# Patient Record
Sex: FEMALE | Race: WHITE | Employment: OTHER | URBAN - METROPOLITAN AREA
[De-identification: names, ages, dates, MRNs, and addresses within clinical notes are randomized per-mention and may not be internally consistent; named-entity substitution may affect disease eponyms.]

---

## 2017-01-24 ENCOUNTER — HOSPITAL ENCOUNTER (OUTPATIENT)
Dept: WOMENS IMAGING | Age: 67
Discharge: HOME | End: 2017-01-24
Attending: INTERNAL MEDICINE | Admitting: INTERNAL MEDICINE

## 2017-01-24 DIAGNOSIS — Z12.39 SCREENING BREAST EXAMINATION: ICD-10-CM

## 2017-02-11 PROBLEM — I21.4 NSTEMI (NON-ST ELEVATED MYOCARDIAL INFARCTION) (HCC): Status: ACTIVE | Noted: 2017-02-11

## 2017-02-14 ENCOUNTER — CARE COORDINATOR VISIT (OUTPATIENT)
Dept: CASE MANAGEMENT | Age: 67
End: 2017-02-14

## 2017-02-27 ENCOUNTER — OFFICE VISIT (OUTPATIENT)
Dept: CARDIOLOGY CLINIC | Age: 67
End: 2017-02-27

## 2017-02-27 VITALS
WEIGHT: 287 LBS | BODY MASS INDEX: 52.81 KG/M2 | HEART RATE: 64 BPM | DIASTOLIC BLOOD PRESSURE: 62 MMHG | HEIGHT: 62 IN | SYSTOLIC BLOOD PRESSURE: 128 MMHG

## 2017-02-27 DIAGNOSIS — Z95.0 CARDIAC PACEMAKER IN SITU: Primary | ICD-10-CM

## 2017-02-27 PROCEDURE — 99214 OFFICE O/P EST MOD 30 MIN: CPT | Performed by: INTERNAL MEDICINE

## 2017-03-02 ENCOUNTER — OFFICE VISIT (OUTPATIENT)
Dept: SURGERY | Age: 67
End: 2017-03-02

## 2017-03-02 VITALS
SYSTOLIC BLOOD PRESSURE: 140 MMHG | BODY MASS INDEX: 52.49 KG/M2 | HEART RATE: 77 BPM | WEIGHT: 287 LBS | RESPIRATION RATE: 16 BRPM | DIASTOLIC BLOOD PRESSURE: 48 MMHG

## 2017-03-02 DIAGNOSIS — C50.919 MALIGNANT NEOPLASM OF BREAST (FEMALE), UNSPECIFIED SITE: Primary | ICD-10-CM

## 2017-03-02 PROCEDURE — 99213 OFFICE O/P EST LOW 20 MIN: CPT | Performed by: SURGERY

## 2017-03-06 ENCOUNTER — HOSPITAL ENCOUNTER (OUTPATIENT)
Dept: OTHER | Age: 67
Discharge: HOME | End: 2017-03-06
Attending: INTERNAL MEDICINE | Admitting: INTERNAL MEDICINE

## 2017-03-06 LAB
ALBUMIN SERPL-MCNC: 4.1 GM/DL (ref 3.4–5)
ALP BLD-CCNC: 109 IU/L (ref 40–129)
ALT SERPL-CCNC: 32 U/L (ref 10–40)
ANION GAP SERPL CALCULATED.3IONS-SCNC: 13 MMOL/L (ref 4–16)
AST SERPL-CCNC: 32 IU/L (ref 15–37)
BILIRUB SERPL-MCNC: 0.3 MG/DL (ref 0–1)
BUN BLDV-MCNC: 12 MG/DL (ref 6–23)
CALCIUM SERPL-MCNC: 10.1 MG/DL (ref 8.3–10.6)
CHLORIDE BLD-SCNC: 98 MMOL/L (ref 99–110)
CO2: 29 MMOL/L (ref 21–32)
CREAT SERPL-MCNC: 0.8 MG/DL (ref 0.6–1.1)
GFR AFRICAN AMERICAN: >60 ML/MIN/1.73M2
GFR NON-AFRICAN AMERICAN: >60 ML/MIN/1.73M2
GLUCOSE BLD-MCNC: 86 MG/DL (ref 70–140)
LACTATE DEHYDROGENASE: 212 IU/L (ref 120–246)
POTASSIUM SERPL-SCNC: 4.1 MMOL/L (ref 3.5–5.1)
RETICULOCYTE COUNT PCT: 1.9 % (ref 0.2–2.2)
SODIUM BLD-SCNC: 140 MMOL/L (ref 135–145)
TOTAL PROTEIN: 7.4 GM/DL (ref 6.4–8.2)

## 2017-03-10 ENCOUNTER — HOSPITAL ENCOUNTER (OUTPATIENT)
Dept: OTHER | Age: 67
Discharge: HOME | End: 2017-03-10
Attending: SURGERY | Admitting: SURGERY

## 2017-03-10 ENCOUNTER — PROCEDURE VISIT (OUTPATIENT)
Dept: SURGERY | Age: 67
End: 2017-03-10

## 2017-03-10 ENCOUNTER — TELEPHONE (OUTPATIENT)
Dept: SURGERY | Age: 67
End: 2017-03-10

## 2017-03-10 VITALS
HEART RATE: 90 BPM | RESPIRATION RATE: 16 BRPM | SYSTOLIC BLOOD PRESSURE: 147 MMHG | DIASTOLIC BLOOD PRESSURE: 82 MMHG | WEIGHT: 287 LBS | HEIGHT: 62 IN | BODY MASS INDEX: 52.81 KG/M2

## 2017-03-10 DIAGNOSIS — J34.89 LESION OF NOSE: Primary | ICD-10-CM

## 2017-03-10 PROCEDURE — 11100 PR BIOPSY OF SKIN LESION: CPT | Performed by: SURGERY

## 2017-03-13 ENCOUNTER — HOSPITAL ENCOUNTER (OUTPATIENT)
Dept: ULTRASOUND IMAGING | Age: 67
Discharge: HOME | End: 2017-03-13
Attending: INTERNAL MEDICINE | Admitting: INTERNAL MEDICINE

## 2017-03-13 DIAGNOSIS — R16.2 HEPATOMEGALY WITH SPLENOMEGALY, NOT ELSEWHERE CLASSIFIED: ICD-10-CM

## 2017-03-13 DIAGNOSIS — R16.2 HEPATOSPLENOMEGALY: ICD-10-CM

## 2017-03-13 DIAGNOSIS — C50.411 CARCINOMA OF UPPER-OUTER QUADRANT OF FEMALE BREAST, RIGHT (HCC): ICD-10-CM

## 2017-03-15 ENCOUNTER — OFFICE VISIT (OUTPATIENT)
Dept: SURGERY | Age: 67
End: 2017-03-15

## 2017-03-15 VITALS
SYSTOLIC BLOOD PRESSURE: 130 MMHG | WEIGHT: 287 LBS | HEART RATE: 78 BPM | HEIGHT: 62 IN | BODY MASS INDEX: 52.81 KG/M2 | DIASTOLIC BLOOD PRESSURE: 70 MMHG | RESPIRATION RATE: 16 BRPM

## 2017-03-15 DIAGNOSIS — K14.8 TONGUE LESION: ICD-10-CM

## 2017-03-15 DIAGNOSIS — J34.89 LESION OF NOSE: Primary | ICD-10-CM

## 2017-03-15 PROCEDURE — 99024 POSTOP FOLLOW-UP VISIT: CPT | Performed by: SURGERY

## 2017-05-30 ENCOUNTER — HOSPITAL ENCOUNTER (OUTPATIENT)
Dept: OTHER | Age: 67
Discharge: HOME | End: 2017-05-30
Attending: INTERNAL MEDICINE | Admitting: INTERNAL MEDICINE

## 2017-05-30 LAB
ALBUMIN SERPL-MCNC: 4.2 GM/DL (ref 3.4–5)
ALP BLD-CCNC: 106 IU/L (ref 40–129)
ALT SERPL-CCNC: 33 U/L (ref 10–40)
ANION GAP SERPL CALCULATED.3IONS-SCNC: 17 MMOL/L (ref 4–16)
AST SERPL-CCNC: 58 IU/L (ref 15–37)
BILIRUB SERPL-MCNC: 0.3 MG/DL (ref 0–1)
BUN BLDV-MCNC: 17 MG/DL (ref 6–23)
CALCIUM SERPL-MCNC: 9.9 MG/DL (ref 8.3–10.6)
CHLORIDE BLD-SCNC: 99 MMOL/L (ref 99–110)
CO2: 26 MMOL/L (ref 21–32)
CREAT SERPL-MCNC: 0.9 MG/DL (ref 0.6–1.1)
GFR AFRICAN AMERICAN: >60 ML/MIN/1.73M2
GFR NON-AFRICAN AMERICAN: >60 ML/MIN/1.73M2
GLUCOSE BLD-MCNC: 122 MG/DL (ref 70–140)
LACTATE DEHYDROGENASE: 277 IU/L (ref 120–246)
POTASSIUM SERPL-SCNC: 4.9 MMOL/L (ref 3.5–5.1)
SODIUM BLD-SCNC: 142 MMOL/L (ref 135–145)
TOTAL PROTEIN: 7.4 GM/DL (ref 6.4–8.2)

## 2017-09-01 ENCOUNTER — HOSPITAL ENCOUNTER (OUTPATIENT)
Dept: GENERAL RADIOLOGY | Age: 67
Discharge: HOME | End: 2017-09-01
Attending: FAMILY MEDICINE | Admitting: FAMILY MEDICINE

## 2017-09-01 ENCOUNTER — HOSPITAL ENCOUNTER (OUTPATIENT)
Dept: ULTRASOUND IMAGING | Age: 67
Discharge: HOME | End: 2017-09-01
Attending: INTERNAL MEDICINE | Admitting: INTERNAL MEDICINE

## 2017-09-01 DIAGNOSIS — R93.89 ABNORMAL FINDING ON RADIOLOGY EXAM: ICD-10-CM

## 2017-09-01 DIAGNOSIS — R16.1 SPLENOMEGALY: ICD-10-CM

## 2017-09-01 LAB
ALBUMIN SERPL-MCNC: 4.1 GM/DL (ref 3.4–5)
ALP BLD-CCNC: 102 IU/L (ref 40–129)
ALT SERPL-CCNC: 35 U/L (ref 10–40)
ANION GAP SERPL CALCULATED.3IONS-SCNC: 14 MMOL/L (ref 4–16)
AST SERPL-CCNC: 52 IU/L (ref 15–37)
BILIRUB SERPL-MCNC: 0.4 MG/DL (ref 0–1)
BUN BLDV-MCNC: 12 MG/DL (ref 6–23)
CALCIUM SERPL-MCNC: 10.4 MG/DL (ref 8.3–10.6)
CHLORIDE BLD-SCNC: 98 MMOL/L (ref 99–110)
CO2: 27 MMOL/L (ref 21–32)
CREAT SERPL-MCNC: 0.8 MG/DL (ref 0.6–1.1)
DIFFERENTIAL TYPE: ABNORMAL
EOSINOPHILS ABSOLUTE: 0.1 K/CU MM
EOSINOPHILS RELATIVE PERCENT: 1 % (ref 0–3)
ESTIMATED AVERAGE GLUCOSE: 114 MG/DL
FERRITIN: 306 NG/ML (ref 15–150)
FOLATE: 12.7 NG/ML (ref 3.1–17.5)
GFR AFRICAN AMERICAN: >60 ML/MIN/1.73M2
GFR NON-AFRICAN AMERICAN: >60 ML/MIN/1.73M2
GLUCOSE BLD-MCNC: 112 MG/DL (ref 70–140)
HBA1C MFR BLD: 5.6 % (ref 4.2–6.3)
HCT VFR BLD CALC: 37.2 % (ref 37–47)
HEMOGLOBIN: 12.1 GM/DL (ref 12.5–16)
HEPATITIS C ANTIBODY: NON REACTIVE
LACTATE DEHYDROGENASE: 247 IU/L (ref 120–246)
LYMPHOCYTES ABSOLUTE: 1.6 K/CU MM
LYMPHOCYTES RELATIVE PERCENT: 16 % (ref 24–44)
MCH RBC QN AUTO: 32.4 PG (ref 27–31)
MCHC RBC AUTO-ENTMCNC: 32.5 % (ref 32–36)
MCV RBC AUTO: 99.7 FL (ref 78–100)
MONOCYTES ABSOLUTE: 0.4 K/CU MM
MONOCYTES RELATIVE PERCENT: 4 % (ref 0–4)
PDW BLD-RTO: 14.6 % (ref 11.7–14.9)
PLATELET # BLD: 212 K/CU MM (ref 140–440)
PMV BLD AUTO: 9.8 FL (ref 7.5–11.1)
POTASSIUM SERPL-SCNC: 5.2 MMOL/L (ref 3.5–5.1)
RBC # BLD: 3.73 M/CU MM (ref 4.2–5.4)
SEGMENTED NEUTROPHILS ABSOLUTE COUNT: 7.8 K/CU MM
SEGMENTED NEUTROPHILS RELATIVE PERCENT: 79 % (ref 36–66)
SODIUM BLD-SCNC: 139 MMOL/L (ref 135–145)
TOTAL PROTEIN: 7.2 GM/DL (ref 6.4–8.2)
VITAMIN B-12: 175.2 PG/ML (ref 211–911)
WBC # BLD: 9.9 K/CU MM (ref 4–10.5)

## 2017-09-11 ENCOUNTER — HOSPITAL ENCOUNTER (OUTPATIENT)
Dept: OTHER | Age: 67
Discharge: HOME | End: 2017-09-11
Attending: FAMILY MEDICINE | Admitting: FAMILY MEDICINE

## 2017-09-11 LAB
BACTERIA: ABNORMAL /HPF
BILIRUBIN URINE: NEGATIVE MG/DL
BLOOD, URINE: NEGATIVE
CLARITY: CLEAR
COLOR: ABNORMAL
GLUCOSE, URINE: NEGATIVE MG/DL
KETONES, URINE: NEGATIVE MG/DL
LEUKOCYTE ESTERASE, URINE: ABNORMAL
NITRITE URINE, QUANTITATIVE: POSITIVE
PH, URINE: 7 (ref 5–8)
PROTEIN UA: NEGATIVE MG/DL
RBC URINE: <1 /HPF (ref 0–6)
SPECIFIC GRAVITY UA: 1.01 (ref 1–1.03)
SQUAMOUS EPITHELIAL: 1 /HPF
TRICHOMONAS: ABNORMAL /HPF
UROBILINOGEN, URINE: NORMAL MG/DL (ref 0.2–1)
WBC UA: 12 /HPF (ref 0–5)
YEAST: ABNORMAL /HPF

## 2017-09-13 LAB
CULTURE: NORMAL
ORGANISM: NORMAL
REPORT STATUS: NORMAL
REQUEST PROBLEM: NORMAL
SPECIMEN: NORMAL
TOTAL COLONY COUNT: NORMAL

## 2017-11-28 ENCOUNTER — HOSPITAL ENCOUNTER (OUTPATIENT)
Dept: GENERAL RADIOLOGY | Age: 67
Discharge: HOME | End: 2017-11-28
Attending: FAMILY MEDICINE | Admitting: FAMILY MEDICINE

## 2017-11-28 DIAGNOSIS — M25.562 LEFT KNEE PAIN, UNSPECIFIED CHRONICITY: ICD-10-CM

## 2017-11-28 LAB
ALBUMIN SERPL-MCNC: 4.2 GM/DL (ref 3.4–5)
ALP BLD-CCNC: 110 IU/L (ref 40–128)
ALT SERPL-CCNC: 22 U/L (ref 10–40)
ANION GAP SERPL CALCULATED.3IONS-SCNC: 15 MMOL/L (ref 4–16)
AST SERPL-CCNC: 30 IU/L (ref 15–37)
BASOPHILS ABSOLUTE: 0.1 K/CU MM
BASOPHILS RELATIVE PERCENT: 0.5 % (ref 0–1)
BILIRUB SERPL-MCNC: 0.2 MG/DL (ref 0–1)
BUN BLDV-MCNC: 12 MG/DL (ref 6–23)
CALCIUM SERPL-MCNC: 9.9 MG/DL (ref 8.3–10.6)
CHLORIDE BLD-SCNC: 99 MMOL/L (ref 99–110)
CHOLESTEROL: 188 MG/DL
CO2: 28 MMOL/L (ref 21–32)
CREAT SERPL-MCNC: 0.7 MG/DL (ref 0.6–1.1)
DIFFERENTIAL TYPE: ABNORMAL
EOSINOPHILS ABSOLUTE: 0.3 K/CU MM
EOSINOPHILS RELATIVE PERCENT: 2.2 % (ref 0–3)
ESTIMATED AVERAGE GLUCOSE: 108 MG/DL
GFR AFRICAN AMERICAN: >60 ML/MIN/1.73M2
GFR NON-AFRICAN AMERICAN: >60 ML/MIN/1.73M2
GLUCOSE FASTING: 84 MG/DL (ref 70–99)
HBA1C MFR BLD: 5.4 % (ref 4.2–6.3)
HCT VFR BLD CALC: 37.1 % (ref 37–47)
HDLC SERPL-MCNC: 46 MG/DL
HEMOGLOBIN: 11.6 GM/DL (ref 12.5–16)
IMMATURE NEUTROPHIL %: 0.9 % (ref 0–0.43)
LDL CHOLESTEROL DIRECT: 113 MG/DL
LYMPHOCYTES ABSOLUTE: 2.2 K/CU MM
LYMPHOCYTES RELATIVE PERCENT: 19.1 % (ref 24–44)
MCH RBC QN AUTO: 29.6 PG (ref 27–31)
MCHC RBC AUTO-ENTMCNC: 31.3 % (ref 32–36)
MCV RBC AUTO: 94.6 FL (ref 78–100)
MONOCYTES ABSOLUTE: 0.7 K/CU MM
MONOCYTES RELATIVE PERCENT: 5.6 % (ref 0–4)
NUCLEATED RBC %: 0 %
PDW BLD-RTO: 13.6 % (ref 11.7–14.9)
PLATELET # BLD: 291 K/CU MM (ref 140–440)
PMV BLD AUTO: 10 FL (ref 7.5–11.1)
POTASSIUM SERPL-SCNC: 4.5 MMOL/L (ref 3.5–5.1)
RBC # BLD: 3.92 M/CU MM (ref 4.2–5.4)
SEGMENTED NEUTROPHILS ABSOLUTE COUNT: 8.4 K/CU MM
SEGMENTED NEUTROPHILS RELATIVE PERCENT: 71.7 % (ref 36–66)
SODIUM BLD-SCNC: 142 MMOL/L (ref 135–145)
TOTAL IMMATURE NEUTOROPHIL: 0.11 K/CU MM
TOTAL NUCLEATED RBC: 0 K/CU MM
TOTAL PROTEIN: 7.5 GM/DL (ref 6.4–8.2)
TRIGL SERPL-MCNC: 215 MG/DL
VITAMIN B-12: 652 PG/ML (ref 211–911)
WBC # BLD: 11.7 K/CU MM (ref 4–10.5)

## 2017-12-05 ENCOUNTER — PAT TELEPHONE (OUTPATIENT)
Dept: SURGERY | Age: 67
End: 2017-12-05

## 2017-12-05 VITALS — HEIGHT: 62 IN | BODY MASS INDEX: 51.34 KG/M2 | WEIGHT: 279 LBS

## 2017-12-07 ENCOUNTER — HOSPITAL ENCOUNTER (OUTPATIENT)
Dept: OTHER | Age: 67
Discharge: HOME | End: 2017-12-07
Attending: INTERNAL MEDICINE | Admitting: INTERNAL MEDICINE

## 2017-12-07 LAB
ALBUMIN SERPL-MCNC: 4 GM/DL (ref 3.4–5)
ALP BLD-CCNC: 109 IU/L (ref 40–128)
ALT SERPL-CCNC: 19 U/L (ref 10–40)
ANION GAP SERPL CALCULATED.3IONS-SCNC: 15 MMOL/L (ref 4–16)
AST SERPL-CCNC: 20 IU/L (ref 15–37)
BILIRUB SERPL-MCNC: 0.2 MG/DL (ref 0–1)
BUN BLDV-MCNC: 14 MG/DL (ref 6–23)
CALCIUM SERPL-MCNC: 10 MG/DL (ref 8.3–10.6)
CHLORIDE BLD-SCNC: 101 MMOL/L (ref 99–110)
CO2: 28 MMOL/L (ref 21–32)
CREAT SERPL-MCNC: 0.8 MG/DL (ref 0.6–1.1)
GFR AFRICAN AMERICAN: >60 ML/MIN/1.73M2
GFR NON-AFRICAN AMERICAN: >60 ML/MIN/1.73M2
GLUCOSE BLD-MCNC: 95 MG/DL (ref 70–99)
POTASSIUM SERPL-SCNC: 4.2 MMOL/L (ref 3.5–5.1)
SODIUM BLD-SCNC: 144 MMOL/L (ref 135–145)
TOTAL PROTEIN: 7.2 GM/DL (ref 6.4–8.2)

## 2017-12-11 NOTE — ANESTHESIA PRE-OP
Department of Anesthesiology  Preprocedure Note       Name:  Eliseo Persaud   Age:  75 y.o.  :  1950                                          MRN:  [id number]         Date:  2017      Surgeon:    Procedure: colonoscopy    Medications prior to admission:   Prior to Admission medications    Medication Sig Start Date End Date Taking? Authorizing Provider   acetaminophen-codeine (TYLENOL #3) 300-30 MG per tablet Take 1 tablet by mouth every 6 hours as needed for Pain 7/30/15   Levi Ricci MD   PROAIR  (90 BASE) MCG/ACT inhaler INHALE 2 PUFFS BY MOUTH EVERY SIX HOURS AS NEEDED FOR WHEEZING  6/30/15   Amelia Montoya MD   sertraline (ZOLOFT) 100 MG tablet Take 200 mg by mouth every morning     Historical Provider, MD   aspirin 81 MG EC tablet Take 81 mg by mouth every morning     Historical Provider, MD   CPAP Machine MISC Inhale  into the lungs nightly. Historical Provider, MD   FLOVENT  MCG/ACT inhaler Inhale 2 puffs into the lungs 2 times daily. 1/3/13   Historical Provider, MD   ezetimibe (ZETIA) 10 MG tablet Take 10 mg by mouth every morning     Historical Provider, MD   simvastatin (ZOCOR) 20 MG tablet Take 20 mg by mouth nightly.     Historical Provider, MD   letrozole (FEMARA) 2.5 MG tablet Take 2.5 mg by mouth nightly     Historical Provider, MD   Ferrous Sulfate (IRON) 325 (65 FE) MG TABS Take 1 tablet by mouth every morning     Historical Provider, MD   calcium-vitamin D (OSCAL 500/200 D-3) 500-200 MG-UNIT per tablet Take 1 tablet by mouth 2 times daily     Historical Provider, MD   lisinopril (PRINIVIL;ZESTRIL) 20 MG tablet Take 20 mg by mouth every morning     Historical Provider, MD       Current medications:    Current Outpatient Prescriptions   Medication Sig Dispense Refill    acetaminophen-codeine (TYLENOL #3) 300-30 MG per tablet Take 1 tablet by mouth every 6 hours as needed for Pain 15 tablet 0    PROAIR  (90 BASE) MCG/ACT inhaler INHALE 2 PUFFS BY MOUTH EVERY SIX HOURS AS NEEDED FOR WHEEZING  1 Inhaler 5    sertraline (ZOLOFT) 100 MG tablet Take 200 mg by mouth every morning       aspirin 81 MG EC tablet Take 81 mg by mouth every morning       CPAP Machine MISC Inhale  into the lungs nightly.  FLOVENT  MCG/ACT inhaler Inhale 2 puffs into the lungs 2 times daily.  ezetimibe (ZETIA) 10 MG tablet Take 10 mg by mouth every morning       simvastatin (ZOCOR) 20 MG tablet Take 20 mg by mouth nightly.  letrozole (FEMARA) 2.5 MG tablet Take 2.5 mg by mouth nightly       Ferrous Sulfate (IRON) 325 (65 FE) MG TABS Take 1 tablet by mouth every morning       calcium-vitamin D (OSCAL 500/200 D-3) 500-200 MG-UNIT per tablet Take 1 tablet by mouth 2 times daily       lisinopril (PRINIVIL;ZESTRIL) 20 MG tablet Take 20 mg by mouth every morning        No current facility-administered medications for this encounter. Allergies: Allergies   Allergen Reactions    Lipitor [Atorvastatin] Shortness Of Breath    Taxol [Paclitaxel] Anaphylaxis and Swelling    Alcohol Other (See Comments)     Passed out with minimal amount.  Demerol Nausea Only    Neosporin [Bacitracin-Neomycin-Polymyxin] Rash and Other (See Comments)     hotness    Other Rash     Ivory Soap    Talc Other (See Comments)     blisters       Problem List:    Patient Active Problem List   Diagnosis Code    Carcinoma of breast ([facility] C50.919    Malignant neoplasm of breast (female) ([facility] C50.919    Diffuse cystic mastopathy N60.19    Hyperlipidemia E78.5    H/O chest pain Z87.898    Fall W19. XXXA    Heart block AV second degree I44.1    Abnormal cardiovascular stress test R94.39    JAMIE (obstructive sleep apnea) G47.33    Super obesity (HCC) E66.01    Mild asthma J45.998    Severe obstructive sleep apnea G47.33    Cardiac pacemaker Z95.0    Chronic cystitis N30.20    Acute renal failure (ARF) (HCC) N17.9    Hyponatremia E87.1    Asthma J45.909    Hypertension I10    uterus.  HERNIA REPAIR  2004    Umb hernia    HERNIA REPAIR  2008    Inc hernia    LYMPH NODE DISSECTION  2/29/2012    Right axillary-3 sentinel nodes were positive out of 9.    MASTECTOMY, RADICAL Right 02/29/2012    w/ sentinal node disection-Dr West    PACEMAKER PLACEMENT      PRE-MALIGNANT / BENIGN SKIN LESION EXCISION  2/8/2013    right leg X2-Dr West    TONSILLECTOMY  1957    TUNNELED VENOUS PORT PLACEMENT  2004    TUNNELED VENOUS PORT PLACEMENT  02/13/2012    removal of mediport       Social History:    Social History   Substance Use Topics    Smoking status: Never Smoker    Smokeless tobacco: Never Used    Alcohol use No      Comment:           CAFFEINE: 2- 12oz nona daily & 2 cups coffee some nights. Counseling given: Not Answered      Vital Signs (Current): There were no vitals filed for this visit. BP Readings from Last 3 Encounters:   07/13/17 (!) 153/62   03/15/17 130/70   03/10/17 (!) 147/82       NPO Status:                                                                                 BMI:   Wt Readings from Last 3 Encounters:   12/05/17 279 lb (126.6 kg)   07/13/17 278 lb (126.1 kg)   03/15/17 287 lb (130.2 kg)     There is no height or weight on file to calculate BMI. Anesthesia Evaluation    Airway:         Dental:          Pulmonary:   (+) sleep apnea:  asthma:                            Cardiovascular:    (+) hypertension:, pacemaker:, past MI:, hyperlipidemia         Beta Blocker:  Not on Beta Blocker         Neuro/Psych:   (+) depression/anxiety             GI/Hepatic/Renal:   (+) bowel prep, morbid obesity          Endo/Other:    (+) : arthritis: OA., malignancy/cancer (breast). Abdominal:           Vascular:                                        Anesthesia Plan      MAC     ASA 3       Induction: intravenous. Pre Anesthesia Assessment complete.  Chart

## 2017-12-14 ENCOUNTER — HOSPITAL ENCOUNTER (OUTPATIENT)
Dept: SURGERY | Age: 67
Discharge: HOME | End: 2017-12-14
Attending: SPECIALIST | Admitting: SPECIALIST

## 2017-12-14 VITALS
OXYGEN SATURATION: 96 % | DIASTOLIC BLOOD PRESSURE: 54 MMHG | BODY MASS INDEX: 51.71 KG/M2 | SYSTOLIC BLOOD PRESSURE: 114 MMHG | HEART RATE: 62 BPM | TEMPERATURE: 98.2 F | RESPIRATION RATE: 16 BRPM | WEIGHT: 281 LBS | HEIGHT: 62 IN

## 2017-12-14 RX ORDER — SODIUM CHLORIDE, SODIUM LACTATE, POTASSIUM CHLORIDE, CALCIUM CHLORIDE 600; 310; 30; 20 MG/100ML; MG/100ML; MG/100ML; MG/100ML
INJECTION, SOLUTION INTRAVENOUS CONTINUOUS
Status: DISCONTINUED | OUTPATIENT
Start: 2017-12-14 | End: 2017-12-15 | Stop reason: HOSPADM

## 2017-12-14 RX ADMIN — SODIUM CHLORIDE, SODIUM LACTATE, POTASSIUM CHLORIDE, CALCIUM CHLORIDE: 600; 310; 30; 20 INJECTION, SOLUTION INTRAVENOUS at 10:40

## 2017-12-14 ASSESSMENT — PAIN SCALES - GENERAL
PAINLEVEL_OUTOF10: 0
PAINLEVEL_OUTOF10: 0

## 2017-12-14 ASSESSMENT — PAIN - FUNCTIONAL ASSESSMENT: PAIN_FUNCTIONAL_ASSESSMENT: 0-10

## 2017-12-14 NOTE — BRIEF OP NOTE
BRIEF COLONOSCOPY REPORT:    Impression:    1) S/P partial sigmoid resection with end-to-side anastamosis     2) 1.5 cm residual polyp at the end of the blind sigmoid anastamotic loop- removed by EMR   3) three 5  Mm polyps removed from the blind sigmoid loop    4) sigmoid divertics   5) internal hemorrhoids      BRIEF EGD REPORT:    Impression:    1) no esophageal or gastric varices    2) small hiatal hernia            SUGGEST:   1) repeat colonoscopy 1 ywear       The complete operative report (including photos) is available in the following locations:   1) soft chart now   2) report will also be scanned and can then be found by going to \"chart review\" then \"notes\" then \"op report\" or by going to \"chart review\" then \"media\" then \"op report\". For review of photos, may need to go to page 2.

## 2017-12-14 NOTE — ANESTHESIA PRE-OP
inhaler Inhale 2 puffs into the lungs 2 times daily.  ezetimibe (ZETIA) 10 MG tablet Take 10 mg by mouth every morning       simvastatin (ZOCOR) 20 MG tablet Take 20 mg by mouth nightly.  letrozole (FEMARA) 2.5 MG tablet Take 2.5 mg by mouth nightly       Ferrous Sulfate (IRON) 325 (65 FE) MG TABS Take 1 tablet by mouth every morning       calcium-vitamin D (OSCAL 500/200 D-3) 500-200 MG-UNIT per tablet Take 1 tablet by mouth 2 times daily       lisinopril (PRINIVIL;ZESTRIL) 20 MG tablet Take 20 mg by mouth every morning       acetaminophen-codeine (TYLENOL #3) 300-30 MG per tablet Take 1 tablet by mouth every 6 hours as needed for Pain 15 tablet 0    PROAIR  (90 BASE) MCG/ACT inhaler INHALE 2 PUFFS BY MOUTH EVERY SIX HOURS AS NEEDED FOR WHEEZING  1 Inhaler 5    CPAP Machine MISC Inhale  into the lungs nightly. Current Facility-Administered Medications   Medication Dose Route Frequency Provider Last Rate Last Dose    lactated ringers infusion   Intravenous Continuous Jannette Pina MD 50 mL/hr at 12/14/17 1040         Allergies: Allergies   Allergen Reactions    Lipitor [Atorvastatin] Shortness Of Breath    Taxol [Paclitaxel] Anaphylaxis and Swelling    Alcohol Other (See Comments)     Passed out with minimal amount.  Demerol Nausea Only    Neosporin [Bacitracin-Neomycin-Polymyxin] Rash and Other (See Comments)     hotness    Other Rash     Ivory Soap    Talc Other (See Comments)     blisters       Problem List:    Patient Active Problem List   Diagnosis Code    Carcinoma of breast ([facility] C50.919    Malignant neoplasm of breast (female) ([facility] [facility] C50.919    Diffuse cystic mastopathy N60.19    Hyperlipidemia E78.5    H/O chest pain Z87.898    Fall W19. XXXA    Heart block AV second degree I44.1    Abnormal cardiovascular stress test R94.39    JAMIE (obstructive sleep apnea) G47.33    Super obesity (HCC) E66.01    Mild asthma J45.998    Severe obstructive sleep apnea 50%stenosis, there is intimal thickening but no significant atherosclerotic plaque noted in the left internal carotid artery, the left carotid are within normal limits    H/O echocardiogram 4/9/2012, 10/20/2011    4/9/2012-LVSF normal EF=>55%. impaired LV relaxation;    H/O echocardiogram 12/31/14    EF 50-55%. LV shows mild concentric hypertrophy. Mildly dilated left atrium. Mildly dilated right ventricle. Sclerotic but not stenotic aortic valve. Mitral annular calcification. Mild MR, Mild TR, Mild pulm HTN.    H/O urinary incontinence     History of blood transfusion     Hx of cardiovascular stress test 06/2013    EF 70% abn suggestive of anterolateral ischemia, possible RCA ischmeia, post left ventricular dilation suggestive multivessel CAD.  Hx of echocardiogram 06/2013    EF50%, mild MR and TR, mild pulmonary HTN, mild AS    HX OTHER MEDICAL 7/24/13, 06/2013 7/13-No clinacally significant arrhythmia. 6/13-multiple cycles of wenckebach episodes in addtion to some sinus tach    Hyperlipidemia     Hypertension     Hypothermia 2008    Obstructive sleep apnea 2008 01- Has CPAP machine but has not used in over a year - states she has not had problems since her pacemaker was placed.  Osteoarthritis     S/P cardiac cath 06/2013    no significant CAD false postive stress test    Super obesity [facility])        Past Surgical History:        Procedure Laterality Date    A-V CARDIAC PACEMAKER INSERTION  3/10/14     Medtronic. Model:  [id number] Medtronic  Serial:  [id number] dual chamber pacemaker    APPENDECTOMY  2004    BREAST BIOPSY  2/13/12    BREAST LUMPECTOMY  2007    right     BREAST SURGERY  02/13/2012    rt lesion biopsy     CARDIAC CATHETERIZATION  2007 & 2011    COLECTOMY  2004    Colon cancer    COLONOSCOPY  10-18-13    polyp    COLONOSCOPY  1/19/2016    internal hemorrhoids, diverticulosis, 1.5 cm sessile polyp, 8 mm polyp found in rectum.     CYST REMOVAL  2003    back  CYSTOSCOPY Bilateral 12/15/14    DENTAL SURGERY      front right tooth and root canal w/ brass bolt    DILATION AND CURETTAGE OF UTERUS  2006    Needed a camera to find my uterus.  HERNIA REPAIR  2004    Umb hernia    HERNIA REPAIR  2008    Inc hernia    LYMPH NODE DISSECTION  2/29/2012    Right axillary-3 sentinel nodes were positive out of 9.    MASTECTOMY, RADICAL Right 02/29/2012    w/ sentinal node disection-Dr West    PACEMAKER PLACEMENT      PRE-MALIGNANT / BENIGN SKIN LESION EXCISION  2/8/2013    right leg X2-Dr West    TONSILLECTOMY  1957    TUNNELED VENOUS PORT PLACEMENT  2004    TUNNELED VENOUS PORT PLACEMENT  02/13/2012    removal of mediport       Social History:    Social History   Substance Use Topics    Smoking status: Never Smoker    Smokeless tobacco: Never Used    Alcohol use No      Comment:           CAFFEINE: 2- 12oz nona daily & 2 cups coffee some nights. Counseling given: Not Answered      Vital Signs (Current):   Vitals:    12/14/17 1030   BP: (!) 159/58   Pulse: 70   Resp: 16   Temp: 98.6 °F (37 °C)   TempSrc: Temporal   SpO2: 98%   Weight: 281 lb (127.5 kg)   Height: 5' 2\" (1.575 m)                                              BP Readings from Last 3 Encounters:   12/14/17 (!) 159/58   07/13/17 (!) 153/62   03/15/17 130/70       NPO Status: Time of last liquid consumption: 0825                        Time of last solid consumption: 0900                        Date of last liquid consumption: 12/14/17                        Date of last solid food consumption: 12/13/17    BMI:   Wt Readings from Last 3 Encounters:   12/14/17 281 lb (127.5 kg)   12/05/17 279 lb (126.6 kg)   07/13/17 278 lb (126.1 kg)     Body mass index is 51.4 kg/m².     Anesthesia Evaluation   no history of anesthetic complications:   Airway: Mallampati: I  TM distance: >3 FB   Neck ROM: full  Mouth opening: > = 3 FB Dental:          Pulmonary:   (+) sleep apnea: on

## 2017-12-14 NOTE — ANESTHESIA POST-OP
Anesthesia Post-op Note    Patient: Juan David Obrien  MRN: [id number]  YOB: 1950  Date of evaluation: 12/14/2017  Time:  2:53 PM     Procedure(s) Performed: EGD COLONOSCOPY  Last Vitals: BP (!) 114/54   Pulse 62   Temp 98.2 °F (36.8 °C) (Temporal)   Resp 16   Ht 5' 2\" (1.575 m)   Wt 281 lb (127.5 kg)   SpO2 96%   BMI 51.40 kg/m²     Florian Phase I:      Florian Phase II: Florian Score: 10    Anesthesia Post Evaluation    Final anesthesia type: MAC  Patient location during evaluation: outpatient unit  Patient participation: complete - patient participated  Level of consciousness: awake and alert  Pain score: 0  Nausea & Vomiting: no nausea and no vomiting  Complications: no  Cardiovascular status: blood pressure returned to baseline  Respiratory status: acceptable  Hydration status: euvolemic        Janes Howe CRNA  2:53 PM

## 2018-01-25 ENCOUNTER — HOSPITAL ENCOUNTER (OUTPATIENT)
Dept: WOMENS IMAGING | Age: 68
Discharge: HOME | End: 2018-01-25
Attending: FAMILY MEDICINE | Admitting: FAMILY MEDICINE

## 2018-01-25 DIAGNOSIS — Z12.31 SCREENING MAMMOGRAM, ENCOUNTER FOR: ICD-10-CM

## 2018-03-19 ENCOUNTER — HOSPITAL ENCOUNTER (OUTPATIENT)
Dept: GENERAL RADIOLOGY | Age: 68
Discharge: HOME | End: 2018-03-19
Attending: FAMILY MEDICINE | Admitting: FAMILY MEDICINE

## 2018-03-19 LAB
ANION GAP SERPL CALCULATED.3IONS-SCNC: 14 MMOL/L (ref 4–16)
BUN BLDV-MCNC: 15 MG/DL (ref 6–23)
CALCIUM SERPL-MCNC: 10.4 MG/DL (ref 8.3–10.6)
CHLORIDE BLD-SCNC: 98 MMOL/L (ref 99–110)
CO2: 29 MMOL/L (ref 21–32)
CREAT SERPL-MCNC: 0.9 MG/DL (ref 0.6–1.1)
GFR AFRICAN AMERICAN: >60 ML/MIN/1.73M2
GFR NON-AFRICAN AMERICAN: >60 ML/MIN/1.73M2
GLUCOSE BLD-MCNC: 105 MG/DL (ref 70–99)
POTASSIUM SERPL-SCNC: 4.9 MMOL/L (ref 3.5–5.1)
SODIUM BLD-SCNC: 141 MMOL/L (ref 135–145)

## 2018-04-12 ENCOUNTER — HOSPITAL ENCOUNTER (OUTPATIENT)
Dept: GENERAL RADIOLOGY | Age: 68
Discharge: HOME | End: 2018-04-12
Attending: FAMILY MEDICINE | Admitting: FAMILY MEDICINE

## 2018-04-12 LAB
CHOLESTEROL: 175 MG/DL
HDLC SERPL-MCNC: 53 MG/DL
LDL CHOLESTEROL DIRECT: 105 MG/DL
T4 FREE: 1.06 NG/DL (ref 0.9–1.8)
TRIGL SERPL-MCNC: 159 MG/DL
TSH HIGH SENSITIVITY: 1.29 UIU/ML (ref 0.27–4.2)

## 2018-04-15 ENCOUNTER — HOSPITAL ENCOUNTER (OUTPATIENT)
Dept: CT IMAGING | Age: 68
Discharge: HOME | End: 2018-04-15

## 2018-09-18 ENCOUNTER — TELEPHONE (OUTPATIENT)
Dept: CARDIOLOGY CLINIC | Age: 68
End: 2018-09-18

## 2018-09-18 ENCOUNTER — OFFICE VISIT (OUTPATIENT)
Dept: CARDIOLOGY CLINIC | Age: 68
End: 2018-09-18

## 2018-09-18 VITALS
WEIGHT: 274 LBS | HEIGHT: 62 IN | DIASTOLIC BLOOD PRESSURE: 70 MMHG | BODY MASS INDEX: 50.42 KG/M2 | HEART RATE: 60 BPM | SYSTOLIC BLOOD PRESSURE: 114 MMHG

## 2018-09-18 DIAGNOSIS — Z95.0 PACEMAKER: Primary | ICD-10-CM

## 2018-09-18 PROCEDURE — 4040F PNEUMOC VAC/ADMIN/RCVD: CPT | Performed by: INTERNAL MEDICINE

## 2018-09-18 PROCEDURE — 99213 OFFICE O/P EST LOW 20 MIN: CPT | Performed by: INTERNAL MEDICINE

## 2018-09-18 PROCEDURE — 93280 PM DEVICE PROGR EVAL DUAL: CPT | Performed by: INTERNAL MEDICINE

## 2018-09-18 PROCEDURE — 3017F COLORECTAL CA SCREEN DOC REV: CPT | Performed by: INTERNAL MEDICINE

## 2018-09-18 PROCEDURE — 1090F PRES/ABSN URINE INCON ASSESS: CPT | Performed by: INTERNAL MEDICINE

## 2018-09-18 PROCEDURE — 1123F ACP DISCUSS/DSCN MKR DOCD: CPT | Performed by: INTERNAL MEDICINE

## 2018-09-18 PROCEDURE — 1036F TOBACCO NON-USER: CPT | Performed by: INTERNAL MEDICINE

## 2018-09-18 PROCEDURE — G8598 ASA/ANTIPLAT THER USED: HCPCS | Performed by: INTERNAL MEDICINE

## 2018-09-18 PROCEDURE — G8427 DOCREV CUR MEDS BY ELIG CLIN: HCPCS | Performed by: INTERNAL MEDICINE

## 2018-09-18 PROCEDURE — 3014F SCREEN MAMMO DOC REV: CPT | Performed by: INTERNAL MEDICINE

## 2018-09-18 PROCEDURE — 1101F PT FALLS ASSESS-DOCD LE1/YR: CPT | Performed by: INTERNAL MEDICINE

## 2018-09-18 PROCEDURE — G8399 PT W/DXA RESULTS DOCUMENT: HCPCS | Performed by: INTERNAL MEDICINE

## 2018-09-18 PROCEDURE — G8417 CALC BMI ABV UP PARAM F/U: HCPCS | Performed by: INTERNAL MEDICINE

## 2018-09-18 RX ORDER — LANOLIN ALCOHOL/MO/W.PET/CERES
1000 CREAM (GRAM) TOPICAL DAILY
COMMUNITY
End: 2022-10-13

## 2018-09-18 NOTE — PROGRESS NOTES
Active Problem List   Diagnosis Code    Carcinoma of breast ([facility] [address] C50.919    Malignant neoplasm of breast (female) ([facility] [address] C50.919    Diffuse cystic mastopathy N60.19    Hyperlipidemia E78.5    H/O chest pain Z87.898    Fall W19. XXXA    Heart block AV second degree I44.1    Abnormal cardiovascular stress test R94.39    JAMIE (obstructive sleep apnea) G47.33    Super obesity E66.9    Mild asthma J45.998    Severe obstructive sleep apnea G47.33    Cardiac pacemaker Z95.0    Chronic cystitis N30.20    Acute renal failure (ARF) (HCC) N17.9    Hyponatremia E87.1    Asthma J45.909    Hypertension I10    Depression F32.9    Obstructive sleep apnea G47.33    Nocturnal oxygen desaturation G47.34    Moderate protein malnutrition (HCC) E44.0    [facility]STEM[facility]non-[facility] elevated [facility]) [facility] I21.4    Acute chest pain R07.9       Assessment & Plan:    -  Hypertension: Patients blood pressure is normal. Patient is advised about low sodium diet. Present medical regimen will not be changed. -  LIPID MANAGEMENT:  Available lipid  lab data reviewed  and patient was given dietary advice. NCEP- ATP III guidelines reviewed with patient. -   Changes  in medicines made: No          - PPM     Check today    · PACER  ANALYSIS:    Pacer analysis is reviewed and filed in the pacer chart. Analysis is consistent with normal  function with stable leads and appropriate battery status for the age of the device. Remaining average battery life is 5 yrs. Patient is using pacer A pace22%, V qnon680%. Recommend continued every three month check and follow up office visit as scheduled.     Liz Zhou MD, 9/18/2018 12:07 PM          - JAMIE on CPAP stable                                        Esteban Yang MA  [facility]

## 2018-09-24 ENCOUNTER — TELEPHONE (OUTPATIENT)
Dept: CARDIOLOGY CLINIC | Age: 68
End: 2018-09-24

## 2018-09-28 ENCOUNTER — NURSE ONLY (OUTPATIENT)
Dept: CARDIOLOGY CLINIC | Age: 68
End: 2018-09-28

## 2018-09-28 DIAGNOSIS — Z95.0 CARDIAC PACEMAKER IN SITU: Primary | ICD-10-CM

## 2018-09-28 PROCEDURE — 99999 PR OFFICE/OUTPT VISIT,PROCEDURE ONLY: CPT | Performed by: INTERNAL MEDICINE

## 2018-09-28 NOTE — PROGRESS NOTES
Patient seen today to get help with setting up Medtronic monitor to do pacemaker checks at home. Patient was shown how to send transmissions and given a schedule. I explained how to care for monitor and to keep plugged at all times. Patient voiced understanding. Patient is in a wheelchair and it makes it hard for her to get to appointments. She is very happy to be able to do checks at home.

## 2018-10-16 ENCOUNTER — HOSPITAL ENCOUNTER (OUTPATIENT)
Age: 68
Discharge: HOME | End: 2018-10-16
Payer: COMMERCIAL

## 2018-10-16 LAB
ALBUMIN SERPL-MCNC: 3.7 GM/DL (ref 3.4–5)
ALP BLD-CCNC: 112 IU/L (ref 40–129)
ALT SERPL-CCNC: 16 U/L (ref 10–40)
ANION GAP SERPL CALCULATED.3IONS-SCNC: 15 MMOL/L (ref 4–16)
AST SERPL-CCNC: 18 IU/L (ref 15–37)
BACTERIA: ABNORMAL /HPF
BASOPHILS ABSOLUTE: 0 K/CU MM
BASOPHILS RELATIVE PERCENT: 0.3 % (ref 0–1)
BILIRUB SERPL-MCNC: 0.2 MG/DL (ref 0–1)
BILIRUBIN URINE: NEGATIVE MG/DL
BLOOD, URINE: ABNORMAL
BUN BLDV-MCNC: 12 MG/DL (ref 6–23)
CALCIUM SERPL-MCNC: 9.5 MG/DL (ref 8.3–10.6)
CHLORIDE BLD-SCNC: 98 MMOL/L (ref 99–110)
CHOLESTEROL: 160 MG/DL
CLARITY: ABNORMAL
CO2: 27 MMOL/L (ref 21–32)
COLOR: YELLOW
CREAT SERPL-MCNC: 1 MG/DL (ref 0.6–1.1)
DIFFERENTIAL TYPE: ABNORMAL
EOSINOPHILS ABSOLUTE: 0.2 K/CU MM
EOSINOPHILS RELATIVE PERCENT: 1.6 % (ref 0–3)
ESTIMATED AVERAGE GLUCOSE: 108 MG/DL
GFR AFRICAN AMERICAN: >60 ML/MIN/1.73M2
GFR NON-AFRICAN AMERICAN: 55 ML/MIN/1.73M2
GLUCOSE FASTING: 119 MG/DL (ref 70–99)
GLUCOSE, URINE: NEGATIVE MG/DL
HBA1C MFR BLD: 5.4 % (ref 4.2–6.3)
HCT VFR BLD CALC: 35.5 % (ref 37–47)
HDLC SERPL-MCNC: 55 MG/DL
HEMOGLOBIN: 10.9 GM/DL (ref 12.5–16)
IMMATURE NEUTROPHIL %: 1.3 % (ref 0–0.43)
IRON: 52 UG/DL (ref 37–145)
KETONES, URINE: NEGATIVE MG/DL
LDL CHOLESTEROL DIRECT: 93 MG/DL
LEUKOCYTE ESTERASE, URINE: ABNORMAL
LYMPHOCYTES ABSOLUTE: 2.1 K/CU MM
LYMPHOCYTES RELATIVE PERCENT: 19.2 % (ref 24–44)
MCH RBC QN AUTO: 28.5 PG (ref 27–31)
MCHC RBC AUTO-ENTMCNC: 30.7 % (ref 32–36)
MCV RBC AUTO: 92.7 FL (ref 78–100)
MONOCYTES ABSOLUTE: 0.4 K/CU MM
MONOCYTES RELATIVE PERCENT: 3.7 % (ref 0–4)
NITRITE URINE, QUANTITATIVE: NEGATIVE
NUCLEATED RBC %: 0 %
PDW BLD-RTO: 14.6 % (ref 11.7–14.9)
PH, URINE: 7 (ref 5–8)
PLATELET # BLD: 296 K/CU MM (ref 140–440)
PMV BLD AUTO: 9.3 FL (ref 7.5–11.1)
POTASSIUM SERPL-SCNC: 4.3 MMOL/L (ref 3.5–5.1)
PROTEIN UA: 30 MG/DL
RBC # BLD: 3.83 M/CU MM (ref 4.2–5.4)
RBC URINE: 6 /HPF (ref 0–6)
SEGMENTED NEUTROPHILS ABSOLUTE COUNT: 8 K/CU MM
SEGMENTED NEUTROPHILS RELATIVE PERCENT: 73.9 % (ref 36–66)
SODIUM BLD-SCNC: 140 MMOL/L (ref 135–145)
SPECIFIC GRAVITY UA: 1.01 (ref 1–1.03)
SQUAMOUS EPITHELIAL: <1 /HPF
TOTAL IMMATURE NEUTOROPHIL: 0.14 K/CU MM
TOTAL NUCLEATED RBC: 0 K/CU MM
TOTAL PROTEIN: 7 GM/DL (ref 6.4–8.2)
TRICHOMONAS: ABNORMAL /HPF
TRIGL SERPL-MCNC: 138 MG/DL
UROBILINOGEN, URINE: NORMAL MG/DL (ref 0.2–1)
WBC # BLD: 10.8 K/CU MM (ref 4–10.5)
WBC CLUMP: ABNORMAL /HPF
WBC UA: 106 /HPF (ref 0–5)

## 2018-10-16 PROCEDURE — 87077 CULTURE AEROBIC IDENTIFY: CPT

## 2018-10-16 PROCEDURE — 81001 URINALYSIS AUTO W/SCOPE: CPT

## 2018-10-16 PROCEDURE — 36415 COLL VENOUS BLD VENIPUNCTURE: CPT

## 2018-10-16 PROCEDURE — 83721 ASSAY OF BLOOD LIPOPROTEIN: CPT

## 2018-10-16 PROCEDURE — 85025 COMPLETE CBC W/AUTO DIFF WBC: CPT

## 2018-10-16 PROCEDURE — 87086 URINE CULTURE/COLONY COUNT: CPT

## 2018-10-16 PROCEDURE — 80053 COMPREHEN METABOLIC PANEL: CPT

## 2018-10-16 PROCEDURE — 80061 LIPID PANEL: CPT

## 2018-10-16 PROCEDURE — 87186 SC STD MICRODIL/AGAR DIL: CPT

## 2018-10-16 PROCEDURE — 83540 ASSAY OF IRON: CPT

## 2018-10-16 PROCEDURE — 83036 HEMOGLOBIN GLYCOSYLATED A1C: CPT

## 2018-10-19 LAB
CULTURE: NORMAL
CULTURE: NORMAL
Lab: NORMAL
ORGANISM: NORMAL
REPORT STATUS: NORMAL
SPECIMEN: NORMAL
TOTAL COLONY COUNT: NORMAL

## 2018-11-05 ENCOUNTER — HOSPITAL ENCOUNTER (OUTPATIENT)
Dept: WOMENS IMAGING | Age: 68
Discharge: HOME | End: 2018-11-05
Payer: COMMERCIAL

## 2018-11-05 DIAGNOSIS — M85.9 DISORDER OF BONE DENSITY AND STRUCTURE, UNSPECIFIED: ICD-10-CM

## 2018-11-05 DIAGNOSIS — M89.9 BONE DISEASE: ICD-10-CM

## 2018-11-05 PROCEDURE — 77080 DXA BONE DENSITY AXIAL: CPT

## 2018-12-23 PROCEDURE — 93296 REM INTERROG EVL PM/IDS: CPT | Performed by: INTERNAL MEDICINE

## 2018-12-23 PROCEDURE — 93294 REM INTERROG EVL PM/LDLS PM: CPT | Performed by: INTERNAL MEDICINE

## 2018-12-26 ENCOUNTER — PROCEDURE VISIT (OUTPATIENT)
Dept: CARDIOLOGY CLINIC | Age: 68
End: 2018-12-26
Payer: COMMERCIAL

## 2018-12-26 DIAGNOSIS — Z95.0 CARDIAC PACEMAKER IN SITU: Primary | ICD-10-CM

## 2018-12-26 DIAGNOSIS — I44.1 HEART BLOCK AV SECOND DEGREE: ICD-10-CM

## 2019-01-15 ENCOUNTER — ANESTHESIA EVENT (OUTPATIENT)
Dept: ENDOSCOPY | Age: 69
End: 2019-01-15
Payer: COMMERCIAL

## 2019-01-15 ASSESSMENT — LIFESTYLE VARIABLES: SMOKING_STATUS: 0

## 2019-01-16 ENCOUNTER — HOSPITAL ENCOUNTER (EMERGENCY)
Age: 69
Discharge: HOME | End: 2019-01-16
Payer: COMMERCIAL

## 2019-01-16 ENCOUNTER — APPOINTMENT (OUTPATIENT)
Dept: GENERAL RADIOLOGY | Age: 69
End: 2019-01-16
Payer: COMMERCIAL

## 2019-01-16 ENCOUNTER — HOSPITAL ENCOUNTER (OUTPATIENT)
Age: 69
Setting detail: OUTPATIENT SURGERY
Discharge: HOME | End: 2019-01-16
Attending: SPECIALIST | Admitting: SPECIALIST
Payer: COMMERCIAL

## 2019-01-16 ENCOUNTER — APPOINTMENT (OUTPATIENT)
Dept: ULTRASOUND IMAGING | Age: 69
End: 2019-01-16
Payer: COMMERCIAL

## 2019-01-16 ENCOUNTER — ANESTHESIA (OUTPATIENT)
Dept: ENDOSCOPY | Age: 69
End: 2019-01-16
Payer: COMMERCIAL

## 2019-01-16 VITALS
OXYGEN SATURATION: 95 % | TEMPERATURE: 97.1 F | DIASTOLIC BLOOD PRESSURE: 68 MMHG | WEIGHT: 268 LBS | HEART RATE: 64 BPM | RESPIRATION RATE: 18 BRPM | BODY MASS INDEX: 49.32 KG/M2 | SYSTOLIC BLOOD PRESSURE: 123 MMHG | HEIGHT: 62 IN

## 2019-01-16 VITALS
SYSTOLIC BLOOD PRESSURE: 130 MMHG | BODY MASS INDEX: 49.32 KG/M2 | OXYGEN SATURATION: 95 % | HEART RATE: 74 BPM | WEIGHT: 268 LBS | TEMPERATURE: 98 F | RESPIRATION RATE: 17 BRPM | DIASTOLIC BLOOD PRESSURE: 49 MMHG | HEIGHT: 62 IN

## 2019-01-16 VITALS — SYSTOLIC BLOOD PRESSURE: 110 MMHG | OXYGEN SATURATION: 100 % | DIASTOLIC BLOOD PRESSURE: 55 MMHG

## 2019-01-16 DIAGNOSIS — M25.551 BILATERAL HIP PAIN: ICD-10-CM

## 2019-01-16 DIAGNOSIS — M25.561 PAIN IN BOTH KNEES, UNSPECIFIED CHRONICITY: ICD-10-CM
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
## 2019-01-16 DIAGNOSIS — M25.552 BILATERAL HIP PAIN: ICD-10-CM

## 2019-01-16 DIAGNOSIS — I73.9 ARTERIAL INSUFFICIENCY, POSTERIOR TIBIAL (HCC): ICD-10-CM

## 2019-01-16 DIAGNOSIS — M25.562 PAIN IN BOTH KNEES, UNSPECIFIED CHRONICITY: ICD-10-CM

## 2019-01-16 DIAGNOSIS — I77.1 ARTERIAL OCCLUSION DUE TO STENOSIS (HCC): ICD-10-CM

## 2019-01-16 PROCEDURE — 7100000011 HC PHASE II RECOVERY - ADDTL 15 MIN: Performed by: SPECIALIST

## 2019-01-16 PROCEDURE — 73590 X-RAY EXAM OF LOWER LEG: CPT

## 2019-01-16 PROCEDURE — 7100000010 HC PHASE II RECOVERY - FIRST 15 MIN: Performed by: SPECIALIST

## 2019-01-16 PROCEDURE — 2500000003 HC RX 250 WO HCPCS: Performed by: NURSE ANESTHETIST, CERTIFIED REGISTERED

## 2019-01-16 PROCEDURE — 88305 TISSUE EXAM BY PATHOLOGIST: CPT

## 2019-01-16 PROCEDURE — 73552 X-RAY EXAM OF FEMUR 2/>: CPT

## 2019-01-16 PROCEDURE — C1773 RET DEV, INSERTABLE: HCPCS | Performed by: SPECIALIST

## 2019-01-16 PROCEDURE — 2720000010 HC SURG SUPPLY STERILE: Performed by: SPECIALIST

## 2019-01-16 PROCEDURE — 3700000000 HC ANESTHESIA ATTENDED CARE: Performed by: SPECIALIST

## 2019-01-16 PROCEDURE — 99284 EMERGENCY DEPT VISIT MOD MDM: CPT

## 2019-01-16 PROCEDURE — 3700000001 HC ADD 15 MINUTES (ANESTHESIA): Performed by: SPECIALIST

## 2019-01-16 PROCEDURE — 2500000003 HC RX 250 WO HCPCS: Performed by: SPECIALIST

## 2019-01-16 PROCEDURE — 72170 X-RAY EXAM OF PELVIS: CPT

## 2019-01-16 PROCEDURE — 2580000003 HC RX 258: Performed by: ANESTHESIOLOGY

## 2019-01-16 PROCEDURE — 2580000003 HC RX 258: Performed by: NURSE ANESTHETIST, CERTIFIED REGISTERED

## 2019-01-16 PROCEDURE — 2709999900 HC NON-CHARGEABLE SUPPLY: Performed by: SPECIALIST

## 2019-01-16 PROCEDURE — 6360000002 HC RX W HCPCS: Performed by: NURSE ANESTHETIST, CERTIFIED REGISTERED

## 2019-01-16 PROCEDURE — 3609010200 HC COLONOSCOPY ABLATION TUMOR POLYP/OTHER LES: Performed by: SPECIALIST

## 2019-01-16 PROCEDURE — 93926 LOWER EXTREMITY STUDY: CPT

## 2019-01-16 RX ORDER — SODIUM CHLORIDE, SODIUM LACTATE, POTASSIUM CHLORIDE, CALCIUM CHLORIDE 600; 310; 30; 20 MG/100ML; MG/100ML; MG/100ML; MG/100ML
INJECTION, SOLUTION INTRAVENOUS CONTINUOUS PRN
Status: DISCONTINUED | OUTPATIENT
Start: 2019-01-16 | End: 2019-01-16 | Stop reason: SDUPTHER

## 2019-01-16 RX ORDER — SODIUM CHLORIDE, SODIUM LACTATE, POTASSIUM CHLORIDE, CALCIUM CHLORIDE 600; 310; 30; 20 MG/100ML; MG/100ML; MG/100ML; MG/100ML
INJECTION, SOLUTION INTRAVENOUS ONCE
Status: COMPLETED | OUTPATIENT
Start: 2019-01-16 | End: 2019-01-16

## 2019-01-16 RX ORDER — PROPOFOL 10 MG/ML
INJECTION, EMULSION INTRAVENOUS PRN
Status: DISCONTINUED | OUTPATIENT
Start: 2019-01-16 | End: 2019-01-16 | Stop reason: SDUPTHER

## 2019-01-16 RX ORDER — LIDOCAINE HYDROCHLORIDE 20 MG/ML
INJECTION, SOLUTION EPIDURAL; INFILTRATION; INTRACAUDAL; PERINEURAL PRN
Status: DISCONTINUED | OUTPATIENT
Start: 2019-01-16 | End: 2019-01-16 | Stop reason: SDUPTHER

## 2019-01-16 RX ADMIN — SODIUM CHLORIDE, POTASSIUM CHLORIDE, SODIUM LACTATE AND CALCIUM CHLORIDE: 600; 310; 30; 20 INJECTION, SOLUTION INTRAVENOUS at 11:20

## 2019-01-16 RX ADMIN — PROPOFOL 20 MG: 10 INJECTION, EMULSION INTRAVENOUS at 12:21

## 2019-01-16 RX ADMIN — SODIUM CHLORIDE, POTASSIUM CHLORIDE, SODIUM LACTATE AND CALCIUM CHLORIDE: 600; 310; 30; 20 INJECTION, SOLUTION INTRAVENOUS at 12:04

## 2019-01-16 RX ADMIN — PROPOFOL 20 MG: 10 INJECTION, EMULSION INTRAVENOUS at 12:30

## 2019-01-16 RX ADMIN — PROPOFOL 20 MG: 10 INJECTION, EMULSION INTRAVENOUS at 12:48

## 2019-01-16 RX ADMIN — PROPOFOL 20 MG: 10 INJECTION, EMULSION INTRAVENOUS at 12:45

## 2019-01-16 RX ADMIN — PROPOFOL 20 MG: 10 INJECTION, EMULSION INTRAVENOUS at 12:28

## 2019-01-16 RX ADMIN — PROPOFOL 20 MG: 10 INJECTION, EMULSION INTRAVENOUS at 12:33

## 2019-01-16 RX ADMIN — LIDOCAINE HYDROCHLORIDE 60 MG: 20 INJECTION, SOLUTION EPIDURAL; INFILTRATION; INTRACAUDAL; PERINEURAL at 12:04

## 2019-01-16 RX ADMIN — PROPOFOL 20 MG: 10 INJECTION, EMULSION INTRAVENOUS at 12:19

## 2019-01-16 RX ADMIN — PROPOFOL 60 MG: 10 INJECTION, EMULSION INTRAVENOUS at 12:04

## 2019-01-16 RX ADMIN — PROPOFOL 20 MG: 10 INJECTION, EMULSION INTRAVENOUS at 12:37

## 2019-01-16 RX ADMIN — PROPOFOL 20 MG: 10 INJECTION, EMULSION INTRAVENOUS at 12:10

## 2019-01-16 RX ADMIN — PROPOFOL 20 MG: 10 INJECTION, EMULSION INTRAVENOUS at 12:23

## 2019-01-16 RX ADMIN — PROPOFOL 20 MG: 10 INJECTION, EMULSION INTRAVENOUS at 12:15

## 2019-01-16 RX ADMIN — PROPOFOL 20 MG: 10 INJECTION, EMULSION INTRAVENOUS at 12:08

## 2019-01-16 RX ADMIN — PROPOFOL 20 MG: 10 INJECTION, EMULSION INTRAVENOUS at 12:25

## 2019-01-16 RX ADMIN — PROPOFOL 20 MG: 10 INJECTION, EMULSION INTRAVENOUS at 12:42

## 2019-01-16 RX ADMIN — PROPOFOL 20 MG: 10 INJECTION, EMULSION INTRAVENOUS at 12:17

## 2019-01-16 RX ADMIN — PROPOFOL 20 MG: 10 INJECTION, EMULSION INTRAVENOUS at 12:12

## 2019-01-16 RX ADMIN — PROPOFOL 20 MG: 10 INJECTION, EMULSION INTRAVENOUS at 12:39

## 2019-01-16 ASSESSMENT — PAIN SCALES - GENERAL
PAINLEVEL_OUTOF10: 0
PAINLEVEL_OUTOF10: 7

## 2019-01-16 ASSESSMENT — PAIN DESCRIPTION - DESCRIPTORS: DESCRIPTORS: CONSTANT

## 2019-01-16 ASSESSMENT — PAIN DESCRIPTION - ORIENTATION: ORIENTATION: RIGHT;LEFT

## 2019-01-16 ASSESSMENT — PAIN - FUNCTIONAL ASSESSMENT: PAIN_FUNCTIONAL_ASSESSMENT: 0-10

## 2019-01-16 ASSESSMENT — PAIN DESCRIPTION - PAIN TYPE: TYPE: ACUTE PAIN

## 2019-01-16 ASSESSMENT — PAIN DESCRIPTION - LOCATION: LOCATION: LEG

## 2019-01-17 ENCOUNTER — TELEPHONE (OUTPATIENT)
Dept: INTERNAL MEDICINE CLINIC | Age: 69
End: 2019-01-17

## 2019-01-18 RX ORDER — LISINOPRIL 20 MG/1
20 TABLET ORAL EVERY MORNING
Qty: 90 TABLET | Refills: 0 | Status: SHIPPED | OUTPATIENT
Start: 2019-01-18 | End: 2019-02-05 | Stop reason: SDUPTHER

## 2019-02-04 ENCOUNTER — HOSPITAL ENCOUNTER (OUTPATIENT)
Dept: WOMENS IMAGING | Age: 69
Discharge: HOME | End: 2019-02-04
Payer: COMMERCIAL

## 2019-02-04 DIAGNOSIS — Z12.31 SCREENING MAMMOGRAM, ENCOUNTER FOR: ICD-10-CM

## 2019-02-04 PROCEDURE — 77067 SCR MAMMO BI INCL CAD: CPT

## 2019-02-05 ENCOUNTER — OFFICE VISIT (OUTPATIENT)
Dept: INTERNAL MEDICINE CLINIC | Age: 69
End: 2019-02-05
Payer: COMMERCIAL

## 2019-02-05 VITALS
WEIGHT: 274 LBS | HEIGHT: 62 IN | OXYGEN SATURATION: 98 % | HEART RATE: 52 BPM | BODY MASS INDEX: 50.42 KG/M2 | RESPIRATION RATE: 18 BRPM | DIASTOLIC BLOOD PRESSURE: 78 MMHG | SYSTOLIC BLOOD PRESSURE: 138 MMHG

## 2019-02-05 DIAGNOSIS — F32.A DEPRESSION, UNSPECIFIED DEPRESSION TYPE: ICD-10-CM

## 2019-02-05 DIAGNOSIS — Z91.81 AT HIGH RISK FOR FALLS: ICD-10-CM

## 2019-02-05 DIAGNOSIS — R20.2 PARESTHESIA AND PAIN OF LEFT EXTREMITY: ICD-10-CM

## 2019-02-05 DIAGNOSIS — I77.1 ARTERIAL OCCLUSION DUE TO STENOSIS (HCC): ICD-10-CM

## 2019-02-05 DIAGNOSIS — M19.90 OSTEOARTHRITIS, UNSPECIFIED OSTEOARTHRITIS TYPE, UNSPECIFIED SITE: ICD-10-CM

## 2019-02-05 DIAGNOSIS — J44.9 CHRONIC OBSTRUCTIVE PULMONARY DISEASE, UNSPECIFIED COPD TYPE (HCC): ICD-10-CM

## 2019-02-05 DIAGNOSIS — E78.5 HYPERLIPIDEMIA, UNSPECIFIED HYPERLIPIDEMIA TYPE: ICD-10-CM

## 2019-02-05 DIAGNOSIS — M79.609 PARESTHESIA AND PAIN OF LEFT EXTREMITY: ICD-10-CM

## 2019-02-05 DIAGNOSIS — I10 ESSENTIAL HYPERTENSION: Primary | ICD-10-CM

## 2019-02-05 DIAGNOSIS — D64.9 ANEMIA, UNSPECIFIED TYPE: ICD-10-CM

## 2019-02-05 PROCEDURE — 99214 OFFICE O/P EST MOD 30 MIN: CPT | Performed by: FAMILY MEDICINE

## 2019-02-05 RX ORDER — EZETIMIBE 10 MG/1
10 TABLET ORAL EVERY MORNING
Qty: 90 TABLET | Refills: 0 | Status: SHIPPED | OUTPATIENT
Start: 2019-02-05 | End: 2019-05-22 | Stop reason: SDUPTHER

## 2019-02-05 RX ORDER — FLUTICASONE PROPIONATE 110 UG/1
2 AEROSOL, METERED RESPIRATORY (INHALATION) 2 TIMES DAILY
Qty: 1 INHALER | Refills: 2 | Status: SHIPPED | OUTPATIENT
Start: 2019-02-05 | End: 2019-05-22 | Stop reason: SDUPTHER

## 2019-02-05 RX ORDER — LISINOPRIL 20 MG/1
20 TABLET ORAL EVERY MORNING
Qty: 90 TABLET | Refills: 0 | Status: SHIPPED | OUTPATIENT
Start: 2019-02-05 | End: 2019-05-22

## 2019-02-05 RX ORDER — SIMVASTATIN 20 MG
20 TABLET ORAL NIGHTLY
Qty: 90 TABLET | Refills: 0 | Status: SHIPPED | OUTPATIENT
Start: 2019-02-05 | End: 2019-05-22 | Stop reason: SDUPTHER

## 2019-02-05 RX ORDER — ALBUTEROL SULFATE 90 UG/1
AEROSOL, METERED RESPIRATORY (INHALATION)
Qty: 1 INHALER | Refills: 2 | Status: SHIPPED | OUTPATIENT
Start: 2019-02-05 | End: 2021-02-11 | Stop reason: SDUPTHER

## 2019-02-05 ASSESSMENT — PATIENT HEALTH QUESTIONNAIRE - PHQ9
1. LITTLE INTEREST OR PLEASURE IN DOING THINGS: 0
2. FEELING DOWN, DEPRESSED OR HOPELESS: 0
SUM OF ALL RESPONSES TO PHQ QUESTIONS 1-9: 0
SUM OF ALL RESPONSES TO PHQ9 QUESTIONS 1 & 2: 0
SUM OF ALL RESPONSES TO PHQ QUESTIONS 1-9: 0

## 2019-02-10 ASSESSMENT — ENCOUNTER SYMPTOMS
SHORTNESS OF BREATH: 0
WHEEZING: 0
CHEST TIGHTNESS: 0
BLOOD IN STOOL: 0
NAUSEA: 0
EYES NEGATIVE: 1
BACK PAIN: 0
ABDOMINAL PAIN: 0

## 2019-02-12 PROBLEM — I73.9 PVD (PERIPHERAL VASCULAR DISEASE) (HCC): Status: ACTIVE | Noted: 2019-02-12

## 2019-02-26 ENCOUNTER — OFFICE VISIT (OUTPATIENT)
Dept: PHYSICAL MEDICINE AND REHAB | Age: 69
End: 2019-02-26
Payer: COMMERCIAL

## 2019-02-26 DIAGNOSIS — G56.03 BILATERAL CARPAL TUNNEL SYNDROME: Primary | ICD-10-CM

## 2019-02-26 DIAGNOSIS — M79.601 PARESTHESIA AND PAIN OF BOTH UPPER EXTREMITIES: ICD-10-CM

## 2019-02-26 DIAGNOSIS — G60.8 POLYNEUROPATHY, PERIPHERAL SENSORIMOTOR AXONAL: ICD-10-CM

## 2019-02-26 DIAGNOSIS — R20.2 PARESTHESIA AND PAIN OF BOTH UPPER EXTREMITIES: ICD-10-CM

## 2019-02-26 DIAGNOSIS — M79.602 PARESTHESIA AND PAIN OF BOTH UPPER EXTREMITIES: ICD-10-CM

## 2019-02-26 PROCEDURE — 95886 MUSC TEST DONE W/N TEST COMP: CPT | Performed by: PHYSICAL MEDICINE & REHABILITATION

## 2019-02-26 PROCEDURE — 95911 NRV CNDJ TEST 9-10 STUDIES: CPT | Performed by: PHYSICAL MEDICINE & REHABILITATION

## 2019-02-27 ENCOUNTER — TELEPHONE (OUTPATIENT)
Dept: INTERNAL MEDICINE CLINIC | Age: 69
End: 2019-02-27

## 2019-04-01 ENCOUNTER — PROCEDURE VISIT (OUTPATIENT)
Dept: CARDIOLOGY CLINIC | Age: 69
End: 2019-04-01
Payer: COMMERCIAL

## 2019-04-01 DIAGNOSIS — I44.1 HEART BLOCK AV SECOND DEGREE: ICD-10-CM

## 2019-04-01 DIAGNOSIS — Z95.0 CARDIAC PACEMAKER IN SITU: Primary | ICD-10-CM

## 2019-04-01 PROCEDURE — 93296 REM INTERROG EVL PM/IDS: CPT | Performed by: INTERNAL MEDICINE

## 2019-04-01 PROCEDURE — 93294 REM INTERROG EVL PM/LDLS PM: CPT | Performed by: INTERNAL MEDICINE

## 2019-04-18 DIAGNOSIS — I44.1 HEART BLOCK AV SECOND DEGREE: ICD-10-CM

## 2019-04-18 DIAGNOSIS — R94.39 ABNORMAL CARDIOVASCULAR STRESS TEST: ICD-10-CM

## 2019-04-18 DIAGNOSIS — Z87.898 H/O CHEST PAIN: ICD-10-CM

## 2019-04-18 DIAGNOSIS — G47.33 SEVERE OBSTRUCTIVE SLEEP APNEA: ICD-10-CM

## 2019-04-18 DIAGNOSIS — E78.5 HYPERLIPIDEMIA: ICD-10-CM

## 2019-04-19 RX ORDER — SERTRALINE HYDROCHLORIDE 100 MG/1
200 TABLET, FILM COATED ORAL EVERY MORNING
Qty: 180 TABLET | Refills: 0 | Status: SHIPPED | OUTPATIENT
Start: 2019-04-19 | End: 2019-05-22 | Stop reason: SDUPTHER

## 2019-04-23 ENCOUNTER — HOSPITAL ENCOUNTER (OUTPATIENT)
Age: 69
Discharge: HOME | End: 2019-04-23
Payer: COMMERCIAL

## 2019-04-23 LAB
ALBUMIN SERPL-MCNC: 4 GM/DL (ref 3.4–5)
ALP BLD-CCNC: 107 IU/L (ref 40–128)
ALT SERPL-CCNC: 19 U/L (ref 10–40)
ANION GAP SERPL CALCULATED.3IONS-SCNC: 12 MMOL/L (ref 4–16)
AST SERPL-CCNC: 21 IU/L (ref 15–37)
BASOPHILS ABSOLUTE: 0 K/CU MM
BASOPHILS RELATIVE PERCENT: 0.3 % (ref 0–1)
BILIRUB SERPL-MCNC: 0.3 MG/DL (ref 0–1)
BUN BLDV-MCNC: 16 MG/DL (ref 6–23)
CALCIUM SERPL-MCNC: 9.9 MG/DL (ref 8.3–10.6)
CHLORIDE BLD-SCNC: 100 MMOL/L (ref 99–110)
CO2: 27 MMOL/L (ref 21–32)
CREAT SERPL-MCNC: 1 MG/DL (ref 0.6–1.1)
DIFFERENTIAL TYPE: ABNORMAL
EOSINOPHILS ABSOLUTE: 0.3 K/CU MM
EOSINOPHILS RELATIVE PERCENT: 3.3 % (ref 0–3)
GFR AFRICAN AMERICAN: >60 ML/MIN/1.73M2
GFR NON-AFRICAN AMERICAN: 55 ML/MIN/1.73M2
GLUCOSE BLD-MCNC: 87 MG/DL (ref 70–99)
HCT VFR BLD CALC: 38.3 % (ref 37–47)
HEMOGLOBIN: 11.6 GM/DL (ref 12.5–16)
IMMATURE NEUTROPHIL %: 0.8 % (ref 0–0.43)
IRON: 57 UG/DL (ref 37–145)
LYMPHOCYTES ABSOLUTE: 1.9 K/CU MM
LYMPHOCYTES RELATIVE PERCENT: 20.8 % (ref 24–44)
MCH RBC QN AUTO: 28 PG (ref 27–31)
MCHC RBC AUTO-ENTMCNC: 30.3 % (ref 32–36)
MCV RBC AUTO: 92.3 FL (ref 78–100)
MONOCYTES ABSOLUTE: 0.6 K/CU MM
MONOCYTES RELATIVE PERCENT: 6.9 % (ref 0–4)
NUCLEATED RBC %: 0 %
PCT TRANSFERRIN: 20 % (ref 10–44)
PDW BLD-RTO: 14.3 % (ref 11.7–14.9)
PLATELET # BLD: 189 K/CU MM (ref 140–440)
PMV BLD AUTO: 10.1 FL (ref 7.5–11.1)
POTASSIUM SERPL-SCNC: 4.6 MMOL/L (ref 3.5–5.1)
RBC # BLD: 4.15 M/CU MM (ref 4.2–5.4)
SEGMENTED NEUTROPHILS ABSOLUTE COUNT: 6.2 K/CU MM
SEGMENTED NEUTROPHILS RELATIVE PERCENT: 67.9 % (ref 36–66)
SODIUM BLD-SCNC: 139 MMOL/L (ref 135–145)
TOTAL IMMATURE NEUTOROPHIL: 0.07 K/CU MM
TOTAL IRON BINDING CAPACITY: 279 UG/DL (ref 250–450)
TOTAL NUCLEATED RBC: 0 K/CU MM
TOTAL PROTEIN: 6.8 GM/DL (ref 6.4–8.2)
TOTAL RETICULOCYTE COUNT: 0.08 K/CU MM
UNSATURATED IRON BINDING CAPACITY: 222 UG/DL (ref 110–370)
WBC # BLD: 9.1 K/CU MM (ref 4–10.5)

## 2019-04-23 PROCEDURE — 80053 COMPREHEN METABOLIC PANEL: CPT

## 2019-04-23 PROCEDURE — 83540 ASSAY OF IRON: CPT

## 2019-04-23 PROCEDURE — 85025 COMPLETE CBC W/AUTO DIFF WBC: CPT

## 2019-04-23 PROCEDURE — 83550 IRON BINDING TEST: CPT

## 2019-05-22 ENCOUNTER — OFFICE VISIT (OUTPATIENT)
Dept: INTERNAL MEDICINE CLINIC | Age: 69
End: 2019-05-22
Payer: COMMERCIAL

## 2019-05-22 VITALS
OXYGEN SATURATION: 98 % | DIASTOLIC BLOOD PRESSURE: 60 MMHG | SYSTOLIC BLOOD PRESSURE: 110 MMHG | WEIGHT: 269.2 LBS | BODY MASS INDEX: 49.54 KG/M2 | HEIGHT: 62 IN | HEART RATE: 98 BPM

## 2019-05-22 DIAGNOSIS — E66.01 MORBID OBESITY WITH BMI OF 45.0-49.9, ADULT (HCC): ICD-10-CM

## 2019-05-22 DIAGNOSIS — E78.5 HYPERLIPIDEMIA, UNSPECIFIED HYPERLIPIDEMIA TYPE: ICD-10-CM

## 2019-05-22 DIAGNOSIS — F33.41 RECURRENT MAJOR DEPRESSIVE DISORDER, IN PARTIAL REMISSION (HCC): ICD-10-CM

## 2019-05-22 DIAGNOSIS — I10 ESSENTIAL HYPERTENSION: Primary | ICD-10-CM

## 2019-05-22 DIAGNOSIS — G56.03 BILATERAL CARPAL TUNNEL SYNDROME: ICD-10-CM

## 2019-05-22 PROCEDURE — 99214 OFFICE O/P EST MOD 30 MIN: CPT | Performed by: FAMILY MEDICINE

## 2019-05-22 RX ORDER — FLUTICASONE PROPIONATE 110 UG/1
2 AEROSOL, METERED RESPIRATORY (INHALATION) 2 TIMES DAILY
Qty: 1 INHALER | Refills: 2 | Status: SHIPPED | OUTPATIENT
Start: 2019-05-22 | End: 2020-09-03 | Stop reason: SDUPTHER

## 2019-05-22 RX ORDER — SIMVASTATIN 20 MG
20 TABLET ORAL NIGHTLY
Qty: 90 TABLET | Refills: 0 | Status: SHIPPED | OUTPATIENT
Start: 2019-05-22 | End: 2019-11-18 | Stop reason: SDUPTHER

## 2019-05-22 RX ORDER — LISINOPRIL 10 MG/1
10 TABLET ORAL DAILY
Qty: 90 TABLET | Refills: 0 | Status: SHIPPED | OUTPATIENT
Start: 2019-05-22 | End: 2019-06-05 | Stop reason: ALTCHOICE

## 2019-05-22 RX ORDER — EZETIMIBE 10 MG/1
10 TABLET ORAL EVERY MORNING
Qty: 90 TABLET | Refills: 0 | Status: SHIPPED | OUTPATIENT
Start: 2019-05-22 | End: 2019-11-18 | Stop reason: SDUPTHER

## 2019-05-22 RX ORDER — SERTRALINE HYDROCHLORIDE 100 MG/1
200 TABLET, FILM COATED ORAL EVERY MORNING
Qty: 180 TABLET | Refills: 0 | Status: SHIPPED | OUTPATIENT
Start: 2019-05-22 | End: 2019-11-18 | Stop reason: SDUPTHER

## 2019-05-26 PROBLEM — E66.01 MORBID OBESITY WITH BMI OF 45.0-49.9, ADULT (HCC): Status: ACTIVE | Noted: 2019-05-26

## 2019-05-26 ASSESSMENT — ENCOUNTER SYMPTOMS
NAUSEA: 0
WHEEZING: 0
BACK PAIN: 0
DIARRHEA: 0
CONSTIPATION: 0
CHEST TIGHTNESS: 0
ABDOMINAL PAIN: 0
BLOOD IN STOOL: 0
EYES NEGATIVE: 1
SHORTNESS OF BREATH: 0

## 2019-05-27 NOTE — PROGRESS NOTES
Nusrat Miranda  1950  78 y.o.  female    Chief Complaint   Patient presents with    Follow-up     Pt here for 3 month f/u for HTN and depression. History of Present Illness  Nusrat Miranda is a 78 y.o. female who presents today for HTN, HL, depression and morbid obesity. Labs reviewed. No Cp or Sob  -HTN-. She would like to try using a lower dose of Lisinopril as she has felt the BP has been on the lower end. No symptoms today  -HL- Contolled and stable on Zocor 20 mg   -Depression- Stable on Zoloft. No SI or plan  Morbid obesity- She has been trying to lose weight. She is eating more vegetables and trying to lower her calories    Review of Systems   Constitutional: Negative for chills, diaphoresis and fever. HENT: Negative. Eyes: Negative. Respiratory: Negative for chest tightness, shortness of breath and wheezing. Cardiovascular: Negative for chest pain and palpitations. Gastrointestinal: Negative for abdominal pain, blood in stool, constipation, diarrhea and nausea. Genitourinary: Negative for difficulty urinating and dysuria. Musculoskeletal: Negative for back pain and neck pain. Skin: Negative. Neurological: Positive for numbness (CTS). Negative for dizziness, light-headedness and headaches. Psychiatric/Behavioral: Negative for sleep disturbance.         No changes in mood       Allergies   Allergen Reactions    Bactrim [Sulfamethoxazole-Trimethoprim] Anaphylaxis and Hives    Lipitor [Atorvastatin] Shortness Of Breath    Taxol [Paclitaxel] Anaphylaxis and Swelling    Demerol Nausea Only    Neosporin [Bacitracin-Neomycin-Polymyxin] Rash and Other (See Comments)     hotness    Other Rash     [facility] So[facility]    [place] Other (See Comments)     blisters       Past Medical History:   Diagnosis Date    Anemia     Anxiety 1978    Arthritis     generalized    Asthma 2006    AV block     2nd degree per hospital in [address] Blood poisoning 1994    Blood transfusion 12/2003    CAD (coronary artery disease)     Cancer ([facility] [address] 2007    skin (face) & colon(2003)/ 2/2012 dx of right breast ca(pc)    Carcinoma of breast ([facility] [address] 2/29/2012    right arm no b/p or sticks    Cardiac pacemaker 3/10/14    Medtronic dual lead    Chronic cystitis     Chronic respiratory failure (HCC)     2 L/min oxygen at night-time    Colon cancer [facility]) 2013    COPD (chronic obstructive pulmonary disease) ([facility] [address]     Depression     Diverticulitis     H/O 24 hour EKG monitoring 2/28/2014 7/24/13 2/14 complete heart block 7/13No clinically significant arrthymia noted.  H/O cardiac catheterization 10/31/2011, 7/11/2007    10/31/2011-No significant CAD. Cardiolite stress test was false positive-Dr Emilio Lipscomb; 7/11/2007-No CAD, global function intact;    H/O cardiovascular stress test 10/20/2011, 3/23/2010    10/20/2011-Lexiscan- Evid of mild ischemia in Left CX region. EF 70%. Global LVSF normal;    H/O cardiovascular stress test 1/7/15    EF 77%. Normal Stress Test.    H/O chest pain 4/2005    sees Dr Jose Elizondo H/O Doppler ultrasound 2/20/2008 2/20/2008-CAROTID DOPPLER- Normal carotids bilaterally;    H/O Doppler ultrasound 06/06/13    CAROTID DOPPLER- there is heterogeneous, irregular atherosclerotic plaque noted in the right internal carotid artery,  doppler flow velocities within the right internal carotid artery are elevated, consistent with a mild, less than 50%stenosis, there is intimal thickening but no significant atherosclerotic plaque noted in the left internal carotid artery, the left carotid are within normal limits    H/O echocardiogram 4/9/2012, 10/20/2011    4/9/2012-LVSF normal EF=>55%. impaired LV relaxation;    H/O echocardiogram 12/31/14    EF 50-55%. LV shows mild concentric hypertrophy. Mildly dilated left atrium. Mildly dilated right ventricle. Sclerotic but not stenotic aortic valve. Mitral annular calcification.   Mild MR, Mild TR, Mild pulm HTN.    H/O urinary incontinence     History of blood transfusion     Hx of cardiovascular stress test 06/2013    EF 70% abn suggestive of anterolateral ischemia, possible RCA ischmeia, post left ventricular dilation suggestive multivessel CAD.  Hx of echocardiogram 06/2013    EF50%, mild MR and TR, mild pulmonary HTN, mild AS    Hx of fall     \"last time 2018- due to neuropathy, have to use cane\"    HX OTHER MEDICAL 7/24/13, 06/2013 7/13-No clinacally significant arrhythmia. 6/13-multiple cycles of wenckebach episodes in addtion to some sinus tach    Hyperlipidemia     Hypertension     Hypothermia 2008    Malignant neoplasm of breast (female) ([address] 2012    Neuropathy     \"have neuropathy of my legs\"    Normocytic normochromic anemia     Obstructive sleep apnea 2008 01- Has CPAP machine but has not used in over a year - states she has not had problems since her pacemaker was placed.  Old MI (myocardial infarction)     per pt on 1/15/2019\"had heart attack about a year ago\"    Osteoarthritis     Osteopenia     [facility] malignant [facility] colon ([facility] [address]     S/P cardiac cath 06/2013    no significant CAD false postive stress test    Super obesity     Umbilical hernia        Past Surgical History:   Procedure Laterality Date    A-V CARDIAC PACEMAKER INSERTION  3/10/14     Medtronic. Model:  [id number] Medtronic  Serial:  [id number] dual chamber pacemaker    APPENDECTOMY  2004    BREAST BIOPSY  2/13/12    BREAST LUMPECTOMY  2007    right     BREAST SURGERY  02/13/2012    rt lesion biopsy     CARDIAC CATHETERIZATION  2007 & 2011    COLECTOMY  2004    Colon cancer    COLONOSCOPY  10-18-13    polyp    COLONOSCOPY  1/19/2016    internal hemorrhoids, diverticulosis, 1.5 cm sessile polyp, 8 mm polyp found in rectum.     COLONOSCOPY  12/14/2017    1.5cm residual polyp, 5mm polyps in blind sigmoid loop x3, Sigmoid divertics, Internal grade 1 hemorrhoids    COLONOSCOPY N/A 1/16/2019    COLONOSCOPY W/ ENDOSCOPIC MUCOSAL RESECTION WITH ELEVIEW 5ML INJECTION AND POLYPECTOMY OF TIP OF BLIND RECTOSIGMOID STUMP AND CAUTERIZATION WITH ERBE PROBE, CLIPPING X2 performed by Shreya Mancuso MD at [place] [address]  2003    back   [address] Bilateral 12/15/14    DENTAL SURGERY      front right tooth and root canal w/ brass bolt    DILATION AND CURETTAGE OF UTERUS  2006    Needed a camera to find my uterus.  ENDOSCOPY, COLON, DIAGNOSTIC  12/14/2017    Small hiatal hernia    HERNIA REPAIR  2004    Umb hernia    HERNIA REPAIR  2008    Inc hernia    LYMPH NODE DISSECTION  2/29/2012    Right axillary-3 sentinel nodes were positive out of 9.    MASTECTOMY, RADICAL Right 02/29/2012    w/ sentinal node disection-Dr West    PACEMAKER PLACEMENT      PRE-MALIGNANT / BENIGN SKIN LESION EXCISION  2/8/2013    right leg X2-Dr West    TONSILLECTOMY  1957    TUNNELED VENOUS PORT PLACEMENT  2004    TUNNELED VENOUS PORT PLACEMENT  02/13/2012    removal of mediport       Social History     Tobacco Use    Smoking status: Never Smoker    Smokeless tobacco: Never Used   Substance Use Topics    Alcohol use: No     Comment:           CAFFEINE: 2- 12oz nona daily & 2 cups coffee some nights.     Drug use: No       Current Outpatient Medications   Medication Sig Dispense Refill    simvastatin (ZOCOR) 20 MG tablet Take 1 tablet by mouth nightly 90 tablet 0    sertraline (ZOLOFT) 100 MG tablet Take 2 tablets by mouth every morning 180 tablet 0    ezetimibe (ZETIA) 10 MG tablet Take 1 tablet by mouth every morning 90 tablet 0    fluticasone (FLOVENT HFA) 110 MCG/ACT inhaler Inhale 2 puffs into the lungs 2 times daily 1 Inhaler 2    lisinopril (PRINIVIL;ZESTRIL) 10 MG tablet Take 1 tablet by mouth daily for high blood presure 90 tablet 0    albuterol sulfate HFA (PROAIR HFA) 108 (90 Base) MCG/ACT inhaler INHALE 2 PUFFS BY MOUTH EVERY SIX HOURS AS NEEDED FOR WHEEZING 1 Inhaler 2    vitamin B-12 (CYANOCOBALAMIN) 1000 MCG tablet Take 1,000 mcg by mouth daily      aspirin 81 MG EC tablet Take 81 mg by mouth every morning       letrozole (FEMARA) 2.5 MG tablet Take 2.5 mg by mouth nightly       Ferrous Sulfate (IRON) 325 (65 FE) MG TABS Take 1 tablet by mouth every morning       calcium-vitamin D (OSCAL 500/200 D-3) 500-200 MG-UNIT per tablet Take 1 tablet by mouth 2 times daily        No current facility-administered medications for this visit. OBJECTIVE:    /60   Pulse 98   Ht 5' 2\" (1.575 m)   Wt 269 lb 3.2 oz (122.1 kg)   LMP 01/15/1999   SpO2 98%   Breastfeeding? No   BMI 49.24 kg/m²     Physical Exam   Constitutional: She is oriented to person, place, and time. She appears well-developed. No distress. HENT:   Right Ear: External ear normal.   Left Ear: External ear normal.   Nose: Nose normal.   Mouth/Throat: Oropharynx is clear and moist.   Eyes: Pupils are equal, round, and reactive to light. EOM are normal.   Neck: Neck supple. Cardiovascular: Normal rate, regular rhythm and normal heart sounds. Pulmonary/Chest: Effort normal and breath sounds normal. No respiratory distress. Abdominal: Soft. Bowel sounds are normal. She exhibits no distension. There is no tenderness. Musculoskeletal: Normal range of motion. Lymphadenopathy:     She has no cervical adenopathy. Neurological: She is alert and oriented to person, place, and time. A sensory deficit (CTS) is present. No cranial nerve deficit. Coordination (ambulates with walker) abnormal.   Skin: Skin is warm and dry. Psychiatric: She has a normal mood and affect. Vitals reviewed. ASSESSMENT:  1. Essential hypertension    2. Hyperlipidemia, unspecified hyperlipidemia type    3. Bilateral carpal tunnel syndrome    4. Recurrent major depressive disorder, in partial remission ([facility] [address]    5.  Morbid obesity with BMI of 45.0-49.9, adult [facility])        PLAN:  Orders Placed This Encounter   Medications    simvastatin (ZOCOR) 20 MG tablet     Sig: Take 1 tablet by mouth nightly     Dispense:  90 tablet     Refill:  0    sertraline (ZOLOFT) 100 MG tablet     Sig: Take 2 tablets by mouth every morning     Dispense:  180 tablet     Refill:  0    ezetimibe (ZETIA) 10 MG tablet     Sig: Take 1 tablet by mouth every morning     Dispense:  90 tablet     Refill:  0    fluticasone (FLOVENT HFA) 110 MCG/ACT inhaler     Sig: Inhale 2 puffs into the lungs 2 times daily     Dispense:  1 Inhaler     Refill:  2    lisinopril (PRINIVIL;ZESTRIL) 10 MG tablet     Sig: Take 1 tablet by mouth daily for high blood presure     Dispense:  90 tablet     Refill:  0     D/C Lisinopril 20   Labs reviewed  Continue medications  ADR's explained  The patient is asked to make an attempt to improve diet and exercise patterns to aid in medical management of this problem. Declines referral or further treatment for her CTS  Keep f/u with specialists          Quality & Risk Score Accuracy    Visit Dx:  D87.75, Y40.75 - Morbid obesity with BMI of 45.0-49.9, adult ([facility]  The current BMI is 49.24 kg/m2 as of 05/22/19 14:56 EDT  Assessment and plan:  Stable based upon symptoms and exam. Continue current treatment plan and follow up at least yearly. Visit Dx:  F33.41 - Recurrent major depressive disorder, in partial remission ([facility]  Assessment and plan:  Stable based upon symptoms and exam. Continue current treatment plan and follow up at least yearly. Last edited 05/22/19 14:57 EDT by Iliana Centeno DO           Return in about 3 months (around 8/22/2019) for HTN.     Electronically Signed by Iliana Centeno DO

## 2019-05-28 ENCOUNTER — TELEPHONE (OUTPATIENT)
Dept: INTERNAL MEDICINE CLINIC | Age: 69
End: 2019-05-28

## 2019-06-03 ENCOUNTER — TELEPHONE (OUTPATIENT)
Dept: INTERNAL MEDICINE CLINIC | Age: 69
End: 2019-06-03

## 2019-06-05 ENCOUNTER — TELEPHONE (OUTPATIENT)
Dept: CARDIOLOGY CLINIC | Age: 69
End: 2019-06-05

## 2019-06-05 NOTE — TELEPHONE ENCOUNTER
The patient called in and stated that her blood pressure has been up and down and up and down .  Her readings have been all over the place  127/63  99/64  104/48  Dr. Erick Carpio wants her pressure above 110

## 2019-06-06 ENCOUNTER — OFFICE VISIT (OUTPATIENT)
Dept: CARDIOLOGY CLINIC | Age: 69
End: 2019-06-06
Payer: COMMERCIAL

## 2019-06-06 VITALS
BODY MASS INDEX: 50.42 KG/M2 | HEART RATE: 66 BPM | DIASTOLIC BLOOD PRESSURE: 60 MMHG | WEIGHT: 274 LBS | HEIGHT: 62 IN | SYSTOLIC BLOOD PRESSURE: 110 MMHG

## 2019-06-06 DIAGNOSIS — I73.9 PVD (PERIPHERAL VASCULAR DISEASE) (HCC): ICD-10-CM

## 2019-06-06 DIAGNOSIS — G47.33 OSA (OBSTRUCTIVE SLEEP APNEA): ICD-10-CM

## 2019-06-06 DIAGNOSIS — R07.9 CHEST PAIN, UNSPECIFIED TYPE: Primary | ICD-10-CM

## 2019-06-06 DIAGNOSIS — Z95.0 CARDIAC PACEMAKER: ICD-10-CM

## 2019-06-06 DIAGNOSIS — I10 ESSENTIAL HYPERTENSION: ICD-10-CM

## 2019-06-06 DIAGNOSIS — E78.5 HYPERLIPIDEMIA, UNSPECIFIED HYPERLIPIDEMIA TYPE: ICD-10-CM

## 2019-06-06 PROCEDURE — 93000 ELECTROCARDIOGRAM COMPLETE: CPT | Performed by: NURSE PRACTITIONER

## 2019-06-06 PROCEDURE — 99214 OFFICE O/P EST MOD 30 MIN: CPT | Performed by: NURSE PRACTITIONER

## 2019-06-06 PROCEDURE — G8598 ASA/ANTIPLAT THER USED: HCPCS | Performed by: NURSE PRACTITIONER

## 2019-06-06 PROCEDURE — 1090F PRES/ABSN URINE INCON ASSESS: CPT | Performed by: NURSE PRACTITIONER

## 2019-06-06 PROCEDURE — G8399 PT W/DXA RESULTS DOCUMENT: HCPCS | Performed by: NURSE PRACTITIONER

## 2019-06-06 PROCEDURE — 3014F SCREEN MAMMO DOC REV: CPT | Performed by: NURSE PRACTITIONER

## 2019-06-06 PROCEDURE — 1036F TOBACCO NON-USER: CPT | Performed by: NURSE PRACTITIONER

## 2019-06-06 PROCEDURE — 3017F COLORECTAL CA SCREEN DOC REV: CPT | Performed by: NURSE PRACTITIONER

## 2019-06-06 PROCEDURE — G8427 DOCREV CUR MEDS BY ELIG CLIN: HCPCS | Performed by: NURSE PRACTITIONER

## 2019-06-06 PROCEDURE — G8417 CALC BMI ABV UP PARAM F/U: HCPCS | Performed by: NURSE PRACTITIONER

## 2019-06-06 PROCEDURE — 4040F PNEUMOC VAC/ADMIN/RCVD: CPT | Performed by: NURSE PRACTITIONER

## 2019-06-06 PROCEDURE — 1123F ACP DISCUSS/DSCN MKR DOCD: CPT | Performed by: NURSE PRACTITIONER

## 2019-06-06 NOTE — PROGRESS NOTES
[facility] [place] PHYSICAL REHABILITATION Cost  [address]  Phone: [phone]    Fax [phone]                  Julia Henry MD, Lemuel Ardon MD, Kitty Osman MD, MD Riddhi Chen MD Hortense Cai, MD Augusta Bernard, APRROSIE               Jose Freangelo APRROSIE     [employer] [place], APRN      6/6/2019    RE: Allyn Sarabia  (1950)                               TO:  Dr. Aliyah Banks DO  The primary cardiologist is Dr. Mehul Godinez    CC:   1. Chest pain, unspecified type    2. PVD (peripheral vascular disease) ([address]    3. Hyperlipidemia, unspecified hyperlipidemia type    4. JAMIE (obstructive sleep apnea)    5. Cardiac pacemaker    6. Essential hypertension         HPI:    Thank you for involving me in taking care of your patient Allyn Sarabia. She is a 78y.o. year old female with a history as listed above and is being seen in the office today. She reports that is feeling fair. She has noticed some chest pain that is lasting about 5 minutes. It is a mid sternal chest pain. It is described. as a squeezing sensation. It comes on its own. Relief on own. There is no radiation. She notes she has SOB that is not new. It has not increased. There is edema noted to the lower legs- pulses are with the doppler  She does note pain with walking 10 feet. Her BP has been low and her lisinopril has been stopped. Her BP was in the 80's. She is compliant with medications. She is in a wheel chair today.      Vitals:    06/06/19 1017   BP: 110/60   Pulse: 66       Current Outpatient Medications   Medication Sig Dispense Refill    simvastatin (ZOCOR) 20 MG tablet Take 1 tablet by mouth nightly 90 tablet 0    sertraline (ZOLOFT) 100 MG tablet Take 2 tablets by mouth every morning 180 tablet 0    ezetimibe (ZETIA) 10 MG tablet Take 1 tablet by mouth every morning 90 tablet 0    fluticasone (FLOVENT HFA) 110 MCG/ACT inhaler Inhale 2 puffs into Global LVSF normal;    H/O cardiovascular stress test 1/7/15    EF 77%. Normal Stress Test.    H/O chest pain 4/2005    sees Dr Ender Barry H/O Doppler ultrasound 2/20/2008 2/20/2008-CAROTID DOPPLER- Normal carotids bilaterally;    H/O Doppler ultrasound 06/06/13    CAROTID DOPPLER- there is heterogeneous, irregular atherosclerotic plaque noted in the right internal carotid artery,  doppler flow velocities within the right internal carotid artery are elevated, consistent with a mild, less than 50%stenosis, there is intimal thickening but no significant atherosclerotic plaque noted in the left internal carotid artery, the left carotid are within normal limits    H/O echocardiogram 4/9/2012, 10/20/2011    4/9/2012-LVSF normal EF=>55%. impaired LV relaxation;    H/O echocardiogram 12/31/14    EF 50-55%. LV shows mild concentric hypertrophy. Mildly dilated left atrium. Mildly dilated right ventricle. Sclerotic but not stenotic aortic valve. Mitral annular calcification. Mild MR, Mild TR, Mild pulm HTN.    H/O urinary incontinence     History of blood transfusion     Hx of cardiovascular stress test 06/2013    EF 70% abn suggestive of anterolateral ischemia, possible RCA ischmeia, post left ventricular dilation suggestive multivessel CAD.  Hx of echocardiogram 06/2013    EF50%, mild MR and TR, mild pulmonary HTN, mild AS    Hx of fall     \"last time 2018- due to neuropathy, have to use cane\"    HX OTHER MEDICAL 7/24/13, 06/2013 7/13-No clinacally significant arrhythmia. 6/13-multiple cycles of wenckebach episodes in addtion to some sinus tach    Hyperlipidemia     Hypertension     Hypothermia 2008    Malignant neoplasm of breast (female) ([facility] [address] 2012    Neuropathy     \"have neuropathy of my legs\"    Normocytic normochromic anemia     Obstructive sleep apnea 2008 01- Has CPAP machine but has not used in over a year - states she has not had problems since her pacemaker was placed.  Old MI (myocardial infarction)     per pt on 1/15/2019\"had heart attack about a year ago\"    Osteoarthritis     Osteopenia     Primary malignant neoplasm of colon ([address]     S/P cardiac cath 06/2013    no significant CAD false postive stress test    Super obesity     Umbilical hernia      Past Surgical History:   Procedure Laterality Date    A-V CARDIAC PACEMAKER INSERTION  3/10/14     Medtronic. Model:  [id number] Medtronic  Serial:  [id number] dual chamber pacemaker    APPENDECTOMY  2004    BREAST BIOPSY  2/13/12    BREAST LUMPECTOMY  2007    right     BREAST SURGERY  02/13/2012    rt lesion biopsy     CARDIAC CATHETERIZATION  2007 & 2011    COLECTOMY  2004    Colon cancer    COLONOSCOPY  10-18-13    polyp    COLONOSCOPY  1/19/2016    internal hemorrhoids, diverticulosis, 1.5 cm sessile polyp, 8 mm polyp found in rectum.  COLONOSCOPY  12/14/2017    1.5cm residual polyp, 5mm polyps in blind sigmoid loop x3, Sigmoid divertics, Internal grade 1 hemorrhoids    COLONOSCOPY N/A 1/16/2019    COLONOSCOPY W/ ENDOSCOPIC MUCOSAL RESECTION WITH ELEVIEW 5ML INJECTION AND POLYPECTOMY OF TIP OF BLIND RECTOSIGMOID STUMP AND CAUTERIZATION WITH ERBE PROBE, CLIPPING X2 performed by Dina Cruz MD at [facility]  2003    back   [address] Bilateral 12/15/14    DENTAL SURGERY      front right tooth and root canal w/ brass bolt    DILATION AND CURETTAGE OF UTERUS  2006    Needed a camera to find my uterus.     ENDOSCOPY, COLON, DIAGNOSTIC  12/14/2017    Small hiatal hernia    HERNIA REPAIR  2004    Umb hernia    HERNIA REPAIR  2008    Inc hernia    LYMPH NODE DISSECTION  2/29/2012    Right axillary-3 sentinel nodes were positive out of 9.    MASTECTOMY, RADICAL Right 02/29/2012    w/ sentinal node disection-Dr West    PACEMAKER PLACEMENT      PRE-MALIGNANT / BENIGN SKIN LESION EXCISION  2/8/2013    right leg X2-Dr West    TONSILLECTOMY  1957    TUNNELED VENOUS PORT PLACEMENT 2004    TUNNELED VENOUS PORT PLACEMENT  02/13/2012    removal of mediport     Family History   Problem Relation Age of Onset    Arthritis Mother         OA, RA    High Cholesterol Mother     High Blood Pressure Mother     Cancer Father         skin and leukemia    High Blood Pressure Father     High Cholesterol Father     Diabetes Father     Heart Disease Father     Heart Disease Brother     High Cholesterol Brother     High Blood Pressure Brother     Heart Disease Brother     Seizures Son      Social History     Tobacco Use    Smoking status: Never Smoker    Smokeless tobacco: Never Used   Substance Use Topics    Alcohol use: No     Comment:           CAFFEINE: 2- 12oz nona daily & 2 cups coffee some nights. Review of Systems:   · Constitutional: No Fever,no unintentional weight Loss   · Eyes: No change in Vision:     · ENT: No Headaches, Hearing Loss or Vertigo. No tinnitus   · Cardiovascular: as per note above   · Respiratory: No cough or wheezing and as per note above. · Gastrointestinal: no abdominal pain, no appetite loss, no blood in stools, constipation, or diarrhea, No heartburn  · Genitourinary: No dysuria, trouble voiding, or hematuria  · Musculoskeletal: back pain- No: myalgia No,  Arthralgia- Yes  · Integumentary: No rash or pruritis  · Neurological: No TIA or stroke symptoms  · Psychiatric: Anxiety- Yes: depression-  No   · Endocrine: No malaise, No fatigue - no temperature intolerance  · Hematologic/Lymphatic: No bleeding problems, blood clots or swollen lymph nodes  · Allergic/Immunologic: No nasal congestion or hives    Objective:      Physical Exam:  /60   Pulse 66   Ht 5' 2\" (1.575 m)   Wt 274 lb (124.3 kg)   LMP 01/15/1999   BMI 50.12 kg/m²   Wt Readings from Last 3 Encounters:   06/06/19 274 lb (124.3 kg)   05/22/19 269 lb 3.2 oz (122.1 kg)   04/23/19 276 lb (125.2 kg)     Body mass index is 50.12 kg/m².   GENERAL - Alert, oriented, pleasant, in no apparent distress. Head unremarkable  Eyes - pupils equal and reactive to light - bilateral conjunctiva are pink: sclera are white   ENT - external ears intact, nose is intact:  tongue is midline pink and moist  Neck - No jugular venous distention noted. No carotid bruits appreciated. Cardiovascular - Normal S1 and S2:  no murmur appreciated, No gallop. Regular rate- Yes,  rhythm regular-Yes. Extremities - No cyanosis, clubbing, +1-2  edema to lower legs. Pulmonary - No respiratory distress. No wheezes or rales. Chest is clear  Pulses: Bilateral radial pulses normal- pedal pulses with doppler  Abdomen -  Soft no tenderness, non distended   Musculoskeletal - Normal movement of all extremities   Neurologic - alert and oriented: There are no gross focal neurologic abnormalities.    Skin-  No rash: No echymosis   Affect- normal mood and pleasant       DATA  BNP:   Lab Results   Component Value Date    BNP 16 06/26/2013    PROBNP 109.6 11/15/2016     CBC:   Lab Results   Component Value Date    WBC 9.1 04/23/2019    RBC 4.15 04/23/2019    HGB 11.6 04/23/2019    HCT 38.3 04/23/2019     04/23/2019     CMP:    Lab Results   Component Value Date     04/23/2019    K 4.6 04/23/2019     04/23/2019    CO2 27 04/23/2019    BUN 16 04/23/2019    CREATININE 1.0 04/23/2019    GFRAA >60 04/23/2019    LABGLOM 55 04/23/2019    GLUCOSE 87 04/23/2019    CALCIUM 9.9 04/23/2019     Hepatic Function Panel:    Lab Results   Component Value Date    ALKPHOS 107 04/23/2019    ALT 19 04/23/2019    AST 21 04/23/2019    PROT 6.8 04/23/2019    PROT 6.6 10/17/2012    BILITOT 0.3 04/23/2019    BILIDIR 0.2 07/24/2015    IBILI 0.1 07/24/2015    LABALBU 4.0 04/23/2019     Magnesium:    Lab Results   Component Value Date    MG 1.9 07/29/2015     PT/INR:    Lab Results   Component Value Date    PROTIME 11.5 07/28/2018    PROTIME 11.2 10/28/2011    INR 1.01 07/28/2018     Lipids:    Lab Results   Component Value Date    TRIG 138

## 2019-06-11 ENCOUNTER — TELEPHONE (OUTPATIENT)
Dept: CARDIOLOGY CLINIC | Age: 69
End: 2019-06-11

## 2019-06-11 NOTE — TELEPHONE ENCOUNTER
Patient reports b/p:  6/9 99/42 , 80/63  6/10 51/41   123/62  6/11 89/59  111/63      Will report to Ever Balling NP and call if new orders

## 2019-06-26 ENCOUNTER — APPOINTMENT (OUTPATIENT)
Dept: GENERAL RADIOLOGY | Age: 69
End: 2019-06-26
Payer: COMMERCIAL

## 2019-06-26 ENCOUNTER — HOSPITAL ENCOUNTER (OUTPATIENT)
Age: 69
Setting detail: OBSERVATION
Discharge: HOME | End: 2019-06-28
Attending: EMERGENCY MEDICINE | Admitting: HOSPITALIST
Payer: COMMERCIAL

## 2019-06-26 ENCOUNTER — TELEPHONE (OUTPATIENT)
Dept: CARDIOLOGY CLINIC | Age: 69
End: 2019-06-26

## 2019-06-26 ENCOUNTER — APPOINTMENT (OUTPATIENT)
Dept: CT IMAGING | Age: 69
End: 2019-06-26
Payer: COMMERCIAL

## 2019-06-26 DIAGNOSIS — R07.9 CHEST PAIN, UNSPECIFIED TYPE: Primary | ICD-10-CM

## 2019-06-26 DIAGNOSIS — R06.02 SHORTNESS OF BREATH: ICD-10-CM

## 2019-06-26 LAB
ALBUMIN SERPL-MCNC: 3.8 GM/DL (ref 3.4–5)
ALP BLD-CCNC: 112 IU/L (ref 40–129)
ALT SERPL-CCNC: 16 U/L (ref 10–40)
ANION GAP SERPL CALCULATED.3IONS-SCNC: 10 MMOL/L (ref 4–16)
AST SERPL-CCNC: 17 IU/L (ref 15–37)
BASOPHILS ABSOLUTE: 0 K/CU MM
BASOPHILS RELATIVE PERCENT: 0.4 % (ref 0–1)
BILIRUB SERPL-MCNC: 0.2 MG/DL (ref 0–1)
BUN BLDV-MCNC: 13 MG/DL (ref 6–23)
CALCIUM SERPL-MCNC: 10.4 MG/DL (ref 8.3–10.6)
CHLORIDE BLD-SCNC: 101 MMOL/L (ref 99–110)
CO2: 28 MMOL/L (ref 21–32)
CREAT SERPL-MCNC: 0.9 MG/DL (ref 0.6–1.1)
DIFFERENTIAL TYPE: ABNORMAL
EOSINOPHILS ABSOLUTE: 0.2 K/CU MM
EOSINOPHILS RELATIVE PERCENT: 2.2 % (ref 0–3)
GFR AFRICAN AMERICAN: >60 ML/MIN/1.73M2
GFR NON-AFRICAN AMERICAN: >60 ML/MIN/1.73M2
GLUCOSE BLD-MCNC: 138 MG/DL (ref 70–99)
HCT VFR BLD CALC: 37.3 % (ref 37–47)
HEMOGLOBIN: 11.7 GM/DL (ref 12.5–16)
IMMATURE NEUTROPHIL %: 0.7 % (ref 0–0.43)
LYMPHOCYTES ABSOLUTE: 1.5 K/CU MM
LYMPHOCYTES RELATIVE PERCENT: 17.8 % (ref 24–44)
MCH RBC QN AUTO: 28.5 PG (ref 27–31)
MCHC RBC AUTO-ENTMCNC: 31.4 % (ref 32–36)
MCV RBC AUTO: 90.8 FL (ref 78–100)
MONOCYTES ABSOLUTE: 0.4 K/CU MM
MONOCYTES RELATIVE PERCENT: 4.6 % (ref 0–4)
NUCLEATED RBC %: 0 %
PDW BLD-RTO: 14.4 % (ref 11.7–14.9)
PLATELET # BLD: 245 K/CU MM (ref 140–440)
PMV BLD AUTO: 9.6 FL (ref 7.5–11.1)
POTASSIUM SERPL-SCNC: 4.3 MMOL/L (ref 3.5–5.1)
RBC # BLD: 4.11 M/CU MM (ref 4.2–5.4)
SEGMENTED NEUTROPHILS ABSOLUTE COUNT: 6.3 K/CU MM
SEGMENTED NEUTROPHILS RELATIVE PERCENT: 74.3 % (ref 36–66)
SODIUM BLD-SCNC: 139 MMOL/L (ref 135–145)
TOTAL IMMATURE NEUTOROPHIL: 0.06 K/CU MM
TOTAL NUCLEATED RBC: 0 K/CU MM
TOTAL PROTEIN: 7.6 GM/DL (ref 6.4–8.2)
TROPONIN T: <0.01 NG/ML
TROPONIN T: <0.01 NG/ML
WBC # BLD: 8.5 K/CU MM (ref 4–10.5)

## 2019-06-26 PROCEDURE — G0378 HOSPITAL OBSERVATION PER HR: HCPCS

## 2019-06-26 PROCEDURE — 6370000000 HC RX 637 (ALT 250 FOR IP): Performed by: HOSPITALIST

## 2019-06-26 PROCEDURE — 94640 AIRWAY INHALATION TREATMENT: CPT

## 2019-06-26 PROCEDURE — 94761 N-INVAS EAR/PLS OXIMETRY MLT: CPT

## 2019-06-26 PROCEDURE — 2580000003 HC RX 258: Performed by: EMERGENCY MEDICINE

## 2019-06-26 PROCEDURE — 6360000004 HC RX CONTRAST MEDICATION: Performed by: EMERGENCY MEDICINE

## 2019-06-26 PROCEDURE — 85025 COMPLETE CBC W/AUTO DIFF WBC: CPT

## 2019-06-26 PROCEDURE — 2580000003 HC RX 258: Performed by: HOSPITALIST

## 2019-06-26 PROCEDURE — 99285 EMERGENCY DEPT VISIT HI MDM: CPT

## 2019-06-26 PROCEDURE — 6360000002 HC RX W HCPCS: Performed by: HOSPITALIST

## 2019-06-26 PROCEDURE — 80053 COMPREHEN METABOLIC PANEL: CPT

## 2019-06-26 PROCEDURE — 93005 ELECTROCARDIOGRAM TRACING: CPT | Performed by: EMERGENCY MEDICINE

## 2019-06-26 PROCEDURE — 99220 PR INITIAL OBSERVATION CARE/DAY 70 MINUTES: CPT | Performed by: INTERNAL MEDICINE

## 2019-06-26 PROCEDURE — 6370000000 HC RX 637 (ALT 250 FOR IP): Performed by: EMERGENCY MEDICINE

## 2019-06-26 PROCEDURE — 71275 CT ANGIOGRAPHY CHEST: CPT

## 2019-06-26 PROCEDURE — 71045 X-RAY EXAM CHEST 1 VIEW: CPT

## 2019-06-26 PROCEDURE — 36415 COLL VENOUS BLD VENIPUNCTURE: CPT

## 2019-06-26 PROCEDURE — 84484 ASSAY OF TROPONIN QUANT: CPT

## 2019-06-26 RX ORDER — ONDANSETRON 2 MG/ML
4 INJECTION INTRAMUSCULAR; INTRAVENOUS EVERY 6 HOURS PRN
Status: DISCONTINUED | OUTPATIENT
Start: 2019-06-26 | End: 2019-06-28 | Stop reason: HOSPADM

## 2019-06-26 RX ORDER — SODIUM CHLORIDE 0.9 % (FLUSH) 0.9 %
10 SYRINGE (ML) INJECTION PRN
Status: DISCONTINUED | OUTPATIENT
Start: 2019-06-26 | End: 2019-06-28 | Stop reason: HOSPADM

## 2019-06-26 RX ORDER — ASPIRIN 81 MG/1
324 TABLET, CHEWABLE ORAL ONCE
Status: COMPLETED | OUTPATIENT
Start: 2019-06-26 | End: 2019-06-26

## 2019-06-26 RX ORDER — SODIUM CHLORIDE 0.9 % (FLUSH) 0.9 %
10 SYRINGE (ML) INJECTION
Status: COMPLETED | OUTPATIENT
Start: 2019-06-26 | End: 2019-06-26

## 2019-06-26 RX ORDER — ASPIRIN 81 MG/1
81 TABLET ORAL EVERY MORNING
Status: DISCONTINUED | OUTPATIENT
Start: 2019-06-27 | End: 2019-06-26 | Stop reason: SDUPTHER

## 2019-06-26 RX ORDER — ALBUTEROL SULFATE 90 UG/1
1 AEROSOL, METERED RESPIRATORY (INHALATION) EVERY 4 HOURS PRN
Status: DISCONTINUED | OUTPATIENT
Start: 2019-06-26 | End: 2019-06-28 | Stop reason: HOSPADM

## 2019-06-26 RX ORDER — SERTRALINE HYDROCHLORIDE 100 MG/1
200 TABLET, FILM COATED ORAL EVERY MORNING
Status: DISCONTINUED | OUTPATIENT
Start: 2019-06-27 | End: 2019-06-28 | Stop reason: HOSPADM

## 2019-06-26 RX ORDER — LETROZOLE 2.5 MG/1
2.5 TABLET, FILM COATED ORAL NIGHTLY
Status: DISCONTINUED | OUTPATIENT
Start: 2019-06-26 | End: 2019-06-28 | Stop reason: HOSPADM

## 2019-06-26 RX ORDER — SIMVASTATIN 20 MG
20 TABLET ORAL NIGHTLY
Status: DISCONTINUED | OUTPATIENT
Start: 2019-06-26 | End: 2019-06-28 | Stop reason: HOSPADM

## 2019-06-26 RX ORDER — SODIUM CHLORIDE 0.9 % (FLUSH) 0.9 %
10 SYRINGE (ML) INJECTION EVERY 12 HOURS SCHEDULED
Status: DISCONTINUED | OUTPATIENT
Start: 2019-06-26 | End: 2019-06-28 | Stop reason: HOSPADM

## 2019-06-26 RX ORDER — NITROGLYCERIN 0.4 MG/1
0.4 TABLET SUBLINGUAL EVERY 5 MIN PRN
Status: DISCONTINUED | OUTPATIENT
Start: 2019-06-26 | End: 2019-06-28 | Stop reason: HOSPADM

## 2019-06-26 RX ORDER — ASPIRIN 81 MG/1
81 TABLET, CHEWABLE ORAL DAILY
Status: DISCONTINUED | OUTPATIENT
Start: 2019-06-27 | End: 2019-06-28 | Stop reason: HOSPADM

## 2019-06-26 RX ORDER — FERROUS SULFATE 325(65) MG
325 TABLET ORAL EVERY MORNING
Status: DISCONTINUED | OUTPATIENT
Start: 2019-06-27 | End: 2019-06-28 | Stop reason: HOSPADM

## 2019-06-26 RX ORDER — FLUTICASONE PROPIONATE 110 UG/1
2 AEROSOL, METERED RESPIRATORY (INHALATION) 2 TIMES DAILY
Status: DISCONTINUED | OUTPATIENT
Start: 2019-06-26 | End: 2019-06-28 | Stop reason: HOSPADM

## 2019-06-26 RX ADMIN — SODIUM CHLORIDE, PRESERVATIVE FREE 10 ML: 5 INJECTION INTRAVENOUS at 16:02

## 2019-06-26 RX ADMIN — SIMVASTATIN 20 MG: 20 TABLET, FILM COATED ORAL at 22:25

## 2019-06-26 RX ADMIN — SODIUM CHLORIDE, PRESERVATIVE FREE 10 ML: 5 INJECTION INTRAVENOUS at 22:28

## 2019-06-26 RX ADMIN — Medication 2 PUFF: at 20:52

## 2019-06-26 RX ADMIN — IOPAMIDOL 60 ML: 755 INJECTION, SOLUTION INTRAVENOUS at 16:02

## 2019-06-26 RX ADMIN — LETROZOLE 2.5 MG: 2.5 TABLET ORAL at 22:26

## 2019-06-26 RX ADMIN — ASPIRIN 81 MG 324 MG: 81 TABLET ORAL at 14:20

## 2019-06-26 RX ADMIN — NITROGLYCERIN 0.4 MG: 0.4 TABLET, ORALLY DISINTEGRATING SUBLINGUAL at 14:20

## 2019-06-26 ASSESSMENT — PAIN DESCRIPTION - FREQUENCY: FREQUENCY: CONTINUOUS

## 2019-06-26 ASSESSMENT — PAIN DESCRIPTION - ONSET: ONSET: AWAKENED FROM SLEEP

## 2019-06-26 ASSESSMENT — PAIN SCALES - GENERAL
PAINLEVEL_OUTOF10: 7
PAINLEVEL_OUTOF10: 0
PAINLEVEL_OUTOF10: 7
PAINLEVEL_OUTOF10: 0

## 2019-06-26 ASSESSMENT — PAIN DESCRIPTION - DESCRIPTORS: DESCRIPTORS: PRESSURE;TIGHTNESS

## 2019-06-26 ASSESSMENT — PAIN DESCRIPTION - PAIN TYPE: TYPE: ACUTE PAIN

## 2019-06-26 ASSESSMENT — PAIN DESCRIPTION - LOCATION: LOCATION: CHEST

## 2019-06-26 ASSESSMENT — PAIN DESCRIPTION - PROGRESSION
CLINICAL_PROGRESSION: NOT CHANGED
CLINICAL_PROGRESSION: NOT CHANGED

## 2019-06-26 ASSESSMENT — PAIN DESCRIPTION - ORIENTATION: ORIENTATION: RIGHT;UPPER

## 2019-06-26 NOTE — ED TRIAGE NOTES
Pt \"just feels like something is wrong\" states that last time she was here she had \"something wrong with her heart\".    States chest pain and tightness - 7/10 pain and shortness of breath - hx of asthma and sleep apnea

## 2019-06-26 NOTE — PROGRESS NOTES
Medication History  [facility]    Patient Name: Sherice Cabrera 1950     Medication history has been completed by: Hansel Lovett CPhT    Source(s) of information: patient and insurance claims     Primary Care Physician: Charity Lee DO     Pharmacy: [facility]    Allergies as of 06/26/2019 - Review Complete 06/26/2019   Allergen Reaction Noted    Bactrim [sulfamethoxazole-trimethoprim] Anaphylaxis and Hives 07/28/2018    Lipitor [atorvastatin] Shortness Of Breath 07/27/2015    Taxol [paclitaxel] Anaphylaxis and Swelling 10/08/2012    Demerol Nausea Only 02/06/2012    Neosporin [bacitracin-neomycin-polymyxin] Rash and Other (See Comments) 02/06/2012    Other Rash     Talc Other (See Comments) 08/13/2015        Prior to Admission medications    Medication Sig Start Date End Date Taking?  Authorizing Provider   simvastatin (ZOCOR) 20 MG tablet Take 1 tablet by mouth nightly 5/22/19  Yes Charity Lee, DO   sertraline (ZOLOFT) 100 MG tablet Take 2 tablets by mouth every morning 5/22/19  Yes Charity Lee, DO   ezetimibe (ZETIA) 10 MG tablet Take 1 tablet by mouth every morning 5/22/19  Yes Charity Lee, DO   albuterol sulfate HFA (PROAIR HFA) 108 (90 Base) MCG/ACT inhaler INHALE 2 PUFFS BY MOUTH EVERY SIX HOURS AS NEEDED FOR WHEEZING 2/5/19  Yes Charity Lee, DO   vitamin B-12 (CYANOCOBALAMIN) 1000 MCG tablet Take 1,000 mcg by mouth daily   Yes Historical Provider, MD   aspirin 81 MG EC tablet Take 81 mg by mouth nightly    Yes Historical Provider, MD   letrozole (FEMARA) 2.5 MG tablet Take 2.5 mg by mouth nightly    Yes Historical Provider, MD   calcium-vitamin D (OSCAL 500/200 D-3) 500-200 MG-UNIT per tablet Take 1 tablet by mouth 2 times daily    Yes Historical Provider, MD   fluticasone (FLOVENT HFA) 110 MCG/ACT inhaler Inhale 2 puffs into the lungs 2 times daily 5/22/19   Charity Lee, DO   Ferrous Sulfate (IRON) 325 (65 FE) MG TABS Take 1 tablet by mouth every morning     Historical Provider, MD     Other Comments:  Medication list reviewed with patient and insurance claims verified  Patient states she has taken first doses of medications today  Recent claims for lisinopril patient states she no longer takes this med.     To my knowledge the above medication history is accurate as of 6/26/2019 4:37 PM.   Jo Ann Alston CPhT   6/26/2019 4:37 PM

## 2019-06-26 NOTE — TELEPHONE ENCOUNTER
Spoke to pt she is having chest pain, sob and a very bad headache. Stated she planned on going to ED just wanted to let us know when she called here about her testing. Advised pt to yes please go to ED to get your chest pain evaluated.  Pt verbalized understanding

## 2019-06-26 NOTE — PROGRESS NOTES
Skin assessment done on admission with [facility] NA. Pt has some redness on bilateral buttocks with dark purple bruising areas on back of her thighs near buttocks. Redness in periarea noted. Very mild redness in abdominal fold on rt side. Pt is incontinent of urine. She was cleaned and barrier cream applied.

## 2019-06-26 NOTE — H&P
History and Physical      Name:  Vanessa Chaparro /Age/Sex: 1950  (78 y.o. female)   MRN & CSN:  [id number] & [id number] Admission Date/Time: 2019  1:53 PM   Location:  ED28/ED-28 PCP: Chepe Graves [address] [place] [address] Day: 1    Assessment and Plan:   Vanessa Chaparro is a 78 y.o.  female  who presents with Chest pain. > Chest pain  - observation, cycle troponin, cardiac tele,   - aspirin, statin, npo after midnight, consult cardiology    > h/o asthma/COPD  - continue home inhalers    > weakness  - Consult PT/OT    > h/o breast, colon and skin cancer per pt report  - continue femara    Diet No diet orders on file   DVT Prophylaxis [x] Lovenox, []  Heparin, [] SCDs, [] No VTE prophylaxis, patient ambulating   Code Status Prior     History of Present Illness:     Chief Complaint: Chest pain. Vanessa Chaparro is a 78 y.o.  female  who presents with Chest pain. Pt presented with across chest , tightness like chest pain, started while at rest, radiates to neck, associated with dyspnea, nothing make it better or worse, no cough, no abd pain, no N/V. Has a h/o MI in the past     10-14 point ROS reviewed negative, unless as noted above     Objective:   No intake or output data in the 24 hours ending 19 1543   Vitals:   Vitals:    19 1359   BP: (!) 133/54   Pulse: 74   Resp: 18   Temp: 97.7 °F (36.5 °C)   SpO2: 98%     Physical Exam:    GEN Awake female, sitting upright in bed in no apparent distress. Obese, Appears given age. EYES Pupils are equally round. No scleral erythema, discharge, or conjunctivitis. HENT Mucous membranes are moist.   NECK No apparent thyromegaly or masses. RESP Clear to auscultation, no wheezes, rales or rhonchi. Symmetric chest movement . CARDIO/VASC S1/S2 auscultated. Regular rate without appreciable murmurs, rubs, or gallops. No peripheral edema. GI Abdomen is soft without significant tenderness, or guarding. Bowel sounds are normoactive.  Rectal exam deferred.  Farooq catheter is not present. MSK No gross joint deformities. Spontaneous movement of all extremities  SKIN Normal coloration, warm, dry. NEURO Cranial nerves appear grossly intact, normal speech, no lateralizing weakness. Impaired sensation lower ext  PSYCH Awake, alert, oriented x 4. Affect appropriate. Past Medical History:      Past Medical History:   Diagnosis Date    Anemia     Anxiety 1978    Arthritis     generalized    Asthma 2006    AV block     2nd degree per hospital in june2013    Blood poisoning 1994    Blood transfusion     12/2003    CAD (coronary artery disease)     Cancer ([facility] [address] 2007    skin (face) & colon(2003)/ 2/2012 dx of right breast ca(pc)    Carcinoma of breast ([facility] [address] 2/29/2012    right arm no b/p or sticks    Cardiac pacemaker 3/10/14    [employer]onic dual lead    Chronic cystitis     Chronic respiratory failure (HCC)     2 L/min oxygen at night-time    Colon cancer ([facility] [address] 2013    COPD (chronic obstructive pulmonary disease) ([facility] [address]     Depression     Diverticulitis     H/O 24 hour EKG monitoring 2/28/2014 7/24/13 2/14 complete heart block 7/13No clinically significant arrthymia noted.  H/O cardiac catheterization 10/31/2011, 7/11/2007    10/31/2011-No significant CAD. Cardiolite stress test was false positive-Dr Farhan Vela; 7/11/2007-No CAD, global function intact;    H/O cardiovascular stress test 10/20/2011, 3/23/2010    10/20/2011-Lexiscan- Evid of mild ischemia in Left CX region. EF 70%. Global LVSF normal;    H/O cardiovascular stress test 1/7/15    EF 77%.    Normal Stress Test.    H/O chest pain 4/2005    sees Dr Lizzie Mobley H/O Doppler ultrasound 2/20/2008 2/20/2008-CAROTID DOPPLER- Normal carotids bilaterally;    H/O Doppler ultrasound 06/06/13    CAROTID DOPPLER- there is heterogeneous, irregular atherosclerotic plaque noted in the right internal carotid artery,  doppler flow velocities within the right internal carotid artery are elevated, consistent with a mild, less than 50%stenosis, there is intimal thickening but no significant atherosclerotic plaque noted in the left internal carotid artery, the left carotid are within normal limits    H/O echocardiogram 4/9/2012, 10/20/2011    4/9/2012-LVSF normal EF=>55%. impaired LV relaxation;    H/O echocardiogram 12/31/14    EF 50-55%. LV shows mild concentric hypertrophy. Mildly dilated left atrium. Mildly dilated right ventricle. Sclerotic but not stenotic aortic valve. Mitral annular calcification. Mild MR, Mild TR, Mild pulm HTN.    H/O urinary incontinence     History of blood transfusion     Hx of cardiovascular stress test 06/2013    EF 70% abn suggestive of anterolateral ischemia, possible RCA ischmeia, post left ventricular dilation suggestive multivessel CAD.  Hx of echocardiogram 06/2013    EF50%, mild MR and TR, mild pulmonary HTN, mild AS    Hx of fall     \"last time 2018- due to neuropathy, have to use cane\"    HX OTHER MEDICAL 7/24/13, 06/2013 7/13-No clinacally significant arrhythmia. 6/13-multiple cycles of wenckebach episodes in addtion to some sinus tach    Hyperlipidemia     Hypertension     Hypothermia 2008    Malignant neoplasm of breast (female) ([address] 2012    Neuropathy     \"have neuropathy of my legs\"    Normocytic normochromic anemia     Obstructive sleep apnea 2008 01- Has CPAP machine but has not used in over a year - states she has not had problems since her pacemaker was placed.  Old MI (myocardial infarction)     per pt on 1/15/2019\"had heart attack about a year ago\"    Osteoarthritis     Osteopenia     Primary malignant neoplasm of colon ([facility] [address]     S/P cardiac cath 06/2013    no significant CAD false postive stress test    Super obesity     Umbilical hernia      PSHX:  has a past surgical history that includes Tonsillectomy (1957); Appendectomy (2004); colectomy (2004);  Tunneled venous port placement (2004); cyst removal (2003); Breast lumpectomy (2007); Dilation and curettage of uterus (2006); hernia repair (2004); hernia repair (2008); Dental surgery; Breast surgery (02/13/2012); Tunneled venous port placement (02/13/2012); Breast biopsy (2/13/12); Mastectomy, radical (Right, 02/29/2012); pre-malignant / benign skin lesion excision (2/8/2013); lymph node dissection (2/29/2012); Cardiac catheterization (2007 & 2011); A-V cardiac pacemaker insertion (3/10/14); Cystoscopy (Bilateral, 12/15/14); pacemaker placement; Endoscopy, colon, diagnostic (12/14/2017); Colonoscopy (10-18-13); Colonoscopy (1/19/2016); Colonoscopy (12/14/2017); and Colonoscopy (N/A, 1/16/2019). Allergies: Allergies   Allergen Reactions    Bactrim [Sulfamethoxazole-Trimethoprim] Anaphylaxis and Hives    Lipitor [Atorvastatin] Shortness Of Breath    Taxol [Paclitaxel] Anaphylaxis and Swelling    Demerol Nausea Only    Neosporin [Bacitracin-Neomycin-Polymyxin] Rash and Other (See Comments)     hotness    Other Rash     Ivory Soap    Talc Other (See Comments)     blisters       FAM HX: family history includes Arthritis in her mother; Cancer in her father; Diabetes in her father; Heart Disease in her brother, brother, and father; High Blood Pressure in her brother, father, and mother; High Cholesterol in her brother, father, and mother; Seizures in her son.   Soc HX:   Social History     Socioeconomic History    Marital status:      Spouse name: None    Number of children: 2    Years of education: None    Highest education level: None   Occupational History    Occupation: retired   Social Needs    Financial resource strain: None    Food insecurity:     Worry: None     Inability: None    Transportation needs:     Medical: None     Non-medical: None   Tobacco Use    Smoking status: Never Smoker    Smokeless tobacco: Never Used   Substance and Sexual Activity    Alcohol use: No     Comment:           CAFFEINE: 2- 12oz nona daily & 2 cups coffee some nights.  Drug use: No    Sexual activity: Not Currently     Partners: Male   Lifestyle    Physical activity:     Days per week: None     Minutes per session: None    Stress: None   Relationships    Social connections:     Talks on phone: None     Gets together: None     Attends Yazidi service: None     Active member of club or organization: None     Attends meetings of clubs or organizations: None     Relationship status: None    Intimate partner violence:     Fear of current or ex partner: None     Emotionally abused: None     Physically abused: None     Forced sexual activity: None   Other Topics Concern    None   Social History Narrative    Do you donate blood or plasma? No    Caffeine intake? Moderate    Advance directive? No    Is blood transfusion acceptable in an emergency? Yes    Live alone or with others? With others    Able to care for self? Yes               Medications:   Medications:   Prior to Admission medications    Medication Sig Start Date End Date Taking?  Authorizing Provider   simvastatin (ZOCOR) 20 MG tablet Take 1 tablet by mouth nightly 5/22/19   Sae Morgan, DO   sertraline (ZOLOFT) 100 MG tablet Take 2 tablets by mouth every morning 5/22/19   Sae Morgan, DO   ezetimibe (ZETIA) 10 MG tablet Take 1 tablet by mouth every morning 5/22/19   Sae Morgan, DO   fluticasone (FLOVENT HFA) 110 MCG/ACT inhaler Inhale 2 puffs into the lungs 2 times daily 5/22/19   Sae Morgan, DO   albuterol sulfate HFA (PROAIR HFA) 108 (90 Base) MCG/ACT inhaler INHALE 2 PUFFS BY MOUTH EVERY SIX HOURS AS NEEDED FOR WHEEZING 2/5/19   Sae Morgan, DO   vitamin B-12 (CYANOCOBALAMIN) 1000 MCG tablet Take 1,000 mcg by mouth daily    Historical Provider, MD   aspirin 81 MG EC tablet Take 81 mg by mouth every morning     Historical Provider, MD   letrozole (FEMARA) 2.5 MG tablet Take 2.5 mg by mouth nightly     Historical Provider, MD   Ferrous Sulfate (IRON) 325 (65 FE) MG TABS Take 1 tablet by mouth every

## 2019-06-26 NOTE — ED NOTES
[id number] [facility] paged hospitalist     Marietta Villalba  06/26/19 [phone] hospitalist returned call      Marietta Villalba  06/26/19 [id number]

## 2019-06-26 NOTE — ED PROVIDER NOTES
Emergency Department Encounter    Patient: Gerhardt Mate  MRN: [id number]  : 1950  Date of Evaluation: 2019  ED Provider:  Carmen Bryan    Triage Chief Complaint:   Chest Pain; Shortness of Breath; Headache; and Dizziness    [place]:  Gerhardt Mate is a 78 y.o. female that presents with complaint of chest pain shortness of breath, dizziness. Occurred about an hour prior to arrival, 7 out of 10 pain is a pressure over the substernal area, does not radiate. Reports last time she had pain like this she was told \"I had a silent heart attack\". She has no history of known blood clots but reports that her doctor told her she needed a test for them. Has had some increased leg swelling. No syncope. No abdominal pain. No nausea or vomiting. No sweating. Has a history of cancer, completed chemo. Has a history of hyperlipidemia, JAMIE, pacemaker, peripheral vascular disease, NSTEMI. Has had no changes in the pain, nothing makes it better or worse. ROS:  10 systems reviewed and negative except as above. Past Medical History:   Diagnosis Date    Anemia     Anxiety     Arthritis     generalized    Asthma     AV block     2nd degree per hospital in 2013    Blood poisoning     Blood transfusion     2003    CAD (coronary artery disease)     Cancer ([address]     skin (face) & colon()/ 2012 dx of right breast ca(pc)    Carcinoma of breast ([address] 2012    right arm no b/p or sticks    Cardiac pacemaker 3/10/14    Medtronic dual lead    Chronic cystitis     Chronic respiratory failure (HCC)     2 L/min oxygen at night-time    Colon cancer ([address]     COPD (chronic obstructive pulmonary disease) ([address]     Depression     Diverticulitis     H/O 24 hour EKG monitoring 2014 complete heart block No clinically significant arrthymia noted.  H/O cardiac catheterization 10/31/2011, 2007    10/31/2011-No significant CAD.  Cardiolite stress my legs\"    Normocytic normochromic anemia     Obstructive sleep apnea 2008 01- Has CPAP machine but has not used in over a year - states she has not had problems since her pacemaker was placed.  Old MI (myocardial infarction)     per pt on 1/15/2019\"had heart attack about a year ago\"    Osteoarthritis     Osteopenia     Primary malignant neoplasm of colon ([address]     S/P cardiac cath 06/2013    no significant CAD false postive stress test    Super obesity     Umbilical hernia      Past Surgical History:   Procedure Laterality Date    A-V CARDIAC PACEMAKER INSERTION  3/10/14     Medtronic. Model:  [id number] Medtronic  Serial:  [id number] dual chamber pacemaker    APPENDECTOMY  2004    BREAST BIOPSY  2/13/12    BREAST LUMPECTOMY  2007    right     BREAST SURGERY  02/13/2012    rt lesion biopsy     CARDIAC CATHETERIZATION  2007 & 2011    COLECTOMY  2004    Colon cancer    COLONOSCOPY  10-18-13    polyp    COLONOSCOPY  1/19/2016    internal hemorrhoids, diverticulosis, 1.5 cm sessile polyp, 8 mm polyp found in rectum.  COLONOSCOPY  12/14/2017    1.5cm residual polyp, 5mm polyps in blind sigmoid loop x3, Sigmoid divertics, Internal grade 1 hemorrhoids    COLONOSCOPY N/A 1/16/2019    COLONOSCOPY W/ ENDOSCOPIC MUCOSAL RESECTION WITH ELEVIEW 5ML INJECTION AND POLYPECTOMY OF TIP OF BLIND RECTOSIGMOID STUMP AND CAUTERIZATION WITH ERBE PROBE, CLIPPING X2 performed by Nelsy Bush MD at [facility]  2003    back   [address] Bilateral 12/15/14    DENTAL SURGERY      front right tooth and root canal w/ brass bolt    DILATION AND CURETTAGE OF UTERUS  2006    Needed a camera to find my uterus.     ENDOSCOPY, COLON, DIAGNOSTIC  12/14/2017    Small hiatal hernia    HERNIA REPAIR  2004    Umb hernia    HERNIA REPAIR  2008    Inc hernia    LYMPH NODE DISSECTION  2/29/2012    Right axillary-3 sentinel nodes were positive out of 9.    MASTECTOMY, RADICAL Right 02/29/2012 w/ sentinal node disection-Dr West    PACEMAKER PLACEMENT      PRE-MALIGNANT / BENIGN SKIN LESION EXCISION  2/8/2013    right leg X2-Dr West    TONSILLECTOMY  1957    TUNNELED VENOUS PORT PLACEMENT  2004    TUNNELED VENOUS PORT PLACEMENT  02/13/2012    removal of mediport     Family History   Problem Relation Age of Onset    Arthritis Mother         OA, RA    High Cholesterol Mother     High Blood Pressure Mother     Cancer Father         skin and leukemia    High Blood Pressure Father     High Cholesterol Father     Diabetes Father     Heart Disease Father     Heart Disease Brother     High Cholesterol Brother     High Blood Pressure Brother     Heart Disease Brother     Seizures Son      Social History     Socioeconomic History    Marital status:      Spouse name: Not on file    Number of children: 2    Years of education: Not on file    Highest education level: Not on file   Occupational History    Occupation: retired   Social Needs    Financial resource strain: Not on file    Food insecurity:     Worry: Not on file     Inability: Not on file   [employer] needs:     Medical: Not on file     Non-medical: Not on file   Tobacco Use    Smoking status: Never Smoker    Smokeless tobacco: Never Used   Substance and Sexual Activity    Alcohol use: No     Comment:           CAFFEINE: 2- 12oz nona daily & 2 cups coffee some nights.     Drug use: No    Sexual activity: Not Currently     Partners: Male   Lifestyle    Physical activity:     Days per week: Not on file     Minutes per session: Not on file    Stress: Not on file   Relationships    Social connections:     Talks on phone: Not on file     Gets together: Not on file     Attends [employer] service: Not on file     Active member of club or organization: Not on file     Attends meetings of clubs or organizations: Not on file     Relationship status: Not on file    Intimate partner violence:     Fear of current or ex partner: Not on file     Emotionally abused: Not on file     Physically abused: Not on file     Forced sexual activity: Not on file   Other Topics Concern    Not on file   Social History Narrative    Do you donate blood or plasma? No    Caffeine intake? Moderate    Advance directive? No    Is blood transfusion acceptable in an emergency? Yes    Live alone or with others? With others    Able to care for self?  Yes             Current Facility-Administered Medications   Medication Dose Route Frequency Provider Last Rate Last Dose    nitroGLYCERIN (NITROSTAT) SL tablet 0.4 mg  0.4 mg Sublingual Q5 Min PRN Carmen Bryan MD   0.4 mg at 06/26/19 1420     Current Outpatient Medications   Medication Sig Dispense Refill    simvastatin (ZOCOR) 20 MG tablet Take 1 tablet by mouth nightly 90 tablet 0    sertraline (ZOLOFT) 100 MG tablet Take 2 tablets by mouth every morning 180 tablet 0    ezetimibe (ZETIA) 10 MG tablet Take 1 tablet by mouth every morning 90 tablet 0    fluticasone (FLOVENT HFA) 110 MCG/ACT inhaler Inhale 2 puffs into the lungs 2 times daily 1 Inhaler 2    albuterol sulfate HFA (PROAIR HFA) 108 (90 Base) MCG/ACT inhaler INHALE 2 PUFFS BY MOUTH EVERY SIX HOURS AS NEEDED FOR WHEEZING 1 Inhaler 2    vitamin B-12 (CYANOCOBALAMIN) 1000 MCG tablet Take 1,000 mcg by mouth daily      aspirin 81 MG EC tablet Take 81 mg by mouth every morning       letrozole (FEMARA) 2.5 MG tablet Take 2.5 mg by mouth nightly       Ferrous Sulfate (IRON) 325 (65 FE) MG TABS Take 1 tablet by mouth every morning       calcium-vitamin D (OSCAL 500/200 D-3) 500-200 MG-UNIT per tablet Take 1 tablet by mouth 2 times daily        Allergies   Allergen Reactions    Bactrim [Sulfamethoxazole-Trimethoprim] Anaphylaxis and Hives    Lipitor [Atorvastatin] Shortness Of Breath    Taxol [Paclitaxel] Anaphylaxis and Swelling    Demerol Nausea Only    Neosporin [Bacitracin-Neomycin-Polymyxin] Rash and Other (See Comments)     hotness  Other Rash     Ivory Soap    Talc Other (See Comments)     blisters       Nursing Notes Reviewed    Physical Exam:  Triage VS:    ED Triage Vitals [06/26/19 1359]   Enc Vitals Group      BP (!) 133/54      Pulse 74      Resp 18      Temp 97.7 °F (36.5 °C)      Temp Source Oral      SpO2 98 %      Weight 264 lb (119.7 kg)      Height 5' 2\" (1.575 m)      Head Circumference       Peak Flow       Pain Score       Pain Loc       Pain Edu? Excl. in [address]? My pulse ox interpretation is - normal    General appearance:  No acute distress. Skin:  Warm. Dry. Eye:  Extraocular movements intact. Ears, nose, mouth and throat:  Oral mucosa moist   Neck:  Trachea midline. Extremity:  2+ pitting edema bilateral lower extremities. Normal ROM     Heart:  Regular rate and rhythm, normal S1 & S2, no extra heart sounds. Perfusion:  intact  Respiratory:  Lungs clear to auscultation bilaterally. Respirations nonlabored. Abdominal:   Soft. Nontender. Non distended.   Neurological:  Alert and oriented           Psychiatric:  Appropriate    I have reviewed and interpreted all of the currently available lab results from this visit (if applicable):  Results for orders placed or performed during the hospital encounter of 06/26/19   CBC Auto Differential   Result Value Ref Range    WBC 8.5 4.0 - 10.5 K/CU MM    RBC 4.11 (L) 4.2 - 5.4 M/CU MM    Hemoglobin 11.7 (L) 12.5 - 16.0 GM/DL    Hematocrit 37.3 37 - 47 %    MCV 90.8 78 - 100 FL    MCH 28.5 27 - 31 PG    MCHC 31.4 (L) 32.0 - 36.0 %    RDW 14.4 11.7 - 14.9 %    Platelets 510 273 - 501 K/CU MM    MPV 9.6 7.5 - 11.1 FL    Differential Type AUTOMATED DIFFERENTIAL     Segs Relative 74.3 (H) 36 - 66 %    Lymphocytes % 17.8 (L) 24 - 44 %    Monocytes % 4.6 (H) 0 - 4 %    Eosinophils % 2.2 0 - 3 %    Basophils % 0.4 0 - 1 %    Segs Absolute 6.3 K/CU MM    Lymphocytes # 1.5 K/CU MM    Monocytes # 0.4 K/CU MM    Eosinophils # 0.2 K/CU MM    Basophils # 0.0 K/CU MM Nucleated RBC % 0.0 %    Total Nucleated RBC 0.0 K/CU MM    Total Immature Neutrophil 0.06 K/CU MM    Immature Neutrophil % 0.7 (H) 0 - 0.43 %   CMP   Result Value Ref Range    Sodium 139 135 - 145 MMOL/L    Potassium 4.3 3.5 - 5.1 MMOL/L    Chloride 101 99 - 110 mMol/L    CO2 28 21 - 32 MMOL/L    BUN 13 6 - 23 MG/DL    CREATININE 0.9 0.6 - 1.1 MG/DL    Glucose 138 (H) 70 - 99 MG/DL    Calcium 10.4 8.3 - 10.6 MG/DL    Alb 3.8 3.4 - 5.0 GM/DL    Total Protein 7.6 6.4 - 8.2 GM/DL    Total Bilirubin 0.2 0.0 - 1.0 MG/DL    ALT 16 10 - 40 U/L    AST 17 15 - 37 IU/L    Alkaline Phosphatase 112 40 - 129 IU/L    GFR Non-African American >60 >60 mL/min/1.73m2    GFR African American >60 >60 mL/min/1.73m2    Anion Gap 10 4 - 16   Troponin   Result Value Ref Range    Troponin T <0.010 <0.01 NG/ML      Radiographs (if obtained):    [x] Radiologist's Report Reviewed:  XR CHEST PORTABLE   Final Result   Bibasilar atelectasis. CTA PULMONARY W CONTRAST    (Results Pending)         EKG (if obtained): (All EKG's are interpreted by myself in the absence of a cardiologist)  Atrial sensed ventricular paced rhythm with prolonged AV conduction, rate of 67 bpm.  No ST elevation. No previous to compare. Chart review shows recent radiographs:  No results found. MDM:  56-year-old female with history as above presents with complaint of chest pain and shortness of breath. Vitals are stable, she is in no acute distress, after aspirin nitro here chest pain is resolved. Ordered basic labs, troponin is normal, electrolytes are normal.  Hemoglobin is stable. Chest x-ray shows bibasilar atelectasis. I suspect more likely ACS given her history. CT PE pending, vitals are stable. She will be admitted to hospitalist team.  She is agreeable to plan. Discussed with Dr. Ramya Rivero for admission. Clinical Impression:  1. Chest pain, unspecified type    2.  Shortness of breath      Disposition referral (if applicable):  No follow-up provider specified. Disposition medications (if applicable):  New Prescriptions    No medications on file       Comment: Please note this report has been produced using speech recognition software and may contain errors related to that system including errors in grammar, punctuation, and spelling, as well as words and phrases that may be inappropriate. Efforts were made to edit the dictations.         Kojo Potts MD  06/26/19 [id number]

## 2019-06-26 NOTE — CONSULTS
CARDIOLOGY CONSULT NOTE   Reason for consultation:  Chest pain    Referring physician:  Bunny Vogt MD     Primary care physician: Josiane Mijares      Dear  Dr. Bunny Vogt MD   Thanks for the consult. Chief Complaints :  Chief Complaint   Patient presents with    Chest Pain    Shortness of Breath    Headache    Dizziness        History of present illness:Nusrat is a 78 y. o.year old who presents with shortness of breath associated with chest pain which describes as pressure radiates to left arm lasting about 5 minutes or so occurred at rest.  Symptoms have been ongoing for several weeks now but they are more intense now.   He has known peripheral vascular disease cardiac cath in 2013 showed no significant CAD last stress test 2017 showed no  Ischemia  She says the pain was 7 out of 10 describes as pressure  Across the chest , she started having headaches associated with shortness of breath he does have a history of pacemaker normally sees Dr. Christine Stein actually saw Abisai Edwards  2 weeks ago and was advised to undergo stress test which is not done yet it is scheduled for july 5th    Past medical history:    has a past medical history of Anemia, Anxiety, Arthritis, Asthma, AV block, Blood poisoning, Blood transfusion, CAD (coronary artery disease), Cancer ([address] Carcinoma of breast ([address] Cardiac pacemaker, Chronic cystitis, Chronic respiratory failure ([address] Colon cancer ([address] COPD (chronic obstructive pulmonary disease) ([address] Depression, Diverticulitis, H/O 24 hour EKG monitoring, H/O cardiac catheterization, H/O cardiovascular stress test, H/O cardiovascular stress test, H/O chest pain, H/O Doppler ultrasound, H/O Doppler ultrasound, H/O echocardiogram, H/O echocardiogram, H/O urinary incontinence, History of blood transfusion, Hx of cardiovascular stress test, Hx of echocardiogram, Hx of fall, HX OTHER MEDICAL, Hyperlipidemia, Hypertension, Hypothermia, Malignant neoplasm of breast (female) ([address] Neuropathy, Normocytic normochromic anemia, Obstructive sleep apnea, Old MI (myocardial infarction), Osteoarthritis, Osteopenia, Primary malignant neoplasm of colon ([address] S/P cardiac cath, Super obesity, and Umbilical hernia. Past surgical history:   has a past surgical history that includes Tonsillectomy (1957); Appendectomy (2004); colectomy (2004); Tunneled venous port placement (2004); cyst removal (2003); Breast lumpectomy (2007); Dilation and curettage of uterus (2006); hernia repair (2004); hernia repair (2008); Dental surgery; Breast surgery (02/13/2012); Tunneled venous port placement (02/13/2012); Breast biopsy (2/13/12); Mastectomy, radical (Right, 02/29/2012); pre-malignant / benign skin lesion excision (2/8/2013); lymph node dissection (2/29/2012); Cardiac catheterization (2007 & 2011); A-V cardiac pacemaker insertion (3/10/14); Cystoscopy (Bilateral, 12/15/14); pacemaker placement; Endoscopy, colon, diagnostic (12/14/2017); Colonoscopy (10-18-13); Colonoscopy (1/19/2016); Colonoscopy (12/14/2017); and Colonoscopy (N/A, 1/16/2019). Social History:   reports that she has never smoked. She has never used smokeless tobacco. She reports that she does not drink alcohol or use drugs.   Family history:   no family history of CAD, STROKE of DM at early age    Allergies   Allergen Reactions    Bactrim [Sulfamethoxazole-Trimethoprim] Anaphylaxis and Hives    Lipitor [Atorvastatin] Shortness Of Breath    Taxol [Paclitaxel] Anaphylaxis and Swelling    Demerol Nausea Only    Neosporin [Bacitracin-Neomycin-Polymyxin] Rash and Other (See Comments)     hotness    Other Rash     Ivory Soap    Talc Other (See Comments)     blisters         nitroGLYCERIN (NITROSTAT) SL tablet 0.4 mg Q5 Min PRN   sodium chloride flush 0.9 % injection 10 mL 2 times per day   sodium chloride flush 0.9 % injection 10 mL PRN   magnesium hydroxide (MILK OF MAGNESIA) 400 MG/5ML suspension 30 mL Daily PRN   ondansetron (ZOFRAN) injection 4 mg Q6H PRN   [START ON 6/27/2019] aspirin chewable tablet 81 mg Daily   enoxaparin (LOVENOX) injection 40 mg Daily   albuterol sulfate  (90 Base) MCG/ACT inhaler 1 puff Q4H PRN   [START ON 6/27/2019] ferrous sulfate tablet 325 mg QAM   [START ON 6/27/2019] sertraline (ZOLOFT) tablet 200 mg QAM   simvastatin (ZOCOR) tablet 20 mg Nightly   fluticasone (FLOVENT HFA) 110 MCG/ACT inhaler 2 puff BID   letrozole (FEMARA) tablet 2.5 mg Nightly     Current Facility-Administered Medications   Medication Dose Route Frequency Provider Last Rate Last Dose    nitroGLYCERIN (NITROSTAT) SL tablet 0.4 mg  0.4 mg Sublingual Q5 Min PRN Max Oconnell MD   0.4 mg at 06/26/19 1420    sodium chloride flush 0.9 % injection 10 mL  10 mL Intravenous 2 times per day Max Oconnell MD        sodium chloride flush 0.9 % injection 10 mL  10 mL Intravenous PRN Max Oconnell MD        magnesium hydroxide (MILK OF MAGNESIA) 400 MG/5ML suspension 30 mL  30 mL Oral Daily PRN Max Oconnell MD        ondansetron (ZOFRAN) injection 4 mg  4 mg Intravenous Q6H PRN Max Oconnell MD        [START ON 6/27/2019] aspirin chewable tablet 81 mg  81 mg Oral Daily Niocle Gunter MD        enoxaparin (LOVENOX) injection 40 mg  40 mg Subcutaneous Daily Max Oconnell MD        albuterol sulfate  (90 Base) MCG/ACT inhaler 1 puff  1 puff Inhalation Q4H PRN Max Oconnell MD        [START ON 6/27/2019] ferrous sulfate tablet 325 mg  325 mg Oral QAM Max Oconnell MD        [START ON 6/27/2019] sertraline (ZOLOFT) tablet 200 mg  200 mg Oral QAM Nicole Gunter MD        simvastatin (ZOCOR) tablet 20 mg  20 mg Oral Nightly Nicole Gunter MD        fluticasone (FLOVENT HFA) 110 MCG/ACT inhaler 2 puff  2 puff Inhalation BID Max Oconnell MD        letrozole [facility]) tablet 2.5 mg  2.5 mg Oral Nightly Max Oconnell MD         Review of Systems:   · Constitutional: No Fever or Weight Loss   · Eyes: No Decreased Vision  · ENT: No Headaches, Hearing Loss or Vertigo  · Cardiovascular: As per HPI  · Respiratory: As per HPI  · Gastrointestinal: No abdominal pain, appetite loss, blood in stools, constipation, diarrhea or heartburn  · Genitourinary: No dysuria, trouble voiding, or hematuria  · Musculoskeletal:  No gait disturbance, weakness or joint complaints  · Integumentary: No rash or pruritis  · Neurological: No TIA or stroke symptoms  · Psychiatric: No anxiety or depression  · Endocrine: No malaise, fatigue or temperature intolerance  · Hematologic/Lymphatic: No bleeding problems, blood clots or swollen lymph nodes  · Allergic/Immunologic: No nasal congestion or hives  All systems negative except as marked. Physical Examination:    Vitals:    06/26/19 1359 06/26/19 1403 06/26/19 1616 06/26/19 1645   BP: (!) 133/54 (!) 133/54 138/63 90/67   Pulse: 74  60 66   Resp: 18  18 16   Temp: 97.7 °F (36.5 °C)   97.7 °F (36.5 °C)   TempSrc: Oral   Oral   SpO2: 98% 97% 97% 98%   Weight: 264 lb (119.7 kg)   271 lb 9.7 oz (123.2 kg)   Height: 5' 2\" (1.575 m)          General Appearance:  No distress, conversant    Constitutional:  Well developed, Well nourished, No acute distress, Non-toxic appearance. HENT:  Normocephalic, Atraumatic, Bilateral external ears normal, Oropharynx moist, No oral exudates, Nose normal. Neck- Normal range of motion, No tenderness, Supple, No stridor,no apical-carotid delay  Lymphatics : no palpable lymph nodes  Eyes:  PERRL, EOMI, Conjunctiva normal, No discharge. Respiratory:  Normal breath sounds, No respiratory distress, No wheezing, No chest tenderness. ,no use of accessory muscles, crackles Absent   Cardiovascular: (PMI) apex non displaced,no lifts no thrills, ankle swelling Absent  , 1+, s1 and s2 audible,Murmur. Absent , JVD not noted    Abdomen /GI:  Bowel sounds normal, Soft, No tenderness, No masses, No gross visceromegaly   :  No costovertebral angle tenderness   Musculoskeletal:  No edema, no tenderness, no deformities. Back- no tenderness  Integument:  Well hydrated, no rash   Lymphatic:  No lymphadenopathy noted   Neurologic:  Alert & oriented x 3, CN 2-12 normal, normal motor function, normal sensory function, no focal deficits noted           Medical decision making and Data review:    Lab Review   Recent Labs     06/26/19  1411   WBC 8.5   HGB 11.7*   HCT 37.3         Recent Labs     06/26/19  1411      K 4.3      CO2 28   BUN 13   CREATININE 0.9     Recent Labs     06/26/19  1411   AST 17   ALT 16   BILITOT 0.2   ALKPHOS 112     Recent Labs     06/26/19  1411   TROPONINT <0.010       No results for input(s): PROBNP in the last 72 hours. Lab Results   Component Value Date    INR 1.01 07/28/2018    PROTIME 11.5 07/28/2018       EKG: (reviewed by myself)    ECHO:(reviewed by myself)    Chest Xray:(reviewed by myself)  Xr Chest Portable    Result Date: 6/26/2019  EXAMINATION: ONE XRAY VIEW OF THE CHEST 6/26/2019 2:30 pm COMPARISON: 01/25/2018 HISTORY: ORDERING SYSTEM PROVIDED HISTORY: chest pain TECHNOLOGIST PROVIDED HISTORY: Reason for exam:->chest pain Ordering Physician Provided Reason for Exam: chest pain Acuity: Acute Type of Exam: Initial Relevant Medical/Surgical History: asthma, cardiac cath, cad, copd FINDINGS: Cardiac pacer stable in positioning. Bibasilar atelectasis. No focal consolidation, pleural effusion or pneumothorax. The cardiomediastinal silhouette is stable. No overt pulmonary edema. The osseous structures are stable. Bibasilar atelectasis. Cta Pulmonary W Contrast    Result Date: 6/26/2019  EXAMINATION: CTA OF THE CHEST 6/26/2019 4:03 pm TECHNIQUE: CTA of the chest was performed after the administration of intravenous contrast.  Multiplanar reformatted images are provided for review. MIP images are provided for review.  Dose modulation, iterative reconstruction, and/or weight based adjustment of the mA/kV was utilized to reduce the radiation dose to as low as cystitis, Chronic respiratory failure ([facility] Colon cancer (HCC), COPD (chronic obstructive pulmonary disease) ([address] Depression, Diverticulitis, H/O 24 hour EKG monitoring, H/O cardiac catheterization, H/O cardiovascular stress test, H/O cardiovascular stress test, H/O chest pain, H/O Doppler ultrasound, H/O Doppler ultrasound, H/O echocardiogram, H/O echocardiogram, H/O urinary incontinence, History of blood transfusion, Hx of cardiovascular stress test, Hx of echocardiogram, Hx of fall, HX OTHER MEDICAL, Hyperlipidemia, Hypertension, Hypothermia, Malignant neoplasm of breast (female) ([address] Neuropathy, Normocytic normochromic anemia, Obstructive sleep apnea, Old MI (myocardial infarction), Osteoarthritis, Osteopenia, Primary malignant neoplasm of colon ([facility] S/P cardiac cath, Super obesity, and Umbilical hernia. Plan and Recommendations:    She has significant leg claudication known peripheral vascular disease we will get a Lexiscan  Chest Pain: serial cardiac enzymes, EKG reviewed, further plan as per enzymes and clinical course  PVD: follow up as outpatient  HTN: stable, continue present medications. DVT prophylaxis if no contraindication  6. Dyslipidemia: continue statins           Thank you  much for consult and giving us the opportunity in contributing in the care of this patient. Please feel free to call me for any questions.        Corey Meza MD, 6/26/2019 6:01 PM

## 2019-06-27 ENCOUNTER — APPOINTMENT (OUTPATIENT)
Dept: NUCLEAR MEDICINE | Age: 69
End: 2019-06-27
Payer: COMMERCIAL

## 2019-06-27 LAB
LV EF: 62 %
LVEF MODALITY: NORMAL
TROPONIN T: <0.01 NG/ML

## 2019-06-27 PROCEDURE — 97167 OT EVAL HIGH COMPLEX 60 MIN: CPT

## 2019-06-27 PROCEDURE — 2709999900 HC NON-CHARGEABLE SUPPLY

## 2019-06-27 PROCEDURE — 94761 N-INVAS EAR/PLS OXIMETRY MLT: CPT

## 2019-06-27 PROCEDURE — 97116 GAIT TRAINING THERAPY: CPT

## 2019-06-27 PROCEDURE — 97530 THERAPEUTIC ACTIVITIES: CPT

## 2019-06-27 PROCEDURE — 6360000004 HC RX CONTRAST MEDICATION

## 2019-06-27 PROCEDURE — 2580000003 HC RX 258: Performed by: HOSPITALIST

## 2019-06-27 PROCEDURE — 84484 ASSAY OF TROPONIN QUANT: CPT

## 2019-06-27 PROCEDURE — 36415 COLL VENOUS BLD VENIPUNCTURE: CPT

## 2019-06-27 PROCEDURE — 97163 PT EVAL HIGH COMPLEX 45 MIN: CPT

## 2019-06-27 PROCEDURE — 2500000003 HC RX 250 WO HCPCS

## 2019-06-27 PROCEDURE — 2580000003 HC RX 258

## 2019-06-27 PROCEDURE — G0378 HOSPITAL OBSERVATION PER HR: HCPCS

## 2019-06-27 PROCEDURE — 93010 ELECTROCARDIOGRAM REPORT: CPT | Performed by: INTERNAL MEDICINE

## 2019-06-27 PROCEDURE — 78452 HT MUSCLE IMAGE SPECT MULT: CPT

## 2019-06-27 PROCEDURE — 6360000002 HC RX W HCPCS

## 2019-06-27 PROCEDURE — 93017 CV STRESS TEST TRACING ONLY: CPT

## 2019-06-27 PROCEDURE — 94640 AIRWAY INHALATION TREATMENT: CPT

## 2019-06-27 PROCEDURE — C1769 GUIDE WIRE: HCPCS

## 2019-06-27 PROCEDURE — 3430000000 HC RX DIAGNOSTIC RADIOPHARMACEUTICAL: Performed by: INTERNAL MEDICINE

## 2019-06-27 PROCEDURE — 6370000000 HC RX 637 (ALT 250 FOR IP): Performed by: HOSPITALIST

## 2019-06-27 PROCEDURE — 97535 SELF CARE MNGMENT TRAINING: CPT

## 2019-06-27 PROCEDURE — C1894 INTRO/SHEATH, NON-LASER: HCPCS

## 2019-06-27 PROCEDURE — 6360000002 HC RX W HCPCS: Performed by: INTERNAL MEDICINE

## 2019-06-27 PROCEDURE — 93458 L HRT ARTERY/VENTRICLE ANGIO: CPT

## 2019-06-27 PROCEDURE — 93458 L HRT ARTERY/VENTRICLE ANGIO: CPT | Performed by: INTERNAL MEDICINE

## 2019-06-27 PROCEDURE — 99232 SBSQ HOSP IP/OBS MODERATE 35: CPT | Performed by: INTERNAL MEDICINE

## 2019-06-27 PROCEDURE — A9500 TC99M SESTAMIBI: HCPCS | Performed by: INTERNAL MEDICINE

## 2019-06-27 RX ORDER — AMINOPHYLLINE DIHYDRATE 25 MG/ML
75 INJECTION, SOLUTION INTRAVENOUS ONCE
Status: COMPLETED | OUTPATIENT
Start: 2019-06-27 | End: 2019-06-27

## 2019-06-27 RX ADMIN — Medication 10 MILLICURIE: at 07:35

## 2019-06-27 RX ADMIN — AMINOPHYLLINE 75 MG: 25 INJECTION, SOLUTION INTRAVENOUS at 09:46

## 2019-06-27 RX ADMIN — SIMVASTATIN 20 MG: 20 TABLET, FILM COATED ORAL at 20:22

## 2019-06-27 RX ADMIN — SODIUM CHLORIDE, PRESERVATIVE FREE 10 ML: 5 INJECTION INTRAVENOUS at 12:00

## 2019-06-27 RX ADMIN — LETROZOLE 2.5 MG: 2.5 TABLET ORAL at 20:22

## 2019-06-27 RX ADMIN — SERTRALINE HYDROCHLORIDE 200 MG: 100 TABLET ORAL at 16:19

## 2019-06-27 RX ADMIN — Medication 30 MILLICURIE: at 09:40

## 2019-06-27 RX ADMIN — REGADENOSON 0.4 MG: 0.08 INJECTION, SOLUTION INTRAVENOUS at 09:46

## 2019-06-27 RX ADMIN — Medication 2 PUFF: at 08:41

## 2019-06-27 RX ADMIN — Medication 2 PUFF: at 22:25

## 2019-06-27 RX ADMIN — SODIUM CHLORIDE, PRESERVATIVE FREE 10 ML: 5 INJECTION INTRAVENOUS at 20:24

## 2019-06-27 RX ADMIN — FERROUS SULFATE TAB 325 MG (65 MG ELEMENTAL FE) 325 MG: 325 (65 FE) TAB at 16:20

## 2019-06-27 ASSESSMENT — PAIN DESCRIPTION - ORIENTATION: ORIENTATION: POSTERIOR

## 2019-06-27 ASSESSMENT — PAIN DESCRIPTION - ONSET: ONSET: ON-GOING

## 2019-06-27 ASSESSMENT — PAIN SCALES - GENERAL
PAINLEVEL_OUTOF10: 8
PAINLEVEL_OUTOF10: 0

## 2019-06-27 ASSESSMENT — PAIN DESCRIPTION - DESCRIPTORS: DESCRIPTORS: BURNING;PINS AND NEEDLES;SHARP

## 2019-06-27 ASSESSMENT — PAIN DESCRIPTION - PROGRESSION: CLINICAL_PROGRESSION: GRADUALLY WORSENING

## 2019-06-27 ASSESSMENT — PAIN DESCRIPTION - LOCATION: LOCATION: COCCYX

## 2019-06-27 ASSESSMENT — PAIN DESCRIPTION - FREQUENCY: FREQUENCY: CONTINUOUS

## 2019-06-27 NOTE — PROGRESS NOTES
Hospitalist Progress Note      Name:  Lenora Childers /Age/Sex: 1950  (78 y.o. female)   MRN & CSN:  [id number] & [id number] Admission Date/Time: 2019  1:53 PM   Location:  [facility]- PCP: Silver Amaya 57 Day: 2    Assessment and Plan:   Lenora Childers is a 78 y.o.  female  who presents with <principal problem not specified>    > Chest pain  - aspirin, statin,  consult cardiology  - stress test pending     > h/o asthma/COPD  - continue home inhalers     > weakness  - Consult PT/OT     > h/o breast, colon and skin cancer per pt report  - continue femara    > PVD  - OP FU with cardiology    Diet Diet NPO, After Midnight Exceptions are: Ice Chips, Sips with Meds   DVT Prophylaxis ? Lovenox,    Code Status Full Code   Disposition  pending cardiac workup       History of Present Illness:     Pt S&E. No chest pain, no dyspnea, no abd pain, no N/V.     10-14 point ROS reviewed negative, unless as noted above    Objective: Intake/Output Summary (Last 24 hours) at 2019 0858  Last data filed at 2019 0618  Gross per 24 hour   Intake 240 ml   Output --   Net 240 ml      Vitals:   Vitals:    19 0844   BP:    Pulse:    Resp:    Temp:    SpO2: 98%     Physical Exam:    GEN Awake female, cooperative, Obese,  no apparent distress. RESP Clear to auscultation, no wheezes, rales or rhonchi. Symmetric chest movement . CARDIO/VASC S1/S2 auscultated. Regular rate. GI Abdomen is soft without significant tenderness, Bowel sounds are normoactive. MSK No gross joint deformities. Spontaneous movement of all extremities  SKIN Normal coloration, warm, dry. NEURO normal speech, no lateralizing weakness. PSYCH Awake, alert, oriented x 4. Affect appropriate.     Medications:   Medications:    sodium chloride flush  10 mL Intravenous 2 times per day    aspirin  81 mg Oral Daily    enoxaparin  40 mg Subcutaneous Daily    ferrous sulfate  325 mg Oral QAM    sertraline  200 mg Oral QAM    simvastatin  20 mg Oral Nightly    fluticasone  2 puff Inhalation BID    letrozole  2.5 mg Oral Nightly      Infusions:   PRN Meds:   technetium sestamibi 10 millicurie ONCE PRN   technetium sestamibi 30 millicurie ONCE PRN   nitroGLYCERIN 0.4 mg Q5 Min PRN   sodium chloride flush 10 mL PRN   magnesium hydroxide 30 mL Daily PRN   ondansetron 4 mg Q6H PRN   albuterol sulfate HFA 1 puff Q4H PRN         Electronically signed by Varsha Jones MD on 6/27/2019 at 8:58 AM

## 2019-06-27 NOTE — PROGRESS NOTES
Cardiology Progress Note     Admit Date:  6/26/2019    Consult reason/ Seen today for :   Chest pain    Subjective and  Overnight Events :  No chest pain, no edema but stress test       Chief complain on admission : 78 y. o.year old who is admitted for  Chief Complaint   Patient presents with    Chest Pain    Shortness of Breath    Headache    Dizziness      Assessment / Plan:  Abnormal stress test   Plan cardiac cath  Alternates and risk of the procedure were dicussed in detail  Patient is in agreement to proceed  Mallampati is 2  ASA is 2  HTN: stable, continue To titrate up medication as needed  DVT Prophylaxis if no contraindication    Past medical history:    has a past medical history of Anemia, Anxiety, Arthritis, Asthma, AV block, Blood poisoning, Blood transfusion, CAD (coronary artery disease), Cancer ([address] Carcinoma of breast ([address] Cardiac pacemaker, Chronic cystitis, Chronic respiratory failure ([address] Colon cancer ([address] COPD (chronic obstructive pulmonary disease) [address] [address] Depression, Diverticulitis, H/O 24 hour EKG monitoring, H/O cardiac catheterization, H/O cardiovascular stress test, H/O cardiovascular stress test, H/O chest pain, H/O Doppler ultrasound, H/O Doppler ultrasound, H/O echocardiogram, H/O echocardiogram, H/O urinary incontinence, History of blood transfusion, Hx of cardiovascular stress test, Hx of echocardiogram, Hx of fall, HX OTHER MEDICAL, Hyperlipidemia, Hypertension, Hypothermia, Malignant neoplasm of breast (female) ([address] Neuropathy, Normocytic normochromic anemia, Obstructive sleep apnea, Old MI (myocardial infarction), Osteoarthritis, Osteopenia, Primary malignant neoplasm of colon ([address] S/P cardiac cath, Super obesity, and Umbilical hernia. Past surgical history:   has a past surgical history that includes Tonsillectomy (1957); Appendectomy (2004); colectomy (2004);  Tunneled venous port placement (2004); cyst removal (2003); Breast lumpectomy (2007); Dilation and curettage of uterus (2006); hernia repair (2004); hernia repair (2008); Dental surgery; Breast surgery (02/13/2012); Tunneled venous port placement (02/13/2012); Breast biopsy (2/13/12); Mastectomy, radical (Right, 02/29/2012); pre-malignant / benign skin lesion excision (2/8/2013); lymph node dissection (2/29/2012); Cardiac catheterization (2007 & 2011); A-V cardiac pacemaker insertion (3/10/14); Cystoscopy (Bilateral, 12/15/14); pacemaker placement; Endoscopy, colon, diagnostic (12/14/2017); Colonoscopy (10-18-13); Colonoscopy (1/19/2016); Colonoscopy (12/14/2017); and Colonoscopy (N/A, 1/16/2019). Social History:   reports that she has never smoked. She has never used smokeless tobacco. She reports that she does not drink alcohol or use drugs. Family history:  family history includes Arthritis in her mother; Cancer in her father; Diabetes in her father; Heart Disease in her brother, brother, and father; High Blood Pressure in her brother, father, and mother; High Cholesterol in her brother, father, and mother; Seizures in her son.     Allergies   Allergen Reactions    Bactrim [Sulfamethoxazole-Trimethoprim] Anaphylaxis and Hives    Lipitor [Atorvastatin] Shortness Of Breath    Taxol [Paclitaxel] Anaphylaxis and Swelling    Demerol Nausea Only    Neosporin [Bacitracin-Neomycin-Polymyxin] Rash and Other (See Comments)     hotness    Other Rash     Ivory Soap    Talc Other (See Comments)     blisters       Review of Systems:    All 14 systems were reviewed and are negative  Except for the positive findings  which as documented     /62   Pulse 63   Temp 98.2 °F (36.8 °C) (Oral)   Resp 18   Ht 5' 2\" (1.575 m)   Wt 273 lb 5.9 oz (124 kg)   LMP 01/15/1999   SpO2 98%   BMI 50.00 kg/m²       Intake/Output Summary (Last 24 hours) at 6/27/2019 1400  Last data filed at 6/27/2019 0618  Gross per 24 hour   Intake 240 ml   Output 06/27/19  0213   TROPONINT <0.010 <0.010 <0.010        ECHO :   echocardiogram    Assessment:  78 y. o.year old who is admitted for  Chief Complaint   Patient presents with    Chest Pain    Shortness of Breath    Headache    Dizziness    , active issues as noted below:  Impression:  Active Problems:    Chest pain  Resolved Problems:    * No resolved hospital problems. *            All labs, medications and tests reviewed by myself , continue all other medications of all above medical condition listed as is except for changes mentioned above. Thank you very much for consult , please call with questions.     Vanessa Stock MD 6/27/2019 2:00 PM

## 2019-06-27 NOTE — PROGRESS NOTES
Physical Therapy  [facility] ACUTE CARE PHYSICAL THERAPY EVALUATION  Nusrat Corey, 1950, 3029/3029-A, 6/27/2019    History  [place]:  The primary encounter diagnosis was Chest pain, unspecified type. A diagnosis of Shortness of breath was also pertinent to this visit. Patient  has a past medical history of Anemia, Anxiety, Arthritis, Asthma, AV block, Blood poisoning, Blood transfusion, CAD (coronary artery disease), Cancer ([address] Carcinoma of breast ([address] Cardiac pacemaker, Chronic cystitis, Chronic respiratory failure ([address] Colon cancer ([address] COPD (chronic obstructive pulmonary disease) ([address] Depression, Diverticulitis, H/O 24 hour EKG monitoring, H/O cardiac catheterization, H/O cardiovascular stress test, H/O cardiovascular stress test, H/O chest pain, H/O Doppler ultrasound, H/O Doppler ultrasound, H/O echocardiogram, H/O echocardiogram, H/O urinary incontinence, History of blood transfusion, Hx of cardiovascular stress test, Hx of echocardiogram, Hx of fall, HX OTHER MEDICAL, Hyperlipidemia, Hypertension, Hypothermia, Malignant neoplasm of breast (female) ([address] Neuropathy, Normocytic normochromic anemia, Obstructive sleep apnea, Old MI (myocardial infarction), Osteoarthritis, Osteopenia, Primary malignant neoplasm of colon ([address] S/P cardiac cath, Super obesity, and Umbilical hernia. Patient  has a past surgical history that includes Tonsillectomy (1957); Appendectomy (2004); colectomy (2004); Tunneled venous port placement (2004); cyst removal (2003); Breast lumpectomy (2007); Dilation and curettage of uterus (2006); hernia repair (2004); hernia repair (2008); Dental surgery; Breast surgery (02/13/2012); Tunneled venous port placement (02/13/2012); Breast biopsy (2/13/12); Mastectomy, radical (Right, 02/29/2012); pre-malignant / benign skin lesion excision (2/8/2013); lymph node dissection (2/29/2012); Cardiac catheterization (2007 & 2011);  A-V cardiac pacemaker insertion (3/10/14); Cystoscopy (Bilateral, 12/15/14); pacemaker placement; Endoscopy, colon, diagnostic (12/14/2017); Colonoscopy (10-18-13); Colonoscopy (1/19/2016); Colonoscopy (12/14/2017); and Colonoscopy (N/A, 1/16/2019). Subjective:  Patient states:  \"I don't trust those 2 wheeled walkers, I fell using it\"   Pain: 6/10 buttock . Has chronic back and LE pain (pt reports she has a high pain tolerance for that)  Communication with other providers:  Handoff to RN, co-eval with OT. Restrictions: general precautions, fall risk, portable tele, pulse ox    Home Setup/Prior level of function  Social/Functional History  Lives With: Significant other  Type of Home: House  Home Layout: One level  Home Access: Ramped entrance  Bathroom Shower/Tub: Tub/Shower unit  Bathroom Toilet: Standard  Bathroom Equipment: Shower chair  Home Equipment: 4 wheeled walker, Cane, Sock aid, Reacher  Receives Help From: Family  ADL Assistance: Independent(supervision with shower transfers)  [address]: Needs assistance(s/o manages primarily, pt will \"cut up vegetables\")  Homemaking Responsibilities: No  Ambulation Assistance: Independent(\"furniture cruises\" or uses cane/4ww)  Transfer Assistance: Independent  Active : No  Occupation: Retired  Leisure & Hobbies: Knitting    Examination of body systems (includes body structures/functions, activity/participation limitations):  · Observation:  Supine in bed upon arrival. Significant other visiting. Agreeable and cooperative with therapy   · Vision:  WFL with glasses  · Hearing:  [facility]   · Cardiopulmonary:  Stable throughout session   · Orientation: [facility]     Musculoskeletal  · ROM R/L:  WFL BLEs     · Strength R/L: BLEs 4-/5, fair in function and endurance. · Neuro:  Neuropathy to LEs (foot to knee bilat) and hands     Mobility/treatment:  · Rolling L/R:  NT  · Supine to sit:  HOB elevated to full upright position, CGA for safety.  (Pt reports sleeping in straight chair with foot rest at deficits, dec potential fall risk, dec burden of care, and restore function. Complexity: High   Prognosis: Good, no significant barriers to participation at this time. Plan Times per week: 3+/week, 1 week,   Discharge Recommendations: Subacute/Skilled Nursing Facility  Equipment: continue to assess     Goals:  Short term goals  Time Frame for Short term goals: 1 week  Short term goal 1: Pt will perform sit><supine (using bed adjustments) Gavin   Short term goal 2: Pt will transfer to bed/recliner Gavin   Short term goal 3: Pt will ambulate 50ft with LRAD Gavin      Treatment plan:  Strengthening; Balance Training; Transfer Training; ROM; Functional Mobility Training; ADL/Self-care Training; Endurance Training; Gait Training; Neuromuscular Re-education; Modalities; Patient/Caregiver Education & Training; Home Exercise Program; Safety Education & Training; Equipment Evaluation, Education, & procurement; Positioning  Recommendations for NURSING mobility: ambulate to bathroom with RW and 2 person assist for safety/use BSC.      Time:   Time in: 1318  Time out: 1402  Timed treatment minutes: 29  Total time: 44 minutes     Electronically signed by:    Manda Spring [id number]  6/27/2019, 3:03 PM

## 2019-06-27 NOTE — PROGRESS NOTES
Occupational Therapy    [facility] ACUTE CARE OCCUPATIONAL THERAPY EVALUATION    [address] [facility], 1950, 3029/3029-A, 6/27/2019    Discharge Recommendation: Yohan Stephen      History:  [place]:  The primary encounter diagnosis was Chest pain, unspecified type. A diagnosis of Shortness of breath was also pertinent to this visit. Subjective:  Patient states: \"I damn near busted my face using a walker. \"  Pain: Pt reported 8/10 pain in coccyx  Communication with other providers: Discussed with RN Benjamin Valverde, Co-evaluation with PT Amanda  Restrictions: General Precautions, Fall Risk, Telemetry, Pulse ox, BP cuff, Bed/chair alarm    Home Setup/Prior level of function:  Social/Functional History  Lives With: Significant other  Type of Home: House  Home Layout: One level  Home Access: Ramped entrance  Bathroom Shower/Tub: Tub/Shower unit  Bathroom Toilet: Standard  Bathroom Equipment: Shower chair  Home Equipment: 4 wheeled walker, Cane, Sock aid, Reacher  Receives Help From: Family  ADL Assistance: Independent (supervision with shower transfers)  [address]: Needs assistance (s/o manages primarily, pt will \"cut up vegetables\")  Homemaking Responsibilities: No  Ambulation Assistance: Independent (\"furniture cruises\" or uses cane/4ww)  Transfer Assistance: Independent  Active : No  Occupation: Retired  Leisure & Hobbies: Knitting, Crocheting    Examination:  · Observation: Supine in bed upon arrival. Agreeable to evaluation.   · Vision: WFL (Glasses)  · [facility]  · Vitals: Stable vitals throughout session    Body Systems and functions:  · ROM R/L: WFL all joints in BL UEs  · Strength R/L: Grossly 4/5 MMT all major muscle groups BL UEs  · Sensation: Mildly impaired (difficulty identifying light touch in BL hands)  · Tone: Normal  · Coordination: WFL  · Perception: WNL    Activities of Daily Living (ADLs):  · Feeding: Independent  · Grooming: CGA (completed hand hygiene, oral care of personal grooming such as brushing teeth? [] 1   [] 2    [] 2 [] 3    [x] 3   [] 4      6. Eating meals? [] 1   [] 2   [] 2   [] 3   [] 3   [x] 4      Raw Score:  16    [24=0% impaired(CH), 23=1-19%(CI), 20-22=20-39%(CJ), 15-19=40-59%(CK), 10-14=60-79%(CL), 7-9=80-99%(CM), 6=100%(CN)]     Treatment:  Therapeutic Activity Training:   Therapeutic activity training was instructed today. Cues were given for safety, sequence, UE/LE placement, awareness, and balance. Activities performed today included bed mobility training, transfer training, HH mobility with cane and RW, education on role of OT, POC, importance of OOB activity, d/c planning  Self Care Training:   Cues were given for safety, sequence, UE/LE placement, visual cues, and balance. Activities performed today included UB/LB dressing tasks, toileting, hand hygiene, oral hygiene, and grooming.       Safety Measures: Gait belt used, Left in Chair, Alarm in place    Assessment:  Pt is a 78year old female with a past medical history of Anemia, Anxiety, Arthritis, Asthma, AV block, Blood poisoning, Blood transfusion, CAD (coronary artery disease), Cancer ([facility] Carcinoma of breast ([facility] Cardiac pacemaker, Chronic cystitis, Chronic respiratory failure ([facility] Colon cancer ([facility] [address] COPD (chronic obstructive pulmonary disease) ([facility] Depression, Diverticulitis, H/O 24 hour EKG monitoring, H/O cardiac catheterization, H/O cardiovascular stress test, H/O cardiovascular stress test, H/O chest pain, H/O Doppler ultrasound, H/O Doppler ultrasound, H/O echocardiogram, H/O echocardiogram, H/O urinary incontinence, History of blood transfusion, Hx of cardiovascular stress test, Hx of echocardiogram, Hx of fall, HX OTHER MEDICAL, Hyperlipidemia, Hypertension, Hypothermia, Malignant neoplasm of breast (female) ([facility] Neuropathy, Normocytic normochromic anemia, Obstructive sleep apnea, Old MI (myocardial infarction), Osteoarthritis, Osteopenia, Primary malignant neoplasm of colon ([facility] [address] S/P cardiac cath, Super obesity, and Umbilical hernia. Pt admitted with chest pain, shortness of breath, and dizziness. Pt lives at home with her s/o and PLOF is as above. Pt currently presents with the above impairments, and is an extremely high fall risk at this time. Recommend continued OT services in SNF at d/c. Complexity: High  Prognosis: Good  Plan: 2x/week      Goals:  1. Pt will complete all aspects of bed mobility for EOB/OOB ADLs SBA with HOB flat  2. Pt will complete UB/LB bathing min A with setup using long handled sponge PRN  3. Pt will complete all aspects of LB dressing mod A with setup using AE PRN  4. Pt will complete all functional transfers to and from bed, chair, toilet, shower chair CGA/good safety awareness  5. Pt will ambulate HH distance to bathroom for toileting min A x 1 with RW  6. Pt will complete all aspects of toileting task CGA  7. Pt will complete ther ex/ther act with focus on UE strengthening, standing tolerance >6 minutes for ADL/IADL tasks  8.  Pt will complete oral hygiene/grooming routine in standing at sink SBA with setup/no seated breaks          Time:   Time in: 1318  Time out: 1402  Timed treatment minutes: 29  Total time: 44        Electronically signed by:    Juan Farley OT/L, [place], .[id number]

## 2019-06-27 NOTE — PLAN OF CARE
Problem: Falls - Risk of:  Goal: Will remain free from falls  Description  Will remain free from falls  Outcome: Ongoing     Problem: Falls - Risk of:  Goal: Absence of physical injury  Description  Absence of physical injury  Outcome: Ongoing     Problem: Risk for Impaired Skin Integrity  Goal: Tissue integrity - skin and mucous membranes  Description  Structural intactness and normal physiological function of skin and  mucous membranes.   Outcome: Ongoing     Problem: Cardiac:  Goal: Ability to maintain vital signs within normal range will improve  Description  Ability to maintain vital signs within normal range will improve  Outcome: Ongoing     Problem: Cardiac:  Goal: Cardiovascular alteration will improve  Description  Cardiovascular alteration will improve  Outcome: Ongoing     Problem: Health Behavior:  Goal: Will modify at least one risk factor affecting health status  Description  Will modify at least one risk factor affecting health status  Outcome: Ongoing     Problem: Physical Regulation:  Goal: Complications related to the disease process, condition or treatment will be avoided or minimized  Description  Complications related to the disease process, condition or treatment will be avoided or minimized  Outcome: Ongoing     Problem: Discharge Planning:  Goal: Patients continuum of care needs are met  Description  Patients continuum of care needs are met  Outcome: Ongoing

## 2019-06-27 NOTE — CARE COORDINATION
LSW attempted to speak with pt regarding discharge plans. Pt was not in her room at time of visit. LSW will try again later.

## 2019-06-28 VITALS
SYSTOLIC BLOOD PRESSURE: 151 MMHG | OXYGEN SATURATION: 97 % | HEIGHT: 62 IN | DIASTOLIC BLOOD PRESSURE: 67 MMHG | TEMPERATURE: 98.4 F | BODY MASS INDEX: 51.12 KG/M2 | WEIGHT: 277.78 LBS | HEART RATE: 69 BPM | RESPIRATION RATE: 18 BRPM

## 2019-06-28 PROCEDURE — 97530 THERAPEUTIC ACTIVITIES: CPT

## 2019-06-28 PROCEDURE — 97116 GAIT TRAINING THERAPY: CPT

## 2019-06-28 PROCEDURE — 94640 AIRWAY INHALATION TREATMENT: CPT

## 2019-06-28 PROCEDURE — G0378 HOSPITAL OBSERVATION PER HR: HCPCS

## 2019-06-28 PROCEDURE — 6370000000 HC RX 637 (ALT 250 FOR IP): Performed by: INTERNAL MEDICINE

## 2019-06-28 PROCEDURE — 99212 OFFICE O/P EST SF 10 MIN: CPT | Performed by: INTERNAL MEDICINE

## 2019-06-28 PROCEDURE — 2580000003 HC RX 258: Performed by: INTERNAL MEDICINE

## 2019-06-28 PROCEDURE — 94761 N-INVAS EAR/PLS OXIMETRY MLT: CPT

## 2019-06-28 PROCEDURE — APPSS30 APP SPLIT SHARED TIME 16-30 MINUTES: Performed by: NURSE PRACTITIONER

## 2019-06-28 RX ORDER — SODIUM CHLORIDE 9 MG/ML
INJECTION, SOLUTION INTRAVENOUS CONTINUOUS
Status: DISCONTINUED | OUTPATIENT
Start: 2019-06-28 | End: 2019-06-28

## 2019-06-28 RX ORDER — DIPHENHYDRAMINE HCL 25 MG
25 TABLET ORAL
Status: DISCONTINUED | OUTPATIENT
Start: 2019-06-28 | End: 2019-06-28

## 2019-06-28 RX ORDER — SODIUM CHLORIDE 0.9 % (FLUSH) 0.9 %
10 SYRINGE (ML) INJECTION EVERY 12 HOURS SCHEDULED
Status: DISCONTINUED | OUTPATIENT
Start: 2019-06-28 | End: 2019-06-28

## 2019-06-28 RX ORDER — ACETAMINOPHEN 325 MG/1
650 TABLET ORAL EVERY 4 HOURS PRN
Status: DISCONTINUED | OUTPATIENT
Start: 2019-06-28 | End: 2019-06-28 | Stop reason: HOSPADM

## 2019-06-28 RX ORDER — SODIUM CHLORIDE 0.9 % (FLUSH) 0.9 %
10 SYRINGE (ML) INJECTION PRN
Status: DISCONTINUED | OUTPATIENT
Start: 2019-06-28 | End: 2019-06-28

## 2019-06-28 RX ORDER — SODIUM CHLORIDE 0.9 % (FLUSH) 0.9 %
10 SYRINGE (ML) INJECTION EVERY 12 HOURS SCHEDULED
Status: DISCONTINUED | OUTPATIENT
Start: 2019-06-28 | End: 2019-06-28 | Stop reason: HOSPADM

## 2019-06-28 RX ORDER — DIAZEPAM 5 MG/1
5 TABLET ORAL
Status: DISCONTINUED | OUTPATIENT
Start: 2019-06-28 | End: 2019-06-28

## 2019-06-28 RX ADMIN — SODIUM CHLORIDE, PRESERVATIVE FREE 10 ML: 5 INJECTION INTRAVENOUS at 09:00

## 2019-06-28 RX ADMIN — Medication 2 PUFF: at 07:19

## 2019-06-28 RX ADMIN — SERTRALINE HYDROCHLORIDE 200 MG: 100 TABLET ORAL at 08:36

## 2019-06-28 RX ADMIN — ASPIRIN 81 MG 81 MG: 81 TABLET ORAL at 08:37

## 2019-06-28 RX ADMIN — FERROUS SULFATE TAB 325 MG (65 MG ELEMENTAL FE) 325 MG: 325 (65 FE) TAB at 08:37

## 2019-06-28 RX ADMIN — Medication 10 ML: at 08:39

## 2019-06-28 ASSESSMENT — PAIN SCALES - GENERAL
PAINLEVEL_OUTOF10: 0
PAINLEVEL_OUTOF10: 7

## 2019-06-28 ASSESSMENT — PAIN DESCRIPTION - PAIN TYPE: TYPE: ACUTE PAIN

## 2019-06-28 ASSESSMENT — PAIN DESCRIPTION - PROGRESSION: CLINICAL_PROGRESSION: NOT CHANGED

## 2019-06-28 ASSESSMENT — PAIN DESCRIPTION - FREQUENCY: FREQUENCY: CONTINUOUS

## 2019-06-28 ASSESSMENT — PAIN DESCRIPTION - ORIENTATION: ORIENTATION: LOWER

## 2019-06-28 ASSESSMENT — PAIN DESCRIPTION - DESCRIPTORS: DESCRIPTORS: CONSTANT;ACHING

## 2019-06-28 ASSESSMENT — PAIN DESCRIPTION - LOCATION: LOCATION: BACK

## 2019-06-28 ASSESSMENT — PAIN DESCRIPTION - ONSET: ONSET: ON-GOING

## 2019-06-28 NOTE — PLAN OF CARE
Problem: Falls - Risk of:  Goal: Will remain free from falls  Description  Will remain free from falls  6/28/2019 0938 by Ember Lei LPN  Outcome: Ongoing     Problem: Falls - Risk of:  Goal: Absence of physical injury  Description  Absence of physical injury  6/28/2019 2970 by Ember Lei LPN  Outcome: Ongoing     Problem: Risk for Impaired Skin Integrity  Goal: Tissue integrity - skin and mucous membranes  Description  Structural intactness and normal physiological function of skin and  mucous membranes.   6/28/2019 5195 by Ember Lei LPN  Outcome: Ongoing     Problem: Cardiac:  Goal: Ability to maintain vital signs within normal range will improve  Description  Ability to maintain vital signs within normal range will improve  6/28/2019 0938 by Ember Lei LPN  Outcome: Ongoing     Problem: Cardiac:  Goal: Cardiovascular alteration will improve  Description  Cardiovascular alteration will improve  6/28/2019 0938 by Ember Lei LPN  Outcome: Ongoing     Problem: Health Behavior:  Goal: Will modify at least one risk factor affecting health status  Description  Will modify at least one risk factor affecting health status  6/28/2019 0938 by Ember Lei LPN  Outcome: Ongoing     Problem: Physical Regulation:  Goal: Complications related to the disease process, condition or treatment will be avoided or minimized  Description  Complications related to the disease process, condition or treatment will be avoided or minimized  6/28/2019 0938 by Ember Lei LPN  Outcome: Ongoing     Problem: Discharge Planning:  Goal: Patients continuum of care needs are met  Description  Patients continuum of care needs are met  6/28/2019 0938 by Ember Lei LPN  Outcome: Ongoing     Problem: Pain:  Goal: Pain level will decrease  Description  Pain level will decrease  6/28/2019 0938 by Ember Lei LPN  Outcome: Ongoing     Problem: Pain:  Goal: Control of acute pain  Description  Control of acute pain  6/28/2019 0938 by Tonya Carlin LPN  Outcome: Ongoing     Problem: Pain:  Goal: Control of chronic pain  Description  Control of chronic pain  6/28/2019 0938 by Tonya Carlin LPN  Outcome: Ongoing

## 2019-06-28 NOTE — PROGRESS NOTES
Physical Therapy  . Physical Therapy Treatment Note  Name: Jose Boland MRN: [id number] :   1950   Date:  2019   Admission Date: 2019 Room:  [facility]3029-A       Restrictions/Precautions:        General Precautions, Fall Risk,     Communication with other providers:  MICHELLE Bhatt cleared patient for physical therapy. Curtis Olguin for hygiene / linen change. Subjective:  Patient states:  \"I'm not going anywhere with you. You are too small. \" Phone Curtis Olguin to assist for patient comfort. Patient verbalized she is not comfortable with RW. Patient insistent on using SC for gait. Patient states she uses the 4WW at home with seat. Pain:   Location, Type, Intensity (0/10 to 10/10):  7/10 bilateral lower extremities and lower back. Patient reports numbness in bilateral hands and feet. Objective:    Observation:  Patient high fowlers upon therapist arrival. Tele. Patient found incontinent of urine. Spouse present. A&O x4  Vitals: HR 69, 02 97%, RR 25, /61    Treatment, including education/measures:  Transfers  Supine to sit :SBA with HOB elevated and use of bed rails  Sit to supine :NT  Scooting :SBA to scoot to EOB  Rolling :NT  Sit to stand :CGA to chair and commode  Stand to sit :CGA to commode and chair  Hygiene tasks performed while seated on the commode unsupported for approximately 10-15 minutes with moderate / maximal assistance for lower body hygiene and dressing. Gait:  Patient ambulated with SC for 10 ft x2 with CGA . Patient presents with forward flexed posture, decreased bilateral step length and height. Patient required verbal instructions for safety awareness and pathway. Safety  Patient left safely in the chair, with call light/phone in reach with alarm applied. Gait belt was used for transfers and gait.       Assessment / Impression:    Patient's tolerance of treatment:  well   Adverse Reaction: none  Significant change in status and impact: none  Barriers to improvement:   Numbness in hands and feet   Discharge plan: pending    Plan for Next Session:    Per POC    Time in:  31-70-28-28  Time out:  1025  Timed treatment minutes:  60  Total treatment time:  61    Previously filed items:  Social/Functional History  Lives With: Significant other  Type of Home: House  Home Layout: One level  Home Access: Ramped entrance  Bathroom Shower/Tub: Tub/Shower unit  Bathroom Toilet: Standard  Bathroom Equipment: Shower chair  Home Equipment: 4 wheeled walker, Cane, Sock aid, Reacher  Receives Help From: Family  ADL Assistance: Independent(supervision with shower transfers)  [address]: Needs assistance(s/o manages primarily, pt will \"cut up vegetables\")  Homemaking Responsibilities: No  Ambulation Assistance: Independent(\"furniture cruises\" or uses cane/4ww)  Transfer Assistance: Independent  Active : No  Occupation: Retired  Leisure & Hobbies: Knitting  Short term goals  Time Frame for Short term goals: 1 week  Short term goal 1: Pt will perform sit><supine (using bed adjustments) Gavin   Short term goal 2: Pt will transfer to bed/recliner Gavin   Short term goal 3: Pt will ambulate 50ft with [id number]        Electronically signed by:    Ann Ornelas PTA [id number]

## 2019-06-28 NOTE — PLAN OF CARE
Problem: Falls - Risk of:  Goal: Will remain free from falls  Description  Will remain free from falls  6/27/2019 2019 by Keith Penaloza RN  Outcome: Ongoing  6/27/2019 1114 by Bianca Juarez LPN  Outcome: Ongoing  Goal: Absence of physical injury  Description  Absence of physical injury  6/27/2019 2019 by Keith Penaloza RN  Outcome: Ongoing  6/27/2019 1114 by Bianca Juarez LPN  Outcome: Ongoing     Problem: Risk for Impaired Skin Integrity  Goal: Tissue integrity - skin and mucous membranes  Description  Structural intactness and normal physiological function of skin and  mucous membranes.   6/27/2019 2019 by Keith Penaloza RN  Outcome: Ongoing  6/27/2019 1114 by Bianca Juarez LPN  Outcome: Ongoing     Problem: Cardiac:  Goal: Ability to maintain vital signs within normal range will improve  Description  Ability to maintain vital signs within normal range will improve  6/27/2019 2019 by Keith Penaloza RN  Outcome: Ongoing  6/27/2019 1114 by Bianca Juarez LPN  Outcome: Ongoing  Goal: Cardiovascular alteration will improve  Description  Cardiovascular alteration will improve  6/27/2019 2019 by Keith Penaloza RN  Outcome: Ongoing  6/27/2019 1114 by Biacna Juarez LPN  Outcome: Ongoing     Problem: Health Behavior:  Goal: Will modify at least one risk factor affecting health status  Description  Will modify at least one risk factor affecting health status  6/27/2019 2019 by Keith Penaloza RN  Outcome: Ongoing  6/27/2019 1114 by Bianca Juarez LPN  Outcome: Ongoing     Problem: Physical Regulation:  Goal: Complications related to the disease process, condition or treatment will be avoided or minimized  Description  Complications related to the disease process, condition or treatment will be avoided or minimized  6/27/2019 2019 by Keith Penaloza RN  Outcome: Ongoing  6/27/2019 1114 by Bianca Juarez LPN  Outcome: Ongoing     Problem: Discharge Planning:  Goal: Patients continuum of care needs are met  Description  Patients continuum of care needs are met  6/27/2019 2019 by Jenaro Lara RN  Outcome: Ongoing  6/27/2019 1114 by Samanta Holloway LPN  Outcome: Ongoing     Problem: Pain:  Goal: Pain level will decrease  Description  Pain level will decrease  Outcome: Ongoing  Goal: Control of acute pain  Description  Control of acute pain  Outcome: Ongoing  Goal: Control of chronic pain  Description  Control of chronic pain  Outcome: Ongoing

## 2019-06-28 NOTE — PROGRESS NOTES
Cardiology Progress Note     Today's Plan: cpm    Admit Date:  6/26/2019    Consult reason/ Seen today for: chest pain     Subjective and  Overnight Events:  Up sitting in chair  Notes she had chest pain 2 times during the night. Right groin site is free of hematoma or ecchymosis. Telemetry v paced     Assessment / Plan / Recommendation:     1. Chest pain: abnormal stress test-  LHC completed; no significant disease- noted Medical management and risk factor modification - continue with statin   2. HTN well controlled  3. Hyperlipidemia continue with statin   4. VHD- Moderate AS- stable    4. DVT prophylaxis if not contraindicated       History of Presenting Illness:    Chief complain on admission : 78 y. o.year old who is admitted for  Chief Complaint   Patient presents with    Chest Pain    Shortness of Breath    Headache    Dizziness        Past medical history:    has a past medical history of Anemia, Anxiety, Arthritis, Asthma, AV block, Blood poisoning, Blood transfusion, CAD (coronary artery disease), Cancer ([address] Carcinoma of breast ([address] Cardiac pacemaker, Chronic cystitis, Chronic respiratory failure ([address] Colon cancer ([address] COPD (chronic obstructive pulmonary disease) ([address] Depression, Diverticulitis, H/O 24 hour EKG monitoring, H/O cardiac catheterization, H/O cardiovascular stress test, H/O cardiovascular stress test, H/O chest pain, H/O Doppler ultrasound, H/O Doppler ultrasound, H/O echocardiogram, H/O echocardiogram, H/O urinary incontinence, History of blood transfusion, Hx of cardiovascular stress test, Hx of echocardiogram, Hx of fall, HX OTHER MEDICAL, Hyperlipidemia, Hypertension, Hypothermia, Malignant neoplasm of breast (female) ([address] Neuropathy, Normocytic normochromic anemia, Obstructive sleep apnea, Old MI (myocardial infarction), Osteoarthritis, Osteopenia, Primary malignant neoplasm of colon [facility]), S/P cardiac cath, Super obesity, and Umbilical hernia. Past surgical history:   has a past surgical history that includes Tonsillectomy (1957); Appendectomy (2004); colectomy (2004); Tunneled venous port placement (2004); cyst removal (2003); Breast lumpectomy (2007); Dilation and curettage of uterus (2006); hernia repair (2004); hernia repair (2008); Dental surgery; Breast surgery (02/13/2012); Tunneled venous port placement (02/13/2012); Breast biopsy (2/13/12); Mastectomy, radical (Right, 02/29/2012); pre-malignant / benign skin lesion excision (2/8/2013); lymph node dissection (2/29/2012); Cardiac catheterization (2007 & 2011); A-V cardiac pacemaker insertion (3/10/14); Cystoscopy (Bilateral, 12/15/14); pacemaker placement; Endoscopy, colon, diagnostic (12/14/2017); Colonoscopy (10-18-13); Colonoscopy (1/19/2016); Colonoscopy (12/14/2017); and Colonoscopy (N/A, 1/16/2019). Social History:   reports that she has never smoked. She has never used smokeless tobacco. She reports that she does not drink alcohol or use drugs. Family history:  family history includes Arthritis in her mother; Cancer in her father; Diabetes in her father; Heart Disease in her brother, brother, and father; High Blood Pressure in her brother, father, and mother; High Cholesterol in her brother, father, and mother; Seizures in her son.     Allergies   Allergen Reactions    Bactrim [Sulfamethoxazole-Trimethoprim] Anaphylaxis and Hives    Lipitor [Atorvastatin] Shortness Of Breath    Taxol [Paclitaxel] Anaphylaxis and Swelling    Demerol Nausea Only    Neosporin [Bacitracin-Neomycin-Polymyxin] Rash and Other (See Comments)     hotness    Other Rash     Ivory Soap    Talc Other (See Comments)     blisters       Review of Systems:   All 14 systems were reviewed and are negative  Except for the positive findings  which as documented     BP (!) 151/67   Pulse 69   Temp 98.4 °F (36.9 °C) (Oral)   Resp 18   Ht 5' 2\" (1.575 m) Wt 277 lb 12.5 oz (126 kg)   LMP 01/15/1999   SpO2 97%   BMI 50.81 kg/m²       Intake/Output Summary (Last 24 hours) at 6/28/2019 1313  Last data filed at 6/28/2019 0900  Gross per 24 hour   Intake 10 ml   Output --   Net 10 ml       Physical Exam:  Constitutional:  Well developed, Well nourished, No acute distress  HENT:  Normocephalic, Atraumatic, Bilateral external ears normal, No oral exudates, Nose normal.   Neck- Supple, No stridor. Eyes:  PERRL  Respiratory:  Normal breath sounds, No respiratory distress  Cardiovascular:  Normal heart rate, Normal rhythm, no murmurs appreciated, No rubs appreciated, No gallops appreciated, JVP not elevated  Abdomen/GI:  Soft, No tenderness  Musculoskeletal:  Intact distal pulses, no edema, No tenderness, No cyanosis, No clubbing. Integument:  Warm, Dry  Lymphatic:  No lymphadenopathy noted. Neurologic:  Alert & oriented  Psychiatric:  Affect and Mood :pleasant     Medications:    sodium chloride flush  10 mL Intravenous 2 times per day    [START ON 6/29/2019] enoxaparin  40 mg Subcutaneous Daily    sodium chloride flush  10 mL Intravenous 2 times per day    aspirin  81 mg Oral Daily    ferrous sulfate  325 mg Oral QAM    sertraline  200 mg Oral QAM    simvastatin  20 mg Oral Nightly    fluticasone  2 puff Inhalation BID    letrozole  2.5 mg Oral Nightly       acetaminophen, magnesium hydroxide, nitroGLYCERIN, sodium chloride flush, ondansetron, albuterol sulfate HFA    Lab Data:  CBC:   Recent Labs     06/26/19  1411   WBC 8.5   HGB 11.7*   HCT 37.3   MCV 90.8        BMP:   Recent Labs     06/26/19  1411      K 4.3      CO2 28   BUN 13   CREATININE 0.9     PT/INR: No results for input(s): PROTIME, INR in the last 72 hours. BNP:  No results for input(s): PROBNP in the last 72 hours.   TROPONIN:   Recent Labs     06/26/19  1411 06/26/19  2021 06/27/19  0213   TROPONINT <0.010 <0.010 <0.010            Impression:  Principal Problem:    Chest pain  Resolved Problems:    * No resolved hospital problems. *       All labs, medications and tests reviewed by myself, continue all other medications of all above medical condition listed as is except for changes mentioned above. Thank you   Please call with questions. Electronically signed by AUSTIN Palmer CNP on 6/28/2019 at 1:13 PM     [address] [facility]    I have seen ,spoken to  and examined this patient personally, independently of the nurse practitioner. I have reviewed the hospital care given to date and reviewed all pertinent labs and imaging. The plan was developed mutually at the time of the visit with the patient,  NP   and myself. I have spoken with patient, nursing staff and provided written and verbal instructions . The above note has been reviewed and I agree with the assessment, diagnosis, and treatment plan with changes made by me as follows     HPI:  I have reviewed the above HPI  And agree with above   Nusrat is a 78 y. o.year old who and presents with had concerns including Chest Pain; Shortness of Breath; Headache; and Dizziness. Chief Complaint   Patient presents with    Chest Pain    Shortness of Breath    Headache    Dizziness     Please review addendum/changes made to note above   Interval history:  Much better     Physical Exam:  General:  Awake, alert, NAD  Head:normal  Eye:normal  Neck:  No JVD   Chest:  Clear to auscultation, respiration easy  Cardiovascular:  S1 and S2 audible, No added heart sounds, No signs of ankle edema, or volume overload, No evidence of JVD, No crackles  Abdomen:   nontender  Extremities:  No  edema  Pulses; palpable  Neuro: grossly normal      MEDICAL DECISION MAKING;    I agree with the above plan, which was planned by myself and discussed with NP.   Ok to discharge        Rodney Mcdonnell MD [facility] [place]

## 2019-07-01 ENCOUNTER — PROCEDURE VISIT (OUTPATIENT)
Dept: CARDIOLOGY CLINIC | Age: 69
End: 2019-07-01
Payer: COMMERCIAL

## 2019-07-01 DIAGNOSIS — I73.9 PVD (PERIPHERAL VASCULAR DISEASE) (HCC): Primary | ICD-10-CM

## 2019-07-01 DIAGNOSIS — I10 ESSENTIAL HYPERTENSION: ICD-10-CM

## 2019-07-01 DIAGNOSIS — R07.9 CHEST PAIN, UNSPECIFIED TYPE: ICD-10-CM

## 2019-07-01 DIAGNOSIS — Z95.0 CARDIAC PACEMAKER: ICD-10-CM

## 2019-07-01 DIAGNOSIS — E78.5 HYPERLIPIDEMIA, UNSPECIFIED HYPERLIPIDEMIA TYPE: ICD-10-CM

## 2019-07-01 LAB
EKG ATRIAL RATE: 67 BPM
EKG DIAGNOSIS: NORMAL
EKG P AXIS: 20 DEGREES
EKG P-R INTERVAL: 216 MS
EKG Q-T INTERVAL: 434 MS
EKG QRS DURATION: 138 MS
EKG QTC CALCULATION (BAZETT): 458 MS
EKG R AXIS: -70 DEGREES
EKG T AXIS: 101 DEGREES
EKG VENTRICULAR RATE: 67 BPM

## 2019-07-01 PROCEDURE — 93925 LOWER EXTREMITY STUDY: CPT | Performed by: INTERNAL MEDICINE

## 2019-07-02 ENCOUNTER — OFFICE VISIT (OUTPATIENT)
Dept: INTERNAL MEDICINE CLINIC | Age: 69
End: 2019-07-02
Payer: COMMERCIAL

## 2019-07-02 VITALS
DIASTOLIC BLOOD PRESSURE: 68 MMHG | HEART RATE: 67 BPM | BODY MASS INDEX: 48.58 KG/M2 | OXYGEN SATURATION: 95 % | SYSTOLIC BLOOD PRESSURE: 120 MMHG | WEIGHT: 264 LBS | HEIGHT: 62 IN

## 2019-07-02 DIAGNOSIS — I35.0 MODERATE AORTIC STENOSIS: ICD-10-CM

## 2019-07-02 DIAGNOSIS — J45.20 MILD INTERMITTENT ASTHMA WITHOUT COMPLICATION: Primary | ICD-10-CM

## 2019-07-02 DIAGNOSIS — Z87.898 HISTORY OF CHEST PAIN: ICD-10-CM

## 2019-07-02 DIAGNOSIS — I73.9 PVD (PERIPHERAL VASCULAR DISEASE) (HCC): ICD-10-CM

## 2019-07-02 DIAGNOSIS — R16.2 HEPATOSPLENOMEGALY: ICD-10-CM

## 2019-07-02 PROCEDURE — 99213 OFFICE O/P EST LOW 20 MIN: CPT | Performed by: FAMILY MEDICINE

## 2019-07-07 PROBLEM — I35.0 MODERATE AORTIC STENOSIS: Status: ACTIVE | Noted: 2019-07-07

## 2019-07-07 ASSESSMENT — ENCOUNTER SYMPTOMS
SHORTNESS OF BREATH: 0
BLOOD IN STOOL: 0
EYES NEGATIVE: 1
NAUSEA: 0
BACK PAIN: 0
CONSTIPATION: 0
CHEST TIGHTNESS: 0
WHEEZING: 0
ABDOMINAL PAIN: 0
DIARRHEA: 0

## 2019-07-07 NOTE — PROGRESS NOTES
of right breast ca(pc)    Carcinoma of breast ([facility] 2/29/2012    right arm no b/p or sticks    Cardiac pacemaker 3/10/14    Medtronic dual lead    Chronic cystitis     Chronic respiratory failure (HCC)     2 L/min oxygen at night-time    Colon cancer [facility]) 2013    COPD (chronic obstructive pulmonary disease) ([facility]     Depression     Diverticulitis     H/O 24 hour EKG monitoring 2/28/2014 7/24/13 2/14 complete heart block 7/13No clinically significant arrthymia noted.  H/O cardiac catheterization 10/31/2011, 7/11/2007    10/31/2011-No significant CAD. Cardiolite stress test was false positive-Dr Jermain Manzo; 7/11/2007-No CAD, global function intact;    H/O cardiovascular stress test 10/20/2011, 3/23/2010    10/20/2011-Lexiscan- Evid of mild ischemia in Left CX region. EF 70%. Global LVSF normal;    H/O cardiovascular stress test 1/7/15    EF 77%. Normal Stress Test.    H/O chest pain 4/2005    sees Dr eDnnis Carter H/O Doppler ultrasound 2/20/2008 2/20/2008-CAROTID DOPPLER- Normal carotids bilaterally;    H/O Doppler ultrasound 06/06/13    CAROTID DOPPLER- there is heterogeneous, irregular atherosclerotic plaque noted in the right internal carotid artery,  doppler flow velocities within the right internal carotid artery are elevated, consistent with a mild, less than 50%stenosis, there is intimal thickening but no significant atherosclerotic plaque noted in the left internal carotid artery, the left carotid are within normal limits    H/O echocardiogram 4/9/2012, 10/20/2011    4/9/2012-LVSF normal EF=>55%. impaired LV relaxation;    H/O echocardiogram 12/31/14    EF 50-55%. LV shows mild concentric hypertrophy. Mildly dilated left atrium. Mildly dilated right ventricle. Sclerotic but not stenotic aortic valve. Mitral annular calcification.   Mild MR, Mild TR, Mild pulm HTN.    H/O urinary incontinence     History of blood transfusion     Hx of cardiovascular stress test 06/2013    EF 70% abn BP  Keep f/u with specialists           Return for Follow up as scheduled.     Electronically Signed by Bernardo Kaufman DO

## 2019-07-08 ENCOUNTER — PROCEDURE VISIT (OUTPATIENT)
Dept: CARDIOLOGY CLINIC | Age: 69
End: 2019-07-08
Payer: COMMERCIAL

## 2019-07-08 DIAGNOSIS — Z95.0 CARDIAC PACEMAKER IN SITU: Primary | ICD-10-CM

## 2019-07-08 DIAGNOSIS — I44.1 HEART BLOCK AV SECOND DEGREE: ICD-10-CM

## 2019-07-08 PROCEDURE — 93294 REM INTERROG EVL PM/LDLS PM: CPT | Performed by: INTERNAL MEDICINE

## 2019-07-08 PROCEDURE — 93296 REM INTERROG EVL PM/IDS: CPT | Performed by: INTERNAL MEDICINE

## 2019-07-10 ENCOUNTER — TELEPHONE (OUTPATIENT)
Dept: CARDIOLOGY CLINIC | Age: 69
End: 2019-07-10

## 2019-07-18 ENCOUNTER — TELEPHONE (OUTPATIENT)
Dept: INTERNAL MEDICINE CLINIC | Age: 69
End: 2019-07-18

## 2019-07-18 DIAGNOSIS — G56.03 BILATERAL CARPAL TUNNEL SYNDROME: Primary | ICD-10-CM

## 2019-07-18 NOTE — TELEPHONE ENCOUNTER
Pt called stating that she had EMG done and wants to know who to see now b/c her ring and middle fingers on her R hand aren't moving.  Please advise

## 2019-07-19 ENCOUNTER — TELEPHONE (OUTPATIENT)
Dept: ORTHOPEDIC SURGERY | Age: 69
End: 2019-07-19

## 2019-07-19 ENCOUNTER — TELEPHONE (OUTPATIENT)
Dept: SURGERY | Age: 69
End: 2019-07-19

## 2019-07-19 NOTE — TELEPHONE ENCOUNTER
neuropathies (bilateral CTS of significant severity and a mixture of acute and chronic electrical features).                2. Minor underlying sensorimotor polyneuropathy with risk factors including her adenocarcinomas and the chemotherapy exposure.                 3. No signs of a concurrent spinal nerve root injury (radiculopathy), plexopathy or primary muscle disease.

## 2019-08-22 ENCOUNTER — OFFICE VISIT (OUTPATIENT)
Dept: INTERNAL MEDICINE CLINIC | Age: 69
End: 2019-08-22
Payer: COMMERCIAL

## 2019-08-22 VITALS
BODY MASS INDEX: 47.84 KG/M2 | HEIGHT: 62 IN | OXYGEN SATURATION: 97 % | WEIGHT: 260 LBS | HEART RATE: 68 BPM | DIASTOLIC BLOOD PRESSURE: 66 MMHG | SYSTOLIC BLOOD PRESSURE: 112 MMHG

## 2019-08-22 DIAGNOSIS — D64.9 ANEMIA, UNSPECIFIED TYPE: ICD-10-CM

## 2019-08-22 DIAGNOSIS — F32.A DEPRESSION, UNSPECIFIED DEPRESSION TYPE: ICD-10-CM

## 2019-08-22 DIAGNOSIS — M65.341 TRIGGER RING FINGER OF RIGHT HAND: ICD-10-CM

## 2019-08-22 DIAGNOSIS — E78.5 HYPERLIPIDEMIA, UNSPECIFIED HYPERLIPIDEMIA TYPE: ICD-10-CM

## 2019-08-22 DIAGNOSIS — L82.1 SEBORRHEIC KERATOSIS: Primary | ICD-10-CM

## 2019-08-22 DIAGNOSIS — R09.89 LABILE BLOOD PRESSURE: ICD-10-CM

## 2019-08-22 PROCEDURE — 99214 OFFICE O/P EST MOD 30 MIN: CPT | Performed by: FAMILY MEDICINE

## 2019-08-22 RX ORDER — BLOOD PRESSURE TEST KIT
KIT MISCELLANEOUS
Qty: 1 KIT | Refills: 0 | Status: SHIPPED | OUTPATIENT
Start: 2019-08-22 | End: 2019-11-18

## 2019-08-29 NOTE — PROGRESS NOTES
Sera Macias  1950  78 y.o.  female    Chief Complaint   Patient presents with    3 Month Follow-Up     Pt here for 3 month f/u for HTN. pt voiced concerns with spot on back and her R ring finger. History of Present Illness  Nusrat Hernandez is a 78 y.o. female who presents today for a check up. Last labs reviewed. No Cp or Sob.  -Pt c/o of trigger finger of her R 4th digit. She has had similar before  -Labile Bp- She states her wrist monitor shows low readings and she wants a new monitor  -Depression- Stable on Zoloft. No SI or plan  -NC anemia- Chronic. No active bleeding  -HL- Controlled on Zocor and Zetia    Review of Systems   Constitutional: Negative for chills, diaphoresis and fever. HENT: Negative. Eyes: Negative. Respiratory: Negative for cough, chest tightness, shortness of breath and wheezing. Cardiovascular: Negative for chest pain and palpitations. Gastrointestinal: Negative for abdominal pain, blood in stool, constipation, diarrhea and nausea. Genitourinary: Negative for difficulty urinating and dysuria. Musculoskeletal: Negative for back pain and neck pain. Trigger finger   Skin:        Skin lesion on back   Neurological: Negative for dizziness, light-headedness and headaches.    Psychiatric/Behavioral:        No changes in mood       Allergies   Allergen Reactions    Bactrim [Sulfamethoxazole-Trimethoprim] Anaphylaxis and Hives    Lipitor [Atorvastatin] Shortness Of Breath    Taxol [Paclitaxel] Anaphylaxis and Swelling    Demerol Nausea Only    Neosporin [Bacitracin-Neomycin-Polymyxin] Rash and Other (See Comments)     hotness    Other Rash     Ivory Soap    Talc Other (See Comments)     blisters       Past Medical History:   Diagnosis Date    Anemia     Anxiety 1978    Arthritis     generalized    Asthma 2006    AV block     2nd degree per hospital in june2013    Blood poisoning 1994    Blood transfusion     12/2003    CAD (coronary artery incontinence     History of blood transfusion     Hx of Arterial Doppler ultrasound 07/01/2019    No hemodynamically significant or focal stenosis visualized bilaterally, bilateral lower extremity arteries exhibit diffuse plaque and multiphasic waveforms, unable to obtain bilateral ABIs due to elevated leg pressures >250 mmHg, possibly due to atherosclerosis    Hx of cardiovascular stress test 06/2013    EF 70% abn suggestive of anterolateral ischemia, possible RCA ischmeia, post left ventricular dilation suggestive multivessel CAD.  Hx of echocardiogram 06/2013    EF50%, mild MR and TR, mild pulmonary HTN, mild AS    Hx of fall     \"last time 2018- due to neuropathy, have to use cane\"    HX OTHER MEDICAL 7/24/13, 06/2013 7/13-No clinacally significant arrhythmia. 6/13-multiple cycles of wenckebach episodes in addtion to some sinus tach    Hyperlipidemia     Hypertension     Hypothermia 2008    Malignant neoplasm of breast (female) ([facility] [address] 2012    Neuropathy     \"have neuropathy of my legs\"    Normocytic normochromic anemia     Obstructive sleep apnea 2008 01- Has CPAP machine but has not used in over a year - states she has not had problems since her pacemaker was placed.  Old MI (myocardial infarction)     per pt on 1/15/2019\"had heart attack about a year ago\"    Osteoarthritis     Osteopenia     Primary malignant neoplasm of colon ([facility]     S/P cardiac cath 06/2013    no significant CAD false postive stress test    Super obesity     Umbilical hernia        Past Surgical History:   Procedure Laterality Date    A-V CARDIAC PACEMAKER INSERTION  3/10/14     Medtronic.    Model:  [id number] Medtronic  Serial:  [id number] dual chamber pacemaker    APPENDECTOMY  2004    BREAST BIOPSY  2/13/12    BREAST LUMPECTOMY  2007    right     BREAST SURGERY  02/13/2012    rt lesion biopsy     CARDIAC CATHETERIZATION  2007 & 2011    COLECTOMY  2004    Colon cancer    COLONOSCOPY  10-18-13 cervical adenopathy. Neurological: She is alert and oriented to person, place, and time. No cranial nerve deficit. Skin: Skin is warm and dry. Pigmented plaque on back   Psychiatric: She has a normal mood and affect. Vitals reviewed. ASSESSMENT:  1. Seborrheic keratosis    2. Labile blood pressure    3. Trigger ring finger of right hand    4. Depression, unspecified depression type    5. Anemia, unspecified type    6. Hyperlipidemia, unspecified hyperlipidemia type        PLAN:    Orders Placed This Encounter   Procedures    CBC Auto Differential       Orders Placed This Encounter   Medications    Blood Pressure KIT     Sig: Use as directed     Dispense:  1 kit     Refill:  0   Obtain lab  Continue medications  Monitor BP. Pt would like a BP monitor  Pt plans to f/u with Dr. Tiana Scott Sx her skin an finger  The patient is asked to make an attempt to improve diet and exercise patterns to aid in medical management of this problem. Return in about 3 months (around 11/22/2019) for blood pressure.     Electronically Signed by Dao Deutsch DO

## 2019-08-31 ASSESSMENT — ENCOUNTER SYMPTOMS
DIARRHEA: 0
ABDOMINAL PAIN: 0
CHEST TIGHTNESS: 0
BACK PAIN: 0
CONSTIPATION: 0
ROS SKIN COMMENTS: SKIN LESION ON BACK
COUGH: 0
EYES NEGATIVE: 1
SHORTNESS OF BREATH: 0
NAUSEA: 0
BLOOD IN STOOL: 0
WHEEZING: 0

## 2019-09-17 ENCOUNTER — OFFICE VISIT (OUTPATIENT)
Dept: CARDIOLOGY CLINIC | Age: 69
End: 2019-09-17
Payer: COMMERCIAL

## 2019-09-17 VITALS
HEIGHT: 62 IN | WEIGHT: 265.8 LBS | HEART RATE: 76 BPM | DIASTOLIC BLOOD PRESSURE: 72 MMHG | BODY MASS INDEX: 48.91 KG/M2 | SYSTOLIC BLOOD PRESSURE: 138 MMHG

## 2019-09-17 DIAGNOSIS — Z95.0 CARDIAC PACEMAKER IN SITU: Primary | ICD-10-CM

## 2019-09-17 PROCEDURE — 99214 OFFICE O/P EST MOD 30 MIN: CPT | Performed by: INTERNAL MEDICINE

## 2019-09-17 NOTE — PROGRESS NOTES
STUMP AND CAUTERIZATION WITH ERBE PROBE, CLIPPING X2 performed by Molly Gerardo MD at [facility]  2003    back   [address] Bilateral 12/15/14    DENTAL SURGERY      front right tooth and root canal w/ brass bolt    DILATION AND CURETTAGE OF UTERUS  2006    Needed a camera to find my uterus.  ENDOSCOPY, COLON, DIAGNOSTIC  12/14/2017    Small hiatal hernia    HERNIA REPAIR  2004    Umb hernia    HERNIA REPAIR  2008    Inc hernia    LYMPH NODE DISSECTION  2/29/2012    Right axillary-3 sentinel nodes were positive out of 9.    MASTECTOMY, RADICAL Right 02/29/2012    w/ sentinal node disection-Dr West    PACEMAKER PLACEMENT      PRE-MALIGNANT / BENIGN SKIN LESION EXCISION  2/8/2013    right leg X2-Dr West    TONSILLECTOMY  1957    TUNNELED VENOUS PORT PLACEMENT  2004    TUNNELED VENOUS PORT PLACEMENT  02/13/2012    removal of mediport      As reviewed   Family History   Problem Relation Age of Onset    Arthritis Mother         OA, RA    High Cholesterol Mother     High Blood Pressure Mother     Cancer Father         skin and leukemia    High Blood Pressure Father     High Cholesterol Father     Diabetes Father     Heart Disease Father     Heart Disease Brother     High Cholesterol Brother     High Blood Pressure Brother     Heart Disease Brother     Seizures Son      Social History     Tobacco Use    Smoking status: Never Smoker    Smokeless tobacco: Never Used   Substance Use Topics    Alcohol use: No     Comment:           CAFFEINE: 2- 12oz nona daily & 2 cups coffee some nights. Review of Systems:    Constitutional: Negative for diaphoresis and fatigue  Psychological:Negative for anxiety or depression  HENT: Negative for headaches, nasal congestion, sinus pain or vertigo  Eyes: Negative for visual disturbance.    Endocrine: Negative for polydipsia/polyuria  Respiratory: Negative for shortness of breath  Cardiovascular: Negative for chest pain, dyspnea

## 2019-09-27 ENCOUNTER — CARE COORDINATION (OUTPATIENT)
Dept: CARE COORDINATION | Age: 69
End: 2019-09-27

## 2019-09-27 NOTE — CARE COORDINATION
ACM called patient at preferred phone number listed in EPIC. Left message and requested pt  return call. This is first attempt to reach pt for possible enrollment CCP. Introduction letter mailed to pt. Contact info provided on [employer]. Jaquelin Del Toro RN  Ambulatory Care Manager  [phone]  [email or web address]

## 2019-10-08 ENCOUNTER — CARE COORDINATION (OUTPATIENT)
Dept: CARE COORDINATION | Age: 69
End: 2019-10-08

## 2019-10-08 SDOH — SOCIAL STABILITY: SOCIAL INSECURITY
WITHIN THE LAST YEAR, HAVE YOU BEEN KICKED, HIT, SLAPPED, OR OTHERWISE PHYSICALLY HURT BY YOUR PARTNER OR EX-PARTNER?: NO

## 2019-10-08 SDOH — SOCIAL STABILITY: SOCIAL NETWORK
DO YOU BELONG TO ANY CLUBS OR ORGANIZATIONS SUCH AS CHURCH GROUPS UNIONS, FRATERNAL OR ATHLETIC GROUPS, OR SCHOOL GROUPS?: NO

## 2019-10-08 SDOH — ECONOMIC STABILITY: TRANSPORTATION INSECURITY
IN THE PAST 12 MONTHS, HAS THE LACK OF TRANSPORTATION KEPT YOU FROM MEDICAL APPOINTMENTS OR FROM GETTING MEDICATIONS?: NO

## 2019-10-08 SDOH — SOCIAL STABILITY: SOCIAL NETWORK: HOW OFTEN DO YOU ATTENT MEETINGS OF THE CLUB OR ORGANIZATION YOU BELONG TO?: NEVER

## 2019-10-08 SDOH — HEALTH STABILITY: PHYSICAL HEALTH: ON AVERAGE, HOW MANY DAYS PER WEEK DO YOU ENGAGE IN MODERATE TO STRENUOUS EXERCISE (LIKE A BRISK WALK)?: 0 DAYS

## 2019-10-08 SDOH — SOCIAL STABILITY: SOCIAL NETWORK: HOW OFTEN DO YOU GET TOGETHER WITH FRIENDS OR RELATIVES?: ONCE A WEEK

## 2019-10-08 SDOH — SOCIAL STABILITY: SOCIAL INSECURITY
WITHIN THE LAST YEAR, HAVE TO BEEN RAPED OR FORCED TO HAVE ANY KIND OF SEXUAL ACTIVITY BY YOUR PARTNER OR EX-PARTNER?: NO

## 2019-10-08 SDOH — SOCIAL STABILITY: SOCIAL INSECURITY: WITHIN THE LAST YEAR, HAVE YOU BEEN AFRAID OF YOUR PARTNER OR EX-PARTNER?: NO

## 2019-10-08 SDOH — SOCIAL STABILITY: SOCIAL NETWORK: HOW OFTEN DO YOU ATTEND CHURCH OR RELIGIOUS SERVICES?: 1 TO 4 TIMES PER YEAR

## 2019-10-08 SDOH — ECONOMIC STABILITY: FOOD INSECURITY: WITHIN THE PAST 12 MONTHS, THE FOOD YOU BOUGHT JUST DIDN'T LAST AND YOU DIDN'T HAVE MONEY TO GET MORE.: NEVER TRUE

## 2019-10-08 SDOH — ECONOMIC STABILITY: FOOD INSECURITY: WITHIN THE PAST 12 MONTHS, YOU WORRIED THAT YOUR FOOD WOULD RUN OUT BEFORE YOU GOT MONEY TO BUY MORE.: NEVER TRUE

## 2019-10-08 SDOH — SOCIAL STABILITY: SOCIAL NETWORK: ARE YOU MARRIED, WIDOWED, DIVORCED, SEPARATED, NEVER MARRIED, OR LIVING WITH A PARTNER?: LIVING WITH PARTNER

## 2019-10-08 SDOH — SOCIAL STABILITY: SOCIAL INSECURITY: WITHIN THE LAST YEAR, HAVE YOU BEEN HUMILIATED OR EMOTIONALLY ABUSED IN OTHER WAYS BY YOUR PARTNER OR EX-PARTNER?: NO

## 2019-10-08 SDOH — ECONOMIC STABILITY: TRANSPORTATION INSECURITY
IN THE PAST 12 MONTHS, HAS LACK OF TRANSPORTATION KEPT YOU FROM MEETINGS, WORK, OR FROM GETTING THINGS NEEDED FOR DAILY LIVING?: NO

## 2019-10-08 SDOH — HEALTH STABILITY: MENTAL HEALTH
STRESS IS WHEN SOMEONE FEELS TENSE, NERVOUS, ANXIOUS, OR CAN'T SLEEP AT NIGHT BECAUSE THEIR MIND IS TROUBLED. HOW STRESSED ARE YOU?: ONLY A LITTLE

## 2019-10-08 SDOH — SOCIAL STABILITY: SOCIAL NETWORK: IN A TYPICAL WEEK, HOW MANY TIMES DO YOU TALK ON THE PHONE WITH FAMILY, FRIENDS, OR NEIGHBORS?: THREE TIMES A WEEK

## 2019-10-08 ASSESSMENT — ENCOUNTER SYMPTOMS: DYSPNEA ASSOCIATED WITH: MINIMAL EXERTION

## 2019-10-10 ENCOUNTER — HOSPITAL ENCOUNTER (OUTPATIENT)
Age: 69
Discharge: HOME | End: 2019-10-10
Payer: COMMERCIAL

## 2019-10-10 LAB
BASOPHILS ABSOLUTE: 0.1 K/CU MM
BASOPHILS RELATIVE PERCENT: 0.6 % (ref 0–1)
DIFFERENTIAL TYPE: ABNORMAL
EOSINOPHILS ABSOLUTE: 0.3 K/CU MM
EOSINOPHILS RELATIVE PERCENT: 3.1 % (ref 0–3)
HCT VFR BLD CALC: 39.7 % (ref 37–47)
HEMOGLOBIN: 11.7 GM/DL (ref 12.5–16)
IMMATURE NEUTROPHIL %: 0.6 % (ref 0–0.43)
LYMPHOCYTES ABSOLUTE: 2.1 K/CU MM
LYMPHOCYTES RELATIVE PERCENT: 20.1 % (ref 24–44)
MCH RBC QN AUTO: 27.1 PG (ref 27–31)
MCHC RBC AUTO-ENTMCNC: 29.5 % (ref 32–36)
MCV RBC AUTO: 92.1 FL (ref 78–100)
MONOCYTES ABSOLUTE: 0.8 K/CU MM
MONOCYTES RELATIVE PERCENT: 7.4 % (ref 0–4)
NUCLEATED RBC %: 0 %
PDW BLD-RTO: 14 % (ref 11.7–14.9)
PLATELET # BLD: 242 K/CU MM (ref 140–440)
PMV BLD AUTO: 10 FL (ref 7.5–11.1)
RBC # BLD: 4.31 M/CU MM (ref 4.2–5.4)
SEGMENTED NEUTROPHILS ABSOLUTE COUNT: 7.2 K/CU MM
SEGMENTED NEUTROPHILS RELATIVE PERCENT: 68.2 % (ref 36–66)
TOTAL IMMATURE NEUTOROPHIL: 0.06 K/CU MM
TOTAL NUCLEATED RBC: 0 K/CU MM
WBC # BLD: 10.6 K/CU MM (ref 4–10.5)

## 2019-10-10 PROCEDURE — 36415 COLL VENOUS BLD VENIPUNCTURE: CPT

## 2019-10-10 PROCEDURE — 85025 COMPLETE CBC W/AUTO DIFF WBC: CPT

## 2019-10-14 ENCOUNTER — TELEPHONE (OUTPATIENT)
Dept: CARDIOLOGY CLINIC | Age: 69
End: 2019-10-14

## 2019-10-14 ENCOUNTER — PROCEDURE VISIT (OUTPATIENT)
Dept: CARDIOLOGY CLINIC | Age: 69
End: 2019-10-14
Payer: COMMERCIAL

## 2019-10-14 DIAGNOSIS — I44.1 HEART BLOCK AV SECOND DEGREE: ICD-10-CM

## 2019-10-14 DIAGNOSIS — Z95.0 CARDIAC PACEMAKER IN SITU: Primary | ICD-10-CM

## 2019-10-14 PROCEDURE — 93296 REM INTERROG EVL PM/IDS: CPT | Performed by: INTERNAL MEDICINE

## 2019-10-14 PROCEDURE — 93294 REM INTERROG EVL PM/LDLS PM: CPT | Performed by: INTERNAL MEDICINE

## 2019-10-18 ENCOUNTER — TELEPHONE (OUTPATIENT)
Dept: INTERNAL MEDICINE CLINIC | Age: 69
End: 2019-10-18

## 2019-11-18 ENCOUNTER — OFFICE VISIT (OUTPATIENT)
Dept: INTERNAL MEDICINE CLINIC | Age: 69
End: 2019-11-18
Payer: COMMERCIAL

## 2019-11-18 VITALS
DIASTOLIC BLOOD PRESSURE: 70 MMHG | BODY MASS INDEX: 47.96 KG/M2 | HEIGHT: 62 IN | SYSTOLIC BLOOD PRESSURE: 142 MMHG | HEART RATE: 66 BPM | OXYGEN SATURATION: 96 % | WEIGHT: 260.6 LBS

## 2019-11-18 DIAGNOSIS — F32.A DEPRESSION, UNSPECIFIED DEPRESSION TYPE: ICD-10-CM

## 2019-11-18 DIAGNOSIS — R03.0 ELEVATED BLOOD PRESSURE READING: ICD-10-CM

## 2019-11-18 DIAGNOSIS — E78.5 HYPERLIPIDEMIA, UNSPECIFIED HYPERLIPIDEMIA TYPE: Primary | ICD-10-CM

## 2019-11-18 PROCEDURE — 99213 OFFICE O/P EST LOW 20 MIN: CPT | Performed by: FAMILY MEDICINE

## 2019-11-18 RX ORDER — ALBUTEROL SULFATE 90 UG/1
AEROSOL, METERED RESPIRATORY (INHALATION)
Qty: 1 INHALER | Refills: 2 | Status: CANCELLED | OUTPATIENT
Start: 2019-11-18

## 2019-11-18 RX ORDER — EZETIMIBE 10 MG/1
10 TABLET ORAL EVERY MORNING
Qty: 90 TABLET | Refills: 1 | Status: SHIPPED | OUTPATIENT
Start: 2019-11-18 | End: 2020-04-01 | Stop reason: SDUPTHER

## 2019-11-18 RX ORDER — SERTRALINE HYDROCHLORIDE 100 MG/1
200 TABLET, FILM COATED ORAL EVERY MORNING
Qty: 180 TABLET | Refills: 1 | Status: SHIPPED | OUTPATIENT
Start: 2019-11-18 | End: 2020-04-01 | Stop reason: SDUPTHER

## 2019-11-18 RX ORDER — BLOOD PRESSURE TEST KIT
KIT MISCELLANEOUS
Qty: 1 KIT | Refills: 0 | Status: CANCELLED | OUTPATIENT
Start: 2019-11-18

## 2019-11-18 RX ORDER — SIMVASTATIN 20 MG
20 TABLET ORAL NIGHTLY
Qty: 90 TABLET | Refills: 1 | Status: SHIPPED | OUTPATIENT
Start: 2019-11-18 | End: 2020-04-01 | Stop reason: SDUPTHER

## 2019-11-18 RX ORDER — FLUTICASONE PROPIONATE 110 UG/1
2 AEROSOL, METERED RESPIRATORY (INHALATION) 2 TIMES DAILY
Qty: 1 INHALER | Refills: 2 | Status: CANCELLED | OUTPATIENT
Start: 2019-11-18

## 2019-11-23 ASSESSMENT — ENCOUNTER SYMPTOMS
SHORTNESS OF BREATH: 0
ABDOMINAL PAIN: 0
BACK PAIN: 0
COUGH: 0
NAUSEA: 0

## 2019-12-19 ENCOUNTER — CARE COORDINATION (OUTPATIENT)
Dept: CARE COORDINATION | Age: 69
End: 2019-12-19

## 2019-12-19 ASSESSMENT — ENCOUNTER SYMPTOMS: DYSPNEA ASSOCIATED WITH: MINIMAL EXERTION

## 2020-01-16 ENCOUNTER — HOSPITAL ENCOUNTER (OUTPATIENT)
Dept: INFUSION THERAPY | Age: 70
Discharge: HOME | End: 2020-01-16
Payer: MEDICARE

## 2020-01-16 LAB
ALBUMIN SERPL-MCNC: 3.7 GM/DL (ref 3.4–5)
ALP BLD-CCNC: 106 IU/L (ref 40–129)
ALT SERPL-CCNC: 8 U/L (ref 10–40)
ANION GAP SERPL CALCULATED.3IONS-SCNC: 14 MMOL/L (ref 4–16)
AST SERPL-CCNC: 10 IU/L (ref 15–37)
BILIRUB SERPL-MCNC: 0.3 MG/DL (ref 0–1)
BUN BLDV-MCNC: 11 MG/DL (ref 6–23)
CALCIUM SERPL-MCNC: 9.4 MG/DL (ref 8.3–10.6)
CHLORIDE BLD-SCNC: 98 MMOL/L (ref 99–110)
CO2: 27 MMOL/L (ref 21–32)
CREAT SERPL-MCNC: 0.8 MG/DL (ref 0.6–1.1)
DIFFERENTIAL TYPE: ABNORMAL
EOSINOPHILS ABSOLUTE: 0.3 K/CU MM
EOSINOPHILS RELATIVE PERCENT: 2 % (ref 0–3)
GFR AFRICAN AMERICAN: >60 ML/MIN/1.73M2
GFR NON-AFRICAN AMERICAN: >60 ML/MIN/1.73M2
GLUCOSE BLD-MCNC: 113 MG/DL (ref 70–99)
HCT VFR BLD CALC: 35.2 % (ref 37–47)
HEMOGLOBIN: 11.4 GM/DL (ref 12.5–16)
LYMPHOCYTES ABSOLUTE: 2.1 K/CU MM
LYMPHOCYTES RELATIVE PERCENT: 15.2 % (ref 24–44)
MCH RBC QN AUTO: 27.7 PG (ref 27–31)
MCHC RBC AUTO-ENTMCNC: 32.4 % (ref 32–36)
MCV RBC AUTO: 85.4 FL (ref 78–100)
MONOCYTES ABSOLUTE: 1 K/CU MM
MONOCYTES RELATIVE PERCENT: 7.5 % (ref 0–4)
PDW BLD-RTO: 14.9 % (ref 11.7–14.9)
PLATELET # BLD: 259 K/CU MM (ref 140–440)
PMV BLD AUTO: 9.9 FL (ref 7.5–11.1)
POTASSIUM SERPL-SCNC: 4.4 MMOL/L (ref 3.5–5.1)
RBC # BLD: 4.12 M/CU MM (ref 4.2–5.4)
SEGMENTED NEUTROPHILS ABSOLUTE COUNT: 10.2 K/CU MM
SEGMENTED NEUTROPHILS RELATIVE PERCENT: 75.3 % (ref 36–66)
SEGMENTED NEUTROPHILS RELATIVE PERCENT: ABNORMAL % (ref 36–66)
SODIUM BLD-SCNC: 139 MMOL/L (ref 135–145)
TOTAL PROTEIN: 7.2 GM/DL (ref 6.4–8.2)
WBC # BLD: ABNORMAL K/CU MM (ref 4–10.5)

## 2020-01-16 PROCEDURE — 80053 COMPREHEN METABOLIC PANEL: CPT

## 2020-01-16 PROCEDURE — 36415 COLL VENOUS BLD VENIPUNCTURE: CPT

## 2020-01-16 PROCEDURE — 85025 COMPLETE CBC W/AUTO DIFF WBC: CPT

## 2020-01-17 ENCOUNTER — OFFICE VISIT (OUTPATIENT)
Dept: PHYSICAL MEDICINE AND REHAB | Age: 70
End: 2020-01-17
Payer: MEDICARE

## 2020-01-17 PROCEDURE — 95911 NRV CNDJ TEST 9-10 STUDIES: CPT | Performed by: PHYSICAL MEDICINE & REHABILITATION

## 2020-01-17 PROCEDURE — 95886 MUSC TEST DONE W/N TEST COMP: CPT | Performed by: PHYSICAL MEDICINE & REHABILITATION

## 2020-01-17 NOTE — LETTER
January 17, 2020    Cesilia Cortes, [address] [facility]      Patient Name: Hellen Gamble   MRN Number: [id number]   YOB: 1950   Date Of Visit: 01/17/2020      Dear Ashlee Desouza,      Thank you for referring Hellen Gamble to me for electrodiagnostic testing. Attached are the findings of the EMG and my assessment. If you have any further questions, please do not hesitate to call me. Thank you for this interesting referral.      Sincerely,          MELISSA Babin MD.

## 2020-01-18 NOTE — PROGRESS NOTES
EMG REPORT     CHIEF COMPLAINT: Bilateral hand and finger numbness with pain. HISTORY OF PRESENT ILLNESS: 78 y.o. left hand dominant female with progressive bilateral hand and digit numbness and painful tingling over the last few months. She has minor neck stiffness w/o pain radiating into her arms. She reports some trouble with holding things in her . Poor sleep due to these symptoms. She rated the pain severity as \"bad\", but couldn't assign a numerical rating. H/O colon cancer, skin cancer, breast cancer and chemotherapy exposure. No h/o DM or any thyroid disorder. PHYSICAL EXAMINATION: Alert. About 70 degrees of active Cspine ROM. Spurling's maneuver was negative. No UE DTR's. Tinel's sign was negative. Weak thumb opposition and digit abduction bilaterally. No atrophy, tremor or clonus. Blunted perception of light touch in both hands/all digits. NERVE CONDUCTION STUDIES:     MOTOR         LATENCY NORMAL AMPLITUDE DISTANCE COND. SANJUANA. RIGHT  MEDIAN 6.5 < 4.2 msec 0.8 8 cm 49   LEFT  MEDIAN 8.0 < 4.2 msec 1.1 8 cm 47   RIGHT  ULNAR 3.7 < 4.2 msec 5.9/0.2 8 cm 51/57   LEFT  ULNAR 3.8 < 4.2 msec 2.1 8 cm 48      SENSORY  ORTHODROMIC        LATENCY NORMAL AMPLITUDE DISTANCE   RIGHT MEDIAN Absent <2.3 msec  10 cm   LEFT  MEDIAN Absent < 2.3 msec  10 cm   RIGHT  ULNAR Absent < 2.3 msec  10 cm   LEFT  ULNAR Absent < 2.3 msec  10 cm       Right dorsal ulnar sensory: dL 2.9 msec, amp 7 microV.       NEEDLE EMG:      RIGHT   LEFT     Insertional Activity Spontaneous  Activity Volutional  MUAP's Insertional Activity Spontaneous  Activity Volutional  MUAP's   Cerv Parasp Normal None Normal Normal None Normal   Infraspinatus Normal None Normal Normal None Normal   Deltoid   Normal None Normal Normal None Normal   Biceps   Normal None Normal Normal None Normal   Triceps   Normal None Normal Normal None Normal   Pronator Teres Normal None Normal Normal None Normal   Extensor Indicis Normal None Normal Normal

## 2020-01-20 ENCOUNTER — ANESTHESIA EVENT (OUTPATIENT)
Dept: ENDOSCOPY | Age: 70
End: 2020-01-20
Payer: MEDICARE

## 2020-01-20 NOTE — PROGRESS NOTES
Surgery:  [email or web address]                        Arrival time: 0745  Nothing to eat or drink after midnight unless instructed to take certain medications by the doctor or the nurse the am of surgery  Arrive at the front information desk -1st floor /[facility] [place] is on [address] [facility]  Please leave money and all other valuables at home. Wear comfortable clothing. If you wear contacts please bring a case. No make up. You may be asked to remove rings or body piercing. Please bring insurance cards and picture ID am of procedure. Please bring any consent or paper work from your doctor. If you become ill,such as a cold, sore throat or fever contact your doctor. Please bathe or shower am of procedure.   Medications to take AM of procedure:     Any questions call [facility] [place]  [phone]

## 2020-01-20 NOTE — ANESTHESIA PRE PROCEDURE
HFA) 110 MCG/ACT inhaler Inhale 2 puffs into the lungs 2 times daily 1 Inhaler 2    albuterol sulfate HFA (PROAIR HFA) 108 (90 Base) MCG/ACT inhaler INHALE 2 PUFFS BY MOUTH EVERY SIX HOURS AS NEEDED FOR WHEEZING 1 Inhaler 2    vitamin B-12 (CYANOCOBALAMIN) 1000 MCG tablet Take 1,000 mcg by mouth daily      aspirin 81 MG EC tablet Take 81 mg by mouth nightly       letrozole (FEMARA) 2.5 MG tablet Take 2.5 mg by mouth nightly       Ferrous Sulfate (IRON) 325 (65 FE) MG TABS Take 1 tablet by mouth every morning       calcium-vitamin D (OSCAL 500/200 D-3) 500-200 MG-UNIT per tablet Take 1 tablet by mouth 2 times daily          Allergies:     Allergies   Allergen Reactions    Bactrim [Sulfamethoxazole-Trimethoprim] Anaphylaxis and Hives    Lipitor [Atorvastatin] Shortness Of Breath    Taxol [Paclitaxel] Anaphylaxis and Swelling    Demerol Nausea Only    Neosporin [Bacitracin-Neomycin-Polymyxin] Rash and Other (See Comments)     hotness    Other Rash     Ivory Soap    Talc Other (See Comments)     blisters       Problem List:    Patient Active Problem List   Diagnosis Code    Carcinoma of breast ([facility] [address] C50.919    Malignant neoplasm of breast (female) ([facility] [address] C50.919    Diffuse cystic mastopathy N60.19    Hyperlipidemia E78.5    H/O chest pain Z87.898    Heart block AV second degree I44.1    Abnormal cardiovascular stress test R94.39    JAMIE (obstructive sleep apnea) G47.33    Super obesity E66.9    Mild asthma J45.909    Severe obstructive sleep apnea G47.33    Cardiac pacemaker Z95.0    Chronic cystitis N30.20    Acute renal failure (ARF) ([facility]) N17.9    Hyponatremia E87.1    Asthma J45.909    Hypertension I10    Depression F32.9    Obstructive sleep apnea G47.33    Nocturnal oxygen desaturation G47.34    NSTEMI (non-ST elevated myocardial infarction) ([facility]) I21.4    Chest pain R07.9    PVD (peripheral vascular disease) ([facility]) I73.9    Morbid obesity with BMI of 45.0-49.9, adult ([facility]) E66.01, [id number] [employer]  Serial:  [id number] dual chamber pacemaker    APPENDECTOMY  2004    BREAST BIOPSY  2/13/12    BREAST LUMPECTOMY  2007    right     BREAST SURGERY  02/13/2012    rt lesion biopsy     CARDIAC CATHETERIZATION  2007 & 2011    COLECTOMY  2004    Colon cancer    COLONOSCOPY  10-18-13    polyp    COLONOSCOPY  1/19/2016    internal hemorrhoids, diverticulosis, 1.5 cm sessile polyp, 8 mm polyp found in rectum.  COLONOSCOPY  12/14/2017    1.5cm residual polyp, 5mm polyps in blind sigmoid loop x3, Sigmoid divertics, Internal grade 1 hemorrhoids    COLONOSCOPY N/A 1/16/2019    COLONOSCOPY W/ ENDOSCOPIC MUCOSAL RESECTION WITH ELEVIEW 5ML INJECTION AND POLYPECTOMY OF TIP OF BLIND RECTOSIGMOID STUMP AND CAUTERIZATION WITH ERBE PROBE, CLIPPING X2 performed by Jazmine Moctezuma MD at [place] [address]  2003    back   [address] Bilateral 12/15/14    DENTAL SURGERY      front right tooth and root canal w/ brass bolt    DILATION AND CURETTAGE OF UTERUS  2006    Needed a camera to find my uterus.  ENDOSCOPY, COLON, DIAGNOSTIC  12/14/2017    Small hiatal hernia    HERNIA REPAIR  2004    Umb hernia    HERNIA REPAIR  2008    Inc hernia    LYMPH NODE DISSECTION  2/29/2012    Right axillary-3 sentinel nodes were positive out of 9.    MASTECTOMY, RADICAL Right 02/29/2012    w/ sentinal node disection-Dr West    PACEMAKER PLACEMENT      PRE-MALIGNANT / BENIGN SKIN LESION EXCISION  2/8/2013    right leg X2-Dr West    TONSILLECTOMY  1957    TUNNELED VENOUS PORT PLACEMENT  2004    TUNNELED VENOUS PORT PLACEMENT  02/13/2012    removal of mediport       Social History:    Social History     Tobacco Use    Smoking status: Never Smoker    Smokeless tobacco: Never Used   Substance Use Topics    Alcohol use: No     Comment:           CAFFEINE: 2- 12oz nona daily & 2 cups coffee some nights. Counseling given: Not Answered      Vital Signs (Current):  There asthma:                           ROS comment: Home O2, 2 L/NC at night. Cardiovascular:    (+) hypertension:, valvular problems/murmurs: AS, pacemaker:, past MI:, CAD:, hyperlipidemia        Rhythm: regular                   ROS comment: Ejection fraction is visually estimated at 50-55%. Left ventricular hypertrophy. Tissue doppler evaluation suggestive of abnormal diastolic dysfunction. Mild mitral regurgitation is present. Mild tricuspid regurgitation. Moderate aortic stenosis. Neuro/Psych:   (+) depression/anxiety             GI/Hepatic/Renal:   (+) morbid obesity          Endo/Other:    (+) blood dyscrasia: anemia:., .                 Abdominal:   (+) obese,         Vascular:   + PVD, aortic or cerebral, . Anesthesia Plan      MAC, general and TIVA     ASA 4     (Chart review)  Induction: intravenous. Anesthetic plan and risks discussed with patient. Plan discussed with CRNA.     Attending anesthesiologist reviewed and agrees with Pre Eval content            AUSTIN Burgos - CRNA   1/20/2020

## 2020-01-21 ENCOUNTER — HOSPITAL ENCOUNTER (OUTPATIENT)
Age: 70
Setting detail: OUTPATIENT SURGERY
Discharge: HOME | End: 2020-01-21
Attending: SPECIALIST | Admitting: SPECIALIST
Payer: MEDICARE

## 2020-01-21 ENCOUNTER — ANESTHESIA (OUTPATIENT)
Dept: ENDOSCOPY | Age: 70
End: 2020-01-21
Payer: MEDICARE

## 2020-01-21 ENCOUNTER — PROCEDURE VISIT (OUTPATIENT)
Dept: CARDIOLOGY CLINIC | Age: 70
End: 2020-01-21
Payer: MEDICARE

## 2020-01-21 VITALS
TEMPERATURE: 97 F | OXYGEN SATURATION: 99 % | RESPIRATION RATE: 16 BRPM | HEART RATE: 60 BPM | WEIGHT: 255 LBS | BODY MASS INDEX: 50.06 KG/M2 | HEIGHT: 60 IN | DIASTOLIC BLOOD PRESSURE: 94 MMHG | SYSTOLIC BLOOD PRESSURE: 134 MMHG

## 2020-01-21 VITALS — SYSTOLIC BLOOD PRESSURE: 98 MMHG | DIASTOLIC BLOOD PRESSURE: 37 MMHG | OXYGEN SATURATION: 94 %

## 2020-01-21 LAB
ALBUMIN SERPL-MCNC: 4 GM/DL (ref 3.4–5)
ALP BLD-CCNC: 111 IU/L (ref 40–129)
ALT SERPL-CCNC: 10 U/L (ref 10–40)
ANION GAP SERPL CALCULATED.3IONS-SCNC: 14 MMOL/L (ref 4–16)
AST SERPL-CCNC: 12 IU/L (ref 15–37)
BILIRUB SERPL-MCNC: 0.3 MG/DL (ref 0–1)
BUN BLDV-MCNC: 10 MG/DL (ref 6–23)
CALCIUM SERPL-MCNC: 10.1 MG/DL (ref 8.3–10.6)
CHLORIDE BLD-SCNC: 99 MMOL/L (ref 99–110)
CO2: 27 MMOL/L (ref 21–32)
CREAT SERPL-MCNC: 0.8 MG/DL (ref 0.6–1.1)
GFR AFRICAN AMERICAN: >60 ML/MIN/1.73M2
GFR NON-AFRICAN AMERICAN: >60 ML/MIN/1.73M2
GLUCOSE BLD-MCNC: 112 MG/DL (ref 70–99)
HCT VFR BLD CALC: 38 % (ref 37–47)
HEMOGLOBIN: 11.8 GM/DL (ref 12.5–16)
MCH RBC QN AUTO: 27.6 PG (ref 27–31)
MCHC RBC AUTO-ENTMCNC: 31.1 % (ref 32–36)
MCV RBC AUTO: 88.8 FL (ref 78–100)
PDW BLD-RTO: 13.9 % (ref 11.7–14.9)
PLATELET # BLD: 330 K/CU MM (ref 140–440)
PMV BLD AUTO: 9.4 FL (ref 7.5–11.1)
POTASSIUM SERPL-SCNC: 4 MMOL/L (ref 3.5–5.1)
RBC # BLD: 4.28 M/CU MM (ref 4.2–5.4)
SODIUM BLD-SCNC: 140 MMOL/L (ref 135–145)
TOTAL PROTEIN: 8.4 GM/DL (ref 6.4–8.2)
WBC # BLD: 11.2 K/CU MM (ref 4–10.5)

## 2020-01-21 PROCEDURE — 93296 REM INTERROG EVL PM/IDS: CPT | Performed by: INTERNAL MEDICINE

## 2020-01-21 PROCEDURE — 7100000010 HC PHASE II RECOVERY - FIRST 15 MIN: Performed by: SPECIALIST

## 2020-01-21 PROCEDURE — 85027 COMPLETE CBC AUTOMATED: CPT

## 2020-01-21 PROCEDURE — 88305 TISSUE EXAM BY PATHOLOGIST: CPT

## 2020-01-21 PROCEDURE — 2580000003 HC RX 258

## 2020-01-21 PROCEDURE — 6360000002 HC RX W HCPCS: Performed by: NURSE ANESTHETIST, CERTIFIED REGISTERED

## 2020-01-21 PROCEDURE — 80053 COMPREHEN METABOLIC PANEL: CPT

## 2020-01-21 PROCEDURE — 7100000011 HC PHASE II RECOVERY - ADDTL 15 MIN: Performed by: SPECIALIST

## 2020-01-21 PROCEDURE — 3609010600 HC COLONOSCOPY POLYPECTOMY SNARE/COLD BIOPSY: Performed by: SPECIALIST

## 2020-01-21 PROCEDURE — 93294 REM INTERROG EVL PM/LDLS PM: CPT | Performed by: INTERNAL MEDICINE

## 2020-01-21 PROCEDURE — 2580000003 HC RX 258: Performed by: SPECIALIST

## 2020-01-21 PROCEDURE — 3700000000 HC ANESTHESIA ATTENDED CARE: Performed by: SPECIALIST

## 2020-01-21 PROCEDURE — 3700000001 HC ADD 15 MINUTES (ANESTHESIA): Performed by: SPECIALIST

## 2020-01-21 PROCEDURE — 2709999900 HC NON-CHARGEABLE SUPPLY: Performed by: SPECIALIST

## 2020-01-21 PROCEDURE — 2500000003 HC RX 250 WO HCPCS: Performed by: NURSE ANESTHETIST, CERTIFIED REGISTERED

## 2020-01-21 RX ORDER — SODIUM CHLORIDE, SODIUM LACTATE, POTASSIUM CHLORIDE, CALCIUM CHLORIDE 600; 310; 30; 20 MG/100ML; MG/100ML; MG/100ML; MG/100ML
INJECTION, SOLUTION INTRAVENOUS CONTINUOUS
Status: DISCONTINUED | OUTPATIENT
Start: 2020-01-21 | End: 2020-01-21 | Stop reason: HOSPADM

## 2020-01-21 RX ORDER — SODIUM CHLORIDE, SODIUM LACTATE, POTASSIUM CHLORIDE, CALCIUM CHLORIDE 600; 310; 30; 20 MG/100ML; MG/100ML; MG/100ML; MG/100ML
INJECTION, SOLUTION INTRAVENOUS
Status: COMPLETED
Start: 2020-01-21 | End: 2020-01-21

## 2020-01-21 RX ORDER — PROPOFOL 10 MG/ML
INJECTION, EMULSION INTRAVENOUS PRN
Status: DISCONTINUED | OUTPATIENT
Start: 2020-01-21 | End: 2020-01-21 | Stop reason: SDUPTHER

## 2020-01-21 RX ORDER — LIDOCAINE HYDROCHLORIDE 20 MG/ML
INJECTION, SOLUTION EPIDURAL; INFILTRATION; INTRACAUDAL; PERINEURAL PRN
Status: DISCONTINUED | OUTPATIENT
Start: 2020-01-21 | End: 2020-01-21 | Stop reason: SDUPTHER

## 2020-01-21 RX ADMIN — PROPOFOL 40 MG: 10 INJECTION, EMULSION INTRAVENOUS at 10:02

## 2020-01-21 RX ADMIN — PROPOFOL 50 MG: 10 INJECTION, EMULSION INTRAVENOUS at 10:16

## 2020-01-21 RX ADMIN — PROPOFOL 40 MG: 10 INJECTION, EMULSION INTRAVENOUS at 10:06

## 2020-01-21 RX ADMIN — PROPOFOL 100 MG: 10 INJECTION, EMULSION INTRAVENOUS at 09:58

## 2020-01-21 RX ADMIN — PROPOFOL 50 MG: 10 INJECTION, EMULSION INTRAVENOUS at 10:19

## 2020-01-21 RX ADMIN — SODIUM CHLORIDE, POTASSIUM CHLORIDE, SODIUM LACTATE AND CALCIUM CHLORIDE: 600; 310; 30; 20 INJECTION, SOLUTION INTRAVENOUS at 09:54

## 2020-01-21 RX ADMIN — PROPOFOL 40 MG: 10 INJECTION, EMULSION INTRAVENOUS at 10:09

## 2020-01-21 RX ADMIN — PROPOFOL 50 MG: 10 INJECTION, EMULSION INTRAVENOUS at 10:11

## 2020-01-21 RX ADMIN — PROPOFOL 50 MG: 10 INJECTION, EMULSION INTRAVENOUS at 10:13

## 2020-01-21 RX ADMIN — SODIUM CHLORIDE, POTASSIUM CHLORIDE, SODIUM LACTATE AND CALCIUM CHLORIDE 1000 ML: 600; 310; 30; 20 INJECTION, SOLUTION INTRAVENOUS at 08:12

## 2020-01-21 RX ADMIN — LIDOCAINE HYDROCHLORIDE 200 MG: 20 INJECTION, SOLUTION EPIDURAL; INFILTRATION; INTRACAUDAL; PERINEURAL at 09:58

## 2020-01-21 ASSESSMENT — PAIN SCALES - GENERAL
PAINLEVEL_OUTOF10: 0
PAINLEVEL_OUTOF10: 0

## 2020-01-21 ASSESSMENT — PAIN - FUNCTIONAL ASSESSMENT: PAIN_FUNCTIONAL_ASSESSMENT: 0-10

## 2020-01-21 NOTE — BRIEF OP NOTE
BRIEF COLONOSCOPY REPORT:   Photos and full colonoscopy report available by going to \"chart review\" then \"procedures\" then  \"colonoscopy\" then \"View Endoscopy Report\"      Impression:    1) S/P partial sigmoid resection with end-to-side anastamosis    2) 3 mm polyp removed from the end of the sigmoid stump with the cold forceps   3) 3 small sigmoid polyps removed with the cold snare     4) mild pan-diverticulosis    5) internal hemorrhoids     Suggest:   1) repeat in 3 years

## 2020-01-21 NOTE — PROGRESS NOTES
1045 drowsy, responds to voice. Denies needs.  Friend at bedside, call light in reach  1050 sip of soda provided  1110 dr Ivory Banks provided update at bedside  1125 friend at bedside, assisting pt to dress  1140  Pt discharged to car via wheelchair

## 2020-01-28 ENCOUNTER — TELEPHONE (OUTPATIENT)
Dept: INTERNAL MEDICINE CLINIC | Age: 70
End: 2020-01-28

## 2020-01-28 NOTE — TELEPHONE ENCOUNTER
Severe B/L CTS -- B/L ulnar neuropathy worse at R elbow.  Polyneuropathy     She can see Ortho (I believe she has seen Dr. Finn Cleaning before, or Dr. Eusebio Sandhu (she is established) if she preforms  Carpal tunnel surgery(I don't think she will manage the ulnar neuropathy however)

## 2020-02-11 ENCOUNTER — HOSPITAL ENCOUNTER (OUTPATIENT)
Dept: WOMENS IMAGING | Age: 70
Discharge: HOME | End: 2020-02-11
Payer: MEDICARE

## 2020-02-11 ENCOUNTER — HOSPITAL ENCOUNTER (OUTPATIENT)
Age: 70
Discharge: HOME | End: 2020-02-11
Payer: MEDICARE

## 2020-02-11 LAB
CHOLESTEROL, FASTING: 168 MG/DL
HDLC SERPL-MCNC: 57 MG/DL
LDL CHOLESTEROL DIRECT: 102 MG/DL
TRIGLYCERIDE, FASTING: 118 MG/DL

## 2020-02-11 PROCEDURE — 36415 COLL VENOUS BLD VENIPUNCTURE: CPT

## 2020-02-11 PROCEDURE — 77067 SCR MAMMO BI INCL CAD: CPT

## 2020-02-11 PROCEDURE — 80061 LIPID PANEL: CPT

## 2020-02-25 ENCOUNTER — CARE COORDINATION (OUTPATIENT)
Dept: CARE COORDINATION | Age: 70
End: 2020-02-25

## 2020-03-11 NOTE — PROGRESS NOTES
No chief complaint on file. HISTORY OF PRESENT ILLNESS:  Nhi Gilmore is a 78 y.o. left hand-dominant patient here for a numbness & tingling in the Bilateral hand and wrist that began approximately several years ago. She states has difficulty holding a cup. The discomfort does not affect sleep at night. she has not tried wrist splints. EMG testing: yes - positive for CTS. ROS:  ROS neg except for positives in HPI    PHYSICAL EXAMINATION:  Gen/Psych: Examination reveals a pleasant individual in no acute distress. The patient is oriented to time, place and person. The patient's mood and affect are appropriate. Lymph: The lymphatic examination bilaterally reveals all areas to be without enlargement or induration. Musculoskeletal:       Cervical spine, shoulders, elbows, wrist:  satisfactory comfortable baseline range of motion, strength, and stability bilaterally     Hand and digits:   Satisfactory range of motion bilaterally. Neurological:  Direct compression, Tinel's, and Phalen's testing did not reproduce the patient's symptoms into the median nerve distribution    DIAGNOSTIC TESTING: EMG: were positive for carpal tunnel, were positive for peripheral neuropathy    IMPRESSION AND PLAN: Likely Bilateral carpal tunnel syndrome. I have reviewed the diagnosis and evidence based treatment options for the patient including rest, cool compresses, behavior modification, splints, NSAIDS, stretching, massage, avoidance of activities causing pain and surgical release. Patient would like to continue conservative therapy. She will follow up if symptoms worsen and she wants to have surgical release.     Prema Back, UASTIN-CNP

## 2020-03-12 ENCOUNTER — OFFICE VISIT (OUTPATIENT)
Dept: SURGERY | Age: 70
End: 2020-03-12
Payer: MEDICARE

## 2020-03-12 VITALS
HEART RATE: 72 BPM | WEIGHT: 259.6 LBS | SYSTOLIC BLOOD PRESSURE: 147 MMHG | RESPIRATION RATE: 16 BRPM | BODY MASS INDEX: 50.7 KG/M2 | DIASTOLIC BLOOD PRESSURE: 53 MMHG

## 2020-03-12 PROCEDURE — 99213 OFFICE O/P EST LOW 20 MIN: CPT | Performed by: NURSE PRACTITIONER

## 2020-03-17 ENCOUNTER — CARE COORDINATION (OUTPATIENT)
Dept: CARE COORDINATION | Age: 70
End: 2020-03-17

## 2020-03-17 ASSESSMENT — ENCOUNTER SYMPTOMS: DYSPNEA ASSOCIATED WITH: MINIMAL EXERTION

## 2020-03-18 ENCOUNTER — CARE COORDINATION (OUTPATIENT)
Dept: CARE COORDINATION | Age: 70
End: 2020-03-18

## 2020-04-01 RX ORDER — EZETIMIBE 10 MG/1
10 TABLET ORAL EVERY MORNING
Qty: 90 TABLET | Refills: 1 | Status: SHIPPED | OUTPATIENT
Start: 2020-04-01 | End: 2020-09-03 | Stop reason: SDUPTHER

## 2020-04-01 RX ORDER — SIMVASTATIN 20 MG
20 TABLET ORAL NIGHTLY
Qty: 90 TABLET | Refills: 1 | Status: SHIPPED | OUTPATIENT
Start: 2020-04-01 | End: 2020-09-03 | Stop reason: SDUPTHER

## 2020-04-01 RX ORDER — SERTRALINE HYDROCHLORIDE 100 MG/1
200 TABLET, FILM COATED ORAL EVERY MORNING
Qty: 180 TABLET | Refills: 1 | Status: SHIPPED | OUTPATIENT
Start: 2020-04-01 | End: 2020-09-03 | Stop reason: SDUPTHER

## 2020-04-28 ENCOUNTER — PROCEDURE VISIT (OUTPATIENT)
Dept: CARDIOLOGY CLINIC | Age: 70
End: 2020-04-28
Payer: MEDICARE

## 2020-04-28 PROCEDURE — 93294 REM INTERROG EVL PM/LDLS PM: CPT | Performed by: INTERNAL MEDICINE

## 2020-04-28 PROCEDURE — 93296 REM INTERROG EVL PM/IDS: CPT | Performed by: INTERNAL MEDICINE

## 2020-08-04 ENCOUNTER — PROCEDURE VISIT (OUTPATIENT)
Dept: CARDIOLOGY CLINIC | Age: 70
End: 2020-08-04
Payer: MEDICARE

## 2020-08-04 PROCEDURE — 93294 REM INTERROG EVL PM/LDLS PM: CPT | Performed by: INTERNAL MEDICINE

## 2020-08-04 PROCEDURE — 93296 REM INTERROG EVL PM/IDS: CPT | Performed by: INTERNAL MEDICINE

## 2020-08-14 ENCOUNTER — TELEPHONE (OUTPATIENT)
Dept: INTERNAL MEDICINE CLINIC | Age: 70
End: 2020-08-14

## 2020-08-14 NOTE — TELEPHONE ENCOUNTER
Said you used to call her in Tylenol 3 and she would like a few more, could not find any record except Hydrocdone form 2013.  [facility] and please call

## 2020-08-17 ENCOUNTER — HOSPITAL ENCOUNTER (INPATIENT)
Age: 70
LOS: 1 days | Discharge: HOME | DRG: 313 | End: 2020-08-17
Attending: INTERNAL MEDICINE | Admitting: INTERNAL MEDICINE
Payer: MEDICARE

## 2020-08-17 ENCOUNTER — APPOINTMENT (OUTPATIENT)
Dept: GENERAL RADIOLOGY | Age: 70
DRG: 313 | End: 2020-08-17
Payer: MEDICARE

## 2020-08-17 VITALS
WEIGHT: 278.7 LBS | RESPIRATION RATE: 20 BRPM | SYSTOLIC BLOOD PRESSURE: 134 MMHG | OXYGEN SATURATION: 92 % | HEART RATE: 73 BPM | BODY MASS INDEX: 54.72 KG/M2 | HEIGHT: 60 IN | TEMPERATURE: 98 F | DIASTOLIC BLOOD PRESSURE: 52 MMHG

## 2020-08-17 LAB
ALBUMIN SERPL-MCNC: 3.7 GM/DL (ref 3.4–5)
ALP BLD-CCNC: 112 IU/L (ref 40–129)
ALT SERPL-CCNC: 18 U/L (ref 10–40)
ANION GAP SERPL CALCULATED.3IONS-SCNC: 11 MMOL/L (ref 4–16)
AST SERPL-CCNC: 19 IU/L (ref 15–37)
BASOPHILS ABSOLUTE: 0 K/CU MM
BASOPHILS RELATIVE PERCENT: 0.4 % (ref 0–1)
BILIRUB SERPL-MCNC: 0.2 MG/DL (ref 0–1)
BUN BLDV-MCNC: 13 MG/DL (ref 6–23)
CALCIUM SERPL-MCNC: 9.3 MG/DL (ref 8.3–10.6)
CHLORIDE BLD-SCNC: 100 MMOL/L (ref 99–110)
CO2: 25 MMOL/L (ref 21–32)
CREAT SERPL-MCNC: 1.2 MG/DL (ref 0.6–1.1)
DIFFERENTIAL TYPE: ABNORMAL
EOSINOPHILS ABSOLUTE: 0.4 K/CU MM
EOSINOPHILS RELATIVE PERCENT: 3.9 % (ref 0–3)
GFR AFRICAN AMERICAN: 54 ML/MIN/1.73M2
GFR NON-AFRICAN AMERICAN: 44 ML/MIN/1.73M2
GLUCOSE BLD-MCNC: 106 MG/DL (ref 70–99)
HCT VFR BLD CALC: 36.5 % (ref 37–47)
HEMOGLOBIN: 11.8 GM/DL (ref 12.5–16)
IMMATURE NEUTROPHIL %: 0.6 % (ref 0–0.43)
LIPASE: 17 IU/L (ref 13–60)
LV EF: 53 %
LVEF MODALITY: NORMAL
LYMPHOCYTES ABSOLUTE: 2.1 K/CU MM
LYMPHOCYTES RELATIVE PERCENT: 20.3 % (ref 24–44)
MCH RBC QN AUTO: 28.8 PG (ref 27–31)
MCHC RBC AUTO-ENTMCNC: 32.3 % (ref 32–36)
MCV RBC AUTO: 89 FL (ref 78–100)
MONOCYTES ABSOLUTE: 0.7 K/CU MM
MONOCYTES RELATIVE PERCENT: 6.3 % (ref 0–4)
NUCLEATED RBC %: 0 %
PDW BLD-RTO: 14.3 % (ref 11.7–14.9)
PLATELET # BLD: 204 K/CU MM (ref 140–440)
PMV BLD AUTO: 9.8 FL (ref 7.5–11.1)
POTASSIUM SERPL-SCNC: 3.7 MMOL/L (ref 3.5–5.1)
PRO-BNP: 210.2 PG/ML
RBC # BLD: 4.1 M/CU MM (ref 4.2–5.4)
SEGMENTED NEUTROPHILS ABSOLUTE COUNT: 7.2 K/CU MM
SEGMENTED NEUTROPHILS RELATIVE PERCENT: 68.5 % (ref 36–66)
SODIUM BLD-SCNC: 136 MMOL/L (ref 135–145)
TOTAL IMMATURE NEUTOROPHIL: 0.06 K/CU MM
TOTAL NUCLEATED RBC: 0 K/CU MM
TOTAL PROTEIN: 6.9 GM/DL (ref 6.4–8.2)
TROPONIN T: <0.01 NG/ML
WBC # BLD: 10.5 K/CU MM (ref 4–10.5)

## 2020-08-17 PROCEDURE — G0378 HOSPITAL OBSERVATION PER HR: HCPCS

## 2020-08-17 PROCEDURE — 93010 ELECTROCARDIOGRAM REPORT: CPT | Performed by: INTERNAL MEDICINE

## 2020-08-17 PROCEDURE — 6370000000 HC RX 637 (ALT 250 FOR IP): Performed by: INTERNAL MEDICINE

## 2020-08-17 PROCEDURE — 99285 EMERGENCY DEPT VISIT HI MDM: CPT

## 2020-08-17 PROCEDURE — 36415 COLL VENOUS BLD VENIPUNCTURE: CPT

## 2020-08-17 PROCEDURE — 83690 ASSAY OF LIPASE: CPT

## 2020-08-17 PROCEDURE — 83880 ASSAY OF NATRIURETIC PEPTIDE: CPT

## 2020-08-17 PROCEDURE — 2580000003 HC RX 258: Performed by: INTERNAL MEDICINE

## 2020-08-17 PROCEDURE — 94761 N-INVAS EAR/PLS OXIMETRY MLT: CPT

## 2020-08-17 PROCEDURE — 6360000002 HC RX W HCPCS: Performed by: INTERNAL MEDICINE

## 2020-08-17 PROCEDURE — 96372 THER/PROPH/DIAG INJ SC/IM: CPT

## 2020-08-17 PROCEDURE — 85025 COMPLETE CBC W/AUTO DIFF WBC: CPT

## 2020-08-17 PROCEDURE — 93005 ELECTROCARDIOGRAM TRACING: CPT | Performed by: PHYSICIAN ASSISTANT

## 2020-08-17 PROCEDURE — 84484 ASSAY OF TROPONIN QUANT: CPT

## 2020-08-17 PROCEDURE — 71045 X-RAY EXAM CHEST 1 VIEW: CPT

## 2020-08-17 PROCEDURE — 94640 AIRWAY INHALATION TREATMENT: CPT

## 2020-08-17 PROCEDURE — 2140000000 HC CCU INTERMEDIATE R&B

## 2020-08-17 PROCEDURE — 80053 COMPREHEN METABOLIC PANEL: CPT

## 2020-08-17 PROCEDURE — 93306 TTE W/DOPPLER COMPLETE: CPT

## 2020-08-17 PROCEDURE — 2500000003 HC RX 250 WO HCPCS: Performed by: NURSE PRACTITIONER

## 2020-08-17 RX ORDER — ACETAMINOPHEN 325 MG/1
650 TABLET ORAL EVERY 6 HOURS PRN
Status: DISCONTINUED | OUTPATIENT
Start: 2020-08-17 | End: 2020-08-17 | Stop reason: HOSPADM

## 2020-08-17 RX ORDER — SODIUM CHLORIDE 0.9 % (FLUSH) 0.9 %
10 SYRINGE (ML) INJECTION PRN
Status: DISCONTINUED | OUTPATIENT
Start: 2020-08-17 | End: 2020-08-17 | Stop reason: HOSPADM

## 2020-08-17 RX ORDER — PROMETHAZINE HYDROCHLORIDE 25 MG/1
12.5 TABLET ORAL EVERY 6 HOURS PRN
Status: DISCONTINUED | OUTPATIENT
Start: 2020-08-17 | End: 2020-08-17 | Stop reason: HOSPADM

## 2020-08-17 RX ORDER — SODIUM CHLORIDE 0.9 % (FLUSH) 0.9 %
10 SYRINGE (ML) INJECTION EVERY 12 HOURS SCHEDULED
Status: DISCONTINUED | OUTPATIENT
Start: 2020-08-17 | End: 2020-08-17 | Stop reason: HOSPADM

## 2020-08-17 RX ORDER — IPRATROPIUM BROMIDE AND ALBUTEROL SULFATE 2.5; .5 MG/3ML; MG/3ML
1 SOLUTION RESPIRATORY (INHALATION) EVERY 4 HOURS PRN
Status: DISCONTINUED | OUTPATIENT
Start: 2020-08-17 | End: 2020-08-17 | Stop reason: HOSPADM

## 2020-08-17 RX ORDER — ASPIRIN 81 MG/1
81 TABLET, CHEWABLE ORAL DAILY
Status: DISCONTINUED | OUTPATIENT
Start: 2020-08-18 | End: 2020-08-17 | Stop reason: HOSPADM

## 2020-08-17 RX ORDER — SIMVASTATIN 20 MG
20 TABLET ORAL NIGHTLY
Status: DISCONTINUED | OUTPATIENT
Start: 2020-08-17 | End: 2020-08-17 | Stop reason: HOSPADM

## 2020-08-17 RX ORDER — POLYETHYLENE GLYCOL 3350 17 G/17G
17 POWDER, FOR SOLUTION ORAL DAILY PRN
Status: DISCONTINUED | OUTPATIENT
Start: 2020-08-17 | End: 2020-08-17 | Stop reason: HOSPADM

## 2020-08-17 RX ORDER — ONDANSETRON 2 MG/ML
4 INJECTION INTRAMUSCULAR; INTRAVENOUS EVERY 6 HOURS PRN
Status: DISCONTINUED | OUTPATIENT
Start: 2020-08-17 | End: 2020-08-17 | Stop reason: HOSPADM

## 2020-08-17 RX ORDER — PANTOPRAZOLE SODIUM 40 MG/1
40 TABLET, DELAYED RELEASE ORAL
Qty: 30 TABLET | Refills: 3 | Status: SHIPPED | OUTPATIENT
Start: 2020-08-18 | End: 2021-02-11

## 2020-08-17 RX ORDER — SERTRALINE HYDROCHLORIDE 100 MG/1
200 TABLET, FILM COATED ORAL EVERY MORNING
Status: DISCONTINUED | OUTPATIENT
Start: 2020-08-17 | End: 2020-08-17 | Stop reason: HOSPADM

## 2020-08-17 RX ORDER — PANTOPRAZOLE SODIUM 40 MG/1
40 TABLET, DELAYED RELEASE ORAL
Status: DISCONTINUED | OUTPATIENT
Start: 2020-08-18 | End: 2020-08-17 | Stop reason: HOSPADM

## 2020-08-17 RX ORDER — SUCRALFATE 1 G/1
1 TABLET ORAL 4 TIMES DAILY
Qty: 120 TABLET | Refills: 1 | Status: SHIPPED | OUTPATIENT
Start: 2020-08-17 | End: 2020-09-03

## 2020-08-17 RX ORDER — EZETIMIBE 10 MG/1
10 TABLET ORAL EVERY MORNING
Status: DISCONTINUED | OUTPATIENT
Start: 2020-08-17 | End: 2020-08-17 | Stop reason: HOSPADM

## 2020-08-17 RX ORDER — ACETAMINOPHEN 650 MG/1
650 SUPPOSITORY RECTAL EVERY 6 HOURS PRN
Status: DISCONTINUED | OUTPATIENT
Start: 2020-08-17 | End: 2020-08-17 | Stop reason: HOSPADM

## 2020-08-17 RX ORDER — FLUTICASONE PROPIONATE 110 UG/1
2 AEROSOL, METERED RESPIRATORY (INHALATION) 2 TIMES DAILY
Status: DISCONTINUED | OUTPATIENT
Start: 2020-08-17 | End: 2020-08-17 | Stop reason: HOSPADM

## 2020-08-17 RX ORDER — SUCRALFATE 1 G/1
1 TABLET ORAL EVERY 6 HOURS SCHEDULED
Status: DISCONTINUED | OUTPATIENT
Start: 2020-08-17 | End: 2020-08-17 | Stop reason: HOSPADM

## 2020-08-17 RX ORDER — LETROZOLE 2.5 MG/1
2.5 TABLET, FILM COATED ORAL NIGHTLY
Status: DISCONTINUED | OUTPATIENT
Start: 2020-08-17 | End: 2020-08-17 | Stop reason: HOSPADM

## 2020-08-17 RX ADMIN — SODIUM CHLORIDE, PRESERVATIVE FREE 10 ML: 5 INJECTION INTRAVENOUS at 11:13

## 2020-08-17 RX ADMIN — EZETIMIBE 10 MG: 10 TABLET ORAL at 11:13

## 2020-08-17 RX ADMIN — ENOXAPARIN SODIUM 40 MG: 40 INJECTION SUBCUTANEOUS at 11:13

## 2020-08-17 RX ADMIN — Medication 2 PUFF: at 08:11

## 2020-08-17 RX ADMIN — SUCRALFATE 1 G: 1 TABLET ORAL at 11:12

## 2020-08-17 RX ADMIN — SERTRALINE HYDROCHLORIDE 200 MG: 100 TABLET ORAL at 11:12

## 2020-08-17 RX ADMIN — SUCRALFATE 1 G: 1 TABLET ORAL at 17:45

## 2020-08-17 RX ADMIN — MICONAZOLE NITRATE: 20 POWDER TOPICAL at 11:13

## 2020-08-17 ASSESSMENT — PAIN DESCRIPTION - LOCATION
LOCATION: CHEST
LOCATION: CHEST

## 2020-08-17 ASSESSMENT — PAIN DESCRIPTION - PAIN TYPE
TYPE: ACUTE PAIN
TYPE: ACUTE PAIN

## 2020-08-17 ASSESSMENT — PAIN DESCRIPTION - ORIENTATION
ORIENTATION: MID
ORIENTATION: MID

## 2020-08-17 ASSESSMENT — PAIN SCALES - GENERAL
PAINLEVEL_OUTOF10: 0
PAINLEVEL_OUTOF10: 5
PAINLEVEL_OUTOF10: 6

## 2020-08-17 ASSESSMENT — HEART SCORE: ECG: 0

## 2020-08-17 NOTE — ED PROVIDER NOTES
EMERGENCY DEPARTMENT ENCOUNTER        PCP: Sushila Adame, [facility]    Chief Complaint   Patient presents with    Chest Pain       This patient was not evaluated by the attending physician. I have independently evaluated this patient. HPI      Kingston Gottron is a 69 y.o. female nonsmoker with PMH of HTN, HLD, obesity, NSTEMI, COPD who presents via EMS with chest pain. Onset - 11pm  Location -midsternal region  Duration -constant until given aspirin and nitro by EMS and then resolved, has recurred twice more in the ED but spontaneously resolved. Episodes seem to last about about 15 minutes when they occur  Character -tightness, burning  Aggravating/Alleviating factors -aggravating factors exertion. Alleviating factor was aspirin and nitro. Activity at onset -sitting, relaxing watching the news  Associate symptoms -diaphoresis, nausea, shortness of breath  Radiation -right-sided neck and right shoulder and right upper extremity  Severity -pain-free at this time    Patient reports cardiologist is Dr. Kalpesh Puente. Patient denies cough, current shortness of breath, current nausea. REVIEW OF SYSTEMS    Constitutional:  Denies fever, chills. HENT:  Denies sore throat or ear pain   Cardiovascular:  +Chest Pain; Denies palpitations,  Denies syncope, no extremity edema. Respiratory:  + shortness of breath; Denies cough, shortness of breath, or hemoptysis    GI:  + nausea;  Denies abdominal pain, vomiting, or diarrhea  :  Denies any urinary symptoms  Musculoskeletal:  Denies back pain, extremity swelling  Skin:  Denies rash  Neurologic:  Denies lightheadedness, dizziness, headache, focal weakness or sensory changes   Endocrine:  Denies polyuria or polydypsia   Lymphatic:  Denies swollen glands     All other review of systems are negative  See HPI and nursing notes for additional information         PAST MEDICAL & SURGICAL HISTORY    Past Medical History:   Diagnosis Date    Anemia     Anxiety 1978    Arthritis     generalized    Asthma 2006    AV block     2nd degree per hospital in june2013    Blood poisoning 1994    Blood transfusion     12/2003    CAD (coronary artery disease)     Cancer ([facility] [address] 2007    skin (face) & colon(2003)/ 2/2012 dx of right breast ca(pc)    Carcinoma of breast ([facility] [address] 2/29/2012    right arm no b/p or sticks    Cardiac pacemaker 3/10/14    Medtronic dual lead    Chronic cystitis     Chronic respiratory failure (HCC)     2 L/min oxygen at night-time    Colon cancer ([facility] [address] 2013    COPD (chronic obstructive pulmonary disease) (HCC)     CTS (carpal tunnel syndrome)     Depression     Diverticulitis     H/O 24 hour EKG monitoring 2/28/2014 7/24/13 2/14 complete heart block 7/13No clinically significant arrthymia noted.  H/O cardiac catheterization 10/31/2011, 7/11/2007    10/31/2011-No significant CAD. Cardiolite stress test was false positive-Dr Maureen Reddy; 7/11/2007-No CAD, global function intact;    H/O cardiovascular stress test 10/20/2011, 3/23/2010    10/20/2011-Lexiscan- Evid of mild ischemia in Left CX region. EF 70%. Global LVSF normal;    H/O cardiovascular stress test 1/7/15    EF 77%. Normal Stress Test.    H/O chest pain 4/2005    sees Dr Evelyn Overton H/O Doppler ultrasound 2/20/2008 2/20/2008-CAROTID DOPPLER- Normal carotids bilaterally;    H/O Doppler ultrasound 06/06/13    CAROTID DOPPLER- there is heterogeneous, irregular atherosclerotic plaque noted in the right internal carotid artery,  doppler flow velocities within the right internal carotid artery are elevated, consistent with a mild, less than 50%stenosis, there is intimal thickening but no significant atherosclerotic plaque noted in the left internal carotid artery, the left carotid are within normal limits    H/O echocardiogram 4/9/2012, 10/20/2011    4/9/2012-LVSF normal EF=>55%. impaired LV relaxation;    H/O echocardiogram 12/31/14    EF 50-55%.   LV shows mild concentric  BREAST LUMPECTOMY  2007    right     BREAST SURGERY  02/13/2012    rt lesion biopsy     CARDIAC CATHETERIZATION  2007 & 2011    COLECTOMY  2004    Colon cancer    COLONOSCOPY  10-18-13    polyp    COLONOSCOPY  1/19/2016    internal hemorrhoids, diverticulosis, 1.5 cm sessile polyp, 8 mm polyp found in rectum.  COLONOSCOPY  12/14/2017    1.5cm residual polyp, 5mm polyps in blind sigmoid loop x3, Sigmoid divertics, Internal grade 1 hemorrhoids    COLONOSCOPY N/A 1/16/2019    COLONOSCOPY W/ ENDOSCOPIC MUCOSAL RESECTION WITH ELEVIEW 5ML INJECTION AND POLYPECTOMY OF TIP OF BLIND RECTOSIGMOID STUMP AND CAUTERIZATION WITH ERBE PROBE, CLIPPING X2 performed by Doyle Douglas MD at [facility]  01/21/2020    pan divertics/ 4 polyps/ sm int hem, S/P partial sigmoid resection w/ end to side anastamosis, repeat in 3 years    COLONOSCOPY N/A 1/21/2020    COLONOSCOPY POLYPECTOMY SNARE/COLD BIOPSY performed by Doyle Douglas MD at [facility]  2003    back   [address] Bilateral 12/15/14    DENTAL SURGERY      front right tooth and root canal w/ brass bolt    DILATION AND CURETTAGE OF UTERUS  2006    Needed a camera to find my uterus.     ENDOSCOPY, COLON, DIAGNOSTIC  12/14/2017    Small hiatal hernia    HERNIA REPAIR  2004    Umb hernia    HERNIA REPAIR  2008    Inc hernia    LYMPH NODE DISSECTION  2/29/2012    Right axillary-3 sentinel nodes were positive out of 9.    MASTECTOMY, RADICAL Right 02/29/2012    w/ sentinal node disection-Dr West    PACEMAKER PLACEMENT      PRE-MALIGNANT / BENIGN SKIN LESION EXCISION  2/8/2013    right leg X2-Dr West    TONSILLECTOMY  1957    TUNNELED VENOUS PORT PLACEMENT  2004    TUNNELED VENOUS PORT PLACEMENT  02/13/2012    removal of mediport       CURRENT MEDICATIONS    Current Outpatient Rx   Medication Sig Dispense Refill    simvastatin (ZOCOR) 20 MG tablet Take 1 tablet by mouth nightly 90 tablet 1    sertraline (ZOLOFT) 100 MG tablet Take 2 tablets by mouth every morning 180 tablet 1    ezetimibe (ZETIA) 10 MG tablet Take 1 tablet by mouth every morning 90 tablet 1    fluticasone (FLOVENT HFA) 110 MCG/ACT inhaler Inhale 2 puffs into the lungs 2 times daily 1 Inhaler 2    albuterol sulfate HFA (PROAIR HFA) 108 (90 Base) MCG/ACT inhaler INHALE 2 PUFFS BY MOUTH EVERY SIX HOURS AS NEEDED FOR WHEEZING 1 Inhaler 2    vitamin B-12 (CYANOCOBALAMIN) 1000 MCG tablet Take 1,000 mcg by mouth daily      aspirin 81 MG EC tablet Take 81 mg by mouth nightly       letrozole (FEMARA) 2.5 MG tablet Take 2.5 mg by mouth nightly       Ferrous Sulfate (IRON) 325 (65 FE) MG TABS Take 1 tablet by mouth every morning       calcium-vitamin D (OSCAL 500/200 D-3) 500-200 MG-UNIT per tablet Take 1 tablet by mouth 2 times daily          ALLERGIES    Allergies   Allergen Reactions    Bactrim [Sulfamethoxazole-Trimethoprim] Anaphylaxis and Hives    Lipitor [Atorvastatin] Shortness Of Breath    Taxol [Paclitaxel] Anaphylaxis and Swelling    Demerol Nausea Only    Neosporin [Bacitracin-Neomycin-Polymyxin] Rash and Other (See Comments)     hotness    Other Rash     Ivory Soap    Talc Other (See Comments)     blisters       SOCIAL & FAMILY HISTORY    Social History     Socioeconomic History    Marital status:      Spouse name: None    Number of children: 2    Years of education: None    Highest education level: None   Occupational History    Occupation: retired   Social Needs    Financial resource strain: None    Food insecurity     Worry: Never true     Inability: Never true    Transportation needs     Medical: No     Non-medical: No   Tobacco Use    Smoking status: Never Smoker    Smokeless tobacco: Never Used   Substance and Sexual Activity    Alcohol use: No     Comment:           CAFFEINE: 2- 12oz nona daily & 2 cups coffee some nights.     Drug use: No    Sexual activity: Not Currently     Partners: Male   Lifestyle  Physical activity     Days per week: 0 days     Minutes per session: None    Stress: Only a little   Relationships    Social connections     Talks on phone: Three times a week     Gets together: Once a week     Attends Mosque service: 1 to 4 times per year     Active member of club or organization: No     Attends meetings of clubs or organizations: Never     Relationship status: Living with partner   [facility] Intimate partner violence     Fear of current or ex partner: No     Emotionally abused: No     Physically abused: No     Forced sexual activity: No   Other Topics Concern    None   Social History Narrative    Do you donate blood or plasma? No    Caffeine intake? Moderate    Advance directive? No    Is blood transfusion acceptable in an emergency? Yes    Live alone or with others? With others    Able to care for self? Yes    No exercise at this time         Family History   Problem Relation Age of Onset    Arthritis Mother         OA, RA    High Cholesterol Mother     High Blood Pressure Mother     Cancer Father         skin and leukemia    High Blood Pressure Father     High Cholesterol Father     Diabetes Father     Heart Disease Father     Heart Disease Brother     High Cholesterol Brother     High Blood Pressure Brother     Heart Disease Brother     Seizures Son        PHYSICAL EXAM    VITAL SIGNS: /65   Pulse 71   Temp 97.4 °F (36.3 °C) (Oral)   Resp 18   Ht 5' (1.524 m)   Wt 260 lb (117.9 kg)   LMP 01/15/1999   SpO2 94%   BMI 50.78 kg/m²    Constitutional:  Well developed, well nourished, no acute distress   HENT:  Atraumatic, moist mucus membranes  Neck/Lymphatics: supple, no JVD, no swollen nodes  Respiratory:  Lungs Clear, no retractions, no wheezing, rhonchi, rales  Cardiovascular:  Rate regular, regular Rhythm, no murmurs/rubs/gallops. No carotid bruits or murmurs heard in carotids.   Vascular: Radial pulses and DP pulses 2+ equal bilaterally  GI:  Soft, nontender, normal bowel sounds  Musculoskeletal:  No edema, no deformities. No thigh/calf tenderness to palpation bilaterally. Compartments are soft in bilateral lower extremities.   Integument:  Skin warm and dry, no petechiae   Neurologic:  Alert & oriented, normal speech  Psych: Pleasant affect, no hallucinations        LABS:  Results for orders placed or performed during the hospital encounter of 08/17/20   CBC Auto Differential   Result Value Ref Range    WBC 10.5 4.0 - 10.5 K/CU MM    RBC 4.10 (L) 4.2 - 5.4 M/CU MM    Hemoglobin 11.8 (L) 12.5 - 16.0 GM/DL    Hematocrit 36.5 (L) 37 - 47 %    MCV 89.0 78 - 100 FL    MCH 28.8 27 - 31 PG    MCHC 32.3 32.0 - 36.0 %    RDW 14.3 11.7 - 14.9 %    Platelets 753 448 - 373 K/CU MM    MPV 9.8 7.5 - 11.1 FL    Differential Type AUTOMATED DIFFERENTIAL     Segs Relative 68.5 (H) 36 - 66 %    Lymphocytes % 20.3 (L) 24 - 44 %    Monocytes % 6.3 (H) 0 - 4 %    Eosinophils % 3.9 (H) 0 - 3 %    Basophils % 0.4 0 - 1 %    Segs Absolute 7.2 K/CU MM    Lymphocytes Absolute 2.1 K/CU MM    Monocytes Absolute 0.7 K/CU MM    Eosinophils Absolute 0.4 K/CU MM    Basophils Absolute 0.0 K/CU MM    Nucleated RBC % 0.0 %    Total Nucleated RBC 0.0 K/CU MM    Total Immature Neutrophil 0.06 K/CU MM    Immature Neutrophil % 0.6 (H) 0 - 0.43 %   Comprehensive Metabolic Panel w/ Reflex to MG   Result Value Ref Range    Sodium 136 135 - 145 MMOL/L    Potassium 3.7 3.5 - 5.1 MMOL/L    Chloride 100 99 - 110 mMol/L    CO2 25 21 - 32 MMOL/L    BUN 13 6 - 23 MG/DL    CREATININE 1.2 (H) 0.6 - 1.1 MG/DL    Glucose 106 (H) 70 - 99 MG/DL    Calcium 9.3 8.3 - 10.6 MG/DL    Alb 3.7 3.4 - 5.0 GM/DL    Total Protein 6.9 6.4 - 8.2 GM/DL    Total Bilirubin 0.2 0.0 - 1.0 MG/DL    ALT 18 10 - 40 U/L    AST 19 15 - 37 IU/L    Alkaline Phosphatase 112 40 - 129 IU/L    GFR Non- 44 (L) >60 mL/min/1.73m2    GFR  54 (L) >60 mL/min/1.73m2    Anion Gap 11 4 - 16   Lipase   Result Value Ref Range    Lipase 17 13 - 60 IU/L   Troponin   Result Value Ref Range    Troponin T <0.010 <0.01 NG/ML   Brain Natriuretic Peptide   Result Value Ref Range    Pro-.2 <300 PG/ML         EKG: EKG Interpretation  Please see ED physician's note for EKG interpretation- Dr. Jerrica Hannon    RADIOLOGY/PROCEDURES    XR CHEST PORTABLE   Final Result   No acute abnormality. ED COURSE & MEDICAL DECISION MAKING       Vital signs and nursing notes reviewed during ED course. I have independently evaluated this patient. Supervising physician present in the Emergency Department, available for consultation, throughout entirety of patient care. Patient presents as above which prompted work-up today. Patient treated with full dose aspirin as well as 1 dose of nitro prior to arrival by EMS with resolution of pain. However upon recheck when discussing results and plan of care patient reports that she has had 2 more episodes of chest pain in the ED and but she is again pain-free at this time. Patient placed on telemetry monitoring as well as continuous pulse oximetry monitoring. While in the ED today, labs, imaging, and EKG were obtained. For EKG interpretation- please see ED physician's note. Labs reveal mildly elevated creatinine of 1.2, mild anemia of 11.8. Rest of labs without clinically significant derangements. Troponin is negative. Chest xray obtained today and reveals no acute cardiopulmonary process. No pneumothorax, no consolidation concerning for pneumonia, no pleural effusion. No tachycardia, tachypnea, hypoxia, pleuritic chest pain-low clinical suspicion for PE. Patient has no widened mediastinum, no ripping or tearing pain, and pulses are equal in all extremities-low clinical suspicion for AAA/thoracic dissection. HEART score is at least 5- therefore I consulted with hospitalist and we discussed the patient's case.  Hospitalist, Dr. Colt Cooper, agrees to admit the patient at this time for further evaluation and care for ACS rule out. All pertinent Lab data and radiographic results reviewed with patient at bedside. The patient and/or the family were informed of the results of any tests/labs/imaging, the treatment plan, and time was allotted to answer questions. Diagnosis, disposition, and treatment plan reviewed in detail with patient who understands and agrees to admission at this time. See chart for details of medications given during the ED stay. Clinical  IMPRESSION    1. Chest pain, unspecified type        PLAN:  Admission to the hospital    Comment: Please note this report has been produced using speech recognition software and may contain errors related to that system including errors in grammar, punctuation, and spelling, as well as words and phrases that may be inappropriate. If there are any questions or concerns please feel free to contact the dictating provider for clarification.            Jacqueline May PA-C  08/17/20 0159

## 2020-08-17 NOTE — PLAN OF CARE
Problem: Skin Integrity:  Goal: Will show no infection signs and symptoms  Description: Will show no infection signs and symptoms  8/17/2020 0913 by Lexii Park RN  Outcome: Ongoing  8/17/2020 0456 by Beatriz Ortiz RN  Outcome: Ongoing  Goal: Absence of new skin breakdown  Description: Absence of new skin breakdown  8/17/2020 0913 by Lexii Park RN  Outcome: Ongoing  8/17/2020 0456 by Beatriz Ortiz RN  Outcome: Ongoing     Problem: Pain:  Goal: Pain level will decrease  Description: Pain level will decrease  8/17/2020 0913 by Lexii Park RN  Outcome: Ongoing  8/17/2020 0456 by Beatriz Ortiz RN  Outcome: Ongoing  Goal: Control of acute pain  Description: Control of acute pain  8/17/2020 0913 by Lexii Park RN  Outcome: Ongoing  8/17/2020 0456 by Beatriz Ortiz RN  Outcome: Ongoing  Goal: Control of chronic pain  Description: Control of chronic pain  8/17/2020 0913 by Lexii Park RN  Outcome: Ongoing  8/17/2020 0456 by Beatriz Ortiz RN  Outcome: Ongoing     Problem: Infection:  Goal: Will remain free from infection  Description: Will remain free from infection  8/17/2020 0913 by Lexii Park RN  Outcome: Ongoing  8/17/2020 0456 by Beatriz Ortiz RN  Outcome: Ongoing     Problem: Safety:  Goal: Free from accidental physical injury  Description: Free from accidental physical injury  8/17/2020 0913 by Lexii Park RN  Outcome: Ongoing  8/17/2020 0456 by Beatriz Ortiz RN  Outcome: Ongoing  Goal: Free from intentional harm  Description: Free from intentional harm  8/17/2020 0913 by Lexii Park RN  Outcome: Ongoing  8/17/2020 0456 by Beatriz Ortiz RN  Outcome: Ongoing     Problem: Daily Care:  Goal: Daily care needs are met  Description: Daily care needs are met  8/17/2020 0913 by Lexii Park RN  Outcome: Ongoing  8/17/2020 0456 by Beatriz Ortiz RN  Outcome: Ongoing     Problem: Discharge Planning:  Goal: Patients continuum of care needs are met  Description: Patients continuum of care needs are met  8/17/2020 0913 by Jana Shaw RN  Outcome: Ongoing  8/17/2020 0456 by Parker Carver RN  Outcome: Ongoing

## 2020-08-17 NOTE — PLAN OF CARE
Problem: Skin Integrity:  Goal: Will show no infection signs and symptoms  Description: Will show no infection signs and symptoms  Outcome: Ongoing  Goal: Absence of new skin breakdown  Description: Absence of new skin breakdown  Outcome: Ongoing     Problem: Pain:  Goal: Pain level will decrease  Description: Pain level will decrease  Outcome: Ongoing  Goal: Control of acute pain  Description: Control of acute pain  Outcome: Ongoing  Goal: Control of chronic pain  Description: Control of chronic pain  Outcome: Ongoing     Problem: Infection:  Goal: Will remain free from infection  Description: Will remain free from infection  Outcome: Ongoing     Problem: Safety:  Goal: Free from accidental physical injury  Description: Free from accidental physical injury  Outcome: Ongoing  Goal: Free from intentional harm  Description: Free from intentional harm  Outcome: Ongoing     Problem: Daily Care:  Goal: Daily care needs are met  Description: Daily care needs are met  Outcome: Ongoing     Problem: Discharge Planning:  Goal: Patients continuum of care needs are met  Description: Patients continuum of care needs are met  Outcome: Ongoing

## 2020-08-17 NOTE — ED NOTES
Bed: ED-32  Expected date:   Expected time:   Means of arrival:   Comments:  EMS CP     Noelle Figueroa RN  08/17/20 5910

## 2020-08-17 NOTE — H&P
HISTORY AND PHYSICAL  (Hospitalist, Internal Medicine)  IDENTIFYING INFORMATION   PATIENT:  Angela Arrington  MRN:  [id number]  ADMIT DATE: 8/17/2020  TIME OF EVALUATION: 8/17/2020 1:50 AM    CHIEF COMPLAINT     CP  HISTORY OF PRESENT ILLNESS   Angela Arrington is a 69 y.o. female admitted for CP that started during 11 PM last night, it lasted for a few hours. Denies SOB or lightheadedness. Somewhat sweaty with the pain. Pt received ASA and nitro and the pain went awake. Pt otherwise has no complaints of CP, SOB, dizziness, N/V/C/D, abdominal pain, dysuria, joint pains, rash/boils, or fevers. PMH listed below:    PAST MEDICAL, SURGICAL, FAMILY, and SOCIAL HISTORY     Past Medical History:   Diagnosis Date    Anemia     Anxiety 1978    Arthritis     generalized    Asthma 2006    AV block     2nd degree per hospital in june2013    Blood poisoning 1994    Blood transfusion     12/2003    CAD (coronary artery disease)     Cancer ([address] 2007    skin (face) & colon(2003)/ 2/2012 dx of right breast ca(pc)    Carcinoma of breast ([address] 2/29/2012    right arm no b/p or sticks    Cardiac pacemaker 3/10/14    Medtronic dual lead    Chronic cystitis     Chronic respiratory failure (HCC)     2 L/min oxygen at night-time    Colon cancer ([address] 2013    COPD (chronic obstructive pulmonary disease) (HCC)     CTS (carpal tunnel syndrome)     Depression     Diverticulitis     H/O 24 hour EKG monitoring 2/28/2014 7/24/13 2/14 complete heart block 7/13No clinically significant arrthymia noted.  H/O cardiac catheterization 10/31/2011, 7/11/2007    10/31/2011-No significant CAD. Cardiolite stress test was false positive-Dr Kyree Hartman; 7/11/2007-No CAD, global function intact;    H/O cardiovascular stress test 10/20/2011, 3/23/2010    10/20/2011-Lexiscan- Evid of mild ischemia in Left CX region. EF 70%. Global LVSF normal;    H/O cardiovascular stress test 1/7/15    EF 77%.    Normal Stress Test.    H/O chest pain legs\"    Normocytic normochromic anemia     Obstructive sleep apnea 2008 01- Has CPAP machine but has not used in over a year - states she has not had problems since her pacemaker was placed.  Old MI (myocardial infarction)     per pt on 1/15/2019\"had heart attack about a year ago\"    Osteoarthritis     Osteopenia     Primary malignant neoplasm of colon ([address]     S/P cardiac cath 06/2013    no significant CAD false postive stress test    Super obesity     Umbilical hernia      Past Surgical History:   Procedure Laterality Date    A-V CARDIAC PACEMAKER INSERTION  3/10/14     Medtronic. Model:  [id number] Medtronic  Serial:  [id number] dual chamber pacemaker    APPENDECTOMY  2004    BREAST BIOPSY  2/13/12    BREAST LUMPECTOMY  2007    right     BREAST SURGERY  02/13/2012    rt lesion biopsy     CARDIAC CATHETERIZATION  2007 & 2011    COLECTOMY  2004    Colon cancer    COLONOSCOPY  10-18-13    polyp    COLONOSCOPY  1/19/2016    internal hemorrhoids, diverticulosis, 1.5 cm sessile polyp, 8 mm polyp found in rectum.  COLONOSCOPY  12/14/2017    1.5cm residual polyp, 5mm polyps in blind sigmoid loop x3, Sigmoid divertics, Internal grade 1 hemorrhoids    COLONOSCOPY N/A 1/16/2019    COLONOSCOPY W/ ENDOSCOPIC MUCOSAL RESECTION WITH ELEVIEW 5ML INJECTION AND POLYPECTOMY OF TIP OF BLIND RECTOSIGMOID STUMP AND CAUTERIZATION WITH ERBE PROBE, CLIPPING X2 performed by Doug Manzo MD at [place] [address]  01/21/2020    pan divertics/ 4 polyps/ sm int hem, S/P partial sigmoid resection w/ end to side anastamosis, repeat in 3 years    COLONOSCOPY N/A 1/21/2020    COLONOSCOPY POLYPECTOMY SNARE/COLD BIOPSY performed by Doug Manzo MD at [place] [address]  2003    back   [address] Bilateral 12/15/14    DENTAL SURGERY      front right tooth and root canal w/ brass bolt    DILATION AND CURETTAGE OF UTERUS  2006    Needed a camera to find my uterus.     ENDOSCOPY, COLON, DIAGNOSTIC  12/14/2017    Small hiatal hernia    HERNIA REPAIR  2004    Umb hernia    HERNIA REPAIR  2008    Inc hernia    LYMPH NODE DISSECTION  2/29/2012    Right axillary-3 sentinel nodes were positive out of 9.    MASTECTOMY, RADICAL Right 02/29/2012    w/ sentinal node disection-Dr West    PACEMAKER PLACEMENT      PRE-MALIGNANT / BENIGN SKIN LESION EXCISION  2/8/2013    right leg X2-Dr West    TONSILLECTOMY  1957    TUNNELED VENOUS PORT PLACEMENT  2004    TUNNELED VENOUS PORT PLACEMENT  02/13/2012    removal of mediport     Family History   Problem Relation Age of Onset    Arthritis Mother         OA, RA    High Cholesterol Mother     High Blood Pressure Mother     Cancer Father         skin and leukemia    High Blood Pressure Father     High Cholesterol Father     Diabetes Father     Heart Disease Father     Heart Disease Brother     High Cholesterol Brother     High Blood Pressure Brother     Heart Disease Brother     Seizures Son      Family Hx of HTN  Family Hx as reviewed above, otherwise non-contributory  Social History     Socioeconomic History    Marital status:      Spouse name: None    Number of children: 2    Years of education: None    Highest education level: None   Occupational History    Occupation: retired   Social Needs    Financial resource strain: None    Food insecurity     Worry: Never true     Inability: Never true    Transportation needs     Medical: No     Non-medical: No   Tobacco Use    Smoking status: Never Smoker    Smokeless tobacco: Never Used   Substance and Sexual Activity    Alcohol use: No     Comment:           CAFFEINE: 2- 12oz nona daily & 2 cups coffee some nights.  Drug use: No    Sexual activity: Not Currently     Partners: Male   Lifestyle    Physical activity     Days per week: 0 days     Minutes per session: None    Stress: Only a little   Relationships    Social connections     Talks on phone:  Three times a week     Gets together: Once a week     Attends Mosque service: 1 to 4 times per year     Active member of club or organization: No     Attends meetings of clubs or organizations: Never     Relationship status: Living with partner   Justice Mcnair Intimate partner violence     Fear of current or ex partner: No     Emotionally abused: No     Physically abused: No     Forced sexual activity: No   Other Topics Concern    None   Social History Narrative    Do you donate blood or plasma? No    Caffeine intake? Moderate    Advance directive? No    Is blood transfusion acceptable in an emergency? Yes    Live alone or with others? With others    Able to care for self? Yes    No exercise at this time           MEDICATIONS   Medications Prior to Admission  Not in a hospital admission. Current Medications  No current facility-administered medications for this encounter.       Current Outpatient Medications   Medication Sig Dispense Refill    simvastatin (ZOCOR) 20 MG tablet Take 1 tablet by mouth nightly 90 tablet 1    sertraline (ZOLOFT) 100 MG tablet Take 2 tablets by mouth every morning 180 tablet 1    ezetimibe (ZETIA) 10 MG tablet Take 1 tablet by mouth every morning 90 tablet 1    fluticasone (FLOVENT HFA) 110 MCG/ACT inhaler Inhale 2 puffs into the lungs 2 times daily 1 Inhaler 2    albuterol sulfate HFA (PROAIR HFA) 108 (90 Base) MCG/ACT inhaler INHALE 2 PUFFS BY MOUTH EVERY SIX HOURS AS NEEDED FOR WHEEZING 1 Inhaler 2    vitamin B-12 (CYANOCOBALAMIN) 1000 MCG tablet Take 1,000 mcg by mouth daily      aspirin 81 MG EC tablet Take 81 mg by mouth nightly       letrozole (FEMARA) 2.5 MG tablet Take 2.5 mg by mouth nightly       Ferrous Sulfate (IRON) 325 (65 FE) MG TABS Take 1 tablet by mouth every morning       calcium-vitamin D (OSCAL 500/200 D-3) 500-200 MG-UNIT per tablet Take 1 tablet by mouth 2 times daily            Allergies  Allergies   Allergen Reactions    Bactrim [Sulfamethoxazole-Trimethoprim] Anaphylaxis and Hives    Lipitor [Atorvastatin] Shortness Of Breath    Taxol [Paclitaxel] Anaphylaxis and Swelling    Demerol Nausea Only    Neosporin [Bacitracin-Neomycin-Polymyxin] Rash and Other (See Comments)     hotness    Other Rash     Ivory Soap    Talc Other (See Comments)     blisters       REVIEW OF SYSTEMS   Within above limitations. 14 point review of systems reviewed. Pertinent positive or negative as per HPI or otherwise negative per 14 point systems review. PHYSICAL EXAM     Wt Readings from Last 3 Encounters:   08/17/20 260 lb (117.9 kg)   03/12/20 259 lb 9.6 oz (117.8 kg)   01/21/20 255 lb (115.7 kg)       Blood pressure 120/65, pulse 71, temperature 97.4 °F (36.3 °C), temperature source Oral, resp. rate 18, height 5' (1.524 m), weight 260 lb (117.9 kg), last menstrual period 01/15/1999, SpO2 94 %, not currently breastfeeding. General - AAO x 3  Psych - Appropriate affect/speech. No agitation  Eyes - Eye lids intact. No scleral icterus  ENT - Lips wnl. External ear clear/dry/intact. No thyromegaly on inspection  Neuro - No gross peripheral or central neuro deficits on inspection  Heart - Sinus. RRR. S1 and S2 present. No added HS/murmurs appreciated. No elevated JVD appreciated  Lung - Adequate air entry b/l, No crackles/wheezes appreciated  GI - Soft. No guarding/rigidity. No hepatosplenomegaly/ascites. BS+   - No CVA/suprapubic tenderness or palpable bladder distension  Skin - Intact. No rash/petechiae/ecchymosis. Warm extremities  MSK - Joints with normal ROM. No joint swellings    Lines/Drains/Airways/Wounds:  [unfilled]    LABS AND IMAGING   CBC  [unfilled]    Last 3 Hemoglobin  Lab Results   Component Value Date    HGB 11.8 08/17/2020    HGB 11.8 01/21/2020    HGB 11.4 01/16/2020     Last 3 WBC/ANC  Lab Results   Component Value Date    WBC 10.5 08/17/2020    WBC 11.2 01/21/2020    WBC  01/16/2020     13.6  WBC/DIFFERENTIAL SUSPECT FLAG NOTED ON ANALYZER.   PLEASE REVIEW AND CONSIDER SENDING OUT IF CLINICALLY INDICATED. No components found for: GRNLOCTYABS  Last 3 Platelets  No results found for: PLATELET  Chemistry  [unfilled]  [unfilled]  Lab Results   Component Value Date     09/01/2017     05/30/2017     03/06/2017     Coagulation Studies  Lab Results   Component Value Date    INR 1.01 07/28/2018     Liver Function Studies  Lab Results   Component Value Date    ALT 18 08/17/2020    AST 19 08/17/2020    ALKPHOS 112 08/17/2020       Recent Imaging        Relevant labs and imaging reviewed    ASSESSMENT AND PLAN   69 y.o. female nonsmoker with PMH of HTN, HLD, obesity, NSTEMI, COPD who presents via EMS with chest pain      Chest pain, consider ACS. Admit to Med/Surg    Telemetry monitoring    Serial troponin level and repeat EKG in AM   Cardiology consult.     Antiplatelet/Statin  Consider stress test     Other PMH- stable  HTN, HLD, obesity, COPD     Case d/w ED provider    DVT ppx: lovenox  Code status: full    Mariel, Internal Medicine  8/17/2020 at 1:50 AM

## 2020-08-17 NOTE — ED NOTES
Patient's depends changed-reports she is incontinent all the time. Patient placed in gown. Bed linens changed. Richardson provided. Call light in reach. Patient ambulated without difficulty to chair and back to bed. Reports she does fall frequently. This nurse stand by assist during ambulation.      Yuliet Estrada, MICHELLE  08/17/20 0160 [facility], RN  08/17/20 8045

## 2020-08-17 NOTE — DISCHARGE SUMMARY
[address] Hospitalist     Discharge Summary    Name:  Mitesh Oh /Age/Sex: 1950  (69 y.o. female)   MRN & CSN:  [id number] & [id number] Admission Date/Time: 2020 12:22 AM   Attending:  Bonilla San MD Discharging Physician: Bonilla San MD     HPI:     Please, see admission HPI in [address] and patient's hospital course below    Hospital Course:   Mitesh Oh is a 69 y.o.  female  who presents with chest pain and the following assessments reflect patient's hospital course     Chest pain -atypical -unlikely cardiac    Per patient it is mostly on the right side of the chest and radiating to the right arm  3 sets of troponin were negative  EKG with no discernible ST changes  Patient had left heart cath a year ago on 2019 which was reported with no significant CAD and abnormal LVEF  Patient is advised to continue with ASA, Zetia, Zocor  Script was provided for Protonix and sucralfate for possible cardiac origin of the atypical chest pain    She is advised to resume all her medications for the following chronic conditions as prescribed before the admission unless contraindicated  Hypertension, hyperlipidemia, COPD    Patient is hemodynamically stable for DC to home    The patient expressed appropriate understanding of and agreement with the discharge recommendations, medications, and plan.      Consults this admission:  IP CONSULT TO HOSPITALIST  IP CONSULT TO CARDIOLOGY    Discharge Instruction:   Follow up appointments:   Primary care physician:  within 1 to 2 weeks    Diet:  cardiac diet   Activity: activity as tolerated  Disposition: Discharged to:   [x]Home, []C, []SNF, []Acute Rehab, []Hospice   Condition on discharge: Stable    Discharge Medications:      Eusebio Burns   Home Medication Instructions CITLALY:[id number]    Printed on:20 1608   Medication Information                      albuterol sulfate HFA (PROAIR HFA) 108 (90 Base) MCG/ACT inhaler  INHALE 2 PUFFS BY MOUTH EVERY SIX HOURS AS NEEDED FOR WHEEZING             aspirin 81 MG EC tablet  Take 81 mg by mouth nightly              calcium-vitamin D (OSCAL 500/200 D-3) 500-200 MG-UNIT per tablet  Take 1 tablet by mouth 2 times daily              ezetimibe (ZETIA) 10 MG tablet  Take 1 tablet by mouth every morning             Ferrous Sulfate (IRON) 325 (65 FE) MG TABS  Take 1 tablet by mouth every morning              fluticasone (FLOVENT HFA) 110 MCG/ACT inhaler  Inhale 2 puffs into the lungs 2 times daily             letrozole (FEMARA) 2.5 MG tablet  Take 2.5 mg by mouth nightly              pantoprazole (PROTONIX) 40 MG tablet  Take 1 tablet by mouth every morning (before breakfast)             sertraline (ZOLOFT) 100 MG tablet  Take 2 tablets by mouth every morning             simvastatin (ZOCOR) 20 MG tablet  Take 1 tablet by mouth nightly             sucralfate (CARAFATE) 1 GM tablet  Take 1 tablet by mouth 4 times daily             vitamin B-12 (CYANOCOBALAMIN) 1000 MCG tablet  Take 1,000 mcg by mouth daily                 Subjective _patient is resting in bed with no distress, no nausea or vomiting, chest pain has subsided this morning  She is hungry and wants to eat  She stated that she was told by Dr. Mansi Alfaro that she no longer needs any cardiac work-up since her left heart cath was normal last year    Objective Findings at Discharge:   BP (!) 129/47   Pulse 62   Temp 98 °F (36.7 °C) (Oral)   Resp 22   Ht 5' (1.524 m)   Wt 278 lb 11.2 oz (126.4 kg)   LMP 01/15/1999   SpO2 95%   BMI 54.43 kg/m²            PHYSICAL EXAM   GEN Awake female, resting in bed in no apparent distress. Appears given age. RESP Clear to auscultation, no wheezes, rales or rhonchi. CARDIO/VAS - S1/S2 auscultated. Regular rate without appreciable murmurs, rubs, or gallops. Peripheral pulses equal bilaterally and palpable. No peripheral edema. GI Abdomen is soft without significant tenderness, masses, or guarding.  Bowel sounds are normoactive  MSK No gross joint deformities. Spontaneous movement of all extremities  SKIN Normal coloration, warm, dry. NEURO Cranial nerves appear grossly intact, normal speech, no lateralizing weakness.     BMP/CBC  Recent Labs     08/17/20  0034      K 3.7      CO2 25   BUN 13   CREATININE 1.2*   WBC 10.5   HCT 36.5*            Discharge Time of 31 minutes    Electronically signed by Maricel Chapman MD on 8/17/2020 at 4:04 PM

## 2020-08-18 ENCOUNTER — CARE COORDINATION (OUTPATIENT)
Dept: CASE MANAGEMENT | Age: 70
End: 2020-08-18

## 2020-08-18 PROCEDURE — 1111F DSCHRG MED/CURRENT MED MERGE: CPT | Performed by: FAMILY MEDICINE

## 2020-08-18 NOTE — CARE COORDINATION
Appt. Made 8/19 @1pm.  Future Appointments   Date Time Provider Chance Chiang   8/19/2020  1:00 PM AUSTIN Hernandez - CNP [facility] WALK [facility]   9/15/2020  1:50 PM Lucien Chopra MD [facility]   9/17/2020  1:15 PM SCHEDULE, [facility] MED ONC LAB [facility] MED ONC [place]   9/17/2020  1:30 PM Courtney Friedman MD [facility] MED ONC [facility]       Raymond Arteaga RN

## 2020-08-21 PROBLEM — Z85.038 PERSONAL HISTORY OF OTHER MALIGNANT NEOPLASM OF LARGE INTESTINE: Status: ACTIVE | Noted: 2020-08-21

## 2020-08-21 PROBLEM — L02.91 CUTANEOUS ABSCESS, UNSPECIFIED: Status: ACTIVE | Noted: 2020-08-21

## 2020-08-21 PROBLEM — R16.0 HEPATOMEGALY, NOT ELSEWHERE CLASSIFIED: Status: ACTIVE | Noted: 2020-08-21

## 2020-08-21 LAB
EKG ATRIAL RATE: 67 BPM
EKG DIAGNOSIS: NORMAL
EKG P AXIS: 24 DEGREES
EKG P-R INTERVAL: 176 MS
EKG Q-T INTERVAL: 478 MS
EKG QRS DURATION: 170 MS
EKG QTC CALCULATION (BAZETT): 505 MS
EKG R AXIS: -67 DEGREES
EKG T AXIS: 87 DEGREES
EKG VENTRICULAR RATE: 67 BPM

## 2020-08-24 ENCOUNTER — CARE COORDINATION (OUTPATIENT)
Dept: CASE MANAGEMENT | Age: 70
End: 2020-08-24

## 2020-08-24 NOTE — CARE COORDINATION
[facility] Transitions Follow Up Call    2020    Patient: Eliazar Florian  Patient : 1950   MRN: [id number]  Reason for Admission:   Chest pain  Discharge Date: 20 RARS: Readmission Risk Score: 16     Spoke with patient on return call    Follow Up: Attempted to reach patient for Care Transition follow up. No answer to phone. Message left with CTN contact information and request for call back. Patient reports that she is feeling better. Denies any chest pain, SOB, wheezing, palpitations, dizziness, light headedness or worsening symptoms. Instructed patient on s/s to report to MD/91Daquan. Patient denies fever, chills, N/V/D, body aches or COVID related symptoms. Discussed COVID risk/ prevention measures and s/s to report to MD.    Patient reports that she continues to self- quarantine. Reports that she is able to get medication, food and supplies as needed. Patient denies the need for [place] [address] support. Confirms follow up appt. With PCP/ Cardiology in a few weeks. Denies transportation barriers. Patient denies any questions, equipment or resource needs. Patient is agreeable to continued Care Transition.     Future Appointments   Date Time Provider Chance Chiang   9/3/2020  1:30 PM Reji Chery [facility]   9/15/2020  1:50 PM Sally Hayes MD [facility]   2020  1:15 PM Kay [address] [facility] MED ONC LAB [address] [facility] MED ONC [place]   2020  1:30 PM Hernán Mccormick MD [facility] MED ONC [facility]       Ho Bernal RN

## 2020-08-31 ENCOUNTER — CARE COORDINATION (OUTPATIENT)
Dept: CASE MANAGEMENT | Age: 70
End: 2020-08-31

## 2020-08-31 NOTE — CARE COORDINATION
[facility] Transitions Follow Up Call    2020    Patient: Ruben Rodriguez  Patient : 1950   MRN: [id number]  Reason for Admission:   Chest pain/ CAD  Discharge Date: 20 RARS: Readmission Risk Score: 16           Follow Up: Attempted patient contact. No answer to phone. Message left with CTN contact information and request for call back.     Future Appointments   Date Time Provider Chance Chiang   9/3/2020  1:30 PM Reji Chery [facility]   9/15/2020  1:50 PM Annie Rea MD [facility]   [facility] [place]   2020  1:30 PM Geovanni Hernandez MD [facility] MED ONC [facility]       Fina Trinh RN

## 2020-09-03 ENCOUNTER — OFFICE VISIT (OUTPATIENT)
Dept: INTERNAL MEDICINE CLINIC | Age: 70
End: 2020-09-03
Payer: MEDICARE

## 2020-09-03 ENCOUNTER — CARE COORDINATION (OUTPATIENT)
Dept: CASE MANAGEMENT | Age: 70
End: 2020-09-03

## 2020-09-03 VITALS
WEIGHT: 270 LBS | BODY MASS INDEX: 49.69 KG/M2 | OXYGEN SATURATION: 96 % | TEMPERATURE: 98.1 F | HEART RATE: 70 BPM | HEIGHT: 62 IN | DIASTOLIC BLOOD PRESSURE: 68 MMHG | SYSTOLIC BLOOD PRESSURE: 122 MMHG

## 2020-09-03 PROCEDURE — 99214 OFFICE O/P EST MOD 30 MIN: CPT | Performed by: FAMILY MEDICINE

## 2020-09-03 RX ORDER — EZETIMIBE 10 MG/1
10 TABLET ORAL EVERY MORNING
Qty: 90 TABLET | Refills: 1 | Status: SHIPPED | OUTPATIENT
Start: 2020-09-03 | End: 2021-02-11 | Stop reason: SDUPTHER

## 2020-09-03 RX ORDER — ALBUTEROL SULFATE 90 UG/1
AEROSOL, METERED RESPIRATORY (INHALATION)
Qty: 1 INHALER | Refills: 2 | Status: CANCELLED | OUTPATIENT
Start: 2020-09-03

## 2020-09-03 RX ORDER — PNV NO.95/FERROUS FUM/FOLIC AC 28MG-0.8MG
1 TABLET ORAL EVERY MORNING
Qty: 30 TABLET | Refills: 2 | Status: CANCELLED | OUTPATIENT
Start: 2020-09-03

## 2020-09-03 RX ORDER — SERTRALINE HYDROCHLORIDE 100 MG/1
200 TABLET, FILM COATED ORAL EVERY MORNING
Qty: 180 TABLET | Refills: 1 | Status: SHIPPED | OUTPATIENT
Start: 2020-09-03 | End: 2021-02-11 | Stop reason: SDUPTHER

## 2020-09-03 RX ORDER — SIMVASTATIN 20 MG
20 TABLET ORAL NIGHTLY
Qty: 90 TABLET | Refills: 1 | Status: SHIPPED | OUTPATIENT
Start: 2020-09-03 | End: 2021-02-11 | Stop reason: SDUPTHER

## 2020-09-03 RX ORDER — FLUTICASONE PROPIONATE 110 UG/1
2 AEROSOL, METERED RESPIRATORY (INHALATION) 2 TIMES DAILY
Qty: 1 INHALER | Refills: 5 | Status: SHIPPED | OUTPATIENT
Start: 2020-09-03 | End: 2021-02-11 | Stop reason: SDUPTHER

## 2020-09-03 ASSESSMENT — ENCOUNTER SYMPTOMS
NAUSEA: 0
ABDOMINAL PAIN: 0
COUGH: 0
SHORTNESS OF BREATH: 0
BACK PAIN: 0

## 2020-09-03 NOTE — CARE COORDINATION
[facility] Transitions Follow Up Call    9/3/2020    Patient: Prosper Vuong  Patient : 1950   MRN: [id number]  Reason for Admission:  Chest pain  Discharge Date: 20 RARS: Readmission Risk Score: 12         Spoke with:   patient    Care Transitions Subsequent and Final Call    Subsequent and Final Calls  Have your medications changed?:  No  Do you have any questions related to your medications?:  No  Do you currently have any active services?:  No  Do you have any needs or concerns that I can assist you with?:  No  Identified Barriers:  None  Care Transitions Interventions  Other Interventions: Follow Up:  Spoke with patient. Reports that she is doing well. Denies CP, SOB, palpitations, dizziness or light headedness. Reviewed s/s to report to MD/91Daquan. Patient reports that this is the last day of her self-quarantine. Reports that she has an appointment with her PCP this afternoon and is getting ready at this time. Patient confirms that she has someone to take her to her appointment. Denies transportation barriers. Denies any questions, equipment or resource needs. Patient is agreeable to continued Care Transition. CTN contact information given. Encouraged call back if needs arise.       Future Appointments   Date Time Provider Chance Chiang   9/3/2020  1:30 PM Reji Chery [facility]   9/15/2020  1:50 PM Diego Hardy MD [address] [place] [address] [facility]   2020  1:15 PM Kay [facility] MED ONC [facility] MED ONC [place]   2020  1:30 PM Candido Hernandez MD [facility] MED ONC [facility]       Gio Kohler RN

## 2020-09-03 NOTE — PROGRESS NOTES
Nusrat Olivarez [facility]  1950  69 y.o.  female    Chief Complaint   Patient presents with    Follow-Up from Hospital         History of Present Illness  Kirill Jennings is a 69 y.o. female who presents today for a check up. Last seen 11/2019. She was in [address] for atypical chest pain - discharged 8/17. She was discharged on Protonix and Carafate, but she states she has not used. No current Cp or Sob. She will see cardiology soon. CXR- neg. Labs reviewed. NC anemia-chronic. +JAMIE and does not use Cpap. +Asthma, HLD, depression. Stable on chronic medications. Previous history of carcinoma of the colon and R breast cancer-continues to follow with Dr. Russell Michelle. Review of Systems   Constitutional: Negative for diaphoresis and fever. HENT: Negative. Respiratory: Negative for cough and shortness of breath. Cardiovascular: Negative for chest pain and palpitations. Gastrointestinal: Negative for abdominal pain, blood in stool, constipation, diarrhea and nausea. Genitourinary: Negative for difficulty urinating. Musculoskeletal: Negative for back pain. Neurological: Negative for dizziness and headaches. Psychiatric/Behavioral: Negative for dysphoric mood. Allergies   Allergen Reactions    Bactrim [Sulfamethoxazole-Trimethoprim] Anaphylaxis and Hives    Lipitor [Atorvastatin] Shortness Of Breath    Taxol [Paclitaxel] Anaphylaxis and Swelling    Aminoglycosides      Abstracted from [facility] patient chart.     Demerol Nausea Only    Neosporin [Bacitracin-Neomycin-Polymyxin] Rash and Other (See Comments)     hotness    Other Rash     Ivory Soap    Talc Other (See Comments)     blisters       Past Medical History:   Diagnosis Date    Anemia     Anxiety 1978    Arthritis     generalized    Asthma 2006    AV block     2nd degree per hospital in [id number]    Blood poisoning 1994    Blood transfusion     12/2003    CAD (coronary artery disease)     Cancer ([facility] 2007    skin (face) & colon(2003)/ Arterial Doppler ultrasound 07/01/2019    No hemodynamically significant or focal stenosis visualized bilaterally, bilateral lower extremity arteries exhibit diffuse plaque and multiphasic waveforms, unable to obtain bilateral ABIs due to elevated leg pressures >250 mmHg, possibly due to atherosclerosis    Hx of cardiovascular stress test 06/2013    EF 70% abn suggestive of anterolateral ischemia, possible RCA ischmeia, post left ventricular dilation suggestive multivessel CAD.  Hx of echocardiogram 06/2013    EF50%, mild MR and TR, mild pulmonary HTN, mild AS    Hx of fall     \"last time 2018- due to neuropathy, have to use cane\"    HX OTHER MEDICAL 7/24/13, 06/2013 7/13-No clinacally significant arrhythmia. 6/13-multiple cycles of wenckebach episodes in addtion to some sinus tach    Hyperlipidemia     Hypertension     Hypothermia 2008    Malignant neoplasm of breast (female) ([facility] 2012    Neuropathy     \"have neuropathy of my legs\"    Normocytic normochromic anemia     Obstructive sleep apnea 2008 01- Has CPAP machine but has not used in over a year - states she has not had problems since her pacemaker was placed.  Old MI (myocardial infarction)     per pt on 1/15/2019\"had heart attack about a year ago\"    Osteoarthritis     Osteopenia     Primary malignant neoplasm of colon ([facility]     S/P cardiac cath 06/2013    no significant CAD false postive stress test    Super obesity     Umbilical hernia        Past Surgical History:   Procedure Laterality Date    A-V CARDIAC PACEMAKER INSERTION  3/10/14     [employer]tr[employer].    Model:  [id number] Medtronic  Serial:  [id number] dual chamber pacemaker    APPENDECTOMY  2004    BREAST BIOPSY  2/13/12    BREAST LUMPECTOMY  2007    right     BREAST SURGERY  02/13/2012    rt lesion biopsy     CARDIAC CATHETERIZATION  2007 & 2011    COLECTOMY  2004    Colon cancer    COLONOSCOPY  10-18-13    polyp    COLONOSCOPY  1/19/2016    internal hemorrhoids, diverticulosis, 1.5 cm sessile polyp, 8 mm polyp found in rectum.  COLONOSCOPY  12/14/2017    1.5cm residual polyp, 5mm polyps in blind sigmoid loop x3, Sigmoid divertics, Internal grade 1 hemorrhoids    COLONOSCOPY N/A 1/16/2019    COLONOSCOPY W/ ENDOSCOPIC MUCOSAL RESECTION WITH ELEVIEW 5ML INJECTION AND POLYPECTOMY OF TIP OF BLIND RECTOSIGMOID STUMP AND CAUTERIZATION WITH ERBE PROBE, CLIPPING X2 performed by Naomi Feliciano MD at [facility]  01/21/2020    pan divertics/ 4 polyps/ sm int hem, S/P partial sigmoid resection w/ end to side anastamosis, repeat in 3 years    COLONOSCOPY N/A 1/21/2020    COLONOSCOPY POLYPECTOMY SNARE/COLD BIOPSY performed by Naomi Feliciano MD at [place] [address]  2003    back   [address] Bilateral 12/15/14    DENTAL SURGERY      front right tooth and root canal w/ brass bolt    DILATION AND CURETTAGE OF UTERUS  2006    Needed a camera to find my uterus.     ENDOSCOPY, COLON, DIAGNOSTIC  12/14/2017    Small hiatal hernia    HERNIA REPAIR  2004    Umb hernia    HERNIA REPAIR  2008    Inc hernia    LYMPH NODE DISSECTION  2/29/2012    Right axillary-3 sentinel nodes were positive out of 9.    MASTECTOMY, RADICAL Right 02/29/2012    w/ sentinal node disection-Dr West    PACEMAKER PLACEMENT      PRE-MALIGNANT / BENIGN SKIN LESION EXCISION  2/8/2013    right leg X2-Dr West    TONSILLECTOMY  1957    TUNNELED VENOUS PORT PLACEMENT  2004    TUNNELED VENOUS PORT PLACEMENT  02/13/2012    removal of mediport       Family History   Problem Relation Age of Onset    Arthritis Mother         OA, RA    High Cholesterol Mother     High Blood Pressure Mother     Cancer Father         skin and leukemia    High Blood Pressure Father     High Cholesterol Father     Diabetes Father     Heart Disease Father     Heart Disease Brother     High Cholesterol Brother     High Blood Pressure Brother     Heart Disease Brother     Seizures Son Social History     Tobacco Use    Smoking status: Never Smoker    Smokeless tobacco: Never Used   Substance Use Topics    Alcohol use: No     Comment:           CAFFEINE: 2- 12oz nona daily & 2 cups coffee some nights.  Drug use: No       Current Outpatient Medications   Medication Sig Dispense Refill    simvastatin (ZOCOR) 20 MG tablet Take 1 tablet by mouth nightly 90 tablet 1    sertraline (ZOLOFT) 100 MG tablet Take 2 tablets by mouth every morning 180 tablet 1    ezetimibe (ZETIA) 10 MG tablet Take 1 tablet by mouth every morning 90 tablet 1    fluticasone (FLOVENT HFA) 110 MCG/ACT inhaler Inhale 2 puffs into the lungs 2 times daily 1 Inhaler 5    albuterol sulfate HFA (PROAIR HFA) 108 (90 Base) MCG/ACT inhaler INHALE 2 PUFFS BY MOUTH EVERY SIX HOURS AS NEEDED FOR WHEEZING 1 Inhaler 2    vitamin B-12 (CYANOCOBALAMIN) 1000 MCG tablet Take 1,000 mcg by mouth daily      aspirin 81 MG EC tablet Take 81 mg by mouth nightly       letrozole (FEMARA) 2.5 MG tablet Take 2.5 mg by mouth nightly       Ferrous Sulfate (IRON) 325 (65 FE) MG TABS Take 1 tablet by mouth every morning       calcium-vitamin D (OSCAL 500/200 D-3) 500-200 MG-UNIT per tablet Take 1 tablet by mouth 2 times daily       pantoprazole (PROTONIX) 40 MG tablet Take 1 tablet by mouth every morning (before breakfast) (Patient not taking: Reported on 9/3/2020) 30 tablet 3     No current facility-administered medications for this visit. OBJECTIVE:    /68   Pulse 70   Temp 98.1 °F (36.7 °C)   Ht 5' 2\" (1.575 m)   Wt 270 lb (122.5 kg)   LMP 01/15/1999   SpO2 96%   BMI 49.38 kg/m²     Physical Exam  Vitals signs reviewed. Constitutional:       General: She is not in acute distress. Appearance: She is well-developed. Eyes:      Extraocular Movements: Extraocular movements intact. Conjunctiva/sclera: Conjunctivae normal.      Pupils: Pupils are equal, round, and reactive to light.    Neck: Musculoskeletal: Neck supple. Cardiovascular:      Rate and Rhythm: Normal rate and regular rhythm. Pulmonary:      Effort: Pulmonary effort is normal. No respiratory distress. Breath sounds: Normal breath sounds. Abdominal:      General: Bowel sounds are normal.      Palpations: Abdomen is soft. Tenderness: There is no abdominal tenderness. Musculoskeletal:      Right lower leg: Edema (trace) present. Left lower leg: Edema (trace) present. Neurological:      Mental Status: She is alert and oriented to person, place, and time. Cranial Nerves: No cranial nerve deficit. Gait: Gait abnormal (ambulates with walker). Psychiatric:         Mood and Affect: Mood normal.         ASSESSMENT:  1. Hyperlipidemia, unspecified hyperlipidemia type    2. Depression, unspecified depression type    3. Anemia, unspecified type    4. Mild intermittent asthma, unspecified whether complicated    5. Atypical chest pain    6. Long term use of drug        PLAN:    Orders Placed This Encounter   Procedures    BASIC METABOLIC PANEL       Orders Placed This Encounter   Medications    simvastatin (ZOCOR) 20 MG tablet     Sig: Take 1 tablet by mouth nightly     Dispense:  90 tablet     Refill:  1    sertraline (ZOLOFT) 100 MG tablet     Sig: Take 2 tablets by mouth every morning     Dispense:  180 tablet     Refill:  1    ezetimibe (ZETIA) 10 MG tablet     Sig: Take 1 tablet by mouth every morning     Dispense:  90 tablet     Refill:  1    fluticasone (FLOVENT HFA) 110 MCG/ACT inhaler     Sig: Inhale 2 puffs into the lungs 2 times daily     Dispense:  1 Inhaler     Refill:  5   Obtain lab  Continue medications  ADR's explained  JAMIE -not on Cpap  The patient is asked to make an attempt to improve diet and exercise patterns to aid in medical management of this problem. Keep f/u with cardiology and other specialists       Return in about 5 months (around 2/3/2021) for HLD.     Electronically Signed by Job Leyva Glenda Farley

## 2020-09-07 ASSESSMENT — ENCOUNTER SYMPTOMS
CONSTIPATION: 0
BLOOD IN STOOL: 0
DIARRHEA: 0

## 2020-09-14 ENCOUNTER — CARE COORDINATION (OUTPATIENT)
Dept: CASE MANAGEMENT | Age: 70
End: 2020-09-14

## 2020-09-14 NOTE — CARE COORDINATION
[facility] Transitions Follow Up Call    2020    Patient: Si William  Patient : 1950   MRN: [id number]  Reason for Admission:   Chest pain  Discharge Date: 20 RARS: Readmission Risk Score: 16       Follow Up: Attempted to reach patient for Care Transition follow up. No answer to phone. Message left with CTN contact information and request for call back.    Future Appointments   Date Time Provider Chance Chiang   10/1/2020  1:15 PM DAVINA Angel MED ONC [facility] MED ONC [place]   10/1/2020  1:30 PM Marleen Tomlinson MD [address] [facility] MED ONC [facility]   10/5/2020  1:20 PM AUSTIN Desai - CNP [facility]   2021  3:30 PM Jamie Gordillo [address] [facility]       Raymond Arteaga RN

## 2020-09-18 ENCOUNTER — CARE COORDINATION (OUTPATIENT)
Dept: CASE MANAGEMENT | Age: 70
End: 2020-09-18

## 2020-09-28 ENCOUNTER — VIRTUAL VISIT (OUTPATIENT)
Dept: INTERNAL MEDICINE CLINIC | Age: 70
End: 2020-09-28
Payer: MEDICARE

## 2020-09-28 VITALS — WEIGHT: 270 LBS | HEIGHT: 62 IN | BODY MASS INDEX: 49.69 KG/M2

## 2020-09-28 PROCEDURE — G0438 PPPS, INITIAL VISIT: HCPCS | Performed by: FAMILY MEDICINE

## 2020-09-28 ASSESSMENT — PATIENT HEALTH QUESTIONNAIRE - PHQ9
2. FEELING DOWN, DEPRESSED OR HOPELESS: 1
SUM OF ALL RESPONSES TO PHQ9 QUESTIONS 1 & 2: 2
1. LITTLE INTEREST OR PLEASURE IN DOING THINGS: 1
SUM OF ALL RESPONSES TO PHQ QUESTIONS 1-9: 2
SUM OF ALL RESPONSES TO PHQ QUESTIONS 1-9: 2

## 2020-09-28 ASSESSMENT — LIFESTYLE VARIABLES: HOW OFTEN DO YOU HAVE A DRINK CONTAINING ALCOHOL: 0

## 2020-09-28 NOTE — PROGRESS NOTES
Medicare Annual Wellness Visit  Name: Alannah Garcia Date: 2020   MRN: [id number] Sex: Female   Age: 69 y.o. Ethnicity: Non-/Non    : 1950 Race: White      Nusrat Carpenter is here for Medicare AWV    Screenings for behavioral, psychosocial and functional/safety risks, and cognitive dysfunction are all negative except as indicated below. These results, as well as other patient data from the [address] form, are documented in Flowsheets linked to this Encounter. Allergies   Allergen Reactions    Bactrim [Sulfamethoxazole-Trimethoprim] Anaphylaxis and Hives    Lipitor [Atorvastatin] Shortness Of Breath    Taxol [Paclitaxel] Anaphylaxis and Swelling    Aminoglycosides      Abstracted from [facility] patient chart.  Demerol Nausea Only    Neosporin [Bacitracin-Neomycin-Polymyxin] Rash and Other (See Comments)     hotness    Other Rash     Ivory Soap    Talc Other (See Comments)     blisters       Prior to Visit Medications    Medication Sig Taking?  Authorizing Provider   simvastatin (ZOCOR) 20 MG tablet Take 1 tablet by mouth nightly Yes Claudeen Armour, DO   sertraline (ZOLOFT) 100 MG tablet Take 2 tablets by mouth every morning Yes Claudeen Armour, DO   ezetimibe (ZETIA) 10 MG tablet Take 1 tablet by mouth every morning Yes Claudeen Armour, DO   fluticasone (FLOVENT HFA) 110 MCG/ACT inhaler Inhale 2 puffs into the lungs 2 times daily Yes Claudeen Armour, DO   albuterol sulfate HFA (PROAIR HFA) 108 (90 Base) MCG/ACT inhaler INHALE 2 PUFFS BY MOUTH EVERY SIX HOURS AS NEEDED FOR WHEEZING Yes Claudeen Armour, DO   vitamin B-12 (CYANOCOBALAMIN) 1000 MCG tablet Take 1,000 mcg by mouth daily Yes Historical Provider, MD   aspirin 81 MG EC tablet Take 81 mg by mouth nightly  Yes Historical Provider, MD   letrozole (FEMARA) 2.5 MG tablet Take 2.5 mg by mouth nightly  Yes Historical Provider, MD   calcium-vitamin D (OSCAL 500/200 D-3) 500-200 MG-UNIT per tablet Take 1 tablet by mouth 2 times daily  Yes Historical Provider, MD   pantoprazole (PROTONIX) 40 MG tablet Take 1 tablet by mouth every morning (before breakfast)  Patient not taking: Reported on 9/3/2020  Esperanza Ortiz MD   Ferrous Sulfate (IRON) 325 (65 FE) MG TABS Take 1 tablet by mouth every morning   Historical Provider, MD       Past Medical History:   Diagnosis Date    Anemia     Anxiety 1978    Arthritis     generalized    Asthma 2006    AV block     2nd degree per hospital in june2013    Blood poisoning 1994    Blood transfusion     12/2003    CAD (coronary artery disease)     Cancer ([facility] [address] 2007    skin (face) & colon(2003)/ 2/2012 dx of right breast ca(pc)    Carcinoma of breast ([facility] [address] 2/29/2012    right arm no b/p or sticks    Cardiac pacemaker 3/10/14    Medtronic dual lead    Chronic cystitis     Chronic respiratory failure (HCC)     2 L/min oxygen at night-time    Colon cancer ([facility] [address] 2013    COPD (chronic obstructive pulmonary disease) ([facility]     CTS (carpal tunnel syndrome)     Depression     Diverticulitis     H/O 24 hour EKG monitoring 2/28/2014 7/24/13 2/14 complete heart block 7/13No clinically significant arrthymia noted.  H/O cardiac catheterization 10/31/2011, 7/11/2007    10/31/2011-No significant CAD. Cardiolite stress test was false positive-Dr Celso Jimenez; 7/11/2007-No CAD, global function intact;    H/O cardiovascular stress test 10/20/2011, 3/23/2010    10/20/2011-Lexiscan- Evid of mild ischemia in Left CX region. EF 70%. Global LVSF normal;    H/O cardiovascular stress test 1/7/15    EF 77%.    Normal Stress Test.    H/O chest pain 4/2005    sees Dr Brenda Casanova H/O Doppler ultrasound 2/20/2008 2/20/2008-CAROTID DOPPLER- Normal carotids bilaterally;    H/O Doppler ultrasound 06/06/13    CAROTID DOPPLER- there is heterogeneous, irregular atherosclerotic plaque noted in the right internal carotid artery,  doppler flow velocities within the right internal carotid artery are elevated, consistent with a mild, less than 50%stenosis, there is intimal thickening but no significant atherosclerotic plaque noted in the left internal carotid artery, the left carotid are within normal limits    H/O echocardiogram 4/9/2012, 10/20/2011    4/9/2012-LVSF normal EF=>55%. impaired LV relaxation;    H/O echocardiogram 12/31/14    EF 50-55%. LV shows mild concentric hypertrophy. Mildly dilated left atrium. Mildly dilated right ventricle. Sclerotic but not stenotic aortic valve. Mitral annular calcification. Mild MR, Mild TR, Mild pulm HTN.    H/O urinary incontinence     History of blood transfusion     Hx of Arterial Doppler ultrasound 07/01/2019    No hemodynamically significant or focal stenosis visualized bilaterally, bilateral lower extremity arteries exhibit diffuse plaque and multiphasic waveforms, unable to obtain bilateral ABIs due to elevated leg pressures >250 mmHg, possibly due to atherosclerosis    Hx of cardiovascular stress test 06/2013    EF 70% abn suggestive of anterolateral ischemia, possible RCA ischmeia, post left ventricular dilation suggestive multivessel CAD.  Hx of echocardiogram 06/2013    EF50%, mild MR and TR, mild pulmonary HTN, mild AS    Hx of fall     \"last time 2018- due to neuropathy, have to use cane\"    HX OTHER MEDICAL 7/24/13, 06/2013 7/13-No clinacally significant arrhythmia. 6/13-multiple cycles of wenckebach episodes in addtion to some sinus tach    Hyperlipidemia     Hypertension     Hypothermia 2008    Malignant neoplasm of breast (female) [facility] 2012    Neuropathy     \"have neuropathy of my legs\"    Normocytic normochromic anemia     Obstructive sleep apnea 2008 01- Has CPAP machine but has not used in over a year - states she has not had problems since her pacemaker was placed.     Old MI (myocardial infarction)     per pt on 1/15/2019\"had heart attack about a year ago\"    Osteoarthritis     Osteopenia     Primary malignant neoplasm of colon ([address]  S/P cardiac cath 06/2013    no significant CAD false postive stress test    Super obesity     Umbilical hernia        Past Surgical History:   Procedure Laterality Date    A-V CARDIAC PACEMAKER INSERTION  3/10/14     Medtronic. Model:  [id number] Medtronic  Serial:  [id number] dual chamber pacemaker    APPENDECTOMY  2004    BREAST BIOPSY  2/13/12    BREAST LUMPECTOMY  2007    right     BREAST SURGERY  02/13/2012    rt lesion biopsy     CARDIAC CATHETERIZATION  2007 & 2011    COLECTOMY  2004    Colon cancer    COLONOSCOPY  10-18-13    polyp    COLONOSCOPY  1/19/2016    internal hemorrhoids, diverticulosis, 1.5 cm sessile polyp, 8 mm polyp found in rectum.  COLONOSCOPY  12/14/2017    1.5cm residual polyp, 5mm polyps in blind sigmoid loop x3, Sigmoid divertics, Internal grade 1 hemorrhoids    COLONOSCOPY N/A 1/16/2019    COLONOSCOPY W/ ENDOSCOPIC MUCOSAL RESECTION WITH ELEVIEW 5ML INJECTION AND POLYPECTOMY OF TIP OF BLIND RECTOSIGMOID STUMP AND CAUTERIZATION WITH ERBE PROBE, CLIPPING X2 performed by Ann Marie Velasco MD at [place] [address]  01/21/2020    pan divertics/ 4 polyps/ sm int hem, S/P partial sigmoid resection w/ end to side anastamosis, repeat in 3 years    COLONOSCOPY N/A 1/21/2020    COLONOSCOPY POLYPECTOMY SNARE/COLD BIOPSY performed by Ann Marie Velasco MD at [place] [address]  2003    back   [address] Bilateral 12/15/14    DENTAL SURGERY      front right tooth and root canal w/ brass bolt    DILATION AND CURETTAGE OF UTERUS  2006    Needed a camera to find my uterus.     ENDOSCOPY, COLON, DIAGNOSTIC  12/14/2017    Small hiatal hernia    HERNIA REPAIR  2004    Umb hernia    HERNIA REPAIR  2008    Inc hernia    LYMPH NODE DISSECTION  2/29/2012    Right axillary-3 sentinel nodes were positive out of 9.    MASTECTOMY, RADICAL Right 02/29/2012    w/ sentinal node disection-Dr West    PACEMAKER PLACEMENT      PRE-MALIGNANT / BENIGN SKIN LESION EXCISION 2/8/2013    right leg X2-Dr West    TONSILLECTOMY  1957    TUNNELED VENOUS PORT PLACEMENT  2004    TUNNELED VENOUS PORT PLACEMENT  02/13/2012    removal of mediport       Family History   Problem Relation Age of Onset    Arthritis Mother         OA, RA    High Cholesterol Mother     High Blood Pressure Mother     Cancer Father         skin and leukemia    High Blood Pressure Father     High Cholesterol Father     Diabetes Father     Heart Disease Father     Heart Disease Brother     High Cholesterol Brother     High Blood Pressure Brother     Heart Disease Brother     Seizures Son        CareTeam (Including outside providers/suppliers regularly involved in providing care):   Patient Care Team:  Kalpesh Bellamy DO as PCP - General (Family Medicine)  Kalpesh Bellamy DO as PCP [facility]  Diego Hardy MD as Consulting Physician (Cardiology)  Candido Hernandez MD as Consulting Physician (Hematology and Oncology)  Vinny Rangel MD as Surgeon (General Surgery)  Teresa Lezama MD as Consulting Physician (Gastroenterology)  Elizabeth Suarez MD as Consulting Physician (Neurology)  Artur Unger MD as Consulting Physician (Pulmonary Disease)  Jenn Sears MD as Surgeon (Orthopedic Surgery)  [facility] (Urology)    Wt Readings from Last 3 Encounters:   09/28/20 270 lb (122.5 kg)   09/03/20 270 lb (122.5 kg)   08/17/20 278 lb 11.2 oz (126.4 kg)     Vitals:    09/28/20 1442   Weight: 270 lb (122.5 kg)   Height: 5' 2\" (1.575 m)     Body mass index is 49.38 kg/m². Based upon direct observation of the patient, evaluation of cognition reveals recent and remote memory intact. Patient's complete Health Risk Assessment and screening values have been reviewed and are found in Flowsheets. The following problems were reviewed today and where indicated follow up appointments were made and/or referrals ordered.     Positive Risk Factor Screenings with Interventions:     Fall something to increase her arm strength. I suggested light weights, but, she stated she has neuropathy in her hands  · Patient stated she has lost a few pounds as she has changed her eating habits and choosing more nutritious foods        Safety:  Safety  Do you have working smoke detectors?: Yes  Have all throw rugs been removed or fastened?: (!) No  Do you have non-slip mats or surfaces in all bathtubs/showers?: Yes  Do all of your stairways have a railing or banister?: Yes  Safety Interventions:  · Home safety tips provided verbally, strongly encouraging patient to remove throw rugs. Patient stated she doesn't walk where they are placed    ADL:  ADLs  In the past 7 days, did you need help from others to perform any of the following everyday activities? Eating, dressing, grooming, bathing, toileting, or walking/balance?: (!) Walking/Balance  In the past 7 days, did you need help from others to take care of any of the following? Laundry, housekeeping, banking/finances, shopping, telephone use, food preparation, transportation, or taking medications?: [employer], Housekeeping, Banking/Finances, Shopping, Food Preparation, Transportation  ADL Interventions:  · Patient declines any further evaluation/treatment for this issue of walking, as she stated that her  helps her when she needs it.  Patient stated that her  also takes care of ADLs    Personalized Preventive Plan   Current Health Maintenance Status  Immunization History   Administered Date(s) Administered    Tdap (Boostrix, Adacel) 07/13/2017        Health Maintenance   Topic Date Due    Shingles Vaccine (1 of 2) 04/01/1999    Annual Wellness Visit (AWV)  05/29/2019    Flu vaccine (1) 09/01/2020    Pneumococcal 65+ years Vaccine (1 of 1 - PPSV23) 07/02/2021 (Originally 4/1/2014)    Colon cancer screen colonoscopy  01/21/2021    Lipid screen  02/11/2021    Breast cancer screen  02/11/2021    DTaP/Tdap/Td vaccine (2 - Td) 07/13/2027    AVA (modify frequency per FRAX score)  Completed    Hepatitis C screen  Completed    Hepatitis A vaccine  Aged Out    Hepatitis B vaccine  Aged Out    Hib vaccine  Aged Out    Meningococcal (ACWY) vaccine  Aged Out     Recommendations for [employer] Due: see orders and patient instructions/AVS.    Unable to obtain 3 vital signs due to patient not having equipment to take temperature/BP. Recommended screening schedule for the next 5-10 years is provided to the patient in written form: see Patient Instructions/AVS.    Louisa William is a 69 y.o. female being evaluated by a Virtual Visit (audio visit) encounter to address concerns as mentioned above. A caregiver was present when appropriate. Due to this being a TeleHealth encounter (During [facility] public health emergency), evaluation of the following organ systems was limited: Vitals/Constitutional/EENT/Resp/CV/GI//MS/Neuro/Skin/Heme-Lymph-Imm. Pursuant to the emergency declaration under the [address] [place] [address] authority and the [employer] Act, this Virtual Visit was conducted with patient's (and/or legal guardian's) consent, to reduce the patient's risk of exposure to COVID-19 and provide necessary medical care. The patient (and/or legal guardian) has also been advised to contact this office for worsening conditions or problems, and seek emergency medical treatment and/or call 911 if deemed necessary. Patient identification was verified at the start of the visit: Yes    Total time spent for this encounter: Not billed by time    Services were provided through a audio synchronous discussion virtually to substitute for in-person clinic visit. Patient and provider were located at their individual homes. --Анна Hills on 9/28/2020 at 2:52 PM    An electronic signature was used to authenticate this note.       Oksana Bella, 9/28/2020, performed the documented evaluation under the direct supervision of the attending physician.

## 2020-10-05 ENCOUNTER — OFFICE VISIT (OUTPATIENT)
Dept: CARDIOLOGY CLINIC | Age: 70
End: 2020-10-05
Payer: MEDICARE

## 2020-10-05 ENCOUNTER — HOSPITAL ENCOUNTER (OUTPATIENT)
Age: 70
Discharge: HOME | End: 2020-10-05
Payer: MEDICARE

## 2020-10-05 VITALS
DIASTOLIC BLOOD PRESSURE: 64 MMHG | HEIGHT: 62 IN | SYSTOLIC BLOOD PRESSURE: 124 MMHG | WEIGHT: 276.6 LBS | HEART RATE: 84 BPM | BODY MASS INDEX: 50.9 KG/M2 | RESPIRATION RATE: 20 BRPM

## 2020-10-05 LAB
ANION GAP SERPL CALCULATED.3IONS-SCNC: 9 MMOL/L (ref 4–16)
BUN BLDV-MCNC: 13 MG/DL (ref 6–23)
CALCIUM SERPL-MCNC: 9.6 MG/DL (ref 8.3–10.6)
CHLORIDE BLD-SCNC: 101 MMOL/L (ref 99–110)
CO2: 31 MMOL/L (ref 21–32)
CREAT SERPL-MCNC: 0.9 MG/DL (ref 0.6–1.1)
GFR AFRICAN AMERICAN: >60 ML/MIN/1.73M2
GFR NON-AFRICAN AMERICAN: >60 ML/MIN/1.73M2
GLUCOSE BLD-MCNC: 113 MG/DL (ref 70–99)
POTASSIUM SERPL-SCNC: 4.1 MMOL/L (ref 3.5–5.1)
SODIUM BLD-SCNC: 141 MMOL/L (ref 135–145)

## 2020-10-05 PROCEDURE — 80048 BASIC METABOLIC PNL TOTAL CA: CPT

## 2020-10-05 PROCEDURE — 36415 COLL VENOUS BLD VENIPUNCTURE: CPT

## 2020-10-05 PROCEDURE — 99213 OFFICE O/P EST LOW 20 MIN: CPT | Performed by: NURSE PRACTITIONER

## 2020-10-05 ASSESSMENT — ENCOUNTER SYMPTOMS
POOR WOUND HEALING: 0
ABDOMINAL PAIN: 0
SNORING: 1
COLOR CHANGE: 0
SHORTNESS OF BREATH: 1
EYE PAIN: 0
HOARSE VOICE: 0

## 2020-10-05 NOTE — PROGRESS NOTES
[facility] ([facility]  [address], [address]  Phone: [phone]    Fax [phone]                  Cole Saxena MD, Jacki Mederos MD, [address] [facility], MD, Stephan Crooked, MD Lyndal Schlatter, MD Avelino Luria, MD Blanchie Quinones, APRN               Ced Lou, APRROSIE Langeal, APRN              Lizandro Kraft, APRN            10/5/2020    RE: Lisset Avila  (1950)                               TO:  Dr. Juan Chao,   The primary cardiologist is Dr. Kale Blackwell:   1. Cardiac pacemaker    2. Precordial pain    3. Hyperlipidemia, unspecified hyperlipidemia type    4. Essential hypertension         HPI:    Thank you for involving me in taking care of your patient Lisset Avila. Lisset Avila is a 69y.o. year old female with a history as listed above and is being seen in the office today. She was seen in the hospital in August 2020 with chest pain. Troponins were negative x3. Left heart catheterization June 2019 showed no significant CAD. Lisset Avila reports that she is feeling fair . There is occasional  chest pain. Comes and goes lasting for a second or 2. She notes that she has shortness of breath with exertion. She denies any palpitations or lightheadedness. She does have a pacemaker to left chest wall which does move around. She is doing [facility] at home.   He is using a walker today for balance      Current Outpatient Medications   Medication Sig Dispense Refill    simvastatin (ZOCOR) 20 MG tablet Take 1 tablet by mouth nightly 90 tablet 1    sertraline (ZOLOFT) 100 MG tablet Take 2 tablets by mouth every morning 180 tablet 1    ezetimibe (ZETIA) 10 MG tablet Take 1 tablet by mouth every morning 90 tablet 1    fluticasone (FLOVENT HFA) 110 MCG/ACT inhaler Inhale 2 puffs into the lungs 2 times daily 1 Inhaler 5    pantoprazole (PROTONIX) 40 MG tablet Take 1 tablet by mouth every morning (before breakfast) 30 tablet 3    albuterol sulfate HFA (PROAIR HFA) 108 (90 Base) MCG/ACT inhaler INHALE 2 PUFFS BY MOUTH EVERY SIX HOURS AS NEEDED FOR WHEEZING 1 Inhaler 2    vitamin B-12 (CYANOCOBALAMIN) 1000 MCG tablet Take 1,000 mcg by mouth daily      aspirin 81 MG EC tablet Take 81 mg by mouth nightly       letrozole (FEMARA) 2.5 MG tablet Take 2.5 mg by mouth nightly       Ferrous Sulfate (IRON) 325 (65 FE) MG TABS Take 1 tablet by mouth every morning       calcium-vitamin D (OSCAL 500/200 D-3) 500-200 MG-UNIT per tablet Take 1 tablet by mouth 2 times daily        No current facility-administered medications for this visit. Allergies: Bactrim [sulfamethoxazole-trimethoprim]; Lipitor [atorvastatin]; Taxol [paclitaxel]; Aminoglycosides; Demerol; Neosporin [bacitracin-neomycin-polymyxin]; Other; and Talc  Past Medical History:   Diagnosis Date    Anemia     Anxiety 1978    Arthritis     generalized    Asthma 2006    AV block     2nd degree per hospital in june2013    Blood poisoning 1994    Blood transfusion     12/2003    CAD (coronary artery disease)     Cancer ([facility] 2007    skin (face) & colon(2003)/ 2/2012 dx of right breast ca(pc)    Carcinoma of breast ([facility] 2/29/2012    right arm no b/p or sticks    Cardiac pacemaker 3/10/14    Medtronic dual lead    Chronic cystitis     Chronic respiratory failure (HCC)     2 L/min oxygen at night-time    Colon cancer ([facility] 2013    COPD (chronic obstructive pulmonary disease) (HCC)     CTS (carpal tunnel syndrome)     Depression     Diverticulitis     H/O 24 hour EKG monitoring 2/28/2014 7/24/13 2/14 complete heart block 7/13No clinically significant arrthymia noted.  H/O cardiac catheterization 10/31/2011, 7/11/2007    10/31/2011-No significant CAD.  Cardiolite stress test was false positive-Dr Senait Munguia; 7/11/2007-No CAD, global function intact;    H/O cardiovascular stress test 10/20/2011, 3/23/2010    10/20/2011-Lexiscan- Evid of mild ischemia in Left CX region. EF 70%. Global LVSF normal;    H/O cardiovascular stress test 1/7/15    EF 77%. Normal Stress Test.    H/O chest pain 4/2005    sees Dr Lillian Andrade H/O Doppler ultrasound 2/20/2008 2/20/2008-CAROTID DOPPLER- Normal carotids bilaterally;    H/O Doppler ultrasound 06/06/13    CAROTID DOPPLER- there is heterogeneous, irregular atherosclerotic plaque noted in the right internal carotid artery,  doppler flow velocities within the right internal carotid artery are elevated, consistent with a mild, less than 50%stenosis, there is intimal thickening but no significant atherosclerotic plaque noted in the left internal carotid artery, the left carotid are within normal limits    H/O echocardiogram 4/9/2012, 10/20/2011    4/9/2012-LVSF normal EF=>55%. impaired LV relaxation;    H/O echocardiogram 12/31/14    EF 50-55%. LV shows mild concentric hypertrophy. Mildly dilated left atrium. Mildly dilated right ventricle. Sclerotic but not stenotic aortic valve. Mitral annular calcification. Mild MR, Mild TR, Mild pulm HTN.    H/O urinary incontinence     History of blood transfusion     Hx of Arterial Doppler ultrasound 07/01/2019    No hemodynamically significant or focal stenosis visualized bilaterally, bilateral lower extremity arteries exhibit diffuse plaque and multiphasic waveforms, unable to obtain bilateral ABIs due to elevated leg pressures >250 mmHg, possibly due to atherosclerosis    Hx of cardiovascular stress test 06/2013    EF 70% abn suggestive of anterolateral ischemia, possible RCA ischmeia, post left ventricular dilation suggestive multivessel CAD.  Hx of echocardiogram 06/2013    EF50%, mild MR and TR, mild pulmonary HTN, mild AS    Hx of fall     \"last time 2018- due to neuropathy, have to use cane\"    HX OTHER MEDICAL 7/24/13, 06/2013 7/13-No clinacally significant arrhythmia.  6/13-multiple cycles of wenckebach episodes in addtion to some sinus tach    Hyperlipidemia     Hypertension     Hypothermia 2008    Malignant ne[facility] of breast (female) [facility]) 2012    Neuropathy     \"have neuropathy of my legs\"    Normocytic normochromic anemia     Obstructive sleep apnea 2008 01- Has CPAP machine but has not used in over a year - states she has not had problems since her pacemaker was placed.  Old MI (myocardial infarction)     per pt on 1/15/2019\"had heart attack about a year ago\"    Osteoarthritis     Osteopenia     [facility] colon ([facility])     S/P cardiac cath 06/2013    no significant CAD false postive stress test    Super obesity     Umbilical hernia      Past Surgical History:   Procedure Laterality Date    A-V CARDIAC PACEMAKER INSERTION  3/10/14     Medtronic. Model:  [id number] Medtronic  Serial:  [id number] dual chamber pacemaker    APPENDECTOMY  2004    BREAST BIOPSY  2/13/12    BREAST LUMPECTOMY  2007    right     BREAST SURGERY  02/13/2012    rt lesion biopsy     CARDIAC CATHETERIZATION  2007 & 2011    COLECTOMY  2004    Colon cancer    COLONOSCOPY  10-18-13    polyp    COLONOSCOPY  1/19/2016    internal hemorrhoids, diverticulosis, 1.5 cm sessile polyp, 8 mm polyp found in rectum.     COLONOSCOPY  12/14/2017    1.5cm residual polyp, 5mm polyps in blind sigmoid loop x3, Sigmoid divertics, Internal grade 1 hemorrhoids    COLONOSCOPY N/A 1/16/2019    COLONOSCOPY W/ ENDOSCOPIC MUCOSAL RESECTION WITH ELEVIEW 5ML INJECTION AND POLYPECTOMY OF TIP OF BLIND RECTOSIGMOID STUMP AND CAUTERIZATION WITH ERBE PROBE, CLIPPING X2 performed by Jose Enrique Gonsalez MD at [facility]  01/21/2020    pan divertics/ 4 polyps/ sm int hem, S/P partial sigmoid resection w/ end to side anastamosis, repeat in 3 years    COLONOSCOPY N/A 1/21/2020    COLONOSCOPY POLYPECTOMY SNARE/COLD BIOPSY performed by Jose Enrique Gonsalez MD at [facility]  2003    back    CYSTOSCOPY Bilateral 12/15/14   [id number] [facility] front right tooth and root canal w/ brass bolt    DILATION AND CURETTAGE OF UTERUS  2006    Needed a camera to find my uterus.  ENDOSCOPY, COLON, DIAGNOSTIC  12/14/2017    Small hiatal hernia    HERNIA REPAIR  2004    Umb hernia    HERNIA REPAIR  2008    Inc hernia    LYMPH NODE DISSECTION  2/29/2012    Right axillary-3 sentinel nodes were positive out of 9.    MASTECTOMY, RADICAL Right 02/29/2012    w/ sentinal node disection-Dr West    PACEMAKER PLACEMENT      PRE-MALIGNANT / BENIGN SKIN LESION EXCISION  2/8/2013    right leg X2-Dr West    TONSILLECTOMY  1957    TUNNELED VENOUS PORT PLACEMENT  2004    TUNNELED VENOUS PORT PLACEMENT  02/13/2012    removal of mediport     Family History   Problem Relation Age of Onset    Arthritis Mother         OA, RA    High Cholesterol Mother     High Blood Pressure Mother     Cancer Father         skin and leukemia    High Blood Pressure Father     High Cholesterol Father     Diabetes Father     Heart Disease Father     Heart Disease Brother     High Cholesterol Brother     High Blood Pressure Brother     Heart Disease Brother     Seizures Son      Social History     Tobacco Use    Smoking status: Never Smoker    Smokeless tobacco: Never Used   Substance Use Topics    Alcohol use: No     Comment:           CAFFEINE: 2- 12oz nona daily & 2 cups coffee some nights. Review of Systems   Constitution: Negative for diaphoresis and malaise/fatigue. HENT: Negative for congestion and hoarse voice. Eyes: Negative for pain and visual disturbance. Cardiovascular: Positive for chest pain, dyspnea on exertion and leg swelling. Negative for irregular heartbeat, near-syncope, palpitations and paroxysmal nocturnal dyspnea. Respiratory: Positive for shortness of breath and snoring. Endocrine: Negative for cold intolerance and heat intolerance. Hematologic/Lymphatic: Negative for adenopathy and bleeding problem.    Skin: Negative for color change and poor wound healing. Musculoskeletal: Positive for arthritis and muscle weakness. Gastrointestinal: Negative for abdominal pain and melena. Genitourinary: Negative for flank pain and hematuria. Neurological: Positive for dizziness (with fast postion change). Negative for light-headedness. Psychiatric/Behavioral: Negative for depression. The patient is not nervous/anxious. Objective:      Physical Exam:  /64 (Site: Left Upper Arm, Position: Sitting, Cuff Size: Large Adult)   Pulse 84   Resp 20   Ht 5' 2\" (1.575 m)   Wt 276 lb 9.6 oz (125.5 kg)   LMP 01/15/1999   BMI 50.59 kg/m²   Wt Readings from Last 3 Encounters:   10/05/20 276 lb 9.6 oz (125.5 kg)   09/28/20 270 lb (122.5 kg)   09/03/20 270 lb (122.5 kg)     Body mass index is 50.59 kg/m². Physical Exam  Constitutional:       Appearance: She is obese. HENT:      Head: Normocephalic and atraumatic. Right Ear: External ear normal.      Left Ear: External ear normal.      Nose: Nose normal.      Mouth/Throat:      Mouth: Mucous membranes are moist.   Eyes:      Conjunctiva/sclera: Conjunctivae normal.      Pupils: Pupils are equal, round, and reactive to light. Neck:      Musculoskeletal: Normal range of motion and neck supple. Cardiovascular:      Rate and Rhythm: Normal rate and regular rhythm. Pulses: Normal pulses. Heart sounds: Normal heart sounds. Pulmonary:      Effort: Pulmonary effort is normal.      Breath sounds: Normal breath sounds. No rales. Chest:      Chest wall: No tenderness. Abdominal:      General: There is no distension. Palpations: Abdomen is soft. Tenderness: There is no abdominal tenderness. Comments: Large round   Musculoskeletal:         General: No tenderness. Right lower leg: Edema (tace) present. Left lower leg: Edema (trace) present. Skin:     General: Skin is warm and dry.       Capillary Refill: Capillary refill takes less than 2 seconds. Neurological:      General: No focal deficit present. Mental Status: She is alert and oriented to person, place, and time. Psychiatric:         Mood and Affect: Mood normal.         Behavior: Behavior normal.             DATA  BNP:   Lab Results   Component Value Date    BNP 16 06/26/2013    PROBNP 210.2 08/17/2020     CBC:   Lab Results   Component Value Date    WBC 10.5 08/17/2020    RBC 4.10 08/17/2020    HGB 11.8 08/17/2020    HCT 36.5 08/17/2020     08/17/2020     CMP:    Lab Results   Component Value Date     08/17/2020    K 3.7 08/17/2020     08/17/2020    CO2 25 08/17/2020    BUN 13 08/17/2020    CREATININE 1.2 08/17/2020    GFRAA 54 08/17/2020    LABGLOM 44 08/17/2020    GLUCOSE 106 08/17/2020    CALCIUM 9.3 08/17/2020     Hepatic Function Panel:    Lab Results   Component Value Date    ALKPHOS 112 08/17/2020    ALT 18 08/17/2020    AST 19 08/17/2020    PROT 6.9 08/17/2020    PROT 6.6 10/17/2012    BILITOT 0.2 08/17/2020    BILIDIR 0.2 07/24/2015    IBILI 0.1 07/24/2015    LABALBU 3.7 08/17/2020     Magnesium:    Lab Results   Component Value Date    MG 1.9 07/29/2015     PT/INR:    Lab Results   Component Value Date    PROTIME 11.5 07/28/2018    PROTIME 11.2 10/28/2011    INR 1.01 07/28/2018     Lipids:    Lab Results   Component Value Date    TRIG 138 10/16/2018    HDL 57 02/11/2020    LDLDIRECT 102 02/11/2020     Lab Results   Component Value Date    LABA1C 5.4 10/16/2018    LABA1C 5.4 11/28/2017     TSH:  No results found for: TSH      Assessment/ Plan:    Patient seen, interviewed and examined. Testing was reviewed. 1. Cardiac pacemaker  Care link reviewed from 08/2220  Stable parameters   her pacemaker does change positions. Have advised patient not to do with pacemaker. I have encouraged patient not to lip pacemaker as well  I did asked patient if she wanted to see Dr. Alanna Duron for possible pocket revision and she is declined    2.  Precordial pain  Non cardiac pain  Atypical  Lasting few seconds  Cath 2019 no sig disease      3. Hyperlipidemia, unspecified hyperlipidemia type  At goal   Continue present medications       4. Essential hypertension  Blood pressure is controlled           Lifestyle and risk factor modificatons discussed. Various goals are discussed and questions answered. Continue current medications. Appropriate prescriptions are addressed. Questions answered and patient verbalizes understanding.    Office visit in 1 year

## 2020-10-20 ENCOUNTER — HOSPITAL ENCOUNTER (OUTPATIENT)
Dept: INFUSION THERAPY | Age: 70
Discharge: HOME | End: 2020-10-20
Payer: MEDICARE

## 2020-10-20 ENCOUNTER — OFFICE VISIT (OUTPATIENT)
Dept: ONCOLOGY | Age: 70
End: 2020-10-20
Payer: MEDICARE

## 2020-10-20 VITALS
RESPIRATION RATE: 18 BRPM | HEIGHT: 62 IN | SYSTOLIC BLOOD PRESSURE: 148 MMHG | HEART RATE: 85 BPM | TEMPERATURE: 97.1 F | OXYGEN SATURATION: 95 % | BODY MASS INDEX: 49.32 KG/M2 | DIASTOLIC BLOOD PRESSURE: 67 MMHG | WEIGHT: 268 LBS

## 2020-10-20 DIAGNOSIS — C50.411 MALIGNANT NEOPLASM OF UPPER-OUTER QUADRANT OF RIGHT BREAST IN FEMALE, ESTROGEN RECEPTOR POSITIVE (HCC): ICD-10-CM

## 2020-10-20 DIAGNOSIS — Z17.0 MALIGNANT NEOPLASM OF UPPER-OUTER QUADRANT OF RIGHT BREAST IN FEMALE, ESTROGEN RECEPTOR POSITIVE (HCC): ICD-10-CM

## 2020-10-20 LAB
ALBUMIN SERPL-MCNC: 4.1 GM/DL (ref 3.4–5)
ALP BLD-CCNC: 114 IU/L (ref 40–128)
ALT SERPL-CCNC: 14 U/L (ref 10–40)
ANION GAP SERPL CALCULATED.3IONS-SCNC: 13 MMOL/L (ref 4–16)
AST SERPL-CCNC: 17 IU/L (ref 15–37)
BASOPHILS ABSOLUTE: 0 K/CU MM
BASOPHILS RELATIVE PERCENT: 0.3 % (ref 0–1)
BILIRUB SERPL-MCNC: 0.3 MG/DL (ref 0–1)
BUN BLDV-MCNC: 13 MG/DL (ref 6–23)
CALCIUM SERPL-MCNC: 9.9 MG/DL (ref 8.3–10.6)
CHLORIDE BLD-SCNC: 97 MMOL/L (ref 99–110)
CO2: 27 MMOL/L (ref 21–32)
CREAT SERPL-MCNC: 0.9 MG/DL (ref 0.6–1.1)
DIFFERENTIAL TYPE: ABNORMAL
EOSINOPHILS ABSOLUTE: 0.3 K/CU MM
EOSINOPHILS RELATIVE PERCENT: 2.9 % (ref 0–3)
GFR AFRICAN AMERICAN: >60 ML/MIN/1.73M2
GFR NON-AFRICAN AMERICAN: >60 ML/MIN/1.73M2
GLUCOSE BLD-MCNC: 118 MG/DL (ref 70–99)
HCT VFR BLD CALC: 38.7 % (ref 37–47)
HEMOGLOBIN: 12.8 GM/DL (ref 12.5–16)
LYMPHOCYTES ABSOLUTE: 2 K/CU MM
LYMPHOCYTES RELATIVE PERCENT: 17.2 % (ref 24–44)
MCH RBC QN AUTO: 28.4 PG (ref 27–31)
MCHC RBC AUTO-ENTMCNC: 33.1 % (ref 32–36)
MCV RBC AUTO: 86 FL (ref 78–100)
MONOCYTES ABSOLUTE: 0.8 K/CU MM
MONOCYTES RELATIVE PERCENT: 7 % (ref 0–4)
PDW BLD-RTO: 15 % (ref 11.7–14.9)
PLATELET # BLD: 241 K/CU MM (ref 140–440)
PMV BLD AUTO: 9.5 FL (ref 7.5–11.1)
POTASSIUM SERPL-SCNC: 4.3 MMOL/L (ref 3.5–5.1)
RBC # BLD: 4.5 M/CU MM (ref 4.2–5.4)
SEGMENTED NEUTROPHILS ABSOLUTE COUNT: 8.5 K/CU MM
SEGMENTED NEUTROPHILS RELATIVE PERCENT: 72.6 % (ref 36–66)
SODIUM BLD-SCNC: 137 MMOL/L (ref 135–145)
TOTAL PROTEIN: 7.4 GM/DL (ref 6.4–8.2)
WBC # BLD: 11.7 K/CU MM (ref 4–10.5)

## 2020-10-20 PROCEDURE — 36415 COLL VENOUS BLD VENIPUNCTURE: CPT

## 2020-10-20 PROCEDURE — 80053 COMPREHEN METABOLIC PANEL: CPT

## 2020-10-20 PROCEDURE — 99211 OFF/OP EST MAY X REQ PHY/QHP: CPT

## 2020-10-20 PROCEDURE — 86300 IMMUNOASSAY TUMOR CA 15-3: CPT

## 2020-10-20 PROCEDURE — 85025 COMPLETE CBC W/AUTO DIFF WBC: CPT

## 2020-10-20 PROCEDURE — 99213 OFFICE O/P EST LOW 20 MIN: CPT | Performed by: INTERNAL MEDICINE

## 2020-10-20 NOTE — PROGRESS NOTES
Patient Name: Gianna Katz  Patient : 1950  Patient MRN: [id number]     Primary Oncologist: China King MD  Referring Provider: Merlene Crews DO     Date of Service: 10/20/2020      Chief Complaint:   Chief Complaint   Patient presents with    Follow-up     Patient Active Problem List:     Malignant neoplasm of breast (female)      Morbid obesity with BMI of 45.0-49.9, adult      Personal history of other malignant neoplasm of large intestine    HPI:   Nusrat BAKER Lex Johnson is a 50-year-old very pleasant female who has history of colon cancer, diagnosed in 2004 and breast cancer diagnosed in . Routine mammogram done in 2012 showed abnormality involving the right breast, which was noted on ultrasound. She underwent biopsy on 2012, two separate lesions, as well as lobular carcinoma in situ, as well as focal carcinoma in situ. 2012, she underwent mastectomy, sentinel node biopsy, followed by axillary dissection. Primary tumor was 1.2 cm and 3 sentinel nodes were positive. The six additional axillary nodes were negative. There was lymphovascular invasion. Tumor was strongly positive for estrogen and progesterone receptor, negative for HER-2. Adjuvant chemotherapy treatments with Adriamycin and Cytoxan on 2012. She tolerated those treatments with significant weakness and tiredness, but no nausea, vomiting, or mucositis. Started on chemotherapy with Taxol on . She had two injections, but on  did have difficulty breathing and chest discomfort after completion of infusion of Taxol. There was possible worry that maybe it was an allergic reaction to Taxol. Subsequently she received treatment with weekly Taxotere. She received her last treatment on 2012. She tolerated these treatments with moderate degree of morbidity too, especially weakness, tiredness, and fluid retention too. ....       October 15, 2012, started treatment dosages of the medicines, was seen at [facility]. She was hospitalized in February of 2017 with chest discomfort started after sort of an argument at home with her boyfriend. Seems like workup in the hospital for cardiac issues were negative. She did have Doppler done of both lower extremities and there was no evidence of deep vein thrombosis. February 11, 2017, chest x-ray was unremarkable, mammogram January 25, 2017, was negative too. Echocardiogram on February 13, 2017, had revealed decent ejection fraction. March 6, 2017, she stated that she has been followed by Cardiologist as well Dr. Lisa Berrios and there was concern about splenomegaly. I could not though feel any enlargement of spleen. Blood work February 14, 2017, in the hospital normal chemistries, hemoglobin of 11.1 grams percent. March 6, 2017, CBC and chemistries drawn here were fairly unremarkable including LDH and reticulocyte count of 1.9 percent. As there was a question of splenomegaly she did undergo ultrasound of the abdomen on March 13, 2017, and was noted to have a spleen to be around 14.4 cm, mildly enlarged, but no other abnormal findings. March 21, 2017, clinically she was doing about the same, still some issue with her boyfriend, but otherwise still being followed by Dr. Lisa Berrios, Dr. Анна Bertrand, and Dr. Nathalie Page. She also had seen Dr. Jmai Schumacher too as well as Dr. Kirill Bowman. May 30, 2017, she was doing better. Stated that she was going to see Dr. Jami Schumacher in August 2017. She was still having symptoms of exogenous obesity, chronic swelling of her legs, as well as difficulty ambulating, chronic weakness and tiredness, but denied any bleeding, and was in general eating well and was not monitoring her salt intake. She did undergo ultrasound of the abdomen on September 1, 2017, primarily because of previous ultrasound revealing mild splenomegaly. Study done this time revealed hepatomegaly with fatty change. Spleen was enlarged measuring around 15.6 cm. Previously it measured 14.4 cm. Visit here on December 7, 2017, we talked about her underlying evaluation per Dr. Tal Knight for mild chronic liver disease. Otherwise she was encouraged to continue to follow with the rest of the physicians and to see me for follow-up visit in a few weeks. Saw me on December 7, 2017, stated that she for some reason or another had not been able to see Dr. Tal Knight. Stated that she had a fall in November 2017 and thus could not keep her appointments. Still being followed by Dr. Riana Madison and Dr. Cassidy Rosado. She overall was in good spirits, was taking Letrozole on a regular basis, and denied any significant new issues. She saw me on April 26, 2018, still taking the Letrozole on a regular basis. She is still being followed by Dr. Riana Madison too. She has in general been doing quite well, had not noticed any major new problems to speak of. She stated that she did undergo colonoscopy exam as well as EGD per Dr. Tal Knight. She tolerated the procedure well. Stated she had mammogram on January 25, 2018, that was negative. She had undergone CT scan of the abdomen and pelvis on April 15, 2018, when seen in the emergency room for hematuria, which was felt to be secondary to urinary tract infection. CT scan was read to be fairly unremarkable actually the spleen was not reported to be enlarged. She was noted to have diffuse fatty infiltration of the liver. Visit per Dr. Tal Knight on February 5, 2018, was thought to have GALLO with early changes of cirrhosis but no varices, no significant thrombocytopenia and mild splenomegaly was felt to possibly be related to GALLO too. No additional therapy was needed except for her to manage her diet.       She had undergone CT scan of the abdomen and pelvis on July 28, 2018 when she visited the emergency room there was no acute abnormality but there were changes of stable splenomegaly measuring around 15.6 cm. DEXA scan on 11/5/2018 showed osteopenia by WHO criteria. Colonoscopy per Dr. Luis العلي January 17, 2019 tubular adenoma. Mammogram February 4, 2019 benign    Visit here on April 2, 2019 was still taking letrozole on a regular basis. Stated had seen Dr. Manuel Mcginnis for peripheral arterial issues. Was being treated with aspirin. She was hospitalized on June 26, 2019 with symptoms of chest pain, evaluated per cardiology, discharged on June 28, 2019. She was seen in the office on January 16, 2020. Stated she was taking letrozole on a regular basis. Overall she was doing fair but did complain of having significant pain involving the right perianal area for last few days duration where she had noticed some discharge. Was noted to have an infected cyst and was treated with antibiotics. Did undergo colonoscopy exam per Dr. Luis العلي on January 21, 2020 3 mm polyp was removed from the sigmoid stump otherwise no suspicious lesions were noted. Mammogram left screening on February 11, 2020 was negative    On October 20, 2020, she presented to me for follow up. We have been following her for colon cancer and stage IIA right breast cancer. She is status post right breast mastectomy, axillary lymph node dissection, followed by adjuvant chemotherapy. She has been on adjuvant endocrine therapy with letrozole since October 15, 2012. She is tolerating letrozole well and she doesn't encounter any major side effects from it. I recommend her to continue with letrozole for now. I also asked her to continue with calcium and vitamin D. She will need to have repeat DEXA scan in 11/2020. She doesn't have any significant symptoms at today visit. PAST MEDICAL HISTORY:    1. Surgery for carcinoma of the colon, T3, N0, M0 disease, followed by adjuvant chemotherapy.   In that regards she is doing quite well.  , 2. Other medical problems include hypertension, degenerative arthritis, bronchial asthma, and hypertension. Review of Systems: \"Per interval history; otherwise 10 point ROS is negative. \"  Her energy level is fair, appetite, and sleep are good. She doesn't have fever, chills, night sweats, cough, shortness of breath, chest pain, hemoptysis, or palpitations. Her bowel and bladder functions are normal. She denies nausea, vomiting, abdominal pain, diarrhea, constipation, dysuria, loss of appetite, or weight loss. She doesn't have neuropathy and she denies bleeding or clotting issues. Doesn't have any pain in her body. Denies anxiety or depression. The rest of the systems are unremarkable. Vital Signs:  BP (!) 148/67 (Site: Left Upper Arm, Position: Sitting, Cuff Size: Large Adult)   Pulse 85   Temp 97.1 °F (36.2 °C) (Infrared)   Resp 18   Ht 5' 2\" (1.575 m)   Wt 268 lb (121.6 kg)   LMP 01/15/1999   SpO2 95%   BMI 49.02 kg/m²     Physical Exam:  CONSTITUTIONAL: awake, alert, cooperative, no apparent distress   EYES: pupils equal, round and reactive to light, sclera clear and conjunctiva normal  ENT: Normocephalic, without obvious abnormality, atraumatic  NECK: supple, symmetrical, no jugular venous distension and no carotid bruits   HEMATOLOGIC/LYMPHATIC: no cervical, supraclavicular or axillary lymphadenopathy   LUNGS: VBS, no wheezes, no crackles, no rhonchi, no increased work of breathing and clear to auscultation   CARDIOVASCULAR: regular rate and rhythm, normal S1 and S2, no murmur noted  ABDOMEN: normal bowel sounds x 4, soft, non-distended, non-tender, no masses palpated, no hepatosplenomegaly   MUSCULOSKELETAL: full range of motion noted, tone is normal  NEUROLOGIC: awake, alert, oriented to name, place and time. Motor skills grossly intact. SKIN: Normal skin color, texture, turgor and no jaundice.  appears intact   EXTREMITIES: no LE edema, no leg swelling, no cyanosis, no clubbing  BREASTS: no palpable mass in right breast mastectomy site, no palpable mass in left breast and bilateral axilla     Labs:  Hematology:  Lab Results   Component Value Date    WBC 11.7 (H) 10/20/2020    RBC 4.50 10/20/2020    HGB 12.8 10/20/2020    HCT 38.7 10/20/2020    MCV 86.0 10/20/2020    MCH 28.4 10/20/2020    MCHC 33.1 10/20/2020    RDW 15.0 (H) 10/20/2020     10/20/2020    MPV 9.5 10/20/2020    BANDSPCT 9 07/23/2015    SEGSPCT 72.6 (H) 10/20/2020    EOSRELPCT 2.9 10/20/2020    BASOPCT 0.3 10/20/2020    LYMPHOPCT 17.2 (L) 10/20/2020    MONOPCT 7.0 (H) 10/20/2020    BANDABS 1.87 07/23/2015    SEGSABS 8.5 10/20/2020    EOSABS 0.3 10/20/2020    BASOSABS 0.0 10/20/2020    LYMPHSABS 2.0 10/20/2020    MONOSABS 0.8 10/20/2020    DIFFTYPE AUTOMATED DIFFERENTIAL 10/20/2020    ANISOCYTOSIS 1+ 07/23/2015    POLYCHROM 1+ 07/23/2015     Lab Results   Component Value Date     (H) 10/17/2012     Chemistry:  Lab Results   Component Value Date     10/20/2020    K 4.3 10/20/2020    CL 97 (L) 10/20/2020    CO2 27 10/20/2020    BUN 13 10/20/2020    CREATININE 0.9 10/20/2020    GLUCOSE 118 (H) 10/20/2020    CALCIUM 9.9 10/20/2020    PROT 7.4 10/20/2020    LABALBU 4.1 10/20/2020    BILITOT 0.3 10/20/2020    ALKPHOS 114 10/20/2020    AST 17 10/20/2020    ALT 14 10/20/2020    LABGLOM >60 10/20/2020    GFRAA >60 10/20/2020    PHOS 3.5 07/29/2015    MG 1.9 07/29/2015     Lab Results   Component Value Date     (H) 09/01/2017     No components found for: LD  Lab Results   Component Value Date    TSHHS 1.290 04/12/2018    T4FREE 1.06 04/12/2018     Immunology:  Lab Results   Component Value Date    PROT 7.4 10/20/2020    ALBUMINELP 3.3 09/28/2011    LABALPH 0.3 09/28/2011    LABALPH 1.1 09/28/2011    LABBETA 1.2 09/28/2011    GAMGLOB 1.1 09/28/2011     No results found for: [place], KLFLCR  No results found for: B2M  Coagulation Panel:  Lab Results   Component Value Date    PROTIME 11.5 07/28/2018    INR 1.01 07/28/2018    APTT 25.9 07/28/2018    DDIMER 485 (H) 01/25/2018     Anemia Panel:  Lab Results Component Value Date    YOYVGEQZ19 652.0 11/28/2017    FOLATE 12.7 09/01/2017     Tumor Markers:  Lab Results   Component Value Date    CEA 0.8 08/15/2011    LABCA2 19.5 11/20/2014     Observations:  No data recorded      Assessment & Plan:   1. Previous history of carcinoma of the colon., 2. Hypertension. , 3. Degenerative arthritis., 4. Bronchial asthma., 5. Hypertension. , 6. CA breast, surgery in March 2012, mastectomy. Three sentinel nodes were positive. Six additional axillary nodes were negative. There was lymphovascular invasion. Tumor was strongly positive for ER and NH, negative for HER-2. Status post three months of treatment with Adriamycin/Cytoxan, followed by Taxol and Taxotere, finishing adjuvant chemotherapy on September 20, 2012. Overall, tolerated these treatments with moderate degree of morbidity. .. Colonoscopy exam in October 2013 had revealed dysplastic polyp. February 2014 again revealed 1 cm polyp at 5 cm from the anal verge, again showing some dysplasia. .. She also had been having cardiac issues in the form of heart block. She did undergo placement of a pacemaker that has been of help. .. September 17, 2015, was feeling fair. Since the last visit here she was hospitalized with severe cellulitis, spent a few weeks at the nursing home as well as [facility]. September 7, 2017, again spent time with her. We talked about underlying issue of carcinoma of the breast.  Encouraged her to stay on letrozole and take it on a regular basis along with calcium and vitamin D.  DEXA scan earlier on had revealed mild osteopenia. Mammogram in January 2017 was negative. April 26, 2018, she was doing fair, being followed by Dr. Maryellen Cordero for mild liver disease with slight splenomegaly. She is also being followed by Dr. Randall England getting her mammograms, . She sees Dr. Santosh Villarreal for other health problems. Colonoscopy per Dr. Maryellen Cordero January 17, 2019 tubular adenoma.     Mammogram February 4, 2019 benign    Visit here on April 2, 2019 was still taking letrozole on a regular basis. Stated had seen Dr. Zena Lopes for peripheral arterial issues. Was being treated with aspirin. She in particular denied any other specific new issues or complaints    January 16, 2020 visit was noted to have an infected cyst in the perianal area was treated with antibiotics did improve. Colonoscopy exam January 21, 2020 couple of polyps. And mammogram in February 2020 was negative. On October 20, 2020, she presented to me for follow up. We have been following her for colon cancer and stage IIA right breast cancer. She is status post right breast mastectomy, axillary lymph node dissection, followed by adjuvant chemotherapy. She has been on adjuvant endocrine therapy with letrozole since October 15, 2012. She is tolerating letrozole well and she doesn't encounter any major side effects from it. I recommend her to continue with letrozole for now. I also asked her to continue with calcium and vitamin D. She will need to have repeat DEXA scan in 11/2020. She doesn't have any significant symptoms at today visit. Recent imaging and labs were reviewed and discussed with the patient.

## 2020-10-20 NOTE — PROGRESS NOTES
MA Rooming Questions  Patient: Delicia Pardo  MRN: [id number]    Date: 10/20/2020        1. Do you have any new issues?   no         2. Do you need any refills on medications?    no    3. Have you had any imaging done since your last visit? YES    4. Have you been hospitalized or seen in the emergency room since your last visit here?   er    5. Did the patient have a depression screening completed today?  No    No data recorded     PHQ-9 Given to (if applicable):               PHQ-9 Score (if applicable):                     [] Positive     []  Negative              Does question #9 need addressed (if applicable)                     [] Yes    []  No               Channing Mathew MA

## 2020-10-22 LAB — CA 27.29: 17.4 U/ML (ref 0–40)

## 2020-11-08 PROCEDURE — 93296 REM INTERROG EVL PM/IDS: CPT | Performed by: INTERNAL MEDICINE

## 2020-11-08 PROCEDURE — 93294 REM INTERROG EVL PM/LDLS PM: CPT | Performed by: INTERNAL MEDICINE

## 2020-11-09 ENCOUNTER — TELEPHONE (OUTPATIENT)
Dept: CARDIOLOGY CLINIC | Age: 70
End: 2020-11-09

## 2020-11-09 ENCOUNTER — PROCEDURE VISIT (OUTPATIENT)
Dept: CARDIOLOGY CLINIC | Age: 70
End: 2020-11-09
Payer: MEDICARE

## 2020-11-09 NOTE — LETTER
[address] [address] [address] [id number]  Phone: [phone]  Fax: [phone]            November 9, 2020    [id number] [address] [facility]      Dear John Section: This is your CARELINK schedule. Please jean your calendar with these dates. You can do your checks anytime during the scheduled day. Since we do not do reminder calls, it will be your responsibility to perform the checks on the day it is scheduled. If you have any questions or concerns, please call and ask for Adrianna Meadows at [phone].

## 2020-12-07 ENCOUNTER — TELEPHONE (OUTPATIENT)
Dept: INTERNAL MEDICINE CLINIC | Age: 70
End: 2020-12-07

## 2020-12-07 NOTE — LETTER
[address] [place] [address] Internal and Family Medicine  [address] [place], [place] [id number]  Phone: [phone]  Fax: [phone], DO        December 8, 2020     Patient: Stephanie Pelletier   YOB: 1950   Date of Visit: 12/7/2020       To Whom It May Concern: It is my medical opinion that Sloane Davis requires a disability parking placard for the following reasons:  She cannot walk 200 feet without stopping to rest.  Duration of need: 5 years    If you have any questions or concerns, please don't hesitate to call.     Sincerely,          Kyara Nicholas, DO

## 2020-12-07 NOTE — TELEPHONE ENCOUNTER
Pt would like letter for handicap placard, however I did not see where you had given her one before so I wanted to make sure it was okay before I did it. Please advise.

## 2021-02-11 ENCOUNTER — VIRTUAL VISIT (OUTPATIENT)
Dept: INTERNAL MEDICINE CLINIC | Age: 71
End: 2021-02-11
Payer: MEDICARE

## 2021-02-11 DIAGNOSIS — R73.9 HYPERGLYCEMIA: ICD-10-CM

## 2021-02-11 DIAGNOSIS — M25.511 ACUTE PAIN OF RIGHT SHOULDER: ICD-10-CM

## 2021-02-11 DIAGNOSIS — Z91.81 AT HIGH RISK FOR FALLS: ICD-10-CM

## 2021-02-11 DIAGNOSIS — J44.9 CHRONIC OBSTRUCTIVE PULMONARY DISEASE, UNSPECIFIED COPD TYPE (HCC): Primary | ICD-10-CM

## 2021-02-11 DIAGNOSIS — E78.5 HYPERLIPIDEMIA, UNSPECIFIED HYPERLIPIDEMIA TYPE: ICD-10-CM

## 2021-02-11 DIAGNOSIS — F32.A DEPRESSION, UNSPECIFIED DEPRESSION TYPE: ICD-10-CM

## 2021-02-11 DIAGNOSIS — R35.0 URINE FREQUENCY: ICD-10-CM

## 2021-02-11 PROCEDURE — 99442 PR PHYS/QHP TELEPHONE EVALUATION 11-20 MIN: CPT | Performed by: FAMILY MEDICINE

## 2021-02-11 RX ORDER — LETROZOLE 2.5 MG/1
2.5 TABLET, FILM COATED ORAL NIGHTLY
Qty: 90 TABLET | Refills: 1 | Status: CANCELLED | OUTPATIENT
Start: 2021-02-11

## 2021-02-11 RX ORDER — ALBUTEROL SULFATE 90 UG/1
AEROSOL, METERED RESPIRATORY (INHALATION)
Qty: 1 INHALER | Refills: 5 | Status: SHIPPED | OUTPATIENT
Start: 2021-02-11

## 2021-02-11 RX ORDER — SERTRALINE HYDROCHLORIDE 100 MG/1
200 TABLET, FILM COATED ORAL EVERY MORNING
Qty: 180 TABLET | Refills: 1 | Status: SHIPPED | OUTPATIENT
Start: 2021-02-11 | End: 2021-11-22

## 2021-02-11 RX ORDER — SIMVASTATIN 20 MG
20 TABLET ORAL NIGHTLY
Qty: 90 TABLET | Refills: 1 | Status: SHIPPED | OUTPATIENT
Start: 2021-02-11 | End: 2021-11-22

## 2021-02-11 RX ORDER — EZETIMIBE 10 MG/1
10 TABLET ORAL EVERY MORNING
Qty: 90 TABLET | Refills: 1 | Status: SHIPPED | OUTPATIENT
Start: 2021-02-11 | End: 2021-11-22

## 2021-02-11 RX ORDER — FLUTICASONE PROPIONATE 110 UG/1
2 AEROSOL, METERED RESPIRATORY (INHALATION) 2 TIMES DAILY
Qty: 1 INHALER | Refills: 5 | Status: SHIPPED | OUTPATIENT
Start: 2021-02-11

## 2021-02-11 RX ORDER — ACETAMINOPHEN 500 MG
500 TABLET ORAL EVERY 6 HOURS PRN
COMMUNITY

## 2021-02-11 ASSESSMENT — ENCOUNTER SYMPTOMS
DIARRHEA: 0
SHORTNESS OF BREATH: 0
ABDOMINAL PAIN: 0
CONSTIPATION: 0
BLOOD IN STOOL: 0
COUGH: 0
BACK PAIN: 0
NAUSEA: 0

## 2021-02-11 NOTE — PROGRESS NOTES
incontinence     History of blood transfusion     Hx of Arterial Doppler ultrasound 07/01/2019    No hemodynamically significant or focal stenosis visualized bilaterally, bilateral lower extremity arteries exhibit diffuse plaque and multiphasic waveforms, unable to obtain bilateral ABIs due to elevated leg pressures >250 mmHg, possibly due to atherosclerosis    Hx of cardiovascular stress test 06/2013    EF 70% abn suggestive of anterolateral ischemia, possible RCA ischmeia, post left ventricular dilation suggestive multivessel CAD.  Hx of echocardiogram 06/2013    EF50%, mild MR and TR, mild pulmonary HTN, mild AS    Hx of fall     \"last time 2018- due to neuropathy, have to use cane\"    HX OTHER MEDICAL 7/24/13, 06/2013 7/13-No clinacally significant arrhythmia. 6/13-multiple cycles of wenckebach episodes in addtion to some sinus tach    Hyperlipidemia     Hypertension     Hypothermia 2008    Malignant neoplasm of breast (female) [facility] [address] 2012    Neuropathy     \"have neuropathy of my legs\"    Normocytic normochromic anemia     Obstructive sleep apnea 2008 01- Has CPAP machine but has not used in over a year - states she has not had problems since her pacemaker was placed.  Old MI (myocardial infarction)     per pt on 1/15/2019\"had heart attack about a year ago\"    Osteoarthritis     Osteopenia     Primary malignant neoplasm of colon ([facility] [address]     S/P cardiac cath 06/2013    no significant CAD false postive stress test    Super obesity     Umbilical hernia        Past Surgical History:   Procedure Laterality Date    A-V CARDIAC PACEMAKER INSERTION  3/10/14     [employer]tr[employer].    Model:  [id number] Medtronic  Serial:  [id number] dual chamber pacemaker    APPENDECTOMY  2004    BREAST BIOPSY  2/13/12    BREAST LUMPECTOMY  2007    right     BREAST SURGERY  02/13/2012    rt lesion biopsy     CARDIAC CATHETERIZATION  2007 & 2011    COLECTOMY  2004    Colon cancer    COLONOSCOPY  10-18-13 polyp    COLONOSCOPY  1/19/2016    internal hemorrhoids, diverticulosis, 1.5 cm sessile polyp, 8 mm polyp found in rectum.  COLONOSCOPY  12/14/2017    1.5cm residual polyp, 5mm polyps in blind sigmoid loop x3, Sigmoid divertics, Internal grade 1 hemorrhoids    COLONOSCOPY N/A 1/16/2019    COLONOSCOPY W/ ENDOSCOPIC MUCOSAL RESECTION WITH ELEVIEW 5ML INJECTION AND POLYPECTOMY OF TIP OF BLIND RECTOSIGMOID STUMP AND CAUTERIZATION WITH ERBE PROBE, CLIPPING X2 performed by Chucky Lee MD at [facility]  01/21/2020    pan divertics/ 4 polyps/ sm int hem, S/P partial sigmoid resection w/ end to side anastamosis, repeat in 3 years    COLONOSCOPY N/A 1/21/2020    COLONOSCOPY POLYPECTOMY SNARE/COLD BIOPSY performed by Chucky Lee MD at [facility]  2003    back   [address] Bilateral 12/15/14    DENTAL SURGERY      front right tooth and root canal w/ brass bolt    DILATION AND CURETTAGE OF UTERUS  2006    Needed a camera to find my uterus.     ENDOSCOPY, COLON, DIAGNOSTIC  12/14/2017    Small hiatal hernia    HERNIA REPAIR  2004    Umb hernia    HERNIA REPAIR  2008    Inc hernia    LYMPH NODE DISSECTION  2/29/2012    Right axillary-3 sentinel nodes were positive out of 9.    MASTECTOMY, RADICAL Right 02/29/2012    w/ sentinal node disection-Dr West    PACEMAKER PLACEMENT      PRE-MALIGNANT / BENIGN SKIN LESION EXCISION  2/8/2013    right leg X2-Dr West    TONSILLECTOMY  1957    TUNNELED VENOUS PORT PLACEMENT  2004    TUNNELED VENOUS PORT PLACEMENT  02/13/2012    removal of mediport       Family History   Problem Relation Age of Onset    Arthritis Mother         OA, RA    High Cholesterol Mother     High Blood Pressure Mother     Cancer Father         skin and leukemia    High Blood Pressure Father     High Cholesterol Father     Diabetes Father     Heart Disease Father     Heart Disease Brother     High Cholesterol Brother     High Blood Pressure Brother     Heart Disease Brother     Seizures Son        Social History     Tobacco Use    Smoking status: Never Smoker    Smokeless tobacco: Never Used   Substance Use Topics    Alcohol use: No     Comment:           CAFFEINE: 2- 12oz nona daily & 2 cups coffee some nights.  Drug use: No       Current Outpatient Medications   Medication Sig Dispense Refill    acetaminophen (TYLENOL) 500 MG tablet Take 500 mg by mouth every 6 hours as needed for Pain      simvastatin (ZOCOR) 20 MG tablet Take 1 tablet by mouth nightly 90 tablet 1    sertraline (ZOLOFT) 100 MG tablet Take 2 tablets by mouth every morning 180 tablet 1    fluticasone (FLOVENT HFA) 110 MCG/ACT inhaler Inhale 2 puffs into the lungs 2 times daily 1 Inhaler 5    ezetimibe (ZETIA) 10 MG tablet Take 1 tablet by mouth every morning 90 tablet 1    albuterol sulfate HFA (PROAIR HFA) 108 (90 Base) MCG/ACT inhaler INHALE 2 PUFFS BY MOUTH EVERY SIX HOURS AS NEEDED FOR WHEEZING 1 Inhaler 5    vitamin B-12 (CYANOCOBALAMIN) 1000 MCG tablet Take 1,000 mcg by mouth daily      aspirin 81 MG EC tablet Take 81 mg by mouth nightly       letrozole (FEMARA) 2.5 MG tablet Take 2.5 mg by mouth nightly       Ferrous Sulfate (IRON) 325 (65 FE) MG TABS Take 1 tablet by mouth every morning       calcium-vitamin D (OSCAL 500/200 D-3) 500-200 MG-UNIT per tablet Take 1 tablet by mouth 2 times daily        No current facility-administered medications for this visit. OBJECTIVE:    No vitals obtained    Physical Exam (Telephone encounter)    ASSESSMENT:  1. Chronic obstructive pulmonary disease, unspecified COPD type ([address]    2. Hyperlipidemia, unspecified hyperlipidemia type    3. Depression, unspecified depression type    4. Acute pain of right shoulder    5. Hyperglycemia    6. Urine frequency    7.  At high risk for falls        PLAN:    Orders Placed This Encounter   Procedures    Lipid Panel    Urinalysis with Microscopic    Hemoglobin A1C

## 2021-02-14 PROBLEM — L02.91 CUTANEOUS ABSCESS, UNSPECIFIED: Status: RESOLVED | Noted: 2020-08-21 | Resolved: 2021-02-14

## 2021-02-15 ENCOUNTER — PROCEDURE VISIT (OUTPATIENT)
Dept: CARDIOLOGY CLINIC | Age: 71
End: 2021-02-15
Payer: MEDICARE

## 2021-02-15 DIAGNOSIS — I44.1 HEART BLOCK AV SECOND DEGREE: ICD-10-CM

## 2021-02-15 DIAGNOSIS — Z95.0 CARDIAC PACEMAKER IN SITU: Primary | ICD-10-CM

## 2021-02-15 PROCEDURE — 93294 REM INTERROG EVL PM/LDLS PM: CPT | Performed by: INTERNAL MEDICINE

## 2021-02-15 PROCEDURE — 93296 REM INTERROG EVL PM/IDS: CPT | Performed by: INTERNAL MEDICINE

## 2021-02-23 ENCOUNTER — TELEPHONE (OUTPATIENT)
Dept: ONCOLOGY | Age: 71
End: 2021-02-23

## 2021-02-23 NOTE — TELEPHONE ENCOUNTER
I called patient to advise of mammogram apt 3/3/2021. Patient indicated she did not want mammogram at this time due to covid. Patient stated Dr. Bee Meyers will order mammogram and bone density as well as yearly lab work at a later date.

## 2021-02-24 ENCOUNTER — TELEPHONE (OUTPATIENT)
Dept: INTERNAL MEDICINE CLINIC | Age: 71
End: 2021-02-24

## 2021-02-24 DIAGNOSIS — T17.308A CHOKING, INITIAL ENCOUNTER: Primary | ICD-10-CM

## 2021-02-24 NOTE — TELEPHONE ENCOUNTER
Spoke to pt. She does not want a swallowing study or any other testing at this time.  She will call to let me know

## 2021-02-24 NOTE — TELEPHONE ENCOUNTER
Choking with dry throat and has hard time breathing. This happens with eating or a small sip of water or soda. She wasn't to know if there is a stomach test to be done?

## 2021-02-25 NOTE — TELEPHONE ENCOUNTER
Pt had left another VM on MA line, stating the test she had spoke of, was not a stomach test, it was a swallow test.     Spoke with pt, to see if she had changed her mind, and now wanted the swallow study. Pt states she does not currently want any testing. She will keep a log of when/duration/what she is doing, when problem reoccurs. No further action is needed, at this time.

## 2021-03-22 ENCOUNTER — APPOINTMENT (OUTPATIENT)
Dept: GENERAL RADIOLOGY | Age: 71
DRG: 177 | End: 2021-03-22
Payer: MEDICARE

## 2021-03-22 ENCOUNTER — HOSPITAL ENCOUNTER (INPATIENT)
Age: 71
LOS: 5 days | Discharge: NURSING, REHAB OR LONG-TERM CARE | DRG: 177 | End: 2021-03-27
Attending: HOSPITALIST | Admitting: HOSPITALIST
Payer: MEDICARE

## 2021-03-22 DIAGNOSIS — J06.9 ACUTE RESPIRATORY DISEASE DUE TO 2019 NOVEL CORONAVIRUS: Primary | ICD-10-CM

## 2021-03-22 DIAGNOSIS — J18.9 MULTIFOCAL PNEUMONIA: ICD-10-CM

## 2021-03-22 DIAGNOSIS — U07.1 ACUTE RESPIRATORY DISEASE DUE TO 2019 NOVEL CORONAVIRUS: Primary | ICD-10-CM

## 2021-03-22 PROBLEM — J12.82 PNEUMONIA DUE TO COVID-19 VIRUS: Status: ACTIVE | Noted: 2021-03-22

## 2021-03-22 LAB
ALBUMIN SERPL-MCNC: 3.4 GM/DL (ref 3.4–5)
ALP BLD-CCNC: 81 IU/L (ref 40–129)
ALT SERPL-CCNC: 16 U/L (ref 10–40)
ANION GAP SERPL CALCULATED.3IONS-SCNC: 9 MMOL/L (ref 4–16)
AST SERPL-CCNC: 19 IU/L (ref 15–37)
BASE EXCESS MIXED: 2.4 (ref 0–2.3)
BASOPHILS ABSOLUTE: 0 K/CU MM
BASOPHILS RELATIVE PERCENT: 0.2 % (ref 0–1)
BILIRUB SERPL-MCNC: 0.4 MG/DL (ref 0–1)
BUN BLDV-MCNC: 11 MG/DL (ref 6–23)
CALCIUM SERPL-MCNC: 8.7 MG/DL (ref 8.3–10.6)
CHLORIDE BLD-SCNC: 95 MMOL/L (ref 99–110)
CO2: 28 MMOL/L (ref 21–32)
COMMENT: ABNORMAL
CREAT SERPL-MCNC: 0.8 MG/DL (ref 0.6–1.1)
DIFFERENTIAL TYPE: ABNORMAL
EOSINOPHILS ABSOLUTE: 0 K/CU MM
EOSINOPHILS RELATIVE PERCENT: 0.4 % (ref 0–3)
GFR AFRICAN AMERICAN: >60 ML/MIN/1.73M2
GFR NON-AFRICAN AMERICAN: >60 ML/MIN/1.73M2
GLUCOSE BLD-MCNC: 124 MG/DL (ref 70–99)
HCO3 VENOUS: 28.8 MMOL/L (ref 19–25)
HCT VFR BLD CALC: 34.9 % (ref 37–47)
HEMOGLOBIN: 11.4 GM/DL (ref 12.5–16)
HETEROPHILE ANTIBODIES: NEGATIVE
IMMATURE NEUTROPHIL %: 1.3 % (ref 0–0.43)
LYMPHOCYTES ABSOLUTE: 0.8 K/CU MM
LYMPHOCYTES RELATIVE PERCENT: 15.5 % (ref 24–44)
MCH RBC QN AUTO: 28.6 PG (ref 27–31)
MCHC RBC AUTO-ENTMCNC: 32.7 % (ref 32–36)
MCV RBC AUTO: 87.5 FL (ref 78–100)
MONOCYTES ABSOLUTE: 0.4 K/CU MM
MONOCYTES RELATIVE PERCENT: 7.9 % (ref 0–4)
NUCLEATED RBC %: 0 %
O2 SAT, VEN: 73.8 % (ref 50–70)
PCO2, VEN: 51 MMHG (ref 38–52)
PDW BLD-RTO: 14.5 % (ref 11.7–14.9)
PH VENOUS: 7.36 (ref 7.32–7.42)
PLATELET # BLD: 186 K/CU MM (ref 140–440)
PMV BLD AUTO: 9.9 FL (ref 7.5–11.1)
PO2, VEN: 42 MMHG (ref 28–48)
POTASSIUM SERPL-SCNC: 3.4 MMOL/L (ref 3.5–5.1)
PRO-BNP: 153.8 PG/ML
RBC # BLD: 3.99 M/CU MM (ref 4.2–5.4)
SARS-COV-2, NAAT: DETECTED
SEGMENTED NEUTROPHILS ABSOLUTE COUNT: 4 K/CU MM
SEGMENTED NEUTROPHILS RELATIVE PERCENT: 74.7 % (ref 36–66)
SODIUM BLD-SCNC: 132 MMOL/L (ref 135–145)
TOTAL IMMATURE NEUTOROPHIL: 0.07 K/CU MM
TOTAL NUCLEATED RBC: 0 K/CU MM
TOTAL PROTEIN: 7.2 GM/DL (ref 6.4–8.2)
TROPONIN T: <0.01 NG/ML
WBC # BLD: 5.4 K/CU MM (ref 4–10.5)

## 2021-03-22 PROCEDURE — 2580000003 HC RX 258: Performed by: HOSPITALIST

## 2021-03-22 PROCEDURE — 87899 AGENT NOS ASSAY W/OPTIC: CPT

## 2021-03-22 PROCEDURE — 80053 COMPREHEN METABOLIC PANEL: CPT

## 2021-03-22 PROCEDURE — 87430 STREP A AG IA: CPT

## 2021-03-22 PROCEDURE — 85025 COMPLETE CBC W/AUTO DIFF WBC: CPT

## 2021-03-22 PROCEDURE — 74018 RADEX ABDOMEN 1 VIEW: CPT

## 2021-03-22 PROCEDURE — 99285 EMERGENCY DEPT VISIT HI MDM: CPT

## 2021-03-22 PROCEDURE — 87635 SARS-COV-2 COVID-19 AMP PRB: CPT

## 2021-03-22 PROCEDURE — 6370000000 HC RX 637 (ALT 250 FOR IP): Performed by: PHYSICIAN ASSISTANT

## 2021-03-22 PROCEDURE — 82805 BLOOD GASES W/O2 SATURATION: CPT

## 2021-03-22 PROCEDURE — 2500000003 HC RX 250 WO HCPCS: Performed by: HOSPITALIST

## 2021-03-22 PROCEDURE — 93005 ELECTROCARDIOGRAM TRACING: CPT | Performed by: PHYSICIAN ASSISTANT

## 2021-03-22 PROCEDURE — 83880 ASSAY OF NATRIURETIC PEPTIDE: CPT

## 2021-03-22 PROCEDURE — 6370000000 HC RX 637 (ALT 250 FOR IP): Performed by: HOSPITALIST

## 2021-03-22 PROCEDURE — 2060000000 HC ICU INTERMEDIATE R&B

## 2021-03-22 PROCEDURE — 87081 CULTURE SCREEN ONLY: CPT

## 2021-03-22 PROCEDURE — 94640 AIRWAY INHALATION TREATMENT: CPT

## 2021-03-22 PROCEDURE — 86318 IA INFECTIOUS AGENT ANTIBODY: CPT

## 2021-03-22 PROCEDURE — 84484 ASSAY OF TROPONIN QUANT: CPT

## 2021-03-22 PROCEDURE — 96375 TX/PRO/DX INJ NEW DRUG ADDON: CPT

## 2021-03-22 PROCEDURE — 87040 BLOOD CULTURE FOR BACTERIA: CPT

## 2021-03-22 PROCEDURE — 71045 X-RAY EXAM CHEST 1 VIEW: CPT

## 2021-03-22 PROCEDURE — 6360000002 HC RX W HCPCS: Performed by: PHYSICIAN ASSISTANT

## 2021-03-22 PROCEDURE — 96365 THER/PROPH/DIAG IV INF INIT: CPT

## 2021-03-22 PROCEDURE — 2580000003 HC RX 258: Performed by: PHYSICIAN ASSISTANT

## 2021-03-22 PROCEDURE — 87449 NOS EACH ORGANISM AG IA: CPT

## 2021-03-22 PROCEDURE — 2700000000 HC OXYGEN THERAPY PER DAY

## 2021-03-22 PROCEDURE — 6360000002 HC RX W HCPCS: Performed by: HOSPITALIST

## 2021-03-22 PROCEDURE — 94664 DEMO&/EVAL PT USE INHALER: CPT

## 2021-03-22 RX ORDER — ONDANSETRON 2 MG/ML
4 INJECTION INTRAMUSCULAR; INTRAVENOUS EVERY 6 HOURS PRN
Status: DISCONTINUED | OUTPATIENT
Start: 2021-03-22 | End: 2021-03-27 | Stop reason: HOSPADM

## 2021-03-22 RX ORDER — 0.9 % SODIUM CHLORIDE 0.9 %
30 INTRAVENOUS SOLUTION INTRAVENOUS PRN
Status: DISCONTINUED | OUTPATIENT
Start: 2021-03-22 | End: 2021-03-27 | Stop reason: HOSPADM

## 2021-03-22 RX ORDER — SERTRALINE HYDROCHLORIDE 100 MG/1
200 TABLET, FILM COATED ORAL EVERY MORNING
Status: DISCONTINUED | OUTPATIENT
Start: 2021-03-23 | End: 2021-03-27 | Stop reason: HOSPADM

## 2021-03-22 RX ORDER — FERROUS SULFATE 325(65) MG
325 TABLET ORAL EVERY MORNING
Status: DISCONTINUED | OUTPATIENT
Start: 2021-03-23 | End: 2021-03-27 | Stop reason: HOSPADM

## 2021-03-22 RX ORDER — SIMVASTATIN 20 MG
20 TABLET ORAL NIGHTLY
Status: DISCONTINUED | OUTPATIENT
Start: 2021-03-22 | End: 2021-03-27 | Stop reason: HOSPADM

## 2021-03-22 RX ORDER — EZETIMIBE 10 MG/1
10 TABLET ORAL EVERY MORNING
Status: DISCONTINUED | OUTPATIENT
Start: 2021-03-23 | End: 2021-03-27 | Stop reason: HOSPADM

## 2021-03-22 RX ORDER — METHYLPREDNISOLONE SODIUM SUCCINATE 125 MG/2ML
80 INJECTION, POWDER, LYOPHILIZED, FOR SOLUTION INTRAMUSCULAR; INTRAVENOUS ONCE
Status: COMPLETED | OUTPATIENT
Start: 2021-03-22 | End: 2021-03-22

## 2021-03-22 RX ORDER — ACETAMINOPHEN 500 MG
500 TABLET ORAL EVERY 6 HOURS PRN
Status: DISCONTINUED | OUTPATIENT
Start: 2021-03-22 | End: 2021-03-27 | Stop reason: HOSPADM

## 2021-03-22 RX ORDER — PROMETHAZINE HYDROCHLORIDE 25 MG/1
12.5 TABLET ORAL EVERY 6 HOURS PRN
Status: DISCONTINUED | OUTPATIENT
Start: 2021-03-22 | End: 2021-03-27 | Stop reason: HOSPADM

## 2021-03-22 RX ORDER — FAMOTIDINE 20 MG/1
20 TABLET, FILM COATED ORAL 2 TIMES DAILY
Status: DISCONTINUED | OUTPATIENT
Start: 2021-03-22 | End: 2021-03-27 | Stop reason: HOSPADM

## 2021-03-22 RX ORDER — GUAIFENESIN/DEXTROMETHORPHAN 100-10MG/5
5 SYRUP ORAL EVERY 4 HOURS PRN
Status: DISCONTINUED | OUTPATIENT
Start: 2021-03-22 | End: 2021-03-23

## 2021-03-22 RX ORDER — ASPIRIN 81 MG/1
81 TABLET ORAL NIGHTLY
Status: DISCONTINUED | OUTPATIENT
Start: 2021-03-22 | End: 2021-03-27 | Stop reason: HOSPADM

## 2021-03-22 RX ORDER — FLUTICASONE PROPIONATE 110 UG/1
2 AEROSOL, METERED RESPIRATORY (INHALATION) 2 TIMES DAILY
Status: DISCONTINUED | OUTPATIENT
Start: 2021-03-22 | End: 2021-03-27 | Stop reason: HOSPADM

## 2021-03-22 RX ORDER — ACETAMINOPHEN 650 MG/1
650 SUPPOSITORY RECTAL EVERY 6 HOURS PRN
Status: DISCONTINUED | OUTPATIENT
Start: 2021-03-22 | End: 2021-03-27 | Stop reason: HOSPADM

## 2021-03-22 RX ORDER — LETROZOLE 2.5 MG/1
2.5 TABLET, FILM COATED ORAL NIGHTLY
Status: DISCONTINUED | OUTPATIENT
Start: 2021-03-22 | End: 2021-03-27 | Stop reason: HOSPADM

## 2021-03-22 RX ORDER — SODIUM CHLORIDE 0.9 % (FLUSH) 0.9 %
10 SYRINGE (ML) INJECTION PRN
Status: DISCONTINUED | OUTPATIENT
Start: 2021-03-22 | End: 2021-03-27 | Stop reason: HOSPADM

## 2021-03-22 RX ORDER — POLYETHYLENE GLYCOL 3350 17 G/17G
17 POWDER, FOR SOLUTION ORAL DAILY PRN
Status: DISCONTINUED | OUTPATIENT
Start: 2021-03-22 | End: 2021-03-27 | Stop reason: HOSPADM

## 2021-03-22 RX ORDER — SODIUM CHLORIDE 0.9 % (FLUSH) 0.9 %
10 SYRINGE (ML) INJECTION EVERY 12 HOURS SCHEDULED
Status: DISCONTINUED | OUTPATIENT
Start: 2021-03-22 | End: 2021-03-27 | Stop reason: HOSPADM

## 2021-03-22 RX ORDER — ALBUTEROL SULFATE 90 UG/1
2 AEROSOL, METERED RESPIRATORY (INHALATION) ONCE
Status: COMPLETED | OUTPATIENT
Start: 2021-03-22 | End: 2021-03-22

## 2021-03-22 RX ORDER — DEXAMETHASONE 4 MG/1
6 TABLET ORAL DAILY
Status: DISCONTINUED | OUTPATIENT
Start: 2021-03-22 | End: 2021-03-27 | Stop reason: HOSPADM

## 2021-03-22 RX ORDER — ACETAMINOPHEN 325 MG/1
650 TABLET ORAL EVERY 6 HOURS PRN
Status: DISCONTINUED | OUTPATIENT
Start: 2021-03-22 | End: 2021-03-27 | Stop reason: HOSPADM

## 2021-03-22 RX ORDER — LANOLIN ALCOHOL/MO/W.PET/CERES
1000 CREAM (GRAM) TOPICAL DAILY
Status: DISCONTINUED | OUTPATIENT
Start: 2021-03-22 | End: 2021-03-27 | Stop reason: HOSPADM

## 2021-03-22 RX ADMIN — ALBUTEROL SULFATE 2 PUFF: 90 AEROSOL, METERED RESPIRATORY (INHALATION) at 08:28

## 2021-03-22 RX ADMIN — REMDESIVIR 200 MG: 100 INJECTION, POWDER, LYOPHILIZED, FOR SOLUTION INTRAVENOUS at 14:16

## 2021-03-22 RX ADMIN — SODIUM CHLORIDE, PRESERVATIVE FREE 10 ML: 5 INJECTION INTRAVENOUS at 21:20

## 2021-03-22 RX ADMIN — Medication 2000 UNITS: at 21:18

## 2021-03-22 RX ADMIN — CYANOCOBALAMIN TAB 1000 MCG 1000 MCG: 1000 TAB at 21:19

## 2021-03-22 RX ADMIN — Medication 2 PUFF: at 08:26

## 2021-03-22 RX ADMIN — SIMVASTATIN 20 MG: 20 TABLET, FILM COATED ORAL at 21:19

## 2021-03-22 RX ADMIN — CEFTRIAXONE 1000 MG: 1 INJECTION, POWDER, FOR SOLUTION INTRAMUSCULAR; INTRAVENOUS at 08:45

## 2021-03-22 RX ADMIN — FAMOTIDINE 20 MG: 20 TABLET, FILM COATED ORAL at 21:19

## 2021-03-22 RX ADMIN — ENOXAPARIN SODIUM 40 MG: 40 INJECTION SUBCUTANEOUS at 21:18

## 2021-03-22 RX ADMIN — METHYLPREDNISOLONE SODIUM SUCCINATE 80 MG: 125 INJECTION, POWDER, FOR SOLUTION INTRAMUSCULAR; INTRAVENOUS at 07:04

## 2021-03-22 RX ADMIN — LETROZOLE 2.5 MG: 2.5 TABLET ORAL at 21:19

## 2021-03-22 RX ADMIN — Medication 81 MG: at 21:19

## 2021-03-22 RX ADMIN — AZITHROMYCIN MONOHYDRATE 500 MG: 500 INJECTION, POWDER, LYOPHILIZED, FOR SOLUTION INTRAVENOUS at 09:19

## 2021-03-22 RX ADMIN — DEXAMETHASONE 6 MG: 4 TABLET ORAL at 21:20

## 2021-03-22 ASSESSMENT — ENCOUNTER SYMPTOMS
SHORTNESS OF BREATH: 1
COUGH: 1
VOMITING: 0
EYE DISCHARGE: 0
BLOOD IN STOOL: 0
WHEEZING: 1
RHINORRHEA: 1
DIARRHEA: 0
ABDOMINAL PAIN: 0
SORE THROAT: 0
EYE ITCHING: 0

## 2021-03-22 ASSESSMENT — PAIN SCALES - GENERAL: PAINLEVEL_OUTOF10: 0

## 2021-03-22 NOTE — H&P
catheterization 10/31/2011, 7/11/2007    10/31/2011-No significant CAD. Cardiolite stress test was false positive-Dr Victoria Hernandez; 7/11/2007-No CAD, global function intact;    H/O cardiovascular stress test 10/20/2011, 3/23/2010    10/20/2011-Lexiscan- Evid of mild ischemia in Left CX region. EF 70%. Global LVSF normal;    H/O cardiovascular stress test 1/7/15    EF 77%. Normal Stress Test.    H/O chest pain 4/2005    sees Dr Lynn Wick H/O Doppler ultrasound 2/20/2008 2/20/2008-CAROTID DOPPLER- Normal carotids bilaterally;    H/O Doppler ultrasound 06/06/13    CAROTID DOPPLER- there is heterogeneous, irregular atherosclerotic plaque noted in the right internal carotid artery,  doppler flow velocities within the right internal carotid artery are elevated, consistent with a mild, less than 50%stenosis, there is intimal thickening but no significant atherosclerotic plaque noted in the left internal carotid artery, the left carotid are within normal limits    H/O echocardiogram 4/9/2012, 10/20/2011    4/9/2012-LVSF normal EF=>55%. impaired LV relaxation;    H/O echocardiogram 12/31/14    EF 50-55%. LV shows mild concentric hypertrophy. Mildly dilated left atrium. Mildly dilated right ventricle. Sclerotic but not stenotic aortic valve. Mitral annular calcification. Mild MR, Mild TR, Mild pulm HTN.    H/O urinary incontinence     History of blood transfusion     Hx of Arterial Doppler ultrasound 07/01/2019    No hemodynamically significant or focal stenosis visualized bilaterally, bilateral lower extremity arteries exhibit diffuse plaque and multiphasic waveforms, unable to obtain bilateral ABIs due to elevated leg pressures >250 mmHg, possibly due to atherosclerosis    Hx of cardiovascular stress test 06/2013    EF 70% abn suggestive of anterolateral ischemia, possible RCA ischmeia, post left ventricular dilation suggestive multivessel CAD.     Hx of echocardiogram 06/2013    EF50%, mild MR and TR, mild pulmonary HTN, mild AS    Hx of fall     \"last time 2018- due to neuropathy, have to use cane\"    HX OTHER MEDICAL 7/24/13, 06/2013 7/13-No clinacally significant arrhythmia. 6/13-multiple cycles of wenckebach episodes in addtion to some sinus tach    Hyperlipidemia     Hypertension     Hypothermia 2008    Malignant ne[facility] of breast (female) ([facility] [address] 2012    Neuropathy     \"have neuropathy of my legs\"    Normocytic normochromic anemia     Obstructive sleep apnea 2008 01- Has CPAP machine but has not used in over a year - states she has not had problems since her pacemaker was placed.  Old MI (myocardial infarction)     per pt on 1/15/2019\"had heart attack about a year ago\"    Osteoarthritis     Osteopenia     Primary malignant neoplasm of colon ([facility] [address]     S/P cardiac cath 06/2013    no significant CAD false postive stress test    Super obesity     Umbilical hernia        PastSurgical History:        Procedure Laterality Date    A-V CARDIAC PACEMAKER INSERTION  3/10/14     [employer]. Model:  [id number] Med[employer]onic  Serial:  [id number] dual chamber pacemaker    APPENDECTOMY  2004    BREAST BIOPSY  2/13/12    BREAST LUMPECTOMY  2007    right     BREAST SURGERY  02/13/2012    rt lesion biopsy     CARDIAC CATHETERIZATION  2007 & 2011    COLECTOMY  2004    Colon cancer    COLONOSCOPY  10-18-13    polyp    COLONOSCOPY  1/19/2016    internal hemorrhoids, diverticulosis, 1.5 cm sessile polyp, 8 mm polyp found in rectum.     COLONOSCOPY  12/14/2017    1.5cm residual polyp, 5mm polyps in blind sigmoid loop x3, Sigmoid divertics, Internal grade 1 hemorrhoids    COLONOSCOPY N/A 1/16/2019    COLONOSCOPY W/ ENDOSCOPIC MUCOSAL RESECTION WITH ELEVIEW 5ML INJECTION AND POLYPECTOMY OF TIP OF BLIND RECTOSIGMOID STUMP AND CAUTERIZATION WITH ERBE PROBE, CLIPPING X2 performed by Aisha Miguel MD at [facility]  01/21/2020    pan divertics/ 4 polyps/ sm int hem, S/P partial sigmoid resection w/ end to side anastamosis, repeat in 3 years    COLONOSCOPY N/A 1/21/2020    COLONOSCOPY POLYPECTOMY SNARE/COLD BIOPSY performed by Anabella Albert MD at [place] [address]  2003    back   [address] Bilateral 12/15/14    DENTAL SURGERY      front right tooth and root canal w/ brass bolt    DILATION AND CURETTAGE OF UTERUS  2006    Needed a camera to find my uterus.  ENDOSCOPY, COLON, DIAGNOSTIC  12/14/2017    Small hiatal hernia    HERNIA REPAIR  2004    Umb hernia    HERNIA REPAIR  2008    Inc hernia    LYMPH NODE DISSECTION  2/29/2012    Right axillary-3 sentinel nodes were positive out of 9.    MASTECTOMY, RADICAL Right 02/29/2012    w/ sentinal node disection-Dr West    PACEMAKER PLACEMENT      PRE-MALIGNANT / BENIGN SKIN LESION EXCISION  2/8/2013    right leg X2-Dr West    TONSILLECTOMY  1957    TUNNELED VENOUS PORT PLACEMENT  2004    TUNNELED VENOUS PORT PLACEMENT  02/13/2012    removal of mediport       Medications Prior to Admission:    Prior to Admission medications    Medication Sig Start Date End Date Taking?  Authorizing Provider   acetaminophen (TYLENOL) 500 MG tablet Take 500 mg by mouth every 6 hours as needed for Pain   Yes Historical Provider, MD   simvastatin (ZOCOR) 20 MG tablet Take 1 tablet by mouth nightly 2/11/21  Yes Leretha High, DO   sertraline (ZOLOFT) 100 MG tablet Take 2 tablets by mouth every morning 2/11/21  Yes Leretha High, DO   fluticasone (FLOVENT HFA) 110 MCG/ACT inhaler Inhale 2 puffs into the lungs 2 times daily 2/11/21  Yes Leretha High, DO   ezetimibe (ZETIA) 10 MG tablet Take 1 tablet by mouth every morning 2/11/21  Yes Leretha High, DO   albuterol sulfate HFA (PROAIR HFA) 108 (90 Base) MCG/ACT inhaler INHALE 2 PUFFS BY MOUTH EVERY SIX HOURS AS NEEDED FOR WHEEZING 2/11/21  Yes Leretha High, DO   vitamin B-12 (CYANOCOBALAMIN) 1000 MCG tablet Take 1,000 mcg by mouth daily   Yes Historical Provider, MD   aspirin 81 MG EC tablet Take 81 mg by mouth nightly    Yes Historical Provider, MD   letrozole (FEMARA) 2.5 MG tablet Take 2.5 mg by mouth nightly    Yes Historical Provider, MD   Ferrous Sulfate (IRON) 325 (65 FE) MG TABS Take 1 tablet by mouth every morning    Yes Historical Provider, MD   calcium-vitamin D (OSCAL 500/200 D-3) 500-200 MG-UNIT per tablet Take 1 tablet by mouth 2 times daily    Yes Historical Provider, MD       Allergies:    Bactrim [sulfamethoxazole-trimethoprim], Lipitor [atorvastatin], Taxol [paclitaxel], Aminoglycosides, Demerol, Neosporin [bacitracin-neomycin-polymyxin], Other, and Talc    Social History:    The patient currently lives with friend. Needs help to take a bath. TOBACCO:   reports that she has never smoked. She has never used smokeless tobacco.  ETOH:   reports no history of alcohol use. History:  Positive as follows:      Problem Relation Age of Onset    Arthritis Mother         OA, RA    High Cholesterol Mother     High Blood Pressure Mother     Cancer Father         skin and leukemia    High Blood Pressure Father     High Cholesterol Father     Diabetes Father     Heart Disease Father     Heart Disease Brother     High Cholesterol Brother     High Blood Pressure Brother     Heart Disease Brother     Seizures Son        REVIEW OF SYSTEMS:   Pertinent positives and negatives as noted in the HPI and ROS. All other systems reviewed and negative. Review of Systems   Constitutional: Positive for chills, diaphoresis and fever. HENT: Positive for rhinorrhea. Negative for sneezing and sore throat. Eyes: Negative for discharge and itching. Respiratory: Positive for cough, shortness of breath and wheezing. Cardiovascular: Positive for leg swelling (only if legs are down). Negative for chest pain and palpitations. Gastrointestinal: Negative for abdominal pain, blood in stool, diarrhea and vomiting. Genitourinary: Negative for dysuria and hematuria.    Musculoskeletal: Negative for myalgias. Walks with cane   Skin: Negative for rash and wound. Neurological: Positive for dizziness and light-headedness. Negative for syncope. Psychiatric/Behavioral: Negative for confusion and hallucinations. PHYSICAL EXAM:  BP (!) 129/47   Pulse 64   Temp 99.1 °F (37.3 °C) (Oral)   Resp 18   Ht 5' 2\" (1.575 m)   Wt 268 lb (121.6 kg)   LMP 01/15/1999   SpO2 96%   BMI 49.02 kg/m²   Physical Exam  Constitutional:       General: She is not in acute distress. Appearance: She is not ill-appearing, toxic-appearing or diaphoretic. HENT:      Head: Normocephalic and atraumatic. Nose: No congestion or rhinorrhea. Mouth/Throat:      Mouth: Mucous membranes are moist.   Eyes:      General: No scleral icterus. Right eye: No discharge. Left eye: No discharge. Conjunctiva/sclera: Conjunctivae normal.   Neck:      Musculoskeletal: No neck rigidity or muscular tenderness. Cardiovascular:      Rate and Rhythm: Normal rate and regular rhythm. Pulses: Normal pulses. Heart sounds: No murmur. No friction rub. No gallop. Pulmonary:      Effort: Pulmonary effort is normal. No respiratory distress. Breath sounds: Wheezing present. No rhonchi or rales. Abdominal:      General: Abdomen is flat. Palpations: Abdomen is soft. Tenderness: There is no abdominal tenderness. There is no guarding or rebound. Hernia: No hernia is present. Musculoskeletal:         General: Swelling present. Right lower leg: Edema present. Left lower leg: Edema present. Lymphadenopathy:      Cervical: No cervical adenopathy. Skin:     General: Skin is warm. Coloration: Skin is not jaundiced. Findings: No bruising, lesion or rash. Neurological:      General: No focal deficit present. Mental Status: She is alert. Mental status is at baseline. Cranial Nerves: No cranial nerve deficit.    Psychiatric:         Mood and Affect: Mood normal. Behavior: Behavior normal.         Thought Content: Thought content normal.         Judgment: Judgment normal.           Imaging:    Xr Chest Portable    Result Date: 3/22/2021  EXAMINATION: ONE XRAY VIEW OF THE CHEST 3/22/2021 7:21 am COMPARISON: 08/17/2020 HISTORY: ORDERING SYSTEM PROVIDED HISTORY: chest pain TECHNOLOGIST PROVIDED HISTORY: Reason for exam:->chest pain Reason for Exam: chest pain Acuity: Acute Type of Exam: Initial Additional signs and symptoms: Pt to ED by EMS from home with complaints of shortness of breath for 3 weeks. Pt states that she has had a fever at home but is afebrile at this time FINDINGS: Pacemaker wires. Patchy opacities laterally in the left mid lung. Bibasilar atelectasis. Possible trace effusions. Heart size at the upper limits of normal.  Ill-defined right suprahilar opacities. No pneumothorax. Small patchy bilateral airspace opacities, concerning for pneumonia. Possible trace bilateral effusions. EKG:   AV paced rhythm    CBC   Recent Labs     03/22/21  0656   WBC 5.4   HGB 11.4*   HCT 34.9*         RENAL  Recent Labs     03/22/21  0656   *   K 3.4*   CL 95*   CO2 28   BUN 11   CREATININE 0.8     LFT'S  Recent Labs     03/22/21  0656   AST 19   ALT 16   BILITOT 0.4   ALKPHOS 81     COAG  No results for input(s): INR in the last 72 hours. CARDIAC ENZYMES  Recent Labs     03/22/21  0656   TROPONINT <0.010       U/A:    Lab Results   Component Value Date    COLORU YELLOW 10/16/2018    WBCUA 106 10/16/2018    RBCUA 6 10/16/2018    MUCUS RARE 07/28/2018    BACTERIA OCCASIONAL 10/16/2018    CLARITYU SLIGHTLY CLOUDY 10/16/2018    SPECGRAV 1.009 10/16/2018    LEUKOCYTESUR LARGE 10/16/2018    BLOODU SMALL 10/16/2018    AMORPHOUS OCCASIONAL 04/15/2018       ABG  No results found for: GTA6TOI, BEART, E4RCZGAE, PHART, THGBART, NQE2DRQ, PO2ART, CYN9WMT        There are no active hospital problems to display for this patient.       CERTIFICATION    I certify that Ira Ricks is expected to be hospitalized for >2 midnights based on the following assessment and plan:    ASSESSMENT/PLAN:    1. Pneumonia due to COVID19  -rapid Covid positive. On 2 L nasal cannula. CXR: Small patchy bilateral airspace opacities. Placed on azithromycin and Rocephin for possible superimposed bacterial infection. Check procalcitonin, if low then stop antibiotics. Check sputum culture. Check CRP. Check D-dimer. Start dexamethasone for 10 days. Start remdesivir. Symptoms are greater than 72 hours so not a candidate for convalescent plasma. On Lovenox. On bronchodilators.     Chronic Illnesses  Breast cancer  Colon cancer  Skin cancer  Hypertension  CAD  JAMIE  Depression  S/p pacemaker    DVT Prophylaxis: lovenox  Diet: No diet orders on file  Home O2: none  Code Status: Full   POA: Daughter - Tracey Tomlin MD

## 2021-03-22 NOTE — PROGRESS NOTES
Pharmacy Consult for Remdesivir Initiation per Dr. Dionna Zheng for Use:  Covid (+) - Yes  Requiring supplemental oxygen - Yes  Requiring high-flow oxygen (>15L/min), non-invasive (BiPAP), or invasive mechanical ventilation - No  Use of 1 pressor or less - Yes  EGFR >30mL/min - Yes  ALT <10x ULN - Yes    Recommendations are consistent with the [facility] criteria for use published on the Hub with consideration from multiple clinical trials. https://hub.[facility]. org/sites/Quality/COVID-19/Clinical%20Care/Pharmaceutical/[facility]%20COVID%2019%20Treatment%20Summary%20-%20Hub%20Edition. pdf    Based on the above criteria, the patient is a candidate for remdesivir therapy. Remdesivir 200 mg on day 1 followed by 100 mg daily on days 2-5.      The following test will also be ordered:  BMP x5 days  CBC x5 days  LFT x5 days    RENAL LAB EVALUATION  EGFR:  Lab Results   Component Value Date    LABGLOM >60 03/22/2021     Lab Results   Component Value Date    GFRAA >60 03/22/2021     Recent Labs     03/22/21  0656   BUN 11   CREATININE 0.8       LIVER FUNCTION LAB EVALUATION  Recent Labs     03/22/21  0656   AST 19   ALT 16   ALKPHOS 81   BILITOT 0.4       PLATELETS  Platelets   Date Value Ref Range Status   03/22/2021 186 140 - 440 K/CU MM Final   10/20/2020 241 140 - 440 K/CU MM Final   08/17/2020 204 140 - 440 K/CU MM Final   01/21/2020 330 140 - 440 K/CU MM Final   01/16/2020 259 140 - 440 K/CU MM Final       Thank you for the consult,  Sophia Small PharmD, [facility]  3/22/2021 1:34 PM

## 2021-03-22 NOTE — ED TRIAGE NOTES
Pt to ED by EMS from home with complaints of shortness of breath for 3 weeks. Pt states that she has had a fever at home but is afebrile at this time. Pt states she decided to come in this morning because her friend \"Bill\" wouldn't take care of her at home like he is supposed to and she wanted to come somewhere where she would be cared for. Medics stated that her O2 was 90% after walking to the squad so they placed her on 2L NC and O2 was 97% there after.

## 2021-03-22 NOTE — ED PROVIDER NOTES
EMERGENCY DEPARTMENT ENCOUNTER    [facility] EMERGENCY DEPARTMENT        TRIAGE CHIEF COMPLAINT:   Shortness of Breath (X3 WEEKS)      [place]:  Sin Brown is a 69 y.o. female that presents via EMS from home for shortness of breath, cough and fevers. Onset was prior to arrival, x3 weeks ago. Context is patient states that she has been isolating at home for over a year, does have a friend/caretaker that lives with her. She has no known concerns for COVID-19 contact. Began having body aches, headaches and sore throat with waxing and waning fevers, last fever was yesterday at 103F. States that she typically does not take any antipyretics, just \"sleeps it off. \"  States the cough is progressively worsening, with increasing shortness of breath with short periods of exertion and laying down flat. Has no known history of CHF. Is reporting green sputum. Has had a decreased appetite otherwise no abdominal urinary complaints. She has baseline continent to urine. No black tarry stools diarrhea or bright red blood per rectum. She does not have a baseline oxygen requirement, does have history of COPD/asthma. Denies any active tobacco use. Currently follows with cardiology, Dr. Rip Giles, has a pacemaker in place. Past medical history includes hypertension, hyperlipidemia, obstructive sleep apnea, coronary artery disease. Per EMS, patient was ambulatory to the squad, 90% on room air was placed onto 2 L on route.     ROS:  General:  No fevers, no chills   Cardiovascular:  See HPI    Respiratory: See HPI  Gastrointestinal:  No pain, no nausea, no vomiting, no diarrhea  Musculoskeletal:  No muscle pain, no joint pain  Skin:  No rash, no pruritis, no easy bruising  Neurologic:  No speech problems, +headache, no extremity numbness, no extremity tingling, no extremity weakness  Psychiatric:  No anxiety  Genitourinary:  No dysuria, no hematuria  Extremities:  No edema    Past Medical History:   Diagnosis Date    Anemia     Anxiety 1978    Arthritis     generalized    Asthma 2006    AV block     2nd degree per hospital in june2013    Blood poisoning 1994    Blood transfusion     12/2003    CAD (coronary artery disease)     Cancer ([facility] [facility] 2007    skin (face) & colon(2003)/ 2/2012 dx of right breast ca(pc)    Carcinoma of breast ([facility] [facility] 2/29/2012    right arm no b/p or sticks    Cardiac pacemaker 3/10/14    Medtronic dual lead    Chronic cystitis     Chronic respiratory failure (HCC)     2 L/min oxygen at night-time    Colon cancer ([facility] 2013    COPD (chronic obstructive pulmonary disease) (HCC)     CTS (carpal tunnel syndrome)     Depression     Diverticulitis     H/O 24 hour EKG monitoring 2/28/2014 7/24/13 2/14 complete heart block 7/13No clinically significant arrthymia noted.  H/O cardiac catheterization 10/31/2011, 7/11/2007    10/31/2011-No significant CAD. Cardiolite stress test was false positive-Dr Bang Gaxiola; 7/11/2007-No CAD, global function intact;    H/O cardiovascular stress test 10/20/2011, 3/23/2010    10/20/2011-Lexiscan- Evid of mild ischemia in Left CX region. EF 70%. Global LVSF normal;    H/O cardiovascular stress test 1/7/15    EF 77%. Normal Stress Test.    H/O chest pain 4/2005    sees Dr Martinez December H/O Doppler ultrasound 2/20/2008 2/20/2008-CAROTID DOPPLER- Normal carotids bilaterally;    H/O Doppler ultrasound 06/06/13    CAROTID DOPPLER- there is heterogeneous, irregular atherosclerotic plaque noted in the right internal carotid artery,  doppler flow velocities within the right internal carotid artery are elevated, consistent with a mild, less than 50%stenosis, there is intimal thickening but no significant atherosclerotic plaque noted in the left internal carotid artery, the left carotid are within normal limits    H/O echocardiogram 4/9/2012, 10/20/2011    4/9/2012-LVSF normal EF=>55%.  impaired LV relaxation;    H/O echocardiogram 12/31/14 EF 50-55%. LV shows mild concentric hypertrophy. Mildly dilated left atrium. Mildly dilated right ventricle. Sclerotic but not stenotic aortic valve. Mitral annular calcification. Mild MR, Mild TR, Mild pulm HTN.    H/O urinary incontinence     History of blood transfusion     Hx of Arterial Doppler ultrasound 07/01/2019    No hemodynamically significant or focal stenosis visualized bilaterally, bilateral lower extremity arteries exhibit diffuse plaque and multiphasic waveforms, unable to obtain bilateral ABIs due to elevated leg pressures >250 mmHg, possibly due to atherosclerosis    Hx of cardiovascular stress test 06/2013    EF 70% abn suggestive of anterolateral ischemia, possible RCA ischmeia, post left ventricular dilation suggestive multivessel CAD.  Hx of echocardiogram 06/2013    EF50%, mild MR and TR, mild pulmonary HTN, mild AS    Hx of fall     \"last time 2018- due to neuropathy, have to use cane\"    HX OTHER MEDICAL 7/24/13, 06/2013 7/13-No clinacally significant arrhythmia. 6/13-multiple cycles of wenckebach episodes in addtion to some sinus tach    Hyperlipidemia     Hypertension     Hypothermia 2008    Malignant neoplasm of breast (female) ([address] 2012    Neuropathy     \"have neuropathy of my legs\"    Normocytic normochromic anemia     Obstructive sleep apnea 2008 01- Has CPAP machine but has not used in over a year - states she has not had problems since her pacemaker was placed.  Old MI (myocardial infarction)     per pt on 1/15/2019\"had heart attack about a year ago\"    Osteoarthritis     Osteopenia     Primary malignant neoplasm of colon ([address]     S/P cardiac cath 06/2013    no significant CAD false postive stress test    Super obesity     Umbilical hernia      Past Surgical History:   Procedure Laterality Date    A-V CARDIAC PACEMAKER INSERTION  3/10/14     Medtronic.    Model:  [id number] Medtronic  Serial:  [id number] dual chamber pacemaker    APPENDECTOMY  2004    BREAST BIOPSY  2/13/12    BREAST LUMPECTOMY  2007    right     BREAST SURGERY  02/13/2012    rt lesion biopsy     CARDIAC CATHETERIZATION  2007 & 2011    COLECTOMY  2004    Colon cancer    COLONOSCOPY  10-18-13    polyp    COLONOSCOPY  1/19/2016    internal hemorrhoids, diverticulosis, 1.5 cm sessile polyp, 8 mm polyp found in rectum.  COLONOSCOPY  12/14/2017    1.5cm residual polyp, 5mm polyps in blind sigmoid loop x3, Sigmoid divertics, Internal grade 1 hemorrhoids    COLONOSCOPY N/A 1/16/2019    COLONOSCOPY W/ ENDOSCOPIC MUCOSAL RESECTION WITH ELEVIEW 5ML INJECTION AND POLYPECTOMY OF TIP OF BLIND RECTOSIGMOID STUMP AND CAUTERIZATION WITH ERBE PROBE, CLIPPING X2 performed by Khris Ríos MD at [place] [address]  01/21/2020    pan divertics/ 4 polyps/ sm int hem, S/P partial sigmoid resection w/ end to side anastamosis, repeat in 3 years    COLONOSCOPY N/A 1/21/2020    COLONOSCOPY POLYPECTOMY SNARE/COLD BIOPSY performed by Khris Ríos MD at [place] [address]  2003    back   [address] Bilateral 12/15/14    DENTAL SURGERY      front right tooth and root canal w/ brass bolt    DILATION AND CURETTAGE OF UTERUS  2006    Needed a camera to find my uterus.     ENDOSCOPY, COLON, DIAGNOSTIC  12/14/2017    Small hiatal hernia    HERNIA REPAIR  2004    Umb hernia    HERNIA REPAIR  2008    Inc hernia    LYMPH NODE DISSECTION  2/29/2012    Right axillary-3 sentinel nodes were positive out of 9.    MASTECTOMY, RADICAL Right 02/29/2012    w/ sentinal node disection-Dr West    PACEMAKER PLACEMENT      PRE-MALIGNANT / BENIGN SKIN LESION EXCISION  2/8/2013    right leg X2-Dr West    TONSILLECTOMY  1957    TUNNELED VENOUS PORT PLACEMENT  2004    TUNNELED VENOUS PORT PLACEMENT  02/13/2012    removal of mediport     Family History   Problem Relation Age of Onset    Arthritis Mother         OA, RA    High Cholesterol Mother     High Blood Pressure Mother     Cancer Father         skin and leukemia    High Blood Pressure Father     High Cholesterol Father     Diabetes Father     Heart Disease Father     Heart Disease Brother     High Cholesterol Brother     High Blood Pressure Brother     Heart Disease Brother     Seizures Son      Social History     Socioeconomic History    Marital status:      Spouse name: Not on file    Number of children: 2    Years of education: Not on file    Highest education level: Not on file   Occupational History    Occupation: retired   Social Needs    Financial resource strain: Not on file    Food insecurity     Worry: Never true     Inability: Never true    Transportation needs     Medical: No     Non-medical: No   Tobacco Use    Smoking status: Never Smoker    Smokeless tobacco: Never Used   Substance and Sexual Activity    Alcohol use: No     Comment:           CAFFEINE: 2- 12oz nona daily & 2 cups coffee some nights.  Drug use: No    Sexual activity: Not Currently     Partners: Male   Lifestyle    Physical activity     Days per week: 0 days     Minutes per session: Not on file    Stress: Only a little   Relationships    Social connections     Talks on phone: Three times a week     Gets together: Once a week     Attends [employer] service: 1 to 4 times per year     Active member of club or organization: No     Attends meetings of clubs or organizations: Never     Relationship status: Living with partner    Intimate partner violence     Fear of current or ex partner: No     Emotionally abused: No     Physically abused: No     Forced sexual activity: No   Other Topics Concern    Not on file   Social History Narrative    Do you donate blood or plasma? No    Caffeine intake? Moderate    Advance directive? No    Is blood transfusion acceptable in an emergency? Yes    Live alone or with others? With others    Able to care for self?  Yes    No exercise at this time         Current Facility-Administered Medications   Medication Dose Route Frequency Provider Last Rate Last Admin    azithromycin (ZITHROMAX) 500 mg in dextrose 5 % 250 mL IVPB  500 mg Intravenous Once Jaden Stratton PA-C         Current Outpatient Medications   Medication Sig Dispense Refill    acetaminophen (TYLENOL) 500 MG tablet Take 500 mg by mouth every 6 hours as needed for Pain      simvastatin (ZOCOR) 20 MG tablet Take 1 tablet by mouth nightly 90 tablet 1    sertraline (ZOLOFT) 100 MG tablet Take 2 tablets by mouth every morning 180 tablet 1    fluticasone (FLOVENT HFA) 110 MCG/ACT inhaler Inhale 2 puffs into the lungs 2 times daily 1 Inhaler 5    ezetimibe (ZETIA) 10 MG tablet Take 1 tablet by mouth every morning 90 tablet 1    albuterol sulfate HFA (PROAIR HFA) 108 (90 Base) MCG/ACT inhaler INHALE 2 PUFFS BY MOUTH EVERY SIX HOURS AS NEEDED FOR WHEEZING 1 Inhaler 5    vitamin B-12 (CYANOCOBALAMIN) 1000 MCG tablet Take 1,000 mcg by mouth daily      aspirin 81 MG EC tablet Take 81 mg by mouth nightly       letrozole (FEMARA) 2.5 MG tablet Take 2.5 mg by mouth nightly       Ferrous Sulfate (IRON) 325 (65 FE) MG TABS Take 1 tablet by mouth every morning       calcium-vitamin D (OSCAL 500/200 D-3) 500-200 MG-UNIT per tablet Take 1 tablet by mouth 2 times daily        Allergies   Allergen Reactions    Bactrim [Sulfamethoxazole-Trimethoprim] Anaphylaxis and Hives    Lipitor [Atorvastatin] Shortness Of Breath    Taxol [Paclitaxel] Anaphylaxis and Swelling    Aminoglycosides      Abstracted from [facility] patient chart.     Demerol Nausea Only    Neosporin [Bacitracin-Neomycin-Polymyxin] Rash and Other (See Comments)     hotness    Other Rash     Ivory Soap    Talc Other (See Comments)     blisters       Nursing Notes Reviewed  PHYSICAL EXAM    VITAL SIGNS: BP (!) 129/47   Pulse 64   Temp 99.1 °F (37.3 °C) (Oral)   Resp 18   Ht 5' 2\" (1.575 m)   Wt 268 lb (121.6 kg)   LMP 01/15/1999   SpO2 96%   BMI 49.02 kg/m²    Constitutional:  Well developed, elevated BMI. In no acute distress  Head:  Normocephalic, Atraumatic  Eyes: PERRL. EOMI. Sclera clear. Conjunctiva normal, No discharge. Neck/Lymphatics: Supple, no JVD, No lymphadenopathy  Cardiovascular:  RRR, Normal S1 & S2   Peripheral Vascular: Distal pulses 2+, Capillary refill <2seconds  Respiratory:  Respirations nonnlabored, 96% on 2 L. Coarse diminished air exchange with scattered expiratory wheezing or rhonchi throughout. No rales. No retractions accessory muscle use. Abdomen: Bowel sounds normal in all quadrants, Soft, Non tender/Nondistended, No palpable abdominal masses. Musculoskeletal: BUE/BLE symmetrical without atrophy or deformities. 2+ pretibial pitting edema to the bilateral foot dorsum and anterior shin. Healing wound noted to the dorsal aspect of the right foot with scabbed lesion, no evidence of necrosis, fluctuance or excessive warmth. Integument:  Warm, Dry, Intact  Neurologic:  Alert & oriented x3 , No focal deficits noted. Cranial nerves II through XII grossly intact. No slurred speech. No facial droop. Normal gross motor coordination & motor strength bilateral upper and lower extremities.  *  No gait ataxia  Psychiatric:  Affect appropriate      I have reviewed and interpreted all of the currently available lab results from this visit (if applicable):  Results for orders placed or performed during the hospital encounter of 03/22/21   COVID-19, Rapid    Specimen: Nasopharyngeal   Result Value Ref Range    SARS-CoV-2, NAAT DETECTED    Strep Screen Group A Throat    Specimen: Throat   Result Value Ref Range    Specimen THROAT     Special Requests NONE     Strep A Direct Screen NEGATIVE    CBC Auto Differential   Result Value Ref Range    WBC 5.4 4.0 - 10.5 K/CU MM    RBC 3.99 (L) 4.2 - 5.4 M/CU MM    Hemoglobin 11.4 (L) 12.5 - 16.0 GM/DL    Hematocrit 34.9 (L) 37 - 47 %    MCV 87.5 78 - 100 FL    MCH 28.6 27 - 31 PG    MCHC 32.7 32.0 - 36.0 %    RDW 14.5 11.7 - 14.9 %    Platelets 754 711 - 345 K/CU MM    MPV 9.9 7.5 - 11.1 FL    Differential Type AUTOMATED DIFFERENTIAL     Segs Relative 74.7 (H) 36 - 66 %    Lymphocytes % 15.5 (L) 24 - 44 %    Monocytes % 7.9 (H) 0 - 4 %    Eosinophils % 0.4 0 - 3 %    Basophils % 0.2 0 - 1 %    Segs Absolute 4.0 K/CU MM    Lymphocytes Absolute 0.8 K/CU MM    Monocytes Absolute 0.4 K/CU MM    Eosinophils Absolute 0.0 K/CU MM    Basophils Absolute 0.0 K/CU MM    Nucleated RBC % 0.0 %    Total Nucleated RBC 0.0 K/CU MM    Total Immature Neutrophil 0.07 K/CU MM    Immature Neutrophil % 1.3 (H) 0 - 0.43 %   Comprehensive Metabolic Panel   Result Value Ref Range    Sodium 132 (L) 135 - 145 MMOL/L    Potassium 3.4 (L) 3.5 - 5.1 MMOL/L    Chloride 95 (L) 99 - 110 mMol/L    CO2 28 21 - 32 MMOL/L    BUN 11 6 - 23 MG/DL    CREATININE 0.8 0.6 - 1.1 MG/DL    Glucose 124 (H) 70 - 99 MG/DL    Calcium 8.7 8.3 - 10.6 MG/DL    Albumin 3.4 3.4 - 5.0 GM/DL    Total Protein 7.2 6.4 - 8.2 GM/DL    Total Bilirubin 0.4 0.0 - 1.0 MG/DL    ALT 16 10 - 40 U/L    AST 19 15 - 37 IU/L    Alkaline Phosphatase 81 40 - 129 IU/L    GFR Non-African American >60 >60 mL/min/1.73m2    GFR African American >60 >60 mL/min/1.73m2    Anion Gap 9 4 - 16   Troponin   Result Value Ref Range    Troponin T <0.010 <0.01 NG/ML   Brain Natriuretic Peptide   Result Value Ref Range    Pro-.8 <300 PG/ML   Blood Gas, Venous   Result Value Ref Range    pH, Eulalio 7.36 7.32 - 7.42    pCO2, Eulalio 51 38 - 52 mmHG    pO2, Eulalio 42 28 - 48 mmHG    Base Exc, Mixed 2.4 (H) 0 - 2.3    HCO3, Venous 28.8 (H) 19 - 25 MMOL/L    O2 Sat, Eulalio 73.8 (H) 50 - 70 %    Comment VBG    Mononucleosis Screen   Result Value Ref Range    Monospot NEGATIVE NEGATIVE   EKG 12 Lead   Result Value Ref Range    Ventricular Rate 71 BPM    Atrial Rate 71 BPM    P-R Interval 208 ms    QRS Duration 140 ms    Q-T Interval 416 ms    QTc Calculation (Bazett) 452 ms    P Axis 17 degrees    R Axis -69 degrees    T Axis 101 degrees    Diagnosis       Atrial-sensed ventricular-paced rhythm  Abnormal ECG  When compared with ECG of 17-AUG-2020 00:30,  Vent. rate has increased BY   4 BPM          Radiographs (if obtained):  [] The following radiograph was interpreted by myself in the absence of a radiologist:   [] Radiologist's Report Reviewed:  XR CHEST PORTABLE   Final Result   Small patchy bilateral airspace opacities, concerning for pneumonia. Possible trace bilateral effusions. XR CHEST PORTABLE (Final result)  Result time 03/22/21 08:00:10  Final result by Geraldine Dumont MD (03/22/21 08:00:10)                Impression:    Small patchy bilateral airspace opacities, concerning for pneumonia. Possible trace bilateral effusions. Narrative:    EXAMINATION:   ONE XRAY VIEW OF THE CHEST     3/22/2021 7:21 am     COMPARISON:   08/17/2020     HISTORY:   ORDERING SYSTEM PROVIDED HISTORY: chest pain   TECHNOLOGIST PROVIDED HISTORY:   Reason for exam:->chest pain   Reason for Exam: chest pain   Acuity: Acute   Type of Exam: Initial   Additional signs and symptoms: Pt to ED by EMS from home with complaints of   shortness of breath for 3 weeks.  Pt states that she has had a fever at home   but is afebrile at this time     FINDINGS:   Pacemaker wires.  Patchy opacities laterally in the left mid lung.  Bibasilar   atelectasis.  Possible trace effusions.  Heart size at the upper limits of   normal.  Ill-defined right suprahilar opacities.  No pneumothorax. EKG Interpretation  Please see ED physician's note - Dr. Dc Zamora - for EKG interpretation        Chart review shows recent radiographs:  No results found. ED COURSE & MEDICAL DECISION MAKING       Vital signs and nursing notes reviewed during ED course. I have independently evaluated this patient .  Supervising physician - Dr. Dc Zamora - was present in ED and available for consultation throughout entirety of patient's care. All pertinent Lab data and radiographic results reviewed with patient at bedside. The patient and/or the family were informed of the results of any tests/labs/imaging, the treatment plan, and time was allotted to answer questions. Clinical Impression:  1. Acute respiratory disease due to 2019 novel coronavirus    2. Multifocal pneumonia        Patient presents with three week history of fevers, cough, and worsening shortness of breath. Was reported 90% on room air during ambulation by EMS, currently on 2 L nasal cannula 96% without increased work of breathing. Pleasant obese 74-year-old female, no acute distress. Noted 2+ pretibial and dorsal foot swelling with a healing wound to the right foot. Lungs very coarse diminished air exchange scattered expiratory wheezing or rhonchi throughout. Unremarkable abdominal exam.  Patient is placed on telemetry continuous pulse ox monitoring, given albuterol inhaler treatments as well as IV steroids. CBC with normal white count. Hemoglobin is 11.4 which is slightly down trended compared to previous but within her baseline. Venous blood gas shows pH of 7.36 PCO2 of 51. CMP with mild hypokalemia of 3.4, normal renal function. Troponin, BNP within normal limits. Monospot, rapid strep are both negative. Rapid covid is postive/detectalbe. Chest xray reveals small patchy bilateral airspace opacities concerning for pneumonia with possible trace bilateral effusions. .  EKG shows an atrial paced rhythm no acute ischemic changes. Blood cultures ordered, started on IV Rocephin and azithromycin. Given patient's age, new oxygen requirement and likely multifocal pneumonia developing from COVID-19, do feel patient is warranted for further evaluation and care. Patient is comfortable and agreeable this plan.     I did consult with Dr. Darryle Point - hospitalist - and discussed patient's history, ED presentation/course including any pertinent laboratory findings and imaging study findings. He/she agrees to hospital admission. Patient is admitted to the hospital in stable condition. In consideration of current COVID19 pandemic, with effort to minimize unnecessary provider exposure, this patient was seen at bedside by me independently. However, in compliance with current hospital ABEBA/ED protocol, prior to admission I did discuss this patient case with emergency department physician, Dr. Amber Portillo, who did agree with ED workup/evaluation and plan for admission. Of note, this Pt was NOT admitted to the ICU. In light of current events, I did utilize appropriate PPE (including N95 face mask, safety glasses, gloves as recommended by the health facility/national standard best practice, during my bedside interactions with the patient. Patient was masked throughout ED course. Full droplet precaution as well as full PPE was followed throughout patient's ED course and evaluation. Diagnosis and plan discussed in detail with patient who understands and agrees. Disposition referral (if applicable):  No follow-up provider specified.     Disposition medications (if applicable):  New Prescriptions    No medications on file         (Please note that portions of this note may have been completed with a voice recognition program. Efforts were made to edit the dictations but occasionally words are mis-transcribed.)          Malaika Win PA-C  03/22/21 [id number]

## 2021-03-22 NOTE — ED PROVIDER NOTES
12 lead EKG per my interpretation:  Paced 71  Axis is   Left axis deviation  QTc is  452  There is specific T wave changes appreciated. Inverted T waves aVL, V2  There is no specific ST wave changes appreciated. Prior EKG to compare with was available and similar to previous.         Errol Man DO  03/22/21 1361

## 2021-03-22 NOTE — ED NOTES
Attempted to call report to [address] [place] [address], spoke with Jose Carlos Katz who states she did not receive a phone call about getting new pt and will figure things out and call back     Susan Bradley RN  03/22/21 [id number]

## 2021-03-22 NOTE — ED NOTES
Pt updated to plan of care and + covid, states understanding. Blood cultures x2 drawn and sent to lab.      Jassi Henry RN  03/22/21 2212

## 2021-03-22 NOTE — ED NOTES
[facility] resp therapist notified of ordered albuterol and atrovent inh.       [address], RN  03/22/21 [phone]

## 2021-03-22 NOTE — PROGRESS NOTES
Pt walked with 2 person assist and cane from ed cart to bed in room. Pt incontinent. Pt cleaned and brief changed. Cream applied to pt buttocks. Pt buttocks red/purple and small areas appearing to be old wounds healing. [facility] due to pt incontinence. Pt alert and oriented. Pt belongings consist of cane, glasses, wallet, hair clip, ear rings, a ring. Pt stated she use to have o2 and cpap at home but they came and took both from her. She could not tell staff why items were removed.

## 2021-03-22 NOTE — PROGRESS NOTES
Daughter Anisha Hardy in [place] is the only person aside from friend Leeta Cushing that pt lives with to receive information about pt. Pt states she has a son in town and a daughter in law in town that is not to receive information. Pt educated about pt access code and how it is used. Pt states understanding.

## 2021-03-22 NOTE — ED NOTES
Called back to [address] [place] [address], transferred to [facility] [phone] and report given.      Jassi Henry, RN  03/22/21 [phone]

## 2021-03-22 NOTE — PROGRESS NOTES
Dietary notified pt did not have a meal tray with the rest of the dinner trays on floor and will need a meal tray as soon as possible

## 2021-03-22 NOTE — PROGRESS NOTES
Per daughter pt is not getting around at home well and is spending long periods sitting on incontinence pads because she can not get up and walk well.  Daughter concerned about discharge plan due to her living out of state

## 2021-03-23 LAB
ALBUMIN SERPL-MCNC: 3.4 GM/DL (ref 3.4–5)
ALBUMIN SERPL-MCNC: 3.4 GM/DL (ref 3.4–5)
ALP BLD-CCNC: 83 IU/L (ref 40–128)
ALP BLD-CCNC: 83 IU/L (ref 40–129)
ALT SERPL-CCNC: 17 U/L (ref 10–40)
ALT SERPL-CCNC: 17 U/L (ref 10–40)
ANION GAP SERPL CALCULATED.3IONS-SCNC: 11 MMOL/L (ref 4–16)
AST SERPL-CCNC: 21 IU/L (ref 15–37)
AST SERPL-CCNC: 21 IU/L (ref 15–37)
BASOPHILS ABSOLUTE: 0 K/CU MM
BASOPHILS RELATIVE PERCENT: 0.1 % (ref 0–1)
BILIRUB SERPL-MCNC: 0.2 MG/DL (ref 0–1)
BILIRUB SERPL-MCNC: 0.2 MG/DL (ref 0–1)
BILIRUBIN DIRECT: 0.2 MG/DL (ref 0–0.3)
BILIRUBIN, INDIRECT: 0 MG/DL (ref 0–0.7)
BUN BLDV-MCNC: 19 MG/DL (ref 6–23)
CALCIUM SERPL-MCNC: 8.7 MG/DL (ref 8.3–10.6)
CHLORIDE BLD-SCNC: 99 MMOL/L (ref 99–110)
CO2: 24 MMOL/L (ref 21–32)
CREAT SERPL-MCNC: 0.8 MG/DL (ref 0.6–1.1)
D DIMER: 655 NG/ML(DDU)
DIFFERENTIAL TYPE: ABNORMAL
EOSINOPHILS ABSOLUTE: 0 K/CU MM
EOSINOPHILS RELATIVE PERCENT: 0 % (ref 0–3)
GFR AFRICAN AMERICAN: >60 ML/MIN/1.73M2
GFR NON-AFRICAN AMERICAN: >60 ML/MIN/1.73M2
GLUCOSE BLD-MCNC: 140 MG/DL (ref 70–99)
HCT VFR BLD CALC: 35.8 % (ref 37–47)
HEMOGLOBIN: 11.4 GM/DL (ref 12.5–16)
HIGH SENSITIVE C-REACTIVE PROTEIN: 41 MG/L
IMMATURE NEUTROPHIL %: 1.2 % (ref 0–0.43)
LACTATE: 2 MMOL/L (ref 0.4–2)
LYMPHOCYTES ABSOLUTE: 0.9 K/CU MM
LYMPHOCYTES RELATIVE PERCENT: 10.9 % (ref 24–44)
MCH RBC QN AUTO: 28.9 PG (ref 27–31)
MCHC RBC AUTO-ENTMCNC: 31.8 % (ref 32–36)
MCV RBC AUTO: 90.6 FL (ref 78–100)
MONOCYTES ABSOLUTE: 0.4 K/CU MM
MONOCYTES RELATIVE PERCENT: 5 % (ref 0–4)
NUCLEATED RBC %: 0 %
PDW BLD-RTO: 13.8 % (ref 11.7–14.9)
PLATELET # BLD: 222 K/CU MM (ref 140–440)
PMV BLD AUTO: 10.3 FL (ref 7.5–11.1)
POTASSIUM SERPL-SCNC: 4.1 MMOL/L (ref 3.5–5.1)
PROCALCITONIN: 0.07
RBC # BLD: 3.95 M/CU MM (ref 4.2–5.4)
SEGMENTED NEUTROPHILS ABSOLUTE COUNT: 6.5 K/CU MM
SEGMENTED NEUTROPHILS RELATIVE PERCENT: 82.8 % (ref 36–66)
SODIUM BLD-SCNC: 134 MMOL/L (ref 135–145)
TOTAL IMMATURE NEUTOROPHIL: 0.09 K/CU MM
TOTAL NUCLEATED RBC: 0 K/CU MM
TOTAL PROTEIN: 6.5 GM/DL (ref 6.4–8.2)
TOTAL PROTEIN: 6.5 GM/DL (ref 6.4–8.2)
TROPONIN T: <0.01 NG/ML
VITAMIN D 25-HYDROXY: 23.47 NG/ML
WBC # BLD: 7.8 K/CU MM (ref 4–10.5)

## 2021-03-23 PROCEDURE — 82306 VITAMIN D 25 HYDROXY: CPT

## 2021-03-23 PROCEDURE — XW033E5 INTRODUCTION OF REMDESIVIR ANTI-INFECTIVE INTO PERIPHERAL VEIN, PERCUTANEOUS APPROACH, NEW TECHNOLOGY GROUP 5: ICD-10-PCS | Performed by: HOSPITALIST

## 2021-03-23 PROCEDURE — 84145 PROCALCITONIN (PCT): CPT

## 2021-03-23 PROCEDURE — 97535 SELF CARE MNGMENT TRAINING: CPT

## 2021-03-23 PROCEDURE — 1200000000 HC SEMI PRIVATE

## 2021-03-23 PROCEDURE — 83605 ASSAY OF LACTIC ACID: CPT

## 2021-03-23 PROCEDURE — 80048 BASIC METABOLIC PNL TOTAL CA: CPT

## 2021-03-23 PROCEDURE — 6360000002 HC RX W HCPCS: Performed by: HOSPITALIST

## 2021-03-23 PROCEDURE — 84484 ASSAY OF TROPONIN QUANT: CPT

## 2021-03-23 PROCEDURE — 6370000000 HC RX 637 (ALT 250 FOR IP): Performed by: HOSPITALIST

## 2021-03-23 PROCEDURE — 86141 C-REACTIVE PROTEIN HS: CPT

## 2021-03-23 PROCEDURE — 2580000003 HC RX 258: Performed by: HOSPITALIST

## 2021-03-23 PROCEDURE — 85025 COMPLETE CBC W/AUTO DIFF WBC: CPT

## 2021-03-23 PROCEDURE — 97116 GAIT TRAINING THERAPY: CPT

## 2021-03-23 PROCEDURE — 80053 COMPREHEN METABOLIC PANEL: CPT

## 2021-03-23 PROCEDURE — 36415 COLL VENOUS BLD VENIPUNCTURE: CPT

## 2021-03-23 PROCEDURE — 85379 FIBRIN DEGRADATION QUANT: CPT

## 2021-03-23 PROCEDURE — 93010 ELECTROCARDIOGRAM REPORT: CPT | Performed by: INTERNAL MEDICINE

## 2021-03-23 PROCEDURE — 82248 BILIRUBIN DIRECT: CPT

## 2021-03-23 PROCEDURE — 94640 AIRWAY INHALATION TREATMENT: CPT

## 2021-03-23 PROCEDURE — 2700000000 HC OXYGEN THERAPY PER DAY

## 2021-03-23 PROCEDURE — 94761 N-INVAS EAR/PLS OXIMETRY MLT: CPT

## 2021-03-23 PROCEDURE — 97162 PT EVAL MOD COMPLEX 30 MIN: CPT

## 2021-03-23 PROCEDURE — 97530 THERAPEUTIC ACTIVITIES: CPT

## 2021-03-23 PROCEDURE — 97166 OT EVAL MOD COMPLEX 45 MIN: CPT

## 2021-03-23 RX ORDER — CODEINE PHOSPHATE AND GUAIFENESIN 10; 100 MG/5ML; MG/5ML
5 SOLUTION ORAL EVERY 4 HOURS PRN
Status: DISCONTINUED | OUTPATIENT
Start: 2021-03-23 | End: 2021-03-27 | Stop reason: HOSPADM

## 2021-03-23 RX ADMIN — SODIUM CHLORIDE, PRESERVATIVE FREE 10 ML: 5 INJECTION INTRAVENOUS at 21:22

## 2021-03-23 RX ADMIN — SERTRALINE HYDROCHLORIDE 200 MG: 100 TABLET ORAL at 08:43

## 2021-03-23 RX ADMIN — ENOXAPARIN SODIUM 40 MG: 40 INJECTION SUBCUTANEOUS at 21:22

## 2021-03-23 RX ADMIN — ENOXAPARIN SODIUM 40 MG: 40 INJECTION SUBCUTANEOUS at 08:42

## 2021-03-23 RX ADMIN — FAMOTIDINE 20 MG: 20 TABLET, FILM COATED ORAL at 08:43

## 2021-03-23 RX ADMIN — LETROZOLE 2.5 MG: 2.5 TABLET ORAL at 21:21

## 2021-03-23 RX ADMIN — Medication 2000 UNITS: at 08:43

## 2021-03-23 RX ADMIN — DEXAMETHASONE 6 MG: 4 TABLET ORAL at 08:43

## 2021-03-23 RX ADMIN — SIMVASTATIN 20 MG: 20 TABLET, FILM COATED ORAL at 21:21

## 2021-03-23 RX ADMIN — Medication 2 PUFF: at 07:38

## 2021-03-23 RX ADMIN — FAMOTIDINE 20 MG: 20 TABLET, FILM COATED ORAL at 21:21

## 2021-03-23 RX ADMIN — Medication 2 PUFF: at 21:34

## 2021-03-23 RX ADMIN — AZITHROMYCIN MONOHYDRATE 500 MG: 500 INJECTION, POWDER, LYOPHILIZED, FOR SOLUTION INTRAVENOUS at 10:46

## 2021-03-23 RX ADMIN — CEFTRIAXONE 1000 MG: 1 INJECTION, POWDER, FOR SOLUTION INTRAMUSCULAR; INTRAVENOUS at 08:42

## 2021-03-23 RX ADMIN — REMDESIVIR 100 MG: 100 INJECTION, POWDER, LYOPHILIZED, FOR SOLUTION INTRAVENOUS at 14:59

## 2021-03-23 RX ADMIN — Medication 81 MG: at 21:21

## 2021-03-23 RX ADMIN — CYANOCOBALAMIN TAB 1000 MCG 1000 MCG: 1000 TAB at 08:56

## 2021-03-23 RX ADMIN — Medication 1 TABLET: at 21:21

## 2021-03-23 RX ADMIN — SODIUM CHLORIDE, PRESERVATIVE FREE 10 ML: 5 INJECTION INTRAVENOUS at 08:43

## 2021-03-23 RX ADMIN — EZETIMIBE 10 MG: 10 TABLET ORAL at 08:43

## 2021-03-23 RX ADMIN — FERROUS SULFATE TAB 325 MG (65 MG ELEMENTAL FE) 325 MG: 325 (65 FE) TAB at 08:43

## 2021-03-23 ASSESSMENT — PAIN SCALES - GENERAL: PAINLEVEL_OUTOF10: 0

## 2021-03-23 NOTE — CONSULTS
[address] [facility] PHYSICAL THERAPY EVALUATION  [address] [place] [address], 1950, 2011/2011-A, 3/23/2021    History  [place]:  The primary encounter diagnosis was Acute respiratory disease due to 2019 novel coronavirus. A diagnosis of Multifocal pneumonia was also pertinent to this visit. Patient  has a past medical history of Anemia, Anxiety, Arthritis, Asthma, AV block, Blood poisoning, Blood transfusion, CAD (coronary artery disease), Cancer ([address] Carcinoma of breast ([address] Cardiac pacemaker, Chronic cystitis, Chronic respiratory failure ([address] Colon cancer ([address] COPD (chronic obstructive pulmonary disease) ([address] CTS (carpal tunnel syndrome), Depression, Diverticulitis, H/O 24 hour EKG monitoring, H/O cardiac catheterization, H/O cardiovascular stress test, H/O cardiovascular stress test, H/O chest pain, H/O Doppler ultrasound, H/O Doppler ultrasound, H/O echocardiogram, H/O echocardiogram, H/O urinary incontinence, History of blood transfusion, Hx of Arterial Doppler ultrasound, Hx of cardiovascular stress test, Hx of echocardiogram, Hx of fall, HX OTHER MEDICAL, Hyperlipidemia, Hypertension, Hypothermia, Malignant neoplasm of breast (female) ([address] Neuropathy, Normocytic normochromic anemia, Obstructive sleep apnea, Old MI (myocardial infarction), Osteoarthritis, Osteopenia, Primary malignant neoplasm of colon ([address] S/P cardiac cath, Super obesity, and Umbilical hernia. Patient  has a past surgical history that includes Tonsillectomy (1957); Appendectomy (2004); colectomy (2004); Tunneled venous port placement (2004); cyst removal (2003); Breast lumpectomy (2007); Dilation and curettage of uterus (2006); hernia repair (2004); hernia repair (2008); Dental surgery; Breast surgery (02/13/2012); Tunneled venous port placement (02/13/2012); Breast biopsy (2/13/12); Mastectomy, radical (Right, 02/29/2012); pre-malignant / benign skin lesion excision (2/8/2013); lymph node dissection (2/29/2012);  Cardiac catheterization (2007 & 2011); A-V cardiac pacemaker insertion (3/10/14); Cystoscopy (Bilateral, 12/15/14); pacemaker placement; Endoscopy, colon, diagnostic (12/14/2017); Colonoscopy (10-18-13); Colonoscopy (1/19/2016); Colonoscopy (12/14/2017); Colonoscopy (N/A, 1/16/2019); Colonoscopy (01/21/2020); and Colonoscopy (N/A, 1/21/2020). Subjective:  Patient states:  Agreeable to therapy. Pain:  No c/o. Communication with other providers:  Handoff to RN, co-eval with OT. Restrictions: Fall risk    Home Setup/Prior level of function  Social/Functional History  Bathroom Shower/Tub: Tub only  ADL Assistance: Independent(but admits recent difficulty with bathing and dressing and frequent incontience ; someone always assists transfer into her clawfoot tub)  Ambulation Assistance: Independent(c cane or rollator)  Transfer Assistance: Independent    Examination of body systems (includes body structures/functions, activity/participation limitations):  · Observation:  Supine in bed upon arrival  · Vision:  [employer] [place]  · [facility] [employer] [place]  · Cardiopulmonary:  On room air, sating 89-95%  · Cognition: WFL, AxO, see OT/SLP note for further evaluation. Musculoskeletal  · ROM R/L:  WFL. · Strength R/L:  4+/5, min weakness in function and endurance. · Neuro:  No focal deficits    · Gait pattern: \"furniture walks\" at baseline, use of cane with forward flexed posture, reaching for objects in environment. Mobility:  · Supine to sit: Max A  · Transfers: CGA  · Sitting balance:  SBA. · Standing balance:  CGA. · Gait: CGA-min A    [facility] 6 Clicks Inpatient Mobility:  AM-PAC Inpatient Mobility Raw Score : 16    Treatment:  Sup to sit: min A at LE, max A at hips and trunk, HOB elevated, cues for sequencing. Sitting balance: SBA, assist to scoot to edge of surface. Anxious sitting EOB, improved anxiety when personal cane provided. Transfers: STS from bed, commode, and chair (x2) with SPC, CGA.     Gait: ambulated x3ft, x10ft, x3ft, x5ft with cane and intermittent HHA, patient either reaching for objects in environment or HHA depending on environment. Refused to use RW. Standing balance: at sink, cues for upright posture and positioning close to sink, patient resting on forearms, increasing fatigue. Safety: patient left in chair with chair alamr, call light within reach, RN notified, gait belt used. Assessment:  Patient is a 70 yo female who presents with COVID, generalized weakness, per patient's daughter patient with increasing difficulty ambulating in home. Demonstrates impaired mobility and endurance with risk of falls. Rec swing bed/SNF. Complexity: Moderate  Prognosis: Good, no significant barriers to participation at this time. Plan Times per week: 2-3+/week, 1 week,      Equipment: TBD    Goals:  Short term goals  Time Frame for Short term goals: 1 week  Short term goal 1: Patient will perform supine to sit with min A. Short term goal 2: Patient will perform STS x3 with CGA. Short term goal 3: Patient will ambulate x25ft with LRAD and CGA. Treatment plan:  Bed mobility, transfers, balance, gait, TA, TX. Recommendations for NURSING mobility: ambulate to BR with cane and gait belt.     Time:   Time in: 1059  Time out: 1135  Timed treatment minutes: 23  Total time: 36    Electronically signed by:    Keya Oh, PT  3/23/2021, 3:36 PM

## 2021-03-23 NOTE — PROGRESS NOTES
Supine to sit: MaxA. Sleeps in chair at baseline. Sitting balance: SBA    Transfers:   Sit to stand: CGA  Stand to sit: [facility]    Standing balance: CGA     Ambulation:  CGA c cane 3' to chair +10' to toilet +3' to sink +5' to chair    Activity tolerance  WFL, slightly below baseline. Assessment:  Assessment  Performance deficits / Impairments: Decreased endurance, Decreased ADL status, Decreased functional mobility , Decreased high-level IADLs, Decreased safe awareness, Decreased balance  Treatment Diagnosis: covid-19 PNA  Prognosis: Good  Decision Making: Medium Complexity  REQUIRES OT FOLLOW UP: Yes  Discharge Recommendations: Subacute/Skilled Nursing Facility      Goals:  By d/c or goals met:       Pt will perform all functional transfers with SBA and appropriate use of LRD to bed, toilet, chair in prep for increased functional independence. Pt will perform UB ADLs with SBA to increase functional independence. Pt will perform LB ADLs with SBA to increase functional independence. Pt will perform all aspects of toileting with SBA to increase functional independence. Pt will participate in therex/therax c emphasis on strength, activity tolerance,  safety, SAIRA tasks. Plan:  Plan  Times per week: 2x      Recommendation for activity with nursing staff:  CGA-Neha c cane to bathroom or to chair. Treatment today:    Self Care Training:   Self care training was performed today. Cues were given for safety, sequence, UE/LE placement, visual cues, and balance. Therapeutic Activity Training:   Therapeutic activity training was instructed today. Cues were given for safety, sequence, UE/LE placement, visual cues, and balance. Education: Role of OT, OT POC, d/c needs, home safety    Safety: Left in chair with all needs in reach. chair alarm applied. Gait belt used for transfer and mobility.       Time in:  1055  Time out:  1135  Timed treatment minutes:  25  Total treatment time:

## 2021-03-23 NOTE — PROGRESS NOTES
Hospitalist Progress Note      Name:  Hugo Martínez /Age/Sex: 1950  (69 y.o. female)   MRN & CSN:  [id number] & [id number] Admission Date/Time: 3/22/2021  6:23 AM   Location:  -A PCP: Raiza Saenz. Wallace Ma 57 Day: 2    Assessment and Plan:   Hugo Martínez is a 69 y.o.  female  who presents with fever and cough    1) Acute Hypoxic Resp failure due to COVID 19 PNA  -Tested positive 2021 but symptoms have been ongoing for about 3 weeks PTA  -CXR: Small patchy bilateral airspace opacities.   -Follow CRP and Procal  -Continue Dexamethasone for 10 days  -No candidate for CP due to duration of symptoms  -Wean off O2 as tolerated  -Continue IV abx     2) Physical Debility  -PT/OT ordered  -Might need placement    Other Chronic medical conditions; medication resumed unless contraindicated   Breast cancer  Colon cancer  Skin cancer  Essential Hypertension  CAD  JAMIE  Depression  S/p pacemaker     DVT Prophylaxis: lovenox           Diet DIET GENERAL;   DVT Prophylaxis [] Lovenox, []  Heparin, [] SCDs, [] Ambulation   GI Prophylaxis [] PPI,  [] H2 Blocker,  [] Carafate,  [] Diet/Tube Feeds   Code Status Full Code   Disposition TBD   MDM      History of Present Illness:     Patient was seen and examined  Denied any worsening SOB  No chest pain, dizziness  No fever, chills, N/V    Ten point ROS reviewed negative, unless as noted above    Objective: Intake/Output Summary (Last 24 hours) at 3/23/2021 1127  Last data filed at 3/23/2021 0513  Gross per 24 hour   Intake --   Output 950 ml   Net -950 ml      Vitals:   Vitals:    21 0740   BP:    Pulse: 60   Resp: 17   Temp:    SpO2: 93%     Physical Exam:   GEN Awake female, sitting upright in bed in no apparent distress. Appears given age. EYES Pupils are equally round. No scleral erythema, discharge, or conjunctivitis. HENT Mucous membranes are moist. Oral pharynx without exudates, no evidence of thrush.   NECK Supple, no apparent thyromegaly or masses. RESP Clear to auscultation, no wheezes, rales or rhonchi. Symmetric chest movement while on 0.5 L of O2.  CARDIO/VASC S1/S2 auscultated. Regular rate without appreciable murmurs, rubs, or gallops. No JVD or carotid bruits. Peripheral pulses equal bilaterally and palpable. No peripheral edema. GI Abdomen is soft without significant tenderness, masses, or guarding. Bowel sounds are normoactive. Rectal exam deferred.  No costovertebral angle tenderness. Farooq catheter is not present. HEME/LYMPH No palpable cervical lymphadenopathy and no hepatosplenomegaly. No petechiae or ecchymoses. MSK No gross joint deformities. SKIN Normal coloration, warm, dry. NEURO Cranial nerves appear grossly intact, normal speech, no lateralizing weakness. PSYCH Awake, alert, oriented x 4. Affect appropriate.     Medications:   Medications:    aspirin  81 mg Oral Nightly    calcium-cholecalciferol  1 tablet Oral BID    ezetimibe  10 mg Oral QAM    ferrous sulfate  325 mg Oral QAM    fluticasone  2 puff Inhalation BID    letrozole  2.5 mg Oral Nightly    sertraline  200 mg Oral QAM    vitamin B-12  1,000 mcg Oral Daily    sodium chloride flush  10 mL Intravenous 2 times per day    famotidine  20 mg Oral BID    enoxaparin  40 mg Subcutaneous BID    dexamethasone  6 mg Oral Daily    vitamin D  2,000 Units Oral Daily    azithromycin  500 mg Intravenous Q24H    cefTRIAXone (ROCEPHIN) IV  1,000 mg Intravenous Q24H    simvastatin  20 mg Oral Nightly    remdesivir IVPB  100 mg Intravenous Q24H      Infusions:   PRN Meds: guaiFENesin-codeine, 5 mL, Q4H PRN  acetaminophen, 500 mg, Q6H PRN  sodium chloride flush, 10 mL, PRN  promethazine, 12.5 mg, Q6H PRN    Or  ondansetron, 4 mg, Q6H PRN  polyethylene glycol, 17 g, Daily PRN  acetaminophen, 650 mg, Q6H PRN    Or  acetaminophen, 650 mg, Q6H PRN  sodium chloride, 30 mL, PRN          Electronically signed by Tarun Chase MD on 3/23/2021 at 11:27 AM

## 2021-03-24 ENCOUNTER — TELEPHONE (OUTPATIENT)
Dept: INTERNAL MEDICINE CLINIC | Age: 71
End: 2021-03-24

## 2021-03-24 LAB
CULTURE: NORMAL
Lab: NORMAL
SPECIMEN: NORMAL
STREP A DIRECT SCREEN: NEGATIVE

## 2021-03-24 PROCEDURE — 6370000000 HC RX 637 (ALT 250 FOR IP): Performed by: HOSPITALIST

## 2021-03-24 PROCEDURE — 6360000002 HC RX W HCPCS: Performed by: HOSPITALIST

## 2021-03-24 PROCEDURE — 94761 N-INVAS EAR/PLS OXIMETRY MLT: CPT

## 2021-03-24 PROCEDURE — 1200000000 HC SEMI PRIVATE

## 2021-03-24 PROCEDURE — 2580000003 HC RX 258: Performed by: HOSPITALIST

## 2021-03-24 PROCEDURE — 94640 AIRWAY INHALATION TREATMENT: CPT

## 2021-03-24 PROCEDURE — 2500000003 HC RX 250 WO HCPCS: Performed by: HOSPITALIST

## 2021-03-24 RX ADMIN — FAMOTIDINE 20 MG: 20 TABLET, FILM COATED ORAL at 21:36

## 2021-03-24 RX ADMIN — ENOXAPARIN SODIUM 40 MG: 40 INJECTION SUBCUTANEOUS at 09:35

## 2021-03-24 RX ADMIN — SERTRALINE HYDROCHLORIDE 200 MG: 100 TABLET ORAL at 09:35

## 2021-03-24 RX ADMIN — ENOXAPARIN SODIUM 40 MG: 40 INJECTION SUBCUTANEOUS at 21:36

## 2021-03-24 RX ADMIN — LETROZOLE 2.5 MG: 2.5 TABLET ORAL at 21:36

## 2021-03-24 RX ADMIN — DEXAMETHASONE 6 MG: 4 TABLET ORAL at 09:34

## 2021-03-24 RX ADMIN — SODIUM CHLORIDE, PRESERVATIVE FREE 10 ML: 5 INJECTION INTRAVENOUS at 09:36

## 2021-03-24 RX ADMIN — CEFTRIAXONE 1000 MG: 1 INJECTION, POWDER, FOR SOLUTION INTRAMUSCULAR; INTRAVENOUS at 09:35

## 2021-03-24 RX ADMIN — Medication 1 TABLET: at 09:34

## 2021-03-24 RX ADMIN — Medication 1 TABLET: at 21:36

## 2021-03-24 RX ADMIN — EZETIMIBE 10 MG: 10 TABLET ORAL at 09:34

## 2021-03-24 RX ADMIN — Medication 2 PUFF: at 19:33

## 2021-03-24 RX ADMIN — FAMOTIDINE 20 MG: 20 TABLET, FILM COATED ORAL at 09:34

## 2021-03-24 RX ADMIN — FERROUS SULFATE TAB 325 MG (65 MG ELEMENTAL FE) 325 MG: 325 (65 FE) TAB at 09:34

## 2021-03-24 RX ADMIN — Medication 2 PUFF: at 07:57

## 2021-03-24 RX ADMIN — CYANOCOBALAMIN TAB 1000 MCG 1000 MCG: 1000 TAB at 09:35

## 2021-03-24 RX ADMIN — SIMVASTATIN 20 MG: 20 TABLET, FILM COATED ORAL at 21:36

## 2021-03-24 RX ADMIN — Medication 81 MG: at 21:36

## 2021-03-24 RX ADMIN — REMDESIVIR 100 MG: 100 INJECTION, POWDER, LYOPHILIZED, FOR SOLUTION INTRAVENOUS at 14:34

## 2021-03-24 RX ADMIN — AZITHROMYCIN MONOHYDRATE 500 MG: 500 INJECTION, POWDER, LYOPHILIZED, FOR SOLUTION INTRAVENOUS at 09:35

## 2021-03-24 RX ADMIN — SODIUM CHLORIDE, PRESERVATIVE FREE 10 ML: 5 INJECTION INTRAVENOUS at 21:42

## 2021-03-24 RX ADMIN — Medication 2000 UNITS: at 09:34

## 2021-03-24 NOTE — DISCHARGE INSTR - COC
Continuity of Care Form    Patient Name: Maria Isabel Rodarte   :  1950  MRN:  [id number]    Admit date:  3/22/2021  Discharge date:  ***    Code Status Order: Full Code   Advance Directives:      Admitting Physician:  Azul Hoffman MD  PCP: Jose Santacruz DO    Discharging Nurse: [facility] Unit/Room#: -A  Discharging Unit Phone Number: ***    Emergency Contact:   Extended Emergency Contact Information  Primary Emergency Contact: Inderjit Kuhn  Address: [address] [facility], [address] Phone: [phone]  Mobile Phone: [phone]  Relation: Other  Secondary Emergency Contact: Dickson Samaniego  Address: [address] [facility], [address] Phone: [phone]  Mobile Phone: [phone]  Relation: Parent    Past Surgical History:  Past Surgical History:   Procedure Laterality Date    A-V CARDIAC PACEMAKER INSERTION  3/10/14     Medtronic. Model:  [id number] Medtronic  Serial:  [id number] dual chamber pacemaker    APPENDECTOMY  2004    BREAST BIOPSY  12    BREAST LUMPECTOMY  2007    right     BREAST SURGERY  2012    rt lesion biopsy     CARDIAC CATHETERIZATION   &     COLECTOMY      Colon cancer    COLONOSCOPY  10-18-13    polyp    COLONOSCOPY  2016    internal hemorrhoids, diverticulosis, 1.5 cm sessile polyp, 8 mm polyp found in rectum.     COLONOSCOPY  2017    1.5cm residual polyp, 5mm polyps in blind sigmoid loop x3, Sigmoid divertics, Internal grade 1 hemorrhoids    COLONOSCOPY N/A 2019    COLONOSCOPY W/ ENDOSCOPIC MUCOSAL RESECTION WITH ELEVIEW 5ML INJECTION AND POLYPECTOMY OF TIP OF BLIND RECTOSIGMOID STUMP AND CAUTERIZATION WITH ERBE PROBE, CLIPPING X2 performed by David Suh MD at [facility] 9  2020    pan divertics/ 4 polyps/ sm int hem, S/P partial sigmoid resection w/ end to side anastamosis, repeat in 3 years  COLONOSCOPY N/A 1/21/2020    COLONOSCOPY POLYPECTOMY SNARE/COLD BIOPSY performed by Ethel Ley MD at [facility] 82  2003    back   [address] Bilateral 12/15/14    DENTAL SURGERY      front right tooth and root canal w/ brass bolt    DILATION AND CURETTAGE OF UTERUS  2006    Needed a camera to find my uterus.     ENDOSCOPY, COLON, DIAGNOSTIC  12/14/2017    Small hiatal hernia    HERNIA REPAIR  2004    Umb hernia    HERNIA REPAIR  2008    Inc hernia    LYMPH NODE DISSECTION  2/29/2012    Right axillary-3 sentinel nodes were positive out of 9.    MASTECTOMY, RADICAL Right 02/29/2012    w/ sentinal node disection-Dr West    PACEMAKER PLACEMENT      PRE-MALIGNANT / BENIGN SKIN LESION EXCISION  2/8/2013    right leg X2-Dr West    TONSILLECTOMY  1957    TUNNELED VENOUS PORT PLACEMENT  2004    TUNNELED VENOUS PORT PLACEMENT  02/13/2012    removal of mediport       Immunization History:   Immunization History   Administered Date(s) Administered    Tdap (Boostrix, Adacel) 07/13/2017       Active Problems:  Patient Active Problem List   Diagnosis Code    Malignant neoplasm of upper-outer quadrant of right breast in female, estrogen receptor positive ([facility] C50.411, Z17.0    Diffuse cystic mastopathy N60.19    Hyperlipidemia E78.5    H/O chest pain Z87.898    Heart block AV second degree I44.1    Abnormal cardiovascular stress test R94.39    JAMIE (obstructive sleep apnea) G47.33    Super obesity E66.9    Mild asthma J45.909    Severe obstructive sleep apnea G47.33    Cardiac pacemaker Z95.0    Chronic cystitis N30.20    Asthma J45.909    Hypertension I10    Depression F32.9    Obstructive sleep apnea G47.33    Nocturnal oxygen desaturation G47.34    NSTEMI (non-ST elevated myocardial infarction) ([facility]) I21.4    PVD (peripheral vascular disease) ([facility]) I73.9    Morbid obesity with BMI of 45.0-49.9, adult (HCC) E66.01, Z68.42    Moderate aortic stenosis I35.0    Hepatomegaly, not elsewhere classified R16.0    Arthritis M19.90    CAD (coronary artery disease) I25.10    Pneumonia due to COVID-19 virus U07.1, J12.82       Isolation/Infection:   Isolation            Droplet Plus          Patient Infection Status       Infection Onset Added Last Indicated Last Indicated By Review Planned Expiration Resolved Resolved By    COVID-19  03/23/21 03/23/21 Rima Porter RN 03/30/21 04/06/21      Resolved    COVID-19 Rule Out 03/22/21 03/22/21 03/22/21 COVID-19, Rapid (Ordered)   03/22/21 Rule-Out Test Resulted            Nurse Assessment:  Last Vital Signs: BP (!) 142/62   Pulse 60   Temp 97.7 °F (36.5 °C) (Oral)   Resp 20   Ht 5' 2\" (1.575 m)   Wt 280 lb 3.3 oz (127.1 kg)   LMP 01/15/1999   SpO2 95%   BMI 51.25 kg/m²     Last documented pain score (0-10 scale): Pain Level: 0  Last Weight:   Wt Readings from Last 1 Encounters:   03/23/21 280 lb 3.3 oz (127.1 kg)     Mental Status:  oriented and alert    IV Access:  - None    Nursing Mobility/ADLs:  Walking   Assisted  Transfer  Assisted  Bathing  Assisted  Dressing  Assisted  Toileting  Assisted  Feeding  Independent  Med Admin  Independent  Med Delivery   whole    Wound Care Documentation and Therapy:        Elimination:  Continence:   · Bowel: Yes  · Bladder: Yes  Urinary Catheter: None   Colostomy/Ileostomy/Ileal Conduit: No       Date of Last BM: ***  No intake or output data in the 24 hours ending 03/24/21 1104  No intake/output data recorded. Safety Concerns: At Risk for Falls    Impairments/Disabilities:      None      Patient's personal belongings (please select all that are sent with patient):  None    RN SIGNATURE:  Electronically signed by Estelita Dyson RN on 3/27/21 at 11:32 AM EDT          PHYSICIAN SECTION      Nutrition Therapy:  Current Nutrition Therapy:   - Oral Diet:  General    Routes of Feeding: Oral  Liquids:  Thin Liquids  Daily Fluid Restriction: no  Last Modified Barium Swallow with Video (Video Swallowing Test): not done    Treatments at the Time of Hospital Discharge:   Respiratory Treatments:  MDI as on MAR  Oxygen Therapy:  O2 2 l  Ventilator:    - No ventilator support    Rehab Therapies: Physical Therapy and Occupational Therapy  Weight Bearing Status/Restrictions: No weight bearing restirctions  Other Medical Equipment (for information only, NOT a DME order):  per PT/OT  Other Treatments:     CBC and BMP in 1 week    Prognosis: Good    Condition at Discharge: Stable    Rehab Potential (if transferring to Rehab): Good    Recommended Labs or Other Treatments After Discharge:     Combivent 2 puffs QID    Physician Certification: I certify the above information and transfer of [address] [place] [address]  is necessary for the continuing treatment of the diagnosis listed and that she requires [facility] for less 30 days.      Update Admission H&P: No change in H&P    PHYSICIAN SIGNATURE:  Electronically signed by Alison Ziegler MD on 3/27/21 at 11:26 AM EDT

## 2021-03-24 NOTE — TELEPHONE ENCOUNTER
Patient called from the hospital she is positive from covid-19 with pneumonia. She was admitted 3/22/2021. The hospital is coming to talk to her about putting in a nursing home and the patient wants to talk to Dr. Charlotte Vitale. She has someone at home to help take care of her and would like to get DR. Mclaughlin advice. Patient request a call.     Please call patient at [phone]

## 2021-03-24 NOTE — PROGRESS NOTES
Hospitalist Progress Note      Name:  Tuan Tee /Age/Sex: 1950  (69 y.o. female)   MRN & CSN:  [id number] & [id number] Admission Date/Time: 3/22/2021  6:23 AM   Location:  -A PCP: Silver Hong 57 Day: 3    Assessment and Plan:   Tuan Tee is a 69 y.o.  female  who presents with fever and cough     1) Acute Hypoxic Resp failure due to COVID 19 PNA  -Tested positive 2021 but symptoms have been ongoing for about 3 weeks PTA  -CXR: Small patchy bilateral airspace opacities. -CRP and Procal both not elevated; discontinue abx  -Continue Dexamethasone for 10 days  -Not a candidate for CP due to duration of symptom onset  -Currently weaned off O2     2) Physical Debility  -PT/OT ordered; recommended SNF  -Currently working on swing bed as no SNF is able to take patient     Other Chronic medical conditions; medication resumed unless contraindicated   Breast cancer  Colon cancer  Skin cancer  Essential Hypertension  CAD  JAMIE  Depression  S/p pacemaker     DVT Prophylaxis: lovenox       Diet DIET GENERAL;   DVT Prophylaxis [] Lovenox, []  Heparin, [] SCDs, [] Ambulation   GI Prophylaxis [] PPI,  [] H2 Blocker,  [] Carafate,  [] Diet/Tube Feeds   Code Status Full Code   Disposition Patient requires continued admission due to placement   MDM [] Low, [x] Moderate,[]  High  Patient's risk as above due to Swing bed     History of Present Illness:     Patient was seen and examined  Denied any worsening shortness of breath  No dizziness or palpitation reported  No fever or chills  Feeling better      Ten point ROS reviewed negative, unless as noted above    Objective:   No intake or output data in the 24 hours ending 21 1151   Vitals:   Vitals:    21 0929   BP: (!) 142/62   Pulse: 60   Resp: 20   Temp: 97.7 °F (36.5 °C)   SpO2: 95%     Physical Exam:   GEN Awake female, sitting upright in bed in no apparent distress. Appears given age.   EYES Pupils are equally round. No scleral erythema, discharge, or conjunctivitis. HENT Mucous membranes are moist. Oral pharynx without exudates, no evidence of thrush. NECK Supple, no apparent thyromegaly or masses. RESP Clear to auscultation, no wheezes, rales or rhonchi. Symmetric chest movement while on room air. CARDIO/VASC S1/S2 auscultated. Regular rate without appreciable murmurs, rubs, or gallops. No JVD or carotid bruits. Peripheral pulses equal bilaterally and palpable. No peripheral edema. GI Abdomen is soft without significant tenderness, masses, or guarding. Bowel sounds are normoactive. Rectal exam deferred.  No costovertebral angle tenderness. Farooq catheter is not present. HEME/LYMPH No palpable cervical lymphadenopathy and no hepatosplenomegaly. No petechiae or ecchymoses. MSK No gross joint deformities. SKIN Normal coloration, warm, dry. NEURO Cranial nerves appear grossly intact, normal speech, no lateralizing weakness. PSYCH Awake, alert, oriented x 4. Affect appropriate.     Medications:   Medications:    aspirin  81 mg Oral Nightly    calcium-cholecalciferol  1 tablet Oral BID    ezetimibe  10 mg Oral QAM    ferrous sulfate  325 mg Oral QAM    fluticasone  2 puff Inhalation BID    letrozole  2.5 mg Oral Nightly    sertraline  200 mg Oral QAM    vitamin B-12  1,000 mcg Oral Daily    sodium chloride flush  10 mL Intravenous 2 times per day    famotidine  20 mg Oral BID    enoxaparin  40 mg Subcutaneous BID    dexamethasone  6 mg Oral Daily    vitamin D  2,000 Units Oral Daily    cefTRIAXone (ROCEPHIN) IV  1,000 mg Intravenous Q24H    simvastatin  20 mg Oral Nightly    remdesivir IVPB  100 mg Intravenous Q24H      Infusions:   PRN Meds: guaiFENesin-codeine, 5 mL, Q4H PRN  acetaminophen, 500 mg, Q6H PRN  sodium chloride flush, 10 mL, PRN  promethazine, 12.5 mg, Q6H PRN    Or  ondansetron, 4 mg, Q6H PRN  polyethylene glycol, 17 g, Daily PRN  acetaminophen, 650 mg, Q6H PRN Or  acetaminophen, 650 mg, Q6H PRN  sodium chloride, 30 mL, PRN          Electronically signed by Violeta Crowley MD on 3/24/2021 at 11:51 AM

## 2021-03-24 NOTE — CARE COORDINATION
Reviewed chart for discharge planning. Pt admitted from home but in need of SNF plcmt. At this time [facility] is the only local SNF that accepts Covid pt and they are out of network with pt's insurance (not able to get one time contact as per Pamela they have tried previously). I therefore spoke with Nayana Quinn RN CM Director about possibility Swing. She states [facility] is opening Covid unit to try there first.  Sent referral to [place] to review. Per Dr. Beverley Gonzales pt is medically ready for discharge. Received call back from Pamela at [facility], she will look again if one time contact could be obtained if needed. Advised I also have [facility] looking pending their ability to admit.

## 2021-03-25 LAB
ALBUMIN SERPL-MCNC: 3.3 GM/DL (ref 3.4–5)
ALBUMIN SERPL-MCNC: 3.3 GM/DL (ref 3.4–5)
ALP BLD-CCNC: 80 IU/L (ref 40–128)
ALP BLD-CCNC: 80 IU/L (ref 40–129)
ALT SERPL-CCNC: 16 U/L (ref 10–40)
ALT SERPL-CCNC: 16 U/L (ref 10–40)
ANION GAP SERPL CALCULATED.3IONS-SCNC: 11 MMOL/L (ref 4–16)
AST SERPL-CCNC: 18 IU/L (ref 15–37)
AST SERPL-CCNC: 18 IU/L (ref 15–37)
BASOPHILS ABSOLUTE: 0 K/CU MM
BASOPHILS RELATIVE PERCENT: 0.1 % (ref 0–1)
BILIRUB SERPL-MCNC: 0.2 MG/DL (ref 0–1)
BILIRUB SERPL-MCNC: 0.2 MG/DL (ref 0–1)
BILIRUBIN DIRECT: 0.2 MG/DL (ref 0–0.3)
BILIRUBIN, INDIRECT: 0 MG/DL (ref 0–0.7)
BUN BLDV-MCNC: 24 MG/DL (ref 6–23)
CALCIUM SERPL-MCNC: 8.9 MG/DL (ref 8.3–10.6)
CHLORIDE BLD-SCNC: 101 MMOL/L (ref 99–110)
CO2: 24 MMOL/L (ref 21–32)
CREAT SERPL-MCNC: 0.8 MG/DL (ref 0.6–1.1)
D DIMER: 541 NG/ML(DDU)
DIFFERENTIAL TYPE: ABNORMAL
EOSINOPHILS ABSOLUTE: 0 K/CU MM
EOSINOPHILS RELATIVE PERCENT: 0 % (ref 0–3)
GFR AFRICAN AMERICAN: >60 ML/MIN/1.73M2
GFR NON-AFRICAN AMERICAN: >60 ML/MIN/1.73M2
GLUCOSE BLD-MCNC: 119 MG/DL (ref 70–99)
HCT VFR BLD CALC: 31.1 % (ref 37–47)
HEMOGLOBIN: 11.2 GM/DL (ref 12.5–16)
HIGH SENSITIVE C-REACTIVE PROTEIN: 11.1 MG/L
IMMATURE NEUTROPHIL %: 2.6 % (ref 0–0.43)
LYMPHOCYTES ABSOLUTE: 0.9 K/CU MM
LYMPHOCYTES RELATIVE PERCENT: 9.2 % (ref 24–44)
MCH RBC QN AUTO: 33.4 PG (ref 27–31)
MCHC RBC AUTO-ENTMCNC: 36 % (ref 32–36)
MCV RBC AUTO: 92.8 FL (ref 78–100)
MONOCYTES ABSOLUTE: 0.4 K/CU MM
MONOCYTES RELATIVE PERCENT: 3.8 % (ref 0–4)
NUCLEATED RBC %: 0 %
PDW BLD-RTO: 15.9 % (ref 11.7–14.9)
PLATELET # BLD: 274 K/CU MM (ref 140–440)
PMV BLD AUTO: 9.9 FL (ref 7.5–11.1)
POTASSIUM SERPL-SCNC: 4.1 MMOL/L (ref 3.5–5.1)
RBC # BLD: 3.35 M/CU MM (ref 4.2–5.4)
SEGMENTED NEUTROPHILS ABSOLUTE COUNT: 8.2 K/CU MM
SEGMENTED NEUTROPHILS RELATIVE PERCENT: 84.3 % (ref 36–66)
SODIUM BLD-SCNC: 136 MMOL/L (ref 135–145)
TOTAL IMMATURE NEUTOROPHIL: 0.25 K/CU MM
TOTAL NUCLEATED RBC: 0 K/CU MM
TOTAL PROTEIN: 6.2 GM/DL (ref 6.4–8.2)
TOTAL PROTEIN: 6.2 GM/DL (ref 6.4–8.2)
WBC # BLD: 9.8 K/CU MM (ref 4–10.5)

## 2021-03-25 PROCEDURE — 85379 FIBRIN DEGRADATION QUANT: CPT

## 2021-03-25 PROCEDURE — 80053 COMPREHEN METABOLIC PANEL: CPT

## 2021-03-25 PROCEDURE — 97535 SELF CARE MNGMENT TRAINING: CPT

## 2021-03-25 PROCEDURE — 86141 C-REACTIVE PROTEIN HS: CPT

## 2021-03-25 PROCEDURE — 6360000002 HC RX W HCPCS: Performed by: HOSPITALIST

## 2021-03-25 PROCEDURE — 85025 COMPLETE CBC W/AUTO DIFF WBC: CPT

## 2021-03-25 PROCEDURE — 97530 THERAPEUTIC ACTIVITIES: CPT

## 2021-03-25 PROCEDURE — 80048 BASIC METABOLIC PNL TOTAL CA: CPT

## 2021-03-25 PROCEDURE — 6370000000 HC RX 637 (ALT 250 FOR IP): Performed by: HOSPITALIST

## 2021-03-25 PROCEDURE — 2500000003 HC RX 250 WO HCPCS: Performed by: HOSPITALIST

## 2021-03-25 PROCEDURE — 2580000003 HC RX 258: Performed by: HOSPITALIST

## 2021-03-25 PROCEDURE — 94761 N-INVAS EAR/PLS OXIMETRY MLT: CPT

## 2021-03-25 PROCEDURE — 82248 BILIRUBIN DIRECT: CPT

## 2021-03-25 PROCEDURE — 1200000000 HC SEMI PRIVATE

## 2021-03-25 PROCEDURE — 36415 COLL VENOUS BLD VENIPUNCTURE: CPT

## 2021-03-25 PROCEDURE — 94640 AIRWAY INHALATION TREATMENT: CPT

## 2021-03-25 RX ADMIN — EZETIMIBE 10 MG: 10 TABLET ORAL at 08:22

## 2021-03-25 RX ADMIN — FAMOTIDINE 20 MG: 20 TABLET, FILM COATED ORAL at 20:44

## 2021-03-25 RX ADMIN — REMDESIVIR 100 MG: 100 INJECTION, POWDER, LYOPHILIZED, FOR SOLUTION INTRAVENOUS at 14:06

## 2021-03-25 RX ADMIN — CYANOCOBALAMIN TAB 1000 MCG 1000 MCG: 1000 TAB at 08:22

## 2021-03-25 RX ADMIN — ENOXAPARIN SODIUM 40 MG: 40 INJECTION SUBCUTANEOUS at 08:22

## 2021-03-25 RX ADMIN — FERROUS SULFATE TAB 325 MG (65 MG ELEMENTAL FE) 325 MG: 325 (65 FE) TAB at 08:22

## 2021-03-25 RX ADMIN — Medication 2000 UNITS: at 08:22

## 2021-03-25 RX ADMIN — Medication 81 MG: at 20:44

## 2021-03-25 RX ADMIN — Medication 1 TABLET: at 20:44

## 2021-03-25 RX ADMIN — DEXAMETHASONE 6 MG: 4 TABLET ORAL at 08:23

## 2021-03-25 RX ADMIN — SODIUM CHLORIDE, PRESERVATIVE FREE 10 ML: 5 INJECTION INTRAVENOUS at 20:44

## 2021-03-25 RX ADMIN — FAMOTIDINE 20 MG: 20 TABLET, FILM COATED ORAL at 08:22

## 2021-03-25 RX ADMIN — ENOXAPARIN SODIUM 40 MG: 40 INJECTION SUBCUTANEOUS at 20:44

## 2021-03-25 RX ADMIN — SIMVASTATIN 20 MG: 20 TABLET, FILM COATED ORAL at 20:43

## 2021-03-25 RX ADMIN — LETROZOLE 2.5 MG: 2.5 TABLET ORAL at 20:43

## 2021-03-25 RX ADMIN — SODIUM CHLORIDE, PRESERVATIVE FREE 10 ML: 5 INJECTION INTRAVENOUS at 08:23

## 2021-03-25 RX ADMIN — SERTRALINE HYDROCHLORIDE 200 MG: 100 TABLET ORAL at 08:22

## 2021-03-25 RX ADMIN — Medication 1 TABLET: at 08:23

## 2021-03-25 RX ADMIN — Medication 2 PUFF: at 07:58

## 2021-03-25 ASSESSMENT — PAIN SCALES - GENERAL: PAINLEVEL_OUTOF10: 0

## 2021-03-25 NOTE — PROGRESS NOTES
Hospitalist Progress Note      Name:  Kenny Ferreira /Age/Sex: 1950  (69 y.o. female)   MRN & CSN:  [id number] & [id number] Admission Date/Time: 3/22/2021  6:23 AM   Location:  [address] [place] [address] PCP: Alf Herrera Dr Day: 4    Assessment and Plan:   Beto Garcia a 69 y.o.  female  who presents with fever and cough     1) Acute Hypoxic Resp failure due to COVID 19 PNA  -Tested positive 2021 but symptoms have been ongoing for about 3 weeks PTA  -CXR: Small patchy bilateral airspace opacities.    -CRP and Procal both not elevated; discontinue abx  -Continue Dexamethasone for 10 days  -Not a candidate for CP due to duration of symptom onset  -Currently weaned off O2     2) Physical Debility  -PT/OT ordered; recommended SNF  -Currently working on swing bed vs SNF placement     Other Chronic medical conditions; medication resumed unless contraindicated   Breast cancer  Colon cancer  Skin cancer  Essential Hypertension  CAD  JAMIE  Depression  S/p pacemaker     DVT Prophylaxis: lovenox       Diet DIET GENERAL;   DVT Prophylaxis [] Lovenox, []  Heparin, [] SCDs, [] Ambulation   GI Prophylaxis [] PPI,  [] H2 Blocker,  [] Carafate,  [] Diet/Tube Feeds   Code Status Full Code   Disposition SNF Vs Swing   [facility]      History of Present Illness:     Patient was seen and examined  Denied any SOB, palpitations  No fever, chills, N/V/D  No acute event overnight    Ten point ROS reviewed negative, unless as noted above    Objective: Intake/Output Summary (Last 24 hours) at 3/25/2021 1254  Last data filed at 3/25/2021 0407  Gross per 24 hour   Intake --   Output 1000 ml   Net -1000 ml      Vitals:   Vitals:    21 1010   BP: (!) 136/95   Pulse: 68   Resp: 17   Temp:    SpO2: 93%     Physical Exam:   GEN Awake female, sitting upright in bed in no apparent distress. Appears given age. EYES Pupils are equally round. No scleral erythema, discharge, or conjunctivitis.   HENT Mucous membranes are moist. Oral pharynx without exudates, no evidence of thrush. NECK Supple, no apparent thyromegaly or masses. RESP Clear to auscultation, no wheezes, rales or rhonchi. Symmetric chest movement while on room air. CARDIO/VASC S1/S2 auscultated. Regular rate without appreciable murmurs, rubs, or gallops. No JVD or carotid bruits. Peripheral pulses equal bilaterally and palpable. No peripheral edema. GI Abdomen is soft without significant tenderness, masses, or guarding. Bowel sounds are normoactive. Rectal exam deferred.  No costovertebral angle tenderness. Farooq catheter is not present. HEME/LYMPH No palpable cervical lymphadenopathy and no hepatosplenomegaly. No petechiae or ecchymoses. MSK No gross joint deformities. SKIN Normal coloration, warm, dry. NEURO Cranial nerves appear grossly intact, normal speech, no lateralizing weakness. PSYCH Awake, alert, oriented x 4. Affect appropriate.     Medications:   Medications:    aspirin  81 mg Oral Nightly    calcium-cholecalciferol  1 tablet Oral BID    ezetimibe  10 mg Oral QAM    ferrous sulfate  325 mg Oral QAM    fluticasone  2 puff Inhalation BID    letrozole  2.5 mg Oral Nightly    sertraline  200 mg Oral QAM    vitamin B-12  1,000 mcg Oral Daily    sodium chloride flush  10 mL Intravenous 2 times per day    famotidine  20 mg Oral BID    enoxaparin  40 mg Subcutaneous BID    dexamethasone  6 mg Oral Daily    vitamin D  2,000 Units Oral Daily    simvastatin  20 mg Oral Nightly    remdesivir IVPB  100 mg Intravenous Q24H      Infusions:   PRN Meds: guaiFENesin-codeine, 5 mL, Q4H PRN  acetaminophen, 500 mg, Q6H PRN  sodium chloride flush, 10 mL, PRN  promethazine, 12.5 mg, Q6H PRN    Or  ondansetron, 4 mg, Q6H PRN  polyethylene glycol, 17 g, Daily PRN  acetaminophen, 650 mg, Q6H PRN    Or  acetaminophen, 650 mg, Q6H PRN  sodium chloride, 30 mL, PRN          Electronically signed by Melyssa Lester MD on 3/25/2021 at 12:54 PM

## 2021-03-25 NOTE — PROGRESS NOTES
Occupational Therapy  Occupational Therapy Treatment Note    Date:  3/25/2021   Room:  420553-S    Kelvin Cee :   1950   MRN: [id number] Admission Date: 3/22/2021     Diagnosis:  The primary encounter diagnosis was Acute respiratory disease due to 2019 novel coronavirus. A diagnosis of Multifocal pneumonia was also pertinent to this visit. Restrictions/Precautions:                      Communication with other providers:  RN, PT. Subjective:  Patient states:  \"I need a chair! \"  Pain:   Location, Type, Intensity (0/10 to 10/10):  none    Objective:    Orientation: WFL   Observation: Pt received in bed. Alert and cooperative. Objective Measures:  WFL. Room air. Treatment, including education:  Self Care Training:   Self care training was performed today. Cues were given for safety, sequence, UE/LE placement, visual cues, and balance. Supine to sit: SBA, HOB fully elevated    Sitting: good, cannot reach distal LE for sock/slipper management    Stand-step pivot: SBA bed to chair    Feeding: INDEP    Assessment / Impression:      Patient's tolerance of treatment:  WFL    Significant change in status and impact:  Improved, nearing baseline  Barriers to improvement:  none    Plan for Next Session:    Cont POC addressing goals:    Goals:  By d/c or goals met:     Pt will perform all functional transfers with SBA and appropriate use of LRD to bed, toilet, chair in prep for increased functional independence. Pt will perform UB ADLs with SBA to increase functional independence. Pt will perform LB ADLs with SBA to increase functional independence. Pt will perform all aspects of toileting with SBA to increase functional independence. Pt will participate in therex/therax c emphasis on strength, activity tolerance,  safety, SAIRA tasks.       Safety: Left in chair with all needs in reach. chair alarm applied. Gait belt used for transfer and mobility.       Time in:  1223  Time out: 1233  Timed treatment minutes:  0  Total treatment time:  10    Electronically signed by:     Lui Delgado [place], [place]   [id number]   12:38 PM, 3/25/2021      Previously filed values:

## 2021-03-25 NOTE — PROGRESS NOTES
Physical Therapy    Physical Therapy Treatment Note  Name: Chad Bryan MRN: [id number] :   1950   Date:  3/25/2021   Admission Date: 3/22/2021 Room:  [address] [place] [address]   Restrictions/Precautions:        Fall risk  Communication with other providers:  OT  Subjective:  Patient states:  \"my daughter is really on top of all this\"  Pain:   Location, Type, Intensity (0/10 to 10/10):  No c/o  Objective:    Observation:  Supine in bed  Treatment, including education/measures:  Sup to sit: SBA, HOB elevated, increased time for scooting, min A to boost hips. Sitting balance: good  Transfers: stand pivot to chair SBA, limited session d/t lunch arriving. Patient left in chair with chair alarm, call light within reach, RN notified, gait belt used. Assessment / Impression:       Patient's tolerance of treatment:  Tolerated well   Adverse Reaction: none  Significant change in status and impact:  Improved mobility  Barriers to improvement:  Endurance, gait, balance, strength  Plan for Next Session:    Cont POC. Time in:  1226  Time out:  1236  Timed treatment minutes:  10  Total treatment time:  10    Previously filed items:  Social/Functional History  Bathroom Shower/Tub: Tub only  ADL Assistance: Independent(but admits recent difficulty with bathing and dressing and frequent incontience ; someone always assists transfer into her clawfoot tub)  Ambulation Assistance: Independent(c cane or rollator)  Transfer Assistance: Independent  Short term goals  Time Frame for Short term goals: 1 week  Short term goal 1: Patient will perform supine to sit with min A. Short term goal 2: Patient will perform STS x3 with CGA. Short term goal 3: Patient will ambulate x25ft with LRAD and CGA.        Electronically signed by:    Luiza Feliciano, PT  3/25/2021, 4:17 PM

## 2021-03-25 NOTE — CARE COORDINATION
Spoke with Jayla at [facility] and they are in process of opening their covid unit and willing to review pt for possible admission. Info provided. Attempted to call pt in room with no answer. TC to pt's dghtr Chyna to discuss options. She states pt's signif other also has covid and that is why he has been unable to care for her as he normally does. She states she has encouraged pt to move into Assisted Living but has been resistant, she hopes this will help pt in going into AL. Advised there are two facilities in [facility] that I know accept Medicaid Voucher for AL, emailed her info to [email or web address]. She states she is in agreement to plcmt at [facility] and will discuss with pt when she talks to her today. Spoke with Jayla, she needs updated PT notes for SNF precert, placed whiteboard.

## 2021-03-26 LAB
ALBUMIN SERPL-MCNC: 3.4 GM/DL (ref 3.4–5)
ALBUMIN SERPL-MCNC: 3.4 GM/DL (ref 3.4–5)
ALP BLD-CCNC: 90 IU/L (ref 40–128)
ALP BLD-CCNC: 90 IU/L (ref 40–129)
ALT SERPL-CCNC: 19 U/L (ref 10–40)
ALT SERPL-CCNC: 19 U/L (ref 10–40)
ANION GAP SERPL CALCULATED.3IONS-SCNC: 13 MMOL/L (ref 4–16)
ANISOCYTOSIS: ABNORMAL
AST SERPL-CCNC: 19 IU/L (ref 15–37)
AST SERPL-CCNC: 19 IU/L (ref 15–37)
BILIRUB SERPL-MCNC: 0.2 MG/DL (ref 0–1)
BILIRUB SERPL-MCNC: 0.2 MG/DL (ref 0–1)
BILIRUBIN DIRECT: 0.2 MG/DL (ref 0–0.3)
BILIRUBIN, INDIRECT: 0 MG/DL (ref 0–0.7)
BUN BLDV-MCNC: 23 MG/DL (ref 6–23)
CALCIUM SERPL-MCNC: 8.7 MG/DL (ref 8.3–10.6)
CHLORIDE BLD-SCNC: 100 MMOL/L (ref 99–110)
CO2: 25 MMOL/L (ref 21–32)
CREAT SERPL-MCNC: 0.9 MG/DL (ref 0.6–1.1)
DIFFERENTIAL TYPE: ABNORMAL
EOSINOPHILS ABSOLUTE: 0.1 K/CU MM
EOSINOPHILS RELATIVE PERCENT: 1 % (ref 0–3)
GFR AFRICAN AMERICAN: >60 ML/MIN/1.73M2
GFR NON-AFRICAN AMERICAN: >60 ML/MIN/1.73M2
GLUCOSE BLD-MCNC: 145 MG/DL (ref 70–99)
HCT VFR BLD CALC: 35.2 % (ref 37–47)
HEMOGLOBIN: 12 GM/DL (ref 12.5–16)
LYMPHOCYTES ABSOLUTE: 0.6 K/CU MM
LYMPHOCYTES RELATIVE PERCENT: 6 % (ref 24–44)
MCH RBC QN AUTO: 30.4 PG (ref 27–31)
MCHC RBC AUTO-ENTMCNC: 34.1 % (ref 32–36)
MCV RBC AUTO: 89.1 FL (ref 78–100)
METAMYELOCYTES ABSOLUTE COUNT: 0.11 K/CU MM
METAMYELOCYTES PERCENT: 1 %
MONOCYTES ABSOLUTE: 0.4 K/CU MM
MONOCYTES RELATIVE PERCENT: 4 % (ref 0–4)
NUCLEATED RED BLOOD CELLS: 1
PDW BLD-RTO: 14.4 % (ref 11.7–14.9)
PLATELET # BLD: 306 K/CU MM (ref 140–440)
PLT MORPHOLOGY: ABNORMAL
PMV BLD AUTO: 9.6 FL (ref 7.5–11.1)
POTASSIUM SERPL-SCNC: 4.1 MMOL/L (ref 3.5–5.1)
RBC # BLD: 3.95 M/CU MM (ref 4.2–5.4)
SEGMENTED NEUTROPHILS ABSOLUTE COUNT: 9.3 K/CU MM
SEGMENTED NEUTROPHILS RELATIVE PERCENT: 88 % (ref 36–66)
SODIUM BLD-SCNC: 138 MMOL/L (ref 135–145)
TOTAL PROTEIN: 6.5 GM/DL (ref 6.4–8.2)
TOTAL PROTEIN: 6.5 GM/DL (ref 6.4–8.2)
WBC # BLD: 10.5 K/CU MM (ref 4–10.5)

## 2021-03-26 PROCEDURE — 94640 AIRWAY INHALATION TREATMENT: CPT

## 2021-03-26 PROCEDURE — 80053 COMPREHEN METABOLIC PANEL: CPT

## 2021-03-26 PROCEDURE — 82248 BILIRUBIN DIRECT: CPT

## 2021-03-26 PROCEDURE — 85027 COMPLETE CBC AUTOMATED: CPT

## 2021-03-26 PROCEDURE — 2500000003 HC RX 250 WO HCPCS: Performed by: HOSPITALIST

## 2021-03-26 PROCEDURE — 80048 BASIC METABOLIC PNL TOTAL CA: CPT

## 2021-03-26 PROCEDURE — 85007 BL SMEAR W/DIFF WBC COUNT: CPT

## 2021-03-26 PROCEDURE — 1200000000 HC SEMI PRIVATE

## 2021-03-26 PROCEDURE — 6360000002 HC RX W HCPCS: Performed by: HOSPITALIST

## 2021-03-26 PROCEDURE — 2580000003 HC RX 258: Performed by: HOSPITALIST

## 2021-03-26 PROCEDURE — 6370000000 HC RX 637 (ALT 250 FOR IP): Performed by: HOSPITALIST

## 2021-03-26 PROCEDURE — 94761 N-INVAS EAR/PLS OXIMETRY MLT: CPT

## 2021-03-26 RX ADMIN — LETROZOLE 2.5 MG: 2.5 TABLET ORAL at 21:19

## 2021-03-26 RX ADMIN — REMDESIVIR 100 MG: 100 INJECTION, POWDER, LYOPHILIZED, FOR SOLUTION INTRAVENOUS at 15:14

## 2021-03-26 RX ADMIN — FAMOTIDINE 20 MG: 20 TABLET, FILM COATED ORAL at 08:43

## 2021-03-26 RX ADMIN — SODIUM CHLORIDE, PRESERVATIVE FREE 10 ML: 5 INJECTION INTRAVENOUS at 21:19

## 2021-03-26 RX ADMIN — Medication 1 TABLET: at 08:44

## 2021-03-26 RX ADMIN — Medication 2 PUFF: at 07:41

## 2021-03-26 RX ADMIN — ENOXAPARIN SODIUM 40 MG: 40 INJECTION SUBCUTANEOUS at 08:43

## 2021-03-26 RX ADMIN — SERTRALINE HYDROCHLORIDE 200 MG: 100 TABLET ORAL at 08:45

## 2021-03-26 RX ADMIN — SODIUM CHLORIDE, PRESERVATIVE FREE 10 ML: 5 INJECTION INTRAVENOUS at 08:46

## 2021-03-26 RX ADMIN — CYANOCOBALAMIN TAB 1000 MCG 1000 MCG: 1000 TAB at 08:43

## 2021-03-26 RX ADMIN — Medication 2000 UNITS: at 08:43

## 2021-03-26 RX ADMIN — DEXAMETHASONE 6 MG: 4 TABLET ORAL at 08:44

## 2021-03-26 RX ADMIN — FERROUS SULFATE TAB 325 MG (65 MG ELEMENTAL FE) 325 MG: 325 (65 FE) TAB at 08:43

## 2021-03-26 RX ADMIN — SIMVASTATIN 20 MG: 20 TABLET, FILM COATED ORAL at 21:00

## 2021-03-26 RX ADMIN — EZETIMIBE 10 MG: 10 TABLET ORAL at 08:45

## 2021-03-26 RX ADMIN — Medication 2 PUFF: at 21:03

## 2021-03-26 RX ADMIN — Medication 81 MG: at 21:19

## 2021-03-26 RX ADMIN — FAMOTIDINE 20 MG: 20 TABLET, FILM COATED ORAL at 21:19

## 2021-03-26 RX ADMIN — ENOXAPARIN SODIUM 40 MG: 40 INJECTION SUBCUTANEOUS at 21:19

## 2021-03-26 RX ADMIN — Medication 1 TABLET: at 21:19

## 2021-03-26 ASSESSMENT — PAIN DESCRIPTION - LOCATION: LOCATION: GENERALIZED

## 2021-03-26 ASSESSMENT — PAIN SCALES - GENERAL: PAINLEVEL_OUTOF10: 4

## 2021-03-26 NOTE — CARE COORDINATION
Spoke with Jayla at [facility], precert pending and she will update as soon as received.

## 2021-03-26 NOTE — PLAN OF CARE
Problem: Falls - Risk of:  Goal: Will remain free from falls  Description: Will remain free from falls  Outcome: Met This Shift  Goal: Absence of physical injury  Description: Absence of physical injury  Outcome: Met This Shift     Problem: Airway Clearance - Ineffective  Goal: Achieve or maintain patent airway  Outcome: Met This Shift     Problem: Gas Exchange - Impaired  Goal: Absence of hypoxia  Outcome: Met This Shift  Goal: Promote optimal lung function  Outcome: Met This Shift     Problem: Breathing Pattern - Ineffective  Goal: Ability to achieve and maintain a regular respiratory rate  Outcome: Met This Shift     Problem:  Body Temperature -  Risk of, Imbalanced  Goal: Ability to maintain a body temperature within defined limits  Outcome: Met This Shift  Goal: Will regain or maintain usual level of consciousness  Outcome: Met This Shift  Goal: Complications related to the disease process, condition or treatment will be avoided or minimized  Outcome: Met This Shift     Problem: Isolation Precautions - Risk of Spread of Infection  Goal: Prevent transmission of infection  Outcome: Met This Shift     Problem: Nutrition Deficits  Goal: Optimize nutritional status  Outcome: Met This Shift     Problem: Risk for Fluid Volume Deficit  Goal: Maintain normal heart rhythm  Outcome: Met This Shift  Goal: Maintain absence of muscle cramping  Outcome: Met This Shift  Goal: Maintain normal serum potassium, sodium, calcium, phosphorus, and pH  Outcome: Met This Shift     Problem: Loneliness or Risk for Loneliness  Goal: Demonstrate positive use of time alone when socialization is not possible  Outcome: Met This Shift     Problem: Fatigue  Goal: Verbalize increase energy and improved vitality  Outcome: Met This Shift     Problem: Patient Education: Go to Patient Education Activity  Goal: Patient/Family Education  Outcome: Met This Shift     Problem: Skin Integrity:  Goal: Will show no infection signs and symptoms  Description: Will show no infection signs and symptoms  Outcome: Met This Shift  Goal: Absence of new skin breakdown  Description: Absence of new skin breakdown  Outcome: Met This Shift

## 2021-03-26 NOTE — PROGRESS NOTES
Hospitalist Progress Note      Name:  Ira Ricks /Age/Sex: 1950  (69 y.o. female)   MRN & CSN:  [id number] & [id number] Admission Date/Time: 3/22/2021  6:23 AM   Location:  [id number] PCP: Silver Dejesus Day: 5    Assessment and Plan:   Ira Ricks is a 69 y.o.  female  who presents with fever and cough     1) Acute Hypoxic Resp failure due to COVID 19 PNA  -Tested positive 2021 but symptoms have been ongoing for about 3 weeks PTA  -CXR: Small patchy bilateral airspace opacities. -CRP and Procal both not elevated; discontinued abx  -On dexamethasone for 10 days  -Not a candidate for CP due to duration of symptom onset  -Currently weaned off O2     2) Physical Debility  -This management working on placement       Breast cancer -letrozole  Colon cancer  Skin cancer  Essential Hypertension  Hyperlipidemia-ezetimibe and simvastatin    Cardiac cath in 2019 showed no significant CAD-on aspirin 81 mg daily    JAMIE  Depression -sertraline  S/p pacemaker     DVT Prophylaxis: lovenox       Diet DIET GENERAL;   DVT Prophylaxis [] Lovenox, []  Heparin, [] SCDs, [] Ambulation   GI Prophylaxis [] PPI,  [] H2 Blocker,  [] Carafate,  [] Diet/Tube Feeds   Code Status Full Code   Disposition Patient requires continued admission due to placement   MDM [] Low, [x] Moderate,[]  High  Patient's risk as above due to Swing bed     History of Present Illness:       -Assumed care today, today is hospital day 4  - Mirlande Preciado is working on placement at [facility]  -Updated PT notes provided today    She was up sitting on the couch when I saw her and playing cards      Social history  -She has seen oncologist Dr. Bull Burk  -She has seen gastroenterologist Dr. Chad Mchugh  -She has seen surgeon Dr. Josh Finnegan  -PCP is Dr. Brittani Rooney oxygen: None    Medical history  -She is 69years old  -Obesity Body mass index is 51.25 kg/m².   -Asthma  -History of colon cancer  -History of stage IIa right breast cancer status post right breast mastectomy, axillary lymph node dissection followed by adjuvant chemotherapy  -She is been on adjuvant endocrine therapy with letrozole since October 15, 2012  -She last saw oncologist Dr. Yoshi Mckeon in October 2020    Objective:        Intake/Output Summary (Last 24 hours) at 3/26/2021 0911  Last data filed at 3/26/2021 0846  Gross per 24 hour   Intake 310 ml   Output --   Net 310 ml      Vitals:   Vitals:    03/26/21 0814   BP: (!) 157/69   Pulse:    Resp: 22   Temp: 97.9 °F (36.6 °C)   SpO2:      Physical Exam:     Lungs-respiratory effort nonlabored at rest    Neurologic-speech clear    Skin-no cyanosis    Psychiatric-very pleasant and cooperative    Gait-stable when sitting up on the couch    Medications:   Medications:    aspirin  81 mg Oral Nightly    calcium-cholecalciferol  1 tablet Oral BID    ezetimibe  10 mg Oral QAM    ferrous sulfate  325 mg Oral QAM    fluticasone  2 puff Inhalation BID    letrozole  2.5 mg Oral Nightly    sertraline  200 mg Oral QAM    vitamin B-12  1,000 mcg Oral Daily    sodium chloride flush  10 mL Intravenous 2 times per day    famotidine  20 mg Oral BID    enoxaparin  40 mg Subcutaneous BID    dexamethasone  6 mg Oral Daily    vitamin D  2,000 Units Oral Daily    simvastatin  20 mg Oral Nightly    remdesivir IVPB  100 mg Intravenous Q24H      Infusions:   PRN Meds: guaiFENesin-codeine, 5 mL, Q4H PRN  acetaminophen, 500 mg, Q6H PRN  sodium chloride flush, 10 mL, PRN  promethazine, 12.5 mg, Q6H PRN    Or  ondansetron, 4 mg, Q6H PRN  polyethylene glycol, 17 g, Daily PRN  acetaminophen, 650 mg, Q6H PRN    Or  acetaminophen, 650 mg, Q6H PRN  sodium chloride, 30 mL, PRN          Electronically signed by Geno Goodwin MD on 3/26/2021 at 9:11 AM

## 2021-03-27 VITALS
HEIGHT: 62 IN | DIASTOLIC BLOOD PRESSURE: 77 MMHG | WEIGHT: 280.2 LBS | SYSTOLIC BLOOD PRESSURE: 153 MMHG | BODY MASS INDEX: 51.56 KG/M2 | TEMPERATURE: 97.7 F | OXYGEN SATURATION: 92 % | RESPIRATION RATE: 14 BRPM | HEART RATE: 64 BPM

## 2021-03-27 LAB
CULTURE: NORMAL
CULTURE: NORMAL
LEGIONELLA URINARY AG: NEGATIVE
Lab: NORMAL
Lab: NORMAL
SPECIMEN: NORMAL
SPECIMEN: NORMAL
STREP PNEUMONIAE ANTIGEN: NORMAL

## 2021-03-27 PROCEDURE — 6370000000 HC RX 637 (ALT 250 FOR IP): Performed by: HOSPITALIST

## 2021-03-27 PROCEDURE — 6360000002 HC RX W HCPCS: Performed by: HOSPITALIST

## 2021-03-27 PROCEDURE — 2700000000 HC OXYGEN THERAPY PER DAY

## 2021-03-27 PROCEDURE — 94640 AIRWAY INHALATION TREATMENT: CPT

## 2021-03-27 PROCEDURE — 94761 N-INVAS EAR/PLS OXIMETRY MLT: CPT

## 2021-03-27 PROCEDURE — 2580000003 HC RX 258: Performed by: HOSPITALIST

## 2021-03-27 RX ORDER — DEXAMETHASONE 6 MG/1
6 TABLET ORAL DAILY
Qty: 4 TABLET | Refills: 0
Start: 2021-03-28 | End: 2021-04-01

## 2021-03-27 RX ORDER — FAMOTIDINE 20 MG/1
20 TABLET, FILM COATED ORAL 2 TIMES DAILY
Qty: 8 TABLET | Refills: 0
Start: 2021-03-27 | End: 2021-05-04 | Stop reason: ALTCHOICE

## 2021-03-27 RX ORDER — PNV NO.95/FERROUS FUM/FOLIC AC 28MG-0.8MG
1 TABLET ORAL EVERY OTHER DAY
Qty: 30 TABLET | Refills: 0
Start: 2021-03-27 | End: 2022-08-23

## 2021-03-27 RX ADMIN — EZETIMIBE 10 MG: 10 TABLET ORAL at 08:17

## 2021-03-27 RX ADMIN — Medication 2 PUFF: at 07:30

## 2021-03-27 RX ADMIN — Medication 1 TABLET: at 08:17

## 2021-03-27 RX ADMIN — Medication 2000 UNITS: at 08:17

## 2021-03-27 RX ADMIN — SODIUM CHLORIDE, PRESERVATIVE FREE 10 ML: 5 INJECTION INTRAVENOUS at 08:18

## 2021-03-27 RX ADMIN — DEXAMETHASONE 6 MG: 4 TABLET ORAL at 08:17

## 2021-03-27 RX ADMIN — FERROUS SULFATE TAB 325 MG (65 MG ELEMENTAL FE) 325 MG: 325 (65 FE) TAB at 08:17

## 2021-03-27 RX ADMIN — ENOXAPARIN SODIUM 40 MG: 40 INJECTION SUBCUTANEOUS at 08:17

## 2021-03-27 RX ADMIN — FAMOTIDINE 20 MG: 20 TABLET, FILM COATED ORAL at 08:17

## 2021-03-27 RX ADMIN — SERTRALINE HYDROCHLORIDE 200 MG: 100 TABLET ORAL at 08:17

## 2021-03-27 RX ADMIN — CYANOCOBALAMIN TAB 1000 MCG 1000 MCG: 1000 TAB at 08:17

## 2021-03-27 NOTE — CARE COORDINATION
LSW received a call from [facility] who stated [facility] can not take pt with +COVID at this time. Autumn stated she received a call form [employer] and Ron Milner informed her that they have approval.  The approval is good for 48 hours. [facility] will need to know when pt is being discharge so they can make sure they have the staff for their COVID unit. LSW PS Dr. Fernando Burch to inform him of approval and if pt is ready for discharge. 10:55 AM Dr. Fernando Burch stated he thinks pt can be discharged today. LSW called Bill at [employer] and he will make sure they have staff on their COVID unit and will call this LSW back.

## 2021-03-27 NOTE — CARE COORDINATION
Received discharge orders for pt  to go to [facility]. Saadia Henderson requested pt come later today. Med Trans to  pt at 4:00pm. LSW faxed pt AVS with both KIEL. LSw called pt dght and left a message informing her of pt discharge to [facility] and time of . Pt's RN and Saadia Henderson also informed of  time.

## 2021-03-27 NOTE — CARE COORDINATION
XENIAW received a call from [place] with [facility] and if pt is ready for discharge they can take pt later today. XENIAW informed Dr. Kaylah Lerner of this info. LSW completed the PASS. CM will need a KIEL to be completed by RN and Doctor. If pt is discharged after hours please complete the following. ... Call report to [phone]   Fax completed AVS with both KIEL on the AVS and any written Rx [phone]. Set up transportation (pt's choice) [place] [phone] or Med Trans [phone] and call family.

## 2021-03-29 LAB
EKG ATRIAL RATE: 71 BPM
EKG DIAGNOSIS: NORMAL
EKG P AXIS: 17 DEGREES
EKG P-R INTERVAL: 208 MS
EKG Q-T INTERVAL: 416 MS
EKG QRS DURATION: 140 MS
EKG QTC CALCULATION (BAZETT): 452 MS
EKG R AXIS: -69 DEGREES
EKG T AXIS: 101 DEGREES
EKG VENTRICULAR RATE: 71 BPM

## 2021-03-30 ENCOUNTER — HOSPITAL ENCOUNTER (OUTPATIENT)
Age: 71
Setting detail: SPECIMEN
Discharge: HOME | End: 2021-03-30

## 2021-03-30 LAB
ALBUMIN SERPL-MCNC: 3.6 GM/DL (ref 3.4–5)
ALP BLD-CCNC: 91 IU/L (ref 40–128)
ALT SERPL-CCNC: 17 U/L (ref 10–40)
ANION GAP SERPL CALCULATED.3IONS-SCNC: 13 MMOL/L (ref 4–16)
ANISOCYTOSIS: ABNORMAL
AST SERPL-CCNC: 16 IU/L (ref 15–37)
BANDED NEUTROPHILS ABSOLUTE COUNT: 0.1 K/CU MM
BANDED NEUTROPHILS RELATIVE PERCENT: 1 % (ref 5–11)
BILIRUB SERPL-MCNC: 0.2 MG/DL (ref 0–1)
BUN BLDV-MCNC: 18 MG/DL (ref 6–23)
CALCIUM SERPL-MCNC: 9.7 MG/DL (ref 8.3–10.6)
CHLORIDE BLD-SCNC: 99 MMOL/L (ref 99–110)
CO2: 28 MMOL/L (ref 21–32)
CREAT SERPL-MCNC: 0.8 MG/DL (ref 0.6–1.1)
DIFFERENTIAL TYPE: ABNORMAL
GFR AFRICAN AMERICAN: >60 ML/MIN/1.73M2
GFR NON-AFRICAN AMERICAN: >60 ML/MIN/1.73M2
GLUCOSE BLD-MCNC: 136 MG/DL (ref 70–99)
HCT VFR BLD CALC: 40 % (ref 37–47)
HEMOGLOBIN: 12.1 GM/DL (ref 12.5–16)
LYMPHOCYTES ABSOLUTE: 1.6 K/CU MM
LYMPHOCYTES RELATIVE PERCENT: 15 % (ref 24–44)
MCH RBC QN AUTO: 28 PG (ref 27–31)
MCHC RBC AUTO-ENTMCNC: 30.3 % (ref 32–36)
MCV RBC AUTO: 92.6 FL (ref 78–100)
METAMYELOCYTES ABSOLUTE COUNT: 0.1 K/CU MM
METAMYELOCYTES PERCENT: 1 %
MONOCYTES ABSOLUTE: 0.2 K/CU MM
MONOCYTES RELATIVE PERCENT: 2 % (ref 0–4)
PDW BLD-RTO: 15.1 % (ref 11.7–14.9)
PLATELET # BLD: 297 K/CU MM (ref 140–440)
PMV BLD AUTO: 9.6 FL (ref 7.5–11.1)
POLYCHROMASIA: ABNORMAL
POTASSIUM SERPL-SCNC: 4.9 MMOL/L (ref 3.5–5.1)
RBC # BLD: 4.32 M/CU MM (ref 4.2–5.4)
SEGMENTED NEUTROPHILS ABSOLUTE COUNT: 8.4 K/CU MM
SEGMENTED NEUTROPHILS RELATIVE PERCENT: 81 % (ref 36–66)
SODIUM BLD-SCNC: 140 MMOL/L (ref 135–145)
TOTAL PROTEIN: 6.6 GM/DL (ref 6.4–8.2)
WBC # BLD: 10.4 K/CU MM (ref 4–10.5)

## 2021-03-30 PROCEDURE — 85007 BL SMEAR W/DIFF WBC COUNT: CPT

## 2021-03-30 PROCEDURE — 80053 COMPREHEN METABOLIC PANEL: CPT

## 2021-03-30 PROCEDURE — 36415 COLL VENOUS BLD VENIPUNCTURE: CPT

## 2021-03-30 PROCEDURE — 85027 COMPLETE CBC AUTOMATED: CPT

## 2021-03-30 NOTE — DISCHARGE SUMMARY
Discharge Summary    Name:  Su Evans /Age/Sex: 1950  (69 y.o. female)   MRN & CSN:  [id number] & [id number] Admission Date/Time: 3/22/2021  6:23 AM   Attending:  No att. providers found Discharging Physician: Hamzah Justice, Tiana Hernandez Course:     Mauricio Obando is a pleasant 76-year-old and was treated for a Covid infection while here. She was treated with dexamethasone. She has underlying asthma and obesity. She came from her home, and was transferred to [facility] for skilled care. She was discharged in stable condition. The patient expressed appropriate understanding of and agreement with the discharge recommendations, medications, and plan.      Consults this admission:  IP CONSULT TO HOSPITALIST  IP CONSULT TO PHARMACY        Discharge Medications:      Jose Enrique Wright   Home Medication Instructions HEY:[id number]    Printed on:21 0708   Medication Information                      acetaminophen (TYLENOL) 500 MG tablet  Take 500 mg by mouth every 6 hours as needed for Pain             albuterol sulfate HFA (PROAIR HFA) 108 (90 Base) MCG/ACT inhaler  INHALE 2 PUFFS BY MOUTH EVERY SIX HOURS AS NEEDED FOR WHEEZING             albuterol-ipratropium (COMBIVENT RESPIMAT)  MCG/ACT AERS inhaler  Inhale 1 puff into the lungs 4 times daily             aspirin 81 MG EC tablet  Take 81 mg by mouth nightly              calcium-vitamin D (OSCAL 500/200 D-3) 500-200 MG-UNIT per tablet  Take 1 tablet by mouth 2 times daily              dexamethasone (DECADRON) 6 MG tablet  Take 1 tablet by mouth daily for 4 days             ezetimibe (ZETIA) 10 MG tablet  Take 1 tablet by mouth every morning             famotidine (PEPCID) 20 MG tablet  Take 1 tablet by mouth 2 times daily for 4 days STOP after 2021, once course of Decadron for Covid completed             Ferrous Sulfate (IRON) 325 (65 Fe) MG TABS  Take 1 tablet by mouth every other day             fluticasone (FLOVENT HFA) 110

## 2021-04-08 ENCOUNTER — HOSPITAL ENCOUNTER (OUTPATIENT)
Age: 71
Setting detail: SPECIMEN
Discharge: HOME | End: 2021-04-08

## 2021-04-09 LAB
ALBUMIN SERPL-MCNC: 3.3 GM/DL (ref 3.4–5)
ALP BLD-CCNC: 102 IU/L (ref 40–129)
ALT SERPL-CCNC: 17 U/L (ref 10–40)
ANION GAP SERPL CALCULATED.3IONS-SCNC: 10 MMOL/L (ref 4–16)
AST SERPL-CCNC: 17 IU/L (ref 15–37)
BILIRUB SERPL-MCNC: 0.3 MG/DL (ref 0–1)
BUN BLDV-MCNC: 15 MG/DL (ref 6–23)
CALCIUM SERPL-MCNC: 9.6 MG/DL (ref 8.3–10.6)
CHLORIDE BLD-SCNC: 99 MMOL/L (ref 99–110)
CO2: 29 MMOL/L (ref 21–32)
CREAT SERPL-MCNC: 0.8 MG/DL (ref 0.6–1.1)
GFR AFRICAN AMERICAN: >60 ML/MIN/1.73M2
GFR NON-AFRICAN AMERICAN: >60 ML/MIN/1.73M2
GLUCOSE BLD-MCNC: 107 MG/DL (ref 70–99)
HCT VFR BLD CALC: 36.7 % (ref 37–47)
HEMOGLOBIN: 11.4 GM/DL (ref 12.5–16)
MCH RBC QN AUTO: 28.9 PG (ref 27–31)
MCHC RBC AUTO-ENTMCNC: 31.1 % (ref 32–36)
MCV RBC AUTO: 92.9 FL (ref 78–100)
PDW BLD-RTO: 15.7 % (ref 11.7–14.9)
PLATELET # BLD: 164 K/CU MM (ref 140–440)
PMV BLD AUTO: 10.3 FL (ref 7.5–11.1)
POTASSIUM SERPL-SCNC: 4.1 MMOL/L (ref 3.5–5.1)
RBC # BLD: 3.95 M/CU MM (ref 4.2–5.4)
SODIUM BLD-SCNC: 138 MMOL/L (ref 135–145)
TOTAL PROTEIN: 6.1 GM/DL (ref 6.4–8.2)
WBC # BLD: 9.1 K/CU MM (ref 4–10.5)

## 2021-04-09 PROCEDURE — 85027 COMPLETE CBC AUTOMATED: CPT

## 2021-04-09 PROCEDURE — 80053 COMPREHEN METABOLIC PANEL: CPT

## 2021-04-09 PROCEDURE — 36415 COLL VENOUS BLD VENIPUNCTURE: CPT

## 2021-04-26 ENCOUNTER — HOSPITAL ENCOUNTER (OUTPATIENT)
Age: 71
Setting detail: SPECIMEN
Discharge: HOME | End: 2021-04-26

## 2021-04-26 LAB
ALBUMIN SERPL-MCNC: 3.7 GM/DL (ref 3.4–5)
ALP BLD-CCNC: 104 IU/L (ref 40–129)
ALT SERPL-CCNC: 14 U/L (ref 10–40)
ANION GAP SERPL CALCULATED.3IONS-SCNC: 10 MMOL/L (ref 4–16)
AST SERPL-CCNC: 16 IU/L (ref 15–37)
BILIRUB SERPL-MCNC: 0.1 MG/DL (ref 0–1)
BUN BLDV-MCNC: 13 MG/DL (ref 6–23)
CALCIUM SERPL-MCNC: 9 MG/DL (ref 8.3–10.6)
CHLORIDE BLD-SCNC: 100 MMOL/L (ref 99–110)
CO2: 29 MMOL/L (ref 21–32)
CREAT SERPL-MCNC: 0.8 MG/DL (ref 0.6–1.1)
GFR AFRICAN AMERICAN: >60 ML/MIN/1.73M2
GFR NON-AFRICAN AMERICAN: >60 ML/MIN/1.73M2
GLUCOSE BLD-MCNC: 106 MG/DL (ref 70–99)
HCT VFR BLD CALC: 34.1 % (ref 37–47)
HEMOGLOBIN: 10.9 GM/DL (ref 12.5–16)
MCH RBC QN AUTO: 29.8 PG (ref 27–31)
MCHC RBC AUTO-ENTMCNC: 32 % (ref 32–36)
MCV RBC AUTO: 93.2 FL (ref 78–100)
PDW BLD-RTO: 16 % (ref 11.7–14.9)
PLATELET # BLD: 195 K/CU MM (ref 140–440)
PMV BLD AUTO: 10.3 FL (ref 7.5–11.1)
POTASSIUM SERPL-SCNC: 4.3 MMOL/L (ref 3.5–5.1)
RBC # BLD: 3.66 M/CU MM (ref 4.2–5.4)
SODIUM BLD-SCNC: 139 MMOL/L (ref 135–145)
TOTAL PROTEIN: 5.9 GM/DL (ref 6.4–8.2)
WBC # BLD: 8.4 K/CU MM (ref 4–10.5)

## 2021-04-26 PROCEDURE — 80053 COMPREHEN METABOLIC PANEL: CPT

## 2021-04-26 PROCEDURE — 36415 COLL VENOUS BLD VENIPUNCTURE: CPT

## 2021-04-26 PROCEDURE — 85027 COMPLETE CBC AUTOMATED: CPT

## 2021-04-27 ENCOUNTER — CARE COORDINATION (OUTPATIENT)
Dept: CASE MANAGEMENT | Age: 71
End: 2021-04-27

## 2021-04-27 NOTE — CARE COORDINATION
[facility] Transitions Initial Follow Up Call    Call within 2 business days of discharge: Yes    Patient: Roxanne Brennan Patient : 1950   MRN: <[id number]>  Reason for Admission: [address]  Discharge Date: 3/27/21 RARS: Readmission Risk Score: 18      Last Discharge [facility]       Complaint Diagnosis Description Type Department Provider    3/22/21 Shortness of Breath Acute respiratory disease due to 2019 novel coronavirus . .. ED to Hosp-Admission (Discharged) (ADMITTED) [facility] 4E Jessica Boothe MD; Nydia Blas [facility]... Acute Care Course:  Pt to [facility] from 3/22 to 3/27 with CoVID 19 pna. She d/c to [facility] for 30 days with d/c on  home    Sig Hx:   Asthma, obese, h/o breast CA, HLD, JAMIE, pacemaker, h/o NSTEMI, hepatomegaly, CAD    DME:     Conversation:  Left HIPPA compliant message regarding the nature of the call and a request for a return call with my contact information      GHADA Patterson, [phone]  Lissa Maynard / [facility] Transition Nurse         Follow up plan:   Will re-attempt          Care Transitions 24 Hour Call    Do you have all of your prescriptions and are they filled?: Yes  Patient DME: Straight cane, Porter B[employer], Shower chair  Patient Home Equipment: CPAP, Nebulizer  Care Transitions Interventions         Follow Up  Future Appointments   Date Time Provider Chance Chiang   2021  3:15 PM Eva LOUIE   10/4/2021  1:00 PM ISAI Waters   10/5/2021  1:40 PM Patrick Velasco MD [facility] GHADA Lancaster, RN   [address] Kaykay Hutchinson/ Elias Garcia Transition Nurse  [phone]

## 2021-04-28 ENCOUNTER — CARE COORDINATION (OUTPATIENT)
Dept: CASE MANAGEMENT | Age: 71
End: 2021-04-28

## 2021-04-28 NOTE — CARE COORDINATION
Elias Garcia Transitions Initial Follow Up Call    Call within 2 business days of discharge: Yes    Patient: Akbar Dodson Patient : 1950   MRN: <[id number]>  Reason for Admission: [address]  Discharge Date: 3/27/21 RARS: Readmission Risk Score: 18      Last Discharge [facility]       Complaint Diagnosis Description Type Department Provider    3/22/21 Shortness of Breath Acute respiratory disease due to 2019 novel coronavirus . .. ED to Hosp-Admission (Discharged) (ADMITTED) [facility] 4E Roberta Bradley MD; [facility]... Acute Care Course:  Pt to [facility] from 3/22 to 3/27 with CoVID 19 pna.  She d/c to [facility] for 30 days with d/c on  home     Sig Hx:   Asthma, obese, h/o breast CA, HLD, JAMIE, pacemaker, h/o NSTEMI, hepatomegaly, CAD     DME:      Conversation:  Called home number and it was a hospital number in vacant room    Left HIPPA compliant message regarding the nature of the call and a request for a return call with my contact information        GHADA Bardales, -[phone]  Nigel Taveras / [facility] Garcia Transition Nurse           Care Transitions 24 Hour Call    Do you have all of your prescriptions and are they filled?: Yes  Patient DME: Straight cane, [employer], Shower chair  Patient Home Equipment: CPAP, Nebulizer  Care Transitions Interventions         Follow Up  Future Appointments   Date Time Provider Chance Chiang   2021  3:15 PM Amaya [facility] KYLER LOUIE   10/4/2021  1:00 PM ISAI Hardy DARYL NOR JACY   10/5/2021  1:40 PM Kaitlin Cunningham MD [facility]     GHADA Bardales, RN   31 Kaykay Hutchinson/ Elias Garcia Transition Nurse  [phone]

## 2021-04-29 ENCOUNTER — TELEPHONE (OUTPATIENT)
Dept: INTERNAL MEDICINE CLINIC | Age: 71
End: 2021-04-29

## 2021-05-04 ENCOUNTER — OFFICE VISIT (OUTPATIENT)
Dept: INTERNAL MEDICINE CLINIC | Age: 71
End: 2021-05-04
Payer: MEDICARE

## 2021-05-04 VITALS
BODY MASS INDEX: 51.54 KG/M2 | HEART RATE: 70 BPM | TEMPERATURE: 97.2 F | OXYGEN SATURATION: 96 % | DIASTOLIC BLOOD PRESSURE: 68 MMHG | SYSTOLIC BLOOD PRESSURE: 138 MMHG | WEIGHT: 281.8 LBS

## 2021-05-04 DIAGNOSIS — D64.9 ANEMIA, UNSPECIFIED TYPE: ICD-10-CM

## 2021-05-04 DIAGNOSIS — F32.A DEPRESSION, UNSPECIFIED DEPRESSION TYPE: ICD-10-CM

## 2021-05-04 DIAGNOSIS — Z86.16 HISTORY OF COVID-19: ICD-10-CM

## 2021-05-04 DIAGNOSIS — E78.5 HYPERLIPIDEMIA, UNSPECIFIED HYPERLIPIDEMIA TYPE: ICD-10-CM

## 2021-05-04 DIAGNOSIS — J45.20 MILD INTERMITTENT ASTHMA, UNSPECIFIED WHETHER COMPLICATED: Primary | ICD-10-CM

## 2021-05-04 PROCEDURE — 99214 OFFICE O/P EST MOD 30 MIN: CPT | Performed by: FAMILY MEDICINE

## 2021-05-04 ASSESSMENT — ENCOUNTER SYMPTOMS
ABDOMINAL PAIN: 0
COUGH: 0
BACK PAIN: 0
SHORTNESS OF BREATH: 0
CHEST TIGHTNESS: 0
CONSTIPATION: 0
NAUSEA: 0
DIARRHEA: 0

## 2021-05-04 NOTE — PROGRESS NOTES
Nsurat Tamara Elaine  1950  66 y.o.  female    Chief Complaint   Patient presents with    Follow-up     Discharged from [place], covid pneumonia discharge date 4/26/2021         History of Present Illness  Saima Bowman is a 66 y.o. female who presents today for a check up. She is here after being discharged from [facility]. Patient Active Problem List    Diagnosis Date Noted    Pneumonia due to COVID-19 virus 03/22/2021    CAD (coronary artery disease)     Arthritis     Hepatomegaly, not elsewhere classified 08/21/2020    Moderate aortic stenosis 07/07/2019    Morbid obesity with BMI of 45.0-49.9, adult ([address] 05/26/2019    PVD (peripheral vascular disease) ([address] 02/12/2019    NSTEMI (non-ST elevated myocardial infarction) ([facility] 02/11/2017    Asthma     Hypertension     Depression     Obstructive sleep apnea     Nocturnal oxygen desaturation     Chronic cystitis 12/15/2014    Cardiac pacemaker 03/10/2014    Mild asthma 11/20/2013    Severe obstructive sleep apnea 11/20/2013    Super obesity 09/11/2013    JAMIE (obstructive sleep apnea) 07/17/2013    Abnormal cardiovascular stress test 06/28/2013    Heart block AV second degree 06/26/2013    Hyperlipidemia     Malignant neoplasm of upper-outer quadrant of right breast in female, estrogen receptor positive ([facility] 08/29/2012    Diffuse cystic mastopathy 08/29/2012    H/O chest pain 04/01/2005     -Pt recently discharged from NH due to Covid 19 pneumonia. . Pt has recovered. She states she had a repeat CXR at the nursing home. Stable today  -Pt states she does not want to have PT or OT in her house. They are only able to assess her on her porch  -NC anemia with hemoglobin of 10.9  -Asthma- Stable today  -Depression- On Zoloft  -HLD- On Zocor    Review of Systems   Constitutional: Negative for diaphoresis and fever. HENT: Negative. Respiratory: Negative for cough, chest tightness and shortness of breath.     Cardiovascular: Negative H/O cardiovascular stress test 1/7/15    EF 77%. Normal Stress Test.    H/O chest pain 4/2005    sees Dr Jemal Carver H/O Doppler ultrasound 2/20/2008 2/20/2008-CAROTID DOPPLER- Normal carotids bilaterally;    H/O Doppler ultrasound 06/06/13    CAROTID DOPPLER- there is heterogeneous, irregular atherosclerotic plaque noted in the right internal carotid artery,  doppler flow velocities within the right internal carotid artery are elevated, consistent with a mild, less than 50%stenosis, there is intimal thickening but no significant atherosclerotic plaque noted in the left internal carotid artery, the left carotid are within normal limits    H/O echocardiogram 4/9/2012, 10/20/2011    4/9/2012-LVSF normal EF=>55%. impaired LV relaxation;    H/O echocardiogram 12/31/14    EF 50-55%. LV shows mild concentric hypertrophy. Mildly dilated left atrium. Mildly dilated right ventricle. Sclerotic but not stenotic aortic valve. Mitral annular calcification. Mild MR, Mild TR, Mild pulm HTN.    H/O urinary incontinence     History of blood transfusion     Hx of Arterial Doppler ultrasound 07/01/2019    No hemodynamically significant or focal stenosis visualized bilaterally, bilateral lower extremity arteries exhibit diffuse plaque and multiphasic waveforms, unable to obtain bilateral ABIs due to elevated leg pressures >250 mmHg, possibly due to atherosclerosis    Hx of cardiovascular stress test 06/2013    EF 70% abn suggestive of anterolateral ischemia, possible RCA ischmeia, post left ventricular dilation suggestive multivessel CAD.  Hx of echocardiogram 06/2013    EF50%, mild MR and TR, mild pulmonary HTN, mild AS    Hx of fall     \"last time 2018- due to neuropathy, have to use cane\"    HX OTHER MEDICAL 7/24/13, 06/2013 7/13-No clinacally significant arrhythmia.  6/13-multiple cycles of wenckebach episodes in addtion to some sinus tach    Hyperlipidemia     Hypertension     Hypothermia 2008    Malignant neoplasm of breast (female) [facility] 2012    Neuropathy     \"have neuropathy of my legs\"    Normocytic normochromic anemia     Obstructive sleep apnea 2008 01- Has CPAP machine but has not used in over a year - states she has not had problems since her pacemaker was placed.  Old MI (myocardial infarction)     per pt on 1/15/2019\"had heart attack about a year ago\"    Osteoarthritis     Osteopenia     Primary malignant neoplasm of colon ([address]     S/P cardiac cath 06/2013    no significant CAD false postive stress test    Super obesity     Umbilical hernia        Past Surgical History:   Procedure Laterality Date    A-V CARDIAC PACEMAKER INSERTION  3/10/14     [employer]. Model:  [id number] Med[employer]onic  Serial:  [id number] dual chamber pacemaker    APPENDECTOMY  2004    BREAST BIOPSY  2/13/12    BREAST LUMPECTOMY  2007    right     BREAST SURGERY  02/13/2012    rt lesion biopsy     CARDIAC CATHETERIZATION  2007 & 2011    COLECTOMY  2004    Colon cancer    COLONOSCOPY  10-18-13    polyp    COLONOSCOPY  1/19/2016    internal hemorrhoids, diverticulosis, 1.5 cm sessile polyp, 8 mm polyp found in rectum.     COLONOSCOPY  12/14/2017    1.5cm residual polyp, 5mm polyps in blind sigmoid loop x3, Sigmoid divertics, Internal grade 1 hemorrhoids    COLONOSCOPY N/A 1/16/2019    COLONOSCOPY W/ ENDOSCOPIC MUCOSAL RESECTION WITH ELEVIEW 5ML INJECTION AND POLYPECTOMY OF TIP OF BLIND RECTOSIGMOID STUMP AND CAUTERIZATION WITH ERBE PROBE, CLIPPING X2 performed by Johnna Marks MD at [facility]  01/21/2020    pan divertics/ 4 polyps/ sm int hem, S/P partial sigmoid resection w/ end to side anastamosis, repeat in 3 years    COLONOSCOPY N/A 1/21/2020    COLONOSCOPY POLYPECTOMY SNARE/COLD BIOPSY performed by Johnna Marks MD at [facility]  2003    back    CYSTOSCOPY Bilateral 12/15/14    DENTAL SURGERY      front right tooth and root canal w/ brass bolt    DILATION AND CURETTAGE OF UTERUS  2006    Needed a camera to find my uterus.  ENDOSCOPY, COLON, DIAGNOSTIC  12/14/2017    Small hiatal hernia    HERNIA REPAIR  2004    Umb hernia    HERNIA REPAIR  2008    Inc hernia    LYMPH NODE DISSECTION  2/29/2012    Right axillary-3 sentinel nodes were positive out of 9.    MASTECTOMY, RADICAL Right 02/29/2012    w/ sentinal node disection-Dr West    PACEMAKER PLACEMENT      PRE-MALIGNANT / BENIGN SKIN LESION EXCISION  2/8/2013    right leg X2-Dr West    TONSILLECTOMY  1957    TUNNELED VENOUS PORT PLACEMENT  2004    TUNNELED VENOUS PORT PLACEMENT  02/13/2012    removal of mediport       Family History   Problem Relation Age of Onset    Arthritis Mother         OA, RA    High Cholesterol Mother     High Blood Pressure Mother     Cancer Father         skin and leukemia    High Blood Pressure Father     High Cholesterol Father     Diabetes Father     Heart Disease Father     Heart Disease Brother     High Cholesterol Brother     High Blood Pressure Brother     Heart Disease Brother     Seizures Son        Social History     Tobacco Use    Smoking status: Never Smoker    Smokeless tobacco: Never Used   Substance Use Topics    Alcohol use: No     Comment:           CAFFEINE: 2- 12oz nona daily & 2 cups coffee some nights.     Drug use: No       Current Outpatient Medications   Medication Sig Dispense Refill    Ferrous Sulfate (IRON) 325 (65 Fe) MG TABS Take 1 tablet by mouth every other day 30 tablet 0    acetaminophen (TYLENOL) 500 MG tablet Take 500 mg by mouth every 6 hours as needed for Pain      simvastatin (ZOCOR) 20 MG tablet Take 1 tablet by mouth nightly 90 tablet 1    sertraline (ZOLOFT) 100 MG tablet Take 2 tablets by mouth every morning 180 tablet 1    fluticasone (FLOVENT HFA) 110 MCG/ACT inhaler Inhale 2 puffs into the lungs 2 times daily 1 Inhaler 5    ezetimibe (ZETIA) 10 MG tablet Take 1 tablet by mouth every morning 90 tablet 1    albuterol sulfate HFA (PROAIR HFA) 108 (90 Base) MCG/ACT inhaler INHALE 2 PUFFS BY MOUTH EVERY SIX HOURS AS NEEDED FOR WHEEZING 1 Inhaler 5    vitamin B-12 (CYANOCOBALAMIN) 1000 MCG tablet Take 1,000 mcg by mouth daily      aspirin 81 MG EC tablet Take 81 mg by mouth nightly       letrozole (FEMARA) 2.5 MG tablet Take 2.5 mg by mouth nightly       calcium-vitamin D (OSCAL 500/200 D-3) 500-200 MG-UNIT per tablet Take 1 tablet by mouth 2 times daily       HYDROcodone-acetaminophen (NORCO) 5-325 MG per tablet Take 1 tablet by mouth every 4 hours as needed for Pain for up to 3 days. 12 tablet 0    albuterol-ipratropium (COMBIVENT RESPIMAT)  MCG/ACT AERS inhaler Inhale 1 puff into the lungs 4 times daily 1 Inhaler 0     No current facility-administered medications for this visit. OBJECTIVE:    /68   Pulse 70   Temp 97.2 °F (36.2 °C)   Wt 281 lb 12.8 oz (127.8 kg)   LMP 01/15/1999   SpO2 96%   Breastfeeding No   BMI 51.54 kg/m²     Physical Exam  Vitals signs reviewed. Constitutional:       General: She is not in acute distress. HENT:      Right Ear: Tympanic membrane normal.      Left Ear: Tympanic membrane normal.   Eyes:      Extraocular Movements: Extraocular movements intact. Conjunctiva/sclera: Conjunctivae normal.      Pupils: Pupils are equal, round, and reactive to light. Neck:      Musculoskeletal: Neck supple. Cardiovascular:      Rate and Rhythm: Normal rate and regular rhythm. Pulmonary:      Effort: Pulmonary effort is normal. No respiratory distress. Breath sounds: Normal breath sounds. Abdominal:      General: Bowel sounds are normal. There is no distension. Palpations: Abdomen is soft. Tenderness: There is no abdominal tenderness. Musculoskeletal:         General: No tenderness. Right lower leg: Edema present. Left lower leg: Edema present. Skin:     Findings: No rash.    Neurological:      Mental Status: She is alert and oriented to person, place, and time. Cranial Nerves: No cranial nerve deficit. Psychiatric:         Mood and Affect: Mood normal.         ASSESSMENT:  1. Mild intermittent asthma, unspecified whether complicated    2. History of COVID-19    3. Depression, unspecified depression type    4. Anemia, unspecified type    5. Hyperlipidemia, unspecified hyperlipidemia type        PLAN:    Orders Placed This Encounter   Procedures    Lipid Panel    CBC Auto Differential    IRON    VITAMIN B12 & FOLATE   Obtain lab  Obtain follow up chest xray from [facility] medications  The patient is asked to make an attempt to improve diet and exercise patterns   Keep f/u with specialists           Return in about 4 months (around 9/4/2021) for Check up.     Electronically Signed by Mohan French DO

## 2021-05-06 ENCOUNTER — TELEPHONE (OUTPATIENT)
Dept: INTERNAL MEDICINE CLINIC | Age: 71
End: 2021-05-06

## 2021-05-06 NOTE — TELEPHONE ENCOUNTER
[facility] nurse went to patients house to evaluate for PT, OT.  Mrs. Peter Villarreal would not let them into her home. Patient states wants to preform treatment from the front porch. The [place] nurse cannot give a legitimate treatment plan and would like direction as how to proceed.

## 2021-05-08 ENCOUNTER — APPOINTMENT (OUTPATIENT)
Dept: GENERAL RADIOLOGY | Age: 71
End: 2021-05-08
Payer: MEDICARE

## 2021-05-08 ENCOUNTER — APPOINTMENT (OUTPATIENT)
Dept: ULTRASOUND IMAGING | Age: 71
End: 2021-05-08
Payer: MEDICARE

## 2021-05-08 ENCOUNTER — HOSPITAL ENCOUNTER (EMERGENCY)
Age: 71
Discharge: HOME | End: 2021-05-08
Payer: MEDICARE

## 2021-05-08 VITALS
SYSTOLIC BLOOD PRESSURE: 154 MMHG | DIASTOLIC BLOOD PRESSURE: 56 MMHG | TEMPERATURE: 97.9 F | OXYGEN SATURATION: 93 % | HEART RATE: 74 BPM | RESPIRATION RATE: 16 BRPM

## 2021-05-08 DIAGNOSIS — M79.605 LEFT LEG PAIN: Primary | ICD-10-CM

## 2021-05-08 PROCEDURE — 73501 X-RAY EXAM HIP UNI 1 VIEW: CPT

## 2021-05-08 PROCEDURE — 6370000000 HC RX 637 (ALT 250 FOR IP): Performed by: PHYSICIAN ASSISTANT

## 2021-05-08 PROCEDURE — 99283 EMERGENCY DEPT VISIT LOW MDM: CPT

## 2021-05-08 PROCEDURE — 93971 EXTREMITY STUDY: CPT

## 2021-05-08 RX ORDER — HYDROCODONE BITARTRATE AND ACETAMINOPHEN 5; 325 MG/1; MG/1
1 TABLET ORAL ONCE
Status: COMPLETED | OUTPATIENT
Start: 2021-05-08 | End: 2021-05-08

## 2021-05-08 RX ORDER — HYDROCODONE BITARTRATE AND ACETAMINOPHEN 5; 325 MG/1; MG/1
1 TABLET ORAL EVERY 4 HOURS PRN
Qty: 12 TABLET | Refills: 0 | Status: SHIPPED | OUTPATIENT
Start: 2021-05-08 | End: 2021-05-11

## 2021-05-08 RX ADMIN — HYDROCODONE BITARTRATE AND ACETAMINOPHEN 1 TABLET: 5; 325 TABLET ORAL at 07:04

## 2021-05-08 ASSESSMENT — PAIN DESCRIPTION - PAIN TYPE: TYPE: ACUTE PAIN

## 2021-05-08 ASSESSMENT — PAIN DESCRIPTION - LOCATION: LOCATION: LEG

## 2021-05-08 ASSESSMENT — PAIN DESCRIPTION - ORIENTATION: ORIENTATION: LEFT

## 2021-05-08 NOTE — ED NOTES
Patient vomited oral contrast.  Dr. Flores aware.     Karen Patten, RN  06/19/17 [id number]     Reviewed AVS with patient and spouse who verbalized understanding. Discharged patient via wheelchair.       Priscila Ricci RN  05/08/21 [id number]

## 2021-05-08 NOTE — ED PROVIDER NOTES
EMERGENCY DEPARTMENT ENCOUNTER      PCP: Lakhwinder Villalobos, [facility]     Chief Complaint   Patient presents with    Leg Pain         This patient was not evaluated by the attending physician. I have independently evaluated this patient . HPI    Eva Chu is a 66 y.o. female who presents with left leg pain. Onset 4 days. Context is patient notes insidious onset of pain without trauma. Pain is generalized to the left upper leg, does not radiate. Pain is worse with certain movements of the hip. Patient states pain started in left low back and radiates to the left thigh. Now pain only in the left upper leg. Patient has history of chronic episodic low back pain. No distal numbness, tingling, weakness, or functional deficit. No other pain. Patient notes that she was recently discharged from skilled nursing facility for which she was at for 4 weeks for Covid, pneumonia. She denies any chest pain or new shortness of breath. REVIEW OF SYSTEMS    General: Denies constitutional symptoms. Cardiac: Denies chest wall injury or chest pain  Pulmonary: Denies chest wall injury or shortness of breath  GI: Denies abdomen injury or abdominal pain  Musculoskeletal:  Denies any other musculoskeletal pain or injuries, including chest wall   Skin:  Skin intact. Lymphatics:  No nodules or streaks.       See HPI and nursing notes for additional information     PAST MEDICAL & SURGICAL HISTORY    Past Medical History:   Diagnosis Date    Anemia     Anxiety 1978    Arthritis     generalized    Asthma 2006    AV block     2nd degree per hospital in june2013    Blood poisoning 1994    Blood transfusion     12/2003    CAD (coronary artery disease)     Cancer ([facility] [address] 2007    skin (face) & colon(2003)/ 2/2012 dx of right breast ca(pc)    Carcinoma of breast ([facility] [address] 2/29/2012    right arm no b/p or sticks    Cardiac pacemaker 3/10/14    Medtronic dual lead    Chronic cystitis     Chronic respiratory failure (HCC)     2 L/min oxygen at night-time    Colon cancer [facility]) 2013    COPD (chronic obstructive pulmonary disease) (HCC)     CTS (carpal tunnel syndrome)     Depression     Diverticulitis     H/O 24 hour EKG monitoring 2/28/2014 7/24/13 2/14 complete heart block 7/13No clinically significant arrthymia noted.  H/O cardiac catheterization 10/31/2011, 7/11/2007    10/31/2011-No significant CAD. Cardiolite stress test was false positive-Dr Kodi Castellon; 7/11/2007-No CAD, global function intact;    H/O cardiovascular stress test 10/20/2011, 3/23/2010    10/20/2011-Lexiscan- Evid of mild ischemia in Left CX region. EF 70%. Global LVSF normal;    H/O cardiovascular stress test 1/7/15    EF 77%. Normal Stress Test.    H/O chest pain 4/2005    sees Dr Olu Moreno H/O Doppler ultrasound 2/20/2008 2/20/2008-CAROTID DOPPLER- Normal carotids bilaterally;    H/O Doppler ultrasound 06/06/13    CAROTID DOPPLER- there is heterogeneous, irregular atherosclerotic plaque noted in the right internal carotid artery,  doppler flow velocities within the right internal carotid artery are elevated, consistent with a mild, less than 50%stenosis, there is intimal thickening but no significant atherosclerotic plaque noted in the left internal carotid artery, the left carotid are within normal limits    H/O echocardiogram 4/9/2012, 10/20/2011    4/9/2012-LVSF normal EF=>55%. impaired LV relaxation;    H/O echocardiogram 12/31/14    EF 50-55%. LV shows mild concentric hypertrophy. Mildly dilated left atrium. Mildly dilated right ventricle. Sclerotic but not stenotic aortic valve. Mitral annular calcification.   Mild MR, Mild TR, Mild pulm HTN.    H/O urinary incontinence     History of blood transfusion     Hx of Arterial Doppler ultrasound 07/01/2019    No hemodynamically significant or focal stenosis visualized bilaterally, bilateral lower extremity arteries exhibit diffuse plaque and multiphasic waveforms, unable to obtain bilateral ABIs due to elevated leg pressures >250 mmHg, possibly due to atherosclerosis    Hx of cardiovascular stress test 06/2013    EF 70% abn suggestive of anterolateral ischemia, possible RCA ischmeia, post left ventricular dilation suggestive multivessel CAD.  Hx of echocardiogram 06/2013    EF50%, mild MR and TR, mild pulmonary HTN, mild AS    Hx of fall     \"last time 2018- due to neuropathy, have to use cane\"    HX OTHER MEDICAL 7/24/13, 06/2013 7/13-No clinacally significant arrhythmia. 6/13-multiple cycles of wenckebach episodes in addtion to some sinus tach    Hyperlipidemia     Hypertension     Hypothermia 2008    Malignant neoplasm of breast (female) ([facility] [address] 2012    Neuropathy     \"have neuropathy of my legs\"    Normocytic normochromic anemia     Obstructive sleep apnea 2008 01- Has CPAP machine but has not used in over a year - states she has not had problems since her pacemaker was placed.  Old MI (myocardial infarction)     per pt on 1/15/2019\"had heart attack about a year ago\"    Osteoarthritis     Osteopenia     Primary malignant neoplasm of colon ([facility] [address]     S/P cardiac cath 06/2013    no significant CAD false postive stress test    Super obesity     Umbilical hernia      Past Surgical History:   Procedure Laterality Date    A-V CARDIAC PACEMAKER INSERTION  3/10/14     Medtronic. Model:  [id number] Medtronic  Serial:  [id number] dual chamber pacemaker    APPENDECTOMY  2004    BREAST BIOPSY  2/13/12    BREAST LUMPECTOMY  2007    right     BREAST SURGERY  02/13/2012    rt lesion biopsy     CARDIAC CATHETERIZATION  2007 & 2011    COLECTOMY  2004    Colon cancer    COLONOSCOPY  10-18-13    polyp    COLONOSCOPY  1/19/2016    internal hemorrhoids, diverticulosis, 1.5 cm sessile polyp, 8 mm polyp found in rectum.     COLONOSCOPY  12/14/2017    1.5cm residual polyp, 5mm polyps in blind sigmoid loop x3, Sigmoid divertics, Internal grade 1 hemorrhoids    COLONOSCOPY N/A 1/16/2019    COLONOSCOPY W/ ENDOSCOPIC MUCOSAL RESECTION WITH ELEVIEW 5ML INJECTION AND POLYPECTOMY OF TIP OF BLIND RECTOSIGMOID STUMP AND CAUTERIZATION WITH ERBE PROBE, CLIPPING X2 performed by Kell Bond MD at [facility]  01/21/2020    pan divertics/ 4 polyps/ sm int hem, S/P partial sigmoid resection w/ end to side anastamosis, repeat in 3 years    COLONOSCOPY N/A 1/21/2020    COLONOSCOPY POLYPECTOMY SNARE/COLD BIOPSY performed by Kell Bond MD at [facility]  2003    back   [address] Bilateral 12/15/14    DENTAL SURGERY      front right tooth and root canal w/ brass bolt    DILATION AND CURETTAGE OF UTERUS  2006    Needed a camera to find my uterus.  ENDOSCOPY, COLON, DIAGNOSTIC  12/14/2017    Small hiatal hernia    HERNIA REPAIR  2004    Umb hernia    HERNIA REPAIR  2008    Inc hernia    LYMPH NODE DISSECTION  2/29/2012    Right axillary-3 sentinel nodes were positive out of 9.    MASTECTOMY, RADICAL Right 02/29/2012    w/ sentinal node disection-Dr West    PACEMAKER PLACEMENT      PRE-MALIGNANT / BENIGN SKIN LESION EXCISION  2/8/2013    right leg X2-Dr West    TONSILLECTOMY  1957    TUNNELED VENOUS PORT PLACEMENT  2004    TUNNELED VENOUS PORT PLACEMENT  02/13/2012    removal of mediport       CURRENT MEDICATIONS    Current Outpatient Rx   Medication Sig Dispense Refill    HYDROcodone-acetaminophen (NORCO) 5-325 MG per tablet Take 1 tablet by mouth every 4 hours as needed for Pain for up to 3 days.  12 tablet 0    Ferrous Sulfate (IRON) 325 (65 Fe) MG TABS Take 1 tablet by mouth every other day 30 tablet 0    albuterol-ipratropium (COMBIVENT RESPIMAT)  MCG/ACT AERS inhaler Inhale 1 puff into the lungs 4 times daily (Patient not taking: Reported on 5/4/2021) 1 Inhaler 0    acetaminophen (TYLENOL) 500 MG tablet Take 500 mg by mouth every 6 hours as needed for Pain      simvastatin (ZOCOR) 20 MG tablet Take 1 tablet by mouth nightly 90 tablet 1    sertraline (ZOLOFT) 100 MG tablet Take 2 tablets by mouth every morning 180 tablet 1    fluticasone (FLOVENT HFA) 110 MCG/ACT inhaler Inhale 2 puffs into the lungs 2 times daily 1 Inhaler 5    ezetimibe (ZETIA) 10 MG tablet Take 1 tablet by mouth every morning 90 tablet 1    albuterol sulfate HFA (PROAIR HFA) 108 (90 Base) MCG/ACT inhaler INHALE 2 PUFFS BY MOUTH EVERY SIX HOURS AS NEEDED FOR WHEEZING 1 Inhaler 5    vitamin B-12 (CYANOCOBALAMIN) 1000 MCG tablet Take 1,000 mcg by mouth daily      aspirin 81 MG EC tablet Take 81 mg by mouth nightly       letrozole (FEMARA) 2.5 MG tablet Take 2.5 mg by mouth nightly       calcium-vitamin D (OSCAL 500/200 D-3) 500-200 MG-UNIT per tablet Take 1 tablet by mouth 2 times daily          ALLERGIES    Allergies   Allergen Reactions    Bactrim [Sulfamethoxazole-Trimethoprim] Anaphylaxis and Hives    Lipitor [Atorvastatin] Shortness Of Breath    Taxol [Paclitaxel] Anaphylaxis and Swelling    Aminoglycosides      Abstracted from [facility] patient chart.  Demerol Nausea Only    Neosporin [Bacitracin-Neomycin-Polymyxin] Rash and Other (See Comments)     hotness    Other Rash     Ivory Soap    Talc Other (See Comments)     blisters       SOCIAL & FAMILY HISTORY    Social History     Socioeconomic History    Marital status:      Spouse name: None    Number of children: 2    Years of education: None    Highest education level: None   Occupational History    Occupation: retired   Social Needs    Financial resource strain: None    Food insecurity     Worry: Never true     Inability: Never true    Transportation needs     Medical: No     Non-medical: No   Tobacco Use    Smoking status: Never Smoker    Smokeless tobacco: Never Used   Substance and Sexual Activity    Alcohol use: No     Comment:           CAFFEINE: 2- 12oz nona daily & 2 cups coffee some nights.     Drug use: No    Sexual activity: Not bilateral lower extremity edema, 1+ nonpitting. Patient states no new nature severity recently. Patient states has been present for \"a long time\". There is mild tenderness to the left posterior calf without redness, warmth, palpable cord. No cord. Homans sign negative. Distal capillary refill, pulses, sensation and motor intact. Back:   - No gross deformity, swelling, or discoloration.    -  + generalized lumbar and Paralumbar tenderness without focus. No masses, fluctuance, warmth, or skin changes.  - No localized midline bony tenderness.   - No change in pain with forward flexion  - SLR test negative     No CVA tenderness to percussion      Neurologic:    - Alert & oriented person, place, time, and situation, no speech difficulties or slurring.  - No obvious gross motor deficits  - Cranial nerves 2-12 grossly intact  - Negative meningeal signs.  - Sensation intact to light touch  - Strength 5/5 in upper and lower extremities bilaterally      HIGH sensitivity Neuro Exam for Lumbar spine  -(L1-L2)  inner thigh sensation intact  -(S3,4,5) Groin sensation intact     -Lower extremity ROM intact including:       -(L2) cross legs (adduct thighs)        -(L3) extend knees & flex knees (S1)       -(L4) dorsiflex ankles       -(L5) point great toes upward & plantar flex toes (S2)        -(L2,3,4) Knee reflexes intact bilaterally      Vascular:    Femoral & Distal pulses, & capillary refill intact bilateral lower extremities  Lymphatics:  No swollen nodes or streaks. Integument:  Well hydrated, no rash. skin intact  Neurologic:  Awake alert, normal flow of speech. Cranial nerves II through XII grossly intact. Psychiatric: Cooperative, pleasant affect    RADIOLOGY/PROCEDURES    XR HIP 1 VW W PELVIS LEFT   Preliminary Result   Mild to moderate bilateral hip degenerative changes without acute osseous   abnormality.   If there remains a high clinical suspicion for fracture,   recommend further evaluation with CT or MRI. VL DUP LOWER EXTREMITY VENOUS LEFT   Final Result   Negative study for deep venous thrombosis in the left lower extremity. ED COURSE & MEDICAL DECISION MAKING        Patient presents as above with left leg pain, nontraumatic. Patient states initially pain was in the left low back that traveled to left lateral hip. Pain now in left lateral hip without discernible low back pain. On exam today there is no evidence of cellulitis, necrotizing infection or vascular compromise clinically. Ultrasound of left lower extremity today reveals no evidence of DVT. Hip x-rays today reveal evidence of mild to moderate degenerative changes. I discussed the unknown etiology with patient at bedside today. We discussed the possibility of pain emanating from hip joint versus low back. Patient's description of scenario is consistent with low back pain, sciatica. No red flag warning signs for epidural abscess, hematoma, cauda equina today. Patient remains neurologically intact and serial rechecks revealed no new findings. Patient states pain medication given in ED today alleviated symptoms. Plan for discharge home with pain medication and close outpatient follow-up with PCP and/or orthopedist-Ortho referral information provided today. Patient agrees to return emergency department if symptoms worsen or any new symptoms develop. Clinical  IMPRESSION    1. Left leg pain              Comment: Please note this report has been produced using speech recognition software and may contain errors related to that system including errors in grammar, punctuation, and spelling, as well as words and phrases that may be inappropriate. If there are any questions or concerns please feel free to contact the dictating provider for clarification.        Caryl Crews, [address]  05/08/21 [id number]

## 2021-05-10 ENCOUNTER — CARE COORDINATION (OUTPATIENT)
Dept: CARE COORDINATION | Age: 71
End: 2021-05-10
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
## 2021-05-12 ENCOUNTER — CARE COORDINATION (OUTPATIENT)
Dept: CARE COORDINATION | Age: 71
End: 2021-05-12

## 2021-05-12 NOTE — CARE COORDINATION
LSW received ACP ref from [facility] contacted pt who communicated to LSW that she does not have a computer, nor will she use one. Pt reviewed that she also does not allow individuals into the home. LSW reviewed the ACP paper work. Pt indicated she was \"ok\" with LSW sending the documentation, but was unsure if she would do it or not. LSW to mail ACP paperwork to pt.

## 2021-05-13 ENCOUNTER — CARE COORDINATION (OUTPATIENT)
Dept: CARE COORDINATION | Age: 71
End: 2021-05-13

## 2021-05-13 ENCOUNTER — OFFICE VISIT (OUTPATIENT)
Dept: INTERNAL MEDICINE CLINIC | Age: 71
End: 2021-05-13
Payer: MEDICARE

## 2021-05-13 VITALS
HEART RATE: 77 BPM | DIASTOLIC BLOOD PRESSURE: 68 MMHG | TEMPERATURE: 98.4 F | SYSTOLIC BLOOD PRESSURE: 130 MMHG | OXYGEN SATURATION: 95 %

## 2021-05-13 DIAGNOSIS — G25.81 RESTLESS LEGS: ICD-10-CM

## 2021-05-13 DIAGNOSIS — M16.0 PRIMARY OSTEOARTHRITIS OF BOTH HIPS: Primary | ICD-10-CM

## 2021-05-13 DIAGNOSIS — R60.0 PEDAL EDEMA: ICD-10-CM

## 2021-05-13 PROCEDURE — 99214 OFFICE O/P EST MOD 30 MIN: CPT | Performed by: FAMILY MEDICINE

## 2021-05-13 RX ORDER — GABAPENTIN 100 MG/1
100 CAPSULE ORAL NIGHTLY
Qty: 30 CAPSULE | Refills: 3 | Status: SHIPPED | OUTPATIENT
Start: 2021-05-13 | End: 2021-07-29 | Stop reason: SDUPTHER

## 2021-05-13 ASSESSMENT — ENCOUNTER SYMPTOMS
COUGH: 0
NAUSEA: 0
SHORTNESS OF BREATH: 0
BACK PAIN: 0
ABDOMINAL PAIN: 0

## 2021-05-13 NOTE — CARE COORDINATION
Attempted to reach patient for ACM follow up. No answer to phone. Message left with ACM contact information and request for call back. Confirmed f/u per ACP.       Plan for next contact:  LISA

## 2021-05-13 NOTE — PROGRESS NOTES
Carcinoma of breast ([facility] [address] 2/29/2012    right arm no b/p or sticks    Cardiac pacemaker 3/10/14    Medtronic dual lead    Chronic cystitis     Chronic respiratory failure (HCC)     2 L/min oxygen at night-time    Colon cancer [facility]) 2013    COPD (chronic obstructive pulmonary disease) (HCC)     CTS (carpal tunnel syndrome)     Depression     Diverticulitis     H/O 24 hour EKG monitoring 2/28/2014 7/24/13 2/14 complete heart block 7/13No clinically significant arrthymia noted.  H/O cardiac catheterization 10/31/2011, 7/11/2007    10/31/2011-No significant CAD. Cardiolite stress test was false positive-Dr Zan Jordan; 7/11/2007-No CAD, global function intact;    H/O cardiovascular stress test 10/20/2011, 3/23/2010    10/20/2011-Lexiscan- Evid of mild ischemia in Left CX region. EF 70%. Global LVSF normal;    H/O cardiovascular stress test 1/7/15    EF 77%. Normal Stress Test.    H/O chest pain 4/2005    sees Dr Shlomo Miranda H/O Doppler ultrasound 2/20/2008 2/20/2008-CAROTID DOPPLER- Normal carotids bilaterally;    H/O Doppler ultrasound 06/06/13    CAROTID DOPPLER- there is heterogeneous, irregular atherosclerotic plaque noted in the right internal carotid artery,  doppler flow velocities within the right internal carotid artery are elevated, consistent with a mild, less than 50%stenosis, there is intimal thickening but no significant atherosclerotic plaque noted in the left internal carotid artery, the left carotid are within normal limits    H/O echocardiogram 4/9/2012, 10/20/2011    4/9/2012-LVSF normal EF=>55%. impaired LV relaxation;    H/O echocardiogram 12/31/14    EF 50-55%. LV shows mild concentric hypertrophy. Mildly dilated left atrium. Mildly dilated right ventricle. Sclerotic but not stenotic aortic valve. Mitral annular calcification.   Mild MR, Mild TR, Mild pulm HTN.    H/O urinary incontinence     History of blood transfusion     Hx of Arterial Doppler ultrasound 07/01/2019 No hemodynamically significant or focal stenosis visualized bilaterally, bilateral lower extremity arteries exhibit diffuse plaque and multiphasic waveforms, unable to obtain bilateral ABIs due to elevated leg pressures >250 mmHg, possibly due to atherosclerosis    Hx of cardiovascular stress test 06/2013    EF 70% abn suggestive of anterolateral ischemia, possible RCA ischmeia, post left ventricular dilation suggestive multivessel CAD.  Hx of echocardiogram 06/2013    EF50%, mild MR and TR, mild pulmonary HTN, mild AS    Hx of fall     \"last time 2018- due to neuropathy, have to use cane\"    HX OTHER MEDICAL 7/24/13, 06/2013 7/13-No clinacally significant arrhythmia. 6/13-multiple cycles of wenckebach episodes in addtion to some sinus tach    Hyperlipidemia     Hypertension     Hypothermia 2008    Malignant neoplasm of breast (female) ([address] 2012    Neuropathy     \"have neuropathy of my legs\"    Normocytic normochromic anemia     Obstructive sleep apnea 2008 01- Has CPAP machine but has not used in over a year - states she has not had problems since her pacemaker was placed.  Old MI (myocardial infarction)     per pt on 1/15/2019\"had heart attack about a year ago\"    Osteoarthritis     Osteopenia     Primary malignant neoplasm of colon ([address]     S/P cardiac cath 06/2013    no significant CAD false postive stress test    Super obesity     Umbilical hernia        Past Surgical History:   Procedure Laterality Date    A-V CARDIAC PACEMAKER INSERTION  3/10/14     Medtronic.    Model:  [id number] Medtronic  Serial:  [id number] dual chamber pacemaker    APPENDECTOMY  2004    BREAST BIOPSY  2/13/12    BREAST LUMPECTOMY  2007    right     BREAST SURGERY  02/13/2012    rt lesion biopsy     CARDIAC CATHETERIZATION  2007 & 2011    COLECTOMY  2004    Colon cancer    COLONOSCOPY  10-18-13    polyp    COLONOSCOPY  1/19/2016    internal hemorrhoids, diverticulosis, 1.5 cm sessile polyp, 8 mm polyp found in rectum.  COLONOSCOPY  12/14/2017    1.5cm residual polyp, 5mm polyps in blind sigmoid loop x3, Sigmoid divertics, Internal grade 1 hemorrhoids    COLONOSCOPY N/A 1/16/2019    COLONOSCOPY W/ ENDOSCOPIC MUCOSAL RESECTION WITH ELEVIEW 5ML INJECTION AND POLYPECTOMY OF TIP OF BLIND RECTOSIGMOID STUMP AND CAUTERIZATION WITH ERBE PROBE, CLIPPING X2 performed by Surya Mcknight MD at [facility]  01/21/2020    pan divertics/ 4 polyps/ sm int hem, S/P partial sigmoid resection w/ end to side anastamosis, repeat in 3 years    COLONOSCOPY N/A 1/21/2020    COLONOSCOPY POLYPECTOMY SNARE/COLD BIOPSY performed by Surya Mcknight MD at [facility]  2003    back   [address] Bilateral 12/15/14    DENTAL SURGERY      front right tooth and root canal w/ brass bolt    DILATION AND CURETTAGE OF UTERUS  2006    Needed a camera to find my uterus.     ENDOSCOPY, COLON, DIAGNOSTIC  12/14/2017    Small hiatal hernia    HERNIA REPAIR  2004    Umb hernia    HERNIA REPAIR  2008    Inc hernia    LYMPH NODE DISSECTION  2/29/2012    Right axillary-3 sentinel nodes were positive out of 9.    MASTECTOMY, RADICAL Right 02/29/2012    w/ sentinal node disection-Dr West    PACEMAKER PLACEMENT      PRE-MALIGNANT / BENIGN SKIN LESION EXCISION  2/8/2013    right leg X2-Dr West    TONSILLECTOMY  1957    TUNNELED VENOUS PORT PLACEMENT  2004    TUNNELED VENOUS PORT PLACEMENT  02/13/2012    removal of mediport       Family History   Problem Relation Age of Onset    Arthritis Mother         OA, RA    High Cholesterol Mother     High Blood Pressure Mother     Cancer Father         skin and leukemia    High Blood Pressure Father     High Cholesterol Father     Diabetes Father     Heart Disease Father     Heart Disease Brother     High Cholesterol Brother     High Blood Pressure Brother     Heart Disease Brother     Seizures Son        Social History     Tobacco Use   

## 2021-05-21 ENCOUNTER — OFFICE VISIT (OUTPATIENT)
Dept: ORTHOPEDIC SURGERY | Age: 71
End: 2021-05-21
Payer: MEDICARE

## 2021-05-21 ENCOUNTER — CARE COORDINATION (OUTPATIENT)
Dept: CARE COORDINATION | Age: 71
End: 2021-05-21

## 2021-05-21 VITALS
OXYGEN SATURATION: 92 % | WEIGHT: 282.8 LBS | RESPIRATION RATE: 18 BRPM | HEART RATE: 92 BPM | BODY MASS INDEX: 52.04 KG/M2 | HEIGHT: 62 IN

## 2021-05-21 DIAGNOSIS — M54.32 SCIATICA OF LEFT SIDE: Primary | ICD-10-CM

## 2021-05-21 DIAGNOSIS — M16.12 PRIMARY OSTEOARTHRITIS OF LEFT HIP: ICD-10-CM

## 2021-05-21 PROCEDURE — 99203 OFFICE O/P NEW LOW 30 MIN: CPT | Performed by: PHYSICIAN ASSISTANT

## 2021-05-21 ASSESSMENT — ENCOUNTER SYMPTOMS
EYES NEGATIVE: 1
GASTROINTESTINAL NEGATIVE: 1
RESPIRATORY NEGATIVE: 1

## 2021-05-21 NOTE — PROGRESS NOTES
Review of Systems   Constitutional: Negative. HENT: Negative. Eyes: Negative. Respiratory: Negative. Cardiovascular: Negative. Gastrointestinal: Negative. Genitourinary: Negative. Musculoskeletal: Positive for arthralgias and myalgias. Skin: Negative. Negative for rash and wound. Neurological: Negative. Psychiatric/Behavioral: Negative. I reviewed and agree with the portions of the HPI, review of systems, vital documentation and plan performed by my staff and have added/addended where appropriate. Dalila Nuñez is a 66 y.o. Patient presents to the office for an ED follow up from [facility] after she was seen on 5/8/21 with complaints of leg pain. X-rays taken in the ED revealed mild to moderate bilateral hip degenerative changes without acute osseous abnormality. Onset: 2 weeks ago with NKI. Hx of multiple falls with her last fall occurring 3 months ago which patient contributes to chronic neuropathy issues. Pain is described as a \"squeezing sensation\" that begins at the top of the buttocks with sensation radiating down the lateral aspect of the left leg with pain rated at a 4/10. She only takes OTC Extra Strength when pain becomes unbearable with pain aggravated by prolonged periods of ambulation. No recent injury, surgeries, or other conservative therapies reported. Hx of skin, colon, and breast cancer and upcoming bone density test scheduled for May 26, 2021. Patient did mention that she is having some numbness in her hands as well that has been present for many years and causes her to drop things and has her fingers tingle. She did not come here for this and does not necessarily want this evaluated this time but did mention it to me during our discussion of her general health.       Past Medical History:   Diagnosis Date    Anemia     Anxiety 1978    Arthritis     generalized    Asthma 2006    AV block     2nd degree per hospital in [address] Blood poisoning 1994  Blood transfusion     12/2003    CAD (coronary artery disease)     Cancer ([facility] [address] 2007    skin (face) & colon(2003)/ 2/2012 dx of right breast ca(pc)    Carcinoma of breast ([facility] [address] 2/29/2012    right arm no b/p or sticks    Cardiac pacemaker 3/10/14    Medtronic dual lead    Chronic cystitis     Chronic respiratory failure (HCC)     2 L/min oxygen at night-time    Colon cancer [facility]) 2013    COPD (chronic obstructive pulmonary disease) (HCC)     CTS (carpal tunnel syndrome)     Depression     Diverticulitis     H/O 24 hour EKG monitoring 2/28/2014 7/24/13 2/14 complete heart block 7/13No clinically significant arrthymia noted.  H/O cardiac catheterization 10/31/2011, 7/11/2007    10/31/2011-No significant CAD. Cardiolite stress test was false positive-Dr Corinne Davenport; 7/11/2007-No CAD, global function intact;    H/O cardiovascular stress test 10/20/2011, 3/23/2010    10/20/2011-Lexiscan- Evid of mild ischemia in Left CX region. EF 70%. Global LVSF normal;    H/O cardiovascular stress test 1/7/15    EF 77%. Normal Stress Test.    H/O chest pain 4/2005    sees Dr Omar Basilio H/O Doppler ultrasound 2/20/2008 2/20/2008-CAROTID DOPPLER- Normal carotids bilaterally;    H/O Doppler ultrasound 06/06/13    CAROTID DOPPLER- there is heterogeneous, irregular atherosclerotic plaque noted in the right internal carotid artery,  doppler flow velocities within the right internal carotid artery are elevated, consistent with a mild, less than 50%stenosis, there is intimal thickening but no significant atherosclerotic plaque noted in the left internal carotid artery, the left carotid are within normal limits    H/O echocardiogram 4/9/2012, 10/20/2011    4/9/2012-LVSF normal EF=>55%. impaired LV relaxation;    H/O echocardiogram 12/31/14    EF 50-55%. LV shows mild concentric hypertrophy. Mildly dilated left atrium. Mildly dilated right ventricle. Sclerotic but not stenotic aortic valve.   Mitral annular calcification. Mild MR, Mild TR, Mild pulm HTN.    H/O urinary incontinence     History of blood transfusion     Hx of Arterial Doppler ultrasound 07/01/2019    No hemodynamically significant or focal stenosis visualized bilaterally, bilateral lower extremity arteries exhibit diffuse plaque and multiphasic waveforms, unable to obtain bilateral ABIs due to elevated leg pressures >250 mmHg, possibly due to atherosclerosis    Hx of cardiovascular stress test 06/2013    EF 70% abn suggestive of anterolateral ischemia, possible RCA ischmeia, post left ventricular dilation suggestive multivessel CAD.  Hx of echocardiogram 06/2013    EF50%, mild MR and TR, mild pulmonary HTN, mild AS    Hx of fall     \"last time 2018- due to neuropathy, have to use cane\"    HX OTHER MEDICAL 7/24/13, 06/2013 7/13-No clinacally significant arrhythmia. 6/13-multiple cycles of wenckebach episodes in addtion to some sinus tach    Hyperlipidemia     Hypertension     Hypothermia 2008    Malignant neoplasm of breast (female) ([address] 2012    Neuropathy     \"have neuropathy of my legs\"    Normocytic normochromic anemia     Obstructive sleep apnea 2008 01- Has CPAP machine but has not used in over a year - states she has not had problems since her pacemaker was placed.  Old MI (myocardial infarction)     per pt on 1/15/2019\"had heart attack about a year ago\"    Osteoarthritis     Osteopenia     Primary malignant neoplasm of colon ([address]     S/P cardiac cath 06/2013    no significant CAD false postive stress test    Super obesity     Umbilical hernia        Past Surgical History:   Procedure Laterality Date    A-V CARDIAC PACEMAKER INSERTION  3/10/14     Medtronic.    Model:  [id number] Medtronic  Serial:  [id number] dual chamber pacemaker    APPENDECTOMY  2004    BREAST BIOPSY  2/13/12    BREAST LUMPECTOMY  2007    right     BREAST SURGERY  02/13/2012    rt lesion biopsy     CARDIAC CATHETERIZATION  2007 & 2011  COLECTOMY  2004    Colon cancer    COLONOSCOPY  10-18-13    polyp    COLONOSCOPY  1/19/2016    internal hemorrhoids, diverticulosis, 1.5 cm sessile polyp, 8 mm polyp found in rectum.  COLONOSCOPY  12/14/2017    1.5cm residual polyp, 5mm polyps in blind sigmoid loop x3, Sigmoid divertics, Internal grade 1 hemorrhoids    COLONOSCOPY N/A 1/16/2019    COLONOSCOPY W/ ENDOSCOPIC MUCOSAL RESECTION WITH ELEVIEW 5ML INJECTION AND POLYPECTOMY OF TIP OF BLIND RECTOSIGMOID STUMP AND CAUTERIZATION WITH ERBE PROBE, CLIPPING X2 performed by Elder Augustin MD at [facility]  01/21/2020    pan divertics/ 4 polyps/ sm int hem, S/P partial sigmoid resection w/ end to side anastamosis, repeat in 3 years    COLONOSCOPY N/A 1/21/2020    COLONOSCOPY POLYPECTOMY SNARE/COLD BIOPSY performed by Elder Augustin MD at [facility]  2003    back   [address] Bilateral 12/15/14    DENTAL SURGERY      front right tooth and root canal w/ brass bolt    DILATION AND CURETTAGE OF UTERUS  2006    Needed a camera to find my uterus.     ENDOSCOPY, COLON, DIAGNOSTIC  12/14/2017    Small hiatal hernia    HERNIA REPAIR  2004    Umb hernia    HERNIA REPAIR  2008    Inc hernia    LYMPH NODE DISSECTION  2/29/2012    Right axillary-3 sentinel nodes were positive out of 9.    MASTECTOMY, RADICAL Right 02/29/2012    w/ sentinal node disection-Dr West    PACEMAKER PLACEMENT      PRE-MALIGNANT / BENIGN SKIN LESION EXCISION  2/8/2013    right leg X2-Dr West    TONSILLECTOMY  1957    TUNNELED VENOUS PORT PLACEMENT  2004    TUNNELED VENOUS PORT PLACEMENT  02/13/2012    removal of mediport       Family History   Problem Relation Age of Onset    Arthritis Mother         OA, RA    High Cholesterol Mother     High Blood Pressure Mother     Cancer Father         skin and leukemia    High Blood Pressure Father     High Cholesterol Father     Diabetes Father     Heart Disease Father     Heart Disease Brother     High Cholesterol Brother     High Blood Pressure Brother     Heart Disease Brother     Seizures Son        Social History     Socioeconomic History    Marital status:      Spouse name: None    Number of children: 2    Years of education: None    Highest education level: None   Occupational History    Occupation: retired   Tobacco Use    Smoking status: Never Smoker    Smokeless tobacco: Never Used   Vaping Use    Vaping Use: Never used   Substance and Sexual Activity    Alcohol use: No     Comment:           CAFFEINE: 2- 12oz nona daily & 2 cups coffee some nights.  Drug use: No    Sexual activity: Not Currently     Partners: Male   Other Topics Concern    None   Social History Narrative    Do you donate blood or plasma? No    Caffeine intake? Moderate    Advance directive? No    Is blood transfusion acceptable in an emergency? Yes    Live alone or with others? With others    Able to care for self? Yes    No exercise at this time         Social Determinants of Health     Financial Resource Strain:     Difficulty of Paying Living Expenses:    Food Insecurity:     Worried About Running Out of Food in the Last Year:     [address] in the Last Year:    Transportation Needs:     Lack of Transportation (Medical):      Lack of Transportation (Non-Medical):    Physical Activity:     Days of Exercise per Week:     Minutes of Exercise per Session:    Stress:     Feeling of Stress :    Social Connections:     Frequency of Communication with Friends and Family:     Frequency of Social Gatherings with Friends and Family:     Attends Yarsani Services:     Active Member of Clubs or Organizations:     Attends Club or Organization Meetings:     Marital Status:    Intimate Partner Violence:     Fear of Current or Ex-Partner:     Emotionally Abused:     Physically Abused:     Sexually Abused:        Current Outpatient Medications   Medication Sig Dispense Refill SpO2 92%   BMI 51.72 kg/m²       Gait is antalgic       Gen/Psych:Examination reveals a pleasant individual in no acute distress. The patient is oriented to time, place and person. The patient's mood and affect are appropriate. Patient appears well nourished. Body habitus is morbidly obese     Lymph:  Mild lymphedema in bilateral lower extremities     Skin intact in bilateral lower extremities with no ulcerations, lesions, rash, erythema. Vascular: There are mild varicosities in bilateral lower extremities, sensation intact to light touch over bilateral lower extremities. Left hip examination:  Palpation: No tenderness to palpation along the greater trochanter but there is mild to moderate tenderness to palpation over the left lumbosacral area extending into the gluteal region. Inspection: Leg lengths are approximately equal  Range of motion: 45 degrees abduction, 40 degrees external rotation, 25 degrees internal rotation, 90 degrees hip flexion  No pain with passive internal or external rotation of the left hip  Hip strength: 5/5 hip abduction, 5/5 hip adduction, 5/5 hip flexion      Outsiderecord review: ER note and x-rays reviewed    Imaging studies:    XR HIP 1 VW W PELVIS LEFT  My review of the x-ray shows moderate to severe degenerative change within the left hip joint and severe degenerative changes in the right hip joint. No obvious fractures noted. Impression   Mild to moderate bilateral hip degenerative changes without acute osseous   abnormality.  If there remains a high clinical suspicion for fracture,   recommend further evaluation with CT or MRI. Impression:     Diagnosis Orders   1. Sciatica of left side     2.  Primary osteoarthritis of left hip             Plan:    Patient Instructions   You may follow up for carpal tunnel as needed  As far as your hip/back pain is concerned I believe more of your pain is coming from your back therefore follow-up with primary care provider or a doctor who works on the spine but not general orthopedics.

## 2021-05-21 NOTE — CARE COORDINATION
Attempted to reach patient for ACM follow up. No answer to phone. Message left with ACM contact information and request for call back. Noted Provider f/u.   Plan for next call:  SDOH/ ACP f/u

## 2021-05-24 ENCOUNTER — PROCEDURE VISIT (OUTPATIENT)
Dept: CARDIOLOGY CLINIC | Age: 71
End: 2021-05-24
Payer: MEDICARE

## 2021-05-24 DIAGNOSIS — I44.1 HEART BLOCK AV SECOND DEGREE: ICD-10-CM

## 2021-05-24 DIAGNOSIS — Z95.0 CARDIAC PACEMAKER IN SITU: Primary | ICD-10-CM

## 2021-05-24 PROCEDURE — 93296 REM INTERROG EVL PM/IDS: CPT | Performed by: INTERNAL MEDICINE

## 2021-05-24 PROCEDURE — 93294 REM INTERROG EVL PM/LDLS PM: CPT | Performed by: INTERNAL MEDICINE

## 2021-06-01 ENCOUNTER — CARE COORDINATION (OUTPATIENT)
Dept: CARE COORDINATION | Age: 71
End: 2021-06-01

## 2021-06-15 ENCOUNTER — CARE COORDINATION (OUTPATIENT)
Dept: CARE COORDINATION | Age: 71
End: 2021-06-15

## 2021-06-15 NOTE — CARE COORDINATION
acetaminophen (TYLENOL) 500 MG tablet Take 500 mg by mouth every 6 hours as needed for Pain    Historical Provider, MD   simvastatin (ZOCOR) 20 MG tablet Take 1 tablet by mouth nightly 2/11/21   [facility], DO   sertraline (ZOLOFT) 100 MG tablet Take 2 tablets by mouth every morning 2/11/21   [facility], DO   fluticasone (FLOVENT HFA) 110 MCG/ACT inhaler Inhale 2 puffs into the lungs 2 times daily 2/11/21   [facility], DO   ezetimibe (ZETIA) 10 MG tablet Take 1 tablet by mouth every morning 2/11/21   [facility], DO   albuterol sulfate HFA (PROAIR HFA) 108 (90 Base) MCG/ACT inhaler INHALE 2 PUFFS BY MOUTH EVERY SIX HOURS AS NEEDED FOR WHEEZING 2/11/21   [facility], DO   vitamin B-12 (CYANOCOBALAMIN) 1000 MCG tablet Take 1,000 mcg by mouth daily    Historical Provider, MD   aspirin 81 MG EC tablet Take 81 mg by mouth nightly     Historical Provider, MD   letrozole (FEMARA) 2.5 MG tablet Take 2.5 mg by mouth nightly     Historical Provider, MD   calcium-vitamin D (OSCAL 500/200 D-3) 500-200 MG-UNIT per tablet Take 1 tablet by mouth 2 times daily     Historical Provider, MD       Future Appointments   Date Time Provider Chance Chiang   9/8/2021  4:00 PM Michelle Tovar DO [facility]   10/4/2021  1:00 PM SCHEDULE, [facility] LPN [facility]   10/5/2021  1:40 PM Deandra Rhodes MD [facility]     ,   COPD Assessment    Does the patient understand envrionmental exposure?: Yes  Is the patient able to verbalize Rescue vs. Long Acting medications?: Yes  Does the patient have a nebulizer?: Yes  Does the patient use a space with inhaled medications?: No            Symptoms:     Symptom course: no change      and   General Assessment    Do you have any symptoms that are causing concern?: No

## 2021-07-15 ENCOUNTER — CARE COORDINATION (OUTPATIENT)
Dept: CARE COORDINATION | Age: 71
End: 2021-07-15

## 2021-07-27 ENCOUNTER — HOSPITAL ENCOUNTER (EMERGENCY)
Age: 71
Discharge: HOME | End: 2021-07-27
Attending: STUDENT IN AN ORGANIZED HEALTH CARE EDUCATION/TRAINING PROGRAM
Payer: MEDICARE

## 2021-07-27 ENCOUNTER — TELEPHONE (OUTPATIENT)
Dept: INTERNAL MEDICINE CLINIC | Age: 71
End: 2021-07-27

## 2021-07-27 ENCOUNTER — APPOINTMENT (OUTPATIENT)
Dept: ULTRASOUND IMAGING | Age: 71
End: 2021-07-27
Payer: MEDICARE

## 2021-07-27 VITALS
OXYGEN SATURATION: 96 % | SYSTOLIC BLOOD PRESSURE: 147 MMHG | RESPIRATION RATE: 18 BRPM | TEMPERATURE: 98.2 F | HEART RATE: 74 BPM | DIASTOLIC BLOOD PRESSURE: 61 MMHG

## 2021-07-27 DIAGNOSIS — M79.89 LEG SWELLING: Primary | ICD-10-CM

## 2021-07-27 PROCEDURE — 93971 EXTREMITY STUDY: CPT

## 2021-07-27 PROCEDURE — 99284 EMERGENCY DEPT VISIT MOD MDM: CPT

## 2021-07-27 ASSESSMENT — PAIN SCALES - GENERAL: PAINLEVEL_OUTOF10: 10

## 2021-07-27 NOTE — TELEPHONE ENCOUNTER
Pt called, c/o swelling and inability to move L leg, pedal edema MARISSA, x 5-6 days. Swollen bulges to LL and UR of Left knee. Pt advised to ER. She voiced understanding/thanks. No further action is needed, at this time.

## 2021-07-28 ENCOUNTER — CARE COORDINATION (OUTPATIENT)
Dept: CARE COORDINATION | Age: 71
End: 2021-07-28

## 2021-07-28 NOTE — CARE COORDINATION
Ambulatory Care Coordination Note  7/28/2021  CM Risk Score: 10  Charlson 10 Year Mortality Risk Score: 100%     ACC: Yani Trammell RN    Summary Note:   Spoke with patient for [employer] [facility] ER follow up. Patient reports that she is still tired. Reports swelling to extremities; pain to calf area; 6/10/  Denies change in color, temperature, increased swelling or pain with dorsiflexion or increased SOB. Encouraged activity as tolerated. Instructed on s/s DVT to report to MD.    Discussed the recommendation for Provider follow up. [facility] contact with Provider to schedule appt. COPD:  Patient reports breathing is at baseline. Denies increased cough, wheezing, or increased SOB. Patient reports that her thoughts are clear. Reviewed COPD zone management tool and s/s to report to MD.    Discussed resource/ support needs:  Instructed on the benefits of Linn Tyson for safety/ disease management. Patient denies support needs at this time. ACP support:  Patient confirms that her friend Travon Villagomez is HCDM. ACP documents have been mailed to patient; patient reports that she did not receive. ACM to re-send. Patient declines support for completion. Encouraged follow through. Care Gaps:  Discussed care gaps: PNA, Shingles, COVID vaccine. Patient reports that she did not think she needed the vaccine for COVID as she had the virus. Instructed patient on recommendation. Will update Provider with request to further discuss with patient. Patient denies any questions or concerns at this time. ACM contact information provided should needs arise. Plan for next outreach:  Confirm PCP follow up; ACP support        Care Coordination Interventions    Program Enrollment: Complex Care  Referral from Primary Care Provider: No  Suggested Interventions and Community Resources  Fall Risk Prevention: In Process  Home Health Services: Completed  Zone Management Tools:  In Process         Goals Addressed This Visit's Progress     Care Coordination Self Management   No change     CC Self Management Goal  Patient Goal (What steps will patient take to achieve goal?):  Compliance with routine PCP follow up. Patient is able to discuss self-management of condition(s):  Pt demonstrates adherence to medications  Pt demonstrates understanding of self-monitoring  Patient is able to identify Red Flags:  Alert to potential adverse drug reactions(s) or side effects and actions to take should they arise  Discuss target symptoms and actions to take should they arise  Identify problems that require immediate PCP or specialist visit  Patient demonstrates understanding of access to PCP/Specialist:  Understands about scheduling routine Follow Up appointments   Understands about sick day appointment options for worsening of symptoms/progression (Same Day, E Visits)            Prior to Admission medications    Medication Sig Start Date End Date Taking? Authorizing Provider   gabapentin (NEURONTIN) 100 MG capsule Take 1 capsule by mouth nightly for 30 days.   Patient not taking: Reported on 5/21/2021 5/13/21 6/12/21  Bonifacio North, DO   Ferrous Sulfate (IRON) 325 (65 Fe) MG TABS Take 1 tablet by mouth every other day 3/27/21   Jona Vences MD   albuterol-ipratropium (COMBIVENT RESPIMAT)  MCG/ACT AERS inhaler Inhale 1 puff into the lungs 4 times daily 3/27/21   Jona Vences MD   acetaminophen (TYLENOL) 500 MG tablet Take 500 mg by mouth every 6 hours as needed for Pain    Historical Provider, MD   simvastatin (ZOCOR) 20 MG tablet Take 1 tablet by mouth nightly 2/11/21   Bonifacio North, DO   sertraline (ZOLOFT) 100 MG tablet Take 2 tablets by mouth every morning 2/11/21   Bonifacio North, DO   fluticasone (FLOVENT HFA) 110 MCG/ACT inhaler Inhale 2 puffs into the lungs 2 times daily 2/11/21   Bonifacio North, DO   ezetimibe (ZETIA) 10 MG tablet Take 1 tablet by mouth every morning 2/11/21   Bonifacio North, DO albuterol sulfate HFA (PROAIR HFA) 108 (90 Base) MCG/ACT inhaler INHALE 2 PUFFS BY MOUTH EVERY SIX HOURS AS NEEDED FOR WHEEZING 2/11/21   Nick Montanez DO   vitamin B-12 (CYANOCOBALAMIN) 1000 MCG tablet Take 1,000 mcg by mouth daily    Historical Provider, MD   aspirin 81 MG EC tablet Take 81 mg by mouth nightly     Historical Provider, MD   letrozole (FEMARA) 2.5 MG tablet Take 2.5 mg by mouth nightly     Historical Provider, MD   calcium-vitamin D (OSCAL 500/200 D-3) 500-200 MG-UNIT per tablet Take 1 tablet by mouth 2 times daily     Historical Provider, MD       Future Appointments   Date Time Provider Chance Mariia   7/29/2021 11:15 AM DO ROSIE Carreon   9/8/2021  4:00 PM DO ROSIE Carreon NOR JACY   10/4/2021  1:00 PM SCHEDULE, SRMX AWV LPN N [facility] NOR MMA   10/5/2021  1:40 PM Dru Humphrey MD [facility]     ,   COPD Assessment    Does the patient understand envrionmental exposure?: Yes  Is the patient able to verbalize Rescue vs. Long Acting medications?: Yes  Does the patient have a nebulizer?: Yes  Does the patient use a space with inhaled medications?: No            Symptoms:     Symptom course: no change      and   General Assessment    Do you have any symptoms that are causing concern?: Yes  Reported Symptoms: Other (Comment: leg pain)

## 2021-07-28 NOTE — ED PROVIDER NOTES
EMERGENCY DEPARTMENT ENCOUNTER      CHIEF COMPLAINT    Chief Complaint   Patient presents with    Leg Pain     left since Thursday, NKI       HPI    Rayray Webb is a 66 y.o. female with history significant for COPD, colon cancer, CAD, has pacemaker who presents with left leg pain. Patient presented with atraumatic left lower extremity pain that is mainly localizing calf area described with a charley horse sensations, with associated swelling, denies any trauma, no fever skin color change or any chest pain or shortness of breath was associated leg swelling. Denies any recent travel or any recent surgery but patient does live a relatively sedentary lifestyle. REVIEW OF SYSTEMS    Constitutional: Denies chills, fatigue, unexpected weight loss or fever. HENT: Denies sore throat or rhinorrhea. Eyes: Denies vision changes. Respiratory: Denies shortness of breath or cough. Cardiovascular: Denies chest pain, leg swelling or palpitations. Gastrointestinal: Denies abdominal pain, diarrhea, nausea and vomiting. Genitourinary: Denies dysuria or hematuria. Skin: Denies rashes or wounds. MSK: Left calf pain and swelling  Neurologic: Denies headache, lightheadedness, numbness, or weakness.    Hematologic/lymphatic: Denies unexpected weight loss, night sweats  Endocrine: No polyuria, polydipsia, or polyphagia      Pertinent positives and negatives are delineated in HPI and ROS above, all other systems are reviewed and are negative    PAST MEDICAL HISTORY    Past Medical History:   Diagnosis Date    Anemia     Anxiety 1978    Arthritis     generalized    Asthma 2006    AV block     2nd degree per hospital in june2013    Blood poisoning 1994    Blood transfusion     12/2003    CAD (coronary artery disease)     Cancer ([facility] [address] 2007    skin (face) & colon(2003)/ 2/2012 dx of right breast ca(pc)    Carcinoma of breast ([facility] [address] 2/29/2012    right arm no b/p or sticks    Cardiac pacemaker 3/10/14 [employer] dual lead    Chronic cystitis     Chronic respiratory failure (HCC)     2 L/min oxygen at night-time    Colon cancer [facility]) 2013    COPD (chronic obstructive pulmonary disease) (HCC)     CTS (carpal tunnel syndrome)     Depression     Diverticulitis     H/O 24 hour EKG monitoring 2/28/2014 7/24/13 2/14 complete heart block 7/13No clinically significant arrthymia noted.  H/O cardiac catheterization 10/31/2011, 7/11/2007    10/31/2011-No significant CAD. Cardiolite stress test was false positive-Dr Maria Del Rosario Bo; 7/11/2007-No CAD, global function intact;    H/O cardiovascular stress test 10/20/2011, 3/23/2010    10/20/2011-Lexiscan- Evid of mild ischemia in Left CX region. EF 70%. Global LVSF normal;    H/O cardiovascular stress test 1/7/15    EF 77%. Normal Stress Test.    H/O chest pain 4/2005    sees Dr Lillian Andrade H/O Doppler ultrasound 2/20/2008 2/20/2008-CAROTID DOPPLER- Normal carotids bilaterally;    H/O Doppler ultrasound 06/06/13    CAROTID DOPPLER- there is heterogeneous, irregular atherosclerotic plaque noted in the right internal carotid artery,  doppler flow velocities within the right internal carotid artery are elevated, consistent with a mild, less than 50%stenosis, there is intimal thickening but no significant atherosclerotic plaque noted in the left internal carotid artery, the left carotid are within normal limits    H/O echocardiogram 4/9/2012, 10/20/2011    4/9/2012-LVSF normal EF=>55%. impaired LV relaxation;    H/O echocardiogram 12/31/14    EF 50-55%. LV shows mild concentric hypertrophy. Mildly dilated left atrium. Mildly dilated right ventricle. Sclerotic but not stenotic aortic valve. Mitral annular calcification.   Mild MR, Mild TR, Mild pulm HTN.    H/O urinary incontinence     History of blood transfusion     Hx of Arterial Doppler ultrasound 07/01/2019    No hemodynamically significant or focal stenosis visualized bilaterally, bilateral lower extremity RESPIMAT)  MCG/ACT AERS inhaler Inhale 1 puff into the lungs 4 times daily 1 Inhaler 0    acetaminophen (TYLENOL) 500 MG tablet Take 500 mg by mouth every 6 hours as needed for Pain      simvastatin (ZOCOR) 20 MG tablet Take 1 tablet by mouth nightly 90 tablet 1    sertraline (ZOLOFT) 100 MG tablet Take 2 tablets by mouth every morning 180 tablet 1    fluticasone (FLOVENT HFA) 110 MCG/ACT inhaler Inhale 2 puffs into the lungs 2 times daily 1 Inhaler 5    ezetimibe (ZETIA) 10 MG tablet Take 1 tablet by mouth every morning 90 tablet 1    albuterol sulfate HFA (PROAIR HFA) 108 (90 Base) MCG/ACT inhaler INHALE 2 PUFFS BY MOUTH EVERY SIX HOURS AS NEEDED FOR WHEEZING 1 Inhaler 5    vitamin B-12 (CYANOCOBALAMIN) 1000 MCG tablet Take 1,000 mcg by mouth daily      aspirin 81 MG EC tablet Take 81 mg by mouth nightly       letrozole (FEMARA) 2.5 MG tablet Take 2.5 mg by mouth nightly       calcium-vitamin D (OSCAL 500/200 D-3) 500-200 MG-UNIT per tablet Take 1 tablet by mouth 2 times daily        Medication is reviewed    ALLERGIES    Allergies   Allergen Reactions    Bactrim [Sulfamethoxazole-Trimethoprim] Anaphylaxis and Hives    Lipitor [Atorvastatin] Shortness Of Breath    Taxol [Paclitaxel] Anaphylaxis and Swelling    Aminoglycosides      Abstracted from [facility] chart.     Demerol Nausea Only    Neosporin [Bacitracin-Neomycin-Polymyxin] Rash and Other (See Comments)     hotness    Other Rash     Ivory Soap    Talc Other (See Comments)     blisters     Allergy is reviewed    FAMILY HISTORY    Family History   Problem Relation Age of Onset    Arthritis Mother         OA, RA    High Cholesterol Mother     High Blood Pressure Mother     Cancer Father         skin and leukemia    High Blood Pressure Father     High Cholesterol Father     Diabetes Father     Heart Disease Father     Heart Disease Brother     High Cholesterol Brother     High Blood Pressure Brother     Heart Disease Brother  Seizures Son      Family history reviewed and no pertinent family history other than the ones mentioned above    SOCIAL HISTORY    Social History     Socioeconomic History    Marital status:      Spouse name: Not on file    Number of children: 2    Years of education: 15    Highest education level: 12th grade   Occupational History    Occupation: retired   Tobacco Use    Smoking status: Never Smoker    Smokeless tobacco: Never Used   Vaping Use    Vaping Use: Never used   Substance and Sexual Activity    Alcohol use: No     Comment:           CAFFEINE: 2- 12oz nona daily & 2 cups coffee some nights.  Drug use: No    Sexual activity: Not Currently     Partners: Male   Other Topics Concern    Not on file   Social History Narrative    Do you donate blood or plasma? No    Caffeine intake? Moderate    Advance directive? No    Is blood transfusion acceptable in an emergency? Yes    Live alone or with others? With others    Able to care for self? Yes    No exercise at this time         Social Determinants of Health     Financial Resource Strain:     Difficulty of Paying Living Expenses:    Food Insecurity:     Worried About Running Out of Food in the Last Year:     [address] in the Last Year:    Transportation Needs:     Lack of Transportation (Medical):      Lack of Transportation (Non-Medical):    Physical Activity:     Days of Exercise per Week:     Minutes of Exercise per Session:    Stress:     Feeling of Stress :    Social Connections:     Frequency of Communication with Friends and Family:     Frequency of Social Gatherings with Friends and Family:     Attends [employer] Services:     Active Member of Clubs or Organizations:     Attends Club or Organization Meetings:     Marital Status:    Intimate Partner Violence:     Fear of Current or Ex-Partner:     Emotionally Abused:     Physically Abused:     Sexually Abused:      Live with by herself  Alcohol and recreational drug use: Denies  Social history reviewed and no pertinent social history other than the ones mentioned above    PHYSICAL EXAM    Vital Signs:BP (!) 147/61   Pulse 74   Temp 98.2 °F (36.8 °C)   Resp 18   LMP 01/15/1999   SpO2 96%   I have reviewed the triage vital signs. Constitutional: Well nourished, well developed, appears stated age  Eyes: PERRL, no conjunctival injection  HENT: NCAT, Neck supple without meningismus   CV: RRR, Warm, no edema  RESP: Normal RR, no increased respiratory efforts  GI: soft, non-distended  MSK: Left leg slightly swollen with 1+ edema with positive [place] Bake' sign in the calf, neurovascularly intact, right leg also slightly swollen however significantly less compared to the left side with neuro intact neuro vasculature negative Homans' sign  Skin: Warm, dry. No rashes  Neuro: Alert, CNs II-XII grossly intact. Moving all 4 extremities  Psych: Appropriate mood and affect. Labs:   Labs Reviewed - No data to display    Radiology:  VL DUP LOWER EXTREMITY VENOUS LEFT   Final Result   No evidence of DVT in the left lower extremity.              I directly reviewed the images and radiology interpretation    ED COURSE  Assessment & Medical Decision Making:  Neetu Chase is a 66 y.o. female who presents with leg pain given patient's sedentary lifestyle with atraumatic leg pain and swelling positive [place] Bake' sign concerning for possible DVT, vascular duplex was obtained that showed no evidence of DVT in the left lower extremities, patient swelling can be contributed by dependent edema given patient relatively sedentary lifestyle, does not have any signs of infections on exam, patient is advised if this pain continues in a week given high suspicion of DVT may require another DVT study, patient will be discharged with urgent PCP follow-up          DDx includes but not limited to: DVT, phlegmasia's, trauma, infection, dependent edema    Workup includes but not limited to: DVT study    Treatment includes but not limited to: None required    Critical care time: NA    Impression:   1. Left lower extremity swelling    Disposition: Discharged    This note dictated using Dragon medical voice recognition software. Attempts at proofreading were made, but errors may occasionally still occur.           Danielle Garcia MD  07/27/21 0924

## 2021-07-29 ENCOUNTER — OFFICE VISIT (OUTPATIENT)
Dept: INTERNAL MEDICINE CLINIC | Age: 71
End: 2021-07-29
Payer: MEDICARE

## 2021-07-29 VITALS
BODY MASS INDEX: 51.69 KG/M2 | HEART RATE: 66 BPM | WEIGHT: 282.6 LBS | OXYGEN SATURATION: 94 % | SYSTOLIC BLOOD PRESSURE: 130 MMHG | DIASTOLIC BLOOD PRESSURE: 60 MMHG

## 2021-07-29 DIAGNOSIS — M54.42 CHRONIC LEFT-SIDED LOW BACK PAIN WITH LEFT-SIDED SCIATICA: Primary | ICD-10-CM

## 2021-07-29 DIAGNOSIS — M54.32 SCIATICA OF LEFT SIDE: ICD-10-CM

## 2021-07-29 DIAGNOSIS — R60.0 PEDAL EDEMA: ICD-10-CM

## 2021-07-29 DIAGNOSIS — E78.5 HYPERLIPIDEMIA, UNSPECIFIED HYPERLIPIDEMIA TYPE: ICD-10-CM

## 2021-07-29 DIAGNOSIS — L03.115 CELLULITIS OF RIGHT LOWER EXTREMITY: ICD-10-CM

## 2021-07-29 DIAGNOSIS — Z12.31 SCREENING MAMMOGRAM, ENCOUNTER FOR: ICD-10-CM

## 2021-07-29 DIAGNOSIS — N95.1 MENOPAUSAL STATE: ICD-10-CM

## 2021-07-29 DIAGNOSIS — G89.29 CHRONIC LEFT-SIDED LOW BACK PAIN WITH LEFT-SIDED SCIATICA: Primary | ICD-10-CM

## 2021-07-29 DIAGNOSIS — D64.9 ANEMIA, UNSPECIFIED TYPE: ICD-10-CM

## 2021-07-29 LAB
A/G RATIO: 1.3 (ref 1.1–2.2)
ALBUMIN SERPL-MCNC: 4.3 G/DL (ref 3.4–5)
ALP BLD-CCNC: 113 U/L (ref 40–129)
ALT SERPL-CCNC: 18 U/L (ref 10–40)
ANION GAP SERPL CALCULATED.3IONS-SCNC: 16 MMOL/L (ref 3–16)
AST SERPL-CCNC: 19 U/L (ref 15–37)
BASOPHILS ABSOLUTE: 0.1 K/UL (ref 0–0.2)
BASOPHILS RELATIVE PERCENT: 0.7 %
BILIRUB SERPL-MCNC: <0.2 MG/DL (ref 0–1)
BUN BLDV-MCNC: 12 MG/DL (ref 7–20)
CALCIUM SERPL-MCNC: 10.3 MG/DL (ref 8.3–10.6)
CHLORIDE BLD-SCNC: 99 MMOL/L (ref 99–110)
CHOLESTEROL, TOTAL: 182 MG/DL (ref 0–199)
CO2: 26 MMOL/L (ref 21–32)
CREAT SERPL-MCNC: 0.7 MG/DL (ref 0.6–1.2)
EOSINOPHILS ABSOLUTE: 0.3 K/UL (ref 0–0.6)
EOSINOPHILS RELATIVE PERCENT: 3.6 %
FERRITIN: 137.8 NG/ML (ref 15–150)
FOLATE: 8.07 NG/ML (ref 4.78–24.2)
GFR AFRICAN AMERICAN: >60
GFR NON-AFRICAN AMERICAN: >60
GLOBULIN: 3.2 G/DL
GLUCOSE BLD-MCNC: 122 MG/DL (ref 70–99)
HCT VFR BLD CALC: 37.2 % (ref 36–48)
HDLC SERPL-MCNC: 50 MG/DL (ref 40–60)
HEMOGLOBIN: 12.7 G/DL (ref 12–16)
IRON SATURATION: 19 % (ref 15–50)
IRON: 60 UG/DL (ref 37–145)
LDL CHOLESTEROL CALCULATED: 95 MG/DL
LYMPHOCYTES ABSOLUTE: 1.5 K/UL (ref 1–5.1)
LYMPHOCYTES RELATIVE PERCENT: 18.3 %
MCH RBC QN AUTO: 29 PG (ref 26–34)
MCHC RBC AUTO-ENTMCNC: 34.2 G/DL (ref 31–36)
MCV RBC AUTO: 85 FL (ref 80–100)
MONOCYTES ABSOLUTE: 0.5 K/UL (ref 0–1.3)
MONOCYTES RELATIVE PERCENT: 6.2 %
NEUTROPHILS ABSOLUTE: 5.8 K/UL (ref 1.7–7.7)
NEUTROPHILS RELATIVE PERCENT: 71.2 %
PDW BLD-RTO: 14.7 % (ref 12.4–15.4)
PLATELET # BLD: 213 K/UL (ref 135–450)
PMV BLD AUTO: 8.4 FL (ref 5–10.5)
POTASSIUM SERPL-SCNC: 4.4 MMOL/L (ref 3.5–5.1)
RBC # BLD: 4.38 M/UL (ref 4–5.2)
SODIUM BLD-SCNC: 141 MMOL/L (ref 136–145)
TOTAL IRON BINDING CAPACITY: 316 UG/DL (ref 260–445)
TOTAL PROTEIN: 7.5 G/DL (ref 6.4–8.2)
TRIGL SERPL-MCNC: 185 MG/DL (ref 0–150)
VITAMIN B-12: 1219 PG/ML (ref 211–911)
VLDLC SERPL CALC-MCNC: 37 MG/DL
WBC # BLD: 8.1 K/UL (ref 4–11)

## 2021-07-29 PROCEDURE — 36415 COLL VENOUS BLD VENIPUNCTURE: CPT | Performed by: FAMILY MEDICINE

## 2021-07-29 PROCEDURE — 99214 OFFICE O/P EST MOD 30 MIN: CPT | Performed by: FAMILY MEDICINE

## 2021-07-29 RX ORDER — DOXYCYCLINE HYCLATE 100 MG
100 TABLET ORAL 2 TIMES DAILY
Qty: 20 TABLET | Refills: 0 | Status: SHIPPED | OUTPATIENT
Start: 2021-07-29 | End: 2021-08-08

## 2021-07-29 RX ORDER — GABAPENTIN 100 MG/1
100 CAPSULE ORAL 3 TIMES DAILY
Qty: 90 CAPSULE | Refills: 3 | Status: SHIPPED | OUTPATIENT
Start: 2021-07-29 | End: 2021-11-24

## 2021-07-29 RX ORDER — ACETAMINOPHEN AND CODEINE PHOSPHATE 300; 30 MG/1; MG/1
1 TABLET ORAL EVERY 6 HOURS PRN
Qty: 20 TABLET | Refills: 0 | Status: SHIPPED | OUTPATIENT
Start: 2021-07-29 | End: 2021-08-03

## 2021-07-29 ASSESSMENT — ENCOUNTER SYMPTOMS
SHORTNESS OF BREATH: 0
NAUSEA: 0
COUGH: 0
ABDOMINAL PAIN: 0

## 2021-07-29 NOTE — PROGRESS NOTES
Chantell Duarte (:  1950) is a 66 y.o. female,Established patient, here for evaluation of the following chief complaint(s):  Leg Pain (Bilat, but left leg has more pain pain level is a 10 on the left leg ), Foot Swelling (right foot ), and Other (chronic conditions)         ASSESSMENT/PLAN:  1. Chronic left-sided low back pain with left-sided sciatica  -     XR LUMBAR SPINE (MIN 4 VIEWS); Future  2. Cellulitis of right lower extremity acute  Start the following medications:  -     mupirocin (BACTROBAN) 2 % ointment; Apply topically 3 times daily to affected areas on legs and feet, Disp-22 g, R-0, Normal  -     doxycycline hyclate (VIBRA-TABS) 100 MG tablet; Take 1 tablet by mouth 2 times daily for 10 days, Disp-20 tablet, R-0Normal  -     acetaminophen-codeine (TYLENOL/CODEINE #3) 300-30 MG per tablet; Take 1 tablet by mouth every 6 hours as needed for Pain for up to 5 days. Intended supply: 45days. Take lowest dose possible to manage pain, Disp-20 tablet, R-0Normal  3. Pedal edema chronic  -     URINE RT REFLEX TO CULTURE  -     CBC WITH AUTO DIFFERENTIAL  -     COMPREHENSIVE METABOLIC PANEL  4. Sciatica of left side  Increase Gabapentin to tid  -     gabapentin (NEURONTIN) 100 MG capsule; Take 1 capsule by mouth 3 times daily for 30 days. , Disp-90 capsule, R-3Normal  -     acetaminophen-codeine (TYLENOL/CODEINE #3) 300-30 MG per tablet; Take 1 tablet by mouth every 6 hours as needed for Pain for up to 5 days. Intended supply: 45days. Take lowest dose possible to manage pain, Disp-20 tablet, R-0Normal  5. Anemia, unspecified type  -     IRON AND TIBC  -     FERRITIN  -     VITAMIN B12 & FOLATE  6. Hyperlipidemia, unspecified hyperlipidemia type  -     LIPID PANEL  7. Menopausal state  -     DEXA BONE DENSITY AXIAL SKELETON; Future  8. Screening mammogram, encounter for  -     [facility] DIGITAL SCREEN UNILATERAL LEFT; Future  Orders as above  ADR's explained  Keep affected area clean.  Avoid scratching  Worse to ER  On this date 7/29/2021 I have spent 30 minutes reviewing previous notes, test results and face to face with the patient discussing the diagnosis and importance of compliance with the treatment plan as well as documenting on the day of the visit. Return if symptoms worsen or fail to improve. Subjective   SUBJECTIVE/OBJECTIVE:  HPI This 65 yo f here for f/u from [facility] ED on 7/27 for leg swelling. Pt with B/L leg swelling L >R, and she has noticed that the top of her R foot is red and warm. B/L legs and feet with several excoriations. She denies scratching. She was negative for DVT. She also has chronic LBP and  LLE sciatica. She is on Gabapentin.  -Anemia- Last hemoglobin 10.9. Pt did not do previous lab orders  -HLD-stable  -Menopausal- Due for bone density   -Hx of  R breast cancer with mastectomy.  Due for L mammogram    Patient Active Problem List    Diagnosis Date Noted    Pneumonia due to COVID-19 virus 03/22/2021    CAD (coronary artery disease)     Arthritis     Hepatomegaly, not elsewhere classified 08/21/2020    Moderate aortic stenosis 07/07/2019    Morbid obesity with BMI of 45.0-49.9, adult ([address] 05/26/2019    PVD (peripheral vascular disease) ([address] 02/12/2019    NSTEMI (non-ST elevated myocardial infarction) ([address] 02/11/2017    Asthma     Hypertension     Depression     Obstructive sleep apnea     Nocturnal oxygen desaturation     Chronic cystitis 12/15/2014    Cardiac pacemaker 03/10/2014    Mild asthma 11/20/2013    Severe obstructive sleep apnea 11/20/2013    Super obesity 09/11/2013    JAMIE (obstructive sleep apnea) 07/17/2013    Abnormal cardiovascular stress test 06/28/2013    Heart block AV second degree 06/26/2013    Hyperlipidemia     Malignant neoplasm of upper-outer quadrant of right breast in female, estrogen receptor positive ([address] 08/29/2012    Diffuse cystic mastopathy 08/29/2012    H/O chest pain 04/01/2005       Review of Systems   Constitutional: Negative for diaphoresis and fever. Respiratory: Negative for cough and shortness of breath. Cardiovascular: Negative for chest pain and palpitations. Gastrointestinal: Negative for abdominal pain and nausea. Genitourinary: Negative for dysuria. Musculoskeletal: Positive for arthralgias. Skin: Positive for rash. Neurological: Negative for dizziness and headaches. Psychiatric/Behavioral: Negative for dysphoric mood. Objective    Vitals:    07/29/21 1139   BP: 130/60   Site: Left Upper Arm   Position: Sitting   Cuff Size: Large Adult   Pulse: 66   SpO2: 94%   Weight: 282 lb 9.6 oz (128.2 kg)       Physical Exam  Vitals reviewed. Constitutional:       General: She is not in acute distress. Cardiovascular:      Rate and Rhythm: Normal rate and regular rhythm. Pulmonary:      Effort: Pulmonary effort is normal. No respiratory distress. Breath sounds: Normal breath sounds. Abdominal:      General: Bowel sounds are normal.      Palpations: Abdomen is soft. Tenderness: There is no abdominal tenderness. Musculoskeletal:      Cervical back: Neck supple. Right lower leg: Edema present. Left lower leg: Edema present. Skin:     Findings: Erythema (erythema to top of R foot with excoriated skin to b/l feet) present. Neurological:      Mental Status: She is alert and oriented to person, place, and time. Cranial Nerves: No cranial nerve deficit. Psychiatric:         Mood and Affect: Mood normal.          An electronic signature was used to authenticate this note.     --Nisha Willis, DO

## 2021-08-04 ENCOUNTER — HOSPITAL ENCOUNTER (OUTPATIENT)
Dept: GENERAL RADIOLOGY | Age: 71
Discharge: HOME | End: 2021-08-04
Payer: MEDICARE

## 2021-08-04 ENCOUNTER — HOSPITAL ENCOUNTER (OUTPATIENT)
Age: 71
Discharge: HOME | End: 2021-08-04
Payer: MEDICARE

## 2021-08-04 DIAGNOSIS — M54.42 CHRONIC LEFT-SIDED LOW BACK PAIN WITH LEFT-SIDED SCIATICA: ICD-10-CM

## 2021-08-04 DIAGNOSIS — G89.29 CHRONIC LEFT-SIDED LOW BACK PAIN WITH LEFT-SIDED SCIATICA: ICD-10-CM

## 2021-08-04 PROCEDURE — 72110 X-RAY EXAM L-2 SPINE 4/>VWS: CPT

## 2021-08-11 ENCOUNTER — CARE COORDINATION (OUTPATIENT)
Dept: CARE COORDINATION | Age: 71
End: 2021-08-11

## 2021-08-30 ENCOUNTER — TELEPHONE (OUTPATIENT)
Dept: INTERNAL MEDICINE CLINIC | Age: 71
End: 2021-08-30

## 2021-08-30 ENCOUNTER — PROCEDURE VISIT (OUTPATIENT)
Dept: CARDIOLOGY CLINIC | Age: 71
End: 2021-08-30
Payer: MEDICARE

## 2021-08-30 DIAGNOSIS — I44.1 HEART BLOCK AV SECOND DEGREE: ICD-10-CM

## 2021-08-30 DIAGNOSIS — Z95.0 CARDIAC PACEMAKER IN SITU: Primary | ICD-10-CM

## 2021-08-30 PROCEDURE — 93296 REM INTERROG EVL PM/IDS: CPT | Performed by: INTERNAL MEDICINE

## 2021-08-30 PROCEDURE — 93294 REM INTERROG EVL PM/LDLS PM: CPT | Performed by: INTERNAL MEDICINE

## 2021-08-30 NOTE — TELEPHONE ENCOUNTER
Had covid for 1 week in April, went to nursing home for 1 month, wants to know if you think she should have the vaccine?  Please call

## 2021-08-31 NOTE — TELEPHONE ENCOUNTER
Called pt and let her know that Dr. Mervat Alfonso recommends getting the vaccination.  Pt verbalized understanding

## 2021-09-14 ENCOUNTER — HOSPITAL ENCOUNTER (OUTPATIENT)
Dept: WOMENS IMAGING | Age: 71
Discharge: HOME | End: 2021-09-14
Payer: MEDICARE

## 2021-09-14 DIAGNOSIS — N95.1 MENOPAUSAL STATE: ICD-10-CM

## 2021-09-14 DIAGNOSIS — Z12.31 SCREENING MAMMOGRAM, ENCOUNTER FOR: ICD-10-CM

## 2021-09-14 PROCEDURE — 77067 SCR MAMMO BI INCL CAD: CPT

## 2021-09-14 PROCEDURE — 77080 DXA BONE DENSITY AXIAL: CPT

## 2021-09-22 ENCOUNTER — CARE COORDINATION (OUTPATIENT)
Dept: CARE COORDINATION | Age: 71
End: 2021-09-22

## 2021-09-22 NOTE — CARE COORDINATION
Ambulatory Care Coordination Note  9/22/2021  CM Risk Score: 10  Charlson 10 Year Mortality Risk Score: 100%     ACC: Cecilio Craig RN    Summary Note:  Spoke with patient. Reports that she is feeling ok. Patient reports that she is very tired today. Denies increased SOB, CP or wheezing. Encouraged patient to avoid triggers for Asthma. Encouraged medication as directed; routine Provider follow up. Instructed patient on safety/ fall prevention measures. Encouraged proper use of DME as directed. Discussed care gaps. Patient verbalized plan to further discuss vaccinations with PCP. Reports that she is considering COVID vaccine. Patient denies any questions or concerns at this time. ACM contact information provided should questions arise. Confirmed upcoming appointment dates and times. Patient denies transportation barriers. No equipment or resource needs noted. Plan for next outreach: Progress toward graduation      Care Coordination Interventions    Program Enrollment: Complex Care  Referral from Primary Care Provider: No  Suggested Interventions and Community Resources  Fall Risk Prevention: In Process  Home Health Services: Completed  Zone Management Tools: In Process         Goals Addressed                 This Visit's Progress     Care Coordination Self Management   On track     CC Self Management Goal  Patient Goal (What steps will patient take to achieve goal?):  Compliance with routine PCP follow up.   Patient is able to discuss self-management of condition(s):  Pt demonstrates adherence to medications  Pt demonstrates understanding of self-monitoring  Patient is able to identify Red Flags:  Alert to potential adverse drug reactions(s) or side effects and actions to take should they arise  Discuss target symptoms and actions to take should they arise  Identify problems that require immediate PCP or specialist visit  Patient demonstrates understanding of access to PCP/Specialist:  Understands about scheduling routine Follow Up appointments   Understands about sick day appointment options for worsening of symptoms/progression (Same Day, E Visits)            Prior to Admission medications    Medication Sig Start Date End Date Taking? Authorizing Provider   gabapentin (NEURONTIN) 100 MG capsule Take 1 capsule by mouth 3 times daily for 30 days.  7/29/21 8/28/21  Galina Rosue, DO   Ferrous Sulfate (IRON) 325 (65 Fe) MG TABS Take 1 tablet by mouth every other day 3/27/21   Jeanmarie Prince MD   albuterol-ipratropium (COMBIVENT RESPIMAT)  MCG/ACT AERS inhaler Inhale 1 puff into the lungs 4 times daily 3/27/21   Jeanmarie Prince MD   acetaminophen (TYLENOL) 500 MG tablet Take 500 mg by mouth every 6 hours as needed for Pain    Historical Provider, MD   simvastatin (ZOCOR) 20 MG tablet Take 1 tablet by mouth nightly 2/11/21   Galina Rouse, DO   sertraline (ZOLOFT) 100 MG tablet Take 2 tablets by mouth every morning 2/11/21   Galina Rouse, DO   fluticasone (FLOVENT HFA) 110 MCG/ACT inhaler Inhale 2 puffs into the lungs 2 times daily 2/11/21   Galina Rouse, DO   ezetimibe (ZETIA) 10 MG tablet Take 1 tablet by mouth every morning 2/11/21   Galina Rouse, DO   albuterol sulfate HFA (PROAIR HFA) 108 (90 Base) MCG/ACT inhaler INHALE 2 PUFFS BY MOUTH EVERY SIX HOURS AS NEEDED FOR WHEEZING 2/11/21   Galina Rouse, DO   vitamin B-12 (CYANOCOBALAMIN) 1000 MCG tablet Take 1,000 mcg by mouth daily    Historical Provider, MD   aspirin 81 MG EC tablet Take 81 mg by mouth nightly     Historical Provider, MD   letrozole (FEMARA) 2.5 MG tablet Take 2.5 mg by mouth nightly     Historical Provider, MD   calcium-vitamin D (OSCAL 500/200 D-3) 500-200 MG-UNIT per tablet Take 1 tablet by mouth 2 times daily     Historical Provider, MD       Future Appointments   Date Time Provider Chance Chiang   10/4/2021  1:00 PM SCHEDULE, [address] [facility] Wiley Marcial AW LPN [address] [facility]   10/5/2021  1:40 PM Shahrzad Rodriguez MD [facility]   11/4/2021  2:00 PM Denis Duque, DO [address] [place] [facility]     ,   COPD Assessment    Does the patient understand envrionmental exposure?: Yes  Is the patient able to verbalize Rescue vs. Long Acting medications?: Yes  Does the patient have a nebulizer?: Yes  Does the patient use a space with inhaled medications?: No            Symptoms:         and   General Assessment    Do you have any symptoms that are causing concern?: No

## 2021-10-04 ENCOUNTER — OFFICE VISIT (OUTPATIENT)
Dept: INTERNAL MEDICINE CLINIC | Age: 71
End: 2021-10-04
Payer: MEDICARE

## 2021-10-04 VITALS
WEIGHT: 253.2 LBS | HEART RATE: 68 BPM | HEIGHT: 62 IN | BODY MASS INDEX: 46.59 KG/M2 | SYSTOLIC BLOOD PRESSURE: 126 MMHG | DIASTOLIC BLOOD PRESSURE: 72 MMHG | OXYGEN SATURATION: 95 %

## 2021-10-04 DIAGNOSIS — Z00.00 ROUTINE GENERAL MEDICAL EXAMINATION AT A HEALTH CARE FACILITY: Primary | ICD-10-CM

## 2021-10-04 PROCEDURE — G0439 PPPS, SUBSEQ VISIT: HCPCS | Performed by: FAMILY MEDICINE

## 2021-10-04 SDOH — ECONOMIC STABILITY: FOOD INSECURITY: WITHIN THE PAST 12 MONTHS, THE FOOD YOU BOUGHT JUST DIDN'T LAST AND YOU DIDN'T HAVE MONEY TO GET MORE.: NEVER TRUE

## 2021-10-04 SDOH — ECONOMIC STABILITY: FOOD INSECURITY: WITHIN THE PAST 12 MONTHS, YOU WORRIED THAT YOUR FOOD WOULD RUN OUT BEFORE YOU GOT MONEY TO BUY MORE.: NEVER TRUE

## 2021-10-04 ASSESSMENT — PATIENT HEALTH QUESTIONNAIRE - PHQ9
SUM OF ALL RESPONSES TO PHQ QUESTIONS 1-9: 2
2. FEELING DOWN, DEPRESSED OR HOPELESS: 1
SUM OF ALL RESPONSES TO PHQ9 QUESTIONS 1 & 2: 2
1. LITTLE INTEREST OR PLEASURE IN DOING THINGS: 1

## 2021-10-04 ASSESSMENT — LIFESTYLE VARIABLES: HOW OFTEN DO YOU HAVE A DRINK CONTAINING ALCOHOL: 0

## 2021-10-04 ASSESSMENT — SOCIAL DETERMINANTS OF HEALTH (SDOH): HOW HARD IS IT FOR YOU TO PAY FOR THE VERY BASICS LIKE FOOD, HOUSING, MEDICAL CARE, AND HEATING?: NOT HARD AT ALL

## 2021-10-04 NOTE — PROGRESS NOTES
Medicare Annual Wellness Visit  Name: Shauna Urbina Date: 10/4/2021   MRN: [id number] Sex: Female   Age: 66 y.o. Ethnicity: Non- / Non    : 1950 Race: White (non-)      Lydia Wells is here for Medicare AWV    Screenings for behavioral, psychosocial and functional/safety risks, and cognitive dysfunction are all negative except as indicated below. These results, as well as other patient data from the [address] form, are documented in Flowsheets linked to this Encounter. Allergies   Allergen Reactions    Bactrim [Sulfamethoxazole-Trimethoprim] Anaphylaxis and Hives    Lipitor [Atorvastatin] Shortness Of Breath    Taxol [Paclitaxel] Anaphylaxis and Swelling    Aminoglycosides      Abstracted from [facility] patient chart.  Demerol Nausea Only    Neosporin [Bacitracin-Neomycin-Polymyxin] Rash and Other (See Comments)     hotness    Other Rash     Ivory Soap    Talc Other (See Comments)     blisters         Prior to Visit Medications    Medication Sig Taking?  Authorizing Provider   Ferrous Sulfate (IRON) 325 (65 Fe) MG TABS Take 1 tablet by mouth every other day Yes Jose Luu MD   albuterol-ipratropium (COMBIVENT RESPIMAT)  MCG/ACT AERS inhaler Inhale 1 puff into the lungs 4 times daily Yes Jose Luu MD   acetaminophen (TYLENOL) 500 MG tablet Take 500 mg by mouth every 6 hours as needed for Pain Yes Historical Provider, MD   simvastatin (ZOCOR) 20 MG tablet Take 1 tablet by mouth nightly Yes Kaylie Bile, DO   sertraline (ZOLOFT) 100 MG tablet Take 2 tablets by mouth every morning Yes Kaylie Bile, DO   fluticasone (FLOVENT HFA) 110 MCG/ACT inhaler Inhale 2 puffs into the lungs 2 times daily Yes Kaylie Bile, DO   ezetimibe (ZETIA) 10 MG tablet Take 1 tablet by mouth every morning Yes Kaylie Bile, DO   albuterol sulfate HFA (PROAIR HFA) 108 (90 Base) MCG/ACT inhaler INHALE 2 PUFFS BY MOUTH EVERY SIX HOURS AS NEEDED FOR WHEEZING Yes Kaylie Sauceda, DO   vitamin B-12 (CYANOCOBALAMIN) 1000 MCG tablet Take 1,000 mcg by mouth daily Yes Historical Provider, MD   aspirin 81 MG EC tablet Take 81 mg by mouth nightly  Yes Historical Provider, MD   letrozole (FEMARA) 2.5 MG tablet Take 2.5 mg by mouth nightly  Yes Historical Provider, MD   calcium-vitamin D (OSCAL 500/200 D-3) 500-200 MG-UNIT per tablet Take 1 tablet by mouth 2 times daily  Yes Historical Provider, MD   gabapentin (NEURONTIN) 100 MG capsule Take 1 capsule by mouth 3 times daily for 30 days. Kaylie Bile, DO         Past Medical History:   Diagnosis Date    Anemia     Anxiety 1978    Arthritis     generalized    Asthma 2006    AV block     2nd degree per hospital in june2013    Blood poisoning 1994    Blood transfusion     12/2003    CAD (coronary artery disease)     Cancer ([facility] [address] 2007    skin (face) & colon(2003)/ 2/2012 dx of right breast ca(pc)    Carcinoma of breast ([facility] [address] 2/29/2012    right arm no b/p or sticks    Cardiac pacemaker 3/10/14    Medtronic dual lead    Chronic cystitis     Chronic respiratory failure (HCC)     2 L/min oxygen at night-time    Colon cancer ([address] 2013    COPD (chronic obstructive pulmonary disease) (HCC)     CTS (carpal tunnel syndrome)     Depression     Diverticulitis     H/O 24 hour EKG monitoring 2/28/2014 7/24/13 2/14 complete heart block 7/13No clinically significant arrthymia noted.  H/O cardiac catheterization 10/31/2011, 7/11/2007    10/31/2011-No significant CAD. Cardiolite stress test was false positive-Dr Alphonso Li; 7/11/2007-No CAD, global function intact;    H/O cardiovascular stress test 10/20/2011, 3/23/2010    10/20/2011-Lexiscan- Evid of mild ischemia in Left CX region. EF 70%. Global LVSF normal;    H/O cardiovascular stress test 1/7/15    EF 77%.    Normal Stress Test.    H/O chest pain 4/2005    sees Dr Rosario Lopes H/O Doppler ultrasound 2/20/2008 2/20/2008-CAROTID DOPPLER- Normal carotids bilaterally;    H/O Doppler ultrasound 06/06/13    CAROTID DOPPLER- there is heterogeneous, irregular atherosclerotic plaque noted in the right internal carotid artery,  doppler flow velocities within the right internal carotid artery are elevated, consistent with a mild, less than 50%stenosis, there is intimal thickening but no significant atherosclerotic plaque noted in the left internal carotid artery, the left carotid are within normal limits    H/O echocardiogram 4/9/2012, 10/20/2011    4/9/2012-LVSF normal EF=>55%. impaired LV relaxation;    H/O echocardiogram 12/31/14    EF 50-55%. LV shows mild concentric hypertrophy. Mildly dilated left atrium. Mildly dilated right ventricle. Sclerotic but not stenotic aortic valve. Mitral annular calcification. Mild MR, Mild TR, Mild pulm HTN.    H/O urinary incontinence     History of blood transfusion     Hx antineoplastic chemo     Hx of Arterial Doppler ultrasound 07/01/2019    No hemodynamically significant or focal stenosis visualized bilaterally, bilateral lower extremity arteries exhibit diffuse plaque and multiphasic waveforms, unable to obtain bilateral ABIs due to elevated leg pressures >250 mmHg, possibly due to atherosclerosis    Hx of cardiovascular stress test 06/2013    EF 70% abn suggestive of anterolateral ischemia, possible RCA ischmeia, post left ventricular dilation suggestive multivessel CAD.  Hx of echocardiogram 06/2013    EF50%, mild MR and TR, mild pulmonary HTN, mild AS    Hx of fall     \"last time 2018- due to neuropathy, have to use cane\"    HX OTHER MEDICAL 7/24/13, 06/2013 7/13-No clinacally significant arrhythmia.  6/13-multiple cycles of wenckebach episodes in addtion to some sinus tach    Hyperlipidemia     Hypertension     Hypothermia 2008    Malignant neoplasm of breast (female) ([facility] [address] 2012    Neuropathy     \"have neuropathy of my legs\"    Normocytic normochromic anemia     Obstructive sleep apnea 2008 01- Has CPAP machine but has not used in over a year - states she has not had problems since her pacemaker was placed.  Old MI (myocardial infarction)     per pt on 1/15/2019\"had heart attack about a year ago\"    Osteoarthritis     Osteopenia     Primary malignant neoplasm of colon ([address]     S/P cardiac cath 06/2013    no significant CAD false postive stress test    Skin cancer     Super obesity     Umbilical hernia        Past Surgical History:   Procedure Laterality Date    A-V CARDIAC PACEMAKER INSERTION  3/10/14     Medtronic. Model:  [id number] Medtronic  Serial:  [id number] dual chamber pacemaker    APPENDECTOMY  2004    BREAST BIOPSY  2/13/12    BREAST LUMPECTOMY  2007    right     BREAST SURGERY  02/13/2012    rt lesion biopsy     CARDIAC CATHETERIZATION  2007 & 2011    COLECTOMY  2004    Colon cancer    COLONOSCOPY  10-18-13    polyp    COLONOSCOPY  1/19/2016    internal hemorrhoids, diverticulosis, 1.5 cm sessile polyp, 8 mm polyp found in rectum.  COLONOSCOPY  12/14/2017    1.5cm residual polyp, 5mm polyps in blind sigmoid loop x3, Sigmoid divertics, Internal grade 1 hemorrhoids    COLONOSCOPY N/A 1/16/2019    COLONOSCOPY W/ ENDOSCOPIC MUCOSAL RESECTION WITH ELEVIEW 5ML INJECTION AND POLYPECTOMY OF TIP OF BLIND RECTOSIGMOID STUMP AND CAUTERIZATION WITH ERBE PROBE, CLIPPING X2 performed by Shirin Oquendo MD at [place] [address]  01/21/2020    pan divertics/ 4 polyps/ sm int hem, S/P partial sigmoid resection w/ end to side anastamosis, repeat in 3 years    COLONOSCOPY N/A 1/21/2020    COLONOSCOPY POLYPECTOMY SNARE/COLD BIOPSY performed by Shirin Oquendo MD at [facility]  2003    back   [address] Bilateral 12/15/14    DENTAL SURGERY      front right tooth and root canal w/ brass bolt    DILATION AND CURETTAGE OF UTERUS  2006    Needed a camera to find my uterus.     ENDOSCOPY, COLON, DIAGNOSTIC  12/14/2017    Small hiatal hernia    HERNIA REPAIR  2004 Umb hernia    HERNIA REPAIR  2008    Inc hernia    LYMPH NODE DISSECTION  2/29/2012    Right axillary-3 sentinel nodes were positive out of 9.    MASTECTOMY, RADICAL Right 02/29/2012    w/ sentinal node disection-Dr West    PACEMAKER PLACEMENT      PRE-MALIGNANT / BENIGN SKIN LESION EXCISION  2/8/2013    right leg X2-Dr West    TONSILLECTOMY  1957    TUNNELED VENOUS PORT PLACEMENT  2004    TUNNELED VENOUS PORT PLACEMENT  02/13/2012    removal of mediport         Family History   Problem Relation Age of Onset    Arthritis Mother         OA, RA    High Cholesterol Mother     High Blood Pressure Mother     Cancer Father         skin and leukemia    High Blood Pressure Father     High Cholesterol Father     Diabetes Father     Heart Disease Father     Heart Disease Brother     High Cholesterol Brother     High Blood Pressure Brother     Heart Disease Brother     No Known Problems Sister     Seizures Son     Cancer Brother         skin cancer    Breast Cancer Neg Hx     Ovarian Cancer Neg Hx        CareTeam (Including outside providers/suppliers regularly involved in providing care):   Patient Care Team:  Ian Herman DO as PCP - General (Family Medicine)  Ian Herman DO as PCP - [address] [place] [address] Provider  Osiel Martel MD as Consulting Physician (Cardiology)  Virgie Ray MD as Consulting Physician (Hematology and Oncology)  Emil Rodriguez MD as Surgeon (General Surgery)  Alia Craft MD as Consulting Physician (Gastroenterology)  Dominga Dawkins MD as Consulting Physician (Neurology)  Jovana Farley MD as Consulting Physician (Pulmonary Disease)  Margarita Zabala MD as Surgeon (Orthopedic Surgery)  [facility] (Urology)  Sarai Altamirano, RN as Ambulatory Care Manager    Wt Readings from Last 3 Encounters:   10/04/21 253 lb 3.2 oz (114.9 kg)   07/29/21 282 lb 9.6 oz (128.2 kg)   05/21/21 282 lb 12.8 oz (128.3 kg)     Vitals:    10/04/21 1302   BP: 126/72 Site: Left Upper Arm   Position: Sitting   Cuff Size: Large Adult   Pulse: 68   SpO2: 95%   Weight: 253 lb 3.2 oz (114.9 kg)   Height: 5' 2\" (1.575 m)     Body mass index is 46.31 kg/m². Based upon direct observation of the patient, evaluation of cognition reveals recent and remote memory intact. Patient's complete Health Risk Assessment and screening values have been reviewed and are found in Flowsheets. The following problems were reviewed today and where indicated follow up appointments were made and/or referrals ordered. Positive Risk Factor Screenings with Interventions:     Fall Risk:  Timed Up and Go Test > 12 seconds? (Complete if either Fall Risk answers are Yes): no  2 or more falls in past year?: (!) yes  Fall with injury in past year?: no  Fall Risk Interventions:    · Home safety tips provided          General Health and ACP:  General  In general, how would you say your health is?: (!) Poor (due to all of her health issues, has had several different cancers, heart issues, asthma)  In the past 7 days, have you experienced any of the following?  New or Increased Pain, New or Increased Fatigue, Loneliness, Social Isolation, Stress or Anger?: (!) Stress (roommate causes her stress)  Do you get the social and emotional support that you need?: Yes  Do you have a Living Will?: (!) No  Advance Directives     Power of  Living Will ACP-Advance Directive ACP-Power of     Not on File Not on File Not on File Not on File      General Health Risk Interventions:  · Poor self-assessment of health status: Patient states her health is poor as she has had several different cancers, heart issues and asthma  · Stress: patient's comments regarding reasons for stress and/or anger: Patient states her room mate will cause her to become stressed  · No Living Will: Advance Care Planning addressed with patient today, information provided to patient    Health Habits/Nutrition:  Health Habits/Nutrition  Do you exercise for at least 20 minutes 2-3 times per week?: (!) No  Have you lost any weight without trying in the past 3 months?: (!) Yes  Do you eat only one meal per day?: No  Have you seen the dentist within the past year?: (!) No (lost teeth due to chemo)  Body mass index: (!) 46.31  Health Habits/Nutrition Interventions:  · Inadequate physical activity:  educational materials provided to promote increased physical activity  · Nutritional issues:  educational materials for healthy, well-balanced diet provided, educational materials to promote weight loss provided  · Dental exam overdue:  patient encouraged to make appointment with his/her dentist      ADL:  ADLs  In the past 7 days, did you need help from others to perform any of the following everyday activities? Eating, dressing, grooming, bathing, toileting, or walking/balance?: (!) Bathing  In the past 7 days, did you need help from others to take care of any of the following? Laundry, housekeeping, banking/finances, shopping, telephone use, food preparation, transportation, or taking medications?: [employer], Housekeeping, Transportation, Food Preparation, Shopping (room mate helps her)  ADL Interventions:  · Patient declines any further evaluation/treatment for this issue, states her room mate helps her with her ADLs.     Personalized Preventive Plan   Current Health Maintenance Status  Immunization History   Administered Date(s) Administered    Tdap (Boostrix, Adacel) 07/13/2017        Health Maintenance   Topic Date Due    COVID-19 Vaccine (1) Never done    Shingles Vaccine (1 of 2) Never done    Pneumococcal 65+ years Vaccine (1 of 1 - PPSV23) Never done    Flu vaccine (1) Never done   [employer] Visit (AWV)  09/29/2021    Lipid screen  07/29/2022    Breast cancer screen  09/14/2022    Colon cancer screen colonoscopy  01/21/2023    DTaP/Tdap/Td vaccine (2 - Td or Tdap) 07/13/2027    DEXA (modify frequency per FRAX score)  Completed    Hepatitis C screen  Completed    Hepatitis A vaccine  Aged Out    Hepatitis B vaccine  Aged Out    Hib vaccine  Aged Out    Meningococcal (ACWY) vaccine  Aged Out     Recommendations for [employer] Due: see orders and patient instructions/AVS. Patient states is planning to get vaccinations needed, and is aware she will need to spread them out over a period of time. Recommended screening schedule for the next 5-10 years is provided to the patient in written form: see Patient Instructions/AVS.    I, Clifm Sleeper, LPN, 42/3/8881, performed the documented evaluation under the direct supervision of the attending physician.

## 2021-10-04 NOTE — PATIENT INSTRUCTIONS
Personalized Preventive Plan for Bebo Avilez - 10/4/2021  Medicare offers a range of preventive health benefits. Some of the tests and screenings are paid in full while other may be subject to a deductible, co-insurance, and/or copay. Some of these benefits include a comprehensive review of your medical history including lifestyle, illnesses that may run in your family, and various assessments and screenings as appropriate. After reviewing your medical record and screening and assessments performed today your provider may have ordered immunizations, labs, imaging, and/or referrals for you. A list of these orders (if applicable) as well as your Preventive Care list are included within your After Visit Summary for your review. Other Preventive Recommendations:    · A preventive eye exam performed by an eye specialist is recommended every 1-2 years to screen for glaucoma; cataracts, macular degeneration, and other eye disorders. · A preventive dental visit is recommended every 6 months. · Try to get at least 150 minutes of exercise per week or 10,000 steps per day on a pedometer . · Order or download the FREE \"Exercise & Physical Activity: Your Everyday Guide\" from [employer]. Call 0-584.773.5115 or search [employer] online. · You need 8196-9413 mg of calcium and 8385-3737 IU of vitamin D per day. It is possible to meet your calcium requirement with diet alone, but a vitamin D supplement is usually necessary to meet this goal.  · When exposed to the sun, use a sunscreen that protects against both UVA and UVB radiation with an SPF of 30 or greater. Reapply every 2 to 3 hours or after sweating, drying off with a towel, or swimming. · Always wear a seat belt when traveling in a car. Always wear a helmet when riding a bicycle or motorcycle. Heart-Healthy Diet   Sodium, Fat, and Cholesterol Controlled Diet       What Is a Heart Healthy Diet?    A heart-healthy diet is one that limits sodium , certain types of fat , and cholesterol . This type of diet is recommended for:   People with any form of cardiovascular disease (eg, coronary heart disease , peripheral vascular disease , previous heart attack , previous stroke )   People with risk factors for cardiovascular disease, such as high blood pressure , high cholesterol , or diabetes   Anyone who wants to lower their risk of developing cardiovascular disease   Sodium    Sodium is a mineral found in many foods. In general, most people consume much more sodium than they need. Diets high in sodium can increase blood pressure and lead to edema (water retention). On a heart-healthy diet, you should consume no more than 2,300 mg (milligrams) of sodium per dayabout the amount in one teaspoon of table salt. The foods highest in sodium include table salt (about 50% sodium), processed foods, convenience foods, and preserved foods. Cholesterol    Cholesterol is a fat-like, waxy substance in your blood. Our bodies make some cholesterol. It is also found in animal products, with the highest amounts in fatty meat, egg yolks, whole milk, cheese, shellfish, and organ meats. On a heart-healthy diet, you should limit your cholesterol intake to less than 200 mg per day. It is normal and important to have some cholesterol in your bloodstream. But too much cholesterol can cause plaque to build up within your arteries, which can eventually lead to a heart attack or stroke. The two types of cholesterol that are most commonly referred to are:   Low-density lipoprotein (LDL) cholesterol  Also known as bad cholesterol, this is the cholesterol that tends to build up along your arteries. Bad cholesterol levels are increased by eating fats that are saturated or hydrogenated. Optimal level of this cholesterol is less than 100. Over 130 starts to get risky for heart disease.    High-density lipoprotein (HDL) cholesterol  Also known as good cholesterol, this type of cholesterol actually carries cholesterol away from your arteries and may, therefore, help lower your risk of having a heart attack. You want this level to be high (ideally greater than 60). It is a risk to have a level less than 40. You can raise this good cholesterol by eating olive oil, canola oil, avocados, or nuts. Exercise raises this level, too. Fat    Fat is calorie dense and packs a lot of calories into a small amount of food. Even though fats should be limited due to their high calorie content, not all fats are bad. In fact, some fats are quite healthful. Fat can be broken down into four main types. The good-for-you fats are:   Monounsaturated fat  found in oils such as olive and canola, avocados, and nuts and natural nut butters; can decrease cholesterol levels, while keeping levels of HDL cholesterol high   Polyunsaturated fat  found in oils such as safflower, sunflower, soybean, corn, and sesame; can decrease total cholesterol and LDL cholesterol   Omega-3 fatty acids  particularly those found in fatty fish (such as salmon, trout, tuna, mackerel, herring, and sardines); can decrease risk of arrhythmias, decrease triglyceride levels, and slightly lower blood pressure   The fats that you want to limit are:   Saturated fat  found in animal products, many fast foods, and a few vegetables; increases total blood cholesterol, including LDL levels   Animal fats that are saturated include: butter, lard, whole-milk dairy products, meat fat, and poultry skin   Vegetable fats that are saturated include: hydrogenated shortening, palm oil, coconut oil, cocoa butter   Hydrogenated or trans fat  found in margarine and vegetable shortening, most shelf stable snack foods, and fried foods; increases LDL and decreases HDL     It is generally recommended that you limit your total fat for the day to less than 30% of your total calories.  If you follow an 1800-calorie heart healthy diet, for example, this would mean 60 grams of fat or less per day. Saturated fat and trans fat in your diet raises your blood cholesterol the most, much more than dietary cholesterol does. For this reason, on a heart-healthy diet, less than 7% of your calories should come from saturated fat and ideally 0% from trans fat. On an 1800-calorie diet, this translates into less than 14 grams of saturated fat per day, leaving 46 grams of fat to come from mono- and polyunsaturated fats.    Food Choices on a Heart Healthy Diet   Food Category   Foods Recommended   Foods to Avoid   Grains   Breads and rolls without salted tops Most dry and cooked cereals Unsalted crackers and breadsticks Low-sodium or homemade breadcrumbs or stuffing All rice and pastas   Breads, rolls, and crackers with salted tops High-fat baked goods (eg, muffins, donuts, pastries) Quick breads, self-rising flour, and biscuit mixes Regular bread crumbs Instant hot cereals Commercially prepared rice, pasta, or stuffing mixes   Vegetables   Most fresh, frozen, and low-sodium canned vegetables Low-sodium and salt-free vegetable juices Canned vegetables if unsalted or rinsed   Regular canned vegetables and juices, including sauerkraut and pickled vegetables Frozen vegetables with sauces Commercially prepared potato and vegetable mixes   Fruits   Most fresh, frozen, and canned fruits All fruit juices   Fruits processed with salt or sodium   Milk   Nonfat or low-fat (1%) milk Nonfat or low-fat yogurt Cottage cheese, low-fat ricotta, cheeses labeled as low-fat and low-sodium   Whole milk Reduced-fat (2%) milk Malted and chocolate milk Full fat yogurt Most cheeses (unless low-fat and low salt) Buttermilk (no more than 1 cup per week)   Meats and Beans   Lean cuts of fresh or frozen beef, veal, lamb, or pork (look for the word loin) Fresh or frozen poultry without the skin Fresh or frozen fish and some shellfish Egg whites and egg substitutes (Limit whole eggs to three per week) Tofu Nuts or seeds (unsalted, dry-roasted), low-sodium peanut butter Dried peas, beans, and lentils   Any smoked, cured, salted, or canned meat, fish, or poultry (including shaw, chipped beef, cold cuts, hot dogs, sausages, sardines, and anchovies) Poultry skins Breaded and/or fried fish or meats Canned peas, beans, and lentils Salted nuts   Fats and Oils   Olive oil and canola oil Low-sodium, low-fat salad dressings and mayonnaise   Butter, margarine, coconut and palm oils, shaw fat   Snacks, Sweets, and Condiments   Low-sodium or unsalted versions of broths, soups, soy sauce, and condiments Pepper, herbs, and spices; vinegar, lemon, or lime juice Low-fat frozen desserts (yogurt, sherbet, fruit bars) Sugar, cocoa powder, honey, syrup, jam, and preserves Low-fat, trans-fat free cookies, cakes, and pies Selvin and animal crackers, fig bars, latanya snaps   High-fat desserts Broth, soups, gravies, and sauces, made from instant mixes or other high-sodium ingredients Salted snack foods Canned olives Meat tenderizers, seasoning salt, and most flavored vinegars   Beverages   Low-sodium carbonated beverages Tea and coffee in moderation Soy milk   Commercially softened water   Suggestions   Make whole grains, fruits, and vegetables the base of your diet. Choose heart-healthy fats such as canola, olive, and flaxseed oil, and foods high in heart-healthy fats, such as nuts, seeds, soybeans, tofu, and fish. Eat fish at least twice per week; the fish highest in omega-3 fatty acids and lowest in mercury include salmon, herring, mackerel, sardines, and canned chunk light tuna. If you eat fish less than twice per week or have high triglycerides, talk to your doctor about taking fish oil supplements. Read food labels.    For products low in fat and cholesterol, look for fat free, low-fat, cholesterol free, saturated fat free, and trans fat freeAlso scan the Nutrition Facts Label, which lists saturated fat, trans fat, and cholesterol amounts. For products low in sodium, look for sodium free, very low sodium, low sodium, no added salt, and unsalted   Skip the salt when cooking or at the table; if food needs more flavor, get creative and try out different herbs and spices. Garlic and onion also add substantial flavor to foods. Trim any visible fat off meat and poultry before cooking, and drain the fat off after moreno. Use cooking methods that require little or no added fat, such as grilling, boiling, baking, poaching, broiling, roasting, steaming, stir-frying, and sauting. Avoid fast food and convenience food. They tend to be high in saturated and trans fat and have a lot of added salt. Talk to a registered dietitian for individualized diet advice. Last Reviewed: March 2011 Demarco Spain MS, MPH, RD   Updated: 3/29/2011   ·     Heart-Healthy Diet   Sodium, Fat, and Cholesterol Controlled Diet       What Is a Heart Healthy Diet? A heart-healthy diet is one that limits sodium , certain types of fat , and cholesterol . This type of diet is recommended for:   People with any form of cardiovascular disease (eg, coronary heart disease , peripheral vascular disease , previous heart attack , previous stroke )   People with risk factors for cardiovascular disease, such as high blood pressure , high cholesterol , or diabetes   Anyone who wants to lower their risk of developing cardiovascular disease   Sodium    Sodium is a mineral found in many foods. In general, most people consume much more sodium than they need. Diets high in sodium can increase blood pressure and lead to edema (water retention). On a heart-healthy diet, you should consume no more than 2,300 mg (milligrams) of sodium per dayabout the amount in one teaspoon of table salt. The foods highest in sodium include table salt (about 50% sodium), processed foods, convenience foods, and preserved foods.    Cholesterol    Cholesterol is a fat-like, waxy substance in your blood. Our bodies make some cholesterol. It is also found in animal products, with the highest amounts in fatty meat, egg yolks, whole milk, cheese, shellfish, and organ meats. On a heart-healthy diet, you should limit your cholesterol intake to less than 200 mg per day. It is normal and important to have some cholesterol in your bloodstream. But too much cholesterol can cause plaque to build up within your arteries, which can eventually lead to a heart attack or stroke. The two types of cholesterol that are most commonly referred to are:   Low-density lipoprotein (LDL) cholesterol  Also known as bad cholesterol, this is the cholesterol that tends to build up along your arteries. Bad cholesterol levels are increased by eating fats that are saturated or hydrogenated. Optimal level of this cholesterol is less than 100. Over 130 starts to get risky for heart disease. High-density lipoprotein (HDL) cholesterol  Also known as good cholesterol, this type of cholesterol actually carries cholesterol away from your arteries and may, therefore, help lower your risk of having a heart attack. You want this level to be high (ideally greater than 60). It is a risk to have a level less than 40. You can raise this good cholesterol by eating olive oil, canola oil, avocados, or nuts. Exercise raises this level, too. Fat    Fat is calorie dense and packs a lot of calories into a small amount of food. Even though fats should be limited due to their high calorie content, not all fats are bad. In fact, some fats are quite healthful. Fat can be broken down into four main types.    The good-for-you fats are:   Monounsaturated fat  found in oils such as olive and canola, avocados, and nuts and natural nut butters; can decrease cholesterol levels, while keeping levels of HDL cholesterol high   Polyunsaturated fat  found in oils such as safflower, sunflower, soybean, corn, and sesame; can decrease total cholesterol and LDL cholesterol   Omega-3 fatty acids  particularly those found in fatty fish (such as salmon, trout, tuna, mackerel, herring, and sardines); can decrease risk of arrhythmias, decrease triglyceride levels, and slightly lower blood pressure   The fats that you want to limit are:   Saturated fat  found in animal products, many fast foods, and a few vegetables; increases total blood cholesterol, including LDL levels   Animal fats that are saturated include: butter, lard, whole-milk dairy products, meat fat, and poultry skin   Vegetable fats that are saturated include: hydrogenated shortening, palm oil, coconut oil, cocoa butter   Hydrogenated or trans fat  found in margarine and vegetable shortening, most shelf stable snack foods, and fried foods; increases LDL and decreases HDL     It is generally recommended that you limit your total fat for the day to less than 30% of your total calories. If you follow an 1800-calorie heart healthy diet, for example, this would mean 60 grams of fat or less per day. Saturated fat and trans fat in your diet raises your blood cholesterol the most, much more than dietary cholesterol does. For this reason, on a heart-healthy diet, less than 7% of your calories should come from saturated fat and ideally 0% from trans fat. On an 1800-calorie diet, this translates into less than 14 grams of saturated fat per day, leaving 46 grams of fat to come from mono- and polyunsaturated fats.    Food Choices on a Heart Healthy Diet   Food Category   Foods Recommended   Foods to Avoid   Grains   Breads and rolls without salted tops Most dry and cooked cereals Unsalted crackers and breadsticks Low-sodium or homemade breadcrumbs or stuffing All rice and pastas   Breads, rolls, and crackers with salted tops High-fat baked goods (eg, muffins, donuts, pastries) Quick breads, self-rising flour, and biscuit mixes Regular bread crumbs Instant hot cereals Commercially prepared rice, pasta, or stuffing mixes Vegetables   Most fresh, frozen, and low-sodium canned vegetables Low-sodium and salt-free vegetable juices Canned vegetables if unsalted or rinsed   Regular canned vegetables and juices, including sauerkraut and pickled vegetables Frozen vegetables with sauces Commercially prepared potato and vegetable mixes   Fruits   Most fresh, frozen, and canned fruits All fruit juices   Fruits processed with salt or sodium   Milk   Nonfat or low-fat (1%) milk Nonfat or low-fat yogurt Cottage cheese, low-fat ricotta, cheeses labeled as low-fat and low-sodium   Whole milk Reduced-fat (2%) milk Malted and chocolate milk Full fat yogurt Most cheeses (unless low-fat and low salt) Buttermilk (no more than 1 cup per week)   Meats and Beans   Lean cuts of fresh or frozen beef, veal, lamb, or pork (look for the word loin) Fresh or frozen poultry without the skin Fresh or frozen fish and some shellfish Egg whites and egg substitutes (Limit whole eggs to three per week) Tofu Nuts or seeds (unsalted, dry-roasted), low-sodium peanut butter Dried peas, beans, and lentils   Any smoked, cured, salted, or canned meat, fish, or poultry (including shaw, chipped beef, cold cuts, hot dogs, sausages, sardines, and anchovies) Poultry skins Breaded and/or fried fish or meats Canned peas, beans, and lentils Salted nuts   Fats and Oils   Olive oil and canola oil Low-sodium, low-fat salad dressings and mayonnaise   Butter, margarine, coconut and palm oils, shaw fat   Snacks, Sweets, and Condiments   Low-sodium or unsalted versions of broths, soups, soy sauce, and condiments Pepper, herbs, and spices; vinegar, lemon, or lime juice Low-fat frozen desserts (yogurt, sherbet, fruit bars) Sugar, cocoa powder, honey, syrup, jam, and preserves Low-fat, trans-fat free cookies, cakes, and pies Selvin and animal crackers, fig bars, latanya snaps   High-fat desserts Broth, soups, gravies, and sauces, made from instant mixes or other high-sodium ingredients Salted snack foods Canned olives Meat tenderizers, seasoning salt, and most flavored vinegars   Beverages   Low-sodium carbonated beverages Tea and coffee in moderation Soy milk   Commercially softened water   Suggestions   Make whole grains, fruits, and vegetables the base of your diet. Choose heart-healthy fats such as canola, olive, and flaxseed oil, and foods high in heart-healthy fats, such as nuts, seeds, soybeans, tofu, and fish. Eat fish at least twice per week; the fish highest in omega-3 fatty acids and lowest in mercury include salmon, herring, mackerel, sardines, and canned chunk light tuna. If you eat fish less than twice per week or have high triglycerides, talk to your doctor about taking fish oil supplements. Read food labels. For products low in fat and cholesterol, look for fat free, low-fat, cholesterol free, saturated fat free, and trans fat freeAlso scan the Nutrition Facts Label, which lists saturated fat, trans fat, and cholesterol amounts. For products low in sodium, look for sodium free, very low sodium, low sodium, no added salt, and unsalted   Skip the salt when cooking or at the table; if food needs more flavor, get creative and try out different herbs and spices. Garlic and onion also add substantial flavor to foods. Trim any visible fat off meat and poultry before cooking, and drain the fat off after moreno. Use cooking methods that require little or no added fat, such as grilling, boiling, baking, poaching, broiling, roasting, steaming, stir-frying, and sauting. Avoid fast food and convenience food. They tend to be high in saturated and trans fat and have a lot of added salt. Talk to a registered dietitian for individualized diet advice.       Last Reviewed: March 2011 Brita Dubin, MS, MPH, RD   Updated: 3/29/2011   ·   Patient information: Weight loss treatments    INTRODUCTION  Obesity is a major international problem, and Americans are among the heaviest people in the world. The percentage of obese people in the [place] has risen steadily. Many people find that although they initially lose weight by dieting, they quickly regain the weight after the diet ends. Because it so hard to keep weight off over time, it is important to have as much information and support as possible before starting a diet. You are most likely to be successful in losing weight and keeping it off when you believe that your body weight can be controlled. STARTING A WEIGHT LOSS PROGRAM  Some people like to talk to their doctor or nurse to get help choosing the best plan, monitoring progress, and getting advice and support along the way. To know what treatment (or combination of treatments) will work best, determine your body mass index (BMI) and waist circumference (measurement). The BMI is calculated from your height and weight. A person with a BMI between 25 and 29.9 is considered overweight   A person with a BMI of 30 or greater is considered to be obese  A waist circumference greater than 35 inches (88 cm) in women and 40 inches (102 cm) in men increases the risk of obesity-related complications, such as heart disease and diabetes. People who are obese and who have a larger waist size may need more aggressive weight loss treatment than others. Talk to your doctor or nurse for advice. Types of treatment  Based on your measurements and your medical history, your doctor or nurse can determine what combination of weight loss treatments would work best for you. Treatments may include changes in lifestyle, exercise, dieting, and, in some cases, weight loss medicines or weight loss surgery. Weight loss surgery, also called bariatric surgery, is reserved for people with severe obesity who have not responded to other weight loss treatments.    SETTING A WEIGHT LOSS GOAL  It is important to set a realistic weight loss goal. Your first goal should be to avoid gaining more weight and staying at your current weight (or within 5 percent). Many people have a \"dream\" weight that is difficult or impossible to achieve. People at high risk of developing diabetes who are able to lose 5 percent of their body weight and maintain this weight will reduce their risk of developing diabetes by about 50 percent and reduce their blood pressure. This is a success. Losing more than 15 percent of your body weight and staying at this weight is an extremely good result, even if you never reach your \"dream\" or \"ideal\" weight. LIFESTYLE CHANGES  Programs that help you to change your lifestyle are usually run by psychologists or other professionals. The goals of lifestyle changes are to help you change your eating habits, become more active, and be more aware of how much you eat and exercise, helping you to make healthier choices. This type of treatment can be broken down into three steps: The triggers that make you want to eat   Eating   What happens after you eat  Triggers to eat  Determining what triggers you to eat involves figuring out what foods you eat and where and when you eat. To figure out what triggers you to eat, keep a record for a few days of everything you eat, the places where you eat, how often you eat, and the emotions you were feeling when you ate. For some people, the trigger is related to a certain time of day or night. For others, the trigger is related to a certain place, like sitting at a desk working. Eating  You can change your eating habits by breaking the chain of events between the trigger for eating and eating itself. There are many ways to do this.  For instance, you can:  Limit where you eat to a few places (eg, dining room)   Restrict the number of utensils (eg, only a fork) used for eating   Drink a sip of water between each bite   Chew your food a certain number of times   Get up and stop eating every few minutes  What happens after you eat  Rewarding yourself for good eating behaviors can help you to develop better habits. This is not a reward for weight loss; instead, it is a reward for changing unhealthy behaviors. Do not use food as a reward. Some people find money, clothing, or personal care (eg, a hair cut, manicure, or massage) to be effective rewards. Treat yourself immediately after making better eating choices to reinforce the value of the good behavior. You need to have clear behavior goals, and you must have a time frame for reaching your goals. Reward small changes along the way to your final goal.  Other factors that contribute to successful weight loss  Changing your behavior involves more than just changing unhealthy eating habits; it also involves finding people around you to support your weight loss, reducing stress, and learning to be strong when tempted by food. Establish a \"cristiana\" system  Having a friend or family member available to provide support and reinforce good behavior is very helpful. The support person needs to understand your goals. Learn to be strong  Learning to be strong when tempted by food is an important part of losing weight. As an example, you will need to learn how to say \"no\" and continue to say no when urged to eat at parties and social gatherings. Develop strategies for events before you go, such as eating before you go or taking low-calorie snacks and drinks with you. Develop a support system  Having a support system is helpful when losing weight. This is why many [employer] groups are successful. Family support is also essential; if your family does not support your efforts to lose weight, this can slow your progress or even keep you from losing weight. Positive thinking  People often have conversations with themselves in their head; these conversations can be positive or negative. If you eat a piece of cake that was not planned, you may respond by thinking, \"Oh, you stupid idiot, you've blown your diet! \" and as a result, you may eat more cake. A positive thought for the same event could be, \"Well, I ate cake when it was not on my plan. Now I should do something to get back on track. \" A positive approach is much more likely to be successful than a negative one. Reduce stress  Although stress is a part of everyday life, it can trigger uncontrolled eating in some people. It is important to find a way to get through these difficult times without eating or by eating low-calorie food, like raw vegetables. It may be helpful to imagine a relaxing place that allows you to temporarily escape from stress. With deep breaths and closed eyes, you can imagine this relaxing place for a few minutes. Self-help programs  Self-help programs like F[employer] BAG Watchers®, Overeaters Anonymous®, and Take Off Chris ([employer])© work for some people. As with all weight loss programs, you are most likely to be successful with these plans if you make long-term changes in how you eat. CHOOSING A DIET  A calorie is a unit of energy found in food. Your body needs calories to function. The goal of any diet is to burn up more calories than you eat. How quickly you lose weight depends upon several factors, such as your age, gender, and starting weight. Older people have a slower metabolism than young people, so they lose weight more slowly. Men lose more weight than women of similar height and weight when dieting because they use more energy. People who are extremely overweight lose weight more quickly than those who are only mildly overweight. Try not to drink alcohol or drinks with added sugar, and most sweets (candy, cakes, cookies), since they rarely contain important nutrients. Portion-controlled diets  One simple way to diet is to buy packaged foods, like frozen low-calorie meals or meal-replacement canned drinks.  A typical meal plan for one day may include:  A meal-replacement drink or breakfast bar for breakfast   A meal-replacement drink or a frozen low-calorie (250 to 350 calories) meal for lunch   A frozen low-calorie meal or other prepackaged, calorie-controlled meal, along with extra vegetables for dinner  This would give you 1000 to 1500 calories per day. Low-fat diet  To reduce the amount of fat in your diet, you can:  Eat low-fat foods. Low-fat foods are those that contain less than 30 percent of calories from fat. Fat is listed on the food facts label (figure 1). Count fat grams. For a 1500 calorie diet, this would mean about 45 g or fewer of fat per day. Low-carbohydrate diet  Low- and very-low-carbohydrate diets (eg, Atkins diet, [employer]) have become popular ways to lose weight quickly. With a very-low-carbohydrate diet, you eat between 0 and 60 grams of carbohydrates per day (a standard diet contains 200 to 300 grams of carbohydrates)   With a low-carbohydrate diet, you eat between 60 and 130 grams of carbohydrates per day  Carbohydrates are found in fruits, vegetables, and grains (including breads, rice, pasta, and cereal), alcoholic beverages, and in dairy products. Meat and fish do not contain carbohydrates. Side effects of very-low-carbohydrate diets can include constipation, headache, bad breath, muscle cramps, diarrhea, and weakness. Mediterranean diet  The term \"Mediterranean diet\" refers to a way of eating that is common in olive-growing regions around the . Although there is some variation in Mediterranean diets, there are some similarities. Most Mediterranean diets include:  A high level of monounsaturated fats (from olive or canola oil, walnuts, pecans, almonds) and a low level of saturated fats (from butter)   A high amount of vegetables, fruits, legumes, and grains (7 to 10 servings of fruits and vegetables per day)   A moderate amount of milk and dairy products, mostly in the form of cheese. Use low-fat dairy products (skim milk, fat-free yogurt, low-fat cheese).    A relatively low amount of red meat and meat products. Substitute fish or poultry for red meat. For those who drink alcohol, a modest amount (mainly as red wine) may help to protect against cardiovascular disease. A modest amount is up to one (4 ounce) glass per day for women and up to two glasses per day for men. Which diet is best?  Studies have compared different diets, including:  Very-low-carbohydrate (Atkins)   Macronutrient balance controlling glycemic load (Zone®)   Reduced-calorie (Weight Watchers®)   Very-low-fat (Ornish)  No one diet is \"best\" for weight loss. Any diet will help you to lose weight if you stick with the diet. Therefore, it is important to choose a diet that includes foods you like. Fad diets  Fad diets often promise quick weight loss (more than 1 to 2 pounds per week) and may claim that you do not need to exercise or give up favorite foods. Some fad diets cost a lot of money, because you have to pay for seminars or pills. Fad diets generally lack any scientific evidence that they are safe and effective, but instead rely on \"before\" and \"after\" photos or testimonials. Diets that sound too good to be true usually are. These plans are a waste of time and money and are not recommended. A doctor, nurse, or nutritionist can help you find a safe and effective way to lose weight and keep it off. WEIGHT LOSS [facility] a weight loss medicine may be helpful when used in combination with diet, exercise, and lifestyle changes. However, it is important to understand the risks and benefits of these medicines. It is also important to be realistic about your goal weight using a weight loss medicine; you may not reach your \"dream\" weight, but you may be able to reduce your risk of diabetes or heart disease.    Weight loss medicines may be recommended for people who have not been able to lose weight with diet and exercise who have a:  BMI of 30 or more    BMI between 27 and 29.9 and have other medical problems, such These problems are more likely when you take orlistat with a high-fat meal (if more than 30 percent of calories in the meal are from fat). Side effects usually improve as you learn to avoid high-fat foods. Severe liver injury has been reported rarely in patients taking orlistat, but it is not known if orlistat caused the liver problems. Diet supplements  Diet supplements are widely used by people who are trying to lose weight, although the safety and efficacy of these supplements are often unproven. A few of the more common diet supplements are discussed below; none of these are recommended because they have not been studied carefully, and there is no proof they are safe or effective. Chitosan and wheat dextrin are ineffective for weight loss, and their use is not recommended. Ephedra, a compound related to ephedrine, is no longer available in the [place] due to safety concerns. Many nonprescription diet pills previously contained ephedra. Although some studies have shown that ephedra helps with weight loss, there can be serious side effects (psychiatric symptoms, palpitations, and stomach upset), including death. There are not enough data about the safety and efficacy of chromium, ginseng, glucomannan, green tea, hydroxycitric acid, L carnitine, psyllium, pyruvate supplements, [place] wort, and conjugated linoleic acid. Two supplements from [facility], [address] [place] (also known as the Xuan Doan 15 pill) and Herbathin dietary supplement, have been shown to contain prescription drugs. Hoodia gordonii is a dietary supplement derived from a plant in [place]. It is not recommended because there is no proof that it is safe or effective. Bitter orange (Citrus aurantium) can increase your heart rate and blood pressure and is not recommended. SHOULD I HAVE SURGERY TO LOSE WEIGHT?   Weight loss surgery is recommended ONLY for people with one of the following:  Severe obesity (body mass index above 40) (calculator 1 and calculator 2) who have not responded to diet, exercise, or weight loss medicines   Body mass index between 35 and 40, along with a serious medical problem (including diabetes, severe joint pain, or sleep apnea) that would improve with weight loss  You should be sure that you understand the potential risks and benefits of weight loss surgery. You must be motivated and willing to make lifelong changes in how you eat to reach and maintain a healthier weight after surgery. You must also be realistic about weight loss after surgery (see 'Effectiveness of weight loss surgery' below). PREPARING FOR WEIGHT LOSS SURGERY  Most people who have weight loss surgery will meet with several specialists before surgery is scheduled. This often includes a dietitian, mental health counselor, a doctor who specializes in care of obese people, and a surgeon who performs weight loss surgery (bariatric surgeon). You may need to work with these providers for several weeks or months before surgery. The nutritionist will explain what and how much you will be able to eat after surgery. You may also need to lose a small amount of weight before surgery. The mental health specialist will help you to cope with stress and other factors that can make it harder to lose weight or trigger you to eat   The medical doctor will determine whether you need other tests, counseling, or treatment before surgery. He or she might also help you begin a medical weight loss program so that you can lose some weight before surgery. The bariatric surgeon will meet with you to discuss the surgeries available to treat obesity. He or she will also make sure you are a good candidate for surgery. TYPES OF WEIGHT LOSS SURGERY  There are several types of weight loss surgeries, the most common being lap banding, gastric bypass, and gastric sleeve.   Lap banding  Laparoscopic adjustable gastric banding (LAGB), or lap banding, is a surgery that uses an adjustable band around the opening to the stomach (figure 1). This reduces the amount of food that you can eat at one time. Lap banding is done through small incisions, with a laparoscope. The band can be adjusted after surgery, allowing you to eat more or less food. Adjustments to the size and tightness of the band are made by using a needle to add or remove fluid from a port (a small container under the skin that is connected to the band). Adding fluid to the band makes it tighter which restricts the amount of food you can eat and may help you to lose more weight. Lap banding is a popular choice because it is relatively simple to perform, can be adjusted or removed, and has a low risk of serious complications immediately after surgery. However, weight loss with the lap band depends on your ability to follow the program closely. You will need to prepare nutritious meals that [facility] with\" the band, not against it. For example, the lap band will not work well if you eat or drink a large amount of liquid calories (like ice cream). The band will not help you to feel full when you eat/drink liquid calories. Weight loss ranges from 45 to 75 percent after two years. As an example, a person who is 120 pounds overweight could expect to lose approximately 54 to 90 pounds in the two years after lap banding. Gastric bypass  [place]-en-Y gastric bypass, also called gastric bypass, helps you to lose weight by reducing the amount of food you can eat and reducing the number of calories and nutrients you absorb from the food you eat. To perform gastric bypass, a surgeon creates a small stomach pouch by dividing the stomach and attaching it to the small intestine. This helps you to lose weight in two ways: The smaller stomach can hold less food than before surgery. This causes you to feel full after eating a very small amount of food or liquid. Over time, the pouch might stretch, allowing you to eat more food.    The body absorbs fewer calories, since food bypasses most of the stomach as well as the upper small intestine. This new arrangement seems to decrease your appetite and change how you break down foods by changing the release of various hormones. Gastric bypass can be performed as open surgery (through an incision on the abdomen) or laparoscopically, which uses smaller incisions and smaller instruments. Both the laparoscopic and open techniques have risks and benefits. You and your surgeon should work together to decide which surgery, if any, is right for you. Gastric bypass has a high success rate, and people lose an average of 62 to 68 percent of their excess body weight in the first year. Weight loss typically levels off after one to two years, with an overall excess weight loss between 50 and 75 percent. For a person who is 120 pounds overweight, an average of 60 to 90 pounds of weight loss would be expected. Gastric sleeve  Gastric sleeve, also known as sleeve gastrectomy, is a surgery that reduces the size of the stomach and makes it into a narrow tube (figure 3). The new stomach is much smaller and produces less of the hormone (ghrelin) that causes hunger, helping you feel satisfied with less food. Sleeve gastrectomy is safer than gastric bypass because the intestines are not rearranged, and there is less chance of malnutrition. It also appears to control hunger better than lap banding. It might be safer than the lap banding because no foreign materials are used. The gastric sleeve has a good success rate, and people lose an average of 33 percent of their excess body weight in the first year. For a person who is 120 pounds overweight, this would mean losing about 40 pounds in the first year. WEIGHT LOSS SURGERY COMPLICATIONS  A variety of complications can occur with weight loss surgery. The risks of surgery depend upon which surgery you have and any medical problems you had before surgery.  Some of the more common early surgical complications (one to six weeks after surgery) include:  Bleeding   Infection   Blockage or tear in the bowels   Need for further surgery  Important medical complications after surgery can include blood clots in the legs or lungs, heart attack, pneumonia, and urinary tract infection. Complications are less likely when surgery is performed in centers that are experienced in weight loss surgery. In general, centers with experience in weight loss surgery have:  Board-certified doctors and surgeons   A team of support staff (dietitians, counselors, nurses)   Long-term follow-up after surgery   Hospital staff experienced with the care of weight loss patients. This includes nurses who are trained in the care of patients immediately after surgery and anesthesiologists who are experienced in caring for the morbidly obese. EFFECTIVENESS OF WEIGHT LOSS SURGERY  The goal of weight loss surgery is to reduce the risk of illness or death associated with obesity. Weight loss surgery can also help you to feel and look better, reduce the amount of money you spend on medicines, and cut down on sick days. As an example, weight loss surgery can improve health problems related to obesity (diabetes, high blood pressure, high cholesterol, sleep apnea) to the point that you need less or no medicine. Finally, weight loss surgery might reduce your risk of developing heart disease, cancer, and certain infections. AFTER WEIGHT LOSS SURGERY  You will need to stay in the hospital until your team feels that it is safe for you to leave (on average, one to three days). Do not drive if you are taking prescription pain medicine. Begin exercising as soon as possible once you have healed; most weight loss centers will design an exercise program for you. Once you are home, it is important to eat and drink exactly what your doctor and dietitian recommend.  You will see your doctor, nurse, and dietitian on a regular basis after surgery to monitor your health, diet, and weight loss. You will be able to slowly increase how much you eat over time, although it will always be important to:  Eat small, frequent meals and not skip meals   Chew your food slowly and completely   Avoid eating while \"distracted\" (such as eating while watching TV)   Stop eating when you feel full   Drink liquids at least 30 minutes before or after eating   Avoid foods high in fat or sugar   Take vitamin supplements, as recommended  It can take several months to learn to listen to your body so that you know when you are hungry and when you are full. You may dislike foods you previously loved, and you may begin to prefer new foods. This can be a frustrating process for some people, so talk to your dietitian if you are having trouble. It usually takes between one and two years to lose weight after surgery. After reaching their goal weight, some people have plastic surgery (called \"body contouring\") to remove excess skin from the body, particularly in the abdominal area. Before you decide to have weight loss surgery, you must commit to staying healthy for life. This includes following up with your healthcare team, exercising most days of the week, and eating a sensible diet every day. It can be difficult to develop new eating and exercise habits after weight loss surgery, and you will have to work hard to stick to your goals. Recovering from surgery and losing weight can be stressful and emotional, and it is important to have the support of family and friends. Working with a , therapist, or support group can help you through the ups and downs. WHERE TO GET MORE INFORMATION  Your healthcare provider is the best source of information for questions and concerns related to your medical problem. This article will be updated as needed every four months on our Web site ([email or web address])  ·     High-Fiber Diet     What Is Fiber?    Dietary fiber is a form of carbohydrate found in plants that cannot be digested by humans. All plants contain fiber, including fruits, vegetables, grains, and legumes. Fiber is often classified into two categories: soluble and insoluble. Soluble fiber draws water into the bowel and can help slow digestion. Examples of foods that are high in soluble fiber include oatmeal, oat bran, barley, legumes (eg, beans and peas), apples, and strawberries. Insoluble fiber speeds digestion and can add bulk to the stool. Examples of foods that are high in insoluble fiber include whole-wheat products, wheat bran, cauliflower, green beans, and potatoes. Why Follow a High-Fiber Diet? A high-fiber diet is often recommended to prevent and treat constipation , hemorrhoids , diverticulitis , and irritable bowel syndrome . Eating a high-fiber diet can also help improve your cholesterol levels, lower your risk of coronary heart disease , reduce your risk of type 2 diabetes , and lower your weight. For people with type 1 or 2 diabetes, a high-fiber diet can also help stabilize blood sugar levels. How Much Fiber Should I Eat? A high-fiber diet should contain  20-35 grams  of fiber a day. This is actually the amount recommended for the general adult population; however, most Americans eat only 15 grams of fiber per day. Digestion of Fiber   Eating a higher fiber diet than usual can take some getting used to by your body's digestive system. To avoid the side effects of sudden increases in dietary fiber (eg, gas, cramping, bloating, and diarrhea), increase fiber gradually and be sure to drink plenty of fluids every day. Tips for Increasing Fiber Intake   Whenever possible, choose whole grains over refined grains (eg, brown rice instead of white rice, whole-wheat bread instead of white bread). Include a variety of grains in your diet, such as wheat, rye, barley, oats, quinoa, and bulgur. Eat more vegetarian-based meals.  Here are some ideas: black bean burgers, eggplant lasagna, and veggie tofu stir-cuadra. Choose high-fiber snacks, such as fruits, popcorn, whole-grain crackers, and nuts. Make whole-grain cereal or whole-grain toast part of your daily breakfast regime. When eating out, whether ordering a sandwich or dinner, ask for extra vegetables. When baking, replace part of the white flour with whole-wheat flour. Whole-wheat flour is particularly easy to incorporate into a recipe. High-Fiber Diet Eating Guide   Food Category   Foods Recommended   Notes   Grains   Whole-grain breads, muffins, bagels, or diana bread Rye bread Whole-wheat crackers or crisp breads Whole-grain or bran cereals Oatmeal, oat bran, or grits Wheat germ Whole-wheat pasta and brown rice   Read the ingredients list on food labels. Look for products that list \"whole\" as the first ingredient (eg, whole-wheat, whole oats). Choose cereals with at least 2 grams of fiber per serving. Vegetables   All vegetables, especially asparagus, bean sprouts, broccoli, [place] sprouts, cabbage, carrots, cauliflower, celery, corn, greens, green beans, green pepper, onions, peas, potatoes (with skin), snow peas, spinach, squash, sweet potatoes, tomatoes, zucchini   For maximum fiber intake, eat the peels of fruits and vegetablesjust be sure to wash them well first.   Fruits   All fruits, especially apples, berries, grapefruits, mangoes, nectarines, oranges, peaches, pears, dried fruits (figs, dates, prunes, raisins)   Choose raw fruits and vegetables over juice, cooked, or cannedraw fruit has more fiber. Dried fruit is also a good source of fiber. Milk   With the exception of yogurt containing inulin (a type of fiber), dairy foods provide little fiber. Add more fiber by topping your yogurt or cottage cheese with fresh fruit, whole grain or bran cereals, nuts, or seeds.    Meats and Beans   All beans and peas, especially Garbanzo beans, kidney beans, lentils, lima beans, split peas, and monae beans All nuts and seeds, especially almonds, peanuts, [place] nuts, cashews, peanut butter, walnuts, sesame and sunflower seeds All meat, poultry, fish, and eggs   Increase fiber in meat dishes by adding monae beans, kidney beans, black-eyed peas, bran, or oatmeal. If you are following a low-fat diet, use nuts and seeds only in moderation. Fats and Oils   All in moderation   Fats and oils do not provide fiber   Snacks, Sweets, and Condiments   Fruit Nuts Popcorn, whole-wheat pretzels, or trail mix made with dried fruits, nuts, and seeds Cakes, breads, and cookies made with oatmeal or whole-wheat flour   Most snack foods do not provide much fiber. Choose snacks with at least 2 grams of fiber per serving. Last Reviewed: March 2011 Hina Alvarez MS, MPH, RD   Updated: 3/29/2011   ·     Keep Your Memory [place]       Many factors can affect your ability to remembera hectic lifestyle, aging, stress, chronic disease, and certain medicines. But, there are steps you can take to sharpen your mind and help preserve your memory. Challenge Your Brain   Regularly challenging your mind may help keeps it in top shape. Good mental exercises include:   Crossword puzzlesUse a dictionary if you need it; you will learn more that way. Brainteasers Try some! Crafts, such as wood working and sewing   Hobbies, such as gardening and building model airplanes   SocializingVisit old friends or join groups to meet new ones. Reading   Learning a new language   Taking a class, whether it be art history or shorty chi   TravelingExperience the food, history, and culture of your destination   Learning to use a computer   Going to museums, the theater, or thought-provoking movies   Changing things in your daily life, such as reversing your pattern in the grocery store or brushing your teeth using your nondominant hand   Use Memory Aids   There is no need to remember every detail on your own.  These memory aids can help: Calendars and day planners   Electronic organizers to store all sorts of helpful informationThese devices can \"beep\" to remind you of appointments. A book of days to record birthdays, anniversaries, and other occasions that occur on the same date every year   Detailed \"to-do\" lists and strategically placed sticky notes   Quick \"study\" sessionsBefore a gathering, review who will be there so their names will be fresh in your mind. Establish routinesFor example, keep your keys, wallet, and umbrella in the same place all the time or take medicine with your 8:00 AM glass of juice   Live a Healthy Life   Many actions that will keep your body strong will do the same for your mind. For example:   Talk to Your Doctor About Herbs and Supplements    Malnutrition and vitamin deficiencies can impair your mental function. For example, vitamin B12 deficiency can cause a range of symptoms, including confusion. But, what if your nutritional needs are being met? Can herbs and supplements still offer a benefit? Researchers have investigated a range of natural remedies, such as ginkgo , ginseng , and the supplement phosphatidylserine (PS). So far, though, the evidence is inconsistent as to whether these products can improve memory or thinking. If you are interested in taking herbs and supplements, talk to your doctor first because they may interact with other medicines that you are taking. Exercise Regularly    Among the many benefits of regular exercise are increased blood flow to the brain and decreased risk of certain diseases that can interfere with memory function. One study found that even moderate exercise has a beneficial effect. Examples of \"moderate\" exercise include:   Playing 18 holes of golf once a week, without a cart   Playing tennis twice a week   Walking one mile per day   Manage Stress    It can be tough to remember what is important when your mind is cluttered. Make time for relaxation.  Choose activities that calm you down, and make it routine. Manage Chronic Conditions    Side effects of high blood pressure , diabetes, and heart disease can interfere with mental function. Many of the lifestyle steps discussed here can help manage these conditions. Strive to eat a healthy diet, exercise regularly, get stress under control, and follow your doctor's advice for your condition. Minimize Medications    Talk to your doctor about the medicines that you take. Some may be unnecessary. Also, healthy lifestyle habits may lower the need for certain drugs. Last Reviewed: April 2010 Nuno Daniels MD   Updated: 4/13/2010   ·     [address]       As we get older, changes in balance, gait, strength, vision, hearing, and cognition make even the most youthful senior more prone to accidents. Falls are one of the leading health risks for older people. This increased risk of falling is related to:   Aging process (eg, decreased muscle strength, slowed reflexes)   Higher incidence of chronic health problems (eg, arthritis, diabetes) that may limit mobility, agility or sensory awareness   Side effects of medicine (eg, dizziness, blurred vision)especially medicines like prescription pain medicines and drugs used to treat mental health conditions   Depending on the brittleness of your bones, the consequences of a fall can be serious and long lasting. Home Life   Research by the Association of Aging [facility]) shows that some home accidents among older adults can be prevented by making simple lifestyle changes and basic modifications and repairs to the home environment. Here are some lifestyle changes that experts recommend:   Have your hearing and vision checked regularly. Be sure to wear prescription glasses that are right for you. Speak to your doctor or pharmacist about the possible side effects of your medicines. A number of medicines can cause dizziness. If you have problems with sleep, talk to your doctor. Limit your intake of alcohol. If necessary, use a cane or walker to help maintain your balance. Wear supportive, rubber-soled shoes, even at home. If you live in a region that gets wintry weather, you may want to put special cleats on your shoes to prevent you from slipping on the snow and ice. Exercise regularly to help maintain muscle tone, agility, and balance. Always hold the banister when going up or down stairs. Also, use  bars when getting in or out of the bath or shower, or using the toilet. To avoid dizziness, get up slowly from a lying down position. Sit up first, dangling your legs for a minute or two before rising to a standing position. Overall Home Safety Check   According to the Consumer Product Safety Commision's \"Older Consumer Home Safety Checklist,\" it is important to check for potential hazards in each room. And remember, proper lighting is an essential factor in home safety. If you cannot see clearly, you are more likely to fall. Important questions to ask yourself include:   Are lamp, electric, extension, and telephone cords placed out of the flow of traffic and maintained in good condition? Have frayed cords been replaced? Are all small rugs and runners slip resistant? If not, you can secure them to the floor with a special double-sided carpet tape. Are smoke detectors properly locatedone on every floor of your home and one outside of every sleeping area? Are they in good working order? Are batteries replaced at least once a year? Do you have a well-maintained carbon monoxide detector outside every sleeping are in your home? Does your furniture layout leave plenty of space to maneuver between and around chairs, tables, beds, and sofas? Are hallways, stairs and passages between rooms well lit? Can you reach a lamp without getting out of bed? Are floor surfaces well maintained?  Shag rugs, high-pile carpeting, tile floors, and polished wood floors can be particularly slippery. Stairs should always have handrails and be carpeted or fitted with a non-skid tread. Is your telephone easily reachable. Is the cord safely tucked away? Room by Room   According to the Association of Aging, bathrooms and emerita are the two most potentially hazardous rooms in your home. In the Kitchen    Be sure your stove is in proper working order and always make sure burners and the oven are off before you go out or go to sleep. Keep pots on the back burners, turn handles away from the front of the stove, and keep stove clean and free of grease build-up. Kitchen ventilation systems and range exhausts should be working properly. Keep flammable objects such as towels and pot holders away from the cooking area except when in use. Make sure kitchen curtains are tied back. Move cords and appliances away from the sink and hot surfaces. If extension cords are needed, install wiring guides so they do not hang over the sink, range, or working areas. Look for coffee pots, kettles and toaster ovens with automatic shut-offs. Keep a mop handy in the kitchen so you can wipe up spills instantly. You should also have a small fire extinguisher. Arrange your kitchen with frequently used items on lower shelves to avoid the need to stand on a stepstool to reach them. Make sure countertops are well-lit to avoid injuries while cutting and preparing food. In the Bathroom    Use a non-slip mat or decals in the tub and shower, since wet, soapy tile or porcelain surfaces are extremely slippery. Make sure bathroom rugs are non-skid or tape them firmly to the floor. Bathtubs should have at least one, preferably two, grab bars, firmly attached to structural supports in the wall. (Do not use built-in soap holders or glass shower doors as grab bars.)    Tub seats fitted with non-slip material on the legs allow you to wash sitting down.  For people with limited mobility, bathtub new products to help older adults live safely and independently in spite of age-related changes. Making Life More Livable  , by Nayely Vaughn, lists over 1,000 products for \"living well in the mature years,\" such as bathing and mobility aids, household security devices, ergonomically designed knives and peelers, and faucet valves and knobs for temperature control. Medical supply stores and organizations are good sources of information about products that improve your quality of life and insure your safety. Last Reviewed: November 2009 Santa Ruff MD   Updated: 3/7/2011     ·        Advance Care Planning: Care Instructions  Your Care Instructions     It can be hard to live with an illness that cannot be cured. But if your health is getting worse, you may want to make decisions about end-of-life care. Planning for the end of your life does not mean that you are giving up. It is a way to make sure that your wishes are met. Clearly stating your wishes can make it easier for your loved ones. Making plans while you are still able may also ease your mind and make your final days less stressful and more meaningful. Follow-up care is a key part of your treatment and safety. Be sure to make and go to all appointments, and call your doctor if you are having problems. It's also a good idea to know your test results and keep a list of the medicines you take. What can you do to plan for the end of life? You can bring these issues up with your doctor. You do not need to wait until your doctor starts the conversation. You might start with, \"What makes life worth living for me is. Tiana Remedies Tiana Remedies \" And then follow it with, \"I would not be willing to live with . Tiana Remedies Tiana Remedies Tiana Remedies \" When you complete this sentence it helps your doctor understand your wishes. Talk openly and honestly with your doctor. This is the best way to understand the decisions you will need to make as your health changes. Know that you can always change your mind.   Ask your doctor about commonly used life-support measures. These include tube feedings, breathing machines, and fluids given through a vein (IV). Understanding these treatments will help you decide whether you want them. You may choose to have these life-supporting treatments for a limited time. This allows a trial period to see whether they will help you. You may also decide that you want your doctor to take only certain measures to keep you alive. It may help to think about the big picture, like what makes life worth living for you or what your values and goals are. Talk to your doctor about how long you are likely to live. Your doctor may be able to give you an idea of what usually happens with your specific illness. Think about preparing papers that state your wishes. These papers are called advance directives. If you do this early and review them often, there will not be any confusion about what you want. You can change your instructions at any time. Which papers should you prepare? Advance directives are legal papers that tell doctors how you want to be cared for at the end of your life. You do not need a  to write these papers. Ask your doctor or your state [facility] department for information on how to write your advance directives. They may have the forms for each of these types of papers. Make sure your doctor has a copy of these on file, and give a copy to a family member or close friend. Consider a do-not-resuscitate order (DNR). This order asks that no extra treatments be done if your heart stops or you stop breathing. Extra treatments may include cardiopulmonary resuscitation (CPR), electrical shock to restart your heart, or a machine to breathe for you. If you decide to have a DNR order, ask your doctor to explain and write it. Place the order in your home where everyone can easily see it. Consider a living will.  A living will explains your wishes about life support and other treatments at the end of your life if you become unable to speak for yourself. Living zaldivar tell doctors to use or not use treatments that would keep you alive. You must have one or two witnesses or a notary present when you sign this form. A living will may be called something else in your state. Consider a medical power of . This form allows you to name a person to make decisions about your care if you are not able to. Most people ask a close friend or family member. Talk to this person about the kinds of treatments you want and those that you do not want. Make sure this person understands your wishes. A medical power of  may be called something else in your state. These legal papers are simple to change. Tell your doctor what you want to change, and ask him or her to make a note in your medical file. Give your family updated copies of the papers. Where can you learn more? Go to https://HabitRPGpepiceweb.[employer] org and sign in to your [employer] account. Enter P184 in the [employer]ES[employer] box to learn more about \"Advance Care Planning: Care Instructions. \"     If you do not have an account, please click on the \"Sign Up Now\" link. Current as of: March 17, 2021               Content Version: 13.0  © 2006-2021 Healthwise, [employer]. Care instructions adapted under license by [facility] ([facility]). If you have questions about a medical condition or this instruction, always ask your healthcare professional. [place] [address] any warranty or liability for your use of this information. ·        Learning About Living Perroy  What is a living will? A living will, also called a declaration, is a legal form. It tells your family and your doctor your wishes when you can't speak for yourself. It's used by the health professionals who will treat you as you near the end of your life or if you get seriously hurt or ill.   If you put your wishes in writing, your loved ones and others will know what kind of care you want. They won't need to guess. This can ease your mind and be helpful to others. And you can change or cancel your living will at any time. A living will is not the same as an estate or property will. An estate will explains what you want to happen with your money and property after you die. How do you use it? A living will is used to describe the kinds of treatment or life support you want as you near the end of your life or if you get seriously hurt or ill. Keep these facts in mind about living zaldivar. Your living will is used only if you can't speak or make decisions for yourself. Most often, one or more doctors must certify that you can't speak or decide for yourself before your living will takes effect. If you get better and can speak for yourself again, you can accept or refuse any treatment. It doesn't matter what you said in your living will. Some states may limit your right to refuse treatment in certain cases. For example, you may need to clearly state in your living will that you don't want artificial hydration and nutrition, such as being fed through a tube. Is a living will a legal document? A living will is a legal document. Each state has its own laws about living zaldivar. And a living will may be called something else in your state. Here are some things to know about living zaldivar. You don't need an  to complete a living will. But legal advice can be helpful if your state's laws are unclear. It can also help if your health history is complicated or your family can't agree on what should be in your living will. You can change your living will at any time. Some people find that their wishes about end-of-life care change as their health changes. If you make big changes to your living will, complete a new form. If you move to another state, make sure that your living will is legal in the state where you now live.  In most cases, doctors will respect your wishes even if you have a form from a different state. You might use a universal form that has been approved by many states. This kind of form can sometimes be filled out and stored online. Your digital copy will then be available wherever you have a connection to the internet. The doctors and nurses who need to treat you can find it right away. Your state may offer an online registry. This is another place where you can store your living will online. It's a good idea to get your living will notarized. This means using a person called a [employer] to watch two people sign, or witness, your living will. What should you know when you create a living will? Here are some questions to ask yourself as you make your living will:  Do you know enough about life support methods that might be used? If not, talk to your doctor so you know what might be done if you can't breathe on your own, your heart stops, or you can't swallow. What things would you still want to be able to do after you receive life-support methods? Would you want to be able to walk? To speak? To eat on your own? To live without the help of machines? Do you want certain Mormon practices performed if you become very ill? If you have a choice, where do you want to be cared for? In your home? At a hospital or nursing home? If you have a choice at the end of your life, where would you prefer to die? At home? In a hospital or nursing home? Somewhere else? Would you prefer to be buried or cremated? Do you want your organs to be donated after you die? What should you do with your living will? Make sure that your family members and your health care agent have copies of your living will (also called a declaration). Give your doctor a copy of your living will. Ask him or her to keep it as part of your medical record. If you have more than one doctor, make sure that each one has a copy. Put a copy of your living will where it can be easily found.  For example, some people may put a copy on their refrigerator door. If you are using a digital copy, be sure your doctor, family members, and health care agent know how to find and access it. Where can you learn more? Go to https://chpevenancio.[employer]. org and sign in to your MyoKardia account. Enter G239 in the [facility] box to learn more about \"Learning About Living Perroy. \"     If you do not have an account, please click on the \"Sign Up Now\" link. Current as of: March 17, 2021               Content Version: 13.0  © 2515-2686 [employer]. Care instructions adapted under license by [facility] ([facility]). If you have questions about a medical condition or this instruction, always ask your healthcare professional. [address] any warranty or liability for your use of this information. ·        Learning About Medical Power of   What is a medical power of ? A medical power of , also called a durable power of  for health care, is one type of the legal forms called advance directives. It lets you name the person you want to make treatment decisions for you if you can't speak or decide for yourself. The person you choose is called your health care agent. This person is also called a health care proxy or health care surrogate. A medical power of  may be called something else in your state. How do you choose a health care agent? Choose your health care agent carefully. This person may or may not be a family member. Talk to the person before you make your final decision. Make sure he or she is comfortable with this responsibility. It's a good idea to choose someone who:  Is at least 25years old. Knows you well and understands what makes life meaningful for you. Understands your Anglican and moral values. Will do what you want, not what he or she wants. Will be able to make difficult choices at a stressful time.   Will be able to refuse or stop treatment, if that is what you would want, even if you could die. Will be firm and confident with health professionals if needed. Will ask questions to get needed information. Lives near you or agrees to travel to you if needed. Your family may help you make medical decisions while you can still be part of that process. But it's important to choose one person to be your health care agent in case you aren't able to make decisions for yourself. If you don't fill out the legal form and name a health care agent, the decisions your family can make may be limited. A health care agent may be called something else in your state. Who will make decisions for you if you don't have a health care agent? If you don't have a health care agent or a living will, you may not get the care you want. Decisions may be made by family members who disagree about your medical care. Or decisions may be made by a medical professional who doesn't know you well. In some cases, a  makes the decisions. When you name a health care agent, it is very clear who has the power to make health decisions for you. How do you name a health care agent? You name your health care agent on a legal form. This form is usually called a medical power of . Ask your hospital, state bar association, or office on aging where to find these forms. You must sign the form to make it legal. Some states require you to get the form notarized. This means that a person called a  watches you sign the form and then he or she signs the form. Some states also require that two or more witnesses sign the form. Be sure to tell your family members and doctors who your health care agent is. Where can you learn more? Go to https://laureano.healthGuvera. org and sign in to your Gtxh account. Enter [id number] in the [employer] box to learn more about \"Learning About [address]. \"     If you do not have an account, please click on the \"Sign Up Now\" link. Current as of: March 17, 2021               Content Version: 13.0  © 3396-6035 Healthwise, Incorporated. Care instructions adapted under license by [facility] ([facility]). If you have questions about a medical condition or this instruction, always ask your healthcare professional. [address] any warranty or liability for your use of this information.     ·

## 2021-10-06 ENCOUNTER — OFFICE VISIT (OUTPATIENT)
Dept: CARDIOLOGY CLINIC | Age: 71
End: 2021-10-06
Payer: MEDICARE

## 2021-10-06 VITALS
HEART RATE: 68 BPM | BODY MASS INDEX: 46.56 KG/M2 | WEIGHT: 253 LBS | DIASTOLIC BLOOD PRESSURE: 70 MMHG | HEIGHT: 62 IN | SYSTOLIC BLOOD PRESSURE: 118 MMHG

## 2021-10-06 DIAGNOSIS — R07.2 PRECORDIAL PAIN: ICD-10-CM

## 2021-10-06 DIAGNOSIS — Z95.0 CARDIAC PACEMAKER: ICD-10-CM

## 2021-10-06 DIAGNOSIS — E78.2 MIXED HYPERLIPIDEMIA: ICD-10-CM

## 2021-10-06 DIAGNOSIS — I38 VHD (VALVULAR HEART DISEASE): Primary | ICD-10-CM

## 2021-10-06 DIAGNOSIS — I10 PRIMARY HYPERTENSION: ICD-10-CM

## 2021-10-06 PROCEDURE — 99214 OFFICE O/P EST MOD 30 MIN: CPT | Performed by: NURSE PRACTITIONER

## 2021-10-06 ASSESSMENT — ENCOUNTER SYMPTOMS
SHORTNESS OF BREATH: 1
SNORING: 1
EYE PAIN: 0

## 2021-10-06 NOTE — LETTER
Brionna Mckeon  1950  [id number]    Have you had any Chest Pain that is not new? - No       DO EKG IF: Patient has a Heart Rate above 100 or below 40     CAD (Coronary Artery Disease) patient should have one on file every 6 months        Have you had any Shortness of Breath - Yes  If Yes - When at rest and on exertion    Have you had any dizziness - Yes  If Yes DO ORTHOSTATIC BP - when do you feel dizzy with position change   How long does it last .  seconds      Sitting wait 5 minutes do supine (laying down) wait 5 minutes then do standing - log each in \"vitals\" area in Epic   Be sure to ask what symptoms they are having if they get dizzy while completing ortho stats such as: room spinning, nausea, etc.    Have you had any palpitations that are not new? - No      Is the patient on any of the following medications - No  If Yes DO EKG - Needs done every 6 months    Do you have any edema - swelling in legs, ankles, and feet  If Yes - CHECK TO SEE IF THE EDEMA IS PITTING  How long have they been having edema - Years  If Yes - Have they worn compression stockings No      Vein \"LEG PROBLEM Questionnaire\"  1. Do you have prominent leg veins? No   2. Do you have any skin discoloration? No  3. Do you have any healed or active sores? Yes  4. Do you have swelling of the legs? Yes  5. Do you have a family history of varicose veins? Yes  6. Does your profession involve pro-longed        standing or heavy lifting? No  7. Have you been fighting overweight problems? Yes  8. Do you have restless legs? Yes  9. Do you have any night time cramps? No  10. Do you have any of the following in your legs:        Tiredness and weakness    When did you have your last labs drawn 07/29/2021  Where did you have them done   What doctor ordered Ian Herman    If we do not have these labs you are retrieve these labs for these providers!     Do you have a surgery or procedure scheduled in the near future - No       Ask patient if they want to sign up for MyChart if they are not already signed up     Check to see if we have an E-MAIL on file for the patient     Check medication list thoroughly!!! AND RECONCILE OUTSIDE MEDICATIONS  If dose has changed change the entire order not just the MG  BE SURE TO ASK PATIENT IF THEY NEED MEDICATION REFILLS     At check out add to every patient's \"wrap up\" the following dot phrase AFTERHOURSEDUCATION and ensure we explain this to our patients

## 2021-10-06 NOTE — PROGRESS NOTES
mouth nightly 90 tablet 1    sertraline (ZOLOFT) 100 MG tablet Take 2 tablets by mouth every morning 180 tablet 1    fluticasone (FLOVENT HFA) 110 MCG/ACT inhaler Inhale 2 puffs into the lungs 2 times daily 1 Inhaler 5    ezetimibe (ZETIA) 10 MG tablet Take 1 tablet by mouth every morning 90 tablet 1    albuterol sulfate HFA (PROAIR HFA) 108 (90 Base) MCG/ACT inhaler INHALE 2 PUFFS BY MOUTH EVERY SIX HOURS AS NEEDED FOR WHEEZING 1 Inhaler 5    vitamin B-12 (CYANOCOBALAMIN) 1000 MCG tablet Take 1,000 mcg by mouth daily      aspirin 81 MG EC tablet Take 81 mg by mouth nightly       letrozole (FEMARA) 2.5 MG tablet Take 2.5 mg by mouth nightly       calcium-vitamin D (OSCAL 500/200 D-3) 500-200 MG-UNIT per tablet Take 1 tablet by mouth 2 times daily        No current facility-administered medications for this visit. Allergies: Bactrim [sulfamethoxazole-trimethoprim], Lipitor [atorvastatin], Taxol [paclitaxel], Aminoglycosides, Demerol, Neosporin [bacitracin-neomycin-polymyxin], Other, and Talc  Past Medical History:   Diagnosis Date    Anemia     Anxiety 1978    Arthritis     generalized    Asthma 2006    AV block     2nd degree per hospital in june2013    Blood poisoning 1994    Blood transfusion     12/2003    CAD (coronary artery disease)     Cancer ([facility] [address] 2007    skin (face) & colon(2003)/ 2/2012 dx of right breast ca(pc)    Carcinoma of breast ([facility] [address] 2/29/2012    right arm no b/p or sticks    Cardiac pacemaker 3/10/14    Medtronic dual lead    Chronic cystitis     Chronic respiratory failure (HCC)     2 L/min oxygen at night-time    Colon cancer ([address] 2013    COPD (chronic obstructive pulmonary disease) (HCC)     CTS (carpal tunnel syndrome)     Depression     Diverticulitis     H/O 24 hour EKG monitoring 2/28/2014 7/24/13 2/14 complete heart block 7/13No clinically significant arrthymia noted.  H/O cardiac catheterization 10/31/2011, 7/11/2007    10/31/2011-No significant CAD. Cardiolite stress test was false positive-Dr Feliberto Hernandez; 7/11/2007-No CAD, global function intact;    H/O cardiovascular stress test 10/20/2011, 3/23/2010    10/20/2011-Lexiscan- Evid of mild ischemia in Left CX region. EF 70%. Global LVSF normal;    H/O cardiovascular stress test 1/7/15    EF 77%. Normal Stress Test.    H/O chest pain 4/2005    sees Dr Kenton oMntgomery H/O Doppler ultrasound 2/20/2008 2/20/2008-CAROTID DOPPLER- Normal carotids bilaterally;    H/O Doppler ultrasound 06/06/13    CAROTID DOPPLER- there is heterogeneous, irregular atherosclerotic plaque noted in the right internal carotid artery,  doppler flow velocities within the right internal carotid artery are elevated, consistent with a mild, less than 50%stenosis, there is intimal thickening but no significant atherosclerotic plaque noted in the left internal carotid artery, the left carotid are within normal limits    H/O echocardiogram 4/9/2012, 10/20/2011    4/9/2012-LVSF normal EF=>55%. impaired LV relaxation;    H/O echocardiogram 12/31/14    EF 50-55%. LV shows mild concentric hypertrophy. Mildly dilated left atrium. Mildly dilated right ventricle. Sclerotic but not stenotic aortic valve. Mitral annular calcification. Mild MR, Mild TR, Mild pulm HTN.    H/O urinary incontinence     History of blood transfusion     Hx antineoplastic chemo     Hx of Arterial Doppler ultrasound 07/01/2019    No hemodynamically significant or focal stenosis visualized bilaterally, bilateral lower extremity arteries exhibit diffuse plaque and multiphasic waveforms, unable to obtain bilateral ABIs due to elevated leg pressures >250 mmHg, possibly due to atherosclerosis    Hx of cardiovascular stress test 06/2013    EF 70% abn suggestive of anterolateral ischemia, possible RCA ischmeia, post left ventricular dilation suggestive multivessel CAD.     Hx of echocardiogram 06/2013    EF50%, mild MR and TR, mild pulmonary HTN, mild AS    Hx of fall \"last time 2018- due to neuropathy, have to use cane\"    HX OTHER MEDICAL 7/24/13, 06/2013 7/13-No clinacally significant arrhythmia. 6/13-multiple cycles of wenckebach episodes in addtion to some sinus tach    Hyperlipidemia     Hypertension     Hypothermia 2008    Malignant neoplasm of breast (female) ([facility] [address] 2012    Neuropathy     \"have neuropathy of my legs\"    Normocytic normochromic anemia     Obstructive sleep apnea 2008 01- Has CPAP machine but has not used in over a year - states she has not had problems since her pacemaker was placed.  Old MI (myocardial infarction)     per pt on 1/15/2019\"had heart attack about a year ago\"    Osteoarthritis     Osteopenia     Primary malignant neoplasm of colon ([facility] [address]     S/P cardiac cath 06/2013    no significant CAD false postive stress test    Skin cancer     Super obesity     Umbilical hernia      Past Surgical History:   Procedure Laterality Date    A-V CARDIAC PACEMAKER INSERTION  3/10/14     Medtronic. Model:  [id number] Medtronic  Serial:  [id number] dual chamber pacemaker    APPENDECTOMY  2004    BREAST BIOPSY  2/13/12    BREAST LUMPECTOMY  2007    right     BREAST SURGERY  02/13/2012    rt lesion biopsy     CARDIAC CATHETERIZATION  2007 & 2011    COLECTOMY  2004    Colon cancer    COLONOSCOPY  10-18-13    polyp    COLONOSCOPY  1/19/2016    internal hemorrhoids, diverticulosis, 1.5 cm sessile polyp, 8 mm polyp found in rectum.     COLONOSCOPY  12/14/2017    1.5cm residual polyp, 5mm polyps in blind sigmoid loop x3, Sigmoid divertics, Internal grade 1 hemorrhoids    COLONOSCOPY N/A 1/16/2019    COLONOSCOPY W/ ENDOSCOPIC MUCOSAL RESECTION WITH ELEVIEW 5ML INJECTION AND POLYPECTOMY OF TIP OF BLIND RECTOSIGMOID STUMP AND CAUTERIZATION WITH ERBE PROBE, CLIPPING X2 performed by Tamica Garcia MD at [facility]  01/21/2020    pan divertics/ 4 polyps/ sm int hem, S/P partial sigmoid resection w/ end to side anastamosis, repeat in 3 years    COLONOSCOPY N/A 1/21/2020    COLONOSCOPY POLYPECTOMY SNARE/COLD BIOPSY performed by Too Quiros MD at [place] [address]  2003    back   [address] Bilateral 12/15/14    DENTAL SURGERY      front right tooth and root canal w/ brass bolt    DILATION AND CURETTAGE OF UTERUS  2006    Needed a camera to find my uterus.  ENDOSCOPY, COLON, DIAGNOSTIC  12/14/2017    Small hiatal hernia    HERNIA REPAIR  2004    Umb hernia    HERNIA REPAIR  2008    Inc hernia    LYMPH NODE DISSECTION  2/29/2012    Right axillary-3 sentinel nodes were positive out of 9.    MASTECTOMY, RADICAL Right 02/29/2012    w/ sentinal node disection-Dr West    PACEMAKER PLACEMENT      PRE-MALIGNANT / BENIGN SKIN LESION EXCISION  2/8/2013    right leg X2-Dr West    TONSILLECTOMY  1957    TUNNELED VENOUS PORT PLACEMENT  2004    TUNNELED VENOUS PORT PLACEMENT  02/13/2012    removal of mediport     Family History   Problem Relation Age of Onset    Arthritis Mother         OA, RA    High Cholesterol Mother     High Blood Pressure Mother     Cancer Father         skin and leukemia    High Blood Pressure Father     High Cholesterol Father     Diabetes Father     Heart Disease Father     Heart Disease Brother     High Cholesterol Brother     High Blood Pressure Brother     Heart Disease Brother     No Known Problems Sister     Seizures Son     Cancer Brother         skin cancer    Breast Cancer Neg Hx     Ovarian Cancer Neg Hx      Social History     Tobacco Use    Smoking status: Never Smoker    Smokeless tobacco: Never Used   Substance Use Topics    Alcohol use: No     Comment:           CAFFEINE: 2- 12oz nona daily & 2 cups coffee some nights. Review of Systems   Constitutional: Negative for diaphoresis and malaise/fatigue. Eyes: Negative for pain and visual disturbance. Cardiovascular: Positive for dyspnea on exertion and leg swelling.  Negative for pain  Non cardiac pain  Atypical-Lasting few seconds  Cath 2019 no sig disease    3. Hyperlipidemia, unspecified hyperlipidemia type  At goal   Continue simvastatin     4. Essential hypertension  Blood pressure is controlled   Continue to monitor    5. VHD  Hx. Of VHD and recently had COVID. She does have shortness of breath and murmur 4/6 noted. Check echo to make sure VHD is not worse or exasperated        Lifestyle and risk factor modificatons discussed. Various goals are discussed and questions answered. Continue current medications. Appropriate prescriptions are addressed. Questions answered and patient verbalizes understanding.        Office visit in 1 year

## 2021-10-26 ENCOUNTER — CARE COORDINATION (OUTPATIENT)
Dept: CARE COORDINATION | Age: 71
End: 2021-10-26

## 2021-11-01 ENCOUNTER — PROCEDURE VISIT (OUTPATIENT)
Dept: CARDIOLOGY CLINIC | Age: 71
End: 2021-11-01
Payer: MEDICARE

## 2021-11-01 DIAGNOSIS — I38 VHD (VALVULAR HEART DISEASE): Primary | ICD-10-CM

## 2021-11-01 LAB
LV EF: 53 %
LVEF MODALITY: NORMAL

## 2021-11-01 PROCEDURE — 93306 TTE W/DOPPLER COMPLETE: CPT | Performed by: INTERNAL MEDICINE

## 2021-11-03 ENCOUNTER — TELEPHONE (OUTPATIENT)
Dept: CARDIOLOGY CLINIC | Age: 71
End: 2021-11-03

## 2021-11-03 NOTE — TELEPHONE ENCOUNTER
Left ventricular systolic function is normal, 55-60%. Mild concentric left ventricular hypertrophy. Grade I diastolic dysfunction. Mildly dilated left atrium. Moderate aortic insufficiency with  of msec. Moderate aortic stenosis with mean gradient of 27 mmHg. Mid-to-Moderate mitral regurgitation is present. Mitral annular calcification is present. Mild tricuspid regurgitation. Mildly elevated pulmonary artery pressure, RVSP 37 mmHg. No evidence of any pericardial effusion.    eliza IN 6 mos    Pt verbally understood results

## 2021-11-04 ENCOUNTER — APPOINTMENT (OUTPATIENT)
Dept: GENERAL RADIOLOGY | Age: 71
End: 2021-11-04
Payer: MEDICARE

## 2021-11-04 ENCOUNTER — HOSPITAL ENCOUNTER (EMERGENCY)
Age: 71
Discharge: HOME | End: 2021-11-05
Attending: EMERGENCY MEDICINE
Payer: MEDICARE

## 2021-11-04 ENCOUNTER — OFFICE VISIT (OUTPATIENT)
Dept: INTERNAL MEDICINE CLINIC | Age: 71
End: 2021-11-04
Payer: MEDICARE

## 2021-11-04 VITALS
HEIGHT: 62 IN | WEIGHT: 287 LBS | OXYGEN SATURATION: 99 % | RESPIRATION RATE: 17 BRPM | BODY MASS INDEX: 52.81 KG/M2 | HEART RATE: 72 BPM | DIASTOLIC BLOOD PRESSURE: 62 MMHG | SYSTOLIC BLOOD PRESSURE: 148 MMHG | TEMPERATURE: 98.7 F

## 2021-11-04 VITALS
HEIGHT: 62 IN | HEART RATE: 75 BPM | OXYGEN SATURATION: 95 % | SYSTOLIC BLOOD PRESSURE: 140 MMHG | TEMPERATURE: 97.2 F | BODY MASS INDEX: 52.81 KG/M2 | WEIGHT: 287 LBS | DIASTOLIC BLOOD PRESSURE: 60 MMHG

## 2021-11-04 DIAGNOSIS — E78.5 HYPERLIPIDEMIA, UNSPECIFIED HYPERLIPIDEMIA TYPE: ICD-10-CM

## 2021-11-04 DIAGNOSIS — M54.42 CHRONIC LEFT-SIDED LOW BACK PAIN WITH LEFT-SIDED SCIATICA: ICD-10-CM

## 2021-11-04 DIAGNOSIS — R09.89 BILATERAL CAROTID BRUITS: ICD-10-CM

## 2021-11-04 DIAGNOSIS — F32.A DEPRESSION, UNSPECIFIED DEPRESSION TYPE: ICD-10-CM

## 2021-11-04 DIAGNOSIS — R73.9 HYPERGLYCEMIA: ICD-10-CM

## 2021-11-04 DIAGNOSIS — G89.29 CHRONIC LEFT-SIDED LOW BACK PAIN WITH LEFT-SIDED SCIATICA: ICD-10-CM

## 2021-11-04 DIAGNOSIS — J45.20 MILD INTERMITTENT ASTHMA, UNSPECIFIED WHETHER COMPLICATED: Primary | ICD-10-CM

## 2021-11-04 DIAGNOSIS — F41.1 ANXIETY STATE: ICD-10-CM

## 2021-11-04 DIAGNOSIS — R07.9 CHEST PAIN, UNSPECIFIED TYPE: Primary | ICD-10-CM

## 2021-11-04 LAB
ALBUMIN SERPL-MCNC: 4.1 GM/DL (ref 3.4–5)
ALP BLD-CCNC: 108 IU/L (ref 40–129)
ALT SERPL-CCNC: 20 U/L (ref 10–40)
ANION GAP SERPL CALCULATED.3IONS-SCNC: 12 MMOL/L (ref 4–16)
AST SERPL-CCNC: 25 IU/L (ref 15–37)
BASOPHILS ABSOLUTE: 0 K/CU MM
BASOPHILS RELATIVE PERCENT: 0.4 % (ref 0–1)
BILIRUB SERPL-MCNC: 0.2 MG/DL (ref 0–1)
BUN BLDV-MCNC: 16 MG/DL (ref 6–23)
CALCIUM SERPL-MCNC: 9.6 MG/DL (ref 8.3–10.6)
CHLORIDE BLD-SCNC: 100 MMOL/L (ref 99–110)
CO2: 26 MMOL/L (ref 21–32)
CREAT SERPL-MCNC: 0.8 MG/DL (ref 0.6–1.1)
D DIMER: 337 NG/ML(DDU)
DIFFERENTIAL TYPE: ABNORMAL
EOSINOPHILS ABSOLUTE: 0.3 K/CU MM
EOSINOPHILS RELATIVE PERCENT: 3.2 % (ref 0–3)
GFR AFRICAN AMERICAN: >60 ML/MIN/1.73M2
GFR NON-AFRICAN AMERICAN: >60 ML/MIN/1.73M2
GLUCOSE BLD-MCNC: 124 MG/DL (ref 70–99)
HCT VFR BLD CALC: 37.6 % (ref 37–47)
HEMOGLOBIN: 11.7 GM/DL (ref 12.5–16)
IMMATURE NEUTROPHIL %: 0.7 % (ref 0–0.43)
LYMPHOCYTES ABSOLUTE: 1.9 K/CU MM
LYMPHOCYTES RELATIVE PERCENT: 18.6 % (ref 24–44)
MCH RBC QN AUTO: 28 PG (ref 27–31)
MCHC RBC AUTO-ENTMCNC: 31.1 % (ref 32–36)
MCV RBC AUTO: 90 FL (ref 78–100)
MONOCYTES ABSOLUTE: 0.7 K/CU MM
MONOCYTES RELATIVE PERCENT: 6.9 % (ref 0–4)
NUCLEATED RBC %: 0 %
PDW BLD-RTO: 14.2 % (ref 11.7–14.9)
PLATELET # BLD: 222 K/CU MM (ref 140–440)
PMV BLD AUTO: 9.7 FL (ref 7.5–11.1)
POTASSIUM SERPL-SCNC: 3.6 MMOL/L (ref 3.5–5.1)
RBC # BLD: 4.18 M/CU MM (ref 4.2–5.4)
SEGMENTED NEUTROPHILS ABSOLUTE COUNT: 7 K/CU MM
SEGMENTED NEUTROPHILS RELATIVE PERCENT: 70.2 % (ref 36–66)
SODIUM BLD-SCNC: 138 MMOL/L (ref 135–145)
TOTAL IMMATURE NEUTOROPHIL: 0.07 K/CU MM
TOTAL NUCLEATED RBC: 0 K/CU MM
TOTAL PROTEIN: 7.3 GM/DL (ref 6.4–8.2)
TROPONIN T: <0.01 NG/ML
WBC # BLD: 10 K/CU MM (ref 4–10.5)

## 2021-11-04 PROCEDURE — 85025 COMPLETE CBC W/AUTO DIFF WBC: CPT

## 2021-11-04 PROCEDURE — 99214 OFFICE O/P EST MOD 30 MIN: CPT | Performed by: FAMILY MEDICINE

## 2021-11-04 PROCEDURE — 71045 X-RAY EXAM CHEST 1 VIEW: CPT

## 2021-11-04 PROCEDURE — 80053 COMPREHEN METABOLIC PANEL: CPT

## 2021-11-04 PROCEDURE — 93005 ELECTROCARDIOGRAM TRACING: CPT | Performed by: EMERGENCY MEDICINE

## 2021-11-04 PROCEDURE — 85379 FIBRIN DEGRADATION QUANT: CPT

## 2021-11-04 PROCEDURE — 99285 EMERGENCY DEPT VISIT HI MDM: CPT

## 2021-11-04 PROCEDURE — 84484 ASSAY OF TROPONIN QUANT: CPT

## 2021-11-04 ASSESSMENT — PAIN DESCRIPTION - FREQUENCY: FREQUENCY: CONTINUOUS

## 2021-11-04 ASSESSMENT — ENCOUNTER SYMPTOMS
CHEST TIGHTNESS: 0
COUGH: 0
SHORTNESS OF BREATH: 0
NAUSEA: 0
ABDOMINAL PAIN: 0

## 2021-11-04 ASSESSMENT — PAIN DESCRIPTION - PAIN TYPE: TYPE: ACUTE PAIN

## 2021-11-04 ASSESSMENT — PAIN DESCRIPTION - DESCRIPTORS: DESCRIPTORS: CONSTANT

## 2021-11-04 ASSESSMENT — HEART SCORE: ECG: 0

## 2021-11-04 ASSESSMENT — PAIN DESCRIPTION - LOCATION: LOCATION: ARM

## 2021-11-04 ASSESSMENT — PAIN DESCRIPTION - ONSET: ONSET: ON-GOING

## 2021-11-04 ASSESSMENT — PAIN DESCRIPTION - ORIENTATION: ORIENTATION: RIGHT

## 2021-11-04 ASSESSMENT — PAIN SCALES - GENERAL: PAINLEVEL_OUTOF10: 5

## 2021-11-04 NOTE — PROGRESS NOTES
Austin Doyle (:  1950) is a 66 y.o. female,Established patient, here for evaluation of the following chief complaint(s):  Hyperlipidemia and Depression         ASSESSMENT/PLAN:  1. Mild intermittent asthma, unspecified whether complicated, chronic  2. Bilateral carotid bruits  -     VL CAROTID BILATERAL; Future  3. Hyperglycemia  -     Hemoglobin A1C; Future  4. Hyperlipidemia, unspecified hyperlipidemia type,chronic  5. Chronic left-sided low back pain with left-sided sciatica  6. Depression, unspecified depression type  Continue medications  Monitor BP  The patient is asked to make an attempt to improve diet and exercise patterns   On this date 2021 I have spent 30 minutes reviewing previous notes, test results and face to face with the patient discussing the diagnosis and importance of compliance with the treatment plan as well as documenting on the day of the visit. Return in about 4 months (around 3/4/2022) for HLD.      Lab Results   Component Value Date    WBC 10.0 2021    HGB 11.7 (L) 2021    HCT 37.6 2021    MCV 90.0 2021     2021     Lab Results   Component Value Date     2021    K 3.6 2021     2021    CO2 26 2021    BUN 16 2021    CREATININE 0.8 2021    GLUCOSE 124 (H) 2021    CALCIUM 9.6 2021    PROT 7.3 2021    LABALBU 4.1 2021    BILITOT 0.2 2021    ALKPHOS 108 2021    AST 25 2021    ALT 20 2021    LABGLOM >60 2021    GFRAA >60 2021    AGRATIO 1.3 2021    GLOB 3.2 2021       Lab Results   Component Value Date    CHOL 182 2021    CHOL 160 10/16/2018    CHOL 175 2018     Lab Results   Component Value Date    TRIG 185 (H) 2021    TRIG 138 10/16/2018    TRIG 159 (H) 2018     Lab Results   Component Value Date    HDL 50 2021    HDL 57 2020    HDL 55 10/16/2018     Lab Results   Component Value Date [address] 07/29/2021     Lab Results   Component Value Date    LABVLDL 37 07/29/2021     No results found for: [facility]  Lab Results   Component Value Date    IRON 60 07/29/2021    TIBC 316 07/29/2021    FERRITIN 137.8 07/29/2021         Subjective   SUBJECTIVE/OBJECTIVE:  HPI: This  67 yo f here for the following:    Asthma- stable  Hyperglycemia on labs  HLD- stable on Zocor and Zetia. Pt with elevated triglycerides  Depression - She is doing well with Zoloft  Chronic LBP with sciatica- On Gabapentin    Patient Active Problem List   Diagnosis    Malignant neoplasm of upper-outer quadrant of right breast in female, estrogen receptor positive ([address]    Diffuse cystic mastopathy    Hyperlipidemia    H/O chest pain    Heart block AV second degree    Abnormal cardiovascular stress test    JAMIE (obstructive sleep apnea)    Super obesity    Mild asthma    Severe obstructive sleep apnea    Cardiac pacemaker    Chronic cystitis    Asthma    Hypertension    Depression    Obstructive sleep apnea    Nocturnal oxygen desaturation    [facility]STEMI [facility]non-ST elevated myocardial infarction) [facility] [address]    PVD (peripheral vascular disease) ([facility] [address]    Morbid obesity with BMI of 45.0-49.9, adult (HCC)    Moderate aortic stenosis    Hepatomegaly, not elsewhere classified    Arthritis    CAD (coronary artery disease)    Pneumonia due to COVID-19 virus       Review of Systems   Constitutional: Negative for diaphoresis and fever. Respiratory: Negative for cough, chest tightness and shortness of breath. Cardiovascular: Negative for chest pain and palpitations. Gastrointestinal: Negative for abdominal pain and nausea. Genitourinary: Negative for dysuria. Musculoskeletal: Positive for back pain (chronic  -stable). Neurological: Negative for dizziness and headaches. Psychiatric/Behavioral: Negative for dysphoric mood.        Allergies   Allergen Reactions    Bactrim [Sulfamethoxazole-Trimethoprim] Anaphylaxis 5' 2\" (1.575 m)      Objective   Physical Exam  Vitals reviewed. Constitutional:       General: She is not in acute distress. Eyes:      Conjunctiva/sclera: Conjunctivae normal.   Neck:      Vascular: Carotid bruit present. Cardiovascular:      Rate and Rhythm: Normal rate and regular rhythm. Pulmonary:      Effort: Pulmonary effort is normal. No respiratory distress. Breath sounds: Normal breath sounds. Abdominal:      General: Bowel sounds are normal.      Palpations: Abdomen is soft. Tenderness: There is no abdominal tenderness. Musculoskeletal:      Cervical back: Neck supple. Right lower leg: Edema present. Left lower leg: Edema present. Neurological:      Mental Status: She is alert and oriented to person, place, and time. Gait: Gait abnormal (wheelchair). Psychiatric:         Mood and Affect: Mood normal.                An electronic signature was used to authenticate this note.     --Maggie More, DO

## 2021-11-05 ENCOUNTER — CARE COORDINATION (OUTPATIENT)
Dept: CARE COORDINATION | Age: 71
End: 2021-11-05

## 2021-11-05 LAB — TROPONIN T: <0.01 NG/ML

## 2021-11-05 PROCEDURE — 84484 ASSAY OF TROPONIN QUANT: CPT

## 2021-11-05 NOTE — ED PROVIDER NOTES
Emergency Department Encounter  Location: [address] [place] [address] EMERGENCY DEPARTMENT    Patient: Shefali Santiago  MRN: [id number]  : 1950  Date of evaluation: 2021  ED Provider: Sallie Martin MD    0000:a.m. Shefali Santiago was checked out to me by Dr. Abhishek Covington. Please see his/her initial documentation for details of the patient's initial ED presentation, physical exam and completed studies. In brief, Shefali Santiago is a 66 y.o. female that presented to the emergency department with chest pain.     I have reviewed and interpreted all of the currently available lab results and diagnostics from this visit:  Results for orders placed or performed during the hospital encounter of 21   CBC auto diff   Result Value Ref Range    WBC 10.0 4.0 - 10.5 K/CU MM    RBC 4.18 (L) 4.2 - 5.4 M/CU MM    Hemoglobin 11.7 (L) 12.5 - 16.0 GM/DL    Hematocrit 37.6 37 - 47 %    MCV 90.0 78 - 100 FL    MCH 28.0 27 - 31 PG    MCHC 31.1 (L) 32.0 - 36.0 %    RDW 14.2 11.7 - 14.9 %    Platelets 236 978 - 230 K/CU MM    MPV 9.7 7.5 - 11.1 FL    Differential Type AUTOMATED DIFFERENTIAL     Segs Relative 70.2 (H) 36 - 66 %    Lymphocytes % 18.6 (L) 24 - 44 %    Monocytes % 6.9 (H) 0 - 4 %    Eosinophils % 3.2 (H) 0 - 3 %    Basophils % 0.4 0 - 1 %    Segs Absolute 7.0 K/CU MM    Lymphocytes Absolute 1.9 K/CU MM    Monocytes Absolute 0.7 K/CU MM    Eosinophils Absolute 0.3 K/CU MM    Basophils Absolute 0.0 K/CU MM    Nucleated RBC % 0.0 %    Total Nucleated RBC 0.0 K/CU MM    Total Immature Neutrophil 0.07 K/CU MM    Immature Neutrophil % 0.7 (H) 0 - 0.43 %   CMP   Result Value Ref Range    Sodium 138 135 - 145 MMOL/L    Potassium 3.6 3.5 - 5.1 MMOL/L    Chloride 100 99 - 110 mMol/L    CO2 26 21 - 32 MMOL/L    BUN 16 6 - 23 MG/DL    CREATININE 0.8 0.6 - 1.1 MG/DL    Glucose 124 (H) 70 - 99 MG/DL    Calcium 9.6 8.3 - 10.6 MG/DL    Albumin 4.1 3.4 - 5.0 GM/DL    Total Protein 7.3 6.4 - 8.2 GM/DL    Total Bilirubin 0.2 0.0 - 1.0 MG/DL    ALT 20 10 - 40 U/L    AST 25 15 - 37 IU/L    Alkaline Phosphatase 108 40 - 129 IU/L    GFR Non-African American >60 >60 mL/min/1.73m2    GFR African American >60 >60 mL/min/1.73m2    Anion Gap 12 4 - 16   Troponin   Result Value Ref Range    Troponin T <0.010 <0.01 NG/ML   D-dimer, Quantitative   Result Value Ref Range    D-Dimer, Quant 337 (H) <230 NG/mL(DDU)   Troponin   Result Value Ref Range    Troponin T <0.010 <0.01 NG/ML     XR CHEST PORTABLE    Result Date: 11/4/2021  EXAMINATION: ONE XRAY VIEW OF THE CHEST 11/4/2021 10:21 pm COMPARISON: 03/22/2021 HISTORY: ORDERING SYSTEM PROVIDED HISTORY: chest pain, sob TECHNOLOGIST PROVIDED HISTORY: Reason for exam:->chest pain, sob Reason for Exam: chest pain, sob Acuity: Acute Type of Exam: Initial FINDINGS: The lungs are clear. The cardiac and mediastinal contours are stable. Pacemaker is unchanged. There is no pleural effusion or pneumothorax. No acute osseous abnormality is identified. No acute cardiopulmonary abnormality. Final ED Course and MDM: In brief, Star Larry is a 66 y.o. female whose care was signed out to me by the outgoing provider. Patient sleeping and resting comfortably on my evaluation. Denies any further complaints. Repeat troponin is within normal limits. She will be discharged home and instructed to follow-up closely with PCP. She is agreeable with this plan of care. ED Medication Orders (From admission, onward)    None          Final Impression      1. Chest pain, unspecified type    2.  Anxiety state        DISPOSITION Decision To Discharge 11/05/2021 02:14:41 AM     (Please note that portions of this note may have been completed with a voice recognition program. Efforts were made to edit the dictations but occasionally words are mis-transcribed.)    Orma [place], MD [address], MD  11/05/21 [id number]

## 2021-11-05 NOTE — ED NOTES
Pt to ED via [facility] with c.o R sided chest pain that started 30 minutes prior to arrival. Pt states it is radiating to R shoulder and R arm, described as constant, it started when she was talking to family about [place] plans, and son will not speak to her. States was getting very anxious. Pt also c.o shortness of breath, had COVID in March for which she spent 4 weeks in a nursing home. Pt now lives at home with family. Uses a walker and cane. Pt states has trouble getting to the bathroom in time, family is able to assist with meal.     Pt has a pacer, sees Dr. Radha De La Paz, is due for testing to see if she has blockages in her carotids, and bloodwork for diabetes. Pt does not wear oxygen at home, was put on 2L via [place] by EMS. .    Pt has not yet received asa.      Patrice Preston, MICHELLE  11/04/21 [id number]

## 2021-11-05 NOTE — ED PROVIDER NOTES
Triage Chief Complaint:   Chest Pain and Shortness of Breath (hx of COVID)    [place]:  Felton Simpson is a 66 y.o. female that presents with chest pain. Patient was in baseline state of health until this evening when the above started. Patient reports \"I got too excited and I think I got anxious\". Patient reports that she deals with depression secondary to not being able to talk to her daughter and grandchildren who live in [place]. This evening patient was talking with her \"new family\". Patient got very excited around the thoughts of being around her new family for Thanksgiving and John. Patient reports that with the excitement she developed chest pain that is a central chest into the right shoulder, constant since onset approximately an hour and a half ago, currently \"just a little bit there\" and worse/improved with nothing. Patient does have some associated shortness of breath that is improving as well. No sweating. No nausea. Patient does have a history of anxiety. Additionally, patient with a history of breast cancer status post surgical resection and chemotherapy with no active chemotherapy. Patient also with a pacemaker.     ROS:  General:  No fevers, no chills, no weakness  Eyes:  No recent vison changes, no discharge  ENT:  No sore throat, no nasal congestion  Cardiovascular:  + chest pain, no palpitations  Respiratory:  + shortness of breath, no cough, no wheezing  Gastrointestinal:  No pain, no nausea, no vomiting, no diarrhea  Musculoskeletal:  No muscle pain, no joint pain  Skin:  No rash, no pruritis, no easy bruising  Neurologic:  No speech problems, no headache, no extremity numbness, no extremity tingling, no extremity weakness  Psychiatric:  + anxiety, + depression  Genitourinary:  No dysuria, no increased urinary frequency  Extremities:  no edema, no pain    Past Medical History:   Diagnosis Date    Anemia     Anxiety 1978    Arthritis     generalized    Asthma 2006    AV block     2nd degree per hospital in [id number]    Blood poisoning 1994    Blood transfusion     12/2003    CAD (coronary artery disease)     Cancer ([facility] [address] 2007    skin (face) & colon(2003)/ 2/2012 dx of right breast ca(pc)    Carcinoma of breast ([facility] [address] 02/29/2012    right arm no b/p or sticks    Cardiac pacemaker 03/10/2014    Medtronic dual lead    Chronic cystitis     Chronic respiratory failure (HCC)     2 L/min oxygen at night-time    Colon cancer ([facility] [address] 2013    COPD (chronic obstructive pulmonary disease) (HCC)     CTS (carpal tunnel syndrome)     Depression     Diverticulitis     H/O 24 hour EKG monitoring 2/28/2014 7/24/13 2/14 complete heart block 7/13No clinically significant arrthymia noted.  H/O cardiac catheterization 10/31/2011, 7/11/2007    10/31/2011-No significant CAD. Cardiolite stress test was false positive-Dr Alphonso Li; 7/11/2007-No CAD, global function intact;    H/O cardiovascular stress test 10/20/2011, 3/23/2010    10/20/2011-Lexiscan- Evid of mild ischemia in Left CX region. EF 70%. Global LVSF normal;    H/O cardiovascular stress test 01/07/2015    EF 77%. Normal Stress Test.    H/O chest pain 04/2005    sees Dr Rosario Lopes H/O Doppler ultrasound 02/20/2008 2/20/2008-CAROTID DOPPLER- Normal carotids bilaterally;    H/O Doppler ultrasound 06/06/2013    CAROTID DOPPLER- there is heterogeneous, irregular atherosclerotic plaque noted in the right internal carotid artery,  doppler flow velocities within the right internal carotid artery are elevated, consistent with a mild, less than 50%stenosis, there is intimal thickening but no significant atherosclerotic plaque noted in the left internal carotid artery, the left carotid are within normal limits    H/O echocardiogram 4/9/2012, 10/20/2011    4/9/2012-LVSF normal EF=>55%. impaired LV relaxation;    H/O echocardiogram 12/31/2014    EF 50-55%. LV shows mild concentric hypertrophy. Mildly dilated left atrium.   Mildly dilated right ventricle. Sclerotic but not stenotic aortic valve. Mitral annular calcification. Mild MR, Mild TR, Mild pulm HTN.    H/O urinary incontinence     History of blood transfusion     Hx antineoplastic chemo     Hx of Arterial Doppler ultrasound 07/01/2019    No hemodynamically significant or focal stenosis visualized bilaterally, bilateral lower extremity arteries exhibit diffuse plaque and multiphasic waveforms, unable to obtain bilateral ABIs due to elevated leg pressures >250 mmHg, possibly due to atherosclerosis    Hx of cardiovascular stress test 06/2013    EF 70% abn suggestive of anterolateral ischemia, possible RCA ischmeia, post left ventricular dilation suggestive multivessel CAD.  Hx of echocardiogram 06/2013    EF50%, mild MR and TR, mild pulmonary HTN, mild AS    Hx of echocardiogram 11/01/2021    Left ventricular systolic function is normal, Mild concentric left ventricular hypertrophy Mildly dilated left atrium. Mitral annular calcification is present. Mild tricuspid regurgitation No evidence of any pericardial effusion    Hx of fall     \"last time 2018- due to neuropathy, have to use cane\"    HX OTHER MEDICAL 7/24/13, 06/2013 7/13-No clinacally significant arrhythmia. 6/13-multiple cycles of wenckebach episodes in addtion to some sinus tach    Hyperlipidemia     Hypertension     Hypothermia 2008    Malignant neoplasm of breast (female) ([facility] [address] 2012    Neuropathy     \"have neuropathy of my legs\"    Normocytic normochromic anemia     Obstructive sleep apnea 2008 01- Has CPAP machine but has not used in over a year - states she has not had problems since her pacemaker was placed.     Old MI (myocardial infarction)     per pt on 1/15/2019\"had heart attack about a year ago\"    Osteoarthritis     Osteopenia     Primary malignant neoplasm of colon ([facility] [address]     S/P cardiac cath 06/2013    no significant CAD false postive stress test    Skin cancer     Super obesity     Umbilical hernia      Past Surgical History:   Procedure Laterality Date    A-V CARDIAC PACEMAKER INSERTION  3/10/14     Medtronic. Model:  [id number] Medtronic  Serial:  [id number] dual chamber pacemaker    APPENDECTOMY  2004    BREAST BIOPSY  2/13/12    BREAST LUMPECTOMY  2007    right     BREAST SURGERY  02/13/2012    rt lesion biopsy     CARDIAC CATHETERIZATION  2007 & 2011    COLECTOMY  2004    Colon cancer    COLONOSCOPY  10-18-13    polyp    COLONOSCOPY  1/19/2016    internal hemorrhoids, diverticulosis, 1.5 cm sessile polyp, 8 mm polyp found in rectum.  COLONOSCOPY  12/14/2017    1.5cm residual polyp, 5mm polyps in blind sigmoid loop x3, Sigmoid divertics, Internal grade 1 hemorrhoids    COLONOSCOPY N/A 1/16/2019    COLONOSCOPY W/ ENDOSCOPIC MUCOSAL RESECTION WITH ELEVIEW 5ML INJECTION AND POLYPECTOMY OF TIP OF BLIND RECTOSIGMOID STUMP AND CAUTERIZATION WITH ERBE PROBE, CLIPPING X2 performed by Edgard Bowman MD at \[facility]\"" [facility]  01/21/2020    pan divertics/ 4 polyps/ sm int hem, S/P partial sigmoid resection w/ end to side anastamosis, repeat in 3 years    COLONOSCOPY N/A 1/21/2020    COLONOSCOPY POLYPECTOMY SNARE/COLD BIOPSY performed by Edgard Bowman MD at [facility]  2003    back   [address] Bilateral 12/15/14    DENTAL SURGERY      front right tooth and root canal w/ brass bolt    DILATION AND CURETTAGE OF UTERUS  2006    Needed a camera to find my uterus.     ENDOSCOPY, COLON, DIAGNOSTIC  12/14/2017    Small hiatal hernia    HERNIA REPAIR  2004    Umb hernia    HERNIA REPAIR  2008    Inc hernia    LYMPH NODE DISSECTION  2/29/2012    Right axillary-3 sentinel nodes were positive out of 9.    MASTECTOMY, RADICAL Right 02/29/2012    w/ sentinal node disection-Dr West    PACEMAKER PLACEMENT      PRE-MALIGNANT / BENIGN SKIN LESION EXCISION  2/8/2013    right leg X2-Dr West    TONSILLECTOMY  1957    TUNNELED VENOUS PORT PLACEMENT  2004    TUNNELED VENOUS PORT PLACEMENT  02/13/2012    removal of mediport     Family History   Problem Relation Age of Onset    Arthritis Mother         OA, RA    High Cholesterol Mother     High Blood Pressure Mother     Cancer Father         skin and leukemia    High Blood Pressure Father     High Cholesterol Father     Diabetes Father     Heart Disease Father     Heart Disease Brother     High Cholesterol Brother     High Blood Pressure Brother     Heart Disease Brother     No Known Problems Sister     Seizures Son     Cancer Brother         skin cancer    Breast Cancer Neg Hx     Ovarian Cancer Neg Hx      Social History     Socioeconomic History    Marital status:      Spouse name: Not on file    Number of children: 2    Years of education: 15    Highest education level: 12th grade   Occupational History    Occupation: retired   Tobacco Use    Smoking status: Never Smoker    Smokeless tobacco: Never Used   Vaping Use    Vaping Use: Never used   Substance and Sexual Activity    Alcohol use: No     Comment:           CAFFEINE: 2- 12oz nona daily & 2 cups coffee some nights.  Drug use: No    Sexual activity: Not Currently     Partners: Male   Other Topics Concern    Not on file   Social History Narrative    Do you donate blood or plasma? No    Caffeine intake? Moderate    Advance directive? No    Is blood transfusion acceptable in an emergency? Yes    Live alone or with others? With others    Able to care for self? Yes    No exercise at this time         Social Determinants of Health     Financial Resource Strain: Low Risk     Difficulty of Paying Living Expenses: Not hard at all   Food Insecurity: No Food Insecurity    Worried About Running Out of Food in the Last Year: Never true    Beau of Food in the Last Year: Never true   Transportation Needs:     Lack of Transportation (Medical):      Lack of Transportation (Non-Medical):    Physical Activity:     Days of Exercise per Week:     Minutes of Exercise per Session:    Stress:     Feeling of Stress :    Social Connections:     Frequency of Communication with Friends and Family:     Frequency of Social Gatherings with Friends and Family:     Attends Baptism Services:     Active Member of Clubs or Organizations:     Attends Club or Organization Meetings:     Marital Status:    Intimate Partner Violence:     Fear of Current or Ex-Partner:     Emotionally Abused:     Physically Abused:     Sexually Abused:      No current facility-administered medications for this encounter. Current Outpatient Medications   Medication Sig Dispense Refill    gabapentin (NEURONTIN) 100 MG capsule Take 1 capsule by mouth 3 times daily for 30 days.  (Patient not taking: Reported on 11/4/2021) 90 capsule 3    Ferrous Sulfate (IRON) 325 (65 Fe) MG TABS Take 1 tablet by mouth every other day 30 tablet 0    albuterol-ipratropium (COMBIVENT RESPIMAT)  MCG/ACT AERS inhaler Inhale 1 puff into the lungs 4 times daily 1 Inhaler 0    acetaminophen (TYLENOL) 500 MG tablet Take 500 mg by mouth every 6 hours as needed for Pain      simvastatin (ZOCOR) 20 MG tablet Take 1 tablet by mouth nightly 90 tablet 1    sertraline (ZOLOFT) 100 MG tablet Take 2 tablets by mouth every morning 180 tablet 1    fluticasone (FLOVENT HFA) 110 MCG/ACT inhaler Inhale 2 puffs into the lungs 2 times daily 1 Inhaler 5    ezetimibe (ZETIA) 10 MG tablet Take 1 tablet by mouth every morning 90 tablet 1    albuterol sulfate HFA (PROAIR HFA) 108 (90 Base) MCG/ACT inhaler INHALE 2 PUFFS BY MOUTH EVERY SIX HOURS AS NEEDED FOR WHEEZING 1 Inhaler 5    vitamin B-12 (CYANOCOBALAMIN) 1000 MCG tablet Take 1,000 mcg by mouth daily      aspirin 81 MG EC tablet Take 81 mg by mouth nightly       letrozole (FEMARA) 2.5 MG tablet Take 2.5 mg by mouth nightly       calcium-vitamin D (OSCAL 500/200 D-3) 500-200 MG-UNIT per tablet Take 1 tablet by mouth 2 times daily        Allergies   Allergen Reactions    Bactrim [Sulfamethoxazole-Trimethoprim] Anaphylaxis and Hives    Lipitor [Atorvastatin] Shortness Of Breath    Taxol [Paclitaxel] Anaphylaxis and Swelling    Aminoglycosides      Abstracted from [facility].  Demerol Nausea Only    Neosporin [Bacitracin-Neomycin-Polymyxin] Rash and Other (See Comments)     hotness    Other Rash     [facility] Soap    Talc Other (See Comments)     blisters       Nursing Notes Reviewed    Physical Exam:  ED Triage Vitals   Enc Vitals Group      BP 11/04/21 2211 (!) 148/62      Pulse 11/04/21 2203 72      Resp 11/04/21 2211 17      Temp 11/04/21 2203 98.7 °F (37.1 °C)      Temp Source 11/04/21 2203 Oral      SpO2 11/04/21 2203 98 %      Weight 11/04/21 2203 287 lb (130.2 kg)      Height 11/04/21 2203 5' 2\" (1.575 m)      Head Circumference --       Peak Flow --       Pain Score --       Pain Loc --       Pain Edu? --       Excl. in [address]? --        My pulse ox interpretation is - normal    General appearance:  No acute distress. Appears deconditioned. Skin:  Warm. Dry. No diaphoresis. Eye:  Extraocular movements intact. Ears, nose, mouth and throat:  Oral mucosa moist   Neck:  Trachea midline. Extremity:  Normal ROM. No bilateral lower extremity edema. No calf tenderness to palpation. Heart:  Regular rate and rhythm, normal S1 & S2, no extra heart sounds. Perfusion:  Intact  Respiratory:  Lungs clear to auscultation bilaterally. Respirations nonlabored. Speaking clearly in full sentences peer respiratory distress. Abdominal:  Normal bowel sounds. Soft. Nontender. Non distended. Elevated BMI. Back:  No CVA tenderness to palpation     Neurological:  Alert and oriented times 3. No focal neuro deficits.              Psychiatric:  Appropriate    I have reviewed and interpreted all of the currently available lab results from this visit (if applicable):  Results for orders placed or performed during the hospital encounter of 11/04/21   CBC auto diff   Result Value Ref Range    WBC 10.0 4.0 - 10.5 K/CU MM    RBC 4.18 (L) 4.2 - 5.4 M/CU MM    Hemoglobin 11.7 (L) 12.5 - 16.0 GM/DL    Hematocrit 37.6 37 - 47 %    MCV 90.0 78 - 100 FL    MCH 28.0 27 - 31 PG    MCHC 31.1 (L) 32.0 - 36.0 %    RDW 14.2 11.7 - 14.9 %    Platelets 405 772 - 257 K/CU MM    MPV 9.7 7.5 - 11.1 FL    Differential Type AUTOMATED DIFFERENTIAL     Segs Relative 70.2 (H) 36 - 66 %    Lymphocytes % 18.6 (L) 24 - 44 %    Monocytes % 6.9 (H) 0 - 4 %    Eosinophils % 3.2 (H) 0 - 3 %    Basophils % 0.4 0 - 1 %    Segs Absolute 7.0 K/CU MM    Lymphocytes Absolute 1.9 K/CU MM    Monocytes Absolute 0.7 K/CU MM    Eosinophils Absolute 0.3 K/CU MM    Basophils Absolute 0.0 K/CU MM    Nucleated RBC % 0.0 %    Total Nucleated RBC 0.0 K/CU MM    Total Immature Neutrophil 0.07 K/CU MM    Immature Neutrophil % 0.7 (H) 0 - 0.43 %   D-dimer, Quantitative   Result Value Ref Range    D-Dimer, Quant 337 (H) <230 NG/mL(DDU)      Radiographs (if obtained):  [] The following radiograph was interpreted by myself in the absence of a radiologist:   [x] Radiologist's Report Reviewed:  XR CHEST PORTABLE   Final Result   No acute cardiopulmonary abnormality. EKG (if obtained): (All EKG's are interpreted by myself in the absence of a cardiologist)  12 lead EKG per my interpretation:  Paced (AS-) at 69  Axis is   Left axis deviation  QTc is  within an acceptable range  PACED    Prior EKG to compare with was available and no clinically significant change no morphology when compared to prior.       Chart review shows recent radiographs:  ECHO Complete 2D W Doppler W Color    Result Date: 11/2/2021  Transthoracic Echocardiography Report (TTE)  Demographics   Patient Name       Loretta SIMPSON    Date of Study       11/01/2021   Date of Birth      1950         Gender              Female   Age                66 year(s)         Race                 Aortic Valve  Moderate aortic insufficiency with  of msec. Moderate aortic stenosis with mean gradient of 27 mmHg. Mitral Valve  Mid-to-Moderate mitral regurgitation is present. Mitral annular calcification is present. Tricuspid Valve  Tricuspid valve is structurally normal.  Mild tricuspid regurgitation. Mildly elevated pulmonary artery pressure, RVSP 37 mmHg. Pulmonic Valve  The pulmonic valve was not well visualized. Pericardial Effusion  No evidence of any pericardial effusion. Pleural Effusion  No evidence of pleural effusion. Miscellaneous  Abdominal aorta not visualized. IVC not visualized. The aorta is within normal limits.   M-Mode/2D Measurements & Calculations   LV Diastolic Dimension:  LV Systolic Dimension:  AV Cusp Separation: 1.2  4.64 cm                  3.55 cm                 cmLA Dimension: 3.3 cmAO  LV FS:23.5 %             LV Volume Diastolic: 69 Root Dimension: 2.1 cmLA  LV PW Diastolic: 6.33 cm ml                      Area: 22.1 cm2  Septum Diastolic: 9.61   LV Volume Systolic: 33  cm                       ml                           LV EDV/LV EDV Index: 69                           ml/32 m2LV ESV/LV ESV   RV Diastolic Dimension:                           Index: 33 ml/15 m2      2.78 cm  LV Area Diastolic: 96.6  EF Calculated (A4C):  cm2                      52.2 %                  LA/Aorta: 9.00  LV Area Systolic: 33.2   EF Calculated (2D):  cm2                      51.7 %                  LA volume/Index: 69 ml                                                   /32m2                           LV Length: 6.95 cm                            LVOT: 2 cm  Doppler Measurements & Calculations   MV Peak E-Wave: 149  AV Peak Velocity: 330 cm/s    LVOT Mean Velocity: 87.8  cm/s                 AV Peak Gradient: 43.56 mmHg  cm/s  MV Peak A-Wave: 136  AV Mean Velocity: 249 cm/s    LVOT Mean Gradient: 4  cm/s                 AV Mean Gradient: 27 mmHg     mmHg  MV E/A Ratio: 1.1 AV VTI: 73 cm                 Estimated RVSP: 37 mmHg  MV Peak Gradient:    AV Area (Continuity):1.35 cm2 Estimated RAP:8 mmHg  8.88 mmHg                       LVOT VTI: 31.4 cm                       AV P1/2t: 474 msec            TR Velocity:269 cm/s                       Estimated PASP: 36.94 mmHg    TR Gradient:28.94 mmHg   MV E' Lateral  Velocity: 7.21 cm/s  MV A' Lateral  Velocity: 11.7 cm/s  MV E/E' lateral:  20.67        MDM:  Pt presents as above. Emergent conditions considered. Presentation prompted initial labs, EKG and imaging as above. EKG with paced rhythm as above. Patient's chest x-ray is read by radiology as negative for acute abnormality making clinically significant pneumonia, pneumothorax or aortic pathology less likely. The patient's initial troponin is within the normal range. Patient's EKG did not show any acute diagnostic ischemic changes as above. CBC and CMP without clinically significant derangement. D-dimer is elevated to 337 but is not suggestive of thromboembolic disease based on age adjustment. The patient was given medications, is starting to feel better. Patient sitting comfortably on recheck. Patient does have a heart score technically of 4 based on her age and risk factors however patient also with a cardiac cath just over 2 years ago with no significant coronary artery disease. Given patient's history, current physical exam, reassuring work-up thus far decision made to obtain delta troponin at the 3-hour point which patient is amenable to staying for. Patient's repeat troponin should be it drawn at 1:15 AM with plan for likely discharge home should be negative with further outpatient cardiology follow-up. Patient signed out to Dr. Peri Forte pending this repeat troponin. Clinical Impression:  1. Chest pain, unspecified type    2. Anxiety state      Disposition referral (if applicable):  No follow-up provider specified.   Disposition medications (if applicable):  New Prescriptions    No medications on file       Comment: Please note this report has been produced using speech recognition software and may contain errors related to that system including errors in grammar, punctuation, and spelling, as well as words and phrases that may be inappropriate. If there are any questions or concerns please feel free to contact the dictating provider for clarification.        Dorinda Ferrer MD  11/04/21 5949

## 2021-11-05 NOTE — CARE COORDINATION
Ambulatory Care Coordination Note  11/5/2021  CM Risk Score: 10  Charlson 10 Year Mortality Risk Score: 100%     ACC: Efrain Allred RN    Summary Note: Call to pt for acm ed f/u. Pt Reports it was anxiety, no med changes made. ACM offered to schedule f/u visit for pt. Pt declines and reports she will call pcp to schedule f/u next week. Pt denies any other needs/concerns at this time. Advised to call if needs arise, voices understanding. Care Coordination Interventions    Program Enrollment: Complex Care  Referral from Primary Care Provider: No  Suggested Interventions and Community Resources  Fall Risk Prevention: In Process  Home Health Services: Completed  Zone Management Tools: In Process         Goals Addressed    None         Prior to Admission medications    Medication Sig Start Date End Date Taking? Authorizing Provider   gabapentin (NEURONTIN) 100 MG capsule Take 1 capsule by mouth 3 times daily for 30 days.   Patient not taking: Reported on 11/4/2021 7/29/21 11/4/21  Lone Jack Bel, DO   Ferrous Sulfate (IRON) 325 (65 Fe) MG TABS Take 1 tablet by mouth every other day 3/27/21   John Jackson MD   albuterol-ipratropium (COMBIVENT RESPIMAT)  MCG/ACT AERS inhaler Inhale 1 puff into the lungs 4 times daily 3/27/21   John Jackson MD   acetaminophen (TYLENOL) 500 MG tablet Take 500 mg by mouth every 6 hours as needed for Pain    Historical Provider, MD   simvastatin (ZOCOR) 20 MG tablet Take 1 tablet by mouth nightly 2/11/21   Wiley Bel, DO   sertraline (ZOLOFT) 100 MG tablet Take 2 tablets by mouth every morning 2/11/21   Wiley Bel, DO   fluticasone (FLOVENT HFA) 110 MCG/ACT inhaler Inhale 2 puffs into the lungs 2 times daily 2/11/21   Wiley Bel, DO   ezetimibe (ZETIA) 10 MG tablet Take 1 tablet by mouth every morning 2/11/21   Wiley Bel, DO   albuterol sulfate HFA (PROAIR HFA) 108 (90 Base) MCG/ACT inhaler INHALE 2 PUFFS BY MOUTH EVERY SIX HOURS AS NEEDED FOR WHEEZING 2/11/21   Raad Garcia DO   vitamin B-12 (CYANOCOBALAMIN) 1000 MCG tablet Take 1,000 mcg by mouth daily    Historical Provider, MD   aspirin 81 MG EC tablet Take 81 mg by mouth nightly     Historical Provider, MD   letrozole (FEMARA) 2.5 MG tablet Take 2.5 mg by mouth nightly     Historical Provider, MD   calcium-vitamin D (OSCAL 500/200 D-3) 500-200 MG-UNIT per tablet Take 1 tablet by mouth 2 times daily     Historical Provider, MD       Future Appointments   Date Time Provider Chance Mariia   11/9/2021  3:30 PM [address] [address] [facility]   3/7/2022  2:30 PM Raad Garcia DO [facility]   10/6/2022  2:00 PM [place] [facility], APRN - CNP [facility]   10/10/2022  1:00 PM SCHEDULE, [facility] AWV LPN [facility]     ,   COPD Assessment    Does the patient understand envrionmental exposure?: Yes  Is the patient able to verbalize Rescue vs. Long Acting medications?: Yes  Does the patient have a nebulizer?: Yes  Does the patient use a space with inhaled medications?: No            Symptoms:         and   General Assessment    Do you have any symptoms that are causing concern?: No

## 2021-11-05 NOTE — ED NOTES
Bed: ED-30  Expected date:   Expected time:   Means of arrival:   Comments:  ruthie Zapata  11/04/21 5847

## 2021-11-05 NOTE — ED NOTES
Pt's son Cely Generous updated about current plan of care. When pt is discharged he would like to be called at [phone] to  pt.         Juan J Ferris RN  11/05/21 8812

## 2021-11-06 LAB
EKG ATRIAL RATE: 69 BPM
EKG DIAGNOSIS: NORMAL
EKG P AXIS: 23 DEGREES
EKG P-R INTERVAL: 210 MS
EKG Q-T INTERVAL: 438 MS
EKG QRS DURATION: 144 MS
EKG QTC CALCULATION (BAZETT): 469 MS
EKG R AXIS: -70 DEGREES
EKG T AXIS: 106 DEGREES
EKG VENTRICULAR RATE: 69 BPM

## 2021-11-06 PROCEDURE — 93010 ELECTROCARDIOGRAM REPORT: CPT | Performed by: INTERNAL MEDICINE

## 2021-11-07 ASSESSMENT — ENCOUNTER SYMPTOMS: BACK PAIN: 1

## 2021-11-09 ENCOUNTER — CARE COORDINATION (OUTPATIENT)
Dept: CARE COORDINATION | Age: 71
End: 2021-11-09

## 2021-11-09 ENCOUNTER — HOSPITAL ENCOUNTER (OUTPATIENT)
Dept: ULTRASOUND IMAGING | Age: 71
Discharge: HOME | End: 2021-11-09
Payer: MEDICARE

## 2021-11-09 DIAGNOSIS — R09.89 BILATERAL CAROTID BRUITS: ICD-10-CM

## 2021-11-09 PROCEDURE — 93880 EXTRACRANIAL BILAT STUDY: CPT

## 2021-11-09 NOTE — CARE COORDINATION
Ambulatory Care Coordination Note  11/10/2021  CM Risk Score: 10  Charlson 10 Year Mortality Risk Score: 100%     ACC: Lito Jordan RN    Summary Note:   Spoke with patient. Patient reports that she is very tired. Denies increased SOB, wheezing or increased cough. COPD:  Breathing is at baseline. Patient reports that she is using inhalers as directed. Reviewed COPD zone management tool and s/s to report to MD.    Discussed recommendation for routine Provider follow up. Offered to schedule appointment; patient declined. Confirmed previous provider appt. 11/4/21. Patient confirmed plan for follow up for Bilateral Carotid SRI today. Patient reports worsening feeling of loneliness anxiety. Reports that she has limited family support. Discussed Gyft counseling and patient confirms that she would benefit; prefers tele-health phone up if available. ACM to follow up. Patient reports that her BS was running higher at recent Provider visit. A1C test pending. Instructed on low sugar/ low carb diet. Offered Dietician support for meal planning. Patient verbalized consent. Discussed Care Gaps:    Patient reports that she has never had the flu and she does not plan on receiving flu shot. Reports that she has received PNA shot one time and does not plan on getting it again. Patient also states that she will not be taking shingles vaccine and \" has heard bad thing about the COVID vaccine. Instructed on recommendation for patient follow through. Encouraged patient to discuss questions, concerns with PCP. Discussed the benefits of HHC and patient declined. Reports that she does not want people to come in her home. Encouraged patient to update ACM/ PCP should needs arise. Patient denies any questions or concerns at this time. ACM contact information provided should needs arise. Note routed to PCP with request for Gyft follow up if appropriate.     Note routed to RD with request for follow up.    Plan for next outreach:   Confirm / [facility] , follow up, progress toward graduation. Care Coordination Interventions    Program Enrollment: Complex Care  Referral from Primary Care Provider: No  Suggested Interventions and Community Resources  Fall Risk Prevention: In Process  Home Health Services: Completed  Registered Dietician: In Process  Social Work: Declined  Other Therapy Services: In Process (Comment: Behavioral Health)  Zone Management Tools: In Process         Goals Addressed                 This Visit's Progress     Care Coordination Self Management   On track     CC Self Management Goal  Patient Goal (What steps will patient take to achieve goal?):  Compliance with routine PCP follow up. Patient is able to discuss self-management of condition(s):  Pt demonstrates adherence to medications  Pt demonstrates understanding of self-monitoring  Patient is able to identify Red Flags:  Alert to potential adverse drug reactions(s) or side effects and actions to take should they arise  Discuss target symptoms and actions to take should they arise  Identify problems that require immediate PCP or specialist visit  Patient demonstrates understanding of access to PCP/Specialist:  Understands about scheduling routine Follow Up appointments   Understands about sick day appointment options for worsening of symptoms/progression (Same Day, E Visits)       Self Monitoring   On track     Other Self-Monitoring - I will monitor my increase cough, SOB and sputum amt and color - Other: with cough always   will report increased any of above symptom to my provider. Patient Reported Blood Pressure No flowsheet data found.     Barriers: lack of motivation, financial and overwhelmed by complexity of regimen  Plan for overcoming my barriers: ACM advised pt to use her medb-neb machine 4 times   Confidence: 5 /10  Anticipated Goal Completion Date: 3/1/20            Prior to Admission medications    Medication Sig Start Date End Date Taking? Authorizing Provider   gabapentin (NEURONTIN) 100 MG capsule Take 1 capsule by mouth 3 times daily for 30 days.   Patient not taking: Reported on 11/4/2021 7/29/21 11/4/21  Ian Alex DO   Ferrous Sulfate (IRON) 325 (65 Fe) MG TABS Take 1 tablet by mouth every other day 3/27/21   Rere Arredondo MD   albuterol-ipratropium (COMBIVENT RESPIMAT)  MCG/ACT AERS inhaler Inhale 1 puff into the lungs 4 times daily 3/27/21   Rere Arredondo MD   acetaminophen (TYLENOL) 500 MG tablet Take 500 mg by mouth every 6 hours as needed for Pain    Historical Provider, MD   simvastatin (ZOCOR) 20 MG tablet Take 1 tablet by mouth nightly 2/11/21   Ian Alex DO   sertraline (ZOLOFT) 100 MG tablet Take 2 tablets by mouth every morning 2/11/21   Ian Alex DO   fluticasone (FLOVENT HFA) 110 MCG/ACT inhaler Inhale 2 puffs into the lungs 2 times daily 2/11/21   Ian Alex DO   ezetimibe (ZETIA) 10 MG tablet Take 1 tablet by mouth every morning 2/11/21   Ian Alex DO   albuterol sulfate HFA (PROAIR HFA) 108 (90 Base) MCG/ACT inhaler INHALE 2 PUFFS BY MOUTH EVERY SIX HOURS AS NEEDED FOR WHEEZING 2/11/21   Ian Alex DO   vitamin B-12 (CYANOCOBALAMIN) 1000 MCG tablet Take 1,000 mcg by mouth daily    Historical Provider, MD   aspirin 81 MG EC tablet Take 81 mg by mouth nightly     Historical Provider, MD   letrozole (FEMARA) 2.5 MG tablet Take 2.5 mg by mouth nightly     Historical Provider, MD   calcium-vitamin D (OSCAL 500/200 D-3) 500-200 MG-UNIT per tablet Take 1 tablet by mouth 2 times daily     Historical Provider, MD       Future Appointments   Date Time Provider Chance Chiang   3/7/2022  2:30 PM Ian Alex DO N [facility]   10/6/2022  2:00 PM Isabel IQBAL/ Sterling Santiago. [place], APRN - CNP [facility]   10/10/2022  1:00 PM SCHEDULE, SRMX AWV LPN N [facility]     ,   COPD Assessment    Does the patient understand envrionmental exposure?: Yes  Is the patient able to verbalize

## 2021-11-10 ENCOUNTER — HOSPITAL ENCOUNTER (OUTPATIENT)
Age: 71
Discharge: HOME | End: 2021-11-10
Payer: MEDICARE

## 2021-11-10 ENCOUNTER — CARE COORDINATION (OUTPATIENT)
Dept: CARE COORDINATION | Age: 71
End: 2021-11-10

## 2021-11-10 DIAGNOSIS — I65.23 BILATERAL CAROTID ARTERY STENOSIS: Primary | ICD-10-CM

## 2021-11-10 LAB
ESTIMATED AVERAGE GLUCOSE: 120 MG/DL
HBA1C MFR BLD: 5.8 % (ref 4.2–6.3)

## 2021-11-10 PROCEDURE — 83036 HEMOGLOBIN GLYCOSYLATED A1C: CPT

## 2021-11-10 PROCEDURE — 36415 COLL VENOUS BLD VENIPUNCTURE: CPT

## 2021-11-10 NOTE — CARE COORDINATION
Contacted [facility] and left voicemail regarding Dietitian referral. Left call back number and will follow up as appropriate.          [address] [facility], [address] [place] [address]

## 2021-11-15 ENCOUNTER — CARE COORDINATION (OUTPATIENT)
Dept: CARE COORDINATION | Age: 71
End: 2021-11-15

## 2021-11-15 SDOH — HEALTH STABILITY: MENTAL HEALTH: HOW OFTEN DO YOU HAVE A DRINK CONTAINING ALCOHOL?: NEVER

## 2021-11-15 SDOH — ECONOMIC STABILITY: INCOME INSECURITY: IN THE LAST 12 MONTHS, WAS THERE A TIME WHEN YOU WERE NOT ABLE TO PAY THE MORTGAGE OR RENT ON TIME?: NO

## 2021-11-15 SDOH — ECONOMIC STABILITY: HOUSING INSECURITY: IN THE LAST 12 MONTHS, HOW MANY PLACES HAVE YOU LIVED?: 1

## 2021-11-15 SDOH — ECONOMIC STABILITY: HOUSING INSECURITY
IN THE LAST 12 MONTHS, WAS THERE A TIME WHEN YOU DID NOT HAVE A STEADY PLACE TO SLEEP OR SLEPT IN A SHELTER (INCLUDING NOW)?: NO

## 2021-11-15 NOTE — CARE COORDINATION
[facility] regarding Dietitian referral. Pt answered, RD explained reason for call and role in care. RD referred for hyperglycemia- per chart review, patient's A1C is 5.8% (11/10/21), which is WNL. Patient states she does not have any specific nutrition related questions or concerns at this time. RD briefly discussed the importance of eating three balanced meals/day with a variety of foods and exercising (such as walking) to help manage BS. Pt verbalizes understanding. RD provided contact information and encouraged patient to outreach with any nutrition related questions or concerns in the future. RD will continue to follow/assist with return call.      [address] [facility], [address] [place] [address]

## 2021-11-19 ENCOUNTER — HOSPITAL ENCOUNTER (OUTPATIENT)
Dept: CT IMAGING | Age: 71
Discharge: HOME | End: 2021-11-19
Payer: MEDICARE

## 2021-11-19 DIAGNOSIS — I65.23 BILATERAL CAROTID ARTERY STENOSIS: ICD-10-CM

## 2021-11-19 PROCEDURE — 6360000004 HC RX CONTRAST MEDICATION: Performed by: FAMILY MEDICINE

## 2021-11-19 PROCEDURE — 70496 CT ANGIOGRAPHY HEAD: CPT

## 2021-11-19 RX ADMIN — IOPAMIDOL 80 ML: 755 INJECTION, SOLUTION INTRAVENOUS at 13:56

## 2021-11-21 DIAGNOSIS — I65.23 BILATERAL CAROTID ARTERY STENOSIS: Primary | ICD-10-CM

## 2021-11-22 DIAGNOSIS — E78.5 HYPERLIPIDEMIA, UNSPECIFIED HYPERLIPIDEMIA TYPE: ICD-10-CM

## 2021-11-22 RX ORDER — SERTRALINE HYDROCHLORIDE 100 MG/1
200 TABLET, FILM COATED ORAL EVERY MORNING
Qty: 180 TABLET | Refills: 1 | Status: SHIPPED | OUTPATIENT
Start: 2021-11-22 | End: 2022-03-28

## 2021-11-22 RX ORDER — SIMVASTATIN 20 MG
TABLET ORAL
Qty: 90 TABLET | Refills: 1 | Status: SHIPPED | OUTPATIENT
Start: 2021-11-22 | End: 2022-03-28

## 2021-11-22 RX ORDER — EZETIMIBE 10 MG/1
10 TABLET ORAL EVERY MORNING
Qty: 90 TABLET | Refills: 1 | Status: SHIPPED | OUTPATIENT
Start: 2021-11-22 | End: 2022-03-28

## 2021-11-23 PROBLEM — I65.23 BILATERAL CAROTID ARTERY STENOSIS: Status: ACTIVE | Noted: 2021-11-23

## 2021-11-23 PROBLEM — J45.40 MODERATE PERSISTENT ASTHMA WITHOUT COMPLICATION: Status: ACTIVE | Noted: 2021-11-23

## 2021-11-29 ENCOUNTER — HOSPITAL ENCOUNTER (OUTPATIENT)
Age: 71
Discharge: HOME | End: 2021-11-29
Payer: MEDICARE

## 2021-11-29 ENCOUNTER — HOSPITAL ENCOUNTER (OUTPATIENT)
Dept: LAB | Age: 71
Discharge: HOME | End: 2021-11-29
Payer: MEDICARE

## 2021-11-29 ENCOUNTER — HOSPITAL ENCOUNTER (OUTPATIENT)
Dept: GENERAL RADIOLOGY | Age: 71
Discharge: HOME | End: 2021-11-29
Payer: MEDICARE

## 2021-11-29 DIAGNOSIS — Z01.818 ENCOUNTER FOR PREADMISSION TESTING: ICD-10-CM

## 2021-11-29 LAB
ABO/RH: NORMAL
ANION GAP SERPL CALCULATED.3IONS-SCNC: 9 MMOL/L (ref 4–16)
ANTIBODY SCREEN: NEGATIVE
APTT: 26.6 SECONDS (ref 25.1–37.1)
BUN BLDV-MCNC: 17 MG/DL (ref 6–23)
CALCIUM SERPL-MCNC: 9.5 MG/DL (ref 8.3–10.6)
CHLORIDE BLD-SCNC: 101 MMOL/L (ref 99–110)
CO2: 29 MMOL/L (ref 21–32)
COMMENT: NORMAL
CREAT SERPL-MCNC: 0.7 MG/DL (ref 0.6–1.1)
GFR AFRICAN AMERICAN: >60 ML/MIN/1.73M2
GFR NON-AFRICAN AMERICAN: >60 ML/MIN/1.73M2
GLUCOSE BLD-MCNC: 105 MG/DL (ref 70–99)
HCT VFR BLD CALC: 38.2 % (ref 37–47)
HEMOGLOBIN: 12.2 GM/DL (ref 12.5–16)
INR BLD: 0.88 INDEX
MCH RBC QN AUTO: 28.2 PG (ref 27–31)
MCHC RBC AUTO-ENTMCNC: 31.9 % (ref 32–36)
MCV RBC AUTO: 88.4 FL (ref 78–100)
PDW BLD-RTO: 14.3 % (ref 11.7–14.9)
PLATELET # BLD: 241 K/CU MM (ref 140–440)
PMV BLD AUTO: 9.9 FL (ref 7.5–11.1)
POTASSIUM SERPL-SCNC: 3.8 MMOL/L (ref 3.5–5.1)
PROTHROMBIN TIME: 11.4 SECONDS (ref 11.7–14.5)
RBC # BLD: 4.32 M/CU MM (ref 4.2–5.4)
SODIUM BLD-SCNC: 139 MMOL/L (ref 135–145)
WBC # BLD: 10.9 K/CU MM (ref 4–10.5)

## 2021-11-29 PROCEDURE — 86850 RBC ANTIBODY SCREEN: CPT

## 2021-11-29 PROCEDURE — 85027 COMPLETE CBC AUTOMATED: CPT

## 2021-11-29 PROCEDURE — 86900 BLOOD TYPING SEROLOGIC ABO: CPT

## 2021-11-29 PROCEDURE — U0003 INFECTIOUS AGENT DETECTION BY NUCLEIC ACID (DNA OR RNA); SEVERE ACUTE RESPIRATORY SYNDROME CORONAVIRUS 2 (SARS-COV-2) (CORONAVIRUS DISEASE [COVID-19]), AMPLIFIED PROBE TECHNIQUE, MAKING USE OF HIGH THROUGHPUT TECHNOLOGIES AS DESCRIBED BY CMS-2020-01-R: HCPCS

## 2021-11-29 PROCEDURE — 93005 ELECTROCARDIOGRAM TRACING: CPT | Performed by: SURGERY

## 2021-11-29 PROCEDURE — 71046 X-RAY EXAM CHEST 2 VIEWS: CPT

## 2021-11-29 PROCEDURE — 36415 COLL VENOUS BLD VENIPUNCTURE: CPT

## 2021-11-29 PROCEDURE — 85730 THROMBOPLASTIN TIME PARTIAL: CPT

## 2021-11-29 PROCEDURE — U0005 INFEC AGEN DETEC AMPLI PROBE: HCPCS

## 2021-11-29 PROCEDURE — 80048 BASIC METABOLIC PNL TOTAL CA: CPT

## 2021-11-29 PROCEDURE — 85610 PROTHROMBIN TIME: CPT

## 2021-11-29 PROCEDURE — 86901 BLOOD TYPING SEROLOGIC RH(D): CPT

## 2021-11-30 ENCOUNTER — CARE COORDINATION (OUTPATIENT)
Dept: CARE COORDINATION | Age: 71
End: 2021-11-30

## 2021-11-30 LAB
EKG ATRIAL RATE: 63 BPM
EKG DIAGNOSIS: NORMAL
EKG P AXIS: 66 DEGREES
EKG P-R INTERVAL: 156 MS
EKG Q-T INTERVAL: 496 MS
EKG QRS DURATION: 176 MS
EKG QTC CALCULATION (BAZETT): 507 MS
EKG R AXIS: -69 DEGREES
EKG T AXIS: 97 DEGREES
EKG VENTRICULAR RATE: 63 BPM
SARS-COV-2: NOT DETECTED
SOURCE: NORMAL

## 2021-11-30 PROCEDURE — 93010 ELECTROCARDIOGRAM REPORT: CPT | Performed by: INTERNAL MEDICINE

## 2021-11-30 RX ORDER — LETROZOLE 2.5 MG/1
2.5 TABLET, FILM COATED ORAL NIGHTLY
Qty: 30 TABLET | Refills: 5 | Status: SHIPPED | OUTPATIENT
Start: 2021-11-30

## 2021-12-02 ENCOUNTER — ANESTHESIA EVENT (OUTPATIENT)
Dept: OPERATING ROOM | Age: 71
DRG: 039 | End: 2021-12-02
Payer: MEDICARE

## 2021-12-02 NOTE — ANESTHESIA PRE PROCEDURE
Department of Anesthesiology  Preprocedure Note       Name:  Gil Lopez   Age:  66 y.o.  :  1950                                          MRN:  [id number]         Date:  2021      Surgeon: Caryl Daley):  Yi Strange MD    Procedure: Procedure(s):  RIGHT CAROTID ENDARTERECTOMY    Medications prior to admission:   Prior to Admission medications    Medication Sig Start Date End Date Taking? Authorizing Provider   sertraline (ZOLOFT) 100 MG tablet TAKE 2 TABLETS BY MOUTH EVERY MORNING 21  Yes Tex Needs, DO   simvastatin (ZOCOR) 20 MG tablet TAKE ONE (1) TABLET BY MOUTH NIGHTLY 21  Yes Tex Needs, DO   ezetimibe (ZETIA) 10 MG tablet TAKE 1 TABLET BY MOUTH EVERY MORNING 21  Yes Tex Needs, DO   Ferrous Sulfate (IRON) 325 (65 Fe) MG TABS Take 1 tablet by mouth every other day 3/27/21  Yes Bunny Kaiser MD   albuterol-ipratropium (COMBIVENT RESPIMAT)  MCG/ACT AERS inhaler Inhale 1 puff into the lungs 4 times daily 3/27/21  Yes Bunny Kaiser MD   fluticasone (FLOVENT HFA) 110 MCG/ACT inhaler Inhale 2 puffs into the lungs 2 times daily 21  Yes Cheyenne Arias DO   albuterol sulfate HFA (PROAIR HFA) 108 (90 Base) MCG/ACT inhaler INHALE 2 PUFFS BY MOUTH EVERY SIX HOURS AS NEEDED FOR WHEEZING 21  Yes Tex Pugh, DO   vitamin B-12 (CYANOCOBALAMIN) 1000 MCG tablet Take 1,000 mcg by mouth daily   Yes Historical Provider, MD   aspirin 81 MG EC tablet Take 81 mg by mouth nightly    Yes Historical Provider, MD   calcium-vitamin D (OSCAL 500/200 D-3) 500-200 MG-UNIT per tablet Take 1 tablet by mouth 2 times daily    Yes Historical Provider, MD   letrozole [facility]) 2.5 MG tablet Take 1 tablet by mouth nightly 21   Rajiv Cornejo MD   acetaminophen (TYLENOL) 500 MG tablet Take 500 mg by mouth every 6 hours as needed for Pain    Historical Provider, MD       Current medications:    No current facility-administered medications for this encounter.      Current Outpatient Medications   Medication Sig Dispense Refill    sertraline (ZOLOFT) 100 MG tablet TAKE 2 TABLETS BY MOUTH EVERY MORNING 180 tablet 1    simvastatin (ZOCOR) 20 MG tablet TAKE ONE (1) TABLET BY MOUTH NIGHTLY 90 tablet 1    ezetimibe (ZETIA) 10 MG tablet TAKE 1 TABLET BY MOUTH EVERY MORNING 90 tablet 1    Ferrous Sulfate (IRON) 325 (65 Fe) MG TABS Take 1 tablet by mouth every other day 30 tablet 0    albuterol-ipratropium (COMBIVENT RESPIMAT)  MCG/ACT AERS inhaler Inhale 1 puff into the lungs 4 times daily 1 Inhaler 0    fluticasone (FLOVENT HFA) 110 MCG/ACT inhaler Inhale 2 puffs into the lungs 2 times daily 1 Inhaler 5    albuterol sulfate HFA (PROAIR HFA) 108 (90 Base) MCG/ACT inhaler INHALE 2 PUFFS BY MOUTH EVERY SIX HOURS AS NEEDED FOR WHEEZING 1 Inhaler 5    vitamin B-12 (CYANOCOBALAMIN) 1000 MCG tablet Take 1,000 mcg by mouth daily      aspirin 81 MG EC tablet Take 81 mg by mouth nightly       calcium-vitamin D (OSCAL 500/200 D-3) 500-200 MG-UNIT per tablet Take 1 tablet by mouth 2 times daily       letrozole (FEMARA) 2.5 MG tablet Take 1 tablet by mouth nightly 30 tablet 5    acetaminophen (TYLENOL) 500 MG tablet Take 500 mg by mouth every 6 hours as needed for Pain         Allergies: Allergies   Allergen Reactions    Bactrim [Sulfamethoxazole-Trimethoprim] Anaphylaxis and Hives    Lipitor [Atorvastatin] Shortness Of Breath    Taxol [Paclitaxel] Anaphylaxis and Swelling    Aminoglycosides      Abstracted from [facility] patient chart.     Demerol Nausea Only    Neosporin [Bacitracin-Neomycin-Polymyxin] Rash and Other (See Comments)     hotness    Other Rash     Ivory Soap    Talc Other (See Comments)     blisters       Problem List:    Patient Active Problem List   Diagnosis Code    Malignant neoplasm of upper-outer quadrant of right breast in female, estrogen receptor positive ([facility] [address] C50.411, Z17.0    Diffuse cystic mastopathy N60.19    Hyperlipidemia E78.5    H/O chest pain C35.603    Heart block AV second degree I44.1    Abnormal cardiovascular stress test R94.39    JAMIE (obstructive sleep apnea) G47.33    Super obesity E66.9    Mild asthma J45.909    Severe obstructive sleep apnea G47.33    Cardiac pacemaker Z95.0    Chronic cystitis N30.20    Asthma J45.909    Hypertension I10    Depression F32. A    Obstructive sleep apnea G47.33    Nocturnal oxygen desaturation G47.34    NSTEMI (non-ST elevated myocardial infarction) ([facility]) I21.4    PVD (peripheral vascular disease) ([facility]) I73.9    Morbid obesity with BMI of 45.0-49.9, adult ([facility]) E66.01, Z68.42    Moderate aortic stenosis I35.0    Hepatomegaly, not elsewhere classified R16.0    Arthritis M19.90    CAD (coronary artery disease) I25.10    Pneumonia due to COVID-19 virus U07.1, J12.82    Bilateral carotid artery stenosis I65.23    Moderate persistent asthma without complication C23.63       Past Medical History:        Diagnosis Date    Anemia     Anxiety 1978    Arthritis     generalized    Asthma 2006    AV block     2nd degree per hospital in june2013    Blood poisoning 1994    Blood transfusion     12/2003    CAD (coronary artery disease)     Cancer ([facility] [address] 2007    skin (face) & colon(2003)/ 2/2012 dx of right breast ca(pc)    Carcinoma of breast ([facility] [address] 02/29/2012    right arm no b/p or sticks    Cardiac pacemaker 03/10/2014    Medtronic dual lead    Chronic cystitis     Chronic respiratory failure (HCC)     2 L/min oxygen at night-time    Colon cancer ([facility] [address] 2013    COPD (chronic obstructive pulmonary disease) ([facility])     CTS (carpal tunnel syndrome)     Depression     Diverticulitis     H/O 24 hour EKG monitoring 2/28/2014 7/24/13 2/14 complete heart block 7/13No clinically significant arrthymia noted.  H/O cardiac catheterization 10/31/2011, 7/11/2007    10/31/2011-No significant CAD.  Cardiolite stress test was false positive-Dr Jay Jay Gould; 7/11/2007-No CAD, global function intact;    H/O cardiovascular stress test 10/20/2011, 3/23/2010    10/20/2011-Lexiscan- Evid of mild ischemia in Left CX region. EF 70%. Global LVSF normal;    H/O cardiovascular stress test 01/07/2015    EF 77%. Normal Stress Test.    H/O chest pain 04/2005    sees Dr Zohra Reyna H/O Doppler ultrasound 02/20/2008 2/20/2008-CAROTID DOPPLER- Normal carotids bilaterally;    H/O Doppler ultrasound 06/06/2013    CAROTID DOPPLER- there is heterogeneous, irregular atherosclerotic plaque noted in the right internal carotid artery,  doppler flow velocities within the right internal carotid artery are elevated, consistent with a mild, less than 50%stenosis, there is intimal thickening but no significant atherosclerotic plaque noted in the left internal carotid artery, the left carotid are within normal limits    H/O echocardiogram 4/9/2012, 10/20/2011    4/9/2012-LVSF normal EF=>55%. impaired LV relaxation;    H/O echocardiogram 12/31/2014    EF 50-55%. LV shows mild concentric hypertrophy. Mildly dilated left atrium. Mildly dilated right ventricle. Sclerotic but not stenotic aortic valve. Mitral annular calcification. Mild MR, Mild TR, Mild pulm HTN.    H/O urinary incontinence     History of blood transfusion     Hx antineoplastic chemo     Hx of Arterial Doppler ultrasound 07/01/2019    No hemodynamically significant or focal stenosis visualized bilaterally, bilateral lower extremity arteries exhibit diffuse plaque and multiphasic waveforms, unable to obtain bilateral ABIs due to elevated leg pressures >250 mmHg, possibly due to atherosclerosis    Hx of cardiovascular stress test 06/2013    EF 70% abn suggestive of anterolateral ischemia, possible RCA ischmeia, post left ventricular dilation suggestive multivessel CAD.     Hx of echocardiogram 06/2013    EF50%, mild MR and TR, mild pulmonary HTN, mild AS    Hx of echocardiogram 11/01/2021    Left ventricular systolic function is normal, Mild concentric left ventricular hypertrophy Mildly dilated left atrium. Mitral annular calcification is present. Mild tricuspid regurgitation No evidence of any pericardial effusion    Hx of fall     \"last time 2018- due to neuropathy, have to use cane\"    HX OTHER MEDICAL 7/24/13, 06/2013 7/13-No clinacally significant arrhythmia. 6/13-multiple cycles of wenckebach episodes in addtion to some sinus tach    Hyperlipidemia     Hypertension     Hypothermia 2008    Malignant neoplasm of breast (female) ([facility] [address] 2012    Neuropathy     \"have neuropathy of my legs\"    Normocytic normochromic anemia     Obstructive sleep apnea 2008 01- Has CPAP machine but has not used in over a year - states she has not had problems since her pacemaker was placed.  Old MI (myocardial infarction)     per pt on 1/15/2019\"had heart attack about a year ago\"    Osteoarthritis     Osteopenia     Primary malignant neoplasm of colon ([facility] [address]     S/P cardiac cath 06/2013    no significant CAD false postive stress test    Skin cancer     Super obesity     Umbilical hernia        Past Surgical History:        Procedure Laterality Date    A-V CARDIAC PACEMAKER INSERTION  3/10/14     Medtronic. Model:  [id number] Medtronic  Serial:  [id number] dual chamber pacemaker    APPENDECTOMY  2004    BREAST BIOPSY  2/13/12    BREAST LUMPECTOMY  2007    right     BREAST SURGERY  02/13/2012    rt lesion biopsy     CARDIAC CATHETERIZATION  2007 & 2011    COLECTOMY  2004    Colon cancer    COLONOSCOPY  10-18-13    polyp    COLONOSCOPY  1/19/2016    internal hemorrhoids, diverticulosis, 1.5 cm sessile polyp, 8 mm polyp found in rectum.     COLONOSCOPY  12/14/2017    1.5cm residual polyp, 5mm polyps in blind sigmoid loop x3, Sigmoid divertics, Internal grade 1 hemorrhoids    COLONOSCOPY N/A 1/16/2019    COLONOSCOPY W/ ENDOSCOPIC MUCOSAL RESECTION WITH ELEVIEW 5ML INJECTION AND POLYPECTOMY OF TIP OF BLIND RECTOSIGMOID STUMP AND CAUTERIZATION WITH ERBE PROBE, CLIPPING X2 performed by Vince Pickett MD at [facility]  01/21/2020    pan divertics/ 4 polyps/ sm int hem, S/P partial sigmoid resection w/ end to side anastamosis, repeat in 3 years    COLONOSCOPY N/A 1/21/2020    COLONOSCOPY POLYPECTOMY SNARE/COLD BIOPSY performed by Vince Pickett MD at [facility]  2003    back   [address] Bilateral 12/15/14    DENTAL SURGERY      front right tooth and root canal w/ brass bolt    DILATION AND CURETTAGE OF UTERUS  2006    Needed a camera to find my uterus.  ENDOSCOPY, COLON, DIAGNOSTIC  12/14/2017    Small hiatal hernia    HERNIA REPAIR  2004    Umb hernia    HERNIA REPAIR  2008    Inc hernia    LYMPH NODE DISSECTION  2/29/2012    Right axillary-3 sentinel nodes were positive out of 9.    MASTECTOMY, RADICAL Right 02/29/2012    w/ sentinal node disection-Dr West    PACEMAKER PLACEMENT      PRE-MALIGNANT / BENIGN SKIN LESION EXCISION  2/8/2013    right leg X2-Dr West    TONSILLECTOMY  1957    TUNNELED VENOUS PORT PLACEMENT  2004    TUNNELED VENOUS PORT PLACEMENT  02/13/2012    removal of mediport       Social History:    Social History     Tobacco Use    Smoking status: Never Smoker    Smokeless tobacco: Never Used   Substance Use Topics    Alcohol use: No     Comment:           CAFFEINE: 2- 12oz nona daily & 2 cups coffee some nights.                                 Counseling given: Not Answered      Vital Signs (Current):   Vitals:    11/24/21 1559   Weight: 275 lb (124.7 kg)   Height: 5' 2\" (1.575 m)                                              BP Readings from Last 3 Encounters:   11/23/21 125/80   11/04/21 (!) 148/62   11/04/21 (!) 140/60       NPO Status:                                                                                 BMI:   Wt Readings from Last 3 Encounters:   11/23/21 275 lb (124.7 kg)   11/04/21 287 lb (130.2 kg)   11/04/21 287 lb (130.2 kg)     Body mass index is 50.3 kg/m².    CBC:   Lab Results   Component Value Date    WBC 10.9 11/29/2021    RBC 4.32 11/29/2021    HGB 12.2 11/29/2021    HCT 38.2 11/29/2021    MCV 88.4 11/29/2021    RDW 14.3 11/29/2021     11/29/2021       CMP:   Lab Results   Component Value Date     11/29/2021    K 3.8 11/29/2021     11/29/2021    CO2 29 11/29/2021    BUN 17 11/29/2021    CREATININE 0.7 11/29/2021    GFRAA >60 11/29/2021    AGRATIO 1.3 07/29/2021    LABGLOM >60 11/29/2021    GLUCOSE 105 11/29/2021    PROT 7.3 11/04/2021    PROT 6.6 10/17/2012    CALCIUM 9.5 11/29/2021    BILITOT 0.2 11/04/2021    ALKPHOS 108 11/04/2021    AST 25 11/04/2021    ALT 20 11/04/2021       POC Tests: No results for input(s): POCGLU, POCNA, POCK, POCCL, POCBUN, POCHEMO, POCHCT in the last 72 hours.     Coags:   Lab Results   Component Value Date    PROTIME 11.4 11/29/2021    PROTIME 11.2 10/28/2011    INR 0.88 11/29/2021    APTT 26.6 11/29/2021       HCG (If Applicable): No results found for: PREGTESTUR, PREGSERUM, HCG, HCGQUANT     ABGs: No results found for: PHART, PO2ART, QND1MSJ, LLB8NOV, BEART, Y0APUOEH     Type & Screen (If Applicable):  No results found for: LABABO, LABRH    Drug/Infectious Status (If Applicable):  Lab Results   Component Value Date    HEPCAB NON REACTIVE 09/01/2017       COVID-19 Screening (If Applicable):   Lab Results   Component Value Date    COVID19 NOT DETECTED 11/29/2021           Anesthesia Evaluation  Patient summary reviewed and Nursing notes reviewed  Airway: Mallampati: II  TM distance: >3 FB   Neck ROM: full  Mouth opening: > = 3 FB Dental:          Pulmonary:normal exam    (+) COPD:  sleep apnea: on noncompliant,  asthma:                           ROS comment: 2 L/min oxygen at night-time   Cardiovascular:    (+) hypertension:, valvular problems/murmurs: AS, pacemaker:, past MI:, CAD:, hyperlipidemia      ECG reviewed                     ROS comment: Atrial-sensed ventricular-paced rhythm   Abnormal ECG   When compared with ECG of 04-NOV-2021 22:07,   Vent. rate has decreased BY   6 BPM   Confirmed by [facility] ([id number]) on 11/30/2021 6:19:20 PM     Summary   Left ventricular systolic function is normal, 55-60%. Mild concentric left ventricular hypertrophy. Grade I diastolic dysfunction. Mildly dilated left atrium. Moderate aortic insufficiency with  of msec. Moderate aortic stenosis with mean gradient of 27 mmHg. Mid-to-Moderate mitral regurgitation is present. Mitral annular calcification is present. Mild tricuspid regurgitation. Mildly elevated pulmonary artery pressure, RVSP 37 mmHg. No evidence of any pericardial effusion. reecho IN 6 mos      Signature      ------------------------------------------------------------------   Electronically signed by Tami Deutsch MD   (Interpreting physician) on 11/02/2021 at 02:23 PM     Neuro/Psych:   (+) depression/anxiety             GI/Hepatic/Renal:   (+) liver disease:, morbid obesity          Endo/Other:    (+) blood dyscrasia: anemia, arthritis:., malignancy/cancer. Abdominal:             Vascular:   + PVD, aortic or cerebral, . ROS comment: Approximately 90% stenosis involving the proximal right cervical internal  carotid artery.     Approximately 50% stenosis involving the proximal left cervical internal  carotid artery.     Severe stenosis involving the origin of the left vertebral artery.     Hypoplastic right vertebral artery. . Other Findings: Poor dentition          Anesthesia Plan      general     ASA 4     (Type and cross? Chart review 12/2/21)  Induction: intravenous. arterial line and BIS  MIPS: Postoperative opioids intended. Anesthetic plan and risks discussed with patient. Plan discussed with CRNA.     Attending anesthesiologist reviewed and agrees with Pre Eval content            AUSTIN Fernandez - CRNA   12/2/2021

## 2021-12-03 ENCOUNTER — ANESTHESIA (OUTPATIENT)
Dept: OPERATING ROOM | Age: 71
DRG: 039 | End: 2021-12-03
Payer: MEDICARE

## 2021-12-03 ENCOUNTER — HOSPITAL ENCOUNTER (INPATIENT)
Age: 71
LOS: 2 days | Discharge: HOME | DRG: 039 | End: 2021-12-05
Attending: SURGERY | Admitting: SURGERY
Payer: MEDICARE

## 2021-12-03 VITALS
OXYGEN SATURATION: 91 % | DIASTOLIC BLOOD PRESSURE: 77 MMHG | TEMPERATURE: 96.9 F | RESPIRATION RATE: 5 BRPM | SYSTOLIC BLOOD PRESSURE: 182 MMHG

## 2021-12-03 DIAGNOSIS — Z01.818 PRE-OP TESTING: ICD-10-CM

## 2021-12-03 DIAGNOSIS — I65.23 BILATERAL CAROTID ARTERY STENOSIS: ICD-10-CM

## 2021-12-03 DIAGNOSIS — Z01.818 ENCOUNTER FOR PREADMISSION TESTING: Primary | ICD-10-CM

## 2021-12-03 PROBLEM — I65.21 CAROTID STENOSIS, RIGHT: Status: ACTIVE | Noted: 2021-12-03

## 2021-12-03 PROCEDURE — 6360000002 HC RX W HCPCS: Performed by: SURGERY

## 2021-12-03 PROCEDURE — 2580000003 HC RX 258

## 2021-12-03 PROCEDURE — 6370000000 HC RX 637 (ALT 250 FOR IP): Performed by: SURGERY

## 2021-12-03 PROCEDURE — 03UK0KZ SUPPLEMENT RIGHT INTERNAL CAROTID ARTERY WITH NONAUTOLOGOUS TISSUE SUBSTITUTE, OPEN APPROACH: ICD-10-PCS | Performed by: SURGERY

## 2021-12-03 PROCEDURE — 6360000002 HC RX W HCPCS

## 2021-12-03 PROCEDURE — 03CM0ZZ EXTIRPATION OF MATTER FROM RIGHT EXTERNAL CAROTID ARTERY, OPEN APPROACH: ICD-10-PCS | Performed by: SURGERY

## 2021-12-03 PROCEDURE — 2709999900 HC NON-CHARGEABLE SUPPLY: Performed by: SURGERY

## 2021-12-03 PROCEDURE — 6360000002 HC RX W HCPCS: Performed by: PHYSICIAN ASSISTANT

## 2021-12-03 PROCEDURE — 2580000003 HC RX 258: Performed by: SURGERY

## 2021-12-03 PROCEDURE — 2580000003 HC RX 258: Performed by: PHYSICIAN ASSISTANT

## 2021-12-03 PROCEDURE — 7100000000 HC PACU RECOVERY - FIRST 15 MIN: Performed by: SURGERY

## 2021-12-03 PROCEDURE — 6370000000 HC RX 637 (ALT 250 FOR IP): Performed by: PHYSICIAN ASSISTANT

## 2021-12-03 PROCEDURE — 2500000003 HC RX 250 WO HCPCS

## 2021-12-03 PROCEDURE — 2580000003 HC RX 258: Performed by: SPECIALIST

## 2021-12-03 PROCEDURE — 3700000001 HC ADD 15 MINUTES (ANESTHESIA): Performed by: SURGERY

## 2021-12-03 PROCEDURE — 2000000000 HC ICU R&B

## 2021-12-03 PROCEDURE — 3700000000 HC ANESTHESIA ATTENDED CARE: Performed by: SURGERY

## 2021-12-03 PROCEDURE — 88300 SURGICAL PATH GROSS: CPT

## 2021-12-03 PROCEDURE — C1768 GRAFT, VASCULAR: HCPCS | Performed by: SURGERY

## 2021-12-03 PROCEDURE — 03UM0KZ SUPPLEMENT RIGHT EXTERNAL CAROTID ARTERY WITH NONAUTOLOGOUS TISSUE SUBSTITUTE, OPEN APPROACH: ICD-10-PCS | Performed by: SURGERY

## 2021-12-03 PROCEDURE — 2500000003 HC RX 250 WO HCPCS: Performed by: SURGERY

## 2021-12-03 PROCEDURE — 7100000001 HC PACU RECOVERY - ADDTL 15 MIN: Performed by: SURGERY

## 2021-12-03 PROCEDURE — 2580000003 HC RX 258: Performed by: ANESTHESIOLOGY

## 2021-12-03 PROCEDURE — 2500000003 HC RX 250 WO HCPCS: Performed by: SPECIALIST

## 2021-12-03 PROCEDURE — 03CH0ZZ EXTIRPATION OF MATTER FROM RIGHT COMMON CAROTID ARTERY, OPEN APPROACH: ICD-10-PCS | Performed by: SURGERY

## 2021-12-03 PROCEDURE — 94640 AIRWAY INHALATION TREATMENT: CPT

## 2021-12-03 PROCEDURE — 03CK0ZZ EXTIRPATION OF MATTER FROM RIGHT INTERNAL CAROTID ARTERY, OPEN APPROACH: ICD-10-PCS | Performed by: SURGERY

## 2021-12-03 PROCEDURE — 3600000014 HC SURGERY LEVEL 4 ADDTL 15MIN: Performed by: SURGERY

## 2021-12-03 PROCEDURE — 03UH0KZ SUPPLEMENT RIGHT COMMON CAROTID ARTERY WITH NONAUTOLOGOUS TISSUE SUBSTITUTE, OPEN APPROACH: ICD-10-PCS | Performed by: SURGERY

## 2021-12-03 PROCEDURE — 6360000002 HC RX W HCPCS: Performed by: ANESTHESIOLOGY

## 2021-12-03 PROCEDURE — 6370000000 HC RX 637 (ALT 250 FOR IP)

## 2021-12-03 PROCEDURE — 3600000004 HC SURGERY LEVEL 4 BASE: Performed by: SURGERY

## 2021-12-03 DEVICE — GRAFT VASC W1XL10CM THK6 PLY SYNTH CROSSLINKED ELAS FOR VASC: Type: IMPLANTABLE DEVICE | Site: NECK | Status: FUNCTIONAL

## 2021-12-03 RX ORDER — ONDANSETRON 2 MG/ML
4 INJECTION INTRAMUSCULAR; INTRAVENOUS
Status: COMPLETED | OUTPATIENT
Start: 2021-12-03 | End: 2021-12-03

## 2021-12-03 RX ORDER — LIDOCAINE HYDROCHLORIDE 10 MG/ML
INJECTION, SOLUTION EPIDURAL; INFILTRATION; INTRACAUDAL; PERINEURAL
Status: COMPLETED | OUTPATIENT
Start: 2021-12-03 | End: 2021-12-03

## 2021-12-03 RX ORDER — HYDROCODONE BITARTRATE AND ACETAMINOPHEN 5; 325 MG/1; MG/1
1 TABLET ORAL PRN
Status: DISCONTINUED | OUTPATIENT
Start: 2021-12-03 | End: 2021-12-03 | Stop reason: HOSPADM

## 2021-12-03 RX ORDER — DEXAMETHASONE SODIUM PHOSPHATE 4 MG/ML
INJECTION, SOLUTION INTRA-ARTICULAR; INTRALESIONAL; INTRAMUSCULAR; INTRAVENOUS; SOFT TISSUE PRN
Status: DISCONTINUED | OUTPATIENT
Start: 2021-12-03 | End: 2021-12-03 | Stop reason: SDUPTHER

## 2021-12-03 RX ORDER — MEPERIDINE HYDROCHLORIDE 25 MG/ML
12.5 INJECTION INTRAMUSCULAR; INTRAVENOUS; SUBCUTANEOUS EVERY 5 MIN PRN
Status: DISCONTINUED | OUTPATIENT
Start: 2021-12-03 | End: 2021-12-03 | Stop reason: HOSPADM

## 2021-12-03 RX ORDER — FLUTICASONE PROPIONATE 110 UG/1
2 AEROSOL, METERED RESPIRATORY (INHALATION) 2 TIMES DAILY
Status: DISCONTINUED | OUTPATIENT
Start: 2021-12-03 | End: 2021-12-05 | Stop reason: HOSPADM

## 2021-12-03 RX ORDER — 0.9 % SODIUM CHLORIDE 0.9 %
500 INTRAVENOUS SOLUTION INTRAVENOUS
Status: DISCONTINUED | OUTPATIENT
Start: 2021-12-03 | End: 2021-12-03 | Stop reason: HOSPADM

## 2021-12-03 RX ORDER — LETROZOLE 2.5 MG/1
2.5 TABLET, FILM COATED ORAL NIGHTLY
Status: DISCONTINUED | OUTPATIENT
Start: 2021-12-03 | End: 2021-12-05 | Stop reason: HOSPADM

## 2021-12-03 RX ORDER — LABETALOL HYDROCHLORIDE 5 MG/ML
INJECTION, SOLUTION INTRAVENOUS PRN
Status: DISCONTINUED | OUTPATIENT
Start: 2021-12-03 | End: 2021-12-03 | Stop reason: SDUPTHER

## 2021-12-03 RX ORDER — ALBUTEROL SULFATE 90 UG/1
1 AEROSOL, METERED RESPIRATORY (INHALATION) EVERY 6 HOURS PRN
Status: DISCONTINUED | OUTPATIENT
Start: 2021-12-03 | End: 2021-12-05 | Stop reason: HOSPADM

## 2021-12-03 RX ORDER — ASPIRIN 81 MG/1
81 TABLET ORAL NIGHTLY
Status: DISCONTINUED | OUTPATIENT
Start: 2021-12-03 | End: 2021-12-05 | Stop reason: HOSPADM

## 2021-12-03 RX ORDER — SODIUM CHLORIDE, SODIUM LACTATE, POTASSIUM CHLORIDE, CALCIUM CHLORIDE 600; 310; 30; 20 MG/100ML; MG/100ML; MG/100ML; MG/100ML
INJECTION, SOLUTION INTRAVENOUS CONTINUOUS
Status: DISCONTINUED | OUTPATIENT
Start: 2021-12-03 | End: 2021-12-03

## 2021-12-03 RX ORDER — PROTAMINE SULFATE 10 MG/ML
INJECTION, SOLUTION INTRAVENOUS PRN
Status: DISCONTINUED | OUTPATIENT
Start: 2021-12-03 | End: 2021-12-03 | Stop reason: SDUPTHER

## 2021-12-03 RX ORDER — ONDANSETRON 2 MG/ML
INJECTION INTRAMUSCULAR; INTRAVENOUS PRN
Status: DISCONTINUED | OUTPATIENT
Start: 2021-12-03 | End: 2021-12-03 | Stop reason: SDUPTHER

## 2021-12-03 RX ORDER — SIMVASTATIN 10 MG
10 TABLET ORAL NIGHTLY
Status: DISCONTINUED | OUTPATIENT
Start: 2021-12-03 | End: 2021-12-05 | Stop reason: HOSPADM

## 2021-12-03 RX ORDER — CEFAZOLIN SODIUM 2 G/100ML
2000 INJECTION, SOLUTION INTRAVENOUS ONCE
Status: COMPLETED | OUTPATIENT
Start: 2021-12-03 | End: 2021-12-03

## 2021-12-03 RX ORDER — SODIUM CHLORIDE 9 MG/ML
INJECTION, SOLUTION INTRAVENOUS CONTINUOUS PRN
Status: DISCONTINUED | OUTPATIENT
Start: 2021-12-03 | End: 2021-12-03 | Stop reason: SDUPTHER

## 2021-12-03 RX ORDER — SODIUM CHLORIDE 450 MG/100ML
INJECTION, SOLUTION INTRAVENOUS CONTINUOUS
Status: DISCONTINUED | OUTPATIENT
Start: 2021-12-03 | End: 2021-12-05 | Stop reason: HOSPADM

## 2021-12-03 RX ORDER — LABETALOL HYDROCHLORIDE 5 MG/ML
5 INJECTION, SOLUTION INTRAVENOUS EVERY 10 MIN PRN
Status: DISCONTINUED | OUTPATIENT
Start: 2021-12-03 | End: 2021-12-03 | Stop reason: HOSPADM

## 2021-12-03 RX ORDER — MORPHINE SULFATE 2 MG/ML
2 INJECTION, SOLUTION INTRAMUSCULAR; INTRAVENOUS
Status: DISCONTINUED | OUTPATIENT
Start: 2021-12-03 | End: 2021-12-05 | Stop reason: HOSPADM

## 2021-12-03 RX ORDER — SODIUM CHLORIDE 9 MG/ML
25 INJECTION, SOLUTION INTRAVENOUS PRN
Status: DISCONTINUED | OUTPATIENT
Start: 2021-12-03 | End: 2021-12-05 | Stop reason: HOSPADM

## 2021-12-03 RX ORDER — DIPHENHYDRAMINE HYDROCHLORIDE 50 MG/ML
12.5 INJECTION INTRAMUSCULAR; INTRAVENOUS
Status: DISCONTINUED | OUTPATIENT
Start: 2021-12-03 | End: 2021-12-03 | Stop reason: HOSPADM

## 2021-12-03 RX ORDER — TRAMADOL HYDROCHLORIDE 50 MG/1
50 TABLET ORAL EVERY 6 HOURS PRN
Status: DISCONTINUED | OUTPATIENT
Start: 2021-12-03 | End: 2021-12-05 | Stop reason: HOSPADM

## 2021-12-03 RX ORDER — PROMETHAZINE HYDROCHLORIDE 25 MG/ML
6.25 INJECTION, SOLUTION INTRAMUSCULAR; INTRAVENOUS
Status: DISCONTINUED | OUTPATIENT
Start: 2021-12-03 | End: 2021-12-03 | Stop reason: HOSPADM

## 2021-12-03 RX ORDER — CEFAZOLIN SODIUM 2 G/100ML
2000 INJECTION, SOLUTION INTRAVENOUS EVERY 8 HOURS
Status: COMPLETED | OUTPATIENT
Start: 2021-12-03 | End: 2021-12-04

## 2021-12-03 RX ORDER — FUROSEMIDE 10 MG/ML
INJECTION INTRAMUSCULAR; INTRAVENOUS PRN
Status: DISCONTINUED | OUTPATIENT
Start: 2021-12-03 | End: 2021-12-03 | Stop reason: SDUPTHER

## 2021-12-03 RX ORDER — HYDROMORPHONE HCL 110MG/55ML
0.5 PATIENT CONTROLLED ANALGESIA SYRINGE INTRAVENOUS EVERY 5 MIN PRN
Status: DISCONTINUED | OUTPATIENT
Start: 2021-12-03 | End: 2021-12-03 | Stop reason: HOSPADM

## 2021-12-03 RX ORDER — HEPARIN SODIUM 1000 [USP'U]/ML
INJECTION, SOLUTION INTRAVENOUS; SUBCUTANEOUS PRN
Status: DISCONTINUED | OUTPATIENT
Start: 2021-12-03 | End: 2021-12-03 | Stop reason: SDUPTHER

## 2021-12-03 RX ORDER — PROPOFOL 10 MG/ML
INJECTION, EMULSION INTRAVENOUS PRN
Status: DISCONTINUED | OUTPATIENT
Start: 2021-12-03 | End: 2021-12-03 | Stop reason: SDUPTHER

## 2021-12-03 RX ORDER — HYDRALAZINE HYDROCHLORIDE 20 MG/ML
5 INJECTION INTRAMUSCULAR; INTRAVENOUS EVERY 10 MIN PRN
Status: DISCONTINUED | OUTPATIENT
Start: 2021-12-03 | End: 2021-12-03 | Stop reason: HOSPADM

## 2021-12-03 RX ORDER — FENTANYL CITRATE 50 UG/ML
50 INJECTION, SOLUTION INTRAMUSCULAR; INTRAVENOUS EVERY 5 MIN PRN
Status: DISCONTINUED | OUTPATIENT
Start: 2021-12-03 | End: 2021-12-03 | Stop reason: HOSPADM

## 2021-12-03 RX ORDER — HYDROCODONE BITARTRATE AND ACETAMINOPHEN 5; 325 MG/1; MG/1
2 TABLET ORAL EVERY 6 HOURS PRN
Status: DISCONTINUED | OUTPATIENT
Start: 2021-12-03 | End: 2021-12-03 | Stop reason: HOSPADM

## 2021-12-03 RX ORDER — ACETAMINOPHEN 325 MG/1
650 TABLET ORAL EVERY 4 HOURS PRN
Status: DISCONTINUED | OUTPATIENT
Start: 2021-12-03 | End: 2021-12-05 | Stop reason: HOSPADM

## 2021-12-03 RX ORDER — LIDOCAINE HYDROCHLORIDE 20 MG/ML
INJECTION, SOLUTION INTRAVENOUS PRN
Status: DISCONTINUED | OUTPATIENT
Start: 2021-12-03 | End: 2021-12-03 | Stop reason: SDUPTHER

## 2021-12-03 RX ORDER — ROCURONIUM BROMIDE 10 MG/ML
INJECTION, SOLUTION INTRAVENOUS PRN
Status: DISCONTINUED | OUTPATIENT
Start: 2021-12-03 | End: 2021-12-03 | Stop reason: SDUPTHER

## 2021-12-03 RX ORDER — ALBUTEROL SULFATE 90 UG/1
1 AEROSOL, METERED RESPIRATORY (INHALATION) 4 TIMES DAILY
Status: DISCONTINUED | OUTPATIENT
Start: 2021-12-03 | End: 2021-12-03

## 2021-12-03 RX ORDER — ALBUTEROL SULFATE 90 UG/1
1 AEROSOL, METERED RESPIRATORY (INHALATION) EVERY 4 HOURS PRN
Status: DISCONTINUED | OUTPATIENT
Start: 2021-12-03 | End: 2021-12-05 | Stop reason: HOSPADM

## 2021-12-03 RX ORDER — SERTRALINE HYDROCHLORIDE 100 MG/1
200 TABLET, FILM COATED ORAL EVERY MORNING
Status: DISCONTINUED | OUTPATIENT
Start: 2021-12-03 | End: 2021-12-05 | Stop reason: HOSPADM

## 2021-12-03 RX ORDER — SODIUM CHLORIDE 0.9 % (FLUSH) 0.9 %
10 SYRINGE (ML) INJECTION PRN
Status: DISCONTINUED | OUTPATIENT
Start: 2021-12-03 | End: 2021-12-05 | Stop reason: HOSPADM

## 2021-12-03 RX ORDER — ONDANSETRON 2 MG/ML
4 INJECTION INTRAMUSCULAR; INTRAVENOUS EVERY 6 HOURS PRN
Status: DISCONTINUED | OUTPATIENT
Start: 2021-12-03 | End: 2021-12-05 | Stop reason: HOSPADM

## 2021-12-03 RX ORDER — SODIUM CHLORIDE 0.9 % (FLUSH) 0.9 %
10 SYRINGE (ML) INJECTION EVERY 12 HOURS SCHEDULED
Status: DISCONTINUED | OUTPATIENT
Start: 2021-12-03 | End: 2021-12-05 | Stop reason: HOSPADM

## 2021-12-03 RX ORDER — FENTANYL CITRATE 50 UG/ML
INJECTION, SOLUTION INTRAMUSCULAR; INTRAVENOUS PRN
Status: DISCONTINUED | OUTPATIENT
Start: 2021-12-03 | End: 2021-12-03 | Stop reason: SDUPTHER

## 2021-12-03 RX ADMIN — DEXTROSE MONOHYDRATE 5 MG/HR: 50 INJECTION, SOLUTION INTRAVENOUS at 09:06

## 2021-12-03 RX ADMIN — FENTANYL CITRATE 25 MCG: 50 INJECTION, SOLUTION INTRAMUSCULAR; INTRAVENOUS at 09:41

## 2021-12-03 RX ADMIN — PROTAMINE SULFATE 50 MG: 10 INJECTION, SOLUTION INTRAVENOUS at 08:48

## 2021-12-03 RX ADMIN — ONDANSETRON 4 MG: 2 INJECTION INTRAMUSCULAR; INTRAVENOUS at 08:33

## 2021-12-03 RX ADMIN — PROPOFOL 120 MG: 10 INJECTION, EMULSION INTRAVENOUS at 07:51

## 2021-12-03 RX ADMIN — DEXAMETHASONE SODIUM PHOSPHATE 4 MG: 4 INJECTION, SOLUTION INTRAMUSCULAR; INTRAVENOUS at 08:02

## 2021-12-03 RX ADMIN — Medication 2 PUFF: at 19:51

## 2021-12-03 RX ADMIN — FENTANYL CITRATE 25 MCG: 50 INJECTION, SOLUTION INTRAMUSCULAR; INTRAVENOUS at 08:23

## 2021-12-03 RX ADMIN — SODIUM CHLORIDE: 9 INJECTION, SOLUTION INTRAVENOUS at 07:58

## 2021-12-03 RX ADMIN — LIDOCAINE HYDROCHLORIDE 80 MG: 20 INJECTION, SOLUTION INTRAVENOUS at 07:51

## 2021-12-03 RX ADMIN — FUROSEMIDE 20 MG: 10 INJECTION, SOLUTION INTRAMUSCULAR; INTRAVENOUS at 09:17

## 2021-12-03 RX ADMIN — FENTANYL CITRATE 50 MCG: 50 INJECTION, SOLUTION INTRAMUSCULAR; INTRAVENOUS at 08:06

## 2021-12-03 RX ADMIN — FENTANYL CITRATE 50 MCG: 50 INJECTION, SOLUTION INTRAMUSCULAR; INTRAVENOUS at 07:51

## 2021-12-03 RX ADMIN — CEFAZOLIN SODIUM 2000 MG: 2 INJECTION, SOLUTION INTRAVENOUS at 15:52

## 2021-12-03 RX ADMIN — SODIUM CHLORIDE, POTASSIUM CHLORIDE, SODIUM LACTATE AND CALCIUM CHLORIDE: 600; 310; 30; 20 INJECTION, SOLUTION INTRAVENOUS at 07:28

## 2021-12-03 RX ADMIN — LETROZOLE 2.5 MG: 2.5 TABLET ORAL at 20:09

## 2021-12-03 RX ADMIN — SODIUM CHLORIDE, PRESERVATIVE FREE 10 ML: 5 INJECTION INTRAVENOUS at 20:10

## 2021-12-03 RX ADMIN — CEFAZOLIN SODIUM 2000 MG: 2 INJECTION, SOLUTION INTRAVENOUS at 07:54

## 2021-12-03 RX ADMIN — LABETALOL HYDROCHLORIDE 10 MG: 5 INJECTION, SOLUTION INTRAVENOUS at 09:24

## 2021-12-03 RX ADMIN — HEPARIN SODIUM 5000 UNITS: 1000 INJECTION, SOLUTION INTRAVENOUS; SUBCUTANEOUS at 08:24

## 2021-12-03 RX ADMIN — ONDANSETRON 4 MG: 2 INJECTION INTRAMUSCULAR; INTRAVENOUS at 09:53

## 2021-12-03 RX ADMIN — SODIUM CHLORIDE: 4.5 INJECTION, SOLUTION INTRAVENOUS at 22:33

## 2021-12-03 RX ADMIN — ROCURONIUM BROMIDE 50 MG: 10 INJECTION INTRAVENOUS at 07:52

## 2021-12-03 RX ADMIN — ASPIRIN 81 MG: 81 TABLET, COATED ORAL at 20:09

## 2021-12-03 RX ADMIN — SODIUM CHLORIDE: 4.5 INJECTION, SOLUTION INTRAVENOUS at 10:09

## 2021-12-03 RX ADMIN — SUGAMMADEX 300 MG: 100 INJECTION, SOLUTION INTRAVENOUS at 09:10

## 2021-12-03 RX ADMIN — SIMVASTATIN 10 MG: 10 TABLET, FILM COATED ORAL at 20:09

## 2021-12-03 ASSESSMENT — PULMONARY FUNCTION TESTS
PIF_VALUE: 31
PIF_VALUE: 29
PIF_VALUE: 30
PIF_VALUE: 29
PIF_VALUE: 23
PIF_VALUE: 30
PIF_VALUE: 30
PIF_VALUE: 0
PIF_VALUE: 30
PIF_VALUE: 0
PIF_VALUE: 4
PIF_VALUE: 30
PIF_VALUE: 30
PIF_VALUE: 0
PIF_VALUE: 21
PIF_VALUE: 1
PIF_VALUE: 33
PIF_VALUE: 30
PIF_VALUE: 31
PIF_VALUE: 31
PIF_VALUE: 6
PIF_VALUE: 33
PIF_VALUE: 29
PIF_VALUE: 32
PIF_VALUE: 30
PIF_VALUE: 0
PIF_VALUE: 2
PIF_VALUE: 30
PIF_VALUE: 29
PIF_VALUE: 2
PIF_VALUE: 32
PIF_VALUE: 21
PIF_VALUE: 29
PIF_VALUE: 0
PIF_VALUE: 1
PIF_VALUE: 1
PIF_VALUE: 31
PIF_VALUE: 1
PIF_VALUE: 0
PIF_VALUE: 1
PIF_VALUE: 8
PIF_VALUE: 32
PIF_VALUE: 32
PIF_VALUE: 4
PIF_VALUE: 31
PIF_VALUE: 29
PIF_VALUE: 29
PIF_VALUE: 19
PIF_VALUE: 0
PIF_VALUE: 1
PIF_VALUE: 6
PIF_VALUE: 34
PIF_VALUE: 30
PIF_VALUE: 30
PIF_VALUE: 1
PIF_VALUE: 31
PIF_VALUE: 32
PIF_VALUE: 0
PIF_VALUE: 29
PIF_VALUE: 19
PIF_VALUE: 7
PIF_VALUE: 1
PIF_VALUE: 29
PIF_VALUE: 0
PIF_VALUE: 26
PIF_VALUE: 7
PIF_VALUE: 32
PIF_VALUE: 30
PIF_VALUE: 30
PIF_VALUE: 32
PIF_VALUE: 27
PIF_VALUE: 30
PIF_VALUE: 32
PIF_VALUE: 32
PIF_VALUE: 30
PIF_VALUE: 1
PIF_VALUE: 9
PIF_VALUE: 29
PIF_VALUE: 31
PIF_VALUE: 0
PIF_VALUE: 1
PIF_VALUE: 29
PIF_VALUE: 30
PIF_VALUE: 30
PIF_VALUE: 21
PIF_VALUE: 8
PIF_VALUE: 0
PIF_VALUE: 6
PIF_VALUE: 34
PIF_VALUE: 0
PIF_VALUE: 31
PIF_VALUE: 30
PIF_VALUE: 0
PIF_VALUE: 30
PIF_VALUE: 0
PIF_VALUE: 30
PIF_VALUE: 6
PIF_VALUE: 30
PIF_VALUE: 1
PIF_VALUE: 32
PIF_VALUE: 0
PIF_VALUE: 0
PIF_VALUE: 31
PIF_VALUE: 0
PIF_VALUE: 30
PIF_VALUE: 0
PIF_VALUE: 4
PIF_VALUE: 2
PIF_VALUE: 0
PIF_VALUE: 4
PIF_VALUE: 30
PIF_VALUE: 27
PIF_VALUE: 32
PIF_VALUE: 31
PIF_VALUE: 30
PIF_VALUE: 30
PIF_VALUE: 34
PIF_VALUE: 2
PIF_VALUE: 21
PIF_VALUE: 29
PIF_VALUE: 31
PIF_VALUE: 32
PIF_VALUE: 0
PIF_VALUE: 31
PIF_VALUE: 2
PIF_VALUE: 33
PIF_VALUE: 30
PIF_VALUE: 0
PIF_VALUE: 33
PIF_VALUE: 30
PIF_VALUE: 3
PIF_VALUE: 1
PIF_VALUE: 1

## 2021-12-03 ASSESSMENT — PAIN SCALES - GENERAL
PAINLEVEL_OUTOF10: 0

## 2021-12-03 ASSESSMENT — PAIN - FUNCTIONAL ASSESSMENT: PAIN_FUNCTIONAL_ASSESSMENT: 0-10

## 2021-12-03 NOTE — ANESTHESIA POSTPROCEDURE EVALUATION
Department of Anesthesiology  Postprocedure Note    Patient: Sin Arita  MRN: [id number]  YOB: 1950  Date of evaluation: 12/3/2021  Time:  9:46 AM     Procedure Summary     Date: 12/03/21 Room / Location: [address]    Anesthesia Start: 4826 Anesthesia Stop: 1880    Procedure: RIGHT CAROTID ENDARTERECTOMY WITH PATCH (Right Neck) Diagnosis:       Bilateral carotid artery stenosis      (Bilateral carotid artery stenosis [I65.23])    Surgeons: Robi Murray MD Responsible Provider: Bang May MD    Anesthesia Type: general ASA Status: 4          Anesthesia Type: general    Florian Phase I:      Florian Phase II:      Last vitals: Reviewed and per EMR flowsheets.        Anesthesia Post Evaluation    Patient location during evaluation: PACU  Patient participation: complete - patient participated  Level of consciousness: awake and alert  Pain score: 1  Airway patency: patent  Nausea & Vomiting: no nausea and no vomiting  Complications: no  Cardiovascular status: hemodynamically stable  Respiratory status: acceptable, spontaneous ventilation and face mask  Hydration status: stable

## 2021-12-03 NOTE — OP NOTE
Operative Note      Patient: Francine Pollard  YOB: 1950  MRN: [id number]    Date of Procedure: 12/3/2021    Pre-Op Diagnosis: Bilateral carotid artery stenosis [I65.23]    Post-Op Diagnosis: Same       Procedure(s):  RIGHT CAROTID ENDARTERECTOMY    Surgeon(s):  MD Willy Araujo MD    Assistant:   Physician Assistant: Anjali Oshea PA-C    Anesthesia: General    Preoperative Diagnosis: Bilateral carotid artery stenosis [I65.23]  Carotid stenosis, right [I65.21]  Active Hospital Problems    Diagnosis Date Noted    Carotid stenosis, right [I65.21] 12/03/2021        Severe Carotid Stenosis   Postoperative Diagnosis: Bilateral carotid artery stenosis [I65.23]  Carotid stenosis, right [I65.21]  Active Hospital Problems    Diagnosis Date Noted    Carotid stenosis, right [I65.21] 12/03/2021       Operation: Right Carotid Endarterectomy Cormatrix patch      Anesthesia: General    Findings: Heavily calcific, ulcerated stenosis on the takeoff of the internal carotid artery    Procedure:  Patient was brought to the operating room after preoperative discussion with patient and family and review of the investigations. Preoperative arterial line and transcranial oxygen saturation monitoring was established. Time out per check list confirmed  Under general anesthesia right neck was prepared and draped. Incision was made along the anterior border of the sternomastoid muscle. The common carotid was dissected out circumferentially and dissected distally to the bifurcation. The internal carotid was dissected beyond the lesion. After heparinization clamps were placed on the internal followed by external and common carotid arteries in that order. Arteriotomy was made in the common carotid and carried through the plaque, into the internal beyond the lesion. The plaque was  shortly from the common carotid and removed smoothly from the internal and the external carotid.  All the loose plaques were removed. The arteriotomy was closed using a CorMatrix patch with a continuous prolene suture. Blood flow was established to the external followed by internal carotid arteries. Protamine was given to reverse the Heparin. Hemostasis was secured and confirmed throughout surgery. Wound was irrigated with antibiotic solution and closed in layers. Blood loss was minimal. Sponge and instrument count was correct before closure. Patient tolerated the procedure well. She was fully alert and awake without neurologic and cardiologic problems at the end of the procedure. We're optimistic that she will make a good recovery. Assistant  Surgeon helped with exposure and facilitated key portions of the procedure. Complications: None    Specimens:   ID Type Source Tests Collected by Time Destination   A : RIGHT CAROTID PLAQUE Specimen Neck SURGICAL PATHOLOGY Jefe Daly MD 12/3/2021 0840        Implants:  Implant Name Type Inv.  Item Serial No.  Lot No. LRB No. Used Action   GRAFT VASC [id number] THK6 PLY SYNTH CROSSLINKED ELAS FOR VASC  GRAFT VASC [id number] THK6 PLY SYNTH CROSSLINKED ELAS FOR VASC  CORMJJ Sales CARDIOVASCULAR [facility]- [id number] Right 1 Implanted         Drains:   [REMOVED] External Urinary Catheter (Removed)       Electronically signed by Salomón Bryant MD on 12/3/2021 at 9:10 AM

## 2021-12-04 LAB — GLUCOSE BLD-MCNC: 108 MG/DL (ref 70–99)

## 2021-12-04 PROCEDURE — 94150 VITAL CAPACITY TEST: CPT

## 2021-12-04 PROCEDURE — 2700000000 HC OXYGEN THERAPY PER DAY

## 2021-12-04 PROCEDURE — 85025 COMPLETE CBC W/AUTO DIFF WBC: CPT

## 2021-12-04 PROCEDURE — 94640 AIRWAY INHALATION TREATMENT: CPT

## 2021-12-04 PROCEDURE — 82962 GLUCOSE BLOOD TEST: CPT

## 2021-12-04 PROCEDURE — 2580000003 HC RX 258: Performed by: PHYSICIAN ASSISTANT

## 2021-12-04 PROCEDURE — 2000000000 HC ICU R&B

## 2021-12-04 PROCEDURE — 94664 DEMO&/EVAL PT USE INHALER: CPT

## 2021-12-04 PROCEDURE — 6360000002 HC RX W HCPCS: Performed by: PHYSICIAN ASSISTANT

## 2021-12-04 PROCEDURE — 94761 N-INVAS EAR/PLS OXIMETRY MLT: CPT

## 2021-12-04 PROCEDURE — 6370000000 HC RX 637 (ALT 250 FOR IP): Performed by: PHYSICIAN ASSISTANT

## 2021-12-04 RX ADMIN — LETROZOLE 2.5 MG: 2.5 TABLET ORAL at 20:45

## 2021-12-04 RX ADMIN — ACETAMINOPHEN 650 MG: 325 TABLET ORAL at 09:10

## 2021-12-04 RX ADMIN — ACETAMINOPHEN 650 MG: 325 TABLET ORAL at 17:22

## 2021-12-04 RX ADMIN — CEFAZOLIN SODIUM 2000 MG: 2 INJECTION, SOLUTION INTRAVENOUS at 00:28

## 2021-12-04 RX ADMIN — Medication 2 PUFF: at 19:43

## 2021-12-04 RX ADMIN — ASPIRIN 81 MG: 81 TABLET, COATED ORAL at 20:45

## 2021-12-04 RX ADMIN — SODIUM CHLORIDE, PRESERVATIVE FREE 10 ML: 5 INJECTION INTRAVENOUS at 20:45

## 2021-12-04 RX ADMIN — SIMVASTATIN 10 MG: 10 TABLET, FILM COATED ORAL at 20:45

## 2021-12-04 RX ADMIN — SODIUM CHLORIDE: 4.5 INJECTION, SOLUTION INTRAVENOUS at 17:23

## 2021-12-04 RX ADMIN — Medication 2 PUFF: at 08:31

## 2021-12-04 RX ADMIN — SERTRALINE HYDROCHLORIDE 200 MG: 100 TABLET ORAL at 09:10

## 2021-12-04 ASSESSMENT — PAIN SCALES - GENERAL
PAINLEVEL_OUTOF10: 3
PAINLEVEL_OUTOF10: 4

## 2021-12-04 NOTE — DISCHARGE SUMMARY
Discharge Summary     Patient ID  Aaron Tadeo   1950  [id number]      Primary Care Doctor:  Lashawn Hawthorne DO    Admit date: 12/3/2021   Discharge date: 12/5/21      Admitting Physician: Senait Molina MD   Discharge Physician: Patricia Melvin MD MD    Discharge Diagnoses: Active Hospital Problems    Diagnosis Date Noted    Carotid stenosis, right [I65.21] 12/03/2021    Super obesity [E66.9] 09/11/2013     Discharged Condition: stable. Hospital Course:  Upon admission underwent surgery of R CEA after discussion and preparation. Tolerated surgery well   Post op ; monitored rhythm, O2 sat, I/O, Labs, CXRs serially  Neuro status , BP and vital signs monitored  Started on diet and activity.   Uneventful recovery  At discharge, pt ambulating  Tolerating diet  Wounds clean  Voiding with baseline incontinece  Lungs clear   NSR  VS at discharge   Vitals:    12/05/21 1726   BP: (!) 132/92   Pulse: 72   Resp: 17   Temp: 98 °F (36.7 °C)   SpO2: 92%       Consults: none    Significant Diagnostic Studies:     Disposition: home    Patient Instructions:      Medication List        CONTINUE taking these medications      albuterol sulfate  (90 Base) MCG/ACT inhaler  Commonly known as: ProAir HFA  INHALE 2 PUFFS BY MOUTH EVERY SIX HOURS AS NEEDED FOR WHEEZING     albuterol-ipratropium  MCG/ACT Aers inhaler  Commonly known as: COMBIVENT RESPIMAT  Inhale 1 puff into the lungs 4 times daily     aspirin 81 MG EC tablet     ezetimibe 10 MG tablet  Commonly known as: ZETIA  TAKE 1 TABLET BY MOUTH EVERY MORNING     fluticasone 110 MCG/ACT inhaler  Commonly known as: Flovent HFA  Inhale 2 puffs into the lungs 2 times daily     Iron 325 (65 Fe) MG Tabs  Take 1 tablet by mouth every other day     letrozole 2.5 MG tablet  Commonly known as: FEMARA  Take 1 tablet by mouth nightly     Oscal 500/200 D-3 500-200 MG-UNIT per tablet  Generic drug: calcium-vitamin D     sertraline 100 MG tablet  Commonly known as: ZOLOFT  TAKE 2 TABLETS BY MOUTH EVERY MORNING     simvastatin 20 MG tablet  Commonly known as: ZOCOR  TAKE ONE (1) TABLET BY MOUTH NIGHTLY     TYLENOL 500 MG tablet  Generic drug: acetaminophen     vitamin B-12 1000 MCG tablet  Commonly known as: CYANOCOBALAMIN            Activity: activity as tolerated and no lifting, Driving, or Strenuous exercise until seen by physician in office  Diet: cardiac diet  Wound Care: as directed    Follow-up as directed at discharge    Signed: Belgica Varela MD    Time spent on discharge 35 minutes

## 2021-12-05 VITALS
HEART RATE: 72 BPM | SYSTOLIC BLOOD PRESSURE: 132 MMHG | OXYGEN SATURATION: 92 % | HEIGHT: 62 IN | BODY MASS INDEX: 50.61 KG/M2 | RESPIRATION RATE: 17 BRPM | WEIGHT: 275 LBS | DIASTOLIC BLOOD PRESSURE: 92 MMHG | TEMPERATURE: 98 F

## 2021-12-05 LAB
ANION GAP SERPL CALCULATED.3IONS-SCNC: 8 MMOL/L (ref 4–16)
BUN BLDV-MCNC: 13 MG/DL (ref 6–23)
CALCIUM SERPL-MCNC: 9.1 MG/DL (ref 8.3–10.6)
CHLORIDE BLD-SCNC: 102 MMOL/L (ref 99–110)
CO2: 29 MMOL/L (ref 21–32)
CREAT SERPL-MCNC: 0.7 MG/DL (ref 0.6–1.1)
GFR AFRICAN AMERICAN: >60 ML/MIN/1.73M2
GFR NON-AFRICAN AMERICAN: >60 ML/MIN/1.73M2
GLUCOSE BLD-MCNC: 128 MG/DL (ref 70–99)
HCT VFR BLD CALC: 35.2 % (ref 37–47)
HEMOGLOBIN: 11.1 GM/DL (ref 12.5–16)
MCH RBC QN AUTO: 28.4 PG (ref 27–31)
MCHC RBC AUTO-ENTMCNC: 31.5 % (ref 32–36)
MCV RBC AUTO: 90 FL (ref 78–100)
PDW BLD-RTO: 14.4 % (ref 11.7–14.9)
PLATELET # BLD: 192 K/CU MM (ref 140–440)
PMV BLD AUTO: 9.8 FL (ref 7.5–11.1)
POTASSIUM SERPL-SCNC: 4.2 MMOL/L (ref 3.5–5.1)
RBC # BLD: 3.91 M/CU MM (ref 4.2–5.4)
SODIUM BLD-SCNC: 139 MMOL/L (ref 135–145)
WBC # BLD: 9 K/CU MM (ref 4–10.5)

## 2021-12-05 PROCEDURE — 94150 VITAL CAPACITY TEST: CPT

## 2021-12-05 PROCEDURE — 94640 AIRWAY INHALATION TREATMENT: CPT

## 2021-12-05 PROCEDURE — 85027 COMPLETE CBC AUTOMATED: CPT

## 2021-12-05 PROCEDURE — 6370000000 HC RX 637 (ALT 250 FOR IP): Performed by: PHYSICIAN ASSISTANT

## 2021-12-05 PROCEDURE — 94761 N-INVAS EAR/PLS OXIMETRY MLT: CPT

## 2021-12-05 PROCEDURE — 80048 BASIC METABOLIC PNL TOTAL CA: CPT

## 2021-12-05 PROCEDURE — 2700000000 HC OXYGEN THERAPY PER DAY

## 2021-12-05 PROCEDURE — 2580000003 HC RX 258: Performed by: PHYSICIAN ASSISTANT

## 2021-12-05 RX ADMIN — Medication 2 PUFF: at 11:46

## 2021-12-05 RX ADMIN — SODIUM CHLORIDE: 4.5 INJECTION, SOLUTION INTRAVENOUS at 09:23

## 2021-12-05 RX ADMIN — ALBUTEROL SULFATE 1 PUFF: 90 AEROSOL, METERED RESPIRATORY (INHALATION) at 16:19

## 2021-12-05 RX ADMIN — SERTRALINE HYDROCHLORIDE 200 MG: 100 TABLET ORAL at 09:02

## 2021-12-05 NOTE — PLAN OF CARE
Problem: Falls - Risk of:  Goal: Will remain free from falls  Description: Will remain free from falls  Outcome: Ongoing  Goal: Absence of physical injury  Description: Absence of physical injury  Outcome: Ongoing     Problem: Infection - Surgical Site:  Goal: Will show no infection signs and symptoms  Description: Will show no infection signs and symptoms  Outcome: Ongoing     Problem: Nutritional:  Goal: Nutritional status will improve  Description: Nutritional status will improve  Outcome: Ongoing     Problem: Physical Regulation:  Goal: Diagnostic test results will improve  Description: Diagnostic test results will improve  Outcome: Ongoing  Goal: Will remain free from infection  Description: Will remain free from infection  Outcome: Ongoing  Goal: Ability to maintain vital signs within normal range will improve  Description: Ability to maintain vital signs within normal range will improve  Outcome: Ongoing     Problem: Respiratory:  Goal: Ability to maintain normal respiratory secretions will improve  Description: Ability to maintain normal respiratory secretions will improve  Outcome: Ongoing     Problem: Skin Integrity:  Goal: Demonstration of wound healing without infection will improve  Description: Demonstration of wound healing without infection will improve  Outcome: Ongoing  Goal: Complications related to intravenous access or infusion will be avoided or minimized  Description: Complications related to intravenous access or infusion will be avoided or minimized  Outcome: Ongoing     Problem: Pain:  Goal: Pain level will decrease  Description: Pain level will decrease  Outcome: Ongoing  Goal: Control of acute pain  Description: Control of acute pain  Outcome: Ongoing  Goal: Control of chronic pain  Description: Control of chronic pain  Outcome: Ongoing

## 2021-12-06 ENCOUNTER — TELEPHONE (OUTPATIENT)
Dept: CARDIOLOGY CLINIC | Age: 71
End: 2021-12-06

## 2021-12-06 ENCOUNTER — PROCEDURE VISIT (OUTPATIENT)
Dept: CARDIOLOGY CLINIC | Age: 71
End: 2021-12-06
Payer: MEDICARE

## 2021-12-06 DIAGNOSIS — I44.1 HEART BLOCK AV SECOND DEGREE: ICD-10-CM

## 2021-12-06 DIAGNOSIS — Z95.0 CARDIAC PACEMAKER IN SITU: Primary | ICD-10-CM

## 2021-12-06 PROCEDURE — 93296 REM INTERROG EVL PM/IDS: CPT | Performed by: INTERNAL MEDICINE

## 2021-12-06 PROCEDURE — 93294 REM INTERROG EVL PM/LDLS PM: CPT | Performed by: INTERNAL MEDICINE

## 2021-12-06 NOTE — PROGRESS NOTES
0930 - transferred from OR on bed, monitor applied, alarms on and verified, tanner leveled and zeroed, bedside handoff provided by [facility] and [address] [facility] - medicated for complaint of nausea  1048 - report called to [facility]; phase one care complete
Another attempt made to ambulate and patient tolerated fairly well. Ambulated approximately 30 feet with cane and standby balance assist Once  returns to room will discuss about attempting to discharge home this afternoon/evening.
Attempted to get patient out of bed to the chair, to evaluate baseline mobility for D/C home, per attending request.  Pt is a maximum assist.  Pt was unable to support her own weight enough to stand, even with maximum assist x2. Pt became dizzy and short of breath. Returned pt to bed where she collapsed and was too exhausted to sit herself up for several minutes, without assistance. Pt states that she holds onto furniture at home to get around until her legs give out completely. She then yells for help if her  is around and states that he helps pull her. Pt states that she wears a depends at home due to being unable to get to the toilet. MD notified. Eval entered for PT. D/C orders canceled.
Called waiting for Bill to come back and see pt
Pt alert and oriented and states she feels comfortable discharging with her  to assist her. Pt transported to front door and was able to stand and pivot to her car without assistance.
Pt ambulated with standby supervision and cane assistance. Ambulated approximately 30 feet and tolerated moderately well. Pt appeared to get slightly dyspneic at end of walk, but pt states she is only able to walk short distances at baseline before getting \"out of breath\". Dr. Tani Davis at bedside for portion of ambulation attempt.
Pt arrived from PACU s/p R CEA, bedside updates given by Gloria Baltazar RN- pt is very sleepy but arouses- follows all commands and A/Ox4- BP elevated pt is on a Cardene GTT(IV) see MAR for all titration and weaning of GTT's and documentation in comment section. Oriented to room- call light within reach- bed alarm on.  V. Paced with complete capture rate @ 70 BPM. Pt has a right neck DSG- C/D/I and Ice Pack remains on top
Pt was not discharged yesterday, due to inability to mobilize. Ambulating better today. Likely home today.
Surgery time of 1000/0700 confirmed with patient.
Visitor at bedside- updates given
from the doctors office    (H & P, Consent, & card for implantable devices). 12. Take a shower the night before or morning of your procedure, do not apply any lotion, oil or powder. 13. Wear a mask covering your nose & mouth when entering the hospital. Have your covid-19 test performed within 2-7 days of your                  surgery. Quarantine yourself after the test until after your surgery.   COVID test 11/29/21 and labs, cxr, EKG

## 2021-12-08 ENCOUNTER — TELEPHONE (OUTPATIENT)
Dept: INTERNAL MEDICINE CLINIC | Age: 71
End: 2021-12-08

## 2021-12-08 NOTE — TELEPHONE ENCOUNTER
Patient called wanted to let the physician know while she was in the hospital 12/3-12/5/2021. She had a IV in the crease of her right arm and a blood pressure done on her right arm wanted to let  know. She has carotid artery right side of neck she is to have no needle sticks and no blood pressure on the right side.

## 2021-12-08 NOTE — TELEPHONE ENCOUNTER
Called and spoke with pt. Pt. States she is doing well. Sometimes when she turns her head she has some mild neck pain. Scheduled pt. For 12/15/2021 at 4:30. Unable to do TCM d/t past 48hr window.

## 2021-12-15 ENCOUNTER — OFFICE VISIT (OUTPATIENT)
Dept: INTERNAL MEDICINE CLINIC | Age: 71
End: 2021-12-15
Payer: MEDICARE

## 2021-12-15 VITALS
OXYGEN SATURATION: 96 % | HEIGHT: 62 IN | DIASTOLIC BLOOD PRESSURE: 60 MMHG | SYSTOLIC BLOOD PRESSURE: 125 MMHG | HEART RATE: 87 BPM | TEMPERATURE: 97.4 F | BODY MASS INDEX: 50.61 KG/M2 | WEIGHT: 275 LBS

## 2021-12-15 DIAGNOSIS — I65.23 BILATERAL CAROTID ARTERY STENOSIS: Primary | ICD-10-CM

## 2021-12-15 DIAGNOSIS — Z98.890 S/P CAROTID ENDARTERECTOMY: ICD-10-CM

## 2021-12-15 DIAGNOSIS — J45.20 MILD INTERMITTENT ASTHMA WITHOUT COMPLICATION: ICD-10-CM

## 2021-12-15 DIAGNOSIS — D64.9 ANEMIA, UNSPECIFIED TYPE: ICD-10-CM

## 2021-12-15 PROCEDURE — 3017F COLORECTAL CA SCREEN DOC REV: CPT | Performed by: FAMILY MEDICINE

## 2021-12-15 PROCEDURE — 4040F PNEUMOC VAC/ADMIN/RCVD: CPT | Performed by: FAMILY MEDICINE

## 2021-12-15 PROCEDURE — G8417 CALC BMI ABV UP PARAM F/U: HCPCS | Performed by: FAMILY MEDICINE

## 2021-12-15 PROCEDURE — G8399 PT W/DXA RESULTS DOCUMENT: HCPCS | Performed by: FAMILY MEDICINE

## 2021-12-15 PROCEDURE — 1123F ACP DISCUSS/DSCN MKR DOCD: CPT | Performed by: FAMILY MEDICINE

## 2021-12-15 PROCEDURE — 1111F DSCHRG MED/CURRENT MED MERGE: CPT | Performed by: FAMILY MEDICINE

## 2021-12-15 PROCEDURE — 99213 OFFICE O/P EST LOW 20 MIN: CPT | Performed by: FAMILY MEDICINE

## 2021-12-15 PROCEDURE — 1036F TOBACCO NON-USER: CPT | Performed by: FAMILY MEDICINE

## 2021-12-15 PROCEDURE — G8427 DOCREV CUR MEDS BY ELIG CLIN: HCPCS | Performed by: FAMILY MEDICINE

## 2021-12-15 PROCEDURE — G8484 FLU IMMUNIZE NO ADMIN: HCPCS | Performed by: FAMILY MEDICINE

## 2021-12-15 PROCEDURE — 1090F PRES/ABSN URINE INCON ASSESS: CPT | Performed by: FAMILY MEDICINE

## 2021-12-15 NOTE — PROGRESS NOTES
Post-Discharge Transitional Care Management Services or Hospital Follow Up      [address] [facility]   YOB: 1950    Date of Office Visit:  12/15/2021  Date of Hospital Admission: 12/3/21  Date of Hospital Discharge: 12/5/21  Risk of hospital readmission (high >=14%. Medium >=10%) :Readmission Risk Score: 15.3 ( )      Care management risk score Rising risk (score 2-5) and Complex Care (Scores >=6): 14     Non face to face  following discharge, date last encounter closed (first attempt may have been earlier): *No documented post hospital discharge outreach found in the last 14 days    Call initiated 2 business days of discharge: *No response recorded in the last 14 days    Patient Active Problem List   Diagnosis    Malignant neoplasm of upper-outer quadrant of right breast in female, estrogen receptor positive ([facility] [address]    Diffuse cystic mastopathy    Hyperlipidemia    H/O chest pain    Heart block AV second degree    Abnormal cardiovascular stress test    JAMIE (obstructive sleep apnea)    Super obesity    Mild asthma    Severe obstructive sleep apnea    Cardiac pacemaker    Chronic cystitis    Asthma    Hypertension    Depression    Obstructive sleep apnea    Nocturnal oxygen desaturation    NSTEMI (non-ST elevated myocardial infarction) ([address]    PVD (peripheral vascular disease) ([facility] [address]    Morbid obesity with BMI of 45.0-49.9, adult (HCC)    Moderate aortic stenosis    Hepatomegaly, not elsewhere classified    Arthritis    CAD (coronary artery disease)    Pneumonia due to COVID-19 virus    Bilateral carotid artery stenosis    Moderate persistent asthma without complication    Carotid stenosis, right       Allergies   Allergen Reactions    Bactrim [Sulfamethoxazole-Trimethoprim] Anaphylaxis and Hives    Lipitor [Atorvastatin] Shortness Of Breath    Taxol [Paclitaxel] Anaphylaxis and Swelling    Aminoglycosides      Abstracted from [facility] patient chart.     Demerol Nausea Only    Neosporin [Bacitracin-Neomycin-Polymyxin] Rash and Other (See Comments)     hotness    Other Rash     [facility] Soap    Tal Other (See Comments)     blisters       Medications listed as ordered at the time of discharge from hospital     Medication List          Accurate as of December 15, 2021 11:59 PM. If you have any questions, ask your nurse or doctor.             CONTINUE taking these medications    albuterol sulfate  (90 Base) MCG/ACT inhaler  Commonly known as: ProAir HFA  INHALE 2 PUFFS BY MOUTH EVERY SIX HOURS AS NEEDED FOR WHEEZING     albuterol-ipratropium  MCG/ACT Aers inhaler  Commonly known as: COMBIVENT RESPIMAT  Inhale 1 puff into the lungs 4 times daily     aspirin 81 MG EC tablet     ezetimibe 10 MG tablet  Commonly known as: ZETIA  TAKE 1 TABLET BY MOUTH EVERY MORNING     fluticasone 110 MCG/ACT inhaler  Commonly known as: Flovent HFA  Inhale 2 puffs into the lungs 2 times daily     Iron 325 (65 Fe) MG Tabs  Take 1 tablet by mouth every other day     letrozole 2.5 MG tablet  Commonly known as: FEMARA  Take 1 tablet by mouth nightly     Oscal 500/200 D-3 500-200 MG-UNIT per tablet  Generic drug: calcium-vitamin D     sertraline 100 MG tablet  Commonly known as: ZOLOFT  TAKE 2 TABLETS BY MOUTH EVERY MORNING     simvastatin 20 MG tablet  Commonly known as: ZOCOR  TAKE ONE (1) TABLET BY MOUTH NIGHTLY     TYLENOL 500 MG tablet  Generic drug: acetaminophen     vitamin B-12 1000 MCG tablet  Commonly known as: CYANOCOBALAMIN              Medications marked \"taking\" at this time  Outpatient Medications Marked as Taking for the 12/15/21 encounter (Office Visit) with Ian Singh, DO   Medication Sig Dispense Refill    letrozole (FEMARA) 2.5 MG tablet Take 1 tablet by mouth nightly 30 tablet 5    sertraline (ZOLOFT) 100 MG tablet TAKE 2 TABLETS BY MOUTH EVERY MORNING 180 tablet 1    simvastatin (ZOCOR) 20 MG tablet TAKE ONE (1) TABLET BY MOUTH NIGHTLY 90 tablet 1    ezetimibe (ZETIA) 10 MG tablet TAKE 1 TABLET BY MOUTH EVERY MORNING 90 tablet 1    Ferrous Sulfate (IRON) 325 (65 Fe) MG TABS Take 1 tablet by mouth every other day 30 tablet 0    albuterol-ipratropium (COMBIVENT RESPIMAT)  MCG/ACT AERS inhaler Inhale 1 puff into the lungs 4 times daily 1 Inhaler 0    acetaminophen (TYLENOL) 500 MG tablet Take 500 mg by mouth every 6 hours as needed for Pain      fluticasone (FLOVENT HFA) 110 MCG/ACT inhaler Inhale 2 puffs into the lungs 2 times daily 1 Inhaler 5    albuterol sulfate HFA (PROAIR HFA) 108 (90 Base) MCG/ACT inhaler INHALE 2 PUFFS BY MOUTH EVERY SIX HOURS AS NEEDED FOR WHEEZING 1 Inhaler 5    vitamin B-12 (CYANOCOBALAMIN) 1000 MCG tablet Take 1,000 mcg by mouth daily      aspirin 81 MG EC tablet Take 81 mg by mouth nightly       calcium-vitamin D (OSCAL 500/200 D-3) 500-200 MG-UNIT per tablet Take 1 tablet by mouth 2 times daily           Medications patient taking as of now reconciled against medications ordered at time of hospital discharge: Yes    Chief Complaint   Patient presents with    Follow-Up from Hospital     carotid surgery on right side       History of Present illness - Follow up of Hospital diagnosis(es): Carotid stenosis, S/P R CEA    -Anemia- mild  -Asthma - stable  Pt states dizzy spells better    Inpatient course: Discharge summary reviewed- see chart. Interval history/Current status: stable    A comprehensive review of systems was negative except for what was noted in the HPI. Lab Results   Component Value Date    WBC 9.0 12/05/2021    HGB 11.1 (L) 12/05/2021    HCT 35.2 (L) 12/05/2021    MCV 90.0 12/05/2021     12/05/2021     Lab Results   Component Value Date     12/05/2021    K 4.2 12/05/2021     12/05/2021    CO2 29 12/05/2021    BUN 13 12/05/2021    CREATININE 0.7 12/05/2021    GLUCOSE 128 12/05/2021    CALCIUM 9.1 12/05/2021        XR Chest 11/29/21  Impression No acute cardiopulmonary process.          Vitals:    12/15/21 1630 12/15/21 1706   BP: (!) 120/58 125/60   Site: Left Upper Arm    Position: Sitting    Cuff Size: Large Adult    Pulse: 87    Temp: 97.4 °F (36.3 °C)    SpO2: 96%    Weight: 275 lb (124.7 kg)    Height: 5' 2\" (1.575 m)      Body mass index is 50.3 kg/m². Wt Readings from Last 3 Encounters:   12/15/21 275 lb (124.7 kg)   12/14/21 275 lb (124.7 kg)   12/03/21 275 lb (124.7 kg)     BP Readings from Last 3 Encounters:   12/15/21 125/60   12/14/21 (!) 160/88   12/05/21 (!) 132/92      Physical Exam:  General Appearance: alert and oriented to person, place and time, well developed and well- nourished, in no acute distress  Skin: warm and dry, no rash or erythema  Head: normocephalic and atraumatic  Eyes: pupils equal, round, and reactive to light, extraocular eye movements intact, conjunctivae normal  ENT: tympanic membrane, external ear and ear canal normal bilaterally  Neck: supple. Incision site C/D/I  Pulmonary/Chest: clear to auscultation bilaterally- no wheezes, rales or rhonchi, normal air movement, no respiratory distress  Cardiovascular: normal rate, regular rhythm, normal S1 and S2  Abdomen: soft, non-tender, non-distended, normal bowel sounds  Extremities: +P. edema  Musculoskeletal: no tenderness  Neurologic: no cranial nerve deficit, Pt in wheelchair    Assessment/Plan:  1. Bilateral carotid artery stenosis    2. S/P carotid endarterectomy    3. Anemia, unspecified type    4.  Mild intermittent asthma without complication    Labs reviewed CBC, BMP  CXR reviewed  Medical Decision Making: low complexity  On this date 12/15/21, I spent min reviewing previous notes and test results as well as a face to face encounter with the patient discussing diagnosis and treatment plan as well as documenting the day of the visit

## 2021-12-22 ENCOUNTER — CARE COORDINATION (OUTPATIENT)
Dept: CARE COORDINATION | Age: 71
End: 2021-12-22

## 2021-12-22 NOTE — CARE COORDINATION
Ambulatory Care Coordination Note  12/22/2021  CM Risk Score: 14  Charlson 10 Year Mortality Risk Score: 100%     ACC: Princess Colby RN    Summary Note:   Spoke with patient for ACM follow up; plan for graduation. COPD:  Breathing is at baseline; some SOB on exertion. Encouraged patient to pace activity. Patient denies increased SOB, cough, wheezing or fatigue. Patient is sleeping well; thought are clear. Patient reports that she is taking her medications as tolerated. Encouraged routine Provider follow up; avoidance of potential triggers to avoid exacerbation. Reviewed COPD zone management tool and s/s to report to MD.      Discussed care gaps. Patient reports that she does not plan to receive vaccinations. Encouraged patient to discuss any questions with Provider. Patient denies the need for ACP support. Offered to schedule PCP appointment : patient declined. Encouraged patient to reach out to her Provider for any changes in condition. Patient denies any questions, equipment or resource needs. ACM contact information provided should needs arise. No further outreach planned. Care Coordination Interventions    Program Enrollment: Complex Care  Referral from Primary Care Provider: No  Suggested Interventions and Community Resources  Fall Risk Prevention: In Process  Home Health Services: Completed  Pharmacist: Declined  Registered Dietician: In Process  Social Work: Declined  Other Therapy Services: In Process (Comment: Behavioral Health)  Zone Management Tools: In Process         Goals Addressed                 This Visit's Progress     COMPLETED: Care Coordination Self Management   On track     CC Self Management Goal  Patient Goal (What steps will patient take to achieve goal?):  Compliance with routine PCP follow up.   Patient is able to discuss self-management of condition(s):  Pt demonstrates adherence to medications  Pt demonstrates understanding of self-monitoring  Patient is able to identify Red Flags:  Alert to potential adverse drug reactions(s) or side effects and actions to take should they arise  Discuss target symptoms and actions to take should they arise  Identify problems that require immediate PCP or specialist visit  Patient demonstrates understanding of access to PCP/Specialist:  Understands about scheduling routine Follow Up appointments   Understands about sick day appointment options for worsening of symptoms/progression (Same Day, E Visits)       COMPLETED: Self Monitoring   On track     Other Self-Monitoring - I will monitor my increase cough, SOB and sputum amt and color - Other: with cough always   will report increased any of above symptom to my provider. Patient Reported Blood Pressure No flowsheet data found. Barriers: lack of motivation, financial and overwhelmed by complexity of regimen  Plan for overcoming my barriers: ACM advised pt to use her [employer] machine 4 times   Confidence: 5 /10  Anticipated Goal Completion Date: 3/1/20            Prior to Admission medications    Medication Sig Start Date End Date Taking?  Authorizing Provider   letrozole [facility]) 2.5 MG tablet Take 1 tablet by mouth nightly 11/30/21   Melonie Bustamante MD   sertraline (ZOLOFT) 100 MG tablet TAKE 2 TABLETS BY MOUTH EVERY MORNING 11/22/21   Natasha Mcclelland DO   simvastatin (ZOCOR) 20 MG tablet TAKE ONE (1) TABLET BY MOUTH NIGHTLY 11/22/21   Natasha Mcclelland DO   ezetimibe (ZETIA) 10 MG tablet TAKE 1 TABLET BY MOUTH EVERY MORNING 11/22/21   Natasha Mcclelland, DO   Ferrous Sulfate (IRON) 325 (65 Fe) MG TABS Take 1 tablet by mouth every other day 3/27/21   Kristopher Zambrano MD   albuterol-ipratropium (COMBIVENT RESPIMAT)  MCG/ACT AERS inhaler Inhale 1 puff into the lungs 4 times daily 3/27/21   Kristopher Zambrano MD   acetaminophen (TYLENOL) 500 MG tablet Take 500 mg by mouth every 6 hours as needed for Pain    Historical Provider, MD   fluticasone (FLOVENT HFA) 110 MCG/ACT inhaler Inhale 2 puffs into the lungs 2 times daily 2/11/21   Falcon Crown, DO   albuterol sulfate HFA (PROAIR HFA) 108 (90 Base) MCG/ACT inhaler INHALE 2 PUFFS BY MOUTH EVERY SIX HOURS AS NEEDED FOR WHEEZING 2/11/21   Falcon Crown, DO   vitamin B-12 (CYANOCOBALAMIN) 1000 MCG tablet Take 1,000 mcg by mouth daily    Historical Provider, MD   aspirin 81 MG EC tablet Take 81 mg by mouth nightly     Historical Provider, MD   calcium-vitamin D (OSCAL 500/200 D-3) 500-200 MG-UNIT per tablet Take 1 tablet by mouth 2 times daily     Historical Provider, MD       Future Appointments   Date Time Provider Chance Chiang   3/7/2022  2:30 PM [place], DO [facility]   10/6/2022  2:00 PM Lacie Dutta, APRN - CNP [facility]   10/10/2022  1:00 PM SCHEDULE, SRMX AWV LPN [facility]     ,   COPD Assessment    Does the patient understand envrionmental exposure?: Yes  Is the patient able to verbalize Rescue vs. Long Acting medications?: Yes  Does the patient have a nebulizer?: Yes  Does the patient use a space with inhaled medications?: No            Symptoms:     Symptom course: no change  Change in chronic cough?: No/At Baseline  Change in sputum?: No/At Baseline      and   General Assessment    Do you have any symptoms that are causing concern?: No

## 2022-01-23 ENCOUNTER — APPOINTMENT (OUTPATIENT)
Dept: GENERAL RADIOLOGY | Age: 72
End: 2022-01-23
Payer: MEDICARE

## 2022-01-23 ENCOUNTER — HOSPITAL ENCOUNTER (EMERGENCY)
Age: 72
Discharge: HOME | End: 2022-01-24
Attending: EMERGENCY MEDICINE
Payer: MEDICARE

## 2022-01-23 VITALS
HEIGHT: 62 IN | WEIGHT: 275 LBS | TEMPERATURE: 98 F | RESPIRATION RATE: 18 BRPM | BODY MASS INDEX: 50.61 KG/M2 | SYSTOLIC BLOOD PRESSURE: 139 MMHG | OXYGEN SATURATION: 97 % | HEART RATE: 74 BPM | DIASTOLIC BLOOD PRESSURE: 56 MMHG
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
## 2022-01-23 DIAGNOSIS — S80.00XA CONTUSION OF KNEE, UNSPECIFIED LATERALITY, INITIAL ENCOUNTER: ICD-10-CM

## 2022-01-23 DIAGNOSIS — M25.562 PAIN IN BOTH KNEES, UNSPECIFIED CHRONICITY: Primary | ICD-10-CM

## 2022-01-23 DIAGNOSIS — M25.561 PAIN IN BOTH KNEES, UNSPECIFIED CHRONICITY: Primary | ICD-10-CM

## 2022-01-23 PROCEDURE — 73552 X-RAY EXAM OF FEMUR 2/>: CPT

## 2022-01-23 PROCEDURE — 73564 X-RAY EXAM KNEE 4 OR MORE: CPT

## 2022-01-23 PROCEDURE — 72170 X-RAY EXAM OF PELVIS: CPT

## 2022-01-23 PROCEDURE — 99283 EMERGENCY DEPT VISIT LOW MDM: CPT

## 2022-01-23 RX ORDER — ACETAMINOPHEN 500 MG
1000 TABLET ORAL ONCE
Status: DISCONTINUED | OUTPATIENT
Start: 2022-01-23 | End: 2022-01-24 | Stop reason: HOSPADM

## 2022-01-23 ASSESSMENT — ENCOUNTER SYMPTOMS
GASTROINTESTINAL NEGATIVE: 1
RESPIRATORY NEGATIVE: 1
ALLERGIC/IMMUNOLOGIC NEGATIVE: 1
EYES NEGATIVE: 1

## 2022-01-23 ASSESSMENT — PAIN SCALES - GENERAL: PAINLEVEL_OUTOF10: 4

## 2022-01-24 NOTE — ED PROVIDER NOTES
EMERGENCY DEPARTMENT ENCOUNTER      PCP: Rangel Baldwin, [facility]    Chief Complaint   Patient presents with    Knee Pain     bilateral; slipped in the snow and fell down onto knees; pt did not hit head or have a loc     This patient was evaluated by attending physician, Dr. Celeste Kelleys is a 66 y.o. female who presents to emergency department today via EMS after a mechanical fall, bilateral knee pain. Patient states that she was getting out of a car when she slipped on ice landing on both of her knees. Now she is having pain throughout her knees, bilateral thighs. Does have a history of neuropathy. Had trouble getting into the hospital EMS was called and was brought to the emergency room for further evaluation. REVIEW OF SYSTEMS    General: No Fever  ENT:  No visual changes. No headache. Cardiac: No Chest Pain, No syncope  Respiratory: No cough or difficulty breathing  GI: No vomiting. No Bloody Stool or Diarrhea  : No Dysuria or Hematuria  MSKTL:  See HPI. No neck or back pain.   Skin:  Denies rash  Neurologic:  Denies headache, focal weakness or sensory changes   Endocrine:  Denies polyuria or polydypsia   Lymphatic:  Denies swollen glands   See HPI and nursing notes for additional information       PAST MEDICAL & SURGICAL HISTORY    Past Medical History:   Diagnosis Date    Anemia     Anxiety 1978    Arthritis     generalized    Asthma 2006    AV block     2nd degree per hospital in june2013    Blood poisoning 1994    Blood transfusion     12/2003    CAD (coronary artery disease)     Cancer ([facility] [address] 2007    skin (face) & colon(2003)/ 2/2012 dx of right breast ca(pc)    Carcinoma of breast ([facility] [address] 02/29/2012    right arm no b/p or sticks    Cardiac pacemaker 03/10/2014    Medtronic dual lead    Chronic cystitis     Chronic respiratory failure (HCC)     2 L/min oxygen at night-time    Colon cancer ([facility] [address] 2013    COPD (chronic obstructive pulmonary disease) ([facility] [address]     CTS (carpal tunnel syndrome)     Depression     Diverticulitis     H/O 24 hour EKG monitoring 2/28/2014 7/24/13 2/14 complete heart block 7/13No clinically significant arrthymia noted.  H/O cardiac catheterization 10/31/2011, 7/11/2007    10/31/2011-No significant CAD. Cardiolite stress test was false positive-Dr Faith Miramontes; 7/11/2007-No CAD, global function intact;    H/O cardiovascular stress test 10/20/2011, 3/23/2010    10/20/2011-Lexiscan- Evid of mild ischemia in Left CX region. EF 70%. Global LVSF normal;    H/O cardiovascular stress test 01/07/2015    EF 77%. Normal Stress Test.    H/O chest pain 04/2005    sees Dr Hussain Garrett H/O Doppler ultrasound 02/20/2008 2/20/2008-CAROTID DOPPLER- Normal carotids bilaterally;    H/O Doppler ultrasound 06/06/2013    CAROTID DOPPLER- there is heterogeneous, irregular atherosclerotic plaque noted in the right internal carotid artery,  doppler flow velocities within the right internal carotid artery are elevated, consistent with a mild, less than 50%stenosis, there is intimal thickening but no significant atherosclerotic plaque noted in the left internal carotid artery, the left carotid are within normal limits    H/O echocardiogram 4/9/2012, 10/20/2011    4/9/2012-LVSF normal EF=>55%. impaired LV relaxation;    H/O echocardiogram 12/31/2014    EF 50-55%. LV shows mild concentric hypertrophy. Mildly dilated left atrium. Mildly dilated right ventricle. Sclerotic but not stenotic aortic valve. Mitral annular calcification.   Mild MR, Mild TR, Mild pulm HTN.    H/O urinary incontinence     History of blood transfusion     Hx antineoplastic chemo     Hx of Arterial Doppler ultrasound 07/01/2019    No hemodynamically significant or focal stenosis visualized bilaterally, bilateral lower extremity arteries exhibit diffuse plaque and multiphasic waveforms, unable to obtain bilateral ABIs due to elevated leg pressures >250 mmHg, possibly due to atherosclerosis    Hx of cardiovascular stress test 06/2013    EF 70% abn suggestive of anterolateral ischemia, possible RCA ischmeia, post left ventricular dilation suggestive multivessel CAD.  Hx of echocardiogram 06/2013    EF50%, mild MR and TR, mild pulmonary HTN, mild AS    Hx of echocardiogram 11/01/2021    Left ventricular systolic function is normal, Mild concentric left ventricular hypertrophy Mildly dilated left atrium. Mitral annular calcification is present. Mild tricuspid regurgitation No evidence of any pericardial effusion    Hx of fall     \"last time 2018- due to neuropathy, have to use cane\"    HX OTHER MEDICAL 7/24/13, 06/2013 7/13-No clinacally significant arrhythmia. 6/13-multiple cycles of wenckebach episodes in addtion to some sinus tach    Hyperlipidemia     Hypertension     Hypothermia 2008    Malignant neoplasm of breast (female) ([facility] [address] 2012    Neuropathy     \"have neuropathy of my legs\"    Normocytic normochromic anemia     Obstructive sleep apnea 2008 01- Has CPAP machine but has not used in over a year - states she has not had problems since her pacemaker was placed.  Old MI (myocardial infarction)     per pt on 1/15/2019\"had heart attack about a year ago\"    Osteoarthritis     Osteopenia     Primary malignant neoplasm of colon ([address]     S/P cardiac cath 06/2013    no significant CAD false postive stress test    Skin cancer     Super obesity     Umbilical hernia      Past Surgical History:   Procedure Laterality Date    A-V CARDIAC PACEMAKER INSERTION  3/10/14     Medtronic.    Model:  [id number] Medtronic  Serial:  [id number] dual chamber pacemaker    APPENDECTOMY  2004    BREAST BIOPSY  2/13/12    BREAST LUMPECTOMY  2007    right     BREAST SURGERY  02/13/2012    rt lesion biopsy     CARDIAC CATHETERIZATION  2007 & 2011    CAROTID ENDARTERECTOMY Right 12/3/2021    RIGHT CAROTID ENDARTERECTOMY WITH PATCH performed by Amaury Nur MD at [facility]  2004    Colon cancer    COLONOSCOPY  10-18-13    polyp    COLONOSCOPY  1/19/2016    internal hemorrhoids, diverticulosis, 1.5 cm sessile polyp, 8 mm polyp found in rectum.  COLONOSCOPY  12/14/2017    1.5cm residual polyp, 5mm polyps in blind sigmoid loop x3, Sigmoid divertics, Internal grade 1 hemorrhoids    COLONOSCOPY N/A 1/16/2019    COLONOSCOPY W/ ENDOSCOPIC MUCOSAL RESECTION WITH ELEVIEW 5ML INJECTION AND POLYPECTOMY OF TIP OF BLIND RECTOSIGMOID STUMP AND CAUTERIZATION WITH ERBE PROBE, CLIPPING X2 performed by Aneesh Gallego MD at [facility]  01/21/2020    pan divertics/ 4 polyps/ sm int hem, S/P partial sigmoid resection w/ end to side anastamosis, repeat in 3 years    COLONOSCOPY N/A 1/21/2020    COLONOSCOPY POLYPECTOMY SNARE/COLD BIOPSY performed by Aneesh Gallego MD at [facility]  2003    back   [address] Bilateral 12/15/14    DENTAL SURGERY      front right tooth and root canal w/ brass bolt    DILATION AND CURETTAGE OF UTERUS  2006    Needed a camera to find my uterus.     ENDOSCOPY, COLON, DIAGNOSTIC  12/14/2017    Small hiatal hernia    HERNIA REPAIR  2004    Umb hernia    HERNIA REPAIR  2008    Inc hernia    LYMPH NODE DISSECTION  2/29/2012    Right axillary-3 sentinel nodes were positive out of 9.    MASTECTOMY, RADICAL Right 02/29/2012    w/ sentinal node disection-Dr West    PACEMAKER PLACEMENT      PRE-MALIGNANT / BENIGN SKIN LESION EXCISION  2/8/2013    right leg X2-Dr West    TONSILLECTOMY  1957    TUNNELED VENOUS PORT PLACEMENT  2004    TUNNELED VENOUS PORT PLACEMENT  02/13/2012    removal of mediport       CURRENT MEDICATIONS    Current Outpatient Rx   Medication Sig Dispense Refill    letrozole (FEMARA) 2.5 MG tablet Take 1 tablet by mouth nightly 30 tablet 5    sertraline (ZOLOFT) 100 MG tablet TAKE 2 TABLETS BY MOUTH EVERY MORNING 180 tablet 1    simvastatin (ZOCOR) 20 MG tablet TAKE ONE (1) Disease Brother     High Cholesterol Brother     High Blood Pressure Brother     Heart Disease Brother     No Known Problems Sister     Seizures Son     Cancer Brother         skin cancer    Breast Cancer Neg Hx     Ovarian Cancer Neg Hx        PHYSICAL EXAM    VITAL SIGNS: BP (!) 139/56   Pulse 74   Temp 98 °F (36.7 °C) (Oral)   Resp 18   Ht 5' 2\" (1.575 m)   Wt 275 lb (124.7 kg)   LMP 01/15/1999   SpO2 97%   BMI 50.30 kg/m²    Constitutional:  Well developed, well nourished, no acute distress   Eyes: EOMI. PERRL, sclera nonicteric. Anterior chambers clear. Funduscopic exam without any gross abnormality or hemorrhages. HENT:  Atraumatic, no trismus. Ears canals and TMs free of blood or clear fluid. Nasal passages and oropharynx free of blood or clear fluid. No circumferential periorbital ecchymosis or mastoid ecchymosis noted. Neck/Lymphatics: supple, no JVD, no swollen nodes. No posterior neck tenderness. Range of motion without obvious pain or deficit. Respiratory:  Lungs Clear, no retractions   Cardiovascular:   normal rate, no murmurs  GI:  Soft, nontender, normal bowel sounds  Musculoskeletal:  No edema, on inspection there is no obvious defect or deformity. No obvious defect deformity into the lower extremity has mild general tenderness throughout. Integument:  Well hydrated, no petechiae   Neurologic:    - Alert & oriented person, place, time, and situation, no speech difficulties or slurring.  - No obvious gross motor deficits  - Cranial nerves 2-12 grossly intact  - Negative meningeal signs.  - Sensation intact to light touch  - Strength 5/5 in upper and lower extremities bilaterally  - Light touch sensation intact throughout. - Upper and lower extremity DTRs 2+ bilaterally. Psych: Pleasant affect, no hallucinations    LABS:  No results found for this visit on 01/23/22.     RADIOLOGY   XR PELVIS (1-2 VIEWS)    Result Date: 1/23/2022  EXAMINATION: ONE XRAY VIEW OF THE PELVIS 1/23/2022 10:51 pm COMPARISON: None. HISTORY: ORDERING SYSTEM PROVIDED HISTORY: trauma TECHNOLOGIST PROVIDED HISTORY: Reason for exam:->trauma FINDINGS: Femoral heads are normally situated in the acetabula with mild degenerative changes on the left and moderate degenerative changes on the right. Surgical clips are noted in the pelvis. Vascular calcifications are noted. The pelvic ring is intact. 1. Osteopenia. 2. No evidence of acute fracture. XR FEMUR LEFT (MIN 2 VIEWS)    Result Date: 1/23/2022  EXAMINATION: 2 X-RAY VIEWS OF THE LEFT FEMUR 4 X-RAY VIEWS LEFT KNEE 1/23/2022 10:51 pm COMPARISON: None. HISTORY: Patient fell most known her knees, left thigh and knee pain LEFT FEMUR FINDINGS: 4 images are presented. Vascular calcifications are noted reflecting calcific atherosclerosis. The femur appears intact throughout its length, articulating normally at the knee and hip joints, though these joints are not studied in detail on this exam.  No retained radiopaque foreign body is seen. Soft tissues appear unremarkable. LEFT KNEE FINDINGS: Osseous structures of the knee are intact and align normally. No retained radiopaque foreign body. Joint spaces appear well maintained. 1. LEFT FEMUR: No acute osseous abnormality evident. 2.  Vascular calcifications are noted reflecting calcific atherosclerosis. 3. LEFT KNEE: No acute osseous abnormality evident. Follow-up imaging recommended if pain persists or worsens following conservative management. XR FEMUR RIGHT (MIN 2 VIEWS)    Result Date: 1/23/2022  EXAMINATION: 4  XRAY VIEWS OF THE RIGHT FEMUR 1/23/2022 7:51 pm COMPARISON: January 16, 2019 HISTORY: ORDERING SYSTEM PROVIDED HISTORY: fall TECHNOLOGIST PROVIDED HISTORY: Reason for exam:->fall FINDINGS: No evidence of an acute fracture or traumatic malalignment is present involving the right femur. Degenerative changes present involving the right hip joint. Vascular calcifications are present.   Pubic rami are intact. No foreign bodies are noted. No evidence of an acute fracture or traumatic malalignment involving the right femur     XR KNEE LEFT (MIN 4 VIEWS)    Result Date: 1/23/2022  EXAMINATION: 2 X-RAY VIEWS OF THE LEFT FEMUR 4 X-RAY VIEWS LEFT KNEE 1/23/2022 10:51 pm COMPARISON: None. HISTORY: Patient fell most known her knees, left thigh and knee pain LEFT FEMUR FINDINGS: 4 images are presented. Vascular calcifications are noted reflecting calcific atherosclerosis. The femur appears intact throughout its length, articulating normally at the knee and hip joints, though these joints are not studied in detail on this exam.  No retained radiopaque foreign body is seen. Soft tissues appear unremarkable. LEFT KNEE FINDINGS: Osseous structures of the knee are intact and align normally. No retained radiopaque foreign body. Joint spaces appear well maintained. 1. LEFT FEMUR: No acute osseous abnormality evident. 2.  Vascular calcifications are noted reflecting calcific atherosclerosis. 3. LEFT KNEE: No acute osseous abnormality evident. Follow-up imaging recommended if pain persists or worsens following conservative management. XR KNEE RIGHT (MIN 4 VIEWS)    Result Date: 1/23/2022  EXAMINATION: FOUR XRAY VIEWS OF THE RIGHT KNEE 1/23/2022 10:51 pm COMPARISON: 04/12/2016. HISTORY: ORDERING SYSTEM PROVIDED HISTORY: pain TECHNOLOGIST PROVIDED HISTORY: Reason for exam:->pain FINDINGS: There is minimal tricompartmental osteoarthritis in the right knee with a medial compartment predominance. There is no fracture or osseous destructive lesion. No knee effusion. Vascular calcifications are noted. No appreciable soft tissue swelling. 1. No acute traumatic injury involving the right knee. ED COURSE & MEDICAL DECISION MAKING      Patient presents as above. Patient seen and examined. Was evaluated by attending physician. Essentially had a mechanical fall slipped on ice landing on knees.   Now having worsening knee pain trouble getting into the house which then prompted the EMS call. She does have history of chronic neuropathy, there is no traumatic findings on her imaging today. At this point I do feel like she can be discharged in stable condition, did get case management well to help with resources to help her get into the home. Son states that he feels that he can get her in. At this point though she will need orthopedic evaluation/primary care eval if symptoms continue. We discharged home in stable condition    Return to emergency Department precautions were discussed in detail with patient and include worsening pain, new symptoms. All pertinent Lab data and radiographic results reviewed with patient at bedside. The patient and/or the family were informed of the results of any tests/labs/imaging, the treatment plan, and time was allotted to answer questions. Clinical  IMPRESSION    1. Pain in both knees, unspecified chronicity    2. Contusion of knee, unspecified laterality, initial encounter    3. Fall, initial encounter      Comment: Please note this report has been produced using speech recognition software and may contain errors related to that system including errors in grammar, punctuation, and spelling, as well as words and phrases that may be inappropriate. If there are any questions or concerns please feel free to contact the dictating provider for clarification.       Jessie Sanchez 411, PA  01/24/22 0110

## 2022-01-24 NOTE — ED NOTES
Discharge instructions reviewed with patient; pt voiced understanding at this time.       Yesika Quinn RN  01/24/22 [id number]

## 2022-01-24 NOTE — ED PROVIDER NOTES
[address] [facility]      TRIAGE CHIEF COMPLAINT:   Knee Pain (bilateral; slipped in the snow and fell down onto knees; pt did not hit head or have a loc)      [place]:  Oren Arellano is a 66 y.o. female that presents via EMS with complaint of fall, bilateral knee pain. Patient states before arrival she was getting out of her car when she slipped on the snow, ice landing on both of her knees. She complains of bilateral knee pain, thigh pain. She did not pass out or hit her head no fevers nausea vomiting chest pain shortness of breath no neck back pain or other extremity pain. Came by EMS she did not take any medicine for this. No other questions or concerns. REVIEW OF SYSTEMS:  At least 10 systems reviewed and otherwise acutely negative except as in the [address] Review of Systems   Constitutional: Negative. HENT: Negative. Eyes: Negative. Respiratory: Negative. Cardiovascular: Negative. Gastrointestinal: Negative. Endocrine: Negative. Genitourinary: Negative. Musculoskeletal: Positive for arthralgias and myalgias. Skin: Negative. Allergic/Immunologic: Negative. Neurological: Negative. Hematological: Negative. Psychiatric/Behavioral: Negative. All other systems reviewed and are negative.       Past Medical History:   Diagnosis Date    Anemia     Anxiety 1978    Arthritis     generalized    Asthma 2006    AV block     2nd degree per hospital in june2013    Blood poisoning 1994    Blood transfusion     12/2003    CAD (coronary artery disease)     Cancer ([address] 2007    skin (face) & colon(2003)/ 2/2012 dx of right breast ca(pc)    Carcinoma of breast ([address] 02/29/2012    right arm no b/p or sticks    Cardiac pacemaker 03/10/2014    Medtronic dual lead    Chronic cystitis     Chronic respiratory failure (HCC)     2 L/min oxygen at night-time    Colon cancer ([address] 2013    COPD (chronic obstructive pulmonary disease) (HCC)     CTS (carpal tunnel syndrome)     Depression     Diverticulitis     H/O 24 hour EKG monitoring 2/28/2014 7/24/13 2/14 complete heart block 7/13No clinically significant arrthymia noted.  H/O cardiac catheterization 10/31/2011, 7/11/2007    10/31/2011-No significant CAD. Cardiolite stress test was false positive-Dr Maty North; 7/11/2007-No CAD, global function intact;    H/O cardiovascular stress test 10/20/2011, 3/23/2010    10/20/2011-Lexiscan- Evid of mild ischemia in Left CX region. EF 70%. Global LVSF normal;    H/O cardiovascular stress test 01/07/2015    EF 77%. Normal Stress Test.    H/O chest pain 04/2005    sees Dr Hilda Blas H/O Doppler ultrasound 02/20/2008 2/20/2008-CAROTID DOPPLER- Normal carotids bilaterally;    H/O Doppler ultrasound 06/06/2013    CAROTID DOPPLER- there is heterogeneous, irregular atherosclerotic plaque noted in the right internal carotid artery,  doppler flow velocities within the right internal carotid artery are elevated, consistent with a mild, less than 50%stenosis, there is intimal thickening but no significant atherosclerotic plaque noted in the left internal carotid artery, the left carotid are within normal limits    H/O echocardiogram 4/9/2012, 10/20/2011    4/9/2012-LVSF normal EF=>55%. impaired LV relaxation;    H/O echocardiogram 12/31/2014    EF 50-55%. LV shows mild concentric hypertrophy. Mildly dilated left atrium. Mildly dilated right ventricle. Sclerotic but not stenotic aortic valve. Mitral annular calcification.   Mild MR, Mild TR, Mild pulm HTN.    H/O urinary incontinence     History of blood transfusion     Hx antineoplastic chemo     Hx of Arterial Doppler ultrasound 07/01/2019    No hemodynamically significant or focal stenosis visualized bilaterally, bilateral lower extremity arteries exhibit diffuse plaque and multiphasic waveforms, unable to obtain bilateral ABIs due to elevated leg pressures >250 mmHg, possibly due to atherosclerosis    Hx of cardiovascular stress test 06/2013    EF 70% abn suggestive of anterolateral ischemia, possible RCA ischmeia, post left ventricular dilation suggestive multivessel CAD.  Hx of echocardiogram 06/2013    EF50%, mild MR and TR, mild pulmonary HTN, mild AS    Hx of echocardiogram 11/01/2021    Left ventricular systolic function is normal, Mild concentric left ventricular hypertrophy Mildly dilated left atrium. Mitral annular calcification is present. Mild tricuspid regurgitation No evidence of any pericardial effusion    Hx of fall     \"last time 2018- due to neuropathy, have to use cane\"    HX OTHER MEDICAL 7/24/13, 06/2013 7/13-No clinacally significant arrhythmia. 6/13-multiple cycles of wenckebach episodes in addtion to some sinus tach    Hyperlipidemia     Hypertension     Hypothermia 2008    Malignant neoplasm of breast (female) ([address] 2012    Neuropathy     \"have neuropathy of my legs\"    Normocytic normochromic anemia     Obstructive sleep apnea 2008 01- Has CPAP machine but has not used in over a year - states she has not had problems since her pacemaker was placed.  Old MI (myocardial infarction)     per pt on 1/15/2019\"had heart attack about a year ago\"    Osteoarthritis     Osteopenia     Primary malignant neoplasm of colon ([address]     S/P cardiac cath 06/2013    no significant CAD false postive stress test    Skin cancer     Super obesity     Umbilical hernia      Past Surgical History:   Procedure Laterality Date    A-V CARDIAC PACEMAKER INSERTION  3/10/14     Medtronic.    Model:  [id number] Medtronic  Serial:  [id number] dual chamber pacemaker    APPENDECTOMY  2004    BREAST BIOPSY  2/13/12    BREAST LUMPECTOMY  2007    right     BREAST SURGERY  02/13/2012    rt lesion biopsy     CARDIAC CATHETERIZATION  2007 & 2011    CAROTID ENDARTERECTOMY Right 12/3/2021    RIGHT CAROTID ENDARTERECTOMY WITH PATCH performed by Sehna Pollard MD at [address] [place] [address]  2004 Colon cancer    COLONOSCOPY  10-18-13    polyp    COLONOSCOPY  1/19/2016    internal hemorrhoids, diverticulosis, 1.5 cm sessile polyp, 8 mm polyp found in rectum.  COLONOSCOPY  12/14/2017    1.5cm residual polyp, 5mm polyps in blind sigmoid loop x3, Sigmoid divertics, Internal grade 1 hemorrhoids    COLONOSCOPY N/A 1/16/2019    COLONOSCOPY W/ ENDOSCOPIC MUCOSAL RESECTION WITH ELEVIEW 5ML INJECTION AND POLYPECTOMY OF TIP OF BLIND RECTOSIGMOID STUMP AND CAUTERIZATION WITH ERBE PROBE, CLIPPING X2 performed by Aneesh Gallego MD at [facility]  01/21/2020    pan divertics/ 4 polyps/ sm int hem, S/P partial sigmoid resection w/ end to side anastamosis, repeat in 3 years    COLONOSCOPY N/A 1/21/2020    COLONOSCOPY POLYPECTOMY SNARE/COLD BIOPSY performed by Aneesh Gallego MD at [facility]  2003    back   [address] Bilateral 12/15/14    DENTAL SURGERY      front right tooth and root canal w/ brass bolt    DILATION AND CURETTAGE OF UTERUS  2006    Needed a camera to find my uterus.     ENDOSCOPY, COLON, DIAGNOSTIC  12/14/2017    Small hiatal hernia    HERNIA REPAIR  2004    Umb hernia    HERNIA REPAIR  2008    Inc hernia    LYMPH NODE DISSECTION  2/29/2012    Right axillary-3 sentinel nodes were positive out of 9.    MASTECTOMY, RADICAL Right 02/29/2012    w/ sentinal node disection-Dr West    PACEMAKER PLACEMENT      PRE-MALIGNANT / BENIGN SKIN LESION EXCISION  2/8/2013    right leg X2-Dr West    TONSILLECTOMY  1957    TUNNELED VENOUS PORT PLACEMENT  2004    TUNNELED VENOUS PORT PLACEMENT  02/13/2012    removal of mediport     Family History   Problem Relation Age of Onset    Arthritis Mother         OA, RA    High Cholesterol Mother     High Blood Pressure Mother     Cancer Father         skin and leukemia    High Blood Pressure Father     High Cholesterol Father     Diabetes Father     Heart Disease Father     Heart Disease Brother     High Cholesterol Brother     High Blood Pressure Brother     Heart Disease Brother     No Known Problems Sister     Seizures Son     Cancer Brother         skin cancer    Breast Cancer Neg Hx     Ovarian Cancer Neg Hx      Social History     Socioeconomic History    Marital status:      Spouse name: Not on file    Number of children: 2    Years of education: 15    Highest education level: 12th grade   Occupational History    Occupation: retired   Tobacco Use    Smoking status: Never Smoker    Smokeless tobacco: Never Used   Vaping Use    Vaping Use: Never used   Substance and Sexual Activity    Alcohol use: No     Comment:           CAFFEINE: 2- 12oz nona daily & 2 cups coffee some nights.  Drug use: No    Sexual activity: Not Currently     Partners: Male   Other Topics Concern    Not on file   Social History Narrative    Do you donate blood or plasma? No    Caffeine intake? Moderate    Advance directive? No    Is blood transfusion acceptable in an emergency? Yes    Live alone or with others? With others    Able to care for self? Yes    No exercise at this time         Social Determinants of Health     Financial Resource Strain: Low Risk     Difficulty of Paying Living Expenses: Not hard at all   Food Insecurity: No Food Insecurity    Worried About Running Out of Food in the Last Year: Never true    Beau of Food in the Last Year: Never true   Transportation Needs:     Lack of Transportation (Medical): Not on file    Lack of Transportation (Non-Medical):  Not on file   Physical Activity:     Days of Exercise per Week: Not on file    Minutes of Exercise per Session: Not on file   Stress:     Feeling of Stress : Not on file   Social Connections:     Frequency of Communication with Friends and Family: Not on file    Frequency of Social Gatherings with Friends and Family: Not on file    Attends Protestant Services: Not on file    Active Member of Clubs or Organizations: Not on file  Attends Club or Organization Meetings: Not on file    Marital Status: Not on file   Intimate Partner Violence:     Fear of Current or Ex-Partner: Not on file    Emotionally Abused: Not on file    Physically Abused: Not on file    Sexually Abused: Not on file   Housing Stability: [address] Unable to Pay for Housing in the Last Year: No    Number of [place] in the Last Year: 1    Unstable Housing in the Last Year: No     Current Facility-Administered Medications   Medication Dose Route Frequency Provider Last Rate Last Admin    acetaminophen (TYLENOL) tablet 1,000 mg  1,000 mg Oral Once Bee Bermudez, DO         Current Outpatient Medications   Medication Sig Dispense Refill    letrozole ([address] [facility]) 2.5 MG tablet Take 1 tablet by mouth nightly 30 tablet 5    sertraline (ZOLOFT) 100 MG tablet TAKE 2 TABLETS BY MOUTH EVERY MORNING 180 tablet 1    simvastatin (ZOCOR) 20 MG tablet TAKE ONE (1) TABLET BY MOUTH NIGHTLY 90 tablet 1    ezetimibe (ZETIA) 10 MG tablet TAKE 1 TABLET BY MOUTH EVERY MORNING 90 tablet 1    Ferrous Sulfate (IRON) 325 (65 Fe) MG TABS Take 1 tablet by mouth every other day 30 tablet 0    albuterol-ipratropium (COMBIVENT RESPIMAT)  MCG/ACT AERS inhaler Inhale 1 puff into the lungs 4 times daily 1 Inhaler 0    acetaminophen (TYLENOL) 500 MG tablet Take 500 mg by mouth every 6 hours as needed for Pain      fluticasone (FLOVENT HFA) 110 MCG/ACT inhaler Inhale 2 puffs into the lungs 2 times daily 1 Inhaler 5    albuterol sulfate HFA (PROAIR HFA) 108 (90 Base) MCG/ACT inhaler INHALE 2 PUFFS BY MOUTH EVERY SIX HOURS AS NEEDED FOR WHEEZING 1 Inhaler 5    vitamin B-12 (CYANOCOBALAMIN) 1000 MCG tablet Take 1,000 mcg by mouth daily      aspirin 81 MG EC tablet Take 81 mg by mouth nightly       calcium-vitamin D (OSCAL 500/200 D-3) 500-200 MG-UNIT per tablet Take 1 tablet by mouth 2 times daily         Allergies   Allergen Reactions    Bactrim [Sulfamethoxazole-Trimethoprim] Anaphylaxis and Hives    Lipitor [Atorvastatin] Shortness Of Breath    Taxol [Paclitaxel] Anaphylaxis and Swelling    Aminoglycosides      Abstracted from [facility] patient chart.     Demerol Nausea Only    Neosporin [Bacitracin-Neomycin-Polymyxin] Rash and Other (See Comments)     hotness    Other Rash     Ivory Soap    Talc Other (See Comments)     blisters     Current Facility-Administered Medications   Medication Dose Route Frequency Provider Last Rate Last Admin    acetaminophen (TYLENOL) tablet 1,000 mg  1,000 mg Oral Once Briseida Quiet, DO         Current Outpatient Medications   Medication Sig Dispense Refill    letrozole (FEMARA) 2.5 MG tablet Take 1 tablet by mouth nightly 30 tablet 5    sertraline (ZOLOFT) 100 MG tablet TAKE 2 TABLETS BY MOUTH EVERY MORNING 180 tablet 1    simvastatin (ZOCOR) 20 MG tablet TAKE ONE (1) TABLET BY MOUTH NIGHTLY 90 tablet 1    ezetimibe (ZETIA) 10 MG tablet TAKE 1 TABLET BY MOUTH EVERY MORNING 90 tablet 1    Ferrous Sulfate (IRON) 325 (65 Fe) MG TABS Take 1 tablet by mouth every other day 30 tablet 0    albuterol-ipratropium (COMBIVENT RESPIMAT)  MCG/ACT AERS inhaler Inhale 1 puff into the lungs 4 times daily 1 Inhaler 0    acetaminophen (TYLENOL) 500 MG tablet Take 500 mg by mouth every 6 hours as needed for Pain      fluticasone (FLOVENT HFA) 110 MCG/ACT inhaler Inhale 2 puffs into the lungs 2 times daily 1 Inhaler 5    albuterol sulfate HFA (PROAIR HFA) 108 (90 Base) MCG/ACT inhaler INHALE 2 PUFFS BY MOUTH EVERY SIX HOURS AS NEEDED FOR WHEEZING 1 Inhaler 5    vitamin B-12 (CYANOCOBALAMIN) 1000 MCG tablet Take 1,000 mcg by mouth daily      aspirin 81 MG EC tablet Take 81 mg by mouth nightly       calcium-vitamin D (OSCAL 500/200 D-3) 500-200 MG-UNIT per tablet Take 1 tablet by mouth 2 times daily          Nursing Notes Reviewed    VITAL SIGNS:  ED Triage Vitals   Enc Vitals Group      BP       Pulse       Resp       Temp       Temp src       SpO2 Weight       Height       Head Circumference       Peak Flow       Pain Score       Pain Loc       Pain Edu? Excl. in [address]? PHYSICAL EXAM:  Physical Exam  Vitals and nursing note reviewed. Constitutional:       General: She is not in acute distress. Appearance: Normal appearance. She is obese. She is not ill-appearing, toxic-appearing or diaphoretic. HENT:      Head: Normocephalic and atraumatic. Right Ear: External ear normal.   Eyes:      General: No scleral icterus. Right eye: No discharge. Left eye: No discharge. Extraocular Movements: Extraocular movements intact. Conjunctiva/sclera: Conjunctivae normal.   Cardiovascular:      Rate and Rhythm: Normal rate and regular rhythm. Pulses: Normal pulses. Heart sounds: Normal heart sounds. No murmur heard. No friction rub. No gallop. Pulmonary:      Effort: Pulmonary effort is normal. No respiratory distress. Breath sounds: Normal breath sounds. No stridor. No wheezing, rhonchi or rales. Abdominal:      General: Bowel sounds are normal. There is no distension. Palpations: Abdomen is soft. There is no mass. Tenderness: There is no abdominal tenderness. There is no guarding or rebound. Hernia: No hernia is present. Musculoskeletal:         General: Tenderness present. No swelling, deformity or signs of injury. Cervical back: Normal and normal range of motion. No rigidity or tenderness. Right hip: Normal.      Left hip: Normal.      Right upper leg: Tenderness present. Left upper leg: Tenderness present. Right knee: Normal range of motion. Tenderness present. Normal pulse. Left knee: Normal range of motion. Tenderness present. Normal pulse. Right lower leg: Normal. No edema. Left lower leg: Normal. No edema. Right ankle: Normal.        Legs:    Skin:     General: Skin is warm. Coloration: Skin is not jaundiced or pale.       Findings: No bruising, erythema, laceration, lesion, rash or wound. Neurological:      General: No focal deficit present. Mental Status: She is alert and oriented to person, place, and time. GCS: GCS eye subscore is 4. GCS verbal subscore is 5. GCS motor subscore is 6. Cranial Nerves: Cranial nerves are intact. No cranial nerve deficit, dysarthria or facial asymmetry. Sensory: Sensation is intact. No sensory deficit. Motor: Motor function is intact. No weakness, tremor, atrophy, abnormal muscle tone or seizure activity. Coordination: Coordination is intact. Coordination normal.   Psychiatric:         Mood and Affect: Mood normal.         Behavior: Behavior normal.         Thought Content: Thought content normal.         Judgment: Judgment normal.           I have reviewed andinterpreted all of the currently available lab results from this visit (if applicable):    No results found for this visit on 01/23/22. Radiographs (if obtained):  [] The following radiograph was interpreted by myself in the absence of a radiologist:  [x] Radiologist's Report Reviewed:    Xray pelvis, Xray bilateral knees, bilateral femurs    EKG (if obtained): (All EKG's are interpreted by myself in the absence of a cardiologist)    MDM:    Patient is complaining of bilateral knee pain. Again before arrival patient was getting out of her car when she states she slipped on the snow, landing on both of her knees. She did not pass out or hit her head denies any neck back pain or extremity pain. Just complains of bilateral knee pain, thigh pain. Came by EMS she appears otherwise very well she denies taking medicine for this. On exam she has good range of motion of both knees, pelvis is stable she is no abdominal pain breath sounds are clear vital signs are stable. She is good pulses abdomen is any signs of trauma or swelling or bruising. We will give her Tylenol, x-ray of her pelvis, knees, femurs.   Likely musculoskeletal pain, contusion patient recheck doing well x-rays negative for fracture dislocation. Again she has good pulses no other signs of trauma discharged home given return precautions and follow-up information. Likely contusion.     CLINICAL IMPRESSION:  Final diagnoses:   Pain in both knees, unspecified chronicity   Contusion of knee, unspecified laterality, initial encounter   Fall, initial encounter       (Please note that portions of this note may have been completed with a voice recognition program. Efforts were made to edit the dictations but occasionally words aremis-transcribed.)    DISPOSITION REFERRAL (if applicable):  Albino Wyatt DO  [address]  [phone]    Schedule an appointment as soon as possible for a visit       [facility] Emergency Department  [place] [facility] [phone]  In 1 day  For re- check, If symptoms worsen    Randell Garcia DO  [address] [facility] [address]  Greta Santos (if applicable):  Discharge Medication List as of 1/24/2022 12:04 AM             [facility] [employer]  01/24/22 [id number]

## 2022-03-07 ENCOUNTER — TELEPHONE (OUTPATIENT)
Dept: INTERNAL MEDICINE CLINIC | Age: 72
End: 2022-03-07

## 2022-03-07 NOTE — TELEPHONE ENCOUNTER
----- Message from Sharilyn Saint sent at 3/7/2022  8:25 AM EST -----  Subject: Message to Provider    QUESTIONS  Information for Provider? Patient has a scheduled in-person appointment   today 3/7 at 2:30 PM and would like to switch her appointment to a VV, having a hard time walking due to fall last week.  ---------------------------------------------------------------------------  --------------  CALL BACK INFO  What is the best way for the office to contact you? OK to leave message on   voicemail  Preferred Call Back Phone Number? [phone]  ---------------------------------------------------------------------------  --------------  SCRIPT ANSWERS  Relationship to Patient?  Self

## 2022-03-07 NOTE — TELEPHONE ENCOUNTER
Spoke with pt. She does not have a cell phon/e. Unable to do VV. Appt rescheduled for 4/08/22. No further action is needed, at this time.

## 2022-03-23 ENCOUNTER — TELEPHONE (OUTPATIENT)
Dept: CARDIOLOGY CLINIC | Age: 72
End: 2022-03-23

## 2022-03-23 ENCOUNTER — PROCEDURE VISIT (OUTPATIENT)
Dept: CARDIOLOGY CLINIC | Age: 72
End: 2022-03-23
Payer: MEDICARE

## 2022-03-23 DIAGNOSIS — Z95.0 CARDIAC PACEMAKER IN SITU: Primary | ICD-10-CM

## 2022-03-23 DIAGNOSIS — I44.1 HEART BLOCK AV SECOND DEGREE: ICD-10-CM

## 2022-03-23 PROCEDURE — 93296 REM INTERROG EVL PM/IDS: CPT | Performed by: INTERNAL MEDICINE

## 2022-03-23 PROCEDURE — 93294 REM INTERROG EVL PM/LDLS PM: CPT | Performed by: INTERNAL MEDICINE

## 2022-03-23 NOTE — LETTER
[address]  [address] [address]  Phone: [phone]  Fax:[phone]      March 23, 2022      [id number] [email or web address] [facility]      Dear Enrique Coyne: This is your CARELINK schedule. Please jean your calendar with these dates. You can do you checks anytime during the scheduled day. Since we do not do reminder calls, it will be your responsibility to preform the checks on the day it is scheduled. If you have any questions or concerns, please call and ask for Delmy Hawley at [phone]. Thank you.

## 2022-03-28 DIAGNOSIS — E78.5 HYPERLIPIDEMIA, UNSPECIFIED HYPERLIPIDEMIA TYPE: ICD-10-CM

## 2022-03-28 RX ORDER — SERTRALINE HYDROCHLORIDE 100 MG/1
200 TABLET, FILM COATED ORAL EVERY MORNING
Qty: 180 TABLET | Refills: 0 | Status: SHIPPED | OUTPATIENT
Start: 2022-03-28 | End: 2022-08-23

## 2022-03-28 RX ORDER — SIMVASTATIN 20 MG
TABLET ORAL
Qty: 90 TABLET | Refills: 0 | Status: SHIPPED | OUTPATIENT
Start: 2022-03-28

## 2022-03-28 RX ORDER — EZETIMIBE 10 MG/1
10 TABLET ORAL EVERY MORNING
Qty: 90 TABLET | Refills: 0 | Status: SHIPPED | OUTPATIENT
Start: 2022-03-28 | End: 2022-08-23

## 2022-03-31 ENCOUNTER — TELEPHONE (OUTPATIENT)
Dept: INTERNAL MEDICINE CLINIC | Age: 72
End: 2022-03-31

## 2022-03-31 NOTE — TELEPHONE ENCOUNTER
Patient called stating left leg between knee and ankle is swollen and itching has an appointment on Friday April 8 with Dr. Benji Rios sent to the clinic to get it taken care of before appointment.

## 2022-04-01 ENCOUNTER — OFFICE VISIT (OUTPATIENT)
Dept: FAMILY MEDICINE CLINIC | Age: 72
End: 2022-04-01
Payer: MEDICARE

## 2022-04-01 ENCOUNTER — HOSPITAL ENCOUNTER (OUTPATIENT)
Dept: ULTRASOUND IMAGING | Age: 72
Discharge: HOME | End: 2022-04-01
Payer: MEDICARE

## 2022-04-01 ENCOUNTER — HOSPITAL ENCOUNTER (OUTPATIENT)
Age: 72
Discharge: HOME | End: 2022-04-01
Payer: MEDICARE

## 2022-04-01 VITALS
WEIGHT: 280 LBS | HEART RATE: 81 BPM | BODY MASS INDEX: 51.21 KG/M2 | SYSTOLIC BLOOD PRESSURE: 128 MMHG | TEMPERATURE: 99.1 F | DIASTOLIC BLOOD PRESSURE: 62 MMHG | OXYGEN SATURATION: 94 %

## 2022-04-01 DIAGNOSIS — M79.89 PAIN AND SWELLING OF LEFT LOWER EXTREMITY: Primary | ICD-10-CM

## 2022-04-01 DIAGNOSIS — M79.605 PAIN AND SWELLING OF LEFT LOWER EXTREMITY: ICD-10-CM

## 2022-04-01 DIAGNOSIS — M79.89 PAIN AND SWELLING OF LEFT LOWER EXTREMITY: ICD-10-CM

## 2022-04-01 DIAGNOSIS — L03.116 CELLULITIS OF LEFT LOWER EXTREMITY: ICD-10-CM

## 2022-04-01 DIAGNOSIS — M79.605 PAIN AND SWELLING OF LEFT LOWER EXTREMITY: Primary | ICD-10-CM

## 2022-04-01 PROCEDURE — 4040F PNEUMOC VAC/ADMIN/RCVD: CPT | Performed by: NURSE PRACTITIONER

## 2022-04-01 PROCEDURE — 93971 EXTREMITY STUDY: CPT

## 2022-04-01 PROCEDURE — 1036F TOBACCO NON-USER: CPT | Performed by: NURSE PRACTITIONER

## 2022-04-01 PROCEDURE — 1123F ACP DISCUSS/DSCN MKR DOCD: CPT | Performed by: NURSE PRACTITIONER

## 2022-04-01 PROCEDURE — G8399 PT W/DXA RESULTS DOCUMENT: HCPCS | Performed by: NURSE PRACTITIONER

## 2022-04-01 PROCEDURE — 1090F PRES/ABSN URINE INCON ASSESS: CPT | Performed by: NURSE PRACTITIONER

## 2022-04-01 PROCEDURE — G8417 CALC BMI ABV UP PARAM F/U: HCPCS | Performed by: NURSE PRACTITIONER

## 2022-04-01 PROCEDURE — 3017F COLORECTAL CA SCREEN DOC REV: CPT | Performed by: NURSE PRACTITIONER

## 2022-04-01 PROCEDURE — 99213 OFFICE O/P EST LOW 20 MIN: CPT | Performed by: NURSE PRACTITIONER

## 2022-04-01 PROCEDURE — G8427 DOCREV CUR MEDS BY ELIG CLIN: HCPCS | Performed by: NURSE PRACTITIONER

## 2022-04-01 RX ORDER — CEPHALEXIN 500 MG/1
500 CAPSULE ORAL 4 TIMES DAILY
Qty: 40 CAPSULE | Refills: 0 | Status: SHIPPED | OUTPATIENT
Start: 2022-04-01 | End: 2022-04-11

## 2022-04-01 RX ORDER — GABAPENTIN 100 MG/1
100 CAPSULE ORAL DAILY
COMMUNITY
End: 2022-10-13 | Stop reason: ALTCHOICE

## 2022-04-01 NOTE — PROGRESS NOTES
Janet Palacios   67 y.o.  female  [id number]      Chief Complaint   Patient presents with    Cellulitis     left leg        Subjective:  67 y. o.female is here for a follow up. She has the following chronic/acute medical problems:  Patient Active Problem List   Diagnosis    Malignant neoplasm of upper-outer quadrant of right breast in female, estrogen receptor positive ([facility]    Diffuse cystic mastopathy    Hyperlipidemia    H/O chest pain    Heart block AV second degree    Abnormal cardiovascular stress test    JAMIE (obstructive sleep apnea)    Super obesity    Mild asthma    Severe obstructive sleep apnea    Cardiac pacemaker    Chronic cystitis    Asthma    Hypertension    Depression    Obstructive sleep apnea    Nocturnal oxygen desaturation    NSTEMI (non-ST elevated myocardial infarction) ([facility]    PVD (peripheral vascular disease) ([facility]    Morbid obesity with BMI of 45.0-49.9, adult (HCC)    Moderate aortic stenosis    Hepatomegaly, not elsewhere classified    Arthritis    CAD (coronary artery disease)    Pneumonia due to COVID-19 virus    Bilateral carotid artery stenosis    Moderate persistent asthma without complication    Carotid stenosis, right       Patient is here with complaints of what she thinks is cellulitis of her left lower extremity. Patient states she has what she calls water bubbles due to swelling and is normal for her. Patient states she has calf pain and she measured and her calf is 2 cm bigger then the other one. Patient states her calf area of the left lower extremity is painful. Patient does have some scratches on the left right lower extremities. Patient states because her leg is itchy. Patient does have erythema on the front part of her shin area. Patient denies pain on the shin area. Patient states she has neuropathy. Patient states she has had cellulitis of her right lower extremity before.        Review of Systems   Constitutional: Negative for appetite change, chills, fatigue and fever. HENT: Negative for congestion, ear pain, postnasal drip, rhinorrhea, sinus pressure, sinus pain, sneezing and sore throat. Respiratory: Negative for cough, chest tightness, shortness of breath and wheezing. Cardiovascular: Positive for leg swelling (states normal for her). Negative for chest pain and palpitations. Gastrointestinal: Negative for diarrhea, nausea and vomiting. Skin: Negative for rash. Neurological: Negative for dizziness, light-headedness and headaches. Current Outpatient Medications   Medication Sig Dispense Refill    gabapentin (NEURONTIN) 100 MG capsule Take 100 mg by mouth daily.       cephALEXin (KEFLEX) 500 MG capsule Take 1 capsule by mouth 4 times daily for 10 days 40 capsule 0    sertraline (ZOLOFT) 100 MG tablet TAKE 2 TABLETS BY MOUTH EVERY MORNING 180 tablet 0    ezetimibe (ZETIA) 10 MG tablet TAKE 1 TABLET BY MOUTH EVERY MORNING 90 tablet 0    simvastatin (ZOCOR) 20 MG tablet TAKE ONE (1) TABLET BY MOUTH NIGHTLY 90 tablet 0    letrozole (FEMARA) 2.5 MG tablet Take 1 tablet by mouth nightly 30 tablet 5    Ferrous Sulfate (IRON) 325 (65 Fe) MG TABS Take 1 tablet by mouth every other day 30 tablet 0    albuterol-ipratropium (COMBIVENT RESPIMAT)  MCG/ACT AERS inhaler Inhale 1 puff into the lungs 4 times daily 1 Inhaler 0    acetaminophen (TYLENOL) 500 MG tablet Take 500 mg by mouth every 6 hours as needed for Pain      fluticasone (FLOVENT HFA) 110 MCG/ACT inhaler Inhale 2 puffs into the lungs 2 times daily 1 Inhaler 5    albuterol sulfate HFA (PROAIR HFA) 108 (90 Base) MCG/ACT inhaler INHALE 2 PUFFS BY MOUTH EVERY SIX HOURS AS NEEDED FOR WHEEZING 1 Inhaler 5    vitamin B-12 (CYANOCOBALAMIN) 1000 MCG tablet Take 1,000 mcg by mouth daily      aspirin 81 MG EC tablet Take 81 mg by mouth nightly       calcium-vitamin D (OSCAL 500/200 D-3) 500-200 MG-UNIT per tablet Take 1 tablet by mouth 2 times daily        No current facility-administered medications for this visit. Past medical, family,surgical history reviewed today. Objective:  /62   Pulse 81   Temp 99.1 °F (37.3 °C) (Oral)   Wt 280 lb (127 kg) Comment: per patient  LMP 01/15/1999   SpO2 94%   Breastfeeding No   BMI 51.21 kg/m²   BP Readings from Last 3 Encounters:   04/01/22 128/62   12/15/21 125/60   12/14/21 (!) 160/88     Wt Readings from Last 3 Encounters:   04/01/22 280 lb (127 kg)   12/15/21 275 lb (124.7 kg)   12/14/21 275 lb (124.7 kg)         Physical Exam  Constitutional:       Appearance: Normal appearance. HENT:      Head: Normocephalic. Cardiovascular:      Rate and Rhythm: Normal rate and regular rhythm. Heart sounds: Normal heart sounds. Pulmonary:      Effort: Pulmonary effort is normal.      Breath sounds: Normal breath sounds. Musculoskeletal:      Cervical back: Neck supple. Right lower leg: Edema present. Left lower leg: Edema present. Skin:     General: Skin is warm and dry. Neurological:      Mental Status: She is alert and oriented to person, place, and time.    Psychiatric:         Mood and Affect: Mood normal.         Behavior: Behavior normal.         Lab Results   Component Value Date    WBC 9.0 12/05/2021    HGB 11.1 (L) 12/05/2021    HCT 35.2 (L) 12/05/2021    MCV 90.0 12/05/2021     12/05/2021     Lab Results   Component Value Date     12/05/2021    K 4.2 12/05/2021     12/05/2021    CO2 29 12/05/2021    BUN 13 12/05/2021    CREATININE 0.7 12/05/2021    GLUCOSE 128 (H) 12/05/2021    CALCIUM 9.1 12/05/2021    PROT 7.3 11/04/2021    LABALBU 4.1 11/04/2021    BILITOT 0.2 11/04/2021    ALKPHOS 108 11/04/2021    AST 25 11/04/2021    ALT 20 11/04/2021    LABGLOM >60 12/05/2021    GFRAA >60 12/05/2021    AGRATIO 1.3 07/29/2021    GLOB 3.2 07/29/2021     Lab Results   Component Value Date    CHOL 182 07/29/2021    CHOL 160 10/16/2018    CHOL 175 04/12/2018     Lab Results Component Value Date    TRIG 185 (H) 07/29/2021    TRIG 138 10/16/2018    TRIG 159 (H) 04/12/2018     Lab Results   Component Value Date    HDL 50 07/29/2021    HDL 57 02/11/2020    HDL 55 10/16/2018     Lab Results   Component Value Date    LDLCALC 95 07/29/2021     Lab Results   Component Value Date    LABA1C 5.8 11/10/2021     Lab Results   Component Value Date    TSHHS 1.290 04/12/2018         ASSESSMENT/PLAN:      1. Pain and swelling of left lower extremity  - VL LOWER EXTREMITY VENOUS LEFT; Future    2. Cellulitis of left lower extremity  Explained to patient will wait to send antibiotic over after getting her results to rule out blood clot. Will have results back in a couple of hours and send all medications over at once to the pharmacy today. No orders of the defined types were placed in this encounter. There are no discontinued medications. Care discussed with patient. Questions answered. Patient verbalizes understanding and agrees with plan. After visit summary provided. Advised to call for any problems, questions, or concerns. Return if symptoms worsen or fail to improve.                                              Signed:  AUTSIN Shaw CNP  04/04/22  7:16 PM

## 2022-04-04 ASSESSMENT — ENCOUNTER SYMPTOMS
VOMITING: 0
DIARRHEA: 0
SINUS PAIN: 0
RHINORRHEA: 0
SHORTNESS OF BREATH: 0
COUGH: 0
NAUSEA: 0
SINUS PRESSURE: 0
CHEST TIGHTNESS: 0
WHEEZING: 0
SORE THROAT: 0

## 2022-04-08 ENCOUNTER — OFFICE VISIT (OUTPATIENT)
Dept: INTERNAL MEDICINE CLINIC | Age: 72
End: 2022-04-08
Payer: MEDICARE

## 2022-04-08 VITALS
BODY MASS INDEX: 51.53 KG/M2 | SYSTOLIC BLOOD PRESSURE: 134 MMHG | HEART RATE: 70 BPM | HEIGHT: 62 IN | DIASTOLIC BLOOD PRESSURE: 58 MMHG | OXYGEN SATURATION: 98 % | WEIGHT: 280 LBS

## 2022-04-08 DIAGNOSIS — R73.03 PREDIABETES: ICD-10-CM

## 2022-04-08 DIAGNOSIS — G89.29 CHRONIC LEFT-SIDED LOW BACK PAIN WITH LEFT-SIDED SCIATICA: ICD-10-CM

## 2022-04-08 DIAGNOSIS — M54.42 CHRONIC LEFT-SIDED LOW BACK PAIN WITH LEFT-SIDED SCIATICA: ICD-10-CM

## 2022-04-08 DIAGNOSIS — J45.20 MILD INTERMITTENT ASTHMA WITHOUT COMPLICATION: ICD-10-CM

## 2022-04-08 DIAGNOSIS — E78.5 HYPERLIPIDEMIA, UNSPECIFIED HYPERLIPIDEMIA TYPE: ICD-10-CM

## 2022-04-08 DIAGNOSIS — F32.A DEPRESSION, UNSPECIFIED DEPRESSION TYPE: ICD-10-CM

## 2022-04-08 DIAGNOSIS — L03.116 CELLULITIS OF LEFT LOWER EXTREMITY: Primary | ICD-10-CM

## 2022-04-08 PROCEDURE — G8399 PT W/DXA RESULTS DOCUMENT: HCPCS | Performed by: FAMILY MEDICINE

## 2022-04-08 PROCEDURE — 1123F ACP DISCUSS/DSCN MKR DOCD: CPT | Performed by: FAMILY MEDICINE

## 2022-04-08 PROCEDURE — 1036F TOBACCO NON-USER: CPT | Performed by: FAMILY MEDICINE

## 2022-04-08 PROCEDURE — 99214 OFFICE O/P EST MOD 30 MIN: CPT | Performed by: FAMILY MEDICINE

## 2022-04-08 PROCEDURE — 3017F COLORECTAL CA SCREEN DOC REV: CPT | Performed by: FAMILY MEDICINE

## 2022-04-08 PROCEDURE — 1090F PRES/ABSN URINE INCON ASSESS: CPT | Performed by: FAMILY MEDICINE

## 2022-04-08 PROCEDURE — G8427 DOCREV CUR MEDS BY ELIG CLIN: HCPCS | Performed by: FAMILY MEDICINE

## 2022-04-08 PROCEDURE — G8417 CALC BMI ABV UP PARAM F/U: HCPCS | Performed by: FAMILY MEDICINE

## 2022-04-08 PROCEDURE — 4040F PNEUMOC VAC/ADMIN/RCVD: CPT | Performed by: FAMILY MEDICINE

## 2022-04-08 ASSESSMENT — ENCOUNTER SYMPTOMS
SHORTNESS OF BREATH: 1
NAUSEA: 0
COUGH: 0
ABDOMINAL PAIN: 0
BACK PAIN: 0

## 2022-04-08 NOTE — PROGRESS NOTES
Alejandra Patterson (:  1950) is a 67 y.o. female,Established patient, here for evaluation of the following chief complaint(s):  Hyperlipidemia, Leg Swelling (left leg - for the past 4 weeks. Went to the walk in clinic and was dx'd with cellulitis and Rx'd Keflex), and Other (chronic conditions)         ASSESSMENT/PLAN:  1. Cellulitis of left lower extremity  Finish Keflex  2. Hyperlipidemia, unspecified hyperlipidemia type  -     Lipid Panel; Future  Continue Zocor and Zetia  3. Mild intermittent asthma without complication  Continue inhalers  4. Depression, unspecified depression type  Continue Zoloft  Consider counseling  5. Chronic left-sided low back pain with left-sided sciatica  On Gabapentin at night  6. Prediabetes  -     Hemoglobin A1C; Future    Pt to see cardiology for vein evaluation  Compression hose/ elevate legs  On this date 2022 I have spent 30 minutes reviewing previous notes, test results and face to face with the patient discussing the diagnosis and importance of compliance with the treatment plan as well as documenting on the day of the visit. Return for follow up in 4 to 5 months for HLD, prediabetes. 22 VL Lower Extremity Venous Left  Impression To the extent of visualization, no DVT demonstrated in the left lower extremity.      Lab Results   Component Value Date    WBC 9.0 2021    HGB 11.1 (L) 2021    HCT 35.2 (L) 2021    MCV 90.0 2021     2021     Lab Results   Component Value Date     2021    K 4.2 2021     2021    CO2 29 2021    BUN 13 2021    CREATININE 0.7 2021    GLUCOSE 128 2021    CALCIUM 9.1 2021      Lab Results   Component Value Date    LABA1C 5.8 11/10/2021     Lab Results   Component Value Date     11/10/2021     Lab Results   Component Value Date    CHOL 182 2021    CHOL 160 10/16/2018    CHOL 175 2018     Lab Results   Component Value Date TRIG 185 (H) 07/29/2021    TRIG 138 10/16/2018    TRIG 159 (H) 04/12/2018     Lab Results   Component Value Date    HDL 50 07/29/2021    HDL 57 02/11/2020    HDL 55 10/16/2018     Lab Results   Component Value Date    LDLCALC 95 07/29/2021     Lab Results   Component Value Date    LABVLDL 37 07/29/2021     No results found for: CHOLHDLRATIO      Subjective   SUBJECTIVE/OBJECTIVE:  HPI: This  68 yo F here for the following:    Pt here for f/u cellulitis LLE. Pt was seen [facility] on 4/1/22 and started on Keflex. Pt with chronic leg swelling. She is established with cardiology  HLD- On Zetia and Zocor  Asthma- stable on inhalers  Depression- On Zoloft. Pt has some concerns about her boyfriend. Estranged from daughter. She may consider going back to counseling with Dr. Makeda Evans  Chronic LBP with sciatica- On Gabapentin and this has helped  VHD-follows with cardiology  Carotid stenosis- Hx of R CEA.  follows with vascular surgery    Patient Active Problem List    Diagnosis Date Noted    Carotid stenosis, right 12/03/2021    Bilateral carotid artery stenosis 11/23/2021    Moderate persistent asthma without complication 65/96/8861    Pneumonia due to COVID-19 virus 03/22/2021    CAD (coronary artery disease)     Arthritis     Hepatomegaly, not elsewhere classified 08/21/2020    Moderate aortic stenosis 07/07/2019    Morbid obesity with BMI of 45.0-49.9, adult ([facility] 05/26/2019    PVD (peripheral vascular disease) ([facility] 02/12/2019    NSTEMI (non-ST elevated myocardial infarction) ([facility] 02/11/2017    Asthma     Hypertension     Depression     Obstructive sleep apnea     Nocturnal oxygen desaturation     Chronic cystitis 12/15/2014    Cardiac pacemaker 03/10/2014    Mild asthma 11/20/2013    Severe obstructive sleep apnea 11/20/2013    Super obesity 09/11/2013    JAMIE (obstructive sleep apnea) 07/17/2013    Abnormal cardiovascular stress test 06/28/2013    Heart block AV second degree 06/26/2013    Hyperlipidemia     Malignant neoplasm of upper-outer quadrant of right breast in female, estrogen receptor positive ([facility] 08/29/2012    Diffuse cystic mastopathy 08/29/2012    H/O chest pain 04/01/2005       Review of Systems   Constitutional: Negative for diaphoresis and fever. Respiratory: Positive for shortness of breath (chronic - stable). Negative for cough. Cardiovascular: Negative for chest pain and palpitations. Gastrointestinal: Negative for abdominal pain and nausea. Genitourinary: Negative for dysuria. Musculoskeletal: Negative for back pain. Skin: Positive for rash. Neurological: Negative for dizziness and headaches. Allergies   Allergen Reactions    Bactrim [Sulfamethoxazole-Trimethoprim] Anaphylaxis and Hives    Lipitor [Atorvastatin] Shortness Of Breath    Taxol [Paclitaxel] Anaphylaxis and Swelling    Aminoglycosides      Abstracted from [facility] patient chart.  Demerol Nausea Only    Neosporin [Bacitracin-Neomycin-Polymyxin] Rash and Other (See Comments)     hotness    Other Rash     Ivory Soap    Talc Other (See Comments)     blisters     Current Outpatient Medications   Medication Sig Dispense Refill    gabapentin (NEURONTIN) 100 MG capsule Take 100 mg by mouth daily.       cephALEXin (KEFLEX) 500 MG capsule Take 1 capsule by mouth 4 times daily for 10 days 40 capsule 0    sertraline (ZOLOFT) 100 MG tablet TAKE 2 TABLETS BY MOUTH EVERY MORNING 180 tablet 0    ezetimibe (ZETIA) 10 MG tablet TAKE 1 TABLET BY MOUTH EVERY MORNING 90 tablet 0    simvastatin (ZOCOR) 20 MG tablet TAKE ONE (1) TABLET BY MOUTH NIGHTLY 90 tablet 0    letrozole (FEMARA) 2.5 MG tablet Take 1 tablet by mouth nightly 30 tablet 5    Ferrous Sulfate (IRON) 325 (65 Fe) MG TABS Take 1 tablet by mouth every other day 30 tablet 0    albuterol-ipratropium (COMBIVENT RESPIMAT)  MCG/ACT AERS inhaler Inhale 1 puff into the lungs 4 times daily 1 Inhaler 0    acetaminophen (TYLENOL) 500 MG tablet Take 500 mg by mouth every 6 hours as needed for Pain      fluticasone (FLOVENT HFA) 110 MCG/ACT inhaler Inhale 2 puffs into the lungs 2 times daily 1 Inhaler 5    albuterol sulfate HFA (PROAIR HFA) 108 (90 Base) MCG/ACT inhaler INHALE 2 PUFFS BY MOUTH EVERY SIX HOURS AS NEEDED FOR WHEEZING 1 Inhaler 5    vitamin B-12 (CYANOCOBALAMIN) 1000 MCG tablet Take 1,000 mcg by mouth daily      aspirin 81 MG EC tablet Take 81 mg by mouth nightly       calcium-vitamin D (OSCAL 500/200 D-3) 500-200 MG-UNIT per tablet Take 1 tablet by mouth 2 times daily        No current facility-administered medications for this visit. Vitals:    04/08/22 1058   BP: (!) 134/58   Site: Left Upper Arm   Position: Sitting   Cuff Size: Medium Adult   Pulse: 70   SpO2: 98%   Weight: 280 lb (127 kg)   Height: 5' 2\" (1.575 m)     Objective   Physical Exam  Vitals reviewed. Constitutional:       General: She is not in acute distress. Eyes:      Extraocular Movements: Extraocular movements intact. Conjunctiva/sclera: Conjunctivae normal.   Cardiovascular:      Rate and Rhythm: Normal rate and regular rhythm. Heart sounds: Murmur heard. Pulmonary:      Effort: Pulmonary effort is normal. No respiratory distress. Breath sounds: Normal breath sounds. Abdominal:      Palpations: Abdomen is soft. Tenderness: There is no abdominal tenderness. Musculoskeletal:      Cervical back: Neck supple. Right lower leg: Edema present. Left lower leg: Edema present. Skin:     Findings: Rash (stasis derm) present. Neurological:      Mental Status: She is alert and oriented to person, place, and time. Psychiatric:         Mood and Affect: Mood normal.                An electronic signature was used to authenticate this note.     --Sandi Garcia DO

## 2022-05-13 ENCOUNTER — TELEPHONE (OUTPATIENT)
Dept: INTERNAL MEDICINE CLINIC | Age: 72
End: 2022-05-13

## 2022-05-13 NOTE — TELEPHONE ENCOUNTER
The patient wanted to let you know she fell     Patient fell while walking back from the bathroom 6:30 am on the floor. The next day bruise and lump in left breast .She called insurance for medical equipment. A ran to move her off the floor and an electric wheelchair.

## 2022-05-15 ENCOUNTER — ANESTHESIA EVENT (OUTPATIENT)
Dept: OPERATING ROOM | Age: 72
DRG: 853 | End: 2022-05-15
Payer: MEDICARE

## 2022-05-15 ENCOUNTER — APPOINTMENT (OUTPATIENT)
Dept: CT IMAGING | Age: 72
DRG: 853 | End: 2022-05-15
Payer: MEDICARE

## 2022-05-15 ENCOUNTER — ANESTHESIA (OUTPATIENT)
Dept: OPERATING ROOM | Age: 72
DRG: 853 | End: 2022-05-15
Payer: MEDICARE

## 2022-05-15 ENCOUNTER — HOSPITAL ENCOUNTER (INPATIENT)
Age: 72
LOS: 4 days | Discharge: NURSING, REHAB OR LONG-TERM CARE | DRG: 853 | End: 2022-05-19
Attending: EMERGENCY MEDICINE | Admitting: INTERNAL MEDICINE
Payer: MEDICARE

## 2022-05-15 ENCOUNTER — APPOINTMENT (OUTPATIENT)
Dept: GENERAL RADIOLOGY | Age: 72
DRG: 853 | End: 2022-05-15
Payer: MEDICARE

## 2022-05-15 DIAGNOSIS — N30.00 ACUTE CYSTITIS WITHOUT HEMATURIA: ICD-10-CM
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
## 2022-05-15 DIAGNOSIS — R10.9 ABDOMINAL PAIN, UNSPECIFIED ABDOMINAL LOCATION: Primary | ICD-10-CM

## 2022-05-15 DIAGNOSIS — R77.8 ELEVATED TROPONIN: ICD-10-CM

## 2022-05-15 DIAGNOSIS — N20.0 KIDNEY STONE: ICD-10-CM

## 2022-05-15 PROBLEM — N39.0 COMPLICATED URINARY TRACT INFECTION: Status: ACTIVE | Noted: 2022-05-15

## 2022-05-15 LAB
ALBUMIN SERPL-MCNC: 3.5 GM/DL (ref 3.4–5)
ALP BLD-CCNC: 115 IU/L (ref 40–128)
ALT SERPL-CCNC: 20 U/L (ref 10–40)
ANION GAP SERPL CALCULATED.3IONS-SCNC: 12 MMOL/L (ref 4–16)
AST SERPL-CCNC: 21 IU/L (ref 15–37)
BACTERIA: NEGATIVE /HPF
BASOPHILS ABSOLUTE: 0 K/CU MM
BASOPHILS RELATIVE PERCENT: 0.3 % (ref 0–1)
BILIRUB SERPL-MCNC: 0.2 MG/DL (ref 0–1)
BILIRUBIN URINE: NEGATIVE MG/DL
BLOOD, URINE: ABNORMAL
BUN BLDV-MCNC: 14 MG/DL (ref 6–23)
CALCIUM SERPL-MCNC: 9.6 MG/DL (ref 8.3–10.6)
CHLORIDE BLD-SCNC: 105 MMOL/L (ref 99–110)
CLARITY: CLEAR
CO2: 24 MMOL/L (ref 21–32)
COLOR: YELLOW
CREAT SERPL-MCNC: 0.8 MG/DL (ref 0.6–1.1)
DIFFERENTIAL TYPE: ABNORMAL
EOSINOPHILS ABSOLUTE: 0.3 K/CU MM
EOSINOPHILS RELATIVE PERCENT: 2.1 % (ref 0–3)
GFR AFRICAN AMERICAN: >60 ML/MIN/1.73M2
GFR NON-AFRICAN AMERICAN: >60 ML/MIN/1.73M2
GLUCOSE BLD-MCNC: 115 MG/DL (ref 70–99)
GLUCOSE, URINE: NEGATIVE MG/DL
HCT VFR BLD CALC: 34.3 % (ref 37–47)
HEMOGLOBIN: 10.9 GM/DL (ref 12.5–16)
IMMATURE NEUTROPHIL %: 0.7 % (ref 0–0.43)
KETONES, URINE: NEGATIVE MG/DL
LEUKOCYTE ESTERASE, URINE: ABNORMAL
LIPASE: 39 IU/L (ref 13–60)
LYMPHOCYTES ABSOLUTE: 2.2 K/CU MM
LYMPHOCYTES RELATIVE PERCENT: 18.1 % (ref 24–44)
MAGNESIUM: 1.8 MG/DL (ref 1.8–2.4)
MCH RBC QN AUTO: 27.9 PG (ref 27–31)
MCHC RBC AUTO-ENTMCNC: 31.8 % (ref 32–36)
MCV RBC AUTO: 87.9 FL (ref 78–100)
MONOCYTES ABSOLUTE: 0.8 K/CU MM
MONOCYTES RELATIVE PERCENT: 6.2 % (ref 0–4)
MUCUS: ABNORMAL HPF
NITRITE URINE, QUANTITATIVE: POSITIVE
NUCLEATED RBC %: 0 %
PDW BLD-RTO: 14.4 % (ref 11.7–14.9)
PH, URINE: 8.5 (ref 5–8)
PLATELET # BLD: 264 K/CU MM (ref 140–440)
PMV BLD AUTO: 9.6 FL (ref 7.5–11.1)
POTASSIUM SERPL-SCNC: 3.5 MMOL/L (ref 3.5–5.1)
PROTEIN UA: 100 MG/DL
RBC # BLD: 3.9 M/CU MM (ref 4.2–5.4)
RBC URINE: 19 /HPF (ref 0–6)
SEGMENTED NEUTROPHILS ABSOLUTE COUNT: 8.9 K/CU MM
SEGMENTED NEUTROPHILS RELATIVE PERCENT: 72.6 % (ref 36–66)
SODIUM BLD-SCNC: 141 MMOL/L (ref 135–145)
SPECIFIC GRAVITY UA: 1.01 (ref 1–1.03)
TOTAL CK: 168 IU/L (ref 26–140)
TOTAL IMMATURE NEUTOROPHIL: 0.09 K/CU MM
TOTAL NUCLEATED RBC: 0 K/CU MM
TOTAL PROTEIN: 6.6 GM/DL (ref 6.4–8.2)
TRANSITIONAL EPITHELIAL: 3 /HPF
TROPONIN T: 0.02 NG/ML
UROBILINOGEN, URINE: 0.2 MG/DL (ref 0.2–1)
WBC # BLD: 12.3 K/CU MM (ref 4–10.5)
WBC CLUMP: ABNORMAL /HPF
WBC UA: 522 /HPF (ref 0–5)

## 2022-05-15 PROCEDURE — 84484 ASSAY OF TROPONIN QUANT: CPT

## 2022-05-15 PROCEDURE — 85025 COMPLETE CBC W/AUTO DIFF WBC: CPT

## 2022-05-15 PROCEDURE — 96365 THER/PROPH/DIAG IV INF INIT: CPT

## 2022-05-15 PROCEDURE — 70450 CT HEAD/BRAIN W/O DYE: CPT

## 2022-05-15 PROCEDURE — 76000 FLUOROSCOPY <1 HR PHYS/QHP: CPT

## 2022-05-15 PROCEDURE — 93005 ELECTROCARDIOGRAM TRACING: CPT | Performed by: EMERGENCY MEDICINE

## 2022-05-15 PROCEDURE — 99285 EMERGENCY DEPT VISIT HI MDM: CPT

## 2022-05-15 PROCEDURE — C2617 STENT, NON-COR, TEM W/O DEL: HCPCS | Performed by: UROLOGY

## 2022-05-15 PROCEDURE — 1200000000 HC SEMI PRIVATE

## 2022-05-15 PROCEDURE — 83735 ASSAY OF MAGNESIUM: CPT

## 2022-05-15 PROCEDURE — 2580000003 HC RX 258: Performed by: STUDENT IN AN ORGANIZED HEALTH CARE EDUCATION/TRAINING PROGRAM

## 2022-05-15 PROCEDURE — 0T768DZ DILATION OF RIGHT URETER WITH INTRALUMINAL DEVICE, VIA NATURAL OR ARTIFICIAL OPENING ENDOSCOPIC: ICD-10-PCS | Performed by: UROLOGY

## 2022-05-15 PROCEDURE — 76376 3D RENDER W/INTRP POSTPROCES: CPT

## 2022-05-15 PROCEDURE — 82550 ASSAY OF CK (CPK): CPT

## 2022-05-15 PROCEDURE — 3600000003 HC SURGERY LEVEL 3 BASE: Performed by: UROLOGY

## 2022-05-15 PROCEDURE — 7100000001 HC PACU RECOVERY - ADDTL 15 MIN: Performed by: UROLOGY

## 2022-05-15 PROCEDURE — 2709999900 HC NON-CHARGEABLE SUPPLY: Performed by: UROLOGY

## 2022-05-15 PROCEDURE — 71045 X-RAY EXAM CHEST 1 VIEW: CPT

## 2022-05-15 PROCEDURE — 2500000003 HC RX 250 WO HCPCS

## 2022-05-15 PROCEDURE — 6360000002 HC RX W HCPCS

## 2022-05-15 PROCEDURE — 3600000013 HC SURGERY LEVEL 3 ADDTL 15MIN: Performed by: UROLOGY

## 2022-05-15 PROCEDURE — C1769 GUIDE WIRE: HCPCS | Performed by: UROLOGY

## 2022-05-15 PROCEDURE — 2580000003 HC RX 258: Performed by: NURSE PRACTITIONER

## 2022-05-15 PROCEDURE — 81001 URINALYSIS AUTO W/SCOPE: CPT

## 2022-05-15 PROCEDURE — 71250 CT THORAX DX C-: CPT

## 2022-05-15 PROCEDURE — 80053 COMPREHEN METABOLIC PANEL: CPT

## 2022-05-15 PROCEDURE — 7100000000 HC PACU RECOVERY - FIRST 15 MIN: Performed by: UROLOGY

## 2022-05-15 PROCEDURE — 74176 CT ABD & PELVIS W/O CONTRAST: CPT

## 2022-05-15 PROCEDURE — 3700000000 HC ANESTHESIA ATTENDED CARE: Performed by: UROLOGY

## 2022-05-15 PROCEDURE — 6370000000 HC RX 637 (ALT 250 FOR IP): Performed by: INTERNAL MEDICINE

## 2022-05-15 PROCEDURE — 87086 URINE CULTURE/COLONY COUNT: CPT

## 2022-05-15 PROCEDURE — 87186 SC STD MICRODIL/AGAR DIL: CPT

## 2022-05-15 PROCEDURE — 6360000002 HC RX W HCPCS: Performed by: UROLOGY

## 2022-05-15 PROCEDURE — BT1D1ZZ FLUOROSCOPY OF RIGHT KIDNEY, URETER AND BLADDER USING LOW OSMOLAR CONTRAST: ICD-10-PCS | Performed by: UROLOGY

## 2022-05-15 PROCEDURE — 6360000004 HC RX CONTRAST MEDICATION: Performed by: UROLOGY

## 2022-05-15 PROCEDURE — 3700000001 HC ADD 15 MINUTES (ANESTHESIA): Performed by: UROLOGY

## 2022-05-15 PROCEDURE — 87077 CULTURE AEROBIC IDENTIFY: CPT

## 2022-05-15 PROCEDURE — 51701 INSERT BLADDER CATHETER: CPT

## 2022-05-15 PROCEDURE — 83690 ASSAY OF LIPASE: CPT

## 2022-05-15 PROCEDURE — 6360000002 HC RX W HCPCS: Performed by: STUDENT IN AN ORGANIZED HEALTH CARE EDUCATION/TRAINING PROGRAM

## 2022-05-15 PROCEDURE — 72125 CT NECK SPINE W/O DYE: CPT

## 2022-05-15 DEVICE — VARIABLE LENGTH URETERAL STENT
Type: IMPLANTABLE DEVICE | Site: URETER | Status: FUNCTIONAL
Brand: CONTOUR VL™

## 2022-05-15 RX ORDER — ENOXAPARIN SODIUM 100 MG/ML
30 INJECTION SUBCUTANEOUS 2 TIMES DAILY
Status: DISCONTINUED | OUTPATIENT
Start: 2022-05-15 | End: 2022-05-19 | Stop reason: HOSPADM

## 2022-05-15 RX ORDER — PROPOFOL 10 MG/ML
INJECTION, EMULSION INTRAVENOUS PRN
Status: DISCONTINUED | OUTPATIENT
Start: 2022-05-15 | End: 2022-05-15 | Stop reason: SDUPTHER

## 2022-05-15 RX ORDER — ACETAMINOPHEN 325 MG/1
650 TABLET ORAL EVERY 6 HOURS PRN
Status: DISCONTINUED | OUTPATIENT
Start: 2022-05-15 | End: 2022-05-19 | Stop reason: HOSPADM

## 2022-05-15 RX ORDER — ONDANSETRON 2 MG/ML
INJECTION INTRAMUSCULAR; INTRAVENOUS PRN
Status: DISCONTINUED | OUTPATIENT
Start: 2022-05-15 | End: 2022-05-15 | Stop reason: SDUPTHER

## 2022-05-15 RX ORDER — SUCCINYLCHOLINE/SOD CL,ISO/PF 100 MG/5ML
SYRINGE (ML) INTRAVENOUS PRN
Status: DISCONTINUED | OUTPATIENT
Start: 2022-05-15 | End: 2022-05-15 | Stop reason: SDUPTHER

## 2022-05-15 RX ORDER — ONDANSETRON 2 MG/ML
4 INJECTION INTRAMUSCULAR; INTRAVENOUS EVERY 6 HOURS PRN
Status: DISCONTINUED | OUTPATIENT
Start: 2022-05-15 | End: 2022-05-15 | Stop reason: SDUPTHER

## 2022-05-15 RX ORDER — LIDOCAINE HYDROCHLORIDE 20 MG/ML
INJECTION, SOLUTION EPIDURAL; INFILTRATION; INTRACAUDAL; PERINEURAL PRN
Status: DISCONTINUED | OUTPATIENT
Start: 2022-05-15 | End: 2022-05-15 | Stop reason: SDUPTHER

## 2022-05-15 RX ORDER — ONDANSETRON 2 MG/ML
4 INJECTION INTRAMUSCULAR; INTRAVENOUS EVERY 6 HOURS PRN
Status: DISCONTINUED | OUTPATIENT
Start: 2022-05-15 | End: 2022-05-19 | Stop reason: HOSPADM

## 2022-05-15 RX ORDER — 0.9 % SODIUM CHLORIDE 0.9 %
1000 INTRAVENOUS SOLUTION INTRAVENOUS ONCE
Status: COMPLETED | OUTPATIENT
Start: 2022-05-15 | End: 2022-05-15

## 2022-05-15 RX ORDER — MORPHINE SULFATE 4 MG/ML
4 INJECTION, SOLUTION INTRAMUSCULAR; INTRAVENOUS
Status: DISCONTINUED | OUTPATIENT
Start: 2022-05-15 | End: 2022-05-19 | Stop reason: HOSPADM

## 2022-05-15 RX ORDER — ACETAMINOPHEN 650 MG/1
650 SUPPOSITORY RECTAL EVERY 6 HOURS PRN
Status: DISCONTINUED | OUTPATIENT
Start: 2022-05-15 | End: 2022-05-19 | Stop reason: HOSPADM

## 2022-05-15 RX ORDER — MORPHINE SULFATE 2 MG/ML
2 INJECTION, SOLUTION INTRAMUSCULAR; INTRAVENOUS
Status: DISCONTINUED | OUTPATIENT
Start: 2022-05-15 | End: 2022-05-19 | Stop reason: HOSPADM

## 2022-05-15 RX ORDER — FENTANYL CITRATE 50 UG/ML
INJECTION, SOLUTION INTRAMUSCULAR; INTRAVENOUS PRN
Status: DISCONTINUED | OUTPATIENT
Start: 2022-05-15 | End: 2022-05-15 | Stop reason: SDUPTHER

## 2022-05-15 RX ORDER — TAMSULOSIN HYDROCHLORIDE 0.4 MG/1
0.4 CAPSULE ORAL DAILY
Status: DISCONTINUED | OUTPATIENT
Start: 2022-05-15 | End: 2022-05-19 | Stop reason: HOSPADM

## 2022-05-15 RX ORDER — SODIUM CHLORIDE 9 MG/ML
INJECTION, SOLUTION INTRAVENOUS CONTINUOUS
Status: DISCONTINUED | OUTPATIENT
Start: 2022-05-15 | End: 2022-05-16

## 2022-05-15 RX ORDER — LETROZOLE 2.5 MG/1
2.5 TABLET, FILM COATED ORAL NIGHTLY
Status: DISCONTINUED | OUTPATIENT
Start: 2022-05-15 | End: 2022-05-19 | Stop reason: HOSPADM

## 2022-05-15 RX ORDER — ONDANSETRON 4 MG/1
4 TABLET, ORALLY DISINTEGRATING ORAL EVERY 8 HOURS PRN
Status: DISCONTINUED | OUTPATIENT
Start: 2022-05-15 | End: 2022-05-15 | Stop reason: SDUPTHER

## 2022-05-15 RX ORDER — ONDANSETRON 4 MG/1
4 TABLET, ORALLY DISINTEGRATING ORAL EVERY 8 HOURS PRN
Status: DISCONTINUED | OUTPATIENT
Start: 2022-05-15 | End: 2022-05-19 | Stop reason: HOSPADM

## 2022-05-15 RX ADMIN — ENOXAPARIN SODIUM 30 MG: 100 INJECTION SUBCUTANEOUS at 20:40

## 2022-05-15 RX ADMIN — PROPOFOL 120 MG: 10 INJECTION, EMULSION INTRAVENOUS at 15:19

## 2022-05-15 RX ADMIN — LIDOCAINE HYDROCHLORIDE 100 MG: 20 INJECTION, SOLUTION EPIDURAL; INFILTRATION; INTRACAUDAL; PERINEURAL at 15:19

## 2022-05-15 RX ADMIN — Medication 100 MG: at 15:19

## 2022-05-15 RX ADMIN — ONDANSETRON 4 MG: 2 INJECTION INTRAMUSCULAR; INTRAVENOUS at 15:21

## 2022-05-15 RX ADMIN — SODIUM CHLORIDE: 9 INJECTION, SOLUTION INTRAVENOUS at 15:16

## 2022-05-15 RX ADMIN — CEFTRIAXONE 1000 MG: 1 INJECTION, POWDER, FOR SOLUTION INTRAMUSCULAR; INTRAVENOUS at 11:53

## 2022-05-15 RX ADMIN — SODIUM CHLORIDE 1000 ML: 9 INJECTION, SOLUTION INTRAVENOUS at 11:53

## 2022-05-15 RX ADMIN — LETROZOLE 2.5 MG: 2.5 TABLET, FILM COATED ORAL at 22:45

## 2022-05-15 RX ADMIN — FENTANYL CITRATE 25 MCG: 50 INJECTION, SOLUTION INTRAMUSCULAR; INTRAVENOUS at 15:35

## 2022-05-15 RX ADMIN — FENTANYL CITRATE 50 MCG: 50 INJECTION, SOLUTION INTRAMUSCULAR; INTRAVENOUS at 15:18

## 2022-05-15 ASSESSMENT — ENCOUNTER SYMPTOMS
DOUBLE VISION: 0
ABDOMINAL PAIN: 1
PHOTOPHOBIA: 0
HOARSE VOICE: 0
COUGH: 0
ORTHOPNEA: 0
BLURRED VISION: 0
SPUTUM PRODUCTION: 1
NAUSEA: 0
SINUS PRESSURE: 0
RHINORRHEA: 0
WHEEZING: 0
EYE PAIN: 0
CONSTIPATION: 0
VOMITING: 0
COLOR CHANGE: 1
HEMOPTYSIS: 0
BLOATING: 0
SORE THROAT: 0
DIARRHEA: 0
SHORTNESS OF BREATH: 0

## 2022-05-15 ASSESSMENT — PAIN SCALES - GENERAL
PAINLEVEL_OUTOF10: 0
PAINLEVEL_OUTOF10: 0

## 2022-05-15 NOTE — CONSULTS
Department of Urology   Consult Note  [facility] 1 2 3 4 5      Date: 5/15/2022   Patient: Neetu Chase   : 1950   DOA: 5/15/2022   MRN: [id number]   ROOM#: [id number]     Reason for Consult:  UTI, ureteral & renal stones  Requesting Practitioner:  [facility]    CHIEF COMPLAINT:  Couldn't get up after a fall a week ago    History Obtained From:  patient, electronic medical record    HISTORY OF PRESENT ILLNESS:                The patient is a 67 y.o. female with significant past medical history of CAD (sees Dr. Jabari Oneal) who presented after a fall a week ago and couldn't get up. No h/o stones. Subjective fevers last night. No hematuria/dysuria. Past Medical History:        Diagnosis Date    Anemia     Anxiety     Arthritis     generalized    Asthma     AV block     2nd degree per hospital in 2013    Blood poisoning     Blood transfusion     2003    CAD (coronary artery disease)     Cancer ([facility] [address]     skin (face) & colon(2012 dx of right breast ca(pc)    Carcinoma of breast ([facility] [address] 2012    right arm no b/p or sticks    Cardiac pacemaker 03/10/2014    Medtronic dual lead    Chronic cystitis     Chronic respiratory failure (HCC)     2 L/min oxygen at night-time    Colon cancer ([facility] [address]     COPD (chronic obstructive pulmonary disease) (HCC)     CTS (carpal tunnel syndrome)     Depression     Diverticulitis     H/O 24 hour EKG monitoring 2014 complete heart block No clinically significant arrthymia noted.  H/O cardiac catheterization 10/31/2011, 2007    10/31/2011-No significant CAD. Cardiolite stress test was false positive-Dr Jabari Oneal; 2007-No CAD, global function intact;    H/O cardiovascular stress test 10/20/2011, 3/23/2010    10/20/2011-Lexiscan- Evid of mild ischemia in Left CX region. EF 70%. Global LVSF normal;    H/O cardiovascular stress test 2015    EF 77%.    Normal Stress Test.    H/O chest pain 04/2005    sees Dr Radha Marshall H/O Doppler ultrasound 02/20/2008 2/20/2008-CAROTID DOPPLER- Normal carotids bilaterally;    H/O Doppler ultrasound 06/06/2013    CAROTID DOPPLER- there is heterogeneous, irregular atherosclerotic plaque noted in the right internal carotid artery,  doppler flow velocities within the right internal carotid artery are elevated, consistent with a mild, less than 50%stenosis, there is intimal thickening but no significant atherosclerotic plaque noted in the left internal carotid artery, the left carotid are within normal limits    H/O echocardiogram 4/9/2012, 10/20/2011    4/9/2012-LVSF normal EF=>55%. impaired LV relaxation;    H/O echocardiogram 12/31/2014    EF 50-55%. LV shows mild concentric hypertrophy. Mildly dilated left atrium. Mildly dilated right ventricle. Sclerotic but not stenotic aortic valve. Mitral annular calcification. Mild MR, Mild TR, Mild pulm HTN.    H/O urinary incontinence     History of blood transfusion     Hx antineoplastic chemo     Hx of Arterial Doppler ultrasound 07/01/2019    No hemodynamically significant or focal stenosis visualized bilaterally, bilateral lower extremity arteries exhibit diffuse plaque and multiphasic waveforms, unable to obtain bilateral ABIs due to elevated leg pressures >250 mmHg, possibly due to atherosclerosis    Hx of cardiovascular stress test 06/2013    EF 70% abn suggestive of anterolateral ischemia, possible RCA ischmeia, post left ventricular dilation suggestive multivessel CAD.  Hx of echocardiogram 06/2013    EF50%, mild MR and TR, mild pulmonary HTN, mild AS    Hx of echocardiogram 11/01/2021    Left ventricular systolic function is normal, Mild concentric left ventricular hypertrophy Mildly dilated left atrium. Mitral annular calcification is present.   Mild tricuspid regurgitation No evidence of any pericardial effusion    Hx of fall     \"last time 2018- due to neuropathy, have to use cane\"    HX OTHER MEDICAL 7/24/13, 06/2013 7/13-No clinacally significant arrhythmia. 6/13-multiple cycles of wenckebach episodes in addtion to some sinus tach    Hyperlipidemia     Hypertension     Hypothermia 2008    Malignant neoplasm of breast (female) ([facility] [address] 2012    Neuropathy     \"have neuropathy of my legs\"    Normocytic normochromic anemia     Obstructive sleep apnea 2008 01- Has CPAP machine but has not used in over a year - states she has not had problems since her pacemaker was placed.  Old MI (myocardial infarction)     per pt on 1/15/2019\"had heart attack about a year ago\"    Osteoarthritis     Osteopenia     Primary malignant neoplasm of colon ([facility] [address]     S/P cardiac cath 06/2013    no significant CAD false postive stress test    Skin cancer     Super obesity     Umbilical hernia      Past Surgical History:        Procedure Laterality Date    A-V CARDIAC PACEMAKER INSERTION  3/10/14     Medtronic. Model:  [id number] Medtronic  Serial:  [id number] dual chamber pacemaker    APPENDECTOMY  2004    BREAST BIOPSY  2/13/12    BREAST LUMPECTOMY  2007    right     BREAST SURGERY  02/13/2012    rt lesion biopsy     CARDIAC CATHETERIZATION  2007 & 2011    CAROTID ENDARTERECTOMY Right 12/3/2021    RIGHT CAROTID ENDARTERECTOMY WITH PATCH performed by Vincenzo Martinez MD at [address] [place] [address]  2004    Colon cancer    COLONOSCOPY  10-18-13    polyp    COLONOSCOPY  1/19/2016    internal hemorrhoids, diverticulosis, 1.5 cm sessile polyp, 8 mm polyp found in rectum.     COLONOSCOPY  12/14/2017    1.5cm residual polyp, 5mm polyps in blind sigmoid loop x3, Sigmoid divertics, Internal grade 1 hemorrhoids    COLONOSCOPY N/A 1/16/2019    COLONOSCOPY W/ ENDOSCOPIC MUCOSAL RESECTION WITH ELEVIEW 5ML INJECTION AND POLYPECTOMY OF TIP OF BLIND RECTOSIGMOID STUMP AND CAUTERIZATION WITH ERBE PROBE, CLIPPING X2 performed by Marly Mccrary MD at [place] [address]  01/21/2020    dunlap divertics/ 4 polyps/ sm int hem, S/P partial sigmoid resection w/ end to side anastamosis, repeat in 3 years    COLONOSCOPY N/A 1/21/2020    COLONOSCOPY POLYPECTOMY SNARE/COLD BIOPSY performed by Gretchen Medina MD at [address] [address]  2003    back   [address] Bilateral 12/15/14    DENTAL SURGERY      front right tooth and root canal w/ brass bolt    DILATION AND CURETTAGE OF UTERUS  2006    Needed a camera to find my uterus.  ENDOSCOPY, COLON, DIAGNOSTIC  12/14/2017    Small hiatal hernia    HERNIA REPAIR  2004    Umb hernia    HERNIA REPAIR  2008    Inc hernia    LYMPH NODE DISSECTION  2/29/2012    Right axillary-3 sentinel nodes were positive out of 9.    MASTECTOMY, RADICAL Right 02/29/2012    w/ sentinal node disection-Dr West    PACEMAKER PLACEMENT      PRE-MALIGNANT / BENIGN SKIN LESION EXCISION  2/8/2013    right leg X2-Dr West    TONSILLECTOMY  1957    TUNNELED VENOUS PORT PLACEMENT  2004    TUNNELED VENOUS PORT PLACEMENT  02/13/2012    removal of mediport     Current Medications:   Current Facility-Administered Medications: 0.9 % sodium chloride bolus, 1,000 mL, IntraVENous, Once    Allergies:  Bactrim [sulfamethoxazole-trimethoprim], Lipitor [atorvastatin], Taxol [paclitaxel], Aminoglycosides, Demerol, Neosporin [bacitracin-neomycin-polymyxin], Other, and Talc    Social History:   TOBACCO:   reports that she has never smoked. She has never used smokeless tobacco.  ETOH:   reports no history of alcohol use. DRUGS:   reports no history of drug use.     Family History:         Problem Relation Age of Onset    Arthritis Mother         OA, RA    High Cholesterol Mother     High Blood Pressure Mother     Cancer Father         skin and leukemia    High Blood Pressure Father     High Cholesterol Father     Diabetes Father     Heart Disease Father     Heart Disease Brother     High Cholesterol Brother     High Blood Pressure Brother     Heart Disease Brother    [facility] No Known Problems Sister     Seizures Son     Cancer Brother         skin cancer    Breast Cancer Neg Hx     Ovarian Cancer Neg Hx        REVIEW OF SYSTEMS:        PHYSICAL EXAM:    VITALS:  /66   Pulse 66   Temp 98.6 °F (37 °C) (Oral)   Resp 16   Wt 300 lb (136.1 kg)   LMP 01/15/1999   SpO2 95%   BMI 54.87 kg/m²     TEMPERATURE:  Current - Temp: 98.6 °F (37 °C); Max - Temp  Av.9 °F (37.2 °C)  Min: 98.6 °F (37 °C)  Max: 99.1 °F (37.3 °C)  24HR BLOOD PRESSURE RANGE:  Systolic (50REO), NATALIE:016 , Min:117 , PDO:429   ; Diastolic (46DUV), FXI:47, Min:49, Max:91    8HR INTAKE OUTPUT:  No intake/output data recorded. URINARY CATHETER OUTPUT (Farooq):     DRAIN/TUBE OUTPUT:        CONSTITUTIONAL:  awake, alert, cooperative, no apparent distress, and appears stated age  EYES:  Lids and lashes normal, pupils equal, round  NECK:  supple, symmetrical, trachea midline and skin normal  BACK:  Symmetric, no curvature, spinous processes are non-tender on palpation, paraspinous muscles are non-tender on palpation, no costal vertebral tenderness  LUNGS:  No increased work of breathing  ABDOMEN:  soft, non-distended, non-tender, no masses palpated, no hepatosplenomegally      Data:    WBC:    Lab Results   Component Value Date    WBC 12.3 05/15/2022     Hemoglobin/Hematocrit:    Lab Results   Component Value Date    HGB 10.9 05/15/2022    HCT 34.3 05/15/2022     BMP:    Lab Results   Component Value Date     05/15/2022    K 3.5 05/15/2022     05/15/2022    CO2 24 05/15/2022    BUN 14 05/15/2022    LABALBU 3.5 05/15/2022    CREATININE 0.8 05/15/2022    CALCIUM 9.6 05/15/2022    GFRAA >60 05/15/2022    LABGLOM >60 05/15/2022     PT/INR:    Lab Results   Component Value Date    PROTIME 11.4 2021    PROTIME 11.2 10/28/2011    INR 0.88 2021       Results for Raissa Mcgill (MRN [id number]) as of 5/15/2022 13:27   Ref.  Range 5/15/2022 05:20   Color, UA Latest Ref Range: YELLOW  YELLOW Clarity, UA Latest Ref Range: CLEAR  CLEAR   Bilirubin, Urine Latest Ref Range: NEGATIVE MG/DL NEGATIVE   Ketones, Urine Latest Ref Range: NEGATIVE MG/DL NEGATIVE   Specific Gravity, UA Latest Ref Range: 1.001 - 1.035  1.015   Blood, Urine Latest Ref Range: NEGATIVE  LARGE NUMBER OR AMOUNT OF (A)   Protein, UA Latest Ref Range: NEGATIVE MG/ (A)   Urobilinogen, Urine Latest Ref Range: 0.2 - 1.0 MG/DL 0.2   Leukocyte Esterase, Urine Latest Ref Range: NEGATIVE  LARGE NUMBER OR AMOUNT OF (A)   Glucose, Urine Latest Ref Range: NEGATIVE MG/DL NEGATIVE   Nitrite Urine, Quantitative Latest Ref Range: NEGATIVE  POSITIVE (A)   pH, Urine Latest Ref Range: 5.0 - 8.0  8.5 (HH)   Mucus, UA Latest Ref Range: NEGATIVE HPF RARE (A)   WBC, UA Latest Ref Range: 0 - 5 / (H)   WBC Clumps, UA Latest Units: /HPF MANY   RBC, UA Latest Ref Range: 0 - 6 /HPF 19 (H)   Trans Epithel, UA Latest Units: /HPF 3   Bacteria, UA Latest Ref Range: NEGATIVE /HPF NEGATIVE       Imaging:    CT ABDOMEN PELVIS WO CONTRAST Additional Contrast? None    Result Date: 5/15/2022  EXAMINATION: CT OF THE CHEST WITHOUT CONTRAST; CT OF THE ABDOMEN AND PELVIS WITHOUT CONTRAST; CT OF THE LUMBAR SPINE WITHOUT CONTRAST; CT OF THE THORACIC SPINE WITHOUT CONTRAST 5/15/2022 5:51 am; 5/15/2022 5:52 am; 5/15/2022 5:53 am TECHNIQUE: CT of the chest was performed without the administration of intravenous contrast. Multiplanar reformatted images are provided for review. Automated exposure control, iterative reconstruction, and/or weight based adjustment of the mA/kV was utilized to reduce the radiation dose to as low as reasonably achievable.; CT of the abdomen and pelvis was performed without the administration of intravenous contrast. Multiplanar reformatted images are provided for review.  Automated exposure control, iterative reconstruction, and/or weight based adjustment of the mA/kV was utilized to reduce the radiation dose to as low as reasonably achievable.; CT of the lumbar spine was performed without the administration of intravenous contrast. Multiplanar reformatted images are provided for review. Adjustment of mA and/or kV according to patient size was utilized. Automated exposure control, iterative reconstruction, and/or weight based adjustment of the mA/kV was utilized to reduce the radiation dose to as low as reasonably achievable.; CT of the thoracic spine was performed without the administration of intravenous contrast. Multiplanar reformatted images are provided for review. Automated exposure control, iterative reconstruction, and/or weight based adjustment of the mA/kV was utilized to reduce the radiation dose to as low as reasonably achievable. COMPARISON: Chest radiograph today, chest CT 06/26/2019, CT abdomen and pelvis 07/28/2018 HISTORY: ORDERING SYSTEM PROVIDED HISTORY: Fall, left chest ecchymosis TECHNOLOGIST PROVIDED HISTORY: Reason for exam:->Fall, left chest ecchymosis Reason for Exam: Fall, left chest ecchymosis; ORDERING SYSTEM PROVIDED HISTORY: Diffuse abdominal pain TECHNOLOGIST PROVIDED HISTORY: Reason for exam:->Diffuse abdominal pain Additional Contrast?->None Reason for Exam: Diffuse abdominal pain; ORDERING SYSTEM PROVIDED HISTORY: fall TECHNOLOGIST PROVIDED HISTORY: Reason for exam:->fall Reason for Exam: fall; ORDERING SYSTEM PROVIDED HISTORY: fall TECHNOLOGIST PROVIDED HISTORY: Reason for exam:->fall Decision Support Exception - unselect if not a suspected or confirmed emergency medical condition->Emergency Medical Condition (MA) Reason for Exam: fall FINDINGS: Chest: Mediastinum: No mediastinal hematoma. No pericardial effusion. Left subclavian dual chamber pacer in place. Lungs/pleura: No pneumothorax or effusion. Mild respiratory motion artifact is noted without evidence for acute airspace disease. The central airway is patent. Soft Tissues/Bones: Motion artifact is noted.   Slight offset of the sternal body near the sternomanubrial junction appears due to motion artifact. No displaced fracture identified. Abdomen/Pelvis: Organs: Evaluation of the abdominal and pelvic viscera is limited in the absence of contrast.  No solid organ injury identified. An obstructing 11 mm stone is present in the mid right ureter resulting in mild hydronephrosis. Bilateral renal calculi are also present with a 1.7 cm stone in the left renal pelvis and mild caliectasis noted. GI/Bowel: There is no bowel dilatation or wall thickening identified. Moderate distal colonic stool burden. Status post partial sigmoid resection. Diverticulosis. Pelvis: Gas within the uterine cavity is noted. Curvilinear soft tissue is noted extending towards the sigmoid colon and colonic surgical clips. Peritoneum/Retroperitoneum: No free air. No free fluid. The aorta is normal in caliber. Bones/Soft Tissues: Pelvic alignment maintained. No fracture identified. No soft tissue hematoma. THORACIC: BONES/ALIGNMENT: Osteopenia. There is normal alignment of the spine. The vertebral body heights are maintained. No osseous destructive lesion is seen. DEGENERATIVE CHANGES: No gross spinal canal stenosis or bony neural foraminal narrowing of the thoracic spine. SOFT TISSUES: No paraspinal hematoma. LUMBAR: BONES/ALIGNMENT: Osteopenia. There is normal alignment of the spine. The vertebral body heights are maintained. No osseous destructive lesion is seen. DEGENERATIVE CHANGES: No gross spinal canal stenosis or bony neural foraminal narrowing of the lumbar spine. SOFT TISSUES: No paraspinal hematoma. 1.  No acute traumatic injury identified in the chest, abdomen or pelvis, within the limits of a noncontrast exam.  Motion artifact does degrade evaluation of the osseous structures in the chest however. 2.  No acute airspace disease identified. 3.  Mildly obstructing 11 mm stone in the mid right ureter. Bilateral nephrolithiasis.  4.  Gas within the uterine cavity is noted, suggestive of underlying fistula. 5.  No acute osseous abnormality identified in the thoracic or lumbar spine. Impression: 66 yo female with UTI and right ureteral stone/left renal stone, h/o CAD. Had lunch at 1100. Will consider this an emergency with her infected stone. Recommendation: Will make NPO for cystoscopy and right retrograde pyelogram/stent insertion today. Right flank initialed. Consent signed & placed in the chart. Dosed Rocephin 1 gram at 1115. [place] friend [phone]    Patient seen and examined, chart reviewed.      Alva Rubin MD     Electronically signed at 5/15/2022

## 2022-05-15 NOTE — PROGRESS NOTES
4 Eyes Skin Assessment     The patient is being assess for  Transfer to New Unit    I agree that 2 RN's have performed a thorough Head to Toe Skin Assessment on the patient. ALL assessment sites listed below have been assessed. Areas assessed by both nurses:   [x]   Head, Face, and Ears   [x]   Shoulders, Back, and Chest  [x]   Arms, Elbows, and Hands   [x]   Coccyx, Sacrum, and IschIum  [x]   Legs, Feet, and Heels        Does the Patient have Skin Breakdown? Skin assessment on admission shows just a red coccyx area with a small opening on the right side. No other skin issues to be noted. Will keep monitoring.           Rangel Prevention initiated:  Yes   Wound Care Orders initiated:  No      [facility] nurse consulted for Pressure Injury (Stage 3,4, Unstageable, DTI, NWPT, and Complex wounds), New and Established Ostomies:  No      Nurse 1 eSignature: Electronically signed by Kaleb Guardado RN on 5/15/22 at 5:07 PM EDT    **SHARE this note so that the co-signing nurse is able to place an eSignature**    Nurse 2 eSignature: Electronically signed by Jo Ann Mitchell on 5/15/22 at 5:07 PM EDT

## 2022-05-15 NOTE — ED PROVIDER NOTES
Emergency Department Encounter  Location: [address] [place] [address] EMERGENCY DEPARTMENT    Patient: Reed Gordon  MRN: [id number]  : 1950  Date of evaluation: 5/15/2022  ED Provider: Naa Conrad MD  06:00AM  Reed Gordon was checked out to me by Dr. Radha Finch. Please see his/her initial documentation for details of the patient's initial ED presentation, physical exam and completed studies. In brief, Reed Gordon is a 67 y.o. female that presented to the emergency department after a fall on May 10, with generalize weakness since the episode and today presents with abdominal and flank pain.      I have reviewed and interpreted all of the currently available lab results and diagnostics from this visit:  Results for orders placed or performed during the hospital encounter of 05/15/22   CBC with Auto Differential   Result Value Ref Range    WBC 12.3 (H) 4.0 - 10.5 K/CU MM    RBC 3.90 (L) 4.2 - 5.4 M/CU MM    Hemoglobin 10.9 (L) 12.5 - 16.0 GM/DL    Hematocrit 34.3 (L) 37 - 47 %    MCV 87.9 78 - 100 FL    MCH 27.9 27 - 31 PG    MCHC 31.8 (L) 32.0 - 36.0 %    RDW 14.4 11.7 - 14.9 %    Platelets 370 206 - 473 K/CU MM    MPV 9.6 7.5 - 11.1 FL    Differential Type AUTOMATED DIFFERENTIAL     Segs Relative 72.6 (H) 36 - 66 %    Lymphocytes % 18.1 (L) 24 - 44 %    Monocytes % 6.2 (H) 0 - 4 %    Eosinophils % 2.1 0 - 3 %    Basophils % 0.3 0 - 1 %    Segs Absolute 8.9 K/CU MM    Lymphocytes Absolute 2.2 K/CU MM    Monocytes Absolute 0.8 K/CU MM    Eosinophils Absolute 0.3 K/CU MM    Basophils Absolute 0.0 K/CU MM    Nucleated RBC % 0.0 %    Total Nucleated RBC 0.0 K/CU MM    Total Immature Neutrophil 0.09 K/CU MM    Immature Neutrophil % 0.7 (H) 0 - 0.43 %   Comprehensive Metabolic Panel w/ Reflex to MG   Result Value Ref Range    Sodium 141 135 - 145 MMOL/L    Potassium 3.5 3.5 - 5.1 MMOL/L    Chloride 105 99 - 110 mMol/L    CO2 24 21 - 32 MMOL/L    BUN 14 6 - 23 MG/DL    CREATININE 0.8 0.6 - 1.1 MG/DL    Glucose 115 (H) 70 - 99 MG/DL    Calcium 9.6 8.3 - 10.6 MG/DL    Albumin 3.5 3.4 - 5.0 GM/DL    Total Protein 6.6 6.4 - 8.2 GM/DL    Total Bilirubin 0.2 0.0 - 1.0 MG/DL    ALT 20 10 - 40 U/L    AST 21 15 - 37 IU/L    Alkaline Phosphatase 115 40 - 128 IU/L    GFR Non-African American >60 >60 mL/min/1.73m2    GFR African American >60 >60 mL/min/1.73m2    Anion Gap 12 4 - 16   Lipase   Result Value Ref Range    Lipase 39 13 - 60 IU/L   Troponin   Result Value Ref Range    Troponin T 0.019 (H) <0.01 NG/ML   Urinalysis with Microscopic   Result Value Ref Range    Color, UA YELLOW YELLOW    Clarity, UA CLEAR CLEAR    Glucose, Urine NEGATIVE NEGATIVE MG/DL    Bilirubin Urine NEGATIVE NEGATIVE MG/DL    Ketones, Urine NEGATIVE NEGATIVE MG/DL    Specific Gravity, UA 1.015 1.001 - 1.035    Blood, Urine LARGE NUMBER OR AMOUNT OF  (A) NEGATIVE    pH, Urine 8.5 (HH) 5.0 - 8.0    Protein,  (A) NEGATIVE MG/DL    Urobilinogen, Urine 0.2 0.2 - 1.0 MG/DL    Nitrite Urine, Quantitative POSITIVE (A) NEGATIVE    Leukocyte Esterase, Urine LARGE NUMBER OR AMOUNT OF  (A) NEGATIVE    RBC, UA 19 (H) 0 - 6 /HPF    WBC,  (H) 0 - 5 /HPF    Bacteria, UA NEGATIVE NEGATIVE /HPF    WBC Clumps, UA MANY /HPF    Trans Epithel, UA 3 /HPF    Mucus, UA RARE (A) NEGATIVE HPF   CK   Result Value Ref Range    Total  (H) 26 - 140 IU/L   Magnesium   Result Value Ref Range    Magnesium 1.8 1.8 - 2.4 mg/dl   EKG 12 Lead   Result Value Ref Range    Ventricular Rate 66 BPM    Atrial Rate 66 BPM    P-R Interval 178 ms    QRS Duration 176 ms    Q-T Interval 486 ms    QTc Calculation (Bazett) 509 ms    P Axis 22 degrees    R Axis -62 degrees    T Axis 108 degrees    Diagnosis       Atrial-sensed ventricular-paced rhythm  Abnormal ECG  When compared with ECG of 29-NOV-2021 15:44,  Vent.  rate has increased BY   3 BPM       CT ABDOMEN PELVIS WO CONTRAST Additional Contrast? None    Result Date: 5/15/2022  EXAMINATION: CT OF THE CHEST WITHOUT CONTRAST; CT OF THE ABDOMEN AND PELVIS WITHOUT CONTRAST; CT OF THE LUMBAR SPINE WITHOUT CONTRAST; CT OF THE THORACIC SPINE WITHOUT CONTRAST 5/15/2022 5:51 am; 5/15/2022 5:52 am; 5/15/2022 5:53 am TECHNIQUE: CT of the chest was performed without the administration of intravenous contrast. Multiplanar reformatted images are provided for review. Automated exposure control, iterative reconstruction, and/or weight based adjustment of the mA/kV was utilized to reduce the radiation dose to as low as reasonably achievable.; CT of the abdomen and pelvis was performed without the administration of intravenous contrast. Multiplanar reformatted images are provided for review. Automated exposure control, iterative reconstruction, and/or weight based adjustment of the mA/kV was utilized to reduce the radiation dose to as low as reasonably achievable.; CT of the lumbar spine was performed without the administration of intravenous contrast. Multiplanar reformatted images are provided for review. Adjustment of mA and/or kV according to patient size was utilized. Automated exposure control, iterative reconstruction, and/or weight based adjustment of the mA/kV was utilized to reduce the radiation dose to as low as reasonably achievable.; CT of the thoracic spine was performed without the administration of intravenous contrast. Multiplanar reformatted images are provided for review. Automated exposure control, iterative reconstruction, and/or weight based adjustment of the mA/kV was utilized to reduce the radiation dose to as low as reasonably achievable.  COMPARISON: Chest radiograph today, chest CT 06/26/2019, CT abdomen and pelvis 07/28/2018 HISTORY: ORDERING SYSTEM PROVIDED HISTORY: Fall, left chest ecchymosis TECHNOLOGIST PROVIDED HISTORY: Reason for exam:->Fall, left chest ecchymosis Reason for Exam: Fall, left chest ecchymosis; ORDERING SYSTEM PROVIDED HISTORY: Diffuse abdominal pain TECHNOLOGIST PROVIDED HISTORY: Reason for exam:->Diffuse abdominal pain Additional Contrast?->None Reason for Exam: Diffuse abdominal pain; ORDERING SYSTEM PROVIDED HISTORY: fall TECHNOLOGIST PROVIDED HISTORY: Reason for exam:->fall Reason for Exam: fall; ORDERING SYSTEM PROVIDED HISTORY: fall TECHNOLOGIST PROVIDED HISTORY: Reason for exam:->fall Decision Support Exception - unselect if not a suspected or confirmed emergency medical condition->Emergency Medical Condition (MA) Reason for Exam: fall FINDINGS: Chest: Mediastinum: No mediastinal hematoma. No pericardial effusion. Left subclavian dual chamber pacer in place. Lungs/pleura: No pneumothorax or effusion. Mild respiratory motion artifact is noted without evidence for acute airspace disease. The central airway is patent. Soft Tissues/Bones: Motion artifact is noted. Slight offset of the sternal body near the sternomanubrial junction appears due to motion artifact. No displaced fracture identified. Abdomen/Pelvis: Organs: Evaluation of the abdominal and pelvic viscera is limited in the absence of contrast.  No solid organ injury identified. An obstructing 11 mm stone is present in the mid right ureter resulting in mild hydronephrosis. Bilateral renal calculi are also present with a 1.7 cm stone in the left renal pelvis and mild caliectasis noted. GI/Bowel: There is no bowel dilatation or wall thickening identified. Moderate distal colonic stool burden. Status post partial sigmoid resection. Diverticulosis. Pelvis: Gas within the uterine cavity is noted. Curvilinear soft tissue is noted extending towards the sigmoid colon and colonic surgical clips. Peritoneum/Retroperitoneum: No free air. No free fluid. The aorta is normal in caliber. Bones/Soft Tissues: Pelvic alignment maintained. No fracture identified. No soft tissue hematoma. THORACIC: BONES/ALIGNMENT: Osteopenia. There is normal alignment of the spine. The vertebral body heights are maintained.  No osseous destructive lesion is seen. DEGENERATIVE CHANGES: No gross spinal canal stenosis or bony neural foraminal narrowing of the thoracic spine. SOFT TISSUES: No paraspinal hematoma. LUMBAR: BONES/ALIGNMENT: Osteopenia. There is normal alignment of the spine. The vertebral body heights are maintained. No osseous destructive lesion is seen. DEGENERATIVE CHANGES: No gross spinal canal stenosis or bony neural foraminal narrowing of the lumbar spine. SOFT TISSUES: No paraspinal hematoma. 1.  No acute traumatic injury identified in the chest, abdomen or pelvis, within the limits of a noncontrast exam.  Motion artifact does degrade evaluation of the osseous structures in the chest however. 2.  No acute airspace disease identified. 3.  Mildly obstructing 11 mm stone in the mid right ureter. Bilateral nephrolithiasis. 4.  Gas within the uterine cavity is noted, suggestive of underlying fistula. 5.  No acute osseous abnormality identified in the thoracic or lumbar spine. CT HEAD WO CONTRAST    Result Date: 5/15/2022  EXAMINATION: CT OF THE CERVICAL SPINE WITHOUT CONTRAST; CT OF THE HEAD WITHOUT CONTRAST 5/15/2022 5:48 am; 5/15/2022 5:47 am TECHNIQUE: CT of the cervical spine was performed without the administration of intravenous contrast. Multiplanar reformatted images are provided for review. Automated exposure control, iterative reconstruction, and/or weight based adjustment of the mA/kV was utilized to reduce the radiation dose to as low as reasonably achievable.; CT of the head was performed without the administration of intravenous contrast. Automated exposure control, iterative reconstruction, and/or weight based adjustment of the mA/kV was utilized to reduce the radiation dose to as low as reasonably achievable.  COMPARISON: 06/26/2014 HISTORY: ORDERING SYSTEM PROVIDED HISTORY: fall TECHNOLOGIST PROVIDED HISTORY: Reason for exam:->fall Decision Support Exception - unselect if not a suspected or confirmed emergency medical condition->Emergency Medical Condition (MA) Reason for Exam: fall; ORDERING SYSTEM PROVIDED HISTORY: fall TECHNOLOGIST PROVIDED HISTORY: Reason for exam:->fall Has a \"code stroke\" or \"stroke alert\" been called? ->No Decision Support Exception - unselect if not a suspected or confirmed emergency medical condition->Emergency Medical Condition (MA) Reason for Exam: fall FINDINGS: HEAD: BRAIN/VENTRICLES: There is no acute intracranial hemorrhage, mass effect or midline shift. No abnormal extra-axial fluid collection. ORBITS: The visualized portion of the orbits demonstrate no acute abnormality. SINUSES: The visualized paranasal sinuses and mastoid air cells demonstrate no acute abnormality. SOFT TISSUES/SKULL: No acute abnormality of the visualized skull or soft tissues. CERVICAL SPINE: BONES/ALIGNMENT: Motion artifact noted. No convincing evidence for fracture or traumatic malalignment. DEGENERATIVE CHANGES: Multilevel degenerative disc disease and facet arthropathy. SOFT TISSUES: There is no prevertebral soft tissue swelling. 1.  Chronic findings in the brain without acute CT abnormality identified. 2.  Motion degraded exam of the cervical spine. No convincing evidence for fracture or traumatic malalignment. CT CHEST WO CONTRAST    Result Date: 5/15/2022  EXAMINATION: CT OF THE CHEST WITHOUT CONTRAST; CT OF THE ABDOMEN AND PELVIS WITHOUT CONTRAST; CT OF THE LUMBAR SPINE WITHOUT CONTRAST; CT OF THE THORACIC SPINE WITHOUT CONTRAST 5/15/2022 5:51 am; 5/15/2022 5:52 am; 5/15/2022 5:53 am TECHNIQUE: CT of the chest was performed without the administration of intravenous contrast. Multiplanar reformatted images are provided for review.  Automated exposure control, iterative reconstruction, and/or weight based adjustment of the mA/kV was utilized to reduce the radiation dose to as low as reasonably achievable.; CT of the abdomen and pelvis was performed without the administration of intravenous contrast. Multiplanar reformatted images are provided for review. Automated exposure control, iterative reconstruction, and/or weight based adjustment of the mA/kV was utilized to reduce the radiation dose to as low as reasonably achievable.; CT of the lumbar spine was performed without the administration of intravenous contrast. Multiplanar reformatted images are provided for review. Adjustment of mA and/or kV according to patient size was utilized. Automated exposure control, iterative reconstruction, and/or weight based adjustment of the mA/kV was utilized to reduce the radiation dose to as low as reasonably achievable.; CT of the thoracic spine was performed without the administration of intravenous contrast. Multiplanar reformatted images are provided for review. Automated exposure control, iterative reconstruction, and/or weight based adjustment of the mA/kV was utilized to reduce the radiation dose to as low as reasonably achievable. COMPARISON: Chest radiograph today, chest CT 06/26/2019, CT abdomen and pelvis 07/28/2018 HISTORY: ORDERING SYSTEM PROVIDED HISTORY: Fall, left chest ecchymosis TECHNOLOGIST PROVIDED HISTORY: Reason for exam:->Fall, left chest ecchymosis Reason for Exam: Fall, left chest ecchymosis; ORDERING SYSTEM PROVIDED HISTORY: Diffuse abdominal pain TECHNOLOGIST PROVIDED HISTORY: Reason for exam:->Diffuse abdominal pain Additional Contrast?->None Reason for Exam: Diffuse abdominal pain; ORDERING SYSTEM PROVIDED HISTORY: fall TECHNOLOGIST PROVIDED HISTORY: Reason for exam:->fall Reason for Exam: fall; ORDERING SYSTEM PROVIDED HISTORY: fall TECHNOLOGIST PROVIDED HISTORY: Reason for exam:->fall Decision Support Exception - unselect if not a suspected or confirmed emergency medical condition->Emergency Medical Condition (MA) Reason for Exam: fall FINDINGS: Chest: Mediastinum: No mediastinal hematoma. No pericardial effusion. Left subclavian dual chamber pacer in place. Lungs/pleura: No pneumothorax or effusion. structures in the chest however. 2.  No acute airspace disease identified. 3.  Mildly obstructing 11 mm stone in the mid right ureter. Bilateral nephrolithiasis. 4.  Gas within the uterine cavity is noted, suggestive of underlying fistula. 5.  No acute osseous abnormality identified in the thoracic or lumbar spine. CT CERVICAL SPINE WO CONTRAST    Result Date: 5/15/2022  EXAMINATION: CT OF THE CERVICAL SPINE WITHOUT CONTRAST; CT OF THE HEAD WITHOUT CONTRAST 5/15/2022 5:48 am; 5/15/2022 5:47 am TECHNIQUE: CT of the cervical spine was performed without the administration of intravenous contrast. Multiplanar reformatted images are provided for review. Automated exposure control, iterative reconstruction, and/or weight based adjustment of the mA/kV was utilized to reduce the radiation dose to as low as reasonably achievable.; CT of the head was performed without the administration of intravenous contrast. Automated exposure control, iterative reconstruction, and/or weight based adjustment of the mA/kV was utilized to reduce the radiation dose to as low as reasonably achievable. COMPARISON: 06/26/2014 HISTORY: ORDERING SYSTEM PROVIDED HISTORY: fall TECHNOLOGIST PROVIDED HISTORY: Reason for exam:->fall Decision Support Exception - unselect if not a suspected or confirmed emergency medical condition->Emergency Medical Condition (MA) Reason for Exam: fall; ORDERING SYSTEM PROVIDED HISTORY: fall TECHNOLOGIST PROVIDED HISTORY: Reason for exam:->fall Has a \"code stroke\" or \"stroke alert\" been called? ->No Decision Support Exception - unselect if not a suspected or confirmed emergency medical condition->Emergency Medical Condition (MA) Reason for Exam: fall FINDINGS: HEAD: BRAIN/VENTRICLES: There is no acute intracranial hemorrhage, mass effect or midline shift. No abnormal extra-axial fluid collection. ORBITS: The visualized portion of the orbits demonstrate no acute abnormality.  SINUSES: The visualized paranasal sinuses and mastoid air cells demonstrate no acute abnormality. SOFT TISSUES/SKULL: No acute abnormality of the visualized skull or soft tissues. CERVICAL SPINE: BONES/ALIGNMENT: Motion artifact noted. No convincing evidence for fracture or traumatic malalignment. DEGENERATIVE CHANGES: Multilevel degenerative disc disease and facet arthropathy. SOFT TISSUES: There is no prevertebral soft tissue swelling. 1.  Chronic findings in the brain without acute CT abnormality identified. 2.  Motion degraded exam of the cervical spine. No convincing evidence for fracture or traumatic malalignment. XR CHEST PORTABLE    Result Date: 5/15/2022  EXAMINATION: ONE XRAY VIEW OF THE CHEST 5/15/2022 2:57 am COMPARISON: 11/29/2021 HISTORY: ORDERING SYSTEM PROVIDED HISTORY: fall TECHNOLOGIST PROVIDED HISTORY: Reason for exam:->fall Reason for Exam: fall Additional signs and symptoms: fall FINDINGS: The cardiomediastinal silhouette is unchanged in appearance. Left subclavian dual chamber pacer in place. There is no consolidation, pneumothorax, or evidence of edema. No effusion is appreciated. The osseous structures are unchanged in appearance. Unchanged appearance of the chest without acute airspace disease identified. CT LUMBAR RECONSTRUCTION WO POST PROCESS    Result Date: 5/15/2022  EXAMINATION: CT OF THE CHEST WITHOUT CONTRAST; CT OF THE ABDOMEN AND PELVIS WITHOUT CONTRAST; CT OF THE LUMBAR SPINE WITHOUT CONTRAST; CT OF THE THORACIC SPINE WITHOUT CONTRAST 5/15/2022 5:51 am; 5/15/2022 5:52 am; 5/15/2022 5:53 am TECHNIQUE: CT of the chest was performed without the administration of intravenous contrast. Multiplanar reformatted images are provided for review.  Automated exposure control, iterative reconstruction, and/or weight based adjustment of the mA/kV was utilized to reduce the radiation dose to as low as reasonably achievable.; CT of the abdomen and pelvis was performed without the administration of intravenous contrast. Multiplanar reformatted images are provided for review. Automated exposure control, iterative reconstruction, and/or weight based adjustment of the mA/kV was utilized to reduce the radiation dose to as low as reasonably achievable.; CT of the lumbar spine was performed without the administration of intravenous contrast. Multiplanar reformatted images are provided for review. Adjustment of mA and/or kV according to patient size was utilized. Automated exposure control, iterative reconstruction, and/or weight based adjustment of the mA/kV was utilized to reduce the radiation dose to as low as reasonably achievable.; CT of the thoracic spine was performed without the administration of intravenous contrast. Multiplanar reformatted images are provided for review. Automated exposure control, iterative reconstruction, and/or weight based adjustment of the mA/kV was utilized to reduce the radiation dose to as low as reasonably achievable. COMPARISON: Chest radiograph today, chest CT 06/26/2019, CT abdomen and pelvis 07/28/2018 HISTORY: ORDERING SYSTEM PROVIDED HISTORY: Fall, left chest ecchymosis TECHNOLOGIST PROVIDED HISTORY: Reason for exam:->Fall, left chest ecchymosis Reason for Exam: Fall, left chest ecchymosis; ORDERING SYSTEM PROVIDED HISTORY: Diffuse abdominal pain TECHNOLOGIST PROVIDED HISTORY: Reason for exam:->Diffuse abdominal pain Additional Contrast?->None Reason for Exam: Diffuse abdominal pain; ORDERING SYSTEM PROVIDED HISTORY: fall TECHNOLOGIST PROVIDED HISTORY: Reason for exam:->fall Reason for Exam: fall; ORDERING SYSTEM PROVIDED HISTORY: fall TECHNOLOGIST PROVIDED HISTORY: Reason for exam:->fall Decision Support Exception - unselect if not a suspected or confirmed emergency medical condition->Emergency Medical Condition (MA) Reason for Exam: fall FINDINGS: Chest: Mediastinum: No mediastinal hematoma. No pericardial effusion. Left subclavian dual chamber pacer in place.  Lungs/pleura: No pneumothorax or effusion. Mild respiratory motion artifact is noted without evidence for acute airspace disease. The central airway is patent. Soft Tissues/Bones: Motion artifact is noted. Slight offset of the sternal body near the sternomanubrial junction appears due to motion artifact. No displaced fracture identified. Abdomen/Pelvis: Organs: Evaluation of the abdominal and pelvic viscera is limited in the absence of contrast.  No solid organ injury identified. An obstructing 11 mm stone is present in the mid right ureter resulting in mild hydronephrosis. Bilateral renal calculi are also present with a 1.7 cm stone in the left renal pelvis and mild caliectasis noted. GI/Bowel: There is no bowel dilatation or wall thickening identified. Moderate distal colonic stool burden. Status post partial sigmoid resection. Diverticulosis. Pelvis: Gas within the uterine cavity is noted. Curvilinear soft tissue is noted extending towards the sigmoid colon and colonic surgical clips. Peritoneum/Retroperitoneum: No free air. No free fluid. The aorta is normal in caliber. Bones/Soft Tissues: Pelvic alignment maintained. No fracture identified. No soft tissue hematoma. THORACIC: BONES/ALIGNMENT: Osteopenia. There is normal alignment of the spine. The vertebral body heights are maintained. No osseous destructive lesion is seen. DEGENERATIVE CHANGES: No gross spinal canal stenosis or bony neural foraminal narrowing of the thoracic spine. SOFT TISSUES: No paraspinal hematoma. LUMBAR: BONES/ALIGNMENT: Osteopenia. There is normal alignment of the spine. The vertebral body heights are maintained. No osseous destructive lesion is seen. DEGENERATIVE CHANGES: No gross spinal canal stenosis or bony neural foraminal narrowing of the lumbar spine. SOFT TISSUES: No paraspinal hematoma.      1.  No acute traumatic injury identified in the chest, abdomen or pelvis, within the limits of a noncontrast exam.  Motion artifact does degrade evaluation of the osseous structures in the chest however. 2.  No acute airspace disease identified. 3.  Mildly obstructing 11 mm stone in the mid right ureter. Bilateral nephrolithiasis. 4.  Gas within the uterine cavity is noted, suggestive of underlying fistula. 5.  No acute osseous abnormality identified in the thoracic or lumbar spine. CT THORACIC RECONSTRUCTION WO POST PROCESS    Result Date: 5/15/2022  EXAMINATION: CT OF THE CHEST WITHOUT CONTRAST; CT OF THE ABDOMEN AND PELVIS WITHOUT CONTRAST; CT OF THE LUMBAR SPINE WITHOUT CONTRAST; CT OF THE THORACIC SPINE WITHOUT CONTRAST 5/15/2022 5:51 am; 5/15/2022 5:52 am; 5/15/2022 5:53 am TECHNIQUE: CT of the chest was performed without the administration of intravenous contrast. Multiplanar reformatted images are provided for review. Automated exposure control, iterative reconstruction, and/or weight based adjustment of the mA/kV was utilized to reduce the radiation dose to as low as reasonably achievable.; CT of the abdomen and pelvis was performed without the administration of intravenous contrast. Multiplanar reformatted images are provided for review. Automated exposure control, iterative reconstruction, and/or weight based adjustment of the mA/kV was utilized to reduce the radiation dose to as low as reasonably achievable.; CT of the lumbar spine was performed without the administration of intravenous contrast. Multiplanar reformatted images are provided for review. Adjustment of mA and/or kV according to patient size was utilized. Automated exposure control, iterative reconstruction, and/or weight based adjustment of the mA/kV was utilized to reduce the radiation dose to as low as reasonably achievable.; CT of the thoracic spine was performed without the administration of intravenous contrast. Multiplanar reformatted images are provided for review.  Automated exposure control, iterative reconstruction, and/or weight based adjustment of the mA/kV was utilized to reduce the radiation dose to as low as reasonably achievable. COMPARISON: Chest radiograph today, chest CT 06/26/2019, CT abdomen and pelvis 07/28/2018 HISTORY: ORDERING SYSTEM PROVIDED HISTORY: Fall, left chest ecchymosis TECHNOLOGIST PROVIDED HISTORY: Reason for exam:->Fall, left chest ecchymosis Reason for Exam: Fall, left chest ecchymosis; ORDERING SYSTEM PROVIDED HISTORY: Diffuse abdominal pain TECHNOLOGIST PROVIDED HISTORY: Reason for exam:->Diffuse abdominal pain Additional Contrast?->None Reason for Exam: Diffuse abdominal pain; ORDERING SYSTEM PROVIDED HISTORY: fall TECHNOLOGIST PROVIDED HISTORY: Reason for exam:->fall Reason for Exam: fall; ORDERING SYSTEM PROVIDED HISTORY: fall TECHNOLOGIST PROVIDED HISTORY: Reason for exam:->fall Decision Support Exception - unselect if not a suspected or confirmed emergency medical condition->Emergency Medical Condition (MA) Reason for Exam: fall FINDINGS: Chest: Mediastinum: No mediastinal hematoma. No pericardial effusion. Left subclavian dual chamber pacer in place. Lungs/pleura: No pneumothorax or effusion. Mild respiratory motion artifact is noted without evidence for acute airspace disease. The central airway is patent. Soft Tissues/Bones: Motion artifact is noted. Slight offset of the sternal body near the sternomanubrial junction appears due to motion artifact. No displaced fracture identified. Abdomen/Pelvis: Organs: Evaluation of the abdominal and pelvic viscera is limited in the absence of contrast.  No solid organ injury identified. An obstructing 11 mm stone is present in the mid right ureter resulting in mild hydronephrosis. Bilateral renal calculi are also present with a 1.7 cm stone in the left renal pelvis and mild caliectasis noted. GI/Bowel: There is no bowel dilatation or wall thickening identified. Moderate distal colonic stool burden. Status post partial sigmoid resection. Diverticulosis. Pelvis: Gas within the uterine cavity is noted. Curvilinear soft tissue is noted extending towards the sigmoid colon and colonic surgical clips. Peritoneum/Retroperitoneum: No free air. No free fluid. The aorta is normal in caliber. Bones/Soft Tissues: Pelvic alignment maintained. No fracture identified. No soft tissue hematoma. THORACIC: BONES/ALIGNMENT: Osteopenia. There is normal alignment of the spine. The vertebral body heights are maintained. No osseous destructive lesion is seen. DEGENERATIVE CHANGES: No gross spinal canal stenosis or bony neural foraminal narrowing of the thoracic spine. SOFT TISSUES: No paraspinal hematoma. LUMBAR: BONES/ALIGNMENT: Osteopenia. There is normal alignment of the spine. The vertebral body heights are maintained. No osseous destructive lesion is seen. DEGENERATIVE CHANGES: No gross spinal canal stenosis or bony neural foraminal narrowing of the lumbar spine. SOFT TISSUES: No paraspinal hematoma. 1.  No acute traumatic injury identified in the chest, abdomen or pelvis, within the limits of a noncontrast exam.  Motion artifact does degrade evaluation of the osseous structures in the chest however. 2.  No acute airspace disease identified. 3.  Mildly obstructing 11 mm stone in the mid right ureter. Bilateral nephrolithiasis. 4.  Gas within the uterine cavity is noted, suggestive of underlying fistula. 5.  No acute osseous abnormality identified in the thoracic or lumbar spine. Final ED Course and MDM: In brief, Marcia Norris is a 67 y.o. female whose care was signed out to me by the outgoing provider. In brief, patient was evaluated for the fall on May 10th and the abdominal pain. Patient at the sign of sign out was pending UA, and imaging. Patient was found to have a UTI and a kidney stone on the right ureter with mild obstruction. Troponin was also elevated at 0.019. Upon my re-evaluation patient denies any chest pain but does have ecchymosis which patient reports is from the fall on May 10th.  Discussed the case with Jolly Lynne from urology who reports urology will evaluate the patient once admitted. Discussed with admitting provider the conversation with urology and discussed the positive troponin findings. Discussed with patient the plan for admission and findings. Patient agrees with plan of care. ED Medication Orders (From admission, onward)    Start Ordered     Status Ordering Provider    05/15/22 1115 05/15/22 1103  cefTRIAXone (ROCEPHIN) 1000 mg IVPB in 50 mL D5W minibag  ONCE        Question:  Antimicrobial Indications  Answer:  Urinary Tract Infection    Last MAR action: Stopped - by Davion Interiano on 05/15/22 at [address], [facility]    05/15/22 1115 05/15/22 1103  0.9 % sodium chloride bolus  ONCE         Last MAR action: New Bag - by Davion Interiano on 05/15/22 at [address] [facility]          Final Impression      1. Abdominal pain, unspecified abdominal location    2. Elevated troponin    3. Acute cystitis without hematuria    4. Kidney stone    5. Fall, initial encounter        DISPOSITION Decision To Admit 05/15/2022 12:59:59 PM     (Please note that portions of this note may have been completed with a voice recognition program. Efforts were made to edit the dictations but occasionally words are mis-transcribed. )    Denisse Flores MD  [address], [place]  05/16/22 6887

## 2022-05-15 NOTE — ED NOTES
Patient brought in by EMS via stretcher complaints of abdominal pain for the last month after a fall. Patient does verbalize that she is not feeling safe due to next door neighbors pitbull dog but feels safe at her home.       Juan Stern RN  05/15/22 [id number]

## 2022-05-15 NOTE — ED PROVIDER NOTES
Emergency Department Encounter    Patient: Chanda Conley  MRN: [id number]  : 1950  Date of Evaluation: 5/15/2022  ED Provider:  Ilya Berrios DO    Triage Chief Complaint:   Abdominal Pain (for several months)    HPI:  Chanda Conley is a 67 y.o. female with past medical history as listed below, including CAD, colon cancer, skin cancer, breast cancer, COPD who presents with abdominal pain. Patient states that on May 10 she fell, she is ambulating back to the bathroom, when she tripped and fell landing on her left side. Patient states she is able to get to her chair, and she states that she has not been able to get out of her chair since the fall, she has been in her chair for 5 days.  has been able to change her, prior to the fall, she was incontinent of urine and stool. This is baseline for her. Patient states that tonight the pain in her left hip increased significantly which prompted her to come in. She also admits to generalized abdominal pain since the fall that is constant, sharp, 10 out of 10. Patient also admits to left-sided chest pain, she has ecchymosis over her left chest, and states that it has been painful since the fall. She did not seek medical attention after the fall. Denies F/C, SOB, palpitations, N/V, dysuria, diarrhea and constipatin. ROS:  Review of Systems   Constitutional: Negative for chills and fever. HENT: Negative for congestion, rhinorrhea, sinus pressure and sore throat. Eyes: Negative for visual disturbance. Respiratory: Negative for cough, shortness of breath and wheezing. Cardiovascular: Positive for chest pain. Negative for palpitations. Gastrointestinal: Positive for abdominal pain. Negative for constipation, diarrhea, nausea and vomiting. Genitourinary: Negative for dysuria, frequency and urgency. Musculoskeletal: Positive for arthralgias and myalgias. Skin: Negative for rash.    Neurological: Negative for dizziness and syncope. Psychiatric/Behavioral: Negative for agitation, behavioral problems and confusion. Past Medical History:   Diagnosis Date    Anemia     Anxiety 1978    Arthritis     generalized    Asthma 2006    AV block     2nd degree per hospital in june2013    Blood poisoning 1994    Blood transfusion     12/2003    CAD (coronary artery disease)     Cancer ([facility] [address] 2007    skin (face) & colon(2003)/ 2/2012 dx of right breast ca(pc)    Carcinoma of breast ([facility] [address] 02/29/2012    right arm no b/p or sticks    Cardiac pacemaker 03/10/2014    Medtronic dual lead    Chronic cystitis     Chronic respiratory failure (HCC)     2 L/min oxygen at night-time    Colon cancer ([facility] [address] 2013    COPD (chronic obstructive pulmonary disease) (HCC)     CTS (carpal tunnel syndrome)     Depression     Diverticulitis     H/O 24 hour EKG monitoring 2/28/2014 7/24/13 2/14 complete heart block 7/13No clinically significant arrthymia noted.  H/O cardiac catheterization 10/31/2011, 7/11/2007    10/31/2011-No significant CAD. Cardiolite stress test was false positive-Dr Timoteo Ray; 7/11/2007-No CAD, global function intact;    H/O cardiovascular stress test 10/20/2011, 3/23/2010    10/20/2011-Lexiscan- Evid of mild ischemia in Left CX region. EF 70%. Global LVSF normal;    H/O cardiovascular stress test 01/07/2015    EF 77%.    Normal Stress Test.    H/O chest pain 04/2005    sees Dr Zainab Poole H/O Doppler ultrasound 02/20/2008 2/20/2008-CAROTID DOPPLER- Normal carotids bilaterally;    H/O Doppler ultrasound 06/06/2013    CAROTID DOPPLER- there is heterogeneous, irregular atherosclerotic plaque noted in the right internal carotid artery,  doppler flow velocities within the right internal carotid artery are elevated, consistent with a mild, less than 50%stenosis, there is intimal thickening but no significant atherosclerotic plaque noted in the left internal carotid artery, the left carotid are within normal limits    H/O echocardiogram 4/9/2012, 10/20/2011    4/9/2012-LVSF normal EF=>55%. impaired LV relaxation;    H/O echocardiogram 12/31/2014    EF 50-55%. LV shows mild concentric hypertrophy. Mildly dilated left atrium. Mildly dilated right ventricle. Sclerotic but not stenotic aortic valve. Mitral annular calcification. Mild MR, Mild TR, Mild pulm HTN.    H/O urinary incontinence     History of blood transfusion     Hx antineoplastic chemo     Hx of Arterial Doppler ultrasound 07/01/2019    No hemodynamically significant or focal stenosis visualized bilaterally, bilateral lower extremity arteries exhibit diffuse plaque and multiphasic waveforms, unable to obtain bilateral ABIs due to elevated leg pressures >250 mmHg, possibly due to atherosclerosis    Hx of cardiovascular stress test 06/2013    EF 70% abn suggestive of anterolateral ischemia, possible RCA ischmeia, post left ventricular dilation suggestive multivessel CAD.  Hx of echocardiogram 06/2013    EF50%, mild MR and TR, mild pulmonary HTN, mild AS    Hx of echocardiogram 11/01/2021    Left ventricular systolic function is normal, Mild concentric left ventricular hypertrophy Mildly dilated left atrium. Mitral annular calcification is present. Mild tricuspid regurgitation No evidence of any pericardial effusion    Hx of fall     \"last time 2018- due to neuropathy, have to use cane\"    HX OTHER MEDICAL 7/24/13, 06/2013 7/13-No clinacally significant arrhythmia. 6/13-multiple cycles of wenckebach episodes in addtion to some sinus tach    Hyperlipidemia     Hypertension     Hypothermia 2008    Malignant neoplasm of breast (female) ([facility] [address] 2012    Neuropathy     \"have neuropathy of my legs\"    Normocytic normochromic anemia     Obstructive sleep apnea 2008 01- Has CPAP machine but has not used in over a year - states she has not had problems since her pacemaker was placed.     Old MI (myocardial infarction)     per pt on 1/15/2019\"had heart attack about a year ago\"    Osteoarthritis     Osteopenia     Primary malignant neoplasm of colon ([address]     S/P cardiac cath 06/2013    no significant CAD false postive stress test    Skin cancer     Super obesity     Umbilical hernia      Past Surgical History:   Procedure Laterality Date    A-V CARDIAC PACEMAKER INSERTION  3/10/14     Medtronic. Model:  [id number] Medtronic  Serial:  [id number] dual chamber pacemaker    APPENDECTOMY  2004    BREAST BIOPSY  2/13/12    BREAST LUMPECTOMY  2007    right     BREAST SURGERY  02/13/2012    rt lesion biopsy     CARDIAC CATHETERIZATION  2007 & 2011    CAROTID ENDARTERECTOMY Right 12/3/2021    RIGHT CAROTID ENDARTERECTOMY WITH PATCH performed by Andrez Toussaint MD at [address] [place] [address]  2004    Colon cancer    COLONOSCOPY  10-18-13    polyp    COLONOSCOPY  1/19/2016    internal hemorrhoids, diverticulosis, 1.5 cm sessile polyp, 8 mm polyp found in rectum.  COLONOSCOPY  12/14/2017    1.5cm residual polyp, 5mm polyps in blind sigmoid loop x3, Sigmoid divertics, Internal grade 1 hemorrhoids    COLONOSCOPY N/A 1/16/2019    COLONOSCOPY W/ ENDOSCOPIC MUCOSAL RESECTION WITH ELEVIEW 5ML INJECTION AND POLYPECTOMY OF TIP OF BLIND RECTOSIGMOID STUMP AND CAUTERIZATION WITH ERBE PROBE, CLIPPING X2 performed by Katia Peterson MD at [facility]  01/21/2020    pan divertics/ 4 polyps/ sm int hem, S/P partial sigmoid resection w/ end to side anastamosis, repeat in 3 years    COLONOSCOPY N/A 1/21/2020    COLONOSCOPY POLYPECTOMY SNARE/COLD BIOPSY performed by Katia Peterson MD at [place] [address]  2003    back   [address] Bilateral 12/15/14    DENTAL SURGERY      front right tooth and root canal w/ brass bolt    DILATION AND CURETTAGE OF UTERUS  2006    Needed a camera to find my uterus.     ENDOSCOPY, COLON, DIAGNOSTIC  12/14/2017    Small hiatal hernia    HERNIA REPAIR  2004    Umb hernia    HERNIA REPAIR  2008 Inc hernia    LYMPH NODE DISSECTION  2/29/2012    Right axillary-3 sentinel nodes were positive out of 9.    MASTECTOMY, RADICAL Right 02/29/2012    w/ sentinal node disection-Dr Wets    PACEMAKER PLACEMENT      PRE-MALIGNANT / BENIGN SKIN LESION EXCISION  2/8/2013    right leg X2-Dr West    TONSILLECTOMY  1957    TUNNELED VENOUS PORT PLACEMENT  2004    TUNNELED VENOUS PORT PLACEMENT  02/13/2012    removal of mediport     Family History   Problem Relation Age of Onset    Arthritis Mother         OA, RA    High Cholesterol Mother     High Blood Pressure Mother     Cancer Father         skin and leukemia    High Blood Pressure Father     High Cholesterol Father     Diabetes Father     Heart Disease Father     Heart Disease Brother     High Cholesterol Brother     High Blood Pressure Brother     Heart Disease Brother     No Known Problems Sister     Seizures Son     Cancer Brother         skin cancer    Breast Cancer Neg Hx     Ovarian Cancer Neg Hx      Social History     Socioeconomic History    Marital status:      Spouse name: Not on file    Number of children: 2    Years of education: 15    Highest education level: 12th grade   Occupational History    Occupation: retired   Tobacco Use    Smoking status: Never Smoker    Smokeless tobacco: Never Used   Vaping Use    Vaping Use: Never used   Substance and Sexual Activity    Alcohol use: No     Comment:           CAFFEINE: 2- 12oz nona daily & 2 cups coffee some nights.  Drug use: No    Sexual activity: Not Currently     Partners: Male   Other Topics Concern    Not on file   Social History Narrative    Do you donate blood or plasma? No    Caffeine intake? Moderate    Advance directive? No    Is blood transfusion acceptable in an emergency? Yes    Live alone or with others? With others    Able to care for self?  Yes    No exercise at this time         Social Determinants of Health     Financial Resource Strain: Low Risk     Difficulty of Paying Living Expenses: Not hard at all   Food Insecurity: No Food Insecurity    Worried About Running Out of Food in the Last Year: Never true    Beau of Food in the Last Year: Never true   Transportation Needs:     Lack of Transportation (Medical): Not on file    Lack of Transportation (Non-Medical): Not on file   Physical Activity:     Days of Exercise per Week: Not on file    Minutes of Exercise per Session: Not on file   Stress:     Feeling of Stress : Not on file   Social Connections:     Frequency of Communication with Friends and Family: Not on file    Frequency of Social Gatherings with Friends and Family: Not on file    Attends Taoism Services: Not on file    Active Member of [address] [place] [address] or Organizations: Not on file    Attends Club or Organization Meetings: Not on file    Marital Status: Not on file   Intimate Partner Violence:     Fear of Current or Ex-Partner: Not on file    Emotionally Abused: Not on file    Physically Abused: Not on file    Sexually Abused: Not on file   Housing Stability: [address] Unable to Pay for Housing in the Last Year: No    Number of [place] in the Last Year: 1    Unstable Housing in the Last Year: No     No current facility-administered medications for this encounter. Current Outpatient Medications   Medication Sig Dispense Refill    gabapentin (NEURONTIN) 100 MG capsule Take 100 mg by mouth daily.       sertraline (ZOLOFT) 100 MG tablet TAKE 2 TABLETS BY MOUTH EVERY MORNING 180 tablet 0    ezetimibe (ZETIA) 10 MG tablet TAKE 1 TABLET BY MOUTH EVERY MORNING 90 tablet 0    simvastatin (ZOCOR) 20 MG tablet TAKE ONE (1) TABLET BY MOUTH NIGHTLY 90 tablet 0    letrozole (FEMARA) 2.5 MG tablet Take 1 tablet by mouth nightly 30 tablet 5    Ferrous Sulfate (IRON) 325 (65 Fe) MG TABS Take 1 tablet by mouth every other day 30 tablet 0    albuterol-ipratropium (COMBIVENT RESPIMAT)  MCG/ACT AERS inhaler Inhale 1 puff into the lungs 4 times daily 1 Inhaler 0    acetaminophen (TYLENOL) 500 MG tablet Take 500 mg by mouth every 6 hours as needed for Pain      fluticasone (FLOVENT HFA) 110 MCG/ACT inhaler Inhale 2 puffs into the lungs 2 times daily 1 Inhaler 5    albuterol sulfate HFA (PROAIR HFA) 108 (90 Base) MCG/ACT inhaler INHALE 2 PUFFS BY MOUTH EVERY SIX HOURS AS NEEDED FOR WHEEZING 1 Inhaler 5    vitamin B-12 (CYANOCOBALAMIN) 1000 MCG tablet Take 1,000 mcg by mouth daily      aspirin 81 MG EC tablet Take 81 mg by mouth nightly       calcium-vitamin D (OSCAL 500/200 D-3) 500-200 MG-UNIT per tablet Take 1 tablet by mouth 2 times daily        Allergies   Allergen Reactions    Bactrim [Sulfamethoxazole-Trimethoprim] Anaphylaxis and Hives    Lipitor [Atorvastatin] Shortness Of Breath    Taxol [Paclitaxel] Anaphylaxis and Swelling    Aminoglycosides      Abstracted from [facility] patient chart.  Demerol Nausea Only    Neosporin [Bacitracin-Neomycin-Polymyxin] Rash and Other (See Comments)     hotness    Other Rash     Ivory Soap    Talc Other (See Comments)     blisters       Nursing Notes Reviewed    Physical Exam:  Triage VS:    ED Triage Vitals [05/15/22 0100]   Enc Vitals Group      BP (!) 117/91      Pulse 78      Resp 15      Temp 99.1 °F (37.3 °C)      Temp Source Oral      SpO2 93 %      Weight       Height       Head Circumference       Peak Flow       Pain Score       Pain Loc       Pain Edu? Excl. in [address]? Physical Exam  Vitals and nursing note reviewed. Constitutional:       General: She is not in acute distress. Appearance: She is well-developed. HENT:      Head: Normocephalic and atraumatic. Nose: Nose normal.      Mouth/Throat:      Mouth: Mucous membranes are moist.   Eyes:      Conjunctiva/sclera: Conjunctivae normal.   Cardiovascular:      Rate and Rhythm: Normal rate and regular rhythm. Pulses: Normal pulses.    Pulmonary:      Effort: Pulmonary effort is normal. No respiratory distress. Breath sounds: Normal breath sounds. No wheezing or rhonchi. Comments: Patient has aging ecchymosis over her left chest wall. Abdominal:      General: Abdomen is flat. Bowel sounds are normal.      Palpations: Abdomen is soft. Tenderness: There is abdominal tenderness. There is no guarding or rebound. Comments: Diffuse abdominal tenderness to palpation. Musculoskeletal:      Comments: Patient able to dorsi and plantarflex laterally. Sensation is diminished bilaterally on her lower extremities, however she does note she has a history of neuropathy and this is baseline for her. No midline tenderness in the C, T, L-spine. Skin:     General: Skin is warm. Capillary Refill: Capillary refill takes less than 2 seconds. Neurological:      Mental Status: She is alert and oriented to person, place, and time. Mental status is at baseline.    Psychiatric:         Mood and Affect: Mood normal.              I have reviewed and interpreted all of the currently available lab results from this visit (if applicable):  Results for orders placed or performed during the hospital encounter of 05/15/22   CBC with Auto Differential   Result Value Ref Range    WBC 12.3 (H) 4.0 - 10.5 K/CU MM    RBC 3.90 (L) 4.2 - 5.4 M/CU MM    Hemoglobin 10.9 (L) 12.5 - 16.0 GM/DL    Hematocrit 34.3 (L) 37 - 47 %    MCV 87.9 78 - 100 FL    MCH 27.9 27 - 31 PG    MCHC 31.8 (L) 32.0 - 36.0 %    RDW 14.4 11.7 - 14.9 %    Platelets 114 198 - 808 K/CU MM    MPV 9.6 7.5 - 11.1 FL    Differential Type AUTOMATED DIFFERENTIAL     Segs Relative 72.6 (H) 36 - 66 %    Lymphocytes % 18.1 (L) 24 - 44 %    Monocytes % 6.2 (H) 0 - 4 %    Eosinophils % 2.1 0 - 3 %    Basophils % 0.3 0 - 1 %    Segs Absolute 8.9 K/CU MM    Lymphocytes Absolute 2.2 K/CU MM    Monocytes Absolute 0.8 K/CU MM    Eosinophils Absolute 0.3 K/CU MM    Basophils Absolute 0.0 K/CU MM    Nucleated RBC % 0.0 %    Total Nucleated RBC 0.0 K/CU MM    Total Immature Neutrophil 0.09 K/CU MM    Immature Neutrophil % 0.7 (H) 0 - 0.43 %   Comprehensive Metabolic Panel w/ Reflex to MG   Result Value Ref Range    Sodium 141 135 - 145 MMOL/L    Potassium 3.5 3.5 - 5.1 MMOL/L    Chloride 105 99 - 110 mMol/L    CO2 24 21 - 32 MMOL/L    BUN 14 6 - 23 MG/DL    CREATININE 0.8 0.6 - 1.1 MG/DL    Glucose 115 (H) 70 - 99 MG/DL    Calcium 9.6 8.3 - 10.6 MG/DL    Albumin 3.5 3.4 - 5.0 GM/DL    Total Protein 6.6 6.4 - 8.2 GM/DL    Total Bilirubin 0.2 0.0 - 1.0 MG/DL    ALT 20 10 - 40 U/L    AST 21 15 - 37 IU/L    Alkaline Phosphatase 115 40 - 128 IU/L    GFR Non-African American >60 >60 mL/min/1.73m2    GFR African American >60 >60 mL/min/1.73m2    Anion Gap 12 4 - 16   Lipase   Result Value Ref Range    Lipase 39 13 - 60 IU/L   Troponin   Result Value Ref Range    Troponin T 0.019 (H) <0.01 NG/ML   CK   Result Value Ref Range    Total  (H) 26 - 140 IU/L   Magnesium   Result Value Ref Range    Magnesium 1.8 1.8 - 2.4 mg/dl   EKG 12 Lead   Result Value Ref Range    Ventricular Rate 66 BPM    Atrial Rate 66 BPM    P-R Interval 178 ms    QRS Duration 176 ms    Q-T Interval 486 ms    QTc Calculation (Bazett) 509 ms    P Axis 22 degrees    R Axis -62 degrees    T Axis 108 degrees    Diagnosis       Atrial-sensed ventricular-paced rhythm  Abnormal ECG  When compared with ECG of 29-NOV-2021 15:44,  Vent. rate has increased BY   3 BPM        Radiographs (if obtained):    [] Radiologist's Report Reviewed:  XR CHEST PORTABLE   Final Result   Unchanged appearance of the chest without acute airspace disease identified.          CT HEAD WO CONTRAST    (Results Pending)   CT CERVICAL SPINE WO CONTRAST    (Results Pending)   CT LUMBAR RECONSTRUCTION WO POST PROCESS    (Results Pending)   CT THORACIC RECONSTRUCTION WO POST PROCESS    (Results Pending)   CT ABDOMEN PELVIS WO CONTRAST Additional Contrast? None    (Results Pending)   CT CHEST WO CONTRAST    (Results Pending)         EKG (if obtained): (All EKG's are interpreted by myself in the absence of a cardiologist)  AV paced rhythm, rate 66, , , QTc 519, no acute ST elevation. EKG similar to EKG done on 11/24/2021. Chart review shows recent radiographs:  No results found. MDM:  Patient seen and examined. Labs and imaging obtained. With mild leukocytosis, 12.3, hemoglobin 10.9, platelets stable. Electrolytes, BUN and creatinine within acceptable limits. CK1 68, troponin 0.019. Patient does have chest pain, however this is in relation to the ecchymosis over the left side of her chest, and she denies any visceral chest pain. Patient's ecchymosis is aging, and is from a fall approximately 5 days ago. EKG did not have any acute changes. At time of completion of this note, imaging is pending. Care of patient transferred from me to oncoming physician, who agrees to follow-up on imaging, disposition patient appropriately. Clinical Impression:  1. Abdominal pain, unspecified abdominal location    2. Elevated troponin      Disposition referral (if applicable):  No follow-up provider specified. Disposition medications (if applicable):  New Prescriptions    No medications on file       Comment: Please note this report has been produced using speech recognition software and may contain errors related to that system including errors in grammar, punctuation, and spelling, as well as words and phrases that may be inappropriate. Efforts were made to edit the dictations.        [id number] [place] [address],   05/15/22 7055

## 2022-05-15 NOTE — PROGRESS NOTES
Called Patient's  and he didn't answer the phone so a voicemail was left along with a number. Also, called patient's mother and gave her an update. All questions were answered. Will keep monitoring.

## 2022-05-15 NOTE — ANESTHESIA PRE PROCEDURE
Department of Anesthesiology  Preprocedure Note       Name:  Maria E Market   Age:  67 y.o.  :  1950                                          MRN:  [id number]         Date:  5/15/2022      Surgeon: Mansoor Isaac):  Windy Luo MD    Procedure: Procedure(s):  CYSTOSCOPY URETERAL STENT INSERTION    Medications prior to admission:   Prior to Admission medications    Medication Sig Start Date End Date Taking? Authorizing Provider   gabapentin (NEURONTIN) 100 MG capsule Take 100 mg by mouth daily.     Historical Provider, MD   sertraline (ZOLOFT) 100 MG tablet TAKE 2 TABLETS BY MOUTH EVERY MORNING 3/28/22   Jagdish Friday, DO   ezetimibe (ZETIA) 10 MG tablet TAKE 1 TABLET BY MOUTH EVERY MORNING 3/28/22   Jagdish Friday, DO   simvastatin (ZOCOR) 20 MG tablet TAKE ONE (1) TABLET BY MOUTH NIGHTLY 3/28/22   McDade Friday, DO   letrozole [facility]) 2.5 MG tablet Take 1 tablet by mouth nightly 21   Joanne Wilkinson MD   Ferrous Sulfate (IRON) 325 (65 Fe) MG TABS Take 1 tablet by mouth every other day 3/27/21   Rivka Landa MD   albuterol-ipratropium (COMBIVENT RESPIMAT)  MCG/ACT AERS inhaler Inhale 1 puff into the lungs 4 times daily 3/27/21   Rivka Landa MD   acetaminophen (TYLENOL) 500 MG tablet Take 500 mg by mouth every 6 hours as needed for Pain    Historical Provider, MD   fluticasone (FLOVENT HFA) 110 MCG/ACT inhaler Inhale 2 puffs into the lungs 2 times daily 21, DO   albuterol sulfate HFA (PROAIR HFA) 108 (90 Base) MCG/ACT inhaler INHALE 2 PUFFS BY MOUTH EVERY SIX HOURS AS NEEDED FOR WHEEZING 21, DO   vitamin B-12 (CYANOCOBALAMIN) 1000 MCG tablet Take 1,000 mcg by mouth daily    Historical Provider, MD   aspirin 81 MG EC tablet Take 81 mg by mouth nightly     Historical Provider, MD   calcium-vitamin D (OSCAL 500/200 D-3) 500-200 MG-UNIT per tablet Take 1 tablet by mouth 2 times daily     Historical Provider, MD       Current medications:    No current facility-administered medications for this visit. Current Outpatient Medications   Medication Sig Dispense Refill    gabapentin (NEURONTIN) 100 MG capsule Take 100 mg by mouth daily.       sertraline (ZOLOFT) 100 MG tablet TAKE 2 TABLETS BY MOUTH EVERY MORNING 180 tablet 0    ezetimibe (ZETIA) 10 MG tablet TAKE 1 TABLET BY MOUTH EVERY MORNING 90 tablet 0    simvastatin (ZOCOR) 20 MG tablet TAKE ONE (1) TABLET BY MOUTH NIGHTLY 90 tablet 0    letrozole (FEMARA) 2.5 MG tablet Take 1 tablet by mouth nightly 30 tablet 5    Ferrous Sulfate (IRON) 325 (65 Fe) MG TABS Take 1 tablet by mouth every other day 30 tablet 0    albuterol-ipratropium (COMBIVENT RESPIMAT)  MCG/ACT AERS inhaler Inhale 1 puff into the lungs 4 times daily 1 Inhaler 0    acetaminophen (TYLENOL) 500 MG tablet Take 500 mg by mouth every 6 hours as needed for Pain      fluticasone (FLOVENT HFA) 110 MCG/ACT inhaler Inhale 2 puffs into the lungs 2 times daily 1 Inhaler 5    albuterol sulfate HFA (PROAIR HFA) 108 (90 Base) MCG/ACT inhaler INHALE 2 PUFFS BY MOUTH EVERY SIX HOURS AS NEEDED FOR WHEEZING 1 Inhaler 5    vitamin B-12 (CYANOCOBALAMIN) 1000 MCG tablet Take 1,000 mcg by mouth daily      aspirin 81 MG EC tablet Take 81 mg by mouth nightly       calcium-vitamin D (OSCAL 500/200 D-3) 500-200 MG-UNIT per tablet Take 1 tablet by mouth 2 times daily        Facility-Administered Medications Ordered in Other Visits   Medication Dose Route Frequency Provider Last Rate Last Admin    iothalamate (CONRAY) 60 % injection 50 mL  50 mL IntraVENous ONCE PRN Jill Rachel MD        enoxaparin Sodium (LOVENOX) injection 30 mg  30 mg SubCUTAneous BID Babin Harness, APRN - MERCEDES        tamsulosin (FLOMAX) capsule 0.4 mg  0.4 mg Oral Daily Babin Harness, APRN - CNP        morphine (PF) injection 2 mg  2 mg IntraVENous Q2H PRN Olaf Harness, [place] - CNP        Or    morphine sulfate (PF) injection 4 mg  4 mg IntraVENous Q2H PRN Babin Harness, APRN - CNP        ondansetron (ZOFRAN-ODT) disintegrating tablet 4 mg  4 mg Oral Q8H PRN Unris Shield, APRN - CNP        Or    ondansetron [facility] injection 4 mg  4 mg IntraVENous Q6H PRN Nuris Shield, APRN - CNP        [START ON 5/16/2022] cefTRIAXone (ROCEPHIN) 1000 mg IVPB in 50 mL D5W minibag  1,000 mg IntraVENous Q24H Nuris Shield, APRN - CNP        acetaminophen (TYLENOL) tablet 650 mg  650 mg Oral Q6H PRN Nuris Shield, APRN - CNP        Or    acetaminophen (TYLENOL) suppository 650 mg  650 mg Rectal Q6H PRN Nuris Shield, APRN - CNP        0.9 % sodium chloride infusion   IntraVENous Continuous Nuris Shield, APRN - CNP           Allergies: Allergies   Allergen Reactions    Bactrim [Sulfamethoxazole-Trimethoprim] Anaphylaxis and Hives    Lipitor [Atorvastatin] Shortness Of Breath    Taxol [Paclitaxel] Anaphylaxis and Swelling    Aminoglycosides      Abstracted from [facility] patient chart.  Demerol Nausea Only    Neosporin [Bacitracin-Neomycin-Polymyxin] Rash and Other (See Comments)     hotness    Other Rash     Ivory Soap    Talc Other (See Comments)     blisters       Problem List:    Patient Active Problem List   Diagnosis Code    Malignant neoplasm of upper-outer quadrant of right breast in female, estrogen receptor positive ([facility] C50.411, Z17.0    Diffuse cystic mastopathy N60.19    Hyperlipidemia E78.5    H/O chest pain Z87.898    Heart block AV second degree I44.1    Abnormal cardiovascular stress test R94.39    JAMIE (obstructive sleep apnea) G47.33    Super obesity E66.9    Mild asthma J45.909    Severe obstructive sleep apnea G47.33    Cardiac pacemaker Z95.0    Chronic cystitis N30.20    Asthma J45.909    Hypertension I10    Depression F32. A    Obstructive sleep apnea G47.33    Nocturnal oxygen desaturation G47.34    NSTEMI (non-ST elevated myocardial infarction) (HCC) I21.4    PVD (peripheral vascular disease) (HCC) I73.9    Morbid obesity with BMI of 45.0-49.9, adult ([facility] E66.01, Z68.42    Moderate aortic stenosis I35.0    Hepatomegaly, not elsewhere classified R16.0    Arthritis M19.90    CAD (coronary artery disease) I25.10    Pneumonia due to COVID-19 virus U07.1, J12.82    Bilateral carotid artery stenosis I65.23    Moderate persistent asthma without complication O58.02    Carotid stenosis, right M89.70    Complicated urinary tract infection N39.0       Past Medical History:        Diagnosis Date    Anemia     Anxiety 1978    Arthritis     generalized    Asthma 2006    AV block     2nd degree per hospital in june2013    Blood poisoning 1994    Blood transfusion     12/2003    CAD (coronary artery disease)     Cancer ([facility] 2007    skin (face) & colon(2003)/ 2/2012 dx of right breast ca(pc)    Carcinoma of breast ([facility] 02/29/2012    right arm no b/p or sticks    Cardiac pacemaker 03/10/2014    Medtronic dual lead    Chronic cystitis     Chronic respiratory failure (HCC)     2 L/min oxygen at night-time    Colon cancer ([facility] 2013    COPD (chronic obstructive pulmonary disease) (HCC)     CTS (carpal tunnel syndrome)     Depression     Diverticulitis     H/O 24 hour EKG monitoring 2/28/2014 7/24/13 2/14 complete heart block 7/13No clinically significant arrthymia noted.  H/O cardiac catheterization 10/31/2011, 7/11/2007    10/31/2011-No significant CAD. Cardiolite stress test was false positive-Dr Boom Shaw; 7/11/2007-No CAD, global function intact;    H/O cardiovascular stress test 10/20/2011, 3/23/2010    10/20/2011-Lexiscan- Evid of mild ischemia in Left CX region. EF 70%. Global LVSF normal;    H/O cardiovascular stress test 01/07/2015    EF 77%.    Normal Stress Test.    H/O chest pain 04/2005    sees Dr Rosalina Cheadle H/O Doppler ultrasound 02/20/2008 2/20/2008-CAROTID DOPPLER- Normal carotids bilaterally;    H/O Doppler ultrasound 06/06/2013    CAROTID DOPPLER- there is heterogeneous, irregular atherosclerotic plaque noted in the right internal carotid artery,  doppler flow velocities within the right internal carotid artery are elevated, consistent with a mild, less than 50%stenosis, there is intimal thickening but no significant atherosclerotic plaque noted in the left internal carotid artery, the left carotid are within normal limits    H/O echocardiogram 4/9/2012, 10/20/2011    4/9/2012-LVSF normal EF=>55%. impaired LV relaxation;    H/O echocardiogram 12/31/2014    EF 50-55%. LV shows mild concentric hypertrophy. Mildly dilated left atrium. Mildly dilated right ventricle. Sclerotic but not stenotic aortic valve. Mitral annular calcification. Mild MR, Mild TR, Mild pulm HTN.    H/O urinary incontinence     History of blood transfusion     Hx antineoplastic chemo     Hx of Arterial Doppler ultrasound 07/01/2019    No hemodynamically significant or focal stenosis visualized bilaterally, bilateral lower extremity arteries exhibit diffuse plaque and multiphasic waveforms, unable to obtain bilateral ABIs due to elevated leg pressures >250 mmHg, possibly due to atherosclerosis    Hx of cardiovascular stress test 06/2013    EF 70% abn suggestive of anterolateral ischemia, possible RCA ischmeia, post left ventricular dilation suggestive multivessel CAD.  Hx of echocardiogram 06/2013    EF50%, mild MR and TR, mild pulmonary HTN, mild AS    Hx of echocardiogram 11/01/2021    Left ventricular systolic function is normal, Mild concentric left ventricular hypertrophy Mildly dilated left atrium. Mitral annular calcification is present. Mild tricuspid regurgitation No evidence of any pericardial effusion    Hx of fall     \"last time 2018- due to neuropathy, have to use cane\"    HX OTHER MEDICAL 7/24/13, 06/2013 7/13-No clinacally significant arrhythmia.  6/13-multiple cycles of wenckebach episodes in addtion to some sinus tach    Hyperlipidemia     Hypertension     Hypothermia 2008    Malignant [facility] of breast (female) [facility]) 2012    Neuropathy     \"have neuropathy of my legs\"    Normocytic normochromic anemia     Obstructive sleep apnea 2008 01- Has CPAP machine but has not used in over a year - states she has not had problems since her pacemaker was placed.  Old MI (myocardial infarction)     per pt on 1/15/2019\"had heart attack about a year ago\"    Osteoarthritis     Osteopenia     Primary malignant neoplasm of colon ([address]     S/P cardiac cath 06/2013    no significant CAD false postive stress test    Skin cancer     Super obesity     Umbilical hernia        Past Surgical History:        Procedure Laterality Date    A-V CARDIAC PACEMAKER INSERTION  3/10/14     Medtronic. Model:  [id number] Medtronic  Serial:  [id number] dual chamber pacemaker    APPENDECTOMY  2004    BREAST BIOPSY  2/13/12    BREAST LUMPECTOMY  2007    right     BREAST SURGERY  02/13/2012    rt lesion biopsy     CARDIAC CATHETERIZATION  2007 & 2011    CAROTID ENDARTERECTOMY Right 12/3/2021    RIGHT CAROTID ENDARTERECTOMY WITH PATCH performed by Eduin Zhang MD at [address] [place] [address]  2004    Colon cancer    COLONOSCOPY  10-18-13    polyp    COLONOSCOPY  1/19/2016    internal hemorrhoids, diverticulosis, 1.5 cm sessile polyp, 8 mm polyp found in rectum.     COLONOSCOPY  12/14/2017    1.5cm residual polyp, 5mm polyps in blind sigmoid loop x3, Sigmoid divertics, Internal grade 1 hemorrhoids    COLONOSCOPY N/A 1/16/2019    COLONOSCOPY W/ ENDOSCOPIC MUCOSAL RESECTION WITH ELEVIEW 5ML INJECTION AND POLYPECTOMY OF TIP OF BLIND RECTOSIGMOID STUMP AND CAUTERIZATION WITH ERBE PROBE, CLIPPING X2 performed by Ariel Blakely MD at [facility]  01/21/2020    pan divertics/ 4 polyps/ sm int hem, S/P partial sigmoid resection w/ end to side anastamosis, repeat in 3 years    COLONOSCOPY N/A 1/21/2020    COLONOSCOPY POLYPECTOMY SNARE/COLD BIOPSY performed by Ariel Blakely MD at [place] [address] REMOVAL  2003    back    CYSTOSCOPY Bilateral 12/15/14    DENTAL SURGERY      front right tooth and root canal w/ brass bolt    DILATION AND CURETTAGE OF UTERUS  2006    Needed a camera to find my uterus.  ENDOSCOPY, COLON, DIAGNOSTIC  12/14/2017    Small hiatal hernia    HERNIA REPAIR  2004    Umb hernia    HERNIA REPAIR  2008    Inc hernia    LYMPH NODE DISSECTION  2/29/2012    Right axillary-3 sentinel nodes were positive out of 9.    MASTECTOMY, RADICAL Right 02/29/2012    w/ sentinal node disection-Dr West    PACEMAKER PLACEMENT      PRE-MALIGNANT / BENIGN SKIN LESION EXCISION  2/8/2013    right leg X2-Dr West    TONSILLECTOMY  1957    TUNNELED VENOUS PORT PLACEMENT  2004    TUNNELED VENOUS PORT PLACEMENT  02/13/2012    removal of mediport       Social History:    Social History     Tobacco Use    Smoking status: Never Smoker    Smokeless tobacco: Never Used   Substance Use Topics    Alcohol use: No     Comment:           CAFFEINE: 2- 12oz nona daily & 2 cups coffee some nights. Counseling given: Not Answered      Vital Signs (Current): There were no vitals filed for this visit.                                            BP Readings from Last 3 Encounters:   05/15/22 136/66   04/08/22 (!) 134/58   04/01/22 128/62       NPO Status:                                                                                 BMI:   Wt Readings from Last 3 Encounters:   05/15/22 300 lb (136.1 kg)   04/08/22 280 lb (127 kg)   04/01/22 280 lb (127 kg)     There is no height or weight on file to calculate BMI.    CBC:   Lab Results   Component Value Date    WBC 12.3 05/15/2022    RBC 3.90 05/15/2022    HGB 10.9 05/15/2022    HCT 34.3 05/15/2022    MCV 87.9 05/15/2022    RDW 14.4 05/15/2022     05/15/2022       CMP:   Lab Results   Component Value Date     05/15/2022    K 3.5 05/15/2022     05/15/2022    CO2 24 05/15/2022    BUN 14 05/15/2022    CREATININE 0.8 05/15/2022    GFRAA >60 05/15/2022    AGRATIO 1.3 07/29/2021    LABGLOM >60 05/15/2022    GLUCOSE 115 05/15/2022    PROT 6.6 05/15/2022    PROT 6.6 10/17/2012    CALCIUM 9.6 05/15/2022    BILITOT 0.2 05/15/2022    ALKPHOS 115 05/15/2022    AST 21 05/15/2022    ALT 20 05/15/2022       POC Tests: No results for input(s): POCGLU, POCNA, POCK, POCCL, POCBUN, POCHEMO, POCHCT in the last 72 hours. Coags:   Lab Results   Component Value Date    PROTIME 11.4 11/29/2021    PROTIME 11.2 10/28/2011    INR 0.88 11/29/2021    APTT 26.6 11/29/2021       HCG (If Applicable): No results found for: PREGTESTUR, PREGSERUM, HCG, HCGQUANT     ABGs: No results found for: PHART, PO2ART, TWT3ATQ, FAY0TAP, BEART, K5IUAGCC     Type & Screen (If Applicable):  No results found for: LABABO, LABRH    Drug/Infectious Status (If Applicable):  Lab Results   Component Value Date    HEPCAB NON REACTIVE 09/01/2017       COVID-19 Screening (If Applicable):   Lab Results   Component Value Date    COVID19 NOT DETECTED 11/29/2021           Anesthesia Evaluation  Patient summary reviewed  Airway: Mallampati: I  TM distance: >3 FB   Neck ROM: full  Mouth opening: > = 3 FB Dental:          Pulmonary:normal exam    (+) COPD:  sleep apnea: on noncompliant,  decreased breath sounds,  asthma:                           ROS comment: 2 L/min oxygen at night-time   Cardiovascular:  Exercise tolerance: poor (<4 METS),   (+) hypertension:, valvular problems/murmurs: AS, pacemaker:, past MI:, CAD:, hyperlipidemia      ECG reviewed  Rhythm: regular  Rate: normal                 ROS comment: Summary   Left ventricular systolic function is normal, 55-60%. Mild concentric left ventricular hypertrophy. Grade I diastolic dysfunction. Mildly dilated left atrium. Moderate aortic insufficiency with  of msec. Moderate aortic stenosis with mean gradient of 27 mmHg. Mid-to-Moderate mitral regurgitation is present.    Mitral annular calcification is

## 2022-05-15 NOTE — PROGRESS NOTES
1600: Patient arrived to PACU from OR. Monitorrs applied, alarms on. Report obtained from [address] [place], [place] [address].  1863: Patient repositioned in bed.   1620: Patient toleratinng ice chips at this time. 1625: Report called to [address] [place] [address] for room [id number] 0665: Patient transferred to room 1102.

## 2022-05-15 NOTE — H&P
urinary tract infection  Nusrat Marcial is a 67 y.o. female with pmh of  HPL, JAMIE, HTN, PVD, CAD, Pacemaker  who presents with complaints of weakness and abdominal pain s/p mechanical fall. She reports coming back from bathroom tripped on something and fell 5/10/22. She was able to get to the chair but unable to get up since and has been sitting in chair for 5 days. She reports incontinent of urine and stool for which her roommate has been cleaning her up in the chair. She began having right upper chest pain where she fell and hit her left upper chest.         Review of Systems: Need 10 Elements   Review of Systems   Constitutional: Negative for chills, diaphoresis, fever and malaise/fatigue. HENT: Negative for congestion, ear pain, hoarse voice and sore throat. Eyes: Negative for blurred vision, double vision, photophobia, pain and visual disturbance. Respiratory: Positive for sputum production. Negative for cough, hemoptysis, shortness of breath and wheezing. Cardiovascular: Negative for chest pain, palpitations, orthopnea, syncope and cyanosis. Gastrointestinal: Positive for abdominal pain. Negative for bloating, constipation, diarrhea, nausea and vomiting. Genitourinary: Positive for flank pain. Negative for frequency, hematuria and urgency. Musculoskeletal: Positive for falls, myalgias and muscle weakness. Skin: Positive for color change (chest ). Neurological: Negative for dizziness, focal weakness, weakness, light-headedness, numbness, headaches, difficulty with concentration and paresthesias. Psychiatric/Behavioral: Negative for memory loss. Objective:        Intake/Output Summary (Last 24 hours) at 5/15/2022 1429  Last data filed at 5/15/2022 1243  Gross per 24 hour   Intake 0 ml   Output --   Net 0 ml      Vitals:   Vitals:    05/15/22 0100 05/15/22 0300 05/15/22 0730 05/15/22 0945   BP: (!) 117/91 (!) 126/49  136/66   Pulse: 78 64  66   Resp: 15 16  16   Temp: 99.1 °F (37.3 °C)   98.6 °F (37 °C)   TempSrc: Oral   Oral   SpO2: 93% 94%  95%   Weight:   300 lb (136.1 kg)        Medications Prior to Admission     Prior to Admission medications    Medication Sig Start Date End Date Taking? Authorizing Provider   gabapentin (NEURONTIN) 100 MG capsule Take 100 mg by mouth daily. Historical Provider, MD   sertraline (ZOLOFT) 100 MG tablet TAKE 2 TABLETS BY MOUTH EVERY MORNING 3/28/22   Denis , DO   ezetimibe (ZETIA) 10 MG tablet TAKE 1 TABLET BY MOUTH EVERY MORNING 3/28/22   Denis , DO   simvastatin (ZOCOR) 20 MG tablet TAKE ONE (1) TABLET BY MOUTH NIGHTLY 3/28/22   Denis , DO   letrozole [facility]) 2.5 MG tablet Take 1 tablet by mouth nightly 11/30/21   Kaleb Lee MD   Ferrous Sulfate (IRON) 325 (65 Fe) MG TABS Take 1 tablet by mouth every other day 3/27/21   Hedy Munoz MD   albuterol-ipratropium (COMBIVENT RESPIMAT)  MCG/ACT AERS inhaler Inhale 1 puff into the lungs 4 times daily 3/27/21   Hedy Munoz MD   acetaminophen (TYLENOL) 500 MG tablet Take 500 mg by mouth every 6 hours as needed for Pain    Historical Provider, MD   fluticasone (FLOVENT HFA) 110 MCG/ACT inhaler Inhale 2 puffs into the lungs 2 times daily 2/11/21   Denis , DO   albuterol sulfate HFA (PROAIR HFA) 108 (90 Base) MCG/ACT inhaler INHALE 2 PUFFS BY MOUTH EVERY SIX HOURS AS NEEDED FOR WHEEZING 2/11/21   Denis , DO   vitamin B-12 (CYANOCOBALAMIN) 1000 MCG tablet Take 1,000 mcg by mouth daily    Historical Provider, MD   aspirin 81 MG EC tablet Take 81 mg by mouth nightly     Historical Provider, MD   calcium-vitamin D (OSCAL 500/200 D-3) 500-200 MG-UNIT per tablet Take 1 tablet by mouth 2 times daily     Historical Provider, MD       Physical Exam: Need 8 Elements   Physical Exam  Constitutional:       Appearance: Normal appearance. She is obese. She is ill-appearing. She is not toxic-appearing or diaphoretic. HENT:      Head: Normocephalic.       Nose: Nose normal. Mouth/Throat:      Mouth: Mucous membranes are moist.   Eyes:      Pupils: Pupils are equal, round, and reactive to light. Cardiovascular:      Rate and Rhythm: Normal rate and regular rhythm. Pulses: Normal pulses. Heart sounds: Normal heart sounds. Pulmonary:      Effort: Pulmonary effort is normal. No respiratory distress. Breath sounds: Normal breath sounds. No wheezing. Abdominal:      General: Bowel sounds are normal.      Palpations: Abdomen is soft. Tenderness: There is abdominal tenderness (suprapubic tenderness). There is right CVA tenderness and left CVA tenderness. Musculoskeletal:         General: Normal range of motion. Cervical back: Normal range of motion and neck supple. Skin:     General: Skin is warm and dry. Findings: Bruising (right chest ) present. Comments: Thick rough dry skin BLE   Neurological:      General: No focal deficit present. Mental Status: She is alert and oriented to person, place, and time.    Psychiatric:         Mood and Affect: Mood normal.         Behavior: Behavior normal.            Past Medical History:   PMHx   Past Medical History:   Diagnosis Date    Anemia     Anxiety 1978    Arthritis     generalized    Asthma 2006    AV block     2nd degree per hospital in june2013    Blood poisoning 1994    Blood transfusion     12/2003    CAD (coronary artery disease)     Cancer ([facility] [address] 2007    skin (face) & colon(2003)/ 2/2012 dx of right breast ca(pc)    Carcinoma of breast ([facility] [address] 02/29/2012    right arm no b/p or sticks    Cardiac pacemaker 03/10/2014    [employer]tronic dual lead    Chronic cystitis     Chronic respiratory failure (HCC)     2 L/min oxygen at night-time    Colon cancer ([facility] [address] 2013    COPD (chronic obstructive pulmonary disease) (HCC)     CTS (carpal tunnel syndrome)     Depression     Diverticulitis     H/O 24 hour EKG monitoring 2/28/2014 7/24/13 2/14 complete heart block 7/13No clinically significant arrthymia noted.  H/O cardiac catheterization 10/31/2011, 7/11/2007    10/31/2011-No significant CAD. Cardiolite stress test was false positive-Dr Richy Cleaning; 7/11/2007-No CAD, global function intact;    H/O cardiovascular stress test 10/20/2011, 3/23/2010    10/20/2011-Lexiscan- Evid of mild ischemia in Left CX region. EF 70%. Global LVSF normal;    H/O cardiovascular stress test 01/07/2015    EF 77%. Normal Stress Test.    H/O chest pain 04/2005    sees Dr Prerna Strickland H/O Doppler ultrasound 02/20/2008 2/20/2008-CAROTID DOPPLER- Normal carotids bilaterally;    H/O Doppler ultrasound 06/06/2013    CAROTID DOPPLER- there is heterogeneous, irregular atherosclerotic plaque noted in the right internal carotid artery,  doppler flow velocities within the right internal carotid artery are elevated, consistent with a mild, less than 50%stenosis, there is intimal thickening but no significant atherosclerotic plaque noted in the left internal carotid artery, the left carotid are within normal limits    H/O echocardiogram 4/9/2012, 10/20/2011    4/9/2012-LVSF normal EF=>55%. impaired LV relaxation;    H/O echocardiogram 12/31/2014    EF 50-55%. LV shows mild concentric hypertrophy. Mildly dilated left atrium. Mildly dilated right ventricle. Sclerotic but not stenotic aortic valve. Mitral annular calcification.   Mild MR, Mild TR, Mild pulm HTN.    H/O urinary incontinence     History of blood transfusion     Hx antineoplastic chemo     Hx of Arterial Doppler ultrasound 07/01/2019    No hemodynamically significant or focal stenosis visualized bilaterally, bilateral lower extremity arteries exhibit diffuse plaque and multiphasic waveforms, unable to obtain bilateral ABIs due to elevated leg pressures >250 mmHg, possibly due to atherosclerosis    Hx of cardiovascular stress test 06/2013    EF 70% abn suggestive of anterolateral ischemia, possible RCA ischmeia, post left ventricular dilation suggestive multivessel CAD.  Hx of echocardiogram 06/2013    EF50%, mild MR and TR, mild pulmonary HTN, mild AS    Hx of echocardiogram 11/01/2021    Left ventricular systolic function is normal, Mild concentric left ventricular hypertrophy Mildly dilated left atrium. Mitral annular calcification is present. Mild tricuspid regurgitation No evidence of any pericardial effusion    Hx of fall     \"last time 2018- due to neuropathy, have to use cane\"    HX OTHER MEDICAL 7/24/13, 06/2013 7/13-No clinacally significant arrhythmia. 6/13-multiple cycles of wenckebach episodes in addtion to some sinus tach    Hyperlipidemia     Hypertension     Hypothermia 2008    Malignant neoplasm of breast (female) ([facility] [address] 2012    Neuropathy     \"have neuropathy of my legs\"    Normocytic normochromic anemia     Obstructive sleep apnea 2008 01- Has CPAP machine but has not used in over a year - states she has not had problems since her pacemaker was placed.  Old MI (myocardial infarction)     per pt on 1/15/2019\"had heart attack about a year ago\"    Osteoarthritis     Osteopenia     Primary malignant neoplasm of colon ([facility] [address]     S/P cardiac cath 06/2013    no significant CAD false postive stress test    Skin cancer     Super obesity     Umbilical hernia      PSHX:  has a past surgical history that includes Tonsillectomy (1957); Appendectomy (2004); colectomy (2004); Tunneled venous port placement (2004); cyst removal (2003); Breast lumpectomy (2007); Dilation and curettage of uterus (2006); hernia repair (2004); hernia repair (2008); Dental surgery; Breast surgery (02/13/2012); Tunneled venous port placement (02/13/2012); Breast biopsy (2/13/12); Mastectomy, radical (Right, 02/29/2012); pre-malignant / benign skin lesion excision (2/8/2013); lymph node dissection (2/29/2012); Cardiac catheterization (2007 & 2011); A-V cardiac pacemaker insertion (3/10/14);  Cystoscopy (Bilateral, 12/15/14); pacemaker placement; Endoscopy, colon, diagnostic (12/14/2017); Colonoscopy (10-18-13); Colonoscopy (1/19/2016); Colonoscopy (12/14/2017); Colonoscopy (N/A, 1/16/2019); Colonoscopy (01/21/2020); Colonoscopy (N/A, 1/21/2020); and Carotid endarterectomy (Right, 12/3/2021). Allergies: Allergies   Allergen Reactions    Bactrim [Sulfamethoxazole-Trimethoprim] Anaphylaxis and Hives    Lipitor [Atorvastatin] Shortness Of Breath    Taxol [Paclitaxel] Anaphylaxis and Swelling    Aminoglycosides      Abstracted from [facility] patient chart.  Demerol Nausea Only    Neosporin [Bacitracin-Neomycin-Polymyxin] Rash and Other (See Comments)     hotness    Other Rash     Ivory Soap    Talc Other (See Comments)     blisters     Fam HX: her family history includes Arthritis in her mother; Cancer in her brother and father; Diabetes in her father; Heart Disease in her brother, brother, and father; High Blood Pressure in her brother, father, and mother; High Cholesterol in her brother, father, and mother; No Known Problems in her sister; Seizures in her son. Soc HX:   Social History     Socioeconomic History    Marital status:      Spouse name: Not on file    Number of children: 2    Years of education: 15    Highest education level: 12th grade   Occupational History    Occupation: retired   Tobacco Use    Smoking status: Never Smoker    Smokeless tobacco: Never Used   Vaping Use    Vaping Use: Never used   Substance and Sexual Activity    Alcohol use: No     Comment:           CAFFEINE: 2- 12oz nona daily & 2 cups coffee some nights.  Drug use: No    Sexual activity: Not Currently     Partners: Male   Other Topics Concern    Not on file   Social History Narrative    Do you donate blood or plasma? No    Caffeine intake? Moderate    Advance directive? No    Is blood transfusion acceptable in an emergency? Yes    Live alone or with others? With others    Able to care for self?  Yes    No exercise at this time         Social Determinants of Health     Financial Resource Strain: Low Risk     Difficulty of Paying Living Expenses: Not hard at all   Food Insecurity: No Food Insecurity    Worried About Running Out of Food in the Last Year: Never true    Beau of Food in the Last Year: Never true   Transportation Needs:     Lack of Transportation (Medical): Not on file    Lack of Transportation (Non-Medical):  Not on file   Physical Activity:     Days of Exercise per Week: Not on file    Minutes of Exercise per Session: Not on file   Stress:     Feeling of Stress : Not on file   Social Connections:     Frequency of Communication with Friends and Family: Not on file    Frequency of Social Gatherings with Friends and Family: Not on file    Attends Adventism Services: Not on file    Active Member of Clubs or Organizations: Not on file    Attends Club or Organization Meetings: Not on file    Marital Status: Not on file   Intimate Partner Violence:     Fear of Current or Ex-Partner: Not on file    Emotionally Abused: Not on file    Physically Abused: Not on file    Sexually Abused: Not on file   Housing Stability: [address] Unable to Pay for Housing in the Last Year: No    Number of Places Lived in the Last Year: 1    Unstable Housing in the Last Year: No       Medications:   Medications:    enoxaparin  30 mg SubCUTAneous BID    tamsulosin  0.4 mg Oral Daily    [START ON 5/16/2022] cefTRIAXone (ROCEPHIN) IV  1,000 mg IntraVENous Q24H      Infusions:    sodium chloride       PRN Meds: iothalamate, 50 mL, ONCE PRN  morphine, 2 mg, Q2H PRN   Or  morphine, 4 mg, Q2H PRN  ondansetron, 4 mg, Q8H PRN   Or  ondansetron, 4 mg, Q6H PRN  acetaminophen, 650 mg, Q6H PRN   Or  acetaminophen, 650 mg, Q6H PRN        Labs      CBC:   Recent Labs     05/15/22  0347   WBC 12.3*   HGB 10.9*        BMP:    Recent Labs     05/15/22  0347      K 3.5      CO2 24   BUN 14   CREATININE 0.8   GLUCOSE 115*     Hepatic:   Recent Labs     05/15/22  0347   AST 21   ALT 20   BILITOT 0.2   ALKPHOS 115     Lipids:   Lab Results   Component Value Date    CHOL 182 07/29/2021    HDL 50 07/29/2021    TRIG 185 07/29/2021     Hemoglobin A1C:   Lab Results   Component Value Date    LABA1C 5.8 11/10/2021     TSH: No results found for: TSH  Troponin:   Lab Results   Component Value Date    TROPONINT 0.019 05/15/2022    TROPONINT <0.010 11/05/2021    TROPONINT <0.010 11/04/2021     Lactic Acid: No results for input(s): LACTA in the last 72 hours. BNP: No results for input(s): PROBNP in the last 72 hours. UA:  Lab Results   Component Value Date    NITRU POSITIVE 05/15/2022    COLORU YELLOW 05/15/2022    WBCUA 522 05/15/2022    RBCUA 19 05/15/2022    MUCUS RARE 05/15/2022    TRICHOMONAS NONE SEEN 10/16/2018    YEAST RARE 09/11/2017    BACTERIA NEGATIVE 05/15/2022    CLARITYU CLEAR 05/15/2022    SPECGRAV 1.015 05/15/2022    LEUKOCYTESUR LARGE NUMBER OR AMOUNT OF  05/15/2022    UROBILINOGEN 0.2 05/15/2022    BILIRUBINUR NEGATIVE 05/15/2022    BLOODU LARGE NUMBER OR AMOUNT OF  05/15/2022    KETUA NEGATIVE 05/15/2022    AMORPHOUS OCCASIONAL 04/15/2018     Urine Cultures: No results found for: Maria Luisa Wolfe  Blood Cultures: No results found for: BC  No results found for: BLOODCULT2  Organism:   Lab Results   Component Value Date    ORG ECOL 10/16/2018       Imaging/Diagnostics Last 24 Hours   CT ABDOMEN PELVIS WO CONTRAST Additional Contrast? None    Result Date: 5/15/2022  EXAMINATION: CT OF THE CHEST WITHOUT CONTRAST; CT OF THE ABDOMEN AND PELVIS WITHOUT CONTRAST; CT OF THE LUMBAR SPINE WITHOUT CONTRAST; CT OF THE THORACIC SPINE WITHOUT CONTRAST 5/15/2022 5:51 am; 5/15/2022 5:52 am; 5/15/2022 5:53 am TECHNIQUE: CT of the chest was performed without the administration of intravenous contrast. Multiplanar reformatted images are provided for review.  Automated exposure control, iterative reconstruction, and/or weight based adjustment of the mA/kV was utilized to reduce the radiation dose to as low as reasonably achievable.; CT of the abdomen and pelvis was performed without the administration of intravenous contrast. Multiplanar reformatted images are provided for review. Automated exposure control, iterative reconstruction, and/or weight based adjustment of the mA/kV was utilized to reduce the radiation dose to as low as reasonably achievable.; CT of the lumbar spine was performed without the administration of intravenous contrast. Multiplanar reformatted images are provided for review. Adjustment of mA and/or kV according to patient size was utilized. Automated exposure control, iterative reconstruction, and/or weight based adjustment of the mA/kV was utilized to reduce the radiation dose to as low as reasonably achievable.; CT of the thoracic spine was performed without the administration of intravenous contrast. Multiplanar reformatted images are provided for review. Automated exposure control, iterative reconstruction, and/or weight based adjustment of the mA/kV was utilized to reduce the radiation dose to as low as reasonably achievable.  COMPARISON: Chest radiograph today, chest CT 06/26/2019, CT abdomen and pelvis 07/28/2018 HISTORY: ORDERING SYSTEM PROVIDED HISTORY: Fall, left chest ecchymosis TECHNOLOGIST PROVIDED HISTORY: Reason for exam:->Fall, left chest ecchymosis Reason for Exam: Fall, left chest ecchymosis; ORDERING SYSTEM PROVIDED HISTORY: Diffuse abdominal pain TECHNOLOGIST PROVIDED HISTORY: Reason for exam:->Diffuse abdominal pain Additional Contrast?->None Reason for Exam: Diffuse abdominal pain; ORDERING SYSTEM PROVIDED HISTORY: fall TECHNOLOGIST PROVIDED HISTORY: Reason for exam:->fall Reason for Exam: fall; ORDERING SYSTEM PROVIDED HISTORY: fall TECHNOLOGIST PROVIDED HISTORY: Reason for exam:->fall Decision Support Exception - unselect if not a suspected or confirmed emergency medical condition->Emergency Medical Condition (MA) Reason for Exam: fall FINDINGS: Chest: Mediastinum: No mediastinal hematoma. No pericardial effusion. Left subclavian dual chamber pacer in place. Lungs/pleura: No pneumothorax or effusion. Mild respiratory motion artifact is noted without evidence for acute airspace disease. The central airway is patent. Soft Tissues/Bones: Motion artifact is noted. Slight offset of the sternal body near the sternomanubrial junction appears due to motion artifact. No displaced fracture identified. Abdomen/Pelvis: Organs: Evaluation of the abdominal and pelvic viscera is limited in the absence of contrast.  No solid organ injury identified. An obstructing 11 mm stone is present in the mid right ureter resulting in mild hydronephrosis. Bilateral renal calculi are also present with a 1.7 cm stone in the left renal pelvis and mild caliectasis noted. GI/Bowel: There is no bowel dilatation or wall thickening identified. Moderate distal colonic stool burden. Status post partial sigmoid resection. Diverticulosis. Pelvis: Gas within the uterine cavity is noted. Curvilinear soft tissue is noted extending towards the sigmoid colon and colonic surgical clips. Peritoneum/Retroperitoneum: No free air. No free fluid. The aorta is normal in caliber. Bones/Soft Tissues: Pelvic alignment maintained. No fracture identified. No soft tissue hematoma. THORACIC: BONES/ALIGNMENT: Osteopenia. There is normal alignment of the spine. The vertebral body heights are maintained. No osseous destructive lesion is seen. DEGENERATIVE CHANGES: No gross spinal canal stenosis or bony neural foraminal narrowing of the thoracic spine. SOFT TISSUES: No paraspinal hematoma. LUMBAR: BONES/ALIGNMENT: Osteopenia. There is normal alignment of the spine. The vertebral body heights are maintained. No osseous destructive lesion is seen. DEGENERATIVE CHANGES: No gross spinal canal stenosis or bony neural foraminal narrowing of the lumbar spine. SOFT TISSUES: No paraspinal hematoma. 1.  No acute traumatic injury identified in the chest, abdomen or pelvis, within the limits of a noncontrast exam.  Motion artifact does degrade evaluation of the osseous structures in the chest however. 2.  No acute airspace disease identified. 3.  Mildly obstructing 11 mm stone in the mid right ureter. Bilateral nephrolithiasis. 4.  Gas within the uterine cavity is noted, suggestive of underlying fistula. 5.  No acute osseous abnormality identified in the thoracic or lumbar spine. CT HEAD WO CONTRAST    Result Date: 5/15/2022  EXAMINATION: CT OF THE CERVICAL SPINE WITHOUT CONTRAST; CT OF THE HEAD WITHOUT CONTRAST 5/15/2022 5:48 am; 5/15/2022 5:47 am TECHNIQUE: CT of the cervical spine was performed without the administration of intravenous contrast. Multiplanar reformatted images are provided for review. Automated exposure control, iterative reconstruction, and/or weight based adjustment of the mA/kV was utilized to reduce the radiation dose to as low as reasonably achievable.; CT of the head was performed without the administration of intravenous contrast. Automated exposure control, iterative reconstruction, and/or weight based adjustment of the mA/kV was utilized to reduce the radiation dose to as low as reasonably achievable. COMPARISON: 06/26/2014 HISTORY: ORDERING SYSTEM PROVIDED HISTORY: fall TECHNOLOGIST PROVIDED HISTORY: Reason for exam:->fall Decision Support Exception - unselect if not a suspected or confirmed emergency medical condition->Emergency Medical Condition (MA) Reason for Exam: fall; ORDERING SYSTEM PROVIDED HISTORY: fall TECHNOLOGIST PROVIDED HISTORY: Reason for exam:->fall Has a \"code stroke\" or \"stroke alert\" been called? ->No Decision Support Exception - unselect if not a suspected or confirmed emergency medical condition->Emergency Medical Condition (MA) Reason for Exam: fall FINDINGS: HEAD: BRAIN/VENTRICLES: There is no acute intracranial hemorrhage, mass effect or midline shift. No abnormal extra-axial fluid collection. ORBITS: The visualized portion of the orbits demonstrate no acute abnormality. SINUSES: The visualized paranasal sinuses and mastoid air cells demonstrate no acute abnormality. SOFT TISSUES/SKULL: No acute abnormality of the visualized skull or soft tissues. CERVICAL SPINE: BONES/ALIGNMENT: Motion artifact noted. No convincing evidence for fracture or traumatic malalignment. DEGENERATIVE CHANGES: Multilevel degenerative disc disease and facet arthropathy. SOFT TISSUES: There is no prevertebral soft tissue swelling. 1.  Chronic findings in the brain without acute CT abnormality identified. 2.  Motion degraded exam of the cervical spine. No convincing evidence for fracture or traumatic malalignment. CT CHEST WO CONTRAST    Result Date: 5/15/2022  EXAMINATION: CT OF THE CHEST WITHOUT CONTRAST; CT OF THE ABDOMEN AND PELVIS WITHOUT CONTRAST; CT OF THE LUMBAR SPINE WITHOUT CONTRAST; CT OF THE THORACIC SPINE WITHOUT CONTRAST 5/15/2022 5:51 am; 5/15/2022 5:52 am; 5/15/2022 5:53 am TECHNIQUE: CT of the chest was performed without the administration of intravenous contrast. Multiplanar reformatted images are provided for review. Automated exposure control, iterative reconstruction, and/or weight based adjustment of the mA/kV was utilized to reduce the radiation dose to as low as reasonably achievable.; CT of the abdomen and pelvis was performed without the administration of intravenous contrast. Multiplanar reformatted images are provided for review. Automated exposure control, iterative reconstruction, and/or weight based adjustment of the mA/kV was utilized to reduce the radiation dose to as low as reasonably achievable.; CT of the lumbar spine was performed without the administration of intravenous contrast. Multiplanar reformatted images are provided for review. Adjustment of mA and/or kV according to patient size was utilized.   Automated exposure control, iterative reconstruction, and/or weight based adjustment of the mA/kV was utilized to reduce the radiation dose to as low as reasonably achievable.; CT of the thoracic spine was performed without the administration of intravenous contrast. Multiplanar reformatted images are provided for review. Automated exposure control, iterative reconstruction, and/or weight based adjustment of the mA/kV was utilized to reduce the radiation dose to as low as reasonably achievable. COMPARISON: Chest radiograph today, chest CT 06/26/2019, CT abdomen and pelvis 07/28/2018 HISTORY: ORDERING SYSTEM PROVIDED HISTORY: Fall, left chest ecchymosis TECHNOLOGIST PROVIDED HISTORY: Reason for exam:->Fall, left chest ecchymosis Reason for Exam: Fall, left chest ecchymosis; ORDERING SYSTEM PROVIDED HISTORY: Diffuse abdominal pain TECHNOLOGIST PROVIDED HISTORY: Reason for exam:->Diffuse abdominal pain Additional Contrast?->None Reason for Exam: Diffuse abdominal pain; ORDERING SYSTEM PROVIDED HISTORY: fall TECHNOLOGIST PROVIDED HISTORY: Reason for exam:->fall Reason for Exam: fall; ORDERING SYSTEM PROVIDED HISTORY: fall TECHNOLOGIST PROVIDED HISTORY: Reason for exam:->fall Decision Support Exception - unselect if not a suspected or confirmed emergency medical condition->Emergency Medical Condition (MA) Reason for Exam: fall FINDINGS: Chest: Mediastinum: No mediastinal hematoma. No pericardial effusion. Left subclavian dual chamber pacer in place. Lungs/pleura: No pneumothorax or effusion. Mild respiratory motion artifact is noted without evidence for acute airspace disease. The central airway is patent. Soft Tissues/Bones: Motion artifact is noted. Slight offset of the sternal body near the sternomanubrial junction appears due to motion artifact. No displaced fracture identified. Abdomen/Pelvis: Organs: Evaluation of the abdominal and pelvic viscera is limited in the absence of contrast.  No solid organ injury identified.   An obstructing 11 mm stone is present in the mid right ureter resulting in mild hydronephrosis. Bilateral renal calculi are also present with a 1.7 cm stone in the left renal pelvis and mild caliectasis noted. GI/Bowel: There is no bowel dilatation or wall thickening identified. Moderate distal colonic stool burden. Status post partial sigmoid resection. Diverticulosis. Pelvis: Gas within the uterine cavity is noted. Curvilinear soft tissue is noted extending towards the sigmoid colon and colonic surgical clips. Peritoneum/Retroperitoneum: No free air. No free fluid. The aorta is normal in caliber. Bones/Soft Tissues: Pelvic alignment maintained. No fracture identified. No soft tissue hematoma. THORACIC: BONES/ALIGNMENT: Osteopenia. There is normal alignment of the spine. The vertebral body heights are maintained. No osseous destructive lesion is seen. DEGENERATIVE CHANGES: No gross spinal canal stenosis or bony neural foraminal narrowing of the thoracic spine. SOFT TISSUES: No paraspinal hematoma. LUMBAR: BONES/ALIGNMENT: Osteopenia. There is normal alignment of the spine. The vertebral body heights are maintained. No osseous destructive lesion is seen. DEGENERATIVE CHANGES: No gross spinal canal stenosis or bony neural foraminal narrowing of the lumbar spine. SOFT TISSUES: No paraspinal hematoma. 1.  No acute traumatic injury identified in the chest, abdomen or pelvis, within the limits of a noncontrast exam.  Motion artifact does degrade evaluation of the osseous structures in the chest however. 2.  No acute airspace disease identified. 3.  Mildly obstructing 11 mm stone in the mid right ureter. Bilateral nephrolithiasis. 4.  Gas within the uterine cavity is noted, suggestive of underlying fistula. 5.  No acute osseous abnormality identified in the thoracic or lumbar spine.      CT CERVICAL SPINE WO CONTRAST    Result Date: 5/15/2022  EXAMINATION: CT OF THE CERVICAL SPINE WITHOUT CONTRAST; CT OF THE HEAD WITHOUT CONTRAST 5/15/2022 5:48 am; 5/15/2022 5:47 am TECHNIQUE: CT of the cervical spine was performed without the administration of intravenous contrast. Multiplanar reformatted images are provided for review. Automated exposure control, iterative reconstruction, and/or weight based adjustment of the mA/kV was utilized to reduce the radiation dose to as low as reasonably achievable.; CT of the head was performed without the administration of intravenous contrast. Automated exposure control, iterative reconstruction, and/or weight based adjustment of the mA/kV was utilized to reduce the radiation dose to as low as reasonably achievable. COMPARISON: 06/26/2014 HISTORY: ORDERING SYSTEM PROVIDED HISTORY: fall TECHNOLOGIST PROVIDED HISTORY: Reason for exam:->fall Decision Support Exception - unselect if not a suspected or confirmed emergency medical condition->Emergency Medical Condition (MA) Reason for Exam: fall; ORDERING SYSTEM PROVIDED HISTORY: fall TECHNOLOGIST PROVIDED HISTORY: Reason for exam:->fall Has a \"code stroke\" or \"stroke alert\" been called? ->No Decision Support Exception - unselect if not a suspected or confirmed emergency medical condition->Emergency Medical Condition (MA) Reason for Exam: fall FINDINGS: HEAD: BRAIN/VENTRICLES: There is no acute intracranial hemorrhage, mass effect or midline shift. No abnormal extra-axial fluid collection. ORBITS: The visualized portion of the orbits demonstrate no acute abnormality. SINUSES: The visualized paranasal sinuses and mastoid air cells demonstrate no acute abnormality. SOFT TISSUES/SKULL: No acute abnormality of the visualized skull or soft tissues. CERVICAL SPINE: BONES/ALIGNMENT: Motion artifact noted. No convincing evidence for fracture or traumatic malalignment. DEGENERATIVE CHANGES: Multilevel degenerative disc disease and facet arthropathy. SOFT TISSUES: There is no prevertebral soft tissue swelling.      1.  Chronic findings in the brain without acute CT abnormality identified. 2.  Motion degraded exam of the cervical spine. No convincing evidence for fracture or traumatic malalignment. XR CHEST PORTABLE    Result Date: 5/15/2022  EXAMINATION: ONE XRAY VIEW OF THE CHEST 5/15/2022 2:57 am COMPARISON: 11/29/2021 HISTORY: ORDERING SYSTEM PROVIDED HISTORY: fall TECHNOLOGIST PROVIDED HISTORY: Reason for exam:->fall Reason for Exam: fall Additional signs and symptoms: fall FINDINGS: The cardiomediastinal silhouette is unchanged in appearance. Left subclavian dual chamber pacer in place. There is no consolidation, pneumothorax, or evidence of edema. No effusion is appreciated. The osseous structures are unchanged in appearance. Unchanged appearance of the chest without acute airspace disease identified. CT LUMBAR RECONSTRUCTION WO POST PROCESS    Result Date: 5/15/2022  EXAMINATION: CT OF THE CHEST WITHOUT CONTRAST; CT OF THE ABDOMEN AND PELVIS WITHOUT CONTRAST; CT OF THE LUMBAR SPINE WITHOUT CONTRAST; CT OF THE THORACIC SPINE WITHOUT CONTRAST 5/15/2022 5:51 am; 5/15/2022 5:52 am; 5/15/2022 5:53 am TECHNIQUE: CT of the chest was performed without the administration of intravenous contrast. Multiplanar reformatted images are provided for review. Automated exposure control, iterative reconstruction, and/or weight based adjustment of the mA/kV was utilized to reduce the radiation dose to as low as reasonably achievable.; CT of the abdomen and pelvis was performed without the administration of intravenous contrast. Multiplanar reformatted images are provided for review. Automated exposure control, iterative reconstruction, and/or weight based adjustment of the mA/kV was utilized to reduce the radiation dose to as low as reasonably achievable.; CT of the lumbar spine was performed without the administration of intravenous contrast. Multiplanar reformatted images are provided for review. Adjustment of mA and/or kV according to patient size was utilized.   Automated exposure control, iterative reconstruction, and/or weight based adjustment of the mA/kV was utilized to reduce the radiation dose to as low as reasonably achievable.; CT of the thoracic spine was performed without the administration of intravenous contrast. Multiplanar reformatted images are provided for review. Automated exposure control, iterative reconstruction, and/or weight based adjustment of the mA/kV was utilized to reduce the radiation dose to as low as reasonably achievable. COMPARISON: Chest radiograph today, chest CT 06/26/2019, CT abdomen and pelvis 07/28/2018 HISTORY: ORDERING SYSTEM PROVIDED HISTORY: Fall, left chest ecchymosis TECHNOLOGIST PROVIDED HISTORY: Reason for exam:->Fall, left chest ecchymosis Reason for Exam: Fall, left chest ecchymosis; ORDERING SYSTEM PROVIDED HISTORY: Diffuse abdominal pain TECHNOLOGIST PROVIDED HISTORY: Reason for exam:->Diffuse abdominal pain Additional Contrast?->None Reason for Exam: Diffuse abdominal pain; ORDERING SYSTEM PROVIDED HISTORY: fall TECHNOLOGIST PROVIDED HISTORY: Reason for exam:->fall Reason for Exam: fall; ORDERING SYSTEM PROVIDED HISTORY: fall TECHNOLOGIST PROVIDED HISTORY: Reason for exam:->fall Decision Support Exception - unselect if not a suspected or confirmed emergency medical condition->Emergency Medical Condition (MA) Reason for Exam: fall FINDINGS: Chest: Mediastinum: No mediastinal hematoma. No pericardial effusion. Left subclavian dual chamber pacer in place. Lungs/pleura: No pneumothorax or effusion. Mild respiratory motion artifact is noted without evidence for acute airspace disease. The central airway is patent. Soft Tissues/Bones: Motion artifact is noted. Slight offset of the sternal body near the sternomanubrial junction appears due to motion artifact. No displaced fracture identified. Abdomen/Pelvis: Organs: Evaluation of the abdominal and pelvic viscera is limited in the absence of contrast.  No solid organ injury identified.   An obstructing 11 mm stone is present in the mid right ureter resulting in mild hydronephrosis. Bilateral renal calculi are also present with a 1.7 cm stone in the left renal pelvis and mild caliectasis noted. GI/Bowel: There is no bowel dilatation or wall thickening identified. Moderate distal colonic stool burden. Status post partial sigmoid resection. Diverticulosis. Pelvis: Gas within the uterine cavity is noted. Curvilinear soft tissue is noted extending towards the sigmoid colon and colonic surgical clips. Peritoneum/Retroperitoneum: No free air. No free fluid. The aorta is normal in caliber. Bones/Soft Tissues: Pelvic alignment maintained. No fracture identified. No soft tissue hematoma. THORACIC: BONES/ALIGNMENT: Osteopenia. There is normal alignment of the spine. The vertebral body heights are maintained. No osseous destructive lesion is seen. DEGENERATIVE CHANGES: No gross spinal canal stenosis or bony neural foraminal narrowing of the thoracic spine. SOFT TISSUES: No paraspinal hematoma. LUMBAR: BONES/ALIGNMENT: Osteopenia. There is normal alignment of the spine. The vertebral body heights are maintained. No osseous destructive lesion is seen. DEGENERATIVE CHANGES: No gross spinal canal stenosis or bony neural foraminal narrowing of the lumbar spine. SOFT TISSUES: No paraspinal hematoma. 1.  No acute traumatic injury identified in the chest, abdomen or pelvis, within the limits of a noncontrast exam.  Motion artifact does degrade evaluation of the osseous structures in the chest however. 2.  No acute airspace disease identified. 3.  Mildly obstructing 11 mm stone in the mid right ureter. Bilateral nephrolithiasis. 4.  Gas within the uterine cavity is noted, suggestive of underlying fistula. 5.  No acute osseous abnormality identified in the thoracic or lumbar spine.      CT THORACIC RECONSTRUCTION WO POST PROCESS    Result Date: 5/15/2022  EXAMINATION: CT OF THE CHEST WITHOUT CONTRAST; CT OF THE ABDOMEN AND PELVIS WITHOUT CONTRAST; CT OF THE LUMBAR SPINE WITHOUT CONTRAST; CT OF THE THORACIC SPINE WITHOUT CONTRAST 5/15/2022 5:51 am; 5/15/2022 5:52 am; 5/15/2022 5:53 am TECHNIQUE: CT of the chest was performed without the administration of intravenous contrast. Multiplanar reformatted images are provided for review. Automated exposure control, iterative reconstruction, and/or weight based adjustment of the mA/kV was utilized to reduce the radiation dose to as low as reasonably achievable.; CT of the abdomen and pelvis was performed without the administration of intravenous contrast. Multiplanar reformatted images are provided for review. Automated exposure control, iterative reconstruction, and/or weight based adjustment of the mA/kV was utilized to reduce the radiation dose to as low as reasonably achievable.; CT of the lumbar spine was performed without the administration of intravenous contrast. Multiplanar reformatted images are provided for review. Adjustment of mA and/or kV according to patient size was utilized. Automated exposure control, iterative reconstruction, and/or weight based adjustment of the mA/kV was utilized to reduce the radiation dose to as low as reasonably achievable.; CT of the thoracic spine was performed without the administration of intravenous contrast. Multiplanar reformatted images are provided for review. Automated exposure control, iterative reconstruction, and/or weight based adjustment of the mA/kV was utilized to reduce the radiation dose to as low as reasonably achievable.  COMPARISON: Chest radiograph today, chest CT 06/26/2019, CT abdomen and pelvis 07/28/2018 HISTORY: ORDERING SYSTEM PROVIDED HISTORY: Fall, left chest ecchymosis TECHNOLOGIST PROVIDED HISTORY: Reason for exam:->Fall, left chest ecchymosis Reason for Exam: Fall, left chest ecchymosis; ORDERING SYSTEM PROVIDED HISTORY: Diffuse abdominal pain TECHNOLOGIST PROVIDED HISTORY: Reason for exam:->Diffuse abdominal pain Additional Contrast?->None Reason for Exam: Diffuse abdominal pain; ORDERING SYSTEM PROVIDED HISTORY: fall TECHNOLOGIST PROVIDED HISTORY: Reason for exam:->fall Reason for Exam: fall; ORDERING SYSTEM PROVIDED HISTORY: fall TECHNOLOGIST PROVIDED HISTORY: Reason for exam:->fall Decision Support Exception - unselect if not a suspected or confirmed emergency medical condition->Emergency Medical Condition (MA) Reason for Exam: fall FINDINGS: Chest: Mediastinum: No mediastinal hematoma. No pericardial effusion. Left subclavian dual chamber pacer in place. Lungs/pleura: No pneumothorax or effusion. Mild respiratory motion artifact is noted without evidence for acute airspace disease. The central airway is patent. Soft Tissues/Bones: Motion artifact is noted. Slight offset of the sternal body near the sternomanubrial junction appears due to motion artifact. No displaced fracture identified. Abdomen/Pelvis: Organs: Evaluation of the abdominal and pelvic viscera is limited in the absence of contrast.  No solid organ injury identified. An obstructing 11 mm stone is present in the mid right ureter resulting in mild hydronephrosis. Bilateral renal calculi are also present with a 1.7 cm stone in the left renal pelvis and mild caliectasis noted. GI/Bowel: There is no bowel dilatation or wall thickening identified. Moderate distal colonic stool burden. Status post partial sigmoid resection. Diverticulosis. Pelvis: Gas within the uterine cavity is noted. Curvilinear soft tissue is noted extending towards the sigmoid colon and colonic surgical clips. Peritoneum/Retroperitoneum: No free air. No free fluid. The aorta is normal in caliber. Bones/Soft Tissues: Pelvic alignment maintained. No fracture identified. No soft tissue hematoma. THORACIC: BONES/ALIGNMENT: Osteopenia. There is normal alignment of the spine. The vertebral body heights are maintained. No osseous destructive lesion is seen.  DEGENERATIVE CHANGES: No gross spinal canal stenosis or bony neural foraminal narrowing of the thoracic spine. SOFT TISSUES: No paraspinal hematoma. LUMBAR: BONES/ALIGNMENT: Osteopenia. There is normal alignment of the spine. The vertebral body heights are maintained. No osseous destructive lesion is seen. DEGENERATIVE CHANGES: No gross spinal canal stenosis or bony neural foraminal narrowing of the lumbar spine. SOFT TISSUES: No paraspinal hematoma. 1.  No acute traumatic injury identified in the chest, abdomen or pelvis, within the limits of a noncontrast exam.  Motion artifact does degrade evaluation of the osseous structures in the chest however. 2.  No acute airspace disease identified. 3.  Mildly obstructing 11 mm stone in the mid right ureter. Bilateral nephrolithiasis. 4.  Gas within the uterine cavity is noted, suggestive of underlying fistula. 5.  No acute osseous abnormality identified in the thoracic or lumbar spine.        Personally reviewed Lab Studies, Imaging, and discussed case with Errol Bailon     Electronically signed by AUSTIN Gan CNP on 5/15/2022 at 2:29 PM

## 2022-05-15 NOTE — OP NOTE
[address] [facility] [address]     Operative Note  [facility]    Patient: Gianna Katz    Date: 5/15/2022  : 1950     DOA: 5/15/2022   MRN: [id number]    [facility] [id number]  ROOM#: [id number]     Pre-operative Diagnosis: Right ureteral stone    Post-operative Diagnosis: same    Procedure: Cystoscopy, right retrograde pyelogram/stent insertion    Surgeons/Assistants: Surya Hamilton    Anesthesia: General endotracheal anesthesia    EBL: Minimal    Complications: none    Specimens: were not obtained    Indication for Procedure:      Gianna Katz is a 67 y.o. female with history of a fall a week ago who presented with inability to ambulate to [facility] ED early this AM.  She has a h/o CAD/PVD and sees Dr. Nathalie Page. She was last seen in the office 4/7/15 for a h/o recurrent UTI's. CT imaging today revealed a right ureteral calculus which measured 11 mm with a non-obstructing 17 mm left renal stone. Her UA is concerning for a UTI. She was provided lunch at 1100. The patient was brought to the OR today for cystourethroscopy, right retrograde pyelogram, and right ureteral stent insertion. Risks and benefits were explained & Gianna Katz agreed to proceed as planned. Her right flank was initialed. All questions were answered. Consent signed & placed in the chart. Description of procedure: The patient was identified in the holding area, taken to procedure room, and placed in supine position. Anesthesia was administered. The patient was then placed in the dorsal lithotomy position, sterilely prepped and then draped in the usual fashion. Time out was taken to ensure this was the proper operation, for the proper patient, on the proper side; everyone in the room agreed. Rigid cystoscopy ensued. A right retrograde pyelogram with a cone tipped catheter was performed opacifying the collecting system with findings of stenotic right ureter with a distal filling defect.   Her distal ureter was J-hooked making the retrograde challenging. A sensor wire was then placed into the patient's right renal pelvis. Using the sensor wire I passed up a 6 [employer] variable length double J stent with a cystoscope. It was noted to be in good position proximally fluoroscopically and distally cystoscopically. The patient's bladder was drained with a catheter. Kate Montelongo tolerated the procedure well & without difficulty and was then transferred to PACU to recover. Kate Montelongo will need to have intervention of the stone as an outpatient once the urine is deemed to be sterile. This will be via ureteroscopy/laser stone manipulation. Right retrograde pyelogram was as follows:  Using a cone-tip catheter, 6 cc's of contrast was injected in the right collecting system opacifying it to completion. A filling defect was seen in the right distal ureter - stone? But I couldn't get much contrast further up due to the angulation of the ureter.     Racquel Edmond MD  Electronically signed at 5/15/2022

## 2022-05-16 ENCOUNTER — APPOINTMENT (OUTPATIENT)
Dept: ULTRASOUND IMAGING | Age: 72
DRG: 853 | End: 2022-05-16
Payer: MEDICARE

## 2022-05-16 ENCOUNTER — TELEPHONE (OUTPATIENT)
Dept: INTERNAL MEDICINE CLINIC | Age: 72
End: 2022-05-16

## 2022-05-16 LAB
ANION GAP SERPL CALCULATED.3IONS-SCNC: 13 MMOL/L (ref 4–16)
BASOPHILS ABSOLUTE: 0.1 K/CU MM
BASOPHILS RELATIVE PERCENT: 0.6 % (ref 0–1)
BUN BLDV-MCNC: 13 MG/DL (ref 6–23)
CALCIUM SERPL-MCNC: 8.3 MG/DL (ref 8.3–10.6)
CHLORIDE BLD-SCNC: 107 MMOL/L (ref 99–110)
CO2: 23 MMOL/L (ref 21–32)
CREAT SERPL-MCNC: 0.7 MG/DL (ref 0.6–1.1)
DIFFERENTIAL TYPE: ABNORMAL
EKG ATRIAL RATE: 66 BPM
EKG DIAGNOSIS: NORMAL
EKG P AXIS: 22 DEGREES
EKG P-R INTERVAL: 178 MS
EKG Q-T INTERVAL: 486 MS
EKG QRS DURATION: 176 MS
EKG QTC CALCULATION (BAZETT): 509 MS
EKG R AXIS: -62 DEGREES
EKG T AXIS: 108 DEGREES
EKG VENTRICULAR RATE: 66 BPM
EOSINOPHILS ABSOLUTE: 0.3 K/CU MM
EOSINOPHILS RELATIVE PERCENT: 2.8 % (ref 0–3)
GFR AFRICAN AMERICAN: >60 ML/MIN/1.73M2
GFR NON-AFRICAN AMERICAN: >60 ML/MIN/1.73M2
GLUCOSE BLD-MCNC: 107 MG/DL (ref 70–99)
HCT VFR BLD CALC: 32.5 % (ref 37–47)
HEMOGLOBIN: 10.1 GM/DL (ref 12.5–16)
IMMATURE NEUTROPHIL %: 0.8 % (ref 0–0.43)
LYMPHOCYTES ABSOLUTE: 2.2 K/CU MM
LYMPHOCYTES RELATIVE PERCENT: 24.1 % (ref 24–44)
MCH RBC QN AUTO: 27.7 PG (ref 27–31)
MCHC RBC AUTO-ENTMCNC: 31.1 % (ref 32–36)
MCV RBC AUTO: 89.3 FL (ref 78–100)
MONOCYTES ABSOLUTE: 0.7 K/CU MM
MONOCYTES RELATIVE PERCENT: 7.2 % (ref 0–4)
NUCLEATED RBC %: 0 %
PDW BLD-RTO: 14.6 % (ref 11.7–14.9)
PLATELET # BLD: 228 K/CU MM (ref 140–440)
PMV BLD AUTO: 9.7 FL (ref 7.5–11.1)
POTASSIUM SERPL-SCNC: 3.8 MMOL/L (ref 3.5–5.1)
RBC # BLD: 3.64 M/CU MM (ref 4.2–5.4)
SEGMENTED NEUTROPHILS ABSOLUTE COUNT: 5.8 K/CU MM
SEGMENTED NEUTROPHILS RELATIVE PERCENT: 64.5 % (ref 36–66)
SODIUM BLD-SCNC: 143 MMOL/L (ref 135–145)
TOTAL IMMATURE NEUTOROPHIL: 0.07 K/CU MM
TOTAL NUCLEATED RBC: 0 K/CU MM
TROPONIN T: <0.01 NG/ML
TROPONIN T: <0.01 NG/ML
WBC # BLD: 9 K/CU MM (ref 4–10.5)

## 2022-05-16 PROCEDURE — 97535 SELF CARE MNGMENT TRAINING: CPT

## 2022-05-16 PROCEDURE — 94664 DEMO&/EVAL PT USE INHALER: CPT

## 2022-05-16 PROCEDURE — 85025 COMPLETE CBC W/AUTO DIFF WBC: CPT

## 2022-05-16 PROCEDURE — 1200000000 HC SEMI PRIVATE

## 2022-05-16 PROCEDURE — 97530 THERAPEUTIC ACTIVITIES: CPT

## 2022-05-16 PROCEDURE — 94640 AIRWAY INHALATION TREATMENT: CPT

## 2022-05-16 PROCEDURE — 2700000000 HC OXYGEN THERAPY PER DAY

## 2022-05-16 PROCEDURE — 36415 COLL VENOUS BLD VENIPUNCTURE: CPT

## 2022-05-16 PROCEDURE — 97166 OT EVAL MOD COMPLEX 45 MIN: CPT

## 2022-05-16 PROCEDURE — 93010 ELECTROCARDIOGRAM REPORT: CPT | Performed by: INTERNAL MEDICINE

## 2022-05-16 PROCEDURE — 97162 PT EVAL MOD COMPLEX 30 MIN: CPT

## 2022-05-16 PROCEDURE — 6370000000 HC RX 637 (ALT 250 FOR IP): Performed by: NURSE PRACTITIONER

## 2022-05-16 PROCEDURE — 6370000000 HC RX 637 (ALT 250 FOR IP): Performed by: INTERNAL MEDICINE

## 2022-05-16 PROCEDURE — 93971 EXTREMITY STUDY: CPT

## 2022-05-16 PROCEDURE — 6360000002 HC RX W HCPCS: Performed by: UROLOGY

## 2022-05-16 PROCEDURE — 80048 BASIC METABOLIC PNL TOTAL CA: CPT

## 2022-05-16 PROCEDURE — 6360000002 HC RX W HCPCS: Performed by: INTERNAL MEDICINE

## 2022-05-16 PROCEDURE — 2580000003 HC RX 258: Performed by: INTERNAL MEDICINE

## 2022-05-16 PROCEDURE — 6370000000 HC RX 637 (ALT 250 FOR IP): Performed by: UROLOGY

## 2022-05-16 PROCEDURE — 84484 ASSAY OF TROPONIN QUANT: CPT

## 2022-05-16 PROCEDURE — 2580000003 HC RX 258: Performed by: UROLOGY

## 2022-05-16 PROCEDURE — 94761 N-INVAS EAR/PLS OXIMETRY MLT: CPT

## 2022-05-16 RX ORDER — POLYETHYLENE GLYCOL 3350 17 G/17G
17 POWDER, FOR SOLUTION ORAL DAILY
Status: DISCONTINUED | OUTPATIENT
Start: 2022-05-16 | End: 2022-05-19 | Stop reason: HOSPADM

## 2022-05-16 RX ORDER — GABAPENTIN 100 MG/1
100 CAPSULE ORAL DAILY
Status: DISCONTINUED | OUTPATIENT
Start: 2022-05-16 | End: 2022-05-19 | Stop reason: HOSPADM

## 2022-05-16 RX ORDER — OXYCODONE HYDROCHLORIDE 5 MG/1
5 TABLET ORAL EVERY 6 HOURS PRN
Status: DISCONTINUED | OUTPATIENT
Start: 2022-05-16 | End: 2022-05-19 | Stop reason: HOSPADM

## 2022-05-16 RX ORDER — LACTULOSE 10 G/15ML
20 SOLUTION ORAL DAILY
Status: DISCONTINUED | OUTPATIENT
Start: 2022-05-16 | End: 2022-05-19 | Stop reason: HOSPADM

## 2022-05-16 RX ORDER — EZETIMIBE 10 MG/1
10 TABLET ORAL EVERY MORNING
Status: DISCONTINUED | OUTPATIENT
Start: 2022-05-16 | End: 2022-05-19 | Stop reason: HOSPADM

## 2022-05-16 RX ORDER — FLUTICASONE PROPIONATE 110 UG/1
2 AEROSOL, METERED RESPIRATORY (INHALATION) 2 TIMES DAILY
Status: DISCONTINUED | OUTPATIENT
Start: 2022-05-16 | End: 2022-05-19 | Stop reason: HOSPADM

## 2022-05-16 RX ORDER — METHOCARBAMOL 500 MG/1
1000 TABLET, FILM COATED ORAL 3 TIMES DAILY
Status: DISCONTINUED | OUTPATIENT
Start: 2022-05-16 | End: 2022-05-19 | Stop reason: HOSPADM

## 2022-05-16 RX ORDER — FERROUS SULFATE 325(65) MG
325 TABLET ORAL EVERY OTHER DAY
Status: DISCONTINUED | OUTPATIENT
Start: 2022-05-16 | End: 2022-05-19 | Stop reason: HOSPADM

## 2022-05-16 RX ADMIN — Medication 2 PUFF: at 08:41

## 2022-05-16 RX ADMIN — GABAPENTIN 100 MG: 100 CAPSULE ORAL at 09:03

## 2022-05-16 RX ADMIN — ENOXAPARIN SODIUM 30 MG: 100 INJECTION SUBCUTANEOUS at 09:04

## 2022-05-16 RX ADMIN — LETROZOLE 2.5 MG: 2.5 TABLET, FILM COATED ORAL at 21:46

## 2022-05-16 RX ADMIN — TAMSULOSIN HYDROCHLORIDE 0.4 MG: 0.4 CAPSULE ORAL at 09:02

## 2022-05-16 RX ADMIN — CEFTRIAXONE SODIUM 2000 MG: 2 INJECTION, POWDER, FOR SOLUTION INTRAMUSCULAR; INTRAVENOUS at 10:37

## 2022-05-16 RX ADMIN — ENOXAPARIN SODIUM 30 MG: 100 INJECTION SUBCUTANEOUS at 21:46

## 2022-05-16 RX ADMIN — FERROUS SULFATE TAB 325 MG (65 MG ELEMENTAL FE) 325 MG: 325 (65 FE) TAB at 09:02

## 2022-05-16 RX ADMIN — SODIUM CHLORIDE: 9 INJECTION, SOLUTION INTRAVENOUS at 04:08

## 2022-05-16 RX ADMIN — METHOCARBAMOL 1000 MG: 500 TABLET ORAL at 21:46

## 2022-05-16 RX ADMIN — EZETIMIBE 10 MG: 10 TABLET ORAL at 09:02

## 2022-05-16 RX ADMIN — Medication 2 PUFF: at 19:11

## 2022-05-16 RX ADMIN — METHOCARBAMOL 1000 MG: 500 TABLET ORAL at 15:47

## 2022-05-16 RX ADMIN — SERTRALINE HYDROCHLORIDE 200 MG: 50 TABLET ORAL at 09:02

## 2022-05-16 ASSESSMENT — PAIN DESCRIPTION - ORIENTATION
ORIENTATION: LEFT
ORIENTATION: MID

## 2022-05-16 ASSESSMENT — PAIN DESCRIPTION - FREQUENCY: FREQUENCY: CONTINUOUS

## 2022-05-16 ASSESSMENT — PAIN DESCRIPTION - ONSET: ONSET: PROGRESSIVE

## 2022-05-16 ASSESSMENT — PAIN - FUNCTIONAL ASSESSMENT: PAIN_FUNCTIONAL_ASSESSMENT: PREVENTS OR INTERFERES SOME ACTIVE ACTIVITIES AND ADLS

## 2022-05-16 ASSESSMENT — PAIN DESCRIPTION - LOCATION
LOCATION: LEG
LOCATION: GROIN

## 2022-05-16 ASSESSMENT — PAIN DESCRIPTION - DESCRIPTORS
DESCRIPTORS: PINS AND NEEDLES
DESCRIPTORS: ACHING

## 2022-05-16 ASSESSMENT — PAIN SCALES - GENERAL
PAINLEVEL_OUTOF10: 2
PAINLEVEL_OUTOF10: 5
PAINLEVEL_OUTOF10: 3

## 2022-05-16 ASSESSMENT — PAIN DESCRIPTION - PAIN TYPE: TYPE: ACUTE PAIN

## 2022-05-16 NOTE — CARE COORDINATION
This RN CM to bedside to introduce self. Pt lives at home with a friend. She uses a walker when outside of the home. She does not have any home health care services. Pt has PCP and rx coverage. Independent of ADL's PTA. Pt is interested in either [place] [id number] or SNF depending on what PT / OT recommend. If SNF is recommended , pt would like a referral sent to [facility]. PT and OT have been ordered. PT is signed up to see pt. WB posted for both PT and PT evaluations and recommendations.

## 2022-05-16 NOTE — CONSULTS
[address] [facility] PHYSICAL THERAPY EVALUATION  Niranjan Alonso, 1950, 1102/1102-A, 5/16/2022  History  [place]:  The primary encounter diagnosis was Abdominal pain, unspecified abdominal location. Diagnoses of Elevated troponin, Acute cystitis without hematuria, Kidney stone, and Fall, initial encounter were also pertinent to this visit. Patient  has a past medical history of Anemia, Anxiety, Arthritis, Asthma, AV block, Blood poisoning, Blood transfusion, CAD (coronary artery disease), Cancer ([address] Carcinoma of breast ([address] Cardiac pacemaker, Chronic cystitis, Chronic respiratory failure ([address] Colon cancer ([address] COPD (chronic obstructive pulmonary disease) ([address] CTS (carpal tunnel syndrome), Depression, Diverticulitis, H/O 24 hour EKG monitoring, H/O cardiac catheterization, H/O cardiovascular stress test, H/O cardiovascular stress test, H/O chest pain, H/O Doppler ultrasound, H/O Doppler ultrasound, H/O echocardiogram, H/O echocardiogram, H/O urinary incontinence, History of blood transfusion, Hx antineoplastic chemo, Hx of Arterial Doppler ultrasound, Hx of cardiovascular stress test, Hx of echocardiogram, Hx of echocardiogram, Hx of fall, HX OTHER MEDICAL, Hyperlipidemia, Hypertension, Hypothermia, Malignant neoplasm of breast (female) ([address] Neuropathy, Normocytic normochromic anemia, Obstructive sleep apnea, Old MI (myocardial infarction), Osteoarthritis, Osteopenia, Primary malignant neoplasm of colon ([address] S/P cardiac cath, Skin cancer, Super obesity, and Umbilical hernia. Patient  has a past surgical history that includes Tonsillectomy (1957); Appendectomy (2004); colectomy (2004); Tunneled venous port placement (2004); cyst removal (2003); Breast lumpectomy (2007); Dilation and curettage of uterus (2006); hernia repair (2004); hernia repair (2008); Dental surgery; Breast surgery (02/13/2012); Tunneled venous port placement (02/13/2012); Breast biopsy (2/13/12);  Mastectomy, radical (Right, 02/29/2012); pre-malignant / benign skin lesion excision (2/8/2013); lymph node dissection (2/29/2012); Cardiac catheterization (2007 & 2011); A-V cardiac pacemaker insertion (3/10/14); Cystoscopy (Bilateral, 12/15/14); pacemaker placement; Endoscopy, colon, diagnostic (12/14/2017); Colonoscopy (10-18-13); Colonoscopy (1/19/2016); Colonoscopy (12/14/2017); Colonoscopy (N/A, 1/16/2019); Colonoscopy (01/21/2020); Colonoscopy (N/A, 1/21/2020); and Carotid endarterectomy (Right, 12/3/2021). Therapy Hx and additional comorbidities:  see above. Fall 5/10/22 at home. Hx stay at [facility]. Restrictions:  fall risk           Communication with other providers:  cleared for tx and discussed with case management staff  Subjective:  Patient states:  Agreeable to eval.  Feeling okay today. Comfortable and no concerns today. States she has infection, kidney stones, and fall a week ago, unable to get up from floor. Pain:  0/10. Patient goal:  To be able to move some in home environment without falling  Occupational profile (relevant social history and personal factors):  lives with family/caregiver, typically independent mobility , needs assist with ADLs  and dependent with ADLs  States she is a household ambulator and prefers to hold on to objects. Enjoys spending time on porch. Examination of body systems (includes body structures/functions, activity/participation limitations): · Vision and Hearing:  WFL vision and WFL hearing  · Cognition and Participation:  alert, oriented, pleasant and participatory, engaged in service and follows commands appropriately. Acutely oriented, fully cognitively intact, precise memory. · Cardiac and Pulmonary Tolerance:  tolerant of activity and recovers quickly with rest period  · Mobility skills and general balance:    Bed Mobility: modified independent. Sup-sit:  contact guard assist  .    Sit-static balance: modified independent.     Sit-stand:  contact guard assist  .    Stand-static balance:  contact guard assist  .    Stand-pivot:  minimal assist.    Bedside stepping/ambulation (0-10 feet):  contact guard assist  .    Mobility/Transfer Skills:  Pattern/Sequence:  Fair, some imparied by body proportion. Efficiency:  Impaired with slight SOB. · Neuro screen:  Grossly intact  · Musculoskeletal screen:  WFL BUE AROM and functional reach/power. Some impaired AROM BLEs at hips with passive motion insufficiency, some impaired power BLE 4-/5 grossly  AMPAC 6 Clicks Inpatient Mobility:   (Amb in room is defined as 10-30 feet.)  [facility] Mobility Inpatient   How much difficulty turning over in bed?: None  How much difficulty sitting down on / standing up from a chair with arms?: A Little  How much difficulty moving from lying on back to sitting on side of bed?: A Little  How much help from another person moving to and from a bed to a chair?: A Little  How much help from another person needed to walk in hospital room?: A Lot  How much help from another person for climbing 3-5 steps with a railing?: Total  AM-PAC Inpatient Mobility Raw Score : 16  AM-PAC Inpatient T-Scale Score : 40.78  Mobility Inpatient CMS 0-100% Score: 54.16  Mobility Inpatient CMS G-Code Modifier : CK  AMPAC 6 Clicks Goal:  20  [facility] Mobility Assessment Tool Score:  1  patient requires assist from staff to sit from supine -- patient is able to transfer with assist  BMAT Goal:  3  Treatment today:    Therapeutic Activity Training:   Therapeutic activity training was instructed today. Cues were given for safety, sequence, UE/LE placement, awareness, and balance. Activities performed today included bed mobility training, sup-sit, sit-stand, SPT. Assessment:  Patient admitted for obstr uropathy, ac cyctitis, sepsis, recent fall with evolving medical features and evolving rehabilitative features.   Patient performed [facility] at [facility], this was unfortunately her first stated opportunity to transfer out of bed during this admission. Has mobility impairments and recent fall history. Is at risk of future fall. Body Structures, Functions, Activity Limitations Requiring Skilled Therapeutic Intervention: Decreased functional mobility ,Decreased body mechanics,Decreased safe awareness,Decreased high-level IADLs,Decreased strength,Decreased ROM,Decreased ADL status,Decreased tolerance to work activity,Decreased balance  Skilled physical therapy services are appropriate in the acute care setting. There are no significant educational impairments or barriers to learning which prevent participation with service. Prognosis: good. Clinical decision making:  medium complexity:  hx 1-2 personal factors/comorbidities, exam tests and measures for 2-3 elements of body/structures/functions/activity limitation/participation restriction, evolving presentation  Discharge recommendations:  residential facility for post-acute rehabilitation, anticipate 1-2 hours per day and 5 days per week. .      From physical therapist perspective, to safely transition patient home from hospital, recommend enhanced support for IADLs, recommend physical assistance for ADLs, recommend physical assistance for mobility, recommend physical assist for home entry/exit, recommend continual available support and recommend home health physical therapy service (S1, S2)  Equipment recommendations:  front-wheeled rolling walker, bedside commode and lift chair  Goals:  Achieve goals within two weeks.   bed mobility and sup-sit independently  sit-stand and SPT independently  ambulate 150 ft with CGA and least restrictive device  ambulate 50 feet with modified independence and least restrictive device  independent with HEP  Plan:  Plan  Plan: 3-5 times per week  Acute care physical therapy treatment to include patient/caregiver education, therapeutic activity training, gait training, neuromuscular re-education, therapeutic exercise and self care training for home management. First intend to develop bed mobility skills and sitting functional activity tolerance, as needed. Next, intend to develop transfer skills and standing functional activity tolerance, where necessary. Then, after standing is safe, intend to develop standing functional mobility skills, including ambulation. Patient mobility with nursing staff is an important component of care during this admission. Mobility homework for patient and nurse:  change position frequently sit at edge of bed 4-6 times daily out of bed for meals stand at bedside 4-6 times daily  use bedside commode for voiding  Patient requires contact guard assist   for mobility with nursing. Time in:  1405  Time out:  1425  Timed treatment minutes:  12  Total treatment time:  20    Electronically signed by:   Selin Foley PT  5/16/2022, 2:39 PM

## 2022-05-16 NOTE — PROGRESS NOTES
Hospitalist Progress Note      Name:  Maria E Salas /Age/Sex: 1950  (67 y.o. female)   MRN & CSN:  [id number] & [id number] Admission Date/Time: 5/15/2022 12:53 AM   [facility] Alf Herrera Dr Day: 2  Discharge barrier/Reason for continued hospitalization: Await urine culture susceptibilities. Assessment and Plan:   Maria E Salas is a 67 y.o.  female class III obesity, complete heart block s/p PPM, colon cancer, s/p colectomy, COPD, right breast cancer s/p right radical mastectomy, anxiety with depression, nocturnal hypoxemia on 2 L of night oxygen, dyslipidemia, who presented to the ED on 5/15/2022 with abdominal pain, generalized weakness, inability to ambulate of 5 days duration and fall approximately a week ago. In the ED, CT abdomen and pelvis showed an obstructing 11 mm stone in the mid right ureter with bilateral nephrolithiasis as to she is preparing for Complicated urinary tract infection    1. Obstructive uropathy: 11 mm mid right ureteral stone  S/p cystoscopy, right retrograde pyelogram and stent placement. Continue Flomax. Appreciate urology    2. Acute cystitis: With possibly infected stone  Urine cultures with >100,000 colonies of Proteus. Await susceptibilities  Continue IV antibiotics, increased dose to 2 g daily. 3.  Sepsis due to UTI with evidence of acute organ dysfunction of elevated troponin (type II MI) present on admission  Continue IV antibiotics as above  Leukocytosis is trending down. 4.  Type II MI: Due to all of the above  EKG with atrial sensed ventricular paced rhythm. Trend troponin. 5.  Left calf tenderness: Check venous Doppler ultrasound. Discussed with bedside RN. 6.  Generalized abdominal pain: Tenderness on exam  CT shows unexplained gas within the uterine cavity suggestive of underlying fistula  Will pursue CT with and without contrast.  Bowel regimen  Add as needed oxycodone, Robaxin.     6. Physical deconditioning: Due to acute illness  PT/OT. Patient has no objection to SNF if needed. Other chronic problems include  Colon cancer s/p remote partial sigmoid resection  Breast cancer: Continue Femara  Dyslipidemia: Continue Zetia  Anxiety with depression: Continue Zoloft. Diet ADULT DIET; Regular; Low Fat/Low Chol/High Fiber/2 gm Na   DVT Prophylaxis [x] Lovenox, []  Heparin, [] SCDs, [] Ambulation   GI Prophylaxis [] PPI,  [] H2 Blocker,  [] Carafate,  [x] Diet/Tube Feeds   Code Status Full Code   MDM [] Low, [] Moderate,[x]  High       History of Present Illness:     Chief Complaint: Complicated urinary tract infection-generalized weakness, abdominal pain    Laura Moctezuma is a 67 y.o.  female  who presents with generalized weakness, abdominal pain, inability to ambulate and fall a week ago. Patient is seen and examined, she has several complaints. She is complaining of abdominal pain right now. This is different from the pain she had prior to surgery. She is also complaining of feeling weak. She tells me that she has been weak and has been in the chair for 1 week. She very much wants to get up from the bed and see what she can do. Reassured the PT/OT will try to walk with her today. She has no objection to going to rehab if she needs to. Ten point ROS reviewed, negative, unless as noted above    Objective: Intake/Output Summary (Last 24 hours) at 5/16/2022 1059  Last data filed at 5/16/2022 0331  Gross per 24 hour   Intake 200 ml   Output 575 ml   Net -375 ml        Vitals:   Vitals:    05/16/22 0331 05/16/22 0838 05/16/22 0844 05/16/22 0939   BP: 126/62 (!) 127/38     Pulse: 68 62  62   Resp: 18 18     Temp: 97.9 °F (36.6 °C) 98.4 °F (36.9 °C)     TempSrc: Oral Oral     SpO2: 98% 90% 96%    Weight: 281 lb 8.4 oz (127.7 kg)      Height: 5' 2\" (1.575 m)           Physical Exam:   GEN: Awake female, alert and oriented x3 in no apparent distress. Appears given age.   Morbidly

## 2022-05-16 NOTE — PROGRESS NOTES
Occupational Therapy  Facility/Department: [address] [place] [address]  Occupational Therapy Initial Assessment    Name: Chanda Conley  : 1950  MRN: [id number]  Date of Service: 2022    Discharge Recommendations:  [address]          Patient Diagnosis(es): The primary encounter diagnosis was Abdominal pain, unspecified abdominal location. Diagnoses of Elevated troponin, Acute cystitis without hematuria, Kidney stone, and Fall, initial encounter were also pertinent to this visit. Past Medical History:  has a past medical history of Anemia, Anxiety, Arthritis, Asthma, AV block, Blood poisoning, Blood transfusion, CAD (coronary artery disease), Cancer ([address] Carcinoma of breast ([address] Cardiac pacemaker, Chronic cystitis, Chronic respiratory failure ([address] Colon cancer ([address] COPD (chronic obstructive pulmonary disease) ([address] CTS (carpal tunnel syndrome), Depression, Diverticulitis, H/O 24 hour EKG monitoring, H/O cardiac catheterization, H/O cardiovascular stress test, H/O cardiovascular stress test, H/O chest pain, H/O Doppler ultrasound, H/O Doppler ultrasound, H/O echocardiogram, H/O echocardiogram, H/O urinary incontinence, History of blood transfusion, Hx antineoplastic chemo, Hx of Arterial Doppler ultrasound, Hx of cardiovascular stress test, Hx of echocardiogram, Hx of echocardiogram, Hx of fall, HX OTHER MEDICAL, Hyperlipidemia, Hypertension, Hypothermia, Malignant neoplasm of breast (female) ([address] Neuropathy, Normocytic normochromic anemia, Obstructive sleep apnea, Old MI (myocardial infarction), Osteoarthritis, Osteopenia, Primary malignant neoplasm of colon ([address] S/P cardiac cath, Skin cancer, Super obesity, and Umbilical hernia. Past Surgical History:  has a past surgical history that includes Tonsillectomy (); Appendectomy (); colectomy (); Tunneled venous port placement (); cyst removal (); Breast lumpectomy ();  Dilation and curettage of uterus (); hernia repair (2004); hernia repair (2008); Dental surgery; Breast surgery (02/13/2012); Tunneled venous port placement (02/13/2012); Breast biopsy (2/13/12); Mastectomy, radical (Right, 02/29/2012); pre-malignant / benign skin lesion excision (2/8/2013); lymph node dissection (2/29/2012); Cardiac catheterization (2007 & 2011); A-V cardiac pacemaker insertion (3/10/14); Cystoscopy (Bilateral, 12/15/14); pacemaker placement; Endoscopy, colon, diagnostic (12/14/2017); Colonoscopy (10-18-13); Colonoscopy (1/19/2016); Colonoscopy (12/14/2017); Colonoscopy (N/A, 1/16/2019); Colonoscopy (01/21/2020); Colonoscopy (N/A, 1/21/2020); and Carotid endarterectomy (Right, 12/3/2021). Assessment   Performance deficits / Impairments: Decreased functional mobility ; Decreased ADL status; Decreased ROM; Decreased strength;Decreased safe awareness;Decreased high-level IADLs;Decreased sensation  Assessment: Pt presents as a 66 y/o female with a complicated urinary tract infection from home. At baseline, pt is independent for ADLs and functional mobility/transfers. Pt presents with the above deficits. Pt would benefit from further acute care and d/c to a SNF for additional services.   Prognosis: Good  Decision Making: Low Complexity  REQUIRES OT FOLLOW-UP: Yes  Activity Tolerance  Activity Tolerance: Patient Tolerated treatment well        Plan   Plan  Times per Week: 3x+  Times per Day: Daily  Current Treatment Recommendations: Strengthening,ROM,Functional mobility training,Endurance training,Positioning,Equipment evaluation, education, & procurement,Patient/Caregiver education & training,Safety education & training,Pain management,Self-Care / ADL,Home management training,Balance training     Restrictions  Restrictions/Precautions  Restrictions/Precautions: Fall Risk,General Precautions  Required Braces or Orthoses?: No  Implants present? : Pacemaker    Subjective   General  Patient assessed for rehabilitation services?: Yes  Response to previous treatment: Patient with no complaints from previous session  Family / Caregiver Present: No  Subjective  Subjective: Pt reports \"I think my subconscious won't let me because I'm worried for when I get home\"     Social/Functional History  Social/Functional History  Lives With: Significant other  Bathroom Shower/Tub: Tub/Shower unit  Bathroom Toilet: Standard  Home Equipment: Ronnald Real, 4 wheeled  Has the patient had two or more falls in the past year or any fall with injury in the past year?: Yes  Receives Help From: Friend(s)  ADL Assistance: Independent  Toileting: Independent  Homemaking Assistance: Independent  Homemaking Responsibilities: Yes  Ambulation Assistance: Independent  Transfer Assistance: Independent       Objective      Observation/Palpation  Posture: Good  Safety Devices  Type of Devices: Call light within reach;Gait belt;Patient at risk for falls; Left in chair;Nurse notified; All fall risk precautions in place (Pt educated on use of call light to call for help)  Restraints  Restraints Initially in Place: No  Bed Mobility Training  Bed Mobility Training: No  Transfer Training  Transfer Training: Yes  Interventions: Verbal cues  Sit to Stand: Minimum assistance  Stand to Sit: Contact-guard assistance  Gait  Assistive Device: Walker, rolling     AROM: Generally decreased, functional  Strength: Generally decreased, functional  Coordination: Within functional limits  Tone: Normal  Sensation: Impaired (Pt reports numbness in right fingers and hand up to wrist)  ADL  Feeding: Independent  Grooming: Contact guard assistance (washing face/hands w/ warm washcloth in sit, brushing hair in sit)  UE Bathing: Supervision  LE Bathing: Moderate assistance (assistance to wash kyle area/buttocks in stand after soiling self)  UE Dressing: Stand by assistance  LE Dressing: Moderate assistance (Pt reports that they are unable to don shoes and have difficulties donning socks and pants.  Pt demonstrated how she normally dons socks and pants while sitting in reclining chair)  Toileting: Maximum assistance (pt soiled self, assistance to wash kyle area/buttocks in stand)           Transfers  Sit to stand: Minimal assistance (Pt completed sit to stand transfer from reclining chair Kal)  Stand to sit: Contact guard assistance  Vision - Basic Assessment  Prior Vision: Wears glasses all the time  Cognition  Overall Cognitive Status: WNL  Perception  Overall Perceptual Status: WFL               Education Given To: Patient  Education Provided: Role of Therapy;Plan of Care;ADL Adaptive Strategies;Transfer Training; Fall Prevention Strategies; Equipment  Education Provided Comments: Pt educated on methods to adapt LE dressing to accomodate deficits with LLE, including use of AD and dressing the affected limb first. Educated pt on proper hand positioning for good eccentric control  Education Method: Demonstration;Verbal  Barriers to Learning: None  Education Outcome: Verbalized understanding                      Self Care Training:   Cues were given for safety, UE/LE placement, and balance. Activities performed today included dressing,and grooming. Therapeutic Activity Training:   Therapeutic activity training was instructed today. Cues were given for safety,  UE/LE placement, awareness, and balance. Activities performed today included stand-sit and sit-stand.       -PAC Score        [facility] Inpatient Daily Activity Raw Score: 15 (05/16/22 1633)  AM-PAC Inpatient ADL T-Scale Score : 34.69 (05/16/22 1633)  ADL Inpatient CMS 0-100% Score: 56.46 (05/16/22 1633)  ADL Inpatient CMS G-Code Modifier : CK (05/16/22 1633)    Goals  Short Term Goals  Time Frame for Short term goals: Until d/c  Short Term Goal 1: Pt will complete toileting Gavin  Short Term Goal 2: Pt will complete LE ADLs Gavin  Short Term Goal 3: Pt will complete UE ADLs independently  Short Term Goal 4: Pt will perform functional mobility/transfers Gavin  Short Term Goal 5: Pt will perform theract/therex to increase functional activity tolerance. Patient Goals   Patient goals :  To return to functional independence       Time In: 1502  Time Out: 1540  Total Treatment Minutes: 28  Total Treatment Time: Cely Stiles OTR/L [id number]  4:34 PM,5/16/2022

## 2022-05-16 NOTE — PROGRESS NOTES
[facility]   Progress Note  [facility] 0 1 2      Date: 2022   Patient: Peter Love   : 1950   DOA: 5/15/2022   MRN: [id number]   ROOM#: [id number]     Admit Date: 5/15/2022     Collaborating Urologist on Call at time of admission: Dr. Mike Rojas    CC: Fall at home  POD #1: cystoscopy, right retrograde pyelogram, right ureteral stent insertion  Subjective:     Pain: none, no nausea and no vomiting,   Bowel Movement/Flatus:   Yes  Voiding:  Indwelling Farooq catheter in place and draining dark yellow urine, debris noted in tubing    The patient is lying in bed comfortably at this time. No nausea or vomiting. Denies any abdominal pain or flank pain. No fever or chills. She has no other complaints at this time.     Objective:      Vitals:    /62   Pulse 68   Temp 97.9 °F (36.6 °C) (Oral)   Resp 18   Ht 5' 2\" (1.575 m)   Wt 281 lb 8.4 oz (127.7 kg)   LMP 01/15/1999   SpO2 98%   BMI 51.49 kg/m²    Temp  Av °F (36.7 °C)  Min: 97.1 °F (36.2 °C)  Max: 98.6 °F (37 °C)     Intake/Output Summary (Last 24 hours) at 2022 1416  Last data filed at 2022 0331  Gross per 24 hour   Intake 200 ml   Output 575 ml   Net -375 ml       Physical Exam:   General appearance: alert, appears stated age, cooperative and morbidly obese  Head: Normocephalic, without obvious abnormality, atraumatic  Back: No CVA tenderness  Abdomen: Soft, nontender, nondistended  : Indwelling Farooq catheter in place draining cloudy, dark yellow urine, debris noted in tubing    Labs:   WBC:    Lab Results   Component Value Date    WBC 9.0 2022      Hemoglobin/Hematocrit:    Lab Results   Component Value Date    HGB 10.1 2022    HCT 32.5 2022      BMP:   Lab Results   Component Value Date     05/15/2022    K 3.5 05/15/2022     05/15/2022    CO2 24 05/15/2022    BUN 14 05/15/2022    LABALBU 3.5 05/15/2022    CREATININE 0.8 05/15/2022    CALCIUM 9.6 05/15/2022 GFRAA >60 05/15/2022    LABGLOM >60 05/15/2022      PT/INR:    Lab Results   Component Value Date    PROTIME 11.4 11/29/2021    PROTIME 11.2 10/28/2011    INR 0.88 11/29/2021      PTT:    Lab Results   Component Value Date    APTT 26.6 11/29/2021      Urine Culture:  PROTEUS MIRABILIS >100,000 CFU/ml    Imaging:   CT ABDOMEN PELVIS WO CONTRAST Additional Contrast? None    Result Date: 5/15/2022  EXAMINATION: CT OF THE CHEST WITHOUT CONTRAST; CT OF THE ABDOMEN AND PELVIS WITHOUT CONTRAST; CT OF THE LUMBAR SPINE WITHOUT CONTRAST; CT OF THE THORACIC SPINE WITHOUT CONTRAST 5/15/2022 5:51 am; 5/15/2022 5:52 am; 5/15/2022 5:53 am TECHNIQUE: CT of the chest was performed without the administration of intravenous contrast. Multiplanar reformatted images are provided for review. Automated exposure control, iterative reconstruction, and/or weight based adjustment of the mA/kV was utilized to reduce the radiation dose to as low as reasonably achievable.; CT of the abdomen and pelvis was performed without the administration of intravenous contrast. Multiplanar reformatted images are provided for review. Automated exposure control, iterative reconstruction, and/or weight based adjustment of the mA/kV was utilized to reduce the radiation dose to as low as reasonably achievable.; CT of the lumbar spine was performed without the administration of intravenous contrast. Multiplanar reformatted images are provided for review. Adjustment of mA and/or kV according to patient size was utilized. Automated exposure control, iterative reconstruction, and/or weight based adjustment of the mA/kV was utilized to reduce the radiation dose to as low as reasonably achievable.; CT of the thoracic spine was performed without the administration of intravenous contrast. Multiplanar reformatted images are provided for review.  Automated exposure control, iterative reconstruction, and/or weight based adjustment of the mA/kV was utilized to reduce the radiation dose to as low as reasonably achievable. COMPARISON: Chest radiograph today, chest CT 06/26/2019, CT abdomen and pelvis 07/28/2018 HISTORY: ORDERING SYSTEM PROVIDED HISTORY: Fall, left chest ecchymosis TECHNOLOGIST PROVIDED HISTORY: Reason for exam:->Fall, left chest ecchymosis Reason for Exam: Fall, left chest ecchymosis; ORDERING SYSTEM PROVIDED HISTORY: Diffuse abdominal pain TECHNOLOGIST PROVIDED HISTORY: Reason for exam:->Diffuse abdominal pain Additional Contrast?->None Reason for Exam: Diffuse abdominal pain; ORDERING SYSTEM PROVIDED HISTORY: fall TECHNOLOGIST PROVIDED HISTORY: Reason for exam:->fall Reason for Exam: fall; ORDERING SYSTEM PROVIDED HISTORY: fall TECHNOLOGIST PROVIDED HISTORY: Reason for exam:->fall Decision Support Exception - unselect if not a suspected or confirmed emergency medical condition->Emergency Medical Condition (MA) Reason for Exam: fall FINDINGS: Chest: Mediastinum: No mediastinal hematoma. No pericardial effusion. Left subclavian dual chamber pacer in place. Lungs/pleura: No pneumothorax or effusion. Mild respiratory motion artifact is noted without evidence for acute airspace disease. The central airway is patent. Soft Tissues/Bones: Motion artifact is noted. Slight offset of the sternal body near the sternomanubrial junction appears due to motion artifact. No displaced fracture identified. Abdomen/Pelvis: Organs: Evaluation of the abdominal and pelvic viscera is limited in the absence of contrast.  No solid organ injury identified. An obstructing 11 mm stone is present in the mid right ureter resulting in mild hydronephrosis. Bilateral renal calculi are also present with a 1.7 cm stone in the left renal pelvis and mild caliectasis noted. GI/Bowel: There is no bowel dilatation or wall thickening identified. Moderate distal colonic stool burden. Status post partial sigmoid resection. Diverticulosis. Pelvis: Gas within the uterine cavity is noted. Curvilinear soft tissue is noted extending towards the sigmoid colon and colonic surgical clips. Peritoneum/Retroperitoneum: No free air. No free fluid. The aorta is normal in caliber. Bones/Soft Tissues: Pelvic alignment maintained. No fracture identified. No soft tissue hematoma. THORACIC: BONES/ALIGNMENT: Osteopenia. There is normal alignment of the spine. The vertebral body heights are maintained. No osseous destructive lesion is seen. DEGENERATIVE CHANGES: No gross spinal canal stenosis or bony neural foraminal narrowing of the thoracic spine. SOFT TISSUES: No paraspinal hematoma. LUMBAR: BONES/ALIGNMENT: Osteopenia. There is normal alignment of the spine. The vertebral body heights are maintained. No osseous destructive lesion is seen. DEGENERATIVE CHANGES: No gross spinal canal stenosis or bony neural foraminal narrowing of the lumbar spine. SOFT TISSUES: No paraspinal hematoma. 1.  No acute traumatic injury identified in the chest, abdomen or pelvis, within the limits of a noncontrast exam.  Motion artifact does degrade evaluation of the osseous structures in the chest however. 2.  No acute airspace disease identified. 3.  Mildly obstructing 11 mm stone in the mid right ureter. Bilateral nephrolithiasis. 4.  Gas within the uterine cavity is noted, suggestive of underlying fistula. 5.  No acute osseous abnormality identified in the thoracic or lumbar spine. CT HEAD WO CONTRAST    Result Date: 5/15/2022  EXAMINATION: CT OF THE CERVICAL SPINE WITHOUT CONTRAST; CT OF THE HEAD WITHOUT CONTRAST 5/15/2022 5:48 am; 5/15/2022 5:47 am TECHNIQUE: CT of the cervical spine was performed without the administration of intravenous contrast. Multiplanar reformatted images are provided for review.  Automated exposure control, iterative reconstruction, and/or weight based adjustment of the mA/kV was utilized to reduce the radiation dose to as low as reasonably achievable.; CT of the head was performed without the administration of intravenous contrast. Automated exposure control, iterative reconstruction, and/or weight based adjustment of the mA/kV was utilized to reduce the radiation dose to as low as reasonably achievable. COMPARISON: 06/26/2014 HISTORY: ORDERING SYSTEM PROVIDED HISTORY: fall TECHNOLOGIST PROVIDED HISTORY: Reason for exam:->fall Decision Support Exception - unselect if not a suspected or confirmed emergency medical condition->Emergency Medical Condition (MA) Reason for Exam: fall; ORDERING SYSTEM PROVIDED HISTORY: fall TECHNOLOGIST PROVIDED HISTORY: Reason for exam:->fall Has a \"code stroke\" or \"stroke alert\" been called? ->No Decision Support Exception - unselect if not a suspected or confirmed emergency medical condition->Emergency Medical Condition (MA) Reason for Exam: fall FINDINGS: HEAD: BRAIN/VENTRICLES: There is no acute intracranial hemorrhage, mass effect or midline shift. No abnormal extra-axial fluid collection. ORBITS: The visualized portion of the orbits demonstrate no acute abnormality. SINUSES: The visualized paranasal sinuses and mastoid air cells demonstrate no acute abnormality. SOFT TISSUES/SKULL: No acute abnormality of the visualized skull or soft tissues. CERVICAL SPINE: BONES/ALIGNMENT: Motion artifact noted. No convincing evidence for fracture or traumatic malalignment. DEGENERATIVE CHANGES: Multilevel degenerative disc disease and facet arthropathy. SOFT TISSUES: There is no prevertebral soft tissue swelling. 1.  Chronic findings in the brain without acute CT abnormality identified. 2.  Motion degraded exam of the cervical spine. No convincing evidence for fracture or traumatic malalignment.      CT CHEST WO CONTRAST    Result Date: 5/15/2022  EXAMINATION: CT OF THE CHEST WITHOUT CONTRAST; CT OF THE ABDOMEN AND PELVIS WITHOUT CONTRAST; CT OF THE LUMBAR SPINE WITHOUT CONTRAST; CT OF THE THORACIC SPINE WITHOUT CONTRAST 5/15/2022 5:51 am; 5/15/2022 5:52 am; 5/15/2022 5:53 am TECHNIQUE: CT of the chest was performed without the administration of intravenous contrast. Multiplanar reformatted images are provided for review. Automated exposure control, iterative reconstruction, and/or weight based adjustment of the mA/kV was utilized to reduce the radiation dose to as low as reasonably achievable.; CT of the abdomen and pelvis was performed without the administration of intravenous contrast. Multiplanar reformatted images are provided for review. Automated exposure control, iterative reconstruction, and/or weight based adjustment of the mA/kV was utilized to reduce the radiation dose to as low as reasonably achievable.; CT of the lumbar spine was performed without the administration of intravenous contrast. Multiplanar reformatted images are provided for review. Adjustment of mA and/or kV according to patient size was utilized. Automated exposure control, iterative reconstruction, and/or weight based adjustment of the mA/kV was utilized to reduce the radiation dose to as low as reasonably achievable.; CT of the thoracic spine was performed without the administration of intravenous contrast. Multiplanar reformatted images are provided for review. Automated exposure control, iterative reconstruction, and/or weight based adjustment of the mA/kV was utilized to reduce the radiation dose to as low as reasonably achievable.  COMPARISON: Chest radiograph today, chest CT 06/26/2019, CT abdomen and pelvis 07/28/2018 HISTORY: ORDERING SYSTEM PROVIDED HISTORY: Fall, left chest ecchymosis TECHNOLOGIST PROVIDED HISTORY: Reason for exam:->Fall, left chest ecchymosis Reason for Exam: Fall, left chest ecchymosis; ORDERING SYSTEM PROVIDED HISTORY: Diffuse abdominal pain TECHNOLOGIST PROVIDED HISTORY: Reason for exam:->Diffuse abdominal pain Additional Contrast?->None Reason for Exam: Diffuse abdominal pain; ORDERING SYSTEM PROVIDED HISTORY: fall TECHNOLOGIST PROVIDED HISTORY: Reason for exam:->fall Reason for Exam: fall; ORDERING SYSTEM PROVIDED HISTORY: fall TECHNOLOGIST PROVIDED HISTORY: Reason for exam:->fall Decision Support Exception - unselect if not a suspected or confirmed emergency medical condition->Emergency Medical Condition (MA) Reason for Exam: fall FINDINGS: Chest: Mediastinum: No mediastinal hematoma. No pericardial effusion. Left subclavian dual chamber pacer in place. Lungs/pleura: No pneumothorax or effusion. Mild respiratory motion artifact is noted without evidence for acute airspace disease. The central airway is patent. Soft Tissues/Bones: Motion artifact is noted. Slight offset of the sternal body near the sternomanubrial junction appears due to motion artifact. No displaced fracture identified. Abdomen/Pelvis: Organs: Evaluation of the abdominal and pelvic viscera is limited in the absence of contrast.  No solid organ injury identified. An obstructing 11 mm stone is present in the mid right ureter resulting in mild hydronephrosis. Bilateral renal calculi are also present with a 1.7 cm stone in the left renal pelvis and mild caliectasis noted. GI/Bowel: There is no bowel dilatation or wall thickening identified. Moderate distal colonic stool burden. Status post partial sigmoid resection. Diverticulosis. Pelvis: Gas within the uterine cavity is noted. Curvilinear soft tissue is noted extending towards the sigmoid colon and colonic surgical clips. Peritoneum/Retroperitoneum: No free air. No free fluid. The aorta is normal in caliber. Bones/Soft Tissues: Pelvic alignment maintained. No fracture identified. No soft tissue hematoma. THORACIC: BONES/ALIGNMENT: Osteopenia. There is normal alignment of the spine. The vertebral body heights are maintained. No osseous destructive lesion is seen. DEGENERATIVE CHANGES: No gross spinal canal stenosis or bony neural foraminal narrowing of the thoracic spine. SOFT TISSUES: No paraspinal hematoma. LUMBAR: BONES/ALIGNMENT: Osteopenia.   There is normal alignment of the spine. The vertebral body heights are maintained. No osseous destructive lesion is seen. DEGENERATIVE CHANGES: No gross spinal canal stenosis or bony neural foraminal narrowing of the lumbar spine. SOFT TISSUES: No paraspinal hematoma. 1.  No acute traumatic injury identified in the chest, abdomen or pelvis, within the limits of a noncontrast exam.  Motion artifact does degrade evaluation of the osseous structures in the chest however. 2.  No acute airspace disease identified. 3.  Mildly obstructing 11 mm stone in the mid right ureter. Bilateral nephrolithiasis. 4.  Gas within the uterine cavity is noted, suggestive of underlying fistula. 5.  No acute osseous abnormality identified in the thoracic or lumbar spine. CT CERVICAL SPINE WO CONTRAST    Result Date: 5/15/2022  EXAMINATION: CT OF THE CERVICAL SPINE WITHOUT CONTRAST; CT OF THE HEAD WITHOUT CONTRAST 5/15/2022 5:48 am; 5/15/2022 5:47 am TECHNIQUE: CT of the cervical spine was performed without the administration of intravenous contrast. Multiplanar reformatted images are provided for review. Automated exposure control, iterative reconstruction, and/or weight based adjustment of the mA/kV was utilized to reduce the radiation dose to as low as reasonably achievable.; CT of the head was performed without the administration of intravenous contrast. Automated exposure control, iterative reconstruction, and/or weight based adjustment of the mA/kV was utilized to reduce the radiation dose to as low as reasonably achievable.  COMPARISON: 06/26/2014 HISTORY: ORDERING SYSTEM PROVIDED HISTORY: fall TECHNOLOGIST PROVIDED HISTORY: Reason for exam:->fall Decision Support Exception - unselect if not a suspected or confirmed emergency medical condition->Emergency Medical Condition (MA) Reason for Exam: fall; ORDERING SYSTEM PROVIDED HISTORY: fall TECHNOLOGIST PROVIDED HISTORY: Reason for exam:->fall Has a \"code stroke\" or \"stroke alert\" been called? ->No Decision Support Exception - unselect if not a suspected or confirmed emergency medical condition->Emergency Medical Condition (MA) Reason for Exam: fall FINDINGS: HEAD: BRAIN/VENTRICLES: There is no acute intracranial hemorrhage, mass effect or midline shift. No abnormal extra-axial fluid collection. ORBITS: The visualized portion of the orbits demonstrate no acute abnormality. SINUSES: The visualized paranasal sinuses and mastoid air cells demonstrate no acute abnormality. SOFT TISSUES/SKULL: No acute abnormality of the visualized skull or soft tissues. CERVICAL SPINE: BONES/ALIGNMENT: Motion artifact noted. No convincing evidence for fracture or traumatic malalignment. DEGENERATIVE CHANGES: Multilevel degenerative disc disease and facet arthropathy. SOFT TISSUES: There is no prevertebral soft tissue swelling. 1.  Chronic findings in the brain without acute CT abnormality identified. 2.  Motion degraded exam of the cervical spine. No convincing evidence for fracture or traumatic malalignment. FL LESS THAN 1 HOUR    Result Date: 5/15/2022  Radiology exam is complete. No Radiologist dictation. Please follow up with ordering provider. XR CHEST PORTABLE    Result Date: 5/15/2022  EXAMINATION: ONE XRAY VIEW OF THE CHEST 5/15/2022 2:57 am COMPARISON: 11/29/2021 HISTORY: ORDERING SYSTEM PROVIDED HISTORY: fall TECHNOLOGIST PROVIDED HISTORY: Reason for exam:->fall Reason for Exam: fall Additional signs and symptoms: fall FINDINGS: The cardiomediastinal silhouette is unchanged in appearance. Left subclavian dual chamber pacer in place. There is no consolidation, pneumothorax, or evidence of edema. No effusion is appreciated. The osseous structures are unchanged in appearance. Unchanged appearance of the chest without acute airspace disease identified.      CT LUMBAR RECONSTRUCTION WO POST PROCESS    Result Date: 5/15/2022  EXAMINATION: CT OF THE CHEST WITHOUT CONTRAST; CT OF THE ABDOMEN AND PELVIS WITHOUT CONTRAST; CT OF THE LUMBAR SPINE WITHOUT CONTRAST; CT OF THE THORACIC SPINE WITHOUT CONTRAST 5/15/2022 5:51 am; 5/15/2022 5:52 am; 5/15/2022 5:53 am TECHNIQUE: CT of the chest was performed without the administration of intravenous contrast. Multiplanar reformatted images are provided for review. Automated exposure control, iterative reconstruction, and/or weight based adjustment of the mA/kV was utilized to reduce the radiation dose to as low as reasonably achievable.; CT of the abdomen and pelvis was performed without the administration of intravenous contrast. Multiplanar reformatted images are provided for review. Automated exposure control, iterative reconstruction, and/or weight based adjustment of the mA/kV was utilized to reduce the radiation dose to as low as reasonably achievable.; CT of the lumbar spine was performed without the administration of intravenous contrast. Multiplanar reformatted images are provided for review. Adjustment of mA and/or kV according to patient size was utilized. Automated exposure control, iterative reconstruction, and/or weight based adjustment of the mA/kV was utilized to reduce the radiation dose to as low as reasonably achievable.; CT of the thoracic spine was performed without the administration of intravenous contrast. Multiplanar reformatted images are provided for review. Automated exposure control, iterative reconstruction, and/or weight based adjustment of the mA/kV was utilized to reduce the radiation dose to as low as reasonably achievable.  COMPARISON: Chest radiograph today, chest CT 06/26/2019, CT abdomen and pelvis 07/28/2018 HISTORY: ORDERING SYSTEM PROVIDED HISTORY: Fall, left chest ecchymosis TECHNOLOGIST PROVIDED HISTORY: Reason for exam:->Fall, left chest ecchymosis Reason for Exam: Fall, left chest ecchymosis; ORDERING SYSTEM PROVIDED HISTORY: Diffuse abdominal pain TECHNOLOGIST PROVIDED HISTORY: Reason for exam:->Diffuse abdominal pain Additional Contrast?->None Reason for Exam: Diffuse abdominal pain; ORDERING SYSTEM PROVIDED HISTORY: fall TECHNOLOGIST PROVIDED HISTORY: Reason for exam:->fall Reason for Exam: fall; ORDERING SYSTEM PROVIDED HISTORY: fall TECHNOLOGIST PROVIDED HISTORY: Reason for exam:->fall Decision Support Exception - unselect if not a suspected or confirmed emergency medical condition->Emergency Medical Condition (MA) Reason for Exam: fall FINDINGS: Chest: Mediastinum: No mediastinal hematoma. No pericardial effusion. Left subclavian dual chamber pacer in place. Lungs/pleura: No pneumothorax or effusion. Mild respiratory motion artifact is noted without evidence for acute airspace disease. The central airway is patent. Soft Tissues/Bones: Motion artifact is noted. Slight offset of the sternal body near the sternomanubrial junction appears due to motion artifact. No displaced fracture identified. Abdomen/Pelvis: Organs: Evaluation of the abdominal and pelvic viscera is limited in the absence of contrast.  No solid organ injury identified. An obstructing 11 mm stone is present in the mid right ureter resulting in mild hydronephrosis. Bilateral renal calculi are also present with a 1.7 cm stone in the left renal pelvis and mild caliectasis noted. GI/Bowel: There is no bowel dilatation or wall thickening identified. Moderate distal colonic stool burden. Status post partial sigmoid resection. Diverticulosis. Pelvis: Gas within the uterine cavity is noted. Curvilinear soft tissue is noted extending towards the sigmoid colon and colonic surgical clips. Peritoneum/Retroperitoneum: No free air. No free fluid. The aorta is normal in caliber. Bones/Soft Tissues: Pelvic alignment maintained. No fracture identified. No soft tissue hematoma. THORACIC: BONES/ALIGNMENT: Osteopenia. There is normal alignment of the spine. The vertebral body heights are maintained. No osseous destructive lesion is seen.  DEGENERATIVE CHANGES: No gross spinal canal stenosis or bony neural foraminal narrowing of the thoracic spine. SOFT TISSUES: No paraspinal hematoma. LUMBAR: BONES/ALIGNMENT: Osteopenia. There is normal alignment of the spine. The vertebral body heights are maintained. No osseous destructive lesion is seen. DEGENERATIVE CHANGES: No gross spinal canal stenosis or bony neural foraminal narrowing of the lumbar spine. SOFT TISSUES: No paraspinal hematoma. 1.  No acute traumatic injury identified in the chest, abdomen or pelvis, within the limits of a noncontrast exam.  Motion artifact does degrade evaluation of the osseous structures in the chest however. 2.  No acute airspace disease identified. 3.  Mildly obstructing 11 mm stone in the mid right ureter. Bilateral nephrolithiasis. 4.  Gas within the uterine cavity is noted, suggestive of underlying fistula. 5.  No acute osseous abnormality identified in the thoracic or lumbar spine. CT THORACIC RECONSTRUCTION WO POST PROCESS    Result Date: 5/15/2022  EXAMINATION: CT OF THE CHEST WITHOUT CONTRAST; CT OF THE ABDOMEN AND PELVIS WITHOUT CONTRAST; CT OF THE LUMBAR SPINE WITHOUT CONTRAST; CT OF THE THORACIC SPINE WITHOUT CONTRAST 5/15/2022 5:51 am; 5/15/2022 5:52 am; 5/15/2022 5:53 am TECHNIQUE: CT of the chest was performed without the administration of intravenous contrast. Multiplanar reformatted images are provided for review. Automated exposure control, iterative reconstruction, and/or weight based adjustment of the mA/kV was utilized to reduce the radiation dose to as low as reasonably achievable.; CT of the abdomen and pelvis was performed without the administration of intravenous contrast. Multiplanar reformatted images are provided for review.  Automated exposure control, iterative reconstruction, and/or weight based adjustment of the mA/kV was utilized to reduce the radiation dose to as low as reasonably achievable.; CT of the lumbar spine was performed without the administration of intravenous contrast. Multiplanar reformatted images are provided for review. Adjustment of mA and/or kV according to patient size was utilized. Automated exposure control, iterative reconstruction, and/or weight based adjustment of the mA/kV was utilized to reduce the radiation dose to as low as reasonably achievable.; CT of the thoracic spine was performed without the administration of intravenous contrast. Multiplanar reformatted images are provided for review. Automated exposure control, iterative reconstruction, and/or weight based adjustment of the mA/kV was utilized to reduce the radiation dose to as low as reasonably achievable. COMPARISON: Chest radiograph today, chest CT 06/26/2019, CT abdomen and pelvis 07/28/2018 HISTORY: ORDERING SYSTEM PROVIDED HISTORY: Fall, left chest ecchymosis TECHNOLOGIST PROVIDED HISTORY: Reason for exam:->Fall, left chest ecchymosis Reason for Exam: Fall, left chest ecchymosis; ORDERING SYSTEM PROVIDED HISTORY: Diffuse abdominal pain TECHNOLOGIST PROVIDED HISTORY: Reason for exam:->Diffuse abdominal pain Additional Contrast?->None Reason for Exam: Diffuse abdominal pain; ORDERING SYSTEM PROVIDED HISTORY: fall TECHNOLOGIST PROVIDED HISTORY: Reason for exam:->fall Reason for Exam: fall; ORDERING SYSTEM PROVIDED HISTORY: fall TECHNOLOGIST PROVIDED HISTORY: Reason for exam:->fall Decision Support Exception - unselect if not a suspected or confirmed emergency medical condition->Emergency Medical Condition (MA) Reason for Exam: fall FINDINGS: Chest: Mediastinum: No mediastinal hematoma. No pericardial effusion. Left subclavian dual chamber pacer in place. Lungs/pleura: No pneumothorax or effusion. Mild respiratory motion artifact is noted without evidence for acute airspace disease. The central airway is patent. Soft Tissues/Bones: Motion artifact is noted. Slight offset of the sternal body near the sternomanubrial junction appears due to motion artifact.   No displaced fracture identified. Abdomen/Pelvis: Organs: Evaluation of the abdominal and pelvic viscera is limited in the absence of contrast.  No solid organ injury identified. An obstructing 11 mm stone is present in the mid right ureter resulting in mild hydronephrosis. Bilateral renal calculi are also present with a 1.7 cm stone in the left renal pelvis and mild caliectasis noted. GI/Bowel: There is no bowel dilatation or wall thickening identified. Moderate distal colonic stool burden. Status post partial sigmoid resection. Diverticulosis. Pelvis: Gas within the uterine cavity is noted. Curvilinear soft tissue is noted extending towards the sigmoid colon and colonic surgical clips. Peritoneum/Retroperitoneum: No free air. No free fluid. The aorta is normal in caliber. Bones/Soft Tissues: Pelvic alignment maintained. No fracture identified. No soft tissue hematoma. THORACIC: BONES/ALIGNMENT: Osteopenia. There is normal alignment of the spine. The vertebral body heights are maintained. No osseous destructive lesion is seen. DEGENERATIVE CHANGES: No gross spinal canal stenosis or bony neural foraminal narrowing of the thoracic spine. SOFT TISSUES: No paraspinal hematoma. LUMBAR: BONES/ALIGNMENT: Osteopenia. There is normal alignment of the spine. The vertebral body heights are maintained. No osseous destructive lesion is seen. DEGENERATIVE CHANGES: No gross spinal canal stenosis or bony neural foraminal narrowing of the lumbar spine. SOFT TISSUES: No paraspinal hematoma. 1.  No acute traumatic injury identified in the chest, abdomen or pelvis, within the limits of a noncontrast exam.  Motion artifact does degrade evaluation of the osseous structures in the chest however. 2.  No acute airspace disease identified. 3.  Mildly obstructing 11 mm stone in the mid right ureter. Bilateral nephrolithiasis. 4.  Gas within the uterine cavity is noted, suggestive of underlying fistula.  5.  No acute osseous abnormality identified in the thoracic or lumbar spine. Assessment & Plan:      Mandeep Mccracken is a 67 y.o. female admitted 5/15/2022 for obstructing right ureteral calculus with UTI. 1) Ureteral calculus: POD #1 cystoscopy, right retrograde pyelogram, and right ureteral stent placement. CT a/p noncontrast showed mildly obstructing 11 mm mid right ureter stone, bilateral nephrolithiasis including 17 mm nonobstructing left renal stone. Cr 0.7, stable   Will need intervention via ureteroscopy/laser lithotripsy for definitive management of her stone once urine is clear of infection. Takes ASA. 2) Complicated UTI: UA showed large blood, large leuks, positive nitrites, 500+ WBCs   WBC 12.3 on intake, improved to 9.0. Afebrile. On IV Rocephin. Urine culture positive Proteus mirabilis >100K CFU/mL. Continue IV abx and transition to PO per C&S prior to discharge. Patient is stable from a  standpoint. Will sign off. Please call with any questions. Patient will follow up in our office for re-evaluation in 1-2 weeks. Patient seen and examined, chart reviewed.      Electronically signed by ZABRINA Negro on 5/16/2022 at 7:52 AM

## 2022-05-16 NOTE — DISCHARGE INSTR - COC
Continuity of Care Form    Patient Name: Chantell Duarte   :  1950  MRN:  [id number]    Admit date:  5/15/2022  Discharge date:  ***    Code Status Order: Full Code   Advance Directives:   Advance Care Flowsheet Documentation       Date/Time Healthcare Directive Type of Healthcare Directive Copy in [address] Agent's Name Healthcare Agent's Phone Number    05/15/22 1431 No, patient does not have an advance directive for healthcare treatment -- -- -- -- --            Admitting Physician:  Mahin Andrews MD  PCP: George Frazier DO    Discharging Nurse: [facility] Unit/Room#: 1102/1102-A  Discharging Unit Phone Number: ***    Emergency Contact:   Extended Emergency Contact Information  Primary Emergency Contact: Inderjit Kuhn  Address: [address] [facility], [address] [place] [address] [address] [facility] Phone: [phone]  Mobile Phone: [phone]  Relation: Other  Secondary Emergency Contact: Daija Flores  Address: [address] [place] [address] [address] [facility] Phone: [phone]  Mobile Phone: [phone]  Relation: Parent    Past Surgical History:  Past Surgical History:   Procedure Laterality Date    A-V CARDIAC PACEMAKER INSERTION  3/10/14     Medtronic. Model:  [id number] Medtronic  Serial:  [id number] dual chamber pacemaker    APPENDECTOMY  2004    BREAST BIOPSY  12    BREAST LUMPECTOMY  2007    right     BREAST SURGERY  2012    rt lesion biopsy     CARDIAC CATHETERIZATION   &     CAROTID ENDARTERECTOMY Right 12/3/2021    RIGHT CAROTID ENDARTERECTOMY WITH PATCH performed by Alma Rosa Andrea MD at [address] [place] [address]      Colon cancer    COLONOSCOPY  10-18-13    polyp    COLONOSCOPY  2016    internal hemorrhoids, diverticulosis, 1.5 cm sessile polyp, 8 mm polyp found in rectum.     COLONOSCOPY  2017    1.5cm residual polyp, 5mm polyps in blind sigmoid loop x3, Sigmoid divertics, Internal grade 1 hemorrhoids    COLONOSCOPY N/A 1/16/2019    COLONOSCOPY W/ ENDOSCOPIC MUCOSAL RESECTION WITH ELEVIEW 5ML INJECTION AND POLYPECTOMY OF TIP OF BLIND RECTOSIGMOID STUMP AND CAUTERIZATION WITH ERBE PROBE, CLIPPING X2 performed by Monda Koyanagi, MD at [address] [place] [address]   01/21/2020    pan divertics/ 4 polyps/ sm int hem, S/P partial sigmoid resection w/ end to side anastamosis, repeat in 3 years    COLONOSCOPY N/A 1/21/2020    COLONOSCOPY POLYPECTOMY SNARE/COLD BIOPSY performed by Monda Koyanagi, MD at [place] [address]  2003    back    CYSTOSCOPY Bilateral 12/15/14    DENTAL SURGERY      front right tooth and root canal w/ brass bolt    DILATION AND CURETTAGE OF UTERUS  2006    Needed a camera to find my uterus. ENDOSCOPY, COLON, DIAGNOSTIC  12/14/2017    Small hiatal hernia    HERNIA REPAIR  2004    Umb hernia    HERNIA REPAIR  2008    Inc hernia    LYMPH NODE DISSECTION  2/29/2012    Right axillary-3 sentinel nodes were positive out of 9.     MASTECTOMY, RADICAL Right 02/29/2012    w/ sentinal node disection-Dr West    PACEMAKER PLACEMENT      PRE-MALIGNANT / BENIGN SKIN LESION EXCISION  2/8/2013    right leg X2-Dr West    TONSILLECTOMY  1957    TUNNELED VENOUS PORT PLACEMENT  2004    TUNNELED VENOUS PORT PLACEMENT  02/13/2012    removal of mediport       Immunization History:   Immunization History   Administered Date(s) Administered    Tdap (Boostrix, Adacel) 07/13/2017       Active Problems:  Patient Active Problem List   Diagnosis Code    Malignant neoplasm of upper-outer quadrant of right breast in female, estrogen receptor positive ([facility] [address] C50.411, Z17.0    Diffuse cystic mastopathy N60.19    Hyperlipidemia E78.5    H/O chest pain Z87.898    Heart block AV second degree I44.1    Abnormal cardiovascular stress test R94.39    JAMIE (obstructive sleep apnea) G47.33    Super obesity E66.9    Mild asthma J45.909    Severe obstructive sleep apnea G47.33 Cardiac pacemaker Z95.0    Chronic cystitis N30.20    Asthma J45.909    Hypertension I10    Depression F32. A    Obstructive sleep apnea G47.33    Nocturnal oxygen desaturation G47.34    NSTEMI (non-ST elevated myocardial infarction) ([facility]) I21.4    PVD (peripheral vascular disease) ([facility]) I73.9    Morbid obesity with BMI of 45.0-49.9, adult ([facility]) E66.01, Z68.42    Moderate aortic stenosis I35.0    Hepatomegaly, not elsewhere classified R16.0    Arthritis M19.90    CAD (coronary artery disease) I25.10    Pneumonia due to COVID-19 virus U07.1, J12.82    Bilateral carotid artery stenosis I65.23    Moderate persistent asthma without complication D75.13    Carotid stenosis, right D09.60    Complicated urinary tract infection N39.0       Isolation/Infection:   Isolation            No Isolation          Patient Infection Status       Infection Onset Added Last Indicated Last Indicated By Review Planned Expiration Resolved Resolved By    None active    Resolved    COVID-19 (Rule Out) 21 COVID-19 (Ordered)   21 Rule-Out Test Resulted    COVID-19  21 Dena Pritchett RN   21     COVID-19 (Rule Out) 21 COVID-19, Rapid (Ordered)   21 Rule-Out Test Resulted            Nurse Assessment:  Last Vital Signs: BP (!) 127/38   Pulse 62   Temp 98.4 °F (36.9 °C) (Oral)   Resp 18   Ht 5' 2\" (1.575 m)   Wt 281 lb 8.4 oz (127.7 kg)   LMP 01/15/1999   SpO2 96%   BMI 51.49 kg/m²     Last documented pain score (0-10 scale): Pain Level: 2  Last Weight:   Wt Readings from Last 1 Encounters:   22 281 lb 8.4 oz (127.7 kg)     Mental Status:  oriented, alert, coherent, and logical    IV Access:  - None    Nursing Mobility/ADLs:  Walking   Dependent  Transfer  Dependent  Bathing  Assisted  Dressing  Assisted  Toileting  Dependent  Feeding  Independent  Med Admin  Assisted  Med Delivery   whole    Wound Care Documentation and Therapy:  Incision 12/03/21 Neck Right; Lateral (Active)   Number of days: 164        Elimination:  Continence: Bowel: Yes  Bladder: Yes  Urinary Catheter: None   Colostomy/Ileostomy/Ileal Conduit: No       Date of Last BM: ***    Intake/Output Summary (Last 24 hours) at 5/16/2022 1118  Last data filed at 5/16/2022 0331  Gross per 24 hour   Intake 200 ml   Output 575 ml   Net -375 ml     I/O last 3 completed shifts: In: 200 [I.V.:200]  Out: 575 [Urine:575]    Safety Concerns:     History of Falls (last 30 days) and At Risk for Falls    Impairments/Disabilities:      Vision    Patient's personal belongings (please select all that are sent with patient):  Glasses    RN SIGNATURE:  Electronically signed by Eduin Alejandra LPN on 3/34/35 at 44:59 AM EDT    CASE MANAGEMENT/SOCIAL WORK SECTION    Inpatient Status Date: ***    Readmission Risk Assessment Score:  Readmission Risk              Risk of Unplanned Readmission:  15           Discharging to Facility/ Agency   Name:   Address:  Phone:  Fax:    Dialysis Facility (if applicable)   Name:  Address:  Dialysis Schedule:  Phone:  Fax:    / signature: {Esignature:[id number]}    PHYSICIAN SECTION  Nutrition Therapy:  Current Nutrition Therapy:   - Oral Diet:  General    Routes of Feeding: Oral  Liquids: Thin Liquids  Daily Fluid Restriction: no  Last Modified Barium Swallow with Video (Video Swallowing Test): not done    Treatments at the Time of Hospital Discharge:   Respiratory Treatments: none  Oxygen Therapy:  is not on home oxygen therapy. Ventilator:    - No ventilator support    Rehab Therapies: Physical Therapy and Occupational Therapy  Weight Bearing Status/Restrictions: No weight bearing restrictions  Other Medical Equipment (for information only, NOT a DME order):  walker  Other Treatments:     Prognosis: Fair    Condition at Discharge: Stable    Rehab Potential (if transferring to Rehab):  Fair    Recommended Labs or Other Treatments After Discharge: ** Patient needs to arrive at [facility] at 0600 for outpatient surgery with Dr Maria De Jesus Cortez on Tuesday 5/24/22 ** NPO at [facility] 5/24/22    Patient will also need to follow-up with urology in 2 weeks. Please repeat CBC and BMP in one week. Physician Certification: I certify the above information and transfer of Arlette Harris  is necessary for the continuing treatment of the diagnosis listed and that she requires [facility] for greater 30 days.      Update Admission H&P: No change in H&P    PHYSICIAN SIGNATURE:  Electronically signed by Faina Reilly PA-C on 5/19/22 at 10:13 AM EDT

## 2022-05-16 NOTE — TELEPHONE ENCOUNTER
Patient wanted Dr. Leblanc Lunch to know she is in the hospital. She had a fall and is unable to walk and she has a UTI.

## 2022-05-16 NOTE — CARE COORDINATION
PT is recommending SNF placement. OT is at bedside working with pt. Pt would like referral sent to [facility]. This RN CM left a voicemail for Jayla with [facility] admissions with referral information. I have asked Jayla to call and let me know she received information.

## 2022-05-17 LAB
CULTURE: ABNORMAL
CULTURE: ABNORMAL
Lab: ABNORMAL
SPECIMEN: ABNORMAL
TROPONIN T: <0.01 NG/ML

## 2022-05-17 PROCEDURE — 94761 N-INVAS EAR/PLS OXIMETRY MLT: CPT

## 2022-05-17 PROCEDURE — 6370000000 HC RX 637 (ALT 250 FOR IP): Performed by: UROLOGY

## 2022-05-17 PROCEDURE — 6360000002 HC RX W HCPCS: Performed by: UROLOGY

## 2022-05-17 PROCEDURE — 94640 AIRWAY INHALATION TREATMENT: CPT

## 2022-05-17 PROCEDURE — 1200000000 HC SEMI PRIVATE

## 2022-05-17 PROCEDURE — 36415 COLL VENOUS BLD VENIPUNCTURE: CPT

## 2022-05-17 PROCEDURE — 84484 ASSAY OF TROPONIN QUANT: CPT

## 2022-05-17 PROCEDURE — 97530 THERAPEUTIC ACTIVITIES: CPT

## 2022-05-17 PROCEDURE — 6370000000 HC RX 637 (ALT 250 FOR IP): Performed by: INTERNAL MEDICINE

## 2022-05-17 PROCEDURE — 6370000000 HC RX 637 (ALT 250 FOR IP): Performed by: NURSE PRACTITIONER

## 2022-05-17 RX ORDER — CEFUROXIME AXETIL 250 MG/1
250 TABLET ORAL EVERY 12 HOURS SCHEDULED
Status: DISCONTINUED | OUTPATIENT
Start: 2022-05-17 | End: 2022-05-19 | Stop reason: HOSPADM

## 2022-05-17 RX ADMIN — ENOXAPARIN SODIUM 30 MG: 100 INJECTION SUBCUTANEOUS at 10:29

## 2022-05-17 RX ADMIN — CEFUROXIME AXETIL 250 MG: 250 TABLET ORAL at 21:09

## 2022-05-17 RX ADMIN — TAMSULOSIN HYDROCHLORIDE 0.4 MG: 0.4 CAPSULE ORAL at 10:28

## 2022-05-17 RX ADMIN — METHOCARBAMOL 1000 MG: 500 TABLET ORAL at 10:28

## 2022-05-17 RX ADMIN — METHOCARBAMOL 1000 MG: 500 TABLET ORAL at 14:03

## 2022-05-17 RX ADMIN — EZETIMIBE 10 MG: 10 TABLET ORAL at 10:28

## 2022-05-17 RX ADMIN — ENOXAPARIN SODIUM 30 MG: 100 INJECTION SUBCUTANEOUS at 21:09

## 2022-05-17 RX ADMIN — LETROZOLE 2.5 MG: 2.5 TABLET, FILM COATED ORAL at 21:09

## 2022-05-17 RX ADMIN — CEFUROXIME AXETIL 250 MG: 250 TABLET ORAL at 10:28

## 2022-05-17 RX ADMIN — Medication 2 PUFF: at 20:51

## 2022-05-17 RX ADMIN — SERTRALINE HYDROCHLORIDE 200 MG: 50 TABLET ORAL at 10:28

## 2022-05-17 RX ADMIN — METHOCARBAMOL 1000 MG: 500 TABLET ORAL at 21:09

## 2022-05-17 RX ADMIN — GABAPENTIN 100 MG: 100 CAPSULE ORAL at 10:28

## 2022-05-17 ASSESSMENT — PAIN DESCRIPTION - LOCATION
LOCATION: LEG
LOCATION: LEG

## 2022-05-17 ASSESSMENT — PAIN DESCRIPTION - ORIENTATION
ORIENTATION: LEFT
ORIENTATION: LEFT

## 2022-05-17 ASSESSMENT — PAIN DESCRIPTION - DESCRIPTORS: DESCRIPTORS: ACHING

## 2022-05-17 ASSESSMENT — PAIN SCALES - GENERAL
PAINLEVEL_OUTOF10: 6
PAINLEVEL_OUTOF10: 3
PAINLEVEL_OUTOF10: 6

## 2022-05-17 ASSESSMENT — PAIN DESCRIPTION - PAIN TYPE: TYPE: ACUTE PAIN

## 2022-05-17 ASSESSMENT — PAIN DESCRIPTION - FREQUENCY: FREQUENCY: CONTINUOUS

## 2022-05-17 ASSESSMENT — PAIN DESCRIPTION - ONSET: ONSET: ON-GOING

## 2022-05-17 ASSESSMENT — PAIN - FUNCTIONAL ASSESSMENT: PAIN_FUNCTIONAL_ASSESSMENT: PREVENTS OR INTERFERES SOME ACTIVE ACTIVITIES AND ADLS

## 2022-05-17 NOTE — PROGRESS NOTES
Hospitalist Progress Note      Name:  Chantell Duarte /Age/Sex: 1950  (67 y.o. female)   MRN & CSN:  [id number] & [id number] Admission Date/Time: 5/15/2022 12:53 AM   Location:  [facility]-A PCP: Alf Herrera Dr Day: 3  Discharge barrier/Reason for continued hospitalization: Discharge pending rehab/SNF placement    Assessment and Plan:   Chantell Duarte is a 67 y.o.  female obesity, complete heart block status post PPM, colon cancer status post colectomy, COPD, right breast cancer status post right radical mastectomy, anxiety and depression, nocturnal hypoxemia on 2L of [place] O2 and hyperlipidemia admitted for right obstructive uropathy and complicated UTI after presenting to ED with abdominal pain, generalized weakness and inability to ambulate for about 5 days following a fall that occurred 1 week prior to admission. Obstructive uropathy: CT abdomen/pelvis with 11 mm mid right ureteral nephrolith  -Evaluated by urology and status post right retrograde pyelogram with stent placement  -Continue Flomax    Sepsis due to Proteus Mirabilis UTI:   -Cefriaxone transitioned to Cefuroxime x 5 more days. Left Calf tenderness:   -Follow-up on Doppler ultrasound of L lower extremity. Generalized abdominal pain: Significantly improved. However with LUQ tenderness on exam  -CT abdomen/pelvis without contrast shows gas within the uterine cavity suggestive of underlying fistula   -Follow-up on CT abdomen/pelvis with and without contrast.      Mechanical fall at home: 2/2 generalized weakness with physical deconditioning  -CT head, CT C/T/L-spine, x-ray of left/right femur, x-ray of left/right knee all nonacute   -PT/OT following. Recommend SNF. Patient agreeable  -Case management assisting with discharge planning. Elevated troponin: Trended down to normal range. 2/2 type II demand ischemia  -EKG with atrial sensed ventricular paced rhythm.     Physical deconditionin/2 acute illness  -PT/OT    Diet ADULT DIET; Regular; Low Fat/Low Chol/High Fiber/2 gm Na   DVT Prophylaxis [x] Lovenox, []  Heparin, [] SCDs, [] Ambulation   GI Prophylaxis [] PPI,  [] H2 Blocker,  [] Carafate,  [x] Diet/Tube Feeds   Code Status Full Code   MDM [] Low, [] Moderate,[]  High       History of Present Illness:     Chief Complaint: Complicated urinary tract infection  Patient with her at bedside this morning and endorses resolution of nausea and vomiting. Tolerating regular diet well. Complains of 8/10 suprapubic abdominal pain but denies dysuria. Endorses intermittent hematuria. Denies shortness of breath or chest pain. Still interested in being discharged to rehab/SNF. Ten point ROS reviewed and negative, unless as noted above    Objective: Intake/Output Summary (Last 24 hours) at 5/17/2022 0840  Last data filed at 5/16/2022 2125  Gross per 24 hour   Intake --   Output 950 ml   Net -950 ml        Vitals:   Vitals:    05/16/22 1539 05/16/22 1740 05/16/22 2109 05/17/22 0244   BP: (!) 148/61  126/61 (!) 135/35   Pulse:  74 71 65   Resp: 16  18 21   Temp: 98.2 °F (36.8 °C)  97.6 °F (36.4 °C) 98.5 °F (36.9 °C)   TempSrc: Oral      SpO2: 95%  95% 90%   Weight:       Height:            Physical Exam:   GEN: Obese female, awake, alert and oriented x3 in no apparent distress. Appears given age. HEENT: Normal mucous membrane which is moist with no ulcers. Head is atraumatic. RESP: Clear lung fields bilaterally. No wheezes, rhonchi or rales  CVS: RRR, systolic murmur but no gallops or rubs  GI/: Abdomen is soft, tender to palpation left upper quadrant area with guarding. No suprapubic tenderness on palpation. Normal bowel sounds. MSK/skin:+ pitting edema in bilateral lower extremities with venous stasis changes. No calf tenderness. 1+ pedal pulses. NEURO: Cranial nerves appear grossly intact, normal speech, no lateralizing weakness.     Medications:   Medications:    cefUROXime  250 mg Oral 2 times per day    ezetimibe  10 mg Oral QAM    ferrous sulfate  325 mg Oral Every Other Day    fluticasone  2 puff Inhalation BID    sertraline  200 mg Oral QAM    gabapentin  100 mg Oral Daily    polyethylene glycol  17 g Oral Daily    lactulose  20 g Oral Daily    methocarbamol  1,000 mg Oral TID    enoxaparin  30 mg SubCUTAneous BID    tamsulosin  0.4 mg Oral Daily    letrozole  2.5 mg Oral Nightly      Infusions:   PRN Meds: oxyCODONE, 5 mg, Q6H PRN  morphine, 2 mg, Q2H PRN   Or  morphine, 4 mg, Q2H PRN  ondansetron, 4 mg, Q8H PRN   Or  ondansetron, 4 mg, Q6H PRN  acetaminophen, 650 mg, Q6H PRN   Or  acetaminophen, 650 mg, Q6H PRN  iopamidol, 50 mL, ONCE PRN          Electronically signed by Monika Daley MD on 5/17/2022 at 8:40 AM

## 2022-05-17 NOTE — PLAN OF CARE
Problem: Discharge Planning  Goal: Discharge to home or other facility with appropriate resources  5/17/2022 0126 by John Bonilla RN  Outcome: Progressing  5/17/2022 0125 by John Bonilla RN  Outcome: Progressing     Problem: Pain  Goal: Verbalizes/displays adequate comfort level or baseline comfort level  5/17/2022 0126 by John Bonilla RN  Outcome: Progressing  5/17/2022 0125 by John Bonilla RN  Outcome: Progressing     Problem: Safety - Adult  Goal: Free from fall injury  5/17/2022 0126 by John Bonilla RN  Outcome: Progressing  5/17/2022 0125 by John Bonilla RN  Outcome: Progressing     Problem: Skin/Tissue Integrity  Goal: Absence of new skin breakdown  Description: 1. Monitor for areas of redness and/or skin breakdown  2. Assess vascular access sites hourly  3. Every 4-6 hours minimum:  Change oxygen saturation probe site  4. Every 4-6 hours:  If on nasal continuous positive airway pressure, respiratory therapy assess nares and determine need for appliance change or resting period.   5/17/2022 0126 by John Bonilla RN  Outcome: Progressing  5/17/2022 0125 by John Bonilla RN  Outcome: Progressing

## 2022-05-17 NOTE — PROGRESS NOTES
Occupational Therapy  . Occupational Therapy Treatment Note  Name: Marissa Caruso MRN: [id number] :   1950   Date:  2022   Admission Date: 5/15/2022 Room:  [facility]A     Discharge Recommendations:  Subacute/Skilled Nursing Facility    Primary Problem:    Restrictions/Precautions:  Restrictions/Precautions  Restrictions/Precautions: Fall Risk,General Precautions  Required Braces or Orthoses?: No  Implants present? : Pacemaker           Communication with other providers:  cotx with PTA Rosanne for assist and safety d/t varying assist level from previous notes. Subjective:  Patient states:  I love sitting in the chair. Pain: none states   (location, type, intensity)    Objective:    Observation:  Patient in high fowlers knitting upon arrival. Very agreeable to therapy and motivated. Objective Measures:  Tele, catheter    Treatment, including education:    ADL activity training was instructed today. Cues were given for safety, sequence, UE/LE placement, visual cues, and balance. Activities performed today included dressing, toileting, hand hygiene, and grooming. LB dressing- DEP for socks. Feeding- SET UP for coffee with small packages. Therapeutic activity training was instructed today. Cues were given for safety, sequence, UE/LE placement, awareness, and balance. Activities performed today included bed mobility training, sup-sit, sit-stand, SPT. High fowlers to EOB- Min x2 With increased time and effort. Scooting hips forward- Mod A With increased time and effort. Sitting balance-good. Stand to 4WW- CGA + Min A for safety. SPT - CGA x2  Sit to recliner- CGA  Scooting hips back in recliner- SBA With increased time and effort. Patient educated on role of OT , benefits of OT and rationale for therapeutic intervention.      All therapeutic intervention performed c emphasis on dynamic balance / standing tolerance to increase strength, endurance and activity tolerance for increased Eros c ADL tasks and func transfers / mobility. Patient left safely in bedside chair at end of session, with call light in reach, alarm on and nursing aware. Gait belt was used for func transfers / mobility. Assessment / Impression:      Patient's tolerance of treatment: good  Adverse Reaction: none  Significant change in status and impact:  none  Barriers to improvement: weakness, body habitus    Plan for Next Session:    Continue with OT POC    Time in:  1409  Time out:  1437  Timed treatment minutes:  28  Total treatment time:  28    Previously filed items:  Social/Functional History  Lives With: Significant other  Bathroom Shower/Tub: Tub/Shower unit  Bathroom Toilet: Standard  Home Equipment: [employer], 4 wheeled  Has the patient had two or more falls in the past year or any fall with injury in the past year?: Yes  Receives Help From: Friend(s)  ADL Assistance: Independent  Toiletin [address]: Independent  Homemaking Responsibilities: Yes  Ambulation Assistance: Independent  Transfer Assistance: Independent  Patient Goals   Patient goals : To return to functional independence  Short Term Goals  Time Frame for Short term goals: Until d/c  Short Term Goal 1: Pt will complete toileting Gavin  Short Term Goal 2: Pt will complete LE ADLs Gavin  Short Term Goal 3: Pt will complete UE ADLs independently  Short Term Goal 4: Pt will perform functional mobility/transfers Gavin  Short Term Goal 5: Pt will perform theract/therex to increase functional activity tolerance.        Electronically signed by:    TONY Guidry COTA/L [id number]    2022, 2:52 PM

## 2022-05-17 NOTE — ANESTHESIA POSTPROCEDURE EVALUATION
Department of Anesthesiology  Postprocedure Note    Patient: Delicia Pardo  MRN: [id number]  YOB: 1950  Date of evaluation: 5/17/2022  Time:  8:54 AM     Procedure Summary     Date: 05/15/22 Room / Location: [address] [place] [address]    Anesthesia Start: 1516 Anesthesia Stop: 7724    Procedure: [address] (Right Ureter) Diagnosis: (ABDOMINAL PAIN)    Surgeons: Rozina Nolen MD Responsible Provider: Crissy Mcnair MD    Anesthesia Type: general ASA Status: 4 - Emergent          Anesthesia Type: No value filed. Florian Phase I: Florian Score: 9    Florian Phase II:      Last vitals: Reviewed and per EMR flowsheets.        Anesthesia Post Evaluation    Patient location during evaluation: PACU  Patient participation: complete - patient participated  Level of consciousness: awake and alert  Pain score: 0  Airway patency: patent  Nausea & Vomiting: no nausea and no vomiting  Complications: no  Cardiovascular status: blood pressure returned to baseline  Respiratory status: acceptable  Hydration status: euvolemic

## 2022-05-17 NOTE — PROGRESS NOTES
Physical Therapy    Physical Therapy Treatment Note  Name: Marissa Caruso MRN: [id number] :   1950   Date:  2022   Admission Date: 5/15/2022 Room:  [address] [place] [address]   Restrictions/Precautions:  Restrictions/Precautions  Restrictions/Precautions: Fall Risk,General Precautions  Required Braces or Orthoses?: No  Implants present? : Pacemaker         Communication with other providers:  Per chart review pt appropriate for tx session. Co-tx with Aaron John due to pt somewhat fluctuating assists levels. Subjective:  Patient states:  Agreeable to tx session;   Pain:   Location, Type, Intensity (0/10 to 10/10):  No C/O    Objective:    Observation:  Pt in bed upon arrival.    Treatment, including education/measures:  Transfers:  Supine to sit :Kal x2 with increased time/effort  Scooting :modA with increased time/effort  Sit to stand :Kal with CGA for safety  Stand to sit :CGA With VC's for eccentric control. Increased time due to pt requiring frequent rest breaks    Safety  Patient left safely in the chair, with call light/phone in reach with alarm applied. Gait belt used for transfers.     Assessment / Impression:    Patient's tolerance of treatment:  good   Adverse Reaction: none  Significant change in status and impact:  none  Barriers to improvement:  Strength, endurance     Plan for Next Session:    Will cont to work towards pt's goals per her tolerance    Time in:  1409  Time out:  1437  Timed treatment minutes:  28  Total treatment time:  28    Previously filed items:  Social/Functional History  Lives With: Significant other  Bathroom Shower/Tub: Tub/Shower unit  Bathroom Toilet: Standard  Home Equipment: Beatrice Waggoner, 4 wheeled  Has the patient had two or more falls in the past year or any fall with injury in the past year?: Yes  Receives Help From: Friend(s)  ADL Assistance: Independent  Toiletin[address] [place] [address] Avenue: Independent  Homemaking Responsibilities: Yes  Ambulation Assistance: Independent  Transfer Assistance: Independent             Electronically signed by:    Asia King PTA  5/17/2022, 3:01 PM

## 2022-05-18 ENCOUNTER — APPOINTMENT (OUTPATIENT)
Dept: ULTRASOUND IMAGING | Age: 72
DRG: 853 | End: 2022-05-18
Payer: MEDICARE

## 2022-05-18 ENCOUNTER — APPOINTMENT (OUTPATIENT)
Dept: CT IMAGING | Age: 72
DRG: 853 | End: 2022-05-18
Payer: MEDICARE

## 2022-05-18 PROCEDURE — 6370000000 HC RX 637 (ALT 250 FOR IP): Performed by: NURSE PRACTITIONER

## 2022-05-18 PROCEDURE — 6370000000 HC RX 637 (ALT 250 FOR IP): Performed by: INTERNAL MEDICINE

## 2022-05-18 PROCEDURE — 1200000000 HC SEMI PRIVATE

## 2022-05-18 PROCEDURE — 6360000002 HC RX W HCPCS: Performed by: UROLOGY

## 2022-05-18 PROCEDURE — 99222 1ST HOSP IP/OBS MODERATE 55: CPT | Performed by: OBSTETRICS & GYNECOLOGY

## 2022-05-18 PROCEDURE — 6360000004 HC RX CONTRAST MEDICATION: Performed by: UROLOGY

## 2022-05-18 PROCEDURE — 76830 TRANSVAGINAL US NON-OB: CPT

## 2022-05-18 PROCEDURE — 94640 AIRWAY INHALATION TREATMENT: CPT

## 2022-05-18 PROCEDURE — 6370000000 HC RX 637 (ALT 250 FOR IP): Performed by: UROLOGY

## 2022-05-18 PROCEDURE — 2700000000 HC OXYGEN THERAPY PER DAY

## 2022-05-18 PROCEDURE — 74177 CT ABD & PELVIS W/CONTRAST: CPT

## 2022-05-18 PROCEDURE — 94761 N-INVAS EAR/PLS OXIMETRY MLT: CPT

## 2022-05-18 PROCEDURE — 76857 US EXAM PELVIC LIMITED: CPT

## 2022-05-18 RX ADMIN — FERROUS SULFATE TAB 325 MG (65 MG ELEMENTAL FE) 325 MG: 325 (65 FE) TAB at 09:11

## 2022-05-18 RX ADMIN — SERTRALINE HYDROCHLORIDE 200 MG: 50 TABLET ORAL at 09:11

## 2022-05-18 RX ADMIN — METHOCARBAMOL 1000 MG: 500 TABLET ORAL at 20:25

## 2022-05-18 RX ADMIN — METHOCARBAMOL 1000 MG: 500 TABLET ORAL at 14:05

## 2022-05-18 RX ADMIN — METHOCARBAMOL 1000 MG: 500 TABLET ORAL at 09:11

## 2022-05-18 RX ADMIN — CEFUROXIME AXETIL 250 MG: 250 TABLET ORAL at 10:33

## 2022-05-18 RX ADMIN — LETROZOLE 2.5 MG: 2.5 TABLET, FILM COATED ORAL at 20:25

## 2022-05-18 RX ADMIN — IOPAMIDOL 50 ML: 755 INJECTION, SOLUTION INTRAVENOUS at 09:47

## 2022-05-18 RX ADMIN — CEFUROXIME AXETIL 250 MG: 250 TABLET ORAL at 20:25

## 2022-05-18 RX ADMIN — ENOXAPARIN SODIUM 30 MG: 100 INJECTION SUBCUTANEOUS at 09:11

## 2022-05-18 RX ADMIN — Medication 2 PUFF: at 22:45

## 2022-05-18 RX ADMIN — Medication 2 PUFF: at 11:45

## 2022-05-18 RX ADMIN — EZETIMIBE 10 MG: 10 TABLET ORAL at 09:11

## 2022-05-18 RX ADMIN — GABAPENTIN 100 MG: 100 CAPSULE ORAL at 09:11

## 2022-05-18 RX ADMIN — ENOXAPARIN SODIUM 30 MG: 100 INJECTION SUBCUTANEOUS at 20:26

## 2022-05-18 RX ADMIN — TAMSULOSIN HYDROCHLORIDE 0.4 MG: 0.4 CAPSULE ORAL at 09:11

## 2022-05-18 ASSESSMENT — PAIN SCALES - GENERAL: PAINLEVEL_OUTOF10: 0

## 2022-05-18 NOTE — PROGRESS NOTES
Hospitalist Progress Note      Name:  Steven Record /Age/Sex: 1950  (67 y.o. female)   MRN & CSN:  [id number] & [id number] Admission Date/Time: 5/15/2022 12:53 AM   [facility]  [facility] PCP: Alf Herrera Dr Day: 4  Discharge barrier/Reason for continued hospitalization: Discharge pending gynecology evaluation and rehab/SNF placement    Assessment and Plan:   Gib Record is a 67 y.o.  female obesity, complete heart block status post PPM, colon cancer status post colectomy, COPD, right breast cancer status post right radical mastectomy, anxiety and depression, nocturnal hypoxemia on 2L of NC O2 and hyperlipidemia admitted for right obstructive uropathy and complicated UTI after presenting to ED with abdominal pain, generalized weakness and inability to ambulate for about 5 days following a fall that occurred 1 week prior to admission. Obstructive uropathy: CT abdomen/pelvis with 11 mm mid right ureteral nephrolith  -Evaluated by urology and status post right retrograde pyelogram with stent placement  -Continue Flomax.    -Farooq catheter in place and noted to have mildly tinged urine in Farooq bag  -Nursing staff reached out to urology who is okay with taking out Farooq catheter.  -Farooq to be discontinued if urine clears up, with plan for bladder scan 4 to 6hrs after    Sepsis due to Proteus Mirabilis UTI: Sepsis resolved. -Cefriaxone transitioned to Cefuroxime x 5 more days total.     Left Calf tenderness:   -DVT ruled out in left lower extremity with negative Doppler ultrasound. Generalized abdominal pain:   Significantly improved. However with LUQ tenderness on exam  -CT abdomen/pelvis without contrast showed gas within the uterine cavity suggestive of underlying fistula.   Repeat study of CT abdomen/pelvis with and without contrast was completed today  and still showing gas in the uterus suggestive of utero enteric fistula or infection with gas-forming organisms.  -Will place consult to gynecology. Mechanical fall at home: 2/2 generalized weakness with physical deconditioning  -CT head, CT C/T/L-spine, x-ray of left/right femur, x-ray of left/right knee all nonacute   -PT/OT following. Recommend SNF. Patient agreeable  -Case management assisting with discharge planning. Elevated troponin: Trended down to normal range. 2/2 type II demand ischemia  -EKG with atrial sensed ventricular paced rhythm. Physical deconditionin/2 acute illness  -PT/OT        Diet ADULT DIET; Regular; Low Fat/Low Chol/High Fiber/2 gm Na   DVT Prophylaxis [x] Lovenox, []  Heparin, [] SCDs, [] Ambulation   GI Prophylaxis [] PPI,  [] H2 Blocker,  [] Carafate,  [x] Diet/Tube Feeds   Code Status Full Code   MDM [] Low, [] Moderate,[]  High       History of Present Illness:     Chief Complaint: Complicated urinary tract infection    Patient denies nausea or vomiting. Endorsing mild dysuria. Farooq catheter in place and urine in Farooq bag is mildly tinged with blood but patient putting out clear urine this morning. Denies constipation bowel movement yesterday. Endorsing mild abdominal pain. Ten point ROS reviewed and negative, unless as noted above    Objective: Intake/Output Summary (Last 24 hours) at 2022 0746  Last data filed at 2022 0259  Gross per 24 hour   Intake --   Output 1950 ml   Net -1950 ml        Vitals:   Vitals:    22 0930 22 1407 22 2053 22 0259   BP: 128/77 112/84 100/68 127/69   Pulse: 63 69 72 64   Resp:    Temp: 98.1 °F (36.7 °C) 98.1 °F (36.7 °C) 98.2 °F (36.8 °C) 98 °F (36.7 °C)   TempSrc:  Oral     SpO2: 92% 94% 94% 96%   Weight:       Height:            Physical Exam:   GEN: Obese female, awake, alert and oriented x3 in no apparent distress. Appears given age. HEENT: Normal mucous membrane which is moist with no ulcers. Head is atraumatic. RESP: Clear lung fields bilaterally.   No wheezes, rhonchi or

## 2022-05-18 NOTE — CARE COORDINATION
Chart reviewed. JACK called and spoke with Jayla and precert is pending. She will call if approved. Jack is following.

## 2022-05-18 NOTE — CARE COORDINATION
Pt has been approved for [facility]. Pt can go when medically stable to go. Pt will need rapid COVID, completed KIEL, and HENS. PS sent to provider.

## 2022-05-19 VITALS
RESPIRATION RATE: 17 BRPM | DIASTOLIC BLOOD PRESSURE: 59 MMHG | BODY MASS INDEX: 51.81 KG/M2 | TEMPERATURE: 98.2 F | HEIGHT: 62 IN | SYSTOLIC BLOOD PRESSURE: 127 MMHG | HEART RATE: 68 BPM | OXYGEN SATURATION: 94 % | WEIGHT: 281.53 LBS

## 2022-05-19 LAB
SARS-COV-2, NAAT: NOT DETECTED
SOURCE: NORMAL

## 2022-05-19 PROCEDURE — 94761 N-INVAS EAR/PLS OXIMETRY MLT: CPT

## 2022-05-19 PROCEDURE — 87635 SARS-COV-2 COVID-19 AMP PRB: CPT

## 2022-05-19 PROCEDURE — 6370000000 HC RX 637 (ALT 250 FOR IP): Performed by: INTERNAL MEDICINE

## 2022-05-19 PROCEDURE — 6370000000 HC RX 637 (ALT 250 FOR IP): Performed by: NURSE PRACTITIONER

## 2022-05-19 PROCEDURE — 6370000000 HC RX 637 (ALT 250 FOR IP): Performed by: UROLOGY

## 2022-05-19 PROCEDURE — 94640 AIRWAY INHALATION TREATMENT: CPT

## 2022-05-19 PROCEDURE — 6360000002 HC RX W HCPCS: Performed by: UROLOGY

## 2022-05-19 RX ORDER — POLYETHYLENE GLYCOL 3350 17 G/17G
17 POWDER, FOR SOLUTION ORAL DAILY
Qty: 527 G | Refills: 1
Start: 2022-05-20 | End: 2022-06-19

## 2022-05-19 RX ORDER — CEFUROXIME AXETIL 250 MG/1
250 TABLET ORAL EVERY 12 HOURS SCHEDULED
Qty: 10 TABLET | Refills: 0
Start: 2022-05-19 | End: 2022-05-24

## 2022-05-19 RX ORDER — METRONIDAZOLE 500 MG/1
500 TABLET ORAL 3 TIMES DAILY
Qty: 21 TABLET | Refills: 0
Start: 2022-05-19 | End: 2022-05-26

## 2022-05-19 RX ORDER — METHOCARBAMOL 500 MG/1
500 TABLET, FILM COATED ORAL 3 TIMES DAILY PRN
Qty: 30 TABLET | Refills: 0
Start: 2022-05-19 | End: 2022-05-26

## 2022-05-19 RX ORDER — TAMSULOSIN HYDROCHLORIDE 0.4 MG/1
0.4 CAPSULE ORAL DAILY
Qty: 30 CAPSULE | Refills: 0
Start: 2022-05-19 | End: 2022-08-23

## 2022-05-19 RX ADMIN — TAMSULOSIN HYDROCHLORIDE 0.4 MG: 0.4 CAPSULE ORAL at 10:11

## 2022-05-19 RX ADMIN — LACTULOSE 20 G: 20 SOLUTION ORAL at 09:51

## 2022-05-19 RX ADMIN — POLYETHYLENE GLYCOL (3350) 17 G: 17 POWDER, FOR SOLUTION ORAL at 09:52

## 2022-05-19 RX ADMIN — ENOXAPARIN SODIUM 30 MG: 100 INJECTION SUBCUTANEOUS at 09:52

## 2022-05-19 RX ADMIN — CEFUROXIME AXETIL 250 MG: 250 TABLET ORAL at 10:11

## 2022-05-19 RX ADMIN — METHOCARBAMOL 1000 MG: 500 TABLET ORAL at 10:10

## 2022-05-19 RX ADMIN — SERTRALINE HYDROCHLORIDE 200 MG: 50 TABLET ORAL at 09:53

## 2022-05-19 RX ADMIN — EZETIMIBE 10 MG: 10 TABLET ORAL at 09:53

## 2022-05-19 RX ADMIN — GABAPENTIN 100 MG: 100 CAPSULE ORAL at 09:53

## 2022-05-19 RX ADMIN — Medication 2 PUFF: at 08:24

## 2022-05-19 NOTE — CARE COORDINATION
Transport scheduled for 2pm with [facility]. Pt and Jayla Orellana with [facility] advised. Shanika RN, CN advised. Attempted to call pt's nurse, no answer. HENS completed. Packet placed with soft chart.

## 2022-05-19 NOTE — CONSULTS
Department of Gynecology  Consult Note      Reason for Consult:  CT findings  Requesting Physician:  Dr. Marion Farrar:   Admitted with urosepsis    History obtained from patient, electronic medical record    IMPRESSION/RECOMMENDATIONS:    CT scan showing gas in the endometrial cavity which is concerning for an infectious pyometra. Differential diagnosis includes a utero enteric fistula, infectious process, endometrial carcinoma.  -Obtain pelvic ultrasound of her uterus and ovaries  -Based on the findings on her ultrasound, I think that her decision can be made whether patient needs and exam under anesthesia, culture of the endometrial cavity, hysteroscopy with dilation and curettage versus a hysterosalpingogram type of fistulogram.          HISTORY OF PRESENT ILLNESS:     The patient is a 67 y.o. female with significant past medical history of morbid obesity (BMI 51), complete heart block status post PPM, colon cancer status post colectomy, COPD, right breast cancer status post right radical mastectomy, anxiety and depression, nocturnal hypoxemia on 2L of NC O2 and hyperlipidemia admitted for right obstructive uropathy and complicated UTI after presenting to ED with abdominal pain, generalized weakness and inability to ambulate for about 5 days following a fall that occurred 1 week prior to admission. Gyn is consulted due to CT findings of gas within the endometrial cavity and concern for \"utero enteric fistula or infection with a gas forming organism\". She denies vaginal bleeding. She denies vaginal discharge. She denies passage of stool like material from the vagina. At first she denies passage of flatus from the vagina, then she thinks and says that maybe she does pass flatus from the vagina. She is uncertain of her last Pap smear (she is over 65 and pap smears are no longer recommended).         Past Medical History:        Diagnosis Date    Anemia     Anxiety 1978    Arthritis generalized    Asthma 2006    AV block     2nd degree per hospital in june2013    Blood poisoning 1994    Blood transfusion     12/2003    CAD (coronary artery disease)     Cancer ([facility] 2007    skin (face) & colon(2003)/ 2/2012 dx of right breast ca(pc)    Carcinoma of breast ([facility] 02/29/2012    right arm no b/p or sticks    Cardiac pacemaker 03/10/2014    Medtronic dual lead    Chronic cystitis     Chronic respiratory failure (HCC)     2 L/min oxygen at night-time    Colon cancer ([facility] 2013    COPD (chronic obstructive pulmonary disease) (HCC)     CTS (carpal tunnel syndrome)     Depression     Diverticulitis     H/O 24 hour EKG monitoring 2/28/2014 7/24/13 2/14 complete heart block 7/13No clinically significant arrthymia noted.  H/O cardiac catheterization 10/31/2011, 7/11/2007    10/31/2011-No significant CAD. Cardiolite stress test was false positive-Dr Mary Samayoa; 7/11/2007-No CAD, global function intact;    H/O cardiovascular stress test 10/20/2011, 3/23/2010    10/20/2011-Lexiscan- Evid of mild ischemia in Left CX region. EF 70%. Global LVSF normal;    H/O cardiovascular stress test 01/07/2015    EF 77%. Normal Stress Test.    H/O chest pain 04/2005    sees Dr Jaida Schumacher H/O Doppler ultrasound 02/20/2008 2/20/2008-CAROTID DOPPLER- Normal carotids bilaterally;    H/O Doppler ultrasound 06/06/2013    CAROTID DOPPLER- there is heterogeneous, irregular atherosclerotic plaque noted in the right internal carotid artery,  doppler flow velocities within the right internal carotid artery are elevated, consistent with a mild, less than 50%stenosis, there is intimal thickening but no significant atherosclerotic plaque noted in the left internal carotid artery, the left carotid are within normal limits    H/O echocardiogram 4/9/2012, 10/20/2011    4/9/2012-LVSF normal EF=>55%. impaired LV relaxation;    H/O echocardiogram 12/31/2014    EF 50-55%. LV shows mild concentric hypertrophy.   Mildly dilated left atrium. Mildly dilated right ventricle. Sclerotic but not stenotic aortic valve. Mitral annular calcification. Mild MR, Mild TR, Mild pulm HTN.    H/O urinary incontinence     History of blood transfusion     Hx antineoplastic chemo     Hx of Arterial Doppler ultrasound 07/01/2019    No hemodynamically significant or focal stenosis visualized bilaterally, bilateral lower extremity arteries exhibit diffuse plaque and multiphasic waveforms, unable to obtain bilateral ABIs due to elevated leg pressures >250 mmHg, possibly due to atherosclerosis    Hx of cardiovascular stress test 06/2013    EF 70% abn suggestive of anterolateral ischemia, possible RCA ischmeia, post left ventricular dilation suggestive multivessel CAD.  Hx of echocardiogram 06/2013    EF50%, mild MR and TR, mild pulmonary HTN, mild AS    Hx of echocardiogram 11/01/2021    Left ventricular systolic function is normal, Mild concentric left ventricular hypertrophy Mildly dilated left atrium. Mitral annular calcification is present. Mild tricuspid regurgitation No evidence of any pericardial effusion    Hx of fall     \"last time 2018- due to neuropathy, have to use cane\"    HX OTHER MEDICAL 7/24/13, 06/2013 7/13-No clinacally significant arrhythmia. 6/13-multiple cycles of wenckebach episodes in addtion to some sinus tach    Hyperlipidemia     Hypertension     Hypothermia 2008    Malignant neoplasm of breast (female) ([facility] 2012    Neuropathy     \"have neuropathy of my legs\"    Normocytic normochromic anemia     Obstructive sleep apnea 2008 01- Has CPAP machine but has not used in over a year - states she has not had problems since her pacemaker was placed.     Old MI (myocardial infarction)     per pt on 1/15/2019\"had heart attack about a year ago\"    Osteoarthritis     Osteopenia     Primary malignant neoplasm of colon ([facility]     S/P cardiac cath 06/2013    no significant CAD false postive stress test  Skin cancer     Super obesity     Umbilical hernia      Past Surgical History:        Procedure Laterality Date    A-V CARDIAC PACEMAKER INSERTION  3/10/14     Medtronic. Model:  [id number] Medtronic  Serial:  [id number] dual chamber pacemaker    APPENDECTOMY  2004    BREAST BIOPSY  2/13/12    BREAST LUMPECTOMY  2007    right     BREAST SURGERY  02/13/2012    rt lesion biopsy     CARDIAC CATHETERIZATION  2007 & 2011    CAROTID ENDARTERECTOMY Right 12/3/2021    RIGHT CAROTID ENDARTERECTOMY WITH PATCH performed by Esau De Anda MD at [facility]  2004    Colon cancer    COLONOSCOPY  10-18-13    polyp    COLONOSCOPY  1/19/2016    internal hemorrhoids, diverticulosis, 1.5 cm sessile polyp, 8 mm polyp found in rectum.  COLONOSCOPY  12/14/2017    1.5cm residual polyp, 5mm polyps in blind sigmoid loop x3, Sigmoid divertics, Internal grade 1 hemorrhoids    COLONOSCOPY N/A 1/16/2019    COLONOSCOPY W/ ENDOSCOPIC MUCOSAL RESECTION WITH ELEVIEW 5ML INJECTION AND POLYPECTOMY OF TIP OF BLIND RECTOSIGMOID STUMP AND CAUTERIZATION WITH ERBE PROBE, CLIPPING X2 performed by Monda Koyanagi, MD at [place] [address]  01/21/2020    pan divertics/ 4 polyps/ sm int hem, S/P partial sigmoid resection w/ end to side anastamosis, repeat in 3 years    COLONOSCOPY N/A 1/21/2020    COLONOSCOPY POLYPECTOMY SNARE/COLD BIOPSY performed by Monda Koyanagi, MD at [facility]  2003    back   [address] Bilateral 12/15/14    CYSTOSCOPY Right 5/15/2022    CYSTOSCOPY URETERAL STENT INSERTION performed by Swetha Jones MD at [facility]      front right tooth and root canal w/ brass bolt    DILATION AND CURETTAGE OF UTERUS  2006    Needed a camera to find my uterus.     ENDOSCOPY, COLON, DIAGNOSTIC  12/14/2017    Small hiatal hernia    HERNIA REPAIR  2004    Umb hernia    HERNIA REPAIR  2008    Inc hernia    LYMPH NODE DISSECTION  2/29/2012    Right axillary-3 sentinel nodes were positive out of 9.    MASTECTOMY, RADICAL Right 02/29/2012    w/ sentinal node disection-Dr West    PACEMAKER PLACEMENT      PRE-MALIGNANT / BENIGN SKIN LESION EXCISION  2/8/2013    right leg X2-Dr West    TONSILLECTOMY  1957    TUNNELED VENOUS PORT PLACEMENT  2004    TUNNELED VENOUS PORT PLACEMENT  02/13/2012    removal of mediport       meds:  Current Facility-Administered Medications:     cefUROXime (CEFTIN) tablet 250 mg, 250 mg, Oral, 2 times per day, Jeet Sawyer MD, 250 mg at 05/18/22 2025    ezetimibe (ZETIA) tablet 10 mg, 10 mg, Oral, QAM, Dona Quevedo APRN - CNP, 10 mg at 05/18/22 0911    ferrous sulfate (IRON 325) tablet 325 mg, 325 mg, Oral, Every Other Day, AUSTIN Elliott - CNP, 325 mg at 05/18/22 0911    fluticasone (FLOVENT HFA) 110 MCG/ACT inhaler 2 puff, 2 puff, Inhalation, BID, AUSTIN Elliott - CNP, 2 puff at 05/18/22 1145    sertraline (ZOLOFT) tablet 200 mg, 200 mg, Oral, QAM, Dona Quevedo APRN - CNP, 200 mg at 05/18/22 0911    gabapentin (NEURONTIN) capsule 100 mg, 100 mg, Oral, Daily, AUSTIN Elliott - CNP, 100 mg at 05/18/22 0911    polyethylene glycol (GLYCOLAX) packet 17 g, 17 g, Oral, Daily, Maria Luisa Kruger MD    lactulose (CHRONULAC) 10 GM/15ML solution 20 g, 20 g, Oral, Daily, Maria Luisa Kruger MD    methocarbamol (ROBAXIN) tablet 1,000 mg, 1,000 mg, Oral, TID, Maria Luisa Kruger MD, 1,000 mg at 05/18/22 2025    oxyCODONE (ROXICODONE) immediate release tablet 5 mg, 5 mg, Oral, Q6H PRN, Maria Luisa Kruger MD    enoxaparin Sodium (LOVENOX) injection 30 mg, 30 mg, SubCUTAneous, BID, Sandi Leung MD, 30 mg at 05/18/22 2026    tamsulosin (FLOMAX) capsule 0.4 mg, 0.4 mg, Oral, Daily, Sandi Leung MD, 0.4 mg at 05/18/22 0911    morphine (PF) injection 2 mg, 2 mg, IntraVENous, Q2H PRN **OR** morphine sulfate (PF) injection 4 mg, 4 mg, IntraVENous, Q2H PRN, Sandi Leung MD    ondansetron (ZOFRAN-ODT) disintegrating tablet 4 mg, 4 mg, Oral, Q8H PRN **OR** ondansetron (ZOFRAN) injection 4 mg, 4 mg, IntraVENous, Q6H PRN, Erick Woodward MD    acetaminophen (TYLENOL) tablet 650 mg, 650 mg, Oral, Q6H PRN **OR** acetaminophen (TYLENOL) suppository 650 mg, 650 mg, Rectal, Q6H PRN, Erick Woodward MD    letrozole [facility]) tablet 2.5 mg, 2.5 mg, Oral, Nightly, Lucina Hansen MD, 2.5 mg at 05/18/22 2025       Allergies:  Bactrim [sulfamethoxazole-trimethoprim], Lipitor [atorvastatin], Taxol [paclitaxel], Aminoglycosides, Demerol, Neosporin [bacitracin-neomycin-polymyxin], Other, and Talc     Social History:  TOBACCO:   reports that she has never smoked. She has never used smokeless tobacco.  ETOH:   reports no history of alcohol use. DRUGS:   reports no history of drug use.     Family History:       Problem Relation Age of Onset    Arthritis Mother         OA, RA    High Cholesterol Mother     High Blood Pressure Mother     Cancer Father         skin and leukemia    High Blood Pressure Father     High Cholesterol Father     Diabetes Father     Heart Disease Father     Heart Disease Brother     High Cholesterol Brother     High Blood Pressure Brother     Heart Disease Brother     No Known Problems Sister     Seizures Son     Cancer Brother         skin cancer    Breast Cancer Neg Hx     Ovarian Cancer Neg Hx         PHYSICAL EXAM:    Vitals:  /64   Pulse 67   Temp 97.9 °F (36.6 °C) (Oral)   Resp 20   Ht 5' 2\" (1.575 m)   Wt 281 lb 8.4 oz (127.7 kg)   LMP 01/15/1999   SpO2 94%   BMI 51.49 kg/m²     CONSTITUTIONAL:  awake, alert, cooperative, no apparent distress, and appears stated age  LUNGS:  no increased work of breathing  ABDOMEN: Obese, nondistended, very minimal diffuse tenderness with deep palpation      DATA:  See CT scan from today and 5/15/22  Reviewed urology operation report  From 5/15/22    Component 5/15/22 0991 Resulting Agency   Specimen URINE CLEAN CATCH  [facility]   Special Requests NONE  [address] [facility]   Culture Final Report  [facility]   Culture PROTEUS MIRABILIS >100,000 CFU/ml Abnormal          05/16/2022 0549 05/16/2022 0627 CBC auto differential [[id number]]   (Abnormal)   Blood    Component Value Units   WBC 9.0 K/CU MM   RBC 3.64 Low  M/CU MM   Hemoglobin 10.1 Low  GM/DL   Hematocrit 32.5 Low  %   MCV 89.3 FL   MCH 27.7 PG   MCHC 31.1 Low  %   RDW 14.6 %   Platelets 253 K/CU MM   MPV 9.7 FL   Differential Type AUTOMATED DIFFERENTIAL    Segs Relative 64.5 %   Lymphocytes % 24.1 %   Monocytes % 7.2 High  %   Eosinophils % 2.8 %   Basophils % 0.6 %   Segs Absolute 5.8 K/CU MM   Lymphocytes Absolute 2.2 K/CU MM   Monocytes Absolute 0.7 K/CU MM   Eosinophils Absolute 0.3 K/CU MM   Basophils Absolute 0.1 K/CU MM   Nucleated RBC % 0.0 %   Total Nucleated RBC 0.0 K/CU MM   Total Immature Neutrophil 0.07 K/CU MM   Immature Neutrophil % 0.8 High  %           05/15/2022 0347 05/15/2022 0405 CBC with Auto Differential [[id number]]   (Abnormal)   Blood    Component Value Units   WBC 12.3 High  K/CU MM   RBC 3.90 Low  M/CU MM   Hemoglobin 10.9 Low  GM/DL   Hematocrit 34.3 Low  %   MCV 87.9 FL   MCH 27.9 PG   MCHC 31.8 Low  %   RDW 14.4 %   Platelets 571 K/CU MM   MPV 9.6 FL   Differential Type AUTOMATED DIFFERENTIAL    Segs Relative 72.6 High  %   Lymphocytes % 18.1 Low  %   Monocytes % 6.2 High  %   Eosinophils % 2.1 %   Basophils % 0.3 %   Segs Absolute 8.9 K/CU MM   Lymphocytes Absolute 2.2 K/CU MM   Monocytes Absolute 0.8 K/CU MM   Eosinophils Absolute 0.3 K/CU MM   Basophils Absolute 0.0 K/CU MM   Nucleated RBC % 0.0 %   Total Nucleated RBC 0.0 K/CU MM   Total Immature Neutrophil 0.09 K/CU MM   Immature Neutrophil % 0.7 High  %

## 2022-05-20 ENCOUNTER — HOSPITAL ENCOUNTER (OUTPATIENT)
Age: 72
Setting detail: SPECIMEN
Discharge: HOME | End: 2022-05-20

## 2022-05-20 LAB
ALBUMIN SERPL-MCNC: 3.5 GM/DL (ref 3.4–5)
ALP BLD-CCNC: 100 IU/L (ref 40–128)
ALT SERPL-CCNC: 13 U/L (ref 10–40)
ANION GAP SERPL CALCULATED.3IONS-SCNC: 12 MMOL/L (ref 4–16)
AST SERPL-CCNC: 17 IU/L (ref 15–37)
BASOPHILS ABSOLUTE: 0.1 K/CU MM
BASOPHILS RELATIVE PERCENT: 0.6 % (ref 0–1)
BILIRUB SERPL-MCNC: 0.2 MG/DL (ref 0–1)
BUN BLDV-MCNC: 9 MG/DL (ref 6–23)
CALCIUM SERPL-MCNC: 9.1 MG/DL (ref 8.3–10.6)
CHLORIDE BLD-SCNC: 100 MMOL/L (ref 99–110)
CO2: 27 MMOL/L (ref 21–32)
CREAT SERPL-MCNC: 0.6 MG/DL (ref 0.6–1.1)
DIFFERENTIAL TYPE: ABNORMAL
EOSINOPHILS ABSOLUTE: 0.4 K/CU MM
EOSINOPHILS RELATIVE PERCENT: 4.3 % (ref 0–3)
GFR AFRICAN AMERICAN: >60 ML/MIN/1.73M2
GFR NON-AFRICAN AMERICAN: >60 ML/MIN/1.73M2
GLUCOSE BLD-MCNC: 94 MG/DL (ref 70–99)
HCT VFR BLD CALC: 32.8 % (ref 37–47)
HEMOGLOBIN: 10.2 GM/DL (ref 12.5–16)
IMMATURE NEUTROPHIL %: 1.3 % (ref 0–0.43)
LYMPHOCYTES ABSOLUTE: 2 K/CU MM
LYMPHOCYTES RELATIVE PERCENT: 23.3 % (ref 24–44)
MCH RBC QN AUTO: 28.3 PG (ref 27–31)
MCHC RBC AUTO-ENTMCNC: 31.1 % (ref 32–36)
MCV RBC AUTO: 91.1 FL (ref 78–100)
MONOCYTES ABSOLUTE: 0.6 K/CU MM
MONOCYTES RELATIVE PERCENT: 7.1 % (ref 0–4)
NUCLEATED RBC %: 0 %
PDW BLD-RTO: 14.7 % (ref 11.7–14.9)
PLATELET # BLD: 206 K/CU MM (ref 140–440)
PMV BLD AUTO: 10.1 FL (ref 7.5–11.1)
POTASSIUM SERPL-SCNC: 4.2 MMOL/L (ref 3.5–5.1)
RBC # BLD: 3.6 M/CU MM (ref 4.2–5.4)
SEGMENTED NEUTROPHILS ABSOLUTE COUNT: 5.4 K/CU MM
SEGMENTED NEUTROPHILS RELATIVE PERCENT: 63.4 % (ref 36–66)
SODIUM BLD-SCNC: 139 MMOL/L (ref 135–145)
TOTAL IMMATURE NEUTOROPHIL: 0.11 K/CU MM
TOTAL NUCLEATED RBC: 0 K/CU MM
TOTAL PROTEIN: 6.2 GM/DL (ref 6.4–8.2)
WBC # BLD: 8.5 K/CU MM (ref 4–10.5)

## 2022-05-20 PROCEDURE — 36415 COLL VENOUS BLD VENIPUNCTURE: CPT

## 2022-05-20 PROCEDURE — 80053 COMPREHEN METABOLIC PANEL: CPT

## 2022-05-20 PROCEDURE — 85025 COMPLETE CBC W/AUTO DIFF WBC: CPT

## 2022-05-20 NOTE — PROGRESS NOTES
5/20/22  4560  I left a message with my call back number and a request to return my call to complete the PAT assessment. I did leave pre-op instructions. SEE OTHER PROGRESS NOTE- unable to reach patient. Covid screen done -     Surgery is at [address] [place] [address] on 5/24/22 at 0700 with an arrival time of 0545.      1. Do not eat or drink anything after midnight - unless instructed by your doctor prior to surgery. This includes no water, chewing gum or mints. 2. Follow your directions as prescribed by the doctor for your procedure and medications. 3. Check with your Doctor regarding stopping vitamins, supplements, blood thinners (Plavix, Coumadin, Lovenox, Effient, Pradaxa, Xarelto, Fragmin or other blood thinners) and follow their instructions. Stop all supplements and herbals until after surgery. Hold aspirin until after surgery. Morning of surgery use your inhalers, but don't take any medications. 4. Do not smoke, and do not drink any alcoholic beverages 24 hours prior to surgery. This includes NA Beer. 5. You may brush your teeth and gargle the morning of surgery. DO NOT SWALLOW WATER    6. You MUST make arrangements for a responsible adult to take you home after your surgery and be able to check on you every couple hours for the day. You will not be allowed to leave alone or drive yourself home. It is strongly suggested someone stay with you the first 24  hrs. Your surgery will be cancelled if you do not have a ride home. 7. Please wear simple, loose fitting clothing to the hospital.  Marek Quintanilla not bring valuables (money, credit cards, checkbooks, etc.) Do not wear any makeup (including no eye makeup) or nail polish on your fingers or toes. 8. DO NOT wear any jewelry or piercings on day of surgery. All body piercing jewelry must be removed. 9. If you have dentures, they will be removed before going to the OR; we will provide you a container.   If you wear contact lenses or glasses,  they will be removed; please bring a case for them. 10. If you  have a Living Will and Durable Power of  for Healthcare, please bring in a copy. 11. Please bring picture ID,  insurance card, paperwork from the doctors office    (H & P, Consent, & card for implantable devices). 12. Take a shower the night before or morning of your procedure, do not apply any lotion, oil or powder. It is recommended to use Hibiclens or CHG wash during pre-op shower/bath.              13. Wear a mask covering your nose & mouth when entering the hospital.

## 2022-05-20 NOTE — PROGRESS NOTES
5/20/22 1230 I called Dr. Sharma Cancer office and it is closed so I chose the option to leave a voicemail stating that patient has an allergy to sulfa (anaphylaxis) and when I tried to order Celebrex a allergy alert popped up. I can not place the order for Celebrex, please let Dr. Jason Vo know this.

## 2022-05-20 NOTE — PROGRESS NOTES
5/20/22  1138  I left a message with my call back number and a request to return my call to complete the PAT assessment. I did leave pre-op instructions. Covid screen done -     Surgery is at [facility] on 5/24/22 . You will be called on  5/23/22 with times. 1. Do not eat or drink anything after midnight - unless instructed by your doctor prior to surgery. This includes no water, chewing gum or mints. 2. Follow your directions as prescribed by the doctor for your procedure and medications. 3. Check with your Doctor regarding stopping vitamins, supplements, blood thinners (Plavix, Coumadin, Lovenox, Effient, Pradaxa, Xarelto, Fragmin or other blood thinners) and follow their instructions. Stop all supplements and herbals until after surgery. Hold aspirin until after surgery. Morning of surgery use your inhalers, but don't take any medications. 4. Do not smoke, and do not drink any alcoholic beverages 24 hours prior to surgery. This includes NA Beer. 5. You may brush your teeth and gargle the morning of surgery. DO NOT SWALLOW WATER    6. You MUST make arrangements for a responsible adult to take you home after your surgery and be able to check on you every couple hours for the day. You will not be allowed to leave alone or drive yourself home. It is strongly suggested someone stay with you the first 24  hrs. Your surgery will be cancelled if you do not have a ride home. 7. Please wear simple, loose fitting clothing to the hospital.  [place] not bring valuables (money, credit cards, checkbooks, etc.) Do not wear any makeup (including no eye makeup) or nail polish on your fingers or toes. 8. DO NOT wear any jewelry or piercings on day of surgery. All body piercing jewelry must be removed. 9. If you have dentures, they will be removed before going to the OR; we will provide you a container.   If you wear contact lenses or glasses,  they will be removed; please bring a case for them. 10. If you  have a Living Will and Durable Power of  for Healthcare, please bring in a copy. 11. Please bring picture ID,  insurance card, paperwork from the doctors office    (H & P, Consent, & card for implantable devices). 12. Take a shower the night before or morning of your procedure, do not apply any lotion, oil or powder. It is recommended to use Hibiclens or CHG wash during pre-op shower/bath.              13. Wear a mask covering your nose & mouth when entering the hospital.

## 2022-05-23 ENCOUNTER — ANESTHESIA EVENT (OUTPATIENT)
Dept: OPERATING ROOM | Age: 72
End: 2022-05-23
Payer: MEDICARE

## 2022-05-23 NOTE — PROGRESS NOTES
Nusrat called me and wanted information about her procedure tomorrow. I let her know that I had spoke and faxed all the pre-op instructions to Layla Andujar, [address] [facility] at [facility]. I also went ahead and did the PAT assessment with the patient and gave her the pre-op instructions.

## 2022-05-23 NOTE — PROGRESS NOTES
5/23/22 5665 I spoke to Anastasiia Hurley in procedure scheduling to inform her that patient is now at [facility] and if at all possible please don't change surgery time, since the facility has to arrange transportation.

## 2022-05-23 NOTE — PROGRESS NOTES
5/20/22 1131 I left a message for patient to call me to complete the PAT assessment. 5/23/22 0909 I called patients listed number again and Link Simpson answered and said patient went from hospital to [facility]. 5/23/22 6081 I called [facility] and Denys the  answered. She confirmed patient is at [facility]. She  gave me the fax number and transferred me to the nurses phone, but no answer and unable to leave a message. 5/23/22 0913 I called [facility] back and ask Darryl to lease transfer me to the DON. Darryl said the DON is not in today , but she il transfere me to the Lynette Wang RN. I left Grace a voicemail stating patient is scheduled for surgery at [facility] on 5/24/22 at 0700 and needs to arrive at 0545. I also left her the pre-op instructions Based on the information I have available. I explained I have not been able to reach the patients nurse and that is why I am calling her. I also faxed the pr-op instructions to [facility] ([phone])    5/23/22 MICHELLE Maldonado at [facility] left me a message stating she did get my message and pre-op instructions for Giovanni Ivey and she has transportation arranged to arrive at [facility] at [phone].

## 2022-05-23 NOTE — PROGRESS NOTES
[facility] on 5/24/00 at 0700 with a 0545 arrival time. Grace called and left me voicemail confirming message received.

## 2022-05-23 NOTE — ANESTHESIA PRE PROCEDURE
Department of Anesthesiology  Preprocedure Note       Name:  Simon Martinez   Age:  67 y.o.  :  1950                                          MRN:  [id number]         Date:  2022      Surgeon: Sandra Martínez):  Nic Lopez MD    Procedure: Procedure(s):  EXAMINATION UNDER ANESTHESIA  DILATATION AND CURETTAGE HYSTEROSCOPY, CULTURES OF UTEREUS    Medications prior to admission:   Prior to Admission medications    Medication Sig Start Date End Date Taking? Authorizing Provider   cefUROXime (CEFTIN) 250 MG tablet Take 1 tablet by mouth every 12 hours for 10 doses 22  Mathew Durant PA-C   metroNIDAZOLE (FLAGYL) 500 MG tablet Take 1 tablet by mouth 3 times daily for 7 days 22  Mathew Durant PA-C   polyethylene glycol (GLYCOLAX) 17 g packet Take 17 g by mouth daily 22  Mathew Durant PA-C   tamsulosin [facility]) 0.4 MG capsule Take 1 capsule by mouth daily for 14 days 22  Mathew Durant PA-C   methocarbamol (ROBAXIN) 500 MG tablet Take 1 tablet by mouth 3 times daily as needed (muscle spasms, pain) 22  Mathew Durant PA-C   gabapentin (NEURONTIN) 100 MG capsule Take 100 mg by mouth daily.     Historical Provider, MD   sertraline (ZOLOFT) 100 MG tablet TAKE 2 TABLETS BY MOUTH EVERY MORNING 3/28/22   Rodriguez Torres DO   ezetimibe (ZETIA) 10 MG tablet TAKE 1 TABLET BY MOUTH EVERY MORNING 3/28/22   Rodriguez Torres DO   simvastatin (ZOCOR) 20 MG tablet TAKE ONE (1) TABLET BY MOUTH NIGHTLY 3/28/22   Rodriguez Torres DO   letrozole [facility]) 2.5 MG tablet Take 1 tablet by mouth nightly 21   Anasatsiia Jo MD   Ferrous Sulfate (IRON) 325 (65 Fe) MG TABS Take 1 tablet by mouth every other day 3/27/21   Stuart Montelongo MD   albuterol-ipratropium (COMBIVENT RESPIMAT)  MCG/ACT AERS inhaler Inhale 1 puff into the lungs 4 times daily 3/27/21   Stuart Montelongo MD   acetaminophen (TYLENOL) 500 MG tablet Take 500 mg by mouth every 6 hours as needed for Pain    Historical Provider, MD   fluticasone (FLOVENT HFA) 110 MCG/ACT inhaler Inhale 2 puffs into the lungs 2 times daily 2/11/21 Jonah January, DO   albuterol sulfate HFA (PROAIR HFA) 108 (90 Base) MCG/ACT inhaler INHALE 2 PUFFS BY MOUTH EVERY SIX HOURS AS NEEDED FOR WHEEZING 2/11/21 Jonah January, DO   vitamin B-12 (CYANOCOBALAMIN) 1000 MCG tablet Take 1,000 mcg by mouth daily    Historical Provider, MD   aspirin 81 MG EC tablet Take 81 mg by mouth nightly     Historical Provider, MD   calcium-vitamin D (OSCAL 500/200 D-3) 500-200 MG-UNIT per tablet Take 1 tablet by mouth 2 times daily     Historical Provider, MD       Current medications:    Current Outpatient Medications   Medication Sig Dispense Refill    cefUROXime (CEFTIN) 250 MG tablet Take 1 tablet by mouth every 12 hours for 10 doses 10 tablet 0    metroNIDAZOLE (FLAGYL) 500 MG tablet Take 1 tablet by mouth 3 times daily for 7 days 21 tablet 0    polyethylene glycol (GLYCOLAX) 17 g packet Take 17 g by mouth daily 527 g 1    tamsulosin (FLOMAX) 0.4 MG capsule Take 1 capsule by mouth daily for 14 days 30 capsule 0    methocarbamol (ROBAXIN) 500 MG tablet Take 1 tablet by mouth 3 times daily as needed (muscle spasms, pain) 30 tablet 0    gabapentin (NEURONTIN) 100 MG capsule Take 100 mg by mouth daily.       sertraline (ZOLOFT) 100 MG tablet TAKE 2 TABLETS BY MOUTH EVERY MORNING 180 tablet 0    ezetimibe (ZETIA) 10 MG tablet TAKE 1 TABLET BY MOUTH EVERY MORNING 90 tablet 0    simvastatin (ZOCOR) 20 MG tablet TAKE ONE (1) TABLET BY MOUTH NIGHTLY 90 tablet 0    letrozole (FEMARA) 2.5 MG tablet Take 1 tablet by mouth nightly 30 tablet 5    Ferrous Sulfate (IRON) 325 (65 Fe) MG TABS Take 1 tablet by mouth every other day 30 tablet 0    albuterol-ipratropium (COMBIVENT RESPIMAT)  MCG/ACT AERS inhaler Inhale 1 puff into the lungs 4 times daily 1 Inhaler 0    acetaminophen (TYLENOL) 500 MG tablet Take 500 mg by mouth every 6 hours as needed for Pain      fluticasone (FLOVENT HFA) 110 MCG/ACT inhaler Inhale 2 puffs into the lungs 2 times daily 1 Inhaler 5    albuterol sulfate HFA (PROAIR HFA) 108 (90 Base) MCG/ACT inhaler INHALE 2 PUFFS BY MOUTH EVERY SIX HOURS AS NEEDED FOR WHEEZING 1 Inhaler 5    vitamin B-12 (CYANOCOBALAMIN) 1000 MCG tablet Take 1,000 mcg by mouth daily      aspirin 81 MG EC tablet Take 81 mg by mouth nightly       calcium-vitamin D (OSCAL 500/200 D-3) 500-200 MG-UNIT per tablet Take 1 tablet by mouth 2 times daily        No current facility-administered medications for this visit. Allergies: Allergies   Allergen Reactions    Bactrim [Sulfamethoxazole-Trimethoprim] Anaphylaxis and Hives    Lipitor [Atorvastatin] Shortness Of Breath    Taxol [Paclitaxel] Anaphylaxis and Swelling    Aminoglycosides      Abstracted from [facility] patient chart.  Demerol Nausea Only    Neosporin [Bacitracin-Neomycin-Polymyxin] Rash and Other (See Comments)     hotness    Other Rash     Ivory Soap    Talc Other (See Comments)     blisters       Problem List:    Patient Active Problem List   Diagnosis Code    Malignant neoplasm of upper-outer quadrant of right breast in female, estrogen receptor positive ([facility] [address] C50.411, Z17.0    Diffuse cystic mastopathy N60.19    Hyperlipidemia E78.5    H/O chest pain Z87.898    Heart block AV second degree I44.1    Abnormal cardiovascular stress test R94.39    JAMIE (obstructive sleep apnea) G47.33    Super obesity E66.9    Mild asthma J45.909    Severe obstructive sleep apnea G47.33    Cardiac pacemaker Z95.0    Chronic cystitis N30.20    Asthma J45.909    Hypertension I10    Depression F32. A    Obstructive sleep apnea G47.33    Nocturnal oxygen desaturation G47.34    NSTEMI (non-ST elevated myocardial infarction) ([facility]) I21.4    PVD (peripheral vascular disease) ([facility]) I73.9    Morbid obesity with BMI of 45.0-49.9, adult ([facility]) E66.01, Z68.42    Moderate aortic stenosis I35.0    Hepatomegaly, not elsewhere classified R16.0    Arthritis M19.90    CAD (coronary artery disease) I25.10    Pneumonia due to COVID-19 virus U07.1, J12.82    Bilateral carotid artery stenosis I65.23    Moderate persistent asthma without complication S96.46    Carotid stenosis, right Y38.45    Complicated urinary tract infection N39.0       Past Medical History:        Diagnosis Date    Anemia     Anxiety 1978    Arthritis     generalized    Asthma 2006    AV block     2nd degree per hospital in june2013    Blood poisoning 1994    Blood transfusion     12/2003    CAD (coronary artery disease)     Cancer ([facility] [address] 2007    skin (face) & colon(2003)/ 2/2012 dx of right breast ca(pc)    Carcinoma of breast ([facility] [address] 02/29/2012    right arm no b/p or sticks    Cardiac pacemaker 03/10/2014    Medtronic dual lead    Chronic cystitis     Chronic respiratory failure (HCC)     2 L/min oxygen at night-time    Colon cancer ([facility] [address] 2013    COPD (chronic obstructive pulmonary disease) (HCC)     CTS (carpal tunnel syndrome)     Depression     Diverticulitis     H/O 24 hour EKG monitoring 2/28/2014 7/24/13 2/14 complete heart block 7/13No clinically significant arrthymia noted.  H/O cardiac catheterization 10/31/2011, 7/11/2007    10/31/2011-No significant CAD. Cardiolite stress test was false positive-Dr Jo Ann Larios; 7/11/2007-No CAD, global function intact;    H/O cardiovascular stress test 10/20/2011, 3/23/2010    10/20/2011-Lexiscan- Evid of mild ischemia in Left CX region. EF 70%. Global LVSF normal;    H/O cardiovascular stress test 01/07/2015    EF 77%.    Normal Stress Test.    H/O chest pain 04/2005    sees Dr Angela Aparicio H/O Doppler ultrasound 02/20/2008 2/20/2008-CAROTID DOPPLER- Normal carotids bilaterally;    H/O Doppler ultrasound 06/06/2013    CAROTID DOPPLER- there is heterogeneous, irregular atherosclerotic plaque noted in the right internal carotid artery,  doppler flow velocities within the right internal carotid artery are elevated, consistent with a mild, less than 50%stenosis, there is intimal thickening but no significant atherosclerotic plaque noted in the left internal carotid artery, the left carotid are within normal limits    H/O echocardiogram 4/9/2012, 10/20/2011    4/9/2012-LVSF normal EF=>55%. impaired LV relaxation;    H/O echocardiogram 12/31/2014    EF 50-55%. LV shows mild concentric hypertrophy. Mildly dilated left atrium. Mildly dilated right ventricle. Sclerotic but not stenotic aortic valve. Mitral annular calcification. Mild MR, Mild TR, Mild pulm HTN.    H/O urinary incontinence     History of blood transfusion     Hx antineoplastic chemo     Hx of Arterial Doppler ultrasound 07/01/2019    No hemodynamically significant or focal stenosis visualized bilaterally, bilateral lower extremity arteries exhibit diffuse plaque and multiphasic waveforms, unable to obtain bilateral ABIs due to elevated leg pressures >250 mmHg, possibly due to atherosclerosis    Hx of cardiovascular stress test 06/2013    EF 70% abn suggestive of anterolateral ischemia, possible RCA ischmeia, post left ventricular dilation suggestive multivessel CAD.  Hx of echocardiogram 06/2013    EF50%, mild MR and TR, mild pulmonary HTN, mild AS    Hx of echocardiogram 11/01/2021    Left ventricular systolic function is normal, Mild concentric left ventricular hypertrophy Mildly dilated left atrium. Mitral annular calcification is present. Mild tricuspid regurgitation No evidence of any pericardial effusion    Hx of fall     \"last time 2018- due to neuropathy, have to use cane\"    HX OTHER MEDICAL 7/24/13, 06/2013 7/13-No clinacally significant arrhythmia.  6/13-multiple cycles of wenckebach episodes in addtion to some sinus tach    Hyperlipidemia     Hypertension     Hypothermia 2008    Malignant neoplasm of breast (female) [facility]) 2012    Neuropathy     \"have neuropathy of my legs\"    Normocytic normochromic anemia     Obstructive sleep apnea 2008 01- Has CPAP machine but has not used in over a year - states she has not had problems since her pacemaker was placed.  Old MI (myocardial infarction)     per pt on 1/15/2019\"had heart attack about a year ago\"    Osteoarthritis     Osteopenia     Primary malignant neoplasm of colon ([address]     S/P cardiac cath 06/2013    no significant CAD false postive stress test    Skin cancer     Super obesity     Umbilical hernia        Past Surgical History:        Procedure Laterality Date    A-V CARDIAC PACEMAKER INSERTION  3/10/14     Medtronic. Model:  [id number] Medtronic  Serial:  [id number] dual chamber pacemaker    APPENDECTOMY  2004    BREAST BIOPSY  2/13/12    BREAST LUMPECTOMY  2007    right     BREAST SURGERY  02/13/2012    rt lesion biopsy     CARDIAC CATHETERIZATION  2007 & 2011    CAROTID ENDARTERECTOMY Right 12/3/2021    RIGHT CAROTID ENDARTERECTOMY WITH PATCH performed by Rupesh Olivares MD at [address] [place] [address]  2004    Colon cancer    COLONOSCOPY  10-18-13    polyp    COLONOSCOPY  1/19/2016    internal hemorrhoids, diverticulosis, 1.5 cm sessile polyp, 8 mm polyp found in rectum.     COLONOSCOPY  12/14/2017    1.5cm residual polyp, 5mm polyps in blind sigmoid loop x3, Sigmoid divertics, Internal grade 1 hemorrhoids    COLONOSCOPY N/A 1/16/2019    COLONOSCOPY W/ ENDOSCOPIC MUCOSAL RESECTION WITH ELEVIEW 5ML INJECTION AND POLYPECTOMY OF TIP OF BLIND RECTOSIGMOID STUMP AND CAUTERIZATION WITH ERBE PROBE, CLIPPING X2 performed by Madi Coronado MD at [place] [address]  01/21/2020    pan divertics/ 4 polyps/ sm int hem, S/P partial sigmoid resection w/ end to side anastamosis, repeat in 3 years    COLONOSCOPY N/A 1/21/2020    COLONOSCOPY POLYPECTOMY SNARE/COLD BIOPSY performed by Madi Coronado MD at [facility]  2003    back    CYSTOSCOPY Bilateral 12/15/14    CYSTOSCOPY Right 5/15/2022    CYSTOSCOPY URETERAL STENT INSERTION performed by Racquel Edmond MD at [facility]      front right tooth and root canal w/ brass bolt    DILATION AND CURETTAGE OF UTERUS  2006    Needed a camera to find my uterus.  ENDOSCOPY, COLON, DIAGNOSTIC  12/14/2017    Small hiatal hernia    HERNIA REPAIR  2004    Umb hernia    HERNIA REPAIR  2008    Inc hernia    LYMPH NODE DISSECTION  2/29/2012    Right axillary-3 sentinel nodes were positive out of 9.    MASTECTOMY, RADICAL Right 02/29/2012    w/ sentinal node disection-Dr West    PACEMAKER PLACEMENT      PRE-MALIGNANT / BENIGN SKIN LESION EXCISION  2/8/2013    right leg X2-Dr West    TONSILLECTOMY  1957    TUNNELED VENOUS PORT PLACEMENT  2004    TUNNELED VENOUS PORT PLACEMENT  02/13/2012    removal of mediport       Social History:    Social History     Tobacco Use    Smoking status: Never Smoker    Smokeless tobacco: Never Used   Substance Use Topics    Alcohol use: No     Comment:           CAFFEINE: 2- 12oz nona daily & 2 cups coffee some nights. Counseling given: Not Answered      Vital Signs (Current): There were no vitals filed for this visit.                                            BP Readings from Last 3 Encounters:   05/19/22 (!) 127/59   04/08/22 (!) 134/58   04/01/22 128/62       NPO Status:                                                                                 BMI:   Wt Readings from Last 3 Encounters:   05/16/22 281 lb 8.4 oz (127.7 kg)   04/08/22 280 lb (127 kg)   04/01/22 280 lb (127 kg)     There is no height or weight on file to calculate BMI.    CBC:   Lab Results   Component Value Date    WBC 8.5 05/20/2022    RBC 3.60 05/20/2022    HGB 10.2 05/20/2022    HCT 32.8 05/20/2022    MCV 91.1 05/20/2022    RDW 14.7 05/20/2022     05/20/2022       CMP:   Lab Results   Component Value Date     05/20/2022    K 4.2 05/20/2022     05/20/2022    CO2 27 05/20/2022    BUN 9 05/20/2022    CREATININE 0.6 05/20/2022    GFRAA >60 05/20/2022    AGRATIO 1.3 07/29/2021    LABGLOM >60 05/20/2022    GLUCOSE 94 05/20/2022    PROT 6.2 05/20/2022    PROT 6.6 10/17/2012    CALCIUM 9.1 05/20/2022    BILITOT 0.2 05/20/2022    ALKPHOS 100 05/20/2022    AST 17 05/20/2022    ALT 13 05/20/2022       POC Tests: No results for input(s): POCGLU, POCNA, POCK, POCCL, POCBUN, POCHEMO, POCHCT in the last 72 hours. Coags:   Lab Results   Component Value Date    PROTIME 11.4 11/29/2021    PROTIME 11.2 10/28/2011    INR 0.88 11/29/2021    APTT 26.6 11/29/2021       HCG (If Applicable): No results found for: PREGTESTUR, PREGSERUM, HCG, HCGQUANT     ABGs: No results found for: PHART, PO2ART, MKD9UJT, SYE5BUN, BEART, O6UZRPDG     Type & Screen (If Applicable):  No results found for: LABABO, LABRH    Drug/Infectious Status (If Applicable):  Lab Results   Component Value Date    HEPCAB NON REACTIVE 09/01/2017       COVID-19 Screening (If Applicable):   Lab Results   Component Value Date    COVID19 NOT DETECTED 05/19/2022    COVID19 NOT DETECTED 11/29/2021           Anesthesia Evaluation  Patient summary reviewed  Airway: Mallampati: I  TM distance: >3 FB   Neck ROM: full  Mouth opening: > = 3 FB   Dental:          Pulmonary:normal exam    (+) COPD:  sleep apnea: on noncompliant,  decreased breath sounds asthma:                           ROS comment: 2 L/min oxygen at night-time   Cardiovascular:  Exercise tolerance: poor (<4 METS),   (+) hypertension:, valvular problems/murmurs: AS, pacemaker:, past MI:, CAD:, hyperlipidemia      ECG reviewed  Rhythm: regular  Rate: normal                 ROS comment: Summary   Left ventricular systolic function is normal, 55-60%. Mild concentric left ventricular hypertrophy. Grade I diastolic dysfunction. Mildly dilated left atrium. Moderate aortic insufficiency with  of msec. Moderate aortic stenosis with mean gradient of 27 mmHg. Mid-to-Moderate mitral regurgitation is present. Mitral annular calcification is present. Mild tricuspid regurgitation. Mildly elevated pulmonary artery pressure, RVSP 37 mmHg. No evidence of any pericardial effusion. reecho IN 6 mos      Signature      ------------------------------------------------------------------   Electronically signed by Errol Washington MD   (Interpreting physician) on 11/02/2021 at 02:23 PM    Atrial-sensed ventricular-paced rhythm   Abnormal ECG   When compared with ECG of 29-NOV-2021 15:44,   Vent. rate has increased BY   3 BPM      Neuro/Psych:   (+) depression/anxiety             GI/Hepatic/Renal:   (+) liver disease:, morbid obesity          Endo/Other:    (+) blood dyscrasia: anemia, arthritis:., malignancy/cancer. Pt had no PAT visit       Abdominal:   (+) obese,           Vascular:   + PVD, aortic or cerebral, . ROS comment: S/p right carotid endarteractomy      Approximately 50% stenosis involving the proximal left cervical internal  carotid artery.     Severe stenosis involving the origin of the left vertebral artery. . Other Findings: Poor dentition            Anesthesia Plan      general     ASA 4       Induction: intravenous. MIPS: Postoperative opioids intended. Anesthetic plan and risks discussed with patient. Plan discussed with CRNA.     Attending anesthesiologist reviewed and agrees with [address] [place], [place] [address] - CRNA   5/23/2022

## 2022-05-24 ENCOUNTER — HOSPITAL ENCOUNTER (OUTPATIENT)
Age: 72
Setting detail: OUTPATIENT SURGERY
Discharge: HOME | End: 2022-05-24
Attending: OBSTETRICS & GYNECOLOGY | Admitting: OBSTETRICS & GYNECOLOGY
Payer: MEDICARE

## 2022-05-24 ENCOUNTER — ANESTHESIA (OUTPATIENT)
Dept: OPERATING ROOM | Age: 72
End: 2022-05-24
Payer: MEDICARE

## 2022-05-24 VITALS
HEART RATE: 62 BPM | OXYGEN SATURATION: 96 % | RESPIRATION RATE: 16 BRPM | SYSTOLIC BLOOD PRESSURE: 182 MMHG | WEIGHT: 280 LBS | TEMPERATURE: 97.3 F | HEIGHT: 62 IN | BODY MASS INDEX: 51.53 KG/M2 | DIASTOLIC BLOOD PRESSURE: 79 MMHG

## 2022-05-24 DIAGNOSIS — R93.5 ABNORMAL FINDINGS ON DIAGNOSTIC IMAGING OF ABDOMEN: ICD-10-CM

## 2022-05-24 DIAGNOSIS — Z01.818 ENCOUNTER FOR PREADMISSION TESTING: ICD-10-CM

## 2022-05-24 DIAGNOSIS — N39.0 URINARY TRACT INFECTION WITHOUT HEMATURIA, SITE UNSPECIFIED: ICD-10-CM

## 2022-05-24 DIAGNOSIS — N71.9: ICD-10-CM

## 2022-05-24 DIAGNOSIS — G89.18 POST-OP PAIN: ICD-10-CM

## 2022-05-24 PROCEDURE — 7100000011 HC PHASE II RECOVERY - ADDTL 15 MIN: Performed by: OBSTETRICS & GYNECOLOGY

## 2022-05-24 PROCEDURE — 2580000003 HC RX 258

## 2022-05-24 PROCEDURE — 3700000000 HC ANESTHESIA ATTENDED CARE: Performed by: OBSTETRICS & GYNECOLOGY

## 2022-05-24 PROCEDURE — 87077 CULTURE AEROBIC IDENTIFY: CPT

## 2022-05-24 PROCEDURE — 7100000000 HC PACU RECOVERY - FIRST 15 MIN: Performed by: OBSTETRICS & GYNECOLOGY

## 2022-05-24 PROCEDURE — 58120 DILATION AND CURETTAGE: CPT | Performed by: OBSTETRICS & GYNECOLOGY

## 2022-05-24 PROCEDURE — 87075 CULTR BACTERIA EXCEPT BLOOD: CPT

## 2022-05-24 PROCEDURE — 2580000003 HC RX 258: Performed by: ANESTHESIOLOGY

## 2022-05-24 PROCEDURE — 3600000003 HC SURGERY LEVEL 3 BASE: Performed by: OBSTETRICS & GYNECOLOGY

## 2022-05-24 PROCEDURE — 3600000013 HC SURGERY LEVEL 3 ADDTL 15MIN: Performed by: OBSTETRICS & GYNECOLOGY

## 2022-05-24 PROCEDURE — 7100000001 HC PACU RECOVERY - ADDTL 15 MIN: Performed by: OBSTETRICS & GYNECOLOGY

## 2022-05-24 PROCEDURE — 87205 SMEAR GRAM STAIN: CPT

## 2022-05-24 PROCEDURE — 3700000001 HC ADD 15 MINUTES (ANESTHESIA): Performed by: OBSTETRICS & GYNECOLOGY

## 2022-05-24 PROCEDURE — 7100000010 HC PHASE II RECOVERY - FIRST 15 MIN: Performed by: OBSTETRICS & GYNECOLOGY

## 2022-05-24 PROCEDURE — 88305 TISSUE EXAM BY PATHOLOGIST: CPT | Performed by: PATHOLOGY

## 2022-05-24 PROCEDURE — 6360000002 HC RX W HCPCS

## 2022-05-24 PROCEDURE — 87070 CULTURE OTHR SPECIMN AEROBIC: CPT

## 2022-05-24 PROCEDURE — 6370000000 HC RX 637 (ALT 250 FOR IP): Performed by: OBSTETRICS & GYNECOLOGY

## 2022-05-24 PROCEDURE — 2500000003 HC RX 250 WO HCPCS

## 2022-05-24 PROCEDURE — 2709999900 HC NON-CHARGEABLE SUPPLY: Performed by: OBSTETRICS & GYNECOLOGY

## 2022-05-24 RX ORDER — ONDANSETRON 2 MG/ML
4 INJECTION INTRAMUSCULAR; INTRAVENOUS
Status: DISCONTINUED | OUTPATIENT
Start: 2022-05-24 | End: 2022-05-24 | Stop reason: HOSPADM

## 2022-05-24 RX ORDER — SODIUM CHLORIDE, SODIUM LACTATE, POTASSIUM CHLORIDE, CALCIUM CHLORIDE 600; 310; 30; 20 MG/100ML; MG/100ML; MG/100ML; MG/100ML
INJECTION, SOLUTION INTRAVENOUS CONTINUOUS PRN
Status: DISCONTINUED | OUTPATIENT
Start: 2022-05-24 | End: 2022-05-24 | Stop reason: SDUPTHER

## 2022-05-24 RX ORDER — SODIUM CHLORIDE, SODIUM LACTATE, POTASSIUM CHLORIDE, CALCIUM CHLORIDE 600; 310; 30; 20 MG/100ML; MG/100ML; MG/100ML; MG/100ML
INJECTION, SOLUTION INTRAVENOUS ONCE
Status: COMPLETED | OUTPATIENT
Start: 2022-05-24 | End: 2022-05-24

## 2022-05-24 RX ORDER — SODIUM CHLORIDE, SODIUM LACTATE, POTASSIUM CHLORIDE, CALCIUM CHLORIDE 600; 310; 30; 20 MG/100ML; MG/100ML; MG/100ML; MG/100ML
INJECTION, SOLUTION INTRAVENOUS CONTINUOUS
Status: DISCONTINUED | OUTPATIENT
Start: 2022-05-24 | End: 2022-05-24 | Stop reason: HOSPADM

## 2022-05-24 RX ORDER — GABAPENTIN 300 MG/1
600 CAPSULE ORAL ONCE
Status: COMPLETED | OUTPATIENT
Start: 2022-05-24 | End: 2022-05-24

## 2022-05-24 RX ORDER — DEXAMETHASONE SODIUM PHOSPHATE 4 MG/ML
INJECTION, SOLUTION INTRA-ARTICULAR; INTRALESIONAL; INTRAMUSCULAR; INTRAVENOUS; SOFT TISSUE PRN
Status: DISCONTINUED | OUTPATIENT
Start: 2022-05-24 | End: 2022-05-24 | Stop reason: SDUPTHER

## 2022-05-24 RX ORDER — DEXTROSE MONOHYDRATE 50 MG/ML
100 INJECTION, SOLUTION INTRAVENOUS PRN
Status: DISCONTINUED | OUTPATIENT
Start: 2022-05-24 | End: 2022-05-24 | Stop reason: HOSPADM

## 2022-05-24 RX ORDER — ACETAMINOPHEN 500 MG
1000 TABLET ORAL ONCE
Status: COMPLETED | OUTPATIENT
Start: 2022-05-24 | End: 2022-05-24

## 2022-05-24 RX ORDER — FENTANYL CITRATE 50 UG/ML
INJECTION, SOLUTION INTRAMUSCULAR; INTRAVENOUS PRN
Status: DISCONTINUED | OUTPATIENT
Start: 2022-05-24 | End: 2022-05-24 | Stop reason: SDUPTHER

## 2022-05-24 RX ORDER — TRAMADOL HYDROCHLORIDE 50 MG/1
50 TABLET ORAL EVERY 6 HOURS PRN
Qty: 10 TABLET | Refills: 0 | Status: SHIPPED | OUTPATIENT
Start: 2022-05-24 | End: 2022-05-29

## 2022-05-24 RX ORDER — OXYCODONE HYDROCHLORIDE 5 MG/1
5 TABLET ORAL
Status: DISCONTINUED | OUTPATIENT
Start: 2022-05-24 | End: 2022-05-24 | Stop reason: HOSPADM

## 2022-05-24 RX ORDER — FENTANYL CITRATE 50 UG/ML
50 INJECTION, SOLUTION INTRAMUSCULAR; INTRAVENOUS EVERY 5 MIN PRN
Status: DISCONTINUED | OUTPATIENT
Start: 2022-05-24 | End: 2022-05-24 | Stop reason: HOSPADM

## 2022-05-24 RX ORDER — HYDRALAZINE HYDROCHLORIDE 20 MG/ML
10 INJECTION INTRAMUSCULAR; INTRAVENOUS
Status: DISCONTINUED | OUTPATIENT
Start: 2022-05-24 | End: 2022-05-24 | Stop reason: HOSPADM

## 2022-05-24 RX ORDER — SODIUM CHLORIDE 0.9 % (FLUSH) 0.9 %
5-40 SYRINGE (ML) INJECTION EVERY 12 HOURS SCHEDULED
Status: DISCONTINUED | OUTPATIENT
Start: 2022-05-24 | End: 2022-05-24 | Stop reason: HOSPADM

## 2022-05-24 RX ORDER — SODIUM CHLORIDE 0.9 % (FLUSH) 0.9 %
5-40 SYRINGE (ML) INJECTION PRN
Status: DISCONTINUED | OUTPATIENT
Start: 2022-05-24 | End: 2022-05-24 | Stop reason: HOSPADM

## 2022-05-24 RX ORDER — PROCHLORPERAZINE EDISYLATE 5 MG/ML
5 INJECTION INTRAMUSCULAR; INTRAVENOUS
Status: DISCONTINUED | OUTPATIENT
Start: 2022-05-24 | End: 2022-05-24 | Stop reason: HOSPADM

## 2022-05-24 RX ORDER — PROPOFOL 10 MG/ML
INJECTION, EMULSION INTRAVENOUS PRN
Status: DISCONTINUED | OUTPATIENT
Start: 2022-05-24 | End: 2022-05-24 | Stop reason: SDUPTHER

## 2022-05-24 RX ORDER — IPRATROPIUM BROMIDE AND ALBUTEROL SULFATE 2.5; .5 MG/3ML; MG/3ML
1 SOLUTION RESPIRATORY (INHALATION)
Status: DISCONTINUED | OUTPATIENT
Start: 2022-05-24 | End: 2022-05-24 | Stop reason: HOSPADM

## 2022-05-24 RX ORDER — LIDOCAINE HYDROCHLORIDE 20 MG/ML
INJECTION, SOLUTION EPIDURAL; INFILTRATION; INTRACAUDAL; PERINEURAL PRN
Status: DISCONTINUED | OUTPATIENT
Start: 2022-05-24 | End: 2022-05-24 | Stop reason: SDUPTHER

## 2022-05-24 RX ORDER — SODIUM CHLORIDE 9 MG/ML
25 INJECTION, SOLUTION INTRAVENOUS PRN
Status: DISCONTINUED | OUTPATIENT
Start: 2022-05-24 | End: 2022-05-24 | Stop reason: HOSPADM

## 2022-05-24 RX ORDER — DIPHENHYDRAMINE HYDROCHLORIDE 50 MG/ML
12.5 INJECTION INTRAMUSCULAR; INTRAVENOUS
Status: DISCONTINUED | OUTPATIENT
Start: 2022-05-24 | End: 2022-05-24 | Stop reason: HOSPADM

## 2022-05-24 RX ORDER — ONDANSETRON 2 MG/ML
INJECTION INTRAMUSCULAR; INTRAVENOUS PRN
Status: DISCONTINUED | OUTPATIENT
Start: 2022-05-24 | End: 2022-05-24 | Stop reason: SDUPTHER

## 2022-05-24 RX ORDER — MEPERIDINE HYDROCHLORIDE 25 MG/ML
12.5 INJECTION INTRAMUSCULAR; INTRAVENOUS; SUBCUTANEOUS EVERY 5 MIN PRN
Status: DISCONTINUED | OUTPATIENT
Start: 2022-05-24 | End: 2022-05-24 | Stop reason: HOSPADM

## 2022-05-24 RX ORDER — MIDAZOLAM HYDROCHLORIDE 2 MG/2ML
2 INJECTION, SOLUTION INTRAMUSCULAR; INTRAVENOUS
Status: DISCONTINUED | OUTPATIENT
Start: 2022-05-24 | End: 2022-05-24 | Stop reason: HOSPADM

## 2022-05-24 RX ADMIN — GABAPENTIN 600 MG: 300 CAPSULE ORAL at 06:38

## 2022-05-24 RX ADMIN — SODIUM CHLORIDE, POTASSIUM CHLORIDE, SODIUM LACTATE AND CALCIUM CHLORIDE: 600; 310; 30; 20 INJECTION, SOLUTION INTRAVENOUS at 06:38

## 2022-05-24 RX ADMIN — ONDANSETRON 4 MG: 2 INJECTION INTRAMUSCULAR; INTRAVENOUS at 07:35

## 2022-05-24 RX ADMIN — FENTANYL CITRATE 25 MCG: 50 INJECTION, SOLUTION INTRAMUSCULAR; INTRAVENOUS at 08:07

## 2022-05-24 RX ADMIN — ACETAMINOPHEN 1000 MG: 500 TABLET ORAL at 06:38

## 2022-05-24 RX ADMIN — FENTANYL CITRATE 50 MCG: 50 INJECTION, SOLUTION INTRAMUSCULAR; INTRAVENOUS at 07:30

## 2022-05-24 RX ADMIN — DEXAMETHASONE SODIUM PHOSPHATE 4 MG: 4 INJECTION, SOLUTION INTRAMUSCULAR; INTRAVENOUS at 07:35

## 2022-05-24 RX ADMIN — PROPOFOL 150 MG: 10 INJECTION, EMULSION INTRAVENOUS at 07:30

## 2022-05-24 RX ADMIN — FENTANYL CITRATE 25 MCG: 50 INJECTION, SOLUTION INTRAMUSCULAR; INTRAVENOUS at 07:54

## 2022-05-24 RX ADMIN — SODIUM CHLORIDE, POTASSIUM CHLORIDE, SODIUM LACTATE AND CALCIUM CHLORIDE: 600; 310; 30; 20 INJECTION, SOLUTION INTRAVENOUS at 07:24

## 2022-05-24 RX ADMIN — LIDOCAINE HYDROCHLORIDE 100 MG: 20 INJECTION, SOLUTION EPIDURAL; INFILTRATION; INTRACAUDAL; PERINEURAL at 07:30

## 2022-05-24 ASSESSMENT — PAIN SCALES - GENERAL
PAINLEVEL_OUTOF10: 0
PAINLEVEL_OUTOF10: 0

## 2022-05-24 ASSESSMENT — PAIN - FUNCTIONAL ASSESSMENT: PAIN_FUNCTIONAL_ASSESSMENT: 0-10

## 2022-05-24 NOTE — H&P
Nusrat Vasquez Mean  5/24/2022                 History and Physical    CHIEF COMPLAINT:  Purulent material in uterus on CT scan  HISTORY OF PRESENT ILLNESS: Arlette Harris is a  67 y.o. female, who is complaining of pelvic pain and found to have purulent type material within the uterus. Past Medical History:   Diagnosis Date    Anemia     Anxiety 1978    Arthritis     generalized    Asthma 2006    AV block     2nd degree per hospital in june2013    Blood poisoning 1994    Blood transfusion     12/2003    CAD (coronary artery disease)     Cancer ([facility] 2007    skin (face) & colon(2003)/ 2/2012 dx of right breast ca(pc)    Carcinoma of breast ([facility] 02/29/2012    right arm no b/p or sticks    Cardiac pacemaker 03/10/2014    Medtronic dual lead    Chronic cystitis     Chronic respiratory failure (HCC)     2 L/min oxygen at night-time    Colon cancer ([facility] 2013    COPD (chronic obstructive pulmonary disease) (HCC)     CTS (carpal tunnel syndrome)     Depression     Diverticulitis     H/O 24 hour EKG monitoring 2/28/2014 7/24/13 2/14 complete heart block 7/13No clinically significant arrthymia noted.  H/O cardiac catheterization 10/31/2011, 7/11/2007    10/31/2011-No significant CAD. Cardiolite stress test was false positive-Dr Prabhakar Wick; 7/11/2007-No CAD, global function intact;    H/O cardiovascular stress test 10/20/2011, 3/23/2010    10/20/2011-Lexiscan- Evid of mild ischemia in Left CX region. EF 70%. Global LVSF normal;    H/O cardiovascular stress test 01/07/2015    EF 77%.    Normal Stress Test.    H/O chest pain 04/2005    sees Dr Noemi Schaeffer H/O Doppler ultrasound 02/20/2008 2/20/2008-CAROTID DOPPLER- Normal carotids bilaterally;    H/O Doppler ultrasound 06/06/2013    CAROTID DOPPLER- there is heterogeneous, irregular atherosclerotic plaque noted in the right internal carotid artery,  doppler flow velocities within the right internal carotid artery are elevated, consistent with a mild, less than 50%stenosis, there is intimal thickening but no significant atherosclerotic plaque noted in the left internal carotid artery, the left carotid are within normal limits    H/O echocardiogram 4/9/2012, 10/20/2011    4/9/2012-LVSF normal EF=>55%. impaired LV relaxation;    H/O echocardiogram 12/31/2014    EF 50-55%. LV shows mild concentric hypertrophy. Mildly dilated left atrium. Mildly dilated right ventricle. Sclerotic but not stenotic aortic valve. Mitral annular calcification. Mild MR, Mild TR, Mild pulm HTN.    H/O urinary incontinence     History of blood transfusion     Hx antineoplastic chemo     Hx of Arterial Doppler ultrasound 07/01/2019    No hemodynamically significant or focal stenosis visualized bilaterally, bilateral lower extremity arteries exhibit diffuse plaque and multiphasic waveforms, unable to obtain bilateral ABIs due to elevated leg pressures >250 mmHg, possibly due to atherosclerosis    Hx of cardiovascular stress test 06/2013    EF 70% abn suggestive of anterolateral ischemia, possible RCA ischmeia, post left ventricular dilation suggestive multivessel CAD.  Hx of echocardiogram 06/2013    EF50%, mild MR and TR, mild pulmonary HTN, mild AS    Hx of echocardiogram 11/01/2021    Left ventricular systolic function is normal, Mild concentric left ventricular hypertrophy Mildly dilated left atrium. Mitral annular calcification is present. Mild tricuspid regurgitation No evidence of any pericardial effusion    Hx of fall     \"last time 2018- due to neuropathy, have to use cane\"    HX OTHER MEDICAL 7/24/13, 06/2013 7/13-No clinacally significant arrhythmia.  6/13-multiple cycles of wenckebach episodes in addtion to some sinus tach    Hyperlipidemia     Hypertension     Hypothermia 2008    Malignant neoplasm of breast (female) [facility] 2012    Neuropathy     \"have neuropathy of my legs\"    Normocytic normochromic anemia     Obstructive sleep apnea 2008 01- Has CPAP machine but has not used in over a year - states she has not had problems since her pacemaker was placed.  Old MI (myocardial infarction)     per pt on 1/15/2019\"had heart attack about a year ago\"    Osteoarthritis     Osteopenia     Primary malignant neoplasm of colon ([address]     S/P cardiac cath 06/2013    no significant CAD false postive stress test    Skin cancer     Super obesity     Umbilical hernia       Past Surgical History:   Procedure Laterality Date    A-V CARDIAC PACEMAKER INSERTION  3/10/14     Medtronic. Model:  [id number] Medtronic  Serial:  [id number] dual chamber pacemaker    APPENDECTOMY  2004    BREAST BIOPSY  2/13/12    BREAST LUMPECTOMY  2007    right     BREAST SURGERY  02/13/2012    rt lesion biopsy     CARDIAC CATHETERIZATION  2007 & 2011    CAROTID ENDARTERECTOMY Right 12/3/2021    RIGHT CAROTID ENDARTERECTOMY WITH PATCH performed by Simona Keenan MD at [address] [place] [address]  2004    Colon cancer    COLONOSCOPY  10-18-13    polyp    COLONOSCOPY  1/19/2016    internal hemorrhoids, diverticulosis, 1.5 cm sessile polyp, 8 mm polyp found in rectum.     COLONOSCOPY  12/14/2017    1.5cm residual polyp, 5mm polyps in blind sigmoid loop x3, Sigmoid divertics, Internal grade 1 hemorrhoids    COLONOSCOPY N/A 1/16/2019    COLONOSCOPY W/ ENDOSCOPIC MUCOSAL RESECTION WITH ELEVIEW 5ML INJECTION AND POLYPECTOMY OF TIP OF BLIND RECTOSIGMOID STUMP AND CAUTERIZATION WITH ERBE PROBE, CLIPPING X2 performed by Lucina Savage MD at [facility]  01/21/2020    pan divertics/ 4 polyps/ sm int hem, S/P partial sigmoid resection w/ end to side anastamosis, repeat in 3 years    COLONOSCOPY N/A 1/21/2020    COLONOSCOPY POLYPECTOMY SNARE/COLD BIOPSY performed by Lucina Savage MD at [facility]  2003    back   [address] Bilateral 12/15/14    CYSTOSCOPY Right 5/15/2022    CYSTOSCOPY URETERAL STENT INSERTION performed by Ysabel Coyle Benny Runner, MD at [address] [facility]      front right tooth and root canal w/ brass bolt    DILATION AND CURETTAGE OF UTERUS  2006    Needed a camera to find my uterus.  ENDOSCOPY, COLON, DIAGNOSTIC  12/14/2017    Small hiatal hernia    HERNIA REPAIR  2004    Umb hernia    HERNIA REPAIR  2008    Inc hernia    LYMPH NODE DISSECTION  2/29/2012    Right axillary-3 sentinel nodes were positive out of 9.    MASTECTOMY, RADICAL Right 02/29/2012    w/ sentinal node disection-Dr West    PACEMAKER PLACEMENT      PRE-MALIGNANT / BENIGN SKIN LESION EXCISION  2/8/2013    right leg X2-Dr West    TONSILLECTOMY  1957    TUNNELED VENOUS PORT PLACEMENT  2004    TUNNELED VENOUS PORT PLACEMENT  02/13/2012    removal of mediport      Allergies: Bactrim [sulfamethoxazole-trimethoprim], Lipitor [atorvastatin], Taxol [paclitaxel], Aminoglycosides, Demerol, Neosporin [bacitracin-neomycin-polymyxin], Other, and Talc     No current facility-administered medications for this encounter. Social History     Tobacco Use    Smoking status: Never Smoker    Smokeless tobacco: Never Used   Substance Use Topics    Alcohol use: No     Comment:           CAFFEINE: 2- 12oz nona daily & 2 cups coffee some nights.       Review of Systems  Constitutional: positive for fatigue  Eyes: negative  Ears, nose, mouth, throat, and face: negative  Respiratory: negative  Cardiovascular: negative  Gastrointestinal: positive for pain  Genitourinary:negative  Integument/breast: negative  Hematologic/lymphatic: negative  Musculoskeletal:positive for muscle weakness  Neurological: negative  Behavioral/Psych: negative    Physical Exam:   BP (!) 147/65   Pulse 63   Temp 97.5 °F (36.4 °C) (Temporal)   Resp 16   Ht 5' 2\" (1.575 m)   Wt 280 lb (127 kg)   LMP 01/15/1999   SpO2 97%   BMI 51.21 kg/m²     General appearance: alert, appears stated age and cooperative  Head: Normocephalic, without obvious abnormality, atraumatic  Neck: supple, symmetrical, trachea midline  Lungs: clear to auscultation bilaterally  Heart: regular rate and rhythm  Abdomen: soft, non-tender; bowel sounds normal; no masses,  no organomegaly  Extremities: extremities normal, atraumatic, no cyanosis or edema    Assessment: Gas in the endometrium concerning for infectious process, fistula, or carcinoma    Plan: Exam under anesthesia, culture uterus, Dilation and curettage (no hysteroscopy today as this could possibly cause an infectious process out of the tubes and cause a peritonitis if there is an infectious process in the uterus    Discussed risk of infection, hemorrhage, blood transfusion, injury to organ. The patient was counseled at length about the risks of sara Covid-19 during their perioperative period and any recovery window from their procedure. The patient was made aware that sara Covid-19  may worsen their prognosis for recovering from their procedure  and lend to a higher morbidity and/or mortality risk. All material risks, benefits, and reasonable alternatives including postponing the procedure were discussed. The patient does wish to proceed with the procedure at this time.       Send copy of H&P to PCP, Wesley Theodoer DO

## 2022-05-24 NOTE — PROGRESS NOTES
56 Pt brought back to unit, bedside report received from MICHELLE Dickerson. 6258 Pt. Provided with crackers and beverage of choice. [address] instructions reviewed with pt, pt expresses understanding. 3640 Pt to dc back to [place] with [facility] transport.  [address] for report, report given to nurse Lopez Rdz

## 2022-05-24 NOTE — PROGRESS NOTES
1292: Patient arrived to PACU from OR. Monitors applied, alarms on. Report obtained from [address] and [address]: Patient resting in bed, tried repositioning, pt still very sleepy from anethesia, will wake to and answer questions accordingly, then easily fall back asleep again. 7876: Patient waking up, tolerating ice chips at this time. 7361: Phase 1 care complete. Patient transferred to [facility]. Report given bedside to [facility].

## 2022-05-24 NOTE — ANESTHESIA POSTPROCEDURE EVALUATION
Department of Anesthesiology  Postprocedure Note    Patient: Suad Hagen  MRN: [id number]  YOB: 1950  Date of evaluation: 5/24/2022  Time:  8:19 AM     Procedure Summary     Date: 05/24/22 Room / Location: [place] [address] / [facility]    Anesthesia Start: 7547 Anesthesia Stop: 0819    Procedures:       EXAMINATION UNDER ANESTHESIA (N/A )      DILATATION AND CURETTAGE HYSTEROSCOPY, CULTURES OF UTEREUS (N/A ) Diagnosis:       Inflammatory disease of uterus      Urinary tract infection without hematuria, site unspecified      Abnormal findings on diagnostic imaging of abdomen      (Inflammatory disease of uterus [N71.9] Urinary tract infection without hematuria, site unspecified [N39.0] Abnormal findings on diagnostic imaging of abdomen [R93.5])    Surgeons: Stefany Carolina MD Responsible Provider: Lui David MD    Anesthesia Type: general ASA Status: 4          Anesthesia Type: No value filed. Florian Phase I:      Florian Phase II:      Last vitals: Reviewed and per EMR flowsheets.        Anesthesia Post Evaluation    Patient location during evaluation: PACU  Patient participation: complete - patient participated  Level of consciousness: awake and alert  Pain score: 0  Airway patency: patent  Nausea & Vomiting: no nausea and no vomiting  Complications: no  Cardiovascular status: blood pressure returned to baseline  Respiratory status: acceptable, spontaneous ventilation and face mask

## 2022-05-24 NOTE — OP NOTE
hysterDATE OF PROCEDURE:                      5/24/2022    SURGEON:                                             Dick Whitaker MD    PREOPERATIVE DIAGNOSIS:              Gas within the endometrial cavity rule out pyosalpinx, endometrial carcinoma, fistula    POSTOPERATIVE DIAGNOSIS:            Same    OPERATION:                                         Exam under anesthesia, culture endometrial cavity, endometrial curettage    ANESTHESIA:                                        General.     ESTIMATED BLOOD LOSS:                 Minimal.     COMPLICATIONS:                                 None. SPECIMEN:         #1 endometrial culture #2 endometrial biopsy    INDICATION:        Above    FINDINGS:        Large cavernous vagina with the cervix very difficult to visualize due to large cavernous vagina and body habitus and position of the cervix. There was no abnormal vaginal discharge consistent with a fistula. There was no abnormal vaginal odor. Using multiple retractors and a bivalve speculum was as well as to assistance it was very difficult to visualize the cervix. I utilized 2 different bivalve speculum's, normal right angle retractors, Trey right angle retractors, sweetheart retractors, Britt retractors, weighted retractors and again it was extremely difficult to visualize the cervix. What appeared to be the anterior aspect of the cervix was grasped, using an endometrial Pipelle a Pipelle was placed through the cervix to approximately 8 cm through the cervix and a culture was obtained. This was then reperformed x2 to obtain an endometrial biopsy. Minimal tissue was identified in the specimen, there was no odor, there is no concern for purulent fluid, there was no concern for any type of stool as in the CT there was concern for an enteric uterine fistula. PROCEDURE NOTE: With the patient in the supine position, general anesthesia was administered without any complications.  The patient was then placed in dorsal lithotomy position. The perineum was scrubbed and draped in the usual fashion. Multiple speculums were placed in the vagina as above. The anterior lip of the cervix was grabbed by single-tooth tenaculum. An endometrial Pipelle was passed obtaining an endometrial culture. An endometrial Pipelle was then passed multiple times to obtain an an endometrial sample. Inspection revealed excellent hemostasis. The procedure was then terminated. All instruments were removed. The patient was placed in supine position, awakened from anesthesia and transferred to recovery room in good stable condition.      Umer Ornelas MD  5/24/2022  8:13 AM

## 2022-05-26 ENCOUNTER — HOSPITAL ENCOUNTER (OUTPATIENT)
Age: 72
Setting detail: SPECIMEN
Discharge: HOME | End: 2022-05-26

## 2022-05-26 LAB
ANION GAP SERPL CALCULATED.3IONS-SCNC: 10 MMOL/L (ref 4–16)
BUN BLDV-MCNC: 15 MG/DL (ref 6–23)
CALCIUM SERPL-MCNC: 9.2 MG/DL (ref 8.3–10.6)
CHLORIDE BLD-SCNC: 100 MMOL/L (ref 99–110)
CO2: 28 MMOL/L (ref 21–32)
CREAT SERPL-MCNC: 0.8 MG/DL (ref 0.6–1.1)
GFR AFRICAN AMERICAN: >60 ML/MIN/1.73M2
GFR NON-AFRICAN AMERICAN: >60 ML/MIN/1.73M2
GLUCOSE BLD-MCNC: 110 MG/DL (ref 70–99)
HCT VFR BLD CALC: 36 % (ref 37–47)
HEMOGLOBIN: 10.8 GM/DL (ref 12.5–16)
MCH RBC QN AUTO: 27.5 PG (ref 27–31)
MCHC RBC AUTO-ENTMCNC: 30 % (ref 32–36)
MCV RBC AUTO: 91.6 FL (ref 78–100)
PDW BLD-RTO: 15.2 % (ref 11.7–14.9)
PLATELET # BLD: 227 K/CU MM (ref 140–440)
PMV BLD AUTO: 10 FL (ref 7.5–11.1)
POTASSIUM SERPL-SCNC: 4.2 MMOL/L (ref 3.5–5.1)
RBC # BLD: 3.93 M/CU MM (ref 4.2–5.4)
SODIUM BLD-SCNC: 138 MMOL/L (ref 135–145)
WBC # BLD: 8.7 K/CU MM (ref 4–10.5)

## 2022-05-26 PROCEDURE — 80048 BASIC METABOLIC PNL TOTAL CA: CPT

## 2022-05-26 PROCEDURE — 36415 COLL VENOUS BLD VENIPUNCTURE: CPT

## 2022-05-26 PROCEDURE — 85027 COMPLETE CBC AUTOMATED: CPT

## 2022-05-30 LAB
CULTURE: ABNORMAL
CULTURE: ABNORMAL
Lab: ABNORMAL
SPECIMEN: ABNORMAL

## 2022-06-03 ENCOUNTER — TELEPHONE (OUTPATIENT)
Dept: INTERNAL MEDICINE CLINIC | Age: 72
End: 2022-06-03

## 2022-06-03 ENCOUNTER — TELEPHONE (OUTPATIENT)
Dept: OBGYN | Age: 72
End: 2022-06-03

## 2022-06-03 NOTE — TELEPHONE ENCOUNTER
Surgical patient seen 5/24/2022 at [facility]. Pt called wanting to know biopsy results and if further action is required. Pt currently in [facility]. Contact number for her is [phone]. Ask for room 103.

## 2022-06-03 NOTE — TELEPHONE ENCOUNTER
Patient request lab work results and biospy of bladder results. She left a message with  Dr. Omkar Short this morning. She is at [facility], AN [facility]FFILIATE OF [facility]  [phone] she would like Dr. Ger Jackson to call her.

## 2022-06-06 NOTE — TELEPHONE ENCOUNTER
There is no evidence of fistula on examination. There is no evidence of infection on examination at the tie of surgery. The surgery was limited to difficult procedure due to menopause and anatomy.   If any symptoms of pain, discharge, or bleeding, then office visit

## 2022-06-11 PROCEDURE — 36415 COLL VENOUS BLD VENIPUNCTURE: CPT

## 2022-06-11 PROCEDURE — 81001 URINALYSIS AUTO W/SCOPE: CPT

## 2022-06-11 PROCEDURE — 87086 URINE CULTURE/COLONY COUNT: CPT

## 2022-06-14 ENCOUNTER — HOSPITAL ENCOUNTER (OUTPATIENT)
Age: 72
Setting detail: SPECIMEN
Discharge: HOME | End: 2022-06-14

## 2022-06-15 ENCOUNTER — HOSPITAL ENCOUNTER (OUTPATIENT)
Age: 72
Setting detail: SPECIMEN
Discharge: HOME | End: 2022-06-15

## 2022-06-15 LAB
BACTERIA: NEGATIVE /HPF
BILIRUBIN URINE: NEGATIVE MG/DL
BLOOD, URINE: ABNORMAL
CLARITY: ABNORMAL
COLOR: YELLOW
GLUCOSE, URINE: NEGATIVE MG/DL
KETONES, URINE: NEGATIVE MG/DL
LEUKOCYTE ESTERASE, URINE: ABNORMAL
NITRITE URINE, QUANTITATIVE: NEGATIVE
PH, URINE: 9 (ref 5–8)
PROTEIN UA: >300 MG/DL
RBC URINE: 31 /HPF (ref 0–6)
REASON FOR REJECTION: NORMAL
REJECTED TEST: NORMAL
SPECIFIC GRAVITY UA: <1.005 (ref 1–1.03)
SQUAMOUS EPITHELIAL: 1 /HPF
TRICHOMONAS: ABNORMAL /HPF
TRIPLE PHOSPHATE CRYSTALS: ABNORMAL /HPF
UROBILINOGEN, URINE: NORMAL MG/DL (ref 0.2–1)
WBC UA: 3 /HPF (ref 0–5)

## 2022-06-15 PROCEDURE — 81001 URINALYSIS AUTO W/SCOPE: CPT

## 2022-06-15 PROCEDURE — 87086 URINE CULTURE/COLONY COUNT: CPT

## 2022-06-16 ENCOUNTER — HOSPITAL ENCOUNTER (OUTPATIENT)
Age: 72
Setting detail: SPECIMEN
Discharge: HOME | End: 2022-06-16

## 2022-06-16 LAB
CULTURE: NORMAL
Lab: NORMAL
SPECIMEN: NORMAL

## 2022-06-16 PROCEDURE — 87086 URINE CULTURE/COLONY COUNT: CPT

## 2022-06-16 PROCEDURE — 87186 SC STD MICRODIL/AGAR DIL: CPT

## 2022-06-16 PROCEDURE — 87077 CULTURE AEROBIC IDENTIFY: CPT

## 2022-06-16 PROCEDURE — 81001 URINALYSIS AUTO W/SCOPE: CPT

## 2022-06-17 LAB
BACTERIA: ABNORMAL /HPF
BILIRUBIN URINE: NEGATIVE MG/DL
BLOOD, URINE: ABNORMAL
CLARITY: ABNORMAL
COLOR: YELLOW
GLUCOSE, URINE: NEGATIVE MG/DL
KETONES, URINE: NEGATIVE MG/DL
LEUKOCYTE ESTERASE, URINE: ABNORMAL
NITRITE URINE, QUANTITATIVE: NEGATIVE
PH, URINE: 9 (ref 5–8)
PROTEIN UA: >300 MG/DL
RBC URINE: 130 /HPF (ref 0–6)
SPECIFIC GRAVITY UA: <1.005 (ref 1–1.03)
TRICHOMONAS: ABNORMAL /HPF
TRIPLE PHOSPHATE CRYSTALS: ABNORMAL /HPF
UROBILINOGEN, URINE: 0.2 MG/DL (ref 0.2–1)
WBC UA: 18 /HPF (ref 0–5)

## 2022-06-20 LAB
CULTURE: ABNORMAL
Lab: ABNORMAL
SPECIMEN: ABNORMAL

## 2022-06-27 ENCOUNTER — CARE COORDINATION (OUTPATIENT)
Dept: CASE MANAGEMENT | Age: 72
End: 2022-06-27

## 2022-06-27 ENCOUNTER — TELEPHONE (OUTPATIENT)
Dept: CARDIOLOGY CLINIC | Age: 72
End: 2022-06-27

## 2022-06-27 NOTE — CARE COORDINATION
[facility] Transitions Follow Up Call    2022    Patient: Jani Mcknight  Patient : 1950   MRN: [id number]  Reason for Admission:   Discharge Date: 22 RARS: Readmission Risk Score: 11.9 ( )    Patient called to inform writer, PCP staff told her they did not have any appointments for follow up patient until . She states they were going to call her if they could do a phone visit. Nusrat also said that nephrology will be notified to request cardiac clearance from cardiology directly instead of telling her to do it. States she will keep writer informed. Spoke with: [address] Transitions Subsequent and Final Call    Subsequent and Final Calls  Care Transitions Interventions  Other Interventions:            Follow Up  Future Appointments   Date Time Provider Chance Chiang   2022  1:30 PM Sanna LOUIE   10/6/2022  2:00 PM Master Harvey APRN - CNP [facility]   10/10/2022  1:00 PM SCHEDULE, [facility] Wiley CASILLAS LPN [address] KYLER Zavala RN

## 2022-06-27 NOTE — TELEPHONE ENCOUNTER
Patient calling for clearance for Dr Luz Kwok.  Left message for Allyson Farrar at his office to send request

## 2022-06-27 NOTE — CARE COORDINATION
[facility] Transitions Initial Follow Up Call    Call within 2 business days of discharge: Yes    Patient: Delicia Pardo Patient : 1950   MRN: [id number]  Reason for Admission: Complicated UTI  Discharge Date: 22 RARS: Readmission Risk Score: 11.9 ( )      Last Discharge [facility]       Complaint Diagnosis Description Type Department Provider    22  Encounter for preadmission testing . .. Admission (Discharged) [facility] MD Hailee        Acute Care Course: Patient admitted to [facility] complicated UTI from -. Obstructive uropathy stent placed. She discharged to [facility] for rehab from -. HFU made:      Sig Hx: general abd pain, Sepsis, Mechanical fall, Physical deconditioning, elevated troponin    DME: Cane    Conversation: Patient states she is very tired. States she tires when trying to ambulate, sponge bath and dress self. States she ambulates using her cane for very short distances and she furniture walks. Her caretaker Conner Almodovar helps with what he can. States she lower abdominal pain. States a stent was placed on her rt side. She later obtain infection. She was placed on a antibiotic Ceftin 250 mg twice daily. She is still currently taking. States the left side had stones and it is painful. For a intervention she will try and get comfortable and calm self. States she has vaginal pain with burning during urination. Patient has Tylenol  And Tramadol on medication list. Medications reviewed. Not in Epic is Glycolax, Tramadol and Robaxin 500mg 3 times a day as needed. She reports BLE swelling. States she elevates to resolve. Denies n/v, fever, chills, urinary frequency, itching or bowel concerns. Appetite is fair. States she limits her sodium intake. Patient states she had [facility] before. States she told them that she does not want anyone in the house. Assessments and therapy took place on the porch per patient. Writer called [facility] [employer]. Fredilis Willy verified referral received. Patient declined several attempts to move August appointment with PCP closer. She states she will call them herself. States she has to arrange a two day notice for transportation. Writer explained to patient she needs to be seen within 7-10 days and asked her to call following this interview. She agreed to call and update writer. Contact information provided. PCP updated on urinary symptoms and need to move appointment closer. Patient is agreeable to future calls. States she likes it when people check on her. Follow up plan: Will wait to hear if PCP appointment is updated. Will hand off to [facility]       Non-face-to-face services provided:      Care Transitions 24 Hour Call    Do you have any ongoing symptoms?: No  Do you have a copy of your discharge instructions?: Yes  Do you have all of your prescriptions and are they filled?: Yes  Have you been contacted by a [address]?: No  Have you scheduled your follow up appointment?: Yes (Comment: Patient will attempt to move appointment closer than 8/23)  Were you discharged with any Home Care or Post Acute Services: Yes  Post Acute Services: Home Health  Patient DME: Straight cane, Chepe David, Shower chair  Patient Home Equipment: CPAP, Nebulizer  Do you have support at home?: Other Caregiver  Do you feel like you have everything you need to keep you well at home?: Yes  Are you an active caregiver in your home?: No  Care Transitions Interventions     Transitions of Care Initial Call    Was this an external facility discharge? No Discharge     Challenges to be reviewed by the provider   Additional needs identified to be addressed with provider: No  none             Method of communication with provider : none    Advance Care Planning:   Does patient have an Advance Directive: not on file. Care Transition Nurse contacted the patient by telephone to perform post hospital discharge assessment.  Verified name and  with patient as identifiers. Provided introduction to self, and explanation of the CTN role. CTN reviewed discharge instructions, medical action plan and red flags with patient who verbalized understanding. Patient given an opportunity to ask questions and does not have any further questions or concerns at this time. Were discharge instructions available to patient? Yes. Reviewed appropriate site of care based on symptoms and resources available to patient including: PCP  Specialist  When to call 911. The patient agrees to contact the PCP office for questions related to their healthcare. Medication reconciliation was performed with patient, who verbalizes understanding of administration of home medications. Advised obtaining a 90-day supply of all daily and as-needed medications. Was patient discharged with a pulse oximeter? no    CTN provided contact information. Plan for follow-up call in 3-5 days based on severity of symptoms and risk factors. Plan for next call: symptom management-fatigue, abdominal pain and urinary burning  follow up appointment-Status of scheduling appointment closer.          Follow Up  Future Appointments   Date Time Provider Chance Chiang   2022  1:30 PM Radha LOUIE   10/6/2022  2:00 PM AUSTIN Canas - CNP [address] [place] [address] [place] [facility] JACY   10/10/2022  1:00 PM SCHEDULE, [facility] LPN [address] K[address]ER Treadwell RN

## 2022-06-28 ENCOUNTER — CARE COORDINATION (OUTPATIENT)
Dept: CARE COORDINATION | Age: 72
End: 2022-06-28

## 2022-06-28 ENCOUNTER — TELEPHONE (OUTPATIENT)
Dept: CARDIOLOGY CLINIC | Age: 72
End: 2022-06-28

## 2022-06-28 NOTE — TELEPHONE ENCOUNTER
Cardiologist: Dr. Mary Samayoa  Surgeon: Dr. Cameron Polanco  Surgery: Cystoscopy, Ureteroscopy, Retrograde Pyelgram, stone manipulation with Holium laser lithotripsy,Poss Stent    Anesthesia: General  Date: ASA  FAX# [phone]  # [phone]    Last OV 10/6/2021 w/Jory         1. Cardiac pacemaker  Care link reviewed  8/30/2021              Aida Powell MD 8/30/2021  7:42 PM EDT  Pacer analysis is reviewed is consistent with normal dual-chamber MRI safe Medtronic Advisa pacer function with stable leads and appropriate battery status for the age of the device. Remaining average battery life is 2 years. Device is programmed to DDD mode lower rate of 60 bpm and 99.8% pacing in the ventricle.     Recommend continued every three month check and follow up office visit as scheduled.     Yosvany Hannon MD, 8/30/2021 7:42 PM   Stable parameters  Her pacemaker does change positions. Have advised patient not to move pacemaker. Discussed pocket revision and patient does not want to have it done at this time.      2. Precordial pain  Non cardiac pain  Atypical-Lasting few seconds  Cath 2019 no sig disease     3. Hyperlipidemia, unspecified hyperlipidemia type  At goal   Continue simvastatin      4. Essential hypertension  Blood pressure is controlled   Continue to monitor     5. VHD  Hx. Of VHD and recently had COVID. She does have shortness of breath and murmur 4/6 noted. Check echo to make sure VHD is not worse or exasperated          Lifestyle and risk factor modificatons discussed. Various goals are discussed and questions answered. Continue current medications.  Appropriate prescriptions are addressed.  Questions answered and patient verbalizes understanding.          Last EKG- 5/22/2022    NM- 6/27/2019   ECG portion of stress test is negative for ischemia by diagnostic criteria.    Normal EF 62 % with normal ventricular contractility.    Moderate intensity , medium size of the inferior wall shows ischemia

## 2022-07-06 ENCOUNTER — TELEPHONE (OUTPATIENT)
Dept: CARDIOLOGY CLINIC | Age: 72
End: 2022-07-06

## 2022-07-06 NOTE — TELEPHONE ENCOUNTER
Justice Bowden from Dr Duane Adolph office called wanting to know if we have been able to reach pt in reference to getting her cleared for surgery. They along with another provider are unable to reach pt. Compared contact info and communication form. No other number to reach pt.

## 2022-07-12 ENCOUNTER — CARE COORDINATION (OUTPATIENT)
Dept: CARE COORDINATION | Age: 72
End: 2022-07-12

## 2022-07-12 NOTE — CARE COORDINATION
Attempted to reach patient for ACM follow up:  [facility] eligible for enrollment. No answer to phone. Message left with ACM contact information and request for call back. Introduction letter sent via My Chart.

## 2022-07-13 ENCOUNTER — CARE COORDINATION (OUTPATIENT)
Dept: CARE COORDINATION | Age: 72
End: 2022-07-13

## 2022-07-21 ENCOUNTER — OFFICE VISIT (OUTPATIENT)
Dept: CARDIOLOGY CLINIC | Age: 72
End: 2022-07-21
Payer: MEDICARE

## 2022-07-21 VITALS
WEIGHT: 267 LBS | DIASTOLIC BLOOD PRESSURE: 76 MMHG | BODY MASS INDEX: 49.13 KG/M2 | HEART RATE: 82 BPM | RESPIRATION RATE: 18 BRPM | HEIGHT: 62 IN | SYSTOLIC BLOOD PRESSURE: 126 MMHG

## 2022-07-21 DIAGNOSIS — I38 VHD (VALVULAR HEART DISEASE): Primary | ICD-10-CM

## 2022-07-21 DIAGNOSIS — Z95.0 CARDIAC PACEMAKER: ICD-10-CM

## 2022-07-21 DIAGNOSIS — I10 HYPERTENSION, UNSPECIFIED TYPE: ICD-10-CM

## 2022-07-21 PROCEDURE — 1124F ACP DISCUSS-NO DSCNMKR DOCD: CPT | Performed by: NURSE PRACTITIONER

## 2022-07-21 PROCEDURE — 3017F COLORECTAL CA SCREEN DOC REV: CPT | Performed by: NURSE PRACTITIONER

## 2022-07-21 PROCEDURE — G8427 DOCREV CUR MEDS BY ELIG CLIN: HCPCS | Performed by: NURSE PRACTITIONER

## 2022-07-21 PROCEDURE — 1036F TOBACCO NON-USER: CPT | Performed by: NURSE PRACTITIONER

## 2022-07-21 PROCEDURE — G8399 PT W/DXA RESULTS DOCUMENT: HCPCS | Performed by: NURSE PRACTITIONER

## 2022-07-21 PROCEDURE — 99214 OFFICE O/P EST MOD 30 MIN: CPT | Performed by: NURSE PRACTITIONER

## 2022-07-21 PROCEDURE — 1090F PRES/ABSN URINE INCON ASSESS: CPT | Performed by: NURSE PRACTITIONER

## 2022-07-21 PROCEDURE — G8417 CALC BMI ABV UP PARAM F/U: HCPCS | Performed by: NURSE PRACTITIONER

## 2022-07-21 ASSESSMENT — ENCOUNTER SYMPTOMS
ORTHOPNEA: 0
SHORTNESS OF BREATH: 1

## 2022-07-21 NOTE — PROGRESS NOTES
Readings from Last 3 Encounters:   07/21/22 267 lb (121.1 kg)   05/23/22 280 lb (127 kg)   05/16/22 281 lb 8.4 oz (127.7 kg)     Body mass index is 48.83 kg/m². Physical Exam  Vitals reviewed. Constitutional:       Appearance: She is obese. HENT:      Head: Normocephalic. Cardiovascular:      Rate and Rhythm: Normal rate and regular rhythm. Heart sounds: Murmur (4/6) heard. Abdominal:      General: There is no distension. Tenderness: There is no abdominal tenderness. Musculoskeletal:      Right lower leg: Edema present. Left lower leg: Edema present. Skin:     General: Skin is warm and dry. Neurological:      General: No focal deficit present. Mental Status: She is alert. Psychiatric:         Mood and Affect: Mood normal.              Current Outpatient Medications   Medication Sig Dispense Refill    tamsulosin (FLOMAX) 0.4 MG capsule Take 1 capsule by mouth daily for 14 days 30 capsule 0    gabapentin (NEURONTIN) 100 MG capsule Take 100 mg by mouth daily.       sertraline (ZOLOFT) 100 MG tablet TAKE 2 TABLETS BY MOUTH EVERY MORNING 180 tablet 0    ezetimibe (ZETIA) 10 MG tablet TAKE 1 TABLET BY MOUTH EVERY MORNING 90 tablet 0    simvastatin (ZOCOR) 20 MG tablet TAKE ONE (1) TABLET BY MOUTH NIGHTLY 90 tablet 0    letrozole (FEMARA) 2.5 MG tablet Take 1 tablet by mouth nightly 30 tablet 5    Ferrous Sulfate (IRON) 325 (65 Fe) MG TABS Take 1 tablet by mouth every other day 30 tablet 0    albuterol-ipratropium (COMBIVENT RESPIMAT)  MCG/ACT AERS inhaler Inhale 1 puff into the lungs 4 times daily 1 Inhaler 0    acetaminophen (TYLENOL) 500 MG tablet Take 500 mg by mouth every 6 hours as needed for Pain      fluticasone (FLOVENT HFA) 110 MCG/ACT inhaler Inhale 2 puffs into the lungs 2 times daily 1 Inhaler 5    albuterol sulfate HFA (PROAIR HFA) 108 (90 Base) MCG/ACT inhaler INHALE 2 PUFFS BY MOUTH EVERY SIX HOURS AS NEEDED FOR WHEEZING 1 Inhaler 5    vitamin B-12 (CYANOCOBALAMIN) 1000 MCG tablet Take 1,000 mcg by mouth daily      calcium-vitamin D (OSCAL-500) 500-200 MG-UNIT per tablet Take 1 tablet by mouth 2 times daily       aspirin 81 MG EC tablet Take 81 mg by mouth nightly  (Patient not taking: Reported on 7/21/2022)       No current facility-administered medications for this visit. All pertinent data reviewed and discussed with patient       ASSESSMENT/PLAN:    Valvular heart disease  Echocardiogram November 2021 demonstrated moderate aortic insufficiency with moderate AAS with mean gradient 27 mmHg: BACILIO 1.35 cm  Now notes increased shortness of breath with activity with dizziness. Recommend recheck echocardiogram to assess progression of valvular heart disease    Dual-chamber pacemaker  Place for complete heart block. Today's pacemaker analysis shows normal functioning dual-chamber Medtronic pacemaker with normal battery and normal leads. Recommend to follow with every 3-month remote pacemaker monitoring    Hypertension  Her antihypertensive have been discontinued  Blood pressure is controlled today    Dyslipidemia  On simvastatin    Carotid artery disease  Status post right carotid endarterectomy. (Dr. Danna Aguilar)  Noted to have moderate disease of the left carotid: Continues to follow with CTS for ongoing surveillance  Recommended continue with simvastatin  Her aspirin is on hold for pending surgery      Preop clearance  Will give preop clearance once echocardiogram has been obtained. Concerned that she may have progression of her valvular heart disease due to increased shortness of breath and dizziness    Tests ordered:  echo   Follow-up  3 mo with Dr Whitt Lincoln    Signed:  Gloris Mcardle, APRN - CNP, 7/21/2022, 3:07 PM    An electronic signature was used to authenticate this note.     Please note this report has been partially produced using speech recognition software and may contain errors related to that system including errors in grammar, punctuation, and spelling, as well as words and phrases that may be inappropriate. If there are any questions or concerns please feel free to contact the dictating provider for clarification.

## 2022-07-23 ENCOUNTER — PROCEDURE VISIT (OUTPATIENT)
Dept: CARDIOLOGY CLINIC | Age: 72
End: 2022-07-23
Payer: MEDICARE

## 2022-07-23 DIAGNOSIS — I38 VHD (VALVULAR HEART DISEASE): ICD-10-CM

## 2022-07-23 LAB
LV EF: 58 %
LVEF MODALITY: NORMAL

## 2022-07-23 PROCEDURE — 93306 TTE W/DOPPLER COMPLETE: CPT | Performed by: INTERNAL MEDICINE

## 2022-07-25 ENCOUNTER — TELEPHONE (OUTPATIENT)
Dept: CARDIOLOGY CLINIC | Age: 72
End: 2022-07-25

## 2022-07-26 ENCOUNTER — TELEPHONE (OUTPATIENT)
Dept: CARDIOLOGY CLINIC | Age: 72
End: 2022-07-26

## 2022-07-26 DIAGNOSIS — I35.0 NONRHEUMATIC AORTIC VALVE STENOSIS: Primary | ICD-10-CM

## 2022-07-26 NOTE — TELEPHONE ENCOUNTER
Called to advise pt of ECHO results: has moderate aortic stenosis- will need to recheck echo in 6 months. Pt asked for results to be sent to Dr. Cuong Morrow.

## 2022-07-29 ENCOUNTER — ANESTHESIA EVENT (OUTPATIENT)
Dept: OPERATING ROOM | Age: 72
End: 2022-07-29
Payer: MEDICARE

## 2022-07-29 ASSESSMENT — COPD QUESTIONNAIRES: CAT_SEVERITY: SEVERE

## 2022-07-29 NOTE — ANESTHESIA PRE PROCEDURE
Patient not taking: Reported on 7/21/2022    Historical Provider, MD   calcium-vitamin D (OSCAL-500) 500-200 MG-UNIT per tablet Take 1 tablet by mouth 2 times daily     Historical Provider, MD       Current medications:    Current Outpatient Medications   Medication Sig Dispense Refill    tamsulosin (FLOMAX) 0.4 MG capsule Take 1 capsule by mouth daily for 14 days 30 capsule 0    gabapentin (NEURONTIN) 100 MG capsule Take 100 mg by mouth daily.  sertraline (ZOLOFT) 100 MG tablet TAKE 2 TABLETS BY MOUTH EVERY MORNING 180 tablet 0    ezetimibe (ZETIA) 10 MG tablet TAKE 1 TABLET BY MOUTH EVERY MORNING 90 tablet 0    simvastatin (ZOCOR) 20 MG tablet TAKE ONE (1) TABLET BY MOUTH NIGHTLY 90 tablet 0    letrozole (FEMARA) 2.5 MG tablet Take 1 tablet by mouth nightly 30 tablet 5    Ferrous Sulfate (IRON) 325 (65 Fe) MG TABS Take 1 tablet by mouth every other day 30 tablet 0    albuterol-ipratropium (COMBIVENT RESPIMAT)  MCG/ACT AERS inhaler Inhale 1 puff into the lungs 4 times daily 1 Inhaler 0    acetaminophen (TYLENOL) 500 MG tablet Take 500 mg by mouth every 6 hours as needed for Pain      fluticasone (FLOVENT HFA) 110 MCG/ACT inhaler Inhale 2 puffs into the lungs 2 times daily 1 Inhaler 5    albuterol sulfate HFA (PROAIR HFA) 108 (90 Base) MCG/ACT inhaler INHALE 2 PUFFS BY MOUTH EVERY SIX HOURS AS NEEDED FOR WHEEZING 1 Inhaler 5    vitamin B-12 (CYANOCOBALAMIN) 1000 MCG tablet Take 1,000 mcg by mouth daily      aspirin 81 MG EC tablet Take 81 mg by mouth nightly  (Patient not taking: Reported on 7/21/2022)      calcium-vitamin D (OSCAL-500) 500-200 MG-UNIT per tablet Take 1 tablet by mouth 2 times daily        No current facility-administered medications for this visit. Allergies:     Allergies   Allergen Reactions    Bactrim [Sulfamethoxazole-Trimethoprim] Anaphylaxis and Hives    Lipitor [Atorvastatin] Shortness Of Breath    Taxol [Paclitaxel] Anaphylaxis and Swelling    Aminoglycosides      Abstracted from [facility] patient chart.  Demerol Nausea Only    Neosporin [Bacitracin-Neomycin-Polymyxin] Rash and Other (See Comments)     hotness    Other Rash     Ivory Soap    Talc Other (See Comments)     blisters       Problem List:    Patient Active Problem List   Diagnosis Code    Malignant neoplasm of upper-outer quadrant of right breast in female, estrogen receptor positive [facility] C50.411, Z17.0    Diffuse cystic mastopathy N60.19    Hyperlipidemia E78.5    H/O chest pain Z87.898    Heart block AV second degree I44.1    Abnormal cardiovascular stress test R94.39    JAMIE (obstructive sleep apnea) G47.33    Super obesity E66.9    Mild asthma J45.909    Severe obstructive sleep apnea G47.33    Cardiac pacemaker Z95.0    Chronic cystitis N30.20    Asthma J45.909    Hypertension I10    Depression F32. A    Obstructive sleep apnea G47.33    Nocturnal oxygen desaturation G47.34    NSTEMI (non-ST elevated myocardial infarction) ([facility]) I21.4    PVD (peripheral vascular disease) ([facility]) I73.9    Morbid obesity with BMI of 45.0-49.9, adult ([facility]) E66.01, Z68.42    Moderate aortic stenosis I35.0    Hepatomegaly, not elsewhere classified R16.0    Arthritis M19.90    CAD (coronary artery disease) I25.10    Pneumonia due to COVID-19 virus U07.1, J12.82    Bilateral carotid artery stenosis I65.23    Moderate persistent asthma without complication D40.32    Carotid stenosis, right I45.61    Complicated urinary tract infection N39.0       Past Medical History:        Diagnosis Date    Anemia     Anxiety 1978    Arthritis     generalized    Asthma 2006    AV block     2nd degree per hospital in june2013    Blood poisoning 1994    Blood transfusion     12/2003    CAD (coronary artery disease)     Cancer ([facility] 2007    skin (face) & colon(2003)/ 2/2012 dx of right breast ca(pc)    Carcin[facility] of [facility] 02/29/2012    right arm no b/p or sticks    Cardiac pacemaker 03/10/2014    Medtronic dual lead    Chronic cystitis     Chronic respiratory failure (HCC)     2 L/min oxygen at night-time    Colon cancer [facility]) 2013    COPD (chronic obstructive pulmonary disease) (HCC)     CTS (carpal tunnel syndrome)     Depression     Diverticulitis     H/O 24 hour EKG monitoring 2/28/2014 7/24/13 2/14 complete heart block 7/13No clinically significant arrthymia noted.  H/O cardiac catheterization 10/31/2011, 7/11/2007    10/31/2011-No significant CAD. Cardiolite stress test was false positive-Dr Juhi George; 7/11/2007-No CAD, global function intact;    H/O cardiovascular stress test 10/20/2011, 3/23/2010    10/20/2011-Lexiscan- Evid of mild ischemia in Left CX region. EF 70%. Global LVSF normal;    H/O cardiovascular stress test 01/07/2015    EF 77%. Normal Stress Test.    H/O chest pain 04/2005    sees Dr Chance Cortés H/O Doppler ultrasound 02/20/2008 2/20/2008-CAROTID DOPPLER- Normal carotids bilaterally;    H/O Doppler ultrasound 06/06/2013    CAROTID DOPPLER- there is heterogeneous, irregular atherosclerotic plaque noted in the right internal carotid artery,  doppler flow velocities within the right internal carotid artery are elevated, consistent with a mild, less than 50%stenosis, there is intimal thickening but no significant atherosclerotic plaque noted in the left internal carotid artery, the left carotid are within normal limits    H/O echocardiogram 4/9/2012, 10/20/2011    4/9/2012-LVSF normal EF=>55%. impaired LV relaxation;    H/O echocardiogram 12/31/2014    EF 50-55%. LV shows mild concentric hypertrophy. Mildly dilated left atrium. Mildly dilated right ventricle. Sclerotic but not stenotic aortic valve. Mitral annular calcification.   Mild MR, Mild TR, Mild pulm HTN.    H/O urinary incontinence     History of blood transfusion     Hx antineoplastic chemo     Hx of Arterial Doppler ultrasound 07/01/2019    No hemodynamically significant or focal stenosis visualized bilaterally, bilateral lower extremity arteries exhibit diffuse plaque and multiphasic waveforms, unable to obtain bilateral ABIs due to elevated leg pressures >250 mmHg, possibly due to atherosclerosis    Hx of cardiovascular stress test 06/2013    EF 70% abn suggestive of anterolateral ischemia, possible RCA ischmeia, post left ventricular dilation suggestive multivessel CAD.  Hx of echocardiogram 06/2013    EF50%, mild MR and TR, mild pulmonary HTN, mild AS    Hx of echocardiogram 11/01/2021    Left ventricular systolic function is normal, Mild concentric left ventricular hypertrophy Mildly dilated left atrium. Mitral annular calcification is present. Mild tricuspid regurgitation No evidence of any pericardial effusion    Hx of fall     \"last time 2018- due to neuropathy, have to use cane\"    HX OTHER MEDICAL 7/24/13, 06/2013 7/13-No clinacally significant arrhythmia. 6/13-multiple cycles of wenckebach episodes in addtion to some sinus tach    Hyperlipidemia     Hypertension     Hypothermia 2008    Malignant neoplasm of breast (female) ([facility] 2012    Neuropathy     \"have neuropathy of my legs\"    Normocytic normochromic anemia     Obstructive sleep apnea 2008 01- Has CPAP machine but has not used in over a year - states she has not had problems since her pacemaker was placed.  Old MI (myocardial infarction)     per pt on 1/15/2019\"had heart attack about a year ago\"    Osteoarthritis     Osteopenia     Primary malignant neoplasm of colon ([facility]     S/P cardiac cath 06/2013    no significant CAD false postive stress test    Skin cancer     Super obesity     Umbilical hernia        Past Surgical History:        Procedure Laterality Date    A-V CARDIAC PACEMAKER INSERTION  3/10/14     Medtronic.    Model:  [id number] Medtronic  Serial:  [id number] dual chamber pacemaker    APPENDECTOMY  2004    BREAST BIOPSY  2/13/12    BREAST LUMPECTOMY  2007    right     BREAST SURGERY  02/13/2012    rt lesion biopsy     CARDIAC CATHETERIZATION  2007 & 2011    CAROTID ENDARTERECTOMY Right 12/3/2021    RIGHT CAROTID ENDARTERECTOMY WITH PATCH performed by Kathy Cervantes MD at [address] [facility]  2004    Colon cancer    COLONOSCOPY  10-18-13    polyp    COLONOSCOPY  1/19/2016    internal hemorrhoids, diverticulosis, 1.5 cm sessile polyp, 8 mm polyp found in rectum.  COLONOSCOPY  12/14/2017    1.5cm residual polyp, 5mm polyps in blind sigmoid loop x3, Sigmoid divertics, Internal grade 1 hemorrhoids    COLONOSCOPY N/A 1/16/2019    COLONOSCOPY W/ ENDOSCOPIC MUCOSAL RESECTION WITH ELEVIEW 5ML INJECTION AND POLYPECTOMY OF TIP OF BLIND RECTOSIGMOID STUMP AND CAUTERIZATION WITH ERBE PROBE, CLIPPING X2 performed by Cristian Pelaez MD at [place] [address]  01/21/2020    pan divertics/ 4 polyps/ sm int hem, S/P partial sigmoid resection w/ end to side anastamosis, repeat in 3 years    COLONOSCOPY N/A 1/21/2020    COLONOSCOPY POLYPECTOMY SNARE/COLD BIOPSY performed by Cristian Pelaez MD at [place] [address]  2003    back   [address] Bilateral 12/15/14    CYSTOSCOPY Right 5/15/2022    CYSTOSCOPY URETERAL STENT INSERTION performed by Dunia Hernandez MD at [facility] [facility]      front right tooth and root canal w/ brass bolt    DILATION AND CURETTAGE OF UTERUS  2006    Needed a camera to find my uterus.     DILATION AND CURETTAGE OF UTERUS N/A 5/24/2022    DILATATION AND CURETTAGE, CULTURES OF UTEREUS performed by Prerna Krishnan MD at [address] [facility], COLON, DIAGNOSTIC  12/14/2017    Small hiatal hernia    HERNIA REPAIR  2004    Umb hernia    HERNIA REPAIR  2008    Inc hernia    KIDNEY SURGERY  05/24/2022    stent placed on right side    LYMPH NODE DISSECTION  2/29/2012    Right axillary-3 sentinel nodes were positive out of 9.    MASTECTOMY, RADICAL Right 02/29/2012    w/ sentinal node disection-Dr West    PACEMAKER input(s): POCGLU, POCNA, POCK, POCCL, POCBUN, POCHEMO, POCHCT in the last 72 hours. Coags:   Lab Results   Component Value Date/Time    PROTIME 11.4 11/29/2021 03:14 PM    PROTIME 11.2 10/28/2011 02:28 PM    INR 0.88 11/29/2021 03:14 PM    APTT 26.6 11/29/2021 03:14 PM       HCG (If Applicable): No results found for: PREGTESTUR, PREGSERUM, HCG, HCGQUANT     ABGs: No results found for: PHART, PO2ART, KZC2EIA, WIP8SAY, BEART, E3OVOHMF     Type & Screen (If Applicable):  No results found for: LABABO, LABRH    Drug/Infectious Status (If Applicable):  Lab Results   Component Value Date/Time    HEPCAB NON REACTIVE 09/01/2017 11:07 AM       COVID-19 Screening (If Applicable):   Lab Results   Component Value Date/Time    COVID19 NOT DETECTED 05/19/2022 10:16 AM    COVID19 NOT DETECTED 11/29/2021 03:15 PM           Anesthesia Evaluation  Patient summary reviewed  Airway: Mallampati: I  TM distance: >3 FB   Neck ROM: full  Mouth opening: > = 3 FB   Dental:          Pulmonary:normal exam    (+) COPD: severe,  sleep apnea: on noncompliant,  decreased breath sounds asthma:                           ROS comment: 2 L/min oxygen at night-time   Cardiovascular:  Exercise tolerance: poor (<4 METS),   (+) hypertension:, valvular problems/murmurs: AS, pacemaker:, past MI:, CAD:, dysrhythmias:, hyperlipidemia      ECG reviewed  Rhythm: regular  Rate: normal  Echocardiogram reviewed         Beta Blocker:  Not on Beta Blocker      ROS comment:  Summary   Left ventricular systolic function is normal with an ejection fraction of   55-60%. Moderate concentric left ventricular hypertrophy. Grade I diastolic dysfunction. Mild bilateral atrial enlargement. Calcific aortic valve with moderate aortic stenosis, mean gradient of 25   mmHg and an BACILIO of 1.17 cm sq. Mild aortic regurgitation with PHT of 854 msec. Aortic valve unchanged from   previous echo.    Mitral annular calcification noted with possible mild mitral stenosis, mean gradient of 3 mmHg and an MVA of 1.96 mmHg. Mild-to-moderate tricuspid regurgitation. Moderate pulmonary hypertension at 51 mmHg. No evidence of pericardial effusion. reecho in 6 mos      Signature      ------------------------------------------------------------------   Electronically signed by Norm Perez MD   (Interpreting physician) on 07/23/2022 at 03:55 PM   ------------------------------------------------------------------  Atrial-sensed ventricular-paced rhythm   Abnormal ECG   When compared with ECG of 29-NOV-2021 15:44,   Vent. rate has increased BY   3 BPM   Confirmed by Padmini Moore MD, [facility] ([id number]) on 5/16/2022 7:16:43 AM  Atrial-sensed ventricular-paced rhythm   Abnormal ECG   When compared with ECG of 04-NOV-2021 22:07,   Vent. rate has decreased BY   6 BPM   Confirmed by Mc Johansen ([id number]) on 11/30/2021 6:19:20 PM    Resulting Agency  [facility]           Neuro/Psych:   (+) depression/anxiety              ROS comment: Neuropathy bl legs  GI/Hepatic/Renal:   (+) liver disease:, morbid obesity          Endo/Other:    (+) blood dyscrasia: anemia, arthritis: OA., malignancy/cancer. Pt had no PAT visit        ROS comment: Right breat ca Abdominal:   (+) obese,           Vascular:   + PVD, aortic or cerebral, . ROS comment: S/p right carotid endarteractomy      Approximately 50% stenosis involving the proximal left cervical internal  carotid artery.     Severe stenosis involving the origin of the left vertebral artery. cea 2021. Other Findings: Poor dentition            Anesthesia Plan      general     ASA 4     (Cardiac clearance pending echo, done 7/26/22)  Induction: intravenous. Anesthetic plan and risks discussed with patient and spouse. Use of blood products discussed with patient whom. Plan discussed with CRNA.                     AUSTIN Lagunas - JENI   7/29/2022

## 2022-08-01 ENCOUNTER — ANESTHESIA (OUTPATIENT)
Dept: OPERATING ROOM | Age: 72
End: 2022-08-01
Payer: MEDICARE

## 2022-08-01 ENCOUNTER — APPOINTMENT (OUTPATIENT)
Dept: GENERAL RADIOLOGY | Age: 72
End: 2022-08-01
Attending: UROLOGY
Payer: MEDICARE

## 2022-08-01 ENCOUNTER — HOSPITAL ENCOUNTER (OUTPATIENT)
Age: 72
Setting detail: OUTPATIENT SURGERY
Discharge: HOME | End: 2022-08-01
Attending: UROLOGY | Admitting: UROLOGY
Payer: MEDICARE

## 2022-08-01 VITALS
WEIGHT: 278 LBS | TEMPERATURE: 97.3 F | SYSTOLIC BLOOD PRESSURE: 160 MMHG | HEART RATE: 60 BPM | RESPIRATION RATE: 18 BRPM | HEIGHT: 62 IN | OXYGEN SATURATION: 95 % | BODY MASS INDEX: 51.16 KG/M2 | DIASTOLIC BLOOD PRESSURE: 65 MMHG

## 2022-08-01 DIAGNOSIS — N20.0 KIDNEY STONE: ICD-10-CM

## 2022-08-01 DIAGNOSIS — Z01.818 ENCOUNTER FOR PREADMISSION TESTING: Primary | ICD-10-CM

## 2022-08-01 LAB
BASOPHILS ABSOLUTE: 0 K/CU MM
BASOPHILS RELATIVE PERCENT: 0.4 % (ref 0–1)
DIFFERENTIAL TYPE: ABNORMAL
EOSINOPHILS ABSOLUTE: 0.2 K/CU MM
EOSINOPHILS RELATIVE PERCENT: 1.9 % (ref 0–3)
HCT VFR BLD CALC: 42.6 % (ref 37–47)
HEMOGLOBIN: 13.2 GM/DL (ref 12.5–16)
IMMATURE NEUTROPHIL %: 0.5 % (ref 0–0.43)
LYMPHOCYTES ABSOLUTE: 2 K/CU MM
LYMPHOCYTES RELATIVE PERCENT: 23.4 % (ref 24–44)
MCH RBC QN AUTO: 27.2 PG (ref 27–31)
MCHC RBC AUTO-ENTMCNC: 31 % (ref 32–36)
MCV RBC AUTO: 87.8 FL (ref 78–100)
MONOCYTES ABSOLUTE: 0.5 K/CU MM
MONOCYTES RELATIVE PERCENT: 5.7 % (ref 0–4)
NUCLEATED RBC %: 0 %
PDW BLD-RTO: 14.8 % (ref 11.7–14.9)
PLATELET # BLD: 261 K/CU MM (ref 140–440)
PMV BLD AUTO: 9.6 FL (ref 7.5–11.1)
RBC # BLD: 4.85 M/CU MM (ref 4.2–5.4)
REASON FOR REJECTION: NORMAL
REJECTED TEST: NORMAL
SEGMENTED NEUTROPHILS ABSOLUTE COUNT: 5.8 K/CU MM
SEGMENTED NEUTROPHILS RELATIVE PERCENT: 68.1 % (ref 36–66)
TOTAL IMMATURE NEUTOROPHIL: 0.04 K/CU MM
TOTAL NUCLEATED RBC: 0 K/CU MM
WBC # BLD: 8.5 K/CU MM (ref 4–10.5)

## 2022-08-01 PROCEDURE — 85025 COMPLETE CBC W/AUTO DIFF WBC: CPT

## 2022-08-01 PROCEDURE — 3600000003 HC SURGERY LEVEL 3 BASE: Performed by: UROLOGY

## 2022-08-01 PROCEDURE — C1758 CATHETER, URETERAL: HCPCS | Performed by: UROLOGY

## 2022-08-01 PROCEDURE — 7100000000 HC PACU RECOVERY - FIRST 15 MIN: Performed by: UROLOGY

## 2022-08-01 PROCEDURE — 88300 SURGICAL PATH GROSS: CPT | Performed by: PATHOLOGY

## 2022-08-01 PROCEDURE — 82360 CALCULUS ASSAY QUANT: CPT

## 2022-08-01 PROCEDURE — C2617 STENT, NON-COR, TEM W/O DEL: HCPCS | Performed by: UROLOGY

## 2022-08-01 PROCEDURE — 2580000003 HC RX 258: Performed by: NURSE ANESTHETIST, CERTIFIED REGISTERED

## 2022-08-01 PROCEDURE — 2580000003 HC RX 258: Performed by: UROLOGY

## 2022-08-01 PROCEDURE — 6360000002 HC RX W HCPCS: Performed by: NURSE ANESTHETIST, CERTIFIED REGISTERED

## 2022-08-01 PROCEDURE — C1769 GUIDE WIRE: HCPCS | Performed by: UROLOGY

## 2022-08-01 PROCEDURE — 2709999900 HC NON-CHARGEABLE SUPPLY: Performed by: UROLOGY

## 2022-08-01 PROCEDURE — 3600000013 HC SURGERY LEVEL 3 ADDTL 15MIN: Performed by: UROLOGY

## 2022-08-01 PROCEDURE — 7100000010 HC PHASE II RECOVERY - FIRST 15 MIN: Performed by: UROLOGY

## 2022-08-01 PROCEDURE — 3700000001 HC ADD 15 MINUTES (ANESTHESIA): Performed by: UROLOGY

## 2022-08-01 PROCEDURE — 7100000011 HC PHASE II RECOVERY - ADDTL 15 MIN: Performed by: UROLOGY

## 2022-08-01 PROCEDURE — 3700000000 HC ANESTHESIA ATTENDED CARE: Performed by: UROLOGY

## 2022-08-01 PROCEDURE — 7100000001 HC PACU RECOVERY - ADDTL 15 MIN: Performed by: UROLOGY

## 2022-08-01 PROCEDURE — 2720000010 HC SURG SUPPLY STERILE: Performed by: UROLOGY

## 2022-08-01 PROCEDURE — 6360000002 HC RX W HCPCS: Performed by: UROLOGY

## 2022-08-01 PROCEDURE — 76000 FLUOROSCOPY <1 HR PHYS/QHP: CPT

## 2022-08-01 DEVICE — VARIABLE LENGTH URETERAL STENT
Type: IMPLANTABLE DEVICE | Site: URETER | Status: FUNCTIONAL
Brand: CONTOUR VL™

## 2022-08-01 RX ORDER — SODIUM CHLORIDE, SODIUM LACTATE, POTASSIUM CHLORIDE, CALCIUM CHLORIDE 600; 310; 30; 20 MG/100ML; MG/100ML; MG/100ML; MG/100ML
INJECTION, SOLUTION INTRAVENOUS CONTINUOUS PRN
Status: DISCONTINUED | OUTPATIENT
Start: 2022-08-01 | End: 2022-08-01 | Stop reason: SDUPTHER

## 2022-08-01 RX ORDER — OXYCODONE HYDROCHLORIDE 5 MG/1
10 TABLET ORAL PRN
Status: CANCELLED | OUTPATIENT
Start: 2022-08-01 | End: 2022-08-01

## 2022-08-01 RX ORDER — FENTANYL CITRATE 50 UG/ML
25 INJECTION, SOLUTION INTRAMUSCULAR; INTRAVENOUS EVERY 5 MIN PRN
Status: CANCELLED | OUTPATIENT
Start: 2022-08-01

## 2022-08-01 RX ORDER — DEXAMETHASONE SODIUM PHOSPHATE 4 MG/ML
INJECTION, SOLUTION INTRA-ARTICULAR; INTRALESIONAL; INTRAMUSCULAR; INTRAVENOUS; SOFT TISSUE PRN
Status: DISCONTINUED | OUTPATIENT
Start: 2022-08-01 | End: 2022-08-01 | Stop reason: SDUPTHER

## 2022-08-01 RX ORDER — FENTANYL CITRATE 50 UG/ML
INJECTION, SOLUTION INTRAMUSCULAR; INTRAVENOUS PRN
Status: DISCONTINUED | OUTPATIENT
Start: 2022-08-01 | End: 2022-08-01 | Stop reason: SDUPTHER

## 2022-08-01 RX ORDER — TRAMADOL HYDROCHLORIDE 50 MG/1
50 TABLET ORAL EVERY 6 HOURS PRN
Qty: 14 TABLET | Refills: 0 | Status: SHIPPED | OUTPATIENT
Start: 2022-08-01 | End: 2022-08-06

## 2022-08-01 RX ORDER — ONDANSETRON 2 MG/ML
INJECTION INTRAMUSCULAR; INTRAVENOUS PRN
Status: DISCONTINUED | OUTPATIENT
Start: 2022-08-01 | End: 2022-08-01 | Stop reason: SDUPTHER

## 2022-08-01 RX ORDER — ONDANSETRON 2 MG/ML
4 INJECTION INTRAMUSCULAR; INTRAVENOUS
Status: CANCELLED | OUTPATIENT
Start: 2022-08-01 | End: 2022-08-01

## 2022-08-01 RX ORDER — OXYCODONE HYDROCHLORIDE 5 MG/1
5 TABLET ORAL PRN
Status: CANCELLED | OUTPATIENT
Start: 2022-08-01 | End: 2022-08-01

## 2022-08-01 RX ORDER — LABETALOL HYDROCHLORIDE 5 MG/ML
10 INJECTION, SOLUTION INTRAVENOUS
Status: CANCELLED | OUTPATIENT
Start: 2022-08-01

## 2022-08-01 RX ORDER — HYDRALAZINE HYDROCHLORIDE 20 MG/ML
10 INJECTION INTRAMUSCULAR; INTRAVENOUS
Status: CANCELLED | OUTPATIENT
Start: 2022-08-01

## 2022-08-01 RX ORDER — DROPERIDOL 2.5 MG/ML
0.62 INJECTION, SOLUTION INTRAMUSCULAR; INTRAVENOUS
Status: CANCELLED | OUTPATIENT
Start: 2022-08-01 | End: 2022-08-01

## 2022-08-01 RX ORDER — ONDANSETRON 4 MG/1
4 TABLET, FILM COATED ORAL EVERY 12 HOURS PRN
Qty: 10 TABLET | Refills: 0 | Status: SHIPPED | OUTPATIENT
Start: 2022-08-01 | End: 2022-08-23

## 2022-08-01 RX ORDER — SODIUM CHLORIDE, SODIUM LACTATE, POTASSIUM CHLORIDE, CALCIUM CHLORIDE 600; 310; 30; 20 MG/100ML; MG/100ML; MG/100ML; MG/100ML
INJECTION, SOLUTION INTRAVENOUS ONCE
Status: COMPLETED | OUTPATIENT
Start: 2022-08-01 | End: 2022-08-01

## 2022-08-01 RX ORDER — SODIUM CHLORIDE 0.9 % (FLUSH) 0.9 %
5-40 SYRINGE (ML) INJECTION EVERY 12 HOURS SCHEDULED
Status: CANCELLED | OUTPATIENT
Start: 2022-08-01

## 2022-08-01 RX ORDER — DEXMEDETOMIDINE HYDROCHLORIDE 4 UG/ML
INJECTION, SOLUTION INTRAVENOUS CONTINUOUS PRN
Status: DISCONTINUED | OUTPATIENT
Start: 2022-08-01 | End: 2022-08-01 | Stop reason: SDUPTHER

## 2022-08-01 RX ORDER — LIDOCAINE HYDROCHLORIDE 20 MG/ML
INJECTION, SOLUTION INTRAVENOUS PRN
Status: DISCONTINUED | OUTPATIENT
Start: 2022-08-01 | End: 2022-08-01 | Stop reason: SDUPTHER

## 2022-08-01 RX ORDER — IPRATROPIUM BROMIDE AND ALBUTEROL SULFATE 2.5; .5 MG/3ML; MG/3ML
1 SOLUTION RESPIRATORY (INHALATION)
Status: CANCELLED | OUTPATIENT
Start: 2022-08-01 | End: 2022-08-01

## 2022-08-01 RX ORDER — SODIUM CHLORIDE 9 MG/ML
25 INJECTION, SOLUTION INTRAVENOUS PRN
Status: CANCELLED | OUTPATIENT
Start: 2022-08-01

## 2022-08-01 RX ORDER — SODIUM CHLORIDE 0.9 % (FLUSH) 0.9 %
5-40 SYRINGE (ML) INJECTION PRN
Status: CANCELLED | OUTPATIENT
Start: 2022-08-01

## 2022-08-01 RX ORDER — PROPOFOL 10 MG/ML
INJECTION, EMULSION INTRAVENOUS PRN
Status: DISCONTINUED | OUTPATIENT
Start: 2022-08-01 | End: 2022-08-01 | Stop reason: SDUPTHER

## 2022-08-01 RX ADMIN — FENTANYL CITRATE 100 MCG: 50 INJECTION, SOLUTION INTRAMUSCULAR; INTRAVENOUS at 16:20

## 2022-08-01 RX ADMIN — SODIUM CHLORIDE, POTASSIUM CHLORIDE, SODIUM LACTATE AND CALCIUM CHLORIDE: 600; 310; 30; 20 INJECTION, SOLUTION INTRAVENOUS at 16:06

## 2022-08-01 RX ADMIN — DEXMEDETOMIDINE HYDROCHLORIDE 0.2 MCG/KG/HR: 4 INJECTION, SOLUTION INTRAVENOUS at 16:21

## 2022-08-01 RX ADMIN — DEXAMETHASONE SODIUM PHOSPHATE 8 MG: 4 INJECTION, SOLUTION INTRAMUSCULAR; INTRAVENOUS at 16:21

## 2022-08-01 RX ADMIN — ONDANSETRON 4 MG: 2 INJECTION INTRAMUSCULAR; INTRAVENOUS at 16:28

## 2022-08-01 RX ADMIN — DEXTROSE MONOHYDRATE 3000 MG: 50 INJECTION, SOLUTION INTRAVENOUS at 16:20

## 2022-08-01 RX ADMIN — SODIUM CHLORIDE, POTASSIUM CHLORIDE, SODIUM LACTATE AND CALCIUM CHLORIDE: 600; 310; 30; 20 INJECTION, SOLUTION INTRAVENOUS at 14:18

## 2022-08-01 RX ADMIN — PROPOFOL 150 MG: 10 INJECTION, EMULSION INTRAVENOUS at 16:14

## 2022-08-01 RX ADMIN — LIDOCAINE HYDROCHLORIDE 100 MG: 20 INJECTION, SOLUTION INTRAVENOUS at 16:14

## 2022-08-01 ASSESSMENT — PAIN SCALES - GENERAL: PAINLEVEL_OUTOF10: 0

## 2022-08-01 NOTE — H&P
Department of Urology   H&P  [facility] 1 2 3 4 5      Date: 2022   Patient: Julio Barkley   : 1950   DOA: 2022   MRN: [id number]   ROOM#: OR/NONE     CHIEF COMPLAINT:   15mm right ureter stone with stent  2.5cm left renal stone w/o obstruction    History Obtained From:  patient    HISTORY OF PRESENT ILLNESS:                The patient is a 67 y.o. female with significant past medical history of 15mm right ureter stone s/p stent placement and 2.5cm non-obstructing left renal stone. She denies flank pain. On ceftin for pre-op urine PCR. Recent OB/GYN evaluation by report. First stone event. Prior UTI's. CT Scan a/p 2022 reviewed. I discussed risks, benefits, and alternative therapies. She elected to proceed. She understands her stent maybe replaced. She understands she will need future left renal stone treatment. Past Medical History:        Diagnosis Date    Anemia     Anxiety     Arthritis     generalized    Asthma     AV block     2nd degree per hospital in 2013    Blood poisoning     Blood transfusion     2003    CAD (coronary artery disease)     Cancer ([address]     skin (face) & colon()/ 2012 dx of right breast ca(pc)    Carcinoma of breast ([address] 2012    right arm no b/p or sticks    Cardiac pacemaker 03/10/2014    Medtronic dual lead    Chronic cystitis     Chronic respiratory failure (HCC)     2 L/min oxygen at night-time    Colon cancer ([address]     COPD (chronic obstructive pulmonary disease) ([address]     CTS (carpal tunnel syndrome)     Depression     Diverticulitis     H/O 24 hour EKG monitoring 2014 complete heart block No clinically significant arrthymia noted. H/O cardiac catheterization 10/31/2011, 2007    10/31/2011-No significant CAD.  Cardiolite stress test was false positive-Dr Randell Buitrago; 2007-No CAD, global function intact;    H/O cardiovascular stress test 10/20/2011, 3/23/2010    10/20/2011-Lexiscan- Evid of mild ischemia in Left CX region. EF 70%. Global LVSF normal;    H/O cardiovascular stress test 01/07/2015    EF 77%. Normal Stress Test.    H/O chest pain 04/2005    sees Dr Neetu Mata    H/O Doppler ultrasound 02/20/2008 2/20/2008-CAROTID DOPPLER- Normal carotids bilaterally;    H/O Doppler ultrasound 06/06/2013    CAROTID DOPPLER- there is heterogeneous, irregular atherosclerotic plaque noted in the right internal carotid artery,  doppler flow velocities within the right internal carotid artery are elevated, consistent with a mild, less than 50%stenosis, there is intimal thickening but no significant atherosclerotic plaque noted in the left internal carotid artery, the left carotid are within normal limits    H/O echocardiogram 4/9/2012, 10/20/2011    4/9/2012-LVSF normal EF=>55%. impaired LV relaxation;    H/O echocardiogram 12/31/2014    EF 50-55%. LV shows mild concentric hypertrophy. Mildly dilated left atrium. Mildly dilated right ventricle. Sclerotic but not stenotic aortic valve. Mitral annular calcification. Mild MR, Mild TR, Mild pulm HTN.    H/O urinary incontinence     History of blood transfusion     Hx antineoplastic chemo     Hx of Arterial Doppler ultrasound 07/01/2019    No hemodynamically significant or focal stenosis visualized bilaterally, bilateral lower extremity arteries exhibit diffuse plaque and multiphasic waveforms, unable to obtain bilateral ABIs due to elevated leg pressures >250 mmHg, possibly due to atherosclerosis    Hx of cardiovascular stress test 06/2013    EF 70% abn suggestive of anterolateral ischemia, possible RCA ischmeia, post left ventricular dilation suggestive multivessel CAD. Hx of echocardiogram 06/2013    EF50%, mild MR and TR, mild pulmonary HTN, mild AS    Hx of echocardiogram 11/01/2021    Left ventricular systolic function is normal, Mild concentric left ventricular hypertrophy Mildly dilated left atrium. Mitral annular calcification is present. Mild tricuspid regurgitation No evidence of any pericardial effusion    Hx of fall     \"last time 2018- due to neuropathy, have to use cane\"    [facility] 7/24/13, 06/2013 7/13-No clinacally significant arrhythmia. 6/13-multiple cycles of wenckebach episodes in addtion to some sinus tach    Hyperlipidemia     Hypertension     Hypothermia 2008    Malignant neoplasm of breast (female) ([facility] 2012    Neuropathy     \"have neuropathy of my legs\"    Normocytic normochromic anemia     Obstructive sleep apnea 2008 01- Has CPAP machine but has not used in over a year - states she has not had problems since her pacemaker was placed. Old MI (myocardial infarction)     per pt on 1/15/2019\"had heart attack about a year ago\"    Osteoarthritis     Osteopenia     Primary malignant neoplasm of colon ([facility]     S/P cardiac cath 06/2013    no significant CAD false postive stress test    Skin cancer     Super obesity     Umbilical hernia      Past Surgical History:        Procedure Laterality Date    A-V CARDIAC PACEMAKER INSERTION  3/10/14     Medtronic. Model:  [id number] Medtronic  Serial:  [id number] dual chamber pacemaker    APPENDECTOMY  2004    BREAST BIOPSY  2/13/12    BREAST LUMPECTOMY  2007    right     BREAST SURGERY  02/13/2012    rt lesion biopsy     CARDIAC CATHETERIZATION  2007 & 2011    CAROTID ENDARTERECTOMY Right 12/3/2021    RIGHT CAROTID ENDARTERECTOMY WITH PATCH performed by Ramez Andre MD at [address] [facility]  2004    Colon cancer    COLONOSCOPY  10-18-13    polyp    COLONOSCOPY  1/19/2016    internal hemorrhoids, diverticulosis, 1.5 cm sessile polyp, 8 mm polyp found in rectum.     COLONOSCOPY  12/14/2017    1.5cm residual polyp, 5mm polyps in blind sigmoid loop x3, Sigmoid divertics, Internal grade 1 hemorrhoids    COLONOSCOPY N/A 1/16/2019    COLONOSCOPY W/ ENDOSCOPIC MUCOSAL RESECTION WITH ELEVIEW 5ML INJECTION AND POLYPECTOMY OF TIP OF BLIND RECTOSIGMOID STUMP AND CAUTERIZATION WITH ERBE PROBE, CLIPPING X2 performed by Edgard Bowman MD at [address]  01/21/2020    pan divertics/ 4 polyps/ sm int hem, S/P partial sigmoid resection w/ end to side anastamosis, repeat in 3 years    COLONOSCOPY N/A 1/21/2020    COLONOSCOPY POLYPECTOMY SNARE/COLD BIOPSY performed by Edgard Bowman MD at [place] [address]  2003    back    CYSTOSCOPY Bilateral 12/15/14    CYSTOSCOPY Right 5/15/2022    CYSTOSCOPY URETERAL STENT INSERTION performed by Mindy Kennedy MD at [address] [place] [address]      front right tooth and root canal w/ brass bolt    DILATION AND CURETTAGE OF UTERUS  2006    Needed a camera to find my uterus. DILATION AND CURETTAGE OF UTERUS N/A 5/24/2022    DILATATION AND CURETTAGE, CULTURES OF UTEREUS performed by Devin Douglas MD at [address], COLON, DIAGNOSTIC  12/14/2017    Small hiatal hernia    HERNIA REPAIR  2004    Umb hernia    HERNIA REPAIR  2008    Inc hernia    KIDNEY SURGERY  05/24/2022    stent placed on right side    LYMPH NODE DISSECTION  2/29/2012    Right axillary-3 sentinel nodes were positive out of 9. MASTECTOMY, RADICAL Right 02/29/2012    w/ sentinal node disection-Dr West    PACEMAKER PLACEMENT      PRE-MALIGNANT / BENIGN SKIN LESION EXCISION  2/8/2013    right leg X2-Dr West    TONSILLECTOMY  1957    TUNNELED VENOUS PORT PLACEMENT  2004    TUNNELED VENOUS PORT PLACEMENT  02/13/2012    removal of mediport     Current Medications:   Actonel, aspirin, ceftin, K, os keyla, proventil, xanax, zetia, zoloft    Allergies:  Bactrim [sulfamethoxazole-trimethoprim], Lipitor [atorvastatin], Taxol [paclitaxel], Aminoglycosides, Demerol, Neosporin [bacitracin-neomycin-polymyxin], Other, and Talc    Social History:   TOBACCO:   reports that she has never smoked. She has never used smokeless tobacco.  ETOH:   reports no history of alcohol use. DRUGS:   reports no history of drug use.   MARITAL STATUS:    OCCUPATION: Family History:         Problem Relation Age of Onset    Arthritis Mother         OA, RA    High Cholesterol Mother     High Blood Pressure Mother     Cancer Father         skin and leukemia    High Blood Pressure Father     High Cholesterol Father     Diabetes Father     Heart Disease Father     Heart Disease Brother     High Cholesterol Brother     High Blood Pressure Brother     Heart Disease Brother     No Known Problems Sister     Seizures Son     Cancer Brother         skin cancer    Breast Cancer Neg Hx     Ovarian Cancer Neg Hx        REVIEW OF SYSTEMS:        PHYSICAL EXAM:    VITALS:  /69   Pulse 63   Temp 98 °F (36.7 °C) (Oral)   Resp 18   Ht 5' 2\" (1.575 m)   Wt 278 lb (126.1 kg)   LMP 01/15/1999   SpO2 93%   BMI 50.85 kg/m²     TEMPERATURE:  Current - Temp: 98 °F (36.7 °C); Max - Temp  Av °F (36.7 °C)  Min: 98 °F (36.7 °C)  Max: 98 °F (36.7 °C)  24HR BLOOD PRESSURE RANGE:  Systolic (76GDH), DWX:243 , Min:129 , SCT:124   ; Diastolic (82KPF), ARS:58, Min:69, Max:69    8HR INTAKE OUTPUT:  No intake/output data recorded.   URINARY CATHETER OUTPUT (Farooq):     DRAIN/TUBE OUTPUT:        CONSTITUTIONAL:  awake, alert, cooperative, no apparent distress, and appears stated age  CTA b  Rrr  Soft, nd/nt obese    Data:    WBC:    Lab Results   Component Value Date/Time    WBC 8.5 2022 01:49 PM     Hemoglobin/Hematocrit:    Lab Results   Component Value Date/Time    HGB 13.2 2022 01:49 PM    HCT 42.6 2022 01:49 PM     BMP:    Lab Results   Component Value Date/Time     2022 05:21 AM    K 4.2 2022 05:21 AM     2022 05:21 AM    CO2 28 2022 05:21 AM    BUN 15 2022 05:21 AM    LABALBU 3.5 2022 05:20 AM    CREATININE 0.8 2022 05:21 AM    CALCIUM 9.2 2022 05:21 AM    GFRAA >60 2022 05:21 AM    LABGLOM >60 2022 05:21 AM     PT/INR:    Lab Results   Component Value Date/Time    PROTIME 11.4 2021 03:14 PM

## 2022-08-01 NOTE — ANESTHESIA POSTPROCEDURE EVALUATION
Department of Anesthesiology  Postprocedure Note    Patient: Niecy Barriga  MRN: [id number]  YOB: 1950  Date of evaluation: 8/1/2022      Procedure Summary     Date: 08/01/22 Room / Location: [address] [place] [address] OR [address] [place] [address]    Anesthesia Start: 1606 Anesthesia Stop: 4281    Procedure: RIGHT CYSTOSCOPY URETEROSCOPY STONE MANIPULATION WITH HOLIUM LASER LITHOTRIPSY STENT REPLACEMENT (Right: Ureter) Diagnosis:       Kidney stone      (Kidney stone [N20.0])    Surgeons: Jen Johnson MD Responsible Provider: Symone Pabon MD    Anesthesia Type: general ASA Status: 4          Anesthesia Type: No value filed.     Florian Phase I: Florian Score: 10    Florian Phase II:        Anesthesia Post Evaluation    Patient location during evaluation: PACU  Patient participation: complete - patient participated  Level of consciousness: awake and alert  Pain score: 1  Airway patency: patent  Nausea & Vomiting: no nausea  Complications: no  Cardiovascular status: blood pressure returned to baseline and hemodynamically stable  Respiratory status: acceptable, face mask, nonlabored ventilation and spontaneous ventilation  Hydration status: euvolemic  Multimodal analgesia pain management approach

## 2022-08-01 NOTE — PROGRESS NOTES
1706 Patient arrived to PACU from OR. Monitors applied and alarms on. Report from Candace  JENI.   7805 Patient repositioned in bed.  1720 Patient tolerating ice chips. 1729 Patient transferred out of PACU to same day surgery. Report to [employer].
1732 Pt. Brought to unit, bedside report received from Luigi Garcia [facility]. Pt. Is A&O, VSS, pt provided with water, tolerating well. [id number] Education provided on use of call light, call ight in reach. 1800 DC instructions reviewed with pt and spouse, all parties express understanding. 1817 Pt to DC home in private vehicle.
7/26/22 1420 I spoke to Elham Sykes and confirmed [facility] him that patients surgery date is on 8/1/22 and if Helenaoscar has any questions she can call me at my number. ADDENDUM: 7/28/22 1990 Patient received clearance from cardiology to proceed with surgery scheduled on 8/1/22.
7/29/22  - Notified patient surgery @ [facility] on 8/1/22 @ 1500, arrival 1300. Understanding verbalized.
7/6/22 0400 I left a message at Dr. Aimee Mckee office asking if they could call patient and have her call me please. 7/7/22 0547 I had a voicemail from Ashwin Nougera at [facility] letting me know that she has not been able to get Nusrat on the phone either. She said she spoke to patients mother and she has not been able to reach Nusrat either. I will try again today. ADDENDUM: 7/7/22 0759 PAT completed. 7/7/22 6495 I left Laura a voicemail informing her that PAT is completed.
Marylee Hong will also be laser staff.
be removed; please bring a case for them. 10. If you  have a Living Will and Durable Power of  for Healthcare, please bring in a copy. 11. Please bring picture ID,  insurance card, paperwork from the doctors office    (H & P, Consent, & card for implantable devices). 12. Take a shower the  morning of your procedure, do not apply any lotion, oil or powder. It is recommended to use Hibiclens or CHG wash during pre-op shower/bath.              13. Wear a mask covering your nose & mouth when entering the hospital.

## 2022-08-01 NOTE — DISCHARGE INSTRUCTIONS
Heavy activity for 7 days  Continue hydration and avoid constipation  Do not manipulate black string in urethra  Motrin/tylenol/azo OTC PRN mild pain  Complete ceftin course  Hold all over the counter supplements for 10 days  Call or return to hospital with questions or concerns especially fevers, chills, rigors  No driving for 24 hours or on narcotics  Follow-up in 7 days for black string stent removal.  Call for an appointment            [facility]  [phone]    Do not drive, work around [address] [place], [place] [address]th  or use equipment. Do not drink any alcoholic beverages. Do not smoke while alone. Avoid making important decisions. Plan to spend a quiet, relaxed evening @ home. Resume normal activities as you begin to feel better. Eat lightly for your first meal, then gradually increase your diet to what is normal for you. In case of nausea, avoid food and drink only clear liquids. Resume food as nausea ceases. Notify your surgeon if you experience fever, chills, large amount of bleeding, difficulty breathing, persistent nausea and vomiting or any other disturbing problem. Call for a follow-up appointment with your surgeon.

## 2022-08-01 NOTE — BRIEF OP NOTE
Brief Postoperative Note      Patient: Francine Pollard  YOB: 1950  MRN: [id number]    Date of Procedure: 8/1/2022    Pre-Op Diagnosis: 12mm right ureter stone    Post-Op Diagnosis: Same       Procedure   Right ureteroscopy, holmium laser lithotripsy, and basket stone extraction  Cystoscopy and right ureter stent exchange    Surgeon(s):  Sheila Maya MD    Anesthesia: General    Estimated Blood Loss (mL): Minimal    Complications: None    Specimens:   ID Type Source Tests Collected by Time Destination   1 : RIGHT URETER STONE PIECES Stone (Calculus) Kidney STONE ANALYSIS Sheila Maya MD 8/1/2022 1652        Implants:  Implant Name Type Inv.  Item Serial No.  Lot No. LRB No. Used Action   STENT URET 4.8FR [id number] PERCFLX HYDR+ SFT PGTL TAPR TIP - [id number]  STENT URET 4.8FR [id number] PERCFLX HYDR+ SFT PGTL TAPR TIP  [employer] [facility] [id number] Right 1 Implanted   With black string        Findings:    Soft stone in mid/proximal right ureter  No cystitis  Left renal stone visible on fluoroscopy    Electronically signed by Sheila Maya MD on 8/1/2022 at 4:58 PM

## 2022-08-02 NOTE — OP NOTE
[address] [place] [address], [address] [facility]                                OPERATIVE REPORT    PATIENT NAME: Jose Childers                    :        1950  MED REC NO:   [id number]                          ROOM:  ACCOUNT NO:   [de-identified]                           ADMIT DATE: 2022  PROVIDER:     Sanna Hurley MD    DATE OF PROCEDURE:  2022    PREOPERATIVE DIAGNOSIS:  A 12 mm right ureter stone. POSTOPERATIVE DIAGNOSIS:  A 12 mm right ureter stone. PROCEDURE PERFORMED:  1. Right ureteroscopy with holmium laser lithotripsy and basket stone  extraction. 2.  Cystoscopy and right ureter stent exchange. SURGEON:  Sanna Hurley MD.    ANESTHESIA:  General.    ESTIMATED BLOOD LOSS:  Minimal.    COMPLICATIONS:  None. SPECIMEN:  Right ureter stone pieces. DRAINS PLACED:  A 4.8-Marshallese variable right ureter stent with black  string. FINDINGS:  1. Soft stone in mid to proximal right ureter with surrounding debris. 2.  No cystitis. 3.  Left renal stone visible on fluoroscopy. DISPOSITION:  Stable to PACU. INDICATIONS FOR PROCEDURE:  The patient is a 65-year-old female with  recent kidney stones, who was diagnosed with a 12 mm right ureter stone,  status post right ureter stent placement in May 2022. She presents  today for further management of her stone. She also had a large left  renal stone without obstruction. She was on Ceftin for preoperative  urine culture. She denied any UTI symptoms. She denied left flank  pain. I reviewed the risks, benefits, and alternative therapies. The  patient elected to proceed with surgery. She understands the risks  include infection, bleeding, need for more surgery and injury to the  ureter. OPERATIVE REPORT:  The patient received preoperative antibiotics. She  had compression boots in place.   She was brought to the operating room  and placed on the operating room table. This was more difficult given  her obesity. General anesthetic was administered by the Anesthesia  Service. She was then placed in dorsal lithotomy position and prepped  and draped in sterile fashion after being appropriately padded. Time-out was performed per protocol. A 21-Thai cystoscope with a 30-degree lens was placed through the  urethra and into the bladder without difficulty. The bladder was  irrigated several times. Her left renal stone was visible on  fluoroscopy. A flexible grasper was used to remove her previous stent  to the meatus, which was encrusted; however, I was able to place a  sensor guidewire as a safety wire next to the stent before the stent was  removed. A semi-rigid ureteroscope was then placed into the right  collecting system and advanced into the right ureter. There was  approximately 6 mm stone found in the distal right ureter that was  fragmented with a holmium laser and a 365 micron fiber. A basket  extraction device was used to remove the stone fragments. Overall, her  stone was very soft. Her right ureter also had debris, which was  removed with the basket. I then advanced the scope to the mid to proximal right ureter, where the  11 mm stone was identified in the right ureter. Using a 365 micron  fiber and the holmium laser, the stone was fragmented easily. The stone  was very soft and broke up quickly. A basket was used to remove the  largest of stone debris. I then advanced the scope to the right UPJ,  and no other stone was found in the right ureter. There was no other  calcification seen on fluoroscopy on the right side. Due to the  inflammation from stent removal and stone manipulation, I elected to  replace the stent. The ureteroscope was removed. A 4.8-Thai variable  stent was placed into the right collecting system with the aid of  fluoroscopy. It appeared to have adequate positioning.   A black string  remained in place for later stent removal.  The bladder was emptied. There was minimal blood loss during this procedure. The patient was  brought to the PACU in stable condition. The patient will follow up in our office in approximately 7 days for  black string stent removal.  She will follow up in our office in 1 month  with a renal ultrasound. We will also schedule her for left  ureteroscopy and holmium laser lithotripsy of her large left renal  stone, which I suspect is soft as well. The patient is requesting the  surgery be done in [facility] due to her difficulty with  transportation. She will be sent home with a prescription for Zofran  and tramadol. She will complete her Ceftin course.         Colt Beckett MD    D: 08/01/2022 17:11:16       T: 08/01/2022 17:14:04     [id number]  Job#: [id number]     Doc#: [id number]    CC:

## 2022-08-05 LAB
STONE COMPOSITION: NORMAL
STONE DESCRIPTION: NORMAL
STONE MASS: 100 MG

## 2022-08-18 ENCOUNTER — HOSPITAL ENCOUNTER (OUTPATIENT)
Age: 72
Discharge: HOME | End: 2022-08-18
Payer: MEDICARE

## 2022-08-18 LAB
CHOLESTEROL: 267 MG/DL
ESTIMATED AVERAGE GLUCOSE: 120 MG/DL
HBA1C MFR BLD: 5.8 % (ref 4.2–6.3)
HDLC SERPL-MCNC: 44 MG/DL
LDL CHOLESTEROL CALCULATED: 187 MG/DL
TRIGL SERPL-MCNC: 181 MG/DL

## 2022-08-18 PROCEDURE — 83036 HEMOGLOBIN GLYCOSYLATED A1C: CPT

## 2022-08-18 PROCEDURE — 80061 LIPID PANEL: CPT

## 2022-08-18 PROCEDURE — 36415 COLL VENOUS BLD VENIPUNCTURE: CPT

## 2022-08-23 ENCOUNTER — OFFICE VISIT (OUTPATIENT)
Dept: INTERNAL MEDICINE CLINIC | Age: 72
End: 2022-08-23
Payer: MEDICARE

## 2022-08-23 VITALS
HEART RATE: 66 BPM | SYSTOLIC BLOOD PRESSURE: 148 MMHG | OXYGEN SATURATION: 95 % | HEIGHT: 62 IN | WEIGHT: 258 LBS | DIASTOLIC BLOOD PRESSURE: 78 MMHG | BODY MASS INDEX: 47.48 KG/M2

## 2022-08-23 DIAGNOSIS — N20.0 KIDNEY STONES: ICD-10-CM

## 2022-08-23 DIAGNOSIS — J45.20 MILD INTERMITTENT ASTHMA WITHOUT COMPLICATION: ICD-10-CM

## 2022-08-23 DIAGNOSIS — E78.5 HYPERLIPIDEMIA, UNSPECIFIED HYPERLIPIDEMIA TYPE: ICD-10-CM

## 2022-08-23 DIAGNOSIS — N63.24 UNSPECIFIED LUMP IN THE LEFT BREAST, LOWER INNER QUADRANT: Primary | ICD-10-CM

## 2022-08-23 DIAGNOSIS — R73.03 PREDIABETES: ICD-10-CM

## 2022-08-23 DIAGNOSIS — F32.A DEPRESSION, UNSPECIFIED DEPRESSION TYPE: ICD-10-CM

## 2022-08-23 DIAGNOSIS — R03.0 ELEVATED BLOOD PRESSURE READING: ICD-10-CM

## 2022-08-23 PROCEDURE — G8427 DOCREV CUR MEDS BY ELIG CLIN: HCPCS | Performed by: FAMILY MEDICINE

## 2022-08-23 PROCEDURE — 1090F PRES/ABSN URINE INCON ASSESS: CPT | Performed by: FAMILY MEDICINE

## 2022-08-23 PROCEDURE — 1036F TOBACCO NON-USER: CPT | Performed by: FAMILY MEDICINE

## 2022-08-23 PROCEDURE — G8399 PT W/DXA RESULTS DOCUMENT: HCPCS | Performed by: FAMILY MEDICINE

## 2022-08-23 PROCEDURE — 3017F COLORECTAL CA SCREEN DOC REV: CPT | Performed by: FAMILY MEDICINE

## 2022-08-23 PROCEDURE — 1124F ACP DISCUSS-NO DSCNMKR DOCD: CPT | Performed by: FAMILY MEDICINE

## 2022-08-23 PROCEDURE — G8417 CALC BMI ABV UP PARAM F/U: HCPCS | Performed by: FAMILY MEDICINE

## 2022-08-23 PROCEDURE — 99214 OFFICE O/P EST MOD 30 MIN: CPT | Performed by: FAMILY MEDICINE

## 2022-08-23 ASSESSMENT — PATIENT HEALTH QUESTIONNAIRE - PHQ9
9. THOUGHTS THAT YOU WOULD BE BETTER OFF DEAD, OR OF HURTING YOURSELF: 1
7. TROUBLE CONCENTRATING ON THINGS, SUCH AS READING THE NEWSPAPER OR WATCHING TELEVISION: 0
SUM OF ALL RESPONSES TO PHQ9 QUESTIONS 1 & 2: 3
1. LITTLE INTEREST OR PLEASURE IN DOING THINGS: 0
2. FEELING DOWN, DEPRESSED OR HOPELESS: 3
8. MOVING OR SPEAKING SO SLOWLY THAT OTHER PEOPLE COULD HAVE NOTICED. OR THE OPPOSITE, BEING SO FIGETY OR RESTLESS THAT YOU HAVE BEEN MOVING AROUND A LOT MORE THAN USUAL: 0
SUM OF ALL RESPONSES TO PHQ QUESTIONS 1-9: 9
SUM OF ALL RESPONSES TO PHQ QUESTIONS 1-9: 10
SUM OF ALL RESPONSES TO PHQ QUESTIONS 1-9: 10
3. TROUBLE FALLING OR STAYING ASLEEP: 2
SUM OF ALL RESPONSES TO PHQ QUESTIONS 1-9: 10
4. FEELING TIRED OR HAVING LITTLE ENERGY: 3
6. FEELING BAD ABOUT YOURSELF - OR THAT YOU ARE A FAILURE OR HAVE LET YOURSELF OR YOUR FAMILY DOWN: 1
5. POOR APPETITE OR OVEREATING: 0

## 2022-08-23 ASSESSMENT — COLUMBIA-SUICIDE SEVERITY RATING SCALE - C-SSRS
2. HAVE YOU ACTUALLY HAD ANY THOUGHTS OF KILLING YOURSELF?: YES
1. WITHIN THE PAST MONTH, HAVE YOU WISHED YOU WERE DEAD OR WISHED YOU COULD GO TO SLEEP AND NOT WAKE UP?: YES
5. HAVE YOU STARTED TO WORK OUT OR WORKED OUT THE DETAILS OF HOW TO KILL YOURSELF? DO YOU INTEND TO CARRY OUT THIS PLAN?: NO
3. HAVE YOU BEEN THINKING ABOUT HOW YOU MIGHT KILL YOURSELF?: YES
6. HAVE YOU EVER DONE ANYTHING, STARTED TO DO ANYTHING, OR PREPARED TO DO ANYTHING TO END YOUR LIFE?: NO
4. HAVE YOU HAD THESE THOUGHTS AND HAD SOME INTENTION OF ACTING ON THEM?: NO

## 2022-08-23 NOTE — PROGRESS NOTES
Mariah White (:  1950) is a 67 y.o. female,established patient, here for evaluation of the following chief complaint(s):  Follow-up (Pt feels a lump on her left breast. ), Hyperlipidemia, and Depression         ASSESSMENT/PLAN:  1. Unspecified lump in the left breast, lower inner quadrant     - MARYBEL ALL DIGITAL DIAGNOSTIC UNILATERAL LEFT; Future    2. Hyperlipidemia, unspecified hyperlipidemia type    -Restart Zocor  - Lipid Panel; Future    3. Depression, unspecified depression type  Patient stopped Zoloft and she would like to remain off of medications  Consider counseling    4. Mild intermittent asthma without complication  Continue Albuterol and Flovent    5. Prediabetes  The patient is asked to make an attempt to improve diet and exercise patterns   - Comprehensive Metabolic Panel; Future    6. Kidney stones  Keep f/u with urology    7. Elevated blood pressure reading  Monitor BP. Can recheck at the office    On this date 2022 I have spent 30 minutes reviewing previous notes, test results and face to face with the patient discussing the diagnosis and importance of compliance with the treatment plan as well as documenting on the day of the visit. Return in about 17 weeks (around 2022) for HLD, asthma.        Lab Results   Component Value Date    WBC 8.5 2022    HGB 13.2 2022    HCT 42.6 2022    MCV 87.8 2022     2022     Lab Results   Component Value Date    CHOL 267 (H) 2022     Lab Results   Component Value Date    TRIG 181 (H) 2022     Lab Results   Component Value Date    HDL 44 2022     Lab Results   Component Value Date    LDLCALC 187 (H) 2022    LDLDIRECT 102 (H) 2020     Lab Results   Component Value Date    LABA1C 5.8 2022     Lab Results   Component Value Date     2022     Lab Results   Component Value Date     2022    K 4.2 2022     2022    CO2 28 2022 BUN 15 05/26/2022    CREATININE 0.8 05/26/2022    GLUCOSE 110 (H) 05/26/2022    CALCIUM 9.2 05/26/2022    PROT 6.2 (L) 05/20/2022    LABALBU 3.5 05/20/2022    BILITOT 0.2 05/20/2022    ALKPHOS 100 05/20/2022    AST 17 05/20/2022    ALT 13 05/20/2022    LABGLOM >60 05/26/2022    GFRAA >60 05/26/2022    AGRATIO 1.3 07/29/2021    GLOB 3.2 07/29/2021     No results found for: TSHFT4, TSH  Lab Results   Component Value Date/Time    COLORU YELLOW 06/16/2022 12:30 PM    LABPH 9.0 06/16/2022 12:30 PM    NITRU NEGATIVE 06/16/2022 12:30 PM    KETUA NEGATIVE 06/16/2022 12:30 PM    UROBILINOGEN 0.2 06/16/2022 12:30 PM    BILIRUBINUR NEGATIVE 06/16/2022 12:30 PM       Subjective   SUBJECTIVE/OBJECTIVE:    HISTORY OF PRESENT ILLNESS:  This is a 67 y.o. female here for the following:      Patient was admitted to [facility]  May '22. For obstructive uropathy- R. She had ureteral stent  Patient had a  U/s today and she has a 2 cm stone in the L renal pelvis  She c/o of a lump she noticed in the L breast. Hx of R breast cancer and mastectomy in 2012  Prediabetes Hba1c 5.8  HLD- not controlled. , HDL 44, - she has been off of her medications  Depression- she stopped Zoloft. She has lost weight and she would like to stay off of the medication  Asthma- stable  Elevated Bp reading today. She denies symptoms    Review of Systems   Constitutional:  Negative for diaphoresis and fever. Respiratory:  Negative for cough and shortness of breath. Cardiovascular:  Negative for chest pain and palpitations. Gastrointestinal:  Negative for abdominal pain and nausea. Genitourinary:         Urine IC   Neurological:  Negative for dizziness and headaches.        Patient Active Problem List    Diagnosis Date Noted    Complicated urinary tract infection 05/15/2022    Carotid stenosis, right 12/03/2021    Bilateral carotid artery stenosis 11/23/2021    Moderate persistent asthma without complication 73/20/3476    Pneumonia due to Base) MCG/ACT inhaler INHALE 2 PUFFS BY MOUTH EVERY SIX HOURS AS NEEDED FOR WHEEZING 1 Inhaler 5    vitamin B-12 (CYANOCOBALAMIN) 1000 MCG tablet Take 1,000 mcg by mouth daily      aspirin 81 MG EC tablet Take 81 mg by mouth nightly      calcium-vitamin D (OSCAL-500) 500-200 MG-UNIT per tablet Take 1 tablet by mouth 2 times daily        No current facility-administered medications for this visit. Vitals:    08/23/22 1313 08/23/22 1418   BP: (!) 150/68 (!) 148/78   Site: Left Upper Arm Left Upper Arm   Position: Sitting Sitting   Cuff Size: Large Adult Large Adult   Pulse: 66    SpO2: 95%    Weight: 258 lb (117 kg)    Height: 5' 2\" (1.575 m)      Objective   Physical Exam  Vitals reviewed. Constitutional:       General: She is not in acute distress. Cardiovascular:      Rate and Rhythm: Normal rate and regular rhythm. Heart sounds: Murmur heard. Pulmonary:      Effort: Pulmonary effort is normal. No respiratory distress. Breath sounds: Normal breath sounds. Chest:   Breasts:     Left: Mass present. Abdominal:      Palpations: Abdomen is soft. Tenderness: There is no abdominal tenderness. Musculoskeletal:      Cervical back: Neck supple. Right lower leg: Edema present. Left lower leg: Edema present. Neurological:      Mental Status: She is alert and oriented to person, place, and time. Gait: Gait abnormal (wheelchair). Psychiatric:         Mood and Affect: Mood normal.              An electronic signature was used to authenticate this note. --Pierre Miguel,      This dictation was generated by voice recognition computer software. Although all attempts are made to edit the dictation for accuracy, there may be errors in the transcription that are not intended.

## 2022-08-28 PROBLEM — J12.82 PNEUMONIA DUE TO COVID-19 VIRUS: Status: RESOLVED | Noted: 2021-03-22 | Resolved: 2022-08-28

## 2022-08-28 PROBLEM — U07.1 PNEUMONIA DUE TO COVID-19 VIRUS: Status: RESOLVED | Noted: 2021-03-22 | Resolved: 2022-08-28

## 2022-09-08 ENCOUNTER — HOSPITAL ENCOUNTER (OUTPATIENT)
Dept: ULTRASOUND IMAGING | Age: 72
Discharge: HOME | End: 2022-09-08
Payer: MEDICARE

## 2022-09-08 ENCOUNTER — HOSPITAL ENCOUNTER (OUTPATIENT)
Dept: WOMENS IMAGING | Age: 72
Discharge: HOME | End: 2022-09-08
Payer: MEDICARE

## 2022-09-08 DIAGNOSIS — N63.24 UNSPECIFIED LUMP IN THE LEFT BREAST, LOWER INNER QUADRANT: ICD-10-CM

## 2022-09-08 PROCEDURE — 76642 ULTRASOUND BREAST LIMITED: CPT

## 2022-09-08 PROCEDURE — G0279 TOMOSYNTHESIS, MAMMO: HCPCS

## 2022-09-12 ENCOUNTER — TELEPHONE (OUTPATIENT)
Dept: INTERNAL MEDICINE CLINIC | Age: 72
End: 2022-09-12

## 2022-09-12 DIAGNOSIS — N63.24 UNSPECIFIED LUMP IN THE LEFT BREAST, LOWER INNER QUADRANT: ICD-10-CM

## 2022-09-12 DIAGNOSIS — Z85.3 PERSONAL HISTORY OF BREAST CANCER: Primary | ICD-10-CM

## 2022-09-12 NOTE — TELEPHONE ENCOUNTER
----- Message from Estuardo Perales sent at 9/9/2022  3:45 PM EDT -----  Subject: Referral Request    Reason for referral request? Patient said that the office of Malinda Agarwal MD with [facility] said that she did need a referral   for her upcoming appointment with them on 9/13/2022. Please call to   advise. Provider patient wants to be referred to(if known):     Provider Phone Number(if known):     Additional Information for Provider?   ---------------------------------------------------------------------------  --------------  [id number]    [phone]; OK to leave message on voicemail  ---------------------------------------------------------------------------  --------------

## 2022-09-13 ENCOUNTER — OFFICE VISIT (OUTPATIENT)
Dept: SURGERY | Age: 72
End: 2022-09-13
Payer: MEDICARE

## 2022-09-13 VITALS
HEART RATE: 96 BPM | SYSTOLIC BLOOD PRESSURE: 134 MMHG | HEIGHT: 62 IN | BODY MASS INDEX: 47.48 KG/M2 | DIASTOLIC BLOOD PRESSURE: 80 MMHG | WEIGHT: 258 LBS | OXYGEN SATURATION: 98 %

## 2022-09-13 DIAGNOSIS — C50.411 MALIGNANT NEOPLASM OF UPPER-OUTER QUADRANT OF RIGHT BREAST IN FEMALE, ESTROGEN RECEPTOR POSITIVE (HCC): ICD-10-CM

## 2022-09-13 DIAGNOSIS — N64.4 BREAST PAIN, LEFT: Primary | ICD-10-CM

## 2022-09-13 DIAGNOSIS — Z85.3 HISTORY OF RIGHT BREAST CANCER: ICD-10-CM

## 2022-09-13 DIAGNOSIS — M85.89 OSTEOPENIA OF MULTIPLE SITES: ICD-10-CM

## 2022-09-13 DIAGNOSIS — Z85.038 HISTORY OF COLON CANCER: ICD-10-CM

## 2022-09-13 DIAGNOSIS — B37.2 INTERTRIGINOUS CANDIDIASIS: ICD-10-CM

## 2022-09-13 DIAGNOSIS — Z17.0 MALIGNANT NEOPLASM OF UPPER-OUTER QUADRANT OF RIGHT BREAST IN FEMALE, ESTROGEN RECEPTOR POSITIVE (HCC): ICD-10-CM

## 2022-09-13 DIAGNOSIS — R06.02 CHRONIC SHORTNESS OF BREATH: ICD-10-CM

## 2022-09-13 PROCEDURE — G8417 CALC BMI ABV UP PARAM F/U: HCPCS | Performed by: SURGERY

## 2022-09-13 PROCEDURE — 99204 OFFICE O/P NEW MOD 45 MIN: CPT | Performed by: SURGERY

## 2022-09-13 PROCEDURE — 1090F PRES/ABSN URINE INCON ASSESS: CPT | Performed by: SURGERY

## 2022-09-13 PROCEDURE — G8427 DOCREV CUR MEDS BY ELIG CLIN: HCPCS | Performed by: SURGERY

## 2022-09-13 ASSESSMENT — PATIENT HEALTH QUESTIONNAIRE - PHQ9
SUM OF ALL RESPONSES TO PHQ QUESTIONS 1-9: 0
SUM OF ALL RESPONSES TO PHQ9 QUESTIONS 1 & 2: 0
SUM OF ALL RESPONSES TO PHQ QUESTIONS 1-9: 0
2. FEELING DOWN, DEPRESSED OR HOPELESS: 0
1. LITTLE INTEREST OR PLEASURE IN DOING THINGS: 0
SUM OF ALL RESPONSES TO PHQ QUESTIONS 1-9: 0
SUM OF ALL RESPONSES TO PHQ QUESTIONS 1-9: 0
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
## 2022-09-14 ENCOUNTER — TELEPHONE (OUTPATIENT)
Dept: INTERNAL MEDICINE CLINIC | Age: 72
End: 2022-09-14

## 2022-09-14 PROBLEM — M85.89 OSTEOPENIA OF MULTIPLE SITES: Status: ACTIVE | Noted: 2022-09-14

## 2022-09-14 RX ORDER — MICONAZOLE NITRATE 20.6 MG/G
POWDER TOPICAL PRN
Qty: 45 G | Refills: 1 | Status: SHIPPED | OUTPATIENT
Start: 2022-09-14 | End: 2022-10-13 | Stop reason: ALTCHOICE

## 2022-09-14 ASSESSMENT — ENCOUNTER SYMPTOMS
CHEST TIGHTNESS: 0
VOMITING: 0
EYE REDNESS: 0
COLOR CHANGE: 0
NAUSEA: 0
SHORTNESS OF BREATH: 1
EYE DISCHARGE: 0
SORE THROAT: 0
ABDOMINAL PAIN: 0
ABDOMINAL DISTENTION: 0

## 2022-09-16 ENCOUNTER — TELEPHONE (OUTPATIENT)
Dept: INTERNAL MEDICINE CLINIC | Age: 72
End: 2022-09-16

## 2022-09-16 NOTE — TELEPHONE ENCOUNTER
Pt called stating she has a 1 in kidney stone & she states that you said she really needs to get it out of it doesn't pass. She was referred to  But she cannot get a hold of anyone to schedule. She would really like to speak to you on this situation.   Or to be sent to another Urologist.     Call back number: [phone]

## 2022-09-18 ENCOUNTER — HOSPITAL ENCOUNTER (EMERGENCY)
Age: 72
Discharge: HOME | End: 2022-09-19
Attending: EMERGENCY MEDICINE
Payer: MEDICARE

## 2022-09-18 ENCOUNTER — APPOINTMENT (OUTPATIENT)
Dept: GENERAL RADIOLOGY | Age: 72
End: 2022-09-18
Payer: MEDICARE

## 2022-09-18 DIAGNOSIS — J44.1 COPD EXACERBATION (HCC): ICD-10-CM

## 2022-09-18 DIAGNOSIS — B34.8 RHINOVIRUS INFECTION: Primary | ICD-10-CM

## 2022-09-18 LAB
ANION GAP SERPL CALCULATED.3IONS-SCNC: 11 MMOL/L (ref 4–16)
BASOPHILS ABSOLUTE: 0 K/CU MM
BASOPHILS RELATIVE PERCENT: 0.1 % (ref 0–1)
BUN BLDV-MCNC: 7 MG/DL (ref 6–23)
CALCIUM SERPL-MCNC: 9.9 MG/DL (ref 8.3–10.6)
CHLORIDE BLD-SCNC: 100 MMOL/L (ref 99–110)
CO2: 27 MMOL/L (ref 21–32)
CREAT SERPL-MCNC: 0.7 MG/DL (ref 0.6–1.1)
DIFFERENTIAL TYPE: ABNORMAL
EOSINOPHILS ABSOLUTE: 0.1 K/CU MM
EOSINOPHILS RELATIVE PERCENT: 0.8 % (ref 0–3)
GFR AFRICAN AMERICAN: >60 ML/MIN/1.73M2
GFR NON-AFRICAN AMERICAN: >60 ML/MIN/1.73M2
GLUCOSE BLD-MCNC: 125 MG/DL (ref 70–99)
HCT VFR BLD CALC: 39.1 % (ref 37–47)
HEMOGLOBIN: 12.6 GM/DL (ref 12.5–16)
IMMATURE NEUTROPHIL %: 0.6 % (ref 0–0.43)
LYMPHOCYTES ABSOLUTE: 1.6 K/CU MM
LYMPHOCYTES RELATIVE PERCENT: 11.8 % (ref 24–44)
MCH RBC QN AUTO: 27.7 PG (ref 27–31)
MCHC RBC AUTO-ENTMCNC: 32.2 % (ref 32–36)
MCV RBC AUTO: 85.9 FL (ref 78–100)
MONOCYTES ABSOLUTE: 0.6 K/CU MM
MONOCYTES RELATIVE PERCENT: 4.4 % (ref 0–4)
NUCLEATED RBC %: 0 %
PDW BLD-RTO: 14.5 % (ref 11.7–14.9)
PLATELET # BLD: 262 K/CU MM (ref 140–440)
PMV BLD AUTO: 10 FL (ref 7.5–11.1)
POTASSIUM SERPL-SCNC: 3.5 MMOL/L (ref 3.5–5.1)
PRO-BNP: 555.9 PG/ML
RBC # BLD: 4.55 M/CU MM (ref 4.2–5.4)
SEGMENTED NEUTROPHILS ABSOLUTE COUNT: 11 K/CU MM
SEGMENTED NEUTROPHILS RELATIVE PERCENT: 82.3 % (ref 36–66)
SODIUM BLD-SCNC: 138 MMOL/L (ref 135–145)
TOTAL IMMATURE NEUTOROPHIL: 0.08 K/CU MM
TOTAL NUCLEATED RBC: 0 K/CU MM
TROPONIN T: <0.01 NG/ML
WBC # BLD: 13.4 K/CU MM (ref 4–10.5)

## 2022-09-18 PROCEDURE — 80048 BASIC METABOLIC PNL TOTAL CA: CPT

## 2022-09-18 PROCEDURE — 96374 THER/PROPH/DIAG INJ IV PUSH: CPT

## 2022-09-18 PROCEDURE — 6360000002 HC RX W HCPCS: Performed by: EMERGENCY MEDICINE

## 2022-09-18 PROCEDURE — 94640 AIRWAY INHALATION TREATMENT: CPT

## 2022-09-18 PROCEDURE — 84484 ASSAY OF TROPONIN QUANT: CPT

## 2022-09-18 PROCEDURE — 99285 EMERGENCY DEPT VISIT HI MDM: CPT

## 2022-09-18 PROCEDURE — 6370000000 HC RX 637 (ALT 250 FOR IP): Performed by: EMERGENCY MEDICINE

## 2022-09-18 PROCEDURE — 71045 X-RAY EXAM CHEST 1 VIEW: CPT

## 2022-09-18 PROCEDURE — 0202U NFCT DS 22 TRGT SARS-COV-2: CPT

## 2022-09-18 PROCEDURE — 85025 COMPLETE CBC W/AUTO DIFF WBC: CPT

## 2022-09-18 PROCEDURE — 83880 ASSAY OF NATRIURETIC PEPTIDE: CPT

## 2022-09-18 PROCEDURE — 93005 ELECTROCARDIOGRAM TRACING: CPT | Performed by: EMERGENCY MEDICINE

## 2022-09-18 RX ORDER — IPRATROPIUM BROMIDE AND ALBUTEROL SULFATE 2.5; .5 MG/3ML; MG/3ML
1 SOLUTION RESPIRATORY (INHALATION) ONCE
Status: COMPLETED | OUTPATIENT
Start: 2022-09-18 | End: 2022-09-18

## 2022-09-18 RX ORDER — ACETAMINOPHEN 500 MG
1000 TABLET ORAL ONCE
Status: COMPLETED | OUTPATIENT
Start: 2022-09-18 | End: 2022-09-18

## 2022-09-18 RX ORDER — DEXAMETHASONE SODIUM PHOSPHATE 10 MG/ML
6 INJECTION, SOLUTION INTRAMUSCULAR; INTRAVENOUS ONCE
Status: COMPLETED | OUTPATIENT
Start: 2022-09-18 | End: 2022-09-18

## 2022-09-18 RX ORDER — PSEUDOEPHEDRINE HYDROCHLORIDE 30 MG/1
30 TABLET ORAL ONCE
Status: COMPLETED | OUTPATIENT
Start: 2022-09-18 | End: 2022-09-18

## 2022-09-18 RX ADMIN — ACETAMINOPHEN 1000 MG: 500 TABLET ORAL at 22:57

## 2022-09-18 RX ADMIN — DEXAMETHASONE SODIUM PHOSPHATE 6 MG: 10 INJECTION INTRAMUSCULAR; INTRAVENOUS at 22:16

## 2022-09-18 RX ADMIN — IPRATROPIUM BROMIDE AND ALBUTEROL SULFATE 1 AMPULE: 2.5; .5 SOLUTION RESPIRATORY (INHALATION) at 22:17

## 2022-09-18 RX ADMIN — PSEUDOEPHEDRINE HCL 30 MG: 30 TABLET, FILM COATED ORAL at 22:17

## 2022-09-18 ASSESSMENT — PAIN DESCRIPTION - LOCATION: LOCATION: HEAD

## 2022-09-19 VITALS
OXYGEN SATURATION: 94 % | TEMPERATURE: 98.4 F | SYSTOLIC BLOOD PRESSURE: 130 MMHG | RESPIRATION RATE: 18 BRPM | HEART RATE: 70 BPM | DIASTOLIC BLOOD PRESSURE: 51 MMHG

## 2022-09-19 LAB
ADENOVIRUS DETECTION BY PCR: NOT DETECTED
BORDETELLA PARAPERTUSSIS BY PCR: NOT DETECTED
BORDETELLA PERTUSSIS PCR: NOT DETECTED
CHLAMYDOPHILA PNEUMONIA PCR: NOT DETECTED
CORONAVIRUS 229E PCR: NOT DETECTED
CORONAVIRUS HKU1 PCR: NOT DETECTED
CORONAVIRUS NL63 PCR: NOT DETECTED
CORONAVIRUS OC43 PCR: NOT DETECTED
EKG ATRIAL RATE: 75 BPM
EKG DIAGNOSIS: NORMAL
EKG P AXIS: 12 DEGREES
EKG P-R INTERVAL: 212 MS
EKG Q-T INTERVAL: 430 MS
EKG QRS DURATION: 150 MS
EKG QTC CALCULATION (BAZETT): 480 MS
EKG R AXIS: -62 DEGREES
EKG T AXIS: 110 DEGREES
EKG VENTRICULAR RATE: 75 BPM
HUMAN METAPNEUMOVIRUS PCR: NOT DETECTED
INFLUENZA A BY PCR: NOT DETECTED
INFLUENZA A H1 (2009) PCR: NOT DETECTED
INFLUENZA A H1 PANDEMIC PCR: NOT DETECTED
INFLUENZA A H3 PCR: NOT DETECTED
INFLUENZA B BY PCR: NOT DETECTED
MYCOPLASMA PNEUMONIAE PCR: NOT DETECTED
PARAINFLUENZA 1 PCR: NOT DETECTED
PARAINFLUENZA 2 PCR: NOT DETECTED
PARAINFLUENZA 3 PCR: NOT DETECTED
PARAINFLUENZA 4 PCR: NOT DETECTED
RHINOVIRUS ENTEROVIRUS PCR: ABNORMAL
RSV PCR: NOT DETECTED
SARS-COV-2: NOT DETECTED

## 2022-09-19 PROCEDURE — 93010 ELECTROCARDIOGRAM REPORT: CPT | Performed by: INTERNAL MEDICINE

## 2022-09-19 RX ORDER — DEXAMETHASONE 6 MG/1
6 TABLET ORAL
Qty: 5 TABLET | Refills: 0 | Status: SHIPPED | OUTPATIENT
Start: 2022-09-19 | End: 2022-09-24

## 2022-09-19 ASSESSMENT — PAIN - FUNCTIONAL ASSESSMENT: PAIN_FUNCTIONAL_ASSESSMENT: NONE - DENIES PAIN

## 2022-09-19 NOTE — CARE COORDINATION
Patient states that she needs ride home upon discharge. Patient is able to ambulate and get in and out of vehicle. CM called [employer] and requested car for transportation. [employer] to transport patient. [phone]. Invoice completed.

## 2022-09-19 NOTE — ED NOTES
Portable xray done at bedside without complications. Family and staff removed per protocol for radiation safety.         Rohit Macias RN  09/18/22 2473

## 2022-09-19 NOTE — ACP (ADVANCE CARE PLANNING)
Patient does not have any ACP documents/Medical Power of . LSW notes hospital will follow [place]'s Next of Kin hierarchy in the following descending order for priority:    Guardian  Spouse  [de-identified] of adult Children  Parents  [de-identified] of adult Siblings  Nearest Relative not described above    Per [facility] Next of Kin hierarchy: Patients' parent will be [address]. Patients' Other will need patient to fill out a [address] to be a Decision Maker or MPOA by [facility].

## 2022-09-19 NOTE — ED NOTES
Patient does not have a ride home, patient uses cane and verbalized she is able to get in and out of the car with her cane. This nurse spoke with  who verbalized she would call and find out if transport had a low riding car that was easier for her to get in and out of. Awaiting a return phone call.       Luz Hidalgo RN  09/19/22 0103

## 2022-09-19 NOTE — PROGRESS NOTES
.Surgery @ [facility] on 9/26/22 you will be called 9/23/22 with times               1. Do not eat or drink anything after midnight - unless instructed by your doctor prior to surgery. This includes                   no water, chewing gum or mints. 2. Follow your directions as prescribed by the doctor for your procedure and medications. Use inhalers, take Decadron and Gabapentin with sips of water. 3. Check with your Doctor regarding stopping vitamins, supplements, blood thinner ASA. and follow their instructions. Stop vitamins, supplements and NSAIDS: 9/19/2022   4. Do not smoke, vape or use chewing tobacco morning of surgery. Do not drink any alcoholic beverages 24 hours prior to surgery. This includes NA Beer. No street drugs 7 days prior to surgery. 5. You may brush your teeth and gargle the morning of surgery. DO NOT SWALLOW WATER   6. You MUST make arrangements for a responsible adult to take you home after your surgery and be able to check on you every couple                   hours for the day. You will not be allowed to leave alone or drive yourself home. It is strongly suggested someone stay with you the first 24                   hrs. Your surgery will be cancelled if you do not have a ride home. 7. Please wear simple, loose fitting clothing to the hospital.  Sarah June not bring valuables (money, credit cards, checkbooks, etc.) Do not wear any                   makeup (including no eye makeup) or nail polish on your fingers or toes. 8. DO NOT wear any jewelry or piercings on day of surgery. All body piercing jewelry must be removed. 9. If you have dentures, they will be removed before going to the OR; we will provide you a container. If you wear contact lenses or glasses,                  they will be removed; please bring a case for them.            10. If you  have a Living Will and Durable Power of  for Healthcare, please bring in a copy.           11. Please bring picture ID,  insurance card, paperwork from the doctors office    (H & P, Consent, & card for implantable devices). 12. Take a shower the morning of your procedure with Hibiclens or an anti-bacterial soap. Do not apply any make-up, deodorant, lotion, oil or powder. 13.  Enter thru the main entrance wearing a mask on the day of surgery.

## 2022-09-19 NOTE — ED PROVIDER NOTES
CHIEF COMPLAINT    Chief Complaint   Patient presents with    Headache     Headache and cough that is exacerbated with talking      HPI  Madhuri Atwood is a 67 y.o. female with history of anxiety, coronary artery disease, pacemaker placement, COPD, hyperlipidemia, hypertension, breast cancer who presents to the ED with complaints of cough, shortness of breath, and headache. Patient states that symptoms began this morning with cough which is intermittently productive with some clear-colored sputum. The cough is persistent and exacerbated exertion. Nothing to make it better. Her shortness of breath is also exacerbated with exertion. She does have history of COPD and previously wear oxygen at night although she states she no longer does this. She also has a frontal headache described as mild to moderate in severity. Nothing seems to make it better or worse although she has not used any over-the-counter medications for this. Patient has not received COVID-19 vaccination. She is unaware of any recent sick contacts. Denies fevers, chills, chest pain, abdominal pain, nausea, vomiting      REVIEW OF SYSTEMS  Constitutional: No fever, chills   Eye: No visual changes  HENT: No earache or sore throat. Resp: Complains of shortness of breath and cough  Cardio: No chest pain or palpitations. GI: No abdominal pain, nausea, vomiting, constipation or diarrhea. No melena. : No dysuria, urgency or frequency. Endocrine: No heat intolerance, no cold intolerance, no polydipsia   Lymphatics: No adenopathy  Musculoskeletal: No new muscle aches or joint pain. Neuro: complains of headache  Psych: No homicidal or suicidal thoughts  Skin: No rash, No itching. ?  ?   PAST MEDICAL HISTORY  Past Medical History:   Diagnosis Date    Anemia     Anxiety 1978    Arthritis     generalized    Asthma 2006    AV block     2nd degree per hospital in [id number]    Blood poisoning 1994    Blood transfusion     12/2003    Breast cancer ([facility] [address] 02/29/2012    right    CAD (coronary artery disease)     Cancer ([facility] [address] 2007    skin (face) & colon(2003)/ 2/2012 dx of right breast ca(pc)    Carcinoma of breast ([facility] [address] 02/29/2012    right arm no b/p or sticks    Cardiac pacemaker 03/10/2014    Medtronic dual lead    Chronic cystitis     Chronic respiratory failure (HCC)     2 L/min oxygen at night-time    Colon cancer ([facility] [address] 2013    COPD (chronic obstructive pulmonary disease) ([facility] [address]     CTS (carpal tunnel syndrome)     Depression     Diverticulitis     H/O 24 hour EKG monitoring 2/28/2014 7/24/13 2/14 complete heart block 7/13No clinically significant arrthymia noted. H/O cardiac catheterization 10/31/2011, 7/11/2007    10/31/2011-No significant CAD. Cardiolite stress test was false positive-Dr Austin Garcia; 7/11/2007-No CAD, global function intact;    H/O cardiovascular stress test 10/20/2011, 3/23/2010    10/20/2011-Lexiscan- Evid of mild ischemia in Left CX region. EF 70%. Global LVSF normal;    H/O cardiovascular stress test 01/07/2015    EF 77%. Normal Stress Test.    H/O chest pain 04/2005    sees Dr Austin Garcia    H/O Doppler ultrasound 02/20/2008 2/20/2008-CAROTID DOPPLER- Normal carotids bilaterally;    H/O Doppler ultrasound 06/06/2013    CAROTID DOPPLER- there is heterogeneous, irregular atherosclerotic plaque noted in the right internal carotid artery,  doppler flow velocities within the right internal carotid artery are elevated, consistent with a mild, less than 50%stenosis, there is intimal thickening but no significant atherosclerotic plaque noted in the left internal carotid artery, the left carotid are within normal limits    H/O echocardiogram 4/9/2012, 10/20/2011    4/9/2012-LVSF normal EF=>55%. impaired LV relaxation;    H/O echocardiogram 12/31/2014    EF 50-55%. LV shows mild concentric hypertrophy. Mildly dilated left atrium. Mildly dilated right ventricle. Sclerotic but not stenotic aortic valve. Mitral annular calcification.   Mild MR, Mild TR, Mild pulm HTN.    H/O urinary incontinence     History of blood transfusion     Hx antineoplastic chemo     Hx of Arterial Doppler ultrasound 07/01/2019    No hemodynamically significant or focal stenosis visualized bilaterally, bilateral lower extremity arteries exhibit diffuse plaque and multiphasic waveforms, unable to obtain bilateral ABIs due to elevated leg pressures >250 mmHg, possibly due to atherosclerosis    Hx of cardiovascular stress test 06/2013    EF 70% abn suggestive of anterolateral ischemia, possible RCA ischmeia, post left ventricular dilation suggestive multivessel CAD. Hx of echocardiogram 06/2013    EF50%, mild MR and TR, mild pulmonary HTN, mild AS    Hx of echocardiogram 11/01/2021    Left ventricular systolic function is normal, Mild concentric left ventricular hypertrophy Mildly dilated left atrium. Mitral annular calcification is present. Mild tricuspid regurgitation No evidence of any pericardial effusion    Hx of fall     \"last time 2018- due to neuropathy, have to use cane\"    [facility] 7/24/13, 06/2013 7/13-No clinacally significant arrhythmia. 6/13-multiple cycles of wenckebach episodes in addtion to some sinus tach    Hyperlipidemia     Hypertension     Hypothermia 2008    Malignant neoplasm of breast (female) ([facility] [address] 2012    Neuropathy     \"have neuropathy of my legs\"    Normocytic normochromic anemia     Obstructive sleep apnea 2008 01- Has CPAP machine but has not used in over a year - states she has not had problems since her pacemaker was placed.     Old MI (myocardial infarction)     per pt on 1/15/2019\"had heart attack about a year ago\"    Osteoarthritis     Osteopenia     Osteopenia of multiple sites 9/14/2022    Pneumonia due to COVID-19 virus 03/22/2021    Primary malignant neoplasm of colon ([facility] [address]     S/P cardiac cath 06/2013    no significant CAD false postive stress test    Skin cancer     Super obesity     Umbilical hernia      FAMILY HISTORY  Family History   Problem Relation Age of Onset    Arthritis Mother         OA, RA    High Cholesterol Mother     High Blood Pressure Mother     Cancer Father         skin and leukemia    High Blood Pressure Father     High Cholesterol Father     Diabetes Father     Heart Disease Father     Heart Disease Brother     High Cholesterol Brother     High Blood Pressure Brother     Heart Disease Brother     No Known Problems Sister     Seizures Son     Cancer Brother         skin cancer    Breast Cancer Neg Hx     Ovarian Cancer Neg Hx      SOCIAL HISTORY  Social History     Socioeconomic History    Marital status:      Spouse name: None    Number of children: 2    Years of education: 12    Highest education level: 12th grade   Occupational History    Occupation: retired   Tobacco Use    Smoking status: Never    Smokeless tobacco: Never   Vaping Use    Vaping Use: Never used   Substance and Sexual Activity    Alcohol use: No     Comment:           CAFFEINE: 2- 12oz nona daily & 2 cups coffee some nights. Drug use: No    Sexual activity: Not Currently     Partners: Male   Social History Narrative    Do you donate blood or plasma? No    Caffeine intake? Moderate    Advance directive? No    Is blood transfusion acceptable in an emergency? Yes    Live alone or with others? With others    Able to care for self?  Yes    No exercise at this time         Social Determinants of Health     Financial Resource Strain: Low Risk     Difficulty of Paying Living Expenses: Not hard at all   Food Insecurity: No Food Insecurity    Worried About [facility] in the Last Year: Never true    Ran Out of Food in the Last Year: Never true   Housing Stability: Low Risk     Unable to Pay for Housing in the Last Year: No    Number of Places Lived in the Last Year: 1    Unstable Housing in the Last Year: No       SURGICAL HISTORY  Past Surgical History:   Procedure Laterality Date    A-V CARDIAC PACEMAKER INSERTION  3/10/14 Medtronic. Model:  [id number] Medtronic  Serial:  [id number] dual chamber pacemaker    APPENDECTOMY  2004    BREAST BIOPSY  2/13/12    BREAST LUMPECTOMY  2007    right     BREAST SURGERY  02/13/2012    rt lesion biopsy     CARDIAC CATHETERIZATION  2007 & 2011    CAROTID ENDARTERECTOMY Right 12/3/2021    RIGHT CAROTID ENDARTERECTOMY WITH PATCH performed by Amarilys Oro MD at [address]  2004    Colon cancer    COLONOSCOPY  10-18-13    polyp    COLONOSCOPY  1/19/2016    internal hemorrhoids, diverticulosis, 1.5 cm sessile polyp, 8 mm polyp found in rectum. COLONOSCOPY  12/14/2017    1.5cm residual polyp, 5mm polyps in blind sigmoid loop x3, Sigmoid divertics, Internal grade 1 hemorrhoids    COLONOSCOPY N/A 1/16/2019    COLONOSCOPY W/ ENDOSCOPIC MUCOSAL RESECTION WITH ELEVIEW 5ML INJECTION AND POLYPECTOMY OF TIP OF BLIND RECTOSIGMOID STUMP AND CAUTERIZATION WITH ERBE PROBE, CLIPPING X2 performed by Ann Mesa MD at [address] [facility]  01/21/2020    pan divertics/ 4 polyps/ sm int hem, S/P partial sigmoid resection w/ end to side anastamosis, repeat in 3 years    COLONOSCOPY N/A 1/21/2020    COLONOSCOPY POLYPECTOMY SNARE/COLD BIOPSY performed by Ann Mesa MD at [place] [address]  2003    back    CYSTOSCOPY Bilateral 12/15/14    CYSTOSCOPY Right 5/15/2022    CYSTOSCOPY URETERAL STENT INSERTION performed by Julio Sandhu MD at [facility]      front right tooth and root canal w/ brass bolt    DILATION AND CURETTAGE OF UTERUS  2006    Needed a camera to find my uterus.     DILATION AND CURETTAGE OF UTERUS N/A 5/24/2022    DILATATION AND CURETTAGE, CULTURES OF UTEREUS performed by Romulo Nowak MD at [address], COLON, DIAGNOSTIC  12/14/2017    Small hiatal hernia    HERNIA REPAIR  2004    Umb hernia    HERNIA REPAIR  2008    Inc hernia    KIDNEY SURGERY  05/24/2022    stent placed on right side    LITHOTRIPSY Right 8/1/2022 RIGHT CYSTOSCOPY URETEROSCOPY STONE MANIPULATION WITH HOLIUM LASER LITHOTRIPSY STENT REPLACEMENT performed by Kiko Farley MD at [place] [address]  2/29/2012    Right axillary-3 sentinel nodes were positive out of 9. MASTECTOMY, RADICAL Right 02/29/2012    w/ sentinal node disection-Dr West    PACEMAKER PLACEMENT      PRE-MALIGNANT / BENIGN SKIN LESION EXCISION  2/8/2013    right leg X2-Dr West    TONSILLECTOMY  1957    TUNNELED VENOUS PORT PLACEMENT  2004    TUNNELED VENOUS PORT PLACEMENT  02/13/2012    removal of mediport     CURRENT MEDICATIONS  Previous Medications    ACETAMINOPHEN (TYLENOL) 500 MG TABLET    Take 500 mg by mouth every 6 hours as needed for Pain    ALBUTEROL SULFATE HFA (PROAIR HFA) 108 (90 BASE) MCG/ACT INHALER    INHALE 2 PUFFS BY MOUTH EVERY SIX HOURS AS NEEDED FOR WHEEZING    ASPIRIN 81 MG EC TABLET    Take 81 mg by mouth nightly    CALCIUM-VITAMIN D (OSCAL-500) 500-200 MG-UNIT PER TABLET    Take 1 tablet by mouth 2 times daily     FLUTICASONE (FLOVENT HFA) 110 MCG/ACT INHALER    Inhale 2 puffs into the lungs 2 times daily    GABAPENTIN (NEURONTIN) 100 MG CAPSULE    Take 100 mg by mouth daily. LETROZOLE (FEMARA) 2.5 MG TABLET    Take 1 tablet by mouth nightly    MICONAZOLE (ZEASORB-AF) 2 % POWDER    Apply topically as needed for Itching (redness under the left breast) Apply topically 2 times daily. SIMVASTATIN (ZOCOR) 20 MG TABLET    TAKE ONE (1) TABLET BY MOUTH NIGHTLY    VITAMIN B-12 (CYANOCOBALAMIN) 1000 MCG TABLET    Take 1,000 mcg by mouth daily     ALLERGIES  Allergies   Allergen Reactions    Bactrim [Sulfamethoxazole-Trimethoprim] Anaphylaxis and Hives    Lipitor [Atorvastatin] Shortness Of Breath    Taxol [Paclitaxel] Anaphylaxis and Swelling    Aminoglycosides      Abstracted from [facility] patient chart.     Demerol Nausea Only    Neosporin [Bacitracin-Neomycin-Polymyxin] Rash and Other (See Comments)     hotness    Other Rash     Ivory Soap    Talc Other (See Comments)     blisters       Nursing notes reviewed by myself for past medical history, family history, social history, surgical history, current medications, and allergies. PHYSICAL EXAM  VITAL SIGNS: Triage VS:    ED Triage Vitals   Enc Vitals Group      BP       Pulse       Resp       Temp       Temp src       SpO2       Weight       Height       Head Circumference       Peak Flow       Pain Score       Pain Loc       Pain Edu? Excl. in [address]? Constitutional: Well developed, Well nourished, nontoxic appearing  HENT: Normocephalic, Atraumatic, Bilateral external ears normal, erythema to posterior oropharynx without exudate, clear rhinorrhea  Eyes: PERRL, EOMI, Conjunctiva normal, No discharge. No scleral icterus. Neck: Normal range of motion, No tenderness, Supple. Lymphatic: No lymphadenopathy noted. Cardiovascular: Normal heart rate, Normal rhythm, No murmurs, gallops or rubs. Thorax & Lungs: Mild tachypnea. Diminished breath sounds bilaterally without definitive wheezing, rhonchi, or rales. No retractions  Abdomen: Soft, No tenderness, No masses, No pulsatile masses, No distention, Normal bowel sounds  Skin: Warm, Dry, Pink, No mottling, No erythema, No rash. Back: No tenderness, No CVA tenderness. Extremities: 2+ symmetrical edema to bilateral lower extremities, No cyanosis, Normal perfusion, No clubbing. Musculoskeletal: Good range of motion in all major joints as observed. No major deformities noted. Neurologic: Alert & oriented x 3, Normal sensation, Normal motor function, CN II-XII grossly intact as tested no focal deficits noted. Psychiatric: Affect normal, Judgment normal, Mood normal.     EKG  Per my interpretation demonstrates atrial sensed and ventricularly paced rhythm at a rate of 75 bpm.  Left axis deviation.   Prolonged QTc interval.  No acute ST segment changes  RADIOLOGY  Labs Reviewed   RESPIRATORY PANEL, MOLECULAR, WITH COVID-19 - Abnormal; Notable for the following components:       Result Value    Rhinovirus Enterovirus PCR DETECTED BY PCR (*)     All other components within normal limits   CBC WITH AUTO DIFFERENTIAL - Abnormal; Notable for the following components:    WBC 13.4 (*)     Segs Relative 82.3 (*)     Lymphocytes % 11.8 (*)     Monocytes % 4.4 (*)     Immature Neutrophil % 0.6 (*)     All other components within normal limits   BASIC METABOLIC PANEL - Abnormal; Notable for the following components:    Glucose 125 (*)     All other components within normal limits   BRAIN NATRIURETIC PEPTIDE - Abnormal; Notable for the following components:    Pro-.9 (*)     All other components within normal limits   TROPONIN     I personally reviewed the images. The radiologist's interpretation reveals:  Last Imaging results   XR CHEST PORTABLE   Final Result   1. No active pulmonary disease. 2. Stable cardiomegaly without overt failure. MEDS GIVEN IN ED:  Medications   ipratropium-albuterol (DUONEB) nebulizer solution 1 ampule (1 ampule Inhalation Given 9/18/22 2217)   dexamethasone (PF) (DECADRON) injection 6 mg (6 mg IntraVENous Given 9/18/22 2216)   pseudoephedrine (SUDAFED) tablet 30 mg (30 mg Oral Given 9/18/22 2217)   acetaminophen (TYLENOL) tablet 1,000 mg (1,000 mg Oral Given 9/18/22 2257)     [address] [place] [address] Drive MAKING  67 yr old female presents to ED with complaints of shortness of breath, cough, congestion, and frontal headache. Initial vital signs here are without evidence of tachycardia, fever, or hypoxia. On exam the patient has slight diminished breath sounds bilaterally with mild tachypnea but no intercostal retractions or conversational dyspnea. She does have some erythema to the posterior oropharynx as well. At this time we will obtain CBC, BMP, EKG, troponin, BNP, respiratory panel, and chest x-ray. In the interim patient provided with Tylenol, DuoNeb treatment, Decadron, Sudafed.   Chest x-ray without acute cardiopulmonary pathology. CBC demonstrates leukocytosis. BMP is reassuring. EKG is without acute ischemic changes and troponin is nonelevated. Patient's respiratory panel is positive for rhinovirus. On reassessment the patient feels significantly better and her aeration has improved. She remains without oxygen requirement staying at rest and at this time I feels appropriate for discharge home. Discharged with a prescription for Decadron. Return precautions provided. Amount and/or Complexity of Data Reviewed  Clinical lab tests: reviewed  Decide to obtain previous medical records or to obtain history from someone other than the patient: yes       -  Patient seen and evaluated in the emergency department. -  Triage and nursing notes reviewed and incorporated. -  Old chart records reviewed and incorporated. -  Work-up included:  See above  -  Results discussed with patient. Appropriate PPE utilized as indicated for entire patient encounter? Time of Disposition: See timeline  ? New Prescriptions    DEXAMETHASONE (DECADRON) 6 MG TABLET    Take 1 tablet by mouth daily (with breakfast) for 5 days     FINAL IMPRESSION  1. Rhinovirus infection    2. [facility]      Electronically signed by:  Javier Kraus DO, 9/19/2022         Javier Kraus DO  09/19/22 [id number]

## 2022-09-23 ENCOUNTER — ANESTHESIA EVENT (OUTPATIENT)
Dept: OPERATING ROOM | Age: 72
End: 2022-09-23
Payer: MEDICARE

## 2022-09-23 NOTE — ANESTHESIA PRE PROCEDURE
Department of Anesthesiology  Preprocedure Note       Name:  Ben Albert   Age:  67 y.o.  :  1950                                          MRN:  [id number]         Date:  2022      Surgeon: Vandana Bowers):  Hemant Garcia MD    Procedure: Procedure(s):  LEFT CYSTOSCOPY URETEROSCOPY RETROGRADE PYELOGRAM STONE MANIPULATION WITH HOLIUM LASER LITHOTRIPSY POSSIBLE STENT PLACEMENT    Medications prior to admission:   Prior to Admission medications    Medication Sig Start Date End Date Taking? Authorizing Provider   dexamethasone (DECADRON) 6 MG tablet Take 1 tablet by mouth daily (with breakfast) for 5 days 22  Noam Gonzalez,    miconazole (ZEASORB-AF) 2 % powder Apply topically as needed for Itching (redness under the left breast) Apply topically 2 times daily. Patient not taking: Reported on 2022   Benjamin Vela MD   gabapentin (NEURONTIN) 100 MG capsule Take 100 mg by mouth daily.   Patient not taking: Reported on 2022    Historical Provider, MD   simvastatin (ZOCOR) 20 MG tablet TAKE ONE (1) TABLET BY MOUTH NIGHTLY 3/28/22   Natasha Mcclelland,    letrozole [facility]) 2.5 MG tablet Take 1 tablet by mouth nightly 21   Melonie Bustamante MD   acetaminophen (TYLENOL) 500 MG tablet Take 500 mg by mouth every 6 hours as needed for Pain    Historical Provider, MD   fluticasone (FLOVENT HFA) 110 MCG/ACT inhaler Inhale 2 puffs into the lungs 2 times daily 21   Natasha Mcclleland DO   albuterol sulfate HFA (PROAIR HFA) 108 (90 Base) MCG/ACT inhaler INHALE 2 PUFFS BY MOUTH EVERY SIX HOURS AS NEEDED FOR WHEEZING 21   Natasha Mcclelland DO   vitamin B-12 (CYANOCOBALAMIN) 1000 MCG tablet Take 1,000 mcg by mouth daily  Patient not taking: Reported on 2022    Historical Provider, MD   aspirin 81 MG EC tablet Take 81 mg by mouth nightly  Patient not taking: Reported on 2022    Historical Provider, MD   calcium-vitamin D (OSCAL-500) 500-200 MG-UNIT per tablet Take 1 tablet by mouth 2 times daily   Patient not taking: Reported on 9/19/2022    Historical Provider, MD       Current medications:    No current facility-administered medications for this encounter. Current Outpatient Medications   Medication Sig Dispense Refill    dexamethasone (DECADRON) 6 MG tablet Take 1 tablet by mouth daily (with breakfast) for 5 days 5 tablet 0    miconazole (ZEASORB-AF) 2 % powder Apply topically as needed for Itching (redness under the left breast) Apply topically 2 times daily. (Patient not taking: Reported on 9/19/2022) 45 g 1    gabapentin (NEURONTIN) 100 MG capsule Take 100 mg by mouth daily. (Patient not taking: Reported on 9/19/2022)      simvastatin (ZOCOR) 20 MG tablet TAKE ONE (1) TABLET BY MOUTH NIGHTLY 90 tablet 0    letrozole (FEMARA) 2.5 MG tablet Take 1 tablet by mouth nightly 30 tablet 5    acetaminophen (TYLENOL) 500 MG tablet Take 500 mg by mouth every 6 hours as needed for Pain      fluticasone (FLOVENT HFA) 110 MCG/ACT inhaler Inhale 2 puffs into the lungs 2 times daily 1 Inhaler 5    albuterol sulfate HFA (PROAIR HFA) 108 (90 Base) MCG/ACT inhaler INHALE 2 PUFFS BY MOUTH EVERY SIX HOURS AS NEEDED FOR WHEEZING 1 Inhaler 5    vitamin B-12 (CYANOCOBALAMIN) 1000 MCG tablet Take 1,000 mcg by mouth daily (Patient not taking: Reported on 9/19/2022)      aspirin 81 MG EC tablet Take 81 mg by mouth nightly (Patient not taking: Reported on 9/19/2022)      calcium-vitamin D (OSCAL-500) 500-200 MG-UNIT per tablet Take 1 tablet by mouth 2 times daily  (Patient not taking: Reported on 9/19/2022)         Allergies: Allergies   Allergen Reactions    Bactrim [Sulfamethoxazole-Trimethoprim] Anaphylaxis and Hives    Lipitor [Atorvastatin] Shortness Of Breath    Taxol [Paclitaxel] Anaphylaxis and Swelling    Aminoglycosides      Abstracted from [facility] patient chart.     Demerol Nausea Only    Neosporin [Bacitracin-Neomycin-Polymyxin] Rash and Other (See Comments)     hotness    Other Rash     Ivory Soap    Talc Other (See Comments)     blisters       Problem List:    Patient Active Problem List   Diagnosis Code    Malignant neoplasm of upper-outer quadrant of right breast in female, estrogen receptor positive ([facility] C50.411, Z17.0    Diffuse cystic mastopathy N60.19    Hyperlipidemia E78.5    H/O chest pain Z87.898    Heart block AV second degree I44.1    Abnormal cardiovascular stress test R94.39    JAMIE (obstructive sleep apnea) G47.33    Super obesity E66.9    Mild asthma J45.909    Severe obstructive sleep apnea G47.33    Cardiac pacemaker Z95.0    Chronic cystitis N30.20    Asthma J45.909    Hypertension I10    Depression F32. A    Obstructive sleep apnea G47.33    Nocturnal oxygen desaturation G47.34    NSTEMI (non-ST elevated myocardial infarction) ([facility]) I21.4    PVD (peripheral vascular disease) ([facility]) I73.9    Morbid obesity with BMI of 45.0-49.9, adult ([facility]) E66.01, Z68.42    Moderate aortic stenosis I35.0    Hepatomegaly, not elsewhere classified R16.0    Arthritis M19.90    CAD (coronary artery disease) I25.10    Bilateral carotid artery stenosis I65.23    Moderate persistent asthma without complication D90.53    Carotid stenosis, right X20.23    Complicated urinary tract infection N39.0    Osteopenia of multiple sites M85.89       Past Medical History:        Diagnosis Date    Anemia     Anxiety 1978    Arthritis     generalized    Asthma 2006    AV block     2nd degree per hospital in june2013    Blood poisoning 1994    Blood transfusion     12/2003    Breast [facility] 02/29/2012    right    CAD (coronary artery disease)     Cancer ([facility] 2007    skin (face) & colon(2003)/ 2/2012 dx of right breast ca(pc)    Carcinoma of breast ([facility] 02/29/2012    right arm no b/p or sticks    Cardiac pacemaker 03/10/2014    Medtronic dual lead    Chronic cystitis     Chronic respiratory failure (HCC)     2 L/min oxygen at night-time    Colon cancer [facility]) 2013    COPD (chronic obstructive pulmonary disease) (HCC)     CTS (carpal tunnel syndrome)     Depression     Diverticulitis     H/O 24 hour EKG monitoring 2/28/2014 7/24/13 2/14 complete heart block 7/13No clinically significant arrthymia noted.  H/O cardiac catheterization 10/31/2011, 7/11/2007    10/31/2011-No significant CAD. Cardiolite stress test was false positive-Dr Michelle Hurst; 7/11/2007-No CAD, global function intact;    H/O cardiovascular stress test 10/20/2011, 3/23/2010    10/20/2011-Lexiscan- Evid of mild ischemia in Left CX region. EF 70%. Global LVSF normal;    H/O cardiovascular stress test 01/07/2015    EF 77%. Normal Stress Test.    H/O chest pain 04/2005    sees Dr Juan Jose Santos H/O Doppler ultrasound 02/20/2008 2/20/2008-CAROTID DOPPLER- Normal carotids bilaterally;    H/O Doppler ultrasound 06/06/2013    CAROTID DOPPLER- there is heterogeneous, irregular atherosclerotic plaque noted in the right internal carotid artery,  doppler flow velocities within the right internal carotid artery are elevated, consistent with a mild, less than 50%stenosis, there is intimal thickening but no significant atherosclerotic plaque noted in the left internal carotid artery, the left carotid are within normal limits    H/O echocardiogram 4/9/2012, 10/20/2011    4/9/2012-LVSF normal EF=>55%. impaired LV relaxation;    H/O echocardiogram 12/31/2014    EF 50-55%. LV shows mild concentric hypertrophy. Mildly dilated left atrium. Mildly dilated right ventricle. Sclerotic but not stenotic aortic valve. Mitral annular calcification.   Mild MR, Mild TR, Mild pulm HTN.    H/O urinary incontinence     History of blood transfusion     Hx antineoplastic chemo     Hx of Arterial Doppler ultrasound 07/01/2019    No hemodynamically significant or focal stenosis visualized bilaterally, bilateral lower extremity arteries exhibit diffuse plaque and multiphasic waveforms, unable to obtain bilateral ABIs due to elevated leg pressures >250 mmHg, possibly due to atherosclerosis    Hx of cardiovascular stress test 06/2013    EF 70% abn suggestive of anterolateral ischemia, possible RCA ischmeia, post left ventricular dilation suggestive multivessel CAD.  Hx of echocardiogram 06/2013    EF50%, mild MR and TR, mild pulmonary HTN, mild AS    Hx of echocardiogram 11/01/2021    Left ventricular systolic function is normal, Mild concentric left ventricular hypertrophy Mildly dilated left atrium. Mitral annular calcification is present. Mild tricuspid regurgitation No evidence of any pericardial effusion    Hx of fall     \"last time 2018- due to neuropathy, have to use cane\"    HX OTHER MEDICAL 7/24/13, 06/2013 7/13-No clinacally significant arrhythmia. 6/13-multiple cycles of wenckebach episodes in addtion to some sinus tach    Hyperlipidemia     Hypertension     Hypothermia 2008    Malignant neoplasm of breast (female) ([address] 2012    Neuropathy     \"have neuropathy of my legs\"    Normocytic normochromic anemia     Obstructive sleep apnea 2008 01- Has CPAP machine but has not used in over a year - states she has not had problems since her pacemaker was placed.  Old MI (myocardial infarction)     per pt on 1/15/2019\"had heart attack about a year ago\"    Osteoarthritis     Osteopenia     Osteopenia of multiple sites 9/14/2022    Pneumonia due to COVID-19 virus 03/22/2021    Primary malignant neoplasm of colon ([facility]     S/P cardiac cath 06/2013    no significant CAD false postive stress test    Skin cancer     Super obesity     Umbilical hernia        Past Surgical History:        Procedure Laterality Date    A-V CARDIAC PACEMAKER INSERTION  3/10/14     Medtronic.    Model:  [id number] Medtronic  Serial:  [id number] dual chamber pacemaker    APPENDECTOMY  2004    BREAST BIOPSY  2/13/12    BREAST LUMPECTOMY  2007    right     BREAST SURGERY  02/13/2012    rt lesion biopsy     CARDIAC CATHETERIZATION  2007 & 2011    CAROTID ENDARTERECTOMY Right 12/3/2021    RIGHT CAROTID ENDARTERECTOMY WITH PATCH performed by Mario De Anda MD at [facility]  2004    Colon cancer    COLONOSCOPY  10-18-13    polyp    COLONOSCOPY  1/19/2016    internal hemorrhoids, diverticulosis, 1.5 cm sessile polyp, 8 mm polyp found in rectum.  COLONOSCOPY  12/14/2017    1.5cm residual polyp, 5mm polyps in blind sigmoid loop x3, Sigmoid divertics, Internal grade 1 hemorrhoids    COLONOSCOPY N/A 1/16/2019    COLONOSCOPY W/ ENDOSCOPIC MUCOSAL RESECTION WITH ELEVIEW 5ML INJECTION AND POLYPECTOMY OF TIP OF BLIND RECTOSIGMOID STUMP AND CAUTERIZATION WITH ERBE PROBE, CLIPPING X2 performed by Arvind Logan MD at [address] [place] [address]  01/21/2020    pan divertics/ 4 polyps/ sm int hem, S/P partial sigmoid resection w/ end to side anastamosis, repeat in 3 years    COLONOSCOPY N/A 1/21/2020    COLONOSCOPY POLYPECTOMY SNARE/COLD BIOPSY performed by Arvind Logan MD at [facility]  2003    back   [address] Bilateral 12/15/14    CYSTOSCOPY Right 5/15/2022    CYSTOSCOPY URETERAL STENT INSERTION performed by Lui Pickard MD at [facility]      front right tooth and root canal w/ brass bolt    DILATION AND CURETTAGE OF UTERUS  2006    Needed a camera to find my uterus.     DILATION AND CURETTAGE OF UTERUS N/A 5/24/2022    DILATATION AND CURETTAGE, CULTURES OF UTEREUS performed by Cat Aguilera MD at [facility], COLON, DIAGNOSTIC  12/14/2017    Small hiatal hernia    HERNIA REPAIR  2004    Umb hernia    HERNIA REPAIR  2008    Inc hernia    KIDNEY SURGERY  05/24/2022    stent placed on right side    LITHOTRIPSY Right 8/1/2022    RIGHT CYSTOSCOPY URETEROSCOPY STONE MANIPULATION WITH HOLIUM LASER LITHOTRIPSY STENT REPLACEMENT performed by Bee Vale MD at [facility]  2/29/2012    Right axillary-3 sentinel nodes were positive out of 9.    MASTECTOMY, RADICAL Right 02/29/2012    w/ sentinal node disection-Dr West    PACEMAKER PLACEMENT      PRE-MALIGNANT / BENIGN SKIN LESION EXCISION  2/8/2013    right leg X2-Dr West    TONSILLECTOMY  1957    TUNNELED VENOUS PORT PLACEMENT  2004    TUNNELED VENOUS PORT PLACEMENT  02/13/2012    removal of mediport       Social History:    Social History     Tobacco Use    Smoking status: Never    Smokeless tobacco: Never   Substance Use Topics    Alcohol use: No     Comment:           CAFFEINE: 2- 12oz nona daily & 2 cups coffee some nights. Counseling given: Not Answered      Vital Signs (Current):   Vitals:    09/19/22 1521   Weight: 250 lb (113.4 kg)   Height: 5' 2\" (1.575 m)                                              BP Readings from Last 3 Encounters:   09/19/22 (!) 130/51   09/13/22 134/80   08/23/22 (!) 148/78       NPO Status:                                                                                 BMI:   Wt Readings from Last 3 Encounters:   09/13/22 258 lb (117 kg)   08/23/22 258 lb (117 kg)   07/07/22 278 lb (126.1 kg)     Body mass index is 45.73 kg/m².     CBC:   Lab Results   Component Value Date/Time    WBC 13.4 09/18/2022 10:16 PM    RBC 4.55 09/18/2022 10:16 PM    HGB 12.6 09/18/2022 10:16 PM    HCT 39.1 09/18/2022 10:16 PM    MCV 85.9 09/18/2022 10:16 PM    RDW 14.5 09/18/2022 10:16 PM     09/18/2022 10:16 PM       CMP:   Lab Results   Component Value Date/Time     09/18/2022 10:16 PM    K 3.5 09/18/2022 10:16 PM     09/18/2022 10:16 PM    CO2 27 09/18/2022 10:16 PM    BUN 7 09/18/2022 10:16 PM    CREATININE 0.7 09/18/2022 10:16 PM    GFRAA >60 09/18/2022 10:16 PM    AGRATIO 1.3 07/29/2021 12:39 PM    LABGLOM >60 09/18/2022 10:16 PM    GLUCOSE 125 09/18/2022 10:16 PM    PROT 6.2 05/20/2022 05:20 AM    PROT 6.6 10/17/2012 02:35 PM    CALCIUM 9.9 09/18/2022 10:16 PM    BILITOT 0.2 05/20/2022 05:20 AM ALKPHOS 100 05/20/2022 05:20 AM    AST 17 05/20/2022 05:20 AM    ALT 13 05/20/2022 05:20 AM       POC Tests: No results for input(s): POCGLU, POCNA, POCK, POCCL, POCBUN, POCHEMO, POCHCT in the last 72 hours. Coags:   Lab Results   Component Value Date/Time    PROTIME 11.4 11/29/2021 03:14 PM    PROTIME 11.2 10/28/2011 02:28 PM    INR 0.88 11/29/2021 03:14 PM    APTT 26.6 11/29/2021 03:14 PM       HCG (If Applicable): No results found for: PREGTESTUR, PREGSERUM, HCG, HCGQUANT     ABGs: No results found for: PHART, PO2ART, ISZ9NHH, GAC2VYK, BEART, V2MEJVED     Type & Screen (If Applicable):  No results found for: LABABO, LABRH    Drug/Infectious Status (If Applicable):  Lab Results   Component Value Date/Time    HEPCAB NON REACTIVE 09/01/2017 11:07 AM       COVID-19 Screening (If Applicable):   Lab Results   Component Value Date/Time    COVID19 NOT DETECTED 09/18/2022 10:16 PM           Anesthesia Evaluation  Patient summary reviewed no history of anesthetic complications:   Airway: Mallampati: II  TM distance: >3 FB   Neck ROM: full  Mouth opening: > = 3 FB   Dental:    (+) poor dentition      Pulmonary:   (+) COPD:  sleep apnea:  asthma:                            Cardiovascular:  Exercise tolerance: poor (<4 METS),   (+) hypertension:, pacemaker: pacemaker, past MI: > 6 months and no interval change, CAD: no interval change, hyperlipidemia         Beta Blocker:  Not on Beta Blocker      ROS comment:  Summary   Left ventricular systolic function is normal with an ejection fraction of   55-60%. Moderate concentric left ventricular hypertrophy. Grade I diastolic dysfunction. Mild bilateral atrial enlargement. Calcific aortic valve with moderate aortic stenosis, mean gradient of 25   mmHg and an BACILIO of 1.17 cm sq. Mild aortic regurgitation with PHT of 854 msec. Aortic valve unchanged from   previous echo.    Mitral annular calcification noted with possible mild mitral stenosis, mean   gradient of 3 mmHg and an MVA of 1.96 mmHg. Mild-to-moderate tricuspid regurgitation. Moderate pulmonary hypertension at 51 mmHg. No evidence of pericardial effusion. reecho in 6 mos     Neuro/Psych:   (+) depression/anxiety             GI/Hepatic/Renal:   (+) liver disease:, renal disease: kidney stones, morbid obesity          Endo/Other:    (+) : arthritis: OA., malignancy/cancer. ROS comment: Breast ca Abdominal:             Vascular:   + PVD, aortic or cerebral, . Other Findings:           Anesthesia Plan      general     ASA 3       Induction: intravenous. MIPS: Postoperative opioids intended and Prophylactic antiemetics administered. Anesthetic plan and risks discussed with patient. Plan discussed with CRNA. Pre Anesthesia Evaluation complete. Anesthesia plan, risks, benefits, alternatives, and personal involved discussed with patient. Patients and/or legal guardian verbalized an understanding  and agreed to proceed.   Afshan Zhao DO  9/26/2022

## 2022-09-26 ENCOUNTER — APPOINTMENT (OUTPATIENT)
Dept: GENERAL RADIOLOGY | Age: 72
End: 2022-09-26
Attending: UROLOGY
Payer: MEDICARE

## 2022-09-26 ENCOUNTER — ANESTHESIA (OUTPATIENT)
Dept: OPERATING ROOM | Age: 72
End: 2022-09-26
Payer: MEDICARE

## 2022-09-26 ENCOUNTER — HOSPITAL ENCOUNTER (OUTPATIENT)
Age: 72
Setting detail: OUTPATIENT SURGERY
Discharge: HOME | End: 2022-09-26
Attending: UROLOGY | Admitting: UROLOGY
Payer: MEDICARE

## 2022-09-26 VITALS
HEART RATE: 59 BPM | OXYGEN SATURATION: 92 % | RESPIRATION RATE: 18 BRPM | WEIGHT: 250 LBS | DIASTOLIC BLOOD PRESSURE: 59 MMHG | SYSTOLIC BLOOD PRESSURE: 154 MMHG | HEIGHT: 62 IN | TEMPERATURE: 96.5 F | BODY MASS INDEX: 46.01 KG/M2

## 2022-09-26 DIAGNOSIS — N20.0 RENAL STONE: Primary | ICD-10-CM

## 2022-09-26 DIAGNOSIS — N20.1 URETERAL STONE: ICD-10-CM

## 2022-09-26 PROCEDURE — 6360000002 HC RX W HCPCS: Performed by: UROLOGY

## 2022-09-26 PROCEDURE — C1769 GUIDE WIRE: HCPCS | Performed by: UROLOGY

## 2022-09-26 PROCEDURE — 6360000002 HC RX W HCPCS

## 2022-09-26 PROCEDURE — 3700000001 HC ADD 15 MINUTES (ANESTHESIA): Performed by: UROLOGY

## 2022-09-26 PROCEDURE — 7100000011 HC PHASE II RECOVERY - ADDTL 15 MIN: Performed by: UROLOGY

## 2022-09-26 PROCEDURE — 2720000010 HC SURG SUPPLY STERILE: Performed by: UROLOGY

## 2022-09-26 PROCEDURE — C1894 INTRO/SHEATH, NON-LASER: HCPCS | Performed by: UROLOGY

## 2022-09-26 PROCEDURE — 2580000003 HC RX 258: Performed by: UROLOGY

## 2022-09-26 PROCEDURE — 6370000000 HC RX 637 (ALT 250 FOR IP): Performed by: ANESTHESIOLOGY

## 2022-09-26 PROCEDURE — C1758 CATHETER, URETERAL: HCPCS | Performed by: UROLOGY

## 2022-09-26 PROCEDURE — 2709999900 HC NON-CHARGEABLE SUPPLY: Performed by: UROLOGY

## 2022-09-26 PROCEDURE — 76000 FLUOROSCOPY <1 HR PHYS/QHP: CPT

## 2022-09-26 PROCEDURE — 88300 SURGICAL PATH GROSS: CPT | Performed by: PATHOLOGY

## 2022-09-26 PROCEDURE — 7100000000 HC PACU RECOVERY - FIRST 15 MIN: Performed by: UROLOGY

## 2022-09-26 PROCEDURE — 3600000013 HC SURGERY LEVEL 3 ADDTL 15MIN: Performed by: UROLOGY

## 2022-09-26 PROCEDURE — 3700000000 HC ANESTHESIA ATTENDED CARE: Performed by: UROLOGY

## 2022-09-26 PROCEDURE — 7100000010 HC PHASE II RECOVERY - FIRST 15 MIN: Performed by: UROLOGY

## 2022-09-26 PROCEDURE — C2617 STENT, NON-COR, TEM W/O DEL: HCPCS | Performed by: UROLOGY

## 2022-09-26 PROCEDURE — 7100000001 HC PACU RECOVERY - ADDTL 15 MIN: Performed by: UROLOGY

## 2022-09-26 PROCEDURE — 3600000003 HC SURGERY LEVEL 3 BASE: Performed by: UROLOGY

## 2022-09-26 PROCEDURE — 82360 CALCULUS ASSAY QUANT: CPT

## 2022-09-26 DEVICE — VARIABLE LENGTH URETERAL STENT
Type: IMPLANTABLE DEVICE | Site: URETHRA | Status: FUNCTIONAL
Brand: CONTOUR VL™

## 2022-09-26 RX ORDER — SODIUM CHLORIDE, SODIUM LACTATE, POTASSIUM CHLORIDE, CALCIUM CHLORIDE 600; 310; 30; 20 MG/100ML; MG/100ML; MG/100ML; MG/100ML
INJECTION, SOLUTION INTRAVENOUS CONTINUOUS
Status: DISCONTINUED | OUTPATIENT
Start: 2022-09-26 | End: 2022-09-26 | Stop reason: HOSPADM

## 2022-09-26 RX ORDER — LABETALOL HYDROCHLORIDE 5 MG/ML
10 INJECTION, SOLUTION INTRAVENOUS
Status: DISCONTINUED | OUTPATIENT
Start: 2022-09-26 | End: 2022-09-26 | Stop reason: HOSPADM

## 2022-09-26 RX ORDER — FENTANYL CITRATE 50 UG/ML
INJECTION, SOLUTION INTRAMUSCULAR; INTRAVENOUS PRN
Status: DISCONTINUED | OUTPATIENT
Start: 2022-09-26 | End: 2022-09-26 | Stop reason: SDUPTHER

## 2022-09-26 RX ORDER — OXYCODONE HYDROCHLORIDE 5 MG/1
5 TABLET ORAL
Status: COMPLETED | OUTPATIENT
Start: 2022-09-26 | End: 2022-09-26

## 2022-09-26 RX ORDER — HYDRALAZINE HYDROCHLORIDE 20 MG/ML
10 INJECTION INTRAMUSCULAR; INTRAVENOUS
Status: DISCONTINUED | OUTPATIENT
Start: 2022-09-26 | End: 2022-09-26 | Stop reason: HOSPADM

## 2022-09-26 RX ORDER — ONDANSETRON 4 MG/1
4 TABLET, FILM COATED ORAL EVERY 12 HOURS PRN
Qty: 10 TABLET | Refills: 0 | Status: SHIPPED | OUTPATIENT
Start: 2022-09-26 | End: 2022-10-13 | Stop reason: ALTCHOICE

## 2022-09-26 RX ORDER — DIPHENHYDRAMINE HYDROCHLORIDE 50 MG/ML
12.5 INJECTION INTRAMUSCULAR; INTRAVENOUS
Status: DISCONTINUED | OUTPATIENT
Start: 2022-09-26 | End: 2022-09-26 | Stop reason: HOSPADM

## 2022-09-26 RX ORDER — HALOPERIDOL 5 MG/ML
1 INJECTION INTRAMUSCULAR
Status: DISCONTINUED | OUTPATIENT
Start: 2022-09-26 | End: 2022-09-26 | Stop reason: HOSPADM

## 2022-09-26 RX ORDER — MEPERIDINE HYDROCHLORIDE 25 MG/ML
6.25 INJECTION INTRAMUSCULAR; INTRAVENOUS; SUBCUTANEOUS EVERY 5 MIN PRN
Status: DISCONTINUED | OUTPATIENT
Start: 2022-09-26 | End: 2022-09-26 | Stop reason: HOSPADM

## 2022-09-26 RX ORDER — PROCHLORPERAZINE EDISYLATE 5 MG/ML
5 INJECTION INTRAMUSCULAR; INTRAVENOUS
Status: DISCONTINUED | OUTPATIENT
Start: 2022-09-26 | End: 2022-09-26 | Stop reason: HOSPADM

## 2022-09-26 RX ORDER — FENTANYL CITRATE 50 UG/ML
50 INJECTION, SOLUTION INTRAMUSCULAR; INTRAVENOUS EVERY 5 MIN PRN
Status: DISCONTINUED | OUTPATIENT
Start: 2022-09-26 | End: 2022-09-26 | Stop reason: HOSPADM

## 2022-09-26 RX ORDER — IPRATROPIUM BROMIDE AND ALBUTEROL SULFATE 2.5; .5 MG/3ML; MG/3ML
1 SOLUTION RESPIRATORY (INHALATION)
Status: DISCONTINUED | OUTPATIENT
Start: 2022-09-26 | End: 2022-09-26 | Stop reason: HOSPADM

## 2022-09-26 RX ORDER — ONDANSETRON 2 MG/ML
INJECTION INTRAMUSCULAR; INTRAVENOUS PRN
Status: DISCONTINUED | OUTPATIENT
Start: 2022-09-26 | End: 2022-09-26 | Stop reason: SDUPTHER

## 2022-09-26 RX ORDER — MIDAZOLAM HYDROCHLORIDE 2 MG/2ML
2 INJECTION, SOLUTION INTRAMUSCULAR; INTRAVENOUS
Status: DISCONTINUED | OUTPATIENT
Start: 2022-09-26 | End: 2022-09-26 | Stop reason: HOSPADM

## 2022-09-26 RX ORDER — PROPOFOL 10 MG/ML
INJECTION, EMULSION INTRAVENOUS PRN
Status: DISCONTINUED | OUTPATIENT
Start: 2022-09-26 | End: 2022-09-26 | Stop reason: SDUPTHER

## 2022-09-26 RX ORDER — DEXAMETHASONE SODIUM PHOSPHATE 4 MG/ML
INJECTION, SOLUTION INTRA-ARTICULAR; INTRALESIONAL; INTRAMUSCULAR; INTRAVENOUS; SOFT TISSUE PRN
Status: DISCONTINUED | OUTPATIENT
Start: 2022-09-26 | End: 2022-09-26 | Stop reason: SDUPTHER

## 2022-09-26 RX ORDER — TRAMADOL HYDROCHLORIDE 50 MG/1
50 TABLET ORAL EVERY 8 HOURS PRN
Qty: 15 TABLET | Refills: 0 | Status: SHIPPED | OUTPATIENT
Start: 2022-09-26 | End: 2022-10-01

## 2022-09-26 RX ORDER — ALFUZOSIN HYDROCHLORIDE 10 MG/1
10 TABLET, EXTENDED RELEASE ORAL DAILY
Qty: 20 TABLET | Refills: 0 | Status: ON HOLD | OUTPATIENT
Start: 2022-09-26 | End: 2022-10-18 | Stop reason: HOSPADM

## 2022-09-26 RX ORDER — MIDAZOLAM HYDROCHLORIDE 1 MG/ML
INJECTION INTRAMUSCULAR; INTRAVENOUS PRN
Status: DISCONTINUED | OUTPATIENT
Start: 2022-09-26 | End: 2022-09-26 | Stop reason: SDUPTHER

## 2022-09-26 RX ADMIN — OXYCODONE HYDROCHLORIDE 5 MG: 5 TABLET ORAL at 15:44

## 2022-09-26 RX ADMIN — CEFAZOLIN 2000 MG: 2 INJECTION, POWDER, FOR SOLUTION INTRAMUSCULAR; INTRAVENOUS at 13:08

## 2022-09-26 RX ADMIN — ONDANSETRON 4 MG: 2 INJECTION INTRAMUSCULAR; INTRAVENOUS at 13:30

## 2022-09-26 RX ADMIN — SODIUM CHLORIDE, POTASSIUM CHLORIDE, SODIUM LACTATE AND CALCIUM CHLORIDE: 600; 310; 30; 20 INJECTION, SOLUTION INTRAVENOUS at 12:28

## 2022-09-26 RX ADMIN — MIDAZOLAM 2 MG: 1 INJECTION INTRAMUSCULAR; INTRAVENOUS at 13:12

## 2022-09-26 RX ADMIN — FENTANYL CITRATE 100 MCG: 50 INJECTION, SOLUTION INTRAMUSCULAR; INTRAVENOUS at 13:20

## 2022-09-26 RX ADMIN — DEXAMETHASONE SODIUM PHOSPHATE 4 MG: 4 INJECTION, SOLUTION INTRAMUSCULAR; INTRAVENOUS at 13:30

## 2022-09-26 RX ADMIN — PROPOFOL 150 MG: 10 INJECTION, EMULSION INTRAVENOUS at 13:20

## 2022-09-26 ASSESSMENT — PAIN DESCRIPTION - PAIN TYPE
TYPE: SURGICAL PAIN
TYPE: SURGICAL PAIN

## 2022-09-26 ASSESSMENT — PAIN DESCRIPTION - ONSET
ONSET: ON-GOING
ONSET: ON-GOING

## 2022-09-26 ASSESSMENT — PAIN SCALES - GENERAL
PAINLEVEL_OUTOF10: 6
PAINLEVEL_OUTOF10: 5
PAINLEVEL_OUTOF10: 0

## 2022-09-26 ASSESSMENT — PAIN - FUNCTIONAL ASSESSMENT
PAIN_FUNCTIONAL_ASSESSMENT: PREVENTS OR INTERFERES SOME ACTIVE ACTIVITIES AND ADLS
PAIN_FUNCTIONAL_ASSESSMENT: 0-10
PAIN_FUNCTIONAL_ASSESSMENT: PREVENTS OR INTERFERES SOME ACTIVE ACTIVITIES AND ADLS

## 2022-09-26 ASSESSMENT — PAIN DESCRIPTION - DESCRIPTORS
DESCRIPTORS: ACHING;DISCOMFORT
DESCRIPTORS: ACHING;DISCOMFORT

## 2022-09-26 ASSESSMENT — PAIN DESCRIPTION - FREQUENCY
FREQUENCY: CONTINUOUS
FREQUENCY: CONTINUOUS

## 2022-09-26 ASSESSMENT — PAIN DESCRIPTION - LOCATION
LOCATION: ABDOMEN
LOCATION: ABDOMEN

## 2022-09-26 ASSESSMENT — PAIN DESCRIPTION - ORIENTATION
ORIENTATION: LEFT
ORIENTATION: LEFT

## 2022-09-26 NOTE — PROGRESS NOTES
1529 - Patient returned to room from pacu,  A+Ox4, VSS (see doc flow), assessment completed as per doc flow. Patient has 6/10  c/o pain, RN to provide prn pain meds. Patient requesting to stay in the hospital overnight, RN explained reasons why she would not. Patient had asked in pre op to stay the night, and Dr Drake Swann was aware. Beverage offered. Call light in reach, bed in low position. RN to continue to monitor. Will call to waiting room for visitor/family. [id number] - Patient discharge instructions and prescriptions reviewed and verified. RN reviewed d/c instructions with patient and spouse. All questions answered. Paper prescriptions given to patient, copy in soft chart. Discharge paperwork signed by RN and patient's spouse. [id number] -Discharged to car  via wheelchair, home with spouse.

## 2022-09-26 NOTE — DISCHARGE INSTRUCTIONS
No heavy activity for 7 days  Motrin/tylenol/azo OTC PRN pain  No driving for 24 hours or on narcotics  Do not manipulate black string in urethra  Continue hydration and avoid constipation  Complete Ceftin course  Call or return to hospital with questions or concerns  Follow-up in 7-10 days for stent removal.  Call for an appointment    Watch for signs of infection    Elevated temperature, redness or pain at site,   Drainage at incision. [facility]  [phone]    Do not drive, work around [address] [place], [place] [address]th  or use equipment. Do not drink any alcoholic beverages. Do not smoke while alone. Avoid making important decisions. Plan to spend a quiet, relaxed evening @ home. Resume normal activities as you begin to feel better. Eat lightly for your first meal, then gradually increase your diet to what is normal for you. In case of nausea, avoid food and drink only clear liquids. Resume food as nausea ceases. Notify your surgeon if you experience fever, chills, large amount of bleeding, difficulty breathing, persistent nausea and vomiting or any other disturbing problem. Call for a follow-up appointment with your surgeon. Advance Care Planning  People with COVID-19 may have no symptoms, mild symptoms, such as fever, cough, and shortness of breath or they may have more severe illness, developing severe and fatal pneumonia. As a result, Advance Care Planning with attention to naming a health care decision maker (someone you trust to make healthcare decisions for you if you could not speak for yourself) and sharing other health care preferences is important BEFORE a possible health crisis. Please contact your Primary Care Provider to discuss Advance Care Planning.     Preventing the Spread of Coronavirus Disease 2019 in Homes and Residential Communities  For the most recent information go to sick, wash your hands before and after you interact with pets and wear a facemask. Call ahead before visiting your doctor  If you have a medical appointment, call the healthcare provider and tell them that you have or may have COVID-19. This will help the healthcare providers office take steps to keep other people from getting infected or exposed. Wear a facemask  You should wear a facemask when you are around other people (e.g., sharing a room or vehicle) or pets and before you enter a healthcare providers office. If you are not able to wear a facemask (for example, because it causes trouble breathing), then people who live with you should not stay in the same room with you, or they should wear a facemask if they enter your room. Cover your coughs and sneezes  Cover your mouth and nose with a tissue when you cough or sneeze. Throw used tissues in a lined trash can. Immediately wash your hands with soap and water for at least 20 seconds or, if soap and water are not available, clean your hands with an alcohol-based hand  that contains at least 60% alcohol. Clean your hands often  Wash your hands often with soap and water for at least 20 seconds, especially after blowing your nose, coughing, or sneezing; going to the bathroom; and before eating or preparing food. If soap and water are not readily available, use an alcohol-based hand  with at least 60% alcohol, covering all surfaces of your hands and rubbing them together until they feel dry. Soap and water are the best option if hands are visibly dirty. Avoid touching your eyes, nose, and mouth with unwashed hands. Avoid sharing personal household items  You should not share dishes, drinking glasses, cups, eating utensils, towels, or bedding with other people or pets in your home. After using these items, they should be washed thoroughly with soap and water.   Clean all high-touch surfaces everyday  High touch surfaces include counters, tabletops, doorknobs, bathroom fixtures, toilets, phones, keyboards, tablets, and bedside tables. Also, clean any surfaces that may have blood, stool, or body fluids on them. Use a household cleaning spray or wipe, according to the label instructions. Labels contain instructions for safe and effective use of the cleaning product including precautions you should take when applying the product, such as wearing gloves and making sure you have good ventilation during use of the product. Monitor your symptoms  Seek prompt medical attention if your illness is worsening (e.g., difficulty breathing). Before seeking care, call your healthcare provider and tell them that you have, or are being evaluated for, COVID-19. Put on a facemask before you enter the facility. These steps will help the healthcare providers office to keep other people in the office or waiting room from getting infected or exposed. Ask your healthcare provider to call the local or state health department. Persons who are placed under active monitoring or facilitated self-monitoring should follow instructions provided by their local health department or occupational health professionals, as appropriate. When working with your local health department check their available hours. If you have a medical emergency and need to call 911, notify the dispatch personnel that you have, or are being evaluated for COVID-19. If possible, put on a facemask before emergency medical services arrive. Discontinuing home isolation  Patients with confirmed COVID-19 should remain under home isolation precautions until the risk of secondary transmission to others is thought to be low. The decision to discontinue home isolation precautions should be made on a case-by-case basis, in consultation with healthcare providers and state and local health departments.

## 2022-09-26 NOTE — H&P
Department of Urology     [facility] 1 2 3 4 5      Date: 2022   Patient: Stormy Foster   : 1950   DOA: 2022   MRN: [id number]   ROOM#: OR/NONE       CHIEF COMPLAINT:  2.5cm left renal stone without obstruction    History Obtained From:  patient    HISTORY OF PRESENT ILLNESS:                The patient is a 67 y.o. female with significant past medical history of morbid obesity with 2.5cm left renal stone without hydronephrosis or flank pain. Recurrent UTI. On ceftin for pre-op urine PCR. She denies n/v/f/c or flank pain. Reviewed options and risks. She elected to proceed with left ureteroscopy, holmium laser lithotripsy and stent placement. She understands may take several procedures due to stone burden. Risks include infection, bleeding, failure of therapy, stricture, stent irritation, sepsis, need for additional stone procedures. She elected to proceed with scheduled surgery. Recent right  ureteroscopy and stone manipulation. Stent is out. She reports pulling out her stent with black string several weeks ago. She reports her urine has been clear.      Past Medical History:        Diagnosis Date    Anemia     Anxiety     Arthritis     generalized    Asthma     AV block     2nd degree per hospital in 2013    Blood poisoning     Blood transfusion     2003    Breast cancer ([address] 2012    right    CAD (coronary artery disease)     Cancer ([address]     skin (face) & colon(2012 dx of right breast ca(pc)    Carcinoma of breast ([address] 2012    right arm no b/p or sticks    Cardiac pacemaker 03/10/2014    Medtronic dual lead    Chronic cystitis     Chronic respiratory failure (HCC)     2 L/min oxygen at night-time    Colon cancer ([address]     COPD (chronic obstructive pulmonary disease) ([address]     CTS (carpal tunnel syndrome)     Depression     Diverticulitis     H/O 24 hour EKG monitoring 2014 complete heart block No clinically significant arrthymia noted. H/O cardiac catheterization 10/31/2011, 7/11/2007    10/31/2011-No significant CAD. Cardiolite stress test was false positive-Dr Anisha Savage; 7/11/2007-No CAD, global function intact;    H/O cardiovascular stress test 10/20/2011, 3/23/2010    10/20/2011-Lexiscan- Evid of mild ischemia in Left CX region. EF 70%. Global LVSF normal;    H/O cardiovascular stress test 01/07/2015    EF 77%. Normal Stress Test.    H/O chest pain 04/2005    sees Dr Anisha Savage    H/O Doppler ultrasound 02/20/2008 2/20/2008-CAROTID DOPPLER- Normal carotids bilaterally;    H/O Doppler ultrasound 06/06/2013    CAROTID DOPPLER- there is heterogeneous, irregular atherosclerotic plaque noted in the right internal carotid artery,  doppler flow velocities within the right internal carotid artery are elevated, consistent with a mild, less than 50%stenosis, there is intimal thickening but no significant atherosclerotic plaque noted in the left internal carotid artery, the left carotid are within normal limits    H/O echocardiogram 4/9/2012, 10/20/2011    4/9/2012-LVSF normal EF=>55%. impaired LV relaxation;    H/O echocardiogram 12/31/2014    EF 50-55%. LV shows mild concentric hypertrophy. Mildly dilated left atrium. Mildly dilated right ventricle. Sclerotic but not stenotic aortic valve. Mitral annular calcification.   Mild MR, Mild TR, Mild pulm HTN.    H/O urinary incontinence     History of blood transfusion     Hx antineoplastic chemo     Hx of Arterial Doppler ultrasound 07/01/2019    No hemodynamically significant or focal stenosis visualized bilaterally, bilateral lower extremity arteries exhibit diffuse plaque and multiphasic waveforms, unable to obtain bilateral ABIs due to elevated leg pressures >250 mmHg, possibly due to atherosclerosis    Hx of cardiovascular stress test 06/2013    EF 70% abn suggestive of anterolateral ischemia, possible RCA ischmeia, post left ventricular dilation suggestive multivessel CAD.    Hx of echocardiogram 06/2013    EF50%, mild MR and TR, mild pulmonary HTN, mild AS    Hx of echocardiogram 11/01/2021    Left ventricular systolic function is normal, Mild concentric left ventricular hypertrophy Mildly dilated left atrium. Mitral annular calcification is present. Mild tricuspid regurgitation No evidence of any pericardial effusion    Hx of fall     \"last time 2018- due to neuropathy, have to use cane\"    [facility] 7/24/13, 06/2013 7/13-No clinacally significant arrhythmia. 6/13-multiple cycles of wenckebach episodes in addtion to some sinus tach    Hyperlipidemia     Hypertension     Hypothermia 2008    Malignant neoplasm of breast (female) [address] 2012    Neuropathy     \"have neuropathy of my legs\"    Normocytic normochromic anemia     Obstructive sleep apnea 2008 01- Has CPAP machine but has not used in over a year - states she has not had problems since her pacemaker was placed. Old MI (myocardial infarction)     per pt on 1/15/2019\"had heart attack about a year ago\"    Osteoarthritis     Osteopenia     Osteopenia of multiple sites 9/14/2022    Pneumonia due to COVID-19 virus 03/22/2021    Primary malignant neoplasm of colon ([facility]     S/P cardiac cath 06/2013    no significant CAD false postive stress test    Skin cancer     Super obesity     Umbilical hernia      Past Surgical History:        Procedure Laterality Date    A-V CARDIAC PACEMAKER INSERTION  3/10/14     Medtronic.    Model:  [id number] Medtronic  Serial:  [id number] dual chamber pacemaker    APPENDECTOMY  2004    BREAST BIOPSY  2/13/12    BREAST LUMPECTOMY  2007    right     BREAST SURGERY  02/13/2012    rt lesion biopsy     CARDIAC CATHETERIZATION  2007 & 2011    CAROTID ENDARTERECTOMY Right 12/3/2021    RIGHT CAROTID ENDARTERECTOMY WITH PATCH performed by Melvina Mosquera MD at [address]  2004    Colon cancer    COLONOSCOPY  10-18-13    polyp    COLONOSCOPY  1/19/2016    internal hemorrhoids, diverticulosis, 1.5 cm sessile polyp, 8 mm polyp found in rectum. COLONOSCOPY  12/14/2017    1.5cm residual polyp, 5mm polyps in blind sigmoid loop x3, Sigmoid divertics, Internal grade 1 hemorrhoids    COLONOSCOPY N/A 1/16/2019    COLONOSCOPY W/ ENDOSCOPIC MUCOSAL RESECTION WITH ELEVIEW 5ML INJECTION AND POLYPECTOMY OF TIP OF BLIND RECTOSIGMOID STUMP AND CAUTERIZATION WITH ERBE PROBE, CLIPPING X2 performed by Shirin Oquendo MD at [facility]  01/21/2020    pan divertics/ 4 polyps/ sm int hem, S/P partial sigmoid resection w/ end to side anastamosis, repeat in 3 years    COLONOSCOPY N/A 1/21/2020    COLONOSCOPY POLYPECTOMY SNARE/COLD BIOPSY performed by Shirin Oquendo MD at [facility]  2003    back    CYSTOSCOPY Bilateral 12/15/14    CYSTOSCOPY Right 5/15/2022    CYSTOSCOPY URETERAL STENT INSERTION performed by Vanessa Payne MD at [facility]      front right tooth and root canal w/ brass bolt    DILATION AND CURETTAGE OF UTERUS  2006    Needed a camera to find my uterus. DILATION AND CURETTAGE OF UTERUS N/A 5/24/2022    DILATATION AND CURETTAGE, CULTURES OF UTEREUS performed by Joselyn Lee MD at [facility], COLON, DIAGNOSTIC  12/14/2017    Small hiatal hernia    HERNIA REPAIR  2004    Umb hernia    HERNIA REPAIR  2008    Inc hernia    KIDNEY SURGERY  05/24/2022    stent placed on right side    LITHOTRIPSY Right 8/1/2022    RIGHT CYSTOSCOPY URETEROSCOPY STONE MANIPULATION WITH HOLIUM LASER LITHOTRIPSY STENT REPLACEMENT performed by Shantelle Dai MD at [place] [address]  2/29/2012    Right axillary-3 sentinel nodes were positive out of 9.     MASTECTOMY, RADICAL Right 02/29/2012    w/ sentinal node disection-Dr West    PACEMAKER PLACEMENT      PRE-MALIGNANT / BENIGN SKIN LESION EXCISION  2/8/2013    right leg X2-Dr West    TONSILLECTOMY  1957    TUNNELED VENOUS PORT PLACEMENT  2004    TUNNELED VENOUS PORT PLACEMENT  2012    removal of mediport     Current Medications:   Neurontin, zocor, tylenol, ceftin, flovent, albuterol, aspirin, calcium      Allergies:  Bactrim [sulfamethoxazole-trimethoprim], Lipitor [atorvastatin], Taxol [paclitaxel], Aminoglycosides, Demerol, Neosporin [bacitracin-neomycin-polymyxin], Other, and Talc    Social History:   TOBACCO:   reports that she has never smoked. She has never used smokeless tobacco.  ETOH:   reports no history of alcohol use. DRUGS:   reports no history of drug use. MARITAL STATUS:    OCCUPATION:      Family History:         Problem Relation Age of Onset    Arthritis Mother         OA, RA    High Cholesterol Mother     High Blood Pressure Mother     Cancer Father         skin and leukemia    High Blood Pressure Father     High Cholesterol Father     Diabetes Father     Heart Disease Father     Heart Disease Brother     High Cholesterol Brother     High Blood Pressure Brother     Heart Disease Brother     No Known Problems Sister     Seizures Son     Cancer Brother         skin cancer    Breast Cancer Neg Hx     Ovarian Cancer Neg Hx        REVIEW OF SYSTEMS:        PHYSICAL EXAM:    VITALS:  BP (!) 154/62   Pulse 72   Temp 97.4 °F (36.3 °C) (Temporal)   Resp 18   Ht 5' 2\" (1.575 m)   Wt 250 lb (113.4 kg)   LMP 01/15/1999   SpO2 96%   BMI 45.73 kg/m²     TEMPERATURE:  Current - Temp: 97.4 °F (36.3 °C); Max - Temp  Av.4 °F (36.3 °C)  Min: 97.4 °F (36.3 °C)  Max: 97.4 °F (36.3 °C)  24HR BLOOD PRESSURE RANGE:  Systolic (99UCH), XJU:492 , Min:154 , CG   ; Diastolic (50OSG), BGD:24, Min:62, Max:62    8HR INTAKE OUTPUT:  No intake/output data recorded.   URINARY CATHETER OUTPUT (Farooq):     DRAIN/TUBE OUTPUT:        CONSTITUTIONAL:  awake, alert, cooperative, no apparent distress, and appears stated age  CTA b  Rrr  Soft, nd/nt, obese    Data:    WBC:    Lab Results   Component Value Date/Time    WBC 13.4 2022 10:16 PM Hemoglobin/Hematocrit:    Lab Results   Component Value Date/Time    HGB 12.6 09/18/2022 10:16 PM    HCT 39.1 09/18/2022 10:16 PM     BMP:    Lab Results   Component Value Date/Time     09/18/2022 10:16 PM    K 3.5 09/18/2022 10:16 PM     09/18/2022 10:16 PM    CO2 27 09/18/2022 10:16 PM    BUN 7 09/18/2022 10:16 PM    LABALBU 3.5 05/20/2022 05:20 AM    CREATININE 0.7 09/18/2022 10:16 PM    CALCIUM 9.9 09/18/2022 10:16 PM    GFRAA >60 09/18/2022 10:16 PM    LABGLOM >60 09/18/2022 10:16 PM     PT/INR:    Lab Results   Component Value Date/Time    PROTIME 11.4 11/29/2021 03:14 PM    PROTIME 11.2 10/28/2011 02:28 PM    INR 0.88 11/29/2021 03:14 PM         Imaging: [unfilled]    CT Scan a/p 5/2022      Impression:   2.5cm left renal stone without obstruction or flank pain  She denies UTI symptoms  On Ceftin for pre-op urine PCR  She elected to proceed with surgery      Recommendation:   Proceed with left ureteroscopy, holmium laser lithotripsy, and stent placement  Ancef on call to OR  Complete ceftin course      Patient seen and examined, chart reviewed.      Electronically signed by Millie Parmar MD on 9/26/2022 at 1:09 PM

## 2022-09-26 NOTE — BRIEF OP NOTE
Brief Postoperative Note      Patient: Alvarez Herzog  YOB: 1950  MRN: [id number]    Date of Procedure: 9/26/2022    Pre-Op Diagnosis: 2.5cm left renal stone                                  Recurrent urinary tract infection    Post-Op Diagnosis: same       Procedure   Left ureteroscopy, holmium laser lithotripsy (extended 30 minutes due to stone burden) and basket stone extraction  Cystoscopy and left ureter stent placement    Surgeon(s):  Yovana Blanc MD      Anesthesia: General    Estimated Blood Loss (mL): Minimal    Complications: None    Specimens:   ID Type Source Tests Collected by Time Destination   1 : Ureteral Stones Tissue Kidney [address] MD Sukumar 9/26/2022 1424    Left renal stone fragments    Implants:  Implant Name Type Inv.  Item Serial No.  Lot No. LRB No. Used Action   STENT URET 4.8FR [id number] PERCFLX HYDR+ SFT PGTL TAPR TIP - [id number]  STENT URET 4.8FR [id number] PERCFLX HYDR+ SFT PGTL TAPR TIP  [facility] UROLOGY-WD [id number] Left 1 Implanted   With black string      Findings:  Faintly radio-opaque stone on fluoroscopy  Soft stone with green color  Stone fragmented well  No concerning masses or lesions in bladder    Electronically signed by Yovana Blanc MD on 9/26/2022 at 2:41 PM

## 2022-09-27 NOTE — OP NOTE
[address] [place] [address], [address] [place] [address]                                OPERATIVE REPORT    PATIENT NAME: Jose Gaxiola                    :        1950  MED REC NO:   [id number]                          ROOM:  ACCOUNT NO:   [de-identified]                           ADMIT DATE: 2022  PROVIDER:     Diana Bunch MD    DATE OF PROCEDURE:  2022    PREOPERATIVE DIAGNOSES:  1.  2.5 cm left renal stone without obstruction. 2.  Recurrent urinary tract infection. POSTOPERATIVE DIAGNOSES:  1.  2.5 cm left renal stone without obstruction. 2.  Recurrent urinary tract infection. OPERATION PERFORMED:  1. Left ureteroscopy with holmium laser lithotripsy requiring an extra  30 minutes due to stone burden and basket stone extraction. 2.  Cystoscopy and left ureter stent placement. SURGEON:  Diana Bunch MD    ANESTHESIA:  General.    ESTIMATED BLOOD LOSS:  Minimal.    COMPLICATIONS:  None. SPECIMENS:  Left renal stone fragments. DRAINS PLACED:  4.8-Portuguese variable left ureter stent with black string. FINDINGS:  1. Faintly radio-opaque stone on fluoroscopy. 2.  Soft stone that fragmented well with a green hue.  3.  Stone fragmented well. 4.  No concerning masses or lesions found in bladder. DISPOSITION:  Stable to PACU. INDICATIONS FOR PROCEDURE:  The patient is a 74-year-old female with a  history of kidney stones, who has a 2.5 cm left renal pelvis stone  without obstruction and without renal colic. She has recurrent urinary  tract infections. She is currently on Ceftin for preoperative urine  PCR. Overall, she is doing well today without renal colic or urinary  tract infection symptoms. I reviewed the risks, benefits, and  alternative therapies. The patient elected to proceed with surgery.    She had a recent right ureteroscopy and holmium laser lithotripsy  procedure, and she removed her right renal stent on her own by pulling  the black string. OPERATIVE REPORT:  The patient received preoperative antibiotics. She  had compression boots in place. She was brought to the operating room  and placed on the operating room table. A general anesthetic was  administered by the Anesthesia service. She was then placed in the  dorsal lithotomy position and prepped and draped in a sterile fashion  after being appropriately padded. A time-out was performed per  protocol. The patient has morbid obesity. A 21-Malay cystoscope with 30-degree  lens was placed through the urethra and into the bladder without  difficulty. The bladder was irrigated several times. There were no  concerning masses or lesions found in the bladder. There was no stent  seen on fluoroscopy. Her left renal stones were faintly radio-opaque. Two sensor guidewires were placed into the left collecting system with  the aid of fluoroscopy. A ureteral access sheath was placed over the  working wire. A LithoVue flexible ureteroscope was then placed into the  left collecting system. The stone was identified in the left renal  pelvis and left lower pole. Using a 365 micron fiber and the holmium  laser, the stone was fragmented into multiple pieces. The stone was  soft and fragmented well. A dusting technique was used. The shell of  the stone did have a green hue. The stone appeared to be well  fragmented. A basket extraction device was used to remove the largest  of the stone fragments and a portion was sent to pathology. Again, the  holmium laser lithotripsy required an extended 30 minutes due to the  stone burden. Portions of the stone were sent to pathology for stone  analysis. The remaining stone debris appeared to be small enough to  pass spontaneously. There was minimal blood loss. I elected to end the  procedure at this point in time.   The flexible ureteroscope was slowly  removed and there were no stones found in the left

## 2022-09-27 NOTE — ANESTHESIA POSTPROCEDURE EVALUATION
Department of Anesthesiology  Postprocedure Note    Patient: Zackary Feliciano  MRN: [id number]  YOB: 1950  Date of evaluation: 9/27/2022      Procedure Summary     Date: 09/26/22 Room / Location: [address]    Anesthesia Start: 1312 Anesthesia Stop: 1445    Procedure: LEFT CYSTOSCOPY URETEROSCOPY RETROGRADE PYELOGRAM STONE [facility] LITH[facility]IPSY POSSIBLE STENT PLACEMENT (Left: Urethra) Diagnosis:       Ureteral stone      (Ureteral stone [N20.1])    Surgeons: Davon Yu MD Responsible Provider: Daysi Kerns DO    Anesthesia Type: General ASA Status: 3          Anesthesia Type: General    Florian Phase I: Florain Score: 10    Florian Phase II: Florian Score: 10      Anesthesia Post Evaluation    Patient location during evaluation: PACU  Patient participation: complete - patient participated  Level of consciousness: sleepy but conscious  Airway patency: patent  Nausea & Vomiting: no nausea  Complications: no  Cardiovascular status: hemodynamically stable  Respiratory status: acceptable  Hydration status: euvolemic

## 2022-10-01 LAB
STONE COMPOSITION: NORMAL
STONE DESCRIPTION: NORMAL
STONE MASS: 128 MG

## 2022-10-06 ENCOUNTER — TELEPHONE (OUTPATIENT)
Dept: CARDIOLOGY CLINIC | Age: 72
End: 2022-10-06

## 2022-10-07 ENCOUNTER — COMMUNITY OUTREACH (OUTPATIENT)
Dept: INTERNAL MEDICINE CLINIC | Age: 72
End: 2022-10-07

## 2022-10-07 NOTE — PROGRESS NOTES
Patient's HM shows they are current for Colorectal Screening. [email or web address] and  files searched with success. Results attached to order and HM updated. Patient is overdue for Annual Wellness Visit.

## 2022-10-08 ENCOUNTER — HOSPITAL ENCOUNTER (EMERGENCY)
Age: 72
Discharge: HOME | End: 2022-10-09
Payer: MEDICARE

## 2022-10-08 ENCOUNTER — APPOINTMENT (OUTPATIENT)
Dept: GENERAL RADIOLOGY | Age: 72
End: 2022-10-08
Payer: MEDICARE

## 2022-10-08 DIAGNOSIS — N30.01 ACUTE CYSTITIS WITH HEMATURIA: Primary | ICD-10-CM

## 2022-10-08 LAB
BASOPHILS ABSOLUTE: 0 K/CU MM
BASOPHILS RELATIVE PERCENT: 0.3 % (ref 0–1)
DIFFERENTIAL TYPE: ABNORMAL
EOSINOPHILS ABSOLUTE: 0.2 K/CU MM
EOSINOPHILS RELATIVE PERCENT: 1.4 % (ref 0–3)
HCT VFR BLD CALC: 36.7 % (ref 37–47)
HEMOGLOBIN: 11.7 GM/DL (ref 12.5–16)
IMMATURE NEUTROPHIL %: 0.5 % (ref 0–0.43)
LYMPHOCYTES ABSOLUTE: 1.6 K/CU MM
LYMPHOCYTES RELATIVE PERCENT: 12 % (ref 24–44)
MCH RBC QN AUTO: 27.9 PG (ref 27–31)
MCHC RBC AUTO-ENTMCNC: 31.9 % (ref 32–36)
MCV RBC AUTO: 87.6 FL (ref 78–100)
MONOCYTES ABSOLUTE: 0.8 K/CU MM
MONOCYTES RELATIVE PERCENT: 5.7 % (ref 0–4)
NUCLEATED RBC %: 0 %
PDW BLD-RTO: 14.5 % (ref 11.7–14.9)
PLATELET # BLD: 217 K/CU MM (ref 140–440)
PMV BLD AUTO: 10 FL (ref 7.5–11.1)
RBC # BLD: 4.19 M/CU MM (ref 4.2–5.4)
SEGMENTED NEUTROPHILS ABSOLUTE COUNT: 10.5 K/CU MM
SEGMENTED NEUTROPHILS RELATIVE PERCENT: 80.1 % (ref 36–66)
TOTAL IMMATURE NEUTOROPHIL: 0.06 K/CU MM
TOTAL NUCLEATED RBC: 0 K/CU MM
WBC # BLD: 13.1 K/CU MM (ref 4–10.5)

## 2022-10-08 PROCEDURE — 6360000002 HC RX W HCPCS: Performed by: PHYSICIAN ASSISTANT

## 2022-10-08 PROCEDURE — 83690 ASSAY OF LIPASE: CPT

## 2022-10-08 PROCEDURE — 85025 COMPLETE CBC W/AUTO DIFF WBC: CPT

## 2022-10-08 PROCEDURE — 71046 X-RAY EXAM CHEST 2 VIEWS: CPT

## 2022-10-08 PROCEDURE — 80053 COMPREHEN METABOLIC PANEL: CPT

## 2022-10-08 PROCEDURE — 84702 CHORIONIC GONADOTROPIN TEST: CPT

## 2022-10-08 PROCEDURE — 96365 THER/PROPH/DIAG IV INF INIT: CPT

## 2022-10-08 RX ORDER — MORPHINE SULFATE 4 MG/ML
4 INJECTION, SOLUTION INTRAMUSCULAR; INTRAVENOUS EVERY 30 MIN PRN
Status: DISCONTINUED | OUTPATIENT
Start: 2022-10-08 | End: 2022-10-09 | Stop reason: HOSPADM

## 2022-10-08 RX ADMIN — MORPHINE SULFATE 4 MG: 4 INJECTION, SOLUTION INTRAMUSCULAR; INTRAVENOUS at 23:50

## 2022-10-09 ENCOUNTER — APPOINTMENT (OUTPATIENT)
Dept: CT IMAGING | Age: 72
End: 2022-10-09
Payer: MEDICARE

## 2022-10-09 VITALS
TEMPERATURE: 98.2 F | OXYGEN SATURATION: 96 % | RESPIRATION RATE: 18 BRPM | HEIGHT: 62 IN | WEIGHT: 245 LBS | BODY MASS INDEX: 45.08 KG/M2 | DIASTOLIC BLOOD PRESSURE: 55 MMHG | HEART RATE: 78 BPM | SYSTOLIC BLOOD PRESSURE: 134 MMHG

## 2022-10-09 LAB
ALBUMIN SERPL-MCNC: 3.7 GM/DL (ref 3.4–5)
ALP BLD-CCNC: 140 IU/L (ref 40–129)
ALT SERPL-CCNC: 10 U/L (ref 10–40)
ANION GAP SERPL CALCULATED.3IONS-SCNC: 9 MMOL/L (ref 4–16)
AST SERPL-CCNC: 11 IU/L (ref 15–37)
BACTERIA: NORMAL /HPF
BILIRUB SERPL-MCNC: 0.3 MG/DL (ref 0–1)
BILIRUBIN URINE: NEGATIVE
BLOOD, URINE: NORMAL
BUN BLDV-MCNC: 10 MG/DL (ref 6–23)
CALCIUM SERPL-MCNC: 9.7 MG/DL (ref 8.3–10.6)
CHLORIDE BLD-SCNC: 102 MMOL/L (ref 99–110)
CLARITY: NORMAL
CO2: 28 MMOL/L (ref 21–32)
COLOR: YELLOW
CREAT SERPL-MCNC: 0.7 MG/DL (ref 0.6–1.1)
GFR AFRICAN AMERICAN: >60 ML/MIN/1.73M2
GFR NON-AFRICAN AMERICAN: >60 ML/MIN/1.73M2
GLUCOSE BLD-MCNC: 132 MG/DL (ref 70–99)
GLUCOSE, URINE: NEGATIVE MG/DL
GONADOTROPIN, CHORIONIC (HCG) QUANT: 2 UIU/ML
KETONES, URINE: NEGATIVE MG/DL
LEUKOCYTE ESTERASE, URINE: NORMAL
LIPASE: 19 IU/L (ref 13–60)
MUCUS: NORMAL HPF
NITRITE URINE, QUANTITATIVE: POSITIVE
PH, URINE: 6
POTASSIUM SERPL-SCNC: 4 MMOL/L (ref 3.5–5.1)
PROTEIN UA: NORMAL MG/DL
RBC URINE: 20 /HPF
SODIUM BLD-SCNC: 139 MMOL/L (ref 135–145)
SPECIFIC GRAVITY UA: 1.01
SQUAMOUS EPITHELIAL: 4 /HPF
TOTAL PROTEIN: 7.5 GM/DL (ref 6.4–8.2)
TRICHOMONAS: NORMAL /HPF
UROBILINOGEN, URINE: 0.2 MG/DL
WBC CLUMP: NORMAL /HPF
WBC UA: 374 /HPF

## 2022-10-09 PROCEDURE — 6370000000 HC RX 637 (ALT 250 FOR IP): Performed by: PHYSICIAN ASSISTANT

## 2022-10-09 PROCEDURE — 71275 CT ANGIOGRAPHY CHEST: CPT

## 2022-10-09 PROCEDURE — 87086 URINE CULTURE/COLONY COUNT: CPT

## 2022-10-09 PROCEDURE — 6360000002 HC RX W HCPCS: Performed by: PHYSICIAN ASSISTANT

## 2022-10-09 PROCEDURE — 74177 CT ABD & PELVIS W/CONTRAST: CPT

## 2022-10-09 PROCEDURE — 87077 CULTURE AEROBIC IDENTIFY: CPT

## 2022-10-09 PROCEDURE — 6360000004 HC RX CONTRAST MEDICATION: Performed by: PHYSICIAN ASSISTANT

## 2022-10-09 PROCEDURE — 81001 URINALYSIS AUTO W/SCOPE: CPT

## 2022-10-09 PROCEDURE — 99285 EMERGENCY DEPT VISIT HI MDM: CPT

## 2022-10-09 PROCEDURE — 87186 SC STD MICRODIL/AGAR DIL: CPT

## 2022-10-09 PROCEDURE — 2580000003 HC RX 258: Performed by: PHYSICIAN ASSISTANT

## 2022-10-09 PROCEDURE — 96375 TX/PRO/DX INJ NEW DRUG ADDON: CPT

## 2022-10-09 RX ORDER — TRAMADOL HYDROCHLORIDE 50 MG/1
50 TABLET ORAL EVERY 6 HOURS PRN
Qty: 10 TABLET | Refills: 0 | Status: SHIPPED | OUTPATIENT
Start: 2022-10-09 | End: 2022-10-12

## 2022-10-09 RX ORDER — HYDROCODONE BITARTRATE AND ACETAMINOPHEN 5; 325 MG/1; MG/1
1 TABLET ORAL ONCE
Status: COMPLETED | OUTPATIENT
Start: 2022-10-09 | End: 2022-10-09

## 2022-10-09 RX ORDER — CEPHALEXIN 500 MG/1
500 CAPSULE ORAL 4 TIMES DAILY
Qty: 28 CAPSULE | Refills: 0 | Status: ON HOLD | OUTPATIENT
Start: 2022-10-09 | End: 2022-10-18 | Stop reason: HOSPADM

## 2022-10-09 RX ADMIN — CEFTRIAXONE SODIUM 1000 MG: 1 INJECTION, POWDER, FOR SOLUTION INTRAMUSCULAR; INTRAVENOUS at 02:58

## 2022-10-09 RX ADMIN — HYDROCODONE BITARTRATE AND ACETAMINOPHEN 1 TABLET: 5; 325 TABLET ORAL at 02:58

## 2022-10-09 RX ADMIN — IOPAMIDOL 75 ML: 755 INJECTION, SOLUTION INTRAVENOUS at 01:06

## 2022-10-09 NOTE — ED PROVIDER NOTES
Triage Chief Complaint:   Rib Pain (injury) (Both sides) and Shortness of Breath    [place]:  Today in the ED I had the pleasure of caring for Quincy Mosquera who is a 67 y.o. female that presents  today to the ED complaining of L sided flank pain. Pt states she had surgery for kidney stone removal on 9/26 and stent placement. And patient states that her pain has been increaseing x1 week    ROS:  REVIEW OF SYSTEMS    At least 10 systems reviewed      All other review of systems are negative  See HPI and nursing notes for additional information       Past Medical History:   Diagnosis Date    Anemia     Anxiety 1978    Arthritis     generalized    Asthma 2006    AV block     2nd degree per hospital in june2013    Blood poisoning 1994    Blood transfusion     12/2003    Breast cancer ([facility] [address] 02/29/2012    right    CAD (coronary artery disease)     Cancer ([address] 2007    skin (face) & colon(2003)/ 2/2012 dx of right breast ca(pc)    Carcinoma of breast ([address] 02/29/2012    right arm no b/p or sticks    Cardiac pacemaker 03/10/2014    Medtronic dual lead    Chronic cystitis     Chronic respiratory failure (HCC)     2 L/min oxygen at night-time    Colon cancer ([address] 2013    COPD (chronic obstructive pulmonary disease) ([address]     CTS (carpal tunnel syndrome)     Depression     Diverticulitis     H/O 24 hour EKG monitoring 2/28/2014 7/24/13 2/14 complete heart block 7/13No clinically significant arrthymia noted. H/O cardiac catheterization 10/31/2011, 7/11/2007    10/31/2011-No significant CAD. Cardiolite stress test was false positive-Dr Praveena Pedro; 7/11/2007-No CAD, global function intact;    H/O cardiovascular stress test 10/20/2011, 3/23/2010    10/20/2011-Lexiscan- Evid of mild ischemia in Left CX region. EF 70%. Global LVSF normal;    H/O cardiovascular stress test 01/07/2015    EF 77%.    Normal Stress Test.    H/O chest pain 04/2005    sees Dr Praveena Pedro    H/O Doppler ultrasound 02/20/2008 2/20/2008-CAROTID DOPPLER- Normal carotids bilaterally;    H/O Doppler ultrasound 06/06/2013    CAROTID DOPPLER- there is heterogeneous, irregular atherosclerotic plaque noted in the right internal carotid artery,  doppler flow velocities within the right internal carotid artery are elevated, consistent with a mild, less than 50%stenosis, there is intimal thickening but no significant atherosclerotic plaque noted in the left internal carotid artery, the left carotid are within normal limits    H/O echocardiogram 4/9/2012, 10/20/2011    4/9/2012-LVSF normal EF=>55%. impaired LV relaxation;    H/O echocardiogram 12/31/2014    EF 50-55%. LV shows mild concentric hypertrophy. Mildly dilated left atrium. Mildly dilated right ventricle. Sclerotic but not stenotic aortic valve. Mitral annular calcification. Mild MR, Mild TR, Mild pulm HTN.    H/O urinary incontinence     History of blood transfusion     Hx antineoplastic chemo     Hx of Arterial Doppler ultrasound 07/01/2019    No hemodynamically significant or focal stenosis visualized bilaterally, bilateral lower extremity arteries exhibit diffuse plaque and multiphasic waveforms, unable to obtain bilateral ABIs due to elevated leg pressures >250 mmHg, possibly due to atherosclerosis    Hx of cardiovascular stress test 06/2013    EF 70% abn suggestive of anterolateral ischemia, possible RCA ischmeia, post left ventricular dilation suggestive multivessel CAD. Hx of echocardiogram 06/2013    EF50%, mild MR and TR, mild pulmonary HTN, mild AS    Hx of echocardiogram 11/01/2021    Left ventricular systolic function is normal, Mild concentric left ventricular hypertrophy Mildly dilated left atrium. Mitral annular calcification is present. Mild tricuspid regurgitation No evidence of any pericardial effusion    Hx of fall     \"last time 2018- due to neuropathy, have to use cane\"    [facility] 7/24/13, 06/2013 7/13-No clinacally significant arrhythmia.  6/13-multiple cycles of jonikebach episodes in addtion to some sinus tach    Hyperlipidemia     Hypertension     Hypothermia 2008    Malignant neoplasm of breast (female) ([facility] [address] 2012    Neuropathy     \"have neuropathy of my legs\"    Normocytic normochromic anemia     Obstructive sleep apnea 2008 01- Has CPAP machine but has not used in over a year - states she has not had problems since her pacemaker was placed. Old MI (myocardial infarction)     per pt on 1/15/2019\"had heart attack about a year ago\"    Osteoarthritis     Osteopenia     Osteopenia of multiple sites 9/14/2022    Pneumonia due to COVID-19 virus 03/22/2021    Primary malignant neoplasm of colon ([facility] [address]     S/P cardiac cath 06/2013    no significant CAD false postive stress test    Skin cancer     Super obesity     Umbilical hernia      Past Surgical History:   Procedure Laterality Date    A-V CARDIAC PACEMAKER INSERTION  3/10/14     Medtronic. Model:  [id number] Medtronic  Serial:  [id number] dual chamber pacemaker    APPENDECTOMY  2004    BREAST BIOPSY  2/13/12    BREAST LUMPECTOMY  2007    right     BREAST SURGERY  02/13/2012    rt lesion biopsy     CARDIAC CATHETERIZATION  2007 & 2011    CAROTID ENDARTERECTOMY Right 12/3/2021    RIGHT CAROTID ENDARTERECTOMY WITH PATCH performed by Logan Bee MD at [facility]  2004    Colon cancer    COLONOSCOPY  10-18-13    polyp    COLONOSCOPY  1/19/2016    internal hemorrhoids, diverticulosis, 1.5 cm sessile polyp, 8 mm polyp found in rectum.     COLONOSCOPY  12/14/2017    1.5cm residual polyp, 5mm polyps in blind sigmoid loop x3, Sigmoid divertics, Internal grade 1 hemorrhoids    COLONOSCOPY N/A 1/16/2019    COLONOSCOPY W/ ENDOSCOPIC MUCOSAL RESECTION WITH ELEVIEW 5ML INJECTION AND POLYPECTOMY OF TIP OF BLIND RECTOSIGMOID STUMP AND CAUTERIZATION WITH ERBE PROBE, CLIPPING X2 performed by Gabe Marsh MD at [address]  01/21/2020    pan divertics/ 4 polyps/ sm int hem, S/P partial sigmoid resection w/ end to side anastamosis, repeat in 3 years    COLONOSCOPY N/A 1/21/2020    COLONOSCOPY POLYPECTOMY SNARE/COLD BIOPSY [facility]  2003    back    CYSTOSCOPY Bilateral 12/15/14    CYSTOSCOPY Right 5/15/2022    CYSTOSCOPY URETERAL STENT INSERTION performed by Mimi Goldstein MD at [facility]      front right tooth and root canal w/ brass bolt    DILATION AND CURETTAGE OF UTERUS  2006    Needed a camera to find my uterus. DILATION AND CURETTAGE OF UTERUS N/A 5/24/2022    DILATATION AND CURETTAGE, CULTURES OF UTEREUS performed by Erin Palmer MD at [address], COLON, DIAGNOSTIC  12/14/2017    Small hiatal hernia    HERNIA REPAIR  2004    Umb hernia    HERNIA REPAIR  2008    Inc hernia    KIDNEY SURGERY  05/24/2022    stent placed on right side    LITHOTRIPSY Right 8/1/2022    RIGHT CYSTOSCOPY URETEROSCOPY STONE MANIPULATION WITH HOLIUM LASER LITHOTRIPSY STENT REPLACEMENT performed by Jeanette Cardona MD at [facility]    LITHOTRIPSY Left 9/26/2022    LEFT CYSTOSCOPY URETEROSCOPY RETROGRADE PYELOGRAM STONE [facility] LITHOTRIPSY POSSIBLE [address] performed by Jeanette Cardona MD at [place] [address]  2/29/2012    Right axillary-3 sentinel nodes were positive out of 9.     MASTECTOMY, RADICAL Right 02/29/2012    w/ sentinal node disection-Dr West    PACEMAKER PLACEMENT      PRE-MALIGNANT / BENIGN SKIN LESION EXCISION  2/8/2013    right leg X2-Dr West    TONSILLECTOMY  1957    TUNNELED VENOUS PORT PLACEMENT  2004    TUNNELED VENOUS PORT PLACEMENT  02/13/2012    removal of mediport     Family History   Problem Relation Age of Onset    Arthritis Mother         OA, RA    High Cholesterol Mother     High Blood Pressure Mother     Cancer Father         skin and leukemia    High Blood Pressure Father     High Cholesterol Father     Diabetes Father     Heart Disease Father     Heart Disease Brother     High Cholesterol Brother     High Blood Pressure Brother     Heart Disease Brother     No Known Problems Sister     Seizures Son     Cancer Brother         skin cancer    Breast Cancer Neg Hx     Ovarian Cancer Neg Hx      Social History     Socioeconomic History    Marital status:      Spouse name: Not on file    Number of children: 2    Years of education: 12    Highest education level: 12th grade   Occupational History    Occupation: retired   Tobacco Use    Smoking status: Never    Smokeless tobacco: Never   Vaping Use    Vaping Use: Never used   Substance and Sexual Activity    Alcohol use: No     Comment:           CAFFEINE: 2- 12oz nona daily & 2 cups coffee some nights. Drug use: No    Sexual activity: Not Currently     Partners: Male   Other Topics Concern    Not on file   Social History Narrative    Do you donate blood or plasma? No    Caffeine intake? Moderate    Advance directive? No    Is blood transfusion acceptable in an emergency? Yes    Live alone or with others? With others    Able to care for self?  Yes    No exercise at this time         Social Determinants of Health     Financial Resource Strain: Not on file   Food Insecurity: Not on file   Transportation Needs: Not on file   Physical Activity: Not on file   Stress: Not on file   Social Connections: Not on file   Intimate Partner Violence: Not on file   Housing Stability: [address] to Pay for Housing in the Last Year: No    Number of Places Lived in the Last Year: 1    Unstable Housing in the Last Year: No     Current Facility-Administered Medications   Medication Dose Route Frequency Provider Last Rate Last Admin    cefTRIAXone (ROCEPHIN) 1,000 mg in dextrose 5 % 50 mL IVPB mini-bag  1,000 mg IntraVENous Once [employer], PA-C        HYDROcodone-acetaminophen (NORCO) 5-325 MG per tablet 1 tablet  1 tablet Oral Once [employer] PA-C        morphine sulfate (PF) injection 4 mg  4 mg IntraVENous Q30 Min PAIGEN Ruby Tom PA-C   4 mg at 10/08/22 2350     Current Outpatient Medications   Medication Sig Dispense Refill    alfuzosin (UROXATRAL) 10 MG extended release tablet Take 1 tablet by mouth daily for 20 days 20 tablet 0    ondansetron (ZOFRAN) 4 MG tablet Take 1 tablet by mouth every 12 hours as needed for Nausea or Vomiting 10 tablet 0    miconazole (ZEASORB-AF) 2 % powder Apply topically as needed for Itching (redness under the left breast) Apply topically 2 times daily. (Patient not taking: Reported on 9/19/2022) 45 g 1    gabapentin (NEURONTIN) 100 MG capsule Take 100 mg by mouth daily. (Patient not taking: Reported on 9/19/2022)      simvastatin (ZOCOR) 20 MG tablet TAKE ONE (1) TABLET BY MOUTH NIGHTLY 90 tablet 0    letrozole (FEMARA) 2.5 MG tablet Take 1 tablet by mouth nightly 30 tablet 5    acetaminophen (TYLENOL) 500 MG tablet Take 500 mg by mouth every 6 hours as needed for Pain      fluticasone (FLOVENT HFA) 110 MCG/ACT inhaler Inhale 2 puffs into the lungs 2 times daily 1 Inhaler 5    albuterol sulfate HFA (PROAIR HFA) 108 (90 Base) MCG/ACT inhaler INHALE 2 PUFFS BY MOUTH EVERY SIX HOURS AS NEEDED FOR WHEEZING 1 Inhaler 5    vitamin B-12 (CYANOCOBALAMIN) 1000 MCG tablet Take 1,000 mcg by mouth daily (Patient not taking: Reported on 9/19/2022)      aspirin 81 MG EC tablet Take 81 mg by mouth nightly (Patient not taking: Reported on 9/19/2022)      calcium-vitamin D (OSCAL-500) 500-200 MG-UNIT per tablet Take 1 tablet by mouth 2 times daily  (Patient not taking: Reported on 9/19/2022)       Allergies   Allergen Reactions    Bactrim [Sulfamethoxazole-Trimethoprim] Anaphylaxis and Hives    Lipitor [Atorvastatin] Shortness Of Breath    Taxol [Paclitaxel] Anaphylaxis and Swelling    Aminoglycosides      Abstracted from [facility] patient chart.     Demerol Nausea Only    Neosporin [Bacitracin-Neomycin-Polymyxin] Rash and Other (See Comments)     hotness    Other Rash     Ivory Soap    Talc Other (See Comments)     blisters       Nursing Notes Reviewed    Physical Exam:  ED Triage Vitals [10/08/22 2127]   Enc Vitals Group      BP (!) 149/56      Heart Rate 78      Resp 18      Temp 98.2 °F (36.8 °C)      Temp Source Oral      SpO2 95 %      Weight 245 lb (111.1 kg)      Height 5' 2\" (1.575 m)      Head Circumference       Peak Flow       Pain Score       Pain Loc       Pain Edu? Excl. in [address]? General :Patient is awake alert oriented person place and time no acute distress nontoxic appearing  HEENT: Pupils are equally round and reactive to light extraocular motors are intact conjunctivae clear sclerae white there is no injection no icterus. Nose without any rhinorrhea or epistaxis. Oral mucosa is moist no exudate buccal mucosa shows no ulcerations. Uvula is midline    Neck: Neck is supple full range of motion trachea midline thyroid nonpalpable  Cardiac: Heart regular rate rhythm no murmurs rubs clicks or gallops  Lungs: Lungs are clear to auscultation there is no wheezing rhonchi or rales. There is no use of accessory muscles no nasal flaring identified. Chest wall: There is no tenderness to palpation over the chest wall or over ribs  Abdomen: Abdomen is soft nontender nondistended. There is no firm or pulsatile masses no rebound rigidity or guarding negative Oropeza's negative McBurney, no peritoneal signs. No flank tenderness palpation. Suprapubic:  there is no tenderness to palpation over the external bladder   Musculoskeletal: 5 out of 5 strength in all 4 extremities full flexion extension abduction and adduction supination pronation of all extremities and all digits. No obvious muscle atrophy is noted. No focal muscle deficits are appreciated  Dermatology: Skin is warm and dry there is no obvious abscesses lacerations or lesions noted  Psych: Mentation is grossly normal cognition is grossly normal. Affect is appropriate  Neuro:  Motor intact sensory intact cranial nerves II through XII are intact level of consciousness is normal cerebellar function is normal reflexes are grossly normal. No evidence of incontinence or loss of bowel or bladder no saddle anesthesia noted Lymphatic: There is no submandibular or cervical adenopathy appreciated.         I have reviewed and interpreted all of the currently available lab results from this visit (if applicable):  Results for orders placed or performed during the hospital encounter of 10/08/22   CBC with Auto Differential   Result Value Ref Range    WBC 13.1 (H) 4.0 - 10.5 K/CU MM    RBC 4.19 (L) 4.2 - 5.4 M/CU MM    Hemoglobin 11.7 (L) 12.5 - 16.0 GM/DL    Hematocrit 36.7 (L) 37 - 47 %    MCV 87.6 78 - 100 FL    MCH 27.9 27 - 31 PG    MCHC 31.9 (L) 32.0 - 36.0 %    RDW 14.5 11.7 - 14.9 %    Platelets 175 974 - 906 K/CU MM    MPV 10.0 7.5 - 11.1 FL    Differential Type AUTOMATED DIFFERENTIAL     Segs Relative 80.1 (H) 36 - 66 %    Lymphocytes % 12.0 (L) 24 - 44 %    Monocytes % 5.7 (H) 0 - 4 %    Eosinophils % 1.4 0 - 3 %    Basophils % 0.3 0 - 1 %    Segs Absolute 10.5 K/CU MM    Lymphocytes Absolute 1.6 K/CU MM    Monocytes Absolute 0.8 K/CU MM    Eosinophils Absolute 0.2 K/CU MM    Basophils Absolute 0.0 K/CU MM    Nucleated RBC % 0.0 %    Total Nucleated RBC 0.0 K/CU MM    Total Immature Neutrophil 0.06 K/CU MM    Immature Neutrophil % 0.5 (H) 0 - 0.43 %   Comprehensive Metabolic Panel   Result Value Ref Range    Sodium 139 135 - 145 MMOL/L    Potassium 4.0 3.5 - 5.1 MMOL/L    Chloride 102 99 - 110 mMol/L    CO2 28 21 - 32 MMOL/L    BUN 10 6 - 23 MG/DL    Creatinine 0.7 0.6 - 1.1 MG/DL    Glucose 132 (H) 70 - 99 MG/DL    Calcium 9.7 8.3 - 10.6 MG/DL    Albumin 3.7 3.4 - 5.0 GM/DL    Total Protein 7.5 6.4 - 8.2 GM/DL    Total Bilirubin 0.3 0.0 - 1.0 MG/DL    ALT 10 10 - 40 U/L    AST 11 (L) 15 - 37 IU/L    Alkaline Phosphatase 140 (H) 40 - 129 IU/L    GFR Non-African American >60 >60 mL/min/1.73m2    GFR African American >60 >60 mL/min/1.73m2    Anion Gap 9 4 - 16   Urinalysis   Result Value Ref Range    Color, UA YELLOW     Clarity, UA CLOUDY     Glucose, Urine NEGATIVE MG/DL    Bilirubin Urine NEGATIVE     Ketones, Urine NEGATIVE MG/DL    Specific Gravity, UA 1.015     Blood, Urine SMALL     pH, Urine 6.0     Protein, UA TRACE MG/DL    Urobilinogen, Urine 0.2 MG/DL    Nitrite Urine, Quantitative POSITIVE     Leukocyte Esterase, Urine MODERATE    HCG, Quantitative, Pregnancy   Result Value Ref Range    hCG Quant 2.0 uIU/ML   Lipase   Result Value Ref Range    Lipase 19 13 - 60 IU/L   Microscopic Urinalysis   Result Value Ref Range    RBC, UA 20 /HPF    WBC,  /HPF    Bacteria, UA OCCASIONAL /HPF    WBC Clumps, UA FEW /HPF    Squam Epithel, UA 4 /HPF    Mucus, UA RARE HPF    Trichomonas, UA NONE SEEN /HPF      Radiographs (if obtained):  [] The following radiograph was interpreted by myself in the absence of a radiologist:   [] Radiologist's Report Reviewed:  CT ABDOMEN PELVIS W IV CONTRAST Additional Contrast? None   Final Result   Motion artifact degrades the evaluation at multiple levels. A paddle   splenomegaly is still noted. Left ureteral stent is present without hydronephrosis at this time. No right   ureteral stent. Borderline thickness of the urinary bladder wall and can   correlate with urinalysis. CTA PULMONARY W CONTRAST   Final Result   1. Respiratory motion does reduce the sensitivity but without convincing   evidence for pulmonary arterial embolism. 2.  Cardiac chambers are mildly enlarged. There is no pericardial effusion. 3.  Heterogenous ventilation of the lungs could be seen with small airways   disease. There is no pleural effusion or pneumothorax. XR CHEST (2 VW)   Final Result   Mild pulmonary vascular congestion. Stable mild cardiomegaly. EKG (if obtained):   Please See Note of attending physician for EKG interpretation.      Chart review shows recent radiograph(s):  XR CHEST (2 VW)    Result Date: 10/8/2022  EXAMINATION: TWO XRAY VIEWS OF THE CHEST 10/8/2022 9:57 pm COMPARISON: September 18, 2022 HISTORY: ORDERING SYSTEM PROVIDED HISTORY: bilateral rib pain TECHNOLOGIST PROVIDED HISTORY: Reason for exam:->bilateral rib pain Reason for exam:->shortness of breath Reason for Exam: bilateral rib pain, shortness of breath Additional signs and symptoms: s/p lithotripsy 9.26.2022 FINDINGS: The left chest wall pacemaker and leads are stable. The cardiomediastinal silhouette is mildly enlarged. There is mild pulmonary vascular congestion. There is no pleural effusion. There is no pneumothorax. There is no acute osseous abnormality. Mild pulmonary vascular congestion. Stable mild cardiomegaly. FL LESS THAN 1 HOUR    Result Date: 9/26/2022  Radiology exam is complete. No Radiologist dictation. Please follow up with ordering provider. XR CHEST PORTABLE    Result Date: 9/18/2022  EXAMINATION: ONE XRAY VIEW OF THE CHEST 9/18/2022 9:58 pm COMPARISON: 05/15/2022. HISTORY: ORDERING SYSTEM PROVIDED HISTORY: cough TECHNOLOGIST PROVIDED HISTORY: Reason for exam:->cough Reason for Exam: cough Additional signs and symptoms: hx of COPD FINDINGS: The heart size is enlarged but unchanged. The pulmonary vasculature is within normal limits. No acute infiltrates are seen. No pneumothoraces are noted. 1. No active pulmonary disease. 2. Stable cardiomegaly without overt failure. MDM:     Interventions given this visit:   Orders Placed This Encounter   Medications    morphine sulfate (PF) injection 4 mg    iopamidol (ISOVUE-370) 76 % injection 75 mL    cefTRIAXone (ROCEPHIN) 1,000 mg in dextrose 5 % 50 mL IVPB mini-bag     Order Specific Question:   Antimicrobial Indications     Answer:   Urinary Tract Infection    HYDROcodone-acetaminophen (NORCO) 5-325 MG per tablet 1 tablet     Patient presents today to the emergency department complaining of worsening abdominal pain. Flank pain.   She does have a ureteral stent. Noted to have urinary tract infection likely etiology patient's symptoms does have mild leukocytosis. 13,000. However this is been the case before. No evidence of septicemia. Single dose IV antibiotics and prescription of p.o. antibiotics are initiated. Patient started follow-up outpatient with urology. Most recent septicemia, septic shock at this time        I independently managed patient today in the ED.     BP (!) 149/56   Pulse 78   Temp 98.2 °F (36.8 °C) (Oral)   Resp 18   Ht 5' 2\" (1.575 m)   Wt 245 lb (111.1 kg)   LMP 01/15/1999   SpO2 95%   BMI 44.81 kg/m²       Clinical Impression:  1. Acute cystitis with hematuria        Disposition referral (if applicable):  Nereida Zavaleta [address] [place] [address] Avenue  [phone]    In 1 week    Disposition medications (if applicable):  New Prescriptions    No medications on file         Comment: Please note this report has been produced using speech recognition software and may contain errors related to that system including errors in grammar, punctuation, and spelling, as well as words and phrases that may be inappropriate. If there are any questions or concerns please feel free to contact the dictating provider for clarification.       Emil Hernandez, [address] [place] [address]  10/09/22 6510

## 2022-10-12 LAB
CULTURE: ABNORMAL
CULTURE: ABNORMAL
Lab: ABNORMAL
SPECIMEN: ABNORMAL

## 2022-10-13 ENCOUNTER — PROCEDURE VISIT (OUTPATIENT)
Dept: CARDIOLOGY CLINIC | Age: 72
End: 2022-10-13
Payer: MEDICARE

## 2022-10-13 ENCOUNTER — HOSPITAL ENCOUNTER (INPATIENT)
Age: 72
LOS: 3 days | Discharge: NURSING, REHAB OR LONG-TERM CARE | DRG: 602 | End: 2022-10-18
Attending: STUDENT IN AN ORGANIZED HEALTH CARE EDUCATION/TRAINING PROGRAM | Admitting: STUDENT IN AN ORGANIZED HEALTH CARE EDUCATION/TRAINING PROGRAM
Payer: MEDICARE

## 2022-10-13 ENCOUNTER — TELEPHONE (OUTPATIENT)
Dept: CARDIOLOGY CLINIC | Age: 72
End: 2022-10-13

## 2022-10-13 ENCOUNTER — TELEPHONE (OUTPATIENT)
Dept: INTERNAL MEDICINE CLINIC | Age: 72
End: 2022-10-13

## 2022-10-13 ENCOUNTER — OFFICE VISIT (OUTPATIENT)
Dept: FAMILY MEDICINE CLINIC | Age: 72
End: 2022-10-13
Payer: MEDICARE

## 2022-10-13 ENCOUNTER — APPOINTMENT (OUTPATIENT)
Dept: ULTRASOUND IMAGING | Age: 72
DRG: 602 | End: 2022-10-13
Payer: MEDICARE

## 2022-10-13 VITALS
WEIGHT: 257.2 LBS | SYSTOLIC BLOOD PRESSURE: 118 MMHG | DIASTOLIC BLOOD PRESSURE: 58 MMHG | OXYGEN SATURATION: 95 % | HEART RATE: 78 BPM | TEMPERATURE: 97.6 F | BODY MASS INDEX: 47.04 KG/M2

## 2022-10-13 DIAGNOSIS — I44.1 HEART BLOCK AV SECOND DEGREE: ICD-10-CM

## 2022-10-13 DIAGNOSIS — L03.90 CELLULITIS, UNSPECIFIED CELLULITIS SITE: ICD-10-CM

## 2022-10-13 DIAGNOSIS — R06.01 ORTHOPNEA: ICD-10-CM

## 2022-10-13 DIAGNOSIS — R60.0 BILATERAL LOWER EXTREMITY EDEMA: Primary | ICD-10-CM

## 2022-10-13 DIAGNOSIS — M79.89 LEG SWELLING: Primary | ICD-10-CM

## 2022-10-13 DIAGNOSIS — R06.00 DYSPNEA, UNSPECIFIED TYPE: ICD-10-CM

## 2022-10-13 DIAGNOSIS — Z95.0 CARDIAC PACEMAKER IN SITU: Primary | ICD-10-CM

## 2022-10-13 LAB
ALBUMIN SERPL-MCNC: 3.9 GM/DL (ref 3.4–5)
ALP BLD-CCNC: 135 IU/L (ref 40–129)
ALT SERPL-CCNC: 11 U/L (ref 10–40)
ANION GAP SERPL CALCULATED.3IONS-SCNC: 9 MMOL/L (ref 4–16)
AST SERPL-CCNC: 15 IU/L (ref 15–37)
BASOPHILS ABSOLUTE: 0 K/CU MM
BASOPHILS RELATIVE PERCENT: 0.4 % (ref 0–1)
BILIRUB SERPL-MCNC: 0.2 MG/DL (ref 0–1)
BUN BLDV-MCNC: 11 MG/DL (ref 6–23)
CALCIUM SERPL-MCNC: 10 MG/DL (ref 8.3–10.6)
CHLORIDE BLD-SCNC: 101 MMOL/L (ref 99–110)
CO2: 29 MMOL/L (ref 21–32)
CREAT SERPL-MCNC: 0.7 MG/DL (ref 0.6–1.1)
DIFFERENTIAL TYPE: ABNORMAL
EOSINOPHILS ABSOLUTE: 0.3 K/CU MM
EOSINOPHILS RELATIVE PERCENT: 3.1 % (ref 0–3)
GFR AFRICAN AMERICAN: >60 ML/MIN/1.73M2
GFR NON-AFRICAN AMERICAN: >60 ML/MIN/1.73M2
GLUCOSE BLD-MCNC: 101 MG/DL (ref 70–99)
HCT VFR BLD CALC: 35.3 % (ref 37–47)
HEMOGLOBIN: 11.3 GM/DL (ref 12.5–16)
IMMATURE NEUTROPHIL %: 0.7 % (ref 0–0.43)
LYMPHOCYTES ABSOLUTE: 2.1 K/CU MM
LYMPHOCYTES RELATIVE PERCENT: 21.1 % (ref 24–44)
MCH RBC QN AUTO: 27.8 PG (ref 27–31)
MCHC RBC AUTO-ENTMCNC: 32 % (ref 32–36)
MCV RBC AUTO: 86.7 FL (ref 78–100)
MONOCYTES ABSOLUTE: 0.6 K/CU MM
MONOCYTES RELATIVE PERCENT: 6.1 % (ref 0–4)
NUCLEATED RBC %: 0 %
PDW BLD-RTO: 14 % (ref 11.7–14.9)
PLATELET # BLD: 255 K/CU MM (ref 140–440)
PMV BLD AUTO: 9.8 FL (ref 7.5–11.1)
POTASSIUM SERPL-SCNC: 3.8 MMOL/L (ref 3.5–5.1)
PRO-BNP: 181.4 PG/ML
RBC # BLD: 4.07 M/CU MM (ref 4.2–5.4)
SEGMENTED NEUTROPHILS ABSOLUTE COUNT: 6.7 K/CU MM
SEGMENTED NEUTROPHILS RELATIVE PERCENT: 68.6 % (ref 36–66)
SODIUM BLD-SCNC: 139 MMOL/L (ref 135–145)
TOTAL IMMATURE NEUTOROPHIL: 0.07 K/CU MM
TOTAL NUCLEATED RBC: 0 K/CU MM
TOTAL PROTEIN: 7.4 GM/DL (ref 6.4–8.2)
WBC # BLD: 9.7 K/CU MM (ref 4–10.5)

## 2022-10-13 PROCEDURE — 96365 THER/PROPH/DIAG IV INF INIT: CPT

## 2022-10-13 PROCEDURE — 1090F PRES/ABSN URINE INCON ASSESS: CPT | Performed by: PHYSICIAN ASSISTANT

## 2022-10-13 PROCEDURE — G8484 FLU IMMUNIZE NO ADMIN: HCPCS | Performed by: PHYSICIAN ASSISTANT

## 2022-10-13 PROCEDURE — G8399 PT W/DXA RESULTS DOCUMENT: HCPCS | Performed by: PHYSICIAN ASSISTANT

## 2022-10-13 PROCEDURE — 99285 EMERGENCY DEPT VISIT HI MDM: CPT

## 2022-10-13 PROCEDURE — 93294 REM INTERROG EVL PM/LDLS PM: CPT | Performed by: INTERNAL MEDICINE

## 2022-10-13 PROCEDURE — 80053 COMPREHEN METABOLIC PANEL: CPT

## 2022-10-13 PROCEDURE — 6370000000 HC RX 637 (ALT 250 FOR IP): Performed by: PHYSICIAN ASSISTANT

## 2022-10-13 PROCEDURE — G8427 DOCREV CUR MEDS BY ELIG CLIN: HCPCS | Performed by: PHYSICIAN ASSISTANT

## 2022-10-13 PROCEDURE — 84484 ASSAY OF TROPONIN QUANT: CPT

## 2022-10-13 PROCEDURE — 83880 ASSAY OF NATRIURETIC PEPTIDE: CPT

## 2022-10-13 PROCEDURE — 93296 REM INTERROG EVL PM/IDS: CPT | Performed by: INTERNAL MEDICINE

## 2022-10-13 PROCEDURE — 85025 COMPLETE CBC W/AUTO DIFF WBC: CPT

## 2022-10-13 PROCEDURE — 93970 EXTREMITY STUDY: CPT

## 2022-10-13 PROCEDURE — 93005 ELECTROCARDIOGRAM TRACING: CPT | Performed by: PHYSICIAN ASSISTANT

## 2022-10-13 PROCEDURE — 87040 BLOOD CULTURE FOR BACTERIA: CPT

## 2022-10-13 PROCEDURE — 96366 THER/PROPH/DIAG IV INF ADDON: CPT

## 2022-10-13 PROCEDURE — 1036F TOBACCO NON-USER: CPT | Performed by: PHYSICIAN ASSISTANT

## 2022-10-13 PROCEDURE — 1123F ACP DISCUSS/DSCN MKR DOCD: CPT | Performed by: PHYSICIAN ASSISTANT

## 2022-10-13 PROCEDURE — 3017F COLORECTAL CA SCREEN DOC REV: CPT | Performed by: PHYSICIAN ASSISTANT

## 2022-10-13 PROCEDURE — G8417 CALC BMI ABV UP PARAM F/U: HCPCS | Performed by: PHYSICIAN ASSISTANT

## 2022-10-13 PROCEDURE — 99213 OFFICE O/P EST LOW 20 MIN: CPT | Performed by: PHYSICIAN ASSISTANT

## 2022-10-13 RX ORDER — TRAMADOL HYDROCHLORIDE 50 MG/1
50 TABLET ORAL ONCE
Status: DISCONTINUED | OUTPATIENT
Start: 2022-10-13 | End: 2022-10-14

## 2022-10-13 RX ORDER — OXYCODONE HYDROCHLORIDE AND ACETAMINOPHEN 5; 325 MG/1; MG/1
1 TABLET ORAL ONCE
Status: COMPLETED | OUTPATIENT
Start: 2022-10-13 | End: 2022-10-13

## 2022-10-13 RX ADMIN — OXYCODONE AND ACETAMINOPHEN 1 TABLET: 5; 325 TABLET ORAL at 23:28

## 2022-10-13 ASSESSMENT — PAIN DESCRIPTION - ORIENTATION
ORIENTATION: RIGHT;LEFT
ORIENTATION: RIGHT

## 2022-10-13 ASSESSMENT — PAIN DESCRIPTION - DESCRIPTORS: DESCRIPTORS: ACHING

## 2022-10-13 ASSESSMENT — PAIN SCALES - GENERAL
PAINLEVEL_OUTOF10: 9
PAINLEVEL_OUTOF10: 9

## 2022-10-13 ASSESSMENT — PAIN - FUNCTIONAL ASSESSMENT: PAIN_FUNCTIONAL_ASSESSMENT: 0-10

## 2022-10-13 ASSESSMENT — PAIN DESCRIPTION - FREQUENCY: FREQUENCY: CONTINUOUS

## 2022-10-13 ASSESSMENT — PAIN DESCRIPTION - LOCATION
LOCATION: LEG;BACK
LOCATION: LEG

## 2022-10-13 NOTE — PROGRESS NOTES
10/18/2022    [address] [place] [address]    Chief Complaint   Patient presents with    Leg Swelling    Foot Swelling       HPI  History was obtained from patient and her . Juhi Newell is a 67 y.o. female who presents today with redness, swelling, warmth to bilateral legs and feet. She state this has been ongoing for a few days, but worse since this morning. She has pain with ambulation. History of cellulitis. She feels like her legs are going to burst and states they are 22 inches round. She feels swelling all the way to her thighs. She also thinks her abdomen is distended. She's short of breath and has noted that the dyspnea is worse when lying supine. She denies any known history of CHF. She is not on any diuretics. She denies history of DVT or PE. She is currently on Keflex 500 mg 4 times daily for urinary tract infection. This was prescribed 5 days ago at the ED. She is to be following up with urology. She had surgery for kidney stone removal and stent placement 3 weeks ago. PAST MEDICAL HISTORY  Past Medical History:   Diagnosis Date    Anemia     Anxiety 1978    Arthritis     generalized    Asthma 2006    AV block     2nd degree per hospital in june2013    Blood poisoning 1994    Blood transfusion     12/2003    Breast cancer ([address] 02/29/2012    right    CAD (coronary artery disease)     Cancer ([address] 2007    skin (face) & colon(2003)/ 2/2012 dx of right breast ca(pc)    Carcinoma of breast ([address] 02/29/2012    right arm no b/p or sticks    Cardiac pacemaker 03/10/2014    Medtronic dual lead    Chronic cystitis     Chronic respiratory failure (HCC)     2 L/min oxygen at night-time    Colon cancer ([address] 2013    COPD (chronic obstructive pulmonary disease) ([address]     CTS (carpal tunnel syndrome)     Depression     Diverticulitis     H/O 24 hour EKG monitoring 2/28/2014 7/24/13 2/14 complete heart block 7/13No clinically significant arrthymia noted.     H/O cardiac catheterization 10/31/2011, 7/11/2007    10/31/2011-No significant CAD. Cardiolite stress test was false positive-Dr Debbie Llanes; 7/11/2007-No CAD, global function intact;    H/O cardiovascular stress test 10/20/2011, 3/23/2010    10/20/2011-Lexiscan- Evid of mild ischemia in Left CX region. EF 70%. Global LVSF normal;    H/O cardiovascular stress test 01/07/2015    EF 77%. Normal Stress Test.    H/O chest pain 04/2005    sees Dr Debbie Llanes    H/O Doppler ultrasound 02/20/2008 2/20/2008-CAROTID DOPPLER- Normal carotids bilaterally;    H/O Doppler ultrasound 06/06/2013    CAROTID DOPPLER- there is heterogeneous, irregular atherosclerotic plaque noted in the right internal carotid artery,  doppler flow velocities within the right internal carotid artery are elevated, consistent with a mild, less than 50%stenosis, there is intimal thickening but no significant atherosclerotic plaque noted in the left internal carotid artery, the left carotid are within normal limits    H/O echocardiogram 4/9/2012, 10/20/2011    4/9/2012-LVSF normal EF=>55%. impaired LV relaxation;    H/O echocardiogram 12/31/2014    EF 50-55%. LV shows mild concentric hypertrophy. Mildly dilated left atrium. Mildly dilated right ventricle. Sclerotic but not stenotic aortic valve. Mitral annular calcification. Mild MR, Mild TR, Mild pulm HTN.    H/O urinary incontinence     History of blood transfusion     Hx antineoplastic chemo     Hx of Arterial Doppler ultrasound 07/01/2019    No hemodynamically significant or focal stenosis visualized bilaterally, bilateral lower extremity arteries exhibit diffuse plaque and multiphasic waveforms, unable to obtain bilateral ABIs due to elevated leg pressures >250 mmHg, possibly due to atherosclerosis    Hx of cardiovascular stress test 06/2013    EF 70% abn suggestive of anterolateral ischemia, possible RCA ischmeia, post left ventricular dilation suggestive multivessel CAD.     Hx of echocardiogram 06/2013    EF50%, mild MR and TR, mild pulmonary HTN, mild AS    Hx of echocardiogram 11/01/2021    Left ventricular systolic function is normal, Mild concentric left ventricular hypertrophy Mildly dilated left atrium. Mitral annular calcification is present. Mild tricuspid regurgitation No evidence of any pericardial effusion    Hx of fall     \"last time 2018- due to neuropathy, have to use cane\"    [facility] 7/24/13, 06/2013 7/13-No clinacally significant arrhythmia. 6/13-multiple cycles of wenckebach episodes in addtion to some sinus tach    Hyperlipidemia     Hypertension     Hypothermia 2008    Malignant neoplasm of breast (female) ([facility] 2012    Neuropathy     \"have neuropathy of my legs\"    Normocytic normochromic anemia     Obstructive sleep apnea 2008 01- Has CPAP machine but has not used in over a year - states she has not had problems since her pacemaker was placed.     Old MI (myocardial infarction)     per pt on 1/15/2019\"had heart attack about a year ago\"    Osteoarthritis     Osteopenia     Osteopenia of multiple sites 9/14/2022    Pneumonia due to COVID-19 virus 03/22/2021    Primary malignant neoplasm of colon ([facility]     S/P cardiac cath 06/2013    no significant CAD false postive stress test    Skin cancer     Super obesity     Umbilical hernia        FAMILY HISTORY  Family History   Problem Relation Age of Onset    Arthritis Mother         OA, RA    High Cholesterol Mother     High Blood Pressure Mother     Cancer Father         skin and leukemia    High Blood Pressure Father     High Cholesterol Father     Diabetes Father     Heart Disease Father     Heart Disease Brother     High Cholesterol Brother     High Blood Pressure Brother     Heart Disease Brother     No Known Problems Sister     Seizures Son     Cancer Brother         skin cancer    Breast Cancer Neg Hx     Ovarian Cancer Neg Hx        SOCIAL HISTORY  Social History     Socioeconomic History    Marital status:      Spouse name: None    Number of children: 2    Years of education: 12    Highest education level: 12th grade   Occupational History    Occupation: retired   Tobacco Use    Smoking status: Never    Smokeless tobacco: Never   Vaping Use    Vaping Use: Never used   Substance and Sexual Activity    Alcohol use: No     Comment:           CAFFEINE: 2- 12oz nona daily & 2 cups coffee some nights. Drug use: No    Sexual activity: Not Currently     Partners: Male   Social History Narrative    Do you donate blood or plasma? No    Caffeine intake? Moderate    Advance directive? No    Is blood transfusion acceptable in an emergency? Yes    Live alone or with others? With others    Able to care for self? Yes    No exercise at this time         Social Determinants of Health     Housing Stability: Low Risk     Unable to Pay for Housing in the Last Year: No    Number of Places Lived in the Last Year: 1    Unstable Housing in the Last Year: No        SURGICAL HISTORY  Past Surgical History:   Procedure Laterality Date    A-V CARDIAC PACEMAKER INSERTION  3/10/14     Medtronic. Model:  [id number] Medtronic  Serial:  [id number] dual chamber pacemaker    APPENDECTOMY  2004    BREAST BIOPSY  2/13/12    BREAST LUMPECTOMY  2007    right     BREAST SURGERY  02/13/2012    rt lesion biopsy     CARDIAC CATHETERIZATION  2007 & 2011    CAROTID ENDARTERECTOMY Right 12/3/2021    RIGHT CAROTID ENDARTERECTOMY WITH PATCH performed by Kiera Phillips MD at [address] [place] [address]  2004    Colon cancer    COLONOSCOPY  10-18-13    polyp    COLONOSCOPY  1/19/2016    internal hemorrhoids, diverticulosis, 1.5 cm sessile polyp, 8 mm polyp found in rectum.     COLONOSCOPY  12/14/2017    1.5cm residual polyp, 5mm polyps in blind sigmoid loop x3, Sigmoid divertics, Internal grade 1 hemorrhoids    COLONOSCOPY N/A 1/16/2019    COLONOSCOPY W/ ENDOSCOPIC MUCOSAL RESECTION WITH ELEVIEW 5ML INJECTION AND POLYPECTOMY OF TIP OF BLIND RECTOSIGMOID STUMP AND CAUTERIZATION WITH ERBE PROBE, CLIPPING X2 performed by Luis Copeland MD at [address]  01/21/2020    pan divertics/ 4 polyps/ sm int hem, S/P partial sigmoid resection w/ end to side anastamosis, repeat in 3 years    COLONOSCOPY N/A 1/21/2020    COLONOSCOPY POLYPECTOMY SNARE/COLD BIOPSY performed by Luis Copeland MD at [place] [address]  2003    back    CYSTOSCOPY Bilateral 12/15/14    CYSTOSCOPY Right 5/15/2022    CYSTOSCOPY URETERAL STENT INSERTION performed by Torrie Joaquin MD at [address] [place] [address]      front right tooth and root canal w/ brass bolt    DILATION AND CURETTAGE OF UTERUS  2006    Needed a camera to find my uterus. DILATION AND CURETTAGE OF UTERUS N/A 5/24/2022    DILATATION AND CURETTAGE, CULTURES OF UTEREUS performed by Ronnie Pinto MD at [address], COLON, DIAGNOSTIC  12/14/2017    Small hiatal hernia    HERNIA REPAIR  2004    Umb hernia    HERNIA REPAIR  2008    Inc hernia    KIDNEY SURGERY  05/24/2022    stent placed on right side    LITHOTRIPSY Right 8/1/2022    RIGHT CYSTOSCOPY URETEROSCOPY STONE MANIPULATION WITH HOLIUM LASER LITHOTRIPSY STENT REPLACEMENT performed by Vanessa Michelle MD at [address]    LITHOTRIPSY Left 9/26/2022    LEFT CYSTOSCOPY URETEROSCOPY RETROGRADE PYELOGRAM STONE [facility] LITHOTRIPSY POSSIBLE [address] performed by Vanessa Michelle MD at [place] [address]  2/29/2012    Right axillary-3 sentinel nodes were positive out of 9. MASTECTOMY, RADICAL Right 02/29/2012    w/ sentinal node disection-Dr West    PACEMAKER PLACEMENT      PRE-MALIGNANT / BENIGN SKIN LESION EXCISION  2/8/2013    right leg X2-Dr West    TONSILLECTOMY  1957    TUNNELED VENOUS PORT PLACEMENT  2004    TUNNELED VENOUS PORT PLACEMENT  02/13/2012    removal of mediport       CURRENT MEDICATIONS  No current facility-administered medications for this visit. No current outpatient medications on file.      Facility-Administered Medications Ordered in Other Visits   Medication Dose Route Frequency Provider Last Rate Last Admin    albuterol sulfate HFA (PROVENTIL;VENTOLIN;PROAIR) 108 (90 Base) MCG/ACT inhaler 2 puff  2 puff Inhalation Q6H PRN Trudy Dominguez MD        sodium chloride flush 0.9 % injection 5-40 mL  5-40 mL IntraVENous 2 times per day Trudy Dominguez MD   10 mL at 10/18/22 0859    sodium chloride flush 0.9 % injection 5-40 mL  5-40 mL IntraVENous PRN Trudy Dominguez MD        0.9 % sodium chloride infusion   IntraVENous PRN Trudy Dominguez MD        enoxaparin Sodium (LOVENOX) injection 30 mg  30 mg SubCUTAneous BID Trudy Dominguez MD   30 mg at 10/18/22 0858    ondansetron (ZOFRAN-ODT) disintegrating tablet 4 mg  4 mg Oral Q8H PRN Trudy Dominguez MD        Or    ondansetron (ZOFRAN) injection 4 mg  4 mg IntraVENous Q6H PRN Trudy Dominguez MD        polyethylene glycol (GLYCOLAX) packet 17 g  17 g Oral Daily PRN Trudy Dominguez MD        aluminum & magnesium hydroxide-simethicone (MAALOX) 200-200-20 MG/5ML suspension 30 mL  30 mL Oral Q6H PRN Trudy Dominguez MD        acetaminophen (TYLENOL) tablet 650 mg  650 mg Oral Q6H PRN Trudy Dominguez MD   650 mg at 10/18/22 0407    Or    acetaminophen (TYLENOL) suppository 650 mg  650 mg Rectal Q6H PRN Trudy Dominguez MD        doxycycline hyclate (VIBRA-TABS) tablet 100 mg  100 mg Oral 2 times per day Dar Lucio MD   100 mg at 10/18/22 0858    letrozole [facility]) tablet 2.5 mg  2.5 mg Oral Nightly Dar Lucio MD   2.5 mg at 10/17/22 2044    aspirin chewable tablet 81 mg  81 mg Oral Daily Dar Lucio MD   81 mg at 10/18/22 0858    fluticasone (FLOVENT HFA) 110 MCG/ACT inhaler 2 puff  2 puff Inhalation BID Dar Lucio MD   2 puff at 10/18/22 0057    simvastatin (ZOCOR) tablet 20 mg  (Patient Supplied)  20 mg Oral Nightly Dar Lucio MD   20 mg at 10/17/22 2045       ALLERGIES  Allergies   Allergen Reactions    Bactrim [Sulfamethoxazole-Trimethoprim] Anaphylaxis and Hives    Lipitor [Atorvastatin] Shortness Of Breath    Taxol [Paclitaxel] Anaphylaxis and Swelling    Aminoglycosides      Abstracted from [facility] patient chart. Demerol Nausea Only    Neosporin [Bacitracin-Neomycin-Polymyxin] Rash and Other (See Comments)     hotness    Other Rash     Ivory Soap    Talc Other (See Comments)     blisters       PHYSICAL EXAM    BP (!) 118/58   Pulse 78   Temp 97.6 °F (36.4 °C) (Oral)   Wt 257 lb 3.2 oz (116.7 kg)   LMP 01/15/1999   SpO2 95%   BMI 47.04 kg/m²     Constitutional:  Well developed, well nourished. No acute distress. HENT:  Normocephalic, atraumatic  Eyes:  conjunctiva normal, no discharge, no scleral icterus  Cardiovascular:  Normal heart rate, normal rhythm, no murmurs, gallops or rubs  Thorax & Lungs:  Normal breath sounds, no respiratory distress, no wheezing, no rales, no rhonchi  Abdomen: Soft. Obese. No tenderness to palpation. No distention. No palpable mass organomegaly. Skin/Extremities: 3+ pitting edema bilateral feet/ankles/lower legs. Mild nonpitting edema of bilateral thighs. Bilateral anterior tib-fib with erythema, warmth. No palpable cords. No cyanosis. Neurologic:  Alert & oriented   Psychiatric:  Affect normal, mood normal    ASSESSMENT & PLAN    Nusrat was seen today for leg swelling and foot swelling. Diagnoses and all orders for this visit:    Bilateral lower extremity edema    Dyspnea, unspecified type    Orthopnea     Patient was encouraged to go directly to the emergency department for more thorough evaluation. Patient and her  voiced understanding and are agreeable.     Medications Discontinued During This Encounter   Medication Reason    aspirin 81 MG EC tablet LIST CLEANUP    calcium-vitamin D (OSCAL-500) 500-200 MG-UNIT per tablet LIST CLEANUP    gabapentin (NEURONTIN) 100 MG capsule Therapy completed    miconazole (ZEASORB-AF) 2 % powder Therapy completed ondansetron (ZOFRAN) 4 MG tablet Therapy completed    vitamin B-12 (CYANOCOBALAMIN) 1000 MCG tablet LIST CLEANUP        No follow-ups on file. Please note that this chart was generated using dragon dictation software. Although every effort was made to ensure the accuracy of this automated transcription, some errors in transcription may have occurred.     Electronically signed by Maribeth Amin PA-C on 10/18/2022

## 2022-10-13 NOTE — ED TRIAGE NOTES
Patient presents to ED via wheelchair w/ spouse for bilateral leg swelling; pt stating, \"It's all the way from my feet to my hips, my leg is like 22 inches around. I was seen here on Saturday for the same thing and it's just getting worse. \" Pt has hx COPD.

## 2022-10-13 NOTE — TELEPHONE ENCOUNTER
Called and gave patient walk in clinic number as patient wants to try to not go to the ER if she doesn't have to. PCP has a full schedule.

## 2022-10-13 NOTE — LETTER
[address]  [address] [place] [address]  Phone: [phone]  Fax:[phone]      October 13, 2022      [address] [address] [facility]      Dear Deb Ferguson: This is your CARELINK schedule. Please jean your calendar with these dates. You can do you checks anytime during the scheduled day. Since we do not do reminder calls, it will be your responsibility to preform the checks on the day it is scheduled. If you have any questions or concerns, please call and ask for Stanley Chapa at [phone]. Thank you.

## 2022-10-13 NOTE — TELEPHONE ENCOUNTER
Patient called stating she has a hx of cellulitis. Patient woke up today with both of her legs swollen and her feet. She is unable to get shoes on and hurts to walk around. Around her ankles and on top of her feet are red. She also states both her legs are warm to the touch. Please advise.

## 2022-10-14 PROBLEM — M79.89 SWELLING OF LOWER EXTREMITY: Status: ACTIVE | Noted: 2022-10-14

## 2022-10-14 LAB
BACTERIA: NEGATIVE /HPF
BILIRUBIN URINE: NEGATIVE MG/DL
BLOOD, URINE: NEGATIVE
CLARITY: CLEAR
COLOR: YELLOW
EKG ATRIAL RATE: 267 BPM
EKG DIAGNOSIS: NORMAL
EKG P AXIS: 85 DEGREES
EKG Q-T INTERVAL: 472 MS
EKG QRS DURATION: 196 MS
EKG QTC CALCULATION (BAZETT): 498 MS
EKG R AXIS: -63 DEGREES
EKG T AXIS: 96 DEGREES
EKG VENTRICULAR RATE: 67 BPM
GLUCOSE, URINE: NEGATIVE MG/DL
KETONES, URINE: NEGATIVE MG/DL
LEUKOCYTE ESTERASE, URINE: ABNORMAL
NITRITE URINE, QUANTITATIVE: NEGATIVE
PH, URINE: 6.5 (ref 5–8)
PROTEIN UA: NEGATIVE MG/DL
RBC URINE: NORMAL /HPF (ref 0–6)
SPECIFIC GRAVITY UA: <1.005 (ref 1–1.03)
SQUAMOUS EPITHELIAL: <1 /HPF
TRICHOMONAS: NORMAL /HPF
TROPONIN T: <0.01 NG/ML
UROBILINOGEN, URINE: 0.2 MG/DL (ref 0.2–1)
WBC UA: 2 /HPF (ref 0–5)

## 2022-10-14 PROCEDURE — 2580000003 HC RX 258: Performed by: PHYSICIAN ASSISTANT

## 2022-10-14 PROCEDURE — G0378 HOSPITAL OBSERVATION PER HR: HCPCS

## 2022-10-14 PROCEDURE — 94640 AIRWAY INHALATION TREATMENT: CPT

## 2022-10-14 PROCEDURE — 93010 ELECTROCARDIOGRAM REPORT: CPT | Performed by: INTERNAL MEDICINE

## 2022-10-14 PROCEDURE — 2580000003 HC RX 258: Performed by: STUDENT IN AN ORGANIZED HEALTH CARE EDUCATION/TRAINING PROGRAM

## 2022-10-14 PROCEDURE — 94761 N-INVAS EAR/PLS OXIMETRY MLT: CPT

## 2022-10-14 PROCEDURE — 81001 URINALYSIS AUTO W/SCOPE: CPT

## 2022-10-14 PROCEDURE — 6370000000 HC RX 637 (ALT 250 FOR IP): Performed by: STUDENT IN AN ORGANIZED HEALTH CARE EDUCATION/TRAINING PROGRAM

## 2022-10-14 PROCEDURE — 6360000002 HC RX W HCPCS: Performed by: STUDENT IN AN ORGANIZED HEALTH CARE EDUCATION/TRAINING PROGRAM

## 2022-10-14 PROCEDURE — 6360000002 HC RX W HCPCS: Performed by: PHYSICIAN ASSISTANT

## 2022-10-14 PROCEDURE — 96376 TX/PRO/DX INJ SAME DRUG ADON: CPT

## 2022-10-14 PROCEDURE — 96375 TX/PRO/DX INJ NEW DRUG ADDON: CPT

## 2022-10-14 RX ORDER — ENOXAPARIN SODIUM 100 MG/ML
30 INJECTION SUBCUTANEOUS 2 TIMES DAILY
Status: DISCONTINUED | OUTPATIENT
Start: 2022-10-15 | End: 2022-10-18 | Stop reason: HOSPADM

## 2022-10-14 RX ORDER — SODIUM CHLORIDE 0.9 % (FLUSH) 0.9 %
5-40 SYRINGE (ML) INJECTION PRN
Status: DISCONTINUED | OUTPATIENT
Start: 2022-10-14 | End: 2022-10-18 | Stop reason: HOSPADM

## 2022-10-14 RX ORDER — ALBUTEROL SULFATE 90 UG/1
2 AEROSOL, METERED RESPIRATORY (INHALATION) EVERY 6 HOURS PRN
Status: DISCONTINUED | OUTPATIENT
Start: 2022-10-14 | End: 2022-10-14

## 2022-10-14 RX ORDER — ASPIRIN 81 MG/1
81 TABLET, CHEWABLE ORAL DAILY
Status: DISCONTINUED | OUTPATIENT
Start: 2022-10-14 | End: 2022-10-18 | Stop reason: HOSPADM

## 2022-10-14 RX ORDER — LETROZOLE 2.5 MG/1
2.5 TABLET, FILM COATED ORAL NIGHTLY
Status: DISCONTINUED | OUTPATIENT
Start: 2022-10-14 | End: 2022-10-18 | Stop reason: HOSPADM

## 2022-10-14 RX ORDER — SODIUM CHLORIDE 0.9 % (FLUSH) 0.9 %
5-40 SYRINGE (ML) INJECTION EVERY 12 HOURS SCHEDULED
Status: DISCONTINUED | OUTPATIENT
Start: 2022-10-14 | End: 2022-10-18 | Stop reason: HOSPADM

## 2022-10-14 RX ORDER — ACETAMINOPHEN 650 MG/1
650 SUPPOSITORY RECTAL EVERY 6 HOURS PRN
Status: DISCONTINUED | OUTPATIENT
Start: 2022-10-14 | End: 2022-10-18 | Stop reason: HOSPADM

## 2022-10-14 RX ORDER — SIMVASTATIN 20 MG
20 TABLET ORAL NIGHTLY
Status: DISCONTINUED | OUTPATIENT
Start: 2022-10-14 | End: 2022-10-14

## 2022-10-14 RX ORDER — ATORVASTATIN CALCIUM 10 MG/1
20 TABLET, FILM COATED ORAL DAILY
Refills: 0 | Status: DISCONTINUED | OUTPATIENT
Start: 2022-10-14 | End: 2022-10-14

## 2022-10-14 RX ORDER — FUROSEMIDE 10 MG/ML
40 INJECTION INTRAMUSCULAR; INTRAVENOUS 2 TIMES DAILY
Status: DISCONTINUED | OUTPATIENT
Start: 2022-10-14 | End: 2022-10-14

## 2022-10-14 RX ORDER — FUROSEMIDE 20 MG/1
20 TABLET ORAL DAILY
Status: DISCONTINUED | OUTPATIENT
Start: 2022-10-14 | End: 2022-10-14

## 2022-10-14 RX ORDER — POLYETHYLENE GLYCOL 3350 17 G/17G
17 POWDER, FOR SOLUTION ORAL DAILY PRN
Status: DISCONTINUED | OUTPATIENT
Start: 2022-10-14 | End: 2022-10-18 | Stop reason: HOSPADM

## 2022-10-14 RX ORDER — ONDANSETRON 2 MG/ML
4 INJECTION INTRAMUSCULAR; INTRAVENOUS EVERY 6 HOURS PRN
Status: DISCONTINUED | OUTPATIENT
Start: 2022-10-14 | End: 2022-10-18 | Stop reason: HOSPADM

## 2022-10-14 RX ORDER — FLUTICASONE PROPIONATE 110 UG/1
2 AEROSOL, METERED RESPIRATORY (INHALATION) 2 TIMES DAILY
Status: DISCONTINUED | OUTPATIENT
Start: 2022-10-14 | End: 2022-10-18 | Stop reason: HOSPADM

## 2022-10-14 RX ORDER — SODIUM CHLORIDE 9 MG/ML
INJECTION, SOLUTION INTRAVENOUS PRN
Status: DISCONTINUED | OUTPATIENT
Start: 2022-10-14 | End: 2022-10-18 | Stop reason: HOSPADM

## 2022-10-14 RX ORDER — FUROSEMIDE 10 MG/ML
40 INJECTION INTRAMUSCULAR; INTRAVENOUS ONCE
Status: COMPLETED | OUTPATIENT
Start: 2022-10-14 | End: 2022-10-14

## 2022-10-14 RX ORDER — ONDANSETRON 4 MG/1
4 TABLET, ORALLY DISINTEGRATING ORAL EVERY 8 HOURS PRN
Status: DISCONTINUED | OUTPATIENT
Start: 2022-10-14 | End: 2022-10-18 | Stop reason: HOSPADM

## 2022-10-14 RX ORDER — ALBUTEROL SULFATE 90 UG/1
2 AEROSOL, METERED RESPIRATORY (INHALATION) EVERY 6 HOURS PRN
Status: DISCONTINUED | OUTPATIENT
Start: 2022-10-14 | End: 2022-10-18 | Stop reason: HOSPADM

## 2022-10-14 RX ORDER — MAGNESIUM HYDROXIDE/ALUMINUM HYDROXICE/SIMETHICONE 120; 1200; 1200 MG/30ML; MG/30ML; MG/30ML
30 SUSPENSION ORAL EVERY 6 HOURS PRN
Status: DISCONTINUED | OUTPATIENT
Start: 2022-10-14 | End: 2022-10-18 | Stop reason: HOSPADM

## 2022-10-14 RX ORDER — DOXYCYCLINE HYCLATE 100 MG
100 TABLET ORAL EVERY 12 HOURS SCHEDULED
Status: DISCONTINUED | OUTPATIENT
Start: 2022-10-14 | End: 2022-10-18 | Stop reason: HOSPADM

## 2022-10-14 RX ORDER — ACETAMINOPHEN 325 MG/1
650 TABLET ORAL EVERY 6 HOURS PRN
Status: DISCONTINUED | OUTPATIENT
Start: 2022-10-14 | End: 2022-10-18 | Stop reason: HOSPADM

## 2022-10-14 RX ORDER — FUROSEMIDE 20 MG/1
20 TABLET ORAL DAILY
Status: DISCONTINUED | OUTPATIENT
Start: 2022-10-14 | End: 2022-10-15

## 2022-10-14 RX ORDER — SIMVASTATIN 20 MG
20 TABLET ORAL NIGHTLY
Status: DISCONTINUED | OUTPATIENT
Start: 2022-10-14 | End: 2022-10-18 | Stop reason: HOSPADM

## 2022-10-14 RX ADMIN — FUROSEMIDE 20 MG: 20 TABLET ORAL at 18:27

## 2022-10-14 RX ADMIN — FUROSEMIDE 40 MG: 10 INJECTION, SOLUTION INTRAMUSCULAR; INTRAVENOUS at 08:23

## 2022-10-14 RX ADMIN — Medication 20 MG: at 20:58

## 2022-10-14 RX ADMIN — LETROZOLE 2.5 MG: 2.5 TABLET ORAL at 20:54

## 2022-10-14 RX ADMIN — SODIUM CHLORIDE, PRESERVATIVE FREE 10 ML: 5 INJECTION INTRAVENOUS at 20:55

## 2022-10-14 RX ADMIN — SODIUM CHLORIDE, PRESERVATIVE FREE 10 ML: 5 INJECTION INTRAVENOUS at 09:52

## 2022-10-14 RX ADMIN — Medication 2 PUFF: at 21:30

## 2022-10-14 RX ADMIN — DOXYCYCLINE HYCLATE 100 MG: 100 TABLET, COATED ORAL at 20:54

## 2022-10-14 RX ADMIN — VANCOMYCIN HYDROCHLORIDE 2000 MG: 1 INJECTION, POWDER, LYOPHILIZED, FOR SOLUTION INTRAVENOUS at 00:06

## 2022-10-14 RX ADMIN — FUROSEMIDE 40 MG: 10 INJECTION, SOLUTION INTRAMUSCULAR; INTRAVENOUS at 17:00

## 2022-10-14 RX ADMIN — ASPIRIN 81 MG CHEWABLE TABLET 81 MG: 81 TABLET CHEWABLE at 18:27

## 2022-10-14 RX ADMIN — FUROSEMIDE 40 MG: 10 INJECTION, SOLUTION INTRAVENOUS at 02:22

## 2022-10-14 RX ADMIN — ACETAMINOPHEN 650 MG: 325 TABLET ORAL at 23:16

## 2022-10-14 ASSESSMENT — PAIN SCALES - GENERAL
PAINLEVEL_OUTOF10: 7
PAINLEVEL_OUTOF10: 0

## 2022-10-14 ASSESSMENT — PAIN DESCRIPTION - LOCATION: LOCATION: BACK;LEG

## 2022-10-14 NOTE — PROGRESS NOTES
[address] [place] [address]    Michelle Zuleta is a 67 y.o. female started on vancomycin for skin and soft tissue infection. [facility]. Other antimicrobials: none at this time    Ht Readings from Last 1 Encounters:   10/13/22 5' 2\" (1.575 m)     Wt Readings from Last 3 Encounters:   10/13/22 258 lb (117 kg)   10/13/22 257 lb 3.2 oz (116.7 kg)   10/08/22 245 lb (111.1 kg)        Pertinent Laboratory Values:   Temp Readings from Last 3 Encounters:   10/13/22 98.3 °F (36.8 °C) (Oral)   10/13/22 97.6 °F (36.4 °C) (Oral)   10/08/22 98.2 °F (36.8 °C) (Oral)     Recent Labs     10/13/22  1700   WBC 9.7     Recent Labs     10/13/22  1700   BUN 11   CREATININE 0.7     Estimated Creatinine Clearance: 87 mL/min (based on SCr of 0.7 mg/dL). No intake or output data in the 24 hours ending 10/13/22 3953    Assessment/Plan:  Pharmacy will order vancomycin 2000 mg (17.1 mg/kg). Please note, pharmacy will order a one-time dose of vancomycin for the Emergency Department. The consult will need to be re-ordered if vancomycin is to continue upon admission. Thank you for the consult.   Jayshree Horn, [facility]  10/13/2022 11:33 PM

## 2022-10-14 NOTE — ED PROVIDER NOTES
Triage Chief Complaint:   Leg Swelling (Bilateral, \"it start in my legs and travelled all the way up my hips. \")    [place] [employer]:  Today in the ED I had the pleasure of caring for Kurt Zavala who is a 67 y.o. female that presents today to the emergency department complaining of bilateral lower extremity swelling. Ongoing over the last 1 week. Seems to get worse. States that the swelling all way up to her thighs. She is also noted redness and pain. No history of DVT or pulmonary embolism. Recently was diagnosed with a urinary tract infection. And was on 2 different courses of antibiotics. She states that her urinary symptoms are better and her abdominal pain is better. However now her legs are hurting. She denies any associated paresthesias or trauma no back pain. No history of CHF for the patient. Not on a water pill. ROS:  REVIEW OF SYSTEMS    At least 10 systems reviewed      All other review of systems are negative  See HPI and nursing notes for additional information       Past Medical History:   Diagnosis Date    Anemia     Anxiety 1978    Arthritis     generalized    Asthma 2006    AV block     2nd degree per hospital in june2013    Blood poisoning 1994    Blood transfusion     12/2003    Breast cancer ([address] 02/29/2012    right    CAD (coronary artery disease)     Cancer ([address] 2007    skin (face) & colon(2003)/ 2/2012 dx of right breast ca(pc)    Carcinoma of breast ([address] 02/29/2012    right arm no b/p or sticks    Cardiac pacemaker 03/10/2014    Medtronic dual lead    Chronic cystitis     Chronic respiratory failure (HCC)     2 L/min oxygen at night-time    Colon cancer ([address] 2013    COPD (chronic obstructive pulmonary disease) ([address]     CTS (carpal tunnel syndrome)     Depression     Diverticulitis     H/O 24 hour EKG monitoring 2/28/2014 7/24/13 2/14 complete heart block 7/13No clinically significant arrthymia noted.     H/O cardiac catheterization 10/31/2011, 7/11/2007    10/31/2011-No significant CAD. Cardiolite stress test was false positive-Dr Aj Sousa; 7/11/2007-No CAD, global function intact;    H/O cardiovascular stress test 10/20/2011, 3/23/2010    10/20/2011-Lexiscan- Evid of mild ischemia in Left CX region. EF 70%. Global LVSF normal;    H/O cardiovascular stress test 01/07/2015    EF 77%. Normal Stress Test.    H/O chest pain 04/2005    sees Dr Aj Sousa    H/O Doppler ultrasound 02/20/2008 2/20/2008-CAROTID DOPPLER- Normal carotids bilaterally;    H/O Doppler ultrasound 06/06/2013    CAROTID DOPPLER- there is heterogeneous, irregular atherosclerotic plaque noted in the right internal carotid artery,  doppler flow velocities within the right internal carotid artery are elevated, consistent with a mild, less than 50%stenosis, there is intimal thickening but no significant atherosclerotic plaque noted in the left internal carotid artery, the left carotid are within normal limits    H/O echocardiogram 4/9/2012, 10/20/2011    4/9/2012-LVSF normal EF=>55%. impaired LV relaxation;    H/O echocardiogram 12/31/2014    EF 50-55%. LV shows mild concentric hypertrophy. Mildly dilated left atrium. Mildly dilated right ventricle. Sclerotic but not stenotic aortic valve. Mitral annular calcification. Mild MR, Mild TR, Mild pulm HTN.    H/O urinary incontinence     History of blood transfusion     Hx antineoplastic chemo     Hx of Arterial Doppler ultrasound 07/01/2019    No hemodynamically significant or focal stenosis visualized bilaterally, bilateral lower extremity arteries exhibit diffuse plaque and multiphasic waveforms, unable to obtain bilateral ABIs due to elevated leg pressures >250 mmHg, possibly due to atherosclerosis    Hx of cardiovascular stress test 06/2013    EF 70% abn suggestive of anterolateral ischemia, possible RCA ischmeia, post left ventricular dilation suggestive multivessel CAD.     Hx of echocardiogram 06/2013    EF50%, mild MR and TR, mild pulmonary HTN, mild AS    Hx of echocardiogram 11/01/2021    Left ventricular systolic function is normal, Mild concentric left ventricular hypertrophy Mildly dilated left atrium. Mitral annular calcification is present. Mild tricuspid regurgitation No evidence of any pericardial effusion    Hx of fall     \"last time 2018- due to neuropathy, have to use cane\"    [facility] 7/24/13, 06/2013 7/13-No clinacally significant arrhythmia. 6/13-multiple cycles of wenckebach episodes in addtion to some sinus tach    Hyperlipidemia     Hypertension     Hypothermia 2008    Malignant neoplasm of breast (female) ([address] 2012    Neuropathy     \"have neuropathy of my legs\"    Normocytic normochromic anemia     Obstructive sleep apnea 2008 01- Has CPAP machine but has not used in over a year - states she has not had problems since her pacemaker was placed. Old MI (myocardial infarction)     per pt on 1/15/2019\"had heart attack about a year ago\"    Osteoarthritis     Osteopenia     Osteopenia of multiple sites 9/14/2022    Pneumonia due to COVID-19 virus 03/22/2021    Primary malignant neoplasm of colon ([address]     S/P cardiac cath 06/2013    no significant CAD false postive stress test    Skin cancer     Super obesity     Umbilical hernia      Past Surgical History:   Procedure Laterality Date    A-V CARDIAC PACEMAKER INSERTION  3/10/14     Medtronic. Model:  [id number] Medtronic  Serial:  [id number] dual chamber pacemaker    APPENDECTOMY  2004    BREAST BIOPSY  2/13/12    BREAST LUMPECTOMY  2007    right     BREAST SURGERY  02/13/2012    rt lesion biopsy     CARDIAC CATHETERIZATION  2007 & 2011    CAROTID ENDARTERECTOMY Right 12/3/2021    RIGHT CAROTID ENDARTERECTOMY WITH PATCH performed by Abril Ozuna MD at [facility]  2004    Colon cancer    COLONOSCOPY  10-18-13    polyp    COLONOSCOPY  1/19/2016    internal hemorrhoids, diverticulosis, 1.5 cm sessile polyp, 8 mm polyp found in rectum.     COLONOSCOPY  12/14/2017 1.5cm residual polyp, 5mm polyps in blind sigmoid loop x3, Sigmoid divertics, Internal grade 1 hemorrhoids    COLONOSCOPY N/A 1/16/2019    COLONOSCOPY W/ ENDOSCOPIC MUCOSAL RESECTION WITH ELEVIEW 5ML INJECTION AND POLYPECTOMY OF TIP OF BLIND RECTOSIGMOID STUMP AND CAUTERIZATION WITH ERBE PROBE, CLIPPING X2 performed by Max Coronado MD at [address]  01/21/2020    pan divertics/ 4 polyps/ sm int hem, S/P partial sigmoid resection w/ end to side anastamosis, repeat in 3 years    COLONOSCOPY N/A 1/21/2020    COLONOSCOPY POLYPECTOMY SNARE/COLD BIOPSY performed by Max Coronado MD at [facility]  2003    back    CYSTOSCOPY Bilateral 12/15/14    CYSTOSCOPY Right 5/15/2022    CYSTOSCOPY URETERAL STENT INSERTION performed by Issac Ledbetter MD at [facility]      front right tooth and root canal w/ brass bolt    DILATION AND CURETTAGE OF UTERUS  2006    Needed a camera to find my uterus. DILATION AND CURETTAGE OF UTERUS N/A 5/24/2022    DILATATION AND CURETTAGE, CULTURES OF UTEREUS performed by Tiana Crigler, MD at [address], COLON, DIAGNOSTIC  12/14/2017    Small hiatal hernia    HERNIA REPAIR  2004    Umb hernia    HERNIA REPAIR  2008    Inc hernia    KIDNEY SURGERY  05/24/2022    stent placed on right side    LITHOTRIPSY Right 8/1/2022    RIGHT CYSTOSCOPY URETEROSCOPY STONE MANIPULATION WITH HOLIUM LASER LITHOTRIPSY STENT REPLACEMENT performed by Amy Gordon MD at [facility]    LITHOTRIPSY Left 9/26/2022    LEFT CYSTOSCOPY URETEROSCOPY RETROGRADE PYELOGRAM STONE [facility] LITHOTRIPSY POSSIBLE [address] [address] performed by Amy Gordon MD at [place] [address]  2/29/2012    Right axillary-3 sentinel nodes were positive out of 9.     MASTECTOMY, RADICAL Right 02/29/2012    w/ sentinal node disection-Dr West    PACEMAKER PLACEMENT      PRE-MALIGNANT / BENIGN SKIN LESION EXCISION  2/8/2013    right leg X2-Dr Brian Jansen    TUNNELED VENOUS PORT PLACEMENT  2004    TUNNELED VENOUS PORT PLACEMENT  02/13/2012    removal of mediport     Family History   Problem Relation Age of Onset    Arthritis Mother         OA, RA    High Cholesterol Mother     High Blood Pressure Mother     Cancer Father         skin and leukemia    High Blood Pressure Father     High Cholesterol Father     Diabetes Father     Heart Disease Father     Heart Disease Brother     High Cholesterol Brother     High Blood Pressure Brother     Heart Disease Brother     No Known Problems Sister     Seizures Son     Cancer Brother         skin cancer    Breast Cancer Neg Hx     Ovarian Cancer Neg Hx      Social History     Socioeconomic History    Marital status:      Spouse name: Not on file    Number of children: 2    Years of education: 12    Highest education level: 12th grade   Occupational History    Occupation: retired   Tobacco Use    Smoking status: Never    Smokeless tobacco: Never   Vaping Use    Vaping Use: Never used   Substance and Sexual Activity    Alcohol use: No     Comment:           CAFFEINE: 2- 12oz nona daily & 2 cups coffee some nights. Drug use: No    Sexual activity: Not Currently     Partners: Male   Other Topics Concern    Not on file   Social History Narrative    Do you donate blood or plasma? No    Caffeine intake? Moderate    Advance directive? No    Is blood transfusion acceptable in an emergency? Yes    Live alone or with others? With others    Able to care for self?  Yes    No exercise at this time         Social Determinants of Health     Financial Resource Strain: Not on file   Food Insecurity: Not on file   Transportation Needs: Not on file   Physical Activity: Not on file   Stress: Not on file   Social Connections: Not on file   Intimate Partner Violence: Not on file   Housing Stability: [address] to Pay for Housing in the Last Year: No    Number of [place] in the Last Year: 1 Unstable Housing in the Last Year: No     Current Facility-Administered Medications   Medication Dose Route Frequency Provider Last Rate Last Admin    traMADol (ULTRAM) tablet 50 mg  50 mg Oral Once [employer], PA-C        vancomycin (VANCOCIN) 2,000 mg in dextrose 5 % 500 mL IVPB  2,000 mg IntraVENous [employer], PA-C 250 mL/hr at 10/14/22 0006 2,000 mg at 10/14/22 0006     Current Outpatient Medications   Medication Sig Dispense Refill    cephALEXin (KEFLEX) 500 MG capsule Take 1 capsule by mouth 4 times daily for 7 days 28 capsule 0    alfuzosin (UROXATRAL) 10 MG extended release tablet Take 1 tablet by mouth daily for 20 days 20 tablet 0    simvastatin (ZOCOR) 20 MG tablet TAKE ONE (1) TABLET BY MOUTH NIGHTLY 90 tablet 0    letrozole (FEMARA) 2.5 MG tablet Take 1 tablet by mouth nightly 30 tablet 5    acetaminophen (TYLENOL) 500 MG tablet Take 500 mg by mouth every 6 hours as needed for Pain      fluticasone (FLOVENT HFA) 110 MCG/ACT inhaler Inhale 2 puffs into the lungs 2 times daily 1 Inhaler 5    albuterol sulfate HFA (PROAIR HFA) 108 (90 Base) MCG/ACT inhaler INHALE 2 PUFFS BY MOUTH EVERY SIX HOURS AS NEEDED FOR WHEEZING 1 Inhaler 5     Allergies   Allergen Reactions    Bactrim [Sulfamethoxazole-Trimethoprim] Anaphylaxis and Hives    Lipitor [Atorvastatin] Shortness Of Breath    Taxol [Paclitaxel] Anaphylaxis and Swelling    Aminoglycosides      Abstracted from [facility] patient chart.     Demerol Nausea Only    Neosporin [Bacitracin-Neomycin-Polymyxin] Rash and Other (See Comments)     hotness    Other Rash     Ivory Soap    Talc Other (See Comments)     blisters       Nursing Notes Reviewed    Physical Exam:  ED Triage Vitals [10/13/22 1603]   Enc Vitals Group      BP (!) 143/60      Heart Rate 77      Resp 18      Temp 98.3 °F (36.8 °C)      Temp Source Oral      SpO2 94 %      Weight 258 lb (117 kg)      Height 5' 2\" (1.575 m)      Head Circumference       Peak Flow       Pain Score Pain Loc       Pain Edu? Excl. in [address]? General :Patient is awake alert oriented person place and time no acute distress nontoxic appearing elderly female. HEENT: Pupils are equally round and reactive to light extraocular motors are intact conjunctivae clear sclerae white there is no injection no icterus. Nose without any rhinorrhea or epistaxis. Oral mucosa is moist no exudate buccal mucosa shows no ulcerations. Uvula is midline    Neck: Neck is supple full range of motion trachea midline thyroid nonpalpable  Cardiac: Heart regular rate rhythm no murmurs rubs clicks or gallops  Lungs: Lungs are clear to auscultation there is no wheezing rhonchi or rales. There is no use of accessory muscles no nasal flaring identified. Chest wall: There is no tenderness to palpation over the chest wall or over ribs  Abdomen: Abdomen is soft nontender nondistended. There is no firm or pulsatile masses no rebound rigidity or guarding negative Oropeza's negative McBurney, no peritoneal signs  Suprapubic:  there is no tenderness to palpation over the external bladder   Musculoskeletal: 5 out of 5 strength in all 4 extremities full flexion extension abduction and adduction supination pronation of all extremities and all digits. No obvious muscle atrophy is noted. No focal muscle deficits are appreciated. Bilateral 3+ pitting edema noted. With vulvar erythema warmth redness of the bilateral shins as well as bilateral dorsal feet. No bullae noted. No abscesses no breaks in skin. No palpable cords or visible varicosities. Dermatology: Skin is warm and dry there is no obvious abscesses lacerations or lesions noted  Psych: Mentation is grossly normal cognition is grossly normal. Affect is appropriate  Neuro:  Motor intact sensory intact cranial nerves II through XII are intact level of consciousness is normal cerebellar function is normal reflexes are grossly normal. No evidence of incontinence or loss of bowel or bladder no saddle anesthesia noted Lymphatic: There is no submandibular or cervical adenopathy appreciated.         I have reviewed and interpreted all of the currently available lab results from this visit (if applicable):  Results for orders placed or performed during the hospital encounter of 10/13/22   CBC with Auto Differential   Result Value Ref Range    WBC 9.7 4.0 - 10.5 K/CU MM    RBC 4.07 (L) 4.2 - 5.4 M/CU MM    Hemoglobin 11.3 (L) 12.5 - 16.0 GM/DL    Hematocrit 35.3 (L) 37 - 47 %    MCV 86.7 78 - 100 FL    MCH 27.8 27 - 31 PG    MCHC 32.0 32.0 - 36.0 %    RDW 14.0 11.7 - 14.9 %    Platelets 234 024 - 762 K/CU MM    MPV 9.8 7.5 - 11.1 FL    Differential Type AUTOMATED DIFFERENTIAL     Segs Relative 68.6 (H) 36 - 66 %    Lymphocytes % 21.1 (L) 24 - 44 %    Monocytes % 6.1 (H) 0 - 4 %    Eosinophils % 3.1 (H) 0 - 3 %    Basophils % 0.4 0 - 1 %    Segs Absolute 6.7 K/CU MM    Lymphocytes Absolute 2.1 K/CU MM    Monocytes Absolute 0.6 K/CU MM    Eosinophils Absolute 0.3 K/CU MM    Basophils Absolute 0.0 K/CU MM    Nucleated RBC % 0.0 %    Total Nucleated RBC 0.0 K/CU MM    Total Immature Neutrophil 0.07 K/CU MM    Immature Neutrophil % 0.7 (H) 0 - 0.43 %   Comprehensive Metabolic Panel   Result Value Ref Range    Sodium 139 135 - 145 MMOL/L    Potassium 3.8 3.5 - 5.1 MMOL/L    Chloride 101 99 - 110 mMol/L    CO2 29 21 - 32 MMOL/L    BUN 11 6 - 23 MG/DL    Creatinine 0.7 0.6 - 1.1 MG/DL    Glucose 101 (H) 70 - 99 MG/DL    Calcium 10.0 8.3 - 10.6 MG/DL    Albumin 3.9 3.4 - 5.0 GM/DL    Total Protein 7.4 6.4 - 8.2 GM/DL    Total Bilirubin 0.2 0.0 - 1.0 MG/DL    ALT 11 10 - 40 U/L    AST 15 15 - 37 IU/L    Alkaline Phosphatase 135 (H) 40 - 129 IU/L    GFR Non-African American >60 >60 mL/min/1.73m2    GFR African American >60 >60 mL/min/1.73m2    Anion Gap 9 4 - 16   Brain Natriuretic Peptide   Result Value Ref Range    Pro-.4 <300 PG/ML   Troponin   Result Value Ref Range    Troponin T <0.010 <0.01 NG/ML EKG 12 Lead   Result Value Ref Range    Ventricular Rate 67 BPM    Atrial Rate 267 BPM    QRS Duration 196 ms    Q-T Interval 472 ms    QTc Calculation (Bazett) 498 ms    P Axis 85 degrees    R Axis -63 degrees    T Axis 96 degrees    Diagnosis       Ventricular-paced rhythm with premature ventricular or aberrantly conducted complexes  Abnormal ECG  When compared with ECG of 18-SEP-2022 22:26,  Vent. rate has decreased BY   8 BPM        Radiographs (if obtained):  [] The following radiograph was interpreted by myself in the absence of a radiologist:   [] Radiologist's Report Reviewed:  VL DUP LOWER EXTREMITY VENOUS BILATERAL   Final Result   1. The veins in both calves were not well evaluated due to leg swelling. 2.  Otherwise, no evidence of DVT in either lower extremity. EKG (if obtained):   Please See Note of attending physician for EKG interpretation. Chart review shows recent radiograph(s):  XR CHEST (2 VW)    Result Date: 10/8/2022  EXAMINATION: TWO XRAY VIEWS OF THE CHEST 10/8/2022 9:57 pm COMPARISON: September 18, 2022 HISTORY: ORDERING SYSTEM PROVIDED HISTORY: bilateral rib pain TECHNOLOGIST PROVIDED HISTORY: Reason for exam:->bilateral rib pain Reason for exam:->shortness of breath Reason for Exam: bilateral rib pain, shortness of breath Additional signs and symptoms: s/p lithotripsy 9.26.2022 FINDINGS: The left chest wall pacemaker and leads are stable. The cardiomediastinal silhouette is mildly enlarged. There is mild pulmonary vascular congestion. There is no pleural effusion. There is no pneumothorax. There is no acute osseous abnormality. Mild pulmonary vascular congestion. Stable mild cardiomegaly.      CT ABDOMEN PELVIS W IV CONTRAST Additional Contrast? None    Result Date: 10/9/2022  EXAMINATION: CT OF THE ABDOMEN AND PELVIS WITH CONTRAST 10/9/2022 1:05 am TECHNIQUE: CT of the abdomen and pelvis was performed with the administration of intravenous contrast. Multiplanar reformatted images are provided for review. Automated exposure control, iterative reconstruction, and/or weight based adjustment of the mA/kV was utilized to reduce the radiation dose to as low as reasonably achievable. COMPARISON: 05/18/2022 HISTORY: ORDERING SYSTEM PROVIDED HISTORY: pain TECHNOLOGIST PROVIDED HISTORY: Reason for exam:->pain Additional Contrast?->None Reason for Exam: pain, Rib Pain (injury); Shortness of Breath FINDINGS: Lower Chest: See the separate chest CT. Organs: Some streak artifact and motion artifact are present. The liver is enlarged without apparent mass or focal nodularity. The spleen is also enlarged with multiple calcified granulomas. The motion artifact degrades evaluation at the areas of motion. There is no apparent pancreatic calcification or ductal dilatation. The adrenal glands are not enlarged. Left ureteral stent is present with the upper coil at the renal pelvis and lower coil in the urinary bladder. There is no apparent hydronephrosis at this time. Assessment for calculi is limited by the motion artifact. GI/Bowel: The bowel loops are not dilated. Motion degrades evaluation of the wall thickness or surrounding inflammation. There is no general ascites or pneumoperitoneum. Pelvis: The urinary bladder wall is borderline in thickness. Previous hysterectomy has been performed. There is no free fluid in the pelvis. Peritoneum/Retroperitoneum: No abdominal aortic aneurysm or retroperitoneal hematoma. Bones/Soft Tissues: Atrophy of the anterior abdominal musculature with broad ventral bulge. Motion degrades evaluation of the bones multiple levels. There are degenerative changes in the spine. Motion artifact degrades the evaluation at multiple levels. A paddle splenomegaly is still noted. Left ureteral stent is present without hydronephrosis at this time. No right ureteral stent.   Borderline thickness of the urinary bladder wall and can correlate with noted.  The lymph nodes are not enlarged. Lungs/pleura: Heterogenous ventilation of the lungs. Areas of dependent and linear atelectasis in the lower lungs. Some linear atelectasis in the medial aspect of the middle lobe and lingula. There is no pleural effusion or pneumothorax. Upper Abdomen: See the separate abdominal CT. Soft Tissues/Bones: There is no soft tissue emphysema or focal fluid collection at the chest wall. Previous right mastectomy. The axillary lymph nodes are not enlarged. No acute fracture or destruction at the included bones. There are old healed rib fracture deformities. Degenerative changes along the spine. 1.  Respiratory motion does reduce the sensitivity but without convincing evidence for pulmonary arterial embolism. 2.  Cardiac chambers are mildly enlarged. There is no pericardial effusion. 3.  Heterogenous ventilation of the lungs could be seen with small airways disease. There is no pleural effusion or pneumothorax. MDM:     Interventions given this visit:   Orders Placed This Encounter   Medications    traMADol (ULTRAM) tablet 50 mg    oxyCODONE-acetaminophen (PERCOCET) 5-325 MG per tablet 1 tablet    vancomycin (VANCOCIN) 2,000 mg in dextrose 5 % 500 mL IVPB     Order Specific Question:   Antimicrobial Indications     Answer:   Skin and Soft Tissue Infection     Order Specific Question:   Suspected Organism(s)     Answer:   . She has 3+ edema. Cellulitis of bilateral lower extremities. Failed outpatient therapy as she has been on 2 different antibiotics will require admission to the hospital.  Particular because she is having significant difficulty ambulating performing her ADLs. IV antibiotics are initiated here in the emergency department no evidence of septicemia. I independently managed patient today in the ED.      /86   Pulse 76   Temp 98.3 °F (36.8 °C) (Oral)   Resp 19   Ht 5' 2\" (1.575 m)   Wt 258 lb (117 kg)   LMP 01/15/1999   SpO2 96% BMI 47.19 kg/m²       Clinical Impression:  1. Leg swelling    2. Cellulitis, unspecified cellulitis site        Disposition referral (if applicable):  No follow-up provider specified. Disposition medications (if applicable):  New Prescriptions    No medications on file         Comment: Please note this report has been produced using speech recognition software and may contain errors related to that system including errors in grammar, punctuation, and spelling, as well as words and phrases that may be inappropriate. If there are any questions or concerns please feel free to contact the dictating provider for clarification.       Rianna Leggett, [phone]  10/14/22 7517

## 2022-10-14 NOTE — PROGRESS NOTES
V2.0  [facility] Hospitalist Progress Note      Name:  Priya Chase /Age/Sex: 1950  (67 y.o. female)   MRN & CSN:  [id number] & [id number] Encounter Date/Time: 10/14/2022 8:42 AM EDT    Location:  [address] [place] [address] PCP: Royer Gamez [address] [place] [address] Day: 2    Assessment and Plan:   Priya Chase is a 67 y.o. female with carotid artery stenosis status post right CEA, SSS status post permanent pacemaker, hyperlipidemia, recent left renal calculi status post stent, right breast cancer status postmastectomy in 2012, morbid obesity, recurrent falls, moderate AI/moderate AS, depression, COPD who presents with Swelling of lower extremity    *Ambulatory dysfunction/recurrent falls  *Bilateral lower extremity edema  Likely secondary to chronic venous insufficiency and mild nonpurulent right lower extremity cellulitis given the erythema, tenderness and warmth as compared to the left lower extremity.   DVT ultrasound negative  Venous insufficiency ultrasound  Compression stockings  Lower extremity elevation  Doxycycline for 5 days  Physical therapy and Occupational Therapy evaluation    *Morbid Obesity  *Deconditioning  BMI 49.02  Reporting recurrent falls for more than 6 months  PT OT evaluation  Counseled regarding bariatric surgery evaluation    *Moderate aortic stenosis  Reports exertional dyspnea, but that could be explained by generalized deconditioning and morbid obesity  Undergoing surveillance with cardiology    *Moderate pulmonary hypertension  Could be secondary to underlying lung parenchymal disease or obstructive sleep apnea in the setting of morbid obesity  Recommend right heart cath in the outpatient setting for further classification of disease process    *Depression  Not on any home medications    *Hyperlipidemia  Continue statin    *Carotid artery stenosis  Status post right CEA  Undergoing surveillance with cardiothoracic surgery  Continue aspirin and statin    *SSS status post permanent pacemaker    Living situation: Patient lives with a friend. Daughter is in [place]. Son lives local.    [address]; Breakfast, Dinner; Low Calorie/High Protein Oral Supplement  ADULT DIET; Regular; 5 carb choices (75 gm/meal); Low Sodium (2 gm); 1200 ml; No artifical sugars/sweeteners   DVT Prophylaxis [x] Lovenox, []  Heparin, [] SCDs, [] Ambulation,  [] Eliquis, [] Xarelto  [] Coumadin   Code Status Full Code   Disposition From: Home  Expected Disposition: Home versus rehab  Estimated Date of Discharge: 1 to 2 days  Patient requires continued admission due to insufficiency ultrasound, improvement in cellulitis, PT OT evaluation   Surrogate Decision Maker/ POA Son     Subjective:     Chief Complaint: Leg Swelling (Bilateral, \"it start in my legs and travelled all the way up my hips. \")       Sondra Ann is a 67 y.o. female who presents with bilateral lower extremity edema. Patient was seen and evaluated at bedside early in AM.  Patient was alert oriented x3, hemodynamically stable. Objective: Intake/Output Summary (Last 24 hours) at 10/14/2022 1749  Last data filed at 10/14/2022 1159  Gross per 24 hour   Intake --   Output 500 ml   Net -500 ml        Vitals:   Vitals:    10/14/22 1501   BP: (!) 129/58   Pulse: 72   Resp: 16   Temp: 98 °F (36.7 °C)   SpO2: 96%       Physical Exam:   Physical Exam  Vitals reviewed. Constitutional:       Appearance: She is obese. HENT:      Head: Normocephalic and atraumatic. Nose: Nose normal.      Mouth/Throat:      Mouth: Mucous membranes are dry. Pharynx: Oropharynx is clear. Eyes:      General: No scleral icterus. Conjunctiva/sclera: Conjunctivae normal.   Cardiovascular:      Rate and Rhythm: Normal rate and regular rhythm. Pulses: Normal pulses. Heart sounds: Murmur heard. Pulmonary:      Effort: Pulmonary effort is normal.      Breath sounds: Normal breath sounds. No wheezing, rhonchi or rales. Abdominal:      General: Bowel sounds are normal. There is no distension. Palpations: Abdomen is soft. Tenderness: There is no abdominal tenderness. Musculoskeletal:         General: No deformity. Normal range of motion. Cervical back: Neck supple. No rigidity. Right lower leg: Edema present. Left lower leg: Edema present. Skin:     Coloration: Skin is not jaundiced or pale. Comments: Right lower extremity erythema, tenderness and warmth as compared to left lower extremity   Neurological:      General: No focal deficit present. Mental Status: She is alert and oriented to person, place, and time. Mental status is at baseline. Medications:   Medications:    sodium chloride flush  5-40 mL IntraVENous 2 times per day    [START ON 10/15/2022] enoxaparin  30 mg SubCUTAneous BID    doxycycline hyclate  100 mg Oral 2 times per day    furosemide  20 mg Oral Daily    letrozole  2.5 mg Oral Nightly    atorvastatin  20 mg Oral Daily    aspirin  81 mg Oral Daily    fluticasone  2 puff Inhalation BID      Infusions:    sodium chloride       PRN Meds: albuterol sulfate HFA, 2 puff, Q6H PRN  sodium chloride flush, 5-40 mL, PRN  sodium chloride, , PRN  ondansetron, 4 mg, Q8H PRN   Or  ondansetron, 4 mg, Q6H PRN  polyethylene glycol, 17 g, Daily PRN  aluminum & magnesium hydroxide-simethicone, 30 mL, Q6H PRN  acetaminophen, 650 mg, Q6H PRN   Or  acetaminophen, 650 mg, Q6H PRN      Labs      Recent Results (from the past 24 hour(s))   EKG 12 Lead    Collection Time: 10/13/22  8:58 PM   Result Value Ref Range    Ventricular Rate 67 BPM    Atrial Rate 267 BPM    QRS Duration 196 ms    Q-T Interval 472 ms    QTc Calculation (Bazett) 498 ms    P Axis 85 degrees    R Axis -63 degrees    T Axis 96 degrees    Diagnosis       Ventricular-paced rhythm with premature ventricular or aberrantly conducted complexes  Abnormal ECG  When compared with ECG of 18-SEP-2022 22:26,  Vent.  rate has decreased BY   8 BPM  Confirmed by MARY Haines ([id number]) on 10/14/2022 4:32:57 PM     Urinalysis    Collection Time: 10/14/22 11:30 AM   Result Value Ref Range    Color, UA YELLOW YELLOW    Clarity, UA CLEAR CLEAR    Glucose, Urine NEGATIVE NEGATIVE MG/DL    Bilirubin Urine NEGATIVE NEGATIVE MG/DL    Ketones, Urine NEGATIVE NEGATIVE MG/DL    Specific Gravity, UA <1.005 1.001 - 1.035    Blood, Urine NEGATIVE NEGATIVE    pH, Urine 6.5 5.0 - 8.0    Protein, UA NEGATIVE NEGATIVE MG/DL    Urobilinogen, Urine 0.2 0.2 - 1.0 MG/DL    Nitrite Urine, Quantitative NEGATIVE NEGATIVE    Leukocyte Esterase, Urine MODERATE (A) NEGATIVE   Microscopic Urinalysis    Collection Time: 10/14/22 11:30 AM   Result Value Ref Range    RBC, UA NONE SEEN 0 - 6 /HPF    WBC, UA 2 0 - 5 /HPF    Bacteria, UA NEGATIVE NEGATIVE /HPF    Squam Epithel, UA <1 /HPF    Trichomonas, UA NONE SEEN NONE SEEN /HPF        Imaging/Diagnostics Last 24 Hours   VL DUP LOWER EXTREMITY VENOUS BILATERAL    Result Date: 10/13/2022  EXAMINATION: DUPLEX VENOUS ULTRASOUND OF THE BILATERAL LOWER [id number]/13/2022 9:36 pm TECHNIQUE: Duplex ultrasound using B-mode/gray scaled imaging, Doppler spectral analysis and color flow Doppler was obtained of the deep venous structures of the lower bilateral extremities. COMPARISON: None. HISTORY: ORDERING SYSTEM PROVIDED HISTORY: pain swelling TECHNOLOGIST PROVIDED HISTORY: Reason for exam:->pain swelling FINDINGS: The visualized veins of the bilateral lower extremities are patent and free of echogenic thrombus. The visualized veins demonstrate good compressibility with normal color flow study and spectral analysis. The veins in both calves were not well evaluated due to leg swelling. 1.  The veins in both calves were not well evaluated due to leg swelling. 2.  Otherwise, no evidence of DVT in either lower extremity.        Electronically signed by Korin Coronado MD on 10/14/2022 at 5:49 PM

## 2022-10-14 NOTE — ED NOTES
ED TO INPATIENT SBAR HANDOFF    Patient Name: Maggy Martinez   :  1950  67 y.o. MRN:  [id number]  Preferred Name  Danny meena Perez [place]  ED Room #:  ED26/ED-26  Family/Caregiver Present no   Restraints no   Sitter no   Sepsis Risk Score Sepsis Risk Score: 1.26    Situation  Code Status: Prior No additional code details. Allergies: Bactrim [sulfamethoxazole-trimethoprim], Lipitor [atorvastatin], Taxol [paclitaxel], Aminoglycosides, Demerol, Neosporin [bacitracin-neomycin-polymyxin], Other, and Talc  Weight: Patient Vitals for the past 96 hrs (Last 3 readings):   Weight   10/13/22 1603 258 lb (117 kg)     Arrived from: home  Chief Complaint:   Chief Complaint   Patient presents with    Leg Swelling     Bilateral, \"it start in my legs and travelled all the way up my hips. \"     Hospital Problem/Diagnosis:  Active Problems:    * No active hospital problems. *  Resolved Problems:    * No resolved hospital problems. *    Imaging:   VL DUP LOWER EXTREMITY VENOUS BILATERAL   Final Result   1. The veins in both calves were not well evaluated due to leg swelling. 2.  Otherwise, no evidence of DVT in either lower extremity.            Abnormal labs:   Abnormal Labs Reviewed   CBC WITH AUTO DIFFERENTIAL - Abnormal; Notable for the following components:       Result Value    RBC 4.07 (*)     Hemoglobin 11.3 (*)     Hematocrit 35.3 (*)     Segs Relative 68.6 (*)     Lymphocytes % 21.1 (*)     Monocytes % 6.1 (*)     Eosinophils % 3.1 (*)     Immature Neutrophil % 0.7 (*)     All other components within normal limits   COMPREHENSIVE METABOLIC PANEL - Abnormal; Notable for the following components:    Glucose 101 (*)     Alkaline Phosphatase 135 (*)     All other components within normal limits     Critical values: no     Abnormal Assessment Findings: pitting edema in BLE and redness      Background  Hospital admissions in last 30 days?  no   History:   Past Medical History:   Diagnosis Date    Anemia     Anxiety     Arthritis     generalized    Asthma 2006    AV block     2nd degree per hospital in june2013    Blood poisoning 1994    Blood transfusion     12/2003    Breast cancer ([facility] [address] 02/29/2012    right    CAD (coronary artery disease)     Cancer ([facility] [address] 2007    skin (face) & colon(2003)/ 2/2012 dx of right breast ca(pc)    Carcinoma of breast ([facility] [address] 02/29/2012    right arm no b/p or sticks    Cardiac pacemaker 03/10/2014    [employer]tronic dual lead    Chronic cystitis     Chronic respiratory failure (HCC)     2 L/min oxygen at night-time    Colon cancer ([facility] [address] 2013    COPD (chronic obstructive pulmonary disease) (HCC)     CTS (carpal tunnel syndrome)     Depression     Diverticulitis     H/O 24 hour EKG monitoring 2/28/2014 7/24/13 2/14 complete heart block 7/13No clinically significant arrthymia noted.  H/O cardiac catheterization 10/31/2011, 7/11/2007    10/31/2011-No significant CAD. Cardiolite stress test was false positive-Dr Jesus Moreno; 7/11/2007-No CAD, global function intact;    H/O cardiovascular stress test 10/20/2011, 3/23/2010    10/20/2011-Lexiscan- Evid of mild ischemia in Left CX region. EF 70%. Global LVSF normal;    H/O cardiovascular stress test 01/07/2015    EF 77%. Normal Stress Test.    H/O chest pain 04/2005    sees Dr Viri Sarabia H/O Doppler ultrasound 02/20/2008 2/20/2008-CAROTID DOPPLER- Normal carotids bilaterally;    H/O Doppler ultrasound 06/06/2013    CAROTID DOPPLER- there is heterogeneous, irregular atherosclerotic plaque noted in the right internal carotid artery,  doppler flow velocities within the right internal carotid artery are elevated, consistent with a mild, less than 50%stenosis, there is intimal thickening but no significant atherosclerotic plaque noted in the left internal carotid artery, the left carotid are within normal limits    H/O echocardiogram 4/9/2012, 10/20/2011    4/9/2012-LVSF normal EF=>55%.  impaired LV relaxation;    H/O echocardiogram 12/31/2014 EF 50-55%. LV shows mild concentric hypertrophy. Mildly dilated left atrium. Mildly dilated right ventricle. Sclerotic but not stenotic aortic valve. Mitral annular calcification. Mild MR, Mild TR, Mild pulm HTN.    H/O urinary incontinence     History of blood transfusion     Hx antineoplastic chemo     Hx of Arterial Doppler ultrasound 07/01/2019    No hemodynamically significant or focal stenosis visualized bilaterally, bilateral lower extremity arteries exhibit diffuse plaque and multiphasic waveforms, unable to obtain bilateral ABIs due to elevated leg pressures >250 mmHg, possibly due to atherosclerosis    Hx of cardiovascular stress test 06/2013    EF 70% abn suggestive of anterolateral ischemia, possible RCA ischmeia, post left ventricular dilation suggestive multivessel CAD.  Hx of echocardiogram 06/2013    EF50%, mild MR and TR, mild pulmonary HTN, mild AS    Hx of echocardiogram 11/01/2021    Left ventricular systolic function is normal, Mild concentric left ventricular hypertrophy Mildly dilated left atrium. Mitral annular calcification is present. Mild tricuspid regurgitation No evidence of any pericardial effusion    Hx of fall     \"last time 2018- due to neuropathy, have to use cane\"    HX OTHER MEDICAL 7/24/13, 06/2013 7/13-No clinacally significant arrhythmia. 6/13-multiple cycles of wenckebach episodes in addtion to some sinus tach    Hyperlipidemia     Hypertension     Hypothermia 2008    Malignant [facility] of breast (female) [facility] [address] 2012    Neuropathy     \"have neuropathy of my legs\"    Normocytic normochromic anemia     Obstructive sleep apnea 2008 01- Has CPAP machine but has not used in over a year - states she has not had problems since her pacemaker was placed.     Old MI (myocardial infarction)     per pt on 1/15/2019\"had heart attack about a year ago\"    Osteoarthritis     Osteopenia     Osteopenia of multiple sites 9/14/2022    Pneumonia due to COVID-19 virus 03/22/2021    Primary malignant neoplasm of colon ([address]     S/P cardiac cath 06/2013    no significant CAD false postive stress test    Skin cancer     Super obesity     Umbilical hernia        Assessment    Vitals/MEWS: MEWS Score: 1  Level of Consciousness: Alert (0)   Vitals:    10/14/22 0002 10/14/22 0007 10/14/22 0100 10/14/22 0102   BP: 117/86   (!) 124/40   Pulse:  76  78   Resp:  19  17   Temp:    98.4 °F (36.9 °C)   TempSrc:       SpO2: 92% 96% 91% 94%   Weight:       Height:         FiO2 (%): na    O2 Flow Rate: O2 Device: None (Room air)    Cardiac Rhythm:    Pain Assessment: 3 [x] Verbal [] Chip Kiana Scale  Pain Scale: Pain Assessment  Pain Assessment: 0-10  Pain Level: 9  Patient's Stated Pain Goal: 2  Pain Location: Leg  Pain Orientation: Right  Pain Descriptors: Aching  Pain Frequency: Continuous  Last documented pain score (0-10 scale) Pain Level: 9  Last documented pain medication administered: see Mar    Mental Status: oriented and alert  C-SSRS: Risk of Suicide: No Risk  Bedside swallow:    [place] Coma Scale (GCS): [place] Coma Scale  Eye Opening: Spontaneous  Best Verbal Response: Oriented  Best Motor Response: Obeys commands  [place] Coma Scale Score: 15  Active LDA's:   Peripheral IV 10/13/22 Left Antecubital (Active)   Site Assessment Clean, dry & intact 10/13/22 2133   Dressing Status Clean, dry & intact 10/13/22 2133     PO Status: Regular  Pertinent or High Risk Medications/Drips: no   o If Yes, please provide details: na  o   Pending Blood Product Administration: no     Blood Cultures: see ED pt care timeline or ED Event log    Recommendation    Pending orders see admit    Plan for Discharge (if known):    Additional Comments: unable to walk at home for the last day, states cant use a walker wont fit in her house   If any further questions, please call Sending RN at [phone]    Electronically signed by: Electronically signed by Jose Mckeon RN on 10/14/2022 at 1:06 AM Kurt Orozco, RN  10/14/22 7399

## 2022-10-14 NOTE — PROGRESS NOTES
4 Eyes Skin Assessment     NAME:  Jaz Beck  YOB: 1950  MEDICAL RECORD NUMBER:  [id number]    The patient is being assess for  Other : Observation    I agree that 2 RN's have performed a thorough Head to Toe Skin Assessment on the patient. ALL assessment sites listed below have been assessed. Areas assessed by both nurses:    Head, Face, Ears, Shoulders, Back, Chest, Arms, Elbows, Hands, Sacrum. Buttock, Coccyx, Ischium, and Legs. Feet and Heels     Yeast/Rash to left breast  Nodule to left breast (Patient states she saw her DrAlek about the nodule, says it was looked at and will be re-evaluated at a later time). Redness to coccyx  Small stage II to Right buttocks cheek  Redness to kyle area  Bilateral swelling to lower extremities most likely related to PVD/Cellulitis. Both below the knee. The areas are marked. Third metatarsal on left foot has band-aid on it because patient states she is missing her toe nail. Generalized very dry skin/flaky     Does the Patient have a Wound? Yes wound(s) were present on assessment.  LDA wound assessment was Initiated and completed        Rangel Prevention initiated:  No   Wound Care Orders initiated:  No    Pressure Injury (Stage 3,4, Unstageable, DTI, NWPT, and Complex wounds) if present place referral/consult order under [de-identified] No    New and Established Ostomies if present place consult order under : No      Nurse 1 eSignature: Electronically signed by Navarro Canas RN on 10/14/22 at 4:37 AM EDT    **SHARE this note so that the co-signing nurse is able to place an eSignature**    Nurse 2 eSignature: Electronically signed by Maru Rockwell LPN on 44/20/59 at 3:44 AM EDT

## 2022-10-14 NOTE — CARE COORDINATION
Reviewed chart and dicussed in IDR, then spoke with pt and her best friend(with pt's permission) about discharge needs/plans. Pt lives at home with BF, she is independent with ADL's and he assists her with Transportation. Pt has a cane for home mobility and RW for community distances. She has a PCP and insurance that covers medications. Pt does not feel HC needed at this time. Plan is home with BF, no needs identified. Cm available if any needs arise.

## 2022-10-14 NOTE — PROGRESS NOTES
Comprehensive Nutrition Assessment    Type and Reason for Visit:  Initial, Positive Nutrition Screen (Unintentional Weight Loss)    Nutrition Recommendations/Plan:   Begin Low Calorie, High Protein oral nutrition supplement BID   Encourage adequate hydration within fluid restriction   Liberalize diet to Low Sodium, Carb Control 5 with 1200 ml; No artifical sugars due to intolerance  Document all Po intakes in I/O      Malnutrition Assessment:  Malnutrition Status: Moderate malnutrition (10/14/22 1066)    Context:  Chronic Illness     Findings of the 6 clinical characteristics of malnutrition:  Energy Intake:  No significant decrease in energy intake  Weight Loss:  Greater than 10% over 6 months (based on office wt of yesterday)     Body Fat Loss:   (Moderate) Orbital, Triceps, Buccal region   Muscle Mass Loss:  Mild muscle mass loss (Wasting masked by edema) Hand (interosseous)  Fluid Accumulation:  Severe Generalized, Extremities (+4 weeping)   Strength:  Not Performed (Reports increased weakness)    Nutrition Assessment:    Admitted with lower extremity swelling. Hx colon cancer, breast cancer, NSTEMI, HLD, PD, CAD, HTN. Pt tolerates current diet, appetite is fair. Pt has been following low sodium at home, prepares meal with reduced salt, reduced added sugars. Pt reports stopping most of her medications when she saw her weight loss, claims to have lost 40# in 3 months. Noted 10# wt gain within admission, office visit wt of 257 lb 3.2 oz on 10/13. Chart indicates significant 10% wt loss in 5 months. Observed moderate wasting, will re-perform NFPE closer to dry weight. Follow as high nutrition risk. Nutrition Related Findings:    sitting up in bed, some pain from legs Wound Type: None       Current Nutrition Intake & Therapies:    Average Meal Intake: %  Average Supplements Intake: None Ordered  ADULT DIET; Regular; 5 carb choices (75 gm/meal);  Low Fat/Low Chol/High Fiber/ROGERS; Low Sodium (2 gm); 1200 ml    Anthropometric Measures:  Height: 5' 2\" (157.5 cm)  Ideal Body Weight (IBW): 110 lbs (50 kg)    Admission Body Weight: 268 lb 1.3 oz (121.6 kg)  Current Body Weight: 268 lb 1.3 oz (121.6 kg), 243.7 % IBW. Weight Source: Bed Scale  Current BMI (kg/m2): 49  Usual Body Weight: 287 lb (130.2 kg) (5/12/22)  % Weight Change (Calculated): -6.6  Weight Adjustment For: No Adjustment                 BMI Categories: Obese Class 3 (BMI 40.0 or greater)    Estimated Daily Nutrient Needs:  Energy Requirements Based On: Formula  Weight Used for Energy Requirements: Current  Energy (kcal/day): 6537-1647 (MSJ)  Weight Used for Protein Requirements: Ideal  Protein (g/day): 50-60 (1-1.2 g/kg)  Method Used for Fluid Requirements: Other (Comment)  Fluid (ml/day): 1200 per MD    Nutrition Diagnosis:   Moderate malnutrition, In context of chronic illness related to catabolic illness as evidenced by weight loss greater than or equal to 10% in 6 months, localized or generalized fluid accumulation, moderate loss of subcutaneous fat    Nutrition Interventions:   Food and/or Nutrient Delivery: Modify Current Diet, Start Oral Nutrition Supplement  Nutrition Education/Counseling: No recommendation at this time  Coordination of Nutrition Care: Continue to monitor while inpatient  Plan of Care discussed with: pt    Goals:     Goals: PO intake 50% or greater       Nutrition Monitoring and Evaluation:   Behavioral-Environmental Outcomes: None Identified  Food/Nutrient Intake Outcomes: Food and Nutrient Intake, Supplement Intake  Physical Signs/Symptoms Outcomes: Biochemical Data, Weight, Nutrition Focused Physical Findings, Fluid Status or Edema    Discharge Planning:     Too soon to determine     Ashwin Spann RD, LD  Contact: [phone]

## 2022-10-14 NOTE — PLAN OF CARE
Problem: Skin/Tissue Integrity  Goal: Absence of new skin breakdown  Description: 1. Monitor for areas of redness and/or skin breakdown  2. Assess vascular access sites hourly  3. Every 4-6 hours minimum:  Change oxygen saturation probe site  4. Every 4-6 hours:  If on nasal continuous positive airway pressure, respiratory therapy assess nares and determine need for appliance change or resting period.   Outcome: Progressing     Problem: Safety - Adult  Goal: Free from fall injury  Outcome: Progressing     Problem: ABCDS Injury Assessment  Goal: Absence of physical injury  Outcome: Progressing     Problem: Discharge Planning  Goal: Discharge to home or other facility with appropriate resources  Outcome: Progressing  Flowsheets (Taken 10/14/2022 0320 by Panchito Castellanos LPN)  Discharge to home or other facility with appropriate resources:   Identify barriers to discharge with patient and caregiver   Arrange for needed discharge resources and transportation as appropriate   Identify discharge learning needs (meds, wound care, etc)   Arrange for interpreters to assist at discharge as needed   Refer to discharge planning if patient needs post-hospital services based on physician order or complex needs related to functional status, cognitive ability or social support system     Problem: Pain  Goal: Verbalizes/displays adequate comfort level or baseline comfort level  Outcome: Progressing  Flowsheets (Taken 10/14/2022 0300 by Panchito Castellanos LPN)  Verbalizes/displays adequate comfort level or baseline comfort level:   Encourage patient to monitor pain and request assistance   Assess pain using appropriate pain scale   Administer analgesics based on type and severity of pain and evaluate response   Implement non-pharmacological measures as appropriate and evaluate response   Consider cultural and social influences on pain and pain management   Notify Licensed Independent Practitioner if interventions unsuccessful or patient reports new pain

## 2022-10-14 NOTE — H&P
History and Physical      Name:  Paulette Dacosta /Age/Sex: 1950  (67 y.o. female)   MRN & CSN:  [id number] & [id number] Encounter Date/Time: 10/14/2022 2:53 AM EDT   Location:  [facility]-02/PAULO-2 PCP: Jaqui Campbell Day: 2    Assessment and Plan:     Patient is a 18-year-old female who presented with worsening LE swelling and concern for cellulitis. # BLE swelling  - Presented with worsening BLE swelling for many weeks. Also endorsed HORVATH and orthopnea. Not on any diuretics. Does not follow low-salt diet, elevate legs or wear compression stockings. Recently completed 7-day course of Keflex UTI with very minmal improvement in LE erythema. - Has 3+ BLE to thighs with bilateral shin erythema (more consistent with venous stasis dermatitis than cellulitis). No fevers, leukocytosis or hx of T2DM. US negative. BNP likely falsely normal due to significant obesity, however, clinically has evidence of CHF. TTE in 2022 normal LVEF, G1DD, mild AR, mild MR, mild-to-moderate TR with moderate PH (RVSP 51 mmHg). - Will initiate IV Lasix BID. Erythema site marked. Received Vanc in the ED, will hold off further antibiotics for now, monitor erythema.   - Daily weights, fluid restriction. Monitor BMP. # Acute decompensate diastolic heart failure  - As above. # JAMIE  - Not interested in CPAP. # Class III obesity  - BMI 47.2.  - Advised on lifestyle modifications. Checklist:  Advanced directive: full  Antibiotics: none  Catheter: none  DVT ppx: Lovenox  Nutrition/Diet: cardiac    Disposition: patient requires continued admission due to BLE edema. MDM: moderate. History of Present Illness:     Chief Complaint: leg swelling    Patient is a 18-year-old female with a PMHx of HTN, JAMIE, breast cancer and class II obesity who presented to the ED with worsening BLE swelling for many weeks. Also endorsed HORVATH and orthopnea. Not on any diuretics.  Does not follow low-salt diet, elevate legs or wear compression stockings. Recently completed 7-day course of Keflex UTI with very minmal improvement in LE erythema. Denied any fevers, chills, nausea, vomiting or abdominal pain, constipation, diarrhea or dysuria. In the ED, US was negative for DVT. She was also started on antibiotic for suspicion of outpatient antibiotic failure. ROS:     Ten point ROS reviewed negative, unless as noted above. Objective:     No intake or output data in the 24 hours ending 10/14/22 0253     Vitals:   Vitals:    10/14/22 0007 10/14/22 0100 10/14/22 0102 10/14/22 0132   BP:   (!) 124/40 (!) 119/42   Pulse: 76  78    Resp: 19  17    Temp:   98.4 °F (36.9 °C)    TempSrc:       SpO2: 96% 91% 94% 90%   Weight:       Height:         BMI: Body mass index is 47.19 kg/m². General: Awake. WDWN. AAOx3. Obese. HEENT: PERRLA. Vision grossly intact. Hearing grossly intact. Oropharynx clear. Neck: Supple. No JVD. CV: RRR. NL S1/S2. 3/6 SM at [address]. CR <2 secs. 3+ BLE to thighs with moderate erythema on shins medially (erythema marked). Pulm: NL effort on RA. CTAB. CW NTTP. GI: +BS x4. Soft. NT/ND. : No CVA or suprapubic tenderness. No Farooq catheter. Skin: As above. MSK: No gross joint deformities. Full ROM. Neuro: CNs grossly intact. Normal speech. No focal deficit. Psych: Good judgement and reason. Past History:      PMHx:   Past Medical History:   Diagnosis Date    Anemia     Anxiety 1978    Arthritis     generalized    Asthma 2006    AV block     2nd degree per hospital in [id number]    Blood poisoning 1994    Blood transfusion     12/2003    Breast cancer ([facility] 02/29/2012    right    CAD (coronary artery disease)     Cancer ([facility] 2007    skin (face) & colon(2003)/ 2/2012 dx of right breast ca(pc)    C[facility] breast ([facility] 02/29/2012    right arm no b/p or sticks    Cardiac pacemaker 03/10/2014    Medtronic dual lead    Chronic cystitis     Chronic respiratory failure (HCC)     2 L/min oxygen at night-time    Colon cancer [facility]) 2013    COPD (chronic obstructive pulmonary disease) (HCC)     CTS (carpal tunnel syndrome)     Depression     Diverticulitis     H/O 24 hour EKG monitoring 2/28/2014 7/24/13 2/14 complete heart block 7/13No clinically significant arrthymia noted. H/O cardiac catheterization 10/31/2011, 7/11/2007    10/31/2011-No significant CAD. Cardiolite stress test was false positive-Dr Gela Gilliland; 7/11/2007-No CAD, global function intact;    H/O cardiovascular stress test 10/20/2011, 3/23/2010    10/20/2011-Lexiscan- Evid of mild ischemia in Left CX region. EF 70%. Global LVSF normal;    H/O cardiovascular stress test 01/07/2015    EF 77%. Normal Stress Test.    H/O chest pain 04/2005    sees Dr Gela Gilliland    H/O Doppler ultrasound 02/20/2008 2/20/2008-CAROTID DOPPLER- Normal carotids bilaterally;    H/O Doppler ultrasound 06/06/2013    CAROTID DOPPLER- there is heterogeneous, irregular atherosclerotic plaque noted in the right internal carotid artery,  doppler flow velocities within the right internal carotid artery are elevated, consistent with a mild, less than 50%stenosis, there is intimal thickening but no significant atherosclerotic plaque noted in the left internal carotid artery, the left carotid are within normal limits    H/O echocardiogram 4/9/2012, 10/20/2011    4/9/2012-LVSF normal EF=>55%. impaired LV relaxation;    H/O echocardiogram 12/31/2014    EF 50-55%. LV shows mild concentric hypertrophy. Mildly dilated left atrium. Mildly dilated right ventricle. Sclerotic but not stenotic aortic valve. Mitral annular calcification.   Mild MR, Mild TR, Mild pulm HTN.    H/O urinary incontinence     History of blood transfusion     Hx antineoplastic chemo     Hx of Arterial Doppler ultrasound 07/01/2019    No hemodynamically significant or focal stenosis visualized bilaterally, bilateral lower extremity arteries exhibit diffuse plaque and multiphasic waveforms, unable to obtain bilateral ABIs due to elevated leg pressures >250 mmHg, possibly due to atherosclerosis    Hx of cardiovascular stress test 06/2013    EF 70% abn suggestive of anterolateral ischemia, possible RCA ischmeia, post left ventricular dilation suggestive multivessel CAD. Hx of echocardiogram 06/2013    EF50%, mild MR and TR, mild pulmonary HTN, mild AS    Hx of echocardiogram 11/01/2021    Left ventricular systolic function is normal, Mild concentric left ventricular hypertrophy Mildly dilated left atrium. Mitral annular calcification is present. Mild tricuspid regurgitation No evidence of any pericardial effusion    Hx of fall     \"last time 2018- due to neuropathy, have to use cane\"    [facility] 7/24/13, 06/2013 7/13-No clinacally significant arrhythmia. 6/13-multiple cycles of wenckebach episodes in addtion to some sinus tach    Hyperlipidemia     Hypertension     Hypothermia 2008    Malignant neoplasm of breast (female) ([facility] 2012    Neuropathy     \"have neuropathy of my legs\"    Normocytic normochromic anemia     Obstructive sleep apnea 2008 01- Has CPAP machine but has not used in over a year - states she has not had problems since her pacemaker was placed. Old MI (myocardial infarction)     per pt on 1/15/2019\"had heart attack about a year ago\"    Osteoarthritis     Osteopenia     Osteopenia of multiple sites 9/14/2022    Pneumonia due to COVID-19 virus 03/22/2021    Primary malignant neoplasm of colon ([facility]     S/P cardiac cath 06/2013    no significant CAD false postive stress test    Skin cancer     Super obesity     Umbilical hernia        PSHx:   Past Surgical History:   Procedure Laterality Date    A-V CARDIAC PACEMAKER INSERTION  3/10/14     Medtronic.    Model:  [id number] Medtronic  Serial:  [id number] dual chamber pacemaker    APPENDECTOMY  2004    BREAST BIOPSY  2/13/12    BREAST LUMPECTOMY  2007    right     BREAST SURGERY  02/13/2012    rt lesion biopsy     CARDIAC CATHETERIZATION  2007 & 2011 CAROTID ENDARTERECTOMY Right 12/3/2021    RIGHT CAROTID ENDARTERECTOMY WITH PATCH performed by Logan Bee MD at [address]  2004    Colon cancer    COLONOSCOPY  10-18-13    polyp    COLONOSCOPY  1/19/2016    internal hemorrhoids, diverticulosis, 1.5 cm sessile polyp, 8 mm polyp found in rectum. COLONOSCOPY  12/14/2017    1.5cm residual polyp, 5mm polyps in blind sigmoid loop x3, Sigmoid divertics, Internal grade 1 hemorrhoids    COLONOSCOPY N/A 1/16/2019    COLONOSCOPY W/ ENDOSCOPIC MUCOSAL RESECTION WITH ELEVIEW 5ML INJECTION AND POLYPECTOMY OF TIP OF BLIND RECTOSIGMOID STUMP AND CAUTERIZATION WITH ERBE PROBE, CLIPPING X2 performed by Gabe Marsh MD at [address]  01/21/2020    pan divertics/ 4 polyps/ sm int hem, S/P partial sigmoid resection w/ end to side anastamosis, repeat in 3 years    COLONOSCOPY N/A 1/21/2020    COLONOSCOPY POLYPECTOMY SNARE/COLD BIOPSY performed by Gabe Marsh MD at [place] [address]  2003    back    CYSTOSCOPY Bilateral 12/15/14    CYSTOSCOPY Right 5/15/2022    CYSTOSCOPY URETERAL STENT INSERTION performed by Emiliana De Los Santos MD at [facility]      front right tooth and root canal w/ brass bolt    DILATION AND CURETTAGE OF UTERUS  2006    Needed a camera to find my uterus.     DILATION AND CURETTAGE OF UTERUS N/A 5/24/2022    DILATATION AND CURETTAGE, CULTURES OF UTEREUS performed by Dewey Ryan MD at [address], COLON, DIAGNOSTIC  12/14/2017    Small hiatal hernia    HERNIA REPAIR  2004    Umb hernia    HERNIA REPAIR  2008    Inc hernia    KIDNEY SURGERY  05/24/2022    stent placed on right side    LITHOTRIPSY Right 8/1/2022    RIGHT CYSTOSCOPY URETEROSCOPY STONE MANIPULATION WITH HOLIUM LASER LITHOTRIPSY STENT REPLACEMENT performed by Malik Fabian MD at  [address] Left 9/26/2022    LEFT CYSTOSCOPY URETEROSCOPY RETROGRADE PYELOGRAM [facility] LITHOTRIPSY POSSIBLE STENT PLACEMENT performed by Carlene Treviño MD at [place] [address]  2/29/2012    Right axillary-3 sentinel nodes were positive out of 9. MASTECTOMY, RADICAL Right 02/29/2012    w/ sentinal node disection-Dr West    PACEMAKER PLACEMENT      PRE-MALIGNANT / BENIGN SKIN LESION EXCISION  2/8/2013    right leg X2-Dr West    TONSILLECTOMY  1957    TUNNELED VENOUS PORT PLACEMENT  2004    TUNNELED VENOUS PORT PLACEMENT  02/13/2012    removal of mediport       Allergies: Allergies   Allergen Reactions    Bactrim [Sulfamethoxazole-Trimethoprim] Anaphylaxis and Hives    Lipitor [Atorvastatin] Shortness Of Breath    Taxol [Paclitaxel] Anaphylaxis and Swelling    Aminoglycosides      Abstracted from [facility] patient chart. Demerol Nausea Only    Neosporin [Bacitracin-Neomycin-Polymyxin] Rash and Other (See Comments)     hotness    Other Rash     Ivory Soap    Talc Other (See Comments)     blisters       FHx: family history includes Arthritis in her mother; Cancer in her brother and father; Diabetes in her father; Heart Disease in her brother, brother, and father; High Blood Pressure in her brother, father, and mother; High Cholesterol in her brother, father, and mother; No Known Problems in her sister; Seizures in her son. SHx:   Social History     Socioeconomic History    Marital status:      Spouse name: None    Number of children: 2    Years of education: 12    Highest education level: 12th grade   Occupational History    Occupation: retired   Tobacco Use    Smoking status: Never    Smokeless tobacco: Never   Vaping Use    Vaping Use: Never used   Substance and Sexual Activity    Alcohol use: No     Comment:           CAFFEINE: 2- 12oz nona daily & 2 cups coffee some nights. Drug use: No    Sexual activity: Not Currently     Partners: Male   Social History Narrative    Do you donate blood or plasma? No    Caffeine intake? Moderate    Advance directive?  No    Is blood transfusion acceptable in an emergency? Yes    Live alone or with others? With others    Able to care for self? Yes    No exercise at this time         Social Determinants of Health     Housing Stability: Low Risk     Unable to Pay for Housing in the Last Year: No    Number of Places Lived in the Last Year: 1    Unstable Housing in the Last Year: No       Medications Prior to Admission     Prior to Admission medications    Medication Sig Start Date End Date Taking?  Authorizing Provider   cephALEXin (KEFLEX) 500 MG capsule Take 1 capsule by mouth 4 times daily for 7 days 10/9/22 10/16/22  Kathleen Villarreal [address] [place], [place] [address]Radha   alfuzosin (UROXATRAL) 10 MG extended release tablet Take 1 tablet by mouth daily for 20 days 9/26/22 10/16/22  Margaret Taveras MD   simvastatin (ZOCOR) 20 MG tablet TAKE ONE (1) TABLET BY MOUTH NIGHTLY 3/28/22   Mahnaz Diaz DO   letrozole [facility]) 2.5 MG tablet Take 1 tablet by mouth nightly 11/30/21   Robi Romo MD   acetaminophen (TYLENOL) 500 MG tablet Take 500 mg by mouth every 6 hours as needed for Pain    Historical Provider, MD   fluticasone (FLOVENT HFA) 110 MCG/ACT inhaler Inhale 2 puffs into the lungs 2 times daily 2/11/21   Mahnaz Diaz DO   albuterol sulfate HFA (PROAIR HFA) 108 (90 Base) MCG/ACT inhaler INHALE 2 PUFFS BY MOUTH EVERY SIX HOURS AS NEEDED FOR WHEEZING 2/11/21   Mahnaz Diaz DO       Data:     CBC:   Recent Labs     10/13/22  1700   WBC 9.7   HGB 11.3*      MCV 86.7   RDW 14.0   LYMPHOPCT 21.1*   MONOPCT 6.1*   BASOPCT 0.4   MONOSABS 0.6   LYMPHSABS 2.1   EOSABS 0.3   BASOSABS 0.0     CMP:    Recent Labs     10/13/22  1700      K 3.8      CO2 29   BUN 11   CREATININE 0.7   GFRAA >60   GLUCOSE 101*   LABALBU 3.9   CALCIUM 10.0   BILITOT 0.2   ALKPHOS 135*   AST 15   ALT 11     Lipids:   Lab Results   Component Value Date/Time    CHOL 267 08/18/2022 02:29 PM    CHOL 182 07/29/2021 12:39 PM    HDL 44 08/18/2022 02:29 PM    TRIG 181 08/18/2022 02:29 PM Hemoglobin A1C:   Lab Results   Component Value Date/Time    LABA1C 5.8 08/18/2022 02:30 PM     TSH: No results found for: TSH  Troponin:   Lab Results   Component Value Date/Time    TROPONINT <0.010 10/13/2022 05:00 PM    TROPONINT <0.010 09/18/2022 10:16 PM    TROPONINT <0.010 05/17/2022 01:39 AM     BNP:   Recent Labs     10/13/22  1700   PROBNP 181.4     Lactic Acid: No results for input(s): LACTA in the last 72 hours. UA:  Lab Results   Component Value Date/Time    NITRU POSITIVE 10/09/2022 01:50 AM    COLORU YELLOW 10/09/2022 01:50 AM    WBCUA 374 10/09/2022 01:50 AM    RBCUA 20 10/09/2022 01:50 AM    MUCUS RARE 10/09/2022 01:50 AM    TRICHOMONAS NONE SEEN 10/09/2022 01:50 AM    YEAST RARE 09/11/2017 07:00 AM    BACTERIA OCCASIONAL 10/09/2022 01:50 AM    CLARITYU CLOUDY 10/09/2022 01:50 AM    SPECGRAV 1.015 10/09/2022 01:50 AM    LEUKOCYTESUR MODERATE 10/09/2022 01:50 AM    UROBILINOGEN 0.2 10/09/2022 01:50 AM    BILIRUBINUR NEGATIVE 10/09/2022 01:50 AM    BLOODU SMALL 10/09/2022 01:50 AM    KETUA NEGATIVE 10/09/2022 01:50 AM    AMORPHOUS OCCASIONAL 04/15/2018 05:48 AM     Urine Cultures: No results found for: Shant Atkinson  Blood Cultures: No results found for: BC  No results found for: BLOODCULT2  Organism:   Lab Results   Component Value Date/Time    ORG ECOL 10/16/2018 02:43 PM       Radiology results:  VL DUP LOWER EXTREMITY VENOUS BILATERAL   Final Result   1. The veins in both calves were not well evaluated due to leg swelling. 2.  Otherwise, no evidence of DVT in either lower extremity.              Medications:     Medications:    furosemide  40 mg IntraVENous BID    traMADol  50 mg Oral Once        Infusions:       PRN Meds:        David Narvaez MD  10/14/22 2:53 AM

## 2022-10-15 PROBLEM — R26.2 AMBULATORY DYSFUNCTION: Status: ACTIVE | Noted: 2022-10-15

## 2022-10-15 LAB
ALBUMIN SERPL-MCNC: 3.4 GM/DL (ref 3.4–5)
ALP BLD-CCNC: 119 IU/L (ref 40–129)
ALT SERPL-CCNC: 10 U/L (ref 10–40)
ANION GAP SERPL CALCULATED.3IONS-SCNC: 10 MMOL/L (ref 4–16)
AST SERPL-CCNC: 14 IU/L (ref 15–37)
BASOPHILS ABSOLUTE: 0 K/CU MM
BASOPHILS RELATIVE PERCENT: 0.4 % (ref 0–1)
BILIRUB SERPL-MCNC: 0.2 MG/DL (ref 0–1)
BUN BLDV-MCNC: 13 MG/DL (ref 6–23)
CALCIUM SERPL-MCNC: 9.6 MG/DL (ref 8.3–10.6)
CHLORIDE BLD-SCNC: 100 MMOL/L (ref 99–110)
CO2: 29 MMOL/L (ref 21–32)
CREAT SERPL-MCNC: 0.8 MG/DL (ref 0.6–1.1)
DIFFERENTIAL TYPE: ABNORMAL
EOSINOPHILS ABSOLUTE: 0.4 K/CU MM
EOSINOPHILS RELATIVE PERCENT: 4.8 % (ref 0–3)
GFR AFRICAN AMERICAN: >60 ML/MIN/1.73M2
GFR NON-AFRICAN AMERICAN: >60 ML/MIN/1.73M2
GLUCOSE BLD-MCNC: 126 MG/DL (ref 70–99)
HCT VFR BLD CALC: 34.1 % (ref 37–47)
HEMOGLOBIN: 10.9 GM/DL (ref 12.5–16)
IMMATURE NEUTROPHIL %: 1.3 % (ref 0–0.43)
LYMPHOCYTES ABSOLUTE: 1.8 K/CU MM
LYMPHOCYTES RELATIVE PERCENT: 24 % (ref 24–44)
MCH RBC QN AUTO: 27.5 PG (ref 27–31)
MCHC RBC AUTO-ENTMCNC: 32 % (ref 32–36)
MCV RBC AUTO: 85.9 FL (ref 78–100)
MONOCYTES ABSOLUTE: 0.5 K/CU MM
MONOCYTES RELATIVE PERCENT: 6.1 % (ref 0–4)
NUCLEATED RBC %: 0 %
PDW BLD-RTO: 14.2 % (ref 11.7–14.9)
PLATELET # BLD: 233 K/CU MM (ref 140–440)
PMV BLD AUTO: 9.6 FL (ref 7.5–11.1)
POTASSIUM SERPL-SCNC: 3.8 MMOL/L (ref 3.5–5.1)
RBC # BLD: 3.97 M/CU MM (ref 4.2–5.4)
SEGMENTED NEUTROPHILS ABSOLUTE COUNT: 4.9 K/CU MM
SEGMENTED NEUTROPHILS RELATIVE PERCENT: 63.4 % (ref 36–66)
SODIUM BLD-SCNC: 139 MMOL/L (ref 135–145)
TOTAL IMMATURE NEUTOROPHIL: 0.1 K/CU MM
TOTAL NUCLEATED RBC: 0 K/CU MM
TOTAL PROTEIN: 6 GM/DL (ref 6.4–8.2)
WBC # BLD: 7.7 K/CU MM (ref 4–10.5)

## 2022-10-15 PROCEDURE — 1200000000 HC SEMI PRIVATE

## 2022-10-15 PROCEDURE — 94640 AIRWAY INHALATION TREATMENT: CPT

## 2022-10-15 PROCEDURE — G0378 HOSPITAL OBSERVATION PER HR: HCPCS

## 2022-10-15 PROCEDURE — 36415 COLL VENOUS BLD VENIPUNCTURE: CPT

## 2022-10-15 PROCEDURE — 80053 COMPREHEN METABOLIC PANEL: CPT

## 2022-10-15 PROCEDURE — 85025 COMPLETE CBC W/AUTO DIFF WBC: CPT

## 2022-10-15 PROCEDURE — 2580000003 HC RX 258: Performed by: STUDENT IN AN ORGANIZED HEALTH CARE EDUCATION/TRAINING PROGRAM

## 2022-10-15 PROCEDURE — 94761 N-INVAS EAR/PLS OXIMETRY MLT: CPT

## 2022-10-15 PROCEDURE — 6370000000 HC RX 637 (ALT 250 FOR IP): Performed by: STUDENT IN AN ORGANIZED HEALTH CARE EDUCATION/TRAINING PROGRAM

## 2022-10-15 PROCEDURE — 6360000002 HC RX W HCPCS: Performed by: STUDENT IN AN ORGANIZED HEALTH CARE EDUCATION/TRAINING PROGRAM

## 2022-10-15 PROCEDURE — 96372 THER/PROPH/DIAG INJ SC/IM: CPT

## 2022-10-15 RX ADMIN — FUROSEMIDE 20 MG: 20 TABLET ORAL at 10:54

## 2022-10-15 RX ADMIN — ACETAMINOPHEN 650 MG: 325 TABLET ORAL at 10:53

## 2022-10-15 RX ADMIN — ACETAMINOPHEN 650 MG: 325 TABLET ORAL at 20:59

## 2022-10-15 RX ADMIN — Medication 2 PUFF: at 08:41

## 2022-10-15 RX ADMIN — Medication 2 PUFF: at 19:46

## 2022-10-15 RX ADMIN — SODIUM CHLORIDE, PRESERVATIVE FREE 10 ML: 5 INJECTION INTRAVENOUS at 10:54

## 2022-10-15 RX ADMIN — LETROZOLE 2.5 MG: 2.5 TABLET ORAL at 21:00

## 2022-10-15 RX ADMIN — ENOXAPARIN SODIUM 30 MG: 100 INJECTION SUBCUTANEOUS at 20:59

## 2022-10-15 RX ADMIN — ENOXAPARIN SODIUM 30 MG: 100 INJECTION SUBCUTANEOUS at 10:54

## 2022-10-15 RX ADMIN — SODIUM CHLORIDE, PRESERVATIVE FREE 10 ML: 5 INJECTION INTRAVENOUS at 21:00

## 2022-10-15 RX ADMIN — DOXYCYCLINE HYCLATE 100 MG: 100 TABLET, COATED ORAL at 10:54

## 2022-10-15 RX ADMIN — DOXYCYCLINE HYCLATE 100 MG: 100 TABLET, COATED ORAL at 20:59

## 2022-10-15 RX ADMIN — ASPIRIN 81 MG CHEWABLE TABLET 81 MG: 81 TABLET CHEWABLE at 10:54

## 2022-10-15 ASSESSMENT — PAIN - FUNCTIONAL ASSESSMENT
PAIN_FUNCTIONAL_ASSESSMENT: ACTIVITIES ARE NOT PREVENTED
PAIN_FUNCTIONAL_ASSESSMENT: ACTIVITIES ARE NOT PREVENTED

## 2022-10-15 ASSESSMENT — PAIN SCALES - GENERAL
PAINLEVEL_OUTOF10: 0
PAINLEVEL_OUTOF10: 0
PAINLEVEL_OUTOF10: 5

## 2022-10-15 ASSESSMENT — PAIN DESCRIPTION - LOCATION
LOCATION: GENERALIZED
LOCATION: GENERALIZED
LOCATION: NECK

## 2022-10-15 ASSESSMENT — PAIN DESCRIPTION - DESCRIPTORS
DESCRIPTORS: ACHING

## 2022-10-15 ASSESSMENT — PAIN DESCRIPTION - ORIENTATION: ORIENTATION: RIGHT

## 2022-10-15 NOTE — PROGRESS NOTES
V2.0  [facility] Hospitalist Progress Note      Name:  Tru Claire /Age/Sex: 1950  (67 y.o. female)   MRN & CSN:  [id number] & [id number] Encounter Date/Time: 10/15/2022 8:42 AM EDT    Location:  [address] [place] [address] PCP: Yessy Landa [address] [place] [address] Day: 3    Assessment and Plan:   Tru Claire is a 67 y.o. female with carotid artery stenosis status post right CEA, SSS status post permanent pacemaker, hyperlipidemia, recent left renal calculi status post stent, right breast cancer status postmastectomy in 2012, morbid obesity, recurrent falls, moderate AI/moderate AS, depression, COPD who presents with Swelling of lower extremity    *Ambulatory dysfunction/recurrent falls  *Bilateral lower extremity edema  Likely secondary to chronic venous insufficiency and mild nonpurulent right lower extremity cellulitis given the erythema, tenderness and warmth as compared to the left lower extremity. DVT ultrasound negative  Venous insufficiency ultrasound, unfortunately can't be performed at this institution, recommend outpatient workup for chronic venous hypertension.   Compression stockings  Lower extremity elevation  Doxycycline for 5 days  Physical therapy and Occupational Therapy evaluation    *Morbid Obesity  *Deconditioning  BMI 49.02  Reporting recurrent falls for more than 6 months  PT OT evaluation, still pending  Counseled regarding bariatric surgery evaluation    *Moderate aortic stenosis  Reports exertional dyspnea, but that could be explained by generalized deconditioning and morbid obesity  Undergoing surveillance with cardiology    *Moderate pulmonary hypertension  Could be secondary to underlying lung parenchymal disease or obstructive sleep apnea in the setting of morbid obesity  Recommend right heart cath in the outpatient setting for further classification of disease process    *Depression  Not on any home medications    *Hyperlipidemia  Continue statin    *Carotid artery stenosis  Status post right CEA  Undergoing surveillance with cardiothoracic surgery  Continue aspirin and statin    *SSS status post permanent pacemaker    Living situation: Patient lives with a friend. Daughter is in [place]. Son lives local.    [address]; Breakfast, Dinner; Low Calorie/High Protein Oral Supplement  ADULT DIET; Regular; 5 carb choices (75 gm/meal); Low Sodium (2 gm); 1200 ml; No artifical sugars/sweeteners   DVT Prophylaxis [x] Lovenox, []  Heparin, [] SCDs, [] Ambulation,  [] Eliquis, [] Xarelto  [] Coumadin   Code Status Full Code   Disposition From: Home  Expected Disposition: Home versus rehab  Estimated Date of Discharge: 1 to 2 days  Patient requires continued admission due to  improvement in cellulitis, PT OT evaluation   Surrogate Decision Maker/ POA Son     Subjective:     Chief Complaint: Leg Swelling (Bilateral, \"it start in my legs and travelled all the way up my hips. \")       Sondra Ann is a 67 y.o. female who presents with bilateral lower extremity edema. Patient was seen and evaluated at bedside early in AM.  Patient was alert oriented x3, hemodynamically stable. Patient is still wondering when her legs are going to be normal, had a detailed discussion that this process takes time and she would probably need a vein specialist in the outpatient setting to manage CVI. Objective: Intake/Output Summary (Last 24 hours) at 10/15/2022 1559  Last data filed at 10/15/2022 8507  Gross per 24 hour   Intake --   Output 1400 ml   Net -1400 ml        Vitals:   Vitals:    10/15/22 1519   BP: (!) 129/52   Pulse: 66   Resp: 16   Temp: 98.1 °F (36.7 °C)   SpO2: 92%       Physical Exam:   Physical Exam  Vitals reviewed. Constitutional:       Appearance: She is obese. HENT:      Head: Normocephalic and atraumatic. Nose: Nose normal.      Mouth/Throat:      Mouth: Mucous membranes are dry. Pharynx: Oropharynx is clear. Eyes:      General: No scleral icterus. Conjunctiva/sclera: Conjunctivae normal.   Cardiovascular:      Rate and Rhythm: Normal rate and regular rhythm. Pulses: Normal pulses. Heart sounds: Murmur heard. Pulmonary:      Effort: Pulmonary effort is normal.      Breath sounds: Normal breath sounds. No wheezing, rhonchi or rales. Abdominal:      General: Bowel sounds are normal. There is no distension. Palpations: Abdomen is soft. Tenderness: There is no abdominal tenderness. Musculoskeletal:         General: No deformity. Normal range of motion. Cervical back: Neck supple. No rigidity. Right lower leg: Edema present. Left lower leg: Edema present. Skin:     Coloration: Skin is not jaundiced or pale. Comments: Right lower extremity erythema, tenderness and warmth as compared to left lower extremity   Neurological:      General: No focal deficit present. Mental Status: She is alert and oriented to person, place, and time. Mental status is at baseline.           Medications:   Medications:    sodium chloride flush  5-40 mL IntraVENous 2 times per day    enoxaparin  30 mg SubCUTAneous BID    doxycycline hyclate  100 mg Oral 2 times per day    letrozole  2.5 mg Oral Nightly    aspirin  81 mg Oral Daily    fluticasone  2 puff Inhalation BID    simvastatin  20 mg Oral Nightly      Infusions:    sodium chloride       PRN Meds: albuterol sulfate HFA, 2 puff, Q6H PRN  sodium chloride flush, 5-40 mL, PRN  sodium chloride, , PRN  ondansetron, 4 mg, Q8H PRN   Or  ondansetron, 4 mg, Q6H PRN  polyethylene glycol, 17 g, Daily PRN  aluminum & magnesium hydroxide-simethicone, 30 mL, Q6H PRN  acetaminophen, 650 mg, Q6H PRN   Or  acetaminophen, 650 mg, Q6H PRN      Labs      Recent Results (from the past 24 hour(s))   CBC with Auto Differential    Collection Time: 10/15/22 10:39 AM   Result Value Ref Range    WBC 7.7 4.0 - 10.5 K/CU MM    RBC 3.97 (L) 4.2 - 5.4 M/CU MM    Hemoglobin 10.9 (L) 12.5 - 16.0 GM/DL    Hematocrit 34.1 (L) 37 - 47 %    MCV 85.9 78 - 100 FL    MCH 27.5 27 - 31 PG    MCHC 32.0 32.0 - 36.0 %    RDW 14.2 11.7 - 14.9 %    Platelets 188 537 - 883 K/CU MM    MPV 9.6 7.5 - 11.1 FL    Differential Type AUTOMATED DIFFERENTIAL     Segs Relative 63.4 36 - 66 %    Lymphocytes % 24.0 24 - 44 %    Monocytes % 6.1 (H) 0 - 4 %    Eosinophils % 4.8 (H) 0 - 3 %    Basophils % 0.4 0 - 1 %    Segs Absolute 4.9 K/CU MM    Lymphocytes Absolute 1.8 K/CU MM    Monocytes Absolute 0.5 K/CU MM    Eosinophils Absolute 0.4 K/CU MM    Basophils Absolute 0.0 K/CU MM    Nucleated RBC % 0.0 %    Total Nucleated RBC 0.0 K/CU MM    Total Immature Neutrophil 0.10 K/CU MM    Immature Neutrophil % 1.3 (H) 0 - 0.43 %   Comprehensive Metabolic Panel w/ Reflex to MG    Collection Time: 10/15/22 10:39 AM   Result Value Ref Range    Sodium 139 135 - 145 MMOL/L    Potassium 3.8 3.5 - 5.1 MMOL/L    Chloride 100 99 - 110 mMol/L    CO2 29 21 - 32 MMOL/L    BUN 13 6 - 23 MG/DL    Creatinine 0.8 0.6 - 1.1 MG/DL    Glucose 126 (H) 70 - 99 MG/DL    Calcium 9.6 8.3 - 10.6 MG/DL    Albumin 3.4 3.4 - 5.0 GM/DL    Total Protein 6.0 (L) 6.4 - 8.2 GM/DL    Total Bilirubin 0.2 0.0 - 1.0 MG/DL    ALT 10 10 - 40 U/L    AST 14 (L) 15 - 37 IU/L    Alkaline Phosphatase 119 40 - 129 IU/L    GFR Non-African American >60 >60 mL/min/1.73m2    GFR African American >60 >60 mL/min/1.73m2    Anion Gap 10 4 - 16        Imaging/Diagnostics Last 24 Hours   VL DUP LOWER EXTREMITY VENOUS BILATERAL    Result Date: 10/13/2022  EXAMINATION: DUPLEX VENOUS ULTRASOUND OF THE BILATERAL LOWER [id number]/13/2022 9:36 pm TECHNIQUE: Duplex ultrasound using B-mode/gray scaled imaging, Doppler spectral analysis and color flow Doppler was obtained of the deep venous structures of the lower bilateral extremities. COMPARISON: None.  HISTORY: ORDERING SYSTEM PROVIDED HISTORY: pain swelling TECHNOLOGIST PROVIDED HISTORY: Reason for exam:->pain swelling FINDINGS: The visualized veins of the bilateral lower extremities are patent and free of echogenic thrombus. The visualized veins demonstrate good compressibility with normal color flow study and spectral analysis. The veins in both calves were not well evaluated due to leg swelling. 1.  The veins in both calves were not well evaluated due to leg swelling. 2.  Otherwise, no evidence of DVT in either lower extremity.        Electronically signed by Neetu Mohr MD on 10/15/2022 at 3:59 PM

## 2022-10-15 NOTE — CARE COORDINATION
Case management consult for SNF placement. LSW reviewed patient medical record. Call to patient room to discuss discharge planning. Patient thinks that she needs to go to a SNF. Patient would like Christopher. Whiteboard placed asking for therapy to eval patient and provide recommendations. Case management will continue to follow. It should be noted that patient insurance will require a pre-cert.

## 2022-10-15 NOTE — PLAN OF CARE
Problem: Skin/Tissue Integrity  Goal: Absence of new skin breakdown  Description: 1. Monitor for areas of redness and/or skin breakdown  2. Assess vascular access sites hourly  3. Every 4-6 hours minimum:  Change oxygen saturation probe site  4. Every 4-6 hours:  If on nasal continuous positive airway pressure, respiratory therapy assess nares and determine need for appliance change or resting period.   Outcome: Progressing     Problem: Safety - Adult  Goal: Free from fall injury  Outcome: Progressing     Problem: ABCDS Injury Assessment  Goal: Absence of physical injury  Outcome: Progressing     Problem: Discharge Planning  Goal: Discharge to home or other facility with appropriate resources  Outcome: Progressing  Flowsheets (Taken 10/15/2022 1046)  Discharge to home or other facility with appropriate resources: Identify barriers to discharge with patient and caregiver     Problem: Pain  Goal: Verbalizes/displays adequate comfort level or baseline comfort level  Outcome: Progressing

## 2022-10-15 NOTE — PLAN OF CARE
Problem: Skin/Tissue Integrity  Goal: Absence of new skin breakdown  Description: 1. Monitor for areas of redness and/or skin breakdown  2. Assess vascular access sites hourly  3. Every 4-6 hours minimum:  Change oxygen saturation probe site  4. Every 4-6 hours:  If on nasal continuous positive airway pressure, respiratory therapy assess nares and determine need for appliance change or resting period.   10/14/2022 2045 by Bartolo Mckay RN  Outcome: Progressing  10/14/2022 0828 by Ofelia Rohca RN  Outcome: Progressing     Problem: Safety - Adult  Goal: Free from fall injury  10/14/2022 2045 by Bartolo Mckay RN  Outcome: Progressing  10/14/2022 0828 by Ofelia Rocha RN  Outcome: Progressing     Problem: ABCDS Injury Assessment  Goal: Absence of physical injury  10/14/2022 2045 by Bartolo Mckay RN  Outcome: Progressing  10/14/2022 0828 by Ofelia Rocha RN  Outcome: Progressing     Problem: Discharge Planning  Goal: Discharge to home or other facility with appropriate resources  10/14/2022 2045 by Bartolo Mckay RN  Outcome: Progressing  10/14/2022 0828 by Ofelia Rocha RN  Outcome: Progressing  Flowsheets (Taken 10/14/2022 0320 by Kylah Marc LPN)  Discharge to home or other facility with appropriate resources:   Identify barriers to discharge with patient and caregiver   Arrange for needed discharge resources and transportation as appropriate   Identify discharge learning needs (meds, wound care, etc)   Arrange for interpreters to assist at discharge as needed   Refer to discharge planning if patient needs post-hospital services based on physician order or complex needs related to functional status, cognitive ability or social support system     Problem: Pain  Goal: Verbalizes/displays adequate comfort level or baseline comfort level  10/14/2022 2045 by Bartolo Mckay RN  Outcome: Progressing  10/14/2022 0828 by Ofelia Rocha RN  Outcome: Progressing  Flowsheets (Taken 10/14/2022 0300 by Tyron Wagner Cotterman, LPN)  Verbalizes/displays adequate comfort level or baseline comfort level:   Encourage patient to monitor pain and request assistance   Assess pain using appropriate pain scale   Administer analgesics based on type and severity of pain and evaluate response   Implement non-pharmacological measures as appropriate and evaluate response   Consider cultural and social influences on pain and pain management   Notify Licensed Independent Practitioner if interventions unsuccessful or patient reports new pain

## 2022-10-16 PROCEDURE — 97535 SELF CARE MNGMENT TRAINING: CPT

## 2022-10-16 PROCEDURE — 6370000000 HC RX 637 (ALT 250 FOR IP): Performed by: STUDENT IN AN ORGANIZED HEALTH CARE EDUCATION/TRAINING PROGRAM

## 2022-10-16 PROCEDURE — 97530 THERAPEUTIC ACTIVITIES: CPT

## 2022-10-16 PROCEDURE — 97166 OT EVAL MOD COMPLEX 45 MIN: CPT

## 2022-10-16 PROCEDURE — 97116 GAIT TRAINING THERAPY: CPT

## 2022-10-16 PROCEDURE — 1200000000 HC SEMI PRIVATE

## 2022-10-16 PROCEDURE — 94640 AIRWAY INHALATION TREATMENT: CPT

## 2022-10-16 PROCEDURE — 97163 PT EVAL HIGH COMPLEX 45 MIN: CPT

## 2022-10-16 PROCEDURE — 94761 N-INVAS EAR/PLS OXIMETRY MLT: CPT

## 2022-10-16 PROCEDURE — 6360000002 HC RX W HCPCS: Performed by: STUDENT IN AN ORGANIZED HEALTH CARE EDUCATION/TRAINING PROGRAM

## 2022-10-16 PROCEDURE — 94664 DEMO&/EVAL PT USE INHALER: CPT

## 2022-10-16 PROCEDURE — 2580000003 HC RX 258: Performed by: STUDENT IN AN ORGANIZED HEALTH CARE EDUCATION/TRAINING PROGRAM

## 2022-10-16 RX ORDER — TRAMADOL HYDROCHLORIDE 50 MG/1
50 TABLET ORAL EVERY 8 HOURS PRN
COMMUNITY

## 2022-10-16 RX ORDER — CIPROFLOXACIN 500 MG/1
500 TABLET, FILM COATED ORAL EVERY 12 HOURS
Status: ON HOLD | COMMUNITY
Start: 2022-10-07 | End: 2022-10-18

## 2022-10-16 RX ADMIN — Medication 2 PUFF: at 08:02

## 2022-10-16 RX ADMIN — ENOXAPARIN SODIUM 30 MG: 100 INJECTION SUBCUTANEOUS at 10:09

## 2022-10-16 RX ADMIN — DOXYCYCLINE HYCLATE 100 MG: 100 TABLET, COATED ORAL at 20:45

## 2022-10-16 RX ADMIN — SODIUM CHLORIDE, PRESERVATIVE FREE 10 ML: 5 INJECTION INTRAVENOUS at 20:45

## 2022-10-16 RX ADMIN — LETROZOLE 2.5 MG: 2.5 TABLET ORAL at 20:45

## 2022-10-16 RX ADMIN — DOXYCYCLINE HYCLATE 100 MG: 100 TABLET, COATED ORAL at 10:10

## 2022-10-16 RX ADMIN — ENOXAPARIN SODIUM 30 MG: 100 INJECTION SUBCUTANEOUS at 20:45

## 2022-10-16 RX ADMIN — Medication 2 PUFF: at 21:55

## 2022-10-16 RX ADMIN — ACETAMINOPHEN 650 MG: 325 TABLET ORAL at 03:02

## 2022-10-16 RX ADMIN — SODIUM CHLORIDE, PRESERVATIVE FREE 10 ML: 5 INJECTION INTRAVENOUS at 10:10

## 2022-10-16 RX ADMIN — ASPIRIN 81 MG CHEWABLE TABLET 81 MG: 81 TABLET CHEWABLE at 10:09

## 2022-10-16 ASSESSMENT — PAIN DESCRIPTION - LOCATION
LOCATION: HIP;BACK;HEAD
LOCATION: GENERALIZED

## 2022-10-16 ASSESSMENT — PAIN SCALES - GENERAL
PAINLEVEL_OUTOF10: 5
PAINLEVEL_OUTOF10: 0
PAINLEVEL_OUTOF10: 6

## 2022-10-16 ASSESSMENT — PAIN DESCRIPTION - ORIENTATION: ORIENTATION: LOWER

## 2022-10-16 ASSESSMENT — PAIN DESCRIPTION - ONSET: ONSET: ON-GOING

## 2022-10-16 ASSESSMENT — PAIN DESCRIPTION - DESCRIPTORS
DESCRIPTORS: ACHING
DESCRIPTORS: ACHING

## 2022-10-16 ASSESSMENT — PAIN - FUNCTIONAL ASSESSMENT: PAIN_FUNCTIONAL_ASSESSMENT: ACTIVITIES ARE NOT PREVENTED

## 2022-10-16 ASSESSMENT — PAIN SCALES - WONG BAKER: WONGBAKER_NUMERICALRESPONSE: 0

## 2022-10-16 ASSESSMENT — PAIN DESCRIPTION - FREQUENCY: FREQUENCY: INTERMITTENT

## 2022-10-16 NOTE — PROGRESS NOTES
Occupational Therapy    [facility] ACUTE CARE OCCUPATIONAL THERAPY EVALUATION    [address] [facility], 1950, 4104/4104-A, 10/16/2022    Discharge Recommendation: Yohan Stephen    History:  [place]:  The primary encounter diagnosis was Leg swelling. A diagnosis of Cellulitis, unspecified cellulitis site was also pertinent to this visit. Past Medical History:   Diagnosis Date    Anemia     Anxiety 1978    Arthritis     generalized    Asthma 2006    AV block     2nd degree per hospital in june2013    Blood poisoning 1994    Blood transfusion     12/2003    Breast cancer ([address] 02/29/2012    right    CAD (coronary artery disease)     Cancer ([address] 2007    skin (face) & colon(2003)/ 2/2012 dx of right breast ca(pc)    Carcinoma of breast ([facility] 02/29/2012    right arm no b/p or sticks    Cardiac pacemaker 03/10/2014    [employer]tronic dual lead    Chronic cystitis     Chronic respiratory failure (HCC)     2 L/min oxygen at night-time    Colon cancer ([address] 2013    COPD (chronic obstructive pulmonary disease) ([address]     CTS (carpal tunnel syndrome)     Depression     Diverticulitis     H/O 24 hour EKG monitoring 2/28/2014 7/24/13 2/14 complete heart block 7/13No clinically significant arrthymia noted. H/O cardiac catheterization 10/31/2011, 7/11/2007    10/31/2011-No significant CAD. Cardiolite stress test was false positive-Dr Jay Perez; 7/11/2007-No CAD, global function intact;    H/O cardiovascular stress test 10/20/2011, 3/23/2010    10/20/2011-Lexiscan- Evid of mild ischemia in Left CX region. EF 70%. Global LVSF normal;    H/O cardiovascular stress test 01/07/2015    EF 77%.    Normal Stress Test.    H/O chest pain 04/2005    sees Dr Jay Perez    H/O Doppler ultrasound 02/20/2008 2/20/2008-CAROTID DOPPLER- Normal carotids bilaterally;    H/O Doppler ultrasound 06/06/2013    CAROTID DOPPLER- there is heterogeneous, irregular atherosclerotic plaque noted in the right internal carotid artery,  doppler flow velocities within the right internal carotid artery are elevated, consistent with a mild, less than 50%stenosis, there is intimal thickening but no significant atherosclerotic plaque noted in the left internal carotid artery, the left carotid are within normal limits    H/O echocardiogram 4/9/2012, 10/20/2011    4/9/2012-LVSF normal EF=>55%. impaired LV relaxation;    H/O echocardiogram 12/31/2014    EF 50-55%. LV shows mild concentric hypertrophy. Mildly dilated left atrium. Mildly dilated right ventricle. Sclerotic but not stenotic aortic valve. Mitral annular calcification. Mild MR, Mild TR, Mild pulm HTN.    H/O urinary incontinence     History of blood transfusion     Hx antineoplastic chemo     Hx of Arterial Doppler ultrasound 07/01/2019    No hemodynamically significant or focal stenosis visualized bilaterally, bilateral lower extremity arteries exhibit diffuse plaque and multiphasic waveforms, unable to obtain bilateral ABIs due to elevated leg pressures >250 mmHg, possibly due to atherosclerosis    Hx of cardiovascular stress test 06/2013    EF 70% abn suggestive of anterolateral ischemia, possible RCA ischmeia, post left ventricular dilation suggestive multivessel CAD. Hx of echocardiogram 06/2013    EF50%, mild MR and TR, mild pulmonary HTN, mild AS    Hx of echocardiogram 11/01/2021    Left ventricular systolic function is normal, Mild concentric left ventricular hypertrophy Mildly dilated left atrium. Mitral annular calcification is present. Mild tricuspid regurgitation No evidence of any pericardial effusion    Hx of fall     \"last time 2018- due to neuropathy, have to use cane\"    [facility] 7/24/13, 06/2013 7/13-No clinacally significant arrhythmia.  6/13-multiple cycles of wenckebach episodes in addtion to some sinus tach    Hyperlipidemia     Hypertension     Hypothermia 2008    Malignant neoplasm of breast (female) ([facility] 2012    Neuropathy     \"have neuropathy of my legs\" Normocytic normochromic anemia     Obstructive sleep apnea 2008 01- Has CPAP machine but has not used in over a year - states she has not had problems since her pacemaker was placed. Old MI (myocardial infarction)     per pt on 1/15/2019\"had heart attack about a year ago\"    Osteoarthritis     Osteopenia     Osteopenia of multiple sites 9/14/2022    Pneumonia due to COVID-19 virus 03/22/2021    Primary malignant neoplasm of colon ([address]     S/P cardiac cath 06/2013    no significant CAD false postive stress test    Skin cancer     Super obesity     Umbilical hernia        Subjective:  Patient states:  \"If therapy gets me to walk again I want it\"  Pain:  5/10 in bilat feet and calves  Communication with other providers: co-eval w/ PT, handoff to RN  Restrictions: General Precautions, Fall Risk    Home Setup/Prior level of function:  Social/Functional History  Lives With: Significant other  Type of Home: House  Home Layout: One level  Home Access: Stairs to enter with rails  Entrance Stairs - Number of Steps: 1  Bathroom Shower/Tub: Tub/Shower unit  Bathroom Toilet: Standard  Home Equipment: [employer]One  ADL Assistance: Independent  Ambulation Assistance: Independent (mod I with spc)  Transfer Assistance: Independent  Active : No     Examination:  Observation: Supine in bed upon arrival, agreeable to therapy eval.  Vision: WFL  Hearing: WFL  Vitals: Stable vitals throughout session on room air      [address] and functions:  ROM: WFL   Strength: B UE grossly 4/5 across all major joints   Sensation: WFL  Tone: Normal  Coordination: WFL  Perception: WNL      Cognitive and Psychosocial Functioning:  Overall cognitive status: alert and oriented, WFL  Affect: Normal       Functional Mobility:  Bed mobility:  supine to sitting EOB w/ SBA, extra time to complete  Sitting balance:  SBA static and dynamic  Transfers: STS from EOB w/ CGA, stand to sit to recliner w/ CGA  Standing balance:  CGA static and dynamic w/ RW  Functional Mobility: ambulated short functional distance using RW w/ CGA-min A  Toilet/Shower Transfers: NT        Activities of Daily Living (ADLs):  Feeding: set up A  Grooming: SBA - CGA  UB bathing: mod A  LB bathing: mod A  UB dressing: SBA  LB dressing: donned socks while long sitting in bed w/ SBA, donned depends while seated EOB w/ mod A  Toileting: mod A    *ADL determined per observation of functional mobility, balance, activity tolerance, cognition, or actual ADL performance. AM-PAC 6 click short form for inpatient daily activity:   How much help from another person does the patient currently need. .. Unable  Dep A Lot  Max A A Lot   Mod A A Little  Min A A Little   CGA  SBA None   Mod I  Indep  Sup   1. Putting on and taking off regular lower body clothing? [] 1    [] 2   [x] 2   [] 3   [] 3   [] 4      2. Bathing (including washing, rinsing, drying)? [] 1   [] 2   [x] 2 [] 3 [] 3 [] 4   3. Toileting, which includes using toilet, bedpan, or urinal? [] 1    [] 2   [x] 2   [] 3   [] 3   [] 4     4. Putting on and taking off regular upper body clothing? [] 1   [] 2   [] 2   [] 3   [x] 3    [] 4      5. Taking care of personal grooming such as brushing teeth? [] 1   [] 2    [] 2 [] 3    [x] 3   [] 4      6. Eating meals? [] 1   [] 2   [] 2   [] 3   [] 3   [x] 4        Raw Score:  16     [24=0% impaired(CH), 23=1-19%(CI), 20-22=20-39%(CJ), 15-19=40-59%(CK), 10-14=60-79%(CL), 7-9=80-99%(CM), 6=100%(CN)]     Treatment:    Self Care Training:   Cues were given for safety, sequence, UE/LE placement, visual cues, and balance. Activities performed included dressing. Therapeutic Activity Training:   Therapeutic activity training was instructed today. Cues were given for safety, sequence, UE/LE placement, awareness, and balance. Activities performed today included bed mobility training, sup-sit, sit-stand, ambulation.       Educated pt on role of OT, therapy POC and functional goals, progression w/ ADLs and transfers, importance of movement and OOB activity, d/c recommendations     Safety Measures: Gait belt used, Left in recliner, Alarm in place  Recommendations for NURSING activity:  Up to chair for all 3 meals and up to bathroom for all toileting needs     Assessment:  Pt is a 67 y o F admitted d/t bilat LE swelling. Pt at baseline is IND for ADLs, has assist for high level IADLs, and mod I for functional transfers/mobility w/ rollator. Pt currently presents w/ deficits in ADL and high level IADL independence, functional ADL transfers, strength, and functional activity tolerance. Continued OT services recommended to increase safety and independence with ADL routine and to address remaining functional deficits. Pt would benefit from continued acute care OT services w/ discharge to SNF. Complexity: Moderate  Prognosis: Good, no significant barriers to participation at this time. Occupational Therapy Plan  Times Per Week: 3  Current Treatment Recommendations: Strengthening, ROM, Functional mobility training, Endurance training, Cognitive reorientation, Pain management, Safety education & training, Patient/Caregiver education & training, Equipment evaluation, education, & procurement, Positioning, Self-Care / ADL, Cognitive/Perceptual training         Goals:  Pt will complete all aspects of bed mobility for EOB/OOB ADLs w/ mod I. Pt will complete UB ADLs w/ mod I. Pt will complete LB ADLs w/ SBA. Pt will complete all functional transfers to and from bed, chair, toilet, shower chair w/ SBA. Pt will ambulate functional household distance w/ SBA. Pt will complete all aspects of toileting task w/ SBA. Pt will perform therex/theract in order to increase strength and functional activity tolerance necessary for increased independence w/ ADL routine.     Pt goal: go home, get stronger  Time Frame for STGs: discharge    Equipment: Continue to assess at next LOC    Time:   Time in: 1213  Time out: 1246  Total time: 33  Timed treatment minutes: 23        Electronically signed by:      JAYDEN Ervin/RANDY  [id number]

## 2022-10-16 NOTE — PLAN OF CARE
Problem: Skin/Tissue Integrity  Goal: Absence of new skin breakdown  Description: 1. Monitor for areas of redness and/or skin breakdown  2. Assess vascular access sites hourly  3. Every 4-6 hours minimum:  Change oxygen saturation probe site  4. Every 4-6 hours:  If on nasal continuous positive airway pressure, respiratory therapy assess nares and determine need for appliance change or resting period.   Outcome: Progressing     Problem: Safety - Adult  Goal: Free from fall injury  Outcome: Progressing     Problem: ABCDS Injury Assessment  Goal: Absence of physical injury  Outcome: Progressing     Problem: Discharge Planning  Goal: Discharge to home or other facility with appropriate resources  Outcome: Progressing  Flowsheets (Taken 10/16/2022 0810)  Discharge to home or other facility with appropriate resources: Identify barriers to discharge with patient and caregiver     Problem: Pain  Goal: Verbalizes/displays adequate comfort level or baseline comfort level  Outcome: Progressing  Flowsheets (Taken 10/16/2022 0810)  Verbalizes/displays adequate comfort level or baseline comfort level: Encourage patient to monitor pain and request assistance

## 2022-10-16 NOTE — PLAN OF CARE
Problem: Skin/Tissue Integrity  Goal: Absence of new skin breakdown  Description: 1. Monitor for areas of redness and/or skin breakdown  2. Assess vascular access sites hourly  3. Every 4-6 hours minimum:  Change oxygen saturation probe site  4. Every 4-6 hours:  If on nasal continuous positive airway pressure, respiratory therapy assess nares and determine need for appliance change or resting period.   10/15/2022 2356 by Jacqueline Tucker RN  Outcome: Progressing  10/15/2022 1831 by Anand Ruiz RN  Outcome: Progressing     Problem: Safety - Adult  Goal: Free from fall injury  10/15/2022 2356 by Jacqueline Tucker RN  Outcome: Progressing  10/15/2022 1831 by Anand Ruiz RN  Outcome: Progressing     Problem: ABCDS Injury Assessment  Goal: Absence of physical injury  10/15/2022 2356 by Jacqueline Tucker RN  Outcome: Progressing  10/15/2022 1831 by Anand Ruiz RN  Outcome: Progressing     Problem: Discharge Planning  Goal: Discharge to home or other facility with appropriate resources  10/15/2022 2356 by Jacqueline Tucker RN  Outcome: Progressing  10/15/2022 1831 by Anand Ruiz RN  Outcome: Progressing  Flowsheets (Taken 10/15/2022 1046)  Discharge to home or other facility with appropriate resources: Identify barriers to discharge with patient and caregiver     Problem: Pain  Goal: Verbalizes/displays adequate comfort level or baseline comfort level  10/15/2022 2356 by Jacqueline Tucker RN  Outcome: Progressing  10/15/2022 1831 by Anand Ruiz RN  Outcome: Progressing

## 2022-10-16 NOTE — PROGRESS NOTES
V2.0  [facility] Hospitalist Progress Note      Name:  Viviana Forte /Age/Sex: 1950  (67 y.o. female)   MRN & CSN:  [id number] & [id number] Encounter Date/Time: 10/16/2022 8:42 AM EDT    Location:  [address] [place] [address]-A PCP: Keyanna Zamora [address] Day: 4    Assessment and Plan:   Viviana Forte is a 67 y.o. female with carotid artery stenosis status post right CEA, SSS status post permanent pacemaker, hyperlipidemia, recent left renal calculi status post stent, right breast cancer status postmastectomy in 2012, morbid obesity, recurrent falls, moderate AI/moderate AS, depression, COPD who presents with Swelling of lower extremity    *Ambulatory dysfunction/recurrent falls  *Bilateral lower extremity edema  Likely secondary to chronic venous insufficiency and mild nonpurulent right lower extremity cellulitis given the erythema, tenderness and warmth as compared to the left lower extremity. DVT ultrasound negative  Venous insufficiency ultrasound, unfortunately can't be performed at this institution, recommend outpatient workup for chronic venous hypertension. Compression stockings, still not placed-discussed with RN to place compression stockings. Lower extremity elevation  Doxycycline for 5 days  Physical therapy and Occupational Therapy evaluation, recommend skilled nursing facility. Consulted case management. *Morbid Obesity  *Deconditioning  BMI 49.02  Reporting recurrent falls for more than 6 months  PT OT evaluation, recommend SNF.   Counseled regarding bariatric surgery evaluation    *Moderate aortic stenosis  Reports exertional dyspnea, but that could be explained by generalized deconditioning and morbid obesity  Undergoing surveillance with cardiology    *Moderate pulmonary hypertension  Could be secondary to underlying lung parenchymal disease or obstructive sleep apnea in the setting of morbid obesity  Recommend right heart cath in the outpatient setting for further classification of disease process    *Depression  Not on any home medications    *Hyperlipidemia  Continue statin    *Carotid artery stenosis  Status post right CEA  Undergoing surveillance with cardiothoracic surgery  Continue aspirin and statin    *SSS status post permanent pacemaker    Living situation: Patient lives with a friend. Daughter is in [place]. Son lives local.    [address]; Breakfast, Dinner; Low Calorie/High Protein Oral Supplement  ADULT DIET; Regular; 5 carb choices (75 gm/meal); Low Sodium (2 gm); 1200 ml; No artifical sugars/sweeteners   DVT Prophylaxis [x] Lovenox, []  Heparin, [] SCDs, [] Ambulation,  [] Eliquis, [] Xarelto  [] Coumadin   Code Status Full Code   Disposition From: Home  Expected Disposition: Home versus rehab  Estimated Date of Discharge: 1 to 2 days  Patient requires continued admission due to placement   Surrogate Decision Maker/ POA Son     Subjective:     Chief Complaint: Leg Swelling (Bilateral, \"it start in my legs and travelled all the way up my hips. \")       Griselda Barker is a 67 y.o. female who presents with bilateral lower extremity edema. Patient was seen and evaluated at bedside early in AM.  Patient was alert oriented x3, hemodynamically stable. Discussed with RN at bedside. Objective: Intake/Output Summary (Last 24 hours) at 10/16/2022 1649  Last data filed at 10/16/2022 1010  Gross per 24 hour   Intake 10 ml   Output 1350 ml   Net -1340 ml          Vitals:   Vitals:    10/16/22 1331   BP: 124/68   Pulse: 65   Resp: 14   Temp: 97.6 °F (36.4 °C)   SpO2: 94%       Physical Exam:   Physical Exam  Vitals reviewed. Constitutional:       Appearance: She is obese. HENT:      Head: Normocephalic and atraumatic. Nose: Nose normal.      Mouth/Throat:      Mouth: Mucous membranes are dry. Pharynx: Oropharynx is clear. Eyes:      General: No scleral icterus.      Conjunctiva/sclera: Conjunctivae normal.   Cardiovascular:      Rate and Rhythm: Normal rate and regular rhythm. Pulses: Normal pulses. Heart sounds: Murmur heard. Pulmonary:      Effort: Pulmonary effort is normal.      Breath sounds: Normal breath sounds. No wheezing, rhonchi or rales. Abdominal:      General: Bowel sounds are normal. There is no distension. Palpations: Abdomen is soft. Tenderness: There is no abdominal tenderness. Musculoskeletal:         General: No deformity. Normal range of motion. Cervical back: Neck supple. No rigidity. Right lower leg: Edema present. Left lower leg: Edema present. Skin:     Coloration: Skin is not jaundiced or pale. Comments: Right lower extremity erythema, tenderness and warmth as compared to left lower extremity   Neurological:      General: No focal deficit present. Mental Status: She is alert and oriented to person, place, and time. Mental status is at baseline. Medications:   Medications:    sodium chloride flush  5-40 mL IntraVENous 2 times per day    enoxaparin  30 mg SubCUTAneous BID    doxycycline hyclate  100 mg Oral 2 times per day    letrozole  2.5 mg Oral Nightly    aspirin  81 mg Oral Daily    fluticasone  2 puff Inhalation BID    simvastatin  20 mg Oral Nightly      Infusions:    sodium chloride       PRN Meds: albuterol sulfate HFA, 2 puff, Q6H PRN  sodium chloride flush, 5-40 mL, PRN  sodium chloride, , PRN  ondansetron, 4 mg, Q8H PRN   Or  ondansetron, 4 mg, Q6H PRN  polyethylene glycol, 17 g, Daily PRN  aluminum & magnesium hydroxide-simethicone, 30 mL, Q6H PRN  acetaminophen, 650 mg, Q6H PRN   Or  acetaminophen, 650 mg, Q6H PRN      Labs      No results found for this or any previous visit (from the past 24 hour(s)).        Imaging/Diagnostics Last 24 Hours   VL DUP LOWER EXTREMITY VENOUS BILATERAL    Result Date: 10/13/2022  EXAMINATION: DUPLEX VENOUS ULTRASOUND OF THE BILATERAL LOWER [id number]/13/2022 9:36 pm TECHNIQUE: Duplex ultrasound using B-mode/gray scaled imaging, Doppler spectral analysis and color flow Doppler was obtained of the deep venous structures of the lower bilateral extremities. COMPARISON: None. HISTORY: ORDERING SYSTEM PROVIDED HISTORY: pain swelling TECHNOLOGIST PROVIDED HISTORY: Reason for exam:->pain swelling FINDINGS: The visualized veins of the bilateral lower extremities are patent and free of echogenic thrombus. The visualized veins demonstrate good compressibility with normal color flow study and spectral analysis. The veins in both calves were not well evaluated due to leg swelling. 1.  The veins in both calves were not well evaluated due to leg swelling. 2.  Otherwise, no evidence of DVT in either lower extremity.        Electronically signed by Vaughn Ch MD on 10/16/2022 at 4:49 PM

## 2022-10-16 NOTE — PROGRESS NOTES
Mastectomy, radical (Right, 02/29/2012); pre-malignant / benign skin lesion excision (2/8/2013); lymph node dissection (2/29/2012); Cardiac catheterization (2007 & 2011); A-V cardiac pacemaker insertion (3/10/14); Cystoscopy (Bilateral, 12/15/14); pacemaker placement; Endoscopy, colon, diagnostic (12/14/2017); Colonoscopy (10-18-13); Colonoscopy (1/19/2016); Colonoscopy (12/14/2017); Colonoscopy (N/A, 1/16/2019); Colonoscopy (01/21/2020); Colonoscopy (N/A, 1/21/2020); Carotid endarterectomy (Right, 12/3/2021); Cystoscopy (Right, 5/15/2022); Dilation and curettage of uterus (N/A, 5/24/2022); Kidney surgery (05/24/2022); Lithotripsy (Right, 8/1/2022); and Lithotripsy (Left, 9/26/2022) with admission for bilateral Leg swelling and Cellulitis. Prior to admission, pt was independent with functional mobility and ADLs. Examination of body systems reveals decreased strength, decreased balance, decreased aerobic capacity, and decreased independence with functional mobility. Therapy Prognosis: Good  Decision Making: High Complexity  Clinical Presentation: unpredictable characteristics  Requires PT Follow-Up: Yes  Activity Tolerance  Activity Tolerance: Patient tolerated evaluation without incident     Plan   Physcial Therapy Plan  General Plan: 3-5 times per week  Current Treatment Recommendations: Strengthening, ROM, Balance training, Functional mobility training, Transfer training, ADL/Self-care training, IADL training, Cognitive/Perceptual training, Endurance training, Safety education & training, Patient/Caregiver education & training, Therapeutic activities, Gait training, Stair training, Equipment evaluation, education, & procurement, Neuromuscular re-education, Pain management, Positioning, Wheelchair mobility training  Safety Devices  Type of Devices:  All fall risk precautions in place, Patient at risk for falls, Call light within reach, Left in chair, Chair alarm in place, Gait belt, Nurse notified walker)  Stand to Sit: Contact guard assistance (with verbal cues to feel chair against back of legs, reach back, and sit slowly; poor eccentric control noted)  Ambulation  Surface: Level tile  Device: Rolling Walker  Assistance: Contact guard assistance;Minimal assistance  Quality of Gait: decreased gait speed, decreased step length bilaterally, unsteady gait  Distance: 10 feet + 6 feet  Comments: with verbal and tactile cues for BLE placement, walker placement, and sequence throughout ambulation; with verbal and tactile cues to maintain upright posture in order to avoid COM shifting outside of [place]     Balance  Posture: Fair  Sitting - Static: Good  Sitting - Dynamic: Good  Standing - Static: Poor;+  Standing - Dynamic: Poor;+         Gait Training:  Cues were given for safety, sequence, device management, balance, posture, awareness, path. Therapeutic Activity Training:   Therapeutic activity training was instructed today. Cues were given for safety, sequence, UE/LE placement, awareness, and balance. Activities performed today included bed mobility training, sup-sit, sit-stand, SPT. AM-PAC Score  AM-[facility] Inpatient Mobility Raw Score : 16 (10/16/22 1659)  AM-PAC Inpatient T-Scale Score : 40.78 (10/16/22 1659)  Mobility Inpatient CMS 0-100% Score: 54.16 (10/16/22 1659)  Mobility Inpatient CMS G-Code Modifier : CK (10/16/22 1659)          Goals  Long Term Goals  Time Frame for Long Term Goals :  In one week:  Long Term Goal 1: Pt will complete all bed mobility with supervision  Long Term Goal 2: Pt will complete sit <> stand transfers with supervision  Long Term Goal 3: Pt will ambulate 75 feet with SBAx1 with LRAD  Long Term Goal 4: Pt will ascend/descend 1 step with a handrail with minAx2  Long Term Goal 5: Pt will independently complete 3 sets of 10 reps of BLE AROM exercises in available and allowed ROM       Education  Patient Education  Education Given To: Patient  Education Provided: Role of Therapy; Energy Conservation; Fall Prevention Strategies; Plan of Care;IADL Safety; ADL Adaptive Strategies; Equipment;Transfer Training  Education Method: Demonstration;Verbal  Education Outcome: Verbalized understanding;Demonstrated understanding;Continued education needed      Time In: 5483  Time Out: 1248  Total Treatment Time: 34  Timed Code Treatment Minutes: 7400 Jonna Melo DPT  License #: [id number]

## 2022-10-17 LAB
ANION GAP SERPL CALCULATED.3IONS-SCNC: 9 MMOL/L (ref 4–16)
BUN BLDV-MCNC: 14 MG/DL (ref 6–23)
CALCIUM SERPL-MCNC: 9.6 MG/DL (ref 8.3–10.6)
CHLORIDE BLD-SCNC: 101 MMOL/L (ref 99–110)
CO2: 27 MMOL/L (ref 21–32)
CREAT SERPL-MCNC: 0.8 MG/DL (ref 0.6–1.1)
CULTURE: NORMAL
CULTURE: NORMAL
GLUCOSE BLD-MCNC: 116 MG/DL (ref 70–99)
Lab: NORMAL
POTASSIUM SERPL-SCNC: 4 MMOL/L (ref 3.5–5.1)
SODIUM BLD-SCNC: 137 MMOL/L (ref 135–145)
SPECIMEN: NORMAL

## 2022-10-17 PROCEDURE — 6360000002 HC RX W HCPCS: Performed by: STUDENT IN AN ORGANIZED HEALTH CARE EDUCATION/TRAINING PROGRAM

## 2022-10-17 PROCEDURE — 6370000000 HC RX 637 (ALT 250 FOR IP): Performed by: STUDENT IN AN ORGANIZED HEALTH CARE EDUCATION/TRAINING PROGRAM

## 2022-10-17 PROCEDURE — 97116 GAIT TRAINING THERAPY: CPT

## 2022-10-17 PROCEDURE — 94761 N-INVAS EAR/PLS OXIMETRY MLT: CPT

## 2022-10-17 PROCEDURE — 97110 THERAPEUTIC EXERCISES: CPT

## 2022-10-17 PROCEDURE — 80048 BASIC METABOLIC PNL TOTAL CA: CPT

## 2022-10-17 PROCEDURE — 36415 COLL VENOUS BLD VENIPUNCTURE: CPT

## 2022-10-17 PROCEDURE — 2580000003 HC RX 258: Performed by: STUDENT IN AN ORGANIZED HEALTH CARE EDUCATION/TRAINING PROGRAM

## 2022-10-17 PROCEDURE — 1200000000 HC SEMI PRIVATE

## 2022-10-17 PROCEDURE — 94640 AIRWAY INHALATION TREATMENT: CPT

## 2022-10-17 PROCEDURE — 97530 THERAPEUTIC ACTIVITIES: CPT

## 2022-10-17 RX ADMIN — LETROZOLE 2.5 MG: 2.5 TABLET ORAL at 20:44

## 2022-10-17 RX ADMIN — Medication 20 MG: at 20:45

## 2022-10-17 RX ADMIN — SODIUM CHLORIDE, PRESERVATIVE FREE 10 ML: 5 INJECTION INTRAVENOUS at 20:45

## 2022-10-17 RX ADMIN — Medication 2 PUFF: at 07:12

## 2022-10-17 RX ADMIN — ENOXAPARIN SODIUM 30 MG: 100 INJECTION SUBCUTANEOUS at 09:19

## 2022-10-17 RX ADMIN — ASPIRIN 81 MG CHEWABLE TABLET 81 MG: 81 TABLET CHEWABLE at 09:19

## 2022-10-17 RX ADMIN — SODIUM CHLORIDE, PRESERVATIVE FREE 10 ML: 5 INJECTION INTRAVENOUS at 09:20

## 2022-10-17 RX ADMIN — DOXYCYCLINE HYCLATE 100 MG: 100 TABLET, COATED ORAL at 09:19

## 2022-10-17 RX ADMIN — ENOXAPARIN SODIUM 30 MG: 100 INJECTION SUBCUTANEOUS at 20:44

## 2022-10-17 RX ADMIN — DOXYCYCLINE HYCLATE 100 MG: 100 TABLET, COATED ORAL at 20:44

## 2022-10-17 ASSESSMENT — PAIN DESCRIPTION - DESCRIPTORS: DESCRIPTORS: ACHING

## 2022-10-17 ASSESSMENT — PAIN DESCRIPTION - ORIENTATION: ORIENTATION: RIGHT;LEFT

## 2022-10-17 ASSESSMENT — PAIN DESCRIPTION - LOCATION: LOCATION: LEG

## 2022-10-17 ASSESSMENT — PAIN - FUNCTIONAL ASSESSMENT: PAIN_FUNCTIONAL_ASSESSMENT: ACTIVITIES ARE NOT PREVENTED

## 2022-10-17 ASSESSMENT — PAIN SCALES - GENERAL: PAINLEVEL_OUTOF10: 4

## 2022-10-17 NOTE — DISCHARGE INSTR - COC
Continuity of Care Form    Patient Name: William Bernal   :  1950  MRN:  [id number]    Admit date:  10/13/2022  Discharge date:  ***    Code Status Order: Full Code   Advance Directives:     Admitting Physician:  Mame Prescott MD  PCP: Tanvir Prabhakar DO    Discharging Nurse: [facility] Unit/Room#: 4104/4104-A  Discharging Unit Phone Number: ***    Emergency Contact:   Extended Emergency Contact Information  Primary Emergency Contact: BamInderjit  Address: [address] [facility], [address] [place] [address] [address] [facility] Phone: [phone]  Mobile Phone: [phone]  Relation: Other  Secondary Emergency Contact: Porter Aguero  Address: [address] [place], [place] [address] [address] [facility] Phone: [phone]  Mobile Phone: [phone]  Relation: Parent    Past Surgical History:  Past Surgical History:   Procedure Laterality Date    A-V CARDIAC PACEMAKER INSERTION  3/10/14     Medtronic. Model:  [id number] Medtronic  Serial:  [id number] dual chamber pacemaker    APPENDECTOMY  2004    BREAST BIOPSY  12    BREAST LUMPECTOMY  2007    right     BREAST SURGERY  2012    rt lesion biopsy     CARDIAC CATHETERIZATION   &     CAROTID ENDARTERECTOMY Right 12/3/2021    RIGHT CAROTID ENDARTERECTOMY WITH PATCH performed by Jose Tristan MD at [address] [place] [address]      Colon cancer    COLONOSCOPY  10-18-13    polyp    COLONOSCOPY  2016    internal hemorrhoids, diverticulosis, 1.5 cm sessile polyp, 8 mm polyp found in rectum.     COLONOSCOPY  2017    1.5cm residual polyp, 5mm polyps in blind sigmoid loop x3, Sigmoid divertics, Internal grade 1 hemorrhoids    COLONOSCOPY N/A 2019    COLONOSCOPY W/ ENDOSCOPIC MUCOSAL RESECTION WITH ELEVIEW 5ML INJECTION AND POLYPECTOMY OF TIP OF BLIND RECTOSIGMOID STUMP AND CAUTERIZATION WITH ERBE PROBE, CLIPPING X2 performed by Charlotte Lopes MD at [address] [place] [address]  01/21/2020    pan divertics/ 4 polyps/ sm int hem, S/P partial sigmoid resection w/ end to side anastamosis, repeat in 3 years    COLONOSCOPY N/A 1/21/2020    COLONOSCOPY POLYPECTOMY SNARE/COLD BIOPSY performed by Jeferson Zuleta MD at [facility]  2003    back    CYSTOSCOPY Bilateral 12/15/14    CYSTOSCOPY Right 5/15/2022    CYSTOSCOPY URETERAL STENT INSERTION performed by Caryle Sprung, MD at [address] [place] [address]      front right tooth and root canal w/ brass bolt    DILATION AND CURETTAGE OF UTERUS  2006    Needed a camera to find my uterus. DILATION AND CURETTAGE OF UTERUS N/A 5/24/2022    DILATATION AND CURETTAGE, CULTURES OF UTEREUS performed by Dipak Perez MD at [address] [place] [address] Entrance, COLON, DIAGNOSTIC  12/14/2017    Small hiatal hernia    HERNIA REPAIR  2004    Umb hernia    HERNIA REPAIR  2008    Inc hernia    KIDNEY SURGERY  05/24/2022    stent placed on right side    LITHOTRIPSY Right 8/1/2022    RIGHT CYSTOSCOPY URETEROSCOPY STONE MANIPULATION WITH HOLIUM LASER LITHOTRIPSY STENT REPLACEMENT performed by Ping Bear MD at [facility] 145    LITHOTRIPSY Left 9/26/2022    LEFT CYSTOSCOPY URETEROSCOPY RETROGRADE PYELOGRAM STONE [facility] LITHOTRIPSY POSSIBLE [address] performed by Ping Bear MD at [place] [address]  2/29/2012    Right axillary-3 sentinel nodes were positive out of 9.     MASTECTOMY, RADICAL Right 02/29/2012    w/ sentinal node disection-Dr West    PACEMAKER PLACEMENT      PRE-MALIGNANT / BENIGN SKIN LESION EXCISION  2/8/2013    right leg X2-Dr West    TONSILLECTOMY  1957    TUNNELED VENOUS PORT PLACEMENT  2004    TUNNELED VENOUS PORT PLACEMENT  02/13/2012    removal of mediport       Immunization History:   Immunization History   Administered Date(s) Administered    Tdap (Boostrix, Adacel) 07/13/2017       Active Problems:  Patient Active Problem List   Diagnosis Code    Malignant neoplasm of upper-outer quadrant of right breast in female, estrogen receptor positive ([address] C50.411, Z17.0    Diffuse cystic mastopathy N60.19    Hyperlipidemia E78.5    H/O chest pain Z87.898    Heart block AV second degree I44.1    Abnormal cardiovascular stress test R94.39    JAMIE (obstructive sleep apnea) G47.33    Super obesity E66.9    Mild asthma J45.909    Severe obstructive sleep apnea G47.33    Cardiac pacemaker Z95.0    Chronic cystitis N30.20    Asthma J45.909    Hypertension I10    Depression F32. A    Obstructive sleep apnea G47.33    Nocturnal oxygen desaturation G47.34    Moderate malnutrition (HCC) E44.0    NSTEMI (non-ST elevated myocardial infarction) (HCC) I21.4    PVD (peripheral vascular disease) (HCC) I73.9    Morbid obesity with BMI of 45.0-49.9, adult (HCC) E66.01, Z68.42    Moderate aortic stenosis I35.0    Hepatomegaly, not elsewhere classified R16.0    Arthritis M19.90    CAD (coronary artery disease) I25.10    Bilateral carotid artery stenosis I65.23    Moderate persistent asthma without complication V19.14    Carotid stenosis, right I07.01    Complicated urinary tract infection N39.0    Osteopenia of multiple sites M85.89    Swelling of lower extremity M79.89    Ambulatory dysfunction R26.2       Isolation/Infection:   Isolation            Contact          Patient Infection Status       Infection Onset Added Last Indicated Last Indicated By Review Planned Expiration Resolved Resolved By    MDRO (multi-drug resistant organism)  10/12/22 10/12/22 Tani Nam RN        10/9/22: URINE: Proteus mirabilis    Resolved    Rhinovirus 22 Respiratory Panel, Molecular, with COVID-19 (Restricted: peds pts or suitable admitted adults)   22     COVID-19 (Rule Out) 22 Respiratory Panel, Molecular, with COVID-19 (Restricted: peds pts or suitable admitted adults) (Ordered)   22 Rule-Out Test Resulted    COVID-19 (Rule Out) 21 COVID-19 (Ordered)   21 Rule-Out Test Resulted    COVID-19  21 Kim Humphrey RN   21     COVID-19 (Rule Out) 21 COVID-19, Rapid (Ordered)   21 Rule-Out Test Resulted            Nurse Assessment:  Last Vital Signs: /75   Pulse 63   Temp 98.1 °F (36.7 °C) (Oral)   Resp 12   Ht 5' 2\" (1.575 m)   Wt 265 lb 8 oz (120.4 kg)   LMP 01/15/1999   SpO2 94%   BMI 48.56 kg/m²     Last documented pain score (0-10 scale): Pain Level: 0  Last Weight:   Wt Readings from Last 1 Encounters:   10/16/22 265 lb 8 oz (120.4 kg)     Mental Status:  oriented    IV Access:  - None    Nursing Mobility/ADLs:  Walking   Assisted  Transfer  Assisted  Bathing  Assisted  Dressing  Assisted  Toileting  Assisted  Feeding  Independent  Med Admin  Assisted  Med Delivery   whole    Wound Care Documentation and Therapy:  Incision 21 Neck Right; Lateral (Active)   Number of days: 317        Elimination:  Continence: Bowel: Yes  Bladder: Yes  Urinary Catheter: None   Colostomy/Ileostomy/Ileal Conduit: No       Date of Last BM: 10/17/22    Intake/Output Summary (Last 24 hours) at 10/17/2022 0907  Last data filed at 10/16/2022 1010  Gross per 24 hour   Intake 10 ml   Output --   Net 10 ml     I/O last 3 completed shifts: In: 10 [I.V.:10]  Out: [facility] 73 [Urine:1350]    Safety Concerns:      At Risk for Falls    Impairments/Disabilities:      None      Patient's personal belongings (please select all that are sent with patient):  Glasses    RN SIGNATURE:  Electronically signed by Edilberto Fitch RN on 10/18/22 at 2:56 PM EDT    CASE MANAGEMENT/SOCIAL WORK SECTION    Inpatient Status Date: ***    Readmission Risk Assessment Score:  Readmission Risk              Risk of Unplanned Readmission:  19           Discharging to Facility/ Agency   Name:   Address:  Phone:  Fax:    Dialysis Facility (if applicable)   Name:  Address:  Dialysis Schedule:  Phone:  Fax:    Case Manager/ signature: {Esignature:[id number]}        PHYSICIAN SECTION      Nutrition Therapy:  Current Nutrition Therapy:   - Oral Diet:  Carb Control 5 carbs/meal (2000kcals/day) and Low Sodium (2gm)    - ADULT ORAL NUTRITION SUPPLEMENT; Breakfast, Dinner; Low Calorie/High Protein Oral Supplement     Routes of Feeding: Oral  Liquids: Thin Liquids  Daily Fluid Restriction: yes - amount 1200 mL  Last Modified Barium Swallow with Video (Video Swallowing Test): not done    Treatments at the Time of Hospital Discharge:   Respiratory Treatments: None  Oxygen Therapy:  is not on home oxygen therapy. Ventilator:    - No ventilator support    Rehab Therapies: Physical Therapy and Occupational Therapy  Weight Bearing Status/Restrictions: No weight bearing restrictions  Other Medical Equipment (for information only, NOT a DME order):  per PT/OT  Other Treatments: Elevated legs    Wear compression stockings daily        Prognosis: Good    Condition at Discharge: Stable    Rehab Potential (if transferring to Rehab): Good    Recommended Labs or Other Treatments After Discharge: CBC and BMP in 1 week    Physician Certification: I certify the above information and transfer of Viviana Forte  is necessary for the continuing treatment of the diagnosis listed and that she requires [facility] for less 30 days.      Update Admission H&P: No change in H&P    PHYSICIAN SIGNATURE:  Electronically signed by Salvador Dupont MD on 10/18/22 at 3:07 PM EDT

## 2022-10-17 NOTE — PLAN OF CARE
Problem: Skin/Tissue Integrity  Goal: Absence of new skin breakdown  Description: 1. Monitor for areas of redness and/or skin breakdown  2. Assess vascular access sites hourly  3. Every 4-6 hours minimum:  Change oxygen saturation probe site  4. Every 4-6 hours:  If on nasal continuous positive airway pressure, respiratory therapy assess nares and determine need for appliance change or resting period.   Outcome: Progressing     Problem: Safety - Adult  Goal: Free from fall injury  Outcome: Progressing  Flowsheets (Taken 10/17/2022 0916)  Free From Fall Injury: Instruct family/caregiver on patient safety     Problem: ABCDS Injury Assessment  Goal: Absence of physical injury  Outcome: Progressing  Flowsheets (Taken 10/17/2022 0916)  Absence of Physical Injury: Implement safety measures based on patient assessment     Problem: Discharge Planning  Goal: Discharge to home or other facility with appropriate resources  Outcome: Progressing     Problem: Pain  Goal: Verbalizes/displays adequate comfort level or baseline comfort level  Outcome: Progressing  Flowsheets (Taken 10/17/2022 0840)  Verbalizes/displays adequate comfort level or baseline comfort level:   Encourage patient to monitor pain and request assistance   Assess pain using appropriate pain scale   Administer analgesics based on type and severity of pain and evaluate response   Consider cultural and social influences on pain and pain management   Implement non-pharmacological measures as appropriate and evaluate response   Notify Licensed Independent Practitioner if interventions unsuccessful or patient reports new pain

## 2022-10-17 NOTE — PROGRESS NOTES
Physician Progress Note      Sage Jaeger  CSN #:                  [id number]  :                       1950  ADMIT DATE:       10/13/2022 4:55 PM  [address] [place] DATE:  Mary Nguyen  PROVIDER #:        Kandice Lopez MD          QUERY TEXT:    Patient admitted with acute decompensate diastolic heart failure. If possible,   please document in progress notes and discharge summary if you are evaluating   and /or treating any of the following: The medical record reflects the following:  Risk Factors: unintentional weight loss, fair appetite  Clinical Indicators: Dietician consult: Moderate malnutrition, In context of   chronic illness related to catabolic illness as evidenced by weight loss   greater than or equal to 10% in 6 months, localized or generalized fluid   accumulation, moderate loss of subcutaneous fat. Treatment: dietician consult, weights, I&O's    GHADA Ng, RN  Clinical   [phone]  Options provided:  -- Protein calorie malnutrition moderate  -- Other - I will add my own diagnosis  -- Disagree - Not applicable / Not valid  -- Disagree - Clinically unable to determine / Unknown  -- Refer to Clinical Documentation Reviewer    PROVIDER RESPONSE TEXT:    This patient has moderate protein calorie malnutrition.     Query created by: Jared Corey on 10/17/2022 9:29 AM      Electronically signed by:  Kandice Lopez MD 10/17/2022 9:45 AM

## 2022-10-17 NOTE — PROGRESS NOTES
Occupational Therapy    Occupational Therapy Treatment Note    Name: Claritza May MRN: [id number] :   1950   Date:  10/17/2022   Admission Date: 10/13/2022 Room:  [address] [place] [address]-A     Primary Problem:   The primary encounter diagnosis was Leg swelling. A diagnosis of Cellulitis, unspecified cellulitis site was also pertinent to this visit. Restrictions/Precautions:  Restrictions/Precautions  Restrictions/Precautions: General Precautions, Fall Risk, contact precautions     Communication with other providers: Per chart review pt appropriate for therapeutic intervention     Subjective:  Patient states:  Pt agreeable to session. Pain:   Location, Type, Intensity (0/10 to 10/10):  denies     Objective:    Observation: Pt sitting EOB, agreeable to session. Objective Measures:  on RA. Treatment, including education:  Therapeutic Activity Training  Therapeutic activity training was instructed today. Cues were given for safety, sequence, UE/LE placement, awareness, and balance. Activities performed today included sit-stand and functional mobility. Pt politely declined ADLs at this time. Sit to Stand from EOB for X5 trials with CGA and use of 4WW with cues to engage brakes on AD first.     Functional mobility for X2 short HH distances within room with use of 4WW with CGA. Dry Toilet t/f on/off toilet with CGA with v/c's to line up B LE's for increased safety. Therapeutic Exercise:  Cues were given for technique, safety, recruitment, and rationale. Cues were verbal and/or tactile. B UE therapeutic exercise for 4ex x15 reps for AROM while sitting unsupported on EOB including shoulder flexion, horizontal abduction, chest press, and elbow flexion/extension for increased strength and ROM in all planes needed for functional transfers and mobility. Pt left seated on EOB with all needs met with handoff to PTA.      Assessment / Impression:    Patient's tolerance of treatment: Well  Adverse Reaction: None  Significant change in status and impact: none   Barriers to improvement: Activity tolerance, safety       Plan for Next Session:    Cont OT POC     Time in:  1144  Time out:  1208  Timed treatment minutes:  24  Total treatment time:  24      Electronically signed by:     TONY Crawford,   10/17/2022, 11:54 AM

## 2022-10-17 NOTE — PROGRESS NOTES
V2.0  [facility] [place] Progress Note      Name:  Abhilash Ortiz /Age/Sex: 1950  (67 y.o. female)   MRN & CSN:  [id number] & [id number] Encounter Date/Time: 10/17/2022 8:42 AM EDT    Location:  [address] [place], [place] [address] PCP: Walter Dakin, [address] [place] [address] Day: 5    Assessment and Plan:   Abhilash Ortiz is a 67 y.o. female with carotid artery stenosis status post right CEA, SSS status post permanent pacemaker, hyperlipidemia, recent left renal calculi status post stent, right breast cancer status postmastectomy in 2012, morbid obesity, recurrent falls, moderate AI/moderate AS, depression, COPD who presents with Swelling of lower extremity    *Ambulatory dysfunction/recurrent falls  *Bilateral lower extremity edema  Likely secondary to chronic venous insufficiency and mild nonpurulent right lower extremity cellulitis given the erythema, tenderness and warmth as compared to the left lower extremity. DVT ultrasound negative  Venous insufficiency ultrasound, unfortunately can't be performed at this institution, recommend outpatient workup for chronic venous hypertension. Compression stockings  Lower extremity elevation  Doxycycline for 5 days  Physical therapy and Occupational Therapy evaluation, recommend skilled nursing facility. Discussed during IDRs, CM working on placement. *Morbid Obesity  *Deconditioning  BMI 49.02  Reporting recurrent falls for more than 6 months  PT OT evaluation, recommend SNF.   Counseled regarding bariatric surgery evaluation    *Moderate aortic stenosis  Reports exertional dyspnea, but that could be explained by generalized deconditioning and morbid obesity  Undergoing surveillance with cardiology    *Moderate pulmonary hypertension  Could be secondary to underlying lung parenchymal disease or obstructive sleep apnea in the setting of morbid obesity  Recommend right heart cath in the outpatient setting for further classification of disease process    *Depression  Not on any home medications    *Hyperlipidemia  Continue statin    *Carotid artery stenosis  Status post right CEA  Undergoing surveillance with cardiothoracic surgery  Continue aspirin and statin    *SSS status post permanent pacemaker    *Moderate Malnutrition  Dietitian following    Living situation: Patient lives with a friend. Daughter is in [place]. Son lives local.    Patient is medically stable for discharge. Diet ADULT ORAL NUTRITION SUPPLEMENT; Breakfast, Dinner; Low Calorie/High Protein Oral Supplement  ADULT DIET; Regular; 5 carb choices (75 gm/meal); Low Sodium (2 gm); 1200 ml; No artifical sugars/sweeteners   DVT Prophylaxis [x] Lovenox, []  Heparin, [] SCDs, [] Ambulation,  [] Eliquis, [] Xarelto  [] Coumadin   Code Status Full Code   Disposition From: Home  Expected Disposition: Home versus rehab  Estimated Date of Discharge: 1 to 2 days  Patient requires continued admission due to placement   Surrogate Decision Maker/ POA Son     Subjective:     Chief Complaint: Leg Swelling (Bilateral, \"it start in my legs and travelled all the way up my hips. \")       Xander Paulson is a 67 y.o. female who presents with bilateral lower extremity edema. Patient was seen and evaluated at bedside early in AM.  Patient was alert oriented x3, hemodynamically stable. No acute overnight events noticed. Objective: Intake/Output Summary (Last 24 hours) at 10/17/2022 1456  Last data filed at 10/17/2022 1039  Gross per 24 hour   Intake 305 ml   Output 250 ml   Net 55 ml        Vitals:   Vitals:    10/17/22 0840   BP: 135/75   Pulse: 63   Resp: 12   Temp: 98.1 °F (36.7 °C)   SpO2: 94%       Physical Exam:   Physical Exam  Vitals reviewed. Constitutional:       Appearance: She is obese. HENT:      Head: Normocephalic and atraumatic. Nose: Nose normal.      Mouth/Throat:      Mouth: Mucous membranes are dry. Pharynx: Oropharynx is clear. Eyes:      General: No scleral icterus.      Conjunctiva/sclera: Conjunctivae normal.   Cardiovascular:      Rate and Rhythm: Normal rate and regular rhythm. Pulses: Normal pulses. Heart sounds: Murmur heard. Pulmonary:      Effort: Pulmonary effort is normal.      Breath sounds: Normal breath sounds. No wheezing, rhonchi or rales. Abdominal:      General: Bowel sounds are normal. There is no distension. Palpations: Abdomen is soft. Tenderness: There is no abdominal tenderness. Musculoskeletal:         General: No deformity. Normal range of motion. Cervical back: Neck supple. No rigidity. Right lower leg: Edema present. Left lower leg: Edema present. Skin:     Coloration: Skin is not jaundiced or pale. Comments: Right lower extremity erythema, tenderness and warmth as compared to left lower extremity. Stasis dermatitis    Neurological:      General: No focal deficit present. Mental Status: She is alert and oriented to person, place, and time. Mental status is at baseline.           Medications:   Medications:    sodium chloride flush  5-40 mL IntraVENous 2 times per day    enoxaparin  30 mg SubCUTAneous BID    doxycycline hyclate  100 mg Oral 2 times per day    letrozole  2.5 mg Oral Nightly    aspirin  81 mg Oral Daily    fluticasone  2 puff Inhalation BID    simvastatin  20 mg Oral Nightly      Infusions:    sodium chloride       PRN Meds: albuterol sulfate HFA, 2 puff, Q6H PRN  sodium chloride flush, 5-40 mL, PRN  sodium chloride, , PRN  ondansetron, 4 mg, Q8H PRN   Or  ondansetron, 4 mg, Q6H PRN  polyethylene glycol, 17 g, Daily PRN  aluminum & magnesium hydroxide-simethicone, 30 mL, Q6H PRN  acetaminophen, 650 mg, Q6H PRN   Or  acetaminophen, 650 mg, Q6H PRN      Labs      Recent Results (from the past 24 hour(s))   Basic Metabolic Panel w/ Reflex to MG    Collection Time: 10/17/22 11:45 AM   Result Value Ref Range    Sodium 137 135 - 145 MMOL/L    Potassium 4.0 3.5 - 5.1 MMOL/L    Chloride 101 99 - 110 mMol/L    CO2 27 21 - 32 MMOL/L Anion Gap 9 4 - 16    BUN 14 6 - 23 MG/DL    Creatinine 0.8 0.6 - 1.1 MG/DL    Glucose 116 (H) 70 - 99 MG/DL    Calcium 9.6 8.3 - 10.6 MG/DL          Imaging/Diagnostics Last 24 Hours   VL DUP LOWER EXTREMITY VENOUS BILATERAL    Result Date: 10/13/2022  EXAMINATION: DUPLEX VENOUS ULTRASOUND OF THE BILATERAL LOWER [id number]/13/2022 9:36 pm TECHNIQUE: Duplex ultrasound using B-mode/gray scaled imaging, Doppler spectral analysis and color flow Doppler was obtained of the deep venous structures of the lower bilateral extremities. COMPARISON: None. HISTORY: ORDERING SYSTEM PROVIDED HISTORY: pain swelling TECHNOLOGIST PROVIDED HISTORY: Reason for exam:->pain swelling FINDINGS: The visualized veins of the bilateral lower extremities are patent and free of echogenic thrombus. The visualized veins demonstrate good compressibility with normal color flow study and spectral analysis. The veins in both calves were not well evaluated due to leg swelling. 1.  The veins in both calves were not well evaluated due to leg swelling. 2.  Otherwise, no evidence of DVT in either lower extremity.        Electronically signed by Mickey Connor MD on 10/17/2022 at 2:56 PM

## 2022-10-17 NOTE — PROGRESS NOTES
Physical Therapy    Physical Therapy Treatment Note  Name: Jaden Adkins MRN: [id number] :   1950   Date:  10/17/2022   Admission Date: 10/13/2022 Room:  [address] [place] [address]   Restrictions/Precautions:  Restrictions/Precautions  Restrictions/Precautions: General Precautions, Fall Risk         Communication with other providers:  [facility] finishing tx upon entry  Subjective:  Patient states:  agreeable to PT  Pain:   Location, Type, Intensity (0/10 to 10/10):  does not rate pain      Objective:    Observation:  sitting EOB /c [facility] upon entry  Treatment, including education/measures:  Sitting Exercises: Ankle pumps x 20  LAQ's x 10  Marching x 10 /c core activation  Hip Abd x 10  Therapeutic Exercise:  Therapeutic exercises were instructed today. Cues were given for technique, safety, recruitment, and rationale. Cues were verbal and/or tactile. Transfers   Sit to stand : CGA/SBA  Stand to sit :CGA/SBA  SPT:CGA  Vc's for safety and sequencing, 4WW for UE support  Gait:  Pt amb with 4WW for 30 ft with CGA assist  Pt needed VC's for safety and sequencing, maneuvering in/around objects in room. Safety  Patient left safely in the chair, with call light/phone in reach with alarm applied. Gait belt was used for transfers and gait. Assessment / Impression:    Pt increases gait tolerance and independence on this date.   Patient's tolerance of treatment:  good    Adverse Reaction: na  Significant change in status and impact:  na  Barriers to improvement:  weakness and fatigue  Plan for Next Session:    Cont POC /c focus on gait   Time in:  1200  Time out:  1240  Timed treatment minutes:  35  Total treatment time:  40    Previously filed items:  Social/Functional History  Lives With: Significant other  Type of Home: House  Home Layout: One level  Home Access: Stairs to enter with rails  Entrance Stairs - Number of Steps: 1  Bathroom Shower/Tub: Tub/Shower unit  Bathroom Toilet: Standard  Home Equipment: Alia Mcacbe  ADL Assistance: Independent  Ambulation Assistance: Independent (mod I with spc)  Transfer Assistance: Independent  Active : No     Long Term Goals  Time Frame for Long Term Goals :  In one week:  Long Term Goal 1: Pt will complete all bed mobility with supervision  Long Term Goal 2: Pt will complete sit <> stand transfers with supervision  Long Term Goal 3: Pt will ambulate 75 feet with SBAx1 with LRAD  Long Term Goal 4: Pt will ascend/descend 1 step with a handrail with minAx2  Long Term Goal 5: Pt will independently complete 3 sets of 10 reps of BLE AROM exercises in available and allowed ROM       Electronically signed by:    Tylor Hurley, PTA  10/17/2022, 12:43 PM

## 2022-10-17 NOTE — PROGRESS NOTES
Patient brought in home medications that were placed in her own bag in her room per her request. Dayshift RN was made aware that patient had a medication in her room that was a controlled substance that had not been accounted for by staff. When night shift RN went in to give nighttime meds to patient, patient supplied Zocor was not in room or patient bin. Charge RN helped this RN find patient's home meds that were placed in a locked drawer by dayshift. Patient was immensely upset that at some point in the day, her meds had been taking without her knowing and without her permission. Charge RN and this RN filled out pharmacy form and counted medications. Patient called her s/o, Garrick Cordoba, and had him come in to pickup all medications except for her Zocor, that staff here is to give her. Patient sent home her cell phone, , wallet, and all medications with Bill. Zocor sent to pharmacy for patient label to be placed.  Patient and s/o given advocate phone number by security per request.

## 2022-10-18 VITALS
SYSTOLIC BLOOD PRESSURE: 127 MMHG | RESPIRATION RATE: 16 BRPM | BODY MASS INDEX: 48.86 KG/M2 | WEIGHT: 265.5 LBS | TEMPERATURE: 98 F | HEIGHT: 62 IN | OXYGEN SATURATION: 98 % | HEART RATE: 69 BPM | DIASTOLIC BLOOD PRESSURE: 96 MMHG

## 2022-10-18 LAB
CULTURE: NORMAL
Lab: NORMAL
SPECIMEN: NORMAL

## 2022-10-18 PROCEDURE — 97116 GAIT TRAINING THERAPY: CPT

## 2022-10-18 PROCEDURE — 2580000003 HC RX 258: Performed by: STUDENT IN AN ORGANIZED HEALTH CARE EDUCATION/TRAINING PROGRAM

## 2022-10-18 PROCEDURE — 6370000000 HC RX 637 (ALT 250 FOR IP): Performed by: STUDENT IN AN ORGANIZED HEALTH CARE EDUCATION/TRAINING PROGRAM

## 2022-10-18 PROCEDURE — 97530 THERAPEUTIC ACTIVITIES: CPT

## 2022-10-18 PROCEDURE — 6360000002 HC RX W HCPCS: Performed by: STUDENT IN AN ORGANIZED HEALTH CARE EDUCATION/TRAINING PROGRAM

## 2022-10-18 PROCEDURE — 94640 AIRWAY INHALATION TREATMENT: CPT

## 2022-10-18 PROCEDURE — 94761 N-INVAS EAR/PLS OXIMETRY MLT: CPT

## 2022-10-18 PROCEDURE — 97110 THERAPEUTIC EXERCISES: CPT

## 2022-10-18 RX ORDER — FUROSEMIDE 20 MG/1
20 TABLET ORAL DAILY PRN
Qty: 60 TABLET | Refills: 3
Start: 2022-10-18

## 2022-10-18 RX ORDER — ASPIRIN 81 MG/1
81 TABLET, CHEWABLE ORAL DAILY
Qty: 30 TABLET | Refills: 0
Start: 2022-10-19

## 2022-10-18 RX ORDER — DOXYCYCLINE HYCLATE 100 MG
100 TABLET ORAL EVERY 12 HOURS SCHEDULED
Qty: 2 TABLET | Refills: 0
Start: 2022-10-18 | End: 2022-10-19

## 2022-10-18 RX ORDER — CEFUROXIME AXETIL 500 MG/1
500 TABLET ORAL 2 TIMES DAILY
Qty: 14 TABLET | Refills: 0
Start: 2022-10-18 | End: 2022-10-25

## 2022-10-18 RX ORDER — POTASSIUM CHLORIDE 750 MG/1
10 TABLET, EXTENDED RELEASE ORAL DAILY PRN
Qty: 30 TABLET | Refills: 0
Start: 2022-10-18

## 2022-10-18 RX ORDER — LACTOBACILLUS RHAMNOSUS GG 10B CELL
1 CAPSULE ORAL DAILY
Qty: 30 CAPSULE | Refills: 0
Start: 2022-10-18

## 2022-10-18 RX ADMIN — ENOXAPARIN SODIUM 30 MG: 100 INJECTION SUBCUTANEOUS at 08:58

## 2022-10-18 RX ADMIN — Medication 2 PUFF: at 00:57

## 2022-10-18 RX ADMIN — SODIUM CHLORIDE, PRESERVATIVE FREE 10 ML: 5 INJECTION INTRAVENOUS at 08:59

## 2022-10-18 RX ADMIN — ASPIRIN 81 MG CHEWABLE TABLET 81 MG: 81 TABLET CHEWABLE at 08:58

## 2022-10-18 RX ADMIN — ACETAMINOPHEN 650 MG: 325 TABLET ORAL at 04:07

## 2022-10-18 RX ADMIN — DOXYCYCLINE HYCLATE 100 MG: 100 TABLET, COATED ORAL at 08:58

## 2022-10-18 ASSESSMENT — PAIN SCALES - GENERAL
PAINLEVEL_OUTOF10: 5
PAINLEVEL_OUTOF10: 7
PAINLEVEL_OUTOF10: 4

## 2022-10-18 ASSESSMENT — PAIN SCALES - WONG BAKER
WONGBAKER_NUMERICALRESPONSE: 0
WONGBAKER_NUMERICALRESPONSE: 0

## 2022-10-18 ASSESSMENT — PAIN - FUNCTIONAL ASSESSMENT: PAIN_FUNCTIONAL_ASSESSMENT: PREVENTS OR INTERFERES SOME ACTIVE ACTIVITIES AND ADLS

## 2022-10-18 ASSESSMENT — PAIN DESCRIPTION - ORIENTATION: ORIENTATION: LEFT;RIGHT;LOWER

## 2022-10-18 ASSESSMENT — PAIN DESCRIPTION - LOCATION
LOCATION: BACK
LOCATION: BACK;LEG

## 2022-10-18 NOTE — CARE COORDINATION
Spoke with Vasiliy at [facility], pt approved for SNU today. PS to Dr Bonnie Mahmood.      1496 Pt on discharge to [facility], set up with Tg for . Nursing , Jo Moon at [facility] and pt/ updated and agreeable with plan.

## 2022-10-18 NOTE — PROGRESS NOTES
Hospitalist    CC in arrival was BLE edema. Has stockings on. On PO Doxy until 10/19. Currently asleep. On RA. Resps nonlabored. Last saw Dr. Dinora Orozco in 2015 for severe JAMIE and mild asthma - no pulmonary office visits since then in system. Referred to [facility].

## 2022-10-18 NOTE — DISCHARGE SUMMARY
V2.0  Discharge Summary    Name:  Jasmin Isaac /Age/Sex: 1950 (28 y.o. female)   Admit Date: 10/13/2022  Discharge Date: 10/18/22    MRN & CSN:  [id number] & [id number] Encounter Date and Time 10/18/22 1:50 PM EDT    Attending:  Shadi Zimmerman MD Discharging Provider: Lisandra Vegas MD       Hospital Course:     Brief HPI: Jasmin Isaac is a 67 y.o. female who presented with bilateral lower extremity edema. She was seen in the ED and was admitted. The edema was all the way up to her thighs. She had associated redness and pain. She was having difficulty ambulating and performing her ADLs. Brief Problem Based Course: While here it was felt that the etiology of the edema is likely chronic venous insufficiency. She was also felt to have mild nonpurulent cellulitis and was treated for that. She was weak and debilitated. She was no discharged to SNF for acute rehab after a 5-day inpatient stay. Problem list    Bilateral lower extremity edema likely due to chronic venous insufficiency  -Untreated sleep apnea may be contributing to the edema  -Recommend compression stockings and lower extremity elevation when possible  -Recommend outpatient venous insufficiency ultrasound  -Recommend outpatient work-up for chronic venous hypertension  -Ultrasound negative for DVT  -She plans to follow-up with Dr. Lexii Hester as she knows him from when she had colon cancer surgery  -Discharged with Lasix 20 mg daily as needed for edema    Difficulty walking and recurrent falls associated with above-she does have some debility  -She had associated recurrent falls for several months  -She will be in skilled rehab upon discharge    Mild nonpurulent right lower extremity cellulitis  -Treated with doxycycline and improved-she had 1 dose IV vancomycin upon arrival, followed by 4 days of oral doxycycline. No further antibiotics upon discharge.     Class III obesity-BMI is 48.5    Sleep apnea  -She has associated moderate pulmonary hypertension  -She saw Dr. Pasco Dancer in 2015-Per his note she had severe JAMIE and mild asthma   -Per chart she is not interested in CPAP  -Recommend right heart cath in the outpatient setting for further classification of disease process    Moderate aortic stenosis-follows with cardiology    Carotid artery stenosis status post right CEA in 2021  -Aspirin at home-continue  -Statin at home-continue    History of intermittent third-degree heart block status post permanent pacemaker done here in 2014    Asthma-continue fluticasone inhaler    Hypertension-apparently not on medication at home  Hyperlipidemia-on simvastatin  Never smoker    Moderate malnutrition-appreciate dietary consult-supplements ordered    History of breast cancer status post right mastectomy in 2012 by Dr. Harry Ayala. She had chemotherapy at that time  -Currently on letrozole    History of colon cancer in 2004-had chemotherapy back then as well    Left renal stone-2.5 cm with recurrent UTIs-status post holmium laser lithotripsy and left ureter stent placement 9/28/2022 by Dr. Jack Ivan  -Urine culture done prior to admission on October 9 showed Proteus mirabilis. -Ceftin for 1 week prescribed upon discharge.-I believe she has been on other antibiotics, but the organism was not sensitive to them. Social history. She lives at home with her significant other who assists her with transportation. She has a cane at home for home distances and a walker for community distances. Abnormal labs: Hemoglobin is 11.3    The patient expressed appropriate understanding of, and agreement with the discharge recommendations, medications, and plan.      Consults this admission:  IP CONSULT TO HOSPITALIST  IP CONSULT TO CASE MANAGEMENT    Discharge Diagnosis:   Swelling of lower extremity  Suspected chronic venous insufficiency  Sleep apnea-untreated  Discharge Instruction:   Follow up appointments: Consider nephrology consult for management of edema  Primary care physician: Whit Brownlee, DO within 2 weeks  Diet: cardiac diet   Activity: activity as tolerated  Disposition: Discharged to:   []Home, []HHC, [x]SNF, []Acute Rehab, []Hospice  Condition on discharge: Stable  Labs and Tests to be Followed up as an outpatient by PCP or Specialist: Consider referral to sleep specialist for untreated sleep apnea    Discharge Medications:        Medication List        START taking these medications      aspirin 81 MG chewable tablet  Take 1 tablet by mouth daily - clarification-aspirin is not a new medication.   She was on it at home already     cefUROXime 500 MG tablet  Commonly known as: CEFTIN  Take 1 tablet by mouth 2 times daily for 7 days     Culturelle Adult Ult Balance Caps  Take 1 caplet by mouth daily     furosemide 20 MG tablet  Commonly known as: Lasix  Take 1 tablet by mouth daily as needed (for edema)     potassium chloride 10 MEQ extended release tablet  Commonly known as: KLOR-CON M  Take 1 tablet by mouth daily as needed (take only when you need Lasix)            CONTINUE taking these medications      acetaminophen 500 MG tablet  Commonly known as: TYLENOL     albuterol sulfate  (90 Base) MCG/ACT inhaler  Commonly known as: ProAir HFA  INHALE 2 PUFFS BY MOUTH EVERY SIX HOURS AS NEEDED FOR WHEEZING     fluticasone 110 MCG/ACT inhaler  Commonly known as: Flovent HFA  Inhale 2 puffs into the lungs 2 times daily     letrozole 2.5 MG tablet  Commonly known as: FEMARA  Take 1 tablet by mouth nightly     simvastatin 20 MG tablet  Commonly known as: ZOCOR  TAKE ONE (1) TABLET BY MOUTH NIGHTLY     traMADol 50 MG tablet  Commonly known as: ULTRAM            STOP taking these medications      alfuzosin 10 MG extended release tablet  Commonly known as: UROXATRAL     cephALEXin 500 MG capsule  Commonly known as: Keflex            ASK your doctor about these medications      doxycycline hyclate 100 MG tablet  Commonly known as: VIBRA-TABS  Take 1 tablet by mouth every 12 hours for 2 doses  Ask about: Should I take this medication? Where to Get Your Medications        Information about where to get these medications is not yet available    Ask your nurse or doctor about these medications  aspirin 81 MG chewable tablet  cefUROXime 500 MG tablet  Culturelle Adult Ult Balance Caps  doxycycline hyclate 100 MG tablet  furosemide 20 MG tablet  potassium chloride 10 MEQ extended release tablet        Objective Findings at Discharge:   /61   Pulse 60   Temp 97.7 °F (36.5 °C) (Oral)   Resp 16   Ht 5' 2\" (1.575 m)   Wt 265 lb 8 oz (120.4 kg)   LMP 01/15/1999   SpO2 94%   BMI 48.56 kg/m²       Physical Exam:   General: NAD  Eyes: EOMI  Neuro: Alert. Psych: Mood appropriate. Labs and Imaging   VL DUP LOWER EXTREMITY VENOUS BILATERAL    Result Date: 10/13/2022  EXAMINATION: DUPLEX VENOUS ULTRASOUND OF THE BILATERAL LOWER [id number]/13/2022 9:36 pm TECHNIQUE: Duplex ultrasound using B-mode/gray scaled imaging, Doppler spectral analysis and color flow Doppler was obtained of the deep venous structures of the lower bilateral extremities. COMPARISON: None. HISTORY: ORDERING SYSTEM PROVIDED HISTORY: pain swelling TECHNOLOGIST PROVIDED HISTORY: Reason for exam:->pain swelling FINDINGS: The visualized veins of the bilateral lower extremities are patent and free of echogenic thrombus. The visualized veins demonstrate good compressibility with normal color flow study and spectral analysis. The veins in both calves were not well evaluated due to leg swelling. 1.  The veins in both calves were not well evaluated due to leg swelling. 2.  Otherwise, no evidence of DVT in either lower extremity. CBC: No results for input(s): WBC, HGB, PLT in the last 72 hours.   BMP:    Recent Labs     10/17/22  1145      K 4.0      CO2 27   BUN 14   CREATININE 0.8   GLUCOSE 116*     Hepatic: No results for input(s): AST, ALT, ALB, BILITOT, ALKPHOS in the last 72 hours. Lipids:   Lab Results   Component Value Date/Time    CHOL 267 08/18/2022 02:29 PM    CHOL 182 07/29/2021 12:39 PM    HDL 44 08/18/2022 02:29 PM    TRIG 181 08/18/2022 02:29 PM     Hemoglobin A1C:   Lab Results   Component Value Date/Time    LABA1C 5.8 08/18/2022 02:30 PM     TSH: No results found for: TSH  Troponin:   Lab Results   Component Value Date/Time    TROPONINT <0.010 10/13/2022 05:00 PM    TROPONINT <0.010 09/18/2022 10:16 PM    TROPONINT <0.010 05/17/2022 01:39 AM     Lactic Acid: No results for input(s): LACTA in the last 72 hours. BNP: No results for input(s): PROBNP in the last 72 hours.   UA:  Lab Results   Component Value Date/Time    NITRU NEGATIVE 10/14/2022 11:30 AM    COLORU YELLOW 10/14/2022 11:30 AM    WBCUA 2 10/14/2022 11:30 AM    RBCUA NONE SEEN 10/14/2022 11:30 AM    MUCUS RARE 10/09/2022 01:50 AM    TRICHOMONAS NONE SEEN 10/14/2022 11:30 AM    YEAST RARE 09/11/2017 07:00 AM    BACTERIA NEGATIVE 10/14/2022 11:30 AM    CLARITYU CLEAR 10/14/2022 11:30 AM    SPECGRAV <1.005 10/14/2022 11:30 AM    LEUKOCYTESUR MODERATE 10/14/2022 11:30 AM    UROBILINOGEN 0.2 10/14/2022 11:30 AM    BILIRUBINUR NEGATIVE 10/14/2022 11:30 AM    BLOODU NEGATIVE 10/14/2022 11:30 AM    KETUA NEGATIVE 10/14/2022 11:30 AM    AMORPHOUS OCCASIONAL 04/15/2018 05:48 AM     Time Spent Discharging patient 45 minutes    Electronically signed by Larry Velasquez MD on 10/18/2022 at 1:50 PM

## 2022-10-18 NOTE — PLAN OF CARE
Problem: Skin/Tissue Integrity  Goal: Absence of new skin breakdown  Description: 1. Monitor for areas of redness and/or skin breakdown  2. Assess vascular access sites hourly  3. Every 4-6 hours minimum:  Change oxygen saturation probe site  4. Every 4-6 hours:  If on nasal continuous positive airway pressure, respiratory therapy assess nares and determine need for appliance change or resting period.   Outcome: Progressing     Problem: Safety - Adult  Goal: Free from fall injury  Outcome: Progressing     Problem: ABCDS Injury Assessment  Goal: Absence of physical injury  Outcome: Progressing     Problem: Discharge Planning  Goal: Discharge to home or other facility with appropriate resources  Outcome: Progressing     Problem: Pain  Goal: Verbalizes/displays adequate comfort level or baseline comfort level  Outcome: Progressing

## 2022-10-18 NOTE — PROGRESS NOTES
Patient discharged from Hospital to [address] [place] [address], this nurse called report to facility, this nurse provided patient with an AVS to pass on to facility, patient was transported via stretcher with [facility],  patient's  accompanied patient over to facility

## 2022-10-18 NOTE — PROGRESS NOTES
Physical Therapy  Name: Tru Claire MRN: [id number] :   1950   Date:  10/18/2022   Admission Date: 10/13/2022 Room:  [address] [place], [place] [address]   Restrictions/Precautions:  Restrictions/Precautions  Restrictions/Precautions: General Precautions, Fall Risk     knees buckle  Communication with other providers:   RN states pt is ok to see for therapy  Subjective:  Patient states:  she is less swollen today, pt very talkative   Pain:   Location, Type, Intensity (0/10 to 10/10): \"minimal\" per pt  Objective:    Observation:  pt was sitting up in the chair  Treatment, including education/measures:  Transfers   Scooting :SBA  Sit to stand :SBA from chair and 4WW, but min A from toilet  Stand to sit :CGA to SBA to chair, 4WW and toilet  Gait:  Pt amb with 4WW for 10 ft x 2, 25 ft and 15 ft with CGA  Pt needed VC's for rest breaks for sob  Sitting Exercises:pt needed rest breaks for sob  Ankle pumps x 20  Glute sets x 10  LAQ's x 15  Therapeutic Exercise:  Therapeutic exercises were instructed today. Cues were given for technique, safety, recruitment, and rationale. Cues were verbal and/or tactile. Safety  Patient left safely in the chair, with call light/phone in reach. Gait belt was used for transfers and gait. Assessment / Impression:     Patient's tolerance of treatment:  good to fair.  Pt's room we=as very warm and pt was sob with first couple of walks, temp in pt's room adjusted and pt did better with a cooler room with less sob   Adverse Reaction: sob with room high temp  Significant change in status and impact:  improved with cooler room temp  Barriers to improvement:  room temp, edema  Plan for Next Session:    Will cont to work towards pt's goals per her tolerance  Time in:  1100  Time out:  1200  Timed treatment minutes:  60  Total treatment time:  60  Previously filed items:  Social/Functional History  Lives With: Significant other  Type of Home: House  Home Layout: One level  Home Access: Stairs to enter with rails  Entrance Stairs - Number of Steps: 1  Bathroom Shower/Tub: Tub/Shower unit  Bathroom Toilet: Standard  Home Equipment: Juan Carlos Morgan  ADL Assistance: Independent  Ambulation Assistance: Independent (mod I with spc)  Transfer Assistance: Independent  Active : No     Long Term Goals  Time Frame for Long Term Goals : In one week:  Long Term Goal 1: Pt will complete all bed mobility with supervision  Long Term Goal 2: Pt will complete sit <> stand transfers with supervision  Long Term Goal 3: Pt will ambulate 75 feet with SBAx1 with LRAD  Long Term Goal 4: Pt will ascend/descend 1 step with a handrail with minAx2  Long Term Goal 5: Pt will independently complete 3 sets of 10 reps of BLE AROM exercises in available and allowed ROM  Electronically signed by:     Myranda Suarez PTA  10/18/2022, 1:07 PM

## 2022-10-20 ENCOUNTER — HOSPITAL ENCOUNTER (OUTPATIENT)
Age: 72
Setting detail: SPECIMEN
Discharge: HOME | End: 2022-10-20

## 2022-10-20 LAB
ALBUMIN SERPL-MCNC: 3.7 GM/DL (ref 3.4–5)
ALP BLD-CCNC: 127 IU/L (ref 40–128)
ALT SERPL-CCNC: 15 U/L (ref 10–40)
ANION GAP SERPL CALCULATED.3IONS-SCNC: 11 MMOL/L (ref 4–16)
AST SERPL-CCNC: 22 IU/L (ref 15–37)
BASOPHILS ABSOLUTE: 0.1 K/CU MM
BASOPHILS RELATIVE PERCENT: 0.6 % (ref 0–1)
BILIRUB SERPL-MCNC: 0.2 MG/DL (ref 0–1)
BUN BLDV-MCNC: 14 MG/DL (ref 6–23)
CALCIUM SERPL-MCNC: 9.5 MG/DL (ref 8.3–10.6)
CHLORIDE BLD-SCNC: 103 MMOL/L (ref 99–110)
CO2: 26 MMOL/L (ref 21–32)
CREAT SERPL-MCNC: 0.7 MG/DL (ref 0.6–1.1)
DIFFERENTIAL TYPE: ABNORMAL
EOSINOPHILS ABSOLUTE: 0.3 K/CU MM
EOSINOPHILS RELATIVE PERCENT: 2.4 % (ref 0–3)
GFR SERPL CREATININE-BSD FRML MDRD: >60 ML/MIN/1.73M2
GLUCOSE BLD-MCNC: 91 MG/DL (ref 70–99)
HCT VFR BLD CALC: 35.4 % (ref 37–47)
HEMOGLOBIN: 11.1 GM/DL (ref 12.5–16)
IMMATURE NEUTROPHIL %: 1.8 % (ref 0–0.43)
LYMPHOCYTES ABSOLUTE: 2.9 K/CU MM
LYMPHOCYTES RELATIVE PERCENT: 27.1 % (ref 24–44)
MCH RBC QN AUTO: 27.6 PG (ref 27–31)
MCHC RBC AUTO-ENTMCNC: 31.4 % (ref 32–36)
MCV RBC AUTO: 88.1 FL (ref 78–100)
MONOCYTES ABSOLUTE: 0.7 K/CU MM
MONOCYTES RELATIVE PERCENT: 6.9 % (ref 0–4)
NUCLEATED RBC %: 0 %
PDW BLD-RTO: 14.8 % (ref 11.7–14.9)
PLATELET # BLD: 253 K/CU MM (ref 140–440)
PMV BLD AUTO: 10 FL (ref 7.5–11.1)
POTASSIUM SERPL-SCNC: 4.6 MMOL/L (ref 3.5–5.1)
RBC # BLD: 4.02 M/CU MM (ref 4.2–5.4)
SEGMENTED NEUTROPHILS ABSOLUTE COUNT: 6.5 K/CU MM
SEGMENTED NEUTROPHILS RELATIVE PERCENT: 61.2 % (ref 36–66)
SODIUM BLD-SCNC: 140 MMOL/L (ref 135–145)
TOTAL IMMATURE NEUTOROPHIL: 0.19 K/CU MM
TOTAL NUCLEATED RBC: 0 K/CU MM
TOTAL PROTEIN: 6.2 GM/DL (ref 6.4–8.2)
WBC # BLD: 10.7 K/CU MM (ref 4–10.5)

## 2022-10-20 PROCEDURE — 80053 COMPREHEN METABOLIC PANEL: CPT

## 2022-10-20 PROCEDURE — 36415 COLL VENOUS BLD VENIPUNCTURE: CPT

## 2022-10-20 PROCEDURE — 85025 COMPLETE CBC W/AUTO DIFF WBC: CPT

## 2022-11-04 ENCOUNTER — OFFICE VISIT (OUTPATIENT)
Dept: INTERNAL MEDICINE CLINIC | Age: 72
End: 2022-11-04
Payer: MEDICARE

## 2022-11-04 VITALS
BODY MASS INDEX: 48.56 KG/M2 | DIASTOLIC BLOOD PRESSURE: 58 MMHG | HEIGHT: 62 IN | HEART RATE: 69 BPM | SYSTOLIC BLOOD PRESSURE: 138 MMHG | OXYGEN SATURATION: 97 %

## 2022-11-04 DIAGNOSIS — Z09 HOSPITAL DISCHARGE FOLLOW-UP: Primary | ICD-10-CM

## 2022-11-04 DIAGNOSIS — G47.30 SLEEP APNEA IN ADULT: ICD-10-CM

## 2022-11-04 PROCEDURE — 1036F TOBACCO NON-USER: CPT

## 2022-11-04 PROCEDURE — G8484 FLU IMMUNIZE NO ADMIN: HCPCS

## 2022-11-04 PROCEDURE — 1123F ACP DISCUSS/DSCN MKR DOCD: CPT

## 2022-11-04 PROCEDURE — G8427 DOCREV CUR MEDS BY ELIG CLIN: HCPCS

## 2022-11-04 PROCEDURE — 1090F PRES/ABSN URINE INCON ASSESS: CPT

## 2022-11-04 PROCEDURE — 1111F DSCHRG MED/CURRENT MED MERGE: CPT

## 2022-11-04 PROCEDURE — 3017F COLORECTAL CA SCREEN DOC REV: CPT

## 2022-11-04 PROCEDURE — 99213 OFFICE O/P EST LOW 20 MIN: CPT

## 2022-11-04 PROCEDURE — 3074F SYST BP LT 130 MM HG: CPT

## 2022-11-04 PROCEDURE — G8399 PT W/DXA RESULTS DOCUMENT: HCPCS

## 2022-11-04 PROCEDURE — G8417 CALC BMI ABV UP PARAM F/U: HCPCS

## 2022-11-04 PROCEDURE — 3078F DIAST BP <80 MM HG: CPT

## 2022-11-04 SDOH — ECONOMIC STABILITY: FOOD INSECURITY: WITHIN THE PAST 12 MONTHS, THE FOOD YOU BOUGHT JUST DIDN'T LAST AND YOU DIDN'T HAVE MONEY TO GET MORE.: SOMETIMES TRUE

## 2022-11-04 SDOH — ECONOMIC STABILITY: FOOD INSECURITY: WITHIN THE PAST 12 MONTHS, YOU WORRIED THAT YOUR FOOD WOULD RUN OUT BEFORE YOU GOT MONEY TO BUY MORE.: SOMETIMES TRUE

## 2022-11-04 ASSESSMENT — SOCIAL DETERMINANTS OF HEALTH (SDOH): HOW HARD IS IT FOR YOU TO PAY FOR THE VERY BASICS LIKE FOOD, HOUSING, MEDICAL CARE, AND HEATING?: NOT VERY HARD

## 2022-11-04 NOTE — PROGRESS NOTES
Post-Discharge Transitional Care Follow Up      Nusrat Hinds   YOB: 1950    Date of Office Visit:  11/4/2022  Date of Hospital Admission: 10/13/22  Date of Hospital Discharge: 10/18/22  Readmission Risk Score (high >=14%. Medium >=10%):Readmission Risk Score: 16.6      Care management risk score Rising risk (score 2-5) and Complex Care (Scores >=6): No Risk Score On File     Non face to face  following discharge, date last encounter closed (first attempt may have been earlier): *No documented post hospital discharge outreach found in the last 14 days     Call initiated 2 business days of discharge: *No response recorded in the last 14 days     Hospital discharge follow-up  Will refer patient to case management for possible resources to help with obtaining physical therapy. -     UT DISCHARGE MEDS RECONCILED W/ CURRENT OUTPATIENT MED LIST  Sleep apnea in adult  Patient recommended for referral based on inpatient findings. Doesn't want sleep study at this time, but would like to have a pulmonologist for possible O2 needs. -     [facility] (Epic) - Fransisco Staples MD, Pulmonlogy, [place]  Body mass index (BMI) 45.0-49.9, adult [facility])    Medical Decision Making: low complexity  Return if symptoms worsen or fail to improve. On this date 11/4/2022 I have spent 25 minutes reviewing previous notes, test results and face to face with the patient discussing the diagnosis and importance of compliance with the treatment plan as well as documenting on the day of the visit. Subjective:     Inpatient course: Discharge summary reviewed- see chart. Interval history/Current status:   Bilateral lower extremity cellulitis and swelling is resolved following stay in SNF. Utilizing a wheelchair in office due to continued weakness with ambulation. Had physical therapy consult done at her home but uncomfortable with them being in her home.  Has inconsistent transportation, but interested in option to discuss care with case management about getting transportation assistance. Doesn't want a referral to sleep medicine at this time for CPAP. Reports she would be willing to see pulmonology for possible O2 requirements at home as documented during inpatient stay. Will place consult. Visit with cardiology on 11/15/22. Patient Active Problem List   Diagnosis    Malignant neoplasm of upper-outer quadrant of right breast in female, estrogen receptor positive ([facility] [address]    Diffuse cystic mastopathy    Hyperlipidemia    H/O chest pain    Heart block AV second degree    Abnormal cardiovascular stress test    JAMIE (obstructive sleep apnea)    Super obesity    Mild asthma    Severe obstructive sleep apnea    Cardiac pacemaker    Chronic cystitis    Asthma    Hypertension    Depression    Obstructive sleep apnea    Nocturnal oxygen desaturation    Moderate malnutrition (HCC)    NSTEMI (non-ST elevated myocardial infarction) ([facility] [address]    PVD (peripheral vascular disease) ([facility] [address]    Morbid obesity with BMI of 45.0-49.9, adult (HCC)    Moderate aortic stenosis    Hepatomegaly, not elsewhere classified    Arthritis    CAD (coronary artery disease)    Bilateral carotid artery stenosis    Moderate persistent asthma without complication    Carotid stenosis, right    Complicated urinary tract infection    Osteopenia of multiple sites    Swelling of lower extremity    Ambulatory dysfunction       Medications listed as ordered at the time of discharge from hospital     Medication List            Accurate as of November 4, 2022 11:59 PM. If you have any questions, ask your nurse or doctor.                 CONTINUE taking these medications      acetaminophen 500 MG tablet  Commonly known as: TYLENOL     albuterol sulfate  (90 Base) MCG/ACT inhaler  Commonly known as: ProAir HFA  INHALE 2 PUFFS BY MOUTH EVERY SIX HOURS AS NEEDED FOR WHEEZING     aspirin 81 MG chewable tablet  Take 1 tablet by mouth daily     Culturelle Adult Ult Balance Caps  Take 1 caplet by mouth daily     fluticasone 110 MCG/ACT inhaler  Commonly known as: Flovent HFA  Inhale 2 puffs into the lungs 2 times daily     furosemide 20 MG tablet  Commonly known as: Lasix  Take 1 tablet by mouth daily as needed (for edema)     letrozole 2.5 MG tablet  Commonly known as: FEMARA  Take 1 tablet by mouth nightly     potassium chloride 10 MEQ extended release tablet  Commonly known as: KLOR-CON M  Take 1 tablet by mouth daily as needed (take only when you need Lasix)     simvastatin 20 MG tablet  Commonly known as: ZOCOR  TAKE ONE (1) TABLET BY MOUTH NIGHTLY     traMADol 50 MG tablet  Commonly known as: ULTRAM               Medications marked \"taking\" at this time  Outpatient Medications Marked as Taking for the 11/4/22 encounter (Office Visit) with AUSTIN Whiteside CNP   Medication Sig Dispense Refill    simvastatin (ZOCOR) 20 MG tablet TAKE ONE (1) TABLET BY MOUTH NIGHTLY 90 tablet 0    letrozole (FEMARA) 2.5 MG tablet Take 1 tablet by mouth nightly 30 tablet 5    albuterol sulfate HFA (PROAIR HFA) 108 (90 Base) MCG/ACT inhaler INHALE 2 PUFFS BY MOUTH EVERY SIX HOURS AS NEEDED FOR WHEEZING 1 Inhaler 5        Medications patient taking as of now reconciled against medications ordered at time of hospital discharge: Yes    Review of Systems   Constitutional:  Negative for activity change, appetite change, chills, diaphoresis, fatigue, fever and unexpected weight change. HENT:  Negative for congestion, facial swelling, rhinorrhea, sore throat and trouble swallowing. Eyes:  Negative for pain, discharge, redness and visual disturbance. Respiratory:  Negative for cough, choking, chest tightness, shortness of breath, wheezing and stridor. Cardiovascular:  Negative for chest pain, palpitations and leg swelling. Gastrointestinal:  Negative for abdominal pain, constipation, diarrhea, nausea and vomiting.    Genitourinary:  Negative for difficulty urinating, dysuria, flank pain and hematuria. Musculoskeletal:  Positive for gait problem. Negative for myalgias. Skin:  Negative for color change and pallor. Neurological:  Positive for weakness. Negative for dizziness, syncope, light-headedness, numbness and headaches. Psychiatric/Behavioral:  Negative for agitation, behavioral problems and decreased concentration. Objective:    BP (!) 138/58 (Site: Left Upper Arm, Position: Sitting, Cuff Size: Large Adult)   Pulse 69   Ht 5' 2\" (1.575 m)   LMP 01/15/1999   SpO2 97%   BMI 48.56 kg/m²   Physical Exam  Vitals reviewed. Constitutional:       General: She is awake. She is not in acute distress. Appearance: Normal appearance. She is well-developed. She is not ill-appearing or toxic-appearing. HENT:      Head: Normocephalic. Jaw: There is normal jaw occlusion. Right Ear: External ear normal.      Left Ear: External ear normal.      Nose: Nose normal.      Mouth/Throat:      Mouth: Mucous membranes are moist.      Pharynx: Oropharynx is clear. Eyes:      General: Lids are normal. Vision grossly intact. Gaze aligned appropriately. Extraocular Movements: Extraocular movements intact. Conjunctiva/sclera: Conjunctivae normal.      Pupils: Pupils are equal, round, and reactive to light. Cardiovascular:      Rate and Rhythm: Normal rate and regular rhythm. Pulses: Normal pulses. Heart sounds: Normal heart sounds, S1 normal and S2 normal.   Pulmonary:      Effort: Pulmonary effort is normal. No respiratory distress. Breath sounds: Normal breath sounds. Abdominal:      General: Bowel sounds are normal.      Palpations: Abdomen is soft. Tenderness: There is no abdominal tenderness. There is no right CVA tenderness, left CVA tenderness, guarding or rebound. Musculoskeletal:         General: Normal range of motion. Cervical back: Normal range of motion. Legs:    Skin:     General: Skin is warm and dry.       Capillary Refill: Capillary refill takes less than 2 seconds. Neurological:      General: No focal deficit present. Mental Status: She is alert and oriented to person, place, and time. Mental status is at baseline. GCS: GCS eye subscore is 4. GCS verbal subscore is 5. GCS motor subscore is 6. Cranial Nerves: No cranial nerve deficit. Sensory: Sensation is intact. No sensory deficit. Motor: Weakness present. Coordination: Coordination is intact. Coordination normal.      Gait: Gait is intact. Psychiatric:         Attention and Perception: Attention and perception normal.         Mood and Affect: Mood and affect normal.         Speech: Speech normal.         Behavior: Behavior normal. Behavior is cooperative. Thought Content: Thought content normal.         Cognition and Memory: Cognition and memory normal.         Judgment: Judgment normal.     An electronic signature was used to authenticate this note.   --AUSTIN Barker - CNP

## 2022-11-04 NOTE — PROGRESS NOTES
Subjective:      No chief complaint on file. HPI:  Arsalan Singh is a 67 y.o. female who presents today ***  HPI  Past Medical History:   Diagnosis Date    Anemia     Anxiety 1978    Arthritis     generalized    Asthma 2006    AV block     2nd degree per hospital in [id number]    Blood poisoning 1994    Blood transfusion     12/2003    Breast cancer ([facility] [address] 02/29/2012    right    CAD (coronary artery disease)     Cancer ([facility] [address] 2007    skin (face) & colon(2003)/ 2/2012 dx of right breast ca(pc)    Carcinoma of breast ([facility] [address] 02/29/2012    right arm no b/p or sticks    Cardiac pacemaker 03/10/2014    Medtronic dual lead    Chronic cystitis     Chronic respiratory failure (HCC)     2 L/min oxygen at night-time    Colon cancer ([facility] [address] 2013    COPD (chronic obstructive pulmonary disease) ([facility] [address]     CTS (carpal tunnel syndrome)     Depression     Diverticulitis     H/O 24 hour EKG monitoring 2/28/2014 7/24/13 2/14 complete heart block 7/13No clinically significant arrthymia noted. H/O cardiac catheterization 10/31/2011, 7/11/2007    10/31/2011-No significant CAD. Cardiolite stress test was false positive-Dr Reynaldo Brantley; 7/11/2007-No CAD, global function intact;    H/O cardiovascular stress test 10/20/2011, 3/23/2010    10/20/2011-Lexiscan- Evid of mild ischemia in Left CX region. EF 70%. Global LVSF normal;    H/O cardiovascular stress test 01/07/2015    EF 77%.    Normal Stress Test.    H/O chest pain 04/2005    sees Dr Reynaldo Brantley    H/O Doppler ultrasound 02/20/2008 2/20/2008-CAROTID DOPPLER- Normal carotids bilaterally;    H/O Doppler ultrasound 06/06/2013    CAROTID DOPPLER- there is heterogeneous, irregular atherosclerotic plaque noted in the right internal carotid artery,  doppler flow velocities within the right internal carotid artery are elevated, consistent with a mild, less than 50%stenosis, there is intimal thickening but no significant atherosclerotic plaque noted in the left internal carotid artery, the left carotid are within normal limits    H/O echocardiogram 4/9/2012, 10/20/2011    4/9/2012-LVSF normal EF=>55%. impaired LV relaxation;    H/O echocardiogram 12/31/2014    EF 50-55%. LV shows mild concentric hypertrophy. Mildly dilated left atrium. Mildly dilated right ventricle. Sclerotic but not stenotic aortic valve. Mitral annular calcification. Mild MR, Mild TR, Mild pulm HTN.    H/O urinary incontinence     History of blood transfusion     Hx antineoplastic chemo     Hx of Arterial Doppler ultrasound 07/01/2019    No hemodynamically significant or focal stenosis visualized bilaterally, bilateral lower extremity arteries exhibit diffuse plaque and multiphasic waveforms, unable to obtain bilateral ABIs due to elevated leg pressures >250 mmHg, possibly due to atherosclerosis    Hx of cardiovascular stress test 06/2013    EF 70% abn suggestive of anterolateral ischemia, possible RCA ischmeia, post left ventricular dilation suggestive multivessel CAD. Hx of echocardiogram 06/2013    EF50%, mild MR and TR, mild pulmonary HTN, mild AS    Hx of echocardiogram 11/01/2021    Left ventricular systolic function is normal, Mild concentric left ventricular hypertrophy Mildly dilated left atrium. Mitral annular calcification is present. Mild tricuspid regurgitation No evidence of any pericardial effusion    Hx of fall     \"last time 2018- due to neuropathy, have to use cane\"    [facility] 7/24/13, 06/2013 7/13-No clinacally significant arrhythmia. 6/13-multiple cycles of wenckebach episodes in addtion to some sinus tach    Hyperlipidemia     Hypertension     Hypothermia 2008    Malignant neoplasm of breast (female) ([facility] [address] 2012    Neuropathy     \"have neuropathy of my legs\"    Normocytic normochromic anemia     Obstructive sleep apnea 2008 01- Has CPAP machine but has not used in over a year - states she has not had problems since her pacemaker was placed.     Old MI (myocardial infarction)     per pt on 1/15/2019\"had heart attack about a year ago\"    Osteoarthritis     Osteopenia     Osteopenia of multiple sites 9/14/2022    Pneumonia due to COVID-19 virus 03/22/2021    Primary malignant neoplasm of colon ([address]     S/P cardiac cath 06/2013    no significant CAD false postive stress test    Skin cancer     Super obesity     Umbilical hernia       Past Surgical History:   Procedure Laterality Date    A-V CARDIAC PACEMAKER INSERTION  3/10/14     Medtronic. Model:  [id number] Medtronic  Serial:  [id number] dual chamber pacemaker    APPENDECTOMY  2004    BREAST BIOPSY  2/13/12    BREAST LUMPECTOMY  2007    right     BREAST SURGERY  02/13/2012    rt lesion biopsy     CARDIAC CATHETERIZATION  2007 & 2011    CAROTID ENDARTERECTOMY Right 12/3/2021    RIGHT CAROTID ENDARTERECTOMY WITH PATCH performed by Rossy Thomas MD at [facility]  2004    Colon cancer    COLONOSCOPY  10-18-13    polyp    COLONOSCOPY  1/19/2016    internal hemorrhoids, diverticulosis, 1.5 cm sessile polyp, 8 mm polyp found in rectum.     COLONOSCOPY  12/14/2017    1.5cm residual polyp, 5mm polyps in blind sigmoid loop x3, Sigmoid divertics, Internal grade 1 hemorrhoids    COLONOSCOPY N/A 1/16/2019    COLONOSCOPY W/ ENDOSCOPIC MUCOSAL RESECTION WITH ELEVIEW 5ML INJECTION AND POLYPECTOMY OF TIP OF BLIND RECTOSIGMOID STUMP AND CAUTERIZATION WITH ERBE PROBE, CLIPPING X2 performed by Jeferson Zuleat MD at [address] [place] [address]  01/21/2020    pan divertics/ 4 polyps/ sm int hem, S/P partial sigmoid resection w/ end to side anastamosis, repeat in 3 years    COLONOSCOPY N/A 1/21/2020    COLONOSCOPY POLYPECTOMY SNARE/COLD BIOPSY performed [facility]  2003    back    CYSTOSCOPY Bilateral 12/15/14    CYSTOSCOPY Right 5/15/2022    CYSTOSCOPY URETERAL STENT INSERTION performed by Caryle Sprung, MD at [facility]      front right tooth and root canal w/ brass bolt    DILATION AND CURETTAGE OF UTERUS  2006    Needed a camera to find my uterus. DILATION AND CURETTAGE OF UTERUS N/A 5/24/2022    DILATATION AND CURETTAGE, CULTURES OF UTEREUS performed by Galilea Kuo MD at [address], COLON, DIAGNOSTIC  12/14/2017    Small hiatal hernia    HERNIA REPAIR  2004    Umb hernia    HERNIA REPAIR  2008    Inc hernia    KIDNEY SURGERY  05/24/2022    stent placed on right side    LITHOTRIPSY Right 8/1/2022    RIGHT CYSTOSCOPY URETEROSCOPY STONE MANIPULATION WITH HOLIUM LASER LITHOTRIPSY STENT REPLACEMENT performed by Simin Blount MD at [address]    LITHOTRIPSY Left 9/26/2022    LEFT CYSTOSCOPY URETEROSCOPY RETROGRADE PYELOGRAM STONE [facility] LITHOTRIPSY POSSIBLE [address] performed by Simin Blount MD at [place] [address]  2/29/2012    Right axillary-3 sentinel nodes were positive out of 9. MASTECTOMY, RADICAL Right 02/29/2012    w/ sentinal node disection-Dr West    PACEMAKER PLACEMENT      PRE-MALIGNANT / BENIGN SKIN LESION EXCISION  2/8/2013    right leg X2-Dr West    TONSILLECTOMY  1957    TUNNELED VENOUS PORT PLACEMENT  2004    TUNNELED VENOUS PORT PLACEMENT  02/13/2012    removal of mediport     Social History     Tobacco Use    Smoking status: Never    Smokeless tobacco: Never   Substance Use Topics    Alcohol use: No     Comment:           CAFFEINE: 2- 12oz nona daily & 2 cups coffee some nights. Review of Systems   Prior to Visit Medications    Medication Sig Taking?  Authorizing Provider   Lactobacillus-Inulin (CULTURELLE ADULT ULT BALANCE) CAPS Take 1 caplet by mouth daily  Rafa Hardy MD   furosemide (LASIX) 20 MG tablet Take 1 tablet by mouth daily as needed (for edema)  Rafa Hardy MD   potassium chloride (KLOR-CON M) 10 MEQ extended release tablet Take 1 tablet by mouth daily as needed (take only when you need Lasix)  Rafa Hardy MD   aspirin 81 MG chewable tablet Take 1 tablet by mouth daily  Cleo Oneal MD   traMADol (ULTRAM) 50 MG tablet Take 50 mg by mouth every 8 hours as needed for Pain. Historical Provider, MD   simvastatin (ZOCOR) 20 MG tablet TAKE ONE (1) TABLET BY MOUTH NIGHTLY  Cecille Stark DO   letrozole (FEMARA) 2.5 MG tablet Take 1 tablet by mouth nightly  Cameron Wan MD   acetaminophen (TYLENOL) 500 MG tablet Take 500 mg by mouth every 6 hours as needed for Pain  Historical Provider, MD   fluticasone (FLOVENT HFA) 110 MCG/ACT inhaler Inhale 2 puffs into the lungs 2 times daily  Cecille Stark DO   albuterol sulfate HFA (PROAIR HFA) 108 (90 Base) MCG/ACT inhaler INHALE 2 PUFFS BY MOUTH EVERY SIX HOURS AS NEEDED FOR WHEEZING  Cecille Stark DO        Objective:      LMP 01/15/1999    Physical Exam     Assessment / Plan:        There are no diagnoses linked to this encounter. I have spent *** minutes on this patient encounter. Patient voiced understanding and agreement with plan. All questions/concerns were addressed, risks/side effects of medications were reviewed. Return precautions and after visit summary were provided. No follow-ups on file. or earlier as needed. Please note this report has been produced using speech recognition software and may contain errors related to that system including errors in grammar, punctuation, and spelling, as well as words and phrases that may be inappropriate. If there are any questions or concerns please feel free to contact the dictating provider for clarification.     Stormy Palafox, AUSTIN - CNP

## 2022-11-06 ASSESSMENT — ENCOUNTER SYMPTOMS
EYE PAIN: 0
EYE DISCHARGE: 0
COLOR CHANGE: 0
CHOKING: 0
SORE THROAT: 0
FACIAL SWELLING: 0
TROUBLE SWALLOWING: 0
WHEEZING: 0
COUGH: 0
RHINORRHEA: 0
NAUSEA: 0
EYE REDNESS: 0
STRIDOR: 0
CHEST TIGHTNESS: 0
DIARRHEA: 0
VOMITING: 0
ABDOMINAL PAIN: 0
SHORTNESS OF BREATH: 0
CONSTIPATION: 0

## 2022-11-06 ASSESSMENT — VISUAL ACUITY: OU: 1

## 2022-11-07 ENCOUNTER — CARE COORDINATION (OUTPATIENT)
Dept: CARE COORDINATION | Age: 72
End: 2022-11-07

## 2022-11-07 ENCOUNTER — TELEPHONE (OUTPATIENT)
Dept: INTERNAL MEDICINE CLINIC | Age: 72
End: 2022-11-07

## 2022-11-07 ENCOUNTER — TELEPHONE (OUTPATIENT)
Dept: PHARMACY | Facility: CLINIC | Age: 72
End: 2022-11-07

## 2022-11-07 SDOH — SOCIAL STABILITY: SOCIAL NETWORK: ARE YOU MARRIED, WIDOWED, DIVORCED, SEPARATED, NEVER MARRIED, OR LIVING WITH A PARTNER?: DIVORCED

## 2022-11-07 SDOH — SOCIAL STABILITY: SOCIAL NETWORK: IN A TYPICAL WEEK, HOW MANY TIMES DO YOU TALK ON THE PHONE WITH FAMILY, FRIENDS, OR NEIGHBORS?: ONCE A WEEK

## 2022-11-07 SDOH — ECONOMIC STABILITY: TRANSPORTATION INSECURITY
IN THE PAST 12 MONTHS, HAS THE LACK OF TRANSPORTATION KEPT YOU FROM MEDICAL APPOINTMENTS OR FROM GETTING MEDICATIONS?: YES

## 2022-11-07 SDOH — SOCIAL STABILITY: SOCIAL NETWORK: HOW OFTEN DO YOU ATTENT MEETINGS OF THE CLUB OR ORGANIZATION YOU BELONG TO?: NEVER

## 2022-11-07 SDOH — HEALTH STABILITY: MENTAL HEALTH: HOW MANY STANDARD DRINKS CONTAINING ALCOHOL DO YOU HAVE ON A TYPICAL DAY?: PATIENT DECLINED

## 2022-11-07 SDOH — ECONOMIC STABILITY: FOOD INSECURITY: WITHIN THE PAST 12 MONTHS, THE FOOD YOU BOUGHT JUST DIDN'T LAST AND YOU DIDN'T HAVE MONEY TO GET MORE.: SOMETIMES TRUE

## 2022-11-07 SDOH — HEALTH STABILITY: MENTAL HEALTH: HOW OFTEN DO YOU HAVE A DRINK CONTAINING ALCOHOL?: PATIENT DECLINED

## 2022-11-07 SDOH — ECONOMIC STABILITY: FOOD INSECURITY: WITHIN THE PAST 12 MONTHS, YOU WORRIED THAT YOUR FOOD WOULD RUN OUT BEFORE YOU GOT MONEY TO BUY MORE.: SOMETIMES TRUE

## 2022-11-07 SDOH — ECONOMIC STABILITY: INCOME INSECURITY: IN THE LAST 12 MONTHS, WAS THERE A TIME WHEN YOU WERE NOT ABLE TO PAY THE MORTGAGE OR RENT ON TIME?: YES

## 2022-11-07 SDOH — HEALTH STABILITY: PHYSICAL HEALTH: ON AVERAGE, HOW MANY MINUTES DO YOU ENGAGE IN EXERCISE AT THIS LEVEL?: 0 MIN

## 2022-11-07 SDOH — ECONOMIC STABILITY: INCOME INSECURITY: HOW HARD IS IT FOR YOU TO PAY FOR THE VERY BASICS LIKE FOOD, HOUSING, MEDICAL CARE, AND HEATING?: SOMEWHAT HARD

## 2022-11-07 SDOH — SOCIAL STABILITY: SOCIAL NETWORK: HOW OFTEN DO YOU ATTEND CHURCH OR RELIGIOUS SERVICES?: NEVER

## 2022-11-07 SDOH — SOCIAL STABILITY: SOCIAL NETWORK: HOW OFTEN DO YOU GET TOGETHER WITH FRIENDS OR RELATIVES?: ONCE A WEEK

## 2022-11-07 SDOH — ECONOMIC STABILITY: HOUSING INSECURITY: IN THE LAST 12 MONTHS, HOW MANY PLACES HAVE YOU LIVED?: 1

## 2022-11-07 SDOH — ECONOMIC STABILITY: TRANSPORTATION INSECURITY
IN THE PAST 12 MONTHS, HAS LACK OF TRANSPORTATION KEPT YOU FROM MEETINGS, WORK, OR FROM GETTING THINGS NEEDED FOR DAILY LIVING?: YES

## 2022-11-07 SDOH — HEALTH STABILITY: PHYSICAL HEALTH: ON AVERAGE, HOW MANY DAYS PER WEEK DO YOU ENGAGE IN MODERATE TO STRENUOUS EXERCISE (LIKE A BRISK WALK)?: 0 DAYS

## 2022-11-07 NOTE — TELEPHONE ENCOUNTER
----- Message from Jovan Barnhart RN sent at 11/7/2022  9:42 AM EST -----  Hello! Referral with request for patient follow up to ensure/ support compliance.

## 2022-11-07 NOTE — CARE COORDINATION
Ambulatory Care Coordination Note  11/7/2022    ACC: Campbell Jara, MICHELLE      Spoke with [facility]; Provider referral for enrollment. Oriented to the role of ACM. Patient consents to ongoing follow up. Medications:  Medication reconciliation completed. Patient reports that \" one of her doctors \" took her off of most of her medications\". Patient reports that she has a medicine box that she does not always use. Patient reports that she does not have any issue with the cost of her medications at this time. Discussed Pharm referral to support compliance; patient consents to referral.    Asthma:  Patient reports that she is using her inhalers as directed. Has a CPAP she uses at night. Patient with chronic cough. Denies increased SOB, CP or wheezing. Encouraged compliance with medications; routine Provider follow up. Reviewed s/s to report to MD.    Discussed support needs:  Patient reports that she was evaluated by [facility] but did not agree to services d/t her dog. Reports that she will not let anyone in her home as her dog may bite them. Patient reports that she will not put the dog outside while staff visit and she prefers to not let anyone in her home. Declines referrals for HHC/ AAA support. AAA assessment was completed in Sept.; but patient is not active. Patient barriers identified:  Patient reports that she does struggle with the cost of her groceries. Reports that she receives assistance with utilities. Patient gets transportation and financial support per her insurance company; but believes that she has utilized all of her transport benefit. Patient is agreeable to support per [facility] transport. Instructed on referral to [facility] to provide support with groceries and patient is agreeable. Falls/ safety:  Patient lives alone in a [place]. Patient reports that she has fallen several times this year. Reports that she is rarely contacted by her family.   Instructed on safety/ fall prevention measures. Discussed [employer]. Patient is agreeable to referral for BC/ BS CM support. Patient is also requesting a transport chair if she is eligible. Offered patient enrollment in the Remote Patient Monitoring (RPM) program for in-home monitoring: NA.    Patient denies any questions and defers further assessment as she reports that she is getting ready to leave for an appointment. No questions or concerns noted. ACM contact information provided should questions arise. Informed patient of plan for outreach per Jeffrey Iniguez [facility] to complete enrollment. Plan for next outreach:   PCAM, goals,  assess functional review, ensure USS, LSW, Transport chair orders    Spoke with Jenn/ USS transport. Referral made with patient contact information provided. Confirmed plan for USS follow up. Referrals made for Pharm/ LSW support. Referral made with request for BC/ BS CM. Return e-mail from Joshua Wayne BC/ BS. Patient to be followed per CM. CM to follow up to obtain ERS for patient. Confirmed that insurance will cover requested DME with Provider order/ deemed medically necessary. CM to send AC list of in network Providers. Note routed to Provider to inform of patient request for transport chair. Ambulatory Care Coordination Assessment    Care Coordination Protocol  Referral from Primary Care Provider: Yes  Week 1 - Initial Assessment     Do you have all of your prescriptions and are they filled?: Yes  Are you able to afford your medications?: Yes  How often do you have trouble taking your medications the way you have been told to take them?: I always take them as prescribed. Do you have Home O2 Therapy?: No         Current Housing: Private Residence        Per the Fall Risk Screening, did the patient have 2 or more falls or 1 fall with injury in the past year?: Yes  How often do you think you are about to fall and you do NOT fall?  For example, you grab something to stabilize yourself or hold onto a wall/furniture?: Frequently  Use of a Mobility Aid: Yes  Difficulty walking/impaired gait: Yes  Issues with feet or shoes like numbness, edema, shoes not fitting: Yes  Changes in vision, poor vision or poor lighting in environment: Yes  Dizziness: No     Frequent urination at night?: Yes  Do you use rails/bars?: Yes  Do you have a non-slip tub mat?: No     Are you experiencing loss of meaning?: No  Are you experiencing loss of hope and peace?: No     Suggested Interventions and Community Resources  Fall Risk Prevention: In Process Pharmacist: In Process   Senior Services: In Process   Social Work: In Process   Transportation Services: In Process         Set up/Review an Education Plan, Set up/Review Goals              Prior to Admission medications    Medication Sig Start Date End Date Taking? Authorizing Provider   traMADol (ULTRAM) 50 MG tablet Take 50 mg by mouth every 8 hours as needed for Pain.    Yes Historical Provider, MD   simvastatin (ZOCOR) 20 MG tablet TAKE ONE (1) TABLET BY MOUTH NIGHTLY 3/28/22  Yes Walter Dakin, DO   letrozole [facility]) 2.5 MG tablet Take 1 tablet by mouth nightly 11/30/21  Yes José Miguel Kaiser MD   acetaminophen (TYLENOL) 500 MG tablet Take 500 mg by mouth every 6 hours as needed for Pain   Yes Historical Provider, MD   albuterol sulfate HFA (PROAIR HFA) 108 (90 Base) MCG/ACT inhaler INHALE 2 PUFFS BY MOUTH EVERY SIX HOURS AS NEEDED FOR WHEEZING 2/11/21  Yes Walter Dakin, DO   Lactobacillus-Inulin (CULTURELLE ADULT ULT BALANCE) CAPS Take 1 caplet by mouth daily  Patient not taking: No sig reported 10/18/22   Peter Mariee MD   furosemide (LASIX) 20 MG tablet Take 1 tablet by mouth daily as needed (for edema)  Patient not taking: No sig reported 10/18/22   Peter Mariee MD   potassium chloride (KLOR-CON M) 10 MEQ extended release tablet Take 1 tablet by mouth daily as needed (take only when you need Lasix)  Patient not taking: No sig reported 10/18/22 Marco Antonio Streeter MD   aspirin 81 MG chewable tablet Take 1 tablet by mouth daily  Patient not taking: Reported on 11/7/2022 10/19/22   Marco Antonio Streeter MD   fluticasone [facility]) 110 MCG/ACT inhaler Inhale 2 puffs into the lungs 2 times daily  Patient not taking: No sig reported 2/11/21   Sylvie Keyes DO       Future Appointments   Date Time Provider Chance Chiang   11/15/2022  1:20 PM Terry Mata MD [facility]   12/20/2022  1:15 PM Dexter Hale MD [address] [facility]   12/21/2022  1:00 PM [facility] ONC NURSE [facility]C [place]   12/21/2022  1:15 PM Claudia Lynn MD [facility]   12/21/2022  1:30 PM SCHEDULE, [facility] MED ONC LAB [facility] [place]   12/29/2022  1:30 PM Sylvie Keyes DO [facility]      and   General Assessment    Do you have any symptoms that are causing concern?: No

## 2022-11-07 NOTE — CARE COORDINATION
HC attempted to contact patient in regards to referral from [facility] for possible social needs. Patient is unavailable at this time. Left HIPAA compliant message with [facility]. contact information, reason for call and request for call back. Will expect a return call. If no call back is received, HC will attempt to reach out again in a few days.

## 2022-11-08 ENCOUNTER — TELEPHONE (OUTPATIENT)
Dept: PHARMACY | Facility: CLINIC | Age: 72
End: 2022-11-08

## 2022-11-08 NOTE — TELEPHONE ENCOUNTER
Doris Alejandra MD, medication review completed with patient:    - Patient reports she has mild edema in both legs following hospital discharge on 10/18/2022. Patient has not been taking her furosemide that was listed on her discharge summary. She wanted to discuss this issue with you at her upcoming appointment on 11/15/2022. See note below for complete details. Thank you,  Rita Velazco, PharmD, [address]   Department, toll free: [phone], option 1    =======================================================    CLINICAL PHARMACY NOTE - Medication Review  Patient outreach to review medications - Spoke with patient. SUBJECTIVE/OBJECTIVE:   Paulette Dacosta is a 67 y.o. female referred to a clinical pharmacy specialist by care coordination for medication review. Medications:  Current Outpatient Medications   Medication Sig Comments    Lactobacillus-Inulin (CULTURELLE ADULT ULT BALANCE) CAPS Take 1 caplet by mouth daily (Patient not taking: No sig reported) Removed from med list.    furosemide (LASIX) 20 MG tablet Take 1 tablet by mouth daily as needed (for edema) (Patient not taking: No sig reported) Does not take. Has not taken in a \"long time\"    potassium chloride (KLOR-CON M) 10 MEQ extended release tablet Take 1 tablet by mouth daily as needed (take only when you need Lasix) (Patient not taking: No sig reported) Does not take. Has not taken in a \"long time\"    aspirin 81 MG chewable tablet Take 1 tablet by mouth daily (Patient not taking: Reported on 11/7/2022) Does not take    traMADol (ULTRAM) 50 MG tablet Take 50 mg by mouth every 8 hours as needed for Pain.  Finished her short supply and does not want to go back on \"any pain medications\"  Removed from med list.    simvastatin (ZOCOR) 20 MG tablet TAKE ONE (1) TABLET BY MOUTH NIGHTLY Patient takes consistently      letrozole (FEMARA) 2.5 MG tablet Take 1 tablet by mouth nightly Patient takes consistently      acetaminophen (TYLENOL) 500 MG tablet Take 500 mg by mouth every 6 hours as needed for Pain Patient only takes at night if her pain is severe. Takes 1-2 tablets (500-1000mg)    fluticasone (FLOVENT HFA) 110 MCG/ACT inhaler Inhale 2 puffs into the lungs 2 times daily (Patient not taking: No sig reported) Patient takes appropriately twice daily. Educated patient that she should rinse mouth out after each use. States she only does that occasionally because she was not sure why she needed to. States she will rinse her mouth more consistently moving forward. albuterol sulfate HFA (PROAIR HFA) 108 (90 Base) MCG/ACT inhaler INHALE 2 PUFFS BY MOUTH EVERY SIX HOURS AS NEEDED FOR WHEEZING Uses only as needed. Does not need on a consistent basis since she has the Flovent. Has only used twice in the past month. Additional Medications (including OTC/Herbal Supplements):  None    Allergies: Allergies   Allergen Reactions    Bactrim [Sulfamethoxazole-Trimethoprim] Anaphylaxis and Hives    Lipitor [Atorvastatin] Shortness Of Breath    Taxol [Paclitaxel] Anaphylaxis and Swelling    Aminoglycosides      Abstracted from [facility] patient chart.     Demerol Nausea Only    Neosporin [Bacitracin-Neomycin-Polymyxin] Rash and Other (See Comments)     hotness    Other Rash     Ivory Soap    Talc Other (See Comments)     blisters       Pertinent Labs/Vitals:  BP Readings from Last 3 Encounters:   11/04/22 (!) 138/58   10/18/22 (!) 127/96   10/13/22 (!) 118/58     No results found for: Dallas Daly  Lab Results   Component Value Date    LABA1C 5.8 08/18/2022    LABA1C 5.8 11/10/2021    LABA1C 5.4 10/16/2018     Lab Results   Component Value Date    CHOL 267 (H) 08/18/2022    TRIG 181 (H) 08/18/2022    HDL 44 08/18/2022    LDLCALC 187 (H) 08/18/2022    LDLDIRECT 102 (H) 02/11/2020     ALT   Date Value Ref Range Status   10/20/2022 15 10 - 40 U/L Final     AST   Date Value Ref Range Status   10/20/2022 22 15 - 37 IU/L Final     The ASCVD Risk score (Giovanni FUNEZ, et al., 2019) failed to calculate for the following reasons: The patient has a prior MI or stroke diagnosis     Lab Results   Component Value Date    CREATININE 0.7 10/20/2022       Estimated Creatinine Clearance: 88 mL/min (based on SCr of 0.7 mg/dL). Social History:   Social History     Tobacco Use    Smoking status: Never    Smokeless tobacco: Never   Substance Use Topics    Alcohol use: No     Comment:           CAFFEINE: 2- 12oz nona daily & 2 cups coffee some nights. Immunizations:   Most Recent Immunizations   Administered Date(s) Administered    Tdap (Boostrix, Adacel) 07/13/2017       ASSESSMENT/PLAN:   - General Assessment:   Patient states she stopped many of her medications following being discharged from a long term care facility. Patient stated she only restarted medications if her PCP told her she was able to take the medication. Following the discontinuation of these medications, patient stated she felt better and lost 40 lbs. Patient did not want to go back on any medications she did not need to be on. The above chart has details on which medications she is taking and notes about each. Patient denies any additional OTC, herbal, or dietary supplement use. Patient states she does not use a medication box/organizer. Instead keeps her medications in a bag and does well taking them each day. Also claims no issues with remembering to take Flovent inhaler. Patient does report her legs have remained swollen with red patches. No increased pain or change in amount of redness. Advised patient to follow up with her PCP if this worsens. Patient states she is seeing urology tomorrow (11/9/22) and cardiology 11/15/22. Recommended she also bring up with her cardiologist the leg swelling, patient expressed understanding.     Drug interactions: No clinically significant interactions identified via PeopleString Interaction Analysis as category D or higher.   Renal dosing: No renal adjustments necessary.      - Primary Care 6 Gaps:  A1c not <9 (18-74yo; A1c in current year): 5.8%   BP not <140/90; if >/=61yo and not diabetic, BP <150/90 (18-74yo; most recent BP recording): most recent 138/58  Pneumonia vaccination (>/= 66yo with any PPSV23 or PCV13 documented in Health Maintenance tab): may benefit from PCV20  Breast cancer screening (50-74yo with mammogram within 27 months of end of current year): Mammogram completed 9/8/22   Colorectal cancer screening (50-74yo with FOBT in past 12 months, flex sig in past 5 years or colonoscopy in last 10 years): completed 1/21/2020  Nephropathy screening (18-74yo with diabetes; either on ACE-I/ARB or annual microalbumin): none    - Upcoming appointments:   Future Appointments   Date Time Provider Chance Chiang   11/15/2022  1:20 PM Mervat Sim MD [facility]   12/20/2022  1:15 PM Paula Grissom MD [facility]   12/21/2022  1:00 PM [facility] MED ONC NURSE [facility]   12/21/2022  1:15 PM Marian Vallejo MD [address] [facility]   12/21/2022  1:30 PM SCHEDULE, [facility] MED ONC LAB [facility] MED ONC [place]   12/29/2022  1:30 PM DO ROSIE Akins [facility]   1/4/2023  3:00 PM Dc Bro MD [address] [place] [address], PharmD, [address] [facility] Resident  [place] [address]  Department, toll free: [phone], option 1

## 2022-11-09 ENCOUNTER — CARE COORDINATION (OUTPATIENT)
Dept: CARE COORDINATION | Age: 72
End: 2022-11-09

## 2022-11-10 ENCOUNTER — CARE COORDINATION (OUTPATIENT)
Dept: CARE COORDINATION | Age: 72
End: 2022-11-10

## 2022-11-10 NOTE — CARE COORDINATION
HC contacted patient in regards to referral from [facility] for possible food insecurity and any other social needs. HC explains role and reason for call. Patient states that she was supposed to be receiving home delivered meals but says she received one delivery with 21 days supply that was ruined due to outside elements. Patient states that she contacted the company to inform them and they advised her that they would be sending out another delivery/ Patient states that she still has not received it and this occurred over a month ago. Patient is unable to tell me what company or phone number she used. Patient is agreeable to [facility] [place]. reaching out to [address] [place] [address] on her behalf to inquire about her home delivered meals. Patient states that she has reliable transportation service and denies any housing or financial insecurities. HC will reach out to [address] [place] [address] to inquire about home delivered meals. HC will follow up with patient to inform her of any updates.

## 2022-11-10 NOTE — CARE COORDINATION
ACM call to pt for Care Coordination outreach. No answer. No option to leave VM, as the phone just rings. Will plan outreach again next week.

## 2022-11-13 ENCOUNTER — TELEPHONE (OUTPATIENT)
Dept: INTERNAL MEDICINE CLINIC | Age: 72
End: 2022-11-13

## 2022-11-13 DIAGNOSIS — E66.01 MORBID OBESITY WITH BMI OF 45.0-49.9, ADULT (HCC): Primary | ICD-10-CM

## 2022-11-13 DIAGNOSIS — M19.91 PRIMARY OSTEOARTHRITIS, UNSPECIFIED SITE: ICD-10-CM

## 2022-11-14 ENCOUNTER — APPOINTMENT (OUTPATIENT)
Dept: ULTRASOUND IMAGING | Age: 72
DRG: 602 | End: 2022-11-14
Payer: MEDICARE

## 2022-11-14 ENCOUNTER — HOSPITAL ENCOUNTER (INPATIENT)
Age: 72
LOS: 3 days | Discharge: NURSING, REHAB OR LONG-TERM CARE | DRG: 602 | End: 2022-11-18
Attending: STUDENT IN AN ORGANIZED HEALTH CARE EDUCATION/TRAINING PROGRAM | Admitting: FAMILY MEDICINE
Payer: MEDICARE

## 2022-11-14 ENCOUNTER — APPOINTMENT (OUTPATIENT)
Dept: GENERAL RADIOLOGY | Age: 72
DRG: 602 | End: 2022-11-14
Payer: MEDICARE

## 2022-11-14 DIAGNOSIS — Z78.9 DECREASED ACTIVITIES OF DAILY LIVING (ADL): ICD-10-CM

## 2022-11-14 DIAGNOSIS — R60.0 LEG EDEMA: ICD-10-CM

## 2022-11-14 DIAGNOSIS — I73.9 PVD (PERIPHERAL VASCULAR DISEASE) (HCC): Primary | ICD-10-CM

## 2022-11-14 LAB
ALBUMIN SERPL-MCNC: 3.7 GM/DL (ref 3.4–5)
ALP BLD-CCNC: 132 IU/L (ref 40–129)
ALT SERPL-CCNC: 9 U/L (ref 10–40)
ANION GAP SERPL CALCULATED.3IONS-SCNC: 11 MMOL/L (ref 4–16)
AST SERPL-CCNC: 12 IU/L (ref 15–37)
BASOPHILS ABSOLUTE: 0 K/CU MM
BASOPHILS RELATIVE PERCENT: 0.2 % (ref 0–1)
BILIRUB SERPL-MCNC: 0.2 MG/DL (ref 0–1)
BUN BLDV-MCNC: 9 MG/DL (ref 6–23)
CALCIUM SERPL-MCNC: 9.6 MG/DL (ref 8.3–10.6)
CHLORIDE BLD-SCNC: 103 MMOL/L (ref 99–110)
CO2: 27 MMOL/L (ref 21–32)
CREAT SERPL-MCNC: 0.6 MG/DL (ref 0.6–1.1)
DIFFERENTIAL TYPE: ABNORMAL
EOSINOPHILS ABSOLUTE: 0.2 K/CU MM
EOSINOPHILS RELATIVE PERCENT: 1.9 % (ref 0–3)
GFR SERPL CREATININE-BSD FRML MDRD: >60 ML/MIN/1.73M2
GLUCOSE BLD-MCNC: 171 MG/DL (ref 70–99)
HCT VFR BLD CALC: 34.2 % (ref 37–47)
HEMOGLOBIN: 10.9 GM/DL (ref 12.5–16)
IMMATURE NEUTROPHIL %: 0.6 % (ref 0–0.43)
LACTATE: 1.7 MMOL/L (ref 0.4–2)
LYMPHOCYTES ABSOLUTE: 2.7 K/CU MM
LYMPHOCYTES RELATIVE PERCENT: 21.8 % (ref 24–44)
MCH RBC QN AUTO: 27.7 PG (ref 27–31)
MCHC RBC AUTO-ENTMCNC: 31.9 % (ref 32–36)
MCV RBC AUTO: 86.8 FL (ref 78–100)
MONOCYTES ABSOLUTE: 0.5 K/CU MM
MONOCYTES RELATIVE PERCENT: 4.3 % (ref 0–4)
NUCLEATED RBC %: 0 %
PDW BLD-RTO: 14.2 % (ref 11.7–14.9)
PLATELET # BLD: 232 K/CU MM (ref 140–440)
PMV BLD AUTO: 9.9 FL (ref 7.5–11.1)
POTASSIUM SERPL-SCNC: 3.3 MMOL/L (ref 3.5–5.1)
RBC # BLD: 3.94 M/CU MM (ref 4.2–5.4)
SEGMENTED NEUTROPHILS ABSOLUTE COUNT: 8.9 K/CU MM
SEGMENTED NEUTROPHILS RELATIVE PERCENT: 71.2 % (ref 36–66)
SODIUM BLD-SCNC: 141 MMOL/L (ref 135–145)
TOTAL IMMATURE NEUTOROPHIL: 0.07 K/CU MM
TOTAL NUCLEATED RBC: 0 K/CU MM
TOTAL PROTEIN: 7 GM/DL (ref 6.4–8.2)
WBC # BLD: 12.4 K/CU MM (ref 4–10.5)

## 2022-11-14 PROCEDURE — 80053 COMPREHEN METABOLIC PANEL: CPT

## 2022-11-14 PROCEDURE — 96372 THER/PROPH/DIAG INJ SC/IM: CPT

## 2022-11-14 PROCEDURE — 90471 IMMUNIZATION ADMIN: CPT | Performed by: PHYSICIAN ASSISTANT

## 2022-11-14 PROCEDURE — 6370000000 HC RX 637 (ALT 250 FOR IP): Performed by: PHYSICIAN ASSISTANT

## 2022-11-14 PROCEDURE — 99285 EMERGENCY DEPT VISIT HI MDM: CPT

## 2022-11-14 PROCEDURE — 93970 EXTREMITY STUDY: CPT

## 2022-11-14 PROCEDURE — 87086 URINE CULTURE/COLONY COUNT: CPT

## 2022-11-14 PROCEDURE — 73630 X-RAY EXAM OF FOOT: CPT

## 2022-11-14 PROCEDURE — 85025 COMPLETE CBC W/AUTO DIFF WBC: CPT

## 2022-11-14 PROCEDURE — 6360000002 HC RX W HCPCS: Performed by: PHYSICIAN ASSISTANT

## 2022-11-14 PROCEDURE — 83605 ASSAY OF LACTIC ACID: CPT

## 2022-11-14 PROCEDURE — 90715 TDAP VACCINE 7 YRS/> IM: CPT | Performed by: PHYSICIAN ASSISTANT

## 2022-11-14 RX ORDER — HYDROCODONE BITARTRATE AND ACETAMINOPHEN 5; 325 MG/1; MG/1
1 TABLET ORAL ONCE
Status: COMPLETED | OUTPATIENT
Start: 2022-11-14 | End: 2022-11-14

## 2022-11-14 RX ADMIN — TETANUS TOXOID, REDUCED DIPHTHERIA TOXOID AND ACELLULAR PERTUSSIS VACCINE, ADSORBED 0.5 ML: 5; 2.5; 8; 8; 2.5 SUSPENSION INTRAMUSCULAR at 20:45

## 2022-11-14 RX ADMIN — HYDROCODONE BITARTRATE AND ACETAMINOPHEN 1 TABLET: 5; 325 TABLET ORAL at 20:45

## 2022-11-14 ASSESSMENT — PAIN SCALES - GENERAL
PAINLEVEL_OUTOF10: 6
PAINLEVEL_OUTOF10: 8

## 2022-11-14 ASSESSMENT — PAIN DESCRIPTION - PAIN TYPE: TYPE: ACUTE PAIN

## 2022-11-14 ASSESSMENT — PAIN DESCRIPTION - FREQUENCY: FREQUENCY: CONTINUOUS

## 2022-11-14 ASSESSMENT — PAIN DESCRIPTION - DESCRIPTORS: DESCRIPTORS: ACHING

## 2022-11-14 ASSESSMENT — PAIN DESCRIPTION - LOCATION: LOCATION: LEG;FOOT

## 2022-11-14 ASSESSMENT — PAIN DESCRIPTION - ORIENTATION: ORIENTATION: RIGHT

## 2022-11-15 ENCOUNTER — CARE COORDINATION (OUTPATIENT)
Dept: CARE COORDINATION | Age: 72
End: 2022-11-15

## 2022-11-15 PROBLEM — L03.115 CELLULITIS OF RIGHT FOOT: Status: ACTIVE | Noted: 2022-11-15

## 2022-11-15 PROBLEM — L03.90 CELLULITIS: Status: ACTIVE | Noted: 2022-11-15

## 2022-11-15 PROBLEM — E43 SEVERE MALNUTRITION (HCC): Chronic | Status: ACTIVE | Noted: 2022-11-15

## 2022-11-15 LAB
BACTERIA: NEGATIVE /HPF
BASOPHILS ABSOLUTE: 0 K/CU MM
BASOPHILS RELATIVE PERCENT: 0.3 % (ref 0–1)
BILIRUBIN URINE: NEGATIVE MG/DL
BLOOD, URINE: ABNORMAL
CLARITY: CLEAR
COLOR: YELLOW
DIFFERENTIAL TYPE: ABNORMAL
EOSINOPHILS ABSOLUTE: 0.2 K/CU MM
EOSINOPHILS RELATIVE PERCENT: 2.5 % (ref 0–3)
FERRITIN: 107 NG/ML (ref 15–150)
FOLATE: 6.1 NG/ML (ref 3.1–17.5)
GLUCOSE, URINE: NEGATIVE MG/DL
HCT VFR BLD CALC: 32.9 % (ref 37–47)
HEMOGLOBIN: 10.4 GM/DL (ref 12.5–16)
IMMATURE NEUTROPHIL %: 0.5 % (ref 0–0.43)
IRON: 55 UG/DL (ref 37–145)
KETONES, URINE: NEGATIVE MG/DL
LEUKOCYTE ESTERASE, URINE: ABNORMAL
LYMPHOCYTES ABSOLUTE: 2 K/CU MM
LYMPHOCYTES RELATIVE PERCENT: 23.1 % (ref 24–44)
MCH RBC QN AUTO: 27.8 PG (ref 27–31)
MCHC RBC AUTO-ENTMCNC: 31.6 % (ref 32–36)
MCV RBC AUTO: 88 FL (ref 78–100)
MONOCYTES ABSOLUTE: 0.5 K/CU MM
MONOCYTES RELATIVE PERCENT: 5.3 % (ref 0–4)
MUCUS: ABNORMAL HPF
NITRITE URINE, QUANTITATIVE: POSITIVE
NUCLEATED RBC %: 0 %
PCT TRANSFERRIN: 22 % (ref 10–44)
PDW BLD-RTO: 14.5 % (ref 11.7–14.9)
PH, URINE: 7.5 (ref 5–8)
PLATELET # BLD: 214 K/CU MM (ref 140–440)
PMV BLD AUTO: 10.3 FL (ref 7.5–11.1)
PRO-BNP: 825.2 PG/ML
PROCALCITONIN: 0.04
PROTEIN UA: NEGATIVE MG/DL
RBC # BLD: 3.74 M/CU MM (ref 4.2–5.4)
RBC URINE: ABNORMAL /HPF (ref 0–6)
RETICULOCYTE COUNT PCT: 2 % (ref 0.2–2.2)
SEGMENTED NEUTROPHILS ABSOLUTE COUNT: 5.9 K/CU MM
SEGMENTED NEUTROPHILS RELATIVE PERCENT: 68.3 % (ref 36–66)
SPECIFIC GRAVITY UA: 1.01 (ref 1–1.03)
SQUAMOUS EPITHELIAL: 8 /HPF
TOTAL IMMATURE NEUTOROPHIL: 0.04 K/CU MM
TOTAL IRON BINDING CAPACITY: 254 UG/DL (ref 250–450)
TOTAL NUCLEATED RBC: 0 K/CU MM
TRICHOMONAS: ABNORMAL /HPF
UNSATURATED IRON BINDING CAPACITY: 199 UG/DL (ref 110–370)
UROBILINOGEN, URINE: 0.2 MG/DL (ref 0.2–1)
VITAMIN B-12: 574.3 PG/ML (ref 211–911)
WBC # BLD: 8.7 K/CU MM (ref 4–10.5)
WBC UA: 128 /HPF (ref 0–5)

## 2022-11-15 PROCEDURE — 6370000000 HC RX 637 (ALT 250 FOR IP): Performed by: STUDENT IN AN ORGANIZED HEALTH CARE EDUCATION/TRAINING PROGRAM

## 2022-11-15 PROCEDURE — 6360000002 HC RX W HCPCS: Performed by: STUDENT IN AN ORGANIZED HEALTH CARE EDUCATION/TRAINING PROGRAM

## 2022-11-15 PROCEDURE — 82607 VITAMIN B-12: CPT

## 2022-11-15 PROCEDURE — 2580000003 HC RX 258: Performed by: NURSE PRACTITIONER

## 2022-11-15 PROCEDURE — 83550 IRON BINDING TEST: CPT

## 2022-11-15 PROCEDURE — 36415 COLL VENOUS BLD VENIPUNCTURE: CPT

## 2022-11-15 PROCEDURE — 99223 1ST HOSP IP/OBS HIGH 75: CPT | Performed by: INTERNAL MEDICINE

## 2022-11-15 PROCEDURE — 85025 COMPLETE CBC W/AUTO DIFF WBC: CPT

## 2022-11-15 PROCEDURE — 94761 N-INVAS EAR/PLS OXIMETRY MLT: CPT

## 2022-11-15 PROCEDURE — 1200000000 HC SEMI PRIVATE

## 2022-11-15 PROCEDURE — 6370000000 HC RX 637 (ALT 250 FOR IP): Performed by: NURSE PRACTITIONER

## 2022-11-15 PROCEDURE — 94640 AIRWAY INHALATION TREATMENT: CPT

## 2022-11-15 PROCEDURE — 85045 AUTOMATED RETICULOCYTE COUNT: CPT

## 2022-11-15 PROCEDURE — G0378 HOSPITAL OBSERVATION PER HR: HCPCS

## 2022-11-15 PROCEDURE — 2580000003 HC RX 258: Performed by: STUDENT IN AN ORGANIZED HEALTH CARE EDUCATION/TRAINING PROGRAM

## 2022-11-15 PROCEDURE — 84145 PROCALCITONIN (PCT): CPT

## 2022-11-15 PROCEDURE — 87040 BLOOD CULTURE FOR BACTERIA: CPT

## 2022-11-15 PROCEDURE — 6360000002 HC RX W HCPCS: Performed by: NURSE PRACTITIONER

## 2022-11-15 PROCEDURE — 82746 ASSAY OF FOLIC ACID SERUM: CPT

## 2022-11-15 PROCEDURE — 83540 ASSAY OF IRON: CPT

## 2022-11-15 PROCEDURE — 82728 ASSAY OF FERRITIN: CPT

## 2022-11-15 PROCEDURE — APPSS60 APP SPLIT SHARED TIME 46-60 MINUTES: Performed by: NURSE PRACTITIONER

## 2022-11-15 PROCEDURE — 83880 ASSAY OF NATRIURETIC PEPTIDE: CPT

## 2022-11-15 PROCEDURE — 81001 URINALYSIS AUTO W/SCOPE: CPT

## 2022-11-15 RX ORDER — LETROZOLE 2.5 MG/1
2.5 TABLET, FILM COATED ORAL NIGHTLY
Status: DISCONTINUED | OUTPATIENT
Start: 2022-11-15 | End: 2022-11-18 | Stop reason: HOSPADM

## 2022-11-15 RX ORDER — FLUTICASONE PROPIONATE 110 UG/1
2 AEROSOL, METERED RESPIRATORY (INHALATION) 2 TIMES DAILY
Status: DISCONTINUED | OUTPATIENT
Start: 2022-11-15 | End: 2022-11-18 | Stop reason: HOSPADM

## 2022-11-15 RX ORDER — SIMVASTATIN 20 MG
20 TABLET ORAL NIGHTLY
Status: DISCONTINUED | OUTPATIENT
Start: 2022-11-15 | End: 2022-11-18 | Stop reason: HOSPADM

## 2022-11-15 RX ORDER — DOXYCYCLINE 25 MG/5ML
100 POWDER, FOR SUSPENSION ORAL 2 TIMES DAILY
Status: DISCONTINUED | OUTPATIENT
Start: 2022-11-15 | End: 2022-11-15 | Stop reason: ALTCHOICE

## 2022-11-15 RX ORDER — POTASSIUM CHLORIDE 20 MEQ/1
20 TABLET, EXTENDED RELEASE ORAL ONCE
Status: COMPLETED | OUTPATIENT
Start: 2022-11-15 | End: 2022-11-15

## 2022-11-15 RX ORDER — DOXYCYCLINE HYCLATE 100 MG
100 TABLET ORAL EVERY 12 HOURS SCHEDULED
Status: DISCONTINUED | OUTPATIENT
Start: 2022-11-15 | End: 2022-11-15

## 2022-11-15 RX ORDER — ALBUTEROL SULFATE 90 UG/1
2 AEROSOL, METERED RESPIRATORY (INHALATION) EVERY 6 HOURS PRN
Status: DISCONTINUED | OUTPATIENT
Start: 2022-11-15 | End: 2022-11-18 | Stop reason: HOSPADM

## 2022-11-15 RX ADMIN — POTASSIUM CHLORIDE 20 MEQ: 1500 TABLET, EXTENDED RELEASE ORAL at 14:22

## 2022-11-15 RX ADMIN — Medication 2 PUFF: at 08:19

## 2022-11-15 RX ADMIN — Medication 2 PUFF: at 20:25

## 2022-11-15 RX ADMIN — VANCOMYCIN HYDROCHLORIDE 750 MG: 750 INJECTION, POWDER, LYOPHILIZED, FOR SOLUTION INTRAVENOUS at 21:54

## 2022-11-15 RX ADMIN — CEFEPIME HYDROCHLORIDE 2000 MG: 2 INJECTION, POWDER, FOR SOLUTION INTRAVENOUS at 14:24

## 2022-11-15 RX ADMIN — VANCOMYCIN HYDROCHLORIDE 2000 MG: 1 INJECTION, POWDER, LYOPHILIZED, FOR SOLUTION INTRAVENOUS at 03:26

## 2022-11-15 RX ADMIN — LETROZOLE 2.5 MG: 2.5 TABLET ORAL at 21:02

## 2022-11-15 ASSESSMENT — PAIN SCALES - WONG BAKER: WONGBAKER_NUMERICALRESPONSE: 0

## 2022-11-15 ASSESSMENT — PAIN SCALES - GENERAL: PAINLEVEL_OUTOF10: 3

## 2022-11-15 NOTE — PROGRESS NOTES
[address] [facility]  consulted by Dr. Xavier Ahuja for monitoring and adjustment. Indication for treatment: Vancomycin indication: SSTI with risk factors   Goal trough: Trough Goal: 10-15 mcg/mL  AUC/ALEX: <500    Risk Factors for MRSA Identified:   Hospitalization within the past 90 days, Received IV antibiotics within the past 90 days    Pertinent Laboratory Values:   Temp Readings from Last 3 Encounters:   11/15/22 97.7 °F (36.5 °C)   10/18/22 98 °F (36.7 °C) (Oral)   10/13/22 97.6 °F (36.4 °C) (Oral)     Recent Labs     11/14/22 2010   WBC 12.4*   LACTATE 1.7     Recent Labs     11/14/22 2010   BUN 9   CREATININE 0.6     Estimated Creatinine Clearance: 99 mL/min (based on SCr of 0.6 mg/dL). No intake or output data in the 24 hours ending 11/15/22 0225    Pertinent Cultures:   Date    Source    Results      Vancomycin level:   TROUGH:  No results for input(s): VANCOTROUGH in the last 72 hours. RANDOM:  No results for input(s): VANCORANDOM in the last 72 hours. Assessment:  HPI: A 67 y.o female who presents with edema and cellulitis. She states, she has been dealing with the swelling for many year, but on last Saturday, she dropped her scissors and it fell on her right foot. Since then the swelling has become worse. SCr, BUN, and urine output: Scr at baseline  Day(s) of therapy: 1 of 5  Vancomycin concentration: Pending    Plan:  Vancomycin 2000 mg x 1, followed by 750 mg IVPB q12h  Predicted trough of 12.1 and AUC: 406 at steady-state  Pharmacy will continue to monitor patient and adjust therapy as indicated    VANCOMYCIN CONCENTRATION SCHEDULED FOR 11/16 @0600    Thank you for the consult.   Danilo Loera, [address] [place] [address]  11/15/2022 2:25 AM

## 2022-11-15 NOTE — PLAN OF CARE
Problem: Pain  Goal: Verbalizes/displays adequate comfort level or baseline comfort level  Outcome: Progressing  Flowsheets (Taken 11/15/2022 8367)  Verbalizes/displays adequate comfort level or baseline comfort level:   Encourage patient to monitor pain and request assistance   Assess pain using appropriate pain scale   Administer analgesics based on type and severity of pain and evaluate response   Implement non-pharmacological measures as appropriate and evaluate response     Problem: Skin/Tissue Integrity  Goal: Absence of new skin breakdown  Description: 1. Monitor for areas of redness and/or skin breakdown  2. Assess vascular access sites hourly  3. Every 4-6 hours minimum:  Change oxygen saturation probe site  4. Every 4-6 hours:  If on nasal continuous positive airway pressure, respiratory therapy assess nares and determine need for appliance change or resting period.   Outcome: Progressing

## 2022-11-15 NOTE — H&P
V2.0  History and Physical      Name:  Marcelo Almaraz /Age/Sex: 1950  (67 y.o. female)   MRN & CSN:  [id number] & [id number] Encounter Date/Time: 11/15/2022 12:16 AM EST   Location:  ED26/ED-26 PCP: Cecille Stark [address] [place] [address] Day: 2    Assessment and Plan:   Marcelo Almaraz is a 67 y.o. female with a pmh of  HTN, JAMIE, breast cancer, Colon ca, Asthma and class II obesity  who presents with Cellulitis and physical deconditioning. Hospital Problems             Last Modified POA    * (Principal) Cellulitis 11/15/2022 Yes       Bilateral lower extremity edema  Likely secondary to chronic venous insufficiency and mild nonpurulent right lower extremity cellulitis given the erythema, tenderness and warmth as compared to the left lower extremity on dorsal aspect of R foot, Scissors fell on it few days ago  -elevated wbc with left shift  DVT ultrasound negative  Advised Compression stockings as outpatient  Lower extremity elevation  Ordered Doxycycline for 5 days     Morbid Obesity  Deconditioning  BMI 45.18  Reporting recurrent falls for more than 6 months  Was recently in SNF and was discharged, has been declining for past few weeks  PT OT consult for evaluation     #Moderate aortic stenosis  Undergoing surveillance with cardiology     Moderate pulmonary hypertension  Could be secondary to underlying lung parenchymal disease or obstructive sleep apnea in the setting of morbid obesity  Recommend right heart cath in the outpatient setting for further classification of disease process        Hyperlipidemia  Continue statin     Carotid artery stenosis  Status post right CEA  Undergoing surveillance with cardiothoracic surgery  Continue aspirin and statin  Disposition:   Current Living situation: home   Expected Disposition: home  Estimated D/C: 2 days  Lives with a patient.     Diet No diet orders on file   DVT Prophylaxis [x] Lovenox, []  Heparin, [] SCDs, [] Ambulation,  [] Eliquis, [] Xarelto   Code Status Prior   Surrogate Decision Maker/ POA      History from:     patient    History of Present Illness:     Chief Complaint: Cellulitis  Maggy Martinez is a 67 y.o. female with pmh of  who presents with bilateral lower extremity edema and cellulitis. She states that she has been dealing with the swelling for many years but on Saturday she dropped her seizures and it fell on the right foot since then the swelling has been worsening she feels that the right foot is tender on touch, warmer than the left foot. Complains of chills but that is something chronic denies any fever, nausea, vomiting, headache, chest pain, abdominal pain, urinary or bowel changes. She does have some baseline shortness of breath but she states that that is because of her asthma, she uses inhalers at home which provides her adequate control. She was from nursing home and is not able to walk more than a few liters. She states that she would like to get some physical therapy and get stronger so she is able to at least walk outside her house on the patio. She lives with a friend at home who takes care of her. She is not able to do her daily activities. Review of Systems: Need 10 Elements   Review of Systems 10 point ROS negative except as noted above    Objective:   No intake or output data in the 24 hours ending 11/15/22 0016   Vitals:   Vitals:    11/14/22 1739 11/14/22 2203 11/14/22 2302   BP: (!) 141/67 (!) 127/46 (!) 132/43   Pulse: 76 78 76   Resp: 18 18 18   Temp: 97.8 °F (36.6 °C)  98 °F (36.7 °C)   TempSrc: Oral     SpO2: 100% 91% 96%   Weight: 247 lb (112 kg)     Height: 5' 2\" (1.575 m)         Medications Prior to Admission     Prior to Admission medications    Medication Sig Start Date End Date Taking? Authorizing Provider   Misc.  Devices [facility]) MISC Use as directed 11/13/22   Melvin Perez,    furosemide (LASIX) 20 MG tablet Take 1 tablet by mouth daily as needed (for edema)  Patient not taking: No sig reported 10/18/22   Feliberto Gillis MD   potassium chloride (KLOR-CON M) 10 MEQ extended release tablet Take 1 tablet by mouth daily as needed (take only when you need Lasix)  Patient not taking: No sig reported 10/18/22   Feliberto Gillis MD   aspirin 81 MG chewable tablet Take 1 tablet by mouth daily  Patient not taking: No sig reported 10/19/22   Feliberto Gillis MD   simvastatin (ZOCOR) 20 MG tablet TAKE ONE (1) TABLET BY MOUTH NIGHTLY 3/28/22   Lizeth Gerardo DO   letrozole [facility]) 2.5 MG tablet Take 1 tablet by mouth nightly 11/30/21   James Greer MD   acetaminophen (TYLENOL) 500 MG tablet Take 500 mg by mouth every 6 hours as needed for Pain    Historical Provider, MD   fluticasone (FLOVENT HFA) 110 MCG/ACT inhaler Inhale 2 puffs into the lungs 2 times daily 2/11/21   Lizeth Gerardo DO   albuterol sulfate HFA (PROAIR HFA) 108 (90 Base) MCG/ACT inhaler INHALE 2 PUFFS BY MOUTH EVERY SIX HOURS AS NEEDED FOR WHEEZING 2/11/21   Lizeth Gerardo DO       Physical Exam: Need 8 Elements   Physical Exam   General: Awake. Very pleasant 50-year-old female WDWN. AAOx3. Obese. HEENT: PERRLA. Vision grossly intact. Hearing grossly intact. Oropharynx clear. Neck: Supple. No JVD. CV: RRR. NL S1/S2. 3/6 SM at RUSB. Which is heard throughout precordium 3+ BLE to thighs with moderate erythema on shins medially (erythema marked). Dorsal aspect of right foot has a circular patch of erythema which is tender on touch  Pulm: NL effort on RA. CTAB. CW NTTP. GI: +BS x4. Soft. NT/ND. : No CVA or suprapubic tenderness. No Farooq catheter. Skin: As above. MSK: No gross joint deformities. Full ROM. Neuro: CNs grossly intact. Normal speech. No focal deficit. Psych: Good judgement and reason.       Past History:   PMHx   Past Medical History:   Diagnosis Date    Anemia     Anxiety 1978    Arthritis     generalized    Asthma 2006    AV block     2nd degree per hospital in june2013    Blood poisoning 1994    Blood transfusion 12/2003    [facility] 02/29/2012    right    CAD (coronary artery disease)     Cancer ([facility] 2007    skin (face) & colon(2003)/ 2/2012 dx of right breast ca(pc)    Carcinoma of breast ([facility] 02/29/2012    right arm no b/p or sticks    Cardiac pacemaker 03/10/2014    Medtronic dual lead    Chronic cystitis     Chronic respiratory failure (HCC)     2 L/min oxygen at night-time    Colon cancer ([facility] 2013    COPD (chronic obstructive pulmonary disease) ([facility]     CTS (carpal tunnel syndrome)     Depression     Diverticulitis     H/O 24 hour EKG monitoring 2/28/2014 7/24/13 2/14 complete heart block 7/13No clinically significant arrthymia noted. H/O cardiac catheterization 10/31/2011, 7/11/2007    10/31/2011-No significant CAD. Cardiolite stress test was false positive-Dr Oscar Laurent; 7/11/2007-No CAD, global function intact;    H/O cardiovascular stress test 10/20/2011, 3/23/2010    10/20/2011-Lexiscan- Evid of mild ischemia in Left CX region. EF 70%. Global LVSF normal;    H/O cardiovascular stress test 01/07/2015    EF 77%. Normal Stress Test.    H/O chest pain 04/2005    sees Dr Oscar Laurent    H/O Doppler ultrasound 02/20/2008 2/20/2008-CAROTID DOPPLER- Normal carotids bilaterally;    H/O Doppler ultrasound 06/06/2013    CAROTID DOPPLER- there is heterogeneous, irregular atherosclerotic plaque noted in the right internal carotid artery,  doppler flow velocities within the right internal carotid artery are elevated, consistent with a mild, less than 50%stenosis, there is intimal thickening but no significant atherosclerotic plaque noted in the left internal carotid artery, the left carotid are within normal limits    H/O echocardiogram 4/9/2012, 10/20/2011    4/9/2012-LVSF normal EF=>55%. impaired LV relaxation;    H/O echocardiogram 12/31/2014    EF 50-55%. LV shows mild concentric hypertrophy. Mildly dilated left atrium. Mildly dilated right ventricle. Sclerotic but not stenotic aortic valve.   Mitral annular calcification. Mild MR, Mild TR, Mild pulm HTN.    H/O urinary incontinence     History of blood transfusion     Hx antineoplastic chemo     Hx of Arterial Doppler ultrasound 07/01/2019    No hemodynamically significant or focal stenosis visualized bilaterally, bilateral lower extremity arteries exhibit diffuse plaque and multiphasic waveforms, unable to obtain bilateral ABIs due to elevated leg pressures >250 mmHg, possibly due to atherosclerosis    Hx of cardiovascular stress test 06/2013    EF 70% abn suggestive of anterolateral ischemia, possible RCA ischmeia, post left ventricular dilation suggestive multivessel CAD. Hx of echocardiogram 06/2013    EF50%, mild MR and TR, mild pulmonary HTN, mild AS    Hx of echocardiogram 11/01/2021    Left ventricular systolic function is normal, Mild concentric left ventricular hypertrophy Mildly dilated left atrium. Mitral annular calcification is present. Mild tricuspid regurgitation No evidence of any pericardial effusion    Hx of fall     \"last time 2018- due to neuropathy, have to use cane\"    [facility] 7/24/13, 06/2013 7/13-No clinacally significant arrhythmia. 6/13-multiple cycles of wenckebach episodes in addtion to some sinus tach    Hyperlipidemia     Hypertension     Hypothermia 2008    Malignant neoplasm of breast (female) ([facility] 2012    Neuropathy     \"have neuropathy of my legs\"    Normocytic normochromic anemia     Obstructive sleep apnea 2008 01- Has CPAP machine but has not used in over a year - states she has not had problems since her pacemaker was placed.     Old MI (myocardial infarction)     per pt on 1/15/2019\"had heart attack about a year ago\"    Osteoarthritis     Osteopenia     Osteopenia of multiple sites 9/14/2022    Pneumonia due to COVID-19 virus 03/22/2021    Primary malignant neoplasm of colon ([facility]     S/P cardiac cath 06/2013    no significant CAD false postive stress test    Skin cancer     Super obesity Umbilical hernia        PSHX:  has a past surgical history that includes Tonsillectomy (9651); Appendectomy (2004); colectomy (2004); Tunneled venous port placement (2004); cyst removal (2003); Breast lumpectomy (2007); Dilation and curettage of uterus (2006); hernia repair (2004); hernia repair (2008); Dental surgery; Breast surgery (02/13/2012); Tunneled venous port placement (02/13/2012); Breast biopsy (2/13/12); Mastectomy, radical (Right, 02/29/2012); pre-malignant / benign skin lesion excision (2/8/2013); lymph node dissection (2/29/2012); Cardiac catheterization (2007 & 2011); A-V cardiac pacemaker insertion (3/10/14); Cystoscopy (Bilateral, 12/15/14); pacemaker placement; Endoscopy, colon, diagnostic (12/14/2017); Colonoscopy (10-18-13); Colonoscopy (1/19/2016); Colonoscopy (12/14/2017); Colonoscopy (N/A, 1/16/2019); Colonoscopy (01/21/2020); Colonoscopy (N/A, 1/21/2020); Carotid endarterectomy (Right, 12/3/2021); Cystoscopy (Right, 5/15/2022); Dilation and curettage of uterus (N/A, 5/24/2022); Kidney surgery (05/24/2022); Lithotripsy (Right, 8/1/2022); and Lithotripsy (Left, 9/26/2022). Fam HX: family history includes Arthritis in her mother; Cancer in her brother and father; Diabetes in her father; Heart Disease in her brother, brother, and father; High Blood Pressure in her brother, father, and mother; High Cholesterol in her brother, father, and mother; No Known Problems in her sister; Seizures in her son. Soc HX:   Social History     Socioeconomic History    Marital status:      Spouse name: None    Number of children: 2    Years of education: 12    Highest education level: 12th grade   Occupational History    Occupation: retired   Tobacco Use    Smoking status: Never    Smokeless tobacco: Never   Vaping Use    Vaping Use: Never used   Substance and Sexual Activity    Alcohol use: No     Comment:           CAFFEINE: 2- 12oz nona daily & 2 cups coffee some nights.     Drug use: No    Sexual Infusions:   PRN Meds:     Labs      CBC:   Recent Labs     11/14/22 2010   WBC 12.4*   HGB 10.9*        BMP:    Recent Labs     11/14/22 2010      K 3.3*      CO2 27   BUN 9   CREATININE 0.6   GLUCOSE 171*     Hepatic:   Recent Labs     11/14/22 2010   AST 12*   ALT 9*   BILITOT 0.2   ALKPHOS 132*     Lipids:   Lab Results   Component Value Date/Time    CHOL 267 08/18/2022 02:29 PM    CHOL 182 07/29/2021 12:39 PM    HDL 44 08/18/2022 02:29 PM    TRIG 181 08/18/2022 02:29 PM     Hemoglobin A1C:   Lab Results   Component Value Date/Time    LABA1C 5.8 08/18/2022 02:30 PM     TSH: No results found for: TSH  Troponin:   Lab Results   Component Value Date/Time    TROPONINT <0.010 10/13/2022 05:00 PM    TROPONINT <0.010 09/18/2022 10:16 PM    TROPONINT <0.010 05/17/2022 01:39 AM     Lactic Acid: No results for input(s): LACTA in the last 72 hours. BNP: No results for input(s): PROBNP in the last 72 hours.   UA:  Lab Results   Component Value Date/Time    NITRU NEGATIVE 10/14/2022 11:30 AM    COLORU YELLOW 10/14/2022 11:30 AM    WBCUA 2 10/14/2022 11:30 AM    RBCUA NONE SEEN 10/14/2022 11:30 AM    MUCUS RARE 10/09/2022 01:50 AM    TRICHOMONAS NONE SEEN 10/14/2022 11:30 AM    YEAST RARE 09/11/2017 07:00 AM    BACTERIA NEGATIVE 10/14/2022 11:30 AM    CLARITYU CLEAR 10/14/2022 11:30 AM    SPECGRAV <1.005 10/14/2022 11:30 AM    LEUKOCYTESUR MODERATE 10/14/2022 11:30 AM    UROBILINOGEN 0.2 10/14/2022 11:30 AM    BILIRUBINUR NEGATIVE 10/14/2022 11:30 AM    BLOODU NEGATIVE 10/14/2022 11:30 AM    KETUA NEGATIVE 10/14/2022 11:30 AM    AMORPHOUS OCCASIONAL 04/15/2018 05:48 AM     Urine Cultures: No results found for: LABURIN  Blood Cultures: No results found for: BC  No results found for: BLOODCULT2  Organism:   Lab Results   Component Value Date/Time    ORG ECOL 10/16/2018 02:43 PM       Imaging/Diagnostics Last 24 Hours   XR FOOT RIGHT (MIN 3 VIEWS)    Result Date: 11/14/2022  EXAMINATION: THREE XRAY VIEWS OF THE RIGHT FOOT 11/14/2022 7:58 pm COMPARISON: None. HISTORY: ORDERING SYSTEM PROVIDED HISTORY: puncutre wound TECHNOLOGIST PROVIDED HISTORY: Reason for exam:->puncutre wound Reason for Exam: puncutre wound Additional signs and symptoms: dropped a pair of scissors on top of her foot Relevant Medical/Surgical History: breast and colon cancer, obesity FINDINGS: No acute fracture dislocation is seen. There is degenerative change with osteophytosis and extensive vascular calcification. There is also extensive soft tissue swelling in the region of the midfoot and forefoot without evidence for radiopaque foreign body. Extensive soft tissue swelling over the dorsum of the midfoot and forefoot. CT scan of the foot with contrast may be helpful for further evaluation if there is clinical concern for abscess. VL DUP LOWER EXTREMITY VENOUS BILATERAL    Result Date: 11/14/2022  EXAMINATION: DUPLEX VENOUS ULTRASOUND OF THE BILATERAL LOWER [id number]/14/2022 8:02 pm TECHNIQUE: Duplex ultrasound using B-mode/gray scaled imaging, Doppler spectral analysis and color flow Doppler was obtained of the deep venous structures of the lower bilateral extremities. COMPARISON: None. HISTORY: ORDERING SYSTEM PROVIDED HISTORY: leg pain swelling TECHNOLOGIST PROVIDED HISTORY: Reason for exam:->leg pain swelling FINDINGS: The visualized veins of the bilateral lower extremities are patent and free of echogenic thrombus. The veins demonstrate good compressibility with normal color flow study and spectral analysis. No evidence of DVT in either lower extremity.          Electronically signed by Bud Griffith MD on 11/15/2022 at 12:16 AM

## 2022-11-15 NOTE — ED PROVIDER NOTES
Emergency Department Encounter  Location: [address] [place] [address] EMERGENCY DEPARTMENT    Patient: Jaden Adkins  MRN: [id number]  : 1950  Date of evaluation: 2022  ED Provider: Ash Allen PA-C      Jaden Adkins was checked out to me by ZABRINA Velazquez. Please see his/her initial documentation for details of the patient's initial ED presentation, physical exam and completed studies. In brief, Jaden Adkins is a 67 y.o. female that presented to the emergency department for bilat le edema and puncture wound. Pt has hx of peripheral vascular disease and has had increased LE edema. She did drop scisors on her leg and suffered a puncture wound sweveral days ago endorses mild pain. Denies fevers, endorses chills however. States she is having significant dificulty walking secondary to leg pain. She states she cant take more than 5 steps as well as having marked difficulty transfering. HENT:  Normocephalic, Atraumatic, PERRL. EOMI. Sclera clear. Conjunctiva normal, No discharge. Neck/Lymphatics: supple, no JVD, no swollen nodes  Cardiovascular:   RRR,  no murmurs/rubs/gallops. Respiratory:   Nonlabored breathing. Normal breath sounds, No wheezing  Abdomen: Bowel sounds normal, Soft, No tenderness, no masses. Musculoskeletal:    On inspection of the lower extremity there is just general edema, body habitus, mild erythema, no significant macular rated indurated erythema. No weeping of wounds, no abscess formation. No cool or pale-appearing limb. Distal cap refill and pulses intact bilateral upper and lower extremities  Bilateral upper and lower extremity ROM intact without pain or obvious deficit  Integument:   Warm, Dry  Neurologic:  Alert & oriented , No focal deficits noted. Cranial nerves II through XII grossly intact. Normal gross motor coordination & motor strength bilateral upper and lower extremities  Sensation intact.   Psychiatric:  Affect normal, Mood normal.     I have reviewed and interpreted all of the currently available lab results and diagnostics from this visit:  Results for orders placed or performed during the hospital encounter of 11/14/22   CBC with Auto Differential   Result Value Ref Range    WBC 12.4 (H) 4.0 - 10.5 K/CU MM    RBC 3.94 (L) 4.2 - 5.4 M/CU MM    Hemoglobin 10.9 (L) 12.5 - 16.0 GM/DL    Hematocrit 34.2 (L) 37 - 47 %    MCV 86.8 78 - 100 FL    MCH 27.7 27 - 31 PG    MCHC 31.9 (L) 32.0 - 36.0 %    RDW 14.2 11.7 - 14.9 %    Platelets 908 834 - 618 K/CU MM    MPV 9.9 7.5 - 11.1 FL    Differential Type AUTOMATED DIFFERENTIAL     Segs Relative 71.2 (H) 36 - 66 %    Lymphocytes % 21.8 (L) 24 - 44 %    Monocytes % 4.3 (H) 0 - 4 %    Eosinophils % 1.9 0 - 3 %    Basophils % 0.2 0 - 1 %    Segs Absolute 8.9 K/CU MM    Lymphocytes Absolute 2.7 K/CU MM    Monocytes Absolute 0.5 K/CU MM    Eosinophils Absolute 0.2 K/CU MM    Basophils Absolute 0.0 K/CU MM    Nucleated RBC % 0.0 %    Total Nucleated RBC 0.0 K/CU MM    Total Immature Neutrophil 0.07 K/CU MM    Immature Neutrophil % 0.6 (H) 0 - 0.43 %   Comprehensive Metabolic Panel   Result Value Ref Range    Sodium 141 135 - 145 MMOL/L    Potassium 3.3 (L) 3.5 - 5.1 MMOL/L    Chloride 103 99 - 110 mMol/L    CO2 27 21 - 32 MMOL/L    BUN 9 6 - 23 MG/DL    Creatinine 0.6 0.6 - 1.1 MG/DL    Est, Glom Filt Rate >60 >60 mL/min/1.73m2    Glucose 171 (H) 70 - 99 MG/DL    Calcium 9.6 8.3 - 10.6 MG/DL    Albumin 3.7 3.4 - 5.0 GM/DL    Total Protein 7.0 6.4 - 8.2 GM/DL    Total Bilirubin 0.2 0.0 - 1.0 MG/DL    ALT 9 (L) 10 - 40 U/L    AST 12 (L) 15 - 37 IU/L    Alkaline Phosphatase 132 (H) 40 - 129 IU/L    Anion Gap 11 4 - 16   Lactic Acid   Result Value Ref Range    Lactate 1.7 0.4 - 2.0 mMOL/L     No results found. Final ED Course and MDM: In brief, Liliana De Los Santos is a 67 y.o. female whose care was signed out to me by the outgoing provider.  In brief, has history of peripheral arterial disease. Presents today with bilateral lower extremity pain. Swelling. This causing her decrease ability to perform her ADLs. Given she cannot perform ADLs cannot be discharged safely. Her work-up here in the emergency department which included venous arterial ultrasound x-ray of the foot. Revealed no acute abnormalities requiring emergent intervention. There was soft tissue edema along the dorsum of the midfoot and forefoot. However on physical examination there is no evidence of infection there. This is supported by a white blood cell count of 12.4. With no significant shift. Metabolic panel unremarkable normal lactic acidosis having low suspicion of septicemia. Or acute infection. Patient will be admitted to the hospital for further evaluation  ED Medication Orders (From admission, onward)      Start Ordered     Status Ordering Provider    11/14/22 2000 11/14/22 1956  tetanus-diphth-acell pertussis (BOOSTRIX) injection 0.5 mL  ONCE         Last MAR action: Given - by Michael Lanza on 11/14/22 at 2045 [facility]    11/14/22 2000 11/14/22 1956  HYDROcodone-acetaminophen ([address]) 5-325 MG per tablet 1 tablet  ONCE         Last MAR action: Given - by Michael Lanza on 11/14/22 at 2045 MARY BALTAZAR            Final Impression    No diagnosis found.     DISPOSITION       (Please note that portions of this note may have been completed with a voice recognition program. Efforts were made to edit the dictations but occasionally words are mis-transcribed.)    Suzan Seymour, [address], [place]  11/28/22 [id number]

## 2022-11-15 NOTE — PROGRESS NOTES
procalcitonin   -Cont Vanco IV, Adding Cefipime IV and DCing Doxy based on her Urine Cx Sensitivity from Oct 9,2022 which is untreated (Urology- Dr. Samuel  office called in 2 different oral antibiotics, but she hadn't picked them up yet)  -Consult ID due to MDRO on Urine Cx with Proteus Mirabilis since 5/15/22    Hypokalemia:  - giving potassium 20 mEQ PO X 1 today, recheck BMP in the am     Anemia:   -Hgb 10.9, Hct 34.2, RBC 3.94  -added Anemia Panel   -B12/Folate, Ferritin, Iron Studies, Retic are normal       Chronic Medical Problems:     Chronic Peripheral Neurophathy    Breast CA:   -cont Femara PO QD     Asthma:  - cont Flovent inahler 2 puffs BID and albuterol HFA prn     Sleep Apnea:  -has seen DR. Laron Jean-Baptiste for this is and is not interested in CPAP     Morbid Obesity:  -BMI 45.18  Reporting recurrent falls for more than 6 months  Was recently in SNF and was discharged, has been declining for past few weeks  PT OT consult for evaluation     Moderate aortic stenosis:  -Undergoing surveillance with cardiology     Moderate pulmonary hypertension:  -Could be secondary to underlying lung parenchymal disease or obstructive sleep apnea in the setting of morbid obesity  Recommend right heart cath in the outpatient setting for further classification of disease process     Hyperlipidemia:  -Continue statin     Carotid artery stenosis:  -Status post right CEA  -Undergoing surveillance with cardiothoracic surgery  -Continue aspirin and statin    Diet Reg Diet- Low Sodium    DVT Prophylaxis [x] Lovenox, []  Heparin, [] SCDs, [] Ambulation,  [] Eliquis, [] Xarelto  [] Coumadin   Code Status Prior   Disposition From: Home  Expected Disposition: Home  Estimated Date of Discharge: 2-3days  Patient requires continued admission due to Cellulitis with weakness   Surrogate Decision Maker/ POA Stas Maradiaga      Subjective:     Chief Complaint: Laceration (Puncture wound to right foot from dropping scissors.  Current cellulitis in right foot)       Nasima Rico is a 67 y.o. female who presents with right foot redness and swelling, after dropping scissors on her foot 2 days ago. Doesn't notice much pain due to her chronic neuropathy, but is not diabetic. Not sure why she has neuropathy. She doesn't have any urinary sxs such as abd/pubic pain, urinary frequency/urgency or pain with urination. Has bilat kidney stones and gets UTI's quite often. Sees Dr. Joe Barkley in Urology office. Review of Systems:    Ten point ROS is negative, unless otherwise noted above. Objective:   No intake or output data in the 24 hours ending 11/15/22 0855     Vitals:   Vitals:    11/15/22 0823   BP:    Pulse:    Resp:    Temp:    SpO2: 94%       Physical Exam:     General: NAD  Eyes: EOMI  ENT: neck supple  Cardiovascular: Regular rate. Normal S1/S2, no murmurs or gallops  Respiratory: Clear to auscultation bilaterally, normal oxygenation on RA  Gastrointestinal: Soft, mild generalized abd tenderness to deep palpation, normal BS x 4  Genitourinary: mild suprapubic tenderness to palpation only   Musculoskeletal: No edema  Skin: warm, dry,  with excessive warmth and redness/swelling/tenderness on the top of her right foot, redness of her bilateral LE with +1 pedal edema, bilateral LE dryness and flaking   Neuro: Alert and oriented x 3  Psych: Mood appropriate.      Medications:   Medications:    fluticasone  2 puff Inhalation BID    letrozole  2.5 mg Oral Nightly    simvastatin  20 mg Oral Nightly    doxycycline Monohydrate  100 mg Oral BID    vancomycin  750 mg IntraVENous Q12H      Infusions:   PRN Meds: albuterol sulfate HFA, 2 puff, Q6H PRN        Labs      Recent Results (from the past 24 hour(s))   CBC with Auto Differential    Collection Time: 11/14/22  8:10 PM   Result Value Ref Range    WBC 12.4 (H) 4.0 - 10.5 K/CU MM    RBC 3.94 (L) 4.2 - 5.4 M/CU MM    Hemoglobin 10.9 (L) 12.5 - 16.0 GM/DL    Hematocrit 34.2 (L) 37 - 47 %    MCV 86.8 78 - 100 FL    MCH 27.7 27 - 31 PG    MCHC 31.9 (L) 32.0 - 36.0 %    RDW 14.2 11.7 - 14.9 %    Platelets 133 646 - 597 K/CU MM    MPV 9.9 7.5 - 11.1 FL    Differential Type AUTOMATED DIFFERENTIAL     Segs Relative 71.2 (H) 36 - 66 %    Lymphocytes % 21.8 (L) 24 - 44 %    Monocytes % 4.3 (H) 0 - 4 %    Eosinophils % 1.9 0 - 3 %    Basophils % 0.2 0 - 1 %    Segs Absolute 8.9 K/CU MM    Lymphocytes Absolute 2.7 K/CU MM    Monocytes Absolute 0.5 K/CU MM    Eosinophils Absolute 0.2 K/CU MM    Basophils Absolute 0.0 K/CU MM    Nucleated RBC % 0.0 %    Total Nucleated RBC 0.0 K/CU MM    Total Immature Neutrophil 0.07 K/CU MM    Immature Neutrophil % 0.6 (H) 0 - 0.43 %   Comprehensive Metabolic Panel    Collection Time: 11/14/22  8:10 PM   Result Value Ref Range    Sodium 141 135 - 145 MMOL/L    Potassium 3.3 (L) 3.5 - 5.1 MMOL/L    Chloride 103 99 - 110 mMol/L    CO2 27 21 - 32 MMOL/L    BUN 9 6 - 23 MG/DL    Creatinine 0.6 0.6 - 1.1 MG/DL    Est, Glom Filt Rate >60 >60 mL/min/1.73m2    Glucose 171 (H) 70 - 99 MG/DL    Calcium 9.6 8.3 - 10.6 MG/DL    Albumin 3.7 3.4 - 5.0 GM/DL    Total Protein 7.0 6.4 - 8.2 GM/DL    Total Bilirubin 0.2 0.0 - 1.0 MG/DL    ALT 9 (L) 10 - 40 U/L    AST 12 (L) 15 - 37 IU/L    Alkaline Phosphatase 132 (H) 40 - 129 IU/L    Anion Gap 11 4 - 16   Lactic Acid    Collection Time: 11/14/22  8:10 PM   Result Value Ref Range    Lactate 1.7 0.4 - 2.0 mMOL/L   Urinalysis    Collection Time: 11/15/22  2:00 AM   Result Value Ref Range    Color, UA YELLOW YELLOW    Clarity, UA CLEAR CLEAR    Glucose, Urine NEGATIVE NEGATIVE MG/DL    Bilirubin Urine NEGATIVE NEGATIVE MG/DL    Ketones, Urine NEGATIVE NEGATIVE MG/DL    Specific Gravity, UA 1.015 1.001 - 1.035    Blood, Urine TRACE (A) NEGATIVE    pH, Urine 7.5 5.0 - 8.0    Protein, UA NEGATIVE NEGATIVE MG/DL    Urobilinogen, Urine 0.2 0.2 - 1.0 MG/DL    Nitrite Urine, Quantitative POSITIVE (A) NEGATIVE    Leukocyte Esterase, Urine LARGE NUMBER OR AMOUNT OBSERVED (A) NEGATIVE   Microscopic Urinalysis    Collection Time: 11/15/22  2:00 AM   Result Value Ref Range    RBC, UA NONE SEEN 0 - 6 /HPF    WBC,  (H) 0 - 5 /HPF    Bacteria, UA NEGATIVE NEGATIVE /HPF    Squam Epithel, UA 8 /HPF    Mucus, UA RARE (A) NEGATIVE HPF    Trichomonas, UA NONE SEEN NONE SEEN /HPF        Imaging/Diagnostics Last 24 Hours   XR FOOT RIGHT (MIN 3 VIEWS)    Result Date: 11/14/2022  EXAMINATION: THREE XRAY VIEWS OF THE RIGHT FOOT 11/14/2022 7:58 pm COMPARISON: None. HISTORY: ORDERING SYSTEM PROVIDED HISTORY: puncutre wound TECHNOLOGIST PROVIDED HISTORY: Reason for exam:->puncutre wound Reason for Exam: puncutre wound Additional signs and symptoms: dropped a pair of scissors on top of her foot Relevant Medical/Surgical History: breast and colon cancer, obesity FINDINGS: No acute fracture dislocation is seen. There is degenerative change with osteophytosis and extensive vascular calcification. There is also extensive soft tissue swelling in the region of the midfoot and forefoot without evidence for radiopaque foreign body. Extensive soft tissue swelling over the dorsum of the midfoot and forefoot. CT scan of the foot with contrast may be helpful for further evaluation if there is clinical concern for abscess. VL DUP LOWER EXTREMITY VENOUS BILATERAL    Result Date: 11/14/2022  EXAMINATION: DUPLEX VENOUS ULTRASOUND OF THE BILATERAL LOWER [id number]/14/2022 8:02 pm TECHNIQUE: Duplex ultrasound using B-mode/gray scaled imaging, Doppler spectral analysis and color flow Doppler was obtained of the deep venous structures of the lower bilateral extremities. COMPARISON: None. HISTORY: ORDERING SYSTEM PROVIDED HISTORY: leg pain swelling TECHNOLOGIST PROVIDED HISTORY: Reason for exam:->leg pain swelling FINDINGS: The visualized veins of the bilateral lower extremities are patent and free of echogenic thrombus.  The veins demonstrate good compressibility with normal color flow study and spectral analysis. No evidence of DVT in either lower extremity.        Electronically signed by AUSTIN Edwards CNP on 11/15/2022 at 8:56 AM

## 2022-11-15 NOTE — PROGRESS NOTES
4 Eyes Skin Assessment     NAME:  Michelle Zuleta  YOB: 1950  MEDICAL RECORD NUMBER:  [id number]    The patient is being assess for  Admission    I agree that 2 RN's have performed a thorough Head to Toe Skin Assessment on the patient. ALL assessment sites listed below have been assessed. Areas assessed by both nurses:    Head, Face, Ears, Shoulders, Back, Chest, Arms, Elbows, Hands, Sacrum. Buttock, Coccyx, Ischium, and Legs. Feet and Heels    Bilateral lower leg cellulitis   Red + blanching sacrum  Skin folds - moist and vulnerable        Does the Patient have a Wound?  No noted wound(s)       Rangel Prevention initiated:  Yes   Wound Care Orders initiated:  No    Pressure Injury (Stage 3,4, Unstageable, DTI, NWPT, and Complex wounds) if present place referral/consult order under [de-identified] No    New and Established Ostomies if present place consult order under : No      Nurse 1 eSignature: Electronically signed by Christine Linares RN on 11/15/22 at 4:24 AM EST    **SHARE this note so that the co-signing nurse is able to place an eSignature**    Nurse 2 eSignature: Electronically signed by Nydia Resendiz RN on 11/15/22 at 5:24 AM EST

## 2022-11-15 NOTE — ED NOTES
ED TO INPATIENT SBAR HANDOFF    Patient Name: Nasima Rico   :  1950  67 y.o. MRN:  [id number]  Preferred Name     ED Room #:  ED26/ED-26  Family/Caregiver Present no   Restraints no   Sitter no   Sepsis Risk Score Sepsis Risk Score: 1.72    Situation  Code Status: Prior No additional code details. Allergies: Bactrim [sulfamethoxazole-trimethoprim], Lipitor [atorvastatin], Taxol [paclitaxel], Aminoglycosides, Demerol, Neosporin [bacitracin-neomycin-polymyxin], Other, and Talc  Weight:   Patient Vitals for the past 96 hrs (Last 3 readings):   Weight   22 1739 247 lb (112 kg)     Arrived from: home  Chief Complaint:   Chief Complaint   Patient presents with    Laceration     Laceration to right foot from dropping scissors. Current cellulitis in right foot     Hospital Problem/Diagnosis:  Principal Problem:    Cellulitis  Resolved Problems:    * No resolved hospital problems. *    Imaging:   VL DUP LOWER EXTREMITY VENOUS BILATERAL   Final Result   No evidence of DVT in either lower extremity. XR FOOT RIGHT (MIN 3 VIEWS)   Final Result   Extensive soft tissue swelling over the dorsum of the midfoot and forefoot. CT scan of the foot with contrast may be helpful for further evaluation if   there is clinical concern for abscess.            Abnormal labs:   Abnormal Labs Reviewed   CBC WITH AUTO DIFFERENTIAL - Abnormal; Notable for the following components:       Result Value    WBC 12.4 (*)     RBC 3.94 (*)     Hemoglobin 10.9 (*)     Hematocrit 34.2 (*)     MCHC 31.9 (*)     Segs Relative 71.2 (*)     Lymphocytes % 21.8 (*)     Monocytes % 4.3 (*)     Immature Neutrophil % 0.6 (*)     All other components within normal limits   COMPREHENSIVE METABOLIC PANEL - Abnormal; Notable for the following components:    Potassium 3.3 (*)     Glucose 171 (*)     ALT 9 (*)     AST 12 (*)     Alkaline Phosphatase 132 (*)     All other components within normal limits     Critical values: no     Abnormal Assessment Findings:      Background  History:   Past Medical History:   Diagnosis Date    Anemia     Anxiety 1978    Arthritis     generalized    Asthma 2006    AV block     2nd degree per hospital in june2013    Blood poisoning 1994    Blood transfusion     12/2003    Breast cancer ([facility] [address] 02/29/2012    right    CAD (coronary artery disease)     Cancer ([facility] [address] 2007    skin (face) & colon(2003)/ 2/2012 dx of right breast ca(pc)    Carcinoma of breast ([facility] [address] 02/29/2012    right arm no b/p or sticks    Cardiac pacemaker 03/10/2014    [employer]tronic dual lead    Chronic cystitis     Chronic respiratory failure (HCC)     2 L/min oxygen at night-time    Colon cancer ([facility] [address] 2013    COPD (chronic obstructive pulmonary disease) (HCC)     CTS (carpal tunnel syndrome)     Depression     Diverticulitis     H/O 24 hour EKG monitoring 2/28/2014 7/24/13 2/14 complete heart block 7/13No clinically significant arrthymia noted.  H/O cardiac catheterization 10/31/2011, 7/11/2007    10/31/2011-No significant CAD. Cardiolite stress test was false positive-Dr Dottie Flynn; 7/11/2007-No CAD, global function intact;    H/O cardiovascular stress test 10/20/2011, 3/23/2010    10/20/2011-Lexiscan- Evid of mild ischemia in Left CX region. EF 70%. Global LVSF normal;    H/O cardiovascular stress test 01/07/2015    EF 77%.    Normal Stress Test.    H/O chest pain 04/2005    sees Dr Cirilo Wise H/O Doppler ultrasound 02/20/2008 2/20/2008-CAROTID DOPPLER- Normal carotids bilaterally;    H/O Doppler ultrasound 06/06/2013    CAROTID DOPPLER- there is heterogeneous, irregular atherosclerotic plaque noted in the right internal carotid artery,  doppler flow velocities within the right internal carotid artery are elevated, consistent with a mild, less than 50%stenosis, there is intimal thickening but no significant atherosclerotic plaque noted in the left internal carotid artery, the left carotid are within normal limits    H/O echocardiogram 4/9/2012, 10/20/2011    4/9/2012-LVSF normal EF=>55%. impaired LV relaxation;    H/O echocardiogram 12/31/2014    EF 50-55%. LV shows mild concentric hypertrophy. Mildly dilated left atrium. Mildly dilated right ventricle. Sclerotic but not stenotic aortic valve. Mitral annular calcification. Mild MR, Mild TR, Mild pulm HTN.    H/O urinary incontinence     History of blood transfusion     Hx antineoplastic chemo     Hx of Arterial Doppler ultrasound 07/01/2019    No hemodynamically significant or focal stenosis visualized bilaterally, bilateral lower extremity arteries exhibit diffuse plaque and multiphasic waveforms, unable to obtain bilateral ABIs due to elevated leg pressures >250 mmHg, possibly due to atherosclerosis    Hx of cardiovascular stress test 06/2013    EF 70% abn suggestive of anterolateral ischemia, possible RCA ischmeia, post left ventricular dilation suggestive multivessel CAD.  Hx of echocardiogram 06/2013    EF50%, mild MR and TR, mild pulmonary HTN, mild AS    Hx of echocardiogram 11/01/2021    Left ventricular systolic function is normal, Mild concentric left ventricular hypertrophy Mildly dilated left atrium. Mitral annular calcification is present. Mild tricuspid regurgitation No evidence of any pericardial effusion    Hx of fall     \"last time 2018- due to neuropathy, have to use cane\"    HX OTHER MEDICAL 7/24/13, 06/2013 7/13-No clinacally significant arrhythmia. 6/13-multiple cycles of wenckebach episodes in addtion to some sinus tach    Hyperlipidemia     Hypertension     Hypothermia 2008    Malignant neoplasm of breast (female) ([facility] [address] 2012    Neuropathy     \"have neuropathy of my legs\"    Normocytic normochromic anemia     Obstructive sleep apnea 2008 01- Has CPAP machine but has not used in over a year - states she has not had problems since her pacemaker was placed.     Old MI (myocardial infarction)     per pt on 1/15/2019\"had heart attack about a year ago\"    Osteoarthritis     Osteopenia     Osteopenia of multiple sites 9/14/2022    Pneumonia due to COVID-19 virus 03/22/2021    Primary malignant neoplasm of colon ([address]     S/P cardiac cath 06/2013    no significant CAD false postive stress test    Skin cancer     Super obesity     Umbilical hernia        Assessment    Vitals/MEWS: MEWS Score: 1  Level of Consciousness: Alert (0)   Vitals:    11/14/22 1739 11/14/22 2203 11/14/22 2302 11/15/22 0020   BP: (!) 141/67 (!) 127/46 (!) 132/43 (!) 125/43   Pulse: 76 78 76 64   Resp: 18 18 18 16   Temp: 97.8 °F (36.6 °C)  98 °F (36.7 °C) 98 °F (36.7 °C)   TempSrc: Oral      SpO2: 100% 91% 96% 92%   Weight: 247 lb (112 kg)      Height: 5' 2\" (1.575 m)        FiO2 (%):    O2 Flow Rate:      Cardiac Rhythm:    Pain Assessment:   [] Verbal [] Artelia Edward Scale  Pain Scale: Pain Assessment  Pain Assessment: 0-10  Pain Level: 6  Pain Location: Leg, Foot  Pain Orientation: Right  Pain Descriptors: Aching  Pain Type: Acute pain  Pain Frequency: Continuous  Last documented pain score (0-10 scale) Pain Level: 6  Last documented pain medication administered:    Mental Status: oriented and alert  NIH Score:    C-SSRS: Risk of Suicide: No Risk  Bedside swallow:    [place] Coma Scale (GCS): [place] Coma Scale  Eye Opening: Spontaneous  Best Verbal Response: Oriented  Best Motor Response: Obeys commands  [place] Coma Scale Score: 15  Active LDA's:   Peripheral IV 11/14/22 Left Antecubital (Active)   Site Assessment Clean, dry & intact 11/14/22 2013   Line Status Blood return noted; Flushed;Normal saline locked 11/14/22 2013   Line Care Cap changed 11/14/22 2013   Phlebitis Assessment No symptoms 11/14/22 2013   Infiltration Assessment 0 11/14/22 2013   Dressing Status New dressing applied 11/14/22 2013   Dressing Type Transparent 11/14/22 2013   Dressing Intervention New 11/14/22 2013     PO Status: Regular  Pertinent or High Risk Medications/Drips: no o If Yes, please provide details:    Pending Blood Product Administration: no     You may also review the ED PT Care Timeline found under the Summary Nursing Index tab. Recommendation    Pending orders    Plan for Discharge (if known): Additional Comments: Pt alert and oriented. Uses cane for ambulation.     If any further questions, please call Sending RN at [phone]    Electronically signed by: Electronically signed by Galileo Norris RN on 11/15/2022 at 12:22 AM       Galileo Norris RN  11/14/22 [address] MICHELLE Awad  11/15/22 0022

## 2022-11-15 NOTE — CARE COORDINATION
CM consulted for possible SNF placement. CM briefly reviewed chart and noted pt lives with bf, has PCP and insurance. Pt was recently admitted 10/13-10/18 for bilateral lower extremity edema. Pt was d/c at this time to [place]. CM went to speak to pt. Pt states she barely walks. She is 8 feet to the bathroom and 8 feet to the front door. Pt states she uses furniture to help ge to both places. DME does have a cane and a walker. Pt states house is not big enough to use walker. Pt also reports she had HC but a rule she has is no home care in the house. Pt reports she has had things come up missing when she let them in the home. Pt's only SNF choice is [place]. Pt will only go to [place] no where else. CM explained that PT/OT would have to see pt and they would make recommendations. Also told pt a inpt CM would follow up on the placement. CM updated Nolan GERBER. CM placed handoff note. CM faxed face sheet to [facility].

## 2022-11-15 NOTE — PROGRESS NOTES
Comprehensive Nutrition Assessment    Type and Reason for Visit:  Initial, Positive Nutrition Screen (Unintentional wt loss)    Nutrition Recommendations/Plan:   Continue Low Sodium diet   Replete low potassium levels   Obtain measured weights   Re-perform NFPE as needed during stay if weight loss continues to occur or as dry weight is taken   Begin Diabetic oral nutrition supplements daily      Malnutrition Assessment:  Malnutrition Status:  Severe malnutrition (11/15/22 9649)    Context:  Chronic Illness     Findings of the 6 clinical characteristics of malnutrition:  Energy Intake:  No significant decrease in energy intake  Weight Loss:  Greater than 10% over 6 months (14%)     Body Fat Loss:   (Moderate body fat loss) Orbital, Triceps, Buccal region   Muscle Mass Loss:  Mild muscle mass loss Thigh (quadraceps), Calf (gastrocnemius), Temples (temporalis), Hand (interosseous)  Fluid Accumulation:  Severe (+2 pitting) Extremities, Generalized   Strength:  Not Performed    Nutrition Assessment:    Admitted with cellulitis. Hx colon cancer, breast cancer, osteopenia, JAMIE, NSTEMI, HLD, PD, CAD, HTN. H/o following low sodium at home, prepares meal with reduced salt, reduced added sugars. Pt screened positive for weight loss, pt is familiar with this RD. Currently pt on low sodium diet, observed pt consuming lunch. Ongoing weight loss since last admission, likely related to fluids. +2 pitting/pitting edema in extremities noted. Significant wt loss 14% x 6 months. Mild to moderate wasting. Continues to meet criteria for malnutrition. Encourage low calorie but high protein intake with therapy. Will provide oral nutrition supplements. Follow as high nutrition risk. Nutrition Related Findings:    Alk Phos 132, ALT 9, AST 12, Glu 171, K 3.3. Noted pain with ambulation and BLE edema may contribute to muscle wasting.  Wound Type: None       Current Nutrition Intake & Therapies:    Average Meal Intake: Unable to assess  Average Supplements Intake: None Ordered  ADULT DIET; Regular; Low Sodium (2 gm)    Anthropometric Measures:  Height: 5' 2\" (157.5 cm)  Ideal Body Weight (IBW): 110 lbs (50 kg)    Admission Body Weight:  (stated, noted 257 lb 3.2 oz on office visit 10/13/22 noted pt weighed 268 lb in last admission with +4 pitting edema)  Current Body Weight: 246 lb 14.6 oz (112 kg), 224.5 % IBW. Weight Source: Stated  Current BMI (kg/m2): 45.1  Usual Body Weight: 287 lb (130.2 kg) (5/12/22)  % Weight Change (Calculated): -14  Weight Adjustment For: No Adjustment                 BMI Categories: Obese Class 3 (BMI 40.0 or greater)    Estimated Daily Nutrient Needs:  Energy Requirements Based On: Formula  Weight Used for Energy Requirements: Current  Energy (kcal/day): 8804-4761 (MSJ)  Weight Used for Protein Requirements: Ideal  Protein (g/day): 60-65 (1.2-1.3 g/kg)  Method Used for Fluid Requirements: 1 ml/kcal  Fluid (ml/day): 4483-9774    Nutrition Diagnosis:    In context of chronic illness, Severe malnutrition related to inadequate protein-energy intake (generalized weakness) as evidenced by weight loss greater than or equal to 10% in 6 months, mild muscle loss, moderate loss of subcutaneous fat, localized or generalized fluid accumulation    Nutrition Interventions:   Food and/or Nutrient Delivery: Continue Current Diet, Start Oral Nutrition Supplement  Nutrition Education/Counseling: No recommendation at this time  Coordination of Nutrition Care: Continue to monitor while inpatient, Coordination of Care       Goals:     Goals: Meet at least 75% of estimated needs       Nutrition Monitoring and Evaluation:   Behavioral-Environmental Outcomes: None Identified  Food/Nutrient Intake Outcomes: Food and Nutrient Intake, Supplement Intake  Physical Signs/Symptoms Outcomes: Biochemical Data, GI Status, Fluid Status or Edema, Meal Time Behavior, Nutrition Focused Physical Findings, Weight, Skin    Discharge Planning:    Continue current diet     Suzi Hayward RD, LD  Contact: [phone]

## 2022-11-15 NOTE — CONSULTS
Infectious Disease Consult Note  11/15/2022   Patient Name: Tosin Perez : 1950   Impression  Possible Complicated UTI:  BLE Cellulitis, R>L Secondary to Chronic Venous Stasis with Puncture wound from Scissors:  Chronic Cystitis/ Past Kidney Stones s/p Lithotripsy:  Afebrile  Leukocytosis 12.4 to normalized  Pct 0.039  11/15-UA , RBC none  11/15-Urine culture Pending  Past Urine cultures: 10/9/22: Proteus mirabilis, 22 E.coli, Proteus mirabilis, 5/15/22: Proteus mirabilis. 10/16/18: E.coli, Proteus Spp.   11/15-MRSA screen Pending  11/15-BC Pending  -XR Foot Right: Extensive soft tissue swelling over the dorsum of the midfoot and forefoot. CT scan of the foot with contrast may be helpful for further evaluation if   there is clinical concern for abscess. -VL BLE Venous: no evidence of DVTs  Chronic Hypoxic Respiratory Failure on Home Oxygen 2 L/min/NC Secondary to COPD/Asthma/ JAMIE on CPAP:  PPM/ CAD/ HTN/ HDL:  Seizures: Morbid Obesity: BMI: 45.18  Past Right Breast and Colon Cancer:  Multi-morbidity: per PMHx: last colonoscopy , s/p right CEA , allergy to Bactrim (anaphylaxis), OA, anxiety/depression, diverticulitis, right breast cancer, colon cancer  Plan:  Continue IV cefepime 2 gm q12h  Continue IV vancomycin per pharmacy dosing  Await Urine culture  Await MRSA screen and BC    Thank you for allowing me to consult in the care of this patient.  ------------------------  REASON FOR CONSULT: Infective syndrome \"recurrent UTI;'s MDRO- Proteus Mirabalis on multiple Urine Cx- most recent 10/9/22\"  Requested by: AUSTIN Osorio  HPI:Patient is a 67 y.o.   female with a history of anemia, anxiety/depression, asthma/COPD on home oxygen 2 L/min/NC, right breast cancer, CAD, PPM, chronic cystitis, colon cancer, diverticulitis, HDL, HTN, JAMIE on CPAP, OA, morbid obesity, kidney stones and lithotripsy who was admitted 2022 for further evaluation and management of severe BLE edema and possible cellulitis R>L. She reports she dropped a pair of scissors onto her right foot causing a puncture wound, on 11/12/22, 2 days prior to admission, and she fell onto her right foot, and since that event, her right foot has had worsening edema and warmth with tenderness. She was started on IV empiric ABX therapy of vancomycin and cefepime on 11/15/22. She underwent a right foot XR which revealed extensive soft tissue swelling over the dorsum of the midfoot and forefoot, and a bilateral BLE venous doppler which showed no evidence of a DVT in either lower extremity. ID has been consulted for a possible UTI. Patient reports has bilateral CVA tenderness with urinary incontinence. ? Infectious diseases service was consulted to evaluate the pt, and recommend further investigative and therapeutic measures. ROS: Other systems reviewed Including eyes, ENT, respiratory, cardiovascular, GI, , dermatologic, neurologic, psych, hem/lymphatic, musculoskeletal and endocrine were negative other than what is mentioned above.      Patient Active Problem List    Diagnosis Date Noted    Cellulitis 11/15/2022    Cellulitis of right foot 11/15/2022    Ambulatory dysfunction 10/15/2022    Swelling of lower extremity 10/14/2022    Osteopenia of multiple sites 60/19/6968    Complicated urinary tract infection 05/15/2022    Carotid stenosis, right 12/03/2021    Bilateral carotid artery stenosis 11/23/2021    Moderate persistent asthma without complication 41/40/8436    CAD (coronary artery disease)     Arthritis     Hepatomegaly, not elsewhere classified 08/21/2020    Moderate aortic stenosis 07/07/2019    Morbid obesity with BMI of 45.0-49.9, adult ([address] 05/26/2019    PVD (peripheral vascular disease) ([facility] 02/12/2019    NSTEMI (non-ST elevated myocardial infarction) ([address] 02/11/2017    Moderate malnutrition ([address] 07/25/2015    Asthma     Hypertension     Depression     Obstructive sleep apnea Nocturnal oxygen desaturation     Chronic cystitis 12/15/2014    Cardiac pacemaker 03/10/2014    Mild asthma 11/20/2013    Severe obstructive sleep apnea 11/20/2013    Super obesity 09/11/2013    JAMIE (obstructive sleep apnea) 07/17/2013    Abnormal cardiovascular stress test 06/28/2013    Heart block AV second degree 06/26/2013    Hyperlipidemia     Malignant neoplasm of upper-outer quadrant of right breast in female, estrogen receptor positive ([address] 08/29/2012    Diffuse cystic mastopathy 08/29/2012    H/O chest pain 04/01/2005     Past Medical History:   Diagnosis Date    Anemia     Anxiety 1978    Arthritis     generalized    Asthma 2006    AV block     2nd degree per hospital in june2013    Blood poisoning 1994    Blood transfusion     12/2003    Breast cancer ([address] 02/29/2012    right    CAD (coronary artery disease)     Cancer ([address] 2007    skin (face) & colon(2003)/ 2/2012 dx of right breast ca(pc)    Carcinoma of breast ([facility] 02/29/2012    right arm no b/p or sticks    Cardiac pacemaker 03/10/2014    Medtronic dual lead    Chronic cystitis     Chronic respiratory failure (HCC)     2 L/min oxygen at night-time    Colon cancer ([address] 2013    COPD (chronic obstructive pulmonary disease) ([address]     CTS (carpal tunnel syndrome)     Depression     Diverticulitis     H/O 24 hour EKG monitoring 2/28/2014 7/24/13 2/14 complete heart block 7/13No clinically significant arrthymia noted. H/O cardiac catheterization 10/31/2011, 7/11/2007    10/31/2011-No significant CAD. Cardiolite stress test was false positive-Dr Parveen Carter; 7/11/2007-No CAD, global function intact;    H/O cardiovascular stress test 10/20/2011, 3/23/2010    10/20/2011-Lexiscan- Evid of mild ischemia in Left CX region. EF 70%. Global LVSF normal;    H/O cardiovascular stress test 01/07/2015    EF 77%.    Normal Stress Test.    H/O chest pain 04/2005    sees Dr Parveen Carter    H/O Doppler ultrasound 02/20/2008 2/20/2008-CAROTID DOPPLER- Normal carotids bilaterally;    H/O Doppler ultrasound 06/06/2013    CAROTID DOPPLER- there is heterogeneous, irregular atherosclerotic plaque noted in the right internal carotid artery,  doppler flow velocities within the right internal carotid artery are elevated, consistent with a mild, less than 50%stenosis, there is intimal thickening but no significant atherosclerotic plaque noted in the left internal carotid artery, the left carotid are within normal limits    H/O echocardiogram 4/9/2012, 10/20/2011    4/9/2012-LVSF normal EF=>55%. impaired LV relaxation;    H/O echocardiogram 12/31/2014    EF 50-55%. LV shows mild concentric hypertrophy. Mildly dilated left atrium. Mildly dilated right ventricle. Sclerotic but not stenotic aortic valve. Mitral annular calcification. Mild MR, Mild TR, Mild pulm HTN.    H/O urinary incontinence     History of blood transfusion     Hx antineoplastic chemo     Hx of Arterial Doppler ultrasound 07/01/2019    No hemodynamically significant or focal stenosis visualized bilaterally, bilateral lower extremity arteries exhibit diffuse plaque and multiphasic waveforms, unable to obtain bilateral ABIs due to elevated leg pressures >250 mmHg, possibly due to atherosclerosis    Hx of cardiovascular stress test 06/2013    EF 70% abn suggestive of anterolateral ischemia, possible RCA ischmeia, post left ventricular dilation suggestive multivessel CAD. Hx of echocardiogram 06/2013    EF50%, mild MR and TR, mild pulmonary HTN, mild AS    Hx of echocardiogram 11/01/2021    Left ventricular systolic function is normal, Mild concentric left ventricular hypertrophy Mildly dilated left atrium. Mitral annular calcification is present. Mild tricuspid regurgitation No evidence of any pericardial effusion    Hx of fall     \"last time 2018- due to neuropathy, have to use cane\"    [facility] 7/24/13, 06/2013 7/13-No clinacally significant arrhythmia.  6/13-multiple cycles of wenckebach episodes in addtion to some sinus tach    Hyperlipidemia     Hypertension     Hypothermia 2008    Malignant neoplasm of breast (female) ([facility] 2012    Neuropathy     \"have neuropathy of my legs\"    Normocytic normochromic anemia     Obstructive sleep apnea 2008 01- Has CPAP machine but has not used in over a year - states she has not had problems since her pacemaker was placed. Old MI (myocardial infarction)     per pt on 1/15/2019\"had heart attack about a year ago\"    Osteoarthritis     Osteopenia     Osteopenia of multiple sites 9/14/2022    Pneumonia due to COVID-19 virus 03/22/2021    Primary malignant neoplasm of colon ([facility]     S/P cardiac cath 06/2013    no significant CAD false postive stress test    Skin cancer     Super obesity     Umbilical hernia       Past Surgical History:   Procedure Laterality Date    A-V CARDIAC PACEMAKER INSERTION  3/10/14     Medtronic. Model:  [id number] Medtronic  Serial:  [id number] dual chamber pacemaker    APPENDECTOMY  2004    BREAST BIOPSY  2/13/12    BREAST LUMPECTOMY  2007    right     BREAST SURGERY  02/13/2012    rt lesion biopsy     CARDIAC CATHETERIZATION  2007 & 2011    CAROTID ENDARTERECTOMY Right 12/3/2021    RIGHT CAROTID ENDARTERECTOMY WITH PATCH performed by Rossy Thomas MD at [address] [place] [address]  2004    Colon cancer    COLONOSCOPY  10-18-13    polyp    COLONOSCOPY  1/19/2016    internal hemorrhoids, diverticulosis, 1.5 cm sessile polyp, 8 mm polyp found in rectum.     COLONOSCOPY  12/14/2017    1.5cm residual polyp, 5mm polyps in blind sigmoid loop x3, Sigmoid divertics, Internal grade 1 hemorrhoids    COLONOSCOPY N/A 1/16/2019    COLONOSCOPY W/ ENDOSCOPIC MUCOSAL RESECTION WITH ELEVIEW 5ML INJECTION AND POLYPECTOMY OF TIP OF BLIND RECTOSIGMOID STUMP AND CAUTERIZATION WITH ERBE PROBE, CLIPPING X2 performed by Jeferson Zuleta MD at [address] [place] [address] Dr  01/21/2020    pan divertics/ 4 polyps/ sm int hem, S/P partial sigmoid resection w/ end to side anastamosis, repeat in 3 years    COLONOSCOPY N/A 1/21/2020    COLONOSCOPY POLYPECTOMY SNARE/COLD BIOPSY performed by Max Coronado MD at [place] [address]  2003    back    CYSTOSCOPY Bilateral 12/15/14    CYSTOSCOPY Right 5/15/2022    CYSTOSCOPY URETERAL STENT INSERTION performed by Issac Ledbetter MD at [address] [place] [address]      front right tooth and root canal w/ brass bolt    DILATION AND CURETTAGE OF UTERUS  2006    Needed a camera to find my uterus. DILATION AND CURETTAGE OF UTERUS N/A 5/24/2022    DILATATION AND CURETTAGE, CULTURES OF UTEREUS performed by Tiana Crigler, MD at [address] [place] [address] Entrance, COLON, DIAGNOSTIC  12/14/2017    Small hiatal hernia    HERNIA REPAIR  2004    Umb hernia    HERNIA REPAIR  2008    Inc hernia    KIDNEY SURGERY  05/24/2022    stent placed on right side    LITHOTRIPSY Right 8/1/2022    RIGHT CYSTOSCOPY URETEROSCOPY STONE MANIPULATION WITH HOLIUM LASER LITHOTRIPSY STENT REPLACEMENT performed by Amy Gordon MD at [facility]    LITHOTRIPSY Left 9/26/2022    LEFT CYSTOSCOPY URETEROSCOPY RETROGRADE PYELOGRAM STONE [facility] LITHOTRIPSY POSSIBLE [address] performed by Amy Gordon MD at [place] [address]  2/29/2012    Right axillary-3 sentinel nodes were positive out of 9.     MASTECTOMY, RADICAL Right 02/29/2012    w/ sentinal node disection-Dr West    PACEMAKER PLACEMENT      PRE-MALIGNANT / BENIGN SKIN LESION EXCISION  2/8/2013    right leg X2-Dr West    TONSILLECTOMY  1957    TUNNELED VENOUS PORT PLACEMENT  2004    TUNNELED VENOUS PORT PLACEMENT  02/13/2012    removal of mediport      Family History   Problem Relation Age of Onset    Arthritis Mother         OA, RA    High Cholesterol Mother     High Blood Pressure Mother     Cancer Father         skin and leukemia    High Blood Pressure Father     High Cholesterol Father     Diabetes Father     Heart Disease Father     Heart Disease Brother High Cholesterol Brother     High Blood Pressure Brother     Heart Disease Brother     No Known Problems Sister     Seizures Son     Cancer Brother         skin cancer    Breast Cancer Neg Hx     Ovarian Cancer Neg Hx       Infectious disease related family history - not contibutory. SOCIAL HISTORY  Social History     Tobacco Use    Smoking status: Never    Smokeless tobacco: Never   Substance Use Topics    Alcohol use: No     Comment:           CAFFEINE: 2- 12oz nona daily & 2 cups coffee some nights. Born: Madisyn Rangel  Lives: [place], [place] with boyfriend  Occupation: Retired from [employer] [address] [place] [address]  No recent travel of significance. No recent unusual exposures. NO pets    ? ALLERGIES  Allergies   Allergen Reactions    Bactrim [Sulfamethoxazole-Trimethoprim] Anaphylaxis and Hives    Lipitor [Atorvastatin] Shortness Of Breath    Taxol [Paclitaxel] Anaphylaxis and Swelling    Aminoglycosides      Abstracted from [facility] chart. Demerol Nausea Only    Neosporin [Bacitracin-Neomycin-Polymyxin] Rash and Other (See Comments)     hotness    Other Rash     Ivory Soap    Talc Other (See Comments)     blisters      MEDICATIONS  Reviewed and are per the chart/EMR. ? Antibiotics:   Present:  Vancomycin 11/15-  Cefepime 11/15-  Past:  -------------------------------------------------------------------------------------------------------------------    Vital Signs:  Vitals:    11/15/22 0823   BP:    Pulse:    Resp:    Temp:    SpO2: 94%         Exam:    VS: noted; wt 247 lb (112 kg) Height 5'2\"  Gen: alert and oriented X3, no distress  Skin: no stigmata of endocarditis  Wounds: C/D/I Right foot with puncture wound with erythema and edema, no drainage, BLE with erythema. HEMT: AT/NC Oropharynx pink, moist, and without lesions or exudates; dentition in poor state of repair  Eyes: PERRLA, EOMI, conjunctiva pink, sclera anicteric. Neck: Supple. Trachea midline. No LAD. Chest: no distress and CTA.  Good air movement. Heart: RRR and no MRG. Abd: soft, non-distended, no tenderness, slight bilateral CVA tenderness no hepatomegaly. Normoactive bowel sounds. Ext: no clubbing, cyanosis, or edema. See above  Neuro: Mental status intact. CN 2-12 intact and no focal sensory or motor deficits    ? Diagnostic Studies: reviewed  11/11/22 XR Foot Right:  Impression   Extensive soft tissue swelling over the dorsum of the midfoot and forefoot. CT scan of the foot with contrast may be helpful for further evaluation if   there is clinical concern for abscess. 11/14/22 VL Dup Lower Extremity Venous Bilateral:  Impression   No evidence of DVT in either lower extremity. ??  I have examined this patient and available medical records on this date and have made the above observations, conclusions and recommendations. Electronically signed by: Electronically signed by Virgie Cade.  AUSTIN Patricia CNP on 11/15/2022 at 1:56 PM

## 2022-11-15 NOTE — ED PROVIDER NOTES
[address] ENCOUNTER        Pt Name: Cheyanne Pearson  MRN: [id number]  [place] 1950  Date of evaluation: 11/14/2022  Provider: ZABRINA Braga  PCP: Puja Morfin DO    ABEBA. I have evaluated this patient. My supervising physician was available for consultation. Triage CHIEF COMPLAINT       Chief Complaint   Patient presents with    Laceration     Laceration to right foot from dropping scissors. Current cellulitis in right foot     HISTORY OF PRESENT ILLNESS      Chief Complaint: Bilateral leg swelling, erythema, puncture wound to dorsum of right foot    Cheyanne Pearson is a 67 y.o. female who presents to the emergency department today concerned about bilateral lower leg swelling, cellulitis. Patient has had recent history of such and needing to be admitted to the hospital last month, needed to go to rehab secondary to her bilateral lower extremity swelling, received significant diuresis, antibiotics and was discharged to a rehab facility. Patient states that her swelling is now returned. She does admit that she dropped a pair of scissors on Saturday evening, 2 days ago on the dorsum of her right foot which is now become more red. She is requesting a tetanus shot. She states that she lives at home and is unable to ambulate or function as her normal, this is more of acute on chronic issue. She does admit that she has had some chills at night. But describing no other cough congestion abdominal pain nausea vomiting diarrhea. Has had a complicated urinary tract infection recently which she has been treated, has been recently prescribed ciprofloxacin and Keflex as an outpatient which she has not received yet. Nursing Notes were all reviewed and agreed with or any disagreements were addressed in the HPI.     REVIEW OF SYSTEMS     Constitutional: See HPI  HENT:   Denies sore throat or ear pain Cardiovascular:   Denies chest pain, palpitations   Respiratory:  Denies cough or shortness of breath    GI:   Denies abdominal pain, nausea, vomiting, or diarrhea  : See HPI   Musculoskeletal: See HPI  Skin:   Denies rash  Neurologic:  Denies headache, focal weakness or sensory changes   Endocrine:  Denies polyuria or polydypsia   Lymphatic:  Denies swollen glands     PAST MEDICAL HISTORY     Past Medical History:   Diagnosis Date    Anemia     Anxiety 1978    Arthritis     generalized    Asthma 2006    AV block     2nd degree per hospital in june2013    Blood poisoning 1994    Blood transfusion     12/2003    Breast cancer ([facility] [address] 02/29/2012    right    CAD (coronary artery disease)     Cancer ([facility] [address] 2007    skin (face) & colon(2003)/ 2/2012 dx of right breast ca(pc)    Carcinoma of breast ([facility] [address] 02/29/2012    right arm no b/p or sticks    Cardiac pacemaker 03/10/2014    Medtronic dual lead    Chronic cystitis     Chronic respiratory failure (HCC)     2 L/min oxygen at night-time    Colon cancer ([facility] [address] 2013    COPD (chronic obstructive pulmonary disease) ([facility] [address]     CTS (carpal tunnel syndrome)     Depression     Diverticulitis     H/O 24 hour EKG monitoring 2/28/2014 7/24/13 2/14 complete heart block 7/13No clinically significant arrthymia noted. H/O cardiac catheterization 10/31/2011, 7/11/2007    10/31/2011-No significant CAD. Cardiolite stress test was false positive-Dr Debbie Llanes; 7/11/2007-No CAD, global function intact;    H/O cardiovascular stress test 10/20/2011, 3/23/2010    10/20/2011-Lexiscan- Evid of mild ischemia in Left CX region. EF 70%. Global LVSF normal;    H/O cardiovascular stress test 01/07/2015    EF 77%.    Normal Stress Test.    H/O chest pain 04/2005    sees Dr Debbie Llanes    H/O Doppler ultrasound 02/20/2008 2/20/2008-CAROTID DOPPLER- Normal carotids bilaterally;    H/O Doppler ultrasound 06/06/2013    CAROTID DOPPLER- there is heterogeneous, irregular atherosclerotic plaque noted in the right internal carotid artery,  doppler flow velocities within the right internal carotid artery are elevated, consistent with a mild, less than 50%stenosis, there is intimal thickening but no significant atherosclerotic plaque noted in the left internal carotid artery, the left carotid are within normal limits    H/O echocardiogram 4/9/2012, 10/20/2011    4/9/2012-LVSF normal EF=>55%. impaired LV relaxation;    H/O echocardiogram 12/31/2014    EF 50-55%. LV shows mild concentric hypertrophy. Mildly dilated left atrium. Mildly dilated right ventricle. Sclerotic but not stenotic aortic valve. Mitral annular calcification. Mild MR, Mild TR, Mild pulm HTN.    H/O urinary incontinence     History of blood transfusion     Hx antineoplastic chemo     Hx of Arterial Doppler ultrasound 07/01/2019    No hemodynamically significant or focal stenosis visualized bilaterally, bilateral lower extremity arteries exhibit diffuse plaque and multiphasic waveforms, unable to obtain bilateral ABIs due to elevated leg pressures >250 mmHg, possibly due to atherosclerosis    Hx of cardiovascular stress test 06/2013    EF 70% abn suggestive of anterolateral ischemia, possible RCA ischmeia, post left ventricular dilation suggestive multivessel CAD. Hx of echocardiogram 06/2013    EF50%, mild MR and TR, mild pulmonary HTN, mild AS    Hx of echocardiogram 11/01/2021    Left ventricular systolic function is normal, Mild concentric left ventricular hypertrophy Mildly dilated left atrium. Mitral annular calcification is present. Mild tricuspid regurgitation No evidence of any pericardial effusion    Hx of fall     \"last time 2018- due to neuropathy, have to use cane\"    [facility] 7/24/13, 06/2013 7/13-No clinacally significant arrhythmia.  6/13-multiple cycles of wenckebach episodes in addtion to some sinus tach    Hyperlipidemia     Hypertension     Hypothermia 2008    Malignant neoplasm of breast (female) ([facility] [address] 2012 Neuropathy     \"have neuropathy of my legs\"    Normocytic normochromic anemia     Obstructive sleep apnea 2008 01- Has CPAP machine but has not used in over a year - states she has not had problems since her pacemaker was placed. Old MI (myocardial infarction)     per pt on 1/15/2019\"had heart attack about a year ago\"    Osteoarthritis     Osteopenia     Osteopenia of multiple sites 9/14/2022    Pneumonia due to COVID-19 virus 03/22/2021    Primary malignant neoplasm of colon ([address]     S/P cardiac cath 06/2013    no significant CAD false postive stress test    Skin cancer     Super obesity     Umbilical hernia        SURGICAL HISTORY     Past Surgical History:   Procedure Laterality Date    A-V CARDIAC PACEMAKER INSERTION  3/10/14     Medtronic. Model:  [id number] Medtronic  Serial:  [id number] dual chamber pacemaker    APPENDECTOMY  2004    BREAST BIOPSY  2/13/12    BREAST LUMPECTOMY  2007    right     BREAST SURGERY  02/13/2012    rt lesion biopsy     CARDIAC CATHETERIZATION  2007 & 2011    CAROTID ENDARTERECTOMY Right 12/3/2021    RIGHT CAROTID ENDARTERECTOMY WITH PATCH performed by Shayla Soliz MD at [address] [place] [address]  2004    Colon cancer    COLONOSCOPY  10-18-13    polyp    COLONOSCOPY  1/19/2016    internal hemorrhoids, diverticulosis, 1.5 cm sessile polyp, 8 mm polyp found in rectum.     COLONOSCOPY  12/14/2017    1.5cm residual polyp, 5mm polyps in blind sigmoid loop x3, Sigmoid divertics, Internal grade 1 hemorrhoids    COLONOSCOPY N/A 1/16/2019    COLONOSCOPY W/ ENDOSCOPIC MUCOSAL RESECTION WITH ELEVIEW 5ML INJECTION AND POLYPECTOMY OF TIP OF BLIND RECTOSIGMOID STUMP AND CAUTERIZATION WITH ERBE PROBE, CLIPPING X2 performed by Luis Copeland MD at [facility]  01/21/2020    pan divertics/ 4 polyps/ sm int hem, S/P partial sigmoid resection w/ end to side anastamosis, repeat in 3 years    COLONOSCOPY N/A 1/21/2020    COLONOSCOPY POLYPECTOMY SNARE/COLD BIOPSY times daily    FUROSEMIDE (LASIX) 20 MG TABLET    Take 1 tablet by mouth daily as needed (for edema)    LETROZOLE (FEMARA) 2.5 MG TABLET    Take 1 tablet by mouth nightly    MISC. DEVICES (TRANSPORT CHAIR) MISC    Use as directed    POTASSIUM CHLORIDE (KLOR-CON M) 10 MEQ EXTENDED RELEASE TABLET    Take 1 tablet by mouth daily as needed (take only when you need Lasix)    SIMVASTATIN (ZOCOR) 20 MG TABLET    TAKE ONE (1) TABLET BY MOUTH NIGHTLY       ALLERGIES     Bactrim [sulfamethoxazole-trimethoprim], Lipitor [atorvastatin], Taxol [paclitaxel], Aminoglycosides, Demerol, Neosporin [bacitracin-neomycin-polymyxin], Other, and Talc    FAMILYHISTORY       Family History   Problem Relation Age of Onset    Arthritis Mother         OA, RA    High Cholesterol Mother     High Blood Pressure Mother     Cancer Father         skin and leukemia    High Blood Pressure Father     High Cholesterol Father     Diabetes Father     Heart Disease Father     Heart Disease Brother     High Cholesterol Brother     High Blood Pressure Brother     Heart Disease Brother     No Known Problems Sister     Seizures Son     Cancer Brother         skin cancer    Breast Cancer Neg Hx     Ovarian Cancer Neg Hx         SOCIAL HISTORY       Social History     Socioeconomic History    Marital status:      Spouse name: None    Number of children: 2    Years of education: 12    Highest education level: 12th grade   Occupational History    Occupation: retired   Tobacco Use    Smoking status: Never    Smokeless tobacco: Never   Vaping Use    Vaping Use: Never used   Substance and Sexual Activity    Alcohol use: No     Comment:           CAFFEINE: 2- 12oz nona daily & 2 cups coffee some nights. Drug use: No    Sexual activity: Not Currently     Partners: Male   Social History Narrative    Do you donate blood or plasma? No    Caffeine intake? Moderate    Advance directive? No    Is blood transfusion acceptable in an emergency?  Yes    Live alone or with others? With others    Able to care for self? Yes    No exercise at this time         Social Determinants of Health     Financial Resource Strain: Medium Risk    Difficulty of Paying Living Expenses: Somewhat hard   Food Insecurity: Food Insecurity Present    Worried About Running Out of Food in the Last Year: Sometimes true    Ran Out of Food in the Last Year: Sometimes true   Transportation Needs: Unmet Transportation Needs    Lack of Transportation (Medical): Yes    Lack of Transportation (Non-Medical): Yes   Physical Activity: Inactive    Days of Exercise per Week: 0 days    Minutes of Exercise per Session: 0 min   Stress: No Stress Concern Present    Feeling of Stress : Only a little   Social Connections: Socially Isolated    Frequency of Communication with Friends and Family: Once a week    Frequency of Social Gatherings with Friends and Family: Once a week    Attends Jehovah's witness Services: Never    Active Member of Clubs or Organizations: No    Attends Club or Organization Meetings: Never    Marital Status:    Housing Stability: High Risk    Unable to Pay for Housing in the Last Year: Yes    Number of [place] in the Last Year: 1    Unstable Housing in the Last Year: No       SCREENINGS           PHYSICAL EXAM       ED Triage Vitals [11/14/22 1739]   BP Temp Temp Source Heart Rate Resp SpO2 Height Weight   (!) 141/67 97.8 °F (36.6 °C) Oral 76 18 100 % 5' 2\" (1.575 m) 247 lb (112 kg)      Constitutional:  Well developed, Well nourished. No distress  HENT:  Normocephalic, Atraumatic, PERRL. EOMI. Sclera clear. Conjunctiva normal, No discharge. Neck/Lymphatics: supple, no JVD, no swollen nodes  Cardiovascular:   RRR,  no murmurs/rubs/gallops. Respiratory:   Nonlabored breathing. Normal breath sounds, No wheezing  Abdomen: Bowel sounds normal, Soft, No tenderness, no masses.   Musculoskeletal:    On inspection of the lower extremity there is just general edema, body habitus, mild erythema, no (has no administration in time range)   HYDROcodone-acetaminophen (NORCO) 5-325 MG per tablet 1 tablet (has no administration in time range)     Is this patient to be included in the SEP-1 Core Measure due to severe sepsis or septic shock? No   Exclusion criteria - the patient is NOT to be included for SEP-1 Core Measure due to: Infection is not suspected    MDM:  Patient presents as above. Emergent etiologies considered. Patient seen and examined. Work-up initiated secondary to presentation, physical exam findings, vital signs and medical chart review. In brief, 71-year-old female presenting the emergency department today with bilateral lower leg swelling, mild erythema. Recently dropped scissors on her right foot has a small puncture wound that is mildly red. Patient recently been admitted for cellulitis and has an ongoing chronic venous insufficiency issue. Has been on Lasix. Was admitted last month and needed a rehab stent, she states that the swelling has returned, she is concerned about possible infection, she also had a complicated urinary tract infection, is complaining of chills at night. Her vital signs otherwise look well though no active fever no tachycardia 100% on room air, no obvious distress. She is requesting a tetanus shot. Will obtain basic labs, will obtain a venous Doppler ultrasound lower extremity, right foot x-ray, update tetanus. We will get case management involved secondary to the nature of her presentation. ________________________________________________________________________    I have signed out [facility] Emergency Department care to  [facility]  Bedside hand-off performed. We discussed the history, physical exam, completed/pending test results (if obtained) and current treatment plan.    At time of patient signout labs, ultrasound, final disposition pending.  ________________________________________________________________________       (Please note that portions ofthis note were completed with a voice recognition program.  Efforts were made to edit the dictations but occasionally words are mis-transcribed. )    ZABRINA Escamilla (electronically signed)            ZABRINA Daniel  11/15/22 9209

## 2022-11-15 NOTE — CARE COORDINATION
ACM chart review. Received notification of hospital admission 11/14/22. No outreach completed - deferred per protocol. Will continue to monitor and plan outreach after d/c.

## 2022-11-15 NOTE — CARE COORDINATION
Reviewed chart and discussed discharge needs/plans with pt. Pt lives with her , PTA he was assisting her as needed with ADL's and provided all transportation. Pt has a cane and Rolator for mobility. She has a PCP  and insurance that covers medications. We discussed HC and SNU. He declined HC not wanting anyone in her home. Open to [facility] if needed again. PT/OT evals pending, pt too painful to get up today.   CM will revisit tomorrow

## 2022-11-15 NOTE — PROGRESS NOTES
Jessie Shiva [address], 1950, [id number], 11/15/2022    Attempted PT/OT eval 11/15 AM, pt very anxious about mobility and pain. Several attempts for edu/encouragement for participation with therapy, pt adamantly but politely refusing. Pt promises she will get up tomorrow with therapy. Will re-attempt as able and appropriate.     Dave Miguel, OTR/L  11/15/2022, 10:15 AM

## 2022-11-16 PROBLEM — N30.00 ACUTE CYSTITIS WITHOUT HEMATURIA: Status: ACTIVE | Noted: 2022-11-16

## 2022-11-16 LAB
ANION GAP SERPL CALCULATED.3IONS-SCNC: 9 MMOL/L (ref 4–16)
BASOPHILS ABSOLUTE: 0 K/CU MM
BASOPHILS RELATIVE PERCENT: 0.3 % (ref 0–1)
BUN BLDV-MCNC: 9 MG/DL (ref 6–23)
CALCIUM SERPL-MCNC: 9.1 MG/DL (ref 8.3–10.6)
CHLORIDE BLD-SCNC: 105 MMOL/L (ref 99–110)
CO2: 26 MMOL/L (ref 21–32)
CREAT SERPL-MCNC: 0.6 MG/DL (ref 0.6–1.1)
DIFFERENTIAL TYPE: ABNORMAL
DOSE AMOUNT: NORMAL
DOSE TIME: NORMAL
EOSINOPHILS ABSOLUTE: 0.2 K/CU MM
EOSINOPHILS RELATIVE PERCENT: 2.6 % (ref 0–3)
GFR SERPL CREATININE-BSD FRML MDRD: >60 ML/MIN/1.73M2
GLUCOSE BLD-MCNC: 109 MG/DL (ref 70–99)
HCT VFR BLD CALC: 31.6 % (ref 37–47)
HEMOGLOBIN: 10.2 GM/DL (ref 12.5–16)
IMMATURE NEUTROPHIL %: 0.5 % (ref 0–0.43)
LYMPHOCYTES ABSOLUTE: 1.8 K/CU MM
LYMPHOCYTES RELATIVE PERCENT: 19.5 % (ref 24–44)
MCH RBC QN AUTO: 28.1 PG (ref 27–31)
MCHC RBC AUTO-ENTMCNC: 32.3 % (ref 32–36)
MCV RBC AUTO: 87.1 FL (ref 78–100)
MONOCYTES ABSOLUTE: 0.6 K/CU MM
MONOCYTES RELATIVE PERCENT: 6.1 % (ref 0–4)
NUCLEATED RBC %: 0 %
PDW BLD-RTO: 14.3 % (ref 11.7–14.9)
PLATELET # BLD: 205 K/CU MM (ref 140–440)
PMV BLD AUTO: 10.1 FL (ref 7.5–11.1)
POTASSIUM SERPL-SCNC: 4.3 MMOL/L (ref 3.5–5.1)
RBC # BLD: 3.63 M/CU MM (ref 4.2–5.4)
SEGMENTED NEUTROPHILS ABSOLUTE COUNT: 6.5 K/CU MM
SEGMENTED NEUTROPHILS RELATIVE PERCENT: 71 % (ref 36–66)
SODIUM BLD-SCNC: 140 MMOL/L (ref 135–145)
TOTAL IMMATURE NEUTOROPHIL: 0.05 K/CU MM
TOTAL NUCLEATED RBC: 0 K/CU MM
VANCOMYCIN RANDOM: 17.1 UG/ML
WBC # BLD: 9.2 K/CU MM (ref 4–10.5)

## 2022-11-16 PROCEDURE — 6360000002 HC RX W HCPCS: Performed by: NURSE PRACTITIONER

## 2022-11-16 PROCEDURE — 2580000003 HC RX 258: Performed by: NURSE PRACTITIONER

## 2022-11-16 PROCEDURE — 6370000000 HC RX 637 (ALT 250 FOR IP)

## 2022-11-16 PROCEDURE — 6370000000 HC RX 637 (ALT 250 FOR IP): Performed by: STUDENT IN AN ORGANIZED HEALTH CARE EDUCATION/TRAINING PROGRAM

## 2022-11-16 PROCEDURE — 94761 N-INVAS EAR/PLS OXIMETRY MLT: CPT

## 2022-11-16 PROCEDURE — 87086 URINE CULTURE/COLONY COUNT: CPT

## 2022-11-16 PROCEDURE — 6360000002 HC RX W HCPCS: Performed by: STUDENT IN AN ORGANIZED HEALTH CARE EDUCATION/TRAINING PROGRAM

## 2022-11-16 PROCEDURE — 85025 COMPLETE CBC W/AUTO DIFF WBC: CPT

## 2022-11-16 PROCEDURE — 97530 THERAPEUTIC ACTIVITIES: CPT

## 2022-11-16 PROCEDURE — 99232 SBSQ HOSP IP/OBS MODERATE 35: CPT | Performed by: NURSE PRACTITIONER

## 2022-11-16 PROCEDURE — 36415 COLL VENOUS BLD VENIPUNCTURE: CPT

## 2022-11-16 PROCEDURE — 87081 CULTURE SCREEN ONLY: CPT

## 2022-11-16 PROCEDURE — 97116 GAIT TRAINING THERAPY: CPT

## 2022-11-16 PROCEDURE — 80202 ASSAY OF VANCOMYCIN: CPT

## 2022-11-16 PROCEDURE — 94640 AIRWAY INHALATION TREATMENT: CPT

## 2022-11-16 PROCEDURE — 87040 BLOOD CULTURE FOR BACTERIA: CPT

## 2022-11-16 PROCEDURE — 80048 BASIC METABOLIC PNL TOTAL CA: CPT

## 2022-11-16 PROCEDURE — 97166 OT EVAL MOD COMPLEX 45 MIN: CPT

## 2022-11-16 PROCEDURE — 97162 PT EVAL MOD COMPLEX 30 MIN: CPT

## 2022-11-16 PROCEDURE — 2580000003 HC RX 258: Performed by: STUDENT IN AN ORGANIZED HEALTH CARE EDUCATION/TRAINING PROGRAM

## 2022-11-16 PROCEDURE — 1200000000 HC SEMI PRIVATE

## 2022-11-16 RX ORDER — DIPHENHYDRAMINE HCL 25 MG
TABLET ORAL
Status: COMPLETED
Start: 2022-11-16 | End: 2022-11-16

## 2022-11-16 RX ORDER — DIPHENHYDRAMINE HCL 25 MG
25 TABLET ORAL EVERY 6 HOURS PRN
Status: DISCONTINUED | OUTPATIENT
Start: 2022-11-16 | End: 2022-11-18 | Stop reason: HOSPADM

## 2022-11-16 RX ADMIN — DIPHENHYDRAMINE HYDROCHLORIDE 25 MG: 25 TABLET ORAL at 19:23

## 2022-11-16 RX ADMIN — CEFEPIME HYDROCHLORIDE 2000 MG: 2 INJECTION, POWDER, FOR SOLUTION INTRAVENOUS at 01:10

## 2022-11-16 RX ADMIN — Medication 2 PUFF: at 21:04

## 2022-11-16 RX ADMIN — VANCOMYCIN HYDROCHLORIDE 750 MG: 750 INJECTION, POWDER, LYOPHILIZED, FOR SOLUTION INTRAVENOUS at 16:23

## 2022-11-16 RX ADMIN — CEFEPIME HYDROCHLORIDE 2000 MG: 2 INJECTION, POWDER, FOR SOLUTION INTRAVENOUS at 12:48

## 2022-11-16 RX ADMIN — Medication 2 PUFF: at 07:47

## 2022-11-16 RX ADMIN — VANCOMYCIN HYDROCHLORIDE 750 MG: 750 INJECTION, POWDER, LYOPHILIZED, FOR SOLUTION INTRAVENOUS at 05:21

## 2022-11-16 RX ADMIN — LETROZOLE 2.5 MG: 2.5 TABLET ORAL at 23:16

## 2022-11-16 NOTE — PROGRESS NOTES
Hospitalist Progress Note      Name:  Jasmin Isaac /Age/Sex: 1950  (67 y.o. female)   MRN & CSN:  [id number] & [id number] Admission Date/Time: 2022  7:39 PM   Location:  [address] [place] [address] PCP: Silver Gillis 57 Day: 3                                               Attending Physician Dr Og Pryor and Plan:   Jasmin Isaac is a 67 y.o.  female  who presents with Cellulitis    Present on Admission:   Cellulitis   Cellulitis of right foot   Severe malnutrition ([address]     Cellulitis of right foot 2/2 puncture wound from scissors   Continue vancomycin   N/V checks   Blood cultures / bottles positive Gram plus bacilli-possible contaminant   Repeat cultures    Complicated UTI   Chronic cystitis  History of bilateral nephrolithiasis s/p lithotripsy and ureteral stent placement 2022   UA suggestive of infection   Continue cefepime   Previous micro reviewed-MDRO Proteus mirabilis-did not  antibiotics outpatient   Pending repeat culture   Infectious disease consulted   Will consult urology given recent lithotripsy/ureteral stent placement and recurrent UTIs    Hypokalemia (3.3)- replete   Resolved today    Chronic edema  Chronic peripheral neuropathy   Chronic venous stasis dermatitis   Elevate extremities    Anemia, chronic H/H 10.2/31. 6. Appears stable continue to trend. Moderate PHTM   Last TTE (2022) EF 55 to 60%, G1 DD, mild bilateral atrial enlargement, calcification aortic valve with moderate AS mild AR moderate P HTN RVSP 51 mmHg    Carotid artery stenosis  Moderate aortic stenosis   S/p right CEA 2021   Following CT surgery    History of breast cancer  History of colon cancer s/p colectomy ()   Continue letrozole    Obesity- Body mass index is 45.18 kg/m². Lifestyle modifications needed      Diet ADULT DIET; Regular;  Low Sodium (2 gm)  ADULT ORAL NUTRITION SUPPLEMENT; Breakfast; Diabetic Oral Supplement   DVT Prophylaxis [x] Lovenox, [] Heparin, [] SCDs, [] Ambulation   GI Prophylaxis [x] PPI,  [] H2 Blocker,  [] Carafate,  [] Diet/Tube Feeds   Code Status Full Code     -Patient assessment and plan discussed with supervising physician-  Subjective 11/16/2022     Paulette Dacosta is a 67 y.o.  female, who presented with  Laceration (Laceration to right foot from dropping scissors. Current cellulitis in right foot)  . Reports feeling improved today. Swelling is improved with leg elevation but does states can not tolerate for long. States yesterday had diffuse body aches and unable to perform therapy but improved today. Ten point ROS reviewed negative, unless as noted above    Objective:   No intake or output data in the 24 hours ending 11/16/22 1324   Vitals:   Vitals:    11/15/22 1757 11/16/22 0130 11/16/22 0749 11/16/22 0805   BP: (!) 159/109 134/66  (!) 144/48   Pulse: 73 72  57   Resp: 18 16 16   Temp: 97.8 °F (36.6 °C) 96.9 °F (36.1 °C)  98.1 °F (36.7 °C)   TempSrc: Oral Oral  Oral   SpO2:   96% 97%   Weight:       Height:         Physical Exam: 11/16/22     GEN -Awake nontoxic appearing female, sitting upright in bed , NAD. normal body habitus. Appears given age. EYES -Pupils are equally round. No scleral erythema, discharge, or conjunctivitis. HENT -MM are moist. Oral pharynx without exudates, no evidence of thrush. NECK -Supple, no apparent thyromegaly or masses. RESP -CTA, no wheezes, rales or rhonchi. Symmetric chest movement while on RA.  C/V -S1/S2 auscultated. RRR without appreciable M/R/G. No JVD or carotid bruits. Peripheral pulses equal bilaterally and palpable. No peripheral edema. GI -Abdomen is soft non distended and without significant tenderness. No masses or guarding. + BS Rectal exam deferred.  -No CVA tenderness. Farooq catheter is not present. LYMPH-No palpable cervical lymphadenopathy and no hepatosplenomegaly. No petechiae or ecchymoses. MS -No gross joint deformities.   SKIN -Normal coloration, warm, dry.  NEURO-Cranial nerves appear grossly intact, normal speech, no lateralizing weakness. PSYC-Awake, alert, oriented x 4. Appropriate affect. Medications:   Medications:    fluticasone  2 puff Inhalation BID    letrozole  2.5 mg Oral Nightly    simvastatin  20 mg Oral Nightly    vancomycin  750 mg IntraVENous Q12H    cefepime  2,000 mg IntraVENous Q12H      Infusions:   PRN Meds: albuterol sulfate HFA, 2 puff, Q6H PRN        LABS  CBC   Recent Labs     11/14/22  2010 11/15/22  1034 11/16/22  0730   WBC 12.4* 8.7 9.2   HGB 10.9* 10.4* 10.2*   HCT 34.2* 32.9* 31.6*    214 205      RENAL  Recent Labs     11/14/22 2010 11/16/22  0730    140   K 3.3* 4.3    105   CO2 27 26   BUN 9 9   CREATININE 0.6 0.6     LFT'S  Recent Labs     11/14/22 2010   AST 12*   ALT 9*   BILITOT 0.2   ALKPHOS 132*       Radiology this visit:  Reviewed. XR FOOT RIGHT (MIN 3 VIEWS)    Result Date: 11/14/2022  EXAMINATION: THREE XRAY VIEWS OF THE RIGHT FOOT 11/14/2022 7:58 pm COMPARISON: None. HISTORY: ORDERING SYSTEM PROVIDED HISTORY: puncutre wound TECHNOLOGIST PROVIDED HISTORY: Reason for exam:->puncutre wound Reason for Exam: puncutre wound Additional signs and symptoms: dropped a pair of scissors on top of her foot Relevant Medical/Surgical History: breast and colon cancer, obesity FINDINGS: No acute fracture dislocation is seen. There is degenerative change with osteophytosis and extensive vascular calcification. There is also extensive soft tissue swelling in the region of the midfoot and forefoot without evidence for radiopaque foreign body. Extensive soft tissue swelling over the dorsum of the midfoot and forefoot. CT scan of the foot with contrast may be helpful for further evaluation if there is clinical concern for abscess.      VL DUP LOWER EXTREMITY VENOUS BILATERAL    Result Date: 11/14/2022  EXAMINATION: DUPLEX VENOUS ULTRASOUND OF THE BILATERAL LOWER [id number]/14/2022 8:02 pm TECHNIQUE: Duplex ultrasound using B-mode/gray scaled imaging, Doppler spectral analysis and color flow Doppler was obtained of the deep venous structures of the lower bilateral extremities. COMPARISON: None. HISTORY: ORDERING SYSTEM PROVIDED HISTORY: leg pain swelling TECHNOLOGIST PROVIDED HISTORY: Reason for exam:->leg pain swelling FINDINGS: The visualized veins of the bilateral lower extremities are patent and free of echogenic thrombus. The veins demonstrate good compressibility with normal color flow study and spectral analysis. No evidence of DVT in either lower extremity.              Electronically signed by AUSTIN Babb CNP on 11/16/2022 at 1:24 PM

## 2022-11-16 NOTE — PROGRESS NOTES
[facility]  consulted by Dr. Carmen Gomes for monitoring and adjustment. Indication for treatment: Vancomycin indication: SSTI with risk factors   Goal trough: Trough Goal: 10-15 mcg/mL  AUC/ALEX: <500    Risk Factors for MRSA Identified:   Hospitalization within the past 90 days, Received IV antibiotics within the past 90 days    Pertinent Laboratory Values:   Temp Readings from Last 3 Encounters:   11/16/22 98.1 °F (36.7 °C) (Oral)   10/18/22 98 °F (36.7 °C) (Oral)   10/13/22 97.6 °F (36.4 °C) (Oral)     Recent Labs     11/14/22  2010 11/15/22  1034 11/16/22  0730   WBC 12.4* 8.7 9.2   LACTATE 1.7  --   --        Recent Labs     11/14/22 2010 11/16/22  0730   BUN 9 9   CREATININE 0.6 0.6       Estimated Creatinine Clearance: 99 mL/min (based on SCr of 0.6 mg/dL). No intake or output data in the 24 hours ending 11/16/22 1303    Pertinent Cultures:   Date    Source    Results  11/15                          Blood                                  Ordered  11/15                          MRSA screen                     Ordered  11/16                          Urine                                   In process    Vancomycin level:   TROUGH:  No results for input(s): VANCOTROUGH in the last 72 hours. RANDOM:    Recent Labs     11/16/22  0730   VANCORANDOM 17.1       Assessment:  HPI: A 67 y.o female who presents with edema and cellulitis. She states, she has been dealing with the swelling for many year, but on last Saturday, she dropped her scissors and it fell on her right foot. Since then the swelling has become worse. SCr, BUN, and urine output: Scr remains at baseline, no UOP data  Day(s) of therapy: 2 of 5 (ID following)  Vancomycin concentration:  11/16 - 17.1, 2 hour level on 750mg q12h, predicted trough of 12.3 at steady state. Plan:  Renal trends remain at baseline  Continue vancomycin 750mg ivpb q12h with a predicted therapeutic trough at steady state.   Recheck the vanco trough level in 2 days in case vancomycin to continue per ID. Expect some accumulation 2/2 BMI. Pharmacy will continue to monitor patient and adjust therapy as indicated    Yohan 3 11/18 @0600    Thank you for the consult. Aneudy Dobson, [facility]  11/16/2022 1:03 PM

## 2022-11-16 NOTE — PROGRESS NOTES
Infectious Disease Progress Note  2022   Patient Name: Griselda Barker : 1950   Impression  Possible Complicated UTI:  BLE Cellulitis, R>L Secondary to Chronic Venous Stasis with Puncture wound from Scissors:  Chronic Cystitis/ Past Kidney Stones s/p Lithotripsy:  Afebrile, no leukocytosis  Pct 0.039  11/15-UA , RBC none  11/15-Urine culture Pending  Past Urine cultures: 10/9/22: Proteus mirabilis, 22 E.coli, Proteus mirabilis, 5/15/22: Proteus mirabilis. 10/16/18: E.coli, Proteus Spp.   -MRSA screen Pending  11/15-BC Pending  -XR Foot Right: Extensive soft tissue swelling over the dorsum of the midfoot and forefoot. CT scan of the foot with contrast may be helpful for further evaluation if   there is clinical concern for abscess. -VL BLE Venous: no evidence of DVTs  Chronic Hypoxic Respiratory Failure on Home Oxygen 2 L/min/NC Secondary to COPD/Asthma/ JAMIE on CPAP:  PPM/ CAD/ HTN/ HDL:  Seizures: Morbid Obesity: BMI: 45.18  Past Right Breast and Colon Cancer:  Multi-morbidity: per PMHx: last colonoscopy , s/p right CEA , allergy to Bactrim (anaphylaxis), OA, anxiety/depression, diverticulitis, right breast cancer, colon cancer  Plan:  Continue IV cefepime 2 gm q12h  Continue IV vancomycin per pharmacy dosing  Await Urine culture   Await MRSA screen  and [facility]    Ongoing Antimicrobial Therapy  Vancomycin 11/15-  Cefepime 11/15-? Completed Antimicrobial Therapy  ? History:? Interval history noted. Chief complaint: Possible complicated UTI, BLE cellulitis, R>L, secondary to chronic venous stasis with puncture wound from scissors.     Denies n/v/d/f or untoward effects of antibiotics  Physical Exam:  Vital Signs: BP (!) 144/48   Pulse 57   Temp 98.1 °F (36.7 °C) (Oral)   Resp 16   Ht 5' 2\" (1.575 m)   Wt 247 lb (112 kg)   LMP 01/15/1999   SpO2 97%   BMI 45.18 kg/m²     Gen: A&O x 4, no distress  Wounds: C/D/I Right foot with puncture wound with erythema and edema, no drainage, BLE with erythema. .  Chest: no distress and CTA. Good air movement. Heart: RRR and no MRG. Abd: soft, non-distended, no tenderness, slight bilateral CVA tenderness no hepatomegaly. Normoactive bowel sounds. Ext: no clubbing, cyanosis, or edema. See above  Neuro: Mental status intact. CN 2-12 intact and no focal sensory or motor deficits     Radiologic / Imaging / TESTING  11/11/22 XR Foot Right:  Impression   Extensive soft tissue swelling over the dorsum of the midfoot and forefoot. CT scan of the foot with contrast may be helpful for further evaluation if   there is clinical concern for abscess. 11/14/22 VL Dup Lower Extremity Venous Bilateral:  Impression   No evidence of DVT in either lower extremity        Labs:    Recent Results (from the past 24 hour(s))   Vancomycin Level, Random    Collection Time: 11/16/22  7:30 AM   Result Value Ref Range    Vancomycin Rm 17.1 UG/ML    DOSE AMOUNT DOSE AMT.  GIVEN - 750 mg     DOSE TIME DOSE TIME GIVEN - 0300    CBC with Auto Differential    Collection Time: 11/16/22  7:30 AM   Result Value Ref Range    WBC 9.2 4.0 - 10.5 K/CU MM    RBC 3.63 (L) 4.2 - 5.4 M/CU MM    Hemoglobin 10.2 (L) 12.5 - 16.0 GM/DL    Hematocrit 31.6 (L) 37 - 47 %    MCV 87.1 78 - 100 FL    MCH 28.1 27 - 31 PG    MCHC 32.3 32.0 - 36.0 %    RDW 14.3 11.7 - 14.9 %    Platelets 362 691 - 001 K/CU MM    MPV 10.1 7.5 - 11.1 FL    Differential Type AUTOMATED DIFFERENTIAL     Segs Relative 71.0 (H) 36 - 66 %    Lymphocytes % 19.5 (L) 24 - 44 %    Monocytes % 6.1 (H) 0 - 4 %    Eosinophils % 2.6 0 - 3 %    Basophils % 0.3 0 - 1 %    Segs Absolute 6.5 K/CU MM    Lymphocytes Absolute 1.8 K/CU MM    Monocytes Absolute 0.6 K/CU MM    Eosinophils Absolute 0.2 K/CU MM    Basophils Absolute 0.0 K/CU MM    Nucleated RBC % 0.0 %    Total Nucleated RBC 0.0 K/CU MM    Total Immature Neutrophil 0.05 K/CU MM    Immature Neutrophil % 0.5 (H) 0 - 0.43 %   Basic Metabolic Panel Collection Time: 11/16/22  7:30 AM   Result Value Ref Range    Sodium 140 135 - 145 MMOL/L    Potassium 4.3 3.5 - 5.1 MMOL/L    Chloride 105 99 - 110 mMol/L    CO2 26 21 - 32 MMOL/L    Anion Gap 9 4 - 16    BUN 9 6 - 23 MG/DL    Creatinine 0.6 0.6 - 1.1 MG/DL    Est, Glom Filt Rate >60 >60 mL/min/1.73m2    Glucose 109 (H) 70 - 99 MG/DL    Calcium 9.1 8.3 - 10.6 MG/DL     CULTURE results: Invalid input(s): BLOOD CULTURE,  URINE CULTURE, SURGICAL CULTURE    Diagnosis:  Patient Active Problem List   Diagnosis    Malignant neoplasm of upper-outer quadrant of right breast in female, estrogen receptor positive (HCC)    Diffuse cystic mastopathy    Hyperlipidemia    H/O chest pain    Heart block AV second degree    Abnormal cardiovascular stress test    JAMIE (obstructive sleep apnea)    Super obesity    Mild asthma    Severe obstructive sleep apnea    Cardiac pacemaker    Chronic cystitis    Asthma    Hypertension    Depression    Obstructive sleep apnea    Nocturnal oxygen desaturation    Moderate malnutrition (HCC)    NSTEMI (non-ST elevated myocardial infarction) (HCC)    PVD (peripheral vascular disease) (HCC)    Morbid obesity with BMI of 45.0-49.9, adult (HCC)    Moderate aortic stenosis    Hepatomegaly, not elsewhere classified    Arthritis    CAD (coronary artery disease)    Bilateral carotid artery stenosis    Moderate persistent asthma without complication    Carotid stenosis, right    Complicated urinary tract infection    Osteopenia of multiple sites    Swelling of lower extremity    Ambulatory dysfunction    Cellulitis    Cellulitis of right foot    Severe malnutrition (HCC)       Active Problems  Principal Problem:    Cellulitis  Active Problems:    Cellulitis of right foot    Severe malnutrition (HCC)  Resolved Problems:    * No resolved hospital problems. *    Electronically signed by: Electronically signed by Priya Nassar.  AUSTIN Patricia CNP on 11/16/2022 at 12:17 PM

## 2022-11-16 NOTE — PROGRESS NOTES
[facility] THERAPY EVALUATION    [address] [place] [address], 1950, 4119/[id number]-A, 11/16/2022    Discharge Recommendation: SNF    History:  [place]:  The primary encounter diagnosis was PVD (peripheral vascular disease) ([address] Diagnoses of Leg edema and Decreased activities of daily living (ADL) were also pertinent to this visit. Patient  has a past medical history of Anemia, Anxiety, Arthritis, Asthma, AV block, Blood poisoning, Blood transfusion, Breast cancer ([address] CAD (coronary artery disease), Cancer ([address] Carcinoma of breast ([address] Cardiac pacemaker, Chronic cystitis, Chronic respiratory failure ([address] Colon cancer ([address] COPD (chronic obstructive pulmonary disease) ([address] CTS (carpal tunnel syndrome), Depression, Diverticulitis, H/O 24 hour EKG monitoring, H/O cardiac catheterization, H/O cardiovascular stress test, H/O cardiovascular stress test, H/O chest pain, H/O Doppler ultrasound, H/O Doppler ultrasound, H/O echocardiogram, H/O echocardiogram, H/O urinary incontinence, History of blood transfusion, Hx antineoplastic chemo, Hx of Arterial Doppler ultrasound, Hx of cardiovascular stress test, Hx of echocardiogram, Hx of echocardiogram, Hx of fall, HX OTHER MEDICAL, Hyperlipidemia, Hypertension, Hypothermia, Malignant neoplasm of breast (female) ([address] Neuropathy, Normocytic normochromic anemia, Obstructive sleep apnea, Old MI (myocardial infarction), Osteoarthritis, Osteopenia, Osteopenia of multiple sites, Pneumonia due to COVID-19 virus, Primary malignant neoplasm of colon ([address] S/P cardiac cath, Skin cancer, Super obesity, and Umbilical hernia. Patient  has a past surgical history that includes Tonsillectomy (1957); Appendectomy (2004); colectomy (2004); Tunneled venous port placement (2004); cyst removal (2003); Breast lumpectomy (2007); Dilation and curettage of uterus (2006); hernia repair (2004); hernia repair (2008);  Dental surgery; Breast surgery (02/13/2012); Tunneled venous port placement (02/13/2012); Breast biopsy (2/13/12); Mastectomy, radical (Right, 02/29/2012); pre-malignant / benign skin lesion excision (2/8/2013); lymph node dissection (2/29/2012); Cardiac catheterization (2007 & 2011); A-V cardiac pacemaker insertion (3/10/14); Cystoscopy (Bilateral, 12/15/14); pacemaker placement; Endoscopy, colon, diagnostic (12/14/2017); Colonoscopy (10-18-13); Colonoscopy (1/19/2016); Colonoscopy (12/14/2017); Colonoscopy (N/A, 1/16/2019); Colonoscopy (01/21/2020); Colonoscopy (N/A, 1/21/2020); Carotid endarterectomy (Right, 12/3/2021); Cystoscopy (Right, 5/15/2022); Dilation and curettage of uterus (N/A, 5/24/2022); Kidney surgery (05/24/2022); Lithotripsy (Right, 8/1/2022); and Lithotripsy (Left, 9/26/2022). Subjective:  Patient states: \"Something about the chlorophyll in the fresh cut grass and sunshine combination takes the stain right out of cloth diapers\"  Pain: denies  Communication with other providers: coeval with PT Jovany  Restrictions: general precautions, fall risk, contact precautions    Home Setup/Prior level of function:   Social/Functional History  Lives With: Significant other  Type of Home: House  Home Layout: One level  Home Access: Stairs to enter with rails  Entrance Stairs - Number of Steps: 1  Bathroom Shower/Tub: Tub/Shower unit  Bathroom Toilet: Standard  Home Equipment: [employer]  ADL Assistance: Independent  Ambulation Assistance: Independent (mod I with spc in home, rollator with community mobility)  Transfer Assistance: occasionally needs assistance with transfers, S/O assists  Active : No   House is not AD accessible    Examination:  Observation: Pt was semi fowlers in bed upon arrival, agreeable to session.  Tele  Vision: WFL, glasses  Hearing: WFL  Objective Measures: stable    Body Systems and functions:  ROM: WFL in BUE  Strength: 4/5 in BUE  Sensation: WFL  Tone: normotonic in BUE  Coordination: movements fluid and coordinated  Activity Tolerance: fair    Activities of Daily Living (ADLs):  Feeding: ind  Grooming: CGA  Toileting: min A  UB dressing/bathing: SBA  LB dressing/bathing: mod A    *pt ADL function inferred from gross functional assessment of mobility, balance, posture, safety awareness, activity tolerance (unless otherwise indicated)    Cognitive and Psychosocial Functioning:  Overall cognitive status: A/Ox4, WNL. Tangential and distractible  Affect: pleasant    Functional Mobility:  Bed Mobility: CGA  Sit <> Stand: min Ax2    Ambulation: CGA using FWW      AM-PAC 6 click short form for inpatient daily activity:   How much help from another person does the patient currently need. .. Unable  Dep A Lot  Max A A Lot   Mod A A Little  Min A A Little   CGA  SBA None   Mod I  Indep  Sup   1. Putting on and taking off regular lower body clothing? [] 1    [] 2   [x] 2   [] 3   [] 3   [] 4      2. Bathing (including washing, rinsing, drying)? [] 1   [] 2   [x] 2 [] 3 [] 3 [] 4   3. Toileting, which includes using toilet, bedpan, or urinal? [] 1    [] 2   [] 2   [x] 3   [] 3   [] 4     4. Putting on and taking off regular upper body clothing? [] 1   [] 2   [] 2   [] 3   [x] 3    [] 4      5. Taking care of personal grooming such as brushing teeth? [] 1   [] 2    [] 2 [] 3    [x] 3   [] 4      6. Eating meals? [] 1   [] 2   [] 2   [] 3   [] 3   [x] 4        Raw Score:  17      24/24 = unimpaired  23/24 = 1-20% impaired   20/24-22/24 = 21-40% impaired  15/24-19/24 = 41-59% impaired   10/24-14/24 = 60%-79% impaired  7/24-9/24 = 80%-99% impaired  6/24 = 100% impaired     Treatment:    Therapeutic Activity Training (20 minutes): Therapeutic activity training was instructed today. Cues were given for safety, sequence, UE/LE placement, visual cues, and balance. Activities performed today included pt demo supine to sit with CGA. Pt seated at EOB with SBA, standing with min Ax2. Pt ambulating short distance in hallway with CGA using FWW.  Pt tolerating fairly, requiring x1 seated rest break in recliner before returning to room. Pt sitting in recliner, positioned for comfort. Education: Role of OT, OT POC, discharge needs, safety, benefits of EOB/OOB activity, AD/DME needs, Home safety  Safety Measures: Gait belt used for safety of pt and therapist, Left in recliner, Alarm in place, call light and phone within reach, lines managed    Assessment:  Pt is a 67 yr old female from home with S/O who presents with cellulitis. Prior to admission, pt was ind with ADLs and mobility using cane/rollator. Pt currently below baseline, min Ax2-CGA using FWW for mobility, ind-mod A for ADLs. Pt also presents with generalized weakness, activity tolerance, impaired mobility. Pt would benefit from continued IP OT services during their stay and discharge to SNF.     Complexity: mod  Prognosis: good  Barriers: deconditioning, comorbidities    Plan:  Plan: 3+/week    Treatment to include: Strengthening, ROM, Balance Training, Functional Mobility Training, Endurance Training, Gait Training, Pain Management, Safety Education and Training, Patient+Caregiver Education and Training, Equipment Evaluation Education and Procurement, Positioning, Self Care Training, Home Management Training, Coordination Training    Pt Would Benefit from Continued Edu on none  Adaptive Equipment Recommendations: none    Goals:  Time frame for goals: 2 weeks  Pt will complete feeding tasks with ind  Pt will complete grooming tasks with mod I standing at sink  Pt will complete toileting tasks with mod I using standard commode  Pt will complete UB dressing and bathing tasks with ind  Pt will complete LB dressing and bathing tasks with mod I  Pt will complete therapeutic exercise/activity to increase independence in ADL/IADL function  Pt will practice functional transfers and mobility with AD for increased safety and independence    Time:   Time in: 1052  Time out: 1122  Treatment Minutes: 20  Evaluation Minutes: 10  Total time: 30    Electronically signed by:      PRITI Hardin  11/16/2022, 10:57 AM

## 2022-11-16 NOTE — TELEPHONE ENCOUNTER
For [facility] in place:  No  Recommendation Provided To: Provider: 1 via Note to Provider  Gap Closed?: No   Time Spent (min): 30

## 2022-11-16 NOTE — PROGRESS NOTES
Physician Progress Note      Jonas Hardy  CSN #:                  [id number]  :                       1950  ADMIT DATE:       2022 7:39 PM  [address] [place] DATE:  RESPONDING  PROVIDER #:        Thuan Salinas MD          QUERY TEXT:    Patient admitted with RLE cellulitis. If possible, please document in progress   notes and discharge summary if you are evaluating and /or treating any of the   following: The medical record reflects the following:  Risk Factors: Hx colon cancer, breast cancer  Clinical Indicators: ASPEN criteria,  Greater than 10% weight loss over 6   months, Moderate body fat loss, Mild muscle mass loss, Severe +2 pitting   edema, Dietitian states severe malnutrition, BMI 45.18  Treatment: ONS, Dietitian consult    Thank you,  Tiana Jessica [phone]    ASPEN Criteria:    https://aspenjournals. onlinelibrary. combs. com/doi/full/10.1177/516487813858640  5  Options provided:  -- Protein calorie malnutrition severe  -- Other - I will add my own diagnosis  -- Disagree - Not applicable / Not valid  -- Disagree - Clinically unable to determine / Unknown  -- Refer to Clinical Documentation Reviewer    PROVIDER RESPONSE TEXT:    Provider disagreed with this query. Albumin normal. Obese and needs weight loss.  Not clinically malnourished    Query created by: Jules Dale on 2022 9:40 AM      Electronically signed by:  Thuan Salinas MD 2022 9:44 AM

## 2022-11-16 NOTE — PROGRESS NOTES
Physical Therapy  [facility] ACUTE CARE PHYSICAL THERAPY EVALUATION  Nusrat Edwards, 1950, 4119/4119-A, 11/16/2022    History  [place]:  The primary encounter diagnosis was PVD (peripheral vascular disease) ([address] Diagnoses of Leg edema and Decreased activities of daily living (ADL) were also pertinent to this visit. Patient  has a past medical history of Anemia, Anxiety, Arthritis, Asthma, AV block, Blood poisoning, Blood transfusion, Breast cancer ([address] CAD (coronary artery disease), Cancer ([address] Carcinoma of breast ([address] Cardiac pacemaker, Chronic cystitis, Chronic respiratory failure ([address] Colon cancer ([address] COPD (chronic obstructive pulmonary disease) ([address] CTS (carpal tunnel syndrome), Depression, Diverticulitis, H/O 24 hour EKG monitoring, H/O cardiac catheterization, H/O cardiovascular stress test, H/O cardiovascular stress test, H/O chest pain, H/O Doppler ultrasound, H/O Doppler ultrasound, H/O echocardiogram, H/O echocardiogram, H/O urinary incontinence, History of blood transfusion, Hx antineoplastic chemo, Hx of Arterial Doppler ultrasound, Hx of cardiovascular stress test, Hx of echocardiogram, Hx of echocardiogram, Hx of fall, HX OTHER MEDICAL, Hyperlipidemia, Hypertension, Hypothermia, Malignant neoplasm of breast (female) ([address] Neuropathy, Normocytic normochromic anemia, Obstructive sleep apnea, Old MI (myocardial infarction), Osteoarthritis, Osteopenia, Osteopenia of multiple sites, Pneumonia due to COVID-19 virus, Primary malignant neoplasm of colon ([address] S/P cardiac cath, Skin cancer, Super obesity, and Umbilical hernia. Patient  has a past surgical history that includes Tonsillectomy (1957); Appendectomy (2004); colectomy (2004); Tunneled venous port placement (2004); cyst removal (2003); Breast lumpectomy (2007); Dilation and curettage of uterus (2006); hernia repair (2004); hernia repair (2008); Dental surgery; Breast surgery (02/13/2012);  Tunneled venous port placement (02/13/2012); Breast biopsy (2/13/12); Mastectomy, radical (Right, 02/29/2012); pre-malignant / benign skin lesion excision (2/8/2013); lymph node dissection (2/29/2012); Cardiac catheterization (2007 & 2011); A-V cardiac pacemaker insertion (3/10/14); Cystoscopy (Bilateral, 12/15/14); pacemaker placement; Endoscopy, colon, diagnostic (12/14/2017); Colonoscopy (10-18-13); Colonoscopy (1/19/2016); Colonoscopy (12/14/2017); Colonoscopy (N/A, 1/16/2019); Colonoscopy (01/21/2020); Colonoscopy (N/A, 1/21/2020); Carotid endarterectomy (Right, 12/3/2021); Cystoscopy (Right, 5/15/2022); Dilation and curettage of uterus (N/A, 5/24/2022); Kidney surgery (05/24/2022); Lithotripsy (Right, 8/1/2022); and Lithotripsy (Left, 9/26/2022). Assessment:   Pt presents 2 days s/p admission for cellulitis and generalized weakness. She is from home w/ SO, in 1 level home, 1 ÁNGEL + 2 steps in home to her chair, ind PLOF, SPC in home, rollator for out of home. She is primarily limited by endurance, additional deficits include strength, balance, functional mobility, safety awareness, posture. Pt requires light assist for mobility, endorses fear of falling and difficulty completing stairs at home, dec endurance preventing ability to complete household distances. Recommending SNF. Complexity: Moderate  Prognosis: Good, comorbid conditions, large body habitus, deconditioning  Plan 3+/week, 1 week  Equipment: pend to next LOC    Recommendations for NURSING mobility: amb / SPT from bed/chair    Subjective:  Patient states:   \"My dad still follows me, he's a cardinal.\"    Pain:  denies pain    Communication with other providers:  Handoff to RN, co-eval with ISABELLA Blevins  Restrictions: fall risk; general precautions    Home Setup/Prior level of function  Social/Functional History  Lives With: Significant other  Type of Home: House  Home Layout: One level  Home Access: Stairs to enter with rails  Entrance Stairs - Number of Steps: 1  Bathroom Shower/Tub: Tub/Shower unit  Bathroom Toilet: Standard  Home Equipment: [employer]  ADL Assistance: Independent  Ambulation Assistance: Independent (mod I with spc in home, rollator with community mobility)  Transfer Assistance: occasionally needs assistance with transfers, S/O assists  Active : No   House is not AD accessible    Examination of body systems (includes body structures/functions, activity/participation limitations):  Observation:  Supine, awake, alert, agreeable. She is pleasant and motivated ; really wants to improve her ability to complete steps/improve mobility. Tele upon arrival  Posture: fair ; fwd flexed at hips, slight fwd shoulders  Vision:  glasses  Hearing:  WFL  Cardiopulmonary:  WFL ; SOB w/ exertion w/o desat  Cognition: A&O x 4 ; alert, follows commands w/o difficulty, attention intact, memory appears intact, good safety awareness, no cues needed for sequencing/setup, no cues needed for initiation, good insight into deficits    Musculoskeletal  ROM R/L: AROM WFL. Strength R/L:  grossly >3/5, as observed in function and ROM. Sensation: severe BL LE sensation deficits to light touch 2/2 neuropathy  Tone: normotonic  Coordination: WFL    Mobility:  Supine to sit:  CGA for steady during completion  Transfers: min A x 2 from EOB and recliner  Sitting balance:  good. Standing balance:  fair .     Gait: 30 ft + 25 ft using FWW at CGA ; dec aliya, BL UE support w/ fwd flexed posture and reliance on AD, waddling quality, dec step clearance BL, slight bouncing w/ onset of fatigue    AMPA 6 Clicks Inpatient Mobility:  AM-PAC Inpatient Mobility Raw Score : 16    Goals:  Pt Goals: return to PLOF  Short Term Goals  Time Frame for Short Term Goals: 1 week  Short Term Goal 1: pt will complete bed mobility at mod ind  Short Term Goal 2: pt will complete transfers at [facility]  Short Term Goal 3: pt will ambulate 75 ft using FWW at [facility]  Short Term Goal 4: pt will demonstrate pacing and endurance to complete inc distance following standing rest ~2mins       Treatment plan:  Bed mobility, functional mobility, transfers, balance, gait, TA, TX, strength activities, neuro    Treatment:  Gait Training:  Cues were given for safety, sequence, device management, balance, posture, awareness, path.       Positioned for comfort and pressure relief  Safety: patient left in chair, call light within reach, RN notified, gait belt used, all needs met    Time:   Time in: 1052  Time out: 1122  Timed treatment minutes: 20  Total time + Eval: 30    Electronically signed by:    Levi Marcus PT, DPT  11/16/2022, 12:59 PM

## 2022-11-17 ENCOUNTER — TELEPHONE (OUTPATIENT)
Dept: INTERNAL MEDICINE CLINIC | Age: 72
End: 2022-11-17

## 2022-11-17 LAB
ANION GAP SERPL CALCULATED.3IONS-SCNC: 11 MMOL/L (ref 4–16)
BASOPHILS ABSOLUTE: 0 K/CU MM
BASOPHILS RELATIVE PERCENT: 0.2 % (ref 0–1)
BUN BLDV-MCNC: 11 MG/DL (ref 6–23)
CALCIUM SERPL-MCNC: 9.4 MG/DL (ref 8.3–10.6)
CHLORIDE BLD-SCNC: 103 MMOL/L (ref 99–110)
CO2: 26 MMOL/L (ref 21–32)
CREAT SERPL-MCNC: 0.6 MG/DL (ref 0.6–1.1)
CULTURE: NORMAL
DIFFERENTIAL TYPE: ABNORMAL
EOSINOPHILS ABSOLUTE: 0.3 K/CU MM
EOSINOPHILS RELATIVE PERCENT: 2.7 % (ref 0–3)
GFR SERPL CREATININE-BSD FRML MDRD: >60 ML/MIN/1.73M2
GLUCOSE BLD-MCNC: 98 MG/DL (ref 70–99)
HCT VFR BLD CALC: 35.6 % (ref 37–47)
HEMOGLOBIN: 11.2 GM/DL (ref 12.5–16)
IMMATURE NEUTROPHIL %: 0.7 % (ref 0–0.43)
LYMPHOCYTES ABSOLUTE: 2 K/CU MM
LYMPHOCYTES RELATIVE PERCENT: 21.4 % (ref 24–44)
Lab: NORMAL
MCH RBC QN AUTO: 28 PG (ref 27–31)
MCHC RBC AUTO-ENTMCNC: 31.5 % (ref 32–36)
MCV RBC AUTO: 89 FL (ref 78–100)
MONOCYTES ABSOLUTE: 0.5 K/CU MM
MONOCYTES RELATIVE PERCENT: 5.8 % (ref 0–4)
NUCLEATED RBC %: 0 %
PDW BLD-RTO: 14.3 % (ref 11.7–14.9)
PLATELET # BLD: 205 K/CU MM (ref 140–440)
PMV BLD AUTO: 9.8 FL (ref 7.5–11.1)
POTASSIUM SERPL-SCNC: 4.4 MMOL/L (ref 3.5–5.1)
RBC # BLD: 4 M/CU MM (ref 4.2–5.4)
SEGMENTED NEUTROPHILS ABSOLUTE COUNT: 6.4 K/CU MM
SEGMENTED NEUTROPHILS RELATIVE PERCENT: 69.2 % (ref 36–66)
SODIUM BLD-SCNC: 140 MMOL/L (ref 135–145)
SPECIMEN: NORMAL
TOTAL IMMATURE NEUTOROPHIL: 0.06 K/CU MM
TOTAL NUCLEATED RBC: 0 K/CU MM
WBC # BLD: 9.2 K/CU MM (ref 4–10.5)

## 2022-11-17 PROCEDURE — 2580000003 HC RX 258: Performed by: NURSE PRACTITIONER

## 2022-11-17 PROCEDURE — 99232 SBSQ HOSP IP/OBS MODERATE 35: CPT | Performed by: NURSE PRACTITIONER

## 2022-11-17 PROCEDURE — 1200000000 HC SEMI PRIVATE

## 2022-11-17 PROCEDURE — 85025 COMPLETE CBC W/AUTO DIFF WBC: CPT

## 2022-11-17 PROCEDURE — 6360000002 HC RX W HCPCS: Performed by: STUDENT IN AN ORGANIZED HEALTH CARE EDUCATION/TRAINING PROGRAM

## 2022-11-17 PROCEDURE — 80048 BASIC METABOLIC PNL TOTAL CA: CPT

## 2022-11-17 PROCEDURE — 94761 N-INVAS EAR/PLS OXIMETRY MLT: CPT

## 2022-11-17 PROCEDURE — 6360000002 HC RX W HCPCS: Performed by: NURSE PRACTITIONER

## 2022-11-17 PROCEDURE — 36415 COLL VENOUS BLD VENIPUNCTURE: CPT

## 2022-11-17 PROCEDURE — 2580000003 HC RX 258: Performed by: STUDENT IN AN ORGANIZED HEALTH CARE EDUCATION/TRAINING PROGRAM

## 2022-11-17 PROCEDURE — 76937 US GUIDE VASCULAR ACCESS: CPT

## 2022-11-17 PROCEDURE — 6370000000 HC RX 637 (ALT 250 FOR IP): Performed by: FAMILY MEDICINE

## 2022-11-17 PROCEDURE — 2580000003 HC RX 258: Performed by: FAMILY MEDICINE

## 2022-11-17 PROCEDURE — 94640 AIRWAY INHALATION TREATMENT: CPT

## 2022-11-17 PROCEDURE — 6370000000 HC RX 637 (ALT 250 FOR IP): Performed by: STUDENT IN AN ORGANIZED HEALTH CARE EDUCATION/TRAINING PROGRAM

## 2022-11-17 RX ORDER — SODIUM CHLORIDE 9 MG/ML
INJECTION, SOLUTION INTRAVENOUS PRN
Status: ACTIVE | OUTPATIENT
Start: 2022-11-17 | End: 2022-11-17

## 2022-11-17 RX ADMIN — CEFEPIME HYDROCHLORIDE 2000 MG: 2 INJECTION, POWDER, FOR SOLUTION INTRAVENOUS at 23:39

## 2022-11-17 RX ADMIN — SODIUM CHLORIDE 25 ML: 9 INJECTION, SOLUTION INTRAVENOUS at 06:02

## 2022-11-17 RX ADMIN — Medication 2 PUFF: at 20:49

## 2022-11-17 RX ADMIN — Medication 2 PUFF: at 10:49

## 2022-11-17 RX ADMIN — VANCOMYCIN HYDROCHLORIDE 750 MG: 750 INJECTION, POWDER, LYOPHILIZED, FOR SOLUTION INTRAVENOUS at 06:03

## 2022-11-17 RX ADMIN — DIPHENHYDRAMINE HYDROCHLORIDE 25 MG: 25 TABLET ORAL at 22:15

## 2022-11-17 RX ADMIN — VANCOMYCIN HYDROCHLORIDE 750 MG: 750 INJECTION, POWDER, LYOPHILIZED, FOR SOLUTION INTRAVENOUS at 17:27

## 2022-11-17 RX ADMIN — CEFEPIME HYDROCHLORIDE 2000 MG: 2 INJECTION, POWDER, FOR SOLUTION INTRAVENOUS at 01:23

## 2022-11-17 RX ADMIN — SODIUM CHLORIDE: 9 INJECTION, SOLUTION INTRAVENOUS at 10:33

## 2022-11-17 RX ADMIN — CEFEPIME HYDROCHLORIDE 2000 MG: 2 INJECTION, POWDER, FOR SOLUTION INTRAVENOUS at 10:33

## 2022-11-17 RX ADMIN — SODIUM CHLORIDE 25 ML: 9 INJECTION, SOLUTION INTRAVENOUS at 01:21

## 2022-11-17 RX ADMIN — SODIUM CHLORIDE: 9 INJECTION, SOLUTION INTRAVENOUS at 17:26

## 2022-11-17 RX ADMIN — LETROZOLE 2.5 MG: 2.5 TABLET ORAL at 22:15

## 2022-11-17 ASSESSMENT — PAIN - FUNCTIONAL ASSESSMENT: PAIN_FUNCTIONAL_ASSESSMENT: ACTIVITIES ARE NOT PREVENTED

## 2022-11-17 ASSESSMENT — PAIN DESCRIPTION - FREQUENCY: FREQUENCY: CONTINUOUS

## 2022-11-17 ASSESSMENT — PAIN SCALES - GENERAL: PAINLEVEL_OUTOF10: 4

## 2022-11-17 ASSESSMENT — PAIN DESCRIPTION - DESCRIPTORS: DESCRIPTORS: DISCOMFORT;SORE

## 2022-11-17 ASSESSMENT — PAIN DESCRIPTION - ORIENTATION: ORIENTATION: LEFT

## 2022-11-17 ASSESSMENT — PAIN DESCRIPTION - ONSET: ONSET: GRADUAL

## 2022-11-17 ASSESSMENT — PAIN DESCRIPTION - LOCATION: LOCATION: ARM

## 2022-11-17 ASSESSMENT — PAIN DESCRIPTION - PAIN TYPE: TYPE: ACUTE PAIN

## 2022-11-17 NOTE — DISCHARGE INSTR - COC
Continuity of Care Form    Patient Name: Liliana De Los Santos   :  1950  MRN:  [id number]    Admit date:  2022  Discharge date:  22      Code Status Order: Full Code   Advance Directives:     Admitting Physician:  Beronica Hilton MD  PCP: Jose Vuong DO    Discharging Nurse: YONG [facility] Unit/Room#: 7427/6815-Q  Discharging Unit Phone Number: [phone]      Emergency Contact:   Extended Emergency Contact Information  Primary Emergency Contact: Inderjit Kuhn  Address: [address] [facility] [address] [facility] Phone: [phone]  Mobile Phone: [phone]  Relation: Other  Secondary Emergency Contact: Florence Running  Address: [address] [facility] Phone: [phone]  Mobile Phone: [phone]  Relation: Parent    Past Surgical History:  Past Surgical History:   Procedure Laterality Date    A-V CARDIAC PACEMAKER INSERTION  3/10/14     Medtronic. Model:  [id number] Medtronic  Serial:  [id number] dual chamber pacemaker    APPENDECTOMY  2004    BREAST BIOPSY  12    BREAST LUMPECTOMY  2007    right     BREAST SURGERY  2012    rt lesion biopsy     CARDIAC CATHETERIZATION   &     CAROTID ENDARTERECTOMY Right 12/3/2021    RIGHT CAROTID ENDARTERECTOMY WITH PATCH performed by Mary Ardon MD at [address] [place] [address]      Colon cancer    COLONOSCOPY  10-18-13    polyp    COLONOSCOPY  2016    internal hemorrhoids, diverticulosis, 1.5 cm sessile polyp, 8 mm polyp found in rectum.     COLONOSCOPY  2017    1.5cm residual polyp, 5mm polyps in blind sigmoid loop x3, Sigmoid divertics, Internal grade 1 hemorrhoids    COLONOSCOPY N/A 2019    COLONOSCOPY W/ ENDOSCOPIC MUCOSAL RESECTION WITH ELEVIEW 5ML INJECTION AND POLYPECTOMY OF TIP OF BLIND RECTOSIGMOID STUMP AND CAUTERIZATION WITH ERBE PROBE, CLIPPING X2 performed by Miladis Veronica MD at [facility] ENDOSCOPY    COLONOSCOPY  01/21/2020    pan divertics/ 4 polyps/ sm int hem, S/P partial sigmoid resection w/ end to side anastamosis, repeat in 3 years    COLONOSCOPY N/A 1/21/2020    COLONOSCOPY POLYPECTOMY SNARE/COLD BIOPSY performed by Max Coronado MD at [place] [address]  2003    back    CYSTOSCOPY Bilateral 12/15/14    CYSTOSCOPY Right 5/15/2022    CYSTOSCOPY URETERAL STENT INSERTION performed by Issac Ledbetter MD at [address] [place] [address]      front right tooth and root canal w/ brass bolt    DILATION AND CURETTAGE OF UTERUS  2006    Needed a camera to find my uterus. DILATION AND CURETTAGE OF UTERUS N/A 5/24/2022    DILATATION AND CURETTAGE, CULTURES OF UTEREUS performed by Tiana Crigler, MD at [address] [place] [address] Entrance, COLON, DIAGNOSTIC  12/14/2017    Small hiatal hernia    HERNIA REPAIR  2004    Umb hernia    HERNIA REPAIR  2008    Inc hernia    KIDNEY SURGERY  05/24/2022    stent placed on right side    LITHOTRIPSY Right 8/1/2022    RIGHT CYSTOSCOPY URETEROSCOPY STONE MANIPULATION WITH HOLIUM LASER LITHOTRIPSY STENT REPLACEMENT performed by Amy Gordon MD at [facility]    LITHOTRIPSY Left 9/26/2022    LEFT CYSTOSCOPY URETEROSCOPY RETROGRADE PYELOGRAM STONE [facility] LITHOTRIPSY POSSIBLE [address] performed by Amy Gordon MD at [place] [address]  2/29/2012    Right axillary-3 sentinel nodes were positive out of 9.     MASTECTOMY, RADICAL Right 02/29/2012    w/ sentinal node disection-Dr West    PACEMAKER PLACEMENT      PRE-MALIGNANT / BENIGN SKIN LESION EXCISION  2/8/2013    right leg X2-Dr West    TONSILLECTOMY  1957    TUNNELED VENOUS PORT PLACEMENT  2004    TUNNELED VENOUS PORT PLACEMENT  02/13/2012    removal of mediport       Immunization History:   Immunization History   Administered Date(s) Administered    Tdap (Boostrix, Adacel) 07/13/2017, 11/14/2022       Active Problems:  Patient Active Problem List   Diagnosis Code Malignant neoplasm of upper-outer quadrant of right breast in female, estrogen receptor positive ([facility] [address] C50.411, Z17.0    Diffuse cystic mastopathy N60.19    Hyperlipidemia E78.5    H/O chest pain Z87.898    Heart block AV second degree I44.1    Abnormal cardiovascular stress test R94.39    JAMIE (obstructive sleep apnea) G47.33    Super obesity E66.9    Mild asthma J45.909    Severe obstructive sleep apnea G47.33    Cardiac pacemaker Z95.0    Chronic cystitis N30.20    Asthma J45.909    Hypertension I10    Depression F32. A    Obstructive sleep apnea G47.33    Nocturnal oxygen desaturation G47.34    Moderate malnutrition (HCC) E44.0    NSTEMI (non-ST elevated myocardial infarction) ([facility]) I21.4    PVD (peripheral vascular disease) ([facility]) I73.9    Morbid obesity with BMI of 45.0-49.9, adult ([facility]) E66.01, Z68.42    Moderate aortic stenosis I35.0    Hepatomegaly, not elsewhere classified R16.0    Arthritis M19.90    CAD (coronary artery disease) I25.10    Bilateral carotid artery stenosis I65.23    Moderate persistent asthma without complication X19.64    Carotid stenosis, right C81.98    Complicated urinary tract infection N39.0    Osteopenia of multiple sites M85.89    Swelling of lower extremity M79.89    Ambulatory dysfunction R26.2    Cellulitis L03.90    Cellulitis of right foot L03.115    Severe malnutrition (HCC) E43    Acute cystitis without hematuria N30.00       Isolation/Infection:   Isolation            Contact          Patient Infection Status       Infection Onset Added Last Indicated Last Indicated By Review Planned Expiration Resolved Resolved By    MDRO (multi-drug resistant organism)  10/12/22 10/12/22 Tasia Ortiz RN        10/9/22: URINE: Proteus mirabilis    Resolved    Rhinovirus 22 Respiratory Panel, Molecular, with COVID-19 (Restricted: peds pts or suitable admitted adults)   22     COVID-19 (Rule Out) 22 Respiratory Panel, Molecular, with COVID-19 (Restricted: peds pts or suitable admitted adults) (Ordered)   22 Rule-Out Test Resulted    COVID-19 (Rule Out) 21 COVID-19 (Ordered)   21 Rule-Out Test Resulted    COVID-19  21 Robby Rowland RN   21     COVID-19 (Rule Out) 21 COVID-19, Rapid (Ordered)   21 Rule-Out Test Resulted            Nurse Assessment:  Last Vital Signs: BP (!) 136/53   Pulse 64   Temp 98.3 °F (36.8 °C) (Oral)   Resp 18   Ht 5' 2\" (1.575 m)   Wt 257 lb 11.5 oz (116.9 kg)   LMP 01/15/1999   SpO2 96%   BMI 47.14 kg/m²     Last documented pain score (0-10 scale): Pain Level: 4  Last Weight:   Wt Readings from Last 1 Encounters:   22 257 lb 11.5 oz (116.9 kg)     Mental Status:  oriented    IV Access:  - None    Nursing Mobility/ADLs:  Walking   Assisted  Transfer  Assisted  Bathing  Assisted  Dressing  Assisted  Toileting  Assisted  Feeding  Independent  Med Admin  Dependent  Med Delivery   whole    Wound Care Documentation and Therapy:        Elimination:  Continence: Bowel: Yes  Bladder: Yes  Urinary Catheter: None   Colostomy/Ileostomy/Ileal Conduit: No       Date of Last BM: 22      Intake/Output Summary (Last 24 hours) at 2022 1319  Last data filed at 2022 0449  Gross per 24 hour   Intake --   Output 1300 ml   Net -1300 ml     I/O last 3 completed shifts:  In: -   Out: 1300 [Urine:1300]    Safety Concerns:      At Risk for Falls    Impairments/Disabilities:      None      Patient's personal belongings (please select all that are sent with patient):  None    RN SIGNATURE:  Electronically signed by Chilo Lin LPN on  at 5:31 PM EST    CASE MANAGEMENT/SOCIAL WORK SECTION    Inpatient Status Date: ***    Readmission Risk Assessment Score:  Readmission Risk              Risk of Unplanned Readmission:  20           Discharging to Facility/ Agency   Name: Address:  Phone:  Fax:    Dialysis Facility (if applicable)   Name:  Address:  Dialysis Schedule:  Phone:  Fax:    / signature: {Esignature:[id number]}    PHYSICIAN SECTION    Nutrition Therapy:  Current Nutrition Therapy:   - Oral Diet:  Carb Control 4 carbs/meal (1800kcals/day) and Low Sodium (2gm)    Routes of Feeding: Oral  Liquids: No Restrictions  Daily Fluid Restriction: no  Last Modified Barium Swallow with Video (Video Swallowing Test): not done    Treatments at the Time of Hospital Discharge:   Respiratory Treatments: home inahlers  Oxygen Therapy:  is not on home oxygen therapy. Ventilator:    - No ventilator support    Rehab Therapies: Physical Therapy, Occupational Therapy  Weight Bearing Status/Restrictions: No weight bearing restrictions  Other Medical Equipment (for information only, NOT a DME order):  wheelchair and walker  Other Treatments: RX antibiotics        Prognosis: Good    Condition at Discharge: Stable    Rehab Potential (if transferring to Rehab): Good    Recommended Labs or Other Treatments After Discharge: CBC w/ diff in 1 week     Physician Certification: I certify the above information and transfer of Priya Chase  is necessary for the continuing treatment of the diagnosis listed and that she requires [facility] for greater 30 days.      Update Admission H&P: No change in H&P    PHYSICIAN SIGNATURE:  Electronically signed by AUSTIN Soni CNP on 11/18/22 at 12:23 PM EST

## 2022-11-17 NOTE — PROGRESS NOTES
[address] [facility]  consulted by Dr. Carmen Gomes for monitoring and adjustment. Indication for treatment: Vancomycin indication: SSTI with risk factors   Goal trough: Trough Goal: 10-15 mcg/mL  AUC/ALEX: <500    Risk Factors for MRSA Identified:   Hospitalization within the past 90 days, Received IV antibiotics within the past 90 days    Pertinent Laboratory Values:   Temp Readings from Last 3 Encounters:   11/17/22 98.3 °F (36.8 °C) (Oral)   10/18/22 98 °F (36.7 °C) (Oral)   10/13/22 97.6 °F (36.4 °C) (Oral)     Recent Labs     11/14/22  2010 11/15/22  1034 11/16/22  0730 11/17/22  0916   WBC 12.4* 8.7 9.2 9.2   LACTATE 1.7  --   --   --        Recent Labs     11/14/22 2010 11/16/22  0730 11/17/22  0916   BUN 9 9 11   CREATININE 0.6 0.6 0.6       Estimated Creatinine Clearance: 101 mL/min (based on SCr of 0.6 mg/dL). Intake/Output Summary (Last 24 hours) at 11/17/2022 1411  Last data filed at 11/17/2022 0449  Gross per 24 hour   Intake --   Output 1300 ml   Net -1300 ml       Pertinent Cultures:   Date    Source    Results  11/15                          Blood                                  Ordered  11/15                          MRSA screen                     Ordered  11/16                          Urine                                   In process    Vancomycin level:   TROUGH:  No results for input(s): VANCOTROUGH in the last 72 hours. RANDOM:    Recent Labs     11/16/22  0730   VANCORANDOM 17.1     Assessment:  HPI: A 67 y.o female who presents with edema and cellulitis. She states, she has been dealing with the swelling for many year, but on last Saturday, she dropped her scissors and it fell on her right foot. Since then the swelling has become worse.   SCr, BUN, and urine output: Scr remains at baseline, no UOP data  Day(s) of therapy: 3 of 5 (ID following)  Vancomycin concentration:  11/16 - 17.1, 2 hour level on 750mg q12h, predicted trough of 12.3 at steady state.    Plan:  Renal trends remain at baseline  Continue vancomycin 750mg ivpb q12h with a predicted therapeutic trough at steady state. Expect some accumulation 2/2 BMI. Pharmacy will continue to monitor patient and adjust therapy as indicated    Yohan 3 11/18 @0600    Thank you for the consult.   Kush Alvarado, [facility]  11/17/2022 2:11 PM

## 2022-11-17 NOTE — PROGRESS NOTES
yet)  -Urine CX neg from 11/16/22    S/p Left Ureteral Stent due to 2.5 cm kidney stone: placed 9/26/22 and removed  -Urology consulted, they recommend outpatient follow up as previously scheduled, no other changes in management, cleared from Urology      Hypokalemia:  - giving potassium 20 mEQ PO X 1 on 11/15, normal since      Anemia:   -B12/Folate, Ferritin, Iron Studies, Retic are normal         Chronic Medical Problems:      Chronic Peripheral Neurophathy     Breast CA:   -cont Femara PO QD      Asthma:  - cont Flovent inahler 2 puffs BID and albuterol HFA prn      Sleep Apnea:  -has seen DR. Mendoza Escoto for this is and is not interested in CPAP      Morbid Obesity:  -BMI 45.18  Reporting recurrent falls for more than 6 months  Was recently in SNF and was discharged, has been declining for past few weeks  PT OT consult for evaluation     Moderate aortic stenosis:  -Undergoing surveillance with cardiology     Moderate pulmonary hypertension:  -Could be secondary to underlying lung parenchymal disease or obstructive sleep apnea in the setting of morbid obesity  Recommend right heart cath in the outpatient setting for further classification of disease process     Hyperlipidemia:  -Continue statin     Carotid artery stenosis:  -Status post right CEA  -Undergoing surveillance with cardiothoracic surgery  -Continue aspirin and statin    Diet ADULT DIET; Regular; Low Sodium (2 gm)  ADULT ORAL NUTRITION SUPPLEMENT; Breakfast; Diabetic Oral Supplement   DVT Prophylaxis [x] Lovenox, []  Heparin, [] SCDs, [] Ambulation,  [] Eliquis, [] Xarelto  [] Coumadin   Code Status Full Code   Disposition From: home  Expected Disposition: SNF  Estimated Date of Discharge: 11/18/22  Patient requires continued admission due to UTI with MDRSO and Cellulitis of foot   Surrogate Decision Maker/ POA Her mother     Subjective:     Chief Complaint: Laceration (Laceration to right foot from dropping scissors.  Current cellulitis in right foot) Quincy Mosquera is a 67 y.o. female who presents with redness and swelling of right foot. States her right foot and leg are much less sensitive to touch today, has severe neuropathy, so only had pain with touch/palpation. No abd pain, frequent urination, dysuria, her body aches are now resolved, her chills are improved and had some chills last night. Her appetite is good. Review of Systems:    Ten point ROS is negative, unless otherwise noted above. Objective: Intake/Output Summary (Last 24 hours) at 11/17/2022 1418  Last data filed at 11/17/2022 0449  Gross per 24 hour   Intake --   Output 1300 ml   Net -1300 ml        Vitals:   Vitals:    11/17/22 1049   BP:    Pulse: 64   Resp: 18   Temp:    SpO2: 96%       Physical Exam:     General: NAD  Eyes: EOMI  ENT: neck supple  Cardiovascular: Regular rate. Normal S1/S2, has a loud blowing murmur heard at right mid sternal border , no gallops, +1 pedal edema  Respiratory: Clear to auscultation bilaterally, normal oxygenation on RA  Gastrointestinal: Soft, non tender, normal BS x 4  Genitourinary: no suprapubic tenderness  Musculoskeletal: No edema  Skin: warm, dry, has redness on bilat LE with dry skin and flaking, mild redness and swelling of right foot with excess warmth to touch  Neuro: Alert and oriented x 3  Psych: Mood appropriate.      Medications:   Medications:    fluticasone  2 puff Inhalation BID    letrozole  2.5 mg Oral Nightly    simvastatin  20 mg Oral Nightly    vancomycin  750 mg IntraVENous Q12H    cefepime  2,000 mg IntraVENous Q12H      Infusions:    sodium chloride 25 mL/hr at 11/17/22 1033     PRN Meds: sodium chloride, , PRN  diphenhydrAMINE, 25 mg, Q6H PRN  albuterol sulfate HFA, 2 puff, Q6H PRN      Labs      Recent Results (from the past 24 hour(s))   Basic Metabolic Panel    Collection Time: 11/17/22  9:16 AM   Result Value Ref Range    Sodium 140 135 - 145 MMOL/L    Potassium 4.4 3.5 - 5.1 MMOL/L    Chloride 103 99 - 110 mMol/L    CO2 26 21 - 32 MMOL/L    Anion Gap 11 4 - 16    BUN 11 6 - 23 MG/DL    Creatinine 0.6 0.6 - 1.1 MG/DL    Est, Glom Filt Rate >60 >60 mL/min/1.73m2    Glucose 98 70 - 99 MG/DL    Calcium 9.4 8.3 - 10.6 MG/DL   CBC with Auto Differential    Collection Time: 11/17/22  9:16 AM   Result Value Ref Range    WBC 9.2 4.0 - 10.5 K/CU MM    RBC 4.00 (L) 4.2 - 5.4 M/CU MM    Hemoglobin 11.2 (L) 12.5 - 16.0 GM/DL    Hematocrit 35.6 (L) 37 - 47 %    MCV 89.0 78 - 100 FL    MCH 28.0 27 - 31 PG    MCHC 31.5 (L) 32.0 - 36.0 %    RDW 14.3 11.7 - 14.9 %    Platelets 158 029 - 175 K/CU MM    MPV 9.8 7.5 - 11.1 FL    Differential Type AUTOMATED DIFFERENTIAL     Segs Relative 69.2 (H) 36 - 66 %    Lymphocytes % 21.4 (L) 24 - 44 %    Monocytes % 5.8 (H) 0 - 4 %    Eosinophils % 2.7 0 - 3 %    Basophils % 0.2 0 - 1 %    Segs Absolute 6.4 K/CU MM    Lymphocytes Absolute 2.0 K/CU MM    Monocytes Absolute 0.5 K/CU MM    Eosinophils Absolute 0.3 K/CU MM    Basophils Absolute 0.0 K/CU MM    Nucleated RBC % 0.0 %    Total Nucleated RBC 0.0 K/CU MM    Total Immature Neutrophil 0.06 K/CU MM    Immature Neutrophil % 0.7 (H) 0 - 0.43 %        Imaging/Diagnostics Last 24 Hours   No results found.     Electronically signed by AUSTIN Kasper CNP on 11/17/2022 at 2:18 PM

## 2022-11-17 NOTE — PLAN OF CARE
Problem: Discharge Planning  Goal: Discharge to home or other facility with appropriate resources  11/17/2022 1225 by Farooq Case LPN  Outcome: Progressing  11/17/2022 0432 by Hector Gallegos LPN  Outcome: Progressing  Flowsheets (Taken 11/16/2022 2310)  Discharge to home or other facility with appropriate resources:   Identify barriers to discharge with patient and caregiver   Arrange for needed discharge resources and transportation as appropriate   Identify discharge learning needs (meds, wound care, etc)   Arrange for interpreters to assist at discharge as needed   Refer to discharge planning if patient needs post-hospital services based on physician order or complex needs related to functional status, cognitive ability or social support system     Problem: Pain  Goal: Verbalizes/displays adequate comfort level or baseline comfort level  11/17/2022 1225 by Farooq Case LPN  Outcome: Progressing  11/17/2022 0432 by Hector Gallegos LPN  Outcome: Progressing  Flowsheets (Taken 11/16/2022 2316)  Verbalizes/displays adequate comfort level or baseline comfort level:   Encourage patient to monitor pain and request assistance   Assess pain using appropriate pain scale   Administer analgesics based on type and severity of pain and evaluate response   Implement non-pharmacological measures as appropriate and evaluate response   Consider cultural and social influences on pain and pain management   Notify Licensed Independent Practitioner if interventions unsuccessful or patient reports new pain     Problem: Skin/Tissue Integrity  Goal: Absence of new skin breakdown  Description: 1. Monitor for areas of redness and/or skin breakdown  2. Assess vascular access sites hourly  3. Every 4-6 hours minimum:  Change oxygen saturation probe site  4.   Every 4-6 hours:  If on nasal continuous positive airway pressure, respiratory therapy assess nares and determine need for appliance change or resting period.   11/17/2022 1225 by Gaby Vilchis LPN  Outcome: Progressing  11/17/2022 0432 by Maggie Millard LPN  Outcome: Progressing     Problem: Safety - Adult  Goal: Free from fall injury  11/17/2022 1225 by Gaby Vilchis LPN  Outcome: Progressing  11/17/2022 0432 by Maggie Millard LPN  Outcome: Progressing     Problem: ABCDS Injury Assessment  Goal: Absence of physical injury  11/17/2022 1225 by Gaby Vilchis LPN  Outcome: Progressing  11/17/2022 0432 by Maggie Millard LPN  Outcome: Progressing

## 2022-11-17 NOTE — CONSULTS
[facility], [address]   Consult Note  [facility] 1 2 3 4 5  Date: 2022   Patient: Jes De La Rosa   : 1950   DOA: 2022   MRN: [id number]   ROOM#: [id number]     Reason for Consult:  Complicated UTI, recent  procedure  Requesting Physician:  AUSTIN Alonzo-CNP  Collaborating Urologist on Call at time of admission: Dr. Pilo More:  BLE edema, cellulitis    History Obtained From:  patient, electronic medical record    HISTORY OF PRESENT ILLNESS:                The patient is a 67 y.o. female with significant past medical history of CAD, breast cancer, colon cancer, chronic venous insufficiency, depression/anxiety, COPD, HTN, HLD, anemia, and morbid obesity,  who presented with BLE edema, right LE cellulitis 2/2 puncture wound, and deconditioning. Patient known to our group, sees Dr. Moe Bhakta and recently underwent cystoscopy with left ureteroscopy/laser lithotripsy for 2.5cm left renal stone (2022) and cystoscopy with right ureteroscopy/laser lithotripsy for 11mm left ureteral stone (2022). Renal US  showed left renal stone. Urine PCR 11/10 positive for Proteus mirabilis and sent in [facility] but patient was admitted to [facility] before she could start it. Started on cefepime in the ER. Urine cx no growth (likely received cefepime before urine cx was sent). Mildly elevated WBC at 12.4 on arrival, no DARIUS. Currently patient is doing well. No fever or chills, no nausea/ vomiting, no abd/flank pain. No UTI symptoms. She is incontinent at baseline. She will soon start trial of Myrbetriq which was provided in office. She reports she dropped a pair of scissors on the top of her right foot and had worsening swelling and redness.     Past Medical History:        Diagnosis Date    Anemia     Anxiety     Arthritis     generalized    Asthma     AV block     2nd degree per hospital in [id number]    Blood poisoning     Blood transfusion     2003    Breast cancer ([facility] [address] 02/29/2012    right    CAD (coronary artery disease)     Cancer ([facility] [address] 2007    skin (face) & colon(2003)/ 2/2012 dx of right breast ca(pc)    Carcinoma of breast ([facility] [address] 02/29/2012    right arm no b/p or sticks    Cardiac pacemaker 03/10/2014    Medtronic dual lead    Chronic cystitis     Chronic respiratory failure (HCC)     2 L/min oxygen at night-time    Colon cancer ([facility] [address] 2013    COPD (chronic obstructive pulmonary disease) ([facility] [address]     CTS (carpal tunnel syndrome)     Depression     Diverticulitis     H/O 24 hour EKG monitoring 2/28/2014 7/24/13 2/14 complete heart block 7/13No clinically significant arrthymia noted. H/O cardiac catheterization 10/31/2011, 7/11/2007    10/31/2011-No significant CAD. Cardiolite stress test was false positive-Dr Jay Perez; 7/11/2007-No CAD, global function intact;    H/O cardiovascular stress test 10/20/2011, 3/23/2010    10/20/2011-Lexiscan- Evid of mild ischemia in Left CX region. EF 70%. Global LVSF normal;    H/O cardiovascular stress test 01/07/2015    EF 77%. Normal Stress Test.    H/O chest pain 04/2005    sees Dr Jay Perez    H/O Doppler ultrasound 02/20/2008 2/20/2008-CAROTID DOPPLER- Normal carotids bilaterally;    H/O Doppler ultrasound 06/06/2013    CAROTID DOPPLER- there is heterogeneous, irregular atherosclerotic plaque noted in the right internal carotid artery,  doppler flow velocities within the right internal carotid artery are elevated, consistent with a mild, less than 50%stenosis, there is intimal thickening but no significant atherosclerotic plaque noted in the left internal carotid artery, the left carotid are within normal limits    H/O echocardiogram 4/9/2012, 10/20/2011    4/9/2012-LVSF normal EF=>55%. impaired LV relaxation;    H/O echocardiogram 12/31/2014    EF 50-55%. LV shows mild concentric hypertrophy. Mildly dilated left atrium. Mildly dilated right ventricle. Sclerotic but not stenotic aortic valve. Mitral annular calcification. Mild MR, Mild TR, Mild pulm HTN.    H/O urinary incontinence     History of blood transfusion     Hx antineoplastic chemo     Hx of Arterial Doppler ultrasound 07/01/2019    No hemodynamically significant or focal stenosis visualized bilaterally, bilateral lower extremity arteries exhibit diffuse plaque and multiphasic waveforms, unable to obtain bilateral ABIs due to elevated leg pressures >250 mmHg, possibly due to atherosclerosis    Hx of cardiovascular stress test 06/2013    EF 70% abn suggestive of anterolateral ischemia, possible RCA ischmeia, post left ventricular dilation suggestive multivessel CAD. Hx of echocardiogram 06/2013    EF50%, mild MR and TR, mild pulmonary HTN, mild AS    Hx of echocardiogram 11/01/2021    Left ventricular systolic function is normal, Mild concentric left ventricular hypertrophy Mildly dilated left atrium. Mitral annular calcification is present. Mild tricuspid regurgitation No evidence of any pericardial effusion    Hx of fall     \"last time 2018- due to neuropathy, have to use cane\"    [facility] 7/24/13, 06/2013 7/13-No clinacally significant arrhythmia. 6/13-multiple cycles of wenckebach episodes in addtion to some sinus tach    Hyperlipidemia     Hypertension     Hypothermia 2008    Malignant neoplasm of breast (female) ([facility] [address] 2012    Neuropathy     \"have neuropathy of my legs\"    Normocytic normochromic anemia     Obstructive sleep apnea 2008 01- Has CPAP machine but has not used in over a year - states she has not had problems since her pacemaker was placed.     Old MI (myocardial infarction)     per pt on 1/15/2019\"had heart attack about a year ago\"    Osteoarthritis     Osteopenia     Osteopenia of multiple sites 9/14/2022    Pneumonia due to COVID-19 virus 03/22/2021    Primary malignant neoplasm of colon ([facility] [address]     S/P cardiac cath 06/2013    no significant CAD false postive stress test    Skin cancer     Super obesity     Umbilical hernia Past Surgical History:        Procedure Laterality Date    A-V CARDIAC PACEMAKER INSERTION  3/10/14     Medtronic. Model:  [id number] Medtronic  Serial:  [id number] dual chamber pacemaker    APPENDECTOMY  2004    BREAST BIOPSY  2/13/12    BREAST LUMPECTOMY  2007    right     BREAST SURGERY  02/13/2012    rt lesion biopsy     CARDIAC CATHETERIZATION  2007 & 2011    CAROTID ENDARTERECTOMY Right 12/3/2021    RIGHT CAROTID ENDARTERECTOMY WITH PATCH performed by Zoë La MD at [address]  2004    Colon cancer    COLONOSCOPY  10-18-13    polyp    COLONOSCOPY  1/19/2016    internal hemorrhoids, diverticulosis, 1.5 cm sessile polyp, 8 mm polyp found in rectum. COLONOSCOPY  12/14/2017    1.5cm residual polyp, 5mm polyps in blind sigmoid loop x3, Sigmoid divertics, Internal grade 1 hemorrhoids    COLONOSCOPY N/A 1/16/2019    COLONOSCOPY W/ ENDOSCOPIC MUCOSAL RESECTION WITH ELEVIEW 5ML INJECTION AND POLYPECTOMY OF TIP OF BLIND RECTOSIGMOID STUMP AND CAUTERIZATION WITH ERBE PROBE, CLIPPING X2 performed by Michelle Morrison MD at [address] [facility] [place]  01/21/2020    pan divertics/ 4 polyps/ sm int hem, S/P partial sigmoid resection w/ end to side anastamosis, repeat in 3 years    COLONOSCOPY N/A 1/21/2020    COLONOSCOPY POLYPECTOMY SNARE/COLD BIOPSY performed by Michelle Morrison MD at [place] [address]  2003    back    CYSTOSCOPY Bilateral 12/15/14    CYSTOSCOPY Right 5/15/2022    CYSTOSCOPY URETERAL STENT INSERTION performed by Honorio Restrepo MD at [facility]      front right tooth and root canal w/ brass bolt    DILATION AND CURETTAGE OF UTERUS  2006    Needed a camera to find my uterus.     DILATION AND CURETTAGE OF UTERUS N/A 5/24/2022    DILATATION AND CURETTAGE, CULTURES OF UTEREUS performed by Meenakshi Wakefield MD at [address], COLON, DIAGNOSTIC  12/14/2017    Small hiatal hernia    HERNIA REPAIR  2004    Umb hernia    HERNIA REPAIR  2008    Inc hernia    KIDNEY SURGERY  05/24/2022    stent placed on right side    LITHOTRIPSY Right 8/1/2022    RIGHT CYSTOSCOPY URETEROSCOPY STONE MANIPULATION WITH HOLIUM LASER LITHOTRIPSY STENT REPLACEMENT performed by Lyric Pacheco MD at [address]    LITHOTRIPSY Left 9/26/2022    LEFT CYSTOSCOPY URETEROSCOPY RETROGRADE PYELOGRAM [place] LITHOTRIPSY POSSIBLE STENT PLACEMENT performed by Lyric Pacheco MD at [place] [address]  2/29/2012    Right axillary-3 sentinel nodes were positive out of 9. MASTECTOMY, RADICAL Right 02/29/2012    w/ sentinal node disection-Dr West    PACEMAKER PLACEMENT      PRE-MALIGNANT / BENIGN SKIN LESION EXCISION  2/8/2013    right leg X2-Dr West    TONSILLECTOMY  1957    TUNNELED VENOUS PORT PLACEMENT  2004    TUNNELED VENOUS PORT PLACEMENT  02/13/2012    removal of mediport     Current Medications:   Current Facility-Administered Medications: 0.9 % sodium chloride infusion, , IntraVENous, PRN  diphenhydrAMINE (BENADRYL) tablet 25 mg, 25 mg, Oral, Q6H PRN  albuterol sulfate HFA (PROVENTIL;VENTOLIN;PROAIR) 108 (90 Base) MCG/ACT inhaler 2 puff, 2 puff, Inhalation, Q6H PRN  fluticasone (FLOVENT HFA) 110 MCG/ACT inhaler 2 puff, 2 puff, Inhalation, BID  letrozole (FEMARA) tablet 2.5 mg, 2.5 mg, Oral, Nightly  simvastatin (ZOCOR) tablet 20 mg (Patient Supplied), 20 mg, Oral, Nightly  [COMPLETED] vancomycin (VANCOCIN) 2,000 mg in dextrose 5 % 500 mL IVPB, 2,000 mg, IntraVENous, Once **FOLLOWED BY** vancomycin (VANCOCIN) 750 mg in dextrose 5 % 250 mL IVPB (Oxda0Nwx), 750 mg, IntraVENous, Q12H  cefepime (MAXIPIME) 2000 mg IVPB minibag, 2,000 mg, IntraVENous, Q12H    Allergies:  Bactrim [sulfamethoxazole-trimethoprim], Lipitor [atorvastatin], Taxol [paclitaxel], Aminoglycosides, Demerol, Neosporin [bacitracin-neomycin-polymyxin], Other, and Talc    Social History:   TOBACCO:   reports that she has never smoked.  She has never used smokeless tobacco.  ETOH:   reports no history of alcohol use. DRUGS:   reports no history of drug use. Family History:       Problem Relation Age of Onset    Arthritis Mother         OA, RA    High Cholesterol Mother     High Blood Pressure Mother     Cancer Father         skin and leukemia    High Blood Pressure Father     High Cholesterol Father     Diabetes Father     Heart Disease Father     Heart Disease Brother     High Cholesterol Brother     High Blood Pressure Brother     Heart Disease Brother     No Known Problems Sister     Seizures Son     Cancer Brother         skin cancer    Breast Cancer Neg Hx     Ovarian Cancer Neg Hx        REVIEW OF SYSTEMS:     CONSTITUTIONAL:  negative for  fevers and chills  RESPIRATORY:  negative for  dyspnea  CARDIOVASCULAR:  negative for  chest pain  GASTROINTESTINAL:  negative for nausea and vomiting  GENITOURINARY:  positive for recurrent UTI    PHYSICAL EXAM:      VITALS:  BP (!) 126/49   Pulse 62   Temp 97.9 °F (36.6 °C) (Oral)   Resp 20   Ht 5' 2\" (1.575 m)   Wt 257 lb 11.5 oz (116.9 kg)   LMP 01/15/1999   SpO2 94%   BMI 47.14 kg/m²      TEMPERATURE:  Current - Temp: 97.9 °F (36.6 °C); Max - Temp  Av.9 °F (36.6 °C)  Min: 97.8 °F (36.6 °C)  Max: 98 °F (36.7 °C)  24HR BLOOD PRESSURE RANGE:  Systolic (11INT), AWB:022 , Min:126 , AQH:650   ; Diastolic (01AOS), DUF:96, Min:46, Max:68    8HR INTAKE OUTPUT:  No intake/output data recorded.   URINARY CATHETER OUTPUT (Farooq):     DRAIN/TUBE OUTPUT:        General appearance: alert, appears stated age, cooperative, no distress, and morbidly obese  Head: Normocephalic, without obvious abnormality, atraumatic  Back:  No CVA tenderness  Abdomen:  Soft, non-tender, non-distended  Extremities: BLE edema and erythema, right foot puncture wound with erythema and edema as well    DATA:    WBC:    Lab Results   Component Value Date/Time    WBC 9.2 2022 09:16 AM     Hemoglobin/Hematocrit:    Lab Results   Component Value Date/Time    HGB 11.2 11/17/2022 09:16 AM    HCT 35.6 11/17/2022 09:16 AM     BMP:    Lab Results   Component Value Date/Time     11/16/2022 07:30 AM    K 4.3 11/16/2022 07:30 AM     11/16/2022 07:30 AM    CO2 26 11/16/2022 07:30 AM    BUN 9 11/16/2022 07:30 AM    LABALBU 3.7 11/14/2022 08:10 PM    CREATININE 0.6 11/16/2022 07:30 AM    CALCIUM 9.1 11/16/2022 07:30 AM    GFRAA >60 10/15/2022 10:39 AM    LABGLOM >60 11/16/2022 07:30 AM     PT/INR:    Lab Results   Component Value Date/Time    PROTIME 11.4 11/29/2021 03:14 PM    PROTIME 11.2 10/28/2011 02:28 PM    INR 0.88 11/29/2021 03:14 PM     Blood Culture: NGTD  Urine Culture: No growth    Imaging:  XR FOOT RIGHT (MIN 3 VIEWS)    Result Date: 11/14/2022  EXAMINATION: THREE XRAY VIEWS OF THE RIGHT FOOT 11/14/2022 7:58 pm COMPARISON: None. HISTORY: ORDERING SYSTEM PROVIDED HISTORY: [facility] TECHNOLOGIST PROVIDED HISTORY: Reason for exam:->puncutre wound Reason for Exam: puncutre wound Additional signs and symptoms: dropped a pair of scissors on top of her foot Relevant Medical/Surgical History: breast and colon cancer, obesity FINDINGS: No acute fracture dislocation is seen. There is degenerative change with osteophytosis and extensive vascular calcification. There is also extensive soft tissue swelling in the region of the midfoot and forefoot without evidence for radiopaque foreign body. Extensive soft tissue swelling over the dorsum of the midfoot and forefoot. CT scan of the foot with contrast may be helpful for further evaluation if there is clinical concern for abscess. VL DUP LOWER EXTREMITY VENOUS BILATERAL    Result Date: 11/14/2022  EXAMINATION: DUPLEX VENOUS ULTRASOUND OF THE BILATERAL LOWER [id number]/14/2022 8:02 pm TECHNIQUE: Duplex ultrasound using B-mode/gray scaled imaging, Doppler spectral analysis and color flow Doppler was obtained of the deep venous structures of the lower bilateral extremities. COMPARISON: None. HISTORY: ORDERING SYSTEM PROVIDED HISTORY: leg pain swelling TECHNOLOGIST PROVIDED HISTORY: Reason for exam:->leg pain swelling FINDINGS: The visualized veins of the bilateral lower extremities are patent and free of echogenic thrombus. The veins demonstrate good compressibility with normal color flow study and spectral analysis. No evidence of DVT in either lower extremity. Assessment & Plan:      Re De Santiago is a 67 y.o. female admitted 11/14/2022 for BLE edema, right foot cellulitis, deconditioning. 1) Recurrent UTI: Urine PCR 11/10 positive for Proteus mirabilis, never started Cipro because of admission   Urine cx 11/16 no growth. Likely already received abx in the ER. Initial WBC 12.4, down to 9.2 today. Afebrile. On cefepime and vanc. 2) Nephrolithiasis: Renal US 11/7 showed left renal stone. Outpatient follow-up. Nothing further from urology at this time. Will sign off. Please call with any questions. Recommend outpatient follow-up as previously scheduled. Patient seen and examined, chart reviewed.      Electronically signed by ZABRINA Thibodeaux on 11/17/2022 at 9:32 AM

## 2022-11-17 NOTE — PLAN OF CARE
Problem: Discharge Planning  Goal: Discharge to home or other facility with appropriate resources  Outcome: Progressing  Flowsheets (Taken 11/16/2022 2310)  Discharge to home or other facility with appropriate resources:   Identify barriers to discharge with patient and caregiver   Arrange for needed discharge resources and transportation as appropriate   Identify discharge learning needs (meds, wound care, etc)   Arrange for interpreters to assist at discharge as needed   Refer to discharge planning if patient needs post-hospital services based on physician order or complex needs related to functional status, cognitive ability or social support system     Problem: Pain  Goal: Verbalizes/displays adequate comfort level or baseline comfort level  Outcome: Progressing  Flowsheets (Taken 11/16/2022 2316)  Verbalizes/displays adequate comfort level or baseline comfort level:   Encourage patient to monitor pain and request assistance   Assess pain using appropriate pain scale   Administer analgesics based on type and severity of pain and evaluate response   Implement non-pharmacological measures as appropriate and evaluate response   Consider cultural and social influences on pain and pain management   Notify Licensed Independent Practitioner if interventions unsuccessful or patient reports new pain     Problem: Skin/Tissue Integrity  Goal: Absence of new skin breakdown  Description: 1. Monitor for areas of redness and/or skin breakdown  2. Assess vascular access sites hourly  3. Every 4-6 hours minimum:  Change oxygen saturation probe site  4. Every 4-6 hours:  If on nasal continuous positive airway pressure, respiratory therapy assess nares and determine need for appliance change or resting period.   Outcome: Progressing     Problem: Safety - Adult  Goal: Free from fall injury  Outcome: Progressing     Problem: ABCDS Injury Assessment  Goal: Absence of physical injury  Outcome: Progressing

## 2022-11-17 NOTE — TELEPHONE ENCOUNTER
Aida from [address] [place] [phone] called stating they received order for a transport chair. They are out of network with patient's insurance.

## 2022-11-17 NOTE — PROGRESS NOTES
Infectious Disease Progress Note  2022   Patient Name: Reva Lockwood : 1950   Impression  BLE Cellulitis, R>L Secondary to Chronic Venous Stasis with Puncture wound from Scissors:  Chronic Cystitis/ Past Kidney Stones s/p Lithotripsy:  Afebrile, no leukocytosis  Pct 0.039  11/15-UA , RBC none  11/15-Urine culture NGTD  Past Urine cultures: 10/9/22: Proteus mirabilis, 22 E.coli, Proteus mirabilis, 5/15/22: Proteus mirabilis. 10/16/18: E.coli, Proteus Spp.   -given a tetanus vaccine in the ED  -MRSA screen Pending  11/15-BC NGTD  -XR Foot Right: Extensive soft tissue swelling over the dorsum of the midfoot and forefoot. CT scan of the foot with contrast may be helpful for further evaluation if   there is clinical concern for abscess. -VL BLE Venous: no evidence of DVTs  Chronic Hypoxic Respiratory Failure on Home Oxygen 2 L/min/NC Secondary to COPD/Asthma/ JAMIE on CPAP:  PPM/ CAD/ HTN/ HDL:  Seizures: Morbid Obesity: BMI: 45.18  Past Right Breast and Colon Cancer:  Multi-morbidity: per PMHx: last colonoscopy , s/p right CEA , allergy to Bactrim (anaphylaxis), OA, anxiety/depression, diverticulitis, right breast cancer, colon cancer  Plan:  Continue IV cefepime 2 gm q12h  Continue IV vancomycin per pharmacy dosing, if MRSA screen is negative, may DC vancomycin  Await MRSA screen    Reviewed Urine culture, shows NGTD  Will sign off, as ID was consulted for recurrent UTIs, and not follow the patient actively, please reconsult as needed. Ongoing Antimicrobial Therapy  Vancomycin 11/15-  Cefepime 11/15-? Completed Antimicrobial Therapy  ? History:? Interval history noted. Chief complaint: Possible complicated UTI, BLE cellulitis, R>L, secondary to chronic venous stasis with puncture wound from scissors.     Denies n/v/d/f or untoward effects of antibiotics  Physical Exam:  Vital Signs: BP (!) 136/53   Pulse 64   Temp 98.3 °F (36.8 °C) (Oral)   Resp 18 Ht 5' 2\" (1.575 m)   Wt 257 lb 11.5 oz (116.9 kg)   LMP 01/15/1999   SpO2 96%   BMI 47.14 kg/m²     Gen: remains alert and oriented  Wounds: C/D/I Right foot with puncture wound with erythema and edema, no drainage, BLE with erythema. .  Chest: no distress and CTA. Good air movement. Heart: RRR and no MRG. Abd: soft, non-distended, no tenderness, slight bilateral CVA tenderness no hepatomegaly. Normoactive bowel sounds. Ext: no clubbing, cyanosis, or edema. See above  Neuro: Mental status intact. CN 2-12 intact and no focal sensory or motor deficits     Radiologic / Imaging / TESTING  11/11/22 XR Foot Right:  Impression   Extensive soft tissue swelling over the dorsum of the midfoot and forefoot. CT scan of the foot with contrast may be helpful for further evaluation if   there is clinical concern for abscess.       11/14/22 VL Dup Lower Extremity Venous Bilateral:  Impression   No evidence of DVT in either lower extremity        Labs:    Recent Results (from the past 24 hour(s))   Basic Metabolic Panel    Collection Time: 11/17/22  9:16 AM   Result Value Ref Range    Sodium 140 135 - 145 MMOL/L    Potassium 4.4 3.5 - 5.1 MMOL/L    Chloride 103 99 - 110 mMol/L    CO2 26 21 - 32 MMOL/L    Anion Gap 11 4 - 16    BUN 11 6 - 23 MG/DL    Creatinine 0.6 0.6 - 1.1 MG/DL    Est, Glom Filt Rate >60 >60 mL/min/1.73m2    Glucose 98 70 - 99 MG/DL    Calcium 9.4 8.3 - 10.6 MG/DL   CBC with Auto Differential    Collection Time: 11/17/22  9:16 AM   Result Value Ref Range    WBC 9.2 4.0 - 10.5 K/CU MM    RBC 4.00 (L) 4.2 - 5.4 M/CU MM    Hemoglobin 11.2 (L) 12.5 - 16.0 GM/DL    Hematocrit 35.6 (L) 37 - 47 %    MCV 89.0 78 - 100 FL    MCH 28.0 27 - 31 PG    MCHC 31.5 (L) 32.0 - 36.0 %    RDW 14.3 11.7 - 14.9 %    Platelets 771 816 - 888 K/CU MM    MPV 9.8 7.5 - 11.1 FL    Differential Type AUTOMATED DIFFERENTIAL     Segs Relative 69.2 (H) 36 - 66 %    Lymphocytes % 21.4 (L) 24 - 44 %    Monocytes % 5.8 (H) 0 - 4 % Eosinophils % 2.7 0 - 3 %    Basophils % 0.2 0 - 1 %    Segs Absolute 6.4 K/CU MM    Lymphocytes Absolute 2.0 K/CU MM    Monocytes Absolute 0.5 K/CU MM    Eosinophils Absolute 0.3 K/CU MM    Basophils Absolute 0.0 K/CU MM    Nucleated RBC % 0.0 %    Total Nucleated RBC 0.0 K/CU MM    Total Immature Neutrophil 0.06 K/CU MM    Immature Neutrophil % 0.7 (H) 0 - 0.43 %     CULTURE results: Invalid input(s): BLOOD CULTURE,  URINE CULTURE, SURGICAL CULTURE    Diagnosis:  Patient Active Problem List   Diagnosis    Malignant neoplasm of upper-outer quadrant of right breast in female, estrogen receptor positive ([address]    Diffuse cystic mastopathy    Hyperlipidemia    H/O chest pain    Heart block AV second degree    Abnormal cardiovascular stress test    JAMIE (obstructive sleep apnea)    Super obesity    Mild asthma    Severe obstructive sleep apnea    Cardiac pacemaker    Chronic cystitis    Asthma    Hypertension    Depression    Obstructive sleep apnea    Nocturnal oxygen desaturation    Moderate malnutrition (HCC)    NSTEMI (non-ST elevated myocardial infarction) ([address]    PVD (peripheral vascular disease) ([address]    Morbid obesity with BMI of 45.0-49.9, adult (HCC)    Moderate aortic stenosis    Hepatomegaly, not elsewhere classified    Arthritis    CAD (coronary artery disease)    Bilateral carotid artery stenosis    Moderate persistent asthma without complication    Carotid stenosis, right    Complicated urinary tract infection    Osteopenia of multiple sites    Swelling of lower extremity    Ambulatory dysfunction    Cellulitis    Cellulitis of right foot    Severe malnutrition ([address]    Acute cystitis without hematuria       Active Problems  Principal Problem:    Cellulitis  Active Problems:    Cellulitis of right foot    Severe malnutrition ([address]    Acute cystitis without hematuria  Resolved Problems:    * No resolved hospital problems. *    Electronically signed by: Electronically signed by Brandon Garcia CNP on 11/17/2022 at 11:54 AM

## 2022-11-17 NOTE — CARE COORDINATION
Reviewed chart and discussed in IDR, pt most likely needs snu. Spoke with pt and she is agreeable and would like Christopher. Called Jayla diop approved and she is starting prior Kelsey Grijalva.

## 2022-11-18 VITALS
HEIGHT: 62 IN | TEMPERATURE: 98 F | RESPIRATION RATE: 18 BRPM | OXYGEN SATURATION: 96 % | SYSTOLIC BLOOD PRESSURE: 137 MMHG | WEIGHT: 258.82 LBS | BODY MASS INDEX: 47.63 KG/M2 | HEART RATE: 62 BPM | DIASTOLIC BLOOD PRESSURE: 61 MMHG

## 2022-11-18 LAB
ANION GAP SERPL CALCULATED.3IONS-SCNC: 9 MMOL/L (ref 4–16)
BASOPHILS ABSOLUTE: 0 K/CU MM
BASOPHILS RELATIVE PERCENT: 0.2 % (ref 0–1)
BUN BLDV-MCNC: 12 MG/DL (ref 6–23)
CALCIUM SERPL-MCNC: 9.4 MG/DL (ref 8.3–10.6)
CHLORIDE BLD-SCNC: 103 MMOL/L (ref 99–110)
CO2: 26 MMOL/L (ref 21–32)
CREAT SERPL-MCNC: 0.7 MG/DL (ref 0.6–1.1)
CREAT SERPL-MCNC: 0.7 MG/DL (ref 0.6–1.1)
DIFFERENTIAL TYPE: ABNORMAL
DOSE AMOUNT: NORMAL
DOSE TIME: NORMAL
EOSINOPHILS ABSOLUTE: 0.3 K/CU MM
EOSINOPHILS RELATIVE PERCENT: 2.9 % (ref 0–3)
GFR SERPL CREATININE-BSD FRML MDRD: >60 ML/MIN/1.73M2
GFR SERPL CREATININE-BSD FRML MDRD: >60 ML/MIN/1.73M2
GLUCOSE BLD-MCNC: 115 MG/DL (ref 70–99)
HCT VFR BLD CALC: 31 % (ref 37–47)
HEMOGLOBIN: 9.9 GM/DL (ref 12.5–16)
IMMATURE NEUTROPHIL %: 0.8 % (ref 0–0.43)
LYMPHOCYTES ABSOLUTE: 2.3 K/CU MM
LYMPHOCYTES RELATIVE PERCENT: 22 % (ref 24–44)
MCH RBC QN AUTO: 27.8 PG (ref 27–31)
MCHC RBC AUTO-ENTMCNC: 31.9 % (ref 32–36)
MCV RBC AUTO: 87.1 FL (ref 78–100)
MONOCYTES ABSOLUTE: 0.6 K/CU MM
MONOCYTES RELATIVE PERCENT: 6.2 % (ref 0–4)
NUCLEATED RBC %: 0 %
PDW BLD-RTO: 14.2 % (ref 11.7–14.9)
PLATELET # BLD: 212 K/CU MM (ref 140–440)
PMV BLD AUTO: 9.8 FL (ref 7.5–11.1)
POTASSIUM SERPL-SCNC: 4 MMOL/L (ref 3.5–5.1)
RBC # BLD: 3.56 M/CU MM (ref 4.2–5.4)
SARS-COV-2, NAAT: NOT DETECTED
SEGMENTED NEUTROPHILS ABSOLUTE COUNT: 7 K/CU MM
SEGMENTED NEUTROPHILS RELATIVE PERCENT: 67.9 % (ref 36–66)
SODIUM BLD-SCNC: 138 MMOL/L (ref 135–145)
SOURCE: NORMAL
TOTAL IMMATURE NEUTOROPHIL: 0.08 K/CU MM
TOTAL NUCLEATED RBC: 0 K/CU MM
VANCOMYCIN TROUGH: 11.6 UG/ML (ref 10–20)
WBC # BLD: 10.2 K/CU MM (ref 4–10.5)

## 2022-11-18 PROCEDURE — 87635 SARS-COV-2 COVID-19 AMP PRB: CPT

## 2022-11-18 PROCEDURE — 94640 AIRWAY INHALATION TREATMENT: CPT

## 2022-11-18 PROCEDURE — 82565 ASSAY OF CREATININE: CPT

## 2022-11-18 PROCEDURE — 6360000002 HC RX W HCPCS: Performed by: STUDENT IN AN ORGANIZED HEALTH CARE EDUCATION/TRAINING PROGRAM

## 2022-11-18 PROCEDURE — 2580000003 HC RX 258: Performed by: STUDENT IN AN ORGANIZED HEALTH CARE EDUCATION/TRAINING PROGRAM

## 2022-11-18 PROCEDURE — 36415 COLL VENOUS BLD VENIPUNCTURE: CPT

## 2022-11-18 PROCEDURE — 94761 N-INVAS EAR/PLS OXIMETRY MLT: CPT

## 2022-11-18 PROCEDURE — 80202 ASSAY OF VANCOMYCIN: CPT

## 2022-11-18 PROCEDURE — 80048 BASIC METABOLIC PNL TOTAL CA: CPT

## 2022-11-18 PROCEDURE — 85025 COMPLETE CBC W/AUTO DIFF WBC: CPT

## 2022-11-18 RX ORDER — ACETAMINOPHEN 500 MG
500 TABLET ORAL EVERY 6 HOURS PRN
Qty: 120 TABLET | Refills: 3
Start: 2022-11-18

## 2022-11-18 RX ORDER — CEPHALEXIN 500 MG/1
500 CAPSULE ORAL 3 TIMES DAILY
Qty: 42 CAPSULE | Refills: 0
Start: 2022-11-18 | End: 2022-12-02

## 2022-11-18 RX ORDER — DOXYCYCLINE HYCLATE 100 MG
100 TABLET ORAL 2 TIMES DAILY
Qty: 28 TABLET | Refills: 0
Start: 2022-11-18 | End: 2022-12-02

## 2022-11-18 RX ADMIN — VANCOMYCIN HYDROCHLORIDE 750 MG: 750 INJECTION, POWDER, LYOPHILIZED, FOR SOLUTION INTRAVENOUS at 05:29

## 2022-11-18 RX ADMIN — Medication 2 PUFF: at 07:41

## 2022-11-18 NOTE — CONSULTS
Consult completed. Nexiva 18g 1.25 inch catheter inserted via ultrasound in patient's LFA. Brisk blood return noted and catheter flushes with ease. Patient tolerated well. Consult IV/PICC team for any questions or if patient's needs change.

## 2022-11-18 NOTE — ADT AUTH CERT
Cellulitis - Care Day 4 (11/17/2022) by Lui Linares RN       Review Status Review Entered   Completed 11/18/2022 0842       Created By   Lui Linares RN      Criteria Review      Care Day: 4 Care Date: 11/17/2022 Level of Care: Inpatient Floor    Guideline Day 3    Level Of Care    ( ) Floor to discharge    11/18/2022 8:42 AM EST by Ramez Cummings      MED SURG    Clinical Status    (X) * Hemodynamic stability    11/18/2022 8:42 AM EST by Guicho Chika 66, /51 mmhg    (X) * Afebrile or fever improved    11/18/2022 8:42 AM EST by Ramez Cummings      TEMP 98.6 (37) ORAL    ( ) * Skin exam stable or improved    11/18/2022 8:42 AM EST by Ramez Cummings      Skin: warm, dry, has redness on bilat LE with dry skin and flaking, mild redness and swelling of right foot with excess warmth to touch    (X) * Mental status at baseline    11/18/2022 8:42 AM EST by Ramez Cummings      Neuro: Alert and oriented x 3    ( ) * Antibiotic treatment needs appropriate for next level of care    11/18/2022 8:42 AM EST by Ramez Cummings      given    ( ) * Pain absent or manageable at next level of care    11/18/2022 8:42 AM EST by Funmi Perez 4/10    ( ) * Discharge plans and education understood    Activity    ( ) * Ambulatory or acceptable for next level of care    11/18/2022 8:42 AM EST by Ramez Cummings      needs assistance    Routes    (X) * Oral hydration    11/18/2022 8:42 AM EST by Ramez Cummings      100 ml    ( ) * Oral medications or regimen acceptable for next level of care    11/18/2022 8:42 AM EST by Ramez Cummings      diphenhydrAMINE (BENADRYL) tablet 25 mg  Freq: EVERY 6 HOURS PRN Route: PO x1    ( ) * Oral diet or acceptable for next level of care    11/18/2022 8:42 AM EST by Jewel Pryor DIET; Regular;  Low Sodium (2 gm)    Interventions    (X) WBC    11/18/2022 8:42 AM EST by Ramez Cummings      WBC  9.2    Medications    ( ) Parenteral or oral antibiotics 11/18/2022 8:42 AM EST by Breanna Zeng      cefepime (MAXIPIME) 2000 mg IVPB minibag  Freq: EVERY 12 HOURS Route: IV x2  vancomycin (VANCOCIN) 750 mg in dextrose 5 % 250 mL IVPB ([id number])  Freq: EVERY 12 HOURS Route: IV x2       Definitions for Care Day 4    Hemodynamic stability    (X) Hemodynamic stability, as indicated by  1 or more  of the following :       (X) Hemodynamic abnormalities at baseline or acceptable for next level of care       * Milestone   Additional Notes   DATE: 11/17/22  MED SURG         Pertinent Updates:   Extremities: BLE edema and erythema, right foot puncture wound with erythema and edema as well   On cefepime and vanc. contact isolation      Vitals:   TEMP 98.6 (37) ORAL, RR 20, HI 66, /51 mmhg, SPO2 94% ROOM AIR      Abnl/Pertinent Labs/Radiology/Diagnostic Studies:   RBC  4.00 Low     Hemoglobin  11.2 Low    Hematocrit    35.6 Low           Physical Exam:   Cardiovascular: Regular rate.  Normal S1/S2, has a loud blowing murmur heard at right mid sternal border , no gallops, +1 pedal edema   Skin: warm, dry, has redness on bilat LE with dry skin and flaking, mild redness and swelling of right foot with excess warmth to touch         MD Consults/Assessments & Plans:   Internal Medicine 11/17   Cellulitis, Rt Foot: puncture wound from dropping scissors, improving   -W/ bilateral lower extremity edema, US Bilat LE- NEG for DVT, likely Chronic Venous Stasis   -WBC were elevated 12.4, now normalized   -Advised Compression stockings as outpatient   -Lower extremity elevation   -Cont Vanco IV  and cefipime (per ID)    -preliminary Blood Cx Neg- at 48 hours    UTI: MDRO, Proteus Miribalis, hx of bilat nephrolithiasis, sees Dr. Miriam Coats    -per ID to continue Cefipime and Vanco IV    -last Urine CX 10/9/22 grew Proteus Mirabilis MDRO, prior Urine Cx have been done and grown the same organism   -adding Blood CX x 2 and procalcitonin    -Cont Vanco IV, Adding Cefipime IV and DC'd Doxy 11/15 based on her Urine Cx Sensitivity from Oct 9,2022 which is untreated (Urology- Dr. Ivone Cardoso office called in 2 different oral antibiotics, but she hadn't picked them up yet)   -Urine CX neg from 11/16/22    S/p Left Ureteral Stent due to 2.5 cm kidney stone: placed 9/26/22 and removed   -Urology consulted, they recommend outpatient follow up as previously scheduled, no other changes in management, cleared from Urology    Hypokalemia:   - giving potassium 20 mEQ PO X 1 on 11/15, normal since    Anemia:    -B12/Folate, Ferritin, Iron Studies, Retic are normal        Chronic Medical Problems:    Chronic Peripheral Neurophathy   Breast CA:    -cont Femara PO QD    Asthma:   - cont Flovent inahler 2 puffs BID and albuterol HFA prn    Sleep Apnea:   -has seen DR. Mateo Ness for this is and is not interested in CPAP    Morbid Obesity:   -BMI 45.18   Reporting recurrent falls for more than 6 months   Was recently in SNF and was discharged, has been declining for past few weeks   PT OT consult for evaluation   Moderate aortic stenosis:   -Undergoing surveillance with cardiology   Moderate pulmonary hypertension:   -Could be secondary to underlying lung parenchymal disease or obstructive sleep apnea in the setting of morbid obesity   Recommend right heart cath in the outpatient setting for further classification of disease process   Hyperlipidemia:   -Continue statin   Carotid artery stenosis:   -Status post right CEA   -Undergoing surveillance with cardiothoracic surgery   -Continue aspirin and statin       Urology 11/17   1) Recurrent UTI: Urine PCR 11/10 positive for Proteus mirabilis, never started Cipro because of admission               Urine cx 11/16 no growth. Likely already received abx in the ER. Initial WBC 12.4, down to 9.2 today. Afebrile. On cefepime and vanc. 2) Nephrolithiasis: Renal US 11/7 showed left renal stone. Outpatient follow-up. Infectious Disease 11/17   ?  Continue IV cefepime 2 gm q12h   ? Continue IV vancomycin per pharmacy dosing, if MRSA screen is negative, may DC vancomycin   ? Await MRSA screen 11/16    ? Reviewed Urine culture, shows NGTD         Orders:   ADULT DIET; Regular; Low Sodium (2 gm)   ADULT ORAL NUTRITION SUPPLEMENT; Breakfast; Diabetic Oral Supplement   Inpatient consult to IV Team    Wound care       PT/OT/SLP/CM Assessments or Notes:   Case Management 11/17   Reviewed chart and discussed in IDR, pt most likely needs snu. Spoke with pt and she is agreeable and would like [facility]. Called Jayla pt approved and she is starting prior [facility]. Cellulitis - Care Day 3 (11/16/2022) by Ravi Ken RN       Review Status Review Entered   Completed 11/17/2022 1225       Created By   Ravi Ken RN      Criteria Review      Care Day: 3 Care Date: 11/16/2022 Level of Care: Inpatient Floor    Guideline Day 2    Level Of Care    ( ) Floor    11/17/2022 12:25 PM EST by Rocco Inojim      MED SURG    Clinical Status    (X) * Dehydration absent    11/17/2022 12:25 PM EST by Rocco Inojim      absent    (X) * Mental status at baseline    11/17/2022 12:25 PM EST by Rocco Inoue      Awake, alert, oriented x 4. Appropriate affect.    (X) * Hemodynamic stability    11/17/2022 12:25 PM EST by Rocco Inojim      BP (!) 144/48   Pulse 68    (X) * Afebrile or fever improved    11/17/2022 12:25 PM EST by Rocco Inojim      Temp 98.1 °F (36.7 °C) (Oral)    Activity    (X) Activity as tolerated    11/17/2022 12:25 PM EST by Rocco Inoue      tolerated    Routes    (X) * Oral hydration    11/17/2022 12:25 PM EST by Interleukin Geneticsue      100 mL    (X) * Oral medications    11/17/2022 12:25 PM EST by Rocco Inojim      letrozole [facility]) tablet 2.5 mg  Freq: NIGHTLY Route: PO  diphenhydrAMINE (BENADRYL) tablet 25 mg  Freq: EVERY 6 HOURS PRN Route: PO X1    (X) Diet as tolerated    11/17/2022 12:25 PM EST by Interleukin Geneticsue      ADULT DIET; Regular;  Low Sodium (2 gm)    Interventions    (X) WBC    11/17/2022 12:25 PM EST by Sapna Betancourt      WBC   9.2    Medications    (X) IV antibiotics    11/17/2022 12:25 PM EST by Sapna Betancourt      cefepime (MAXIPIME) 2000 mg IVPB minibag  Freq: EVERY 12 HOURS Route: IV X2  vancomycin (VANCOCIN) 750 mg in dextrose 5 % 250 mL IVPB ([id number])  Freq: EVERY 12 HOURS Route: IV X2       Definitions for Care Day 3    Hemodynamic stability    (X) Hemodynamic stability, as indicated by  1 or more  of the following :       (X) Hemodynamic abnormalities at baseline or acceptable for next level of care       * Milestone   Additional Notes   DATE: 11/16/22  MED SURG         Pertinent Updates:   Continue IV cefepime 2 gm q12h   Continue IV vancomycin per pharmacy dosing   neurovascular check every 4 hours   RBC   3.63 Low    Contact isolation          Vitals:   BP (!) 144/48   Pulse 68   Temp 98.1 °F (36.7 °C) (Oral)   Resp 18    SpO2 96%  ROOM AIR          Abnl/Pertinent Labs/Radiology/Diagnostic Studies:   Glucose   109 High    RBC   3.63 Low    Hemoglobin   10.2 Low     Hematocrit  31.6 Low       Physical Exam:   MS       -No gross joint deformities. SKIN    -Normal coloration, warm, dry.        MD Consults/Assessments & Plans:   Internal Medicine 11/16   Cellulitis of right foot 2/2 puncture wound from scissors               Continue vancomycin               N/V checks               Blood cultures 1/4 bottles positive Gram plus bacilli-possible contaminant               Repeat cultures   Complicated UTI    Chronic cystitis   History of bilateral nephrolithiasis s/p lithotripsy and ureteral stent placement 9/28/2022               UA suggestive of infection               Continue cefepime               Previous micro reviewed-MDRO Proteus mirabilis-did not  antibiotics outpatient               Pending repeat culture               Infectious disease consulted               Will consult urology given recent lithotripsy/ureteral stent completing stairs at home, dec endurance preventing ability to complete household distances. Recommending SNF.

## 2022-11-18 NOTE — DISCHARGE SUMMARY
V2.0  Discharge Summary    Name:  Arsalan Singh /Age/Sex: 1950 (87 y.o. female)   Admit Date: 2022  Discharge Date: 22    MRN & CSN:  [id number] & [id number] Encounter Date and Time 22 9:02 AM EST    Attending:  Carmita Caballero MD Discharging Provider: Omkar Marti, APRN - [address] [facility] Course:     Brief HPI: Arsalan Singh is a 67 y.o. female who presented with right foot cellulitis after dropping scissors on her foot. Also had a urine culture from outpatient urology positive with Proteus Miribalis- MDRO, had never been treated, therefore was treated with antibiotics for both infections. Infectious Diease was consulted due to multiple Urine CX positive for this same organism along with her cellulitis. Cellulitis is much improved today and she is feeling well, so will DC all IVs and send her to SNF on oral cephalexin and doxy per ID recommendations. Urology was consulted due to her multiple kidney stones, UTI's and left ureter stent placed/removed, they recommended outpatient FU as scheduled. Dietician saw her and recommended Low Sodium, Low Potassium, Diabetic Diet.      Brief Problem Based Course:     Cellulitis, Rt Foot: puncture wound from dropping scissors, improving  -WBCs were elevated 12.4, now normalized  -Advised Compression stockings as outpatient  -Lower extremity elevation  -Will DC Vanco IV  and cefipime (per ID) begin oral cephalexain 500 mg PO TID and doxycycline 100 mg PO BID x 14 days  -preliminary Blood Cx Neg- at 48 hours     UTI: MDRO, Proteus Miribalis, hx of bilat nephrolithiasis, sees Dr. Jonny Ramirez   -per ID since Urine CX came back from 22 negative, will DC antibiotics for UTI  -last Urine CX 10/9/22 grew Proteus Mirabilis MDRO, prior Urine Cx have been done and grown the same organism     Hypokalemia:  - giving potassium 20 mEQ PO X 1 on 11/15, normal since      Anemia:   -B12/Folate, Ferritin, Iron Studies, Retic are normal          S/p Left Ureteral Stent due to 2.5 cm kidney stone: placed 9/26/22 and removed  -Urology consulted, they recommend outpatient follow up as previously scheduled, no other changes in management, cleared from Urology       Chronic Medical Problems:      Chronic Peripheral Neurophathy     Breast CA:   -cont Femara PO QD      Asthma:  - cont Flovent inahler 2 puffs BID and albuterol HFA prn      Sleep Apnea:  -has seen DR. Pasco Dancer for this is and is not interested in CPAP      Morbid Obesity:  -BMI 45.18  Reporting recurrent falls for more than 6 months  Was recently in SNF and was discharged, has been declining for past few weeks  PT OT consulted for evaluation     Moderate aortic stenosis:  -Undergoing surveillance with cardiology     Moderate pulmonary hypertension:  -Could be secondary to underlying lung parenchymal disease or obstructive sleep apnea in the setting of morbid obesity  Recommend right heart cath in the outpatient setting for further classification of disease process     Hyperlipidemia:  -Continue statin    This patient was seen and examined and/or discussed in conjunction with Dr. Aung Iraheta. He/She was agreeable with the patient's plan at discharge as dictated above. The patient expressed appropriate understanding of, and agreement with the discharge recommendations, medications, and plan.      Consults this admission:  IP CONSULT TO CASE MANAGEMENT  PHARMACY TO DOSE VANCOMYCIN  IP CONSULT TO INFECTIOUS DISEASES  IP CONSULT TO UROLOGY  IP CONSULT TO IV TEAM    Discharge Diagnosis:   CellulitisRight Foot     UTI with MRDO- Proteus Mirabilis    Discharge Instruction:   Follow up appointments: Urology outpatient as previously scheduled   Primary care physician: Kriss Cox DO within 2 weeks  Diet: regular diet w/ Low Sodium, Low Potassium, Diabetic Low Carb   Activity: activity as tolerated  Disposition: Discharged to:   []Home, [][facility], [x]SNF, []Acute Rehab, []Hospice   Condition on discharge: Stable  Labs and Tests to be Followed up as an outpatient by PCP or Specialist: CBC w/ Diff in 1 week, Urology follow up as previously scheduled, PCP     Discharge Medications:        Medication List        START taking these medications      aspirin 81 MG chewable tablet  Take 1 tablet by mouth daily     cephALEXin 500 MG capsule  Commonly known as: KEFLEX  Take 1 capsule by mouth 3 times daily for 14 days     doxycycline hyclate 100 MG tablet  Commonly known as: VIBRA-TABS  Take 1 tablet by mouth 2 times daily for 14 days            CONTINUE taking these medications      acetaminophen 500 MG tablet  Commonly known as: TYLENOL  Take 1 tablet by mouth every 6 hours as needed for Pain     albuterol sulfate  (90 Base) MCG/ACT inhaler  Commonly known as: ProAir HFA  INHALE 2 PUFFS BY MOUTH EVERY SIX HOURS AS NEEDED FOR WHEEZING     fluticasone 110 MCG/ACT inhaler  Commonly known as: Flovent HFA  Inhale 2 puffs into the lungs 2 times daily     furosemide 20 MG tablet  Commonly known as: Lasix  Take 1 tablet by mouth daily as needed (for edema)     letrozole 2.5 MG tablet  Commonly known as: FEMARA  Take 1 tablet by mouth nightly     potassium chloride 10 MEQ extended release tablet  Commonly known as: KLOR-CON M  Take 1 tablet by mouth daily as needed (take only when you need Lasix)     simvastatin 20 MG tablet  Commonly known as: ZOCOR  TAKE ONE (1) TABLET BY MOUTH NIGHTLY     Transport Chair Misc  Use as directed               Where to Get Your Medications        Information about where to get these medications is not yet available    Ask your nurse or doctor about these medications  acetaminophen 500 MG tablet  cephALEXin 500 MG capsule  doxycycline hyclate 100 MG tablet        Objective Findings at Discharge:   /61   Pulse 62   Temp 98 °F (36.7 °C) (Oral)   Resp 18   Ht 5' 2\" (1.575 m)   Wt 258 lb 13.1 oz (117.4 kg)   LMP 01/15/1999   SpO2 96%   BMI 47.34 kg/m²       Physical Exam:   General: NAD  Eyes: EOMI  ENT: neck supple  Cardiovascular: Rregular rate and rhythm, loud systolic blowing murmur heard at right lower sternal border, no gallops lifts or heaves  Respiratory: Clear to auscultation bilaterally   Gastrointestinal: Soft, non tender, BS x 4  Genitourinary: no suprapubic tenderness  Musculoskeletal: Bilat +1 LE Edema with mild erythema and scaling lesions, right upper foot has mild swelling and erythema and is much improved, rt foot non tender to touch   Skin: warm, dry  Neuro: Alert and oriented x 3  Psych: Mood appropriate. Labs and Imaging   XR FOOT RIGHT (MIN 3 VIEWS)    Result Date: 11/14/2022  EXAMINATION: THREE XRAY VIEWS OF THE RIGHT FOOT 11/14/2022 7:58 pm COMPARISON: None. HISTORY: ORDERING SYSTEM PROVIDED HISTORY: puncutre wound TECHNOLOGIST PROVIDED HISTORY: Reason for exam:->puncutre wound Reason for Exam: puncutre wound Additional signs and symptoms: dropped a pair of scissors on top of her foot Relevant Medical/Surgical History: breast and colon cancer, obesity FINDINGS: No acute fracture dislocation is seen. There is degenerative change with osteophytosis and extensive vascular calcification. There is also extensive soft tissue swelling in the region of the midfoot and forefoot without evidence for radiopaque foreign body. Extensive soft tissue swelling over the dorsum of the midfoot and forefoot. CT scan of the foot with contrast may be helpful for further evaluation if there is clinical concern for abscess. VL DUP LOWER EXTREMITY VENOUS BILATERAL    Result Date: 11/14/2022  EXAMINATION: DUPLEX VENOUS ULTRASOUND OF THE BILATERAL LOWER ALIYXEXBEGN80/14/2022 8:02 pm TECHNIQUE: Duplex ultrasound using B-mode/gray scaled imaging, Doppler spectral analysis and color flow Doppler was obtained of the deep venous structures of the lower bilateral extremities. COMPARISON: None.  HISTORY: ORDERING SYSTEM PROVIDED HISTORY: leg pain swelling TECHNOLOGIST PROVIDED HISTORY: Reason for exam:->leg pain swelling FINDINGS: The visualized veins of the bilateral lower extremities are patent and free of echogenic thrombus. The veins demonstrate good compressibility with normal color flow study and spectral analysis. No evidence of DVT in either lower extremity. CBC:   Recent Labs     11/16/22  0730 11/17/22  0916 11/18/22  0421   WBC 9.2 9.2 10.2   HGB 10.2* 11.2* 9.9*    205 212     BMP:    Recent Labs     11/16/22  0730 11/17/22  0916 11/18/22  0421    140 138   K 4.3 4.4 4.0    103 103   CO2 26 26 26   BUN 9 11 12   CREATININE 0.6 0.6 0.7  0.7   GLUCOSE 109* 98 115*     Hepatic: No results for input(s): AST, ALT, ALB, BILITOT, ALKPHOS in the last 72 hours. Lipids:   Lab Results   Component Value Date/Time    CHOL 267 08/18/2022 02:29 PM    CHOL 182 07/29/2021 12:39 PM    HDL 44 08/18/2022 02:29 PM    TRIG 181 08/18/2022 02:29 PM     Hemoglobin A1C:   Lab Results   Component Value Date/Time    LABA1C 5.8 08/18/2022 02:30 PM     TSH: No results found for: TSH  Troponin:   Lab Results   Component Value Date/Time    TROPONINT <0.010 10/13/2022 05:00 PM    TROPONINT <0.010 09/18/2022 10:16 PM    TROPONINT <0.010 05/17/2022 01:39 AM     Lactic Acid: No results for input(s): LACTA in the last 72 hours. BNP:   No results for input(s): PROBNP in the last 72 hours.     UA:  Lab Results   Component Value Date/Time    NITRU POSITIVE 11/15/2022 02:00 AM    COLORU YELLOW 11/15/2022 02:00 AM    WBCUA 128 11/15/2022 02:00 AM    RBCUA NONE SEEN 11/15/2022 02:00 AM    MUCUS RARE 11/15/2022 02:00 AM    TRICHOMONAS NONE SEEN 11/15/2022 02:00 AM    YEAST RARE 09/11/2017 07:00 AM    BACTERIA NEGATIVE 11/15/2022 02:00 AM    CLARITYU CLEAR 11/15/2022 02:00 AM    SPECGRAV 1.015 11/15/2022 02:00 AM    LEUKOCYTESUR LARGE NUMBER OR AMOUNT OBSERVED 11/15/2022 02:00 AM    UROBILINOGEN 0.2 11/15/2022 02:00 AM    BILIRUBINUR NEGATIVE 11/15/2022 02:00 AM    BLOODU TRACE 11/15/2022 02:00 AM CARMENZAUA NEGATIVE 11/15/2022 02:00 AM    AMORPHOUS OCCASIONAL 04/15/2018 05:48 AM     Urine Cultures: No results found for: LABURIN  Blood Cultures: No results found for: BC  No results found for: BLOODCULT2  Organism:   Lab Results   Component Value Date/Time    ORG ECOL 10/16/2018 02:43 PM       Time Spent Discharging patient 35 minutes    Electronically signed by AUSTIN Lu CNP on 11/18/2022 at 12:25 PM

## 2022-11-18 NOTE — PROGRESS NOTES
[address]  consulted by Dr. Austyn Augustin for monitoring and adjustment. Indication for treatment: Vancomycin indication: SSTI with risk factors   Goal trough: Trough Goal: 10-15 mcg/mL  AUC/ALEX: <500    Risk Factors for MRSA Identified:   Hospitalization within the past 90 days, Received IV antibiotics within the past 90 days    Pertinent Laboratory Values:   Temp Readings from Last 3 Encounters:   11/18/22 98 °F (36.7 °C) (Oral)   10/18/22 98 °F (36.7 °C) (Oral)   10/13/22 97.6 °F (36.4 °C) (Oral)     Recent Labs     11/16/22  0730 11/17/22  0916 11/18/22  0421   WBC 9.2 9.2 10.2       Recent Labs     11/16/22  0730 11/17/22  0916 11/18/22  0421   BUN 9 11 12   CREATININE 0.6 0.6 0.7  0.7       Estimated Creatinine Clearance: 87 mL/min (based on SCr of 0.7 mg/dL). Intake/Output Summary (Last 24 hours) at 11/18/2022 1422  Last data filed at 11/18/2022 0606  Gross per 24 hour   Intake --   Output 1430 ml   Net -1430 ml         Pertinent Cultures:   Date    Source    Results  11/15                          Blood                                      NGTD  11/15                          MRSA screen                         Pending  11/16                          Urine                                       Negative    Vancomycin level:   TROUGH:    Recent Labs     11/18/22  0421   VANCOTROUGH 11.6     RANDOM:    Recent Labs     11/16/22  0730   VANCORANDOM 17.1     Assessment:  HPI: A 67 y.o female who presents with edema and cellulitis. She states, she has been dealing with the swelling for many year, but on last Saturday, she dropped her scissors and it fell on her right foot. Since then the swelling has become worse. SCr, BUN, and urine output: Scr remains at baseline, no UOP data  Day(s) of therapy: 4 of 5 (ID following)  Vancomycin concentration:  11/16 - 17.1, 2 hour level on 750mg q12h, predicted trough of 12.3 at steady state.   11/18 - 11.6, 11 hours post-dose    Plan:  Renal trends remain at baseline  Continue on Vancomycin 750mg ivpb q12h with a predicted therapeutic trough at steady state. Expect some accumulation 2/2 BMI. Pharmacy will continue to monitor patient and adjust therapy as indicated    NO Levels SCHEDULED FOR VANCOMYCIN CONCENTRATION    Thank you for the consult.   Shant Mccarthy [facility]  11/18/2022 2:22 PM

## 2022-11-18 NOTE — PLAN OF CARE
Problem: Discharge Planning  Goal: Discharge to home or other facility with appropriate resources  11/18/2022 1317 by Jarad Schmidt LPN  Outcome: Completed  11/18/2022 1028 by Jarad Schmidt LPN  Outcome: Progressing

## 2022-11-18 NOTE — CARE COORDINATION
Spoke with Jayla pt approved for [facility] anytime. PS to [facility] , [address] Pt on discharge to [address] [facility], set up with [place] for 1400. Attempted to call nurse Shanika no answer, Arnie Liu and Annabel Villegas to update Shanika.   left for jayla at [address] [facility].

## 2022-11-19 LAB
CULTURE: NORMAL
Lab: NORMAL
SPECIMEN: NORMAL

## 2022-11-20 LAB
CULTURE: NORMAL
CULTURE: NORMAL
Lab: NORMAL
Lab: NORMAL
SPECIMEN: NORMAL
SPECIMEN: NORMAL

## 2022-11-21 ENCOUNTER — CARE COORDINATION (OUTPATIENT)
Dept: CARE COORDINATION | Age: 72
End: 2022-11-21

## 2022-11-21 ENCOUNTER — HOSPITAL ENCOUNTER (OUTPATIENT)
Age: 72
Setting detail: SPECIMEN
Discharge: HOME | End: 2022-11-21

## 2022-11-21 LAB
ALBUMIN SERPL-MCNC: 4.2 GM/DL (ref 3.4–5)
ALP BLD-CCNC: 149 IU/L (ref 40–128)
ALT SERPL-CCNC: 10 U/L (ref 10–40)
ANION GAP SERPL CALCULATED.3IONS-SCNC: 16 MMOL/L (ref 4–16)
AST SERPL-CCNC: 15 IU/L (ref 15–37)
BILIRUB SERPL-MCNC: 0.3 MG/DL (ref 0–1)
BUN BLDV-MCNC: 14 MG/DL (ref 6–23)
CALCIUM SERPL-MCNC: 10.1 MG/DL (ref 8.3–10.6)
CHLORIDE BLD-SCNC: 100 MMOL/L (ref 99–110)
CO2: 23 MMOL/L (ref 21–32)
CREAT SERPL-MCNC: 0.8 MG/DL (ref 0.6–1.1)
GFR SERPL CREATININE-BSD FRML MDRD: >60 ML/MIN/1.73M2
GLUCOSE BLD-MCNC: 125 MG/DL (ref 70–99)
HCT VFR BLD CALC: 37.2 % (ref 37–47)
HEMOGLOBIN: 11.9 GM/DL (ref 12.5–16)
MCH RBC QN AUTO: 27.4 PG (ref 27–31)
MCHC RBC AUTO-ENTMCNC: 32 % (ref 32–36)
MCV RBC AUTO: 85.7 FL (ref 78–100)
PDW BLD-RTO: 14.4 % (ref 11.7–14.9)
PLATELET # BLD: 294 K/CU MM (ref 140–440)
PMV BLD AUTO: 10.4 FL (ref 7.5–11.1)
POTASSIUM SERPL-SCNC: 4.2 MMOL/L (ref 3.5–5.1)
RBC # BLD: 4.34 M/CU MM (ref 4.2–5.4)
SODIUM BLD-SCNC: 139 MMOL/L (ref 135–145)
TOTAL PROTEIN: 7.2 GM/DL (ref 6.4–8.2)
WBC # BLD: 11 K/CU MM (ref 4–10.5)

## 2022-11-21 PROCEDURE — 80053 COMPREHEN METABOLIC PANEL: CPT

## 2022-11-21 PROCEDURE — 85027 COMPLETE CBC AUTOMATED: CPT

## 2022-11-21 PROCEDURE — 36415 COLL VENOUS BLD VENIPUNCTURE: CPT

## 2022-11-21 NOTE — CARE COORDINATION
AC chart review. Received notification pt was d/c from [facility] to [facility] Friday 11/18/22. AC call to [facility]. Spoke to [facility]. Confirmed pt remains at [facility]. No planned d/c date at this time.

## 2022-11-21 NOTE — CARE COORDINATION
HC attempted to reach out to patient to follow up in regards to social needs. HC spoke with patients emergency contact Loraine Holstein and he reported that patient has been admitted to a skilled nursing facility. HC will defer assistance until patient is possibly released to come back home and at that time [facility] will reassess.

## 2022-12-05 ENCOUNTER — CARE COORDINATION (OUTPATIENT)
Dept: CARE COORDINATION | Age: 72
End: 2022-12-05

## 2022-12-05 NOTE — CARE COORDINATION
ACM chart review. [facility] call to [facility]. Spoke to Patti Lea. States pt remains at facility for rehab. No plans for d/c at this time. Will plan for outreach next week and d/c from CC if pt remains in SNF.

## 2022-12-07 ENCOUNTER — TELEPHONE (OUTPATIENT)
Dept: INTERNAL MEDICINE CLINIC | Age: 72
End: 2022-12-07

## 2022-12-07 NOTE — TELEPHONE ENCOUNTER
Patient is being discharged from the nursing home potentially on Saturday. She will being go home and states she still has trouble walking. There person she lives with, Kit Bhagat, is who helps her. Kit Bhagat is currently sick and they are concerned he may end up admitted to the hospital. This would leave patient at home alone. She needs help around the home besides getting up to use the restroom. Patient would like to know of any resources, programs, or services we could help provide including meal services, home health, etc. Please advise.

## 2022-12-07 NOTE — TELEPHONE ENCOUNTER
Called pt back  and gave her the phone # to [address] [place] [address] on Aging to call. [phone].

## 2022-12-08 ENCOUNTER — HOSPITAL ENCOUNTER (OUTPATIENT)
Age: 72
Setting detail: SPECIMEN
Discharge: HOME | End: 2022-12-08

## 2022-12-08 LAB
ANION GAP SERPL CALCULATED.3IONS-SCNC: 14 MMOL/L (ref 4–16)
BUN BLDV-MCNC: 21 MG/DL (ref 6–23)
CALCIUM SERPL-MCNC: 9.2 MG/DL (ref 8.3–10.6)
CHLORIDE BLD-SCNC: 98 MMOL/L (ref 99–110)
CO2: 25 MMOL/L (ref 21–32)
CREAT SERPL-MCNC: 0.7 MG/DL (ref 0.6–1.1)
GFR SERPL CREATININE-BSD FRML MDRD: >60 ML/MIN/1.73M2
GLUCOSE BLD-MCNC: 104 MG/DL (ref 70–99)
POTASSIUM SERPL-SCNC: 4.1 MMOL/L (ref 3.5–5.1)
SODIUM BLD-SCNC: 137 MMOL/L (ref 135–145)

## 2022-12-08 PROCEDURE — 36415 COLL VENOUS BLD VENIPUNCTURE: CPT

## 2022-12-08 PROCEDURE — 80048 BASIC METABOLIC PNL TOTAL CA: CPT

## 2022-12-19 ENCOUNTER — CARE COORDINATION (OUTPATIENT)
Dept: CARE COORDINATION | Age: 72
End: 2022-12-19

## 2022-12-19 NOTE — CARE COORDINATION
[facility] received return call from Kiah Cox with [facility] ([facility]). [facility] pt d/c from SNF on 12/10 with Wolf Leung, PT and HHA service. [facility] call to pt again. No answer. Left  requesting return call to [facility]. [facility] call to [facility]. Spoke to Wyatt Duarte. Transferred to clinical manager Kayce. [facility] services started last week with SN, PT and mental health nurse. No HHA - as they do not have staffing available.

## 2022-12-19 NOTE — CARE COORDINATION
ACM chart review. Note from PCP office states pt was possible d/c from facility on Saturday 12/17. ACM call to [facility]. Left  for Purnima Viera requesting return call to [place] to confirm d/c and any services at d/c. [facility] call to pts home number. No answer. Left  requesting return call to [facility].

## 2022-12-21 ENCOUNTER — HOSPITAL ENCOUNTER (OUTPATIENT)
Dept: INFUSION THERAPY | Age: 72
Discharge: HOME | End: 2022-12-21
Payer: MEDICARE

## 2022-12-21 ENCOUNTER — OFFICE VISIT (OUTPATIENT)
Dept: ONCOLOGY | Age: 72
End: 2022-12-21
Payer: MEDICARE

## 2022-12-21 VITALS
OXYGEN SATURATION: 98 % | WEIGHT: 244 LBS | HEIGHT: 62 IN | HEART RATE: 80 BPM | TEMPERATURE: 98.7 F | SYSTOLIC BLOOD PRESSURE: 183 MMHG | BODY MASS INDEX: 44.9 KG/M2 | DIASTOLIC BLOOD PRESSURE: 80 MMHG

## 2022-12-21 DIAGNOSIS — C50.411 MALIGNANT NEOPLASM OF UPPER-OUTER QUADRANT OF RIGHT BREAST IN FEMALE, ESTROGEN RECEPTOR POSITIVE (HCC): ICD-10-CM

## 2022-12-21 DIAGNOSIS — Z17.0 MALIGNANT NEOPLASM OF UPPER-OUTER QUADRANT OF RIGHT BREAST IN FEMALE, ESTROGEN RECEPTOR POSITIVE (HCC): Primary | ICD-10-CM

## 2022-12-21 DIAGNOSIS — C50.411 MALIGNANT NEOPLASM OF UPPER-OUTER QUADRANT OF RIGHT BREAST IN FEMALE, ESTROGEN RECEPTOR POSITIVE (HCC): Primary | ICD-10-CM

## 2022-12-21 DIAGNOSIS — Z17.0 MALIGNANT NEOPLASM OF UPPER-OUTER QUADRANT OF RIGHT BREAST IN FEMALE, ESTROGEN RECEPTOR POSITIVE (HCC): ICD-10-CM

## 2022-12-21 LAB
ALBUMIN SERPL-MCNC: 3.4 GM/DL (ref 3.4–5)
ALP BLD-CCNC: 135 IU/L (ref 40–128)
ALT SERPL-CCNC: 14 U/L (ref 10–40)
ANION GAP SERPL CALCULATED.3IONS-SCNC: 13 MMOL/L (ref 4–16)
AST SERPL-CCNC: 18 IU/L (ref 15–37)
BASOPHILS ABSOLUTE: 0 K/CU MM
BASOPHILS RELATIVE PERCENT: 0.3 % (ref 0–1)
BILIRUB SERPL-MCNC: 0.3 MG/DL (ref 0–1)
BUN BLDV-MCNC: 8 MG/DL (ref 6–23)
CALCIUM SERPL-MCNC: 9.1 MG/DL (ref 8.3–10.6)
CHLORIDE BLD-SCNC: 104 MMOL/L (ref 99–110)
CO2: 24 MMOL/L (ref 21–32)
CREAT SERPL-MCNC: 0.6 MG/DL (ref 0.6–1.1)
DIFFERENTIAL TYPE: ABNORMAL
EOSINOPHILS ABSOLUTE: 0.2 K/CU MM
EOSINOPHILS RELATIVE PERCENT: 2.1 % (ref 0–3)
GFR SERPL CREATININE-BSD FRML MDRD: >60 ML/MIN/1.73M2
GLUCOSE BLD-MCNC: 101 MG/DL (ref 70–99)
HCT VFR BLD CALC: 34.6 % (ref 37–47)
HEMOGLOBIN: 10.9 GM/DL (ref 12.5–16)
LYMPHOCYTES ABSOLUTE: 1.8 K/CU MM
LYMPHOCYTES RELATIVE PERCENT: 22.4 % (ref 24–44)
MCH RBC QN AUTO: 27.7 PG (ref 27–31)
MCHC RBC AUTO-ENTMCNC: 31.5 % (ref 32–36)
MCV RBC AUTO: 87.8 FL (ref 78–100)
MONOCYTES ABSOLUTE: 0.6 K/CU MM
MONOCYTES RELATIVE PERCENT: 7.5 % (ref 0–4)
PDW BLD-RTO: 14.9 % (ref 11.7–14.9)
PLATELET # BLD: 191 K/CU MM (ref 140–440)
PMV BLD AUTO: 9.9 FL (ref 7.5–11.1)
POTASSIUM SERPL-SCNC: 3.7 MMOL/L (ref 3.5–5.1)
RBC # BLD: 3.94 M/CU MM (ref 4.2–5.4)
SEGMENTED NEUTROPHILS ABSOLUTE COUNT: 5.4 K/CU MM
SEGMENTED NEUTROPHILS RELATIVE PERCENT: 67.7 % (ref 36–66)
SODIUM BLD-SCNC: 141 MMOL/L (ref 135–145)
TOTAL PROTEIN: 6.3 GM/DL (ref 6.4–8.2)
WBC # BLD: 8 K/CU MM (ref 4–10.5)

## 2022-12-21 PROCEDURE — 3074F SYST BP LT 130 MM HG: CPT | Performed by: INTERNAL MEDICINE

## 2022-12-21 PROCEDURE — 85025 COMPLETE CBC W/AUTO DIFF WBC: CPT

## 2022-12-21 PROCEDURE — 3078F DIAST BP <80 MM HG: CPT | Performed by: INTERNAL MEDICINE

## 2022-12-21 PROCEDURE — 36415 COLL VENOUS BLD VENIPUNCTURE: CPT

## 2022-12-21 PROCEDURE — 99214 OFFICE O/P EST MOD 30 MIN: CPT | Performed by: INTERNAL MEDICINE

## 2022-12-21 PROCEDURE — 1123F ACP DISCUSS/DSCN MKR DOCD: CPT | Performed by: INTERNAL MEDICINE

## 2022-12-21 PROCEDURE — 80053 COMPREHEN METABOLIC PANEL: CPT

## 2022-12-21 PROCEDURE — 86300 IMMUNOASSAY TUMOR CA 15-3: CPT

## 2022-12-21 PROCEDURE — 99211 OFF/OP EST MAY X REQ PHY/QHP: CPT

## 2022-12-21 RX ORDER — CEPHALEXIN 250 MG/1
CAPSULE ORAL
COMMUNITY
Start: 2022-11-14

## 2022-12-21 ASSESSMENT — PATIENT HEALTH QUESTIONNAIRE - PHQ9
1. LITTLE INTEREST OR PLEASURE IN DOING THINGS: 0
SUM OF ALL RESPONSES TO PHQ QUESTIONS 1-9: 0
2. FEELING DOWN, DEPRESSED OR HOPELESS: 0
SUM OF ALL RESPONSES TO PHQ QUESTIONS 1-9: 0
SUM OF ALL RESPONSES TO PHQ9 QUESTIONS 1 & 2: 0

## 2022-12-21 NOTE — PROGRESS NOTES
MA Rooming Questions  Patient: Abhilash Ortiz  MRN: [id number]    Date: 12/21/2022        1. Do you have any new issues?   no         2. Do you need any refills on medications?    no    3. Have you had any imaging done since your last visit? yes - obey 12/9    4. Have you been hospitalized or seen in the emergency room since your last visit here?   no    5. Did the patient have a depression screening completed today?  Yes    PHQ-9 Total Score: 0 (12/21/2022  1:09 PM)       PHQ-9 Given to (if applicable):               PHQ-9 Score (if applicable):                     [] Positive     []  Negative              Does question #9 need addressed (if applicable)                     [] Yes    []  No               Lynn Bartholomew CMA

## 2022-12-23 NOTE — PROGRESS NOTES
followed by axillary dissection. Primary tumor was 1.2 cm and 3 sentinel nodes were positive. The six additional axillary nodes were negative. There was lymphovascular invasion. Tumor was strongly positive for estrogen and progesterone receptor, negative for HER-2. Adjuvant chemotherapy treatments with Adriamycin and Cytoxan on April 11, 2012. She tolerated those treatments with significant weakness and tiredness, but no nausea, vomiting, or mucositis. Started on chemotherapy with Taxol on July 12th. She had two injections, but on July 19th did have difficulty breathing and chest discomfort after completion of infusion of Taxol. There was possible worry that maybe it was an allergic reaction to Taxol. Subsequently she received treatment with weekly Taxotere. She received her last treatment on September 20, 2012. She tolerated these treatments with moderate degree of morbidity too, especially weakness, tiredness, and fluid retention too. .... October 15, 2012, started treatment with Femara. She was hospitalized from June 26, 2013, to June 29, 2013, with chest pain. Workup in the hospital included chest CT and that was unremarkable. She did have cardiac evaluation, including cardiac echo with ejection fraction of 65 percent. She also had coronary angiogram and that was negative for any significant disease . ... October 21, 2013, she did undergo colonoscopy per Dr. Yanet Garcia and was found to have a couple of polyps, one a 20 cm. Biopsy showed tubular adenoma. Another polyp was noted just above the anal opening, showing serrated adenoma and tubular adenoma focally with high-grade dysplasia. December 31, 2013,  DEXA scan same day, normal bone density. February 20, 2014, colonoscopy per Dr. Yanet Garcia, 1 cm polyp at 5 cm from the rectum, biopsy showing tubular adenoma with high-grade dysplasia. ... March 10, 2014, dual-chamber pacemaker insertion for heart block.   Tolerated the procedure well. March 7, 2014, CT scan of the brain negative for any infarct or hemorrhage. September 17, 2015, mentioned that she was feeling fair. Since the last visit here, apparently was hospitalized in July and then went to a nursing home and subsequently was seen at the wound clinic for some time for infection involving the right foot following a spider bite with severe cellulitis. It took a long time for that to heal.       Mammogram January 22, 2016, negative. Bone density January 22, 2016, osteopenia. Doppler right lower leg April 12, 2016, negative for deep vein thrombosis. Did not see me after her visit from April 14, 2016, until March 6, 2017. In the interim she stated that she had been taking Letrozole on a regular basis, even though I had received note from [facility], but checking with her in June 2016 she had stated that she was taking Letrozole on a regular basis. March 6, 2017, she also stated that she was hospitalized in October 2016 after she attempted to take extra dosages of the medicines, was seen at [facility]. She was hospitalized in February of 2017 with chest discomfort started after sort of an argument at home with her boyfriend. Seems like workup in the hospital for cardiac issues were negative. She did have Doppler done of both lower extremities and there was no evidence of deep vein thrombosis. February 11, 2017, chest x-ray was unremarkable, mammogram January 25, 2017, was negative too. Echocardiogram on February 13, 2017, had revealed decent ejection fraction. March 6, 2017, she stated that she has been followed by Cardiologist as well Dr. Delmi Tran and there was concern about splenomegaly. I could not though feel any enlargement of spleen. Blood work February 14, 2017, in the hospital normal chemistries, hemoglobin of 11.1 grams percent.   March 6, 2017, CBC and chemistries drawn here were fairly unremarkable including LDH and reticulocyte count of 1.9 percent. As there was a question of splenomegaly she did undergo ultrasound of the abdomen on March 13, 2017, and was noted to have a spleen to be around 14.4 cm, mildly enlarged, but no other abnormal findings. March 21, 2017, clinically she was doing about the same, still some issue with her boyfriend, but otherwise still being followed by Dr. Ernie Daniels, Dr. Fred Posada, and Dr. Jesus Moreno. She also had seen Dr. Jude Butler too as well as Dr. Doreen Schaeffer. May 30, 2017, she was doing better. Stated that she was going to see Dr. Jude Butler in August 2017. She was still having symptoms of exogenous obesity, chronic swelling of her legs, as well as difficulty ambulating, chronic weakness and tiredness, but denied any bleeding, and was in general eating well and was not monitoring her salt intake. She did undergo ultrasound of the abdomen on September 1, 2017, primarily because of previous ultrasound revealing mild splenomegaly. Study done this time revealed hepatomegaly with fatty change. Spleen was enlarged measuring around 15.6 cm. Previously it measured 14.4 cm. Visit here on December 7, 2017, we talked about her underlying evaluation per Dr. Jude Butler for mild chronic liver disease. Otherwise she was encouraged to continue to follow with the rest of the physicians and to see me for follow-up visit in a few weeks. Saw me on December 7, 2017, stated that she for some reason or another had not been able to see Dr. Jude Butler. Stated that she had a fall in November 2017 and thus could not keep her appointments. Still being followed by Dr. Fred Posada and Dr. Jesus Moreno. She overall was in good spirits, was taking Letrozole on a regular basis, and denied any significant new issues. She saw me on April 26, 2018, still taking the Letrozole on a regular basis. She is still being followed by Dr. Fred Posada too.   She has in general been doing quite well, had not noticed any major new problems to speak of. She stated that she did undergo colonoscopy exam as well as EGD per Dr. Amalia Bautista. She tolerated the procedure well. Stated she had mammogram on January 25, 2018, that was negative. She had undergone CT scan of the abdomen and pelvis on April 15, 2018, when seen in the emergency room for hematuria, which was felt to be secondary to urinary tract infection. CT scan was read to be fairly unremarkable actually the spleen was not reported to be enlarged. She was noted to have diffuse fatty infiltration of the liver. Visit per Dr. Amalia Bautista on February 5, 2018, was thought to have GALLO with early changes of cirrhosis but no varices, no significant thrombocytopenia and mild splenomegaly was felt to possibly be related to GALLO too. No additional therapy was needed except for her to manage her diet. She had undergone CT scan of the abdomen and pelvis on July 28, 2018 when she visited the emergency room there was no acute abnormality but there were changes of stable splenomegaly measuring around 15.6 cm. DEXA scan on 11/5/2018 showed osteopenia by WHO criteria. Colonoscopy per Dr. Amalia Bautista January 17, 2019 tubular adenoma. Mammogram February 4, 2019 benign     Visit here on April 2, 2019 was still taking letrozole on a regular basis. Stated had seen Dr. Joshua Nieves for peripheral arterial issues. Was being treated with aspirin. She was hospitalized on June 26, 2019 with symptoms of chest pain, evaluated per cardiology, discharged on June 28, 2019. She was seen in the office on January 16, 2020. Stated she was taking letrozole on a regular basis. Overall she was doing fair but did complain of having significant pain involving the right perianal area for last few days duration where she had noticed some discharge. Was noted to have an infected cyst and was treated with antibiotics.      Did undergo colonoscopy exam per Dr. Amalia Bautista on January 21, 2020 3 mm polyp was removed from the sigmoid stump otherwise no suspicious lesions were noted. Mammogram left screening on February 11, 2020 was negative. Left breast mammogram done on 9/8/22 showed no sonographic or mammographic evidence of malignancy. On December 21, 2022, she presented to me for follow up. We have been following her for colon cancer and stage IIA right breast cancer. She is status post right breast mastectomy, axillary lymph node dissection, followed by adjuvant chemotherapy. She has been on adjuvant endocrine therapy with letrozole since October 15, 2012. She is tolerating letrozole well and she doesn't encounter any major side effects from it. Since she has been taking it for more than 10 years already, I recommend her to stop it. Will plan to have repeat left breast mammogram in 9/2023. She doesn't have any significant symptoms at today visit. PAST MEDICAL HISTORY:    1. Surgery for carcinoma of the colon, T3, N0, M0 disease, followed by adjuvant chemotherapy. In that regards she is doing quite well.  , 2. Other medical problems include hypertension, degenerative arthritis, bronchial asthma, and hypertension. Oncology History    No history exists. Review of Systems: \"Per interval history; otherwise 10 point ROS is negative. \"  Her energy level is good and her sleep is fine. She doesn't have fever, chills, night sweats, cough, shortness of breath, chest pain, hemoptysis, or palpitations. Her bowel and bladder functions are normal. She denies nausea, vomiting, abdominal pain, diarrhea, constipation, dysuria, loss of appetite, or weight loss. She doesn't have neuropathy and she denies bleeding or clotting issues. She denies any pain in her body. No anxiety or depression. The rest of the systems are unremarkable.     Vital Signs:  BP (!) 183/80 (Site: Left Upper Arm, Position: Sitting, Cuff Size: Large Adult)   Pulse 80   Temp 98.7 °F (37.1 °C) (Temporal)   Ht 5' 2\" (1.575 m) Wt 244 lb (110.7 kg)   LMP 01/15/1999   SpO2 98%   BMI 44.63 kg/m²     Physical Exam:  CONSTITUTIONAL: awake, alert, cooperative, no apparent distress   EYES: pupils equal, round and reactive to light, sclera clear, normal conjunctiva  ENT: Normocephalic, without obvious abnormality, atraumatic  NECK: supple, symmetrical, no jugular venous distension, no carotid bruits   HEMATOLOGIC/LYMPHATIC: no cervical, supraclavicular or axillary lymphadenopathy   LUNGS: VBS, no wheezes, no increased work of breathing, no rhonchi, clear to auscultation, no crackles,   CARDIOVASCULAR: regular rate and rhythm, normal S1 and S2, no murmur noted  ABDOMEN: normal bowel sounds x 4, soft, non-distended, non-tender, no masses palpated, no hepatosplenomegaly   MUSCULOSKELETAL: full range of motion noted, tone is normal  NEUROLOGIC: awake, alert, oriented to name, place and time. Motor skills grossly intact. SKIN: appears intact, normal skin color, normal texture, normal turgor, no jaundice. EXTREMITIES: no LE edema, no leg swelling, no cyanosis, no clubbing,   BREASTS: no palpable mass in right mastectomy site.  No palpable mass in left breast and b/l axilla     Labs:  Hematology:  Lab Results   Component Value Date    WBC 8.0 12/21/2022    RBC 3.94 (L) 12/21/2022    HGB 10.9 (L) 12/21/2022    HCT 34.6 (L) 12/21/2022    MCV 87.8 12/21/2022    MCH 27.7 12/21/2022    MCHC 31.5 (L) 12/21/2022    RDW 14.9 12/21/2022     12/21/2022    MPV 9.9 12/21/2022    BANDSPCT 1 (L) 03/30/2021    SEGSPCT 67.7 (H) 12/21/2022    EOSRELPCT 2.1 12/21/2022    BASOPCT 0.3 12/21/2022    LYMPHOPCT 22.4 (L) 12/21/2022    MONOPCT 7.5 (H) 12/21/2022    BANDABS 0.10 03/30/2021    SEGSABS 5.4 12/21/2022    EOSABS 0.2 12/21/2022    BASOSABS 0.0 12/21/2022    LYMPHSABS 1.8 12/21/2022    MONOSABS 0.6 12/21/2022    DIFFTYPE AUTOMATED DIFFERENTIAL 12/21/2022    ANISOCYTOSIS 1+ 03/30/2021    POLYCHROM 1+ 03/30/2021    PLTM PLATELETS NORMAL IN NUMBER AND SIZE 03/26/2021     Lab Results   Component Value Date     (H) 10/17/2012     Chemistry:  Lab Results   Component Value Date     12/21/2022    K 3.7 12/21/2022     12/21/2022    CO2 24 12/21/2022    BUN 8 12/21/2022    CREATININE 0.6 12/21/2022    GLUCOSE 101 (H) 12/21/2022    CALCIUM 9.1 12/21/2022    PROT 6.3 (L) 12/21/2022    LABALBU 3.4 12/21/2022    BILITOT 0.3 12/21/2022    ALKPHOS 135 (H) 12/21/2022    AST 18 12/21/2022    ALT 14 12/21/2022    LABGLOM >60 12/21/2022    GFRAA >60 10/15/2022    AGRATIO 1.3 07/29/2021    GLOB 3.2 07/29/2021    PHOS 3.5 07/29/2015    MG 1.8 05/15/2022    POCGLU 108 (H) 12/04/2021     Lab Results   Component Value Date     (H) 09/01/2017     No components found for: LD  Lab Results   Component Value Date    TSHHS 1.290 04/12/2018    T4FREE 1.06 04/12/2018     Immunology:  Lab Results   Component Value Date    PROT 6.3 (L) 12/21/2022    ALBUMINELP 3.3 09/28/2011    LABALPH 0.3 09/28/2011    LABALPH 1.1 09/28/2011    LABBETA 1.2 09/28/2011    GAMGLOB 1.1 09/28/2011     No results found for: LENI So  No results found for: B2M  Coagulation Panel:  Lab Results   Component Value Date    PROTIME 11.4 (L) 11/29/2021    INR 0.88 11/29/2021    APTT 26.6 11/29/2021    DDIMER 337 (H) 11/04/2021     Anemia Panel:  Lab Results   Component Value Date    DMHDATAZ00 574.3 11/15/2022    FOLATE 6.1 11/15/2022     Tumor Markers:  Lab Results   Component Value Date    CEA 0.8 08/15/2011    LABCA2 17.4 10/20/2020     Observations:  PHQ-9 Total Score: 0 (12/21/2022  1:09 PM)        Assessment & Plan:   1. Previous history of carcinoma of the colon., 2. Hypertension. , 3. Degenerative arthritis., 4. Bronchial asthma., 5. Hypertension. , 6. CA breast, surgery in March 2012, mastectomy. Three sentinel nodes were positive. Six additional axillary nodes were negative. There was lymphovascular invasion. Tumor was strongly positive for ER and GA, negative for HER-2. Status post three months of treatment with Adriamycin/Cytoxan, followed by Taxol and Taxotere, finishing adjuvant chemotherapy on September 20, 2012. Overall, tolerated these treatments with moderate degree of morbidity. .. Colonoscopy exam in October 2013 had revealed dysplastic polyp. February 2014 again revealed 1 cm polyp at 5 cm from the anal verge, again showing some dysplasia. .. She also had been having cardiac issues in the form of heart block. She did undergo placement of a pacemaker that has been of help. .. September 17, 2015, was feeling fair. Since the last visit here she was hospitalized with severe cellulitis, spent a few weeks at the nursing home as well as Wound Clinic. September 7, 2017, again spent time with her. We talked about underlying issue of carcinoma of the breast.  Encouraged her to stay on letrozole and take it on a regular basis along with calcium and vitamin D.  DEXA scan earlier on had revealed mild osteopenia. Mammogram in January 2017 was negative. April 26, 2018, she was doing fair, being followed by Dr. Oli Mcclendon for mild liver disease with slight splenomegaly. She is also being followed by Dr. Tawnya Mixoning getting her mammograms, . She sees Dr. Ariel Britt for other health problems. Colonoscopy per Dr. Oli Mcclendon January 17, 2019 tubular adenoma. Mammogram February 4, 2019 benign     Visit here on April 2, 2019 was still taking letrozole on a regular basis. Stated had seen Dr. Dennis Steele for peripheral arterial issues. Was being treated with aspirin. She in particular denied any other specific new issues or complaints     January 16, 2020 visit was noted to have an infected cyst in the perianal area was treated with antibiotics did improve. Colonoscopy exam January 21, 2020 couple of polyps. And mammogram in February 2020 was negative. Left breast mammogram done on 9/8/22 showed no sonographic or mammographic evidence of malignancy.      On December 21, 2022, she presented to me for follow up. We have been following her for colon cancer and stage IIA right breast cancer. She is status post right breast mastectomy, axillary lymph node dissection, followed by adjuvant chemotherapy. She has been on adjuvant endocrine therapy with letrozole since October 15, 2012. She is tolerating letrozole well and she doesn't encounter any major side effects from it. Since she has been taking it for more than 10 years already, I recommend her to stop it. Will plan to have repeat left breast mammogram in 9/2023. Recent imaging and labs were reviewed and discussed with the patient. Recent imaging and labs were reviewed and discussed with the patient.

## 2022-12-25 LAB — CA 27.29: 18.8 U/ML

## 2022-12-27 ENCOUNTER — CARE COORDINATION (OUTPATIENT)
Dept: CARE COORDINATION | Age: 72
End: 2022-12-27

## 2022-12-27 NOTE — CARE COORDINATION
ACM call to pt for Care Coordination outreach. Unidentified person answered and told ACM she had incorrect number. Number verified to be mobile number listed. ACM call to pt on home number listed. Ambulatory Care Coordination Note  12/27/2022    ACC: Rima Griffiths RN    ACM call to pt on home phone. Spoke to [address] [place]. States she is doing \"ok\". ACM inquired about [place] services from [facility]. States they completed services as she was \"walking good\". States nursing and PT have ended. Discussed recent Oncology appt. States went good - was told she could stop Femara. Continues to use CPAP at night. Has DME. Discussed fall prevention and safety. States she has some edema in LE feet/ankles. States she is ambulating \"ok\" Encouraged to elevate as able. Instructed to contact PCP office to schedule appt. Pt voiced understanding. Confirmed AWV 12/29/22. States she will discuss at that time. Pt needed to end call, as leatha was calling her - as he had arrived to work on frozen pipes. Plan:  ACP    Offered patient enrollment in the Remote Patient Monitoring (RPM) program for in-home monitoring: NA.    General Assessment    Do you have any symptoms that are causing concern?: No           General Assessment    Do you have any symptoms that are causing concern?: No         Lab Results       None            Care Coordination Interventions    Referral from Primary Care Provider: Yes  Suggested Interventions and Community Resources  Fall Risk Prevention: In Process  Marketplace Navigator: Completed (Comment: Rachid WILLARD/ BS)  Pharmacist: In Process  Senior Services: In Process  Social Work: In Process  Transportation Support: In Process          Goals Addressed                   This Visit's Progress     Conditions and Symptoms        I will schedule office visits, as directed by my provider. I will keep my appointment or reschedule if I have to cancel.   I will notify my provider of any barriers to my plan Spoke w/ patient and informed him of then negative test for berryliosis  Patient has a f/up henrique w/ rheumatology in [place] and will call to schedule a f/up with us in the beginning of the year  All questions answered and patient verbalized understanding  of care. I will follow my Zone Management tool to seek urgent or emergent care. I will notify my provider of any symptoms that indicate a worsening of my condition. Barriers: fear of failure, lack of support, overwhelmed by complexity of regimen, stress, and lack of education  Plan for overcoming my barriers: Utilize [facility] for education and community resources. Confidence: 7/10  Anticipated Goal Completion Date: 3/27/2023                Prior to Admission medications    Medication Sig Start Date End Date Taking? Authorizing Provider   cephALEXin (KEFLEX) 250 MG capsule  11/14/22   Nereida Zavaleta MD   acetaminophen (TYLENOL) 500 MG tablet Take 1 tablet by mouth every 6 hours as needed for Pain 11/18/22   AUSTIN Mcdowell - CNP   [facility]  Devices [facility]) MISC Use as directed  Patient not taking: Reported on 11/15/2022 11/13/22   Ute Hawthorne DO   furosemide (LASIX) 20 MG tablet Take 1 tablet by mouth daily as needed (for edema) 10/18/22   Onur Blank MD   potassium chloride (KLOR-CON M) 10 MEQ extended release tablet Take 1 tablet by mouth daily as needed (take only when you need Lasix) 10/18/22   Onur Blank MD   aspirin 81 MG chewable tablet Take 1 tablet by mouth daily 10/19/22   Onur Blank MD   simvastatin (ZOCOR) 20 MG tablet TAKE ONE (1) TABLET BY MOUTH NIGHTLY 3/28/22   Ute Hawthorne DO   letrozole [facility]) 2.5 MG tablet Take 1 tablet by mouth nightly 11/30/21   Nick Penn MD   fluticasone (FLOVENT HFA) 110 MCG/ACT inhaler Inhale 2 puffs into the lungs 2 times daily 2/11/21   Ute Hawthorne DO   albuterol sulfate HFA (PROAIR HFA) 108 (90 Base) MCG/ACT inhaler INHALE 2 PUFFS BY MOUTH EVERY SIX HOURS AS NEEDED FOR WHEEZING 2/11/21   Ute Hawthorne DO       Future Appointments   Date Time Provider Chance Chiang   12/29/2022  4:15 PM Letty LOUIE   1/4/2023  3:00 PM Foster Vernon MD [address] Foster Cedeño   12/21/2023 12:45 PM [facility] MED ONC NURSE [facility] MED ONC [place]   12/21/2023  1:00 PM Portillo Mancia MD [facility] CC MMA

## 2023-01-09 ENCOUNTER — OFFICE VISIT (OUTPATIENT)
Dept: INTERNAL MEDICINE CLINIC | Age: 73
End: 2023-01-09
Payer: MEDICARE

## 2023-01-09 VITALS
BODY MASS INDEX: 45.64 KG/M2 | SYSTOLIC BLOOD PRESSURE: 144 MMHG | DIASTOLIC BLOOD PRESSURE: 88 MMHG | WEIGHT: 248 LBS | HEIGHT: 62 IN | OXYGEN SATURATION: 94 % | HEART RATE: 72 BPM

## 2023-01-09 DIAGNOSIS — D64.9 ANEMIA, UNSPECIFIED TYPE: ICD-10-CM

## 2023-01-09 DIAGNOSIS — L03.115 CELLULITIS OF RIGHT FOOT: ICD-10-CM

## 2023-01-09 DIAGNOSIS — Z00.00 MEDICARE ANNUAL WELLNESS VISIT, SUBSEQUENT: Primary | ICD-10-CM

## 2023-01-09 DIAGNOSIS — F32.A DEPRESSION, UNSPECIFIED DEPRESSION TYPE: ICD-10-CM

## 2023-01-09 DIAGNOSIS — R73.03 PREDIABETES: ICD-10-CM

## 2023-01-09 DIAGNOSIS — I73.9 PVD (PERIPHERAL VASCULAR DISEASE) (HCC): ICD-10-CM

## 2023-01-09 DIAGNOSIS — E78.5 HYPERLIPIDEMIA, UNSPECIFIED HYPERLIPIDEMIA TYPE: ICD-10-CM

## 2023-01-09 DIAGNOSIS — R60.0 PEDAL EDEMA: ICD-10-CM

## 2023-01-09 PROBLEM — E43 SEVERE MALNUTRITION (HCC): Chronic | Status: RESOLVED | Noted: 2022-11-15 | Resolved: 2023-01-09

## 2023-01-09 PROCEDURE — 3078F DIAST BP <80 MM HG: CPT | Performed by: FAMILY MEDICINE

## 2023-01-09 PROCEDURE — 3074F SYST BP LT 130 MM HG: CPT | Performed by: FAMILY MEDICINE

## 2023-01-09 PROCEDURE — 1123F ACP DISCUSS/DSCN MKR DOCD: CPT | Performed by: FAMILY MEDICINE

## 2023-01-09 PROCEDURE — G0439 PPPS, SUBSEQ VISIT: HCPCS | Performed by: FAMILY MEDICINE

## 2023-01-09 RX ORDER — CEPHALEXIN 500 MG/1
500 CAPSULE ORAL 4 TIMES DAILY
Qty: 28 CAPSULE | Refills: 0 | Status: SHIPPED | OUTPATIENT
Start: 2023-01-09

## 2023-01-09 RX ORDER — SIMVASTATIN 20 MG
TABLET ORAL
Qty: 90 TABLET | Refills: 0 | Status: SHIPPED | OUTPATIENT
Start: 2023-01-09

## 2023-01-09 RX ORDER — FUROSEMIDE 20 MG/1
20 TABLET ORAL DAILY PRN
Qty: 90 TABLET | Refills: 0 | Status: SHIPPED | OUTPATIENT
Start: 2023-01-09

## 2023-01-09 RX ORDER — POTASSIUM CHLORIDE 750 MG/1
10 TABLET, EXTENDED RELEASE ORAL DAILY PRN
Qty: 90 TABLET | Refills: 0 | Status: SHIPPED | OUTPATIENT
Start: 2023-01-09

## 2023-01-09 ASSESSMENT — LIFESTYLE VARIABLES
HOW MANY STANDARD DRINKS CONTAINING ALCOHOL DO YOU HAVE ON A TYPICAL DAY: PATIENT DOES NOT DRINK
HOW OFTEN DO YOU HAVE A DRINK CONTAINING ALCOHOL: NEVER

## 2023-01-09 ASSESSMENT — PATIENT HEALTH QUESTIONNAIRE - PHQ9
SUM OF ALL RESPONSES TO PHQ QUESTIONS 1-9: 5
3. TROUBLE FALLING OR STAYING ASLEEP: 2
1. LITTLE INTEREST OR PLEASURE IN DOING THINGS: 0
10. IF YOU CHECKED OFF ANY PROBLEMS, HOW DIFFICULT HAVE THESE PROBLEMS MADE IT FOR YOU TO DO YOUR WORK, TAKE CARE OF THINGS AT HOME, OR GET ALONG WITH OTHER PEOPLE: 3
7. TROUBLE CONCENTRATING ON THINGS, SUCH AS READING THE NEWSPAPER OR WATCHING TELEVISION: 0
4. FEELING TIRED OR HAVING LITTLE ENERGY: 1
5. POOR APPETITE OR OVEREATING: 0
8. MOVING OR SPEAKING SO SLOWLY THAT OTHER PEOPLE COULD HAVE NOTICED. OR THE OPPOSITE, BEING SO FIGETY OR RESTLESS THAT YOU HAVE BEEN MOVING AROUND A LOT MORE THAN USUAL: 0
SUM OF ALL RESPONSES TO PHQ9 QUESTIONS 1 & 2: 2
9. THOUGHTS THAT YOU WOULD BE BETTER OFF DEAD, OR OF HURTING YOURSELF: 2
SUM OF ALL RESPONSES TO PHQ QUESTIONS 1-9: 7
6. FEELING BAD ABOUT YOURSELF - OR THAT YOU ARE A FAILURE OR HAVE LET YOURSELF OR YOUR FAMILY DOWN: 0
2. FEELING DOWN, DEPRESSED OR HOPELESS: 2
SUM OF ALL RESPONSES TO PHQ QUESTIONS 1-9: 7
SUM OF ALL RESPONSES TO PHQ QUESTIONS 1-9: 7

## 2023-01-09 ASSESSMENT — COLUMBIA-SUICIDE SEVERITY RATING SCALE - C-SSRS
3. HAVE YOU BEEN THINKING ABOUT HOW YOU MIGHT KILL YOURSELF?: YES
1. WITHIN THE PAST MONTH, HAVE YOU WISHED YOU WERE DEAD OR WISHED YOU COULD GO TO SLEEP AND NOT WAKE UP?: YES
5. HAVE YOU STARTED TO WORK OUT OR WORKED OUT THE DETAILS OF HOW TO KILL YOURSELF? DO YOU INTEND TO CARRY OUT THIS PLAN?: NO
6. HAVE YOU EVER DONE ANYTHING, STARTED TO DO ANYTHING, OR PREPARED TO DO ANYTHING TO END YOUR LIFE?: YES
4. HAVE YOU HAD THESE THOUGHTS AND HAD SOME INTENTION OF ACTING ON THEM?: YES
2. HAVE YOU ACTUALLY HAD ANY THOUGHTS OF KILLING YOURSELF?: YES
7. DID THIS OCCUR IN THE LAST THREE MONTHS: YES

## 2023-01-18 ENCOUNTER — CARE COORDINATION (OUTPATIENT)
Dept: CARE COORDINATION | Age: 73
End: 2023-01-18

## 2023-01-18 DIAGNOSIS — I10 HYPERTENSION, UNSPECIFIED TYPE: Primary | ICD-10-CM

## 2023-01-18 NOTE — PROGRESS NOTES
1/18/23 3:33 PM       Remote Patient Monitoring Treatment Plan    Received request from ACM/CTROSIE Griffiths RN to order remote patient monitoring for in home monitoring of HTN and order completed. Patient will be monitoring   --blood pressure   --pulse ox   --weight. Please set alert for ONLY weight gain of 5# in 7 days. Pt has no documented hx of HF. Patient will engage in Remote Patient Monitoring each day to develop the skills necessary for self management. RPM Care Team Responsibilities:   Alerts will be reviewed daily and addressed within 2-4 hours during operational hours (Monday -Friday 9 am-4 pm)  Alert response and intervention documented in patient medical record  Alert response escalated to PCP per protocol and documented in patient medical record  Patient monitored over approximately  days  Discharge from program based on self-management readiness    See care coordination encounters for additional details.       Pedro Ly, DNP, FNP-C, Remote Patient Monitoring NP, (Ph) [phone]

## 2023-01-18 NOTE — CARE COORDINATION
Remote Patient Kit Ordering Note      Date/Time:  1/18/2023 3:36 PM      [x] CCSS confirmed patient shipping address  [x] Patient will receive package over the next 2-4 business days. Someone 21 years or older must be present to sign for UPS delivery. [x] Patient to contact virtual installation-specific phone number listed in the patient instructions. [x] If the patient does not contact HRS within 24 hours, an [employer] Ambassador [facility] Shahzadcathi will call the patient directly: If the patient does not answer, HRS will follow up with the clinical team notifying them about the unsuccessful attempt to contact the patient. HRS will make three call attempts to the patient. [x] LPN will contact patient once equipment is active to welcome them to the program.                                                         [x] Hours of RPM monitoring - Monday-Friday 2951-8864                     All questions answered at this time. LPN made aware the RPM kit has been ordered. EMTP notified patient of RPM equipment order.

## 2023-01-18 NOTE — CARE COORDINATION
Ambulatory Care Coordination Note  1/18/2023    ACC: Wilberto Griffiths, RN    ACM call to pt on home phone. Spoke to [address]. States she is doing \"ok\". Continues to use CPAP at night. Has DME. Discussed fall prevention and safety. States she has some edema in LE feet/ankles. States she is ambulating \"ok\". Encouraged to elevate as able. [address] and PT stopped and she feels she is walking better. ACM discussed ACP - states she received documents previously and did not complete. Not interested at this time. Pt aware of care gaps - vaccines. Does not plan to obtain. Denies any other needs at this time. Plan:  Monitor for RPM   Fall, HTN education    Offered patient enrollment in the Remote Patient Monitoring (RPM) program for in-home monitoring: Yes, patient enrolled. General Assessment    Do you have any symptoms that are causing concern?: No           Lab Results       None            Care Coordination Interventions    Referral from Primary Care Provider: Yes  Suggested Interventions and Community Resources  Fall Risk Prevention: In Process  [employer] Navigator: Completed (Comment: Daisy WILLARD/ BS)  Pharmacist: In Process  Senior Services: In Process  Social Work: In Process  Transportation Support: In Process          Goals Addressed                   This Visit's Progress     Conditions and Symptoms   On track     I will schedule office visits, as directed by my provider. I will keep my appointment or reschedule if I have to cancel. I will notify my provider of any barriers to my plan of care. I will follow my Zone Management tool to seek urgent or emergent care. I will notify my provider of any symptoms that indicate a worsening of my condition. Barriers: fear of failure, lack of support, overwhelmed by complexity of regimen, stress, and lack of education  Plan for overcoming my barriers: Utilize AC for education and community resources.    Confidence: 7/10  Anticipated Goal Completion Date: 3/27/2023                Prior to Admission medications    Medication Sig Start Date End Date Taking? Authorizing Provider   potassium chloride (KLOR-CON M) 10 MEQ extended release tablet Take 1 tablet by mouth daily as needed (take only when you need Lasix) 1/9/23   Cordella Fair, DO   simvastatin (ZOCOR) 20 MG tablet TAKE ONE (1) TABLET BY MOUTH NIGHTLY 1/9/23   Cordella Fair, DO   furosemide (LASIX) 20 MG tablet Take 1 tablet by mouth daily as needed (for edema) 1/9/23   Cordella Fair, DO   cephALEXin (KEFLEX) 500 MG capsule Take 1 capsule by mouth 4 times daily 1/9/23   Cordella Fair, DO   acetaminophen (TYLENOL) 500 MG tablet Take 1 tablet by mouth every 6 hours as needed for Pain 11/18/22   AUSTIN Hart - CNP   [facility]  Devices [facility]) MISC Use as directed 11/13/22   Cordella Fair, DO   aspirin 81 MG chewable tablet Take 1 tablet by mouth daily  Patient not taking: Reported on 1/9/2023 10/19/22   Leola Strong MD   letrozole [facility]) 2.5 MG tablet Take 1 tablet by mouth nightly  Patient not taking: Reported on 1/9/2023 11/30/21   Zulma Bruner MD   fluticasone (FLOVENT HFA) 110 MCG/ACT inhaler Inhale 2 puffs into the lungs 2 times daily 2/11/21   Cordella Fair, DO   albuterol sulfate HFA (PROAIR HFA) 108 (90 Base) MCG/ACT inhaler INHALE 2 PUFFS BY MOUTH EVERY SIX HOURS AS NEEDED FOR WHEEZING 2/11/21   Cordella Fair, DO       Future Appointments   Date Time Provider Chance Chiang   4/11/2023  3:00 PM Paul LOUIE   12/21/2023 12:45 PM SRM[facility], MED ONC NURSE [address] [facility] MED ONC [place]   12/21/2023  1:00 PM Zulma Bruner MD [address] [facility] [place] RIA [facility]   1/22/2024  3:30 PM Srmx [facility] [address] [facility]

## 2023-01-18 NOTE — CARE COORDINATION
Remote Patient Monitoring Enrollment Note      Date/Time:  1/18/2023 3:17 PM    Offered patient enrollment in the [facility] Remote Patient Monitoring (RPM) program for in home monitoring for HTN. Patient accepted RPM services. Patient will be monitoring the following daily:  blood pressure reading  blood pressure heart rate  pulse ox reading  pulse ox heart rate  weight    ACM reviewed the information below with patient:    Emergency Contact (name and contact number): Beckie Olszewski [phone]    [x] A member from the care coordination team will reach out to notify the patient once the RPM kit is ordered. [x] Once the kit is delivered, the [facility] team will contact the patient after UPS deliver to assist with set up. [x] Determined BP cuff size: large (13.8\"-19.68\")      [x] Determined weight scale: regular (<330lbs)                                                 [x] Hours of ACM monitoring - Monday-Friday [id number]                      All questions about RPM program answered at this time.

## 2023-01-22 ENCOUNTER — HOSPITAL ENCOUNTER (EMERGENCY)
Age: 73
Discharge: HOME | End: 2023-01-23
Payer: MEDICARE

## 2023-01-22 ENCOUNTER — APPOINTMENT (OUTPATIENT)
Dept: GENERAL RADIOLOGY | Age: 73
End: 2023-01-22
Payer: MEDICARE

## 2023-01-22 DIAGNOSIS — N39.0 URINARY TRACT INFECTION WITHOUT HEMATURIA, SITE UNSPECIFIED: Primary | ICD-10-CM

## 2023-01-22 LAB
ALBUMIN SERPL-MCNC: 3.9 GM/DL (ref 3.4–5)
ALP BLD-CCNC: 126 IU/L (ref 40–129)
ALT SERPL-CCNC: 11 U/L (ref 10–40)
ANION GAP SERPL CALCULATED.3IONS-SCNC: 12 MMOL/L (ref 4–16)
AST SERPL-CCNC: 14 IU/L (ref 15–37)
BASOPHILS ABSOLUTE: 0 K/CU MM
BASOPHILS RELATIVE PERCENT: 0.2 % (ref 0–1)
BILIRUB SERPL-MCNC: 0.7 MG/DL (ref 0–1)
BUN BLDV-MCNC: 14 MG/DL (ref 6–23)
CALCIUM SERPL-MCNC: 9.2 MG/DL (ref 8.3–10.6)
CHLORIDE BLD-SCNC: 96 MMOL/L (ref 99–110)
CO2: 25 MMOL/L (ref 21–32)
CREAT SERPL-MCNC: 0.8 MG/DL (ref 0.6–1.1)
DIFFERENTIAL TYPE: ABNORMAL
EOSINOPHILS ABSOLUTE: 0 K/CU MM
EOSINOPHILS RELATIVE PERCENT: 0 % (ref 0–3)
GFR SERPL CREATININE-BSD FRML MDRD: >60 ML/MIN/1.73M2
GLUCOSE BLD-MCNC: 102 MG/DL (ref 70–99)
HCT VFR BLD CALC: 31.1 % (ref 37–47)
HEMOGLOBIN: 9.8 GM/DL (ref 12.5–16)
IMMATURE NEUTROPHIL %: 0.6 % (ref 0–0.43)
LACTATE: 1.3 MMOL/L (ref 0.5–1.9)
LYMPHOCYTES ABSOLUTE: 1.7 K/CU MM
LYMPHOCYTES RELATIVE PERCENT: 11.4 % (ref 24–44)
MCH RBC QN AUTO: 27.2 PG (ref 27–31)
MCHC RBC AUTO-ENTMCNC: 31.5 % (ref 32–36)
MCV RBC AUTO: 86.4 FL (ref 78–100)
MONOCYTES ABSOLUTE: 1.3 K/CU MM
MONOCYTES RELATIVE PERCENT: 8.7 % (ref 0–4)
NUCLEATED RBC %: 0 %
PDW BLD-RTO: 14.6 % (ref 11.7–14.9)
PLATELET # BLD: 186 K/CU MM (ref 140–440)
PMV BLD AUTO: 9.9 FL (ref 7.5–11.1)
POTASSIUM SERPL-SCNC: 3.9 MMOL/L (ref 3.5–5.1)
PRO-BNP: 1119 PG/ML
RAPID INFLUENZA  B AGN: NEGATIVE
RAPID INFLUENZA A AGN: NEGATIVE
RBC # BLD: 3.6 M/CU MM (ref 4.2–5.4)
SARS-COV-2, NAAT: NOT DETECTED
SEGMENTED NEUTROPHILS ABSOLUTE COUNT: 11.8 K/CU MM
SEGMENTED NEUTROPHILS RELATIVE PERCENT: 79.1 % (ref 36–66)
SODIUM BLD-SCNC: 133 MMOL/L (ref 135–145)
SOURCE: NORMAL
TOTAL IMMATURE NEUTOROPHIL: 0.09 K/CU MM
TOTAL NUCLEATED RBC: 0 K/CU MM
TOTAL PROTEIN: 7.7 GM/DL (ref 6.4–8.2)
TROPONIN T: <0.01 NG/ML
WBC # BLD: 14.9 K/CU MM (ref 4–10.5)

## 2023-01-22 PROCEDURE — 71045 X-RAY EXAM CHEST 1 VIEW: CPT

## 2023-01-22 PROCEDURE — 81001 URINALYSIS AUTO W/SCOPE: CPT

## 2023-01-22 PROCEDURE — 2580000003 HC RX 258: Performed by: PHYSICIAN ASSISTANT

## 2023-01-22 PROCEDURE — 80053 COMPREHEN METABOLIC PANEL: CPT

## 2023-01-22 PROCEDURE — 85025 COMPLETE CBC W/AUTO DIFF WBC: CPT

## 2023-01-22 PROCEDURE — 87635 SARS-COV-2 COVID-19 AMP PRB: CPT

## 2023-01-22 PROCEDURE — 83880 ASSAY OF NATRIURETIC PEPTIDE: CPT

## 2023-01-22 PROCEDURE — 84484 ASSAY OF TROPONIN QUANT: CPT

## 2023-01-22 PROCEDURE — 6370000000 HC RX 637 (ALT 250 FOR IP): Performed by: PHYSICIAN ASSISTANT

## 2023-01-22 PROCEDURE — 96365 THER/PROPH/DIAG IV INF INIT: CPT

## 2023-01-22 PROCEDURE — 81003 URINALYSIS AUTO W/O SCOPE: CPT

## 2023-01-22 PROCEDURE — 93005 ELECTROCARDIOGRAM TRACING: CPT | Performed by: PHYSICIAN ASSISTANT

## 2023-01-22 PROCEDURE — 99285 EMERGENCY DEPT VISIT HI MDM: CPT

## 2023-01-22 PROCEDURE — 87804 INFLUENZA ASSAY W/OPTIC: CPT

## 2023-01-22 PROCEDURE — 83605 ASSAY OF LACTIC ACID: CPT

## 2023-01-22 RX ORDER — 0.9 % SODIUM CHLORIDE 0.9 %
1000 INTRAVENOUS SOLUTION INTRAVENOUS ONCE
Status: COMPLETED | OUTPATIENT
Start: 2023-01-22 | End: 2023-01-22

## 2023-01-22 RX ORDER — ACETAMINOPHEN 500 MG
1000 TABLET ORAL ONCE
Status: COMPLETED | OUTPATIENT
Start: 2023-01-22 | End: 2023-01-22

## 2023-01-22 RX ADMIN — ACETAMINOPHEN 1000 MG: 500 TABLET ORAL at 20:15

## 2023-01-22 RX ADMIN — SODIUM CHLORIDE 1000 ML: 9 INJECTION, SOLUTION INTRAVENOUS at 20:48

## 2023-01-22 ASSESSMENT — PAIN SCALES - GENERAL: PAINLEVEL_OUTOF10: 8

## 2023-01-22 ASSESSMENT — PAIN - FUNCTIONAL ASSESSMENT: PAIN_FUNCTIONAL_ASSESSMENT: 0-10

## 2023-01-22 ASSESSMENT — LIFESTYLE VARIABLES: HOW OFTEN DO YOU HAVE A DRINK CONTAINING ALCOHOL: NEVER

## 2023-01-23 VITALS
SYSTOLIC BLOOD PRESSURE: 132 MMHG | BODY MASS INDEX: 44.53 KG/M2 | DIASTOLIC BLOOD PRESSURE: 58 MMHG | HEIGHT: 62 IN | WEIGHT: 242 LBS | HEART RATE: 70 BPM | TEMPERATURE: 98.6 F | RESPIRATION RATE: 27 BRPM | OXYGEN SATURATION: 100 %

## 2023-01-23 LAB
BACTERIA: ABNORMAL /HPF
BILIRUBIN URINE: NEGATIVE MG/DL
BLOOD, URINE: ABNORMAL
CLARITY: ABNORMAL
COLOR: YELLOW
EKG ATRIAL RATE: 79 BPM
EKG DIAGNOSIS: NORMAL
EKG P AXIS: 23 DEGREES
EKG P-R INTERVAL: 158 MS
EKG Q-T INTERVAL: 424 MS
EKG QRS DURATION: 174 MS
EKG QTC CALCULATION (BAZETT): 486 MS
EKG R AXIS: -73 DEGREES
EKG T AXIS: 93 DEGREES
EKG VENTRICULAR RATE: 79 BPM
GLUCOSE, URINE: NEGATIVE MG/DL
KETONES, URINE: NEGATIVE MG/DL
LEUKOCYTE ESTERASE, URINE: ABNORMAL
NITRITE URINE, QUANTITATIVE: POSITIVE
PH, URINE: 6 (ref 5–8)
PROTEIN UA: 100 MG/DL
RBC URINE: ABNORMAL /HPF (ref 0–6)
SPECIFIC GRAVITY UA: 1.01 (ref 1–1.03)
TRICHOMONAS: ABNORMAL /HPF
UROBILINOGEN, URINE: 0.2 MG/DL (ref 0.2–1)
WBC CLUMP: ABNORMAL /HPF
WBC UA: 943 /HPF (ref 0–5)

## 2023-01-23 PROCEDURE — 2580000003 HC RX 258: Performed by: PHYSICIAN ASSISTANT

## 2023-01-23 PROCEDURE — 93010 ELECTROCARDIOGRAM REPORT: CPT | Performed by: INTERNAL MEDICINE

## 2023-01-23 PROCEDURE — 6360000002 HC RX W HCPCS: Performed by: PHYSICIAN ASSISTANT

## 2023-01-23 RX ORDER — CEPHALEXIN 500 MG/1
500 CAPSULE ORAL 3 TIMES DAILY
Qty: 21 CAPSULE | Refills: 0 | Status: SHIPPED | OUTPATIENT
Start: 2023-01-23 | End: 2023-01-30

## 2023-01-23 RX ADMIN — CEFTRIAXONE 1000 MG: 1 INJECTION, POWDER, FOR SOLUTION INTRAMUSCULAR; INTRAVENOUS at 01:51

## 2023-01-23 NOTE — ED NOTES
Report given to Nicola Villalba RN. Care transferred at this time.       Manuela Gutierrez RN  01/22/23 2052

## 2023-01-23 NOTE — ED NOTES
Pt O2 sat was 86-88 on room air while sleeping. This RN placed pt on 2 L NC and notified provider.       Nicci Khalil RN  01/22/23 4742

## 2023-01-23 NOTE — ED PROVIDER NOTES
Emergency [address] EMERGENCY DEPARTMENT    Patient: Мария Pope  MRN: [id number]  : 1950  Date of Evaluation: 2023  ED Provider: Sabrina Wynne PA-C    Chief Complaint       Chief Complaint   Patient presents with    Fever    Fatigue    Extremity Weakness    Generalized Body Aches       ZACKARY Pope is a 67 y.o. female who presents to the emergency department for generalized weakness, fatigue, body aches for the last 2-3 days. She has a fever upon arrival to the ED, has not checked it at home. She denies nasal congestion or cough but has had a sore throat. Denies cp or sob. Denies n/v/d. No known sick contacts. ROS     CONSTITUTIONAL:  + fever.  + body aches, fatigue, weakness. EYES:  Denies visual changes. HEAD:  Denies headache. ENT:  Denies earache, nasal congestion, + sore throat. NECK:  Denies neck pain. RESPIRATORY:  Denies any shortness of breath. CARDIOVASCULAR:  Denies chest pain. GI:  Denies nausea or vomiting. :  Denies urinary symptoms. MUSCULOSKELETAL:  Denies extremity pain or swelling. BACK:  Denies back pain. INTEGUMENT:  Denies skin changes. LYMPHATIC:  Denies lymphadenopathy. NEUROLOGIC:  Denies any numbness/tingling.     Past History     Past Medical History:   Diagnosis Date    Anemia     Anxiety     Arthritis     generalized    Asthma 2006    AV block     2nd degree per hospital in [id number]    Blood poisoning 1994    Blood transfusion     2003    Breast cancer ([facility] [address] 2012    right    CAD (coronary artery disease)     Cancer ([facility] [address]     skin (face) & colon(2012 dx of right breast ca(pc)    Carcinoma of breast ([facility] [address] 2012    right arm no b/p or sticks    Cardiac pacemaker 03/10/2014    Medtronic dual lead    Chronic cystitis     Chronic respiratory failure (HCC)     2 L/min oxygen at night-time    Colon cancer ([facility] [address]     COPD (chronic obstructive pulmonary disease) ([address]     CTS (carpal tunnel syndrome)     Depression     Diverticulitis     H/O 24 hour EKG monitoring 2/28/2014 7/24/13 2/14 complete heart block 7/13No clinically significant arrthymia noted. H/O cardiac catheterization 10/31/2011, 7/11/2007    10/31/2011-No significant CAD. Cardiolite stress test was false positive-Dr Vanesa Juarez; 7/11/2007-No CAD, global function intact;    H/O cardiovascular stress test 10/20/2011, 3/23/2010    10/20/2011-Lexiscan- Evid of mild ischemia in Left CX region. EF 70%. Global LVSF normal;    H/O cardiovascular stress test 01/07/2015    EF 77%. Normal Stress Test.    H/O chest pain 04/2005    sees Dr Vanesa Juarez    H/O Doppler ultrasound 02/20/2008 2/20/2008-CAROTID DOPPLER- Normal carotids bilaterally;    H/O Doppler ultrasound 06/06/2013    CAROTID DOPPLER- there is heterogeneous, irregular atherosclerotic plaque noted in the right internal carotid artery,  doppler flow velocities within the right internal carotid artery are elevated, consistent with a mild, less than 50%stenosis, there is intimal thickening but no significant atherosclerotic plaque noted in the left internal carotid artery, the left carotid are within normal limits    H/O echocardiogram 4/9/2012, 10/20/2011    4/9/2012-LVSF normal EF=>55%. impaired LV relaxation;    H/O echocardiogram 12/31/2014    EF 50-55%. LV shows mild concentric hypertrophy. Mildly dilated left atrium. Mildly dilated right ventricle. Sclerotic but not stenotic aortic valve. Mitral annular calcification.   Mild MR, Mild TR, Mild pulm HTN.    H/O urinary incontinence     History of blood transfusion     Hx antineoplastic chemo     Hx of Arterial Doppler ultrasound 07/01/2019    No hemodynamically significant or focal stenosis visualized bilaterally, bilateral lower extremity arteries exhibit diffuse plaque and multiphasic waveforms, unable to obtain bilateral ABIs due to elevated leg pressures >250 mmHg, possibly due to atherosclerosis    Hx of cardiovascular stress test 06/2013    EF 70% abn suggestive of anterolateral ischemia, possible RCA ischmeia, post left ventricular dilation suggestive multivessel CAD. Hx of echocardiogram 06/2013    EF50%, mild MR and TR, mild pulmonary HTN, mild AS    Hx of echocardiogram 11/01/2021    Left ventricular systolic function is normal, Mild concentric left ventricular hypertrophy Mildly dilated left atrium. Mitral annular calcification is present. Mild tricuspid regurgitation No evidence of any pericardial effusion    Hx of fall     \"last time 2018- due to neuropathy, have to use cane\"    [address] [place] [address] 7/24/13, 06/2013 7/13-No clinacally significant arrhythmia. 6/13-multiple cycles of wenckebach episodes in addtion to some sinus tach    Hyperlipidemia     Hypertension     Hypothermia 2008    Malignant neoplasm of breast (female) ([facility] [address] 2012    Neuropathy     \"have neuropathy of my legs\"    Normocytic normochromic anemia     Obstructive sleep apnea 2008 01- Has CPAP machine but has not used in over a year - states she has not had problems since her pacemaker was placed. Old MI (myocardial infarction)     per pt on 1/15/2019\"had heart attack about a year ago\"    Osteoarthritis     Osteopenia     Osteopenia of multiple sites 9/14/2022    Pneumonia due to COVID-19 virus 03/22/2021    Primary malignant neoplasm of colon ([facility] [address]     S/P cardiac cath 06/2013    no significant CAD false postive stress test    Skin cancer     Super obesity     Umbilical hernia      Past Surgical History:   Procedure Laterality Date    A-V CARDIAC PACEMAKER INSERTION  3/10/14     Medtronic.    Model:  [id number] Medtronic  Serial:  [id number] dual chamber pacemaker    APPENDECTOMY  2004    BREAST BIOPSY  2/13/12    BREAST LUMPECTOMY  2007    right     BREAST SURGERY  02/13/2012    rt lesion biopsy     CARDIAC CATHETERIZATION  2007 & 2011    CAROTID ENDARTERECTOMY Right 12/3/2021    RIGHT CAROTID ENDARTERECTOMY WITH PATCH performed by Alannah Us MD at [address] [facility]  2004    Colon cancer    COLONOSCOPY  10-18-13    polyp    COLONOSCOPY  1/19/2016    internal hemorrhoids, diverticulosis, 1.5 cm sessile polyp, 8 mm polyp found in rectum. COLONOSCOPY  12/14/2017    1.5cm residual polyp, 5mm polyps in blind sigmoid loop x3, Sigmoid divertics, Internal grade 1 hemorrhoids    COLONOSCOPY N/A 1/16/2019    COLONOSCOPY W/ ENDOSCOPIC MUCOSAL RESECTION WITH ELEVIEW 5ML INJECTION AND POLYPECTOMY OF TIP OF BLIND RECTOSIGMOID STUMP AND CAUTERIZATION WITH ERBE PROBE, CLIPPING X2 performed by Yadira Bond MD at [address] [place] [address]  01/21/2020    pan divertics/ 4 polyps/ sm int hem, S/P partial sigmoid resection w/ end to side anastamosis, repeat in 3 years    COLONOSCOPY N/A 1/21/2020    COLONOSCOPY POLYPECTOMY SNARE/COLD BIOPSY performed by Yadira Bond MD at [place] [address]  2003    back    CYSTOSCOPY Bilateral 12/15/14    CYSTOSCOPY Right 5/15/2022    CYSTOSCOPY URETERAL STENT INSERTION performed by Godfrey Chowdhury MD at [address] [place] [address]      front right tooth and root canal w/ brass bolt    DILATION AND CURETTAGE OF UTERUS  2006    Needed a camera to find my uterus.     DILATION AND CURETTAGE OF UTERUS N/A 5/24/2022    DILATATION AND CURETTAGE, CULTURES OF UTEREUS performed by Jose G Delgado MD at [address], COLON, DIAGNOSTIC  12/14/2017    Small hiatal hernia    HERNIA REPAIR  2004    Umb hernia    HERNIA REPAIR  2008    Inc hernia    KIDNEY SURGERY  05/24/2022    stent placed on right side    LITHOTRIPSY Right 8/1/2022    RIGHT CYSTOSCOPY URETEROSCOPY STONE MANIPULATION WITH HOLIUM LASER LITHOTRIPSY STENT REPLACEMENT performed by Cam Chau MD at [address]    LITHOTRIPSY Left 9/26/2022    LEFT CYSTOSCOPY URETEROSCOPY RETROGRADE [address] LITHOTRIPSY POSSIBLE [address],[facility] [address] performed by Sandra Valle Angela aTmez MD at [place] [address]  2/29/2012    Right axillary-3 sentinel nodes were positive out of 9. MASTECTOMY, RADICAL Right 02/29/2012    w/ sentinal node disection-Dr West    PACEMAKER PLACEMENT      PRE-MALIGNANT / BENIGN SKIN LESION EXCISION  2/8/2013    right leg X2-Dr West    TONSILLECTOMY  1957    TUNNELED VENOUS PORT PLACEMENT  2004    TUNNELED VENOUS PORT PLACEMENT  02/13/2012    removal of mediport     Social History     Socioeconomic History    Marital status:      Spouse name: None    Number of children: 2    Years of education: 12    Highest education level: 12th grade   Occupational History    Occupation: retired   Tobacco Use    Smoking status: Never    Smokeless tobacco: Never   Vaping Use    Vaping Use: Never used   Substance and Sexual Activity    Alcohol use: No     Comment:           CAFFEINE: 2- 12oz nona daily & 2 cups coffee some nights. Drug use: No    Sexual activity: Not Currently     Partners: Male   Social History Narrative    Do you donate blood or plasma? No    Caffeine intake? Moderate    Advance directive? No    Is blood transfusion acceptable in an emergency? Yes    Live alone or with others? With others    Able to care for self? Yes    No exercise at this time         Social Determinants of Health     Financial Resource Strain: Medium Risk    Difficulty of Paying Living Expenses: Somewhat hard   Food Insecurity: Food Insecurity Present    Worried About Running Out of Food in the Last Year: Sometimes true    Ran Out of Food in the Last Year: Sometimes true   Transportation Needs: Unmet Transportation Needs    Lack of Transportation (Medical): Yes    Lack of Transportation (Non-Medical): Yes   Physical Activity: Inactive    Days of Exercise per Week: 0 days    Minutes of Exercise per Session: 0 min   Stress: No Stress Concern Present    Feeling of Stress :  Only a little   Social Connections: Socially Isolated    Frequency of Communication with Friends and Family: Once a week    Frequency of Social Gatherings with Friends and Family: Once a week    Attends Rastafarian Services: Never    Active Member of Clubs or Organizations: No    Attends Club or Organization Meetings: Never    Marital Status:    Housing Stability: High Risk    Unable to Pay for Housing in the Last Year: Yes    Number of [place] in the Last Year: 1    Unstable Housing in the Last Year: No       Medications/Allergies     Discharge Medication List as of 1/23/2023  3:21 AM        CONTINUE these medications which have NOT CHANGED    Details   potassium chloride (KLOR-CON M) 10 MEQ extended release tablet Take 1 tablet by mouth daily as needed (take only when you need Lasix), Disp-90 tablet, R-0Normal      simvastatin (ZOCOR) 20 MG tablet TAKE ONE (1) TABLET BY MOUTH NIGHTLY, Disp-90 tablet, R-0Normal      furosemide (LASIX) 20 MG tablet Take 1 tablet by mouth daily as needed (for edema), Disp-90 tablet, R-0Normal      acetaminophen (TYLENOL) 500 MG tablet Take 1 tablet by mouth every 6 hours as needed for Pain, Disp-120 tablet, R-3NO PRINT      Misc. Devices (TRANSPORT CHAIR) MISC Disp-1 each, R-0, PrintUse as directed      aspirin 81 MG chewable tablet Take 1 tablet by mouth daily, Disp-30 tablet, R-0NO PRINT      letrozole (FEMARA) 2.5 MG tablet Take 1 tablet by mouth nightly, Disp-30 tablet, R-5Normal      fluticasone (FLOVENT HFA) 110 MCG/ACT inhaler Inhale 2 puffs into the lungs 2 times daily, Disp-1 Inhaler, R-5Normal      albuterol sulfate HFA (PROAIR HFA) 108 (90 Base) MCG/ACT inhaler INHALE 2 PUFFS BY MOUTH EVERY SIX HOURS AS NEEDED FOR WHEEZING, Disp-1 Inhaler, R-5Normal           Allergies   Allergen Reactions    Bactrim [Sulfamethoxazole-Trimethoprim] Anaphylaxis and Hives    Lipitor [Atorvastatin] Shortness Of Breath    Taxol [Paclitaxel] Anaphylaxis and Swelling    Aminoglycosides      Abstracted from [facility] patient chart.     Demerol Nausea Only    Neosporin [Bacitracin-Neomycin-Polymyxin] Rash and Other (See Comments)     hotness    Other Rash     Ivory Soap    Talc Other (See Comments)     blisters        Physical Exam       ED Triage Vitals [01/22/23 1834]   BP Temp Temp Source Heart Rate Resp SpO2 Height Weight   (!) 138/102 (!) 101 °F (38.3 °C) Oral 81 24 98 % 5' 2\" (1.575 m) 242 lb (109.8 kg)     GENERAL APPEARANCE:  Well-developed, well-nourished, no acute distress. HEAD:  NC/AT. EYES:  Sclera anicteric. ENT:  Ears, nose, mouth normal.     NECK:  Supple. CARDIO:  RRR. LUNGS:   CTAB. Respirations unlabored. ABDOMEN:  Soft, non-distended, non-tender. BS active. BACK:  No midline thoracic or lumbar spinal tenderness. EXTREMITIES:  No acute deformities. SKIN:  Warm and dry. NEUROLOGICAL:  Alert and oriented. PSYCHIATRIC:  Normal mood.      Diagnostics     Labs:  Results for orders placed or performed during the hospital encounter of 01/22/23   Rapid Flu Swab    Specimen: Nasopharyngeal   Result Value Ref Range    Rapid Influenza A Ag NEGATIVE NEGATIVE    Rapid Influenza B Ag NEGATIVE NEGATIVE   COVID-19, Rapid    Specimen: Nasopharyngeal   Result Value Ref Range    Source UNKNOWN     SARS-CoV-2, NAAT NOT DETECTED NOT DETECTED   CMP   Result Value Ref Range    Sodium 133 (L) 135 - 145 MMOL/L    Potassium 3.9 3.5 - 5.1 MMOL/L    Chloride 96 (L) 99 - 110 mMol/L    CO2 25 21 - 32 MMOL/L    BUN 14 6 - 23 MG/DL    Creatinine 0.8 0.6 - 1.1 MG/DL    Est, Glom Filt Rate >60 >60 mL/min/1.73m2    Glucose 102 (H) 70 - 99 MG/DL    Calcium 9.2 8.3 - 10.6 MG/DL    Albumin 3.9 3.4 - 5.0 GM/DL    Total Protein 7.7 6.4 - 8.2 GM/DL    Total Bilirubin 0.7 0.0 - 1.0 MG/DL    ALT 11 10 - 40 U/L    AST 14 (L) 15 - 37 IU/L    Alkaline Phosphatase 126 40 - 129 IU/L    Anion Gap 12 4 - 16   Lactic Acid   Result Value Ref Range    Lactate 1.3 0.5 - 1.9 mMOL/L   Urinalysis   Result Value Ref Range    Color, UA YELLOW YELLOW    Clarity, UA TURBID (A) CLEAR    Glucose, Urine NEGATIVE NEGATIVE MG/DL    Bilirubin Urine NEGATIVE NEGATIVE MG/DL    Ketones, Urine NEGATIVE NEGATIVE MG/DL    Specific Gravity, UA 1.010 1.001 - 1.035    Blood, Urine LARGE NUMBER OR AMOUNT OBSERVED (A) NEGATIVE    pH, Urine 6.0 5.0 - 8.0    Protein,  (A) NEGATIVE MG/DL    Urobilinogen, Urine 0.2 0.2 - 1.0 MG/DL    Nitrite Urine, Quantitative POSITIVE (A) NEGATIVE    Leukocyte Esterase, Urine LARGE NUMBER OR AMOUNT OBSERVED (A) NEGATIVE   Brain Natriuretic Peptide   Result Value Ref Range    Pro-BNP 1,119 (H) <300 PG/ML   Troponin   Result Value Ref Range    Troponin T <0.010 <0.01 NG/ML   CBC with Auto Differential   Result Value Ref Range    WBC 14.9 (H) 4.0 - 10.5 K/CU MM    RBC 3.60 (L) 4.2 - 5.4 M/CU MM    Hemoglobin 9.8 (L) 12.5 - 16.0 GM/DL    Hematocrit 31.1 (L) 37 - 47 %    MCV 86.4 78 - 100 FL    MCH 27.2 27 - 31 PG    MCHC 31.5 (L) 32.0 - 36.0 %    RDW 14.6 11.7 - 14.9 %    Platelets 200 796 - 436 K/CU MM    MPV 9.9 7.5 - 11.1 FL    Differential Type AUTOMATED DIFFERENTIAL     Segs Relative 79.1 (H) 36 - 66 %    Lymphocytes % 11.4 (L) 24 - 44 %    Monocytes % 8.7 (H) 0 - 4 %    Eosinophils % 0.0 0 - 3 %    Basophils % 0.2 0 - 1 %    Segs Absolute 11.8 K/CU MM    Lymphocytes Absolute 1.7 K/CU MM    Monocytes Absolute 1.3 K/CU MM    Eosinophils Absolute 0.0 K/CU MM    Basophils Absolute 0.0 K/CU MM    Nucleated RBC % 0.0 %    Total Nucleated RBC 0.0 K/CU MM    Total Immature Neutrophil 0.09 K/CU MM    Immature Neutrophil % 0.6 (H) 0 - 0.43 %   Microscopic Urinalysis   Result Value Ref Range    RBC, UA NONE SEEN 0 - 6 /HPF    WBC,  (H) 0 - 5 /HPF    Bacteria, UA MANY (A) NEGATIVE /HPF    WBC Clumps, UA MANY /HPF    Trichomonas, UA NONE SEEN NONE SEEN /HPF   EKG 12 Lead   Result Value Ref Range    Ventricular Rate 79 BPM    Atrial Rate 79 BPM    P-R Interval 158 ms    QRS Duration 174 ms    Q-T Interval 424 ms    QTc Calculation (Bazett) 486 ms    P Axis 23 degrees    R Axis -73 degrees    T Axis 93 degrees    Diagnosis       Atrial-sensed ventricular-paced rhythm  Abnormal ECG  When compared with ECG of 13-OCT-2022 20:58,  Vent. rate has increased BY  12 BPM  Confirmed by MARY Henry ([id number]) on 1/23/2023 7:37:52 PM       Radiographs:  XR CHEST PORTABLE    Result Date: 1/22/2023  EXAMINATION: ONE XRAY VIEW OF THE CHEST 1/22/2023 3:54 pm COMPARISON: 09/18/2022 and 10/08/2022. HISTORY: ORDERING SYSTEM PROVIDED HISTORY: general weakness TECHNOLOGIST PROVIDED HISTORY: Reason for exam:->general weakness Reason for Exam: Fatigue; Emesis Additional signs and symptoms: Fever; Fatigue; Extremity Weakness; Generalized Body Aches FINDINGS: There is no confluent consolidation or effusion. There is cardiomegaly and mild vascular congestion. There is no septal thickening. A dual lead pacemaker appears unchanged. 1. Cardiomegaly and mild vascular congestion without evidence of interstitial edema. 2. No confluent airspace consolidation. Procedures/EKG:   Please see attending physician's note for interpretation. ED Course and MDM     CC/HPI Summary, DDx, ED Course, and Reassessment:  Patient presents to the ED with chief complaint of of feeling generally unwell. Differential diagnosis includes but is not limited to infectious process, ACS, electrolyte abnormality, others. Labs and imaging ordered on initial evaluation. Results were discussed with patient. WBC count is 14,900. Lactic is WNL. UA nitrite positive with 943 WBC, many bacteria. COVID and flu negative. CXR with cardiomegaly, no airspace disease. Upon re-examination, she is feeling much better. Temperature improved with Tylenol. She would like to try outpatient management. Will dc with Keflex. FU closely with PCP or return for new/worsening symptoms. She is agreeable with plan of care and disposition.     History from : Patient    Limitations to history : None    Patient was given the following medications:  Medications acetaminophen (TYLENOL) tablet 1,000 mg (1,000 mg Oral Given 1/22/23 2015)   0.9 % sodium chloride bolus (0 mLs IntraVENous Stopped 1/22/23 8708)   cefTRIAXone (ROCEPHIN) 1,000 mg in dextrose 5 % 50 mL IVPB mini-bag (0 mg IntraVENous Stopped 1/23/23 0320)       Independent Imaging Interpretation by me:  None    EKG (if obtained):  Please see supervising physician's note for interpretation. Chronic conditions affecting care: CAD, COPD    Discussion with Other Profesionals : None    Social Determinants : None    Records Reviewed : Outpatient Notes with PCP    Disposition Considerations (tests considered but not done, Shared Decision Making, Pt Expectation of Test or Tx.):   Discussed possible admission with patient but she elected for outpatient management as discussed above. I am the Primary Clinician of Record. Supervising physician was Dr. Kylah Hwang. Patient was seen independently. In light of current events, I did utilize appropriate PPE (including N95 and surgical face mask, safety glasses, and gloves, as recommended by the health facility/national standard best practice, during my bedside interactions with the patient. Final Impression      1.  Urinary tract infection without hematuria, site unspecified          DISPOSITION Decision To Discharge 01/23/2023 04:08:55 AM      [address] Delmy Calero PA-C  [address] [place] [address]  01/24/23 1975

## 2023-01-23 NOTE — ED NOTES
Discharge packet reviewed with pt, including prescriptions. Pt verbalized understanding and denies questions.       Patsy Castro RN  01/23/23 8883

## 2023-01-24 ENCOUNTER — TELEPHONE (OUTPATIENT)
Dept: INTERNAL MEDICINE CLINIC | Age: 73
End: 2023-01-24

## 2023-01-24 ENCOUNTER — CARE COORDINATION (OUTPATIENT)
Dept: CARE COORDINATION | Age: 73
End: 2023-01-24
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
## 2023-01-24 NOTE — TELEPHONE ENCOUNTER
Patient called just wanting to give Torrie Juarez an FYI that she was released from the [facility] on Monday 1/23 for UTI and is now home.

## 2023-01-26 ENCOUNTER — CARE COORDINATION (OUTPATIENT)
Dept: CASE MANAGEMENT | Age: 73
End: 2023-01-26

## 2023-01-26 NOTE — CARE COORDINATION
Remote Patient Monitoring Note      Date/Time:  1/26/2023 4:20 PM    LPN reviewed patients reported daily Remote Patient Monitoring metrics. All reported metrics are within alert parameters. Plan/Follow Up:  Will continue to review, monitor and address alerts with follow up based on severity of symptoms and risk factors  Current Patient Metrics ---- Blood Pressure: 155/72, 62bpm Pulseox: 97%, 65bpm Survey: C Weight: 242.5lbs Note Created at: 01/26/2023 04:21 PM ET ---- Time-Spent: 2 minutes 0 seconds

## 2023-01-27 ENCOUNTER — CARE COORDINATION (OUTPATIENT)
Dept: CASE MANAGEMENT | Age: 73
End: 2023-01-27

## 2023-01-27 NOTE — CARE COORDINATION
Remote Patient Monitoring Note      Date/Time:  2023 3:53 PM    LPN reviewed patients reported daily Remote Patient Monitoring metrics. All reported metrics are within alert parameters. Plan/Follow Up: Will continue to review, monitor and address alerts with follow up based on severity of symptoms and risk factors. Remote Patient Monitoring Welcome Note      Date/Time:  2023 3:54 PM     Verified patients name and  as identifiers. Completed and confirmed the following:     Emergency Contact: Emergency Contact : Heather Sparks [phone]    [x] Patient received all RPM equipment (tablet, scale, blood pressure device and cuff, and pulse oximeter)  Cuff Size: Small  []  Regular   [x]  Large  []   Weight Scale: Regular   [x]  Bariatric  []               [x] Instructed patient keep box for use when returning equipment                                                          [x] Reviewed Patient Welcome Letter with patient                         [x] Reviewed expectations for patient and care team  [x] Reviewed RPM consent form         [x] Instructed patient to keep scale on flat surface                                                         [x] Instructed patient to keep tablet plugged in at all times                         [x] Instructed how to contact IT support (number listed on welcome letter)  [x] Provided Remote Patient Monitoring care  information                 All questions answered at this time.     Current Patient Metrics ---- Blood Pressure: 152/81, 60bpm Pulseox: 94%, 60bpm Survey: C Weight: 240.5lbs Note Created at: 2023 03:54 PM ET ---- Time-Spent: 6 minutes 0 seconds

## 2023-01-30 ENCOUNTER — CARE COORDINATION (OUTPATIENT)
Dept: CARE COORDINATION | Age: 73
End: 2023-01-30

## 2023-01-31 ENCOUNTER — CARE COORDINATION (OUTPATIENT)
Dept: CARE COORDINATION | Age: 73
End: 2023-01-31

## 2023-02-01 ENCOUNTER — CARE COORDINATION (OUTPATIENT)
Dept: CARE COORDINATION | Age: 73
End: 2023-02-01

## 2023-02-01 NOTE — CARE COORDINATION
Remote Patient Monitoring Note      Date/Time:  2/1/2023 3:27 PM    EMTP reviewed patients reported daily Remote Patient Monitoring metrics. Patient has not updated daily metrics at this time. Plan/Follow Up:  Will continue to review, monitor and address alerts with follow up based on severity of symptoms and risk factors-- Current Patient Metrics ---- Blood Pressure: -/-, -bpm Pulseox: -%, -bpm Survey: - Weight: -lbs Note Created at: 02/01/2023 03:28 PM ET ---- Time-Spent: 2 minutes 0 seconds

## 2023-02-02 ENCOUNTER — CARE COORDINATION (OUTPATIENT)
Dept: CARE COORDINATION | Age: 73
End: 2023-02-02

## 2023-02-02 ENCOUNTER — TELEPHONE (OUTPATIENT)
Dept: CARDIOLOGY CLINIC | Age: 73
End: 2023-02-02

## 2023-02-02 NOTE — CARE COORDINATION
Remote Patient Monitoring Note      Date/Time:  2/2/2023 12:06 PM    CCSS reviewed patients reported daily Remote Patient Monitoring metrics. All reported metrics are within alert parameters. Plan/Follow Up:  Will continue to review, monitor and address alerts with follow up based on severity of symptoms and risk factors  Current Patient Metrics ---- Blood Pressure: 146/74, 60bpm Pulseox: 96%, 60bpm Survey: C Weight: 243.5lbs Note Created at: 02/02/2023 12:07 PM ET ---- Time-Spent: 2 minutes 0 seconds

## 2023-02-02 NOTE — TELEPHONE ENCOUNTER
Spoke to pt regarding missed [employer] transmission. Needs new device. Given [employer] get connected number. Will send when able.

## 2023-02-03 ENCOUNTER — CARE COORDINATION (OUTPATIENT)
Dept: CARE COORDINATION | Age: 73
End: 2023-02-03

## 2023-02-03 NOTE — CARE COORDINATION
Remote Patient Monitoring Note      Date/Time:  2/3/2023 11:49 AM    CCSS reviewed patients reported daily Remote Patient Monitoring metrics. All reported metrics are within alert parameters.     Plan/Follow Up: Will continue to review, monitor and address alerts with follow up based on severity of symptoms and risk factors  Current Patient Metrics ---- Blood Pressure: 144/66, 61bpm Pulseox: 95%, 60bpm Survey: C Weight: 245.0lbs Note Created at: 02/03/2023 11:50 AM ET ---- Time-Spent: 2 minutes 0 seconds

## 2023-02-06 ENCOUNTER — CARE COORDINATION (OUTPATIENT)
Dept: CASE MANAGEMENT | Age: 73
End: 2023-02-06

## 2023-02-06 NOTE — CARE COORDINATION
Remote Alert Monitoring Note      Date/Time:  2023 12:03 PM    LPN contacted patient by telephone regarding red alert received for weight increase (6# over night). Verified patients name and  as identifiers. Background: HTN  Refer to 911 immediately if:  Patient unresponsive or unable to provide history  Change in cognition or sudden confusion  Patient unable to respond in complete sentences  Intense chest pain/tightness  Any concern for any clinical emergency  Red Alert: Provider response time of 1 hr required for any red alert requiring intervention  Yellow Alert: Provider response time of 3hr required for any escalated yellow alert    BP Triage  Are you having any Chest Pain? no   Are you having any Shortness of Breath? no   Do you have a headache or have any vision changes? no   Are you having any numbness or tingling? no   Are you having any other health concerns or issues? no       Clinical Interventions: Reviewed and followed up on alerts and treatments-Patient has weight gain of 6# overnight. Denies Swelling, SOB, CP, Increase NA intake, scale problems. Didn't void this morning and was wearing a diaper when weighed. Re-weighed and was 244. No further action needed at this time. Education of patient/family/caregiver/guardian to support self-management-    Instructed patient on the importance of weighing daily, in the morning after urinating, Same Clothing on, NO shoes, Scale on Flat/hard surface, and that if the patient has weight gain of 3# over night or 5# weight gain in a week to call their physician immediately and report. Plan/Follow Up: Will continue to review, monitor and address alerts with follow up based on severity of symptoms and risk factors.     Current Patient Metrics ---- Blood Pressure: 152/72, 66bpm Pulseox: 94%, 66bpm Survey: - Weight: 244.0lbs Note Created at: 2023 12:28 PM ET ---- Time-Spent: 24 minutes 0 seconds

## 2023-02-07 ENCOUNTER — CARE COORDINATION (OUTPATIENT)
Dept: CARE COORDINATION | Age: 73
End: 2023-02-07

## 2023-02-07 NOTE — CARE COORDINATION
Remote Patient Monitoring Note      Date/Time:  2/7/2023 10:23 AM    EMTP reviewed patients reported daily Remote Patient Monitoring metrics. All reported metrics are within alert parameters. Plan/Follow Up:  Will continue to review, monitor and address alerts with follow up based on severity of symptoms and risk factors--- Current Patient Metrics ---- Blood Pressure: 159/78, 62bpm Pulseox: 97%, 62bpm Survey: - Weight: 243.5lbs Note Created at: 02/07/2023 10:23 AM ET ---- Time-Spent: 2 minutes 0 seconds

## 2023-02-08 ENCOUNTER — CARE COORDINATION (OUTPATIENT)
Dept: CARE COORDINATION | Age: 73
End: 2023-02-08

## 2023-02-08 NOTE — CARE COORDINATION
Remote Patient Monitoring Note      Date/Time:  2/8/2023 1:34 PM    EMTP reviewed patients reported daily Remote Patient Monitoring metrics. All reported metrics are within alert parameters. Plan/Follow Up:  Will continue to review, monitor and address alerts with follow up based on severity of symptoms and risk factors--- Current Patient Metrics ---- Blood Pressure: 140/70, 59bpm Pulseox: 96%, 60bpm Survey: - Weight: 243.5lbs Note Created at: 02/08/2023 01:34 PM ET ---- Time-Spent: 2 minutes 0 seconds

## 2023-02-09 ENCOUNTER — CARE COORDINATION (OUTPATIENT)
Dept: CARE COORDINATION | Age: 73
End: 2023-02-09

## 2023-02-09 NOTE — CARE COORDINATION
Remote Patient Monitoring Note      Date/Time:  2/9/2023 12:59 PM    EMTP reviewed patients reported daily Remote Patient Monitoring metrics. All reported metrics are within alert parameters. Plan/Follow Up:  Will continue to review, monitor and address alerts with follow up based on severity of symptoms and risk factors--- Current Patient Metrics ---- Blood Pressure: 152/76, 60bpm Pulseox: 93%, 64bpm Survey: - Weight: 244.0lbs Note Created at: 02/09/2023 12:59 PM ET ---- Time-Spent: 2 minutes 0 seconds

## 2023-02-10 ENCOUNTER — CARE COORDINATION (OUTPATIENT)
Dept: CARE COORDINATION | Age: 73
End: 2023-02-10

## 2023-02-10 NOTE — CARE COORDINATION
Remote Patient Monitoring Note      Date/Time:  2/10/2023 10:43 AM    CCSS reviewed patients reported daily Remote Patient Monitoring metrics. All reported metrics are within alert parameters. Plan/Follow Up:  Will continue to review, monitor and address alerts with follow up based on severity of symptoms and risk factors  Current Patient Metrics ---- Blood Pressure: 136/66, 63bpm Pulseox: 92%, 67bpm Survey: - Weight: 243.5lbs Note Created at: 02/10/2023 10:44 AM ET ---- Time-Spent: 2 minutes 0 seconds

## 2023-02-13 ENCOUNTER — CARE COORDINATION (OUTPATIENT)
Dept: CASE MANAGEMENT | Age: 73
End: 2023-02-13

## 2023-02-13 NOTE — CARE COORDINATION
Remote Patient Monitoring Note      Date/Time:  2/13/2023 10:36 AM    LPN reviewed patients reported daily Remote Patient Monitoring metrics. All reported metrics are within alert parameters. Plan/Follow Up: Will continue to review, monitor and address alerts with follow up based on severity of symptoms and risk factors.     Current Patient Metrics ---- Blood Pressure: 136/63, 68bpm Pulseox: 94%, 66bpm Survey: - Weight: 245.5lbs Note Created at: 02/13/2023 10:37 AM ET ---- Time-Spent: 2 minutes 0 seconds

## 2023-02-14 ENCOUNTER — CARE COORDINATION (OUTPATIENT)
Dept: CARE COORDINATION | Age: 73
End: 2023-02-14

## 2023-02-14 NOTE — CARE COORDINATION
Remote Patient Monitoring Note      Date/Time:  2/14/2023 12:53 PM    EMTP reviewed patients reported daily Remote Patient Monitoring metrics. All reported metrics are within alert parameters. Plan/Follow Up:  Will continue to review, monitor and address alerts with follow up based on severity of symptoms and risk factors--- Current Patient Metrics ---- Blood Pressure: 159/76, 59bpm Pulseox: 96%, 60bpm Survey: C Weight: 244.5lbs Note Created at: 02/14/2023 12:54 PM ET ---- Time-Spent: 2 minutes 0 seconds

## 2023-02-15 ENCOUNTER — CARE COORDINATION (OUTPATIENT)
Dept: CASE MANAGEMENT | Age: 73
End: 2023-02-15

## 2023-02-15 NOTE — CARE COORDINATION
Remote Patient Monitoring Note      Date/Time:  2/15/2023 1:25 PM    LPN reviewed patients reported daily Remote Patient Monitoring metrics. All reported metrics are within alert parameters. Plan/Follow Up: Will continue to review, monitor and address alerts with follow up based on severity of symptoms and risk factors.     Current Patient Metrics ---- Blood Pressure: 152/68, 59bpm Pulseox: 95%, 65bpm Survey: - Weight: 244.0lbs Note Created at: 02/15/2023 01:25 PM ET ---- Time-Spent: 2 minutes 0 seconds

## 2023-02-16 ENCOUNTER — CARE COORDINATION (OUTPATIENT)
Dept: CARE COORDINATION | Age: 73
End: 2023-02-16

## 2023-02-16 ENCOUNTER — CARE COORDINATION (OUTPATIENT)
Dept: CASE MANAGEMENT | Age: 73
End: 2023-02-16

## 2023-02-16 NOTE — CARE COORDINATION
Remote Patient Monitoring Note      Date/Time:  2/16/2023 11:15 AM    LPN reviewed patients reported daily Remote Patient Monitoring metrics. All reported metrics are within alert parameters. Plan/Follow Up:  Will continue to review, monitor and address alerts with follow up based on severity of symptoms and risk factors     Current Patient Metrics ---- Blood Pressure: 146/68, 65bpm Pulseox: 96%, 66bpm Survey: - Weight: 246.0lbs Note Created at: 02/16/2023 11:15 AM ET ---- Time-Spent: 2 minutes 0 seconds

## 2023-02-17 ENCOUNTER — CARE COORDINATION (OUTPATIENT)
Dept: CARE COORDINATION | Age: 73
End: 2023-02-17

## 2023-02-17 NOTE — CARE COORDINATION
Remote Patient Monitoring Note      Date/Time:  2/17/2023 2:14 PM    CCSS reviewed patients reported daily Remote Patient Monitoring metrics. Patient has not updated daily metrics at this time. Plan/Follow Up:  Will continue to review, monitor and address alerts with follow up based on severity of symptoms and risk factors

## 2023-02-20 ENCOUNTER — APPOINTMENT (OUTPATIENT)
Dept: CT IMAGING | Age: 73
DRG: 287 | End: 2023-02-20
Payer: MEDICARE

## 2023-02-20 ENCOUNTER — APPOINTMENT (OUTPATIENT)
Dept: GENERAL RADIOLOGY | Age: 73
DRG: 287 | End: 2023-02-20
Payer: MEDICARE

## 2023-02-20 ENCOUNTER — HOSPITAL ENCOUNTER (INPATIENT)
Age: 73
LOS: 2 days | Discharge: NURSING, REHAB OR LONG-TERM CARE | DRG: 287 | End: 2023-02-24
Attending: STUDENT IN AN ORGANIZED HEALTH CARE EDUCATION/TRAINING PROGRAM | Admitting: INTERNAL MEDICINE
Payer: MEDICARE

## 2023-02-20 ENCOUNTER — CARE COORDINATION (OUTPATIENT)
Dept: CARE COORDINATION | Age: 73
End: 2023-02-20

## 2023-02-20 ENCOUNTER — APPOINTMENT (OUTPATIENT)
Dept: ULTRASOUND IMAGING | Age: 73
DRG: 287 | End: 2023-02-20
Payer: MEDICARE

## 2023-02-20 DIAGNOSIS — R07.9 CHEST PAIN WITH MODERATE RISK FOR CARDIAC ETIOLOGY: Primary | ICD-10-CM

## 2023-02-20 DIAGNOSIS — G44.209 ACUTE NON INTRACTABLE TENSION-TYPE HEADACHE: ICD-10-CM

## 2023-02-20 LAB
ALBUMIN SERPL-MCNC: 3.8 GM/DL (ref 3.4–5)
ALP BLD-CCNC: 150 IU/L (ref 40–129)
ALT SERPL-CCNC: 8 U/L (ref 10–40)
ANION GAP SERPL CALCULATED.3IONS-SCNC: 9 MMOL/L (ref 4–16)
AST SERPL-CCNC: 14 IU/L (ref 15–37)
BASOPHILS ABSOLUTE: 0 K/CU MM
BASOPHILS RELATIVE PERCENT: 0.4 % (ref 0–1)
BILIRUB SERPL-MCNC: 0.2 MG/DL (ref 0–1)
BUN SERPL-MCNC: 13 MG/DL (ref 6–23)
CALCIUM SERPL-MCNC: 9.5 MG/DL (ref 8.3–10.6)
CHLORIDE BLD-SCNC: 101 MMOL/L (ref 99–110)
CO2: 27 MMOL/L (ref 21–32)
CREAT SERPL-MCNC: 1.1 MG/DL (ref 0.6–1.1)
DIFFERENTIAL TYPE: ABNORMAL
EOSINOPHILS ABSOLUTE: 0.3 K/CU MM
EOSINOPHILS RELATIVE PERCENT: 2.7 % (ref 0–3)
GFR SERPL CREATININE-BSD FRML MDRD: 53 ML/MIN/1.73M2
GLUCOSE SERPL-MCNC: 115 MG/DL (ref 70–99)
HCT VFR BLD CALC: 35.7 % (ref 37–47)
HEMOGLOBIN: 11.2 GM/DL (ref 12.5–16)
IMMATURE NEUTROPHIL %: 0.6 % (ref 0–0.43)
LIPASE: 16 IU/L (ref 13–60)
LYMPHOCYTES ABSOLUTE: 2.5 K/CU MM
LYMPHOCYTES RELATIVE PERCENT: 22.9 % (ref 24–44)
MAGNESIUM: 1.8 MG/DL (ref 1.8–2.4)
MCH RBC QN AUTO: 27.3 PG (ref 27–31)
MCHC RBC AUTO-ENTMCNC: 31.4 % (ref 32–36)
MCV RBC AUTO: 87.1 FL (ref 78–100)
MONOCYTES ABSOLUTE: 0.6 K/CU MM
MONOCYTES RELATIVE PERCENT: 5.8 % (ref 0–4)
NUCLEATED RBC %: 0 %
PDW BLD-RTO: 14.8 % (ref 11.7–14.9)
PLATELET # BLD: 276 K/CU MM (ref 140–440)
PMV BLD AUTO: 9.8 FL (ref 7.5–11.1)
POTASSIUM SERPL-SCNC: 3.8 MMOL/L (ref 3.5–5.1)
PRO-BNP: 733.1 PG/ML
RBC # BLD: 4.1 M/CU MM (ref 4.2–5.4)
SEGMENTED NEUTROPHILS ABSOLUTE COUNT: 7.3 K/CU MM
SEGMENTED NEUTROPHILS RELATIVE PERCENT: 67.6 % (ref 36–66)
SODIUM BLD-SCNC: 137 MMOL/L (ref 135–145)
TOTAL IMMATURE NEUTOROPHIL: 0.07 K/CU MM
TOTAL NUCLEATED RBC: 0 K/CU MM
TOTAL PROTEIN: 6.5 GM/DL (ref 6.4–8.2)
TROPONIN T: <0.01 NG/ML
TROPONIN T: <0.01 NG/ML
WBC # BLD: 10.9 K/CU MM (ref 4–10.5)

## 2023-02-20 PROCEDURE — 93880 EXTRACRANIAL BILAT STUDY: CPT

## 2023-02-20 PROCEDURE — 70450 CT HEAD/BRAIN W/O DYE: CPT

## 2023-02-20 PROCEDURE — 6370000000 HC RX 637 (ALT 250 FOR IP): Performed by: STUDENT IN AN ORGANIZED HEALTH CARE EDUCATION/TRAINING PROGRAM

## 2023-02-20 PROCEDURE — 84484 ASSAY OF TROPONIN QUANT: CPT

## 2023-02-20 PROCEDURE — G0378 HOSPITAL OBSERVATION PER HR: HCPCS

## 2023-02-20 PROCEDURE — 85025 COMPLETE CBC W/AUTO DIFF WBC: CPT

## 2023-02-20 PROCEDURE — 85730 THROMBOPLASTIN TIME PARTIAL: CPT

## 2023-02-20 PROCEDURE — 71045 X-RAY EXAM CHEST 1 VIEW: CPT

## 2023-02-20 PROCEDURE — 99285 EMERGENCY DEPT VISIT HI MDM: CPT

## 2023-02-20 PROCEDURE — 85610 PROTHROMBIN TIME: CPT

## 2023-02-20 PROCEDURE — 83880 ASSAY OF NATRIURETIC PEPTIDE: CPT

## 2023-02-20 PROCEDURE — 83735 ASSAY OF MAGNESIUM: CPT

## 2023-02-20 PROCEDURE — 6360000002 HC RX W HCPCS

## 2023-02-20 PROCEDURE — 93005 ELECTROCARDIOGRAM TRACING: CPT | Performed by: STUDENT IN AN ORGANIZED HEALTH CARE EDUCATION/TRAINING PROGRAM

## 2023-02-20 PROCEDURE — 80053 COMPREHEN METABOLIC PANEL: CPT

## 2023-02-20 PROCEDURE — 83690 ASSAY OF LIPASE: CPT

## 2023-02-20 RX ORDER — FLUTICASONE PROPIONATE 110 UG/1
2 AEROSOL, METERED RESPIRATORY (INHALATION) 2 TIMES DAILY
Status: DISCONTINUED | OUTPATIENT
Start: 2023-02-21 | End: 2023-02-24 | Stop reason: HOSPADM

## 2023-02-20 RX ORDER — ASPIRIN 81 MG/1
TABLET, CHEWABLE ORAL
Status: DISPENSED
Start: 2023-02-20 | End: 2023-02-21

## 2023-02-20 RX ORDER — NITROGLYCERIN 0.4 MG/1
0.4 TABLET SUBLINGUAL EVERY 5 MIN PRN
Status: DISCONTINUED | OUTPATIENT
Start: 2023-02-20 | End: 2023-02-24 | Stop reason: HOSPADM

## 2023-02-20 RX ORDER — ASPIRIN 325 MG
162 TABLET ORAL ONCE
Status: DISCONTINUED | OUTPATIENT
Start: 2023-02-20 | End: 2023-02-20

## 2023-02-20 RX ORDER — SODIUM CHLORIDE 9 MG/ML
INJECTION, SOLUTION INTRAVENOUS PRN
Status: DISCONTINUED | OUTPATIENT
Start: 2023-02-20 | End: 2023-02-24 | Stop reason: HOSPADM

## 2023-02-20 RX ORDER — ASPIRIN 81 MG/1
324 TABLET, CHEWABLE ORAL ONCE
Status: COMPLETED | OUTPATIENT
Start: 2023-02-20 | End: 2023-02-20

## 2023-02-20 RX ORDER — ATORVASTATIN CALCIUM 10 MG/1
10 TABLET, FILM COATED ORAL DAILY
Status: DISCONTINUED | OUTPATIENT
Start: 2023-02-21 | End: 2023-02-20

## 2023-02-20 RX ORDER — ACETAMINOPHEN 650 MG/1
650 SUPPOSITORY RECTAL EVERY 6 HOURS PRN
Status: DISCONTINUED | OUTPATIENT
Start: 2023-02-20 | End: 2023-02-24 | Stop reason: HOSPADM

## 2023-02-20 RX ORDER — POLYETHYLENE GLYCOL 3350 17 G/17G
17 POWDER, FOR SOLUTION ORAL DAILY PRN
Status: DISCONTINUED | OUTPATIENT
Start: 2023-02-20 | End: 2023-02-24 | Stop reason: HOSPADM

## 2023-02-20 RX ORDER — SIMVASTATIN 20 MG
20 TABLET ORAL NIGHTLY
Status: DISCONTINUED | OUTPATIENT
Start: 2023-02-21 | End: 2023-02-21

## 2023-02-20 RX ORDER — ENOXAPARIN SODIUM 100 MG/ML
40 INJECTION SUBCUTANEOUS DAILY
Status: DISCONTINUED | OUTPATIENT
Start: 2023-02-21 | End: 2023-02-21

## 2023-02-20 RX ORDER — ONDANSETRON 2 MG/ML
4 INJECTION INTRAMUSCULAR; INTRAVENOUS EVERY 6 HOURS PRN
Status: DISCONTINUED | OUTPATIENT
Start: 2023-02-20 | End: 2023-02-20

## 2023-02-20 RX ORDER — SODIUM CHLORIDE 0.9 % (FLUSH) 0.9 %
5-40 SYRINGE (ML) INJECTION EVERY 12 HOURS SCHEDULED
Status: DISCONTINUED | OUTPATIENT
Start: 2023-02-21 | End: 2023-02-24 | Stop reason: HOSPADM

## 2023-02-20 RX ORDER — SODIUM CHLORIDE 0.9 % (FLUSH) 0.9 %
5-40 SYRINGE (ML) INJECTION PRN
Status: DISCONTINUED | OUTPATIENT
Start: 2023-02-20 | End: 2023-02-24 | Stop reason: HOSPADM

## 2023-02-20 RX ORDER — MORPHINE SULFATE 4 MG/ML
INJECTION, SOLUTION INTRAMUSCULAR; INTRAVENOUS
Status: COMPLETED
Start: 2023-02-20 | End: 2023-02-20

## 2023-02-20 RX ORDER — ASPIRIN 81 MG/1
81 TABLET, CHEWABLE ORAL DAILY
Status: DISCONTINUED | OUTPATIENT
Start: 2023-02-21 | End: 2023-02-24 | Stop reason: HOSPADM

## 2023-02-20 RX ORDER — ONDANSETRON 4 MG/1
4 TABLET, ORALLY DISINTEGRATING ORAL EVERY 8 HOURS PRN
Status: DISCONTINUED | OUTPATIENT
Start: 2023-02-20 | End: 2023-02-20

## 2023-02-20 RX ORDER — ACETAMINOPHEN 325 MG/1
650 TABLET ORAL EVERY 6 HOURS PRN
Status: DISCONTINUED | OUTPATIENT
Start: 2023-02-20 | End: 2023-02-24 | Stop reason: HOSPADM

## 2023-02-20 RX ADMIN — MORPHINE SULFATE 4 MG: 4 INJECTION, SOLUTION INTRAMUSCULAR; INTRAVENOUS at 18:20

## 2023-02-20 RX ADMIN — ASPIRIN 324 MG: 81 TABLET, CHEWABLE ORAL at 18:36

## 2023-02-20 ASSESSMENT — ENCOUNTER SYMPTOMS
SHORTNESS OF BREATH: 1
NAUSEA: 0
EYE REDNESS: 0
DIARRHEA: 0
COUGH: 0
CHEST TIGHTNESS: 1
VOMITING: 0

## 2023-02-20 ASSESSMENT — PAIN SCALES - GENERAL: PAINLEVEL_OUTOF10: 10

## 2023-02-20 ASSESSMENT — PAIN DESCRIPTION - LOCATION: LOCATION: CHEST

## 2023-02-20 NOTE — ED PROVIDER NOTES
Emergency Department Encounter    Patient: Tru Claire  MRN: [id number]  : 1950  Date of Evaluation: 2023  ED Provider:  Susi Cavanaugh DO    Triage Chief Complaint:   Hypertension (Reports left side deficit when trying to use her left arm that started on Friday. States she has had a headache and neck pain since last night when her blood pressure was 180/80.)    [place]:  Tru Claire is a 67 y.o. female with a past medical history of hypertension who presents to the ED with a history of headache and chest pain. Patient endorses excruciating anterior chest pain radiating to the left side of the chest.  Pain is sharp and burning at the same time. Patient also had a headache since yesterday which was excruciating. No history of shortness of breath, fever, palpitations, lightheadedness, nausea/diaphoresis, cancer, recent prolonged immobility, recent long distance travel, recent trauma or surgery. ROS - see HPI, below listed is current ROS at time of my eval:  Review of Systems    ROS is an in history; otherwise unremarkable    Past Medical History:   Diagnosis Date    Anemia     Anxiety     Arthritis     generalized    Asthma     AV block     2nd degree per hospital in 2013    Blood poisoning     Blood transfusion     2003    Breast cancer ([address] 2012    right    CAD (coronary artery disease)     Cancer ([address] 2007    skin (face) & colon(2012 dx of right breast ca(pc)    Carcinoma of breast ([address] 2012    right arm no b/p or sticks    Cardiac pacemaker 03/10/2014    Medtronic dual lead    Chronic cystitis     Chronic respiratory failure (HCC)     2 L/min oxygen at night-time    Colon cancer ([address]     COPD (chronic obstructive pulmonary disease) ([address]     CTS (carpal tunnel syndrome)     Depression     Diverticulitis     H/O 24 hour EKG monitoring 2014 complete heart block No clinically significant arrthymia noted. H/O cardiac catheterization 10/31/2011, 7/11/2007    10/31/2011-No significant CAD. Cardiolite stress test was false positive-Dr Jen Kennedy; 7/11/2007-No CAD, global function intact;    H/O cardiovascular stress test 10/20/2011, 3/23/2010    10/20/2011-Lexiscan- Evid of mild ischemia in Left CX region. EF 70%. Global LVSF normal;    H/O cardiovascular stress test 01/07/2015    EF 77%. Normal Stress Test.    H/O chest pain 04/2005    sees Dr Jen Kennedy    H/O Doppler ultrasound 02/20/2008 2/20/2008-CAROTID DOPPLER- Normal carotids bilaterally;    H/O Doppler ultrasound 06/06/2013    CAROTID DOPPLER- there is heterogeneous, irregular atherosclerotic plaque noted in the right internal carotid artery,  doppler flow velocities within the right internal carotid artery are elevated, consistent with a mild, less than 50%stenosis, there is intimal thickening but no significant atherosclerotic plaque noted in the left internal carotid artery, the left carotid are within normal limits    H/O echocardiogram 4/9/2012, 10/20/2011    4/9/2012-LVSF normal EF=>55%. impaired LV relaxation;    H/O echocardiogram 12/31/2014    EF 50-55%. LV shows mild concentric hypertrophy. Mildly dilated left atrium. Mildly dilated right ventricle. Sclerotic but not stenotic aortic valve. Mitral annular calcification. Mild MR, Mild TR, Mild pulm HTN.    H/O urinary incontinence     History of blood transfusion     Hx antineoplastic chemo     Hx of Arterial Doppler ultrasound 07/01/2019    No hemodynamically significant or focal stenosis visualized bilaterally, bilateral lower extremity arteries exhibit diffuse plaque and multiphasic waveforms, unable to obtain bilateral ABIs due to elevated leg pressures >250 mmHg, possibly due to atherosclerosis    Hx of cardiovascular stress test 06/2013    EF 70% abn suggestive of anterolateral ischemia, possible RCA ischmeia, post left ventricular dilation suggestive multivessel CAD.     Hx of echocardiogram 06/2013    EF50%, mild MR and TR, mild pulmonary HTN, mild AS    Hx of echocardiogram 11/01/2021    Left ventricular systolic function is normal, Mild concentric left ventricular hypertrophy Mildly dilated left atrium. Mitral annular calcification is present. Mild tricuspid regurgitation No evidence of any pericardial effusion    Hx of fall     \"last time 2018- due to neuropathy, have to use cane\"    [facility] 7/24/13, 06/2013 7/13-No clinacally significant arrhythmia. 6/13-multiple cycles of wenckebach episodes in addtion to some sinus tach    Hyperlipidemia     Hypertension     Hypothermia 2008    Malignant neoplasm of breast (female) ([facility] 2012    Neuropathy     \"have neuropathy of my legs\"    Normocytic normochromic anemia     Obstructive sleep apnea 2008 01- Has CPAP machine but has not used in over a year - states she has not had problems since her pacemaker was placed. Old MI (myocardial infarction)     per pt on 1/15/2019\"had heart attack about a year ago\"    Osteoarthritis     Osteopenia     Osteopenia of multiple sites 9/14/2022    Pneumonia due to COVID-19 virus 03/22/2021    Primary malignant neoplasm of colon ([facility]     S/P cardiac cath 06/2013    no significant CAD false postive stress test    Skin cancer     Super obesity     Umbilical hernia      Past Surgical History:   Procedure Laterality Date    A-V CARDIAC PACEMAKER INSERTION  3/10/14     Medtronic.    Model:  [id number] Medtronic  Serial:  [id number] dual chamber pacemaker    APPENDECTOMY  2004    BREAST BIOPSY  2/13/12    BREAST LUMPECTOMY  2007    right     BREAST SURGERY  02/13/2012    rt lesion biopsy     CARDIAC CATHETERIZATION  2007 & 2011    CAROTID ENDARTERECTOMY Right 12/3/2021    RIGHT CAROTID ENDARTERECTOMY WITH PATCH performed by Juan Traylor MD at [address] [employer]  2004    Colon cancer    COLONOSCOPY  10-18-13    polyp    COLONOSCOPY  1/19/2016    internal hemorrhoids, diverticulosis, 1.5 cm sessile polyp, 8 mm polyp found in rectum. COLONOSCOPY  12/14/2017    1.5cm residual polyp, 5mm polyps in blind sigmoid loop x3, Sigmoid divertics, Internal grade 1 hemorrhoids    COLONOSCOPY N/A 1/16/2019    COLONOSCOPY W/ ENDOSCOPIC MUCOSAL RESECTION WITH ELEVIEW 5ML INJECTION AND POLYPECTOMY OF TIP OF BLIND RECTOSIGMOID STUMP AND CAUTERIZATION WITH ERBE PROBE, CLIPPING X2 performed by Peyton Malik MD at [address] [place] [address]  01/21/2020    pan divertics/ 4 polyps/ sm int hem, S/P partial sigmoid resection w/ end to side anastamosis, repeat in 3 years    COLONOSCOPY N/A 1/21/2020    COLONOSCOPY POLYPECTOMY SNARE/COLD BIOPSY performed by Peyton Malik MD at [place] [address]  2003    back    CYSTOSCOPY Bilateral 12/15/14    CYSTOSCOPY Right 5/15/2022    CYSTOSCOPY URETERAL STENT INSERTION performed by Malinda Hanley MD at [address] [place] [address]      front right tooth and root canal w/ brass bolt    DILATION AND CURETTAGE OF UTERUS  2006    Needed a camera to find my uterus. DILATION AND CURETTAGE OF UTERUS N/A 5/24/2022    DILATATION AND CURETTAGE, CULTURES OF UTEREUS performed by Minnie Oviedo MD at [address], COLON, DIAGNOSTIC  12/14/2017    Small hiatal hernia    HERNIA REPAIR  2004    Umb hernia    HERNIA REPAIR  2008    Inc hernia    KIDNEY SURGERY  05/24/2022    stent placed on right side    LITHOTRIPSY Right 8/1/2022    RIGHT CYSTOSCOPY URETEROSCOPY STONE MANIPULATION WITH HOLIUM LASER LITHOTRIPSY STENT REPLACEMENT performed by Steffany Stratton MD at [address]    LITHOTRIPSY Left 9/26/2022    LEFT CYSTOSCOPY URETEROSCOPY RETROGRADE PYELOGRAM STONE [facility] LITHOTRIPSY POSSIBLE [address],[facility] [address] performed by Steffany Stratton MD at [facility]  2/29/2012    Right axillary-3 sentinel nodes were positive out of 9.     MASTECTOMY, RADICAL Right 02/29/2012    w/ sentinal node disection-Dr West    PACEMAKER PLACEMENT PRE-MALIGNANT / BENIGN SKIN LESION EXCISION  2/8/2013    right leg X2-Dr West    TONSILLECTOMY  1957    TUNNELED VENOUS PORT PLACEMENT  2004    TUNNELED VENOUS PORT PLACEMENT  02/13/2012    removal of mediport     Family History   Problem Relation Age of Onset    Arthritis Mother         OA, RA    High Cholesterol Mother     High Blood Pressure Mother     Cancer Father         skin and leukemia    High Blood Pressure Father     High Cholesterol Father     Diabetes Father     Heart Disease Father     Heart Disease Brother     High Cholesterol Brother     High Blood Pressure Brother     Heart Disease Brother     No Known Problems Sister     Seizures Son     Cancer Brother         skin cancer    Breast Cancer Neg Hx     Ovarian Cancer Neg Hx      Social History     Socioeconomic History    Marital status:      Spouse name: Not on file    Number of children: 2    Years of education: 12    Highest education level: 12th grade   Occupational History    Occupation: retired   Tobacco Use    Smoking status: Never    Smokeless tobacco: Never   Vaping Use    Vaping Use: Never used   Substance and Sexual Activity    Alcohol use: No     Comment:           CAFFEINE: 2- 12oz nona daily & 2 cups coffee some nights. Drug use: No    Sexual activity: Not Currently     Partners: Male   Other Topics Concern    Not on file   Social History Narrative    Do you donate blood or plasma? No    Caffeine intake? Moderate    Advance directive? No    Is blood transfusion acceptable in an emergency? Yes    Live alone or with others? With others    Able to care for self?  Yes    No exercise at this time         Social Determinants of Health     Financial Resource Strain: Medium Risk    Difficulty of Paying Living Expenses: Somewhat hard   Food Insecurity: Food Insecurity Present    Worried About Running Out of Food in the Last Year: Sometimes true    Ran Out of Food in the Last Year: Sometimes true   Transportation Needs: Unmet Transportation Needs    Lack of Transportation (Medical): Yes    Lack of Transportation (Non-Medical): Yes   Physical Activity: Inactive    Days of Exercise per Week: 0 days    Minutes of Exercise per Session: 0 min   Stress: No Stress Concern Present    Feeling of Stress :  Only a little   Social Connections: Socially Isolated    Frequency of Communication with Friends and Family: Once a week    Frequency of Social Gatherings with Friends and Family: Once a week    Attends [employer] Services: Never    Active Member of Clubs or Organizations: No    Attends Club or Organization Meetings: Never    Marital Status:    Intimate Partner Violence: Not on file   Housing Stability: High Risk    Unable to Pay for Housing in the Last Year: Yes    Number of [place] in the Last Year: 1    Unstable Housing in the Last Year: No     Current Facility-Administered Medications   Medication Dose Route Frequency Provider Last Rate Last Admin    enoxaparin Sodium (LOVENOX) injection 30 mg  30 mg SubCUTAneous BID AUSTIN Vargas CNP   30 mg at 02/21/23 0838    sertraline (ZOLOFT) tablet 50 mg  50 mg Oral Daily AUSTIN Kyle CNP        hydrOXYzine pamoate (VISTARIL) capsule 25 mg  25 mg Oral Daily PRN AUSTIN Kyle CNP        sodium chloride flush 0.9 % injection 5-40 mL  5-40 mL IntraVENous 2 times per day AUSTIN Vargas CNP   10 mL at 02/21/23 0838    sodium chloride flush 0.9 % injection 5-40 mL  5-40 mL IntraVENous PRN AUSTIN Guzman CNP        0.9 % sodium chloride infusion   IntraVENous PRN AUSTIN Guzman CNP        acetaminophen (TYLENOL) tablet 650 mg  650 mg Oral Q6H PRN AUSTIN Guzman CNP        Or    acetaminophen (TYLENOL) suppository 650 mg  650 mg Rectal Q6H PRN AUSTIN Guzman CNP        polyethylene glycol (GLYCOLAX) packet 17 g  17 g Oral Daily PRN AUSTIN Vargas CNP        aspirin chewable tablet 81 mg  81 mg Oral Daily Tabby CORRINE AUSTIN Love CNP   81 mg at 02/21/23 4382    nitroglycerin (NITRO-BID) 2 % ointment 1 inch  1 inch Topical 4 times per day Park MeigsAUSTIN CNP   1 inch at 02/21/23 0550    nitroGLYCERIN (NITROSTAT) SL tablet 0.4 mg  0.4 mg SubLINGual Q5 Min PRN AUSTIN Guzman CNP        fluticasone (FLOVENT HFA) 110 MCG/ACT inhaler 2 puff  2 puff Inhalation BID AUSTIN Guzman CNP        simvastatin (ZOCOR) tablet 20 mg (Patient Supplied)  20 mg Oral Nightly AUSTIN Guzman CNP         Allergies   Allergen Reactions    Bactrim [Sulfamethoxazole-Trimethoprim] Anaphylaxis and Hives    Lipitor [Atorvastatin] Shortness Of Breath    Taxol [Paclitaxel] Anaphylaxis and Swelling    Aminoglycosides      Abstracted from [facility] chart. Demerol Nausea Only    Neosporin [Bacitracin-Neomycin-Polymyxin] Rash and Other (See Comments)     hotness    Other Rash     Ivory Soap    Talc Other (See Comments)     blisters       Nursing Notes Reviewed    Physical Exam:  Triage VS:    ED Triage Vitals [02/20/23 1648]   Enc Vitals Group      BP (!) 132/52      Heart Rate 77      Resp 16      Temp 97.5 °F (36.4 °C)      Temp Source Oral      SpO2 98 %      Weight       Height       Head Circumference       Peak Flow       Pain Score       Pain Loc       Pain Edu? Excl. in [address]? Physical Exam    My pulse ox interpretation is - normal    General appearance:  acutely ill looking. Skin:  Warm. Dry. Eye:  Extraocular movements intact. Ears, nose, mouth and throat:  Oral mucosa moist   Neck:  Trachea midline. Extremity:  No obvious deformity, erythema, or warmth. No swelling. Normal ROM. Distal pulses intact. Heart:  Regular rate and rhythm, normal S1 & S2, no extra heart sounds. Perfusion:  intact  Respiratory:  Lungs clear to auscultation bilaterally. Respirations nonlabored. Abdominal:  Normal bowel sounds. Soft. Nontender.   Non distended. No Organomegaly. No traore, Mcburney, Rovsing, fluctuance, shifting dullness  Back:  No CVA tenderness to palpation     Neurological:  Alert and oriented times 3. No focal neuro deficits. Psychiatric:  Appropriate    I have reviewed and interpreted all of the currently available lab results from this visit (if applicable):        COMPREHENSIVE METABOLIC PANEL - Abnormal; Notable for the following components:       Result Value    Est, Glom Filt Rate 53 (*)     Glucose 115 (*)     ALT 8 (*)     AST 14 (*)     Alkaline Phosphatase 150 (*)     All other components within normal limits   CBC WITH AUTO DIFFERENTIAL - Abnormal; Notable for the following components:    WBC 10.9 (*)     RBC 4.10 (*)     Hemoglobin 11.2 (*)     Hematocrit 35.7 (*)     MCHC 31.4 (*)     Segs Relative 67.6 (*)     Lymphocytes % 22.9 (*)     Monocytes % 5.8 (*)     Immature Neutrophil % 0.6 (*)     All other components within normal limits   BRAIN NATRIURETIC PEPTIDE - Abnormal; Notable for the following components:    Pro-.1 (*)     All other components within normal limits   CBC - Abnormal; Notable for the following components:    RBC 3.81 (*)     Hemoglobin 10.3 (*)     Hematocrit 33.8 (*)     MCHC 30.5 (*)     All other components within normal limits   COMPREHENSIVE METABOLIC PANEL W/ REFLEX TO MG FOR LOW K - Abnormal; Notable for the following components:    Glucose 105 (*)     Total Protein 5.8 (*)     ALT 7 (*)     AST 11 (*)     Alkaline Phosphatase 133 (*)      MAGNESIUM   TROPONIN   LIPASE   TROPONIN     Radiographs (if obtained):  Radiologist's Report Reviewed:      CT Head W/O Contrast   Final Result   No acute intracranial abnormality. XR CHEST PORTABLE   Final Result   Stable cardiomegaly. No acute cardiopulmonary abnormality.               EKG (if obtained): (All EKG's are interpreted by myself in the absence of a cardiologist)    CRITICAL CARE NOTE:  There was a high probability of clinically significant life-threatening deterioration of the patient's condition requiring my urgent intervention due to chest pain and headache. IV medication and analgesics as well as bedside monitoring was performed to address this. Total critical care time is AT LEAST 35 minutes. This includes vital sign monitoring, pulse oximetry monitoring, telemetry monitoring, clinical response to the IV medications, reviewing the nursing notes, consultation time, dictation/documentation time, and interpretation of the lab work. This time excludes time spent performing procedures and separately billable procedures and family discussion time. MDM:    History source:  patient   History Limitations: none    67-year-old female with a past medical history of hypertension who presents to the ED with a history of headache and chest pain. Patient endorses excruciating anterior chest pain radiating to the left side of the chest.  Pain is sharp and burning at the same time. Patient also had a headache since yesterday which was excruciating. No history of shortness of breath, fever, palpitations, lightheadedness, nausea/diaphoresis, cancer, recent prolonged immobility, recent long distance travel, recent trauma or surgery. Physical examination is significant for an acutely ill looking patient in no respiratory distress. Significant differentials considered at this time include   - ACS, PE, Pneumonia, Pneumothorax/Tension Pneumothorax, Esophageal rupture, Aortic Dissection, Carotid dissection, Cardiac Tamponade, Pericarditis    Previous notes reviewed:   ED note 1/22/23    EKG significant for atrial sensed and ventricular paced rhythm. Ventricular rate of 75. No STEMI.   EKG is interpreted by me, pending read by cardiologist.       Laboratory evaluations include[de-identified]  Considered: CBC, CMP, lipase, magnesium, coagulation profile, troponin  Done: Same  Significant results unremarkable    Radiology include:  Considered CXR, CT-head  Done: Same  Significant results: negative    Medications:     IV morphine, p.o. aspirin  none    Procedure:   Procedures    Consults   none    Social Determinants affecting management or disposition:  none    Reevaluation  This is a resolution of her symptoms. Patient does not have any chest pain, shortness of breath, or headache at this time. Disposition considered   Admission    Final disposition   Admission    69-year-old female with past medical history of CAD, hypertension, hyperlipidemia, presenting to the ED with a history of acute, sharp, nonradiating, anterior chest pains which started earlier today. Patient also endorsed a history of headache. Patient also states she had left-sided weakness 3 days ago which has resolved. Physical exam is significant for an acutely ill looking female in acute chest pain. No neurologic deficits at the time of the encounter. Patient was given aspirin and IV morphine in the ED and scheduled for cardiac work-up. Patient also had a CT scan of the head. Patient's laboratory evaluation as well as CT scan of the head and chest x-ray were unremarkable. Patient endorsed a resolution of her symptoms after medications were given. Patient scheduled for admission for chest pain/observation. Case discussed with hospitalist who accepted admission. Patient is okay with the plan to admit. Clinical Impression:  1. Chest pain with moderate risk for cardiac etiology    2. Acute non intractable tension-type headache      Disposition referral (if applicable):   Lionel Smith  [phone]         [address] [place] [address]  Schedule an appointment as soon as possible for a visit    Disposition medications (if applicable):  Current Discharge Medication List        ED Provider Disposition Time  DISPOSITION Admitted 02/20/2023 08:57:07 PM      Comment: Please note this report has been produced using speech recognition software and may contain errors related to that system including errors in grammar, punctuation, and spelling, as well as words and phrases that may be inappropriate. Efforts were made to edit the dictations.        Tessa Valenzuela DO  02/21/23 1004

## 2023-02-21 ENCOUNTER — CARE COORDINATION (OUTPATIENT)
Dept: CARE COORDINATION | Age: 73
End: 2023-02-21

## 2023-02-21 ENCOUNTER — APPOINTMENT (OUTPATIENT)
Dept: NUCLEAR MEDICINE | Age: 73
DRG: 287 | End: 2023-02-21
Payer: MEDICARE

## 2023-02-21 PROBLEM — R29.898 LEFT ARM WEAKNESS: Status: ACTIVE | Noted: 2023-02-21

## 2023-02-21 LAB
25(OH)D3 SERPL-MCNC: 13.93 NG/ML
ALBUMIN SERPL-MCNC: 3.6 GM/DL (ref 3.4–5)
ALP BLD-CCNC: 133 IU/L (ref 40–128)
ALT SERPL-CCNC: 7 U/L (ref 10–40)
ANION GAP SERPL CALCULATED.3IONS-SCNC: 10 MMOL/L (ref 4–16)
APTT: 29.8 SECONDS (ref 25.1–37.1)
AST SERPL-CCNC: 11 IU/L (ref 15–37)
BILIRUB SERPL-MCNC: 0.2 MG/DL (ref 0–1)
BUN SERPL-MCNC: 18 MG/DL (ref 6–23)
CALCIUM SERPL-MCNC: 8.9 MG/DL (ref 8.3–10.6)
CHLORIDE BLD-SCNC: 105 MMOL/L (ref 99–110)
CHOLEST SERPL-MCNC: 161 MG/DL
CO2: 25 MMOL/L (ref 21–32)
CREAT SERPL-MCNC: 0.9 MG/DL (ref 0.6–1.1)
ESTIMATED AVERAGE GLUCOSE: 117 MG/DL
GFR SERPL CREATININE-BSD FRML MDRD: >60 ML/MIN/1.73M2
GLUCOSE SERPL-MCNC: 105 MG/DL (ref 70–99)
HBA1C MFR BLD: 5.7 % (ref 4.2–6.3)
HCT VFR BLD CALC: 33.8 % (ref 37–47)
HDLC SERPL-MCNC: 41 MG/DL
HEMOGLOBIN: 10.3 GM/DL (ref 12.5–16)
INR BLD: 0.98 INDEX
LDLC SERPL CALC-MCNC: 100 MG/DL
LV EF: 58 %
LVEF MODALITY: NORMAL
MCH RBC QN AUTO: 27 PG (ref 27–31)
MCHC RBC AUTO-ENTMCNC: 30.5 % (ref 32–36)
MCV RBC AUTO: 88.7 FL (ref 78–100)
PDW BLD-RTO: 14.7 % (ref 11.7–14.9)
PLATELET # BLD: 245 K/CU MM (ref 140–440)
PMV BLD AUTO: 9.6 FL (ref 7.5–11.1)
POTASSIUM SERPL-SCNC: 4.2 MMOL/L (ref 3.5–5.1)
PROTHROMBIN TIME: 12.6 SECONDS (ref 11.7–14.5)
RBC # BLD: 3.81 M/CU MM (ref 4.2–5.4)
SODIUM BLD-SCNC: 140 MMOL/L (ref 135–145)
T4 FREE SERPL-MCNC: 1.06 NG/DL (ref 0.9–1.8)
TOTAL PROTEIN: 5.8 GM/DL (ref 6.4–8.2)
TRIGL SERPL-MCNC: 100 MG/DL
TROPONIN T: <0.01 NG/ML
TSH SERPL DL<=0.005 MIU/L-ACNC: 4.32 UIU/ML (ref 0.27–4.2)
WBC # BLD: 9.6 K/CU MM (ref 4–10.5)

## 2023-02-21 PROCEDURE — 85610 PROTHROMBIN TIME: CPT

## 2023-02-21 PROCEDURE — 80053 COMPREHEN METABOLIC PANEL: CPT

## 2023-02-21 PROCEDURE — 36415 COLL VENOUS BLD VENIPUNCTURE: CPT

## 2023-02-21 PROCEDURE — 82306 VITAMIN D 25 HYDROXY: CPT

## 2023-02-21 PROCEDURE — 84439 ASSAY OF FREE THYROXINE: CPT

## 2023-02-21 PROCEDURE — 6370000000 HC RX 637 (ALT 250 FOR IP): Performed by: NURSE PRACTITIONER

## 2023-02-21 PROCEDURE — 85027 COMPLETE CBC AUTOMATED: CPT

## 2023-02-21 PROCEDURE — 99223 1ST HOSP IP/OBS HIGH 75: CPT | Performed by: INTERNAL MEDICINE

## 2023-02-21 PROCEDURE — G0378 HOSPITAL OBSERVATION PER HR: HCPCS

## 2023-02-21 PROCEDURE — 6360000002 HC RX W HCPCS: Performed by: NURSE PRACTITIONER

## 2023-02-21 PROCEDURE — 6370000000 HC RX 637 (ALT 250 FOR IP): Performed by: INTERNAL MEDICINE

## 2023-02-21 PROCEDURE — 82607 VITAMIN B-12: CPT

## 2023-02-21 PROCEDURE — 83036 HEMOGLOBIN GLYCOSYLATED A1C: CPT

## 2023-02-21 PROCEDURE — 80061 LIPID PANEL: CPT

## 2023-02-21 PROCEDURE — 2580000003 HC RX 258: Performed by: NURSE PRACTITIONER

## 2023-02-21 PROCEDURE — 84484 ASSAY OF TROPONIN QUANT: CPT

## 2023-02-21 PROCEDURE — 84443 ASSAY THYROID STIM HORMONE: CPT

## 2023-02-21 PROCEDURE — 96372 THER/PROPH/DIAG INJ SC/IM: CPT

## 2023-02-21 PROCEDURE — 93005 ELECTROCARDIOGRAM TRACING: CPT | Performed by: NURSE PRACTITIONER

## 2023-02-21 PROCEDURE — 82746 ASSAY OF FOLIC ACID SERUM: CPT

## 2023-02-21 PROCEDURE — 85730 THROMBOPLASTIN TIME PARTIAL: CPT

## 2023-02-21 PROCEDURE — 94640 AIRWAY INHALATION TREATMENT: CPT

## 2023-02-21 PROCEDURE — 99205 OFFICE O/P NEW HI 60 MIN: CPT | Performed by: PSYCHIATRY & NEUROLOGY

## 2023-02-21 PROCEDURE — 93306 TTE W/DOPPLER COMPLETE: CPT

## 2023-02-21 RX ORDER — MORPHINE SULFATE 4 MG/ML
4 INJECTION, SOLUTION INTRAMUSCULAR; INTRAVENOUS ONCE
Status: DISCONTINUED | OUTPATIENT
Start: 2023-02-20 | End: 2023-02-24 | Stop reason: HOSPADM

## 2023-02-21 RX ORDER — ENOXAPARIN SODIUM 100 MG/ML
30 INJECTION SUBCUTANEOUS 2 TIMES DAILY
Status: DISCONTINUED | OUTPATIENT
Start: 2023-02-21 | End: 2023-02-24 | Stop reason: HOSPADM

## 2023-02-21 RX ORDER — ATORVASTATIN CALCIUM 40 MG/1
40 TABLET, FILM COATED ORAL NIGHTLY
Status: DISCONTINUED | OUTPATIENT
Start: 2023-02-21 | End: 2023-02-22

## 2023-02-21 RX ORDER — HYDROXYZINE PAMOATE 25 MG/1
25 CAPSULE ORAL DAILY PRN
Status: DISCONTINUED | OUTPATIENT
Start: 2023-02-21 | End: 2023-02-24 | Stop reason: HOSPADM

## 2023-02-21 RX ADMIN — ENOXAPARIN SODIUM 30 MG: 100 INJECTION SUBCUTANEOUS at 08:38

## 2023-02-21 RX ADMIN — ASPIRIN 81 MG 81 MG: 81 TABLET ORAL at 08:37

## 2023-02-21 RX ADMIN — FLUTICASONE PROPIONATE 2 PUFF: 110 AEROSOL, METERED RESPIRATORY (INHALATION) at 23:00

## 2023-02-21 RX ADMIN — ATORVASTATIN CALCIUM 40 MG: 40 TABLET, FILM COATED ORAL at 20:51

## 2023-02-21 RX ADMIN — HYDROXYZINE PAMOATE 25 MG: 25 CAPSULE ORAL at 11:11

## 2023-02-21 RX ADMIN — NITROGLYCERIN 1 INCH: 20 OINTMENT TOPICAL at 05:50

## 2023-02-21 RX ADMIN — SODIUM CHLORIDE, PRESERVATIVE FREE 10 ML: 5 INJECTION INTRAVENOUS at 20:51

## 2023-02-21 RX ADMIN — SERTRALINE HYDROCHLORIDE 50 MG: 50 TABLET ORAL at 11:11

## 2023-02-21 RX ADMIN — SODIUM CHLORIDE, PRESERVATIVE FREE 10 ML: 5 INJECTION INTRAVENOUS at 08:38

## 2023-02-21 RX ADMIN — NITROGLYCERIN 1 INCH: 20 OINTMENT TOPICAL at 00:59

## 2023-02-21 ASSESSMENT — PAIN DESCRIPTION - DESCRIPTORS: DESCRIPTORS: ACHING

## 2023-02-21 ASSESSMENT — PAIN SCALES - GENERAL
PAINLEVEL_OUTOF10: 0
PAINLEVEL_OUTOF10: 0

## 2023-02-21 ASSESSMENT — PAIN DESCRIPTION - LOCATION: LOCATION: CHEST;SHOULDER

## 2023-02-21 ASSESSMENT — PAIN DESCRIPTION - ORIENTATION: ORIENTATION: LEFT

## 2023-02-21 NOTE — PROGRESS NOTES
Left arm/leg weakness and numbness present upon assessment, which was present in ED. Patient states this started Friday morning. CT head completed in ED and was negative. No further intervention. Hospitalist Dr Rimma Arzate notified. Neuro consult ordered.

## 2023-02-21 NOTE — PROGRESS NOTES
Unable to obtain nurse to monitor patient during MRI due to patient's pacemaker. Spoke with heart center, cath lab, IR and nursing supervisor. No nurses available at this time. Message left with Amy Berrios also to see if she could help with situation. Patient on hold at this time.

## 2023-02-21 NOTE — PROGRESS NOTES
4 Eyes Skin Assessment     NAME:  Xander Paulson  YOB: 1950  MEDICAL RECORD NUMBER:  [id number]    The patient is being assessed for  Admission    I agree that One RN has performed a thorough Head to Toe Skin Assessment on the patient. ALL assessment sites listed below have been assessed. Areas assessed by both nurses:    Head, Face, Ears, Shoulders, Back, Chest, Arms, Elbows, Hands, Sacrum. Buttock, Coccyx, Ischium, Legs. Feet and Heels, and Other          Does the Patient have a Wound?  No noted wound(s)       Rangel Prevention initiated by RN: No   Wound Care Orders initiated by RN: No    Pressure Injury (Stage 3,4, Unstageable, DTI, NWPT, and Complex wounds) if present, place referral order by RN under : No    New and Established Ostomies, if present place, referral order under : No      Nurse 1 eSignature: Electronically signed by William Nugent RN on 2/21/23 at 3:01 AM EST    **SHARE this note so that the co-signing nurse can place an eSignature**    Nurse 2 eSignature: Electronically signed by Rosetta Beltre RN on 2/21/23 at 3:04 AM EST

## 2023-02-21 NOTE — CONSULTS
INPATIENT CARDIOLOGY CONSULT NOTE         Reason for consultation:  HTN    Referring physician:  Mari Raines MD     Primary care physician: Tanvir Prabhakar DO      Dear Mari Raines MD Thank you for the consult    Chief Complaint   Patient presents with    Hypertension     Reports left side deficit when trying to use her left arm that started on Friday. States she has had a headache and neck pain since last night when her blood pressure was 180/80. History of present illness:Nusrat is a 67 y. o.year old who  presents with   Chief Complaint   Patient presents with    Hypertension     Reports left side deficit when trying to use her left arm that started on Friday. States she has had a headache and neck pain since last night when her blood pressure was 180/80. Patient is a 67-year-old female who presents to the hospital with chief complaint of chest pain. Patient has history of moderate aortic stenosis, essential hypertension hyperlipidemia. As per patient she has been experiencing intermittent chest discomfort over the past 3 days pain is retrosternal nonexertional nonradiating in nature. Patient is also complaining of shortness of breath with exertion. Denies any palpitation    She had a coronary angiogram done in 2019 which was normal.      ECHO  2023 (Today)     Left ventricular systolic function is normal.   Ejection fraction is visually estimated at 55-60%. PPM wiring visualized in right heart. Heavily calcified aortic valve with severe aortic stenosis; mean P   mmHg, BACILIO: 0.82 cm sq, DVI: 0.24, BACILIO indexed: 0.37 cm sq/ m sq. Mild aortic regurgitation; PHT: 554 msec. Prominent mitral annular calcification is present. Mild mitral stenosis; mean P mmHg. Posterior mitral valve leaflet   appears calcified and restricted. Mild tricuspid regurgitation; RVSP: 40 mmHg. No evidence of any pericardial effusion.       ECHO 2022     Left ventricular systolic function is normal with an ejection fraction of   55-60%. Moderate concentric left ventricular hypertrophy. Grade I diastolic dysfunction. Mild bilateral atrial enlargement. Calcific aortic valve with moderate aortic stenosis, mean gradient of 25   mmHg and an BACILIO of 1.17 cm sq. Mild aortic regurgitation with PHT of 854 msec. Aortic valve unchanged from   previous echo. Mitral annular calcification noted with possible mild mitral stenosis, mean   gradient of 3 mmHg and an MVA of 1.96 mmHg. Mild-to-moderate tricuspid regurgitation. Moderate pulmonary hypertension at 51 mmHg. No evidence of pericardial effusion. reecho in 6 mos        Past medical history:    has a past medical history of Anemia, Anxiety, Arthritis, Asthma, AV block, Blood poisoning, Blood transfusion, Breast cancer ([address] CAD (coronary artery disease), Cancer ([address] Carcinoma of breast ([address] Cardiac pacemaker, Chronic cystitis, Chronic respiratory failure ([address] Colon cancer ([address] COPD (chronic obstructive pulmonary disease) ([address] CTS (carpal tunnel syndrome), Depression, Diverticulitis, H/O 24 hour EKG monitoring, H/O cardiac catheterization, H/O cardiovascular stress test, H/O cardiovascular stress test, H/O chest pain, H/O Doppler ultrasound, H/O Doppler ultrasound, H/O echocardiogram, H/O echocardiogram, H/O urinary incontinence, History of blood transfusion, Hx antineoplastic chemo, Hx of Arterial Doppler ultrasound, Hx of cardiovascular stress test, Hx of echocardiogram, Hx of echocardiogram, Hx of fall, HX OTHER MEDICAL, Hyperlipidemia, Hypertension, Hypothermia, Malignant neoplasm of breast (female) ([address] Neuropathy, Normocytic normochromic anemia, Obstructive sleep apnea, Old MI (myocardial infarction), Osteoarthritis, Osteopenia, Osteopenia of multiple sites, Pneumonia due to COVID-19 virus, Primary malignant neoplasm of colon ([address] S/P cardiac cath, Skin cancer, Super obesity, and Umbilical hernia.   Past surgical history:   has a past surgical history that includes Tonsillectomy (7488); Appendectomy (2004); colectomy (2004); Tunneled venous port placement (2004); cyst removal (2003); Breast lumpectomy (2007); Dilation and curettage of uterus (2006); hernia repair (2004); hernia repair (2008); Dental surgery; Breast surgery (02/13/2012); Tunneled venous port placement (02/13/2012); Breast biopsy (2/13/12); Mastectomy, radical (Right, 02/29/2012); pre-malignant / benign skin lesion excision (2/8/2013); lymph node dissection (2/29/2012); Cardiac catheterization (2007 & 2011); A-V cardiac pacemaker insertion (3/10/14); Cystoscopy (Bilateral, 12/15/14); pacemaker placement; Endoscopy, colon, diagnostic (12/14/2017); Colonoscopy (10-18-13); Colonoscopy (1/19/2016); Colonoscopy (12/14/2017); Colonoscopy (N/A, 1/16/2019); Colonoscopy (01/21/2020); Colonoscopy (N/A, 1/21/2020); Carotid endarterectomy (Right, 12/3/2021); Cystoscopy (Right, 5/15/2022); Dilation and curettage of uterus (N/A, 5/24/2022); Kidney surgery (05/24/2022); Lithotripsy (Right, 8/1/2022); and Lithotripsy (Left, 9/26/2022). Social History:   reports that she has never smoked. She has never used smokeless tobacco. She reports that she does not drink alcohol and does not use drugs. Family history:   no family history of CAD, STROKE of DM    Allergies   Allergen Reactions    Bactrim [Sulfamethoxazole-Trimethoprim] Anaphylaxis and Hives    Lipitor [Atorvastatin] Shortness Of Breath    Taxol [Paclitaxel] Anaphylaxis and Swelling    Aminoglycosides      Abstracted from [facility] patient chart.     Demerol Nausea Only    Neosporin [Bacitracin-Neomycin-Polymyxin] Rash and Other (See Comments)     hotness    Other Rash     Ivory Soap    Talc Other (See Comments)     blisters       enoxaparin Sodium (LOVENOX) injection 30 mg, BID  sertraline (ZOLOFT) tablet 50 mg, Daily  hydrOXYzine pamoate (VISTARIL) capsule 25 mg, Daily PRN  sodium chloride flush 0.9 % injection 5-40 mL, 2 times per day  sodium chloride flush 0.9 % injection 5-40 mL, PRN  0.9 % sodium chloride infusion, PRN  acetaminophen (TYLENOL) tablet 650 mg, Q6H PRN   Or  acetaminophen (TYLENOL) suppository 650 mg, Q6H PRN  polyethylene glycol (GLYCOLAX) packet 17 g, Daily PRN  aspirin chewable tablet 81 mg, Daily  nitroglycerin (NITRO-BID) 2 % ointment 1 inch, 4 times per day  nitroGLYCERIN (NITROSTAT) SL tablet 0.4 mg, Q5 Min PRN  fluticasone (FLOVENT HFA) 110 MCG/ACT inhaler 2 puff, BID  simvastatin (ZOCOR) tablet 20 mg (Patient Supplied), Nightly      Current Facility-Administered Medications   Medication Dose Route Frequency Provider Last Rate Last Admin    enoxaparin Sodium (LOVENOX) injection 30 mg  30 mg SubCUTAneous BID Yvonne Daniels APRN - CNP   30 mg at 02/21/23 0838    sertraline (ZOLOFT) tablet 50 mg  50 mg Oral Daily Aliza Josefina, APRN - CNP   50 mg at 02/21/23 1111    hydrOXYzine pamoate (VISTARIL) capsule 25 mg  25 mg Oral Daily PRN Aliza Josefina, APRN - CNP   25 mg at 02/21/23 1111    sodium chloride flush 0.9 % injection 5-40 mL  5-40 mL IntraVENous 2 times per day Yvonne Daniels APRN - CNP   10 mL at 02/21/23 0838    sodium chloride flush 0.9 % injection 5-40 mL  5-40 mL IntraVENous PRN Tabby I AUSTIN Love - CNP        0.9 % sodium chloride infusion   IntraVENous PRN Tabby I JACLYN LoveN - CNP        acetaminophen (TYLENOL) tablet 650 mg  650 mg Oral Q6H PRN Tabby I JACLYN LoveN - CNP        Or    acetaminophen (TYLENOL) suppository 650 mg  650 mg Rectal Q6H PRN Tabby I AUSTIN Love - CNP        polyethylene glycol (GLYCOLAX) packet 17 g  17 g Oral Daily PRN Tabby I AUSTIN Love - CNP        aspirin chewable tablet 81 mg  81 mg Oral Daily Tabbygerhard Love APRN - CNP   81 mg at 02/21/23 0837    nitroglycerin (NITRO-BID) 2 % ointment 1 inch  1 inch Topical 4 times per day [employer], APRN - CNP   1 inch at 02/21/23 0534    nitroGLYCERIN (NITROSTAT) SL tablet 0.4 mg  0.4 mg SubLINGual Q5 Min PRN AUSTIN Guzman CNP        fluticasone (FLOVENT HFA) 110 MCG/ACT inhaler 2 puff  2 puff Inhalation BID AUSTIN Guzman CNP        simvastatin (ZOCOR) tablet 20 mg (Patient Supplied)  20 mg Oral Nightly AUSTIN Ford CNP             Review of Systems:     Constitutional: No Fever or Weight Loss   Eyes: No Decreased Vision  ENT: No Headaches, Hearing Loss or Vertigo  Cardiovascular:   no chest pain,  no dyspnea on exertion,  no palpitations or loss of consciousness  Respiratory: No cough or wheezing    Gastrointestinal: No abdominal pain, appetite loss, blood in stools, constipation, diarrhea or heartburn  Genitourinary: No dysuria, trouble voiding, or hematuria  Musculoskeletal:  No gait disturbance, weakness or joint complaints  Integumentary: No rash or pruritis  Neurological: No TIA or stroke symptoms  Psychiatric: No anxiety or depression  Endocrine: No malaise, fatigue or temperature intolerance  Hematologic/Lymphatic: No bleeding problems, blood clots or swollen lymph nodes  Allergic/Immunologic: No nasal congestion or hives    All other systems were reviewed and were negative otherwise. Physical Examination:      Vitals:    02/21/23 0806   BP: (!) 140/58   Pulse: 60   Resp: 18   Temp: 97.6 °F (36.4 °C)   SpO2: 97%      Wt Readings from Last 3 Encounters:   02/21/23 245 lb 11.2 oz (111.4 kg)   02/21/23 250 lb 2 oz (113.5 kg)   01/22/23 242 lb (109.8 kg)     Body mass index is 44.94 kg/m². General Appearance:  No distress, conversant  Constitutional:  Well developed, Well nourished  HEENT:  Normocephalic, Atraumatic, Oropharynx moist   Nose normal. Neck Supple Carotid: no carotid bruit  Eyes:  Conjunctiva normal, No discharge.    Respiratory:    Normal breath sounds, No respiratory distress, No wheezing, no use of accessory muscles, diaphragm movement is normal  No chest Tenderness  Cardiovascular: S1-S2 EBER murmurs auscultated. No rubs, thrills or gallops. Normal  rhythm. Pedal pulses are normal. No pedal edema  GI:  Soft Non tender, non distended. Musculoskeletal:   No tenderness, No cyanosis, No clubbing. Integument:  Warm, Dry, No erythema, No rash. Lymphatic:  No lymphadenopathy noted. Neurologic:  Alert & oriented x 3  No focal deficits noted. Psychiatric:  Affect normal, Judgment normal, Mood normal.       Lab Review     Recent Labs     02/21/23  0600   WBC 9.6   HGB 10.3*   HCT 33.8*         Recent Labs     02/21/23  0600      K 4.2      CO2 25   BUN 18   CREATININE 0.9     Recent Labs     02/21/23 0600   AST 11*   ALT 7*   BILITOT 0.2   ALKPHOS 133*     No results for input(s): TROPONINI in the last 72 hours. Lab Results   Component Value Date    BNP 16 06/26/2013     Lab Results   Component Value Date    INR 0.98 02/21/2023    PROTIME 12.6 02/21/2023         All labs, images, EKGs were personally reviewed      Assessment: 67 y. o.year old with PMH of  has a past medical history of Anemia, Anxiety, Arthritis, Asthma, AV block, Blood poisoning, Blood transfusion, Breast cancer ([address] CAD (coronary artery disease), Cancer ([address] Carcinoma of breast ([address] Cardiac pacemaker, Chronic cystitis, Chronic respiratory failure ([address] Colon cancer ([address] COPD (chronic obstructive pulmonary disease) ([address] CTS (carpal tunnel syndrome), Depression, Diverticulitis, H/O 24 hour EKG monitoring, H/O cardiac catheterization, H/O cardiovascular stress test, H/O cardiovascular stress test, H/O chest pain, H/O Doppler ultrasound, H/O Doppler ultrasound, H/O echocardiogram, H/O echocardiogram, H/O urinary incontinence, History of blood transfusion, Hx antineoplastic chemo, Hx of Arterial Doppler ultrasound, Hx of cardiovascular stress test, Hx of echocardiogram, Hx of echocardiogram, Hx of fall, HX OTHER MEDICAL, Hyperlipidemia, Hypertension, Hypothermia, Malignant neoplasm of breast (female) ([address] Neuropathy, Normocytic normochromic anemia, Obstructive sleep apnea, Old MI (myocardial infarction), Osteoarthritis, Osteopenia, Osteopenia of multiple sites, Pneumonia due to COVID-19 virus, Primary malignant neoplasm of colon ([address] S/P cardiac cath, Skin cancer, Super obesity, and Umbilical hernia. Medical Decision Making :       Atypical chest pain  Shortness of breath with exertion  Moderate to severe aortic stenosis  Essential hypertension  Hyperlipidemia. S/p PPM - will interrogate       Patient was originally scheduled for stress test however was declined. Subsequently an echocardiogram was performed which showed progressive severe aortic stenosis. Compared to July 2022. Dimondaledirk Mcknightnatasha Patient mean aortic gradient has increased from 25-40+ mm Hg with aortic valve around 0.8 cm²    We will schedule patient for left and right heart cardiac catheterization in the morning, discussed with the patient and she is agreeable for the procedure. Continue with aspirin 81 mg daily  Discontinue Nitropaste  Stop Zocor, start patient on Lipitor 40 daily  Patient is hypertensive: Start patient on metoprolol 25 twice daily  DVT prophylaxis with Lovenox    Psychiatric evaluation for anxiety and depression underway. ## History of colonic cancer, and History of breast cancer s/p mastectomy : S/p chemotherapy. Likely in remission, will get opinion from oncology about prognosis before proceeding with TAVR evaluation.       Thank you for the consult    Dr. Isabelle Nicholas  2/21/2023 11:44 AM

## 2023-02-21 NOTE — CONSULTS
Neurology Service Consult Note  [address]   Patient Name: Maggy Martinez  : 1950        Subjective:   Reason for consult: Left sided weakness, numbness  67 y.o. female  with history of anemia, anxiety, arthritis, asthma, breast and skin cancer, CAD, COPD, depression, HTN, HLD, heart block s/p pacemaker, neuropathy of lower extremities presenting to [place] [address] with complaints of chest pain, HTN, left arm and leg weakness following an argument with her roommate. Chart was reviewed in detail. Patient was seen and assessed. She is lying in bed sleeping but wakes to voice. She has a difficult time describing the events of yesterday and just keeps telling me that she has had a lot of stress due to her living situation. On Friday she developed left shoulder pain that radiated into her left arm stopping at the elbow. Symptoms persisted for the entire day and were followed by left sided face, arm, body and leg numbness. She believes that this occurred as she was anxious and hypertensive. She also reports headache \"all over\" her head. She then realized that she was unable to move her left arm. By the end of the day these symptoms resolved. It is not entirely clear when, but at some point the left sided numbness returned as well as chest pain and patient presented to the hospital for further evaluation. She denies speech or vision changes during these events. The patient tells me that her symptoms improved significantly (including sensory changes and weakness) after she was given pain medication in the ED for chest pain.        Past Medical History:   Diagnosis Date    Anemia     Anxiety     Arthritis     generalized    Asthma 2006    AV block     2nd degree per hospital in [id number]    Blood poisoning 1994    Blood transfusion     2003    Breast cancer ([facility] [address] 2012    right    CAD (coronary artery disease)     [facility]    skin (face) & colon(2012 dx of right breast ca(pc)    Carcinoma of breast ([facility] [address] 02/29/2012    right arm no b/p or sticks    Cardiac pacemaker 03/10/2014    Medtronic dual lead    Chronic cystitis     Chronic respiratory failure (HCC)     2 L/min oxygen at night-time    Colon cancer ([facility] [address] 2013    COPD (chronic obstructive pulmonary disease) (HCC)     CTS (carpal tunnel syndrome)     Depression     Diverticulitis     H/O 24 hour EKG monitoring 2/28/2014 7/24/13 2/14 complete heart block 7/13No clinically significant arrthymia noted. H/O cardiac catheterization 10/31/2011, 7/11/2007    10/31/2011-No significant CAD. Cardiolite stress test was false positive-Dr Bobby Meyer; 7/11/2007-No CAD, global function intact;    H/O cardiovascular stress test 10/20/2011, 3/23/2010    10/20/2011-Lexiscan- Evid of mild ischemia in Left CX region. EF 70%. Global LVSF normal;    H/O cardiovascular stress test 01/07/2015    EF 77%. Normal Stress Test.    H/O chest pain 04/2005    sees Dr Bobby Meyer    H/O Doppler ultrasound 02/20/2008 2/20/2008-CAROTID DOPPLER- Normal carotids bilaterally;    H/O Doppler ultrasound 06/06/2013    CAROTID DOPPLER- there is heterogeneous, irregular atherosclerotic plaque noted in the right internal carotid artery,  doppler flow velocities within the right internal carotid artery are elevated, consistent with a mild, less than 50%stenosis, there is intimal thickening but no significant atherosclerotic plaque noted in the left internal carotid artery, the left carotid are within normal limits    H/O echocardiogram 4/9/2012, 10/20/2011    4/9/2012-LVSF normal EF=>55%. impaired LV relaxation;    H/O echocardiogram 12/31/2014    EF 50-55%. LV shows mild concentric hypertrophy. Mildly dilated left atrium. Mildly dilated right ventricle. Sclerotic but not stenotic aortic valve. Mitral annular calcification.   Mild MR, Mild TR, Mild pulm HTN.    H/O urinary incontinence     History of blood transfusion     Hx antineoplastic chemo Hx of Arterial Doppler ultrasound 07/01/2019    No hemodynamically significant or focal stenosis visualized bilaterally, bilateral lower extremity arteries exhibit diffuse plaque and multiphasic waveforms, unable to obtain bilateral ABIs due to elevated leg pressures >250 mmHg, possibly due to atherosclerosis    Hx of cardiovascular stress test 06/2013    EF 70% abn suggestive of anterolateral ischemia, possible RCA ischmeia, post left ventricular dilation suggestive multivessel CAD. Hx of echocardiogram 06/2013    EF50%, mild MR and TR, mild pulmonary HTN, mild AS    Hx of echocardiogram 11/01/2021    Left ventricular systolic function is normal, Mild concentric left ventricular hypertrophy Mildly dilated left atrium. Mitral annular calcification is present. Mild tricuspid regurgitation No evidence of any pericardial effusion    Hx of fall     \"last time 2018- due to neuropathy, have to use cane\"    [address] [place] [address] 7/24/13, 06/2013 7/13-No clinacally significant arrhythmia. 6/13-multiple cycles of wenckebach episodes in addtion to some sinus tach    Hyperlipidemia     Hypertension     Hypothermia 2008    Malignant neoplasm of breast (female) ([facility] [address] 2012    Neuropathy     \"have neuropathy of my legs\"    Normocytic normochromic anemia     Obstructive sleep apnea 2008 01- Has CPAP machine but has not used in over a year - states she has not had problems since her pacemaker was placed. Old MI (myocardial infarction)     per pt on 1/15/2019\"had heart attack about a year ago\"    Osteoarthritis     Osteopenia     Osteopenia of multiple sites 9/14/2022    Pneumonia due to COVID-19 virus 03/22/2021    Primary malignant neoplasm of colon ([facility] [address]     S/P cardiac cath 06/2013    no significant CAD false postive stress test    Skin cancer     Super obesity     Umbilical hernia     :   Past Surgical History:   Procedure Laterality Date    A-V CARDIAC PACEMAKER INSERTION  3/10/14     Medtronic.    Model: [id number]  Serial:  [id number] dual chamber pacemaker    APPENDECTOMY  2004    BREAST BIOPSY  2/13/12    BREAST LUMPECTOMY  2007    right     BREAST SURGERY  02/13/2012    rt lesion biopsy     CARDIAC CATHETERIZATION  2007 & 2011    CAROTID ENDARTERECTOMY Right 12/3/2021    RIGHT CAROTID ENDARTERECTOMY WITH PATCH performed by Zoë La MD at [facility]  2004    Colon cancer    COLONOSCOPY  10-18-13    polyp    COLONOSCOPY  1/19/2016    internal hemorrhoids, diverticulosis, 1.5 cm sessile polyp, 8 mm polyp found in rectum. COLONOSCOPY  12/14/2017    1.5cm residual polyp, 5mm polyps in blind sigmoid loop x3, Sigmoid divertics, Internal grade 1 hemorrhoids    COLONOSCOPY N/A 1/16/2019    COLONOSCOPY W/ ENDOSCOPIC MUCOSAL RESECTION WITH ELEVIEW 5ML INJECTION AND POLYPECTOMY OF TIP OF BLIND RECTOSIGMOID STUMP AND CAUTERIZATION WITH ERBE PROBE, CLIPPING X2 performed by Michelle Morrison MD at [address] [place] [address]  01/21/2020    pan divertics/ 4 polyps/ sm int hem, S/P partial sigmoid resection w/ end to side anastamosis, repeat in 3 years    COLONOSCOPY N/A 1/21/2020    COLONOSCOPY POLYPECTOMY SNARE/COLD BIOPSY performed by Michelle Morrison MD at [place] [address]  2003    back    CYSTOSCOPY Bilateral 12/15/14    CYSTOSCOPY Right 5/15/2022    CYSTOSCOPY URETERAL STENT INSERTION performed by Honorio Restrepo MD at [facility]      front right tooth and root canal w/ brass bolt    DILATION AND CURETTAGE OF UTERUS  2006    Needed a camera to find my uterus.     DILATION AND CURETTAGE OF UTERUS N/A 5/24/2022    DILATATION AND CURETTAGE, CULTURES OF UTEREUS performed by Meenakshi Wakefield MD at [address] [place] [address], COLON, DIAGNOSTIC  12/14/2017    Small hiatal hernia    HERNIA REPAIR  2004    Umb hernia    HERNIA REPAIR  2008    Inc hernia    KIDNEY SURGERY  05/24/2022    stent placed on right side    LITHOTRIPSY Right 8/1/2022    RIGHT CYSTOSCOPY URETEROSCOPY STONE MANIPULATION WITH HOLIUM LASER LITHOTRIPSY STENT REPLACEMENT performed by Sheila Ulrich MD at [address]    LITHOTRIPSY Left 9/26/2022    LEFT CYSTOSCOPY URETEROSCOPY RETROGRADE PYELOGRAM STONE MANIPULATION WITH HOLIUM LASER LITHOTRIPSY POSSIBLE STENT PLACEMENT performed by Sheila Ulrich MD at [facility]  2/29/2012    Right axillary-3 sentinel nodes were positive out of 9. MASTECTOMY, RADICAL Right 02/29/2012    w/ sentinal node disection-Dr West    PACEMAKER PLACEMENT      PRE-MALIGNANT / BENIGN SKIN LESION EXCISION  2/8/2013    right leg X2-Dr West    TONSILLECTOMY  1957    TUNNELED VENOUS PORT PLACEMENT  2004    TUNNELED VENOUS PORT PLACEMENT  02/13/2012    removal of mediport     Medications:  Scheduled Meds:   enoxaparin  30 mg SubCUTAneous BID    sodium chloride flush  5-40 mL IntraVENous 2 times per day    aspirin  81 mg Oral Daily    nitroglycerin  1 inch Topical 4 times per day    fluticasone  2 puff Inhalation BID    simvastatin  20 mg Oral Nightly     Continuous Infusions:   sodium chloride       PRN Meds:.sodium chloride flush, sodium chloride, acetaminophen **OR** acetaminophen, polyethylene glycol, nitroGLYCERIN    Allergies   Allergen Reactions    Bactrim [Sulfamethoxazole-Trimethoprim] Anaphylaxis and Hives    Lipitor [Atorvastatin] Shortness Of Breath    Taxol [Paclitaxel] Anaphylaxis and Swelling    Aminoglycosides      Abstracted from [facility] patient chart.     Demerol Nausea Only    Neosporin [Bacitracin-Neomycin-Polymyxin] Rash and Other (See Comments)     hotness    Other Rash     Ivory Soap    Talc Other (See Comments)     blisters     Social History     Socioeconomic History    Marital status:      Spouse name: Not on file    Number of children: 2    Years of education: 12    Highest education level: 12th grade   Occupational History    Occupation: retired   Tobacco Use    Smoking status: Never    Smokeless tobacco: Never   Vaping Use    Vaping Use: Never used   Substance and Sexual Activity    Alcohol use: No     Comment:           CAFFEINE: 2- 12oz nona daily & 2 cups coffee some nights. Drug use: No    Sexual activity: Not Currently     Partners: Male   Other Topics Concern    Not on file   Social History Narrative    Do you donate blood or plasma? No    Caffeine intake? Moderate    Advance directive? No    Is blood transfusion acceptable in an emergency? Yes    Live alone or with others? With others    Able to care for self? Yes    No exercise at this time         Social Determinants of Health     Financial Resource Strain: Medium Risk    Difficulty of Paying Living Expenses: Somewhat hard   Food Insecurity: Food Insecurity Present    Worried About Running Out of Food in the Last Year: Sometimes true    Ran Out of Food in the Last Year: Sometimes true   Transportation Needs: Unmet Transportation Needs    Lack of Transportation (Medical): Yes    Lack of Transportation (Non-Medical): Yes   Physical Activity: Inactive    Days of Exercise per Week: 0 days    Minutes of Exercise per Session: 0 min   Stress: No Stress Concern Present    Feeling of Stress :  Only a little   Social Connections: Socially Isolated    Frequency of Communication with Friends and Family: Once a week    Frequency of Social Gatherings with Friends and Family: Once a week    Attends Shinto Services: Never    Active Member of Clubs or Organizations: No    Attends Club or Organization Meetings: Never    Marital Status:    Intimate Partner Violence: Not on file   Housing Stability: High Risk    Unable to Pay for Housing in the Last Year: Yes    Number of [place] in the Last Year: 1    Unstable Housing in the Last Year: No      Family History   Problem Relation Age of Onset    Arthritis Mother         OA, RA    High Cholesterol Mother     High Blood Pressure Mother     Cancer Father         skin and leukemia    High Blood Pressure Father     High Cholesterol Father Diabetes Father     Heart Disease Father     Heart Disease Brother     High Cholesterol Brother     High Blood Pressure Brother     Heart Disease Brother     No Known Problems Sister     Seizures Son     Cancer Brother         skin cancer    Breast Cancer Neg Hx     Ovarian Cancer Neg Hx          ROS (10 systems)  In addition to that documented in the HPI above, the additional ROS was obtained:  Constitutional: Denies fevers or chills  Eyes: Denies vision changes  ENMT: Denies sore throat  CV: Denies chest pain  Resp: Denies SOB  GI: Denies vomiting or diarrhea  : Denies painful urination  MSK: Denies recent trauma  Skin: Denies new rashes  Neuro: Left sided sensory loss, left sided weakness  Endocrine: Denies unexpected weight loss  Heme: Denies bleeding disorders    Physical Exam:       Wt Readings from Last 3 Encounters:   02/21/23 245 lb 11.2 oz (111.4 kg)   01/22/23 242 lb (109.8 kg)   01/09/23 248 lb (112.5 kg)     Temp Readings from Last 3 Encounters:   02/21/23 97.6 °F (36.4 °C) (Oral)   01/22/23 98.6 °F (37 °C) (Oral)   12/21/22 98.7 °F (37.1 °C) (Temporal)     BP Readings from Last 3 Encounters:   02/21/23 (!) 140/58   01/23/23 (!) 132/58   01/09/23 (!) 144/88     Pulse Readings from Last 3 Encounters:   02/21/23 60   01/23/23 70   01/09/23 72        Gen: A&O x 4, NAD, cooperative  HEENT: NC/AT, EOMI, PERRL, mmm,  neck supple, no meningeal signs  Heart: Paced  Lungs: respirations even and unlabored  Ext: no edema, no calf tenderness b/l  Psych: normal mood and anxious affect  Skin: no rashes or lesions    NEUROLOGIC EXAM:    Mental Status: A&O to self, location, month and year, NAD, speech clear, language fluent, repetition and naming intact, follows commands appropriately    Cranial Nerve Exam:   CN II-XII: PERRL, VFF, no nystagmus, no gaze paresis, sensation V1-V3 intact b/l, muscles of facial expression symmetric; hearing intact to conversational tone, palate elevates symmetrically, shoulder elevation symmetric and tongue protrudes midline with movement side to side. Motor Exam:       Strength 5/5 UE/LE right, 4/5 UE/LE left, functional and give away weakness on exam  Tone and bulk normal   No pronator drift    Deep Tendon Reflexes: 2/4 biceps, triceps, brachioradialis, patellar, and achilles b/l; flexor plantar responses b/l    Sensation: Decreased to light touch/pinprick/vibration UE/LE left, left face starting at midline    Coordination/Cerebellum:       Tremors--none      Rapidly alternating movements:  dysdiadochokinesia left                Heel-to-Shin: no dysmetria b/l      Finger-to-Nose: no dysmetria b/l    Gait and stance:      Gait: deferred      LABS:     Recent Labs     02/20/23  1800 02/21/23  0600   WBC 10.9* 9.6    140   K 3.8 4.2    105   CO2 27 25   BUN 13 18   CREATININE 1.1 0.9   GLUCOSE 115* 105*   INR  --  0.98           IMAGING:    CT head w/o contrast:  Impression   No acute intracranial abnormality. Carotid US:  Impression   The right internal carotid artery demonstrates 0-50% stenosis. The left internal carotid artery demonstrates 0-50% stenosis. Bilateral vertebral arteries are patent with flow in the normal direction. MRI brain w/o contrast:  Pending    MRA head and neck:  Pending    All imaging was personally reviewed   ASSESSMENT/PLAN:   67year old female with complaints of chest pain, left sided sensory loss, left sided weakness. Patient is alert and oriented x 4. Speech is clear, language is fluent. Patient is a poor historian. She does have a history of neuropathy but feels these sensory changes are different than her base. She has decreased sensation to the face, starting at midline and in the left upper and lower extremities subjectively despite response to pinprick. She has functional weakness in the left upper and lower extremity with no evidence for drift on exam. Reflexes are intact.    Left sided weakness and paraesthesias secondary to HTN urgency v lower suspicion for stroke superimposed on worsening anxiety and depression  Neuro imaging as above  MRI brain w/o contrast pending  MRA head and neck pending - patient unable to tolerate CTA due to ? Reaction to contrast  Continue aspirin and atorvastatin for secondary stroke prevention  [id number]  Recommend SBP < 140  B12/Folate WNL  Vitamin D 13.93 - recommend supplementation  TSH 4.320  PT/OT as recommended  Stress test was ordered for today, patient refused  Further recommendations pending MRI, MRA      Patient was discussed with attending neurologist Dr. Tevin Velez     Thank you for allowing us to participate in the care of your patient. If there are any questions regarding evaluation please feel free to contact us. AUSTIN Blake - CNP, 2/21/2023       ------------------------------------    Attending Note:  I have rounded on this patient with Leah Santiago CNP. I have reviewed the chart and we have discussed this case in detail. The patient was seen and examined by myself. Pertinent labs and imaging have been personally reviewed. Our findings and impressions were discussed with the patient. I concur with the Nurse Practioner's assessment and plan. Exam does seem to be evolving as the day to progresses. There are some functional exam findings. Stroke versus conversion disorder. The MRI of the brain is pending at this time. We will make further recommendations depending on diagnostic testing.     Giana Macias,  2/21/2023 9:10 PM

## 2023-02-21 NOTE — CARE COORDINATION
Case Management Assessment  Initial Evaluation    Date/Time of Evaluation: 2/21/2023 4:14 PM  Assessment Completed by: EZEQUIEL Mora    If patient is discharged prior to next notation, then this note serves as note for discharge by case management. Patient Name: Miriam Chavira                   YOB: 1950  Diagnosis: Chest pain [R07.9]                   Date / Time: 2/20/2023  5:13 PM    Patient Admission Status: Observation   Readmission Risk (Low < 19, Mod (19-27), High > 27): Readmission Risk Score: 18.6    Current PCP: Nithin Lake, DO  PCP verified by CM? Yes    Chart Reviewed: Yes      History Provided by: Patient  Patient Orientation: Alert and Oriented    Patient Cognition: Alert    Hospitalization in the last 30 days (Readmission):  No    If yes, Readmission Assessment in CM Navigator will be completed. Advance Directives:      Code Status: Full Code   Patient's Primary Decision Maker is: Patient Declined (Legal Next of Kin Remains as Decision Maker)    Primary Decision Maker: Cameron Garcia [facility] - [phone]    Discharge Planning:    Patient lives with: Friends Type of Home: House  Primary Care Giver: Friend  Patient Support Systems include: Spouse/Significant Other   Current Financial resources: Medicare, Medicaid  Current community resources: None  Current services prior to admission: Yohan Stephen            Current DME:              Type of Home Care services:  OT, PT, Skilled Therapy    ADLS  Prior functional level: Assistance with the following:, Mobility  Current functional level: Assistance with the following:, Mobility    PT AM-PAC:   /24  OT AM-PAC:   /24    Family can provide assistance at DC: Yes  Would you like Case Management to discuss the discharge plan with any other family members/significant others, and if so, who?  No  Plans to Return to Present Housing: Unknown at present  Other Identified Issues/Barriers to RETURNING to current housing: consults, PT/OT  Potential Assistance needed at discharge: N/A            Potential DME:  tbd  Patient expects to discharge to: [facility]  for transportation at discharge:  [email or web address]    Financial    Payor: Princess Walls / Plan: Thuy Due / Product Type: *No Product type* /     Does insurance require precert for SNF: Yes    Potential assistance Purchasing Medications:    Meds-to-Beds request: No      [facility], [address] [place] [address],Second Floor [address]  Phone: [phone] Fax: [phone]      Notes:    Factors facilitating achievement of predicted outcomes: Friend support, Motivated, Cooperative, and Pleasant    Barriers to discharge: consults, PT/OT, possible SNF    Additional Case Management Notes: CM in to see Pt to initiate discharge planning. Pt from home with her SO. Discharge plan at this time pending PT/OT. Pt states she has been to [facility] in the past.  Pt denies any needs at this time.   CM following     The Plan for Transition of Care is related to the following treatment goals of Chest pain [R07.9]    The Patient and/or Patient Representative Agree with the Discharge Plan?  yes    Jozef Ackerman [place]  Case Management Department  Ph: [phone]

## 2023-02-21 NOTE — PROGRESS NOTES
Hospitalist Progress Note      Name:  Re De Santiago /Age/Sex: 1950  (67 y.o. female)   MRN & CSN:  [id number] & [id number] Admission Date/Time: 2023  5:13 PM   [facility]:  [facility] PCP: Silver Foss 57 Day: 2    Assessment and Plan:   Re De Santiago is a 67 y.o.  female with past medical history of moderate to severe arctic stenosis, moderate pulmonary hypertension, carotid artery stenosis s/p right CEA, JAMIE, hypertension, hyperlipidemia, third-degree AV block s/p PPM, history of right breast carcinoma status postmastectomy, history of colon cancer status postchemotherapy, morbid obesity with BMI 45.75 was admitted on 2023 for evaluation of chest pain. Also reports of increased anxiety/depression. Patient was seen by cardiology/psychiatry and neurology. .    Echo 2023   Left ventricular systolic function is normal.   Ejection fraction is visually estimated at 55-60%. PPM wiring visualized in right heart. Heavily calcified aortic valve with severe aortic stenosis; mean P   mmHg, BACILIO: 0.82 cm sq, DVI: 0.24, BACILIO indexed: 0.37 cm sq/ m sq. Mild aortic regurgitation; PHT: 554 msec. Prominent mitral annular calcification is present. Mild mitral stenosis; mean P mmHg. Posterior mitral valve leaflet   appears calcified and restricted. Mild tricuspid regurgitation; RVSP: 40 mmHg. No evidence of any pericardial effusion. Assessment    Atypical chest pain  Chronic exertional shortness of breath likely secondary to cardiac etiology  Moderate pulmonary hypertension/severe aortic stenosis  Left-sided weakness/numbness? CVA event  Hypertension  Hyperlipidemia  S/p PPM  History of breast/colon carcinoma    Plan    Continue aspirin. Started on Lipitor  Cardiology evaluation appreciated. Plan for a left heart cath/right heart cath tomorrow  Also started on metoprolol  Psychiatry evaluation appreciated. Started on sertraline  Await MRI brain/MRA head. Neurology consulted. Await further recommendations  Echo noted as above. EF 55 to 60% with severe aortic stenosis  Given history of colon carcinoma/breast cancer, cardiology will get opinion from oncology about prognosis before proceeding with TAVR evaluation. Diet ADULT DIET; Regular   DVT Prophylaxis [x] Lovenox, []  Heparin, [] SCDs, [] Ambulation   GI Prophylaxis [] PPI,  [] H2 Blocker,  [] Carafate,  [] Diet/Tube Feeds   Code Status Full Code   Disposition Patient requires continued admission due to pending heart catheterization/MRI studies/neurology eval   MDM [] Low, [] Moderate,[x]  High  Patient's risk as above      Subjective    Patient seen and examined at bedside. Still reports a left-sided weakness/numbness.  present at bedside. Denies any shortness of breath or chest pain at rest.    Objective: Intake/Output Summary (Last 24 hours) at 2/21/2023 1231  Last data filed at 2/21/2023 7104  Gross per 24 hour   Intake 10 ml   Output 200 ml   Net -190 ml      Vitals:   Vitals:    02/21/23 0806   BP: (!) 140/58   Pulse: 60   Resp: 18   Temp: 97.6 °F (36.4 °C)   SpO2: 97%     Physical Exam:   GEN Awake female, sitting upright in bed in no apparent distress. Appears given age. RESP bilateral entry fair. No obvious wheezing or crackles  CARDIO/VASC S1/S2 auscultated. Regular. Systolic murmur. No peripheral edema. GI Abdomen is soft without significant tenderness,  NEURO AOx3. Motor strength 5/5 right upper and lower extremity, 4/5 left upper and lower extremity. Paresthesia present left upper and lower extremity.   Medications:   Medications:    enoxaparin  30 mg SubCUTAneous BID    sertraline  50 mg Oral Daily    atorvastatin  40 mg Oral Nightly    sodium chloride flush  5-40 mL IntraVENous 2 times per day    aspirin  81 mg Oral Daily    fluticasone  2 puff Inhalation BID      Infusions:    sodium chloride       PRN Meds: hydrOXYzine pamoate, 25 mg, Daily PRN  sodium chloride flush, 5-40 mL, PRN  sodium chloride, , PRN  acetaminophen, 650 mg, Q6H PRN   Or  acetaminophen, 650 mg, Q6H PRN  polyethylene glycol, 17 g, Daily PRN  nitroGLYCERIN, 0.4 mg, Q5 Min PRN          Electronically signed by Dorie Hobbs MD on 2/21/2023 at 12:31 PM
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
## 2023-02-21 NOTE — H&P
V2.0  History and Physical      Name:  Re De Santiago /Age/Sex: 1950  (67 y.o. female)   MRN & CSN:  [id number] & [id number] Encounter Date/Time: 2023 9:41 PM EST   Location:  ED17/ED-17 PCP: Lizeth Gerardo[address] Day: 1    Assessment and Plan:   Re De Santiago is a 67 y.o. female  who presents with Chest pain    Hospital Problems             Last Modified POA    * (Principal) Chest pain 2023 Yes     Chest pain     Place in observation   Heart Score 4  Initial troponin 4, trend x3              Telemetry monitoring    Chest x-ray reviewed, negative for acute cardiopulmonary process              EKG reviewed, AV paced rhythm. No evidence of acute ST elevation or depression . Repeat EKG in AM              Cardiology consult. [facility]           Continue home aspirin and statin   Check lipid panel in am     History of carotid disease status post CEA   Check carotid duplex    Anxiety  Depression, unspecified depression type   Patient stopped Zoloft as she believed it caused weight gain in the past.  She stated to PCP she may consider therapy. Anxiety related to home situation affecting overall health   Inpatient referral to psychiatry      Disposition:   Current Living situation: With roommate  Expected Disposition: Likely same  Estimated D/C: 2 days    Diet No diet orders on file   DVT Prophylaxis [x] Lovenox, []  Heparin, [] SCDs, [] Ambulation,  [] Eliquis, [] Xarelto, [] Coumadin   Code Status Prior   Surrogate Decision Maker/ POA      History from:     patient, electronic medical record    History of Present Illness:     Chief Complaint: Chest pain  Re De Santiago is a 67 y.o. female who presents to the ED with complaints of chest pain, hypertension at home. Upon presentation to the ED the patient presented with chest pain, shortness of breath. Denies any focal weakness. Though did report some left arm weakness which was noted to resolved.   Denies any nausea vomiting diarrhea. Denies any fever or chills, recent illness. Was noted to be quite upset on arrival which resolved after treatment. Upon entering the room-and introducing myself to the patient and telling her that I am the hospitalist to hear to evalute the patient patient tells me she \"feels much better after morphine and that she knows that he cannot her,  now that she is here\". Upon f lengthy further questioning the patient clarifies she is talking about her roommate. She states that her roommate was making her upset and frustrated because he \"just lays there all day\" and she feels that that is what was getting her blood pressure elevated and causing her to have chest pain. The patient states it causes her much anxiety and depression because she is unable to do many things around the house she allows the roommate to live there for free if he just assists in the upkeep of the house. I clarified with the patient that this is a roommate and not a family member or friend. She states she is agreeable to speak to someone while here as she feels the stress of this is affecting her overall health. She adamantly denies suicidal and homicidal ideations multiple times during her visit     Review of Systems: Need 10 Elements   Review of Systems   Constitutional:  Negative for activity change, appetite change, diaphoresis and fatigue. HENT:  Negative for congestion and postnasal drip. Eyes:  Negative for redness and visual disturbance. Respiratory:  Positive for chest tightness and shortness of breath. Negative for cough. Cardiovascular:  Negative for chest pain and palpitations. Gastrointestinal:  Negative for diarrhea, nausea and vomiting. Endocrine: Negative for cold intolerance and heat intolerance. Genitourinary:  Negative for difficulty urinating and dysuria. Musculoskeletal:  Negative for joint swelling and neck pain. Skin: Negative.     Neurological:  Negative for dizziness and speech difficulty. Psychiatric/Behavioral:  Positive for agitation. Negative for confusion, self-injury and suicidal ideas. The patient is nervous/anxious. Objective:   No intake or output data in the 24 hours ending 02/20/23 2150   Vitals:   Vitals:    02/20/23 1648 02/20/23 1830 02/20/23 1845 02/20/23 2129   BP: (!) 132/52 (!) 153/60  (!) 133/49   Pulse: 77   72   Resp: 16   16   Temp: 97.5 °F (36.4 °C)   97.7 °F (36.5 °C)   TempSrc: Oral   Oral   SpO2: 98% 91% 92% 93%       Medications Prior to Admission     Prior to Admission medications    Medication Sig Start Date End Date Taking? Authorizing Provider   potassium chloride (KLOR-CON M) 10 MEQ extended release tablet Take 1 tablet by mouth daily as needed (take only when you need Lasix) 1/9/23   Noé Barrera, DO   simvastatin (ZOCOR) 20 MG tablet TAKE ONE (1) TABLET BY MOUTH NIGHTLY 1/9/23   Noé Barrera, DO   furosemide (LASIX) 20 MG tablet Take 1 tablet by mouth daily as needed (for edema) 1/9/23   Noé Barrera, DO   acetaminophen (TYLENOL) 500 MG tablet Take 1 tablet by mouth every 6 hours as needed for Pain 11/18/22   [facility]) MISC Use as directed 11/13/22   Noé Barrera DO   aspirin 81 MG chewable tablet Take 1 tablet by mouth daily  Patient not taking: Reported on 1/9/2023 10/19/22   Kit Talley MD   [facility] 2.5 MG tablet Take 1 tablet by mouth nightly  Patient not taking: Reported on 1/9/2023 11/30/21   Chai Callaway MD   fluticasone (FLOVENT HFA) 110 MCG/ACT inhaler Inhale 2 puffs into the lungs 2 times daily 2/11/21   Noé Barrera DO   albuterol sulfate HFA (PROAIR HFA) 108 (90 Base) MCG/ACT inhaler INHALE 2 PUFFS BY MOUTH EVERY SIX HOURS AS NEEDED FOR WHEEZING 2/11/21   Noé Barrera DO       Physical Exam: Need 8 Elements   Physical Exam  Vitals and nursing note reviewed. Constitutional:       General: She is not in acute distress. Appearance: Normal appearance. HENT:      Head: Normocephalic. Nose: Nose normal.      Mouth/Throat:      Pharynx: Oropharynx is clear. Eyes:      Pupils: Pupils are equal, round, and reactive to light. Cardiovascular:      Rate and Rhythm: Normal rate and regular rhythm. Pulses: Normal pulses. Pulmonary:      Effort: Pulmonary effort is normal. No respiratory distress. Breath sounds: No wheezing or rhonchi. Abdominal:      General: Abdomen is flat. Bowel sounds are normal. There is no distension. Musculoskeletal:         General: Normal range of motion. Cervical back: Normal range of motion. Skin:     General: Skin is warm and dry. Capillary Refill: Capillary refill takes less than 2 seconds. Neurological:      General: No focal deficit present. Mental Status: She is alert and oriented to person, place, and time. Psychiatric:         Mood and Affect: Mood is anxious. Behavior: Behavior is cooperative. Thought Content: Thought content does not include homicidal or suicidal ideation. Past Medical History:   PMHx   Past Medical History:   Diagnosis Date    Anemia     Anxiety 1978    Arthritis     generalized    Asthma 2006    AV block     2nd degree per hospital in june2013    Blood poisoning 1994    Blood transfusion     12/2003    Breast cancer ([address] 02/29/2012    right    CAD (coronary artery disease)     Cancer ([address] 2007    skin (face) & colon(2003)/ 2/2012 dx of right breast ca(pc)    Carcinoma of breast ([address] 02/29/2012    right arm no b/p or sticks    Cardiac pacemaker 03/10/2014    [employer] dual lead    Chronic cystitis     Chronic respiratory failure (HCC)     2 L/min oxygen at night-time    Colon cancer ([address] 2013    COPD (chronic obstructive pulmonary disease) ([address]     CTS (carpal tunnel syndrome)     Depression     Diverticulitis     H/O 24 hour EKG monitoring 2/28/2014 7/24/13 2/14 complete heart block 7/13No clinically significant arrthymia noted.     H/O cardiac catheterization 10/31/2011, 7/11/2007    10/31/2011-No significant CAD. Cardiolite stress test was false positive-Dr Fernandes Later; 7/11/2007-No CAD, global function intact;    H/O cardiovascular stress test 10/20/2011, 3/23/2010    10/20/2011-Lexiscan- Evid of mild ischemia in Left CX region. EF 70%. Global LVSF normal;    H/O cardiovascular stress test 01/07/2015    EF 77%. Normal Stress Test.    H/O chest pain 04/2005    sees Dr Fernandes Later    H/O Doppler ultrasound 02/20/2008 2/20/2008-CAROTID DOPPLER- Normal carotids bilaterally;    H/O Doppler ultrasound 06/06/2013    CAROTID DOPPLER- there is heterogeneous, irregular atherosclerotic plaque noted in the right internal carotid artery,  doppler flow velocities within the right internal carotid artery are elevated, consistent with a mild, less than 50%stenosis, there is intimal thickening but no significant atherosclerotic plaque noted in the left internal carotid artery, the left carotid are within normal limits    H/O echocardiogram 4/9/2012, 10/20/2011    4/9/2012-LVSF normal EF=>55%. impaired LV relaxation;    H/O echocardiogram 12/31/2014    EF 50-55%. LV shows mild concentric hypertrophy. Mildly dilated left atrium. Mildly dilated right ventricle. Sclerotic but not stenotic aortic valve. Mitral annular calcification. Mild MR, Mild TR, Mild pulm HTN.    H/O urinary incontinence     History of blood transfusion     Hx antineoplastic chemo     Hx of Arterial Doppler ultrasound 07/01/2019    No hemodynamically significant or focal stenosis visualized bilaterally, bilateral lower extremity arteries exhibit diffuse plaque and multiphasic waveforms, unable to obtain bilateral ABIs due to elevated leg pressures >250 mmHg, possibly due to atherosclerosis    Hx of cardiovascular stress test 06/2013    EF 70% abn suggestive of anterolateral ischemia, possible RCA ischmeia, post left ventricular dilation suggestive multivessel CAD.     Hx of echocardiogram 06/2013    EF50%, mild MR and TR, mild pulmonary HTN, mild AS    Hx of echocardiogram 11/01/2021    Left ventricular systolic function is normal, Mild concentric left ventricular hypertrophy Mildly dilated left atrium. Mitral annular calcification is present. Mild tricuspid regurgitation No evidence of any pericardial effusion    Hx of fall     \"last time 2018- due to neuropathy, have to use cane\"    [facility] 7/24/13, 06/2013 7/13-No clinacally significant arrhythmia. 6/13-multiple cycles of wenckebach episodes in addtion to some sinus tach    Hyperlipidemia     Hypertension     Hypothermia 2008    Malignant neoplasm of breast (female) [facility] 2012    Neuropathy     \"have neuropathy of my legs\"    Normocytic normochromic anemia     Obstructive sleep apnea 2008 01- Has CPAP machine but has not used in over a year - states she has not had problems since her pacemaker was placed. Old MI (myocardial infarction)     per pt on 1/15/2019\"had heart attack about a year ago\"    Osteoarthritis     Osteopenia     Osteopenia of multiple sites 9/14/2022    Pneumonia due to COVID-19 virus 03/22/2021    Primary malignant neoplasm of colon ([facility]     S/P cardiac cath 06/2013    no significant CAD false postive stress test    Skin cancer     Super obesity     Umbilical hernia      PSHX:  has a past surgical history that includes Tonsillectomy (1957); Appendectomy (2004); colectomy (2004); Tunneled venous port placement (2004); cyst removal (2003); Breast lumpectomy (2007); Dilation and curettage of uterus (2006); hernia repair (2004); hernia repair (2008); Dental surgery; Breast surgery (02/13/2012); Tunneled venous port placement (02/13/2012); Breast biopsy (2/13/12); Mastectomy, radical (Right, 02/29/2012); pre-malignant / benign skin lesion excision (2/8/2013); lymph node dissection (2/29/2012); Cardiac catheterization (2007 & 2011); A-V cardiac pacemaker insertion (3/10/14);  Cystoscopy (Bilateral, 12/15/14); pacemaker placement; Endoscopy, colon, diagnostic (12/14/2017); Colonoscopy (10-18-13); Colonoscopy (1/19/2016); Colonoscopy (12/14/2017); Colonoscopy (N/A, 1/16/2019); Colonoscopy (01/21/2020); Colonoscopy (N/A, 1/21/2020); Carotid endarterectomy (Right, 12/3/2021); Cystoscopy (Right, 5/15/2022); Dilation and curettage of uterus (N/A, 5/24/2022); Kidney surgery (05/24/2022); Lithotripsy (Right, 8/1/2022); and Lithotripsy (Left, 9/26/2022). Allergies: Allergies   Allergen Reactions    Bactrim [Sulfamethoxazole-Trimethoprim] Anaphylaxis and Hives    Lipitor [Atorvastatin] Shortness Of Breath    Taxol [Paclitaxel] Anaphylaxis and Swelling    Aminoglycosides      Abstracted from [facility] patient chart. Demerol Nausea Only    Neosporin [Bacitracin-Neomycin-Polymyxin] Rash and Other (See Comments)     hotness    Other Rash     Ivory Soap    Talc Other (See Comments)     blisters     Fam HX:  family history includes Arthritis in her mother; Cancer in her brother and father; Diabetes in her father; Heart Disease in her brother, brother, and father; High Blood Pressure in her brother, father, and mother; High Cholesterol in her brother, father, and mother; No Known Problems in her sister; Seizures in her son. Soc HX:   Social History     Socioeconomic History    Marital status:     Number of children: 2    Years of education: 12    Highest education level: 12th grade   Occupational History    Occupation: retired   Tobacco Use    Smoking status: Never    Smokeless tobacco: Never   Vaping Use    Vaping Use: Never used   Substance and Sexual Activity    Alcohol use: No     Comment:           CAFFEINE: 2- 12oz nona daily & 2 cups coffee some nights. Drug use: No    Sexual activity: Not Currently     Partners: Male   Social History Narrative    Do you donate blood or plasma? No    Caffeine intake? Moderate    Advance directive? No    Is blood transfusion acceptable in an emergency?  Yes    Live alone or with others? With others    Able to care for self? Yes    No exercise at this time         Social Determinants of Health     Financial Resource Strain: Medium Risk    Difficulty of Paying Living Expenses: Somewhat hard   Food Insecurity: Food Insecurity Present    Worried About Running Out of Food in the Last Year: Sometimes true    Ran Out of Food in the Last Year: Sometimes true   Transportation Needs: Unmet Transportation Needs    Lack of Transportation (Medical): Yes    Lack of Transportation (Non-Medical): Yes   Physical Activity: Inactive    Days of Exercise per Week: 0 days    Minutes of Exercise per Session: 0 min   Stress: No Stress Concern Present    Feeling of Stress :  Only a little   Social Connections: Socially Isolated    Frequency of Communication with Friends and Family: Once a week    Frequency of Social Gatherings with Friends and Family: Once a week    Attends Yarsanism Services: Never    Active Member of Clubs or Organizations: No    Attends Club or Organization Meetings: Never    Marital Status:    Housing Stability: High Risk    Unable to Pay for Housing in the Last Year: Yes    Number of [place] in the Last Year: 1    Unstable Housing in the Last Year: No       Medications:   Medications:    aspirin        aspirin          Infusions:   PRN Meds:      Labs      CBC:   Recent Labs     02/20/23  1800   WBC 10.9*   HGB 11.2*        BMP:    Recent Labs     02/20/23  1800      K 3.8      CO2 27   BUN 13   CREATININE 1.1   GLUCOSE 115*     Hepatic:   Recent Labs     02/20/23  1800   AST 14*   ALT 8*   BILITOT 0.2   ALKPHOS 150*     Lipids:   Lab Results   Component Value Date/Time    CHOL 267 08/18/2022 02:29 PM    CHOL 182 07/29/2021 12:39 PM    HDL 44 08/18/2022 02:29 PM    TRIG 181 08/18/2022 02:29 PM     Hemoglobin A1C:   Lab Results   Component Value Date/Time    LABA1C 5.8 08/18/2022 02:30 PM     TSH: No results found for: TSH  Troponin:   Lab Results   Component Value Date/Time    TROPONINT <0.010 02/20/2023 06:00 PM    TROPONINT <0.010 01/22/2023 07:28 PM    TROPONINT <0.010 10/13/2022 05:00 PM     Lactic Acid: No results for input(s): LACTA in the last 72 hours. BNP: No results for input(s): PROBNP in the last 72 hours. UA:  Lab Results   Component Value Date/Time    NITRU POSITIVE 01/22/2023 11:03 PM    COLORU YELLOW 01/22/2023 11:03 PM    WBCUA 943 01/22/2023 11:03 PM    RBCUA NONE SEEN 01/22/2023 11:03 PM    MUCUS RARE 11/15/2022 02:00 AM    TRICHOMONAS NONE SEEN 01/22/2023 11:03 PM    YEAST RARE 09/11/2017 07:00 AM    BACTERIA MANY 01/22/2023 11:03 PM    CLARITYU TURBID 01/22/2023 11:03 PM    SPECGRAV 1.010 01/22/2023 11:03 PM    LEUKOCYTESUR LARGE NUMBER OR AMOUNT OBSERVED 01/22/2023 11:03 PM    UROBILINOGEN 0.2 01/22/2023 11:03 PM    BILIRUBINUR NEGATIVE 01/22/2023 11:03 PM    BLOODU LARGE NUMBER OR AMOUNT OBSERVED 01/22/2023 11:03 PM    Jose Bene NEGATIVE 01/22/2023 11:03 PM    AMORPHOUS OCCASIONAL 04/15/2018 05:48 AM     Urine Cultures: No results found for: Marv Fan  Blood Cultures: No results found for: BC  No results found for: BLOODCULT2  Organism:   Lab Results   Component Value Date/Time    ORG ECOL 10/16/2018 02:43 PM       Imaging/Diagnostics Last 24 Hours   CT Head W/O Contrast    Result Date: 2/20/2023  EXAMINATION: CT OF THE HEAD WITHOUT CONTRAST  2/20/2023 6:13 pm TECHNIQUE: CT of the head was performed without the administration of intravenous contrast. Automated exposure control, iterative reconstruction, and/or weight based adjustment of the mA/kV was utilized to reduce the radiation dose to as low as reasonably achievable. COMPARISON: 05/15/2022 HISTORY: ORDERING SYSTEM PROVIDED HISTORY: severe headache TECHNOLOGIST PROVIDED HISTORY: Reason for exam:->severe headache Has a \"code stroke\" or \"stroke alert\" been called? ->No Decision Support Exception - unselect if not a suspected or confirmed emergency medical condition->Emergency Medical Condition (MA) Reason for Exam: severe headache FINDINGS: BRAIN/VENTRICLES: There is no acute intracranial hemorrhage, mass effect or midline shift. No abnormal extra-axial fluid collection. The gray-white differentiation is maintained without evidence of an acute infarct. There is no evidence of hydrocephalus. ORBITS: The visualized portion of the orbits demonstrate no acute abnormality. SINUSES: Minimal scattered paranasal sinus mucoperiosteal thickening. No mastoid effusion. SOFT TISSUES/SKULL:  No acute abnormality of the visualized skull or soft tissues. No acute intracranial abnormality. XR CHEST PORTABLE    Result Date: 2/20/2023  EXAMINATION: ONE XRAY VIEW OF THE CHEST 2/20/2023 5:29 pm COMPARISON: 01/22/2023 HISTORY: ORDERING SYSTEM PROVIDED HISTORY: chest pain TECHNOLOGIST PROVIDED HISTORY: Reason for exam:->chest pain Reason for Exam: chest pain Additional signs and symptoms: none Relevant Medical/Surgical History: CAD, COPD, asthma FINDINGS: Clear lungs. No pneumothorax or pleural effusion. Stable cardiomegaly. Mediastinal contours normal.  Pacemaker unchanged. Surgical clips in the right neck redemonstrated. Stable cardiomegaly. No acute cardiopulmonary abnormality.        Personally reviewed Lab Studies, Imaging, and discussed case with Dr Maura Jordan    Electronically signed by AUSTIN Pino CNP on 2/20/2023 at 9:50 PM

## 2023-02-21 NOTE — ED NOTES
ED TO INPATIENT SBAR HANDOFF    Patient Name: Jaz Beck   :  1950  67 y.o. MRN:  [id number]  Preferred Name  [facility]  ED Room #:  ED17/ED-17  Family/Caregiver Present Yes  Restraints no   Sitter no   Sepsis Risk Score Sepsis Risk Score: 0.85    Situation  Code Status: Prior No additional code details. Allergies: Bactrim [sulfamethoxazole-trimethoprim], Lipitor [atorvastatin], Taxol [paclitaxel], Aminoglycosides, Demerol, Neosporin [bacitracin-neomycin-polymyxin], Other, and Talc  Weight: No data found. Arrived from: home  Chief Complaint:   Chief Complaint   Patient presents with    Hypertension     Reports left side deficit when trying to use her left arm that started on Friday. States she has had a headache and neck pain since last night when her blood pressure was 180/80. Hospital Problem/Diagnosis:  Principal Problem:    Chest pain  Resolved Problems:    * No resolved hospital problems. *    Imaging:   CT Head W/O Contrast   Final Result   No acute intracranial abnormality. XR CHEST PORTABLE   Final Result   Stable cardiomegaly. No acute cardiopulmonary abnormality.          VL DUP CAROTID BILATERAL    (Results Pending)     Abnormal labs:   Abnormal Labs Reviewed   COMPREHENSIVE METABOLIC PANEL - Abnormal; Notable for the following components:       Result Value    Est, Glom Filt Rate 53 (*)     Glucose 115 (*)     ALT 8 (*)     AST 14 (*)     Alkaline Phosphatase 150 (*)     All other components within normal limits   CBC WITH AUTO DIFFERENTIAL - Abnormal; Notable for the following components:    WBC 10.9 (*)     RBC 4.10 (*)     Hemoglobin 11.2 (*)     Hematocrit 35.7 (*)     MCHC 31.4 (*)     Segs Relative 67.6 (*)     Lymphocytes % 22.9 (*)     Monocytes % 5.8 (*)     Immature Neutrophil % 0.6 (*)     All other components within normal limits   BRAIN NATRIURETIC PEPTIDE - Abnormal; Notable for the following components:    Pro-.1 (*)     All other components within normal limits     Critical values: yes     Abnormal Assessment Findings: pt states she has a headache and all over weakness, pt states this is normal for her. Background  History:   Past Medical History:   Diagnosis Date    Anemia     Anxiety 1978    Arthritis     generalized    Asthma 2006    AV block     2nd degree per hospital in [id number]    Blood poisoning 1994    Blood transfusion     12/2003    Breast cancer ([facility] [address] 02/29/2012    right    CAD (coronary artery disease)     Cancer ([facility] [address] 2007    skin (face) & colon(2003)/ 2/2012 dx of right breast ca(pc)    Carcinoma of breast ([facility] [address] 02/29/2012    right arm no b/p or sticks    Cardiac pacemaker 03/10/2014    [employer] dual lead    Chronic cystitis     Chronic respiratory failure (HCC)     2 L/min oxygen at night-time    Colon cancer ([facility] [address] 2013    COPD (chronic obstructive pulmonary disease) ([facility] [address]     CTS (carpal tunnel syndrome)     Depression     Diverticulitis     H/O 24 hour EKG monitoring 2/28/2014 7/24/13 2/14 complete heart block 7/13No clinically significant arrthymia noted. H/O cardiac catheterization 10/31/2011, 7/11/2007    10/31/2011-No significant CAD. Cardiolite stress test was false positive-Dr Radha Riley; 7/11/2007-No CAD, global function intact;    H/O cardiovascular stress test 10/20/2011, 3/23/2010    10/20/2011-Lexiscan- Evid of mild ischemia in Left CX region. EF 70%. Global LVSF normal;    H/O cardiovascular stress test 01/07/2015    EF 77%.    Normal Stress Test.    H/O chest pain 04/2005    sees Dr Radha Riley    H/O Doppler ultrasound 02/20/2008 2/20/2008-CAROTID DOPPLER- Normal carotids bilaterally;    H/O Doppler ultrasound 06/06/2013    CAROTID DOPPLER- there is heterogeneous, irregular atherosclerotic plaque noted in the right internal carotid artery,  doppler flow velocities within the right internal carotid artery are elevated, consistent with a mild, less than 50%stenosis, there is intimal thickening but no significant atherosclerotic plaque noted in the left internal carotid artery, the left carotid are within normal limits    H/O echocardiogram 4/9/2012, 10/20/2011    4/9/2012-LVSF normal EF=>55%. impaired LV relaxation;    H/O echocardiogram 12/31/2014    EF 50-55%. LV shows mild concentric hypertrophy. Mildly dilated left atrium. Mildly dilated right ventricle. Sclerotic but not stenotic aortic valve. Mitral annular calcification. Mild MR, Mild TR, Mild pulm HTN.    H/O urinary incontinence     History of blood transfusion     Hx antineoplastic chemo     Hx of Arterial Doppler ultrasound 07/01/2019    No hemodynamically significant or focal stenosis visualized bilaterally, bilateral lower extremity arteries exhibit diffuse plaque and multiphasic waveforms, unable to obtain bilateral ABIs due to elevated leg pressures >250 mmHg, possibly due to atherosclerosis    Hx of cardiovascular stress test 06/2013    EF 70% abn suggestive of anterolateral ischemia, possible RCA ischmeia, post left ventricular dilation suggestive multivessel CAD. Hx of echocardiogram 06/2013    EF50%, mild MR and TR, mild pulmonary HTN, mild AS    Hx of echocardiogram 11/01/2021    Left ventricular systolic function is normal, Mild concentric left ventricular hypertrophy Mildly dilated left atrium. Mitral annular calcification is present. Mild tricuspid regurgitation No evidence of any pericardial effusion    Hx of fall     \"last time 2018- due to neuropathy, have to use cane\"    [facility] 7/24/13, 06/2013 7/13-No clinacally significant arrhythmia.  6/13-multiple cycles of wenckebach episodes in addtion to some sinus tach    Hyperlipidemia     Hypertension     Hypothermia 2008    Malignant neoplasm of breast (female) ([facility] [address] 2012    Neuropathy     \"have neuropathy of my legs\"    Normocytic normochromic anemia     Obstructive sleep apnea 2008 01- Has CPAP machine but has not used in over a year - states she has not had problems since her pacemaker was placed. Old MI (myocardial infarction)     per pt on 1/15/2019\"had heart attack about a year ago\"    Osteoarthritis     Osteopenia     Osteopenia of multiple sites 9/14/2022    Pneumonia due to COVID-19 virus 03/22/2021    Primary malignant neoplasm of colon ([address]     S/P cardiac cath 06/2013    no significant CAD false postive stress test    Skin cancer     Super obesity     Umbilical hernia        Assessment    Vitals/MEWS: MEWS Score: 1  Level of Consciousness: Alert (0)   Vitals:    02/20/23 1830 02/20/23 1845 02/20/23 2129 02/20/23 2302   BP: (!) 153/60  (!) 133/49 (!) 132/55   Pulse:   72 75   Resp:   16 17   Temp:   97.7 °F (36.5 °C) 97.9 °F (36.6 °C)   TempSrc:   Oral Oral   SpO2: 91% 92% 93% 94%     FiO2 (%):   O2 Flow Rate:   room air   Cardiac Rhythm: NSR  Pain Assessment: 5 [x] Verbal [] Jenene Alken Scale  Pain Scale: Pain Assessment  Pain Level: 10  Pain Location: Chest  Last documented pain score (0-10 scale) Pain Level: 10  Last documented pain medication administered: morphine  Mental Status: oriented, alert, coherent, logical, thought processes intact, and able to concentrate and follow conversation  NIH Score: NIH     C-SSRS: Risk of Suicide: No Risk  Bedside swallow:    [place]a Coma Scale (GCS): Active LDA's:   Peripheral IV 02/20/23 Left Antecubital (Active)   Site Assessment Clean, dry & intact 02/20/23 1802     PO Status: Regular  Pertinent or High Risk Medications/Drips: no   If Yes, please provide details:   Pending Blood Product Administration: no     You may also review the ED PT Care Timeline found under the Summary Nursing Index tab. Recommendation    Pending orders inpatient orders  Plan for Discharge (if known):    Additional Comments:    If any further questions, please call Sending RN at [phone]    Electronically signed by: Electronically signed by Williams Pritchard RN on 2/20/2023 at 11:05 PM         Williams Pritchard RN  02/20/23 [id number]       [facility] Stu Dumont, [address] [place] [address]  02/20/23 [id number]

## 2023-02-21 NOTE — PROGRESS NOTES
Educated patient on need for left and right heart catheterization. Procedure was explained in detail as well as risks and benefits. Patient voiced understanding and was agreeable to undergoing procedure. Consent was obtained. Plan for left and right heart catheterization tomorrow.  Patient to be NPO after midnight    Taya Bartlett PA-C

## 2023-02-21 NOTE — CARE COORDINATION
ACM chart review. No outreach completed this date - deferred per protocol. [facility] received notificaiton pt admitted from ED to Obs at [facility] on 2/20/23. No plan for d/c at this time.      [facility] notified Remote Patient Monitoring team to pause program.

## 2023-02-21 NOTE — PROGRESS NOTES
Patient is refusing the stress test at this time. She states that she has not been able to handle them in the past. Nuclear medicine and Dr Mc Loveless updated at this time.

## 2023-02-21 NOTE — PROGRESS NOTES
LOVENOX PROPHYLAXIS EVALUATION    Wt Readings from Last 3 Encounters:   02/21/23 250 lb (113.4 kg)   01/22/23 242 lb (109.8 kg)   01/09/23 248 lb (112.5 kg)       Estimated Creatinine Clearance: 54 mL/min (based on SCr of 1.1 mg/dL).   Recent Labs     02/20/23  1800   BUN 13   CREATININE 1.1      HGB 11.2*   HCT 35.7*       Weight Range: 101-150.9 kg    CRCL = 30 or greater    50.9 kg   and below     .9  kg   101-150.9 kg   151-174.9  kg   175 kg  or greater     30mg subq  daily     40mg subq daily    (or 30mg subq BID orthopedic)     30mg subq  BID   40mg subq  BID   60mg subq BID       Per P/T protocol for appropriate subq anticoagulation by weight and CRCL change to:    Enoxaparin 30mg subq BID      Venkatesh Solis, [facility]  4:33 AM  02/21/23

## 2023-02-22 ENCOUNTER — APPOINTMENT (OUTPATIENT)
Dept: MRI IMAGING | Age: 73
DRG: 287 | End: 2023-02-22
Payer: MEDICARE

## 2023-02-22 PROBLEM — R07.9 CHEST PAIN: Status: ACTIVE | Noted: 2023-02-22

## 2023-02-22 LAB
ANION GAP SERPL CALCULATED.3IONS-SCNC: 10 MMOL/L (ref 4–16)
BUN SERPL-MCNC: 16 MG/DL (ref 6–23)
CALCIUM SERPL-MCNC: 9.2 MG/DL (ref 8.3–10.6)
CHLORIDE BLD-SCNC: 101 MMOL/L (ref 99–110)
CO2: 26 MMOL/L (ref 21–32)
CREAT SERPL-MCNC: 0.7 MG/DL (ref 0.6–1.1)
EKG ATRIAL RATE: 60 BPM
EKG ATRIAL RATE: 75 BPM
EKG DIAGNOSIS: NORMAL
EKG DIAGNOSIS: NORMAL
EKG P AXIS: 2 DEGREES
EKG P AXIS: 50 DEGREES
EKG P-R INTERVAL: 186 MS
EKG P-R INTERVAL: 188 MS
EKG Q-T INTERVAL: 458 MS
EKG Q-T INTERVAL: 496 MS
EKG QRS DURATION: 174 MS
EKG QRS DURATION: 178 MS
EKG QTC CALCULATION (BAZETT): 496 MS
EKG QTC CALCULATION (BAZETT): 511 MS
EKG R AXIS: -63 DEGREES
EKG R AXIS: -64 DEGREES
EKG T AXIS: 101 DEGREES
EKG T AXIS: 105 DEGREES
EKG VENTRICULAR RATE: 60 BPM
EKG VENTRICULAR RATE: 75 BPM
FOLATE SERPL-MCNC: 15.3 NG/ML (ref 3.1–17.5)
GFR SERPL CREATININE-BSD FRML MDRD: >60 ML/MIN/1.73M2
GLUCOSE SERPL-MCNC: 107 MG/DL (ref 70–99)
HCT VFR BLD CALC: 34.6 % (ref 37–47)
HEMOGLOBIN: 10.7 GM/DL (ref 12.5–16)
MCH RBC QN AUTO: 26.8 PG (ref 27–31)
MCHC RBC AUTO-ENTMCNC: 30.9 % (ref 32–36)
MCV RBC AUTO: 86.7 FL (ref 78–100)
PDW BLD-RTO: 14.5 % (ref 11.7–14.9)
PLATELET # BLD: 247 K/CU MM (ref 140–440)
PMV BLD AUTO: 9.7 FL (ref 7.5–11.1)
POTASSIUM SERPL-SCNC: 4.3 MMOL/L (ref 3.5–5.1)
RBC # BLD: 3.99 M/CU MM (ref 4.2–5.4)
SODIUM BLD-SCNC: 137 MMOL/L (ref 135–145)
VITAMIN B-12: 386.7 PG/ML (ref 211–911)
WBC # BLD: 10.5 K/CU MM (ref 4–10.5)

## 2023-02-22 PROCEDURE — 70551 MRI BRAIN STEM W/O DYE: CPT

## 2023-02-22 PROCEDURE — C1894 INTRO/SHEATH, NON-LASER: HCPCS

## 2023-02-22 PROCEDURE — B2151ZZ FLUOROSCOPY OF LEFT HEART USING LOW OSMOLAR CONTRAST: ICD-10-PCS | Performed by: INTERNAL MEDICINE

## 2023-02-22 PROCEDURE — 99233 SBSQ HOSP IP/OBS HIGH 50: CPT | Performed by: NURSE PRACTITIONER

## 2023-02-22 PROCEDURE — 2709999900 HC NON-CHARGEABLE SUPPLY

## 2023-02-22 PROCEDURE — 6360000002 HC RX W HCPCS

## 2023-02-22 PROCEDURE — APPSS30 APP SPLIT SHARED TIME 16-30 MINUTES

## 2023-02-22 PROCEDURE — 93010 ELECTROCARDIOGRAM REPORT: CPT | Performed by: INTERNAL MEDICINE

## 2023-02-22 PROCEDURE — C1769 GUIDE WIRE: HCPCS

## 2023-02-22 PROCEDURE — 6370000000 HC RX 637 (ALT 250 FOR IP): Performed by: NURSE PRACTITIONER

## 2023-02-22 PROCEDURE — 2500000003 HC RX 250 WO HCPCS

## 2023-02-22 PROCEDURE — 4A023N8 MEASUREMENT OF CARDIAC SAMPLING AND PRESSURE, BILATERAL, PERCUTANEOUS APPROACH: ICD-10-PCS | Performed by: INTERNAL MEDICINE

## 2023-02-22 PROCEDURE — 70544 MR ANGIOGRAPHY HEAD W/O DYE: CPT

## 2023-02-22 PROCEDURE — 6370000000 HC RX 637 (ALT 250 FOR IP): Performed by: INTERNAL MEDICINE

## 2023-02-22 PROCEDURE — 93460 R&L HRT ART/VENTRICLE ANGIO: CPT

## 2023-02-22 PROCEDURE — 6360000004 HC RX CONTRAST MEDICATION

## 2023-02-22 PROCEDURE — 2580000003 HC RX 258: Performed by: NURSE PRACTITIONER

## 2023-02-22 PROCEDURE — C1887 CATHETER, GUIDING: HCPCS

## 2023-02-22 PROCEDURE — 85027 COMPLETE CBC AUTOMATED: CPT

## 2023-02-22 PROCEDURE — B2111ZZ FLUOROSCOPY OF MULTIPLE CORONARY ARTERIES USING LOW OSMOLAR CONTRAST: ICD-10-PCS | Performed by: INTERNAL MEDICINE

## 2023-02-22 PROCEDURE — 2580000003 HC RX 258: Performed by: INTERNAL MEDICINE

## 2023-02-22 PROCEDURE — 94640 AIRWAY INHALATION TREATMENT: CPT

## 2023-02-22 PROCEDURE — G0378 HOSPITAL OBSERVATION PER HR: HCPCS

## 2023-02-22 PROCEDURE — 99233 SBSQ HOSP IP/OBS HIGH 50: CPT | Performed by: INTERNAL MEDICINE

## 2023-02-22 PROCEDURE — 94761 N-INVAS EAR/PLS OXIMETRY MLT: CPT

## 2023-02-22 PROCEDURE — 36415 COLL VENOUS BLD VENIPUNCTURE: CPT

## 2023-02-22 PROCEDURE — C1760 CLOSURE DEV, VASC: HCPCS

## 2023-02-22 PROCEDURE — C1751 CATH, INF, PER/CENT/MIDLINE: HCPCS

## 2023-02-22 PROCEDURE — 1200000000 HC SEMI PRIVATE

## 2023-02-22 PROCEDURE — 93460 R&L HRT ART/VENTRICLE ANGIO: CPT | Performed by: INTERNAL MEDICINE

## 2023-02-22 PROCEDURE — 80048 BASIC METABOLIC PNL TOTAL CA: CPT

## 2023-02-22 PROCEDURE — 6370000000 HC RX 637 (ALT 250 FOR IP)

## 2023-02-22 RX ORDER — 0.9 % SODIUM CHLORIDE 0.9 %
500 INTRAVENOUS SOLUTION INTRAVENOUS ONCE
Status: COMPLETED | OUTPATIENT
Start: 2023-02-22 | End: 2023-02-22

## 2023-02-22 RX ORDER — ACETAMINOPHEN 325 MG/1
650 TABLET ORAL EVERY 4 HOURS PRN
Status: DISCONTINUED | OUTPATIENT
Start: 2023-02-22 | End: 2023-02-24 | Stop reason: HOSPADM

## 2023-02-22 RX ORDER — VITAMIN B COMPLEX
1000 TABLET ORAL DAILY
Status: DISCONTINUED | OUTPATIENT
Start: 2023-02-22 | End: 2023-02-24 | Stop reason: HOSPADM

## 2023-02-22 RX ORDER — ONDANSETRON 2 MG/ML
4 INJECTION INTRAMUSCULAR; INTRAVENOUS EVERY 6 HOURS PRN
Status: DISCONTINUED | OUTPATIENT
Start: 2023-02-22 | End: 2023-02-24 | Stop reason: HOSPADM

## 2023-02-22 RX ORDER — HYDRALAZINE HYDROCHLORIDE 20 MG/ML
10 INJECTION INTRAMUSCULAR; INTRAVENOUS EVERY 10 MIN PRN
Status: DISCONTINUED | OUTPATIENT
Start: 2023-02-22 | End: 2023-02-24 | Stop reason: HOSPADM

## 2023-02-22 RX ORDER — SODIUM CHLORIDE 0.9 % (FLUSH) 0.9 %
5-40 SYRINGE (ML) INJECTION PRN
Status: DISCONTINUED | OUTPATIENT
Start: 2023-02-22 | End: 2023-02-24 | Stop reason: HOSPADM

## 2023-02-22 RX ORDER — ROSUVASTATIN CALCIUM 20 MG/1
20 TABLET, COATED ORAL NIGHTLY
Status: DISCONTINUED | OUTPATIENT
Start: 2023-02-22 | End: 2023-02-24 | Stop reason: HOSPADM

## 2023-02-22 RX ORDER — SODIUM CHLORIDE 9 MG/ML
INJECTION, SOLUTION INTRAVENOUS PRN
Status: DISCONTINUED | OUTPATIENT
Start: 2023-02-22 | End: 2023-02-24 | Stop reason: HOSPADM

## 2023-02-22 RX ORDER — SODIUM CHLORIDE 0.9 % (FLUSH) 0.9 %
5-40 SYRINGE (ML) INJECTION EVERY 12 HOURS SCHEDULED
Status: DISCONTINUED | OUTPATIENT
Start: 2023-02-22 | End: 2023-02-24 | Stop reason: HOSPADM

## 2023-02-22 RX ADMIN — HYDROXYZINE PAMOATE 25 MG: 25 CAPSULE ORAL at 08:52

## 2023-02-22 RX ADMIN — Medication 1000 UNITS: at 16:19

## 2023-02-22 RX ADMIN — FLUTICASONE PROPIONATE 2 PUFF: 110 AEROSOL, METERED RESPIRATORY (INHALATION) at 08:44

## 2023-02-22 RX ADMIN — METOPROLOL TARTRATE 25 MG: 25 TABLET, FILM COATED ORAL at 20:59

## 2023-02-22 RX ADMIN — SODIUM CHLORIDE, PRESERVATIVE FREE 10 ML: 5 INJECTION INTRAVENOUS at 09:00

## 2023-02-22 RX ADMIN — FLUTICASONE PROPIONATE 2 PUFF: 110 AEROSOL, METERED RESPIRATORY (INHALATION) at 20:27

## 2023-02-22 RX ADMIN — ASPIRIN 81 MG 81 MG: 81 TABLET ORAL at 08:52

## 2023-02-22 RX ADMIN — SODIUM CHLORIDE 500 ML: 9 INJECTION, SOLUTION INTRAVENOUS at 16:25

## 2023-02-22 RX ADMIN — METOPROLOL TARTRATE 25 MG: 25 TABLET, FILM COATED ORAL at 12:37

## 2023-02-22 RX ADMIN — SERTRALINE HYDROCHLORIDE 50 MG: 50 TABLET ORAL at 08:52

## 2023-02-22 RX ADMIN — ROSUVASTATIN CALCIUM 20 MG: 20 TABLET, COATED ORAL at 20:58

## 2023-02-22 RX ADMIN — SODIUM CHLORIDE, PRESERVATIVE FREE 10 ML: 5 INJECTION INTRAVENOUS at 20:59

## 2023-02-22 ASSESSMENT — PAIN SCALES - GENERAL
PAINLEVEL_OUTOF10: 0
PAINLEVEL_OUTOF10: 0

## 2023-02-22 NOTE — PROGRESS NOTES
02/22/23 1410   Encounter Summary   Encounter Overview/Reason  Attempted Encounter   Service Provided For: Patient not available   Last Encounter  02/22/23  (attempted visit. Patient was in cath. Lab at the time of visit. [address] will continue to follow up for spiritual support.  Pastoral presence provided to significant other.)   Begin Time 1400   End Time  1415   Total Time Calculated 15 min   Assessment/Intervention/Outcome   Assessment Unable to assess   Plan and Referrals   Plan/Referrals Continue to visit, (comment)

## 2023-02-22 NOTE — CONSULTS
HEMATOLOGY ONCOLOGY  Consultation Report    REASON FOR CONSULT    Planning for TAVR, ? Status of malignancy    CHIEF COMPLAINT    Chief Complaint   Patient presents with    Hypertension     Reports left side deficit when trying to use her left arm that started on Friday. States she has had a headache and neck pain since last night when her blood pressure was 180/80. HISTORY OF PRESENT ILLNESS   Mich Vallejo is a 67 y.o. female with past medical history significant for colon cancer diagnosed in 2004 and breast cancer 2012 s/p right  mastectomy, adjuvant chemotherapy, and ten years of aromatase inhibitor. For her history of colon cancer she has been followed by Dr. Margery Romberg for colonoscopy with last 1/2020 with 3 mm polyps and otherwise without suspicious lesions. She presented to the ED with chest pain. She has severe aortic stenosis with plan for heart catheterization by cardiology with plan for possible TAVR. On exam today she is tearful reporting frustration about hospitalization. She denies chest pain, shortness of breath presently. LE edema improved.      PAST MEDICAL HISTORY    Past Medical History:   Diagnosis Date    Anemia     Anxiety 1978    Arthritis     generalized    Asthma 2006    AV block     2nd degree per hospital in june2013    Blood poisoning 1994    Blood transfusion     12/2003    Breast cancer ([address] 02/29/2012    right    CAD (coronary artery disease)     Cancer ([address] 2007    skin (face) & colon(2003)/ 2/2012 dx of right breast ca(pc)    Carcinoma of breast ([address] 02/29/2012    right arm no b/p or sticks    Cardiac pacemaker 03/10/2014    Medtronic dual lead    Chronic cystitis     Chronic respiratory failure (HCC)     2 L/min oxygen at night-time    Colon cancer ([address] 2013    COPD (chronic obstructive pulmonary disease) ([address]     CTS (carpal tunnel syndrome)     Depression     Diverticulitis     H/O 24 hour EKG monitoring 2/28/2014 7/24/13 2/14 complete heart block 7/13No clinically significant arrthymia noted. H/O cardiac catheterization 10/31/2011, 7/11/2007    10/31/2011-No significant CAD. Cardiolite stress test was false positive-Dr Aamir Wilkinson; 7/11/2007-No CAD, global function intact;    H/O cardiovascular stress test 10/20/2011, 3/23/2010    10/20/2011-Lexiscan- Evid of mild ischemia in Left CX region. EF 70%. Global LVSF normal;    H/O cardiovascular stress test 01/07/2015    EF 77%. Normal Stress Test.    H/O chest pain 04/2005    sees Dr Aamir Wilkinson    H/O Doppler ultrasound 02/20/2008 2/20/2008-CAROTID DOPPLER- Normal carotids bilaterally;    H/O Doppler ultrasound 06/06/2013    CAROTID DOPPLER- there is heterogeneous, irregular atherosclerotic plaque noted in the right internal carotid artery,  doppler flow velocities within the right internal carotid artery are elevated, consistent with a mild, less than 50%stenosis, there is intimal thickening but no significant atherosclerotic plaque noted in the left internal carotid artery, the left carotid are within normal limits    H/O echocardiogram 4/9/2012, 10/20/2011    4/9/2012-LVSF normal EF=>55%. impaired LV relaxation;    H/O echocardiogram 12/31/2014    EF 50-55%. LV shows mild concentric hypertrophy. Mildly dilated left atrium. Mildly dilated right ventricle. Sclerotic but not stenotic aortic valve. Mitral annular calcification.   Mild MR, Mild TR, Mild pulm HTN.    H/O urinary incontinence     History of blood transfusion     Hx antineoplastic chemo     Hx of Arterial Doppler ultrasound 07/01/2019    No hemodynamically significant or focal stenosis visualized bilaterally, bilateral lower extremity arteries exhibit diffuse plaque and multiphasic waveforms, unable to obtain bilateral ABIs due to elevated leg pressures >250 mmHg, possibly due to atherosclerosis    Hx of cardiovascular stress test 06/2013    EF 70% abn suggestive of anterolateral ischemia, possible RCA ischmeia, post left ventricular dilation suggestive multivessel CAD.    Hx of echocardiogram 06/2013    EF50%, mild MR and TR, mild pulmonary HTN, mild AS    Hx of echocardiogram 11/01/2021    Left ventricular systolic function is normal, Mild concentric left ventricular hypertrophy Mildly dilated left atrium.  Mitral annular calcification is present.  Mild tricuspid regurgitation No evidence of any pericardial effusion    Hx of fall     \"last time 2018- due to neuropathy, have to use cane\"    HX OTHER MEDICAL 7/24/13, 06/2013 7/13-No clinacally significant arrhythmia. 6/13-multiple cycles of wenckebach episodes in addtion to some sinus tach    Hyperlipidemia     Hypertension     Hypothermia 2008    Malignant neoplasm of breast (female) ([facility] 2012    Neuropathy     \"have neuropathy of my legs\"    Normocytic normochromic anemia     Obstructive sleep apnea 2008 01- Has CPAP machine but has not used in over a year - states she has not had problems since her pacemaker was placed.    Old MI (myocardial infarction)     per pt on 1/15/2019\"had heart attack about a year ago\"    Osteoarthritis     Osteopenia     Osteopenia of multiple sites 9/14/2022    Pneumonia due to COVID-19 virus 03/22/2021    Primary malignant neoplasm of colon (HCC)     S/P cardiac cath 06/2013    no significant CAD false postive stress test    Skin cancer     Super obesity     Umbilical hernia        SURGICAL HISTORY    Past Surgical History:   Procedure Laterality Date    A-V CARDIAC PACEMAKER INSERTION  3/10/14     Medtronic.   Model:  A2DR01 Medtronic  Serial:  [id number] dual chamber pacemaker    APPENDECTOMY  2004    BREAST BIOPSY  2/13/12    BREAST LUMPECTOMY  2007    right     BREAST SURGERY  02/13/2012    rt lesion biopsy     CARDIAC CATHETERIZATION  2007 & 2011    CAROTID ENDARTERECTOMY Right 12/3/2021    RIGHT CAROTID ENDARTERECTOMY WITH PATCH performed by Niko Francisco MD at [facility] OR    COLECTOMY  2004    Colon cancer    COLONOSCOPY  10-18-13    polyp    COLONOSCOPY   1/19/2016    internal hemorrhoids, diverticulosis, 1.5 cm sessile polyp, 8 mm polyp found in rectum. COLONOSCOPY  12/14/2017    1.5cm residual polyp, 5mm polyps in blind sigmoid loop x3, Sigmoid divertics, Internal grade 1 hemorrhoids    COLONOSCOPY N/A 1/16/2019    COLONOSCOPY W/ ENDOSCOPIC MUCOSAL RESECTION WITH ELEVIEW 5ML INJECTION AND POLYPECTOMY OF TIP OF BLIND RECTOSIGMOID STUMP AND CAUTERIZATION WITH ERBE PROBE, CLIPPING X2 performed by Manuel Cedillo MD at [address] [place] [address]  01/21/2020    pan divertics/ 4 polyps/ sm int hem, S/P partial sigmoid resection w/ end to side anastamosis, repeat in 3 years    COLONOSCOPY N/A 1/21/2020    COLONOSCOPY POLYPECTOMY SNARE/COLD BIOPSY performed by Manuel Cedillo MD at [place] [address]  2003    back    CYSTOSCOPY Bilateral 12/15/14    CYSTOSCOPY Right 5/15/2022    CYSTOSCOPY URETERAL STENT INSERTION performed by David Kwan MD at [address] [place] [address]      front right tooth and root canal w/ brass bolt    DILATION AND CURETTAGE OF UTERUS  2006    Needed a camera to find my uterus. DILATION AND CURETTAGE OF UTERUS N/A 5/24/2022    DILATATION AND CURETTAGE, CULTURES OF UTEREUS performed by Robert Underwood MD at [address] [place] [address], COLON, DIAGNOSTIC  12/14/2017    Small hiatal hernia    HERNIA REPAIR  2004    Umb hernia    HERNIA REPAIR  2008    Inc hernia    KIDNEY SURGERY  05/24/2022    stent placed on right side    LITHOTRIPSY Right 8/1/2022    RIGHT CYSTOSCOPY URETEROSCOPY STONE MANIPULATION WITH HOLIUM LASER LITHOTRIPSY STENT REPLACEMENT performed by Ernestine Norris MD at [address]    LITHOTRIPSY Left 9/26/2022    LEFT CYSTOSCOPY URETEROSCOPY RETROGRADE PYELOGRAM STONE [facility] LITHOTRIPSY POSSIBLE [address] performed by Ernestine Norris MD at [place] [address]  2/29/2012    Right axillary-3 sentinel nodes were positive out of 9.     MASTECTOMY, RADICAL Right 02/29/2012    w/ sentinal node disection-Dr West    PACEMAKER PLACEMENT      PRE-MALIGNANT / BENIGN SKIN LESION EXCISION  2/8/2013    right leg X2-Dr West    TONSILLECTOMY  1957    TUNNELED VENOUS PORT PLACEMENT  2004    TUNNELED VENOUS PORT PLACEMENT  02/13/2012    removal of mediport       FAMILY HISTORY    Family History   Problem Relation Age of Onset    Arthritis Mother         OA, RA    High Cholesterol Mother     High Blood Pressure Mother     Cancer Father         skin and leukemia    High Blood Pressure Father     High Cholesterol Father     Diabetes Father     Heart Disease Father     Heart Disease Brother     High Cholesterol Brother     High Blood Pressure Brother     Heart Disease Brother     No Known Problems Sister     Seizures Son     Cancer Brother         skin cancer    Breast Cancer Neg Hx     Ovarian Cancer Neg Hx        SOCIAL HISTORY    Social History     Socioeconomic History    Marital status:     Number of children: 2    Years of education: 12    Highest education level: 12th grade   Occupational History    Occupation: retired   Tobacco Use    Smoking status: Never    Smokeless tobacco: Never   Vaping Use    Vaping Use: Never used   Substance and Sexual Activity    Alcohol use: No     Comment:           CAFFEINE: 2- 12oz nona daily & 2 cups coffee some nights. Drug use: No    Sexual activity: Not Currently     Partners: Male   Social History Narrative    Do you donate blood or plasma? No    Caffeine intake? Moderate    Advance directive? No    Is blood transfusion acceptable in an emergency? Yes    Live alone or with others? With others    Able to care for self?  Yes    No exercise at this time         Social Determinants of Health     Financial Resource Strain: Medium Risk    Difficulty of Paying Living Expenses: Somewhat hard   Food Insecurity: Food Insecurity Present    Worried About [address] in the Last Year: Sometimes true    Ran Out of Food in the Last Year: Sometimes true Transportation Needs: Unmet Transportation Needs    Lack of Transportation (Medical): Yes    Lack of Transportation (Non-Medical): Yes   Physical Activity: Inactive    Days of Exercise per Week: 0 days    Minutes of Exercise per Session: 0 min   Stress: No Stress Concern Present    Feeling of Stress : Only a little   Social Connections: Socially Isolated    Frequency of Communication with Friends and Family: Once a week    Frequency of Social Gatherings with Friends and Family: Once a week    Attends Jewish Services: Never    Active Member of Clubs or Organizations: No    Attends Club or Organization Meetings: Never    Marital Status:    Housing Stability: High Risk    Unable to Pay for Housing in the Last Year: Yes    Number of [place] in the Last Year: 1    Unstable Housing in the Last Year: No       REVIEW OF SYSTEMS    Constitutional:  Denies fever, chills, loss of appetite, weight loss, tiredness, fatigue +weakness   HEENT:  Denies swelling of neck glands  Respiratory:  Denies cough, shortness of breath or hemoptysis  Cardiovascular:  Denies chest pain, palpitations or swelling   GI:  Denies abdominal pain, nausea, vomiting, constipation or diarrhea   Musculoskeletal:  Denies back pain   Skin:  Denies rash   Neurologic:  Denies headache, focal weakness or sensory changes   All systems negative except as marked.      PHYSICAL EXAM    Vitals: BP (!) 158/62   Pulse 62   Temp 97.6 °F (36.4 °C) (Oral)   Resp 14   Ht 5' 2\" (1.575 m)   Wt 249 lb (112.9 kg)   LMP 01/15/1999   SpO2 98%   BMI 45.54 kg/m²   CONSTITUTIONAL: awake, alert, cooperative, NAD   EYES: EOM grossly intact, sclera clear and conjunctiva normal  ENT: Normocephalic, without obvious abnormality, atraumatic  NECK: supple, symmetrical  HEMATOLOGIC/LYMPHATIC: no cervical, supraclavicular or axillary lymphadenopathy   LUNGS: no increased work of breathing and clear to auscultation   CARDIOVASCULAR: regular rate and rhythm, normal S1 and S2, +Murmur  ABDOMEN: normal bowel sounds x 4, soft, non-distended, non-tender, no masses palpated, no hepatosplenomgaly   MUSCULOSKELETAL: full range of motion noted, tone is normal  NEUROLOGIC: awake, alert, oriented to name, place and time. Motor skills grossly intact. SKIN: Normal skin color, texture, turgor and no jaundice. Skin appears intact   EXTREMITIES: +LE edema  LABORATORY RESULTS  CBC:   Recent Labs     02/20/23  1800 02/21/23  0600 02/22/23  0423   WBC 10.9* 9.6 10.5   HGB 11.2* 10.3* 10.7*    245 247     BMP:    Recent Labs     02/20/23  1800 02/21/23  0600 02/22/23  0423    140 137   K 3.8 4.2 4.3    105 101   CO2 27 25 26   BUN 13 18 16   CREATININE 1.1 0.9 0.7   GLUCOSE 115* 105* 107*     Hepatic:   Recent Labs     02/20/23  1800 02/21/23  0600   AST 14* 11*   ALT 8* 7*   BILITOT 0.2 0.2   ALKPHOS 150* 133*     INR:   Recent Labs     02/21/23  0600   INR 0.98       RADIOLOGY REPORTS  MRI BRAIN WO CONTRAST   Final Result   No acute ischemia. Mild-to-moderate chronic microvascular disease within the periventricular   white matter. MRA HEAD WO CONTRAST   Final Result   Unremarkable exam      RECOMMENDATIONS:   Unavailable         VL DUP CAROTID BILATERAL   Final Result   The right internal carotid artery demonstrates 0-50% stenosis. The left internal carotid artery demonstrates 0-50% stenosis. Bilateral vertebral arteries are patent with flow in the normal direction. CT Head W/O Contrast   Final Result   No acute intracranial abnormality. XR CHEST PORTABLE   Final Result   Stable cardiomegaly. No acute cardiopulmonary abnormality. ASSESSMENT/RECOMMENDATION    History of colon cancer 2004, breast cancer 2012 s/p mastectomy, adjuvant chemotherapy and 10 years of AI. She is in remission. Previous diagnosis of cancer does not impact decision making regarding future surgeries with her duration of remission.  Life expectancy is more impacted by cardiac issues at this point. We recommend to proceed with TAVR if recommended. Emotional distress- has been seen by psychiatry,  contacted for visit today    We will follow the patient. Thank you for allowing me to participate in the care of this very pleasant patient. I have independently evaluated and examined this patient today. I have reviewed radiologic and biochemical tests on this patient. Management Plan is developed mutually with Chaitanya Perez NP.  I have reviewed above note and agree with assessment and plan

## 2023-02-22 NOTE — PROGRESS NOTES
Neurology Service Progress Note  [address]   Patient Name: Paulette Dacosta  : 1950        Subjective:   CC: Left sided weakness, numbness  Chart was reviewed in detail. Patient was seen and assessed. Discussed MRI results and that there is no evidence for stroke or intracranial stenosis on exam. She underwent cardiac cath today with no evidence for significant aortic stenosis. Past Medical History:   Diagnosis Date    Anemia     Anxiety     Arthritis     generalized    Asthma     AV block     2nd degree per hospital in 2013    Blood poisoning     Blood transfusion     2003    Breast cancer ([facility] 2012    right    CAD (coronary artery disease)     Cancer ([facility] [address]     skin (face) & colon(2012 dx of right breast ca(pc)    Carcinoma of breast ([facility] [address] 2012    right arm no b/p or sticks    Cardiac pacemaker 03/10/2014    Medtronic dual lead    Chronic cystitis     Chronic respiratory failure (HCC)     2 L/min oxygen at night-time    Colon cancer ([facility] [address]     COPD (chronic obstructive pulmonary disease) ([facility] [address]     CTS (carpal tunnel syndrome)     Depression     Diverticulitis     H/O 24 hour EKG monitoring 2014 complete heart block No clinically significant arrthymia noted. H/O cardiac catheterization 10/31/2011, 2007    10/31/2011-No significant CAD. Cardiolite stress test was false positive-Dr Aj Sousa; 2007-No CAD, global function intact;    H/O cardiovascular stress test 10/20/2011, 3/23/2010    10/20/2011-Lexiscan- Evid of mild ischemia in Left CX region. EF 70%. Global LVSF normal;    H/O cardiovascular stress test 2015    EF 77%.    Normal Stress Test.    H/O chest pain 2005    sees Dr Aj Sousa    H/O Doppler ultrasound 2008-CAROTID DOPPLER- Normal carotids bilaterally;    H/O Doppler ultrasound 2013    CAROTID DOPPLER- there is heterogeneous, irregular atherosclerotic plaque noted in the right internal carotid artery,  doppler flow velocities within the right internal carotid artery are elevated, consistent with a mild, less than 50%stenosis, there is intimal thickening but no significant atherosclerotic plaque noted in the left internal carotid artery, the left carotid are within normal limits    H/O echocardiogram 4/9/2012, 10/20/2011    4/9/2012-LVSF normal EF=>55%. impaired LV relaxation;    H/O echocardiogram 12/31/2014    EF 50-55%. LV shows mild concentric hypertrophy. Mildly dilated left atrium. Mildly dilated right ventricle. Sclerotic but not stenotic aortic valve. Mitral annular calcification. Mild MR, Mild TR, Mild pulm HTN.    H/O urinary incontinence     History of blood transfusion     Hx antineoplastic chemo     Hx of Arterial Doppler ultrasound 07/01/2019    No hemodynamically significant or focal stenosis visualized bilaterally, bilateral lower extremity arteries exhibit diffuse plaque and multiphasic waveforms, unable to obtain bilateral ABIs due to elevated leg pressures >250 mmHg, possibly due to atherosclerosis    Hx of cardiovascular stress test 06/2013    EF 70% abn suggestive of anterolateral ischemia, possible RCA ischmeia, post left ventricular dilation suggestive multivessel CAD. Hx of echocardiogram 06/2013    EF50%, mild MR and TR, mild pulmonary HTN, mild AS    Hx of echocardiogram 11/01/2021    Left ventricular systolic function is normal, Mild concentric left ventricular hypertrophy Mildly dilated left atrium. Mitral annular calcification is present. Mild tricuspid regurgitation No evidence of any pericardial effusion    Hx of fall     \"last time 2018- due to neuropathy, have to use cane\"    [facility] 7/24/13, 06/2013 7/13-No clinacally significant arrhythmia.  6/13-multiple cycles of wenckebach episodes in addtion to some sinus tach    Hyperlipidemia     Hypertension     Hypothermia 2008    Malignant neoplasm of breast (female) ([facility] [address] 2012    Neuropathy     \"have neuropathy of my legs\"    Normocytic normochromic anemia     Obstructive sleep apnea 2008 01- Has CPAP machine but has not used in over a year - states she has not had problems since her pacemaker was placed. Old MI (myocardial infarction)     per pt on 1/15/2019\"had heart attack about a year ago\"    Osteoarthritis     Osteopenia     Osteopenia of multiple sites 9/14/2022    Pneumonia due to COVID-19 virus 03/22/2021    Primary malignant neoplasm of colon ([address]     S/P cardiac cath 06/2013    no significant CAD false postive stress test    Skin cancer     Super obesity     Umbilical hernia     :   Past Surgical History:   Procedure Laterality Date    A-V CARDIAC PACEMAKER INSERTION  3/10/14     Medtronic. Model:  [id number] Medtronic  Serial:  [id number] dual chamber pacemaker    APPENDECTOMY  2004    BREAST BIOPSY  2/13/12    BREAST LUMPECTOMY  2007    right     BREAST SURGERY  02/13/2012    rt lesion biopsy     CARDIAC CATHETERIZATION  2007 & 2011    CAROTID ENDARTERECTOMY Right 12/3/2021    RIGHT CAROTID ENDARTERECTOMY WITH PATCH performed by Fred Clay MD at [address] [place] [address]  2004    Colon cancer    COLONOSCOPY  10-18-13    polyp    COLONOSCOPY  1/19/2016    internal hemorrhoids, diverticulosis, 1.5 cm sessile polyp, 8 mm polyp found in rectum.     COLONOSCOPY  12/14/2017    1.5cm residual polyp, 5mm polyps in blind sigmoid loop x3, Sigmoid divertics, Internal grade 1 hemorrhoids    COLONOSCOPY N/A 1/16/2019    COLONOSCOPY W/ ENDOSCOPIC MUCOSAL RESECTION WITH ELEVIEW 5ML INJECTION AND POLYPECTOMY OF TIP OF BLIND RECTOSIGMOID STUMP AND CAUTERIZATION WITH ERBE PROBE, CLIPPING X2 performed by Onofre Shepherd MD at [address] [place] [address] Dr  01/21/2020    pan divertics/ 4 polyps/ sm int hem, S/P partial sigmoid resection w/ end to side anastamosis, repeat in 3 years    COLONOSCOPY N/A 1/21/2020    COLONOSCOPY POLYPECTOMY SNARE/COLD BIOPSY performed by Rigo Kenyon Reva Dey MD at [facility]  2003    back    CYSTOSCOPY Bilateral 12/15/14    CYSTOSCOPY Right 5/15/2022    CYSTOSCOPY URETERAL STENT INSERTION performed by Svetlana Posada MD at [address] [place] [address]      front right tooth and root canal w/ brass bolt    DILATION AND CURETTAGE OF UTERUS  2006    Needed a camera to find my uterus. DILATION AND CURETTAGE OF UTERUS N/A 5/24/2022    DILATATION AND CURETTAGE, CULTURES OF UTEREUS performed by Nii Garcia MD at [address] [place] [address] Entrance, COLON, DIAGNOSTIC  12/14/2017    Small hiatal hernia    HERNIA REPAIR  2004    Umb hernia    HERNIA REPAIR  2008    Inc hernia    KIDNEY SURGERY  05/24/2022    stent placed on right side    LITHOTRIPSY Right 8/1/2022    RIGHT CYSTOSCOPY URETEROSCOPY STONE MANIPULATION WITH HOLIUM LASER LITHOTRIPSY STENT REPLACEMENT performed by Carlene Treviño MD at [facility]    LITHOTRIPSY Left 9/26/2022    LEFT CYSTOSCOPY URETEROSCOPY RETROGRADE PYELOGRAM STONE [facility] LITHOTRIPSY POSSIBLE [address] performed by Carlene Treviño MD at [place] [address]  2/29/2012    Right axillary-3 sentinel nodes were positive out of 9.     MASTECTOMY, RADICAL Right 02/29/2012    w/ sentinal node disection-Dr West    PACEMAKER PLACEMENT      PRE-MALIGNANT / BENIGN SKIN LESION EXCISION  2/8/2013    right leg X2-Dr West    TONSILLECTOMY  1957    TUNNELED VENOUS PORT PLACEMENT  2004    TUNNELED VENOUS PORT PLACEMENT  02/13/2012    removal of mediport     Medications:  Scheduled Meds:   metoprolol tartrate  25 mg Oral BID    rosuvastatin  20 mg Oral Nightly    Vitamin D  1,000 Units Oral Daily    sodium chloride flush  5-40 mL IntraVENous 2 times per day    [Held by provider] enoxaparin  30 mg SubCUTAneous BID    sertraline  50 mg Oral Daily    morphine  4 mg IntraVENous Once    sodium chloride flush  5-40 mL IntraVENous 2 times per day    aspirin  81 mg Oral Daily    fluticasone  2 puff Inhalation BID     Continuous Infusions:   sodium chloride      sodium chloride       PRN Meds:.sodium chloride flush, sodium chloride, acetaminophen, ondansetron, hydrALAZINE, hydrOXYzine pamoate, sodium chloride flush, sodium chloride, acetaminophen **OR** acetaminophen, polyethylene glycol, nitroGLYCERIN    Allergies   Allergen Reactions    Bactrim [Sulfamethoxazole-Trimethoprim] Anaphylaxis and Hives    Lipitor [Atorvastatin] Shortness Of Breath    Taxol [Paclitaxel] Anaphylaxis and Swelling    Aminoglycosides      Abstracted from [facility] patient chart. Demerol Nausea Only    Neosporin [Bacitracin-Neomycin-Polymyxin] Rash and Other (See Comments)     hotness    Other Rash     Ivory Soap    Talc Other (See Comments)     blisters     Social History     Socioeconomic History    Marital status:      Spouse name: Not on file    Number of children: 2    Years of education: 12    Highest education level: 12th grade   Occupational History    Occupation: retired   Tobacco Use    Smoking status: Never    Smokeless tobacco: Never   Vaping Use    Vaping Use: Never used   Substance and Sexual Activity    Alcohol use: No     Comment:           CAFFEINE: 2- 12oz nona daily & 2 cups coffee some nights. Drug use: No    Sexual activity: Not Currently     Partners: Male   Other Topics Concern    Not on file   Social History Narrative    Do you donate blood or plasma? No    Caffeine intake? Moderate    Advance directive? No    Is blood transfusion acceptable in an emergency? Yes    Live alone or with others? With others    Able to care for self?  Yes    No exercise at this time         Social Determinants of Health     Financial Resource Strain: Medium Risk    Difficulty of Paying Living Expenses: Somewhat hard   Food Insecurity: Food Insecurity Present    Worried About Running Out of Food in the Last Year: Sometimes true    Ran Out of Food in the Last Year: Sometimes true   Transportation Needs: Unmet Transportation Needs    Lack of Transportation (Medical): Yes    Lack of Transportation (Non-Medical): Yes   Physical Activity: Inactive    Days of Exercise per Week: 0 days    Minutes of Exercise per Session: 0 min   Stress: No Stress Concern Present    Feeling of Stress :  Only a little   Social Connections: Socially Isolated    Frequency of Communication with Friends and Family: Once a week    Frequency of Social Gatherings with Friends and Family: Once a week    Attends [employer] Services: Never    Active Member of Clubs or Organizations: No    Attends Club or Organization Meetings: Never    Marital Status:    Intimate Partner Violence: Not on file   Housing Stability: High Risk    Unable to Pay for Housing in the Last Year: Yes    Number of [place] in the Last Year: 1    Unstable Housing in the Last Year: No      Family History   Problem Relation Age of Onset    Arthritis Mother         OA, RA    High Cholesterol Mother     High Blood Pressure Mother     Cancer Father         skin and leukemia    High Blood Pressure Father     High Cholesterol Father     Diabetes Father     Heart Disease Father     Heart Disease Brother     High Cholesterol Brother     High Blood Pressure Brother     Heart Disease Brother     No Known Problems Sister     Seizures Son     Cancer Brother         skin cancer    Breast Cancer Neg Hx     Ovarian Cancer Neg Hx          ROS (10 systems)  In addition to that documented in the HPI above, the additional ROS was obtained:  Constitutional: Denies fevers or chills  Eyes: Denies vision changes  ENMT: Denies sore throat  CV: Denies chest pain  Resp: Denies SOB  GI: Denies vomiting or diarrhea  : Denies painful urination  MSK: Denies recent trauma  Skin: Denies new rashes  Neuro: Left sided sensory loss, left sided weakness  Endocrine: Denies unexpected weight loss  Heme: Denies bleeding disorders    Physical Exam:       Wt Readings from Last 3 Encounters:   02/22/23 249 lb (112.9 kg) 02/21/23 250 lb 2 oz (113.5 kg)   01/22/23 242 lb (109.8 kg)     Temp Readings from Last 3 Encounters:   02/22/23 97.7 °F (36.5 °C) (Oral)   01/22/23 98.6 °F (37 °C) (Oral)   12/21/22 98.7 °F (37.1 °C) (Temporal)     BP Readings from Last 3 Encounters:   02/22/23 (!) 134/56   01/23/23 (!) 132/58   01/09/23 (!) 144/88     Pulse Readings from Last 3 Encounters:   02/22/23 60   01/23/23 70   01/09/23 72        Gen: A&O x 4, NAD, cooperative  HEENT: NC/AT, EOMI, PERRL, mmm,  neck supple, no meningeal signs  Heart: Paced  Lungs: respirations even and unlabored  Ext: no edema, no calf tenderness b/l  Psych: normal mood and anxious affect  Skin: no rashes or lesions    NEUROLOGIC EXAM:    Mental Status: A&O to self, location, month and year, NAD, speech clear, language fluent, repetition and naming intact, follows commands appropriately    Cranial Nerve Exam:   CN II-XII: PERRL, VFF, no nystagmus, no gaze paresis, sensation V1-V3 intact b/l, muscles of facial expression symmetric; hearing intact to conversational tone, palate elevates symmetrically, shoulder elevation symmetric and tongue protrudes midline with movement side to side.     Motor Exam:       Strength 5/5 UE/LE right, 4/5 UE/LE left, functional and give away weakness on exam  Tone and bulk normal   No pronator drift    Deep Tendon Reflexes: 2/4 biceps, triceps, brachioradialis, patellar, and achilles b/l; flexor plantar responses b/l    Sensation: Decreased to light touch/pinprick/vibration UE/LE left, left face starting at midline    Coordination/Cerebellum:       Tremors--none      Rapidly alternating movements:  dysdiadochokinesia left                Heel-to-Shin: no dysmetria b/l      Finger-to-Nose: no dysmetria b/l    Gait and stance:      Gait: deferred      LABS:     Recent Labs     02/20/23  1800 02/21/23  0600 02/22/23  0423   WBC 10.9* 9.6 10.5    140 137   K 3.8 4.2 4.3    105 101   CO2 27 25 26   BUN 13 18 16   CREATININE 1.1 0.9 0.7 GLUCOSE 115* 105* 107*   INR  --  0.98  --              IMAGING:    CT head w/o contrast:  Impression   No acute intracranial abnormality. Carotid US:  Impression   The right internal carotid artery demonstrates 0-50% stenosis. The left internal carotid artery demonstrates 0-50% stenosis. Bilateral vertebral arteries are patent with flow in the normal direction. MRI brain w/o contrast:  Impression   No acute ischemia. Mild-to-moderate chronic microvascular disease within the periventricular   white matter. MRA head   Impression   Unremarkable exam       All imaging was personally reviewed   ASSESSMENT/PLAN:   67year old female with complaints of chest pain, left sided sensory loss, left sided weakness. Patient is alert and oriented x 4. Speech is clear, language is fluent. Slow improvement in symptoms  Left sided weakness and paraesthesias secondary to  HTN urgency v conversion disorder superimposed on worsening anxiety and depression. No evidence for stroke on imaging. Neuro imaging as above  MRI brain w/o contrast no evidence for stroke  MRA head no acute findings  Continue aspirin and atorvastatin for secondary stroke prevention  [id number]  Recommend SBP < 140  B12/Folate WNL  Vitamin D 13.93 - recommend supplementation  TSH 4.320  PT/OT as recommended  Stress test was ordered for today, patient refused  No evidence for stroke on imaging as as causative factor for sensory and strength changes. No further recommendations. We will sign off at this time. Please contact us with any new concerns. Patient was discussed with attending neurologist Dr. Gege Hernandez     Thank you for allowing us to participate in the care of your patient. If there are any questions regarding evaluation please feel free to contact us.      AUSTIN Redman - MERCEDES, 2/22/2023

## 2023-02-22 NOTE — PROGRESS NOTES
[facility], [place] PHYSICAL REHABILITATION [facility] [address], [address] [facility],[address]  Phone: [phone]    Fax [phone]                  Carmela Sanchez MD, Renetta Camejo MD, Nehemias Lake MD, MD Shane Hernandez, MD Everette Diez, MD Dr. Doyle Liang MD, AUSTIN Burgess, AUSTIN Leone, APRN Leopoldo Agreste, AUSTIN Cosme, PARadhaC    CARDIOLOGY  NOTE      Name:  Romario Lyle /Age/Sex: 1950  (67 y.o. female)   MRN & CSN:  [id number] & [id number] Admission Date/Time: 2023  5:13 PM   Location:  2729/0178-D PCP: Candido Russ [address] [place] [address] Day: 3    - Cardiology consult is for:  HTN         Subjective:  Birgit Chirinos is a 67 y. o.year old         Objective: Temperature:  Current - Temp: 97.6 °F (36.4 °C);  Max - Temp  Av.7 °F (36.5 °C)  Min: 97.6 °F (36.4 °C)  Max: 97.7 °F (36.5 °C)    Respiratory Rate : Resp  Av  Min: 14  Max: 20    Pulse Range: Pulse  Av.2  Min: 60  Max: 71    Blood Presuure Range:  Systolic (98XVG), KBD:185 , Min:137 , KCC:994   ; Diastolic (56GID), MBJ:54, Min:50, Max:62      Pulse ox Range: SpO2  Av.7 %  Min: 95 %  Max: 98 %    24hr I & O:    Intake/Output Summary (Last 24 hours) at 2023 0845  Last data filed at 2023 0998  Gross per 24 hour   Intake 75 ml   Output 2275 ml   Net -2200 ml         BP (!) 158/62   Pulse 62   Temp 97.6 °F (36.4 °C) (Oral)   Resp 14   Ht 5' 2\" (1.575 m)   Wt 249 lb (112.9 kg)   LMP 01/15/1999   SpO2 98%   BMI 45.54 kg/m²         TELEMETRY: Sinus      has a past medical history of Anemia, Anxiety, Arthritis, Asthma, AV block, Blood poisoning, Blood transfusion, Breast cancer ([facility] [address] CAD (coronary artery disease), Cancer ([facility] [address] Carcinoma of breast ([facility] [address] Cardiac pacemaker, Chronic cystitis, Chronic respiratory failure ([facility] [address] Colon cancer ([facility] [address] COPD (chronic obstructive pulmonary disease) ([facility] [address] CTS (carpal tunnel syndrome), Depression, Diverticulitis, H/O 24 hour EKG monitoring, H/O cardiac catheterization, H/O cardiovascular stress test, H/O cardiovascular stress test, H/O chest pain, H/O Doppler ultrasound, H/O Doppler ultrasound, H/O echocardiogram, H/O echocardiogram, H/O urinary incontinence, History of blood transfusion, Hx antineoplastic chemo, Hx of Arterial Doppler ultrasound, Hx of cardiovascular stress test, Hx of echocardiogram, Hx of echocardiogram, Hx of fall, HX OTHER MEDICAL, Hyperlipidemia, Hypertension, Hypothermia, Malignant neoplasm of breast (female) ([facility] [address] Neuropathy, Normocytic normochromic anemia, Obstructive sleep apnea, Old MI (myocardial infarction), Osteoarthritis, Osteopenia, Osteopenia of multiple sites, Pneumonia due to COVID-19 virus, Primary malignant neoplasm of colon ([facility] [address] S/P cardiac cath, Skin cancer, Super obesity, and Umbilical hernia. has a past surgical history that includes Tonsillectomy (1320); Appendectomy (2004); colectomy (2004); Tunneled venous port placement (2004); cyst removal (2003); Breast lumpectomy (2007); Dilation and curettage of uterus (2006); hernia repair (2004); hernia repair (2008); Dental surgery; Breast surgery (02/13/2012); Tunneled venous port placement (02/13/2012); Breast biopsy (2/13/12); Mastectomy, radical (Right, 02/29/2012); pre-malignant / benign skin lesion excision (2/8/2013); lymph node dissection (2/29/2012); Cardiac catheterization (2007 & 2011); A-V cardiac pacemaker insertion (3/10/14); Cystoscopy (Bilateral, 12/15/14); pacemaker placement; Endoscopy, colon, diagnostic (12/14/2017); Colonoscopy (10-18-13); Colonoscopy (1/19/2016); Colonoscopy (12/14/2017); Colonoscopy (N/A, 1/16/2019); Colonoscopy (01/21/2020); Colonoscopy (N/A, 1/21/2020); Carotid endarterectomy (Right, 12/3/2021); Cystoscopy (Right, 5/15/2022); Dilation and curettage of uterus (N/A, 5/24/2022); Kidney surgery (05/24/2022);  Lithotripsy (Right, 8/1/2022); and Lithotripsy (Left, 9/26/2022). Physical Exam  Constitutional:       General: She is not in acute distress. Appearance: She is not diaphoretic. HENT:      Head: Normocephalic and atraumatic. Right Ear: External ear normal.      Left Ear: External ear normal.      Nose: Nose normal.      Mouth/Throat:      Mouth: Mucous membranes are moist.   Eyes:      Extraocular Movements: Extraocular movements intact. Comments: Pupils equal and round   Neck:      Vascular: No carotid bruit. Cardiovascular:      Rate and Rhythm: Normal rate and regular rhythm. Pulses: Normal pulses. Heart sounds: S1 normal and S2 normal. No murmur heard. No friction rub. No gallop. Pulmonary:      Effort: Pulmonary effort is normal. No respiratory distress. Breath sounds: Normal breath sounds. No rales. Chest:      Chest wall: No tenderness. Abdominal:      Palpations: Abdomen is soft. Tenderness: There is no abdominal tenderness. Musculoskeletal:      Right lower leg: No edema. Left lower leg: No edema. Skin:     General: Skin is warm and dry. Capillary Refill: Capillary refill takes less than 2 seconds. Findings: No rash. Comments: Skin turgor brisk   Neurological:      Mental Status: She is alert and oriented to person, place, and time. Mental status is at baseline. Psychiatric:         Behavior: Behavior is cooperative. Thought Content:  Thought content normal.         Judgment: Judgment normal.        Medications:    [Held by provider] enoxaparin  30 mg SubCUTAneous BID    sertraline  50 mg Oral Daily    atorvastatin  40 mg Oral Nightly    morphine  4 mg IntraVENous Once    sodium chloride flush  5-40 mL IntraVENous 2 times per day    aspirin  81 mg Oral Daily    fluticasone  2 puff Inhalation BID      sodium chloride       hydrOXYzine pamoate, sodium chloride flush, sodium chloride, acetaminophen **OR** acetaminophen, polyethylene glycol, nitroGLYCERIN    Lab Data:  CBC:   Recent Labs     02/20/23  1800 02/21/23  0600 02/22/23  0423   WBC 10.9* 9.6 10.5   HGB 11.2* 10.3* 10.7*   HCT 35.7* 33.8* 34.6*   MCV 87.1 88.7 86.7    245 247     BMP:   Recent Labs     02/20/23  1800 02/21/23  0600 02/22/23  0423    140 137   K 3.8 4.2 4.3    105 101   CO2 27 25 26   BUN 13 18 16   CREATININE 1.1 0.9 0.7     LIVER PROFILE:   Recent Labs     02/20/23  1800 02/21/23  0600   AST 14* 11*   ALT 8* 7*   LIPASE 16  --    BILITOT 0.2 0.2   ALKPHOS 150* 133*     PT/INR:   Recent Labs     02/21/23  0600   PROTIME 12.6   INR 0.98     APTT:   Recent Labs     02/21/23  0600   APTT 29.8     BNP:  No results for input(s): BNP in the last 72 hours. TROPONIN:   Recent Labs     02/20/23  1800 02/20/23 2124 02/21/23  0245   TROPONINT <0.010 <0.010 <0.010     Labs, consult, tests reviewed          Gorge Moses PA-C, 2/22/2023 9:38 AM         CARDIOLOGY ATTENDING ADDENDUM    I have seen, spoken to and examined this patient personally, independent of the NP/PAC. I have reviewed the hospital care given to date and reviewed all pertinent labs and imaging. I have spoken with patient, nursing staff and provided written and verbal instructions . The above note has been reviewed. I have spent substantive amount of time in formulating patient care. Physical Exam:    General:    Awake, alert  Head:normal  Eye:normal  Chest:    Clear to auscultation   0 Basilar crackles   Cardiovascular:  S1S2           MEDICAL DECISION MAKING :       Atypical chest pain  Shortness of breath with exertion  Essential hypertension  Hyperlipidemia. LVOT obstruction  S/p PPM - will interrogate      C and RHC today   Patient was originally scheduled for stress test however was declined. Case discussed with Dr. Tammy Bhatti who performed hemodynamic study in the Cath Lab.   Patient does not appear to have significant aortic stenosis mean gradient was noted to be around 17 mmHg.     The discrepancy between echocardiogram indicating severe aortic stenosis versus hemodynamic data from cardiac catheterization lab can be best explained by hyperdynamic state of the left ventricle with LVOT obstruction due to septal bulge     Patient does not appear to have hypertrophic cardiomyopathy or severe aortic stenosis     We will perform transesophageal echocardiogram tomorrow morning       Continue with aspirin 81 mg daily  Discontinue Nitropaste  Start Crestor 20 mg daily  Patient is hypertensive: Continue metoprolol 25 twice daily  DVT prophylaxis with Lovenox     Psychiatric evaluation for anxiety and depression underway. ## History of colonic cancer, and History of breast cancer s/p mastectomy : S/p chemotherapy.   Likely in remission, will get opinion from oncology about prognosis before proceeding with TAVR evaluation        Dr. Modesto Buckner MD

## 2023-02-22 NOTE — PROGRESS NOTES
Occupational Therapy ATTEMPT Treatment Note  Name: Maggy Martinez MRN: [id number] :   1950   Date:  2023   Admission Date: 2023 Room:  [address] [place] [address]-A     Primary Problem:  The primary encounter diagnosis was Chest pain with moderate risk for cardiac etiology. A diagnosis of Acute non intractable tension-type headache was also pertinent to this visit. Past Medical History:   Diagnosis Date    Anemia     Anxiety     Arthritis     generalized    Asthma     AV block     2nd degree per hospital in 2013    Blood poisoning     Blood transfusion     2003    Breast cancer ([facility] [address] 2012    right    CAD (coronary artery disease)     Cancer ([facility] [address]     skin (face) & colon(/ 2012 dx of right breast ca(pc)    Carcinoma of breast ([facility] [address] 2012    right arm no b/p or sticks    Cardiac pacemaker 03/10/2014    [employer] dual lead    Chronic cystitis     Chronic respiratory failure (HCC)     2 L/min oxygen at night-time    Colon cancer ([facility] [address]     COPD (chronic obstructive pulmonary disease) ([facility] [address]     CTS (carpal tunnel syndrome)     Depression     Diverticulitis     H/O 24 hour EKG monitoring 2014 complete heart block No clinically significant arrthymia noted. H/O cardiac catheterization 10/31/2011, 2007    10/31/2011-No significant CAD. Cardiolite stress test was false positive-Dr Carlitos Burrell; 2007-No CAD, global function intact;    H/O cardiovascular stress test 10/20/2011, 3/23/2010    10/20/2011-Lexiscan- Evid of mild ischemia in Left CX region. EF 70%. Global LVSF normal;    H/O cardiovascular stress test 2015    EF 77%.    Normal Stress Test.    H/O chest pain 2005    sees Dr Carlitos Burrell    H/O Doppler ultrasound 2008-CAROTID DOPPLER- Normal carotids bilaterally;    H/O Doppler ultrasound 2013    CAROTID DOPPLER- there is heterogeneous, irregular atherosclerotic plaque noted in the right internal carotid artery, doppler flow velocities within the right internal carotid artery are elevated, consistent with a mild, less than 50%stenosis, there is intimal thickening but no significant atherosclerotic plaque noted in the left internal carotid artery, the left carotid are within normal limits    H/O echocardiogram 4/9/2012, 10/20/2011    4/9/2012-LVSF normal EF=>55%. impaired LV relaxation;    H/O echocardiogram 12/31/2014    EF 50-55%. LV shows mild concentric hypertrophy. Mildly dilated left atrium. Mildly dilated right ventricle. Sclerotic but not stenotic aortic valve. Mitral annular calcification. Mild MR, Mild TR, Mild pulm HTN.    H/O urinary incontinence     History of blood transfusion     Hx antineoplastic chemo     Hx of Arterial Doppler ultrasound 07/01/2019    No hemodynamically significant or focal stenosis visualized bilaterally, bilateral lower extremity arteries exhibit diffuse plaque and multiphasic waveforms, unable to obtain bilateral ABIs due to elevated leg pressures >250 mmHg, possibly due to atherosclerosis    Hx of cardiovascular stress test 06/2013    EF 70% abn suggestive of anterolateral ischemia, possible RCA ischmeia, post left ventricular dilation suggestive multivessel CAD. Hx of echocardiogram 06/2013    EF50%, mild MR and TR, mild pulmonary HTN, mild AS    Hx of echocardiogram 11/01/2021    Left ventricular systolic function is normal, Mild concentric left ventricular hypertrophy Mildly dilated left atrium. Mitral annular calcification is present. Mild tricuspid regurgitation No evidence of any pericardial effusion    Hx of fall     \"last time 2018- due to neuropathy, have to use cane\"    [facility] 7/24/13, 06/2013 7/13-No clinacally significant arrhythmia.  6/13-multiple cycles of wenckebach episodes in addtion to some sinus tach    Hyperlipidemia     Hypertension     Hypothermia 2008    Malignant neoplasm of breast (female) ([facility] [address] 2012    Neuropathy     \"have neuropathy of my legs\"    Normocytic normochromic anemia     Obstructive sleep apnea 2008 01- Has CPAP machine but has not used in over a year - states she has not had problems since her pacemaker was placed. Old MI (myocardial infarction)     per pt on 1/15/2019\"had heart attack about a year ago\"    Osteoarthritis     Osteopenia     Osteopenia of multiple sites 9/14/2022    Pneumonia due to COVID-19 virus 03/22/2021    Primary malignant neoplasm of colon ([facility] [address]     S/P cardiac cath 06/2013    no significant CAD false postive stress test    Skin cancer     Super obesity     Umbilical hernia              Received request from CM for eval and rec. Per chart review, pt awaiting L/R heart cath today. Will await heart cath and defer eval/rec until pt medically appropriate.            Electronically signed by:    JAYDEN Houser/L  License: [id number]  4/75/9836, 9:36 AM

## 2023-02-22 NOTE — PROGRESS NOTES
Physical Therapy  Per chart review, pt to undergo left and right heart catheterization today. Will evaluate when pt medically appropriate.

## 2023-02-22 NOTE — PLAN OF CARE
Problem: Discharge Planning  Goal: Discharge to home or other facility with appropriate resources  2/22/2023 1137 by Rey Smith RN  Outcome: Progressing  2/22/2023 0813 by Khris Velasquez  Outcome: Progressing     Problem: Skin/Tissue Integrity  Goal: Absence of new skin breakdown  Description: 1. Monitor for areas of redness and/or skin breakdown  2. Assess vascular access sites hourly  3. Every 4-6 hours minimum:  Change oxygen saturation probe site  4. Every 4-6 hours:  If on nasal continuous positive airway pressure, respiratory therapy assess nares and determine need for appliance change or resting period. 2/22/2023 1137 by Rey Smith RN  Outcome: Progressing  2/22/2023 0813 by Khris Velasquez  Outcome: Progressing     Problem: Safety - Adult  Goal: Free from fall injury  2/22/2023 1137 by Rey Smith RN  Outcome: Progressing  2/22/2023 0813 by Khris Velasquez  Outcome: Progressing     Problem: Pain  Goal: Verbalizes/displays adequate comfort level or baseline comfort level  2/22/2023 1137 by Rey Smith RN  Outcome: Progressing  2/22/2023 0813 by Khris Velasquez  Outcome: Progressing     Problem: ABCDS Injury Assessment  Goal: Absence of physical injury  2/22/2023 1137 by Rey Smith RN  Outcome: Progressing  2/22/2023 0813 by Khris Velasquez  Outcome: Progressing     Problem: Anxiety  Goal: Will report anxiety at manageable levels  Description: INTERVENTIONS:  1. Administer medication as ordered  2. Teach and rehearse alternative coping skills  3. Provide emotional support with 1:1 interaction with staff  2/22/2023 1137 by Rey Smith RN  Outcome: Progressing  2/22/2023 0813 by Khris Velasquez  Outcome: Progressing     Problem: Coping  Goal: Pt/Family able to verbalize concerns and demonstrate effective coping strategies  Description: INTERVENTIONS:  1. Assist patient/family to identify coping skills, available support systems and cultural and spiritual values  2.  Provide emotional support, including active listening and acknowledgement of concerns of patient and caregivers  3. Reduce environmental stimuli, as able  4. Instruct patient/family in relaxation techniques, as appropriate  5.  Assess for spiritual pain/suffering and initiate Spiritual Care, Psychosocial Clinical Specialist consults as needed  2/22/2023 1137 by Moody Porras RN  Outcome: Progressing  2/22/2023 0813 by Adelia Aviles  Outcome: Progressing

## 2023-02-22 NOTE — PLAN OF CARE
Problem: Discharge Planning  Goal: Discharge to home or other facility with appropriate resources  Outcome: Progressing     Problem: Skin/Tissue Integrity  Goal: Absence of new skin breakdown  Description: 1. Monitor for areas of redness and/or skin breakdown  2. Assess vascular access sites hourly  3. Every 4-6 hours minimum:  Change oxygen saturation probe site  4. Every 4-6 hours:  If on nasal continuous positive airway pressure, respiratory therapy assess nares and determine need for appliance change or resting period. Outcome: Progressing     Problem: Safety - Adult  Goal: Free from fall injury  Outcome: Progressing     Problem: Pain  Goal: Verbalizes/displays adequate comfort level or baseline comfort level  Outcome: Progressing     Problem: ABCDS Injury Assessment  Goal: Absence of physical injury  Outcome: Progressing     Problem: Anxiety  Goal: Will report anxiety at manageable levels  Description: INTERVENTIONS:  1. Administer medication as ordered  2. Teach and rehearse alternative coping skills  3. Provide emotional support with 1:1 interaction with staff  Outcome: Progressing     Problem: Coping  Goal: Pt/Family able to verbalize concerns and demonstrate effective coping strategies  Description: INTERVENTIONS:  1. Assist patient/family to identify coping skills, available support systems and cultural and spiritual values  2. Provide emotional support, including active listening and acknowledgement of concerns of patient and caregivers  3. Reduce environmental stimuli, as able  4. Instruct patient/family in relaxation techniques, as appropriate  5.  Assess for spiritual pain/suffering and initiate Spiritual Care, Psychosocial Clinical Specialist consults as needed  Outcome: Progressing

## 2023-02-22 NOTE — PROGRESS NOTES
Hospitalist Progress Note      Name:  Re De Santiago /Age/Sex: 1950  (67 y.o. female)   MRN & CSN:  [id number] & [id number] Admission Date/Time: 2023  5:13 PM   Location:  [facility]7/3027-A PCP: Silver Foss 57 Day: 3    Assessment and Plan:   Re De Santiago is a 67 y.o.  female with past medical history of moderate to severe arctic stenosis, moderate pulmonary hypertension, carotid artery stenosis s/p right CEA, JAMIE, hypertension, hyperlipidemia, third-degree AV block s/p PPM, history of right breast carcinoma status postmastectomy, history of colon cancer status postchemotherapy, morbid obesity with BMI 45.75 was admitted on 2023 for evaluation of chest pain. Also reports of increased anxiety/depression. Patient was seen by cardiology/psychiatry and neurology. .    Echo 2023   Left ventricular systolic function is normal.   Ejection fraction is visually estimated at 55-60%. PPM wiring visualized in right heart. Heavily calcified aortic valve with severe aortic stenosis; mean P   mmHg, BACILIO: 0.82 cm sq, DVI: 0.24, BACILIO indexed: 0.37 cm sq/ m sq. Mild aortic regurgitation; PHT: 554 msec. Prominent mitral annular calcification is present. Mild mitral stenosis; mean P mmHg. Posterior mitral valve leaflet   appears calcified and restricted. Mild tricuspid regurgitation; RVSP: 40 mmHg. No evidence of any pericardial effusion. Assessment    Atypical chest pain  Chronic exertional shortness of breath likely secondary to cardiac etiology  Moderate pulmonary hypertension/severe aortic stenosis  Left-sided weakness/numbness? CVA event versus conversion disorder  Hypertension  Hyperlipidemia  S/p PPM  History of breast/colon carcinoma    Plan    Continue aspirin. Started on Lipitor  Cardiology evaluation appreciated. Plan for a left heart cath/right heart cath today  Also started on metoprolol  Psychiatry evaluation appreciated.   Started on sertraline  Await MRI brain/MRA head. Neurology consulted. Await further recommendations based on the imaging studies  Echo noted as above. EF 55 to 60% with severe aortic stenosis  Given history of colon carcinoma/breast cancer, cardiology will get opinion from oncology about prognosis before proceeding with TAVR evaluation. Diet Diet NPO Exceptions are: Sips of Water with Meds, Ice Chips   DVT Prophylaxis [x] Lovenox, []  Heparin, [] SCDs, [] Ambulation   GI Prophylaxis [] PPI,  [] H2 Blocker,  [] Carafate,  [] Diet/Tube Feeds   Code Status Full Code   Disposition Patient requires continued admission due to pending heart catheterization/MRI studies/neurology eval/MRI   MDM [] Low, [] Moderate,[x]  High  Patient's risk as above      Subjective    Patient seen and examined at bedside. Reports her left-sided weakness/numbness is much better today. Denies any shortness of breath or chest pain at rest.    Objective: Intake/Output Summary (Last 24 hours) at 2/22/2023 0855  Last data filed at 2/22/2023 0612  Gross per 24 hour   Intake --   Output 1525 ml   Net -1525 ml        Vitals:   Vitals:    02/22/23 0844   BP:    Pulse: 62   Resp: 14   Temp:    SpO2: 98%     Physical Exam:   GEN Awake female, sitting upright in bed in no apparent distress. Appears given age. RESP bilateral entry fair. No obvious wheezing or crackles  CARDIO/VASC S1/S2 auscultated. Regular. Systolic murmur. No peripheral edema. GI Abdomen is soft without significant tenderness,  NEURO AOx3. Motor strength 5/5 right upper and lower extremity, 4/5 left upper and lower extremity.    Medications:   Medications:    [Held by provider] enoxaparin  30 mg SubCUTAneous BID    sertraline  50 mg Oral Daily    atorvastatin  40 mg Oral Nightly    morphine  4 mg IntraVENous Once    sodium chloride flush  5-40 mL IntraVENous 2 times per day    aspirin  81 mg Oral Daily    fluticasone  2 puff Inhalation BID      Infusions:    sodium chloride PRN Meds: hydrOXYzine pamoate, 25 mg, Daily PRN  sodium chloride flush, 5-40 mL, PRN  sodium chloride, , PRN  acetaminophen, 650 mg, Q6H PRN   Or  acetaminophen, 650 mg, Q6H PRN  polyethylene glycol, 17 g, Daily PRN  nitroGLYCERIN, 0.4 mg, Q5 Min PRN        Electronically signed by Renny Concepcion MD on 2/22/2023 at 8:55 AM

## 2023-02-22 NOTE — PROGRESS NOTES
Patient reporting moderate anxiety r/t heart cath procedure today. Educated patient on relaxation techniques and provided emotional support. PRN hydroxyzine given. Will continue to monitor and provide emotional support as needed.

## 2023-02-22 NOTE — PROGRESS NOTES
Pt had mri of brain and mra after pacer set for mri -pt had diff tolerating exams and was unable to complete mra of neck. Pt refused further imaging and does not want mra of neck .  Order canceled

## 2023-02-22 NOTE — PROGRESS NOTES
I discussed the case with Dr. Urbano Saxena which is performed hemodynamic study in the Cath Lab. Patient does not appear to have significant aortic stenosis mean gradient was noted to be around 17 mmHg.     The discrepancy between echocardiogram indicating severe aortic stenosis versus hemodynamic data from cardiac catheterization lab can be best explained by hyperdynamic state of the left ventricle with LVOT obstruction due to septal bulge    Patient does not appear to have hypertrophic cardiomyopathy or severe aortic stenosis      We will recommend beta-blocker, will continue with Lopressor

## 2023-02-22 NOTE — PROCEDURES
CARDIAC CATH REPORT      Nusrat Saha   67 y.o., female  1950 2/22/2023    PROCEDURE:    1. Left heart catheterization, selective coronary arteriography,  left ventricular angiography. 2. Graft study  3. Right heart catherization     PREPROCEDURE DIAGNOSIS: ACVD     POSTOPERATIVE DIAGNOSIS:    Coronary artery disease       DESCRIPTION OF PROCEDURE:  The patient had the procedure described and  informed consent is obtained. Prepared, draped and monitored in the routine  fashion. Two percent lidocaine without epinephrine is used for local  infiltration anesthesia. Patient is positively identified in the cath lab. The  right femoral artery is punctured and cannulated with 4-Maltese introducer  sheath. A 4-Maltese, 4-cm left Kenzie, right 3D catheters are used for  selective coronary arteriography. Multiple views were obtained in various  projections. Left ventricular angiography is performed using pigtail left  ventricular angiography catheter. DARCY obtained in 30 degree oblique view. He  tolerated the procedure well. At the end of the procedure, femoral arterial  sheath is discontinued and hemostasis achieved with manual compression. No immediate complications were noted. Estimated blood loss was 20 mL. No samples removed   FINDINGS:    //Hemodynamics:  Aortic pressure 147/63,  , LV pressure 178/1,22  , EDP of 22mmHG. RV pressure 18 mmhg  PA pressure 26 mmHG  RA pressure 16/11 ,8 mmHG  Pcwp is 18/15 , 14 mmHG  CO by Ficks was 3.6 L/MIn   BACILIO was 1.01 Cm2 and Mean gradient across the valve 17 mmHG Peak Gradient was 15 mmHG       ANGIOGRAPHIC FINDINGS: Right dominance system. 1. Left main:  Left main is patent  2. Left anterior descending is patent , no significant stenosis  4  Circumflex artery is patent and no stenosis   5.  Rght coronary artery is patent and no stenosis   BACILIO was 1.01 Cm2 and Mean gradient across the valve 17 mmHG Peak Gradient was 15 mmHG       RECOMMENDATIONS AND DISPOSITION:   Continue medical management  Follow up with cardiology      The patient is counseledfor continued risk modification for  prevention. Findings are reviewed with the patient and discussed with the family. Follow up in one week.                                      Jeanne Hewitt MD, 2/22/2023 6:14 PM

## 2023-02-23 ENCOUNTER — HOSPITAL ENCOUNTER (OUTPATIENT)
Dept: NON INVASIVE DIAGNOSTICS | Age: 73
Discharge: HOME | End: 2023-02-23
Payer: MEDICARE

## 2023-02-23 ENCOUNTER — ANESTHESIA (OUTPATIENT)
Dept: NON INVASIVE DIAGNOSTICS | Age: 73
End: 2023-02-23
Payer: MEDICARE

## 2023-02-23 ENCOUNTER — ANESTHESIA EVENT (OUTPATIENT)
Dept: NON INVASIVE DIAGNOSTICS | Age: 73
End: 2023-02-23
Payer: MEDICARE

## 2023-02-23 LAB
ANION GAP SERPL CALCULATED.3IONS-SCNC: 8 MMOL/L (ref 4–16)
BUN SERPL-MCNC: 19 MG/DL (ref 6–23)
CALCIUM SERPL-MCNC: 9.4 MG/DL (ref 8.3–10.6)
CHLORIDE BLD-SCNC: 101 MMOL/L (ref 99–110)
CO2: 27 MMOL/L (ref 21–32)
CREAT SERPL-MCNC: 0.8 MG/DL (ref 0.6–1.1)
GFR SERPL CREATININE-BSD FRML MDRD: >60 ML/MIN/1.73M2
GLUCOSE SERPL-MCNC: 102 MG/DL (ref 70–99)
HCT VFR BLD CALC: 34.4 % (ref 37–47)
HEMOGLOBIN: 10.8 GM/DL (ref 12.5–16)
LV EF: 58 %
LVEF MODALITY: NORMAL
MCH RBC QN AUTO: 27.1 PG (ref 27–31)
MCHC RBC AUTO-ENTMCNC: 31.4 % (ref 32–36)
MCV RBC AUTO: 86.2 FL (ref 78–100)
PDW BLD-RTO: 14.5 % (ref 11.7–14.9)
PLATELET # BLD: 258 K/CU MM (ref 140–440)
PMV BLD AUTO: 9.1 FL (ref 7.5–11.1)
POTASSIUM SERPL-SCNC: 4.3 MMOL/L (ref 3.5–5.1)
RBC # BLD: 3.99 M/CU MM (ref 4.2–5.4)
SODIUM BLD-SCNC: 136 MMOL/L (ref 135–145)
WBC # BLD: 12.4 K/CU MM (ref 4–10.5)

## 2023-02-23 PROCEDURE — 85027 COMPLETE CBC AUTOMATED: CPT

## 2023-02-23 PROCEDURE — 2500000003 HC RX 250 WO HCPCS

## 2023-02-23 PROCEDURE — 7100000000 HC PACU RECOVERY - FIRST 15 MIN

## 2023-02-23 PROCEDURE — 3700000001 HC ADD 15 MINUTES (ANESTHESIA)

## 2023-02-23 PROCEDURE — 97535 SELF CARE MNGMENT TRAINING: CPT

## 2023-02-23 PROCEDURE — 99233 SBSQ HOSP IP/OBS HIGH 50: CPT | Performed by: INTERNAL MEDICINE

## 2023-02-23 PROCEDURE — 97530 THERAPEUTIC ACTIVITIES: CPT

## 2023-02-23 PROCEDURE — 7100000001 HC PACU RECOVERY - ADDTL 15 MIN

## 2023-02-23 PROCEDURE — 93312 ECHO TRANSESOPHAGEAL: CPT

## 2023-02-23 PROCEDURE — 6360000002 HC RX W HCPCS: Performed by: NURSE PRACTITIONER

## 2023-02-23 PROCEDURE — 2580000003 HC RX 258: Performed by: INTERNAL MEDICINE

## 2023-02-23 PROCEDURE — 36415 COLL VENOUS BLD VENIPUNCTURE: CPT

## 2023-02-23 PROCEDURE — 93312 ECHO TRANSESOPHAGEAL: CPT | Performed by: INTERNAL MEDICINE

## 2023-02-23 PROCEDURE — 94761 N-INVAS EAR/PLS OXIMETRY MLT: CPT

## 2023-02-23 PROCEDURE — 6370000000 HC RX 637 (ALT 250 FOR IP): Performed by: NURSE PRACTITIONER

## 2023-02-23 PROCEDURE — 1200000000 HC SEMI PRIVATE

## 2023-02-23 PROCEDURE — 6360000002 HC RX W HCPCS

## 2023-02-23 PROCEDURE — 94640 AIRWAY INHALATION TREATMENT: CPT

## 2023-02-23 PROCEDURE — 97166 OT EVAL MOD COMPLEX 45 MIN: CPT

## 2023-02-23 PROCEDURE — 6370000000 HC RX 637 (ALT 250 FOR IP)

## 2023-02-23 PROCEDURE — 80048 BASIC METABOLIC PNL TOTAL CA: CPT

## 2023-02-23 PROCEDURE — 99232 SBSQ HOSP IP/OBS MODERATE 35: CPT | Performed by: INTERNAL MEDICINE

## 2023-02-23 PROCEDURE — APPSS30 APP SPLIT SHARED TIME 16-30 MINUTES

## 2023-02-23 PROCEDURE — B24BZZ4 ULTRASONOGRAPHY OF HEART WITH AORTA, TRANSESOPHAGEAL: ICD-10-PCS | Performed by: INTERNAL MEDICINE

## 2023-02-23 PROCEDURE — 97116 GAIT TRAINING THERAPY: CPT

## 2023-02-23 PROCEDURE — 3700000000 HC ANESTHESIA ATTENDED CARE

## 2023-02-23 PROCEDURE — 2580000003 HC RX 258

## 2023-02-23 PROCEDURE — 97162 PT EVAL MOD COMPLEX 30 MIN: CPT

## 2023-02-23 PROCEDURE — 6370000000 HC RX 637 (ALT 250 FOR IP): Performed by: INTERNAL MEDICINE

## 2023-02-23 RX ORDER — LIDOCAINE HYDROCHLORIDE 20 MG/ML
INJECTION, SOLUTION INTRAVENOUS PRN
Status: DISCONTINUED | OUTPATIENT
Start: 2023-02-23 | End: 2023-02-23 | Stop reason: SDUPTHER

## 2023-02-23 RX ORDER — ETOMIDATE 2 MG/ML
INJECTION INTRAVENOUS PRN
Status: DISCONTINUED | OUTPATIENT
Start: 2023-02-23 | End: 2023-02-23 | Stop reason: SDUPTHER

## 2023-02-23 RX ORDER — PROPOFOL 10 MG/ML
INJECTION, EMULSION INTRAVENOUS PRN
Status: DISCONTINUED | OUTPATIENT
Start: 2023-02-23 | End: 2023-02-23 | Stop reason: SDUPTHER

## 2023-02-23 RX ORDER — SODIUM CHLORIDE 9 MG/ML
INJECTION, SOLUTION INTRAVENOUS CONTINUOUS PRN
Status: DISCONTINUED | OUTPATIENT
Start: 2023-02-23 | End: 2023-02-23 | Stop reason: SDUPTHER

## 2023-02-23 RX ADMIN — LIDOCAINE HYDROCHLORIDE 50 MG: 20 INJECTION, SOLUTION INTRAVENOUS at 11:37

## 2023-02-23 RX ADMIN — PROPOFOL 50 MG: 10 INJECTION, EMULSION INTRAVENOUS at 11:37

## 2023-02-23 RX ADMIN — ENOXAPARIN SODIUM 30 MG: 100 INJECTION SUBCUTANEOUS at 14:21

## 2023-02-23 RX ADMIN — ENOXAPARIN SODIUM 30 MG: 100 INJECTION SUBCUTANEOUS at 21:20

## 2023-02-23 RX ADMIN — SODIUM CHLORIDE: 9 INJECTION, SOLUTION INTRAVENOUS at 11:37

## 2023-02-23 RX ADMIN — SODIUM CHLORIDE, PRESERVATIVE FREE 10 ML: 5 INJECTION INTRAVENOUS at 14:22

## 2023-02-23 RX ADMIN — METOPROLOL TARTRATE 25 MG: 25 TABLET, FILM COATED ORAL at 21:19

## 2023-02-23 RX ADMIN — ETOMIDATE 5 MG: 2 INJECTION, SOLUTION INTRAVENOUS at 11:37

## 2023-02-23 RX ADMIN — Medication 1000 UNITS: at 14:21

## 2023-02-23 RX ADMIN — METOPROLOL TARTRATE 25 MG: 25 TABLET, FILM COATED ORAL at 14:21

## 2023-02-23 RX ADMIN — SERTRALINE HYDROCHLORIDE 50 MG: 50 TABLET ORAL at 14:21

## 2023-02-23 RX ADMIN — ROSUVASTATIN CALCIUM 20 MG: 20 TABLET, COATED ORAL at 21:20

## 2023-02-23 RX ADMIN — ASPIRIN 81 MG 81 MG: 81 TABLET ORAL at 14:21

## 2023-02-23 RX ADMIN — FLUTICASONE PROPIONATE 2 PUFF: 110 AEROSOL, METERED RESPIRATORY (INHALATION) at 19:23

## 2023-02-23 RX ADMIN — FLUTICASONE PROPIONATE 2 PUFF: 110 AEROSOL, METERED RESPIRATORY (INHALATION) at 08:02

## 2023-02-23 RX ADMIN — SODIUM CHLORIDE, PRESERVATIVE FREE 10 ML: 5 INJECTION INTRAVENOUS at 21:21

## 2023-02-23 ASSESSMENT — PAIN SCALES - GENERAL
PAINLEVEL_OUTOF10: 0

## 2023-02-23 ASSESSMENT — PAIN DESCRIPTION - LOCATION: LOCATION: BACK

## 2023-02-23 ASSESSMENT — PAIN DESCRIPTION - DESCRIPTORS: DESCRIPTORS: ACHING

## 2023-02-23 NOTE — ANESTHESIA PRE PROCEDURE
Department of Anesthesiology  Preprocedure Note       Name:  Jasmin Isaac   Age:  67 y.o.  :  1950                                          MRN:  [id number]         Date:  2023      Surgeon: * Surgery not found *    Procedure:     Medications prior to admission:   Prior to Admission medications    Medication Sig Start Date End Date Taking? Authorizing Provider   potassium chloride (KLOR-CON M) 10 MEQ extended release tablet Take 1 tablet by mouth daily as needed (take only when you need Lasix)  Patient not taking: Reported on 2023   Herman Eckert DO   simvastatin (ZOCOR) 20 MG tablet TAKE ONE (1) TABLET BY MOUTH NIGHTLY 23   Herman Eckert DO   furosemide (LASIX) 20 MG tablet Take 1 tablet by mouth daily as needed (for edema) 23   Herman Eckert DO   acetaminophen (TYLENOL) 500 MG tablet Take 1 tablet by mouth every 6 hours as needed for Pain 22   AUSTIN Saul - [facility]   Misc. Devices [facility]) MISC Use as directed 22   Herman Eckert DO   aspirin 81 MG chewable tablet Take 1 tablet by mouth daily  Patient not taking: No sig reported 10/19/22   Shadi Zimmerman MD   [facility]) 2.5 MG tablet Take 1 tablet by mouth nightly  Patient not taking: No sig reported 21   Diego Chtaman MD   fluticasone (FLOVENT HFA) 110 MCG/ACT inhaler Inhale 2 puffs into the lungs 2 times daily  Patient not taking: Reported on 2023   Herman Eckert DO   albuterol sulfate HFA (PROAIR HFA) 108 (90 Base) MCG/ACT inhaler INHALE 2 PUFFS BY MOUTH EVERY SIX HOURS AS NEEDED FOR WHEEZING 21   Herman Eckert DO       Current medications:    No current facility-administered medications for this encounter. No current outpatient medications on file.      Facility-Administered Medications Ordered in Other Encounters   Medication Dose Route Frequency Provider Last Rate Last Admin    metoprolol tartrate (LOPRESSOR) tablet 25 mg  25 mg Oral BID Tyson Duenas PA-C   25 mg at 02/22/23 2059    rosuvastatin (CRESTOR) tablet 20 mg  20 mg Oral Nightly Gorge Moses PA-C   20 mg at 02/22/23 2058    Vitamin D (CHOLECALCIFEROL) tablet 1,000 Units  1,000 Units Oral Daily Cayden Briscoe MD   1,000 Units at 02/22/23 1619    sodium chloride flush 0.9 % injection 5-40 mL  5-40 mL IntraVENous 2 times per day Isha Waters MD   10 mL at 02/22/23 2059    sodium chloride flush 0.9 % injection 5-40 mL  5-40 mL IntraVENous PRN Isha Waters MD        0.9 % sodium chloride infusion   IntraVENous PRN Isha Waters MD        acetaminophen (TYLENOL) tablet 650 mg  650 mg Oral Q4H PRN Isha Waters MD        ondansetron [facility]) injection 4 mg  4 mg IntraVENous Q6H PRN Isha Waters MD        hydrALAZINE (APRESOLINE) injection 10 mg  10 mg IntraVENous Q10 Min PRN Isha Waters MD        enoxaparin Sodium (LOVENOX) injection 30 mg  30 mg SubCUTAneous BID AUSTIN Dobbs - CNP   30 mg at 02/21/23 0838    sertraline (ZOLOFT) tablet 50 mg  50 mg Oral Daily Sasha June APRN - CNP   50 mg at 02/22/23 2839    hydrOXYzine pamoate (VISTARIL) capsule 25 mg  25 mg Oral Daily PRN AUSTIN Do - CNP   25 mg at 02/22/23 2631    morphine sulfate (PF) injection 4 mg  4 mg IntraVENous Once Nandini Arteaga,         sodium chloride flush 0.9 % injection 5-40 mL  5-40 mL IntraVENous 2 times per day AUSTIN Dobbs - CNP   10 mL at 02/22/23 2059    sodium chloride flush 0.9 % injection 5-40 mL  5-40 mL IntraVENous PRN AUSTIN Guzman CNP        0.9 % sodium chloride infusion   IntraVENous PRN AUSTIN Guzman CNP        acetaminophen (TYLENOL) tablet 650 mg  650 mg Oral Q6H PRN AUSTIN Guzman CNP        Or    acetaminophen (TYLENOL) suppository 650 mg  650 mg Rectal Q6H PRN AUSTIN Guzman CNP        polyethylene glycol (GLYCOLAX) packet 17 g  17 g Oral Daily PRN Tabby AUSTIN Holland CNP        aspirin chewable tablet 81 mg  81 mg Oral Daily TabbyAUSTIN Son CNP   81 mg at 02/22/23 0852    nitroGLYCERIN (NITROSTAT) SL tablet 0.4 mg  0.4 mg SubLINGual Q5 Min PRN AUSTIN Guzman CNP        fluticasone (FLOVENT HFA) 110 MCG/ACT inhaler 2 puff  2 puff Inhalation BID AUSTIN Khan CNP   2 puff at 02/23/23 0802       Allergies: Allergies   Allergen Reactions    Bactrim [Sulfamethoxazole-Trimethoprim] Anaphylaxis and Hives    Lipitor [Atorvastatin] Shortness Of Breath    Taxol [Paclitaxel] Anaphylaxis and Swelling    Aminoglycosides      Abstracted from [facility] patient chart.  Demerol Nausea Only    Neosporin [Bacitracin-Neomycin-Polymyxin] Rash and Other (See Comments)     hotness    Other Rash     Ivory Soap    Talc Other (See Comments)     blisters       Problem List:    Patient Active Problem List   Diagnosis Code    Malignant neoplasm of upper-outer quadrant of right breast in female, estrogen receptor positive ([facility] [address] C50.411, Z17.0    Diffuse cystic mastopathy N60.19    Hyperlipidemia E78.5    H/O chest pain Z87.898    Heart block AV second degree I44.1    Abnormal cardiovascular stress test R94.39    JAMIE (obstructive sleep apnea) G47.33    Super obesity E66.9    Mild asthma J45.909    Severe obstructive sleep apnea G47.33    Cardiac pacemaker Z95.0    Chronic cystitis N30.20    Asthma J45.909    Hypertension I10    Depression F32. A    Obstructive sleep apnea G47.33    Nocturnal oxygen desaturation G47.34    NSTEMI (non-ST elevated myocardial infarction) ([facility]) I21.4    PVD (peripheral vascular disease) ([facility]) I73.9    Morbid obesity with BMI of 45.0-49.9, adult ([facility]) E66.01, Z68.42    Moderate aortic stenosis I35.0    Hepatomegaly, not elsewhere classified R16.0    Arthritis M19.90    CAD (coronary artery disease) I25.10    Bilateral carotid artery stenosis I65.23    Moderate persistent asthma without complication H95.52    Carotid stenosis, right D48.47    Complicated urinary tract infection N39.0    Osteopenia of multiple sites M85.89    Swelling of lower extremity M79.89    Ambulatory dysfunction R26.2    Cellulitis L03.90    Cellulitis of right foot L03.115    Acute cystitis without hematuria N30.00    Chest pain with moderate risk for cardiac etiology R07.9    Left arm weakness R29.898    Chest pain R07.9       Past Medical History:        Diagnosis Date    Anemia     Anxiety 1978    Arthritis     generalized    Asthma 2006    AV block     2nd degree per hospital in june2013    Blood poisoning 1994    Blood transfusion     12/2003    Breast cancer ([facility] [address] 02/29/2012    right    CAD (coronary artery disease)     Cancer ([facility] [address] 2007    skin (face) & colon(2003)/ 2/2012 dx of right breast ca(pc)    Carcinoma of breast ([facility] [address] 02/29/2012    right arm no b/p or sticks    Cardiac pacemaker 03/10/2014    Medtronic dual lead    Chronic cystitis     Chronic respiratory failure (HCC)     2 L/min oxygen at night-time    Colon cancer ([facility] [address] 2013    COPD (chronic obstructive pulmonary disease) (HCC)     CTS (carpal tunnel syndrome)     Depression     Diverticulitis     H/O 24 hour EKG monitoring 2/28/2014 7/24/13 2/14 complete heart block 7/13No clinically significant arrthymia noted.  H/O cardiac catheterization 10/31/2011, 7/11/2007    10/31/2011-No significant CAD. Cardiolite stress test was false positive-Dr Richardson Stage; 7/11/2007-No CAD, global function intact;    H/O cardiovascular stress test 10/20/2011, 3/23/2010    10/20/2011-Lexiscan- Evid of mild ischemia in Left CX region. EF 70%. Global LVSF normal;    H/O cardiovascular stress test 01/07/2015    EF 77%.    Normal Stress Test.    H/O chest pain 04/2005    sees Dr Perri Rice H/O Doppler ultrasound 02/20/2008 2/20/2008-CAROTID DOPPLER- Normal carotids bilaterally;    H/O Doppler ultrasound 06/06/2013    CAROTID DOPPLER- there is heterogeneous, irregular atherosclerotic plaque noted in the right internal carotid artery,  doppler flow velocities within the right internal carotid artery are elevated, consistent with a mild, less than 50%stenosis, there is intimal thickening but no significant atherosclerotic plaque noted in the left internal carotid artery, the left carotid are within normal limits    H/O echocardiogram 4/9/2012, 10/20/2011    4/9/2012-LVSF normal EF=>55%. impaired LV relaxation;    H/O echocardiogram 12/31/2014    EF 50-55%. LV shows mild concentric hypertrophy. Mildly dilated left atrium. Mildly dilated right ventricle. Sclerotic but not stenotic aortic valve. Mitral annular calcification. Mild MR, Mild TR, Mild pulm HTN.    H/O urinary incontinence     History of blood transfusion     Hx antineoplastic chemo     Hx of Arterial Doppler ultrasound 07/01/2019    No hemodynamically significant or focal stenosis visualized bilaterally, bilateral lower extremity arteries exhibit diffuse plaque and multiphasic waveforms, unable to obtain bilateral ABIs due to elevated leg pressures >250 mmHg, possibly due to atherosclerosis    Hx of cardiovascular stress test 06/2013    EF 70% abn suggestive of anterolateral ischemia, possible RCA ischmeia, post left ventricular dilation suggestive multivessel CAD.  Hx of echocardiogram 06/2013    EF50%, mild MR and TR, mild pulmonary HTN, mild AS    Hx of echocardiogram 11/01/2021    Left ventricular systolic function is normal, Mild concentric left ventricular hypertrophy Mildly dilated left atrium. Mitral annular calcification is present. Mild tricuspid regurgitation No evidence of any pericardial effusion    Hx of fall     \"last time 2018- due to neuropathy, have to use cane\"    HX OTHER MEDICAL 7/24/13, 06/2013 7/13-No clinacally significant arrhythmia.  6/13-multiple cycles of wenckebach episodes in addtion to some sinus tach    Hyperlipidemia     Hypertension     Hypothermia 2008    Malignant neoplasm of breast (female) [facility]) 2012    Neuropathy     \"have neuropathy of my legs\"    Normocytic normochromic anemia     Obstructive sleep apnea 2008 01- Has CPAP machine but has not used in over a year - states she has not had problems since her pacemaker was placed.  Old MI (myocardial infarction)     per pt on 1/15/2019\"had heart attack about a year ago\"    Osteoarthritis     Osteopenia     Osteopenia of multiple sites 9/14/2022    Pneumonia due to COVID-19 virus 03/22/2021    Primary malignant neoplasm of colon ([address]     S/P cardiac cath 06/2013    no significant CAD false postive stress test    Skin cancer     Super obesity     Umbilical hernia        Past Surgical History:        Procedure Laterality Date    A-V CARDIAC PACEMAKER INSERTION  3/10/14     Medtronic. Model:  [id number] Medtronic  Serial:  [id number] dual chamber pacemaker    APPENDECTOMY  2004    BREAST BIOPSY  2/13/12    BREAST LUMPECTOMY  2007    right     BREAST SURGERY  02/13/2012    rt lesion biopsy     CARDIAC CATHETERIZATION  2007 & 2011    CAROTID ENDARTERECTOMY Right 12/3/2021    RIGHT CAROTID ENDARTERECTOMY WITH PATCH performed by Sailaja Hurst MD at [address] [place] [address]  2004    Colon cancer    COLONOSCOPY  10-18-13    polyp    COLONOSCOPY  1/19/2016    internal hemorrhoids, diverticulosis, 1.5 cm sessile polyp, 8 mm polyp found in rectum.     COLONOSCOPY  12/14/2017    1.5cm residual polyp, 5mm polyps in blind sigmoid loop x3, Sigmoid divertics, Internal grade 1 hemorrhoids    COLONOSCOPY N/A 1/16/2019    COLONOSCOPY W/ ENDOSCOPIC MUCOSAL RESECTION WITH ELEVIEW 5ML INJECTION AND POLYPECTOMY OF TIP OF BLIND RECTOSIGMOID STUMP AND CAUTERIZATION WITH ERBE PROBE, CLIPPING X2 performed by Lucia Wright MD at [place] [address]  01/21/2020    pan divertics/ 4 polyps/ sm int hem, S/P partial sigmoid resection w/ end to side anastamosis, repeat in 3 years    COLONOSCOPY N/A 1/21/2020    COLONOSCOPY POLYPECTOMY SNARE/COLD BIOPSY performed by Alfonso Aden MD at [facility]  2003    back   [address] Bilateral 12/15/14    CYSTOSCOPY Right 5/15/2022    CYSTOSCOPY URETERAL STENT INSERTION performed by Janeth Cedeño MD at [address] [facility]      front right tooth and root canal w/ brass bolt    DILATION AND CURETTAGE OF UTERUS  2006    Needed a camera to find my uterus.  DILATION AND CURETTAGE OF UTERUS N/A 5/24/2022    DILATATION AND CURETTAGE, CULTURES OF UTEREUS performed by Roma Soliman MD at [facility], COLON, DIAGNOSTIC  12/14/2017    Small hiatal hernia    HERNIA REPAIR  2004    Umb hernia    HERNIA REPAIR  2008    Inc hernia    KIDNEY SURGERY  05/24/2022    stent placed on right side    LITHOTRIPSY Right 8/1/2022    RIGHT CYSTOSCOPY URETEROSCOPY STONE MANIPULATION WITH HOLIUM LASER LITHOTRIPSY STENT REPLACEMENT performed by Sheila Ulrich MD at [facility] [place] [facility] LITHOTRIPSY Left 9/26/2022    LEFT CYSTOSCOPY URETEROSCOPY RETROGRADE PYELOGRAM [address] [facility] [address] performed by Sheila Ulrich MD at [address] [place] [address]  2/29/2012    Right axillary-3 sentinel nodes were positive out of 9.    MASTECTOMY, RADICAL Right 02/29/2012    w/ sentinal node disection-Dr West    PACEMAKER PLACEMENT      PRE-MALIGNANT / BENIGN SKIN LESION EXCISION  2/8/2013    right leg X2-Dr West    TONSILLECTOMY  1957    TUNNELED VENOUS PORT PLACEMENT  2004    TUNNELED VENOUS PORT PLACEMENT  02/13/2012    removal of mediport       Social History:    Social History     Tobacco Use    Smoking status: Never    Smokeless tobacco: Never   Substance Use Topics    Alcohol use: No     Comment:           CAFFEINE: 2- 12oz nona daily & 2 cups coffee some nights.                                 Counseling given: Not Answered      Vital Signs (Current): There were no vitals filed for this visit. BP Readings from Last 3 Encounters:   02/23/23 130/68   01/23/23 (!) 132/58   01/09/23 (!) 144/88       NPO Status:                                                                                 BMI:   Wt Readings from Last 3 Encounters:   02/23/23 250 lb (113.4 kg)   02/21/23 250 lb 2 oz (113.5 kg)   01/22/23 242 lb (109.8 kg)     There is no height or weight on file to calculate BMI.    CBC:   Lab Results   Component Value Date/Time    WBC 12.4 02/23/2023 02:54 AM    RBC 3.99 02/23/2023 02:54 AM    HGB 10.8 02/23/2023 02:54 AM    HCT 34.4 02/23/2023 02:54 AM    MCV 86.2 02/23/2023 02:54 AM    RDW 14.5 02/23/2023 02:54 AM     02/23/2023 02:54 AM       CMP:   Lab Results   Component Value Date/Time     02/23/2023 02:54 AM    K 4.3 02/23/2023 02:54 AM     02/23/2023 02:54 AM    CO2 27 02/23/2023 02:54 AM    BUN 19 02/23/2023 02:54 AM    CREATININE 0.8 02/23/2023 02:54 AM    GFRAA >60 10/15/2022 10:39 AM    AGRATIO 1.3 07/29/2021 12:39 PM    LABGLOM >60 02/23/2023 02:54 AM    GLUCOSE 102 02/23/2023 02:54 AM    PROT 5.8 02/21/2023 06:00 AM    PROT 6.6 10/17/2012 02:35 PM    CALCIUM 9.4 02/23/2023 02:54 AM    BILITOT 0.2 02/21/2023 06:00 AM    ALKPHOS 133 02/21/2023 06:00 AM    AST 11 02/21/2023 06:00 AM    ALT 7 02/21/2023 06:00 AM       POC Tests: No results for input(s): POCGLU, POCNA, POCK, POCCL, POCBUN, POCHEMO, POCHCT in the last 72 hours.     Coags:   Lab Results   Component Value Date/Time    PROTIME 12.6 02/21/2023 06:00 AM    PROTIME 11.2 10/28/2011 02:28 PM    INR 0.98 02/21/2023 06:00 AM    APTT 29.8 02/21/2023 06:00 AM       HCG (If Applicable): No results found for: PREGTESTUR, PREGSERUM, HCG, HCGQUANT     ABGs: No results found for: PHART, PO2ART, YUA6HLM, OMD6VQP, BEART, Z5BWTJHS     Type & Screen (If Applicable):  No results found for: LABABO, LABRH    Drug/Infectious Status (If Applicable):  Lab Results   Component Value Date/Time    HEPCAB NON REACTIVE 2017 11:07 AM       COVID-19 Screening (If Applicable):   Lab Results   Component Value Date/Time    COVID19 NOT DETECTED 2023 07:21 PM    COVID19 NOT DETECTED 2022 10:16 PM           Anesthesia Evaluation  Patient summary reviewed  Airway: Mallampati: III          Dental:    (+) poor dentition      Pulmonary:   (+) COPD:  sleep apnea:  asthma:                            Cardiovascular:    (+) hypertension:, valvular problems/murmurs: AS, pacemaker: pacemaker, past MI:, CAD:, hyperlipidemia            Echocardiogram reviewed         Beta Blocker:  Not on Beta Blocker      ROS comment: Echo 23:     Left ventricular systolic function is normal.   Ejection fraction is visually estimated at 55-60%. PPM wiring visualized in right heart. Heavily calcified aortic valve with severe aortic stenosis; mean PmmHg, BACILIO: 0.82 cm sq, DVI: 0.24, BACILIO indexed: 0.37 cm sq/ m sq. Mild aortic regurgitation; PHT: 554 msec. Prominent mitral annular calcification is present. Mild mitral stenosis; mean P mmHg. Posterior mitral valve leaflet appears calcified and restricted. Mild tricuspid regurgitation; RVSP: 40 mmHg. No evidence of any pericardial effusion. Neuro/Psych:   (+) depression/anxiety             GI/Hepatic/Renal:   (+) morbid obesity          Endo/Other:    (+) : arthritis: OA., malignancy/cancer. Abdominal:   (+) obese,           Vascular:   + PVD, aortic or cerebral, . Other Findings:           Anesthesia Plan      MAC     ASA 3       Induction: intravenous. Anesthetic plan and risks discussed with patient.                         Myrtle Jones DO   2023

## 2023-02-23 NOTE — PROGRESS NOTES
Occupational [address] [facility] ACUTE CARE OCCUPATIONAL THERAPY EVALUATION    [address] [facility], 1950, 3027/3027-A, 2/23/2023    Discharge Recommendation: Yohan Stephen      History:  [place]:  The primary encounter diagnosis was Chest pain with moderate risk for cardiac etiology. A diagnosis of Acute non intractable tension-type headache was also pertinent to this visit. Subjective:  Patient states: \"I look down because then if there's money on the ground I can grab it! \"   Pain: 5/10 R knee pain w/ functional mobility   Communication with other providers: co-juan PT Rivka  Restrictions: General Precautions, Fall Risk, Contact Precautions, BP cuff, Telemetry, Purwik    Home Setup/Prior level of function:  Social/Functional History  Lives With: Roommate (significant other?)  Type of Home: House  Home Layout: One level  Home Access: Stairs to enter without rails   Entrance Stairs - Number of Steps: 3 (+ ramp that has handrails)  Bathroom Shower/Tub: Tub/Shower unit  Bathroom Toilet: Standard  Bathroom Equipment: Shower chair, Hand-held shower  Bathroom Accessibility: Not accessible  Home Equipment: Rollator, SPC (broken right now)  ADL Assistance: Independent  Homemaking Assistance: Needs assistance (roommate helps (pt will help fold laundry))  Homemaking Responsibilities: Yes  Ambulation Assistance: Independent (Mod I w/ Rollator in community; but won't fit in halls within the house)  Transfer Assistance: Independent  Active : Yes (if absolutely needed)    Examination:  Observation: Supine in bed upon arrival. Pt agreeable to evaluation and very talkative requiring redirection and inc time to complete functional mobility and ADLs.   Vision: WFL (glasses)  Hearing: WFL  Vitals: Stable vitals throughout session    Body Systems and functions:  ROM: WFL in BL UEs observed functionally  Strength: 4/5 MMT grossly in BL UEs  Sensation: Pt reports hx of neuropathy (hands and LEs) (see PT eval for LEs)  Tone: Normal  Coordination: WFL  Perception: WNL    Activities of Daily Living (ADLs):  Feeding: Independent  Grooming: SBA (while seated; pt completed oral and facial hygiene sitting in chair SBA)  UB bathing: SBA  LB bathing: Min A (to reach buttocks)  UB dressing: SBA   LB dressing: CGA (pt able to don sock in figure 4 and BL shoes w/ CGA & thread brief BL LEs, manage to hips sitting CGA, and manage to hips standing CGA; inc time and effort for all; pt w/ significant posterior lean sitting EOB w/ LB dressing)  Toileting: Mod A (kyle care)     Cognitive and Psychosocial Functioning:  Overall cognitive status: WFL- pt w/ distractible and very talkative requiring frequent redirection; some decreased insight for safety, history of depression and pt w/ current home stressors   Affect: Normal     Balance:   Sitting: SBA in unsupported sitting EOB   Standing: CGA w/ RW static and dynamic     Functional Mobility:  Bed Mobility: SBA supine to sitting EOB w/ HOB elevated and inc time  Transfers: CGA sit to stand from EOB, CGA sit to stand to chair for controlled descent (cues for sequencing and safe hand placement throughout)  Ambulation: CGA w/ RW ~50 ft (cues for posture) (see PT eval for gait assessment)      AM-PAC 6 click short form for inpatient daily activity:   How much help from another person does the patient currently need. .. Unable  Dep A Lot  Max A A Lot   Mod A A Little  Min A A Little   CGA  SBA None   Mod I  Indep  Sup   1. Putting on and taking off regular lower body clothing? [] 1    [] 2   [] 2   [] 3   [x] 3   [] 4      2. Bathing (including washing, rinsing, drying)? [] 1   [] 2   [] 2 [x] 3 [] 3 [] 4   3. Toileting, which includes using toilet, bedpan, or urinal? [] 1    [] 2   [x] 2   [] 3   [] 3   [] 4     4. Putting on and taking off regular upper body clothing? [] 1   [] 2   [] 2   [] 3   [x] 3    [] 4      5. Taking care of personal grooming such as brushing teeth?  [] 1   [] 2 [] 2 [] 3    [x] 3   [] 4      6. Eating meals? [] 1   [] 2   [] 2   [] 3   [] 3   [x] 4      Raw Score:  18     [24=0% impaired(CH), 23=1-19%(CI), 20-22=20-39%(CJ), 15-19=40-59%(CK), 10-14=60-79%(CL), 7-9=80-99%(CM), 6=100%(CN)]     Treatment:  Therapeutic Activity Training:   Therapeutic activity training was instructed today. Cues were given for safety, sequence, UE/LE placement, awareness, and balance. Activities performed today included bed mobility training, transfer training, functional mobility, edu on role of OT, importance for OOB activity, POC, and d/c recs  Self Care Training:   Cues were given for safety, sequence, UE/LE placement, visual cues, and balance.     Activities performed today included LB dressing tasks, oral and facial hygiene seated      Safety Measures: Gait belt used, Left in Chair, Alarm in place    Assessment:  Pt is a 66 yo F w/ a past medical history of Anemia, Anxiety, Arthritis, Asthma, AV block, Blood poisoning, Blood transfusion, Breast cancer ([address] CAD (coronary artery disease), Cancer ([address] Carcinoma of breast ([address] Cardiac pacemaker, Chronic cystitis, Chronic respiratory failure ([address] Colon cancer ([address] COPD (chronic obstructive pulmonary disease) ([address] CTS (carpal tunnel syndrome), Depression, Diverticulitis, H/O 24 hour EKG monitoring, H/O cardiac catheterization, H/O cardiovascular stress test, H/O cardiovascular stress test, H/O chest pain, H/O Doppler ultrasound, H/O Doppler ultrasound, H/O echocardiogram, H/O echocardiogram, H/O urinary incontinence, History of blood transfusion, Hx antineoplastic chemo, Hx of Arterial Doppler ultrasound, Hx of cardiovascular stress test, Hx of echocardiogram, Hx of echocardiogram, Hx of fall, HX OTHER MEDICAL, Hyperlipidemia, Hypertension, Hypothermia, Malignant neoplasm of breast (female) ([address] Neuropathy, Normocytic normochromic anemia, Obstructive sleep apnea, Old MI (myocardial infarction), Osteoarthritis, Osteopenia, Osteopenia of multiple sites, Pneumonia due to COVID-19 virus, Primary malignant neoplasm of colon ([facility] S/P cardiac cath, Skin cancer, Super obesity, and Umbilical hernia. Pt admitted w/ c/o chest pain, hypertension, and SOB. Pt underwent cardiac cath on 2/22. Pt lives at home w/ roommate and reports at baseline she is IND w/ ADLs, receives assist for IADLs, and is Mod I w/ Rollator for functional mobility. Pt presents with the above impairments and would benefit from continued acute care OT services. Recommend SNF at discharge. Complexity: Moderate  Prognosis: Good  Plan: 3x/week      Goals:  1. Pt will complete all aspects of bed mobility for EOB/OOB ADLs w/ Supervision and HOB flat   2. Pt will complete UB/LB bathing SBA w/ long handled sponge PRN  3. Pt will complete all aspects of LB dressing SBA w/ AE PRN  4. Pt will complete all functional transfers to and from bed, chair, toilet, shower chair SBA   5. Pt will ambulate HH distance to bathroom for toileting SBA w/ LRAD  6. Pt will complete all aspects of toileting task SBA  7. Pt will complete oral hygiene/grooming routine in standing at sink SBA w/ initial set up and no seated rest breaks   8.  Pt will complete ther ex/ther act with focus on UE strengthening and dynamic standing balance/endurance >8 min            Time:   Time in: 0854  Time out: 0940  Timed treatment minutes: 31  Total time: 46      Electronically signed by:    Carlos Key S/OT  Jenelle Farris OTR/L, [address] [facility], [id number]

## 2023-02-23 NOTE — PROCEDURES
ANDRE with anesthesia was performed today  Indication: Assess for aortic valve, aortic stenosis      ASA Mallampati 3/3    Preliminary: ANDRE shows tricuspid, aortic valve, calcified, with moderate stenosis.   Aortic valve area calculated and will be available in epic full report to

## 2023-02-23 NOTE — CONSULTS
[address] [facility] PHYSICAL THERAPY EVALUATION  Tosin Perez, 1950, 3027/3027-A, 2/23/2023    History  [place]:  The primary encounter diagnosis was Chest pain with moderate risk for cardiac etiology. A diagnosis of Acute non intractable tension-type headache was also pertinent to this visit. Patient  has a past medical history of Anemia, Anxiety, Arthritis, Asthma, AV block, Blood poisoning, Blood transfusion, Breast cancer ([address] CAD (coronary artery disease), Cancer ([address] Carcinoma of breast ([address] Cardiac pacemaker, Chronic cystitis, Chronic respiratory failure ([address] Colon cancer ([address] COPD (chronic obstructive pulmonary disease) ([address] CTS (carpal tunnel syndrome), Depression, Diverticulitis, H/O 24 hour EKG monitoring, H/O cardiac catheterization, H/O cardiovascular stress test, H/O cardiovascular stress test, H/O chest pain, H/O Doppler ultrasound, H/O Doppler ultrasound, H/O echocardiogram, H/O echocardiogram, H/O urinary incontinence, History of blood transfusion, Hx antineoplastic chemo, Hx of Arterial Doppler ultrasound, Hx of cardiovascular stress test, Hx of echocardiogram, Hx of echocardiogram, Hx of fall, HX OTHER MEDICAL, Hyperlipidemia, Hypertension, Hypothermia, Malignant neoplasm of breast (female) ([address] Neuropathy, Normocytic normochromic anemia, Obstructive sleep apnea, Old MI (myocardial infarction), Osteoarthritis, Osteopenia, Osteopenia of multiple sites, Pneumonia due to COVID-19 virus, Primary malignant neoplasm of colon ([address] S/P cardiac cath, Skin cancer, Super obesity, and Umbilical hernia. Patient  has a past surgical history that includes Tonsillectomy (1957); Appendectomy (2004); colectomy (2004); Tunneled venous port placement (2004); cyst removal (2003); Breast lumpectomy (2007); Dilation and curettage of uterus (2006); hernia repair (2004); hernia repair (2008); Dental surgery; Breast surgery (02/13/2012);  Tunneled venous port placement (02/13/2012); Breast biopsy (2/13/12); Mastectomy, radical (Right, 02/29/2012); pre-malignant / benign skin lesion excision (2/8/2013); lymph node dissection (2/29/2012); Cardiac catheterization (2007 & 2011); A-V cardiac pacemaker insertion (3/10/14); Cystoscopy (Bilateral, 12/15/14); pacemaker placement; Endoscopy, colon, diagnostic (12/14/2017); Colonoscopy (10-18-13); Colonoscopy (1/19/2016); Colonoscopy (12/14/2017); Colonoscopy (N/A, 1/16/2019); Colonoscopy (01/21/2020); Colonoscopy (N/A, 1/21/2020); Carotid endarterectomy (Right, 12/3/2021); Cystoscopy (Right, 5/15/2022); Dilation and curettage of uterus (N/A, 5/24/2022); Kidney surgery (05/24/2022); Lithotripsy (Right, 8/1/2022); and Lithotripsy (Left, 9/26/2022). Subjective:  Patient states:  \"Well I know it's stress! I get so mad at him, and he doesn't help me\", \"It's hard for me to get my foot up the step\". Pain:  pt c/o 5/10 pain R anterior knee with gait.     Communication with other providers:  Handoff to RN, co-eval with OT Golden Ramirez and OT Kelsey Preston  Restrictions: contact, tele, purwik, fall risk, general     Home Setup/Prior level of function  Social/Functional History  Lives With: Other (comment) (roommate)  Type of Home: House  Home Layout: One level  Home Access: Stairs to enter without rails (+ ramp (has handrails))  Entrance Stairs - Number of Steps: 3  Bathroom Shower/Tub: Tub/Shower unit  Bathroom Toilet: Standard  Bathroom Equipment: Shower chair, Hand-held shower  Bathroom Accessibility: Not accessible  Home Equipment: Rollator  ADL Assistance: Independent  Homemaking Assistance: Needs assistance (roommate helps (pt will help fold laundry))  Homemaking Responsibilities: Yes  Ambulation Assistance: Independent (Mod I w/ Rollator in community)  Transfer Assistance: Independent  Active : Yes    Examination of body systems (includes body structures/functions, activity/participation limitations):  Observation:  pt is awake in high-fowlers upon arrival  Vision:  [facility]  Hearing:  WFL  Cardiopulmonary:  WFL on RA  Cognition: Min decreased safety insight, A&O x4, see OT/SLP note for further evaluation. Musculoskeletal  ROM R/L:  WFL BLE. Min decreased end range hip flexion ROM d/t body habitus    Strength R/L:  Generally 4-/5, decreased in function and endurance. Neuro:  pt reports B neuropathy LLE impaired>RLE. Decreased sensation distal to knee joint. Gait pattern: Pt demonstrates step-through pattern with decreased R stance time, impaired R knee flexion in terminal stance, impaired R foot clearance with increased time to advance RLE. Decreased aliya and foot clearance. Mobility:  Supine to sit:  SBA increased time  Transfers: CGA  Sitting balance:  SBA. Standing balance:  CGA. Gait: CGA    [facility] 6 Clicks Inpatient Mobility:  AM-PAC Inpatient Mobility Raw Score : 17    Treatment:  *Pt min tangential throughout requiring increased time and re-direction  Bed mobility: From HOB raised, pt able to gradually advance LE to EOB with intermittent UE assist to advance RLE. Overall increased UE support required on bed rail, increased time to complete multiple scooting reps to EOB SBA. Sitting balance: At EOB extended time for seated balance tasks: donning/doffing x1 sock , donning B slip-on shoes, and donning brief. Pt required significant retro trunk lean for momentum in lifting LE into figure-4 position ea trial. Close CGA provided for safety and balance concerns. Pt required intermittent cues to resume task as pt is tangential.     STS: From EOB to RW with cues for UE placement, sequencing, CGA. Pt with increased time and UE support required to upright. Standing balance: Static standing balance at EOB x1 min for clothing management, balance assessment. PT able to pull up brief in standing without LOB. PT cues for trial of WS/marching with BUE support on RW, pt with min increased difficulty lifting RLE from floor, minimal clearance. Gait: Pt AMB in hallway with RW, chair follow, x50 ft. Pt with step-through pattern with decreased R stance time, impaired R knee flexion in terminal stance, impaired R foot clearance with increased time to advance RLE. Decreased aliya and foot clearance. PT cues frequently for decreased excursion of RW and upright posture. Pt requires frequent visual attending to feet d/t sensory deficit. Pt fatigues quickly with gait, c/o increasing R knee pain, required RTS at recliner with CGA and cues. Pt demos poor eccentric control of descent. Pt returned to room via chair d/t fatigue and knee pain. PT assisted with positioning in chair. Education: PT educated on use of FWW v rollator, safety concerns with stair performance, POC, d/c planning, importance of OOB mobility. Safety: patient left in recliner with OT student attending, call light within reach, RN notified, gait belt used. Assessment:    Pt is a 66 y/o female admitted 2/20 with c/o  chest pain, LUE weakness. Patient with significant h/o Anemia, Anxiety, Arthritis, Asthma, AV block, Blood poisoning, Blood transfusion, Breast cancer ([address] CAD (coronary artery disease), Cancer ([address] Carcinoma of breast ([address] Cardiac pacemaker, Chronic cystitis, Chronic respiratory failure ([address] Colon cancer ([address] COPD (chronic obstructive pulmonary disease) ([address] CTS (carpal tunnel syndrome), Depression, Diverticulitis, H/O 24 hour EKG monitoring, H/O cardiac catheterization, H/O cardiovascular stress test, H/O cardiovascular stress test, H/O chest pain, H/O Doppler ultrasound, H/O Doppler ultrasound, H/O echocardiogram, H/O echocardiogram, H/O urinary incontinence, History of blood transfusion, Hx antineoplastic chemo, see chart. Per pt report pt has been performing ADLs/IADLs with Mod I, increased difficulty, limited AMB with RW, see above. At this time pt appears to be functioning below baseline.    Pt is now presenting with impairments in BLE strength, functional endurance, safety awareness, balance, pain, gait, transfers. Pt would benefit from skilled PT services in order to address impairments and promote return to PLOF. PT to recommend d/c to SNF. Complexity: Moderate  Prognosis: Good, no significant barriers to participation at this time.    Plan General Plan: 3-5 times per week/week, 1 week,   Discharge Recommendations: Subacute/Skilled Nursing Facility  Equipment: Defer to next LOC    Goals:  Short Term Goals  Time Frame for Short Term Goals: 1 week  Short Term Goal 1: Pt will perform STS from low surface to RW with min cues, SBA  Short Term Goal 2: Pt will perform step-ups to 4'' box with BUE support and Min A x5  Short Term Goal 3: Pt will AMB x30 ft with upright posture, SBA  Short Term Goal 4: Pt will tolerate standing dynamic balance activity x5' with x1 UE support CGA       Treatment plan:  Bed mobility, transfers, balance, gait, TA, TX    Recommendations for NURSING mobility: AMB to/from BR with RW CGA    Time:   Time in: 08:54  Time out: 09:31  Timed treatment minutes: 25  Total time: 37    Electronically signed by:    Roman Ann, PT  6/30/0031, 51:03 PM  PT Lic #: [id number];

## 2023-02-23 NOTE — CARE COORDINATION
CM in to see Pt to follow up on discharge planning. Pt agreeable to therapy recommendation of SNF, choice of [facility]. CM call to [facility] with referral.    Dr. Richie Ann updated.      CM following

## 2023-02-23 NOTE — PROGRESS NOTES
Hospitalist Progress Note      Name:  Priya Chase /Age/Sex: 1950  (67 y.o. female)   MRN & CSN:  [id number] & [id number] Admission Date/Time: 2023  5:13 PM   Location:  [facility] PCP: Silver Galicia Day: 4    Assessment and Plan:   Priya Chase is a 67 y.o.  female with past medical history of moderate to severe arctic stenosis, moderate pulmonary hypertension, carotid artery stenosis s/p right CEA, JAMIE, hypertension, hyperlipidemia, third-degree AV block s/p PPM, history of right breast carcinoma status postmastectomy, history of colon cancer status postchemotherapy, morbid obesity with BMI 45.75 was admitted on 2023 for evaluation of chest pain. Also reports of increased anxiety/depression. Patient was seen by cardiology/psychiatry and neurology. .    Echo 2023   Left ventricular systolic function is normal.   Ejection fraction is visually estimated at 55-60%. PPM wiring visualized in right heart. Heavily calcified aortic valve with severe aortic stenosis; mean P   mmHg, BACILIO: 0.82 cm sq, DVI: 0.24, BACILIO indexed: 0.37 cm sq/ m sq. Mild aortic regurgitation; PHT: 554 msec. Prominent mitral annular calcification is present. Mild mitral stenosis; mean P mmHg. Posterior mitral valve leaflet   appears calcified and restricted. Mild tricuspid regurgitation; RVSP: 40 mmHg. No evidence of any pericardial effusion. Assessment    Atypical chest pain  Chronic exertional shortness of breath likely secondary to cardiac etiology  Moderate pulmonary hypertension  Left-sided weakness/numbness? CVA event versus conversion disorder  Hypertension  Hyperlipidemia  S/p PPM  History of breast/colon carcinoma    Plan    Continue aspirin. Started on Lipitor  Cardiology evaluation appreciated. Status post left heart cath/right heart cath 2023.   Patient does not appear to have significant aortic stenosis as was initially thought based on echocardiogram, mean gradient was noted to be 17 mmHg. Patient was also noted to have hypertrophic cardiomyopathy. Await final ANDRE report, done today and further cardiology recommendations. Prelim report shows tricuspid, aortic valve, calcified with moderate stenosis. Continue metoprolol  Psychiatry evaluation appreciated. Started on sertraline  MRI brain/MRA head shows no acute abnormalities. Neurology evaluation appreciated. Suspect left-sided weakness and paresthesia secondary to hypertensive urgency versus conversion disorder superimposed on worsening anxiety/depression. Echo noted as above. EF 55 to 60%   PT OT eval      Diet Diet NPO Exceptions are: Sips of Water with Meds, Ice Chips   DVT Prophylaxis [x] Lovenox, []  Heparin, [] SCDs, [] Ambulation   GI Prophylaxis [] PPI,  [] H2 Blocker,  [] Carafate,  [] Diet/Tube Feeds   Code Status Full Code   Disposition Patient requires continued admission due to ANDRE/cardiology clearance/PT/OT eval   MDM [] Low, [x] Moderate,[]  High  Patient's risk as above      Subjective    Patient seen and examined this morning. Sitting comfortably in chair. .  Reports her left-sided weakness/numbness is much better today. Denies any shortness of breath or chest pain at rest.    Objective: Intake/Output Summary (Last 24 hours) at 2/23/2023 0938  Last data filed at 2/22/2023 1856  Gross per 24 hour   Intake 120 ml   Output 700 ml   Net -580 ml        Vitals:   Vitals:    02/23/23 0803   BP:    Pulse: 60   Resp: 16   Temp:    SpO2:      Physical Exam:   GEN Awake female, sitting upright in bed in no apparent distress. Appears given age. RESP bilateral entry fair. No obvious wheezing or crackles  CARDIO/VASC S1/S2 auscultated. Regular. Systolic murmur. No peripheral edema. GI Abdomen is soft without significant tenderness,  NEURO AOx3. Motor strength 5/5 right upper and lower extremity, 4/5 left upper and lower extremity.    Medications:   Medications:    metoprolol tartrate  25 mg Oral BID    rosuvastatin  20 mg Oral Nightly    Vitamin D  1,000 Units Oral Daily    sodium chloride flush  5-40 mL IntraVENous 2 times per day    enoxaparin  30 mg SubCUTAneous BID    sertraline  50 mg Oral Daily    morphine  4 mg IntraVENous Once    sodium chloride flush  5-40 mL IntraVENous 2 times per day    aspirin  81 mg Oral Daily    fluticasone  2 puff Inhalation BID      Infusions:    sodium chloride      sodium chloride       PRN Meds: sodium chloride flush, 5-40 mL, PRN  sodium chloride, , PRN  acetaminophen, 650 mg, Q4H PRN  ondansetron, 4 mg, Q6H PRN  hydrALAZINE, 10 mg, Q10 Min PRN  hydrOXYzine pamoate, 25 mg, Daily PRN  sodium chloride flush, 5-40 mL, PRN  sodium chloride, , PRN  acetaminophen, 650 mg, Q6H PRN   Or  acetaminophen, 650 mg, Q6H PRN  polyethylene glycol, 17 g, Daily PRN  nitroGLYCERIN, 0.4 mg, Q5 Min PRN        Electronically signed by Bayron Rouse MD on 2/23/2023 at 9:38 AM

## 2023-02-23 NOTE — PROGRESS NOTES
HEMATOLOGY ONCOLOGY  Progress Note    Reva Lockwood is a 67 y.o. female with past medical history significant for colon cancer diagnosed in 2004 and breast cancer 2012 s/p right  mastectomy, adjuvant chemotherapy, and ten years of aromatase inhibitor. For her history of colon cancer she has been followed by Dr. Mina Chirinos for colonoscopy with last 1/2020 with 3 mm polyps and otherwise without suspicious lesions. She presented to the ED with chest pain. She has severe aortic stenosis with plan for heart catheterization by cardiology with plan for possible TAVR. LHC and RHC 02/22/23: No significant CAD. BACILIO of 1.01 cm2 with mean gradient of 17 mmhg  ANDRE was planned today to further evaluate aortic stenosis    2/23/23  She was seen and examined. She is feeling better. No new complaints. PHYSICAL EXAM    Vitals: /68   Pulse 60   Temp 98.5 °F (36.9 °C) (Oral)   Resp 16   Ht 5' 2\" (1.575 m)   Wt 250 lb (113.4 kg)   LMP 01/15/1999   SpO2 96%   BMI 45.73 kg/m²   CONSTITUTIONAL: awake, alert, cooperative, NAD   EYES: EOM grossly intact, sclera clear and conjunctiva normal  ENT: Normocephalic, without obvious abnormality, atraumatic  NECK: supple, symmetrical  HEMATOLOGIC/LYMPHATIC: no cervical, supraclavicular or axillary lymphadenopathy   LUNGS: no increased work of breathing and clear to auscultation   CARDIOVASCULAR: regular rate and rhythm, normal S1 and S2, +Murmur  ABDOMEN: normal bowel sounds x 4, soft, non-distended, non-tender, no masses palpated, no hepatosplenomgaly   MUSCULOSKELETAL: full range of motion noted, tone is normal  NEUROLOGIC: awake, alert, oriented to name, place and time. Motor skills grossly intact. SKIN: Normal skin color, texture, turgor and no jaundice.  Skin appears intact   EXTREMITIES: +LE edema, no cyanosis, no clubbing,     LABORATORY RESULTS  CBC:   Recent Labs     02/21/23  0600 02/22/23  0423 02/23/23  0254   WBC 9.6 10.5 12.4*   HGB 10.3* 10.7* 10.8*    247 258     BMP:    Recent Labs     02/21/23  0600 02/22/23  0423 02/23/23  0254    137 136   K 4.2 4.3 4.3    101 101   CO2 25 26 27   BUN 18 16 19   CREATININE 0.9 0.7 0.8   GLUCOSE 105* 107* 102*     Hepatic:   Recent Labs     02/20/23  1800 02/21/23  0600   AST 14* 11*   ALT 8* 7*   BILITOT 0.2 0.2   ALKPHOS 150* 133*     INR:   Recent Labs     02/21/23  0600   INR 0.98     ASSESSMENT/RECOMMENDATION    History of colon cancer 2004, breast cancer 2012 s/p mastectomy, adjuvant chemotherapy and 10 years of AI. She is in remission. Her prognosis from cancers are good. We recommend to proceed with TAVR if necessary. ANDRE planned for today. [facility] result was reviewed. We will follow the patient. Thank you.

## 2023-02-23 NOTE — ANESTHESIA POSTPROCEDURE EVALUATION
Department of Anesthesiology  Postprocedure Note    Patient: Sarah Mcknight  MRN: [id number]  YOB: 1950  Date of evaluation: 2/23/2023      Procedure Summary     Date: 02/23/23 Room / Location: [facility] Stress Lab    Anesthesia Start: 1137 Anesthesia Stop: 0810    Procedure: TRANSESOPHAGEAL ECHOCARDIOGRAM Diagnosis:     Scheduled Providers:  Responsible Provider: Asuncion Rahman DO    Anesthesia Type: MAC ASA Status: 3          Anesthesia Type: MAC    Florian Phase I:      Florian Phase II:        Anesthesia Post Evaluation    Patient location during evaluation: bedside  Patient participation: complete - patient participated  Level of consciousness: awake  Pain score: 0  Airway patency: patent  Nausea & Vomiting: no vomiting and no nausea  Complications: no  Cardiovascular status: hemodynamically stable  Respiratory status: acceptable, airway suctioned, spontaneous ventilation and nasal cannula  Hydration status: stable

## 2023-02-23 NOTE — PROGRESS NOTES
[address], [place]   Phone: [phone]    Fax [phone]                  Doris Alejandra MD, Marion Hunt MD, Brit Gray MD, Rilla Remedies, MD Laretta Severin, MD Derry Marion, MD Molly Shutters, MD Dr. Sharmaine Dom MD Dayla Gibbons, AUSTIN Mancia, AUSTIN Jacinto, AUSTIN Richardson, AUSTIN Chambers PA-C    CARDIOLOGY  NOTE      Name:  Paulette Dacosta /Age/Sex: 1950  (67 y.o. female)   MRN & CSN:  [id number] & [id number] Admission Date/Time: 2023  5:13 PM   Location:  3027/3027-A PCP: Jaqui Campbell Day: 4         Subjective:  Nusrat is a 67 y. o.year old     Explained to patient ANDRE procedure, voiced understanding and is agreeable. Objective: Temperature:  Current - Temp: 98.5 °F (36.9 °C);  Max - Temp  Av.1 °F (36.7 °C)  Min: 97.6 °F (36.4 °C)  Max: 98.7 °F (37.1 °C)    Respiratory Rate : Resp  Av.3  Min: 14  Max: 24    Pulse Range: Pulse  Av.3  Min: 60  Max: 62    Blood Presuure Range:  Systolic (43PES), SGZ:452 , Min:112 , NFH:447   ; Diastolic (13MJW), TPL:25, Min:50, Max:118      Pulse ox Range: SpO2  Av.3 %  Min: 90 %  Max: 98 %    24hr I & O:    Intake/Output Summary (Last 24 hours) at 2023 0843  Last data filed at 2023 1856  Gross per 24 hour   Intake 195 ml   Output 1450 ml   Net -1255 ml           /68   Pulse 60   Temp 98.5 °F (36.9 °C) (Oral)   Resp 16   Ht 5' 2\" (1.575 m)   Wt 250 lb (113.4 kg)   LMP 01/15/1999   SpO2 96%   BMI 45.73 kg/m²         TELEMETRY: Sinus      has a past medical history of Anemia, Anxiety, Arthritis, Asthma, AV block, Blood poisoning, Blood transfusion, Breast cancer ([facility] [address] CAD (coronary artery disease), Cancer ([facility] [address] Carcinoma of breast ([facility] [address] Cardiac pacemaker, Chronic cystitis, Chronic respiratory failure ([facility] [address] Colon cancer ([facility] [address] COPD (chronic obstructive pulmonary disease) (HCC), CTS (carpal tunnel syndrome), Depression, Diverticulitis, H/O 24 hour EKG monitoring, H/O cardiac catheterization, H/O cardiovascular stress test, H/O cardiovascular stress test, H/O chest pain, H/O Doppler ultrasound, H/O Doppler ultrasound, H/O echocardiogram, H/O echocardiogram, H/O urinary incontinence, History of blood transfusion, Hx antineoplastic chemo, Hx of Arterial Doppler ultrasound, Hx of cardiovascular stress test, Hx of echocardiogram, Hx of echocardiogram, Hx of fall, HX OTHER MEDICAL, Hyperlipidemia, Hypertension, Hypothermia, Malignant neoplasm of breast (female) ([facility] [address] Neuropathy, Normocytic normochromic anemia, Obstructive sleep apnea, Old MI (myocardial infarction), Osteoarthritis, Osteopenia, Osteopenia of multiple sites, Pneumonia due to COVID-19 virus, Primary malignant neoplasm of colon ([facility] [address] S/P cardiac cath, Skin cancer, Super obesity, and Umbilical hernia. has a past surgical history that includes Tonsillectomy (4109); Appendectomy (2004); colectomy (2004); Tunneled venous port placement (2004); cyst removal (2003); Breast lumpectomy (2007); Dilation and curettage of uterus (2006); hernia repair (2004); hernia repair (2008); Dental surgery; Breast surgery (02/13/2012); Tunneled venous port placement (02/13/2012); Breast biopsy (2/13/12); Mastectomy, radical (Right, 02/29/2012); pre-malignant / benign skin lesion excision (2/8/2013); lymph node dissection (2/29/2012); Cardiac catheterization (2007 & 2011); A-V cardiac pacemaker insertion (3/10/14); Cystoscopy (Bilateral, 12/15/14); pacemaker placement; Endoscopy, colon, diagnostic (12/14/2017); Colonoscopy (10-18-13); Colonoscopy (1/19/2016); Colonoscopy (12/14/2017); Colonoscopy (N/A, 1/16/2019); Colonoscopy (01/21/2020); Colonoscopy (N/A, 1/21/2020); Carotid endarterectomy (Right, 12/3/2021); Cystoscopy (Right, 5/15/2022); Dilation and curettage of uterus (N/A, 5/24/2022);  Kidney surgery (05/24/2022); Lithotripsy (Right, 8/1/2022); and Lithotripsy (Left, 9/26/2022). Physical Exam  Constitutional:       General: She is not in acute distress. Appearance: She is not diaphoretic. HENT:      Head: Normocephalic and atraumatic. Right Ear: External ear normal.      Left Ear: External ear normal.      Nose: Nose normal.      Mouth/Throat:      Mouth: Mucous membranes are moist.   Eyes:      Extraocular Movements: Extraocular movements intact. Comments: Pupils equal and round   Neck:      Vascular: No carotid bruit. Cardiovascular:      Rate and Rhythm: Normal rate and regular rhythm. Pulses: Normal pulses. Heart sounds: S1 normal and S2 normal. No murmur heard. No friction rub. No gallop. Pulmonary:      Effort: Pulmonary effort is normal. No respiratory distress. Breath sounds: Normal breath sounds. No rales. Chest:      Chest wall: No tenderness. Abdominal:      Palpations: Abdomen is soft. Tenderness: There is no abdominal tenderness. Musculoskeletal:      Right lower leg: No edema. Left lower leg: No edema. Skin:     General: Skin is warm and dry. Capillary Refill: Capillary refill takes less than 2 seconds. Findings: No rash. Comments: Skin turgor brisk   Neurological:      Mental Status: She is alert and oriented to person, place, and time. Mental status is at baseline. Psychiatric:         Behavior: Behavior is cooperative. Thought Content:  Thought content normal.         Judgment: Judgment normal.        Medications:    metoprolol tartrate  25 mg Oral BID    rosuvastatin  20 mg Oral Nightly    Vitamin D  1,000 Units Oral Daily    sodium chloride flush  5-40 mL IntraVENous 2 times per day    [Held by provider] enoxaparin  30 mg SubCUTAneous BID    sertraline  50 mg Oral Daily    morphine  4 mg IntraVENous Once    sodium chloride flush  5-40 mL IntraVENous 2 times per day    aspirin  81 mg Oral Daily fluticasone  2 puff Inhalation BID      sodium chloride      sodium chloride       sodium chloride flush, sodium chloride, acetaminophen, ondansetron, hydrALAZINE, hydrOXYzine pamoate, sodium chloride flush, sodium chloride, acetaminophen **OR** acetaminophen, polyethylene glycol, nitroGLYCERIN    Lab Data:  CBC:   Recent Labs     02/21/23  0600 02/22/23  0423 02/23/23  0254   WBC 9.6 10.5 12.4*   HGB 10.3* 10.7* 10.8*   HCT 33.8* 34.6* 34.4*   MCV 88.7 86.7 86.2    247 258       BMP:   Recent Labs     02/21/23  0600 02/22/23  0423 02/23/23  0254    137 136   K 4.2 4.3 4.3    101 101   CO2 25 26 27   BUN 18 16 19   CREATININE 0.9 0.7 0.8       LIVER PROFILE:   Recent Labs     02/20/23  1800 02/21/23  0600   AST 14* 11*   ALT 8* 7*   LIPASE 16  --    BILITOT 0.2 0.2   ALKPHOS 150* 133*       PT/INR:   Recent Labs     02/21/23  0600   PROTIME 12.6   INR 0.98       APTT:   Recent Labs     02/21/23  0600   APTT 29.8       BNP:  No results for input(s): BNP in the last 72 hours. TROPONIN:   Recent Labs     02/20/23  1800 02/20/23  2124 02/21/23  0245   TROPONINT <0.010 <0.010 <0.010       Labs, consult, tests reviewed     Simran Ozuna PA-C, 2/23/2023 8:43 AM          CARDIOLOGY ATTENDING ADDENDUM    I have seen, spoken to and examined this patient personally, independent of the NP/PAC. I have reviewed the hospital care given to date and reviewed all pertinent labs and imaging. I have spoken with patient, nursing staff and provided written and verbal instructions . The above note has been reviewed. I have spent substantive amount of time in formulating patient care.            Physical Exam:    General:   Awake, alert  Head:normal  Eye:normal  Chest:   Clear to auscultation 0 Basilar crackles   Cardiovascular:  S1S2   Abdomen: soft   Extremities:   0 edema  Pulses; palpable      MEDICAL DECISION MAKING :       Atypical chest pain  Shortness of breath with exertion  Essential hypertension: well controlled  Hyperlipidemia. LVOT obstruction  S/p PPM - will interrogate      [facility] and C 02/22/23: No significant CAD. BACILIO of 1.01 cm2 with mean gradient of 17 mmhg  ANDRE today to further evaluate aortic stenosis     The discrepancy between echocardiogram indicating severe aortic stenosis versus hemodynamic data from cardiac catheterization lab can be best explained by hyperdynamic state of the left ventricle with LVOT obstruction due to septal bulge     Patient does not appear to have hypertrophic cardiomyopathy or severe aortic stenosis        Continue with aspirin 81 mg daily  Discontinue Nitropaste  Continue Crestor 20 mg daily  Continue metoprolol 25 twice daily  DVT prophylaxis with Lovenox        ## History of colonic cancer, and History of breast cancer s/p mastectomy : S/p chemotherapy.   Likely in remission, will get opinion from oncology about prognosis before proceeding with TAVR evaluation       Dr. Jes Chou MD

## 2023-02-24 VITALS
DIASTOLIC BLOOD PRESSURE: 73 MMHG | RESPIRATION RATE: 10 BRPM | HEART RATE: 60 BPM | BODY MASS INDEX: 42.51 KG/M2 | WEIGHT: 231 LBS | TEMPERATURE: 97.8 F | SYSTOLIC BLOOD PRESSURE: 125 MMHG | OXYGEN SATURATION: 98 % | HEIGHT: 62 IN

## 2023-02-24 LAB
ANION GAP SERPL CALCULATED.3IONS-SCNC: 9 MMOL/L (ref 4–16)
BUN SERPL-MCNC: 19 MG/DL (ref 6–23)
CALCIUM SERPL-MCNC: 8.8 MG/DL (ref 8.3–10.6)
CHLORIDE BLD-SCNC: 105 MMOL/L (ref 99–110)
CO2: 26 MMOL/L (ref 21–32)
CREAT SERPL-MCNC: 0.9 MG/DL (ref 0.6–1.1)
GFR SERPL CREATININE-BSD FRML MDRD: >60 ML/MIN/1.73M2
GLUCOSE SERPL-MCNC: 105 MG/DL (ref 70–99)
HCT VFR BLD CALC: 33.4 % (ref 37–47)
HEMOGLOBIN: 10.3 GM/DL (ref 12.5–16)
MCH RBC QN AUTO: 26.8 PG (ref 27–31)
MCHC RBC AUTO-ENTMCNC: 30.8 % (ref 32–36)
MCV RBC AUTO: 86.8 FL (ref 78–100)
PDW BLD-RTO: 14.7 % (ref 11.7–14.9)
PLATELET # BLD: 244 K/CU MM (ref 140–440)
PMV BLD AUTO: 9.7 FL (ref 7.5–11.1)
POTASSIUM SERPL-SCNC: 4.2 MMOL/L (ref 3.5–5.1)
RBC # BLD: 3.85 M/CU MM (ref 4.2–5.4)
SARS-COV-2 RDRP RESP QL NAA+PROBE: NOT DETECTED
SODIUM BLD-SCNC: 140 MMOL/L (ref 135–145)
SOURCE: NORMAL
WBC # BLD: 9.5 K/CU MM (ref 4–10.5)

## 2023-02-24 PROCEDURE — 87635 SARS-COV-2 COVID-19 AMP PRB: CPT

## 2023-02-24 PROCEDURE — 6370000000 HC RX 637 (ALT 250 FOR IP): Performed by: INTERNAL MEDICINE

## 2023-02-24 PROCEDURE — 94640 AIRWAY INHALATION TREATMENT: CPT

## 2023-02-24 PROCEDURE — 6370000000 HC RX 637 (ALT 250 FOR IP): Performed by: NURSE PRACTITIONER

## 2023-02-24 PROCEDURE — APPSS30 APP SPLIT SHARED TIME 16-30 MINUTES

## 2023-02-24 PROCEDURE — 85027 COMPLETE CBC AUTOMATED: CPT

## 2023-02-24 PROCEDURE — 97110 THERAPEUTIC EXERCISES: CPT

## 2023-02-24 PROCEDURE — 6360000002 HC RX W HCPCS: Performed by: NURSE PRACTITIONER

## 2023-02-24 PROCEDURE — 36415 COLL VENOUS BLD VENIPUNCTURE: CPT

## 2023-02-24 PROCEDURE — 80048 BASIC METABOLIC PNL TOTAL CA: CPT

## 2023-02-24 PROCEDURE — 97530 THERAPEUTIC ACTIVITIES: CPT

## 2023-02-24 PROCEDURE — 99233 SBSQ HOSP IP/OBS HIGH 50: CPT | Performed by: INTERNAL MEDICINE

## 2023-02-24 PROCEDURE — 2580000003 HC RX 258: Performed by: INTERNAL MEDICINE

## 2023-02-24 PROCEDURE — 6370000000 HC RX 637 (ALT 250 FOR IP)

## 2023-02-24 PROCEDURE — 94761 N-INVAS EAR/PLS OXIMETRY MLT: CPT

## 2023-02-24 RX ORDER — HYDROXYZINE PAMOATE 25 MG/1
25 CAPSULE ORAL DAILY PRN
Qty: 5 CAPSULE | Refills: 0 | Status: SHIPPED | OUTPATIENT
Start: 2023-02-24 | End: 2023-03-01

## 2023-02-24 RX ORDER — ROSUVASTATIN CALCIUM 20 MG/1
20 TABLET, COATED ORAL NIGHTLY
Qty: 30 TABLET | Refills: 3 | Status: SHIPPED | OUTPATIENT
Start: 2023-02-24

## 2023-02-24 RX ORDER — CHOLECALCIFEROL (VITAMIN D3) 25 MCG
1000 TABLET ORAL DAILY
Qty: 30 TABLET | Refills: 0 | Status: SHIPPED | OUTPATIENT
Start: 2023-02-25 | End: 2023-03-27

## 2023-02-24 RX ADMIN — ENOXAPARIN SODIUM 30 MG: 100 INJECTION SUBCUTANEOUS at 09:54

## 2023-02-24 RX ADMIN — Medication 1000 UNITS: at 09:54

## 2023-02-24 RX ADMIN — METOPROLOL TARTRATE 25 MG: 25 TABLET, FILM COATED ORAL at 09:54

## 2023-02-24 RX ADMIN — ASPIRIN 81 MG 81 MG: 81 TABLET ORAL at 09:54

## 2023-02-24 RX ADMIN — SERTRALINE HYDROCHLORIDE 50 MG: 50 TABLET ORAL at 09:54

## 2023-02-24 RX ADMIN — FLUTICASONE PROPIONATE 2 PUFF: 110 AEROSOL, METERED RESPIRATORY (INHALATION) at 08:38

## 2023-02-24 RX ADMIN — SODIUM CHLORIDE, PRESERVATIVE FREE 10 ML: 5 INJECTION INTRAVENOUS at 09:53

## 2023-02-24 ASSESSMENT — PAIN SCALES - GENERAL: PAINLEVEL_OUTOF10: 0

## 2023-02-24 NOTE — PROGRESS NOTES
Physical Therapy    Physical Therapy Treatment Note  Name: Romario Lyle MRN: [id number] :   1950   Date:  2023   Admission Date: 2023 Room:  [address] [place] [address]   Restrictions/Precautions:          fall risk, contact prec  Communication with other providers:  nurse steps in for pt vitals  Subjective:  Patient states:  agreeable to PT  Pain:   Location, Type, Intensity (0/10 to 10/10):  does not numerically rate, says she blocks pain she has had for years    Objective:    Observation:  semi diamond in bed upon entry  Treatment, including education/measures:  Transfers   Supine to sit :SBA  Sit to supine :SBA  Scooting :SBA  Sit to stand :CGA  Stand to sit :CGA  SPT:CGA using 4WW, vc for safe techs. Therapeutic Ex  Sitting:  PF x20  LAQ 2x10  Marches 2x10  Hip abd 2x10  Vc for proper form. Rest breaks between sets. Gait:  Pt amb with 4WW for ~45' /c CGA and decreased step length/ht, fwd lean/posture. Decreased aliya. Vc for safety and sequencing. Safety  Patient left safely in the chair, in care of nurse. Gait belt was used for transfers and gait.       Assessment / Impression:    Patient's tolerance of treatment:  good   Adverse Reaction: na  Significant change in status and impact:  na  Barriers to improvement:  weakness and endurance  Plan for Next Session:    Cont gait training  Time in:  932  Time out:  957  Timed treatment minutes:  25  Total treatment time:  25    Previously filed items:  Social/Functional History  Lives With: Other (comment) (roommate)  Type of Home: House  Home Layout: One level  Home Access: Stairs to enter without rails (+ ramp (has handrails))  Entrance Stairs - Number of Steps: 3  Bathroom Shower/Tub: Tub/Shower unit  Bathroom Toilet: Standard  Bathroom Equipment: Shower chair, Hand-held shower  Bathroom Accessibility: Not accessible  Home Equipment: Rollator  ADL Assistance: [address] [place] [address] Avenue: Needs assistance (roommate helps (pt will help fold laundry))  Homemaking Responsibilities: Yes  Ambulation Assistance: Independent (Mod I w/ Rollator in community)  Transfer Assistance: Independent  Active : Yes        Short Term Goals  Time Frame for Short Term Goals: 1 week  Short Term Goal 1: Pt will perform STS from low surface to RW with min cues, SBA  Short Term Goal 2: Pt will perform step-ups to 4'' box with BUE support and Min A x5  Short Term Goal 3: Pt will AMB x30 ft with upright posture, SBA  Short Term Goal 4: Pt will tolerate standing dynamic balance activity x5' with x1 UE support CGA    Electronically signed by:    Shonna Quezada, PTA  2/24/2023, 9:39 AM

## 2023-02-24 NOTE — PROGRESS NOTES
Pt discharged to [facility] via [facility]. PIV removed with tip intact. [facility] unwilling to transport pt walker; she called her s/o Jennifer Vences who states he will  and take to [facility]. Called report to [facility] and spoke with [facility] JASON COOLEY RN. Pt was provided a copy of the AVS per her request.       2:58 PM  Pt s/o Jennifer Vences picked up walker.

## 2023-02-24 NOTE — PROGRESS NOTES
[address], [address] [facility],Third [address]  Phone: [phone]    Fax [phone]                  Elena Ponce MD, Jose G Hollins MD, Alberto Quispe MD, MD Anuradha Livingston MD Ricard Kemp, MD Dr. Hugo Can MD, AUSTIN Alfaro, AUSTIN Enriquez, AUSTIN Cadena, AUSTIN Torres, PA-C    CARDIOLOGY  NOTE      Name:  Griselda Barker /Age/Sex: 1950  (67 y.o. female)   MRN & CSN:  [id number] & [id number] Admission Date/Time: 2023  5:13 PM   Location:  3027/3027-A PCP: Jaqui Campbell Day: 5        Subjective:  Nusrat is a 67 y. o.year old     Feels well overall today. No significant complaints at this time. Objective: Temperature:  Current - Temp: 97.8 °F (36.6 °C);  Max - Temp  Av.2 °F (36.8 °C)  Min: 97.3 °F (36.3 °C)  Max: 98.9 °F (37.2 °C)    Respiratory Rate : Resp  Av.6  Min: 10  Max: 25    Pulse Range: Pulse  Av  Min: 60  Max: 67    Blood Presuure Range:  Systolic (70WYF), RKP:932 , Min:112 , QTS:749   ; Diastolic (61QOR), GHB:27, Min:45, Max:73      Pulse ox Range: SpO2  Av.9 %  Min: 93 %  Max: 98 %    24hr I & O:    Intake/Output Summary (Last 24 hours) at 2023 1131  Last data filed at 2023 0630  Gross per 24 hour   Intake 100 ml   Output 1375 ml   Net -1275 ml           /73   Pulse 60   Temp 97.8 °F (36.6 °C) (Oral)   Resp 10   Ht 5' 2\" (1.575 m)   Wt 231 lb (104.8 kg)   LMP 01/15/1999   SpO2 98%   BMI 42.25 kg/m²         TELEMETRY: Sinus      has a past medical history of Anemia, Anxiety, Arthritis, Asthma, AV block, Blood poisoning, Blood transfusion, Breast cancer ([facility] [address] CAD (coronary artery disease), Cancer ([facility] [address] Carcinoma of breast ([facility] [address] Cardiac pacemaker, Chronic cystitis, Chronic respiratory failure ([facility] [address] Colon cancer ([facility] [address] COPD (chronic obstructive pulmonary disease) ([facility] [address] CTS (carpal tunnel syndrome), Depression, Diverticulitis, H/O 24 hour EKG monitoring, H/O cardiac catheterization, H/O cardiovascular stress test, H/O cardiovascular stress test, H/O chest pain, H/O Doppler ultrasound, H/O Doppler ultrasound, H/O echocardiogram, H/O echocardiogram, H/O urinary incontinence, History of blood transfusion, Hx antineoplastic chemo, Hx of Arterial Doppler ultrasound, Hx of cardiovascular stress test, Hx of echocardiogram, Hx of echocardiogram, Hx of fall, HX OTHER MEDICAL, Hyperlipidemia, Hypertension, Hypothermia, Malignant neoplasm of breast (female) ([facility] [address] Neuropathy, Normocytic normochromic anemia, Obstructive sleep apnea, Old MI (myocardial infarction), Osteoarthritis, Osteopenia, Osteopenia of multiple sites, Pneumonia due to COVID-19 virus, Primary malignant neoplasm of colon ([facility] [address] S/P cardiac cath, Skin cancer, Super obesity, and Umbilical hernia. has a past surgical history that includes Tonsillectomy (4434); Appendectomy (2004); colectomy (2004); Tunneled venous port placement (2004); cyst removal (2003); Breast lumpectomy (2007); Dilation and curettage of uterus (2006); hernia repair (2004); hernia repair (2008); Dental surgery; Breast surgery (02/13/2012); Tunneled venous port placement (02/13/2012); Breast biopsy (2/13/12); Mastectomy, radical (Right, 02/29/2012); pre-malignant / benign skin lesion excision (2/8/2013); lymph node dissection (2/29/2012); Cardiac catheterization (2007 & 2011); A-V cardiac pacemaker insertion (3/10/14); Cystoscopy (Bilateral, 12/15/14); pacemaker placement; Endoscopy, colon, diagnostic (12/14/2017); Colonoscopy (10-18-13); Colonoscopy (1/19/2016); Colonoscopy (12/14/2017); Colonoscopy (N/A, 1/16/2019); Colonoscopy (01/21/2020); Colonoscopy (N/A, 1/21/2020); Carotid endarterectomy (Right, 12/3/2021); Cystoscopy (Right, 5/15/2022); Dilation and curettage of uterus (N/A, 5/24/2022);  Kidney surgery (05/24/2022); Lithotripsy (Right, 8/1/2022); and Lithotripsy (Left, 9/26/2022). Physical Exam  Constitutional:       General: She is not in acute distress. Appearance: She is not diaphoretic. HENT:      Head: Normocephalic and atraumatic. Right Ear: External ear normal.      Left Ear: External ear normal.      Nose: Nose normal.      Mouth/Throat:      Mouth: Mucous membranes are moist.   Eyes:      Extraocular Movements: Extraocular movements intact. Comments: Pupils equal and round   Neck:      Vascular: No carotid bruit. Cardiovascular:      Rate and Rhythm: Normal rate and regular rhythm. Pulses: Normal pulses. Heart sounds: S1 normal and S2 normal. No murmur heard. No friction rub. No gallop. Pulmonary:      Effort: Pulmonary effort is normal. No respiratory distress. Breath sounds: Normal breath sounds. No rales. Chest:      Chest wall: No tenderness. Abdominal:      Palpations: Abdomen is soft. Tenderness: There is no abdominal tenderness. Musculoskeletal:      Right lower leg: No edema. Left lower leg: No edema. Skin:     General: Skin is warm and dry. Capillary Refill: Capillary refill takes less than 2 seconds. Findings: No rash. Comments: Skin turgor brisk   Neurological:      Mental Status: She is alert and oriented to person, place, and time. Mental status is at baseline. Psychiatric:         Behavior: Behavior is cooperative. Thought Content:  Thought content normal.         Judgment: Judgment normal.        Medications:    metoprolol tartrate  25 mg Oral BID    rosuvastatin  20 mg Oral Nightly    Vitamin D  1,000 Units Oral Daily    sodium chloride flush  5-40 mL IntraVENous 2 times per day    enoxaparin  30 mg SubCUTAneous BID    sertraline  50 mg Oral Daily    morphine  4 mg IntraVENous Once    sodium chloride flush  5-40 mL IntraVENous 2 times per day    aspirin  81 mg Oral Daily    fluticasone  2 puff Inhalation BID sodium chloride      sodium chloride       sodium chloride flush, sodium chloride, acetaminophen, ondansetron, hydrALAZINE, hydrOXYzine pamoate, sodium chloride flush, sodium chloride, acetaminophen **OR** acetaminophen, polyethylene glycol, nitroGLYCERIN    Lab Data:  CBC:   Recent Labs     02/22/23 0423 02/23/23  0254 02/24/23  0302   WBC 10.5 12.4* 9.5   HGB 10.7* 10.8* 10.3*   HCT 34.6* 34.4* 33.4*   MCV 86.7 86.2 86.8    258 244       BMP:   Recent Labs     02/22/23 0423 02/23/23  0254 02/24/23  0302    136 140   K 4.3 4.3 4.2    101 105   CO2 26 27 26   BUN 16 19 19   CREATININE 0.7 0.8 0.9       LIVER PROFILE:   No results for input(s): AST, ALT, LIPASE, BILIDIR, BILITOT, ALKPHOS in the last 72 hours. Invalid input(s): AMYLASE,  ALB    PT/INR:   No results for input(s): PROTIME, INR in the last 72 hours. APTT:   No results for input(s): APTT in the last 72 hours. BNP:  No results for input(s): BNP in the last 72 hours. TROPONIN:   No results for input(s): TROPONINT in the last 72 hours. Labs, consult, tests reviewed     Nicole Luna, 2/24/2023 11:31 AM        CARDIOLOGY ATTENDING ADDENDUM    I have seen, spoken to and examined this patient personally, independent of the NP/PAC. I have reviewed the hospital care given to date and reviewed all pertinent labs and imaging. I have spoken with patient, nursing staff and provided written and verbal instructions . The above note has been reviewed. I have spent substantive amount of time in formulating patient care.              Physical Exam:    General:   Awake, alert  Head:normal  Eye:normal  Chest:   Clear to auscultation 0 Basilar crackles   Cardiovascular:  S1S2   Abdomen: soft   Extremities:   0 edema  Pulses; palpable      MEDICAL DECISION MAKING :       Atypical chest pain  Moderate Aortic Stenosis  LVOT obstruction  Shortness of breath with exertion  Essential hypertension: well controlled  Hyperlipidemia. S/p PPM      C and RHC 02/22/23: No significant CAD. BACILIO of 1.01 cm2 with mean gradient of 17 mmhg  ANDRE yesterday: Moderate aortic stenosis with     The discrepancy between echocardiogram indicating severe aortic stenosis versus hemodynamic data from cardiac catheterization lab can be best explained by hyperdynamic state of the left ventricle with LVOT obstruction due to septal bulge     Patient does not appear to have hypertrophic cardiomyopathy        Continue with aspirin 81 mg daily  Discontinue Nitropaste  Continue Crestor 20 mg daily  Continue metoprolol 25 twice daily  DVT prophylaxis with Lovenox        ## History of colonic cancer, and History of breast cancer s/p mastectomy : S/p chemotherapy. Likely in remission, will get opinion from oncology about prognosis before proceeding with TAVR evaluation         ANDRE: 02/23/23   Left ventricular systolic function is normal.   Ejection fraction is visually estimated at 55-60%. No evidence of thrombus within the left atrial appendage. Negative bubble study; no ASD or PFO noted. PPM wiring visualized within the right heart. Calcified aortic valve with likely moderate stenosis is present; Valve   appears to be opening okay - Discrepancy between hemodynamic data from heart   cath and TTE , likely secondary to dynamic LVOT obstruction in the setting   of septal bulge. Mild mitral stenosis; Mild mitral regurgitation. There is no evidence of any pericardial effusion.     Dr. Fabiola Leger MD

## 2023-02-24 NOTE — DISCHARGE SUMMARY
V2.0  Discharge Summary    Name:  oSndra Ann /Age/Sex: 1950 (67 y.o. female)   Admit Date: 2023  Discharge Date: 23    MRN & CSN:  [id number] & [id number] Encounter Date and Time 23 2:53 PM EST    Attending:  No att. providers found Discharging Provider: Jayne Sheffield MD       Hospital Course:     Brief HPI and Hospital course: Sondra Poster is a 67 y.o. female with past medical history of moderate to severe aortic stenosis, moderate pulmonary hypertension, carotid artery stenosis s/p right CEA, JAMIE, hypertension, hyperlipidemia, third-degree AV block s/p PPM, history of right breast carcinoma status postmastectomy, history of colon cancer status postchemotherapy, morbid obesity with BMI 45.75 admitted on  with history of chest pain, evaluated by cardiology, echocardiogram on  showed EF 55 to 60%, BACILIO 0.82, mild AR, underwent patient with right and left heart cath on  which showed no CAD, BACILIO 1.01, patient also underwent ANDRE on  which showed AS moderate, treated with aspirin, Lipitor, metoprolol. MRI brain/MRA head shows no acute abnormalities. Neurology evaluation appreciated. Suspect left-sided weakness and paresthesia secondary to hypertensive urgency versus conversion disorder superimposed on worsening anxiety/depression. Psychiatry evaluation appreciated. Started on sertraline, PT/OT evaluated and recommended SNF, discharge patient in a stable condition to follow-up with PCP and cardiology. The patient expressed appropriate understanding of, and agreement with the discharge recommendations, medications, and plan.      Consults this admission:  IP CONSULT TO CARDIOLOGY  IP CONSULT TO PSYCHIATRY  IP CONSULT TO NEUROLOGY  IP CONSULT TO ONCOLOGY    Discharge Diagnosis:   Chest pain with moderate risk for cardiac etiology  Atypical chest pain  Chronic exertional shortness of breath likely secondary to cardiac etiology  Moderate pulmonary hypertension  Left-sided weakness/numbness?   CVA event versus conversion disorder  Hypertension  Hyperlipidemia  S/p PPM  History of breast/colon carcinoma    Discharge Instruction:   Follow up appointments: PCP, cardiology  Primary care physician: Eual Schirmer, DO within 2 weeks  Diet: regular diet and cardiac diet   Activity: activity as tolerated  Disposition: Discharged to:   []Home, [][facility], [x]SNF, []Acute Rehab, []Hospice   Condition on discharge: Stable  Labs and Tests to be Followed up as an outpatient by PCP or Specialist:     Discharge Medications:        Medication List        START taking these medications      aspirin 81 MG chewable tablet  Take 1 tablet by mouth daily     hydrOXYzine pamoate 25 MG capsule  Commonly known as: VISTARIL  Take 1 capsule by mouth daily as needed for Anxiety     metoprolol tartrate 25 MG tablet  Commonly known as: LOPRESSOR  Take 1 tablet by mouth 2 times daily     rosuvastatin 20 MG tablet  Commonly known as: CRESTOR  Take 1 tablet by mouth nightly     sertraline 50 MG tablet  Commonly known as: ZOLOFT  Take 1 tablet by mouth daily  Start taking on: February 25, 2023     Vitamin D3 25 MCG Tabs  Take 1 tablet by mouth daily  Start taking on: February 25, 2023            CONTINUE taking these medications      acetaminophen 500 MG tablet  Commonly known as: TYLENOL  Take 1 tablet by mouth every 6 hours as needed for Pain     albuterol sulfate  (90 Base) MCG/ACT inhaler  Commonly known as: ProAir HFA  INHALE 2 PUFFS BY MOUTH EVERY SIX HOURS AS NEEDED FOR WHEEZING     Transport Chair Misc  Use as directed            STOP taking these medications      furosemide 20 MG tablet  Commonly known as: Lasix     letrozole 2.5 MG tablet  Commonly known as: FEMARA     potassium chloride 10 MEQ extended release tablet  Commonly known as: KLOR-CON M     simvastatin 20 MG tablet  Commonly known as: ZOCOR            ASK your doctor about these medications      fluticasone 110 MCG/ACT inhaler  Commonly known as: Flovent HFA  Inhale 2 puffs into the lungs 2 times daily               Where to Get Your Medications        These medications were sent to [facility], [address] [facility] [place] [address], [address] [id number]      Phone: [phone]   hydrOXYzine pamoate 25 MG capsule  metoprolol tartrate 25 MG tablet  rosuvastatin 20 MG tablet  sertraline 50 MG tablet  Vitamin D3 25 MCG Tabs        Objective Findings at Discharge:   /73   Pulse 60   Temp 97.8 °F (36.6 °C) (Oral)   Resp 10   Ht 5' 2\" (1.575 m)   Wt 231 lb (104.8 kg)   LMP 01/15/1999   SpO2 98%   BMI 42.25 kg/m²       Physical Exam:   General: Afebrile, no distress  Eyes: EOMI  ENT: neck supple  Cardiovascular: Regular rate. Respiratory: Clear to auscultation  Gastrointestinal: Soft, non tender  Genitourinary: no suprapubic tenderness  Musculoskeletal: No edema  Skin: warm, dry  Neuro: Alert. Psych: Mood appropriate. Labs and Imaging   CT Head W/O Contrast    Result Date: 2/20/2023  EXAMINATION: CT OF THE HEAD WITHOUT CONTRAST  2/20/2023 6:13 pm TECHNIQUE: CT of the head was performed without the administration of intravenous contrast. Automated exposure control, iterative reconstruction, and/or weight based adjustment of the mA/kV was utilized to reduce the radiation dose to as low as reasonably achievable. COMPARISON: 05/15/2022 HISTORY: ORDERING SYSTEM PROVIDED HISTORY: severe headache TECHNOLOGIST PROVIDED HISTORY: Reason for exam:->severe headache Has a \"code stroke\" or \"stroke alert\" been called? ->No Decision Support Exception - unselect if not a suspected or confirmed emergency medical condition->Emergency Medical Condition (MA) Reason for Exam: severe headache FINDINGS: BRAIN/VENTRICLES: There is no acute intracranial hemorrhage, mass effect or midline shift. No abnormal extra-axial fluid collection.   The gray-white differentiation is maintained without evidence of an acute infarct. There is no evidence of hydrocephalus. ORBITS: The visualized portion of the orbits demonstrate no acute abnormality. SINUSES: Minimal scattered paranasal sinus mucoperiosteal thickening. No mastoid effusion. SOFT TISSUES/SKULL:  No acute abnormality of the visualized skull or soft tissues. No acute intracranial abnormality. MRA HEAD WO CONTRAST    Result Date: 2/22/2023  EXAMINATION: MRA OF THE HEAD WITHOUT CONTRAST 2/22/2023 10:48 am TECHNIQUE: MRA of the head was performed utilizing time-of-flight imaging with MIP images. No intravenous contrast was administered. COMPARISON: None HISTORY: ORDERING SYSTEM PROVIDED HISTORY: rule out IC stenosis, unable to tolerate IV dye for CTA TECHNOLOGIST PROVIDED HISTORY: Reason for exam:->rule out IC stenosis, unable to tolerate IV dye for CTA Reason for Exam: rule out IC stenosis, unable to tolerate IV dye for CTA FINDINGS: ANTERIOR CIRCULATION: No significant stenosis of the intracranial internal carotid, anterior cerebral, or middle cerebral arteries. POSTERIOR CIRCULATION: No significant stenosis of the vertebral, basilar, or posterior cerebral arteries. Unremarkable exam RECOMMENDATIONS: Unavailable     XR CHEST PORTABLE    Result Date: 2/20/2023  EXAMINATION: ONE XRAY VIEW OF THE CHEST 2/20/2023 5:29 pm COMPARISON: 01/22/2023 HISTORY: ORDERING SYSTEM PROVIDED HISTORY: chest pain TECHNOLOGIST PROVIDED HISTORY: Reason for exam:->chest pain Reason for Exam: chest pain Additional signs and symptoms: none Relevant Medical/Surgical History: CAD, COPD, asthma FINDINGS: Clear lungs. No pneumothorax or pleural effusion. Stable cardiomegaly. Mediastinal contours normal.  Pacemaker unchanged. Surgical clips in the right neck redemonstrated. Stable cardiomegaly. No acute cardiopulmonary abnormality.      VL DUP CAROTID BILATERAL    Result Date: 2/21/2023  EXAMINATION: ULTRASOUND EVALUATION OF THE CAROTID ARTERIES 2/20/2023 TECHNIQUE: Duplex ultrasound using B-mode/gray scaled imaging, Doppler spectral analysis and color flow Doppler was obtained of the carotid arteries. COMPARISON: None HISTORY: ORDERING SYSTEM PROVIDED HISTORY:  Chest pain, h/o right carotid stenosis. TECHNOLOGIST PROVIDED HISTORY: Reason for Exam:  Chest pain, h/o right carotid stenosis. FINDINGS: RIGHT: The right common carotid artery demonstrates peak systolic velocities of 85, 85 cm/sec in the proximal and distal segments respectively. The right common carotid artery demonstrates end-diastolic velocities of 17, 9 cm/sec in the proximal and distal segments respectively. The right internal carotid artery demonstrates the systolic velocities of 774, 119, 111 cm/sec in the proximal, mid and distal segments respectively. The right internal carotid artery demonstrates the end-diastolic velocities of 19, 32, 23 cm/sec in the proximal, mid and distal segments respectively. The external carotid artery appears patent. The vertebral artery demonstrates normal antegrade flow. Mild atherosclerotic plaque is identified in the common carotid artery. ICA/CCA ratio of 1.5 LEFT: The left common carotid artery demonstrates peak systolic velocities of 463, 92 cm/sec in the proximal and distal segments respectively. The left common carotid artery demonstrates end-diastolic velocities of 13, 13 cm/sec in the proximal and distal segments respectively. The left internal carotid artery demonstrates the systolic velocities of 320, 139, 119 cm/sec in the proximal, mid and distal segments respectively. The left internal carotid artery demonstrates the end-diastolic velocities of 32, 16, 29 cm/sec in the proximal, mid and distal segments respectively. The external carotid artery appears patent. The vertebral artery demonstrates normal antegrade flow. Mild atherosclerotic plaque is identified at the carotid bulb and proximal ICA.  ICA/CCA ratio of 1.3     The right internal carotid artery demonstrates 0-50% stenosis. The left internal carotid artery demonstrates 0-50% stenosis. Bilateral vertebral arteries are patent with flow in the normal direction. MRI BRAIN WO CONTRAST    Result Date: 2/22/2023  EXAMINATION: MRI OF THE BRAIN WITHOUT CONTRAST  2/22/2023 10:47 am TECHNIQUE: Multiplanar multisequence MRI of the brain was performed without the administration of intravenous contrast. COMPARISON: None HISTORY: ORDERING SYSTEM PROVIDED HISTORY: r/o stroke, left sided paresthesia, weakness TECHNOLOGIST PROVIDED HISTORY: Reason for exam:->r/o stroke, left sided paresthesia, weakness Reason for Exam: r/o stroke, left sided paresthesia, weakness FINDINGS: INTRACRANIAL STRUCTURES/VENTRICLES: There is no acute infarct. No mass effect or midline shift. No evidence of an acute intracranial hemorrhage. The ventricles and sulci are normal in size and configuration. The sellar/suprasellar regions appear unremarkable. The normal signal voids within the major intracranial vessels appear maintained. Mild-to-moderate chronic microvascular disease is identified within the periventricular white matter. ORBITS: The visualized portion of the orbits demonstrate no acute abnormality. SINUSES: Mild mucosal thickening is identified within the paranasal sinuses. BONES/SOFT TISSUES: The bone marrow signal intensity appears normal. The soft tissues demonstrate no acute abnormality. No acute ischemia. Mild-to-moderate chronic microvascular disease within the periventricular white matter.        CBC:   Recent Labs     02/22/23 0423 02/23/23  0254 02/24/23  0302   WBC 10.5 12.4* 9.5   HGB 10.7* 10.8* 10.3*    258 244     BMP:    Recent Labs     02/22/23  0423 02/23/23  0254 02/24/23  0302    136 140   K 4.3 4.3 4.2    101 105   CO2 26 27 26   BUN 16 19 19   CREATININE 0.7 0.8 0.9   GLUCOSE 107* 102* 105*     Hepatic: No results for input(s): AST, ALT, ALB, BILITOT, ALKPHOS in the last 72 hours. Lipids:   Lab Results   Component Value Date/Time    CHOL 161 02/21/2023 06:00 AM    CHOL 182 07/29/2021 12:39 PM    HDL 41 02/21/2023 06:00 AM    TRIG 100 02/21/2023 06:00 AM     Hemoglobin A1C:   Lab Results   Component Value Date/Time    LABA1C 5.7 02/21/2023 09:10 AM     TSH: No results found for: TSH  Troponin:   Lab Results   Component Value Date/Time    TROPONINT <0.010 02/21/2023 02:45 AM    TROPONINT <0.010 02/20/2023 09:24 PM    TROPONINT <0.010 02/20/2023 06:00 PM     Lactic Acid: No results for input(s): LACTA in the last 72 hours. BNP: No results for input(s): PROBNP in the last 72 hours.   UA:  Lab Results   Component Value Date/Time    NITRU POSITIVE 01/22/2023 11:03 PM    COLORU YELLOW 01/22/2023 11:03 PM    WBCUA 943 01/22/2023 11:03 PM    RBCUA NONE SEEN 01/22/2023 11:03 PM    MUCUS RARE 11/15/2022 02:00 AM    TRICHOMONAS NONE SEEN 01/22/2023 11:03 PM    YEAST RARE 09/11/2017 07:00 AM    BACTERIA MANY 01/22/2023 11:03 PM    CLARITYU TURBID 01/22/2023 11:03 PM    SPECGRAV 1.010 01/22/2023 11:03 PM    LEUKOCYTESUR LARGE NUMBER OR AMOUNT OBSERVED 01/22/2023 11:03 PM    UROBILINOGEN 0.2 01/22/2023 11:03 PM    BILIRUBINUR NEGATIVE 01/22/2023 11:03 PM    BLOODU LARGE NUMBER OR AMOUNT OBSERVED 01/22/2023 11:03 PM    Linward Corns NEGATIVE 01/22/2023 11:03 PM    AMORPHOUS OCCASIONAL 04/15/2018 05:48 AM     Urine Cultures: No results found for: Raymundo Dennison  Blood Cultures: No results found for: BC  No results found for: BLOODCULT2  Organism:   Lab Results   Component Value Date/Time    ORG ECOL 10/16/2018 02:43 PM       Time Spent Discharging patient 33 minutes    Electronically signed by Serafin Vital MD on 2/24/2023 at 2:53 PM

## 2023-02-24 NOTE — CARE COORDINATION
CM contacted by Jayla/Jacinto, Pt approved and they can accept today if medically ready. PS to Dr. Renée Bolanos to update, rapid covid requested. 12:21 PM   Pt on discharge. CM set stretcher transportation with [facility] for 1400.      Pt RN Jory updated  Jayla solorzano

## 2023-02-24 NOTE — DISCHARGE INSTR - COC
Continuity of Care Form    Patient Name: Madhuri Atwood   :  1950  MRN:  [id number]    Admit date:  2023  Discharge date:  2023    Code Status Order: Full Code   Advance Directives:     Admitting Physician:  Anayeli Montano MD  PCP: Patrick Osborn DO    Discharging Nurse: [facility] Unit/Room#: 5151/6607-B  Discharging Unit Phone Number: [phone]    Emergency Contact:   Extended Emergency Contact Information  Primary Emergency Contact: Inderjit Kuhn  Address: [address] [facility], [address] [address] [facility] [address] [facility] Phone: [phone]  Mobile Phone: [phone]  Relation: Other  Secondary Emergency Contact: David Johnnie  Address: [address] [facility], [address] [facility] Phone: [phone]  Mobile Phone: [phone]  Relation: Parent    Past Surgical History:  Past Surgical History:   Procedure Laterality Date    A-V CARDIAC PACEMAKER INSERTION  3/10/14     [employer]tr[employer]. Model:  [id number] Medtronic  Serial:  [id number] dual chamber pacemaker    APPENDECTOMY  2004    BREAST BIOPSY  12    BREAST LUMPECTOMY  2007    right     BREAST SURGERY  2012    rt lesion biopsy     CARDIAC CATHETERIZATION   &     CAROTID ENDARTERECTOMY Right 12/3/2021    RIGHT CAROTID ENDARTERECTOMY WITH PATCH performed by Wally Marquez MD at [address] [place] [address]      Colon cancer    COLONOSCOPY  10-18-13    polyp    COLONOSCOPY  2016    internal hemorrhoids, diverticulosis, 1.5 cm sessile polyp, 8 mm polyp found in rectum.     COLONOSCOPY  2017    1.5cm residual polyp, 5mm polyps in blind sigmoid loop x3, Sigmoid divertics, Internal grade 1 hemorrhoids    COLONOSCOPY N/A 2019    COLONOSCOPY W/ ENDOSCOPIC MUCOSAL RESECTION WITH ELEVIEW 5ML INJECTION AND POLYPECTOMY OF TIP OF BLIND RECTOSIGMOID STUMP AND CAUTERIZATION WITH ERBE PROBE, CLIPPING X2 performed by Tip Jones MD at [facility] COLONOSCOPY  01/21/2020    pan divertics/ 4 polyps/ sm int hem, S/P partial sigmoid resection w/ end to side anastamosis, repeat in 3 years    COLONOSCOPY N/A 1/21/2020    COLONOSCOPY POLYPECTOMY SNARE/COLD BIOPSY performed by Jose Henderson MD at [place] [address]  2003    back    CYSTOSCOPY Bilateral 12/15/14    CYSTOSCOPY Right 5/15/2022    CYSTOSCOPY URETERAL STENT INSERTION performed by Giacomo Rooney MD at [address] [place] [address]      front right tooth and root canal w/ brass bolt    DILATION AND CURETTAGE OF UTERUS  2006    Needed a camera to find my uterus. DILATION AND CURETTAGE OF UTERUS N/A 5/24/2022    DILATATION AND CURETTAGE, CULTURES OF UTEREUS performed by Maral Vicente MD at [address] [facility], COLON, DIAGNOSTIC  12/14/2017    Small hiatal hernia    HERNIA REPAIR  2004    Umb hernia    HERNIA REPAIR  2008    Inc hernia    KIDNEY SURGERY  05/24/2022    stent placed on right side    LITHOTRIPSY Right 8/1/2022    RIGHT CYSTOSCOPY URETEROSCOPY STONE MANIPULATION WITH HOLIUM LASER LITHOTRIPSY STENT REPLACEMENT performed by Codie Thibodeaux MD at [facility]    LITHOTRIPSY Left 9/26/2022    LEFT CYSTOSCOPY URETEROSCOPY RETROGRADE PYELOGRAM STONE [facility] LITHOTRIPSY POSSIBLE [address],[facility] [address] performed by Codie Thibodeaux MD at [place] [address]  2/29/2012    Right axillary-3 sentinel nodes were positive out of 9.     MASTECTOMY, RADICAL Right 02/29/2012    w/ sentinal node disection-Dr West    PACEMAKER PLACEMENT      PRE-MALIGNANT / BENIGN SKIN LESION EXCISION  2/8/2013    right leg X2-Dr West    TONSILLECTOMY  1957    TUNNELED VENOUS PORT PLACEMENT  2004    TUNNELED VENOUS PORT PLACEMENT  02/13/2012    removal of mediport       Immunization History:   Immunization History   Administered Date(s) Administered    Tdap (Boostrix, Adacel) 07/13/2017, 11/14/2022       Active Problems:  Patient Active Problem List   Diagnosis Code Malignant neoplasm of upper-outer quadrant of right breast in female, estrogen receptor positive ([facility] [address] C50.411, Z17.0    Diffuse cystic mastopathy N60.19    Hyperlipidemia E78.5    H/O chest pain Z87.898    Heart block AV second degree I44.1    Abnormal cardiovascular stress test R94.39    JAMIE (obstructive sleep apnea) G47.33    Super obesity E66.9    Mild asthma J45.909    Severe obstructive sleep apnea G47.33    Cardiac pacemaker Z95.0    Chronic cystitis N30.20    Asthma J45.909    Hypertension I10    Depression F32. A    Obstructive sleep apnea G47.33    Nocturnal oxygen desaturation G47.34    NSTEMI (non-ST elevated myocardial infarction) ([facility]) I21.4    PVD (peripheral vascular disease) ([facility]) I73.9    Morbid obesity with BMI of 45.0-49.9, adult ([facility]) E66.01, Z68.42    Moderate aortic stenosis I35.0    Hepatomegaly, not elsewhere classified R16.0    Arthritis M19.90    CAD (coronary artery disease) I25.10    Bilateral carotid artery stenosis I65.23    Moderate persistent asthma without complication T91.90    Carotid stenosis, right D57.50    Complicated urinary tract infection N39.0    Osteopenia of multiple sites M85.89    Swelling of lower extremity M79.89    Ambulatory dysfunction R26.2    Cellulitis L03.90    Cellulitis of right foot L03.115    Acute cystitis without hematuria N30.00    Chest pain with moderate risk for cardiac etiology R07.9    Left arm weakness R29.898    Chest pain R07.9       Isolation/Infection:   Isolation            Contact          Patient Infection Status       Infection Onset Added Last Indicated Last Indicated By Review Planned Expiration Resolved Resolved By    MDRO (multi-drug resistant organism)  10/12/22 10/12/22 Fidelina Moreno RN        10/9/22: URINE: Proteus mirabilis    Resolved    COVID-19 (Rule Out) 01/22/23 01/22/23 01/22/23 COVID-19, Rapid (Ordered)   01/22/23 Rule-Out Test Resulted    Rhinovirus 09/18/22 09/19/22 09/18/22 Respiratory Panel, Molecular, with COVID-19 (Restricted: peds pts or suitable admitted adults)   22     COVID-19 (Rule Out) 22 Respiratory Panel, Molecular, with COVID-19 (Restricted: peds pts or suitable admitted adults) (Ordered)   22 Rule-Out Test Resulted    COVID-19 (Rule Out) 21 COVID-19 (Ordered)   21 Rule-Out Test Resulted    COVID-19  21 Jessica Narvaez RN   21     COVID-19 (Rule Out) 21 COVID-19, Rapid (Ordered)   21 Rule-Out Test Resulted            Nurse Assessment:  Last Vital Signs: /73   Pulse 60   Temp 97.8 °F (36.6 °C) (Oral)   Resp 10   Ht 5' 2\" (1.575 m)   Wt 231 lb (104.8 kg)   LMP 01/15/1999   SpO2 98%   BMI 42.25 kg/m²     Last documented pain score (0-10 scale): Pain Level: 0  Last Weight:   Wt Readings from Last 1 Encounters:   23 231 lb (104.8 kg)     Mental Status:  oriented, alert, and thought processes intact    IV Access:  - None    Nursing Mobility/ADLs:  Walking   Assisted  Transfer  Assisted  Bathing  Assisted  Dressing  Assisted  Toileting  Assisted  Feeding  Independent  Med Admin  Assisted  Med Delivery   whole    Wound Care Documentation and Therapy:        Elimination:  Continence: Bowel: Yes  Bladder: No  Urinary Catheter: None   Colostomy/Ileostomy/Ileal Conduit: No       Date of Last BM: 2023    Intake/Output Summary (Last 24 hours) at 2023 1208  Last data filed at 2023 0630  Gross per 24 hour   Intake --   Output 1375 ml   Net -1375 ml     I/O last 3 completed shifts: In: 100 [I.V.:100]  Out: [address] [Urine:1375]    Safety Concerns: At Risk for Falls    Impairments/Disabilities:      None    Nutrition Therapy:  Current Nutrition Therapy:   - Oral Diet:  Low Sodium (3-4gm) and No caffeine    Routes of Feeding: Oral  Liquids:  Thin Liquids  Daily Fluid Restriction: no  Last Modified Barium Swallow with Video (Video Swallowing Test): not done    Treatments at the Time of Hospital Discharge:   Respiratory Treatments:   Oxygen Therapy:  is not on home oxygen therapy. Ventilator:    - No ventilator support    Rehab Therapies: Physical Therapy and Occupational Therapy  Weight Bearing Status/Restrictions: No weight bearing restrictions  Other Medical Equipment (for information only, NOT a DME order):  as needed  Other Treatments:     Patient's personal belongings (please select all that are sent with patient):      RN SIGNATURE:  Electronically signed by Joey Quinn RN on 2/24/23 at 1:34 PM EST              PHYSICIAN SECTION    Prognosis: Good    Condition at Discharge: Stable    Rehab Potential (if transferring to Rehab): Good    Recommended Labs or Other Treatments After Discharge:     Physician Certification: I certify the above information and transfer of Marcelo Almaraz  is necessary for the continuing treatment of the diagnosis listed and that she requires [facility] for less 30 days.      Update Admission H&P: No change in H&P    PHYSICIAN SIGNATURE:  Electronically signed by Fernando Onofre MD on 2/24/23 at 12:08 PM EST

## 2023-02-27 ENCOUNTER — CARE COORDINATION (OUTPATIENT)
Dept: CARE COORDINATION | Age: 73
End: 2023-02-27

## 2023-02-27 ENCOUNTER — TELEPHONE (OUTPATIENT)
Dept: INTERNAL MEDICINE CLINIC | Age: 73
End: 2023-02-27

## 2023-02-27 NOTE — CARE COORDINATION
[facility] chart review. [facility] received notification pt was d/c from [facility] to [facility] on 2/24/23. Pt was enrolled in RPM  program. Paused while hospitalized. [facility] call to pts EC Elena Barry regarding RPM equipment being sent back. No answer. Left  requesting return call to [facility]. 11:37am Spoke to [employer] with RPM.   Its recommended to wait a few weeks to see if the patient is discharged from the SNF in that time, and leave them paused. If they arent discharged home within a few weeks we can deactivate and initiate return order. [facility] will leave pt on active CC panel and monitor for d/c from SNF.

## 2023-02-28 ENCOUNTER — HOSPITAL ENCOUNTER (OUTPATIENT)
Age: 73
Setting detail: SPECIMEN
Discharge: HOME | End: 2023-02-28

## 2023-02-28 LAB
ALBUMIN SERPL-MCNC: 3.7 GM/DL (ref 3.4–5)
ALP BLD-CCNC: 126 IU/L (ref 40–128)
ALT SERPL-CCNC: 10 U/L (ref 10–40)
ANION GAP SERPL CALCULATED.3IONS-SCNC: 12 MMOL/L (ref 4–16)
AST SERPL-CCNC: 15 IU/L (ref 15–37)
BASOPHILS ABSOLUTE: 0.1 K/CU MM
BASOPHILS RELATIVE PERCENT: 0.6 % (ref 0–1)
BILIRUB SERPL-MCNC: 0.2 MG/DL (ref 0–1)
BUN SERPL-MCNC: 18 MG/DL (ref 6–23)
CALCIUM SERPL-MCNC: 9.3 MG/DL (ref 8.3–10.6)
CHLORIDE BLD-SCNC: 102 MMOL/L (ref 99–110)
CO2: 27 MMOL/L (ref 21–32)
CREAT SERPL-MCNC: 0.8 MG/DL (ref 0.6–1.1)
DIFFERENTIAL TYPE: ABNORMAL
EOSINOPHILS ABSOLUTE: 0.3 K/CU MM
EOSINOPHILS RELATIVE PERCENT: 3 % (ref 0–3)
GFR SERPL CREATININE-BSD FRML MDRD: >60 ML/MIN/1.73M2
GLUCOSE SERPL-MCNC: 83 MG/DL (ref 70–99)
HCT VFR BLD CALC: 34.2 % (ref 37–47)
HEMOGLOBIN: 10.5 GM/DL (ref 12.5–16)
IMMATURE NEUTROPHIL %: 0.6 % (ref 0–0.43)
LYMPHOCYTES ABSOLUTE: 3.1 K/CU MM
LYMPHOCYTES RELATIVE PERCENT: 28.1 % (ref 24–44)
MCH RBC QN AUTO: 26.9 PG (ref 27–31)
MCHC RBC AUTO-ENTMCNC: 30.7 % (ref 32–36)
MCV RBC AUTO: 87.7 FL (ref 78–100)
MONOCYTES ABSOLUTE: 0.7 K/CU MM
MONOCYTES RELATIVE PERCENT: 6.3 % (ref 0–4)
NUCLEATED RBC %: 0 %
PDW BLD-RTO: 15 % (ref 11.7–14.9)
PLATELET # BLD: 277 K/CU MM (ref 140–440)
PMV BLD AUTO: 9.9 FL (ref 7.5–11.1)
POTASSIUM SERPL-SCNC: 4.2 MMOL/L (ref 3.5–5.1)
RBC # BLD: 3.9 M/CU MM (ref 4.2–5.4)
SEGMENTED NEUTROPHILS ABSOLUTE COUNT: 6.7 K/CU MM
SEGMENTED NEUTROPHILS RELATIVE PERCENT: 61.4 % (ref 36–66)
SODIUM BLD-SCNC: 141 MMOL/L (ref 135–145)
TOTAL IMMATURE NEUTOROPHIL: 0.07 K/CU MM
TOTAL NUCLEATED RBC: 0 K/CU MM
TOTAL PROTEIN: 6 GM/DL (ref 6.4–8.2)
WBC # BLD: 10.9 K/CU MM (ref 4–10.5)

## 2023-02-28 PROCEDURE — 36415 COLL VENOUS BLD VENIPUNCTURE: CPT

## 2023-02-28 PROCEDURE — 80053 COMPREHEN METABOLIC PANEL: CPT

## 2023-02-28 PROCEDURE — 85025 COMPLETE CBC W/AUTO DIFF WBC: CPT

## 2023-03-06 ENCOUNTER — TELEPHONE (OUTPATIENT)
Dept: INTERNAL MEDICINE CLINIC | Age: 73
End: 2023-03-06

## 2023-03-06 ENCOUNTER — CARE COORDINATION (OUTPATIENT)
Dept: CARE COORDINATION | Age: 73
End: 2023-03-06

## 2023-03-06 NOTE — CARE COORDINATION
Remote Patient Monitoring Note      Date/Time:  3/6/2023 12:03 PM    CCSS reviewed patients reported daily Remote Patient Monitoring metrics. All reported metrics are within alert parameters. Plan/Follow Up:  Will continue to review, monitor and address alerts with follow up based on severity of symptoms and risk factors   Current Patient Metrics ---- Blood Pressure: 148/69, 59bpm Pulseox: 95%, 60bpm Survey: C Weight: 243.0lbs Note Created at: 03/06/2023 12:03 PM ET ---- Time-Spent: 2 minutes 0 seconds

## 2023-03-06 NOTE — TELEPHONE ENCOUNTER
Patient called stating she has a BP, oxygen and weight machine. And machine is saying that the  provider needs to put a code in to resume. Patient was given a number for machine and they do not know what machine the patient is talking about.

## 2023-03-06 NOTE — TELEPHONE ENCOUNTER
I don't have any idea what she is talking about.  She can make a nurse visit for help, but I don't know either

## 2023-03-06 NOTE — CARE COORDINATION
ACM received message from [address], [address] at PCP office stating orders were requested for [facility] PT/OT from [place]. States pt had contacted the office requesting code for RPM equipment. ACM call to [place]. Spoke to [facility]. Confirmed pt was d/c home 3/5/23. ACM will forward information to RPM team to resume RPM program which had been paused while pt was in SNF.

## 2023-03-07 ENCOUNTER — CARE COORDINATION (OUTPATIENT)
Dept: CARE COORDINATION | Age: 73
End: 2023-03-07

## 2023-03-07 NOTE — CARE COORDINATION
Remote Patient Monitoring Note      Date/Time:  3/7/2023 11:27 AM    CCSS reviewed patients reported daily Remote Patient Monitoring metrics. All reported metrics are within alert parameters. Plan/Follow Up:  Will continue to review, monitor and address alerts with follow up based on severity of symptoms and risk factors   Current Patient Metrics ---- Blood Pressure: 134/72, 60bpm Pulseox: 93%, 60bpm Survey: C Weight: 241.5lbs Note Created at: 03/07/2023 11:27 AM ET ---- Time-Spent: 2 minutes 0 seconds

## 2023-03-08 ENCOUNTER — CARE COORDINATION (OUTPATIENT)
Dept: CARE COORDINATION | Age: 73
End: 2023-03-08

## 2023-03-08 ENCOUNTER — OFFICE VISIT (OUTPATIENT)
Dept: CARDIOLOGY CLINIC | Age: 73
End: 2023-03-08
Payer: MEDICARE

## 2023-03-08 VITALS
SYSTOLIC BLOOD PRESSURE: 122 MMHG | BODY MASS INDEX: 44.26 KG/M2 | OXYGEN SATURATION: 96 % | DIASTOLIC BLOOD PRESSURE: 62 MMHG | WEIGHT: 242 LBS | HEART RATE: 60 BPM

## 2023-03-08 DIAGNOSIS — I38 VALVULAR HEART DISEASE: ICD-10-CM

## 2023-03-08 DIAGNOSIS — I65.21 CAROTID STENOSIS, RIGHT: Primary | ICD-10-CM

## 2023-03-08 DIAGNOSIS — I10 PRIMARY HYPERTENSION: ICD-10-CM

## 2023-03-08 PROBLEM — I21.4 NSTEMI (NON-ST ELEVATED MYOCARDIAL INFARCTION) (HCC): Status: RESOLVED | Noted: 2017-02-11 | Resolved: 2023-03-08

## 2023-03-08 PROCEDURE — 99214 OFFICE O/P EST MOD 30 MIN: CPT | Performed by: NURSE PRACTITIONER

## 2023-03-08 PROCEDURE — 3074F SYST BP LT 130 MM HG: CPT | Performed by: NURSE PRACTITIONER

## 2023-03-08 PROCEDURE — 3078F DIAST BP <80 MM HG: CPT | Performed by: NURSE PRACTITIONER

## 2023-03-08 PROCEDURE — 1124F ACP DISCUSS-NO DSCNMKR DOCD: CPT | Performed by: NURSE PRACTITIONER

## 2023-03-08 RX ORDER — SIMVASTATIN 20 MG
20 TABLET ORAL NIGHTLY
COMMUNITY
End: 2023-03-10 | Stop reason: SDUPTHER

## 2023-03-08 RX ORDER — BISACODYL 10 MG
10 SUPPOSITORY, RECTAL RECTAL DAILY
COMMUNITY
End: 2023-03-10

## 2023-03-08 RX ORDER — HYDROXYZINE HYDROCHLORIDE 25 MG/1
25 TABLET, FILM COATED ORAL DAILY PRN
COMMUNITY
End: 2023-03-10 | Stop reason: SDUPTHER

## 2023-03-08 RX ORDER — FLUTICASONE PROPIONATE 50 MCG
1 SPRAY, SUSPENSION (ML) NASAL DAILY
COMMUNITY
End: 2023-03-10

## 2023-03-08 ASSESSMENT — ENCOUNTER SYMPTOMS
SHORTNESS OF BREATH: 0
ORTHOPNEA: 0

## 2023-03-08 NOTE — CARE COORDINATION
Remote Patient Monitoring Note      Date/Time:  3/8/2023 9:54 AM    CCSS reviewed patients reported daily Remote Patient Monitoring metrics. All reported metrics are within alert parameters. Plan/Follow Up:  Will continue to review, monitor and address alerts with follow up based on severity of symptoms and risk factors   Current Patient Metrics ---- Blood Pressure: 137/71, 60bpm Pulseox: 94%, 60bpm Survey: - Weight: 242.0lbs Note Created at: 03/08/2023 09:54 AM ET ---- Time-Spent: 2 minutes 0 seconds

## 2023-03-08 NOTE — PROGRESS NOTES
3/8/2023  Primary cardiologist: Dr. Juhi George    CC:   Francisco Javier Pro  is an established 67 y.o.  female here for a follow up on hospital       SUBJECTIVE/OBJECTIVE:  Francisco Javier Pro is a 67 y.o. female with a history of valvular heart disease (moderate AI/moderate AS), dual-chamber pacemaker, hyperlipidemia, hypertension and carotid artery disease s/p right CEA  She underwent cardiac catheterization in August 2019 that showed no significant CAD      HPI :   Francisco Javier Pro she was recently in the hospital with atypical chest pain. Noninvasive testing with transthoracic echocardiogram was concerning for severe aortic stenosis with aortic valve area 0.82 cm² she underwent cardiac catheterization that showed essentially normal coronaries. Aortic valve area is calculated at 1.0 cm² with a mean gradient across the valve of 17 mmHg. She underwent ANDRE which showed tricuspid, aortic valve to be calcified along with moderate stenosis. Review of Systems   Constitutional: Negative for diaphoresis and malaise/fatigue. Cardiovascular:  Positive for leg swelling. Negative for chest pain, claudication, dyspnea on exertion, irregular heartbeat, near-syncope, orthopnea, palpitations and paroxysmal nocturnal dyspnea. Respiratory:  Negative for shortness of breath. Musculoskeletal:  Positive for muscle weakness. Neurological:  Negative for dizziness and light-headedness. Vitals:    03/08/23 1457   BP: 122/62   Pulse: 60   SpO2: 96%   Weight: 242 lb (109.8 kg)     Patient-Reported Vitals 2/11/2021   Patient-Reported Weight 275#     Wt Readings from Last 3 Encounters:   03/08/23 242 lb (109.8 kg)   02/24/23 231 lb (104.8 kg)   02/21/23 250 lb 2 oz (113.5 kg)     Body mass index is 44.26 kg/m². Physical Exam  Vitals reviewed. Constitutional:       Appearance: Normal appearance. She is obese. HENT:      Head: Normocephalic and atraumatic. Eyes:      Extraocular Movements: Extraocular movements intact.       Pupils: Pupils are equal, round, and reactive to light. Neck:      Vascular: No carotid bruit. Cardiovascular:      Rate and Rhythm: Normal rate and regular rhythm. Pulses: Normal pulses. Heart sounds: Murmur (systolic  3/6) heard. Pulmonary:      Effort: Pulmonary effort is normal.      Breath sounds: Normal breath sounds. No rales. Chest:      Chest wall: No tenderness. Abdominal:      General: There is no distension. Palpations: Abdomen is soft. Tenderness: There is no abdominal tenderness. Musculoskeletal:      Cervical back: No tenderness. Right lower leg: Edema (trace) present. Left lower leg: Edema (trace) present. Skin:     General: Skin is warm and dry. Capillary Refill: Capillary refill takes less than 2 seconds. Neurological:      General: No focal deficit present. Mental Status: She is alert and oriented to person, place, and time. Psychiatric:         Mood and Affect: Mood normal.         Behavior: Behavior normal.              Current Outpatient Medications   Medication Sig Dispense Refill    bisacodyl (DULCOLAX) 10 MG suppository Place 10 mg rectally daily      fluticasone (FLONASE) 50 MCG/ACT nasal spray 1 spray by Each Nostril route daily      hydrOXYzine HCl (ATARAX) 25 MG tablet Take 25 mg by mouth daily as needed for Anxiety      Magnesium Hydroxide (MILK OF MAGNESIA PO) Take by mouth      simvastatin (ZOCOR) 20 MG tablet Take 20 mg by mouth nightly      sertraline (ZOLOFT) 50 MG tablet Take 1 tablet by mouth daily 30 tablet 3    metoprolol tartrate (LOPRESSOR) 25 MG tablet Take 1 tablet by mouth 2 times daily 60 tablet 3    Cholecalciferol (VITAMIN D) 25 MCG TABS Take 1 tablet by mouth daily 30 tablet 0    acetaminophen (TYLENOL) 500 MG tablet Take 1 tablet by mouth every 6 hours as needed for Pain 120 tablet 3    Misc.  Devices (TRANSPORT CHAIR) MISC Use as directed 1 each 0    aspirin 81 MG chewable tablet Take 1 tablet by mouth daily 30 tablet 0    albuterol sulfate HFA (PROAIR HFA) 108 (90 Base) MCG/ACT inhaler INHALE 2 PUFFS BY MOUTH EVERY SIX HOURS AS NEEDED FOR WHEEZING 1 Inhaler 5    rosuvastatin (CRESTOR) 20 MG tablet Take 1 tablet by mouth nightly (Patient not taking: Reported on 3/8/2023) 30 tablet 3    fluticasone (FLOVENT HFA) 110 MCG/ACT inhaler Inhale 2 puffs into the lungs 2 times daily (Patient not taking: No sig reported) 1 Inhaler 5     No current facility-administered medications for this visit. All pertinent data reviewed and discussed with patient    Transthoracic echocardiogram 2023   Left ventricular systolic function is normal.   Ejection fraction is visually estimated at 55-60%. PPM wiring visualized in right heart. Heavily calcified aortic valve with severe aortic stenosis; mean P   mmHg, BACILIO: 0.82 cm sq, DVI: 0.24, BACILIO indexed: 0.37 cm sq/ m sq. Mild aortic regurgitation; PHT: 554 msec. Prominent mitral annular calcification is present. Mild mitral stenosis; mean P mmHg. Posterior mitral valve leaflet   appears calcified and restricted. Mild tricuspid regurgitation; RVSP: 40 mmHg. No evidence of any pericardial effusion. Cardiac catheterization 2023  ANGIOGRAPHIC FINDINGS: Right dominance system. 1. Left main:  Left main is patent  2. Left anterior descending is patent , no significant stenosis  4  Circumflex artery is patent and no stenosis   5. Rght coronary artery is patent and no stenosis   BACILIO was 1.01 Cm2 and Mean gradient across the valve 17 mmHG Peak Gradient was 15 mmHG     Transesophageal echocardiogram 2023  Left ventricular systolic function is normal.   Ejection fraction is visually estimated at 55-60%. No evidence of thrombus within the left atrial appendage. Negative bubble study; no ASD or PFO noted. PPM wiring visualized within the right heart.       Calcified aortic valve with likely moderate stenosis is present; Valve   appears to be opening okay - Discrepancy between hemodynamic data from heart   cath and TTE , likely secondary to dynamic LVOT obstruction in the setting   of septal bulge. Mild mitral stenosis; Mild mitral regurgitation. There is no evidence of any pericardial effusion. ASSESSMENT/PLAN:    Chest pain  Recent admitted with upper epigastric discomfort. Pertinent negative x3. Underwent cardiac catheterization which showed no CAD. Valvular disease  Transthoracic echocardiogram concerning for severe aortic stenosis however may have been over estimated as right heart cath demonstrates BACILIO 1.01 cm² with mean gradient 17 mmHg. ANDRE with moderate AS-discrepancy likely secondary to dynamic left ventricular outflow tract obstruction in the setting of septal bulge. Did not recommend intervention at this time. Continue with diuretic therapy however need to monitor closely as she may be preload dependent in the setting of AAS- continue metoprolol       Tests ordered:  none  Follow-up  3 mo or sooner if needed      Signed:  AUSTIN Fish CNP, 3/8/2023, 3:05 PM    An electronic signature was used to authenticate this note. Please note this report has been partially produced using speech recognition software and may contain errors related to that system including errors in grammar, punctuation, and spelling, as well as words and phrases that may be inappropriate. If there are any questions or concerns please feel free to contact the dictating provider for clarification.

## 2023-03-09 ENCOUNTER — CARE COORDINATION (OUTPATIENT)
Dept: CARE COORDINATION | Age: 73
End: 2023-03-09

## 2023-03-09 NOTE — CARE COORDINATION
Remote Patient Monitoring Note      Date/Time:  3/9/2023 11:53 AM    EMTP reviewed patients reported daily Remote Patient Monitoring metrics. All reported metrics are within alert parameters. Plan/Follow Up:  Will continue to review, monitor and address alerts with follow up based on severity of symptoms and risk factors--- Current Patient Metrics ---- Blood Pressure: 135/68, 59bpm Pulseox: 95%, 60bpm Survey: C Weight: 243.0lbs Note Created at: 03/09/2023 11:53 AM ET ---- Time-Spent: 2 minutes 0 seconds

## 2023-03-10 ENCOUNTER — CARE COORDINATION (OUTPATIENT)
Dept: CARE COORDINATION | Age: 73
End: 2023-03-10

## 2023-03-10 ENCOUNTER — OFFICE VISIT (OUTPATIENT)
Dept: INTERNAL MEDICINE CLINIC | Age: 73
End: 2023-03-10

## 2023-03-10 ENCOUNTER — HOSPITAL ENCOUNTER (OUTPATIENT)
Age: 73
Setting detail: SPECIMEN
Discharge: HOME | End: 2023-03-10
Payer: MEDICARE

## 2023-03-10 VITALS — DIASTOLIC BLOOD PRESSURE: 72 MMHG | HEART RATE: 60 BPM | OXYGEN SATURATION: 96 % | SYSTOLIC BLOOD PRESSURE: 130 MMHG

## 2023-03-10 DIAGNOSIS — E55.9 VITAMIN D DEFICIENCY: ICD-10-CM

## 2023-03-10 DIAGNOSIS — R05.9 COUGH, UNSPECIFIED TYPE: ICD-10-CM

## 2023-03-10 DIAGNOSIS — J01.90 ACUTE BACTERIAL SINUSITIS: Primary | ICD-10-CM

## 2023-03-10 DIAGNOSIS — B96.89 ACUTE BACTERIAL SINUSITIS: Primary | ICD-10-CM

## 2023-03-10 DIAGNOSIS — I10 PRIMARY HYPERTENSION: ICD-10-CM

## 2023-03-10 DIAGNOSIS — E78.5 HYPERLIPIDEMIA, UNSPECIFIED HYPERLIPIDEMIA TYPE: ICD-10-CM

## 2023-03-10 DIAGNOSIS — F32.A DEPRESSION, UNSPECIFIED DEPRESSION TYPE: ICD-10-CM

## 2023-03-10 DIAGNOSIS — F41.9 ANXIETY: ICD-10-CM

## 2023-03-10 PROCEDURE — U0005 INFEC AGEN DETEC AMPLI PROBE: HCPCS

## 2023-03-10 PROCEDURE — U0003 INFECTIOUS AGENT DETECTION BY NUCLEIC ACID (DNA OR RNA); SEVERE ACUTE RESPIRATORY SYNDROME CORONAVIRUS 2 (SARS-COV-2) (CORONAVIRUS DISEASE [COVID-19]), AMPLIFIED PROBE TECHNIQUE, MAKING USE OF HIGH THROUGHPUT TECHNOLOGIES AS DESCRIBED BY CMS-2020-01-R: HCPCS

## 2023-03-10 RX ORDER — HYDROXYZINE PAMOATE 25 MG/1
1 CAPSULE ORAL
COMMUNITY
Start: 2023-02-27 | End: 2023-03-10

## 2023-03-10 RX ORDER — SIMVASTATIN 20 MG
20 TABLET ORAL NIGHTLY
Qty: 90 TABLET | Refills: 1 | Status: SHIPPED | OUTPATIENT
Start: 2023-03-10

## 2023-03-10 RX ORDER — DOXYCYCLINE HYCLATE 100 MG
100 TABLET ORAL 2 TIMES DAILY
Qty: 14 TABLET | Refills: 0 | Status: SHIPPED | OUTPATIENT
Start: 2023-03-10 | End: 2023-03-17

## 2023-03-10 RX ORDER — HYDROXYZINE HYDROCHLORIDE 25 MG/1
25 TABLET, FILM COATED ORAL DAILY PRN
Qty: 90 TABLET | Refills: 1 | Status: SHIPPED | OUTPATIENT
Start: 2023-03-10

## 2023-03-10 RX ORDER — FLUTICASONE PROPIONATE 110 UG/1
2 AEROSOL, METERED RESPIRATORY (INHALATION) 2 TIMES DAILY
Qty: 1 EACH | Refills: 2 | Status: SHIPPED | OUTPATIENT
Start: 2023-03-10

## 2023-03-10 ASSESSMENT — ENCOUNTER SYMPTOMS
COUGH: 1
SHORTNESS OF BREATH: 0
RHINORRHEA: 1
ABDOMINAL PAIN: 0
NAUSEA: 0

## 2023-03-10 NOTE — PROGRESS NOTES
Shefali Santiago (:  1950) is a 67 y.o. female,established patient, here for evaluation of the following chief complaint(s):  Follow-Up from Hospital (Was released to [facility]. Just released from [facility] on .), Hyperlipidemia, and Depression         ASSESSMENT/PLAN:  1. Acute bacterial sinusitis  -Start:  - doxycycline hyclate (VIBRA-TABS) 100 MG tablet; Take 1 tablet by mouth 2 times daily for 7 days  Dispense: 14 tablet; Refill: 0  ADR's explained  - fluticasone (FLOVENT HFA) 110 MCG/ACT inhaler; Inhale 2 puffs into the lungs 2 times daily  Dispense: 1 each; Refill: 2  ADR's explained    2. Cough, unspecified type  -COVID-19    3. Hyperlipidemia, unspecified hyperlipidemia type  - simvastatin (ZOCOR) 20 MG tablet; Take 1 tablet by mouth nightly  Dispense: 90 tablet; Refill: 1    4. Primary hypertension  - metoprolol tartrate (LOPRESSOR) 25 MG tablet; Take 1 tablet by mouth 2 times daily  Dispense: 180 tablet; Refill: 1    5. Depression, unspecified depression type  - sertraline (ZOLOFT) 50 MG tablet; Take 1 tablet by mouth daily  Dispense: 90 tablet; Refill: 1  Recommend she call to follow up with [facility]    6. Anxiety  - hydrOXYzine HCl (ATARAX) 25 MG tablet; Take 1 tablet by mouth daily as needed for Anxiety  Dispense: 90 tablet; Refill: 1    7. Vitamin D deficiency  Use vitamin D 3 [facility] lab reviewed: CBC  Labs reviewed below  Keep f/u with specialists  On this date 3/10/2023 I have spent 30 minutes reviewing previous notes, test results and face to face with the patient discussing the diagnosis and importance of compliance with the treatment plan as well as documenting on the day of the visit. Return in about 5 months (around 8/10/2023) for HLD, Depression.          Lab Results   Component Value Date    CHOL 161 2023     Lab Results   Component Value Date    TRIG 100 2023     Lab Results   Component Value Date    HDL 41 2023     Lab Results Component Value Date    LDLCALC 100 (H) 02/21/2023    LDLDIRECT 102 (H) 02/11/2020     Lab Results   Component Value Date    LABA1C 5.7 02/21/2023     Lab Results   Component Value Date     02/21/2023     Lab Results   Component Value Date     02/28/2023    K 4.2 02/28/2023     02/28/2023    CO2 27 02/28/2023    BUN 18 02/28/2023    CREATININE 0.8 02/28/2023    GLUCOSE 83 02/28/2023    CALCIUM 9.3 02/28/2023    PROT 6.0 (L) 02/28/2023    LABALBU 3.7 02/28/2023    BILITOT 0.2 02/28/2023    ALKPHOS 126 02/28/2023    AST 15 02/28/2023    ALT 10 02/28/2023    LABGLOM >60 02/28/2023    GFRAA >60 10/15/2022    AGRATIO 1.3 07/29/2021    GLOB 3.2 07/29/2021     TSH, High Sensitivity 0.270 - 4.20 uIu/ml 4.320 High      Vit D, 25-Hydroxy >20 NG/ML 13.93 Low      Lab Results   Component Value Date/Time    COLORU YELLOW 01/22/2023 11:03 PM    LABPH 6.0 01/22/2023 11:03 PM    NITRU POSITIVE 01/22/2023 11:03 PM    KETUA NEGATIVE 01/22/2023 11:03 PM    UROBILINOGEN 0.2 01/22/2023 11:03 PM    BILIRUBINUR NEGATIVE 01/22/2023 11:03 PM       Subjective   SUBJECTIVE/OBJECTIVE:    HISTORY OF PRESENT ILLNESS:  This is a 67 y.o. female here for the following:  Patient Active Problem List    Diagnosis Date Noted    Valvular heart disease 03/08/2023    Chest pain 02/22/2023    Left arm weakness 02/21/2023    Chest pain with moderate risk for cardiac etiology 02/20/2023    Ambulatory dysfunction 10/15/2022    Swelling of lower extremity 10/14/2022    Osteopenia of multiple sites 09/14/2022    Carotid stenosis, right 12/03/2021    Bilateral carotid artery stenosis 11/23/2021    Moderate persistent asthma without complication 62/62/3271    Arthritis     Hepatomegaly, not elsewhere classified 08/21/2020    Moderate aortic stenosis 07/07/2019    Morbid obesity with BMI of 45.0-49.9, adult ([facility] 05/26/2019    PVD (peripheral vascular disease) ([facility] 02/12/2019    Asthma     Hypertension     Depression     Obstructive sleep apnea     Nocturnal oxygen desaturation     Chronic cystitis 12/15/2014    Cardiac pacemaker 03/10/2014    Mild asthma 11/20/2013    Severe obstructive sleep apnea 11/20/2013    Super obesity 09/11/2013    JAMIE (obstructive sleep apnea) 07/17/2013    Abnormal cardiovascular stress test 06/28/2013    Heart block AV second degree 06/26/2013    Hyperlipidemia     Malignant neoplasm of upper-outer quadrant of right breast in female, estrogen receptor positive ([facility] 08/29/2012    Diffuse cystic mastopathy 08/29/2012    H/O chest pain 04/01/2005      Patient was recently in the hospital for chest pain. Moderate AS. She was recently discharged from [facility]. Patient says she developed chest pain, and recalls having an argument with her boyfriend Blanca Negro prior to being admitted. Denies Cp today  +Depression- she was referred to [facility]. Patient started on Zoloft again. Patient was started on hydroxyzine for her anxiety. She feels stable on both medications  HTN-stable on metoprolol  HLD-on simvastatin 20 mg  Vitamin D deficiency-she has stopped the vitamin D3 that was prescribed and states she has calcium and vitamin D at home and she will start to use  Asthma stable on inhalers    Review of Systems   Constitutional:  Negative for diaphoresis and fever. HENT:  Positive for congestion and rhinorrhea. Respiratory:  Positive for cough. Negative for shortness of breath. Cardiovascular:  Negative for chest pain and palpitations. Gastrointestinal:  Negative for abdominal pain and nausea. Genitourinary:  Negative for difficulty urinating. Neurological:  Negative for dizziness and headaches. Psychiatric/Behavioral:  Negative for dysphoric mood. Allergies   Allergen Reactions    Bactrim [Sulfamethoxazole-Trimethoprim] Anaphylaxis and Hives    Lipitor [Atorvastatin] Shortness Of Breath    Taxol [Paclitaxel] Anaphylaxis and Swelling    Aminoglycosides      Abstracted from [facility] patient chart.     Demerol Nausea Only    Neosporin [Bacitracin-Neomycin-Polymyxin] Rash and Other (See Comments)     hotness    Other Rash     Ivory Soap    Talc Other (See Comments)     blisters     Current Outpatient Medications   Medication Sig Dispense Refill    Calcium Citrate-Vitamin D (CALCIUM + D PO) Take by mouth      simvastatin (ZOCOR) 20 MG tablet Take 1 tablet by mouth nightly 90 tablet 1    metoprolol tartrate (LOPRESSOR) 25 MG tablet Take 1 tablet by mouth 2 times daily 180 tablet 1    sertraline (ZOLOFT) 50 MG tablet Take 1 tablet by mouth daily 90 tablet 1    hydrOXYzine HCl (ATARAX) 25 MG tablet Take 1 tablet by mouth daily as needed for Anxiety 90 tablet 1    doxycycline hyclate (VIBRA-TABS) 100 MG tablet Take 1 tablet by mouth 2 times daily for 7 days 14 tablet 0    fluticasone (FLOVENT HFA) 110 MCG/ACT inhaler Inhale 2 puffs into the lungs 2 times daily 1 each 2    acetaminophen (TYLENOL) 500 MG tablet Take 1 tablet by mouth every 6 hours as needed for Pain 120 tablet 3    aspirin 81 MG chewable tablet Take 1 tablet by mouth daily 30 tablet 0    albuterol sulfate HFA (PROAIR HFA) 108 (90 Base) MCG/ACT inhaler INHALE 2 PUFFS BY MOUTH EVERY SIX HOURS AS NEEDED FOR WHEEZING 1 Inhaler 5     No current facility-administered medications for this visit. Vitals:    03/10/23 1146   BP: 130/72   Site: Left Upper Arm   Position: Sitting   Cuff Size: Medium Adult   Pulse: 60   SpO2: 96%     Objective   Physical Exam  Vitals reviewed. Constitutional:       General: She is not in acute distress. Eyes:      Extraocular Movements: Extraocular movements intact. Cardiovascular:      Rate and Rhythm: Normal rate and regular rhythm. Heart sounds: Murmur heard. Pulmonary:      Effort: Pulmonary effort is normal. No respiratory distress. Breath sounds: Normal breath sounds. Abdominal:      Palpations: Abdomen is soft. Tenderness: There is no abdominal tenderness. Musculoskeletal:      Cervical back: Neck supple. Right lower leg: Edema present. Left lower leg: Edema present. Neurological:      Mental Status: She is alert and oriented to person, place, and time. Gait: Gait abnormal (wheelchair). Psychiatric:         Mood and Affect: Mood normal.              An electronic signature was used to authenticate this note. --Quan Thacker DO     This dictation was generated by voice recognition computer software. Although all attempts are made to edit the dictation for accuracy, there may be errors in the transcription that are not intended.

## 2023-03-10 NOTE — CARE COORDINATION
Remote Patient Monitoring Note      Date/Time:  3/10/2023 11:21 AM    CCSS reviewed patients reported daily Remote Patient Monitoring metrics. All reported metrics are within alert parameters. Plan/Follow Up:  Will continue to review, monitor and address alerts with follow up based on severity of symptoms and risk factors   Current Patient Metrics ---- Blood Pressure: 139/71, 59bpm Pulseox: 93%, 60bpm Survey: - Weight: 242.5lbs Note Created at: 03/10/2023 11:21 AM ET ---- Time-Spent: 2 minutes 0 seconds

## 2023-03-11 PROBLEM — L03.90 CELLULITIS: Status: RESOLVED | Noted: 2022-11-15 | Resolved: 2023-03-11

## 2023-03-11 PROBLEM — N30.00 ACUTE CYSTITIS WITHOUT HEMATURIA: Status: RESOLVED | Noted: 2022-11-16 | Resolved: 2023-03-11

## 2023-03-11 PROBLEM — N39.0 COMPLICATED URINARY TRACT INFECTION: Status: RESOLVED | Noted: 2022-05-15 | Resolved: 2023-03-11

## 2023-03-11 PROBLEM — L03.115 CELLULITIS OF RIGHT FOOT: Status: RESOLVED | Noted: 2022-11-15 | Resolved: 2023-03-11

## 2023-03-11 PROBLEM — E55.9 VITAMIN D DEFICIENCY: Status: ACTIVE | Noted: 2023-03-11

## 2023-03-11 LAB
SARS-COV-2 RNA RESP QL NAA+PROBE: NOT DETECTED
SOURCE: NORMAL

## 2023-03-13 ENCOUNTER — CARE COORDINATION (OUTPATIENT)
Dept: CARE COORDINATION | Age: 73
End: 2023-03-13

## 2023-03-13 NOTE — CARE COORDINATION
Remote Patient Monitoring Note      Date/Time:  3/13/2023 12:01 PM    CCSS reviewed patients reported daily Remote Patient Monitoring metrics. All reported metrics are within alert parameters. Plan/Follow Up:  Will continue to review, monitor and address alerts with follow up based on severity of symptoms and risk factors  Current Patient Metrics ---- Blood Pressure: 141/67, 60bpm Pulseox: 93%, 60bpm Survey: C Weight: 241.5lbs Note Created at: 03/13/2023 12:02 PM ET ---- Time-Spent: 2 minutes 0 seconds

## 2023-03-14 ENCOUNTER — CARE COORDINATION (OUTPATIENT)
Dept: CASE MANAGEMENT | Age: 73
End: 2023-03-14

## 2023-03-14 NOTE — CARE COORDINATION
Remote Alert Monitoring Note      Date/Time:  3/14/2023 12:38 PM  Patient Current Location: [facility]    LPN contacted patient by telephone regarding red alert received for weight increase (4# over night). Verified patients name and  as identifiers. Background: HTN  Refer to 911 immediately if:  Patient unresponsive or unable to provide history  Change in cognition or sudden confusion  Patient unable to respond in complete sentences  Intense chest pain/tightness  Any concern for any clinical emergency  Red Alert: Provider response time of 1 hr required for any red alert requiring intervention  Yellow Alert: Provider response time of 3hr required for any escalated yellow alert    Weight Scale Triage  Was your weight obtained upon rising/waking today? YES   Was your weight obtained after voiding and/or use of the bathroom today? no   Did you weigh yourself in the same amount of clothing today, compared to how you typically do? yes   Was the scale bumped or moved prior to today's weight? no   Is your scale on a flat/hard surface? yes   Did you obtain your weight with shoes on? no   If yes, is this something you normally do during your daily weights? no   Were you standing up straight on the scale today? yes   Were you leaning on anything while obtaining your weight today? no       Clinical Interventions: Reviewed and followed up on alerts and treatments-Patient has weigh gain of 4# over night. Denies CP,  SOB. Has slight swelling in lower extremities. Patient had BM after weighing today. Patient states, \"I was bad yesterday\"  I ate a lot of NA and extra food\"  Hands were swollen yesterday, rings tight. Today much less hand swelling. Has PCP appointment 23. Will monitor weight. No action needed at this time. Education of patient/family/caregiver/guardian to support self-management-Instructed to cut down on the higher NA foods. Read labels for NA content and stay under 2 grams NA per day.       Instructed patient on the importance of weighing daily, in the morning after urinating, Same Clothing on, NO shoes, Scale on Flat/hard surface, and that if the patient has weight gain of 3# over night or 5# weight gain in a week to call their physician immediately and report. Plan/Follow Up: Will continue to review, monitor and address alerts with follow up based on severity of symptoms and risk factors.      Current Patient Metrics ---- Blood Pressure: 160/80, 59bpm Pulseox: 93%, 60bpm Survey: C Weight: 246.0lbs Note Created at: 03/14/2023 12:55 PM ET ---- Time-Spent: 17 minutes 0 seconds

## 2023-03-15 ENCOUNTER — APPOINTMENT (OUTPATIENT)
Dept: GENERAL RADIOLOGY | Age: 73
End: 2023-03-15
Payer: MEDICARE

## 2023-03-15 ENCOUNTER — CARE COORDINATION (OUTPATIENT)
Dept: CASE MANAGEMENT | Age: 73
End: 2023-03-15

## 2023-03-15 ENCOUNTER — HOSPITAL ENCOUNTER (EMERGENCY)
Age: 73
Discharge: HOME | End: 2023-03-15
Payer: MEDICARE

## 2023-03-15 VITALS
HEART RATE: 79 BPM | OXYGEN SATURATION: 95 % | SYSTOLIC BLOOD PRESSURE: 140 MMHG | TEMPERATURE: 97.6 F | DIASTOLIC BLOOD PRESSURE: 79 MMHG | RESPIRATION RATE: 15 BRPM

## 2023-03-15 DIAGNOSIS — S93.601A SPRAIN OF RIGHT FOOT, INITIAL ENCOUNTER: ICD-10-CM

## 2023-03-15 DIAGNOSIS — L03.90 CELLULITIS, UNSPECIFIED CELLULITIS SITE: Primary | ICD-10-CM

## 2023-03-15 DIAGNOSIS — S93.401A SPRAIN OF RIGHT ANKLE, UNSPECIFIED LIGAMENT, INITIAL ENCOUNTER: ICD-10-CM

## 2023-03-15 PROCEDURE — 73630 X-RAY EXAM OF FOOT: CPT

## 2023-03-15 PROCEDURE — 99283 EMERGENCY DEPT VISIT LOW MDM: CPT

## 2023-03-15 PROCEDURE — 73610 X-RAY EXAM OF ANKLE: CPT

## 2023-03-15 PROCEDURE — 73590 X-RAY EXAM OF LOWER LEG: CPT

## 2023-03-15 RX ORDER — CEPHALEXIN 500 MG/1
500 CAPSULE ORAL 4 TIMES DAILY
Qty: 28 CAPSULE | Refills: 0 | Status: SHIPPED | OUTPATIENT
Start: 2023-03-15 | End: 2023-03-22

## 2023-03-15 ASSESSMENT — PAIN DESCRIPTION - FREQUENCY: FREQUENCY: CONTINUOUS

## 2023-03-15 ASSESSMENT — PAIN DESCRIPTION - PAIN TYPE: TYPE: ACUTE PAIN

## 2023-03-15 ASSESSMENT — PAIN DESCRIPTION - ORIENTATION: ORIENTATION: RIGHT

## 2023-03-15 ASSESSMENT — PAIN DESCRIPTION - LOCATION: LOCATION: LEG;FOOT

## 2023-03-15 ASSESSMENT — PAIN SCALES - GENERAL: PAINLEVEL_OUTOF10: 8

## 2023-03-15 NOTE — ED PROVIDER NOTES
[address] ENCOUNTER        Pt Name: Dania Pastrana  MRN: [id number]  [place] 1950  Date of evaluation: 3/15/2023  Provider: AUSTIN Muse - MERCEDES  PCP: Morris Hackett DO    ABEBA. I have evaluated this patient. My supervising physician was available for consultation. Triage CHIEF COMPLAINT       Chief Complaint   Patient presents with    Leg Injury     Right, fall this morning         HISTORY OF PRESENT ILLNESS      Chief Complaint: Right foot and ankle pain    Dania Pastrana is a 67 y.o. female who presents for evaluation of right foot and ankle pain after she had an inversion injury of her ankle earlier this morning. She states she was stepping off of the scale when she lost her balance and twisted her ankle attempting to write herself. She has some pain in the lateral aspect of her foot. She has been dealing with chronic peripheral edema which has been improving recently after being discharged from an ECF. She has also been dealing with recurrent cellulitis in the right foot over the last year. She states that has been mildly more red recently and tender. Denies any fever, chills, nausea, vomiting. Nursing Notes were all reviewed and agreed with or any disagreements were addressed in the HPI. REVIEW OF SYSTEMS     Pertinent ROS as noted in HPI.       PAST MEDICAL HISTORY     Past Medical History:   Diagnosis Date    Anemia     Anxiety 1978    Arthritis     generalized    Asthma 2006    AV block     2nd degree per hospital in [id number]    Blood poisoning 1994    Blood transfusion     12/2003    Breast cancer ([facility] 02/29/2012    right    CAD (coronary artery disease)     Cancer ([facility] 2007    skin (face) & colon(2003)/ 2/2012 dx of right breast ca(pc)    Carcinoma of breast ([facility] 02/29/2012    right arm no b/p or sticks    Cardiac pacemaker 03/10/2014    Medtronic dual lead    Cellulitis 11/15/2022 Chronic cystitis     Chronic respiratory failure (HCC)     2 L/min oxygen at night-time    Colon cancer ([facility] [facility] 2013    COPD (chronic obstructive pulmonary disease) (HCC)     CTS (carpal tunnel syndrome)     Depression     Diverticulitis     H/O 24 hour EKG monitoring 2/28/2014 7/24/13 2/14 complete heart block 7/13No clinically significant arrthymia noted. H/O cardiac catheterization 10/31/2011, 7/11/2007    10/31/2011-No significant CAD. Cardiolite stress test was false positive-Dr Bill Barclay; 7/11/2007-No CAD, global function intact;    H/O cardiovascular stress test 10/20/2011, 3/23/2010    10/20/2011-Lexiscan- Evid of mild ischemia in Left CX region. EF 70%. Global LVSF normal;    H/O cardiovascular stress test 01/07/2015    EF 77%. Normal Stress Test.    H/O chest pain 04/2005    sees Dr Bill Barclay    H/O Doppler ultrasound 02/20/2008 2/20/2008-CAROTID DOPPLER- Normal carotids bilaterally;    H/O Doppler ultrasound 06/06/2013    CAROTID DOPPLER- there is heterogeneous, irregular atherosclerotic plaque noted in the right internal carotid artery,  doppler flow velocities within the right internal carotid artery are elevated, consistent with a mild, less than 50%stenosis, there is intimal thickening but no significant atherosclerotic plaque noted in the left internal carotid artery, the left carotid are within normal limits    H/O echocardiogram 4/9/2012, 10/20/2011    4/9/2012-LVSF normal EF=>55%. impaired LV relaxation;    H/O echocardiogram 12/31/2014    EF 50-55%. LV shows mild concentric hypertrophy. Mildly dilated left atrium. Mildly dilated right ventricle. Sclerotic but not stenotic aortic valve. Mitral annular calcification.   Mild MR, Mild TR, Mild pulm HTN.    H/O urinary incontinence     History of blood transfusion     Hx antineoplastic chemo     Hx of Arterial Doppler ultrasound 07/01/2019    No hemodynamically significant or focal stenosis visualized bilaterally, bilateral lower extremity arteries exhibit diffuse plaque and multiphasic waveforms, unable to obtain bilateral ABIs due to elevated leg pressures >250 mmHg, possibly due to atherosclerosis    Hx of cardiovascular stress test 06/2013    EF 70% abn suggestive of anterolateral ischemia, possible RCA ischmeia, post left ventricular dilation suggestive multivessel CAD. Hx of echocardiogram 06/2013    EF50%, mild MR and TR, mild pulmonary HTN, mild AS    Hx of echocardiogram 11/01/2021    Left ventricular systolic function is normal, Mild concentric left ventricular hypertrophy Mildly dilated left atrium. Mitral annular calcification is present. Mild tricuspid regurgitation No evidence of any pericardial effusion    Hx of fall     \"last time 2018- due to neuropathy, have to use cane\"    [facility] 7/24/13, 06/2013 7/13-No clinacally significant arrhythmia. 6/13-multiple cycles of wenckebach episodes in addtion to some sinus tach    Hyperlipidemia     Hypertension     Hypothermia 2008    Malignant neoplasm of breast (female) ([facility] 2012    Neuropathy     \"have neuropathy of my legs\"    Normocytic normochromic anemia     Obstructive sleep apnea 2008 01- Has CPAP machine but has not used in over a year - states she has not had problems since her pacemaker was placed. Old MI (myocardial infarction)     per pt on 1/15/2019\"had heart attack about a year ago\"    Osteoarthritis     Osteopenia     Osteopenia of multiple sites 9/14/2022    Pneumonia due to COVID-19 virus 03/22/2021    Primary malignant neoplasm of colon ([facility]     S/P cardiac cath 06/2013    no significant CAD false postive stress test    Skin cancer     Super obesity     Umbilical hernia        SURGICAL HISTORY     Past Surgical History:   Procedure Laterality Date    A-V CARDIAC PACEMAKER INSERTION  3/10/14     Medtronic.    Model:  [id number] Medtronic  Serial:  [id number] dual chamber pacemaker    APPENDECTOMY  2004    BREAST BIOPSY  2/13/12    BREAST LUMPECTOMY 2007    right     BREAST SURGERY  02/13/2012    rt lesion biopsy     CARDIAC CATHETERIZATION  2007 & 2011    CAROTID ENDARTERECTOMY Right 12/3/2021    RIGHT CAROTID ENDARTERECTOMY WITH PATCH performed by Roma Rausch MD at [address]  2004    Colon cancer    COLONOSCOPY  10-18-13    polyp    COLONOSCOPY  1/19/2016    internal hemorrhoids, diverticulosis, 1.5 cm sessile polyp, 8 mm polyp found in rectum. COLONOSCOPY  12/14/2017    1.5cm residual polyp, 5mm polyps in blind sigmoid loop x3, Sigmoid divertics, Internal grade 1 hemorrhoids    COLONOSCOPY N/A 1/16/2019    COLONOSCOPY W/ ENDOSCOPIC MUCOSAL RESECTION WITH ELEVIEW 5ML INJECTION AND POLYPECTOMY OF TIP OF BLIND RECTOSIGMOID STUMP AND CAUTERIZATION WITH ERBE PROBE, CLIPPING X2 performed by Pennie Bo MD at [address] [facility]  01/21/2020    pan divertics/ 4 polyps/ sm int hem, S/P partial sigmoid resection w/ end to side anastamosis, repeat in 3 years    COLONOSCOPY N/A 1/21/2020    COLONOSCOPY POLYPECTOMY SNARE/COLD BIOPSY performed by Pennie Bo MD at [place] [address]  2003    back    CYSTOSCOPY Bilateral 12/15/14    CYSTOSCOPY Right 5/15/2022    CYSTOSCOPY URETERAL STENT INSERTION performed by Perfecto Jimenez MD at [address]      front right tooth and root canal w/ brass bolt    DILATION AND CURETTAGE OF UTERUS  2006    Needed a camera to find my uterus.     DILATION AND CURETTAGE OF UTERUS N/A 5/24/2022    DILATATION AND CURETTAGE, CULTURES OF UTEREUS performed by Krunal El MD at [address], COLON, DIAGNOSTIC  12/14/2017    Small hiatal hernia    HERNIA REPAIR  2004    Umb hernia    HERNIA REPAIR  2008    Inc hernia    KIDNEY SURGERY  05/24/2022    stent placed on right side    LITHOTRIPSY Right 8/1/2022    RIGHT CYSTOSCOPY URETEROSCOPY STONE MANIPULATION WITH HOLIUM LASER LITHOTRIPSY STENT REPLACEMENT performed by Roopa Cortes MD at [facility] Left 9/26/2022    LEFT CYSTOSCOPY URETEROSCOPY RETROGRADE PYELOGRAM STONE MANIPULATION WITH HOLIUM LASER LITHOTRIPSY POSSIBLE STENT PLACEMENT performed by Giuseppe Najera MD at [place] [address]  2/29/2012    Right axillary-3 sentinel nodes were positive out of 9.     MASTECTOMY, RADICAL Right 02/29/2012    w/ sentinal node disection-Dr West    PACEMAKER PLACEMENT      PRE-MALIGNANT / BENIGN SKIN LESION EXCISION  2/8/2013    right leg X2-Dr West    TONSILLECTOMY  1957    TUNNELED VENOUS PORT PLACEMENT  2004    TUNNELED VENOUS PORT PLACEMENT  02/13/2012    removal of mediport       CURRENTMEDICATIONS       Discharge Medication List as of 3/15/2023  3:58 PM        CONTINUE these medications which have NOT CHANGED    Details   Calcium Citrate-Vitamin D (CALCIUM + D PO) Take by mouthHistorical Med      simvastatin (ZOCOR) 20 MG tablet Take 1 tablet by mouth nightly, Disp-90 tablet, R-1Normal      metoprolol tartrate (LOPRESSOR) 25 MG tablet Take 1 tablet by mouth 2 times daily, Disp-180 tablet, R-1Normal      sertraline (ZOLOFT) 50 MG tablet Take 1 tablet by mouth daily, Disp-90 tablet, R-1Normal      hydrOXYzine HCl (ATARAX) 25 MG tablet Take 1 tablet by mouth daily as needed for Anxiety, Disp-90 tablet, R-1Normal      doxycycline hyclate (VIBRA-TABS) 100 MG tablet Take 1 tablet by mouth 2 times daily for 7 days, Disp-14 tablet, R-0Normal      fluticasone (FLOVENT HFA) 110 MCG/ACT inhaler Inhale 2 puffs into the lungs 2 times daily, Disp-1 each, R-2Normal      acetaminophen (TYLENOL) 500 MG tablet Take 1 tablet by mouth every 6 hours as needed for Pain, Disp-120 tablet, R-3NO PRINT      aspirin 81 MG chewable tablet Take 1 tablet by mouth daily, Disp-30 tablet, R-0NO PRINT      albuterol sulfate HFA (PROAIR HFA) 108 (90 Base) MCG/ACT inhaler INHALE 2 PUFFS BY MOUTH EVERY SIX HOURS AS NEEDED FOR WHEEZING, Disp-1 Inhaler, R-5Normal             ALLERGIES     Bactrim [sulfamethoxazole-trimethoprim], Lipitor [atorvastatin], Taxol [paclitaxel], Aminoglycosides, Demerol, Neosporin [bacitracin-neomycin-polymyxin], Other, and Talc    FAMILYHISTORY       Family History   Problem Relation Age of Onset    Arthritis Mother         OA, RA    High Cholesterol Mother     High Blood Pressure Mother     Cancer Father         skin and leukemia    High Blood Pressure Father     High Cholesterol Father     Diabetes Father     Heart Disease Father     Heart Disease Brother     High Cholesterol Brother     High Blood Pressure Brother     Heart Disease Brother     No Known Problems Sister     Seizures Son     Cancer Brother         skin cancer    Breast Cancer Neg Hx     Ovarian Cancer Neg Hx         SOCIAL HISTORY       Social History     Socioeconomic History    Marital status:      Spouse name: None    Number of children: 2    Years of education: 12    Highest education level: 12th grade   Occupational History    Occupation: retired   Tobacco Use    Smoking status: Never    Smokeless tobacco: Never   Vaping Use    Vaping Use: Never used   Substance and Sexual Activity    Alcohol use: No     Comment:           CAFFEINE: 2- 12oz nona daily & 2 cups coffee some nights. Drug use: No    Sexual activity: Not Currently     Partners: Male   Social History Narrative    Do you donate blood or plasma? No    Caffeine intake? Moderate    Advance directive? No    Is blood transfusion acceptable in an emergency? Yes    Live alone or with others? With others    Able to care for self?  Yes    No exercise at this time         Social Determinants of Health     Financial Resource Strain: Medium Risk    Difficulty of Paying Living Expenses: Somewhat hard   Food Insecurity: Food Insecurity Present    Worried About [address] in the Last Year: Sometimes true    Ran Out of Food in the Last Year: Sometimes true   Transportation Needs: Unmet Transportation Needs    Lack of Transportation (Medical): Yes    Lack of Transportation (Non-Medical): Yes   Physical Activity: Inactive    Days of Exercise per Week: 0 days    Minutes of Exercise per Session: 0 min   Stress: No Stress Concern Present    Feeling of Stress : Only a little   Social Connections: Socially Isolated    Frequency of Communication with Friends and Family: Once a week    Frequency of Social Gatherings with Friends and Family: Once a week    Attends Tenri[employer] Services: Never    Active Member of Clubs or Organizations: No    Attends Club or Organization Meetings: Never    Marital Status:    Housing Stability: High Risk    Unable to Pay for Housing in the Last Year: Yes    Number of Places Lived in the Last Year: 1    Unstable Housing in the Last Year: No       SCREENINGS           PHYSICAL EXAM       ED Triage Vitals [03/15/23 1348]   BP Temp Temp Source Heart Rate Resp SpO2 Height Weight   (!) 140/79 97.6 °F (36.4 °C) Oral 79 15 95 % -- --        Constitutional:  Well developed, Well nourished.  No distress  HENT:  Normocephalic, Atraumatic  Neck/Lymphatics: supple, no swollen nodes  Cardiovascular:   RRR,  no murmurs/rubs/gallops.  Distal cap refill and pulses intact bilateral upper and lower extremities.  Moderate bilateral peripheral edema over feet extending to mid shins.  There is slight increase in edema on the right foot as compared to the left the lateral right ankle  Respiratory:   Nonlabored breathing.  Normal breath sounds, No wheezing  Musculoskeletal: Right foot is mildly tender over the distal fourth and fifth metatarsal as well as the proximal fifth metatarsal.  There is mild tenderness over the third through fifth toes.  There is mild posterior lateral tenderness of the ankle without any significant tenderness over the fibula.  Range of motion and strength of the ankle are normal.  There is a patchy dry area of thickened skin over the dorsum of the right foot which is warm and mildly erythematous concerning for recurrent cellulitis.  No lymphatic  streaking. No crepitus. Integument:   Warm, Dry, see above   Psychiatric:  Affect normal, Mood normal.     DIAGNOSTIC RESULTS   LABS:    Labs Reviewed - No data to display    When ordered, only abnormal lab results are displayed. All other labs were within normal range or not returned as of this dictation. RADIOLOGY:   Non-plain film images such as CT, Ultrasound and MRI are read by the radiologist. Plain radiographic images are visualized and preliminarily interpreted by the  ED Provider with the below findings:    Interpretation perthe Radiologist below, if available at the time of this note:    XR FOOT RIGHT (MIN 3 VIEWS)   Final Result   No acute osseous abnormality. XR ANKLE RIGHT (MIN 3 VIEWS)   Final Result   No radiographic evidence of acute osseous abnormality. Heavy atherosclerotic calcification. Correlation with clinical evidence of   lower extremity arterial insufficiency. Diffuse dystrophic soft tissue calcification consistent with chronic or   recurrent cellulitis, collagen vascular disease, dermatomyositis, soft tissue   calcinosis. XR TIBIA FIBULA RIGHT (2 VIEWS)   Final Result   No radiographic evidence acute osseous abnormality visualized lower leg. Soft tissue changes consistent with chronic or recurrent cellulitis,   calcinosis, dermatomyositis and collagen vascular disease. Subjective skeletal osteopenia. XR TIBIA FIBULA RIGHT (2 VIEWS)    Result Date: 3/15/2023  EXAMINATION: 2 XRAY VIEWS OF THE RIGHT TIBIA AND FIBULA 3/15/2023 2:06 pm COMPARISON: None. HISTORY: ORDERING SYSTEM PROVIDED HISTORY: fall TECHNOLOGIST PROVIDED HISTORY: Reason for exam:->fall Reason for Exam: lower leg pain Additional signs and symptoms: fall Relevant Medical/Surgical History: na FINDINGS: No radiographic evidence of acute fracture, dislocation, bone destruction, pathologic periosteal reaction. Subjective skeletal osteopenia noted.  Diffuse soft tissue calcification noted mid-lower leg consistent with chronic or recurrent cellulitis, soft tissue calcinosis, collagen vascular disease and dermatomyositis. Heavy atherosclerotic calcification visualized arteries of the right leg noted of uncertain hemodynamic significance. Correlation with clinical evidence of lower extremity arterial insufficiency suggested. No radiographic evidence acute osseous abnormality visualized lower leg. Soft tissue changes consistent with chronic or recurrent cellulitis, calcinosis, dermatomyositis and collagen vascular disease. Subjective skeletal osteopenia. XR ANKLE RIGHT (MIN 3 VIEWS)    Result Date: 3/15/2023  EXAMINATION: THREE XRAY VIEWS OF THE RIGHT ANKLE 3/15/2023 2:06 pm COMPARISON: 11/14/2022 at 2003 hours HISTORY: ORDERING SYSTEM PROVIDED HISTORY: fall TECHNOLOGIST PROVIDED HISTORY: Reason for exam:->fall Reason for Exam: right ankle pain Additional signs and symptoms: fall Relevant Medical/Surgical History: na FINDINGS: No radiographic evidence of acute fracture or dislocation. Ankle mortise is intact. Atherosclerotic calcification visualized runoff arteries of right leg noted of uncertain hemodynamic significance. Correlation with clinical evidence of right lower extremity arterial insufficiency suggested. Diffuse soft tissue calcification noted throughout the visualized right lower leg with differential considerations including collagen vascular disease, dermatomyositis, soft tissue calcinosis     No radiographic evidence of acute osseous abnormality. Heavy atherosclerotic calcification. Correlation with clinical evidence of lower extremity arterial insufficiency. Diffuse dystrophic soft tissue calcification consistent with chronic or recurrent cellulitis, collagen vascular disease, dermatomyositis, soft tissue calcinosis. XR FOOT RIGHT (MIN 3 VIEWS)    Result Date: 3/15/2023  EXAMINATION: THREE XRAY VIEWS OF THE RIGHT FOOT 3/15/2023 2:06 pm COMPARISON: 11/14/2022. HISTORY: ORDERING SYSTEM PROVIDED HISTORY: fall TECHNOLOGIST PROVIDED HISTORY: Reason for exam:->fall Reason for Exam: right foot pain Additional signs and symptoms: fall Relevant Medical/Surgical History: na FINDINGS: There is no evidence of acute fracture. There is normal alignment of the tarsometatarsal joints. No acute joint abnormality. No focal osseous lesion. Similar swelling of the forefoot. No acute osseous abnormality. PROCEDURES   Unless otherwise noted below, none       Procedures    CRITICAL CARE   CRITICAL CARE NOTE:  N/A    CONSULTS:  None      VITALS:   Vitals:    Vitals:    03/15/23 1348   BP: (!) 140/79   Pulse: 79   Resp: 15   Temp: 97.6 °F (36.4 °C)   TempSrc: Oral   SpO2: 95%       EMERGENCY DEPARTMENT COURSE and MDM:   Patient presents as above. Emergent etiologies considered. Patient seen and examined. Work-up initiated secondary to presentation, physical exam findings, vital signs and medical chart review. Sepsis Consideration:   Exclusion criteria - the patient is NOT to be included for SEP-1 Core Measure due to:  2+ SIRS criteria are not met     History from : Patient    Limitations to history : None    Patient was given the following medications:  Medications - No data to display    Imaging Interpretation: Not applicable    Telemetry: Not Applicable    ECG Interpretation: N/A    Chronic conditions affecting care: HTN, HLD, CAD    Discussion with Other Profesionals : None    Social Determinants : None    Records Reviewed : None    In brief, patient presents for evaluation after an inversion injury of the right ankle with some increase in right ankle and foot pain. On exam she has diffuse edema of bilateral lower extremities with the right being slightly worse than left. There is tenderness over the lateral right foot as well as the lateral right ankle. X-ray of the right foot, right ankle, and right tib-fib were ordered in triage and showed no acute fractures. There was evidence of dystrophic soft tissue calcifications consistent with chronic or recurrent cellulitis, collagen vascular disease, dermatomyositis or soft tissue calcinosis. Clinically the patient does have some erythema and warmth of the dorsum of the foot which is concerning for potential recurrent cellulitis. She is taking doxycycline prescribed by PCP for URI for the last couple of days. Will add Keflex. Encouraged her to follow-up with primary care in the next few days for reevaluation. Placed in a [address] [place] [address] for comfort for right foot and ankle sprain. Patient verbalized understanding and feels comfortable discharge at this time. I am the Primary Clinician of Record. CLINICAL IMPRESSION      1. Cellulitis, unspecified cellulitis site    2. Sprain of right ankle, unspecified ligament, initial encounter    3.  Sprain of right foot, initial encounter          DISPOSITION/PLAN   DISPOSITION Decision To Discharge 03/15/2023 03:47:41 PM      PATIENT REFERREDTO:  DO [id number]  [phone]      3-4 days      DISCHARGE MEDICATIONS:  Discharge Medication List as of 3/15/2023  3:58 PM        START taking these medications    Details   cephALEXin (KEFLEX) 500 MG capsule Take 1 capsule by mouth 4 times daily for 7 days, Disp-28 capsule, R-0Normal             DISCONTINUED MEDICATIONS:  Discharge Medication List as of 3/15/2023  3:58 PM                 (Please note that portions ofthis note were completed with a voice recognition program.  Efforts were made to edit the dictations but occasionally words are mis-transcribed.)    AUSTIN Erickson CNP (electronically signed)              AUSTIN Marie CNP  03/15/23 [id number]

## 2023-03-15 NOTE — CARE COORDINATION
Remote Alert Monitoring Note      Date/Time:  3/15/2023 9:41 AM  Patient Current Location: [facility]    LPN contacted patient by telephone regarding red alert received for weight increase (5# in 7 days). Verified patients name and  as identifiers. Background: HTN  Refer to 911 immediately if:  Patient unresponsive or unable to provide history  Change in cognition or sudden confusion  Patient unable to respond in complete sentences  Intense chest pain/tightness  Any concern for any clinical emergency  Red Alert: Provider response time of 1 hr required for any red alert requiring intervention  Yellow Alert: Provider response time of 3hr required for any escalated yellow alert    Weight Scale Triage  Was your weight obtained upon rising/waking today? YES   Was your weight obtained after voiding and/or use of the bathroom today? yes   Did you weigh yourself in the same amount of clothing today, compared to how you typically do? yes   Was the scale bumped or moved prior to today's weight? no   Is your scale on a flat/hard surface? yes   Did you obtain your weight with shoes on? no   If yes, is this something you normally do during your daily weights? no   Were you standing up straight on the scale today? no   Were you leaning on anything while obtaining your weight today? Bearl Clam over with scale       Clinical Interventions: Reviewed and followed up on alerts and treatments-Patient has weight gain of 5# in 7 days. Denies  CP, SOB, Increased NA intake (cut down since yesterday). Has some swelling in right foot as scale \"went over\" when she tried to step off this morning. Now right foot hurts - per patient. She will call PCP for possible foot X-ray this morning. Has upcoming appointment with cardiologist 3/24/2023. Took all medications this morning as directed. No action needed at this time. Will monitor weight gain over next few days.      Education of patient/family/caregiver/guardian to support self-management-Reiterated to patient to continue to cut down on NA intake. Call PCP for right foot pain. Instructed patient on the importance of weighing daily, in the morning after urinating, Same Clothing on, NO shoes, Scale on Flat/hard surface, and that if the patient has weight gain of 3# over night or 5# weight gain in a week to call their physician immediately and report. Plan/Follow Up: Will continue to review, monitor and address alerts with follow up based on severity of symptoms and risk factors.     Current Patient Metrics ---- Blood Pressure: 152/70, 60bpm Pulseox: 93%, 62bpm Survey: - Weight: 246.5lbs Note Created at: 03/15/2023 09:57 AM ET ---- Time-Spent: 15 minutes 0 seconds

## 2023-03-16 ENCOUNTER — CARE COORDINATION (OUTPATIENT)
Dept: CARE COORDINATION | Age: 73
End: 2023-03-16

## 2023-03-16 ENCOUNTER — HOSPITAL ENCOUNTER (OUTPATIENT)
Dept: GENERAL RADIOLOGY | Age: 73
Discharge: HOME | End: 2023-03-16
Payer: MEDICARE

## 2023-03-16 ENCOUNTER — HOSPITAL ENCOUNTER (OUTPATIENT)
Age: 73
Discharge: HOME | End: 2023-03-16
Payer: MEDICARE

## 2023-03-16 ENCOUNTER — CARE COORDINATION (OUTPATIENT)
Dept: CASE MANAGEMENT | Age: 73
End: 2023-03-16

## 2023-03-16 ENCOUNTER — OFFICE VISIT (OUTPATIENT)
Dept: INTERNAL MEDICINE CLINIC | Age: 73
End: 2023-03-16
Payer: MEDICARE

## 2023-03-16 VITALS
RESPIRATION RATE: 20 BRPM | SYSTOLIC BLOOD PRESSURE: 138 MMHG | DIASTOLIC BLOOD PRESSURE: 70 MMHG | HEART RATE: 66 BPM | BODY MASS INDEX: 45.27 KG/M2 | WEIGHT: 247.5 LBS | OXYGEN SATURATION: 98 %

## 2023-03-16 DIAGNOSIS — R06.2 WHEEZING: ICD-10-CM

## 2023-03-16 DIAGNOSIS — L03.115 CELLULITIS OF RIGHT FOOT: Primary | ICD-10-CM

## 2023-03-16 PROCEDURE — 3078F DIAST BP <80 MM HG: CPT

## 2023-03-16 PROCEDURE — 1124F ACP DISCUSS-NO DSCNMKR DOCD: CPT

## 2023-03-16 PROCEDURE — 99213 OFFICE O/P EST LOW 20 MIN: CPT

## 2023-03-16 PROCEDURE — 3074F SYST BP LT 130 MM HG: CPT

## 2023-03-16 PROCEDURE — 71046 X-RAY EXAM CHEST 2 VIEWS: CPT

## 2023-03-16 ASSESSMENT — ENCOUNTER SYMPTOMS
NAUSEA: 0
SORE THROAT: 0
SHORTNESS OF BREATH: 1
CONSTIPATION: 0
DIARRHEA: 0
EYE PAIN: 0
CHEST TIGHTNESS: 0
ABDOMINAL PAIN: 0
CHOKING: 0
COUGH: 1
EYE DISCHARGE: 0
STRIDOR: 0
COLOR CHANGE: 0
WHEEZING: 0
TROUBLE SWALLOWING: 0
FACIAL SWELLING: 0
EYE REDNESS: 0
VOMITING: 0
RHINORRHEA: 0

## 2023-03-16 ASSESSMENT — VISUAL ACUITY: OU: 1

## 2023-03-16 NOTE — CARE COORDINATION
Remote Alert Monitoring Note      Date/Time:  3/16/2023 1:26 PM  Patient Current Location: [facility]    LPN contacted patient by telephone regarding red alert received for weight increase (5# over 7 days). Verified patients name and  as identifiers. Background: HTN  Refer to 911 immediately if:  Patient unresponsive or unable to provide history  Change in cognition or sudden confusion  Patient unable to respond in complete sentences  Intense chest pain/tightness  Any concern for any clinical emergency  Red Alert: Provider response time of 1 hr required for any red alert requiring intervention  Yellow Alert: Provider response time of 3hr required for any escalated yellow alert    Weight Scale Triage  Was your weight obtained upon rising/waking today? Was your weight obtained after voiding and/or use of the bathroom today? yes   Did you weigh yourself in the same amount of clothing today, compared to how you typically do? yes   Was the scale bumped or moved prior to today's weight? no   Is your scale on a flat/hard surface? yes   Did you obtain your weight with shoes on? No   Had immobilizer Boot on foot. If yes, is this something you normally do during your daily weights? no   Were you standing up straight on the scale today? yes   Were you leaning on anything while obtaining your weight today? no       Clinical Interventions: Reviewed and followed up on alerts and treatments-Patient has weight gain of 1# over night and 5 # in 7 days. Denies CP, Increased NA intake. Is SOB as always. Has Sprain of right foot yesterday and is now wearing immobilizer boot. Right Foot red and swollen. Patient states she was told she has Cellulitis. Has taken all daytime medications as directed. Is icing and elevating right foot. Will see PCP this afternoon. Will send FYI for weights to PCP for upcoming appointment.     Education of patient/family/caregiver/guardian to support self-management-Ice and elevate right foot per · On statin ER instructions. Plan/Follow Up: Will continue to review, monitor and address alerts with follow up based on severity of symptoms and risk factors.     Current Patient Metrics ---- Blood Pressure: 148/68, 62bpm Pulseox: 93%, 62bpm Survey: - Weight: 247.5lbs Note Created at: 03/16/2023 01:47 PM ET ---- Time-Spent: 22 minutes 0 seconds

## 2023-03-16 NOTE — PROGRESS NOTES
software and may contain errors related to that system including errors in grammar, punctuation, and spelling, as well as words and phrases that may be inappropriate. If there are any questions or concerns please feel free to contact the dictating provider for clarification.     Addie Fernando, AUSTIN - CNP

## 2023-03-16 NOTE — CARE COORDINATION
Ambulatory Care Coordination Note  3/16/2023    Patient Current Location:  Home: [address] [place] [address] [address] [facility]     ACM contacted the patient by telephone. Verified name and  with patient as identifiers. Provided introduction to self, and explanation of the ACM role. Challenges to be reviewed by the provider   Additional needs identified to be addressed with provider: No  none               Method of communication with provider: none. ACM: Kadie Griffiths RN    ACM call to pt for ED f/u. Pt was seen in ED d/t fall and right leg/foot cellulitis. States she has obtained and started taking Keflex prescribed. Has f/u appt with Teresa Anderson NP today 3/16 at 3pm.     Pt active with RPM. Will plan to continue for a few more weeks. Last VS logged:  Current Patient Metrics ---- Blood Pressure: 148/68, 62bpm Pulseox: 93%, 62bpm Survey: - Weight: 247.5lbs    States water has been turned back on for all things at home, except laundry. States she and SO have been going to [employer] and enjoy taking to the other people in there. Pt denies any additional needs at this time. Call ended as pt needs to leave for PCP appt. Plan:  Monitor RPM    Offered patient enrollment in the Remote Patient Monitoring (RPM) program for in-home monitoring: Yes, patient already enrolled. General Assessment    Do you have any symptoms that are causing concern?: Yes  Progression since Onset: Unchanged  Reported Symptoms: Pain, Other (Comment: Cellulitis right leg/foot)           Lab Results       None            Care Coordination Interventions    Referral from Primary Care Provider: Yes  Suggested Interventions and Community Resources  Fall Risk Prevention: In Process  Marketplace Navigator: Completed (Comment: Avinash WILLARD/ BS)  Pharmacist: In Process  Senior Services: In Process  Social Work: Completed  Transportation Support:  In Process          Goals Addressed                   This Visit's Progress Conditions and Symptoms   On track     I will schedule office visits, as directed by my provider. I will keep my appointment or reschedule if I have to cancel. I will notify my provider of any barriers to my plan of care. I will follow my Zone Management tool to seek urgent or emergent care. I will notify my provider of any symptoms that indicate a worsening of my condition. Barriers: fear of failure, lack of support, overwhelmed by complexity of regimen, stress, and lack of education  Plan for overcoming my barriers: Utilize [facility] for education and community resources.    Confidence: 7/10  Anticipated Goal Completion Date: 3/27/2023   Updated 4/30/2023                Future Appointments   Date Time Provider Chance Chiang   3/16/2023  3:00 PM AUSTIN Priest CNP [facility]   3/24/2023  1:45 PM [facility], MED ONC NURSE [address]   3/24/2023  2:00 PM MD Steven Rojas [facility]   4/11/2023  3:00 PM DO Kaden Hercules NOR [facility]   5/22/2023 11:50 AM Martín Russell MD [facility]   8/16/2023  1:00 PM DO Kaden Hercules NOR [facility]   12/21/2023 12:45 PM [facility], MED ONC NURSE [address] [facility] MED ONC [place]   12/21/2023  1:00 PM MD Steven Rojas [facility]   1/22/2024  3:30 PM [facility]

## 2023-03-17 ENCOUNTER — CARE COORDINATION (OUTPATIENT)
Dept: CASE MANAGEMENT | Age: 73
End: 2023-03-17

## 2023-03-17 NOTE — CARE COORDINATION
Remote Alert Monitoring Note      Date/Time:  3/17/2023 12:05 PM  [facility] regarding yellow alert received for blood pressure reading (172/79). Verified patients name and  as identifiers. Background: HTN  Refer to 911 immediately if:  Patient unresponsive or unable to provide history  Change in cognition or sudden confusion  Patient unable to respond in complete sentences  Intense chest pain/tightness  Any concern for any clinical emergency  Red Alert: Provider response time of 1 hr required for any red alert requiring intervention  Yellow Alert: Provider response time of 3hr required for any escalated yellow alert    BP Triage  Are you having any Chest Pain? no   Are you having any Shortness of Breath? no   Do you have a headache or have any vision changes? no   Are you having any numbness or tingling? no   Are you having any other health concerns or issues? no       Clinical Interventions: Reviewed and followed up on alerts and treatments-Patient has elevated BP of 172/79. Denies CP, SOB, Stroke-like symptoms, Increased Na intake. Has some swelling in right foot (cellulitis) and is wearing a boot. Has Cardiology appointment 3/24/23. Has taken all medications this morning as directed. Rechecked Bp for 169/70. No further action needed at this time. Education of patient/family/caregiver/guardian to support self-management-Recheck BP. Instructed to place BP cuff on upper arm snuggly. Sit with feet flat on the floor. Sit quietly and relax for 5 minutes before taking BP. Advised Patient to contact PCP  regarding any health concerns for early outpatient intervention in an effort to avoid hospitalization. Report any worsening symptoms to PCP and/or Call 911 and/or GO TO  EMERGENCY ROOM if symptoms are severe or worsening. Expresses understanding. Plan/Follow Up:  Will continue to review, monitor and address alerts with follow up based on severity of symptoms and risk factors.     Current Patient Metrics ---- Blood Pressure: 169/70, 62bpm Pulseox: 93%, 60bpm Survey: C Weight: 245.5lbs Note Created at: 03/17/2023 12:18 PM ET ---- Time-Spent: 13 minutes 0 seconds

## 2023-03-20 ENCOUNTER — CARE COORDINATION (OUTPATIENT)
Dept: CASE MANAGEMENT | Age: 73
End: 2023-03-20

## 2023-03-20 NOTE — CARE COORDINATION
Remote Alert Monitoring Note      Date/Time:  3/20/2023 10:33 AM  Patient Current Location: [facility]    LPN contacted patient by telephone regarding red alert received for weight increase (7#  over night). Verified patients name and  as identifiers. Background: HTN  Refer to 911 immediately if:  Patient unresponsive or unable to provide history  Change in cognition or sudden confusion  Patient unable to respond in complete sentences  Intense chest pain/tightness  Any concern for any clinical emergency  Red Alert: Provider response time of 1 hr required for any red alert requiring intervention  Yellow Alert: Provider response time of 3hr required for any escalated yellow alert    Weight Scale Triage  Was your weight obtained upon rising/waking today? YES   Was your weight obtained after voiding and/or use of the bathroom today? yes   Did you weigh yourself in the same amount of clothing today, compared to how you typically do? yes   Was the scale bumped or moved prior to today's weight? yes   Is your scale on a flat/hard surface? yes   Did you obtain your weight with shoes on? no   If yes, is this something you normally do during your daily weights? no   Were you standing up straight on the scale today? yes   Were you leaning on anything while obtaining your weight today? no       Clinical Interventions: Reviewed and followed up on alerts and treatments-   Patient has 7# weight gain over night. Denies CP, SOB, Increased NA intake, Right leg and foot still swollen but less per patient Wearing boot on right leg. No problems with the scale this morning. Weighs without boot on. Next PCP appointment 23. Patient states she has Lasix 20 mg to take PRN  for weight gain. Patient states she may take one later today. Continue taking Keflex at this time for cellulitis. Will send PCP, Surinder Aguilar on weight gain. Please see VS in chart Note. Please see chart Note from CNP in Notes.  Continue to monitor weight gain

## 2023-03-21 ENCOUNTER — HOSPITAL ENCOUNTER (OUTPATIENT)
Age: 73
Discharge: HOME | End: 2023-03-21
Payer: MEDICARE

## 2023-03-21 ENCOUNTER — CARE COORDINATION (OUTPATIENT)
Dept: CASE MANAGEMENT | Age: 73
End: 2023-03-21

## 2023-03-21 DIAGNOSIS — R73.03 PREDIABETES: ICD-10-CM

## 2023-03-21 DIAGNOSIS — D64.9 ANEMIA, UNSPECIFIED TYPE: ICD-10-CM

## 2023-03-21 DIAGNOSIS — E78.5 HYPERLIPIDEMIA, UNSPECIFIED HYPERLIPIDEMIA TYPE: ICD-10-CM

## 2023-03-21 LAB
BASOPHILS ABSOLUTE: 0.1 K/CU MM
BASOPHILS RELATIVE PERCENT: 0.5 % (ref 0–1)
DIFFERENTIAL TYPE: ABNORMAL
EOSINOPHILS ABSOLUTE: 0.2 K/CU MM
EOSINOPHILS RELATIVE PERCENT: 2 % (ref 0–3)
FERRITIN: 151 NG/ML (ref 15–150)
HCT VFR BLD CALC: 35.8 % (ref 37–47)
HEMOGLOBIN: 10.9 GM/DL (ref 12.5–16)
IMMATURE NEUTROPHIL %: 0.7 % (ref 0–0.43)
IRON: 41 UG/DL (ref 37–145)
LYMPHOCYTES ABSOLUTE: 2.3 K/CU MM
LYMPHOCYTES RELATIVE PERCENT: 22.7 % (ref 24–44)
MCH RBC QN AUTO: 26.7 PG (ref 27–31)
MCHC RBC AUTO-ENTMCNC: 30.4 % (ref 32–36)
MCV RBC AUTO: 87.7 FL (ref 78–100)
MONOCYTES ABSOLUTE: 0.6 K/CU MM
MONOCYTES RELATIVE PERCENT: 5.7 % (ref 0–4)
NUCLEATED RBC %: 0 %
PCT TRANSFERRIN: 16 % (ref 10–44)
PDW BLD-RTO: 14.4 % (ref 11.7–14.9)
PLATELET # BLD: 240 K/CU MM (ref 140–440)
PMV BLD AUTO: 9.9 FL (ref 7.5–11.1)
RBC # BLD: 4.08 M/CU MM (ref 4.2–5.4)
SEGMENTED NEUTROPHILS ABSOLUTE COUNT: 7.1 K/CU MM
SEGMENTED NEUTROPHILS RELATIVE PERCENT: 68.4 % (ref 36–66)
TOTAL IMMATURE NEUTOROPHIL: 0.07 K/CU MM
TOTAL IRON BINDING CAPACITY: 263 UG/DL (ref 250–450)
TOTAL NUCLEATED RBC: 0 K/CU MM
UNSATURATED IRON BINDING CAPACITY: 222 UG/DL (ref 110–370)
WBC # BLD: 10.3 K/CU MM (ref 4–10.5)

## 2023-03-21 PROCEDURE — 83036 HEMOGLOBIN GLYCOSYLATED A1C: CPT

## 2023-03-21 PROCEDURE — 36415 COLL VENOUS BLD VENIPUNCTURE: CPT

## 2023-03-21 PROCEDURE — 83540 ASSAY OF IRON: CPT

## 2023-03-21 PROCEDURE — 85025 COMPLETE CBC W/AUTO DIFF WBC: CPT

## 2023-03-21 PROCEDURE — 83550 IRON BINDING TEST: CPT

## 2023-03-21 PROCEDURE — 82728 ASSAY OF FERRITIN: CPT

## 2023-03-21 PROCEDURE — 80061 LIPID PANEL: CPT

## 2023-03-22 LAB
CHOLEST SERPL-MCNC: 200 MG/DL
ESTIMATED AVERAGE GLUCOSE: 103 MG/DL
HBA1C MFR BLD: 5.2 % (ref 4.2–6.3)
HDLC SERPL-MCNC: 42 MG/DL
LDLC SERPL CALC-MCNC: 132 MG/DL
TRIGL SERPL-MCNC: 129 MG/DL

## 2023-03-24 ENCOUNTER — HOSPITAL ENCOUNTER (OUTPATIENT)
Dept: INFUSION THERAPY | Age: 73
Discharge: HOME | End: 2023-03-24
Payer: MEDICARE

## 2023-03-24 ENCOUNTER — OFFICE VISIT (OUTPATIENT)
Dept: ONCOLOGY | Age: 73
End: 2023-03-24

## 2023-03-24 VITALS
OXYGEN SATURATION: 97 % | TEMPERATURE: 97.9 F | DIASTOLIC BLOOD PRESSURE: 88 MMHG | SYSTOLIC BLOOD PRESSURE: 145 MMHG | HEIGHT: 62 IN | WEIGHT: 246 LBS | BODY MASS INDEX: 45.27 KG/M2 | HEART RATE: 65 BPM

## 2023-03-24 DIAGNOSIS — Z17.0 MALIGNANT NEOPLASM OF UPPER-OUTER QUADRANT OF RIGHT BREAST IN FEMALE, ESTROGEN RECEPTOR POSITIVE (HCC): Primary | ICD-10-CM

## 2023-03-24 DIAGNOSIS — C50.411 MALIGNANT NEOPLASM OF UPPER-OUTER QUADRANT OF RIGHT BREAST IN FEMALE, ESTROGEN RECEPTOR POSITIVE (HCC): Primary | ICD-10-CM

## 2023-03-24 PROCEDURE — 99211 OFF/OP EST MAY X REQ PHY/QHP: CPT

## 2023-03-24 ASSESSMENT — PATIENT HEALTH QUESTIONNAIRE - PHQ9
SUM OF ALL RESPONSES TO PHQ QUESTIONS 1-9: 2
SUM OF ALL RESPONSES TO PHQ9 QUESTIONS 1 & 2: 2
SUM OF ALL RESPONSES TO PHQ QUESTIONS 1-9: 2
1. LITTLE INTEREST OR PLEASURE IN DOING THINGS: 0
2. FEELING DOWN, DEPRESSED OR HOPELESS: 2

## 2023-03-24 NOTE — PROGRESS NOTES
MA Rooming Questions  Patient: Buddy Allen  MRN: [id number]    Date: 3/24/2023        1. Do you have any new issues? yes - darker spot on face, patient states she has been more anxious lately          2. Do you need any refills on medications?    no    3. Have you had any imaging done since your last visit? yes - [facility]    4. Have you been hospitalized or seen in the emergency room since your last visit here?   yes - [facility]    5. Did the patient have a depression screening completed today?  Yes    PHQ-9 Total Score: 2 (3/24/2023  1:46 PM)       PHQ-9 Given to (if applicable):               PHQ-9 Score (if applicable):                     [] Positive     []  Negative              Does question #9 need addressed (if applicable)                     [] Yes    []  No               Aarti Luther CMA
was still having symptoms of exogenous obesity, chronic swelling of her legs, as well as difficulty ambulating, chronic weakness and tiredness, but denied any bleeding, and was in general eating well and was not monitoring her salt intake. She did undergo ultrasound of the abdomen on September 1, 2017, primarily because of previous ultrasound revealing mild splenomegaly. Study done this time revealed hepatomegaly with fatty change. Spleen was enlarged measuring around 15.6 cm. Previously it measured 14.4 cm. Visit here on December 7, 2017, we talked about her underlying evaluation per Dr. Dania Zambrano for mild chronic liver disease. Otherwise she was encouraged to continue to follow with the rest of the physicians and to see me for follow-up visit in a few weeks. Saw me on December 7, 2017, stated that she for some reason or another had not been able to see Dr. Dania Zambrano. Stated that she had a fall in November 2017 and thus could not keep her appointments. Still being followed by Dr. Zena Tom and Dr. Bev Rodrigues. She overall was in good spirits, was taking Letrozole on a regular basis, and denied any significant new issues. She saw me on April 26, 2018, still taking the Letrozole on a regular basis. She is still being followed by Dr. Zena Tom too. She has in general been doing quite well, had not noticed any major new problems to speak of. She stated that she did undergo colonoscopy exam as well as EGD per Dr. Dania Zambrano. She tolerated the procedure well. Stated she had mammogram on January 25, 2018, that was negative. She had undergone CT scan of the abdomen and pelvis on April 15, 2018, when seen in the emergency room for hematuria, which was felt to be secondary to urinary tract infection. CT scan was read to be fairly unremarkable actually the spleen was not reported to be enlarged. She was noted to have diffuse fatty infiltration of the liver.   Visit per Dr. Dania Zambrano on February 5,

## 2023-03-30 ENCOUNTER — CARE COORDINATION (OUTPATIENT)
Dept: CARE COORDINATION | Age: 73
End: 2023-03-30

## 2023-03-30 ENCOUNTER — CARE COORDINATION (OUTPATIENT)
Dept: CASE MANAGEMENT | Age: 73
End: 2023-03-30

## 2023-03-30 NOTE — CARE COORDINATION
Attempted to reach patient for continued Care Coordination follow up and education. Patient was unavailable at the time of my call, and phone line continued to ring.

## 2023-03-30 NOTE — CARE COORDINATION
Remote Alert Monitoring Note      Date/Time:  3/30/2023 2:34 PM  Patient Current Location: [facility]    LPN attempted to contacted patient by telephone regarding yellow alert received for blood pressure reading (180/70). Attempted to reach patient for RPM Yellow Alert Call. Unable to reach patient. No answer - unable to leave message. ER contact has same number. Will continue to follow. Gokul Steele LPN    [phone]  [facility] [place] [address] Coordinator      Background: HTN  Refer to 911 immediately if:  Patient unresponsive or unable to provide history  Change in cognition or sudden confusion  Patient unable to respond in complete sentences  Intense chest pain/tightness  Any concern for any clinical emergency  Red Alert: Provider response time of 1 hr required for any red alert requiring intervention  Yellow Alert: Provider response time of 3hr required for any escalated yellow alert    Plan/Follow Up: Will continue to review, monitor and address alerts with follow up based on severity of symptoms and risk factors.     Current Patient Metrics ---- Blood Pressure: 180/70, 61bpm Pulseox: 96%, 71bpm Survey: C Weight: 246.5lbs Note Created at: 03/30/2023 02:41 PM ET ---- Time-Spent: 2 minutes 0 seconds

## 2023-04-05 ENCOUNTER — CARE COORDINATION (OUTPATIENT)
Dept: CASE MANAGEMENT | Age: 73
End: 2023-04-05

## 2023-04-05 NOTE — CARE COORDINATION
Remote Alert Monitoring Note      Date/Time:  2023 1:13 PM  [facility] regarding red alert received for pulse ox reading (91%). Verified patients name and  as identifiers. Background: HTN  Refer to 911 immediately if:  Patient unresponsive or unable to provide history  Change in cognition or sudden confusion  Patient unable to respond in complete sentences  Intense chest pain/tightness  Any concern for any clinical emergency  Red Alert: Provider response time of 1 hr required for any red alert requiring intervention  Yellow Alert: Provider response time of 3hr required for any escalated yellow alert    O2 Triage  Are you having any Chest Pain? no   Are you having any Shortness of Breath? no   Swelling in your hands or feet? Yes     Are you having any other health concerns or issues? no       Clinical Interventions: Reviewed and followed up on alerts and treatments-Patient has low SpO2 level of 91%. Denies CP, SOB. Has swelling in legs and feet. Recheck SpO2 at 96%. Patient has been eating more NA high foods last few days. Education of patient/family/caregiver/guardian to support self-management-Instructed patient to cut down on High NA foods. Recheck SpO2   Have warm hands, hold hands and fingers very still while testing SpO2 level. Take a few deep breaths before testing. Advised Patient to contact PCP  regarding any health concerns for early outpatient intervention in an effort to avoid hospitalization. Report any worsening symptoms to PCP and/or Call 911 and/or GO TO  EMERGENCY ROOM if symptoms are severe or worsening. Expresses understanding. Plan/Follow Up: Will continue to review, monitor and address alerts with follow up based on severity of symptoms and risk factors.     Current Patient Metrics ---- Blood Pressure: 169/86, 59bpm Pulseox: 96%, 63bpm Survey: - Weight: -lbs Note Created at: 2023 01:38 PM ET ---- Time-Spent:

## 2023-04-07 ENCOUNTER — CARE COORDINATION (OUTPATIENT)
Dept: CARE COORDINATION | Age: 73
End: 2023-04-07

## 2023-04-24 ENCOUNTER — CARE COORDINATION (OUTPATIENT)
Dept: CARE COORDINATION | Age: 73
End: 2023-04-24

## 2023-04-24 ENCOUNTER — PROCEDURE VISIT (OUTPATIENT)
Dept: CARDIOLOGY CLINIC | Age: 73
End: 2023-04-24

## 2023-04-24 VITALS
HEART RATE: 61 BPM | WEIGHT: 245 LBS | SYSTOLIC BLOOD PRESSURE: 170 MMHG | OXYGEN SATURATION: 93 % | DIASTOLIC BLOOD PRESSURE: 89 MMHG | BODY MASS INDEX: 44.81 KG/M2

## 2023-04-24 DIAGNOSIS — I44.1 HEART BLOCK AV SECOND DEGREE: ICD-10-CM

## 2023-04-24 DIAGNOSIS — Z95.0 CARDIAC PACEMAKER IN SITU: Primary | ICD-10-CM

## 2023-04-24 NOTE — CARE COORDINATION
Ambulatory Care Coordination Note  2023    Patient Current Location:  Home: [address]     ACM contacted the patient by telephone. Verified name and  with patient as identifiers. Provided introduction to self, and explanation of the ACM role. Challenges to be reviewed by the provider   Additional needs identified to be addressed with provider: Yes  Pt reports having chest congestion with productive cough with greenish sputum x1 week. Method of communication with provider: chart routing. ACM: Kadie Griffiths, RN    ACM call to pt for CC outreach. Spoke to [address] [place] [address]. States she is doing ok. Reports having a cough and chest congestion with greenish sputum x1 week. Not taking any OTC meds. ACM inquired about making an appt for pt. She is has limited transportation d/t SO car not running. Will route message to PCP re: symptoms and possible tele health visit. Pt active with RPM.   Weight: 245 lb (111.1 kg), BP: (!) 170/89    Pt agreeable to end RPM. Has a wrist BP cuff and scale to use at home. ACM discussed graduating from [address] [place] [address] after RPM equipment sent back. Pt agreeable. Pt has ACM contact information for any future needs. She is compliant with appts and medications. Pt denies any additional needs at this time. Plan:  Monitor for return of RPM equipment then graduate. Offered patient enrollment in the Remote Patient Monitoring (RPM) program for in-home monitoring: Yes, patient already enrolled. General Assessment    Do you have any symptoms that are causing concern?: Yes  Progression since Onset: Gradually Worsening  Reported Symptoms: Cough, Congestion (Comment: x1 week)           Lab Results       None            Care Coordination Interventions    Referral from Primary Care Provider: Yes  Suggested Interventions and Community Resources  Fall Risk Prevention:  In Process  Marketplace Navigator: Completed (Comment: Tracy WILLARD/

## 2023-04-25 ENCOUNTER — CARE COORDINATION (OUTPATIENT)
Dept: CARE COORDINATION | Age: 73
End: 2023-04-25

## 2023-04-25 DIAGNOSIS — I10 PRIMARY HYPERTENSION: Primary | ICD-10-CM

## 2023-04-25 NOTE — CARE COORDINATION
EMTP placed call to patient to arrange RPM kit  through [facility]. Reviewed with patient how to pack equipment in original packing. Verified patient's availability to schedule UPS  time. UPS  time requested.  Anticipated  date range : Anytime besides Thursday

## 2023-04-25 NOTE — PROGRESS NOTES
4/25/23 10:23 AM     Remote Patient Order Discontinued    Received request from Sejal Griffiths RN  to discontinue order for remote patient monitoring of HTN and order completed.      Ashleigh Chavarria DNP, FNP-C, Remote Patient Monitoring NP, (Ph) [phone]

## 2023-04-25 NOTE — CARE COORDINATION
Remote Patient Monitoring Graduation      Date/Time:  4/25/2023 9:49 AM  Patient Current Location: Home: [address]  Patient has graduated from the Remote Patient Monitoring program on 4/25/2023. RPM goals have been met at this time. Patient has been provided instruction on process to return RPM equipment and RPM has been deactivated. Patient has ACM's contact information for any further questions, concerns, or needs. grad

## 2023-04-27 ENCOUNTER — HOSPITAL ENCOUNTER (OUTPATIENT)
Dept: ULTRASOUND IMAGING | Age: 73
Discharge: HOME | End: 2023-04-27
Payer: MEDICARE

## 2023-04-27 ENCOUNTER — HOSPITAL ENCOUNTER (OUTPATIENT)
Dept: WOMENS IMAGING | Age: 73
Discharge: HOME | End: 2023-04-27
Payer: MEDICARE

## 2023-04-27 DIAGNOSIS — N64.4 BREAST PAIN, LEFT: ICD-10-CM

## 2023-04-27 PROCEDURE — G0279 TOMOSYNTHESIS, MAMMO: HCPCS

## 2023-04-27 PROCEDURE — 76642 ULTRASOUND BREAST LIMITED: CPT

## 2023-05-11 ENCOUNTER — TELEPHONE (OUTPATIENT)
Dept: INTERNAL MEDICINE CLINIC | Age: 73
End: 2023-05-11

## 2023-05-11 NOTE — TELEPHONE ENCOUNTER
Marie from Dr. Pablo Rise office called and states we referred the patient there in November of 2022. Patient has had 3 scheduled appointments and has no showed for all 3 of them. States she has attempted to reach the patient several times with no luck. Requests that if we see the patient in office that we call to schedule her back with them and inform her that she has to show up for that appointment and if she no shows for it she will be dismissed from their practice.

## 2023-05-12 ENCOUNTER — CARE COORDINATION (OUTPATIENT)
Dept: CARE COORDINATION | Age: 73
End: 2023-05-12

## 2023-05-17 ENCOUNTER — CARE COORDINATION (OUTPATIENT)
Dept: CARE COORDINATION | Age: 73
End: 2023-05-17

## 2023-05-18 ASSESSMENT — ENCOUNTER SYMPTOMS
EYE REDNESS: 0
COLOR CHANGE: 0
EYE DISCHARGE: 0
NAUSEA: 0
CHEST TIGHTNESS: 0
SORE THROAT: 0
SHORTNESS OF BREATH: 1
ABDOMINAL PAIN: 0
VOMITING: 0
ABDOMINAL DISTENTION: 0

## 2023-05-19 ENCOUNTER — OFFICE VISIT (OUTPATIENT)
Dept: SURGERY | Age: 73
End: 2023-05-19
Payer: MEDICARE

## 2023-05-19 VITALS
WEIGHT: 237 LBS | HEART RATE: 76 BPM | DIASTOLIC BLOOD PRESSURE: 78 MMHG | BODY MASS INDEX: 43.61 KG/M2 | OXYGEN SATURATION: 98 % | SYSTOLIC BLOOD PRESSURE: 136 MMHG | HEIGHT: 62 IN

## 2023-05-19 DIAGNOSIS — N64.4 BREAST PAIN, LEFT: Primary | ICD-10-CM

## 2023-05-19 DIAGNOSIS — N63.20 MASS OF LEFT BREAST, UNSPECIFIED QUADRANT: ICD-10-CM

## 2023-05-19 PROCEDURE — 3074F SYST BP LT 130 MM HG: CPT | Performed by: SURGERY

## 2023-05-19 PROCEDURE — 1124F ACP DISCUSS-NO DSCNMKR DOCD: CPT | Performed by: SURGERY

## 2023-05-19 PROCEDURE — 3078F DIAST BP <80 MM HG: CPT | Performed by: SURGERY

## 2023-05-19 PROCEDURE — 99213 OFFICE O/P EST LOW 20 MIN: CPT | Performed by: SURGERY

## 2023-05-19 RX ORDER — FERROUS SULFATE 325(65) MG
325 TABLET ORAL
COMMUNITY
End: 2023-05-24

## 2023-05-19 ASSESSMENT — PATIENT HEALTH QUESTIONNAIRE - PHQ9
1. LITTLE INTEREST OR PLEASURE IN DOING THINGS: 0
SUM OF ALL RESPONSES TO PHQ QUESTIONS 1-9: 0
2. FEELING DOWN, DEPRESSED OR HOPELESS: 0
SUM OF ALL RESPONSES TO PHQ9 QUESTIONS 1 & 2: 0
SUM OF ALL RESPONSES TO PHQ QUESTIONS 1-9: 0

## 2023-05-22 ENCOUNTER — OFFICE VISIT (OUTPATIENT)
Dept: CARDIOLOGY CLINIC | Age: 73
End: 2023-05-22
Payer: MEDICARE

## 2023-05-22 VITALS
DIASTOLIC BLOOD PRESSURE: 78 MMHG | WEIGHT: 237 LBS | HEIGHT: 62 IN | HEART RATE: 68 BPM | SYSTOLIC BLOOD PRESSURE: 130 MMHG | BODY MASS INDEX: 43.61 KG/M2

## 2023-05-22 DIAGNOSIS — I65.23 BILATERAL CAROTID ARTERY STENOSIS: ICD-10-CM

## 2023-05-22 DIAGNOSIS — I38 VALVULAR HEART DISEASE: Primary | ICD-10-CM

## 2023-05-22 DIAGNOSIS — I10 PRIMARY HYPERTENSION: ICD-10-CM

## 2023-05-22 PROCEDURE — 1124F ACP DISCUSS-NO DSCNMKR DOCD: CPT | Performed by: NURSE PRACTITIONER

## 2023-05-22 PROCEDURE — 3078F DIAST BP <80 MM HG: CPT | Performed by: NURSE PRACTITIONER

## 2023-05-22 PROCEDURE — 99214 OFFICE O/P EST MOD 30 MIN: CPT | Performed by: NURSE PRACTITIONER

## 2023-05-22 PROCEDURE — 3074F SYST BP LT 130 MM HG: CPT | Performed by: NURSE PRACTITIONER

## 2023-05-22 ASSESSMENT — ENCOUNTER SYMPTOMS: ORTHOPNEA: 0

## 2023-05-22 NOTE — PROGRESS NOTES
Cm2 and Mean gradient across the valve 17 mmHG Peak Gradient was 15 mmHG      Pacemaker  Analysis reviewed from 05/05/2023:   Pacer analysis is reviewed is consistent with normal dual-chamber MRI safe Medtronic Advisa pacer function with stable leads and appropriate battery status for the age of the device. Remaining average battery life is 10 months. Device is programmed to DDD mode lower rate of 60 bpm and 100% pacing in the ventricle. Recommend she resume lopressor      Recommend continued every three month check and follow up office visit as scheduled. Stable remote monitoring noted. Continue with Q3 month monitoring    Obesity  Bmi >44.35  Working on weight loss    Tests ordered:  none   Follow-up  6 mo     Signed:  AUSTIN Stevens CNP, 5/22/2023, 4:01 PM    An electronic signature was used to authenticate this note. Please note this report has been partially produced using speech recognition software and may contain errors related to that system including errors in grammar, punctuation, and spelling, as well as words and phrases that may be inappropriate. If there are any questions or concerns please feel free to contact the dictating provider for clarification.

## 2023-05-23 ASSESSMENT — ENCOUNTER SYMPTOMS: SHORTNESS OF BREATH: 1

## 2023-05-24 ENCOUNTER — OFFICE VISIT (OUTPATIENT)
Dept: INTERNAL MEDICINE CLINIC | Age: 73
End: 2023-05-24
Payer: MEDICARE

## 2023-05-24 VITALS
BODY MASS INDEX: 43.35 KG/M2 | DIASTOLIC BLOOD PRESSURE: 80 MMHG | HEART RATE: 63 BPM | OXYGEN SATURATION: 94 % | SYSTOLIC BLOOD PRESSURE: 132 MMHG | WEIGHT: 237 LBS

## 2023-05-24 DIAGNOSIS — D64.9 ANEMIA, UNSPECIFIED TYPE: ICD-10-CM

## 2023-05-24 DIAGNOSIS — J45.20 MILD INTERMITTENT ASTHMA WITHOUT COMPLICATION: ICD-10-CM

## 2023-05-24 DIAGNOSIS — F32.A DEPRESSION, UNSPECIFIED DEPRESSION TYPE: ICD-10-CM

## 2023-05-24 DIAGNOSIS — E78.5 HYPERLIPIDEMIA, UNSPECIFIED HYPERLIPIDEMIA TYPE: Primary | ICD-10-CM

## 2023-05-24 DIAGNOSIS — Z78.0 ENCOUNTER FOR OSTEOPOROSIS SCREENING IN ASYMPTOMATIC POSTMENOPAUSAL PATIENT: ICD-10-CM

## 2023-05-24 DIAGNOSIS — Z13.820 ENCOUNTER FOR OSTEOPOROSIS SCREENING IN ASYMPTOMATIC POSTMENOPAUSAL PATIENT: ICD-10-CM

## 2023-05-24 PROCEDURE — 3074F SYST BP LT 130 MM HG: CPT | Performed by: FAMILY MEDICINE

## 2023-05-24 PROCEDURE — 99214 OFFICE O/P EST MOD 30 MIN: CPT | Performed by: FAMILY MEDICINE

## 2023-05-24 PROCEDURE — 3078F DIAST BP <80 MM HG: CPT | Performed by: FAMILY MEDICINE

## 2023-05-24 PROCEDURE — 1124F ACP DISCUSS-NO DSCNMKR DOCD: CPT | Performed by: FAMILY MEDICINE

## 2023-05-24 ASSESSMENT — ENCOUNTER SYMPTOMS
SHORTNESS OF BREATH: 0
NAUSEA: 0
ABDOMINAL PAIN: 0
COUGH: 0

## 2023-05-24 NOTE — PROGRESS NOTES
medications  HLD-- she will restart the Zoloft  Depression- she plans to restart the Zoloft. Patient  having counseling over the phone with Florencio LUCIANO anemia, chronic  Due for follow up colonoscopy with GI. Personal history of colon cancer -2013  Asthma- stable on albuterol and Flovent  Patient has seen cardiology, and she will need her pacemaker replaced  She has seen Dr. Ceci Gutierrez and will get a breast MRI. Personal history of breast cancer    Review of Systems   Constitutional:  Negative for diaphoresis and fever. Respiratory:  Negative for cough and shortness of breath. Cardiovascular:  Negative for chest pain and palpitations. Gastrointestinal:  Negative for abdominal pain and nausea. Genitourinary:  Negative for dysuria. Neurological:  Negative for dizziness and headaches. Allergies   Allergen Reactions    Bactrim [Sulfamethoxazole-Trimethoprim] Anaphylaxis and Hives    Lipitor [Atorvastatin] Shortness Of Breath    Taxol [Paclitaxel] Anaphylaxis and Swelling    Aminoglycosides      Abstracted from [facility] patient chart.     Demerol Nausea Only    Neosporin [Bacitracin-Neomycin-Polymyxin] Rash and Other (See Comments)     hotness    Other Rash     Ivory Soap    Talc Other (See Comments)     blisters     Current Outpatient Medications   Medication Sig Dispense Refill    Calcium Citrate-Vitamin D (CALCIUM + D PO) Take by mouth      simvastatin (ZOCOR) 20 MG tablet Take 1 tablet by mouth nightly 90 tablet 1    sertraline (ZOLOFT) 50 MG tablet Take 1 tablet by mouth daily 90 tablet 1    fluticasone (FLOVENT HFA) 110 MCG/ACT inhaler Inhale 2 puffs into the lungs 2 times daily 1 each 2    acetaminophen (TYLENOL) 500 MG tablet Take 1 tablet by mouth every 6 hours as needed for Pain 120 tablet 3    aspirin 81 MG chewable tablet Take 1 tablet by mouth daily 30 tablet 0    albuterol sulfate HFA (PROAIR HFA) 108 (90 Base) MCG/ACT inhaler INHALE 2 PUFFS BY MOUTH EVERY SIX HOURS AS NEEDED FOR

## 2023-05-26 ENCOUNTER — TELEPHONE (OUTPATIENT)
Dept: SURGERY | Age: 73
End: 2023-05-26

## 2023-05-30 ENCOUNTER — CARE COORDINATION (OUTPATIENT)
Dept: CARE COORDINATION | Age: 73
End: 2023-05-30

## 2023-05-30 NOTE — CARE COORDINATION
Attempted to reach patient for continued Care Coordination follow up and education. Patient was unavailable at the time of my call, and I am unable to leave a voicemail.

## 2023-06-05 ENCOUNTER — TELEPHONE (OUTPATIENT)
Dept: SURGERY | Age: 73
End: 2023-06-05

## 2023-06-05 NOTE — TELEPHONE ENCOUNTER
Kip to have PT sched a follow up. As pt was unable to get MRI completed, Dr. Briana Heredia would like to see her.

## 2023-06-06 ENCOUNTER — CARE COORDINATION (OUTPATIENT)
Dept: CARE COORDINATION | Age: 73
End: 2023-06-06

## 2023-06-06 NOTE — CARE COORDINATION
ACM call to pt for Care Coordination follow up. No answer. Left HIPAA compliant message on voicemail requesting return call to ACM. Fourth attempt. CC episode will be closed.

## 2023-06-20 ENCOUNTER — OFFICE VISIT (OUTPATIENT)
Dept: SURGERY | Age: 73
End: 2023-06-20
Payer: MEDICARE

## 2023-06-20 VITALS
OXYGEN SATURATION: 96 % | BODY MASS INDEX: 44.53 KG/M2 | SYSTOLIC BLOOD PRESSURE: 142 MMHG | HEART RATE: 75 BPM | HEIGHT: 62 IN | DIASTOLIC BLOOD PRESSURE: 76 MMHG | WEIGHT: 242 LBS

## 2023-06-20 DIAGNOSIS — M85.89 OSTEOPENIA OF MULTIPLE SITES: ICD-10-CM

## 2023-06-20 DIAGNOSIS — B37.2 INTERTRIGINOUS CANDIDIASIS: ICD-10-CM

## 2023-06-20 DIAGNOSIS — N64.4 BREAST PAIN, LEFT: Primary | ICD-10-CM

## 2023-06-20 DIAGNOSIS — Z85.3 HISTORY OF RIGHT BREAST CANCER: ICD-10-CM

## 2023-06-20 PROCEDURE — 3074F SYST BP LT 130 MM HG: CPT | Performed by: SURGERY

## 2023-06-20 PROCEDURE — 3078F DIAST BP <80 MM HG: CPT | Performed by: SURGERY

## 2023-06-20 PROCEDURE — 1124F ACP DISCUSS-NO DSCNMKR DOCD: CPT | Performed by: SURGERY

## 2023-06-20 PROCEDURE — 99213 OFFICE O/P EST LOW 20 MIN: CPT | Performed by: SURGERY

## 2023-06-20 ASSESSMENT — ENCOUNTER SYMPTOMS
SORE THROAT: 0
EYE REDNESS: 0
COLOR CHANGE: 0
CHEST TIGHTNESS: 0
ABDOMINAL PAIN: 0
ABDOMINAL DISTENTION: 0
VOMITING: 0
SHORTNESS OF BREATH: 1
EYE DISCHARGE: 0
NAUSEA: 0

## 2023-06-20 NOTE — PROGRESS NOTES
GENERAL SURGERY NOTE - Outpatient Progress  [facility]) Physicians    PATIENT: Robson Reilly 1950, 75 y.o., female   Date: 6/20/2023  MRN: [id number]   Requesting Provider:  No ref. provider found History Obtained From:  patient, electronic medical record     Reason for Evaluation & Chief Complaint:    Chief Complaint   Patient presents with    Follow-up     Discuss other treatment since patient is unable to have MRI. HISTORY OF PRESENT ILLNESS:    Jose Juan Prieto is a 75 y.o. female presenting in follow up for a new tender, Left breast lump(s)- and to continue care . Since last seen she has been about the same. The redness and skin irritation under her breasts is better with the non-talc powder and keeping the area dry. A breast MRI was ordered, but was unable to be done due to her other health conditions, inability to lay prone. Per Radiology, she can't have it done. From Previously:   She is concerned about a possible lump in the left breast, but cannot always locate it. 4/27/23 L breast mmg/US - BIRADS2. She still gets some redness under her breast folds which improves with non-talc powder (she has a talc allergy). She has a history of right breast cancer for which she underwent mastectomy in 2012, was last seen by Dr. Anant Olvera in 2017. She presented to her PCP Angelica Mcclain DO 8/2022 with concern for a new left breast lump for which diagnostic mmg/US were obtained on 9/8/22 - BIRADS2 - no lesion identified at the area of concern. She noticed a lump in the left breast, unsure of how long ago - she has been worried about there being a problem in the left breast for years. She has been worried about a lump near the medial nipple. She denies skin changes or nipple drainage. She has had redness in her breast fold for a long time as well which is painful - Gold [place] powder helps some.       Breast Concerns: yes  Nipple Drainage or Retraction: no  Skin Changes: no  Headaches: no  Vision

## 2023-07-12 ENCOUNTER — TELEPHONE (OUTPATIENT)
Dept: INTERNAL MEDICINE CLINIC | Age: 73
End: 2023-07-12

## 2023-07-20 ENCOUNTER — TELEPHONE (OUTPATIENT)
Dept: INTERNAL MEDICINE CLINIC | Age: 73
End: 2023-07-20

## 2023-07-20 ENCOUNTER — HOSPITAL ENCOUNTER (EMERGENCY)
Age: 73
Discharge: HOME HEALTH | End: 2023-07-20
Payer: MEDICARE

## 2023-07-20 ENCOUNTER — APPOINTMENT (OUTPATIENT)
Dept: GENERAL RADIOLOGY | Age: 73
End: 2023-07-20
Payer: MEDICARE

## 2023-07-20 VITALS
OXYGEN SATURATION: 99 % | DIASTOLIC BLOOD PRESSURE: 82 MMHG | TEMPERATURE: 98.5 F | SYSTOLIC BLOOD PRESSURE: 159 MMHG | HEART RATE: 60 BPM | RESPIRATION RATE: 19 BRPM

## 2023-07-20 DIAGNOSIS — I10 UNCONTROLLED HYPERTENSION: Primary | ICD-10-CM

## 2023-07-20 DIAGNOSIS — R06.02 SHORTNESS OF BREATH: ICD-10-CM

## 2023-07-20 LAB
ALBUMIN SERPL-MCNC: 4.1 GM/DL (ref 3.4–5)
ALP BLD-CCNC: 145 IU/L (ref 40–129)
ALT SERPL-CCNC: 6 U/L (ref 10–40)
ANION GAP SERPL CALCULATED.3IONS-SCNC: 10 MMOL/L (ref 4–16)
AST SERPL-CCNC: 9 IU/L (ref 15–37)
BASOPHILS ABSOLUTE: 0 K/CU MM
BASOPHILS RELATIVE PERCENT: 0.3 % (ref 0–1)
BILIRUB SERPL-MCNC: 0.2 MG/DL (ref 0–1)
BUN SERPL-MCNC: 12 MG/DL (ref 6–23)
CALCIUM SERPL-MCNC: 9.4 MG/DL (ref 8.3–10.6)
CHLORIDE BLD-SCNC: 98 MMOL/L (ref 99–110)
CO2: 31 MMOL/L (ref 21–32)
CREAT SERPL-MCNC: 0.7 MG/DL (ref 0.6–1.1)
DIFFERENTIAL TYPE: ABNORMAL
EOSINOPHILS ABSOLUTE: 0.4 K/CU MM
EOSINOPHILS RELATIVE PERCENT: 3 % (ref 0–3)
GFR SERPL CREATININE-BSD FRML MDRD: >60 ML/MIN/1.73M2
GLUCOSE SERPL-MCNC: 70 MG/DL (ref 70–99)
HCT VFR BLD CALC: 39 % (ref 37–47)
HEMOGLOBIN: 11.8 GM/DL (ref 12.5–16)
IMMATURE NEUTROPHIL %: 0.4 % (ref 0–0.43)
LYMPHOCYTES ABSOLUTE: 3.7 K/CU MM
LYMPHOCYTES RELATIVE PERCENT: 31.2 % (ref 24–44)
MCH RBC QN AUTO: 26 PG (ref 27–31)
MCHC RBC AUTO-ENTMCNC: 30.3 % (ref 32–36)
MCV RBC AUTO: 85.9 FL (ref 78–100)
MONOCYTES ABSOLUTE: 0.8 K/CU MM
MONOCYTES RELATIVE PERCENT: 6.8 % (ref 0–4)
NUCLEATED RBC %: 0 %
PDW BLD-RTO: 15.2 % (ref 11.7–14.9)
PLATELET # BLD: 236 K/CU MM (ref 140–440)
PMV BLD AUTO: 9.8 FL (ref 7.5–11.1)
POTASSIUM SERPL-SCNC: 4.2 MMOL/L (ref 3.5–5.1)
PRO-BNP: 603.6 PG/ML
RBC # BLD: 4.54 M/CU MM (ref 4.2–5.4)
SEGMENTED NEUTROPHILS ABSOLUTE COUNT: 6.9 K/CU MM
SEGMENTED NEUTROPHILS RELATIVE PERCENT: 58.3 % (ref 36–66)
SODIUM BLD-SCNC: 139 MMOL/L (ref 135–145)
TOTAL IMMATURE NEUTOROPHIL: 0.05 K/CU MM
TOTAL NUCLEATED RBC: 0 K/CU MM
TOTAL PROTEIN: 6.6 GM/DL (ref 6.4–8.2)
TROPONIN T: <0.01 NG/ML
WBC # BLD: 11.9 K/CU MM (ref 4–10.5)

## 2023-07-20 PROCEDURE — 83880 ASSAY OF NATRIURETIC PEPTIDE: CPT

## 2023-07-20 PROCEDURE — 80053 COMPREHEN METABOLIC PANEL: CPT

## 2023-07-20 PROCEDURE — 71045 X-RAY EXAM CHEST 1 VIEW: CPT

## 2023-07-20 PROCEDURE — 93005 ELECTROCARDIOGRAM TRACING: CPT | Performed by: EMERGENCY MEDICINE

## 2023-07-20 PROCEDURE — 6370000000 HC RX 637 (ALT 250 FOR IP): Performed by: PHYSICIAN ASSISTANT

## 2023-07-20 PROCEDURE — 85025 COMPLETE CBC W/AUTO DIFF WBC: CPT

## 2023-07-20 PROCEDURE — 84484 ASSAY OF TROPONIN QUANT: CPT

## 2023-07-20 PROCEDURE — 99285 EMERGENCY DEPT VISIT HI MDM: CPT

## 2023-07-20 RX ORDER — AMLODIPINE BESYLATE 5 MG/1
5 TABLET ORAL DAILY
Qty: 30 TABLET | Refills: 0 | Status: SHIPPED | OUTPATIENT
Start: 2023-07-20

## 2023-07-20 RX ORDER — AMLODIPINE BESYLATE 5 MG/1
5 TABLET ORAL ONCE
Status: COMPLETED | OUTPATIENT
Start: 2023-07-20 | End: 2023-07-20

## 2023-07-20 RX ADMIN — AMLODIPINE BESYLATE 5 MG: 5 TABLET ORAL at 17:25

## 2023-07-20 NOTE — ED PROVIDER NOTES
12 lead EKG per my interpretation:  Paced (AS-) at 64  Axis is   Left axis deviation  QTc is   prolonged at 499  There is no specific T wave changes appreciated. There is no specific ST wave changes appreciated. PACED    Prior EKG to compare with was available and there is no clinically significant change in over morphology when compared to prior with paced rhythm seen on prior from 2/21/2023.     MD Rosetta Flores MD  07/20/23 9214
review. In brief, 28-year-old female presenting emerged department today verbalizing concern about elevated blood pressure readings over the last several days. States that her blood pressures been 769 systolic over 182 diastolic according to her home monitor. She states that she feels mildly short of breath but has no significant dyspnea on exertion, orthopnea, leg swelling. States the shortness of breath is more ongoing in nature. She is not requiring oxygen. Describing no chest pain. No headache, no flank pains. Verbalizing concern about her pacemaker as well, states that it is \"end-of-life\" and would like to have it checked. Blood pressure today is 399-905 systolic and 48-30N diastolic while in the emergency department. Patient is 99% on room air, she is in no obvious distress. She is complaining of no chest pain. Did obtain a screening EKG, interrogated patient's pacemaker which is not at the end of her life, has over 6 months of battery life is functioning appropriately. Patient demonstrated no significant arrhythmia, EKG demonstrating no ischemic changes. Troponin and BNP within normal limits. Chest x-ray showing mild CHF but no overt pulmonary vascular congestion. Did consult cardiology, they recommend initiating her on Norvasc 5 mg to be taken daily. At this point though we feel that she can be discharged and follow-up as an outpatient. She was advised on strict return precautions. Close monitoring of her symptoms. We will advise her to keep a log when she initiates her anti to hypertensive regimen.     History from : Patient    Limitations to history : None    Patient was given the following medications:  Medications   amLODIPine (NORVASC) tablet 5 mg (5 mg Oral Given 7/20/23 0323)       Independent Imaging Interpretation by me: Chest x-ray interpreted by myself did show mild cardiomegaly but no overt signs of significant pulmonary vascular congestion or overt heart failure    EKG (if

## 2023-07-21 ENCOUNTER — TELEPHONE (OUTPATIENT)
Dept: INTERNAL MEDICINE CLINIC | Age: 73
End: 2023-07-21

## 2023-07-21 LAB
EKG ATRIAL RATE: 64 BPM
EKG DIAGNOSIS: NORMAL
EKG P AXIS: 66 DEGREES
EKG P-R INTERVAL: 168 MS
EKG Q-T INTERVAL: 484 MS
EKG QRS DURATION: 180 MS
EKG QTC CALCULATION (BAZETT): 499 MS
EKG R AXIS: -63 DEGREES
EKG T AXIS: 102 DEGREES
EKG VENTRICULAR RATE: 64 BPM

## 2023-07-21 PROCEDURE — 93010 ELECTROCARDIOGRAM REPORT: CPT | Performed by: INTERNAL MEDICINE

## 2023-07-21 NOTE — TELEPHONE ENCOUNTER
Spoke to patient about her BP. She is on Norvasc 5 mg now.  She will take the medication and follow up  7/26

## 2023-07-25 ENCOUNTER — OFFICE VISIT (OUTPATIENT)
Dept: CARDIOLOGY CLINIC | Age: 73
End: 2023-07-25
Payer: MEDICARE

## 2023-07-25 VITALS
SYSTOLIC BLOOD PRESSURE: 150 MMHG | BODY MASS INDEX: 44.16 KG/M2 | HEART RATE: 62 BPM | WEIGHT: 240 LBS | HEIGHT: 62 IN | DIASTOLIC BLOOD PRESSURE: 58 MMHG | OXYGEN SATURATION: 94 %

## 2023-07-25 DIAGNOSIS — I10 PRIMARY HYPERTENSION: Primary | ICD-10-CM

## 2023-07-25 PROCEDURE — 99214 OFFICE O/P EST MOD 30 MIN: CPT | Performed by: NURSE PRACTITIONER

## 2023-07-25 PROCEDURE — 1124F ACP DISCUSS-NO DSCNMKR DOCD: CPT | Performed by: NURSE PRACTITIONER

## 2023-07-25 PROCEDURE — 3078F DIAST BP <80 MM HG: CPT | Performed by: NURSE PRACTITIONER

## 2023-07-25 PROCEDURE — 3077F SYST BP >= 140 MM HG: CPT | Performed by: NURSE PRACTITIONER

## 2023-07-25 RX ORDER — FUROSEMIDE 20 MG/1
20 TABLET ORAL DAILY
COMMUNITY

## 2023-07-25 ASSESSMENT — ENCOUNTER SYMPTOMS
SHORTNESS OF BREATH: 0
ORTHOPNEA: 0

## 2023-07-25 NOTE — PATIENT INSTRUCTIONS
**It is YOUR responsibilty to bring medication bottles and/or updated medication list to [facility]. This will allow us to better serve you and all your healthcare needs**  [facility] Locations - No appointment necessary. Sites open Monday to Friday. Call your preferred location for test preparation, business   hours and other information you need. [employer] accepts 's. [address] [place] [address]. [address] [place], [address]  Phone: [phone]     Please be informed that if you contact our office outside of normal business hours the physician on call cannot help with any scheduling or rescheduling issues, procedure instruction questions or any type of medication issue. We advise you for any urgent/emergency that you go to the nearest emergency room! PLEASE CALL OUR OFFICE DURING NORMAL BUSINESS HOURS    Monday - Friday   8 am to 5 pm    [place]: [address] [place]: [phone]    [place]:  [phone]  Thank you for allowing us to care for you today! We want to ensure we can follow your treatment plan and we strive to give you the best outcomes and experience possible. If you ever have a life threatening emergency and call 911 - for an ambulance (EMS)   Our providers can only care for you at:   [facility] or [facility]. Even if you have someone take you or you drive yourself we can only care for you in a [facility] facility. Our providers are not setup at the other healthcare locations! We are committed to providing you the best care possible. If you receive a survey after visiting one of our offices, please take time to share your experience concerning your physician office visit. These surveys are confidential and no health information about you is shared. We are eager to improve for you and we are counting on your feedback to help make that happen.

## 2023-07-25 NOTE — PROGRESS NOTES
Effort: Pulmonary effort is normal.      Breath sounds: Normal breath sounds. No rales. Chest:      Chest wall: No tenderness. Comments: Left sided pacer pocket intact- pacemaker is moveable   Abdominal:      General: There is no distension. Palpations: Abdomen is soft. Tenderness: There is no abdominal tenderness. Musculoskeletal:      Cervical back: No tenderness. Right lower leg: No edema. Left lower leg: No edema. Skin:     General: Skin is warm and dry. Capillary Refill: Capillary refill takes less than 2 seconds. Neurological:      General: No focal deficit present. Mental Status: She is alert and oriented to person, place, and time.    Psychiatric:         Mood and Affect: Mood normal.         Behavior: Behavior normal.              Current Outpatient Medications   Medication Sig Dispense Refill    amLODIPine (NORVASC) 5 MG tablet Take 1 tablet by mouth daily 30 tablet 0    fluticasone (FLOVENT HFA) 110 MCG/ACT inhaler Inhale 2 puffs into the lungs 2 times daily 1 each 2    acetaminophen (TYLENOL) 500 MG tablet Take 1 tablet by mouth every 6 hours as needed for Pain 120 tablet 3    albuterol sulfate HFA (PROAIR HFA) 108 (90 Base) MCG/ACT inhaler INHALE 2 PUFFS BY MOUTH EVERY SIX HOURS AS NEEDED FOR WHEEZING 1 Inhaler 5    Cyanocobalamin (VITAMIN B12 PO) Take by mouth (Patient not taking: Reported on 7/25/2023)      Ferrous Sulfate (IRON PO) Take by mouth (Patient not taking: Reported on 7/25/2023)      Calcium Citrate-Vitamin D (CALCIUM + D PO) Take by mouth (Patient not taking: Reported on 7/25/2023)      simvastatin (ZOCOR) 20 MG tablet Take 1 tablet by mouth nightly (Patient not taking: Reported on 7/25/2023) 90 tablet 1    sertraline (ZOLOFT) 50 MG tablet Take 1 tablet by mouth daily (Patient not taking: Reported on 7/25/2023) 90 tablet 1    aspirin 81 MG chewable tablet Take 1 tablet by mouth daily (Patient not taking: Reported on 7/25/2023) 30 tablet 0     No

## 2023-07-26 ENCOUNTER — OFFICE VISIT (OUTPATIENT)
Dept: INTERNAL MEDICINE CLINIC | Age: 73
End: 2023-07-26
Payer: MEDICARE

## 2023-07-26 VITALS
BODY MASS INDEX: 43.9 KG/M2 | HEIGHT: 62 IN | OXYGEN SATURATION: 94 % | HEART RATE: 60 BPM | DIASTOLIC BLOOD PRESSURE: 88 MMHG | SYSTOLIC BLOOD PRESSURE: 151 MMHG

## 2023-07-26 DIAGNOSIS — F32.A DEPRESSION, UNSPECIFIED DEPRESSION TYPE: ICD-10-CM

## 2023-07-26 DIAGNOSIS — J45.20 MILD INTERMITTENT ASTHMA WITHOUT COMPLICATION: ICD-10-CM

## 2023-07-26 DIAGNOSIS — I10 PRIMARY HYPERTENSION: Primary | ICD-10-CM

## 2023-07-26 PROCEDURE — 3074F SYST BP LT 130 MM HG: CPT | Performed by: FAMILY MEDICINE

## 2023-07-26 PROCEDURE — 99214 OFFICE O/P EST MOD 30 MIN: CPT | Performed by: FAMILY MEDICINE

## 2023-07-26 PROCEDURE — 1124F ACP DISCUSS-NO DSCNMKR DOCD: CPT | Performed by: FAMILY MEDICINE

## 2023-07-26 PROCEDURE — 3078F DIAST BP <80 MM HG: CPT | Performed by: FAMILY MEDICINE

## 2023-07-26 RX ORDER — VALSARTAN 80 MG/1
80 TABLET ORAL DAILY
Qty: 30 TABLET | Refills: 1 | Status: SHIPPED | OUTPATIENT
Start: 2023-07-26

## 2023-07-26 ASSESSMENT — ENCOUNTER SYMPTOMS
COUGH: 0
SHORTNESS OF BREATH: 0
NAUSEA: 0
ABDOMINAL PAIN: 0

## 2023-07-26 NOTE — PROGRESS NOTES
Shadi Fisher (:  1950) is a 75 y.o. female,established patient, here for evaluation of the following chief complaint(s):  Follow-up (ER follow up- [facility] high BP ) and Hypertension         ASSESSMENT/PLAN:  1. Primary hypertension, chronic progressing    Start valsartan  - valsartan (DIOVAN) 80 MG tablet; Take 1 tablet by mouth daily  Dispense: 30 tablet; Refill: 1  ADR's explained  Continue Norvasc 5 mg  Obtain BMP  Monitor BP    2. Mild intermittent asthma without complication  Continue albuterol and Flovent    3. Depression, unspecified depression type-stable  Off of Zoloft    Keep f/u with cardiology  Return in about 2 months (around 2023) for HTN, asthma.        Lab Results   Component Value Date    WBC 11.9 (H) 2023    HGB 11.8 (L) 2023    HCT 39.0 2023    MCV 85.9 2023     2023     Lab Results   Component Value Date    CHOL 200 (H) 2023     Lab Results   Component Value Date    TRIG 129 2023     Lab Results   Component Value Date    HDL 42 2023     Lab Results   Component Value Date    LDLCALC 132 (H) 2023    LDLDIRECT 102 (H) 2020     Lab Results   Component Value Date    LABA1C 5.2 2023     Lab Results   Component Value Date     2023     Lab Results   Component Value Date     2023    K 4.2 2023    CL 98 (L) 2023    CO2 31 2023    BUN 12 2023    CREATININE 0.7 2023    GLUCOSE 70 2023    CALCIUM 9.4 2023    PROT 6.6 2023    LABALBU 4.1 2023    BILITOT 0.2 2023    ALKPHOS 145 (H) 2023    AST 9 (L) 2023    ALT 6 (L) 2023    LABGLOM >60 2023    GFRAA >60 10/15/2022    AGRATIO 1.3 2021    GLOB 3.2 2021     Pro-BNP <300 PG/.6 High        Lab Results   Component Value Date/Time    COLORU YELLOW 2023 11:03 PM    LABPH 6.0 2023 11:03 PM    NITRU POSITIVE 2023 11:03 PM    KETUA NEGATIVE

## 2023-08-02 DIAGNOSIS — I10 PRIMARY HYPERTENSION: Primary | ICD-10-CM

## 2023-08-02 RX ORDER — FUROSEMIDE 20 MG/1
20 TABLET ORAL DAILY
Qty: 30 TABLET | Refills: 1 | Status: SHIPPED | OUTPATIENT
Start: 2023-08-02

## 2023-08-02 NOTE — TELEPHONE ENCOUNTER
Her BP is still running high and wanted to let  know and wants to know what to do and she also needed  medication.      136/95  154/94  140/107  138/88  142/101  Yesterday-162/93  Today-153/101

## 2023-08-03 RX ORDER — VALSARTAN 160 MG/1
160 TABLET ORAL DAILY
Qty: 30 TABLET | Refills: 2 | Status: SHIPPED | OUTPATIENT
Start: 2023-08-03

## 2023-08-03 NOTE — TELEPHONE ENCOUNTER
Spoke to pt. Will increase valsartan 160. Medication sent in. She will monitor the BP. (She has order for BMP)

## 2023-08-14 RX ORDER — AMLODIPINE BESYLATE 5 MG/1
5 TABLET ORAL DAILY
Qty: 30 TABLET | Refills: 1 | Status: SHIPPED | OUTPATIENT
Start: 2023-08-14

## 2023-08-21 ENCOUNTER — HOSPITAL ENCOUNTER (OUTPATIENT)
Age: 73
Discharge: HOME | End: 2023-08-21
Payer: MEDICARE

## 2023-08-21 DIAGNOSIS — I10 PRIMARY HYPERTENSION: ICD-10-CM

## 2023-08-21 LAB
ANION GAP SERPL CALCULATED.3IONS-SCNC: 13 MMOL/L (ref 4–16)
BUN SERPL-MCNC: 12 MG/DL (ref 6–23)
CALCIUM SERPL-MCNC: 9.7 MG/DL (ref 8.3–10.6)
CHLORIDE BLD-SCNC: 102 MMOL/L (ref 99–110)
CO2: 27 MMOL/L (ref 21–32)
CREAT SERPL-MCNC: 0.6 MG/DL (ref 0.6–1.1)
GFR SERPL CREATININE-BSD FRML MDRD: >60 ML/MIN/1.73M2
GLUCOSE SERPL-MCNC: 85 MG/DL (ref 70–99)
POTASSIUM SERPL-SCNC: 4.5 MMOL/L (ref 3.5–5.1)
SODIUM BLD-SCNC: 142 MMOL/L (ref 135–145)

## 2023-08-21 PROCEDURE — 36415 COLL VENOUS BLD VENIPUNCTURE: CPT

## 2023-08-21 PROCEDURE — 80048 BASIC METABOLIC PNL TOTAL CA: CPT

## 2023-08-23 ENCOUNTER — TELEPHONE (OUTPATIENT)
Dept: CARDIOLOGY CLINIC | Age: 73
End: 2023-08-23

## 2023-08-23 NOTE — TELEPHONE ENCOUNTER
----- Message from AUSTIN Rodrigues CNP sent at 8/22/2023  5:01 PM EDT -----  Please phone labs - normal

## 2023-08-24 ENCOUNTER — HOSPITAL ENCOUNTER (EMERGENCY)
Age: 73
Discharge: HOME | End: 2023-08-24
Attending: EMERGENCY MEDICINE
Payer: MEDICARE

## 2023-08-24 ENCOUNTER — APPOINTMENT (OUTPATIENT)
Dept: GENERAL RADIOLOGY | Age: 73
End: 2023-08-24
Payer: MEDICARE

## 2023-08-24 VITALS
SYSTOLIC BLOOD PRESSURE: 136 MMHG | OXYGEN SATURATION: 96 % | RESPIRATION RATE: 18 BRPM | TEMPERATURE: 98 F | DIASTOLIC BLOOD PRESSURE: 57 MMHG | HEART RATE: 60 BPM

## 2023-08-24 DIAGNOSIS — M79.645 FINGER PAIN, LEFT: Primary | ICD-10-CM

## 2023-08-24 PROCEDURE — 99284 EMERGENCY DEPT VISIT MOD MDM: CPT

## 2023-08-24 PROCEDURE — 6360000002 HC RX W HCPCS: Performed by: EMERGENCY MEDICINE

## 2023-08-24 PROCEDURE — 73130 X-RAY EXAM OF HAND: CPT

## 2023-08-24 PROCEDURE — 90715 TDAP VACCINE 7 YRS/> IM: CPT | Performed by: EMERGENCY MEDICINE

## 2023-08-24 PROCEDURE — 6370000000 HC RX 637 (ALT 250 FOR IP): Performed by: EMERGENCY MEDICINE

## 2023-08-24 PROCEDURE — 90471 IMMUNIZATION ADMIN: CPT | Performed by: EMERGENCY MEDICINE

## 2023-08-24 RX ORDER — ACETAMINOPHEN 325 MG/1
650 TABLET ORAL ONCE
Status: COMPLETED | OUTPATIENT
Start: 2023-08-24 | End: 2023-08-24

## 2023-08-24 RX ORDER — IBUPROFEN 600 MG/1
600 TABLET ORAL ONCE
Status: COMPLETED | OUTPATIENT
Start: 2023-08-24 | End: 2023-08-24

## 2023-08-24 RX ADMIN — TETANUS TOXOID, REDUCED DIPHTHERIA TOXOID AND ACELLULAR PERTUSSIS VACCINE, ADSORBED 0.5 ML: 5; 2.5; 8; 8; 2.5 SUSPENSION INTRAMUSCULAR at 20:19

## 2023-08-24 RX ADMIN — IBUPROFEN 600 MG: 600 TABLET, FILM COATED ORAL at 20:18

## 2023-08-24 RX ADMIN — ACETAMINOPHEN 650 MG: 325 TABLET ORAL at 20:18

## 2023-08-24 ASSESSMENT — PAIN DESCRIPTION - FREQUENCY: FREQUENCY: CONTINUOUS

## 2023-08-24 ASSESSMENT — PAIN DESCRIPTION - PAIN TYPE: TYPE: ACUTE PAIN

## 2023-08-24 ASSESSMENT — PAIN SCALES - GENERAL: PAINLEVEL_OUTOF10: 5

## 2023-08-24 ASSESSMENT — PAIN DESCRIPTION - LOCATION: LOCATION: FINGER (COMMENT WHICH ONE)

## 2023-08-24 ASSESSMENT — PAIN DESCRIPTION - ORIENTATION: ORIENTATION: LEFT

## 2023-08-25 NOTE — ED PROVIDER NOTES
Emergency Department Encounter    Patient: Bianca Castro  MRN: [id number]  : 1950  Date of Evaluation: 2023  ED Provider:  Facundo Portillo DO    Triage Chief Complaint:   Finger Injury (Left middle)    [place]:  Bianca Castro is a 75 y.o. female with history of sleep apnea depression osteoarthritis hyperlipidemia asthma COPD coronary arteries disease, neuropathy, carcinoma of the breast, skin cancer, anxiety, hypertension, respiratory failure, colon cancer, pneumonia that presents complaining of injury to the left hand third digit. Patient states she was needing and she felt the knitting needle go into her left finger. Patient states no bleeding pus drainage. She states needle came out and now she does not see the entrance wound. She states some mild pain to touch but that has gotten better. She states no swelling. She states she is able to use the left hand. She states she is left-hand dominant. Patient states unknown tetanus status. Patient denies any numbness and tingling or weakness in extremities no discoloration of the extremities of the left hand. Patient here for evaluation.     ROS - see HPI, below listed is current ROS at time of my eval:  General:  No fevers, no chills, no weakness  Eyes:  No recent vison changes, no discharge  ENT:  No sore throat, no nasal congestion, no hearing changes  Cardiovascular:  No chest pain, no palpitations  Respiratory:  No shortness of breath, no cough, no wheezing  Gastrointestinal:  No pain, no nausea, no vomiting, no diarrhea  Musculoskeletal:  No muscle pain, no joint pain  Skin:  No rash, no pruritis, no easy bruising  Neurologic:  No speech problems, no headache, no extremity numbness, no extremity tingling, no extremity weakness  Psychiatric:  No anxiety  Genitourinary:  No dysuria, no hematuria  Endocrine:  No unexpected weight gain, no unexpected weight loss  Extremities: Positive for left finger injury, no edema, no pain    Past Medical DILATATION AND CURETTAGE, CULTURES OF UTEREUS performed by Anders Feliciano MD at [address], COLON, DIAGNOSTIC  12/14/2017    Small hiatal hernia    HERNIA REPAIR  2004    Umb hernia    HERNIA REPAIR  2008    Inc hernia    KIDNEY SURGERY  05/24/2022    stent placed on right side    LITHOTRIPSY Right 8/1/2022    RIGHT CYSTOSCOPY URETEROSCOPY STONE MANIPULATION WITH HOLIUM LASER LITHOTRIPSY STENT REPLACEMENT performed by Joseph Bellamy MD at [address] [facility]    LITHOTRIPSY Left 9/26/2022    LEFT CYSTOSCOPY URETEROSCOPY RETROGRADE PYELOGRAM [id number] [facility] performed by Joseph Bellamy MD at [address] [facility]  2/29/2012    Right axillary-3 sentinel nodes were positive out of 9.     MASTECTOMY, RADICAL Right 02/29/2012    w/ sentinal node disection-Dr West    PACEMAKER PLACEMENT      PRE-MALIGNANT / BENIGN SKIN LESION EXCISION  2/8/2013    right leg X2-Dr West    TONSILLECTOMY  1957    TUNNELED VENOUS PORT PLACEMENT  2004    TUNNELED VENOUS PORT PLACEMENT  02/13/2012    removal of mediport     Family History   Problem Relation Age of Onset    Arthritis Mother         OA, RA    High Cholesterol Mother     High Blood Pressure Mother     Cancer Father         skin and leukemia    High Blood Pressure Father     High Cholesterol Father     Diabetes Father     Heart Disease Father     Heart Disease Brother     High Cholesterol Brother     High Blood Pressure Brother     Heart Disease Brother     No Known Problems Sister     Seizures Son     Cancer Brother         skin cancer    Breast Cancer Neg Hx     Ovarian Cancer Neg Hx      Social History     Socioeconomic History    Marital status:      Spouse name: Not on file    Number of children: 2    Years of education: 12    Highest education level: 12th grade   Occupational History    Occupation: retired   Tobacco Use    Smoking status: Never    Smokeless tobacco: Never   Vaping

## 2023-08-25 NOTE — DISCHARGE INSTRUCTIONS
Follow-up with primary care physician for reevaluation. Call for an appointment  Take Tylenol alternate with Motrin for any pain aches and fevers  Return to the emergency department immediately if any swelling and bruising pus drainage discharge signs infection inability to move the finger or any worsening symptoms.

## 2023-08-25 NOTE — ED NOTES
Patient given discharge instructions at this time. Verbalizes understanding and denies any further needs, questions, concerns or complaints. Patient ambulatory to front of ER in standing, up position in stable condition.        Joneen Severe, RN  08/24/23 [id number]

## 2023-08-27 ENCOUNTER — HOSPITAL ENCOUNTER (OUTPATIENT)
Age: 73
Setting detail: OBSERVATION
Discharge: NURSING, REHAB OR LONG-TERM CARE | End: 2023-09-01
Attending: STUDENT IN AN ORGANIZED HEALTH CARE EDUCATION/TRAINING PROGRAM
Payer: MEDICARE

## 2023-08-27 ENCOUNTER — APPOINTMENT (OUTPATIENT)
Dept: GENERAL RADIOLOGY | Age: 73
End: 2023-08-27
Payer: MEDICARE

## 2023-08-27 DIAGNOSIS — R07.9 CHEST PAIN, UNSPECIFIED TYPE: Primary | ICD-10-CM

## 2023-08-27 LAB
ALBUMIN SERPL-MCNC: 3.7 GM/DL (ref 3.4–5)
ALP BLD-CCNC: 136 IU/L (ref 40–129)
ALT SERPL-CCNC: 8 U/L (ref 10–40)
ANION GAP SERPL CALCULATED.3IONS-SCNC: 11 MMOL/L (ref 4–16)
AST SERPL-CCNC: 12 IU/L (ref 15–37)
BILIRUB SERPL-MCNC: 0.3 MG/DL (ref 0–1)
BUN SERPL-MCNC: 15 MG/DL (ref 6–23)
CALCIUM SERPL-MCNC: 9.6 MG/DL (ref 8.3–10.6)
CHLORIDE BLD-SCNC: 100 MMOL/L (ref 99–110)
CO2: 25 MMOL/L (ref 21–32)
CREAT SERPL-MCNC: 0.6 MG/DL (ref 0.6–1.1)
D DIMER: 0.58 UG/ML (FEU)
GFR SERPL CREATININE-BSD FRML MDRD: >60 ML/MIN/1.73M2
GLUCOSE SERPL-MCNC: 99 MG/DL (ref 70–99)
HCT VFR BLD CALC: 38.8 % (ref 37–47)
HEMOGLOBIN: 12.2 GM/DL (ref 12.5–16)
MCH RBC QN AUTO: 27 PG (ref 27–31)
MCHC RBC AUTO-ENTMCNC: 31.4 % (ref 32–36)
MCV RBC AUTO: 85.8 FL (ref 78–100)
PDW BLD-RTO: 14.7 % (ref 11.7–14.9)
PLATELET # BLD: 252 K/CU MM (ref 140–440)
PMV BLD AUTO: 9.5 FL (ref 7.5–11.1)
POTASSIUM SERPL-SCNC: 3.9 MMOL/L (ref 3.5–5.1)
PRO-BNP: 432 PG/ML
RBC # BLD: 4.52 M/CU MM (ref 4.2–5.4)
SODIUM BLD-SCNC: 136 MMOL/L (ref 135–145)
TOTAL PROTEIN: 7 GM/DL (ref 6.4–8.2)
TROPONIN T: <0.01 NG/ML
TROPONIN T: <0.01 NG/ML
WBC # BLD: 10.8 K/CU MM (ref 4–10.5)

## 2023-08-27 PROCEDURE — G0378 HOSPITAL OBSERVATION PER HR: HCPCS

## 2023-08-27 PROCEDURE — 71045 X-RAY EXAM CHEST 1 VIEW: CPT

## 2023-08-27 PROCEDURE — 94761 N-INVAS EAR/PLS OXIMETRY MLT: CPT

## 2023-08-27 PROCEDURE — 2580000003 HC RX 258: Performed by: NURSE PRACTITIONER

## 2023-08-27 PROCEDURE — 96372 THER/PROPH/DIAG INJ SC/IM: CPT

## 2023-08-27 PROCEDURE — 93005 ELECTROCARDIOGRAM TRACING: CPT | Performed by: EMERGENCY MEDICINE

## 2023-08-27 PROCEDURE — 99285 EMERGENCY DEPT VISIT HI MDM: CPT

## 2023-08-27 PROCEDURE — 6370000000 HC RX 637 (ALT 250 FOR IP): Performed by: NURSE PRACTITIONER

## 2023-08-27 PROCEDURE — 85379 FIBRIN DEGRADATION QUANT: CPT

## 2023-08-27 PROCEDURE — 83880 ASSAY OF NATRIURETIC PEPTIDE: CPT

## 2023-08-27 PROCEDURE — 85027 COMPLETE CBC AUTOMATED: CPT

## 2023-08-27 PROCEDURE — 6360000002 HC RX W HCPCS: Performed by: NURSE PRACTITIONER

## 2023-08-27 PROCEDURE — 84484 ASSAY OF TROPONIN QUANT: CPT

## 2023-08-27 PROCEDURE — 36415 COLL VENOUS BLD VENIPUNCTURE: CPT

## 2023-08-27 PROCEDURE — 80053 COMPREHEN METABOLIC PANEL: CPT

## 2023-08-27 PROCEDURE — 6370000000 HC RX 637 (ALT 250 FOR IP): Performed by: STUDENT IN AN ORGANIZED HEALTH CARE EDUCATION/TRAINING PROGRAM

## 2023-08-27 RX ORDER — ACETAMINOPHEN 325 MG/1
650 TABLET ORAL EVERY 6 HOURS PRN
Status: DISCONTINUED | OUTPATIENT
Start: 2023-08-27 | End: 2023-09-01

## 2023-08-27 RX ORDER — POLYETHYLENE GLYCOL 3350 17 G/17G
17 POWDER, FOR SOLUTION ORAL DAILY PRN
Status: DISCONTINUED | OUTPATIENT
Start: 2023-08-27 | End: 2023-09-01 | Stop reason: HOSPADM

## 2023-08-27 RX ORDER — ENOXAPARIN SODIUM 100 MG/ML
30 INJECTION SUBCUTANEOUS 2 TIMES DAILY
Status: DISCONTINUED | OUTPATIENT
Start: 2023-08-27 | End: 2023-09-01 | Stop reason: HOSPADM

## 2023-08-27 RX ORDER — VALSARTAN 160 MG/1
160 TABLET ORAL DAILY
Status: DISCONTINUED | OUTPATIENT
Start: 2023-08-28 | End: 2023-09-01 | Stop reason: HOSPADM

## 2023-08-27 RX ORDER — SODIUM CHLORIDE 0.9 % (FLUSH) 0.9 %
5-40 SYRINGE (ML) INJECTION EVERY 12 HOURS SCHEDULED
Status: DISCONTINUED | OUTPATIENT
Start: 2023-08-27 | End: 2023-09-01 | Stop reason: HOSPADM

## 2023-08-27 RX ORDER — FAMOTIDINE 20 MG/1
20 TABLET, FILM COATED ORAL 2 TIMES DAILY
Status: DISCONTINUED | OUTPATIENT
Start: 2023-08-27 | End: 2023-09-01 | Stop reason: HOSPADM

## 2023-08-27 RX ORDER — NITROGLYCERIN 0.4 MG/1
0.4 TABLET SUBLINGUAL EVERY 5 MIN PRN
Status: DISCONTINUED | OUTPATIENT
Start: 2023-08-27 | End: 2023-09-01 | Stop reason: HOSPADM

## 2023-08-27 RX ORDER — SODIUM CHLORIDE 9 MG/ML
INJECTION, SOLUTION INTRAVENOUS PRN
Status: DISCONTINUED | OUTPATIENT
Start: 2023-08-27 | End: 2023-09-01 | Stop reason: HOSPADM

## 2023-08-27 RX ORDER — TIZANIDINE 4 MG/1
4 TABLET ORAL EVERY 6 HOURS PRN
Status: DISCONTINUED | OUTPATIENT
Start: 2023-08-27 | End: 2023-09-01 | Stop reason: HOSPADM

## 2023-08-27 RX ORDER — ASPIRIN 81 MG/1
81 TABLET, CHEWABLE ORAL DAILY
Status: DISCONTINUED | OUTPATIENT
Start: 2023-08-27 | End: 2023-09-01 | Stop reason: HOSPADM

## 2023-08-27 RX ORDER — AMLODIPINE BESYLATE 5 MG/1
5 TABLET ORAL NIGHTLY
Status: DISCONTINUED | OUTPATIENT
Start: 2023-08-27 | End: 2023-09-01 | Stop reason: HOSPADM

## 2023-08-27 RX ORDER — FUROSEMIDE 20 MG/1
20 TABLET ORAL DAILY
Status: DISCONTINUED | OUTPATIENT
Start: 2023-08-28 | End: 2023-08-27

## 2023-08-27 RX ORDER — ONDANSETRON 2 MG/ML
4 INJECTION INTRAMUSCULAR; INTRAVENOUS EVERY 6 HOURS PRN
Status: DISCONTINUED | OUTPATIENT
Start: 2023-08-27 | End: 2023-09-01 | Stop reason: HOSPADM

## 2023-08-27 RX ORDER — LIDOCAINE 4 G/G
1 PATCH TOPICAL DAILY
Status: DISCONTINUED | OUTPATIENT
Start: 2023-08-27 | End: 2023-09-01 | Stop reason: HOSPADM

## 2023-08-27 RX ORDER — SODIUM CHLORIDE 0.9 % (FLUSH) 0.9 %
5-40 SYRINGE (ML) INJECTION PRN
Status: DISCONTINUED | OUTPATIENT
Start: 2023-08-27 | End: 2023-09-01 | Stop reason: HOSPADM

## 2023-08-27 RX ORDER — ALBUTEROL SULFATE 90 UG/1
2 AEROSOL, METERED RESPIRATORY (INHALATION) EVERY 4 HOURS PRN
Status: DISCONTINUED | OUTPATIENT
Start: 2023-08-27 | End: 2023-09-01 | Stop reason: HOSPADM

## 2023-08-27 RX ORDER — ACETAMINOPHEN 500 MG
500 TABLET ORAL EVERY 6 HOURS PRN
Status: DISCONTINUED | OUTPATIENT
Start: 2023-08-27 | End: 2023-08-27 | Stop reason: SDUPTHER

## 2023-08-27 RX ORDER — ENOXAPARIN SODIUM 100 MG/ML
40 INJECTION SUBCUTANEOUS DAILY
Status: DISCONTINUED | OUTPATIENT
Start: 2023-08-27 | End: 2023-08-27 | Stop reason: DRUGHIGH

## 2023-08-27 RX ORDER — FLUTICASONE PROPIONATE 110 UG/1
2 AEROSOL, METERED RESPIRATORY (INHALATION) 2 TIMES DAILY
Status: DISCONTINUED | OUTPATIENT
Start: 2023-08-27 | End: 2023-09-01 | Stop reason: HOSPADM

## 2023-08-27 RX ORDER — FUROSEMIDE 20 MG/1
20 TABLET ORAL DAILY
Status: DISCONTINUED | OUTPATIENT
Start: 2023-08-27 | End: 2023-09-01 | Stop reason: HOSPADM

## 2023-08-27 RX ORDER — ONDANSETRON 4 MG/1
4 TABLET, ORALLY DISINTEGRATING ORAL EVERY 8 HOURS PRN
Status: DISCONTINUED | OUTPATIENT
Start: 2023-08-27 | End: 2023-09-01 | Stop reason: HOSPADM

## 2023-08-27 RX ORDER — ACETAMINOPHEN 650 MG/1
650 SUPPOSITORY RECTAL EVERY 6 HOURS PRN
Status: DISCONTINUED | OUTPATIENT
Start: 2023-08-27 | End: 2023-09-01

## 2023-08-27 RX ADMIN — ENOXAPARIN SODIUM 30 MG: 100 INJECTION SUBCUTANEOUS at 21:52

## 2023-08-27 RX ADMIN — SODIUM CHLORIDE, PRESERVATIVE FREE 10 ML: 5 INJECTION INTRAVENOUS at 21:34

## 2023-08-27 RX ADMIN — FLUTICASONE PROPIONATE 2 PUFF: 110 AEROSOL, METERED RESPIRATORY (INHALATION) at 22:17

## 2023-08-27 RX ADMIN — FUROSEMIDE 20 MG: 20 TABLET ORAL at 21:34

## 2023-08-27 RX ADMIN — AMLODIPINE BESYLATE 5 MG: 5 TABLET ORAL at 21:34

## 2023-08-27 RX ADMIN — FAMOTIDINE 20 MG: 20 TABLET ORAL at 21:34

## 2023-08-27 ASSESSMENT — PAIN DESCRIPTION - ORIENTATION: ORIENTATION: MID

## 2023-08-27 ASSESSMENT — PAIN DESCRIPTION - LOCATION: LOCATION: CHEST

## 2023-08-27 ASSESSMENT — PAIN DESCRIPTION - DESCRIPTORS: DESCRIPTORS: ACHING;TIGHTNESS

## 2023-08-27 ASSESSMENT — PAIN SCALES - GENERAL
PAINLEVEL_OUTOF10: 5
PAINLEVEL_OUTOF10: 0

## 2023-08-27 ASSESSMENT — PAIN - FUNCTIONAL ASSESSMENT: PAIN_FUNCTIONAL_ASSESSMENT: 0-10

## 2023-08-27 ASSESSMENT — HEART SCORE: ECG: 0

## 2023-08-27 NOTE — H&P
Strain: Medium Risk    Difficulty of Paying Living Expenses: Somewhat hard   Food Insecurity: Food Insecurity Present    Worried About Running Out of Food in the Last Year: Sometimes true    Ran Out of Food in the Last Year: Sometimes true   Transportation Needs: Unmet Transportation Needs    Lack of Transportation (Medical): Yes    Lack of Transportation (Non-Medical): Yes   Physical Activity: Inactive    Days of Exercise per Week: 0 days    Minutes of Exercise per Session: 0 min   Stress: No Stress Concern Present    Feeling of Stress :  Only a little   Social Connections: Socially Isolated    Frequency of Communication with Friends and Family: Once a week    Frequency of Social Gatherings with Friends and Family: Once a week    Attends [employer] Services: Never    Active Member of Clubs or Organizations: No    Attends Club or Organization Meetings: Never    Marital Status:    Housing Stability: High Risk    Unable to Pay for Housing in the Last Year: Yes    Number of [address] in the Last Year: 1    Unstable Housing in the Last Year: No       Medications:   Medications:    Infusions:   PRN Meds:     Labs      CBC:   Recent Labs     08/27/23  1311   WBC 10.8*   HGB 12.2*        BMP:    Recent Labs     08/27/23  1311      K 3.9      CO2 25   BUN 15   CREATININE 0.6   GLUCOSE 99     Hepatic:   Recent Labs     08/27/23  1311   AST 12*   ALT 8*   BILITOT 0.3   ALKPHOS 136*     Lipids:   Lab Results   Component Value Date/Time    CHOL 200 03/21/2023 02:30 PM    CHOL 182 07/29/2021 12:39 PM    HDL 42 03/21/2023 02:30 PM    TRIG 129 03/21/2023 02:30 PM     Hemoglobin A1C:   Lab Results   Component Value Date/Time    LABA1C 5.2 03/21/2023 02:30 PM     TSH: No results found for: TSH  Troponin:   Lab Results   Component Value Date/Time    TROPONINT <0.010 08/27/2023 01:11 PM    TROPONINT <0.010 07/20/2023 04:05 PM    TROPONINT <0.010 02/21/2023 02:45 AM     Lactic Acid: No results for input(s): LACTA in the last 72 hours. BNP: No results for input(s): PROBNP in the last 72 hours. UA:  Lab Results   Component Value Date/Time    NITRU POSITIVE 01/22/2023 11:03 PM    COLORU YELLOW 01/22/2023 11:03 PM    WBCUA 943 01/22/2023 11:03 PM    RBCUA NONE SEEN 01/22/2023 11:03 PM    MUCUS RARE 11/15/2022 02:00 AM    TRICHOMONAS NONE SEEN 01/22/2023 11:03 PM    YEAST RARE 09/11/2017 07:00 AM    BACTERIA MANY 01/22/2023 11:03 PM    CLARITYU TURBID 01/22/2023 11:03 PM    SPECGRAV 1.010 01/22/2023 11:03 PM    LEUKOCYTESUR LARGE NUMBER OR AMOUNT OBSERVED 01/22/2023 11:03 PM    UROBILINOGEN 0.2 01/22/2023 11:03 PM    BILIRUBINUR NEGATIVE 01/22/2023 11:03 PM    BLOODU LARGE NUMBER OR AMOUNT OBSERVED 01/22/2023 11:03 PM    [place] NEGATIVE 01/22/2023 11:03 PM    AMORPHOUS OCCASIONAL 04/15/2018 05:48 AM     Urine Cultures: No results found for: Kasey Santos  Blood Cultures: No results found for: BC  No results found for: BLOODCULT2  Organism:   Lab Results   Component Value Date/Time    ORG ECOL 10/16/2018 02:43 PM       Imaging/Diagnostics Last 24 Hours   XR HAND LEFT (MIN 3 VIEWS)    Result Date: 8/24/2023  EXAMINATION: THREE XRAY VIEWS OF THE LEFT HAND 8/24/2023 8:29 pm COMPARISON: None. HISTORY: ORDERING SYSTEM PROVIDED HISTORY: injury with hook to third digit TECHNOLOGIST PROVIDED HISTORY: Reason for exam:->injury with hook to third digit Reason for Exam: patrice needle stick Additional signs and symptoms: none Relevant Medical/Surgical History: none FINDINGS: Frontal, lateral and oblique views. Soft tissue swelling about the hand. No radiopaque foreign body. No acute fracture. Osteopenia. No aggressive skeletal lesion. Mild-to-moderate osteoarthritis, most pronounced in the 1st carpometacarpal joint. Atherosclerosis. No acute fracture or radiopaque foreign body. XR CHEST PORTABLE    Result Date: 8/27/2023  EXAMINATION: ONE XRAY VIEW OF THE CHEST 8/27/2023 3:00 pm COMPARISON: Chest radiograph 07/20/2023.

## 2023-08-27 NOTE — ED NOTES
Called OBS unit (Spoke with [place]) and stated SBAR is in for patient     Angelia Jamil RN  08/27/23 [id number]

## 2023-08-27 NOTE — PROGRESS NOTES
4 Eyes Skin Assessment     NAME:  Fadia Lafleur  YOB: 1950  MEDICAL RECORD NUMBER:  [id number]    The patient is being assessed for  Admission    I agree that at least one RN has performed a thorough Head to Toe Skin Assessment on the patient. ALL assessment sites listed below have been assessed. Areas assessed by both nurses:    Head, Face, Ears, Shoulders, Back, Chest, Arms, Elbows, Hands, Sacrum. Buttock, Coccyx, Ischium, Legs. Feet and Heels, and Under Medical Devices         Does the Patient have a Wound?  No noted wound(s)       [place] Prevention initiated by RN: No  Wound Care Orders initiated by RN: No    Pressure Injury (Stage 3,4, Unstageable, DTI, NWPT, and Complex wounds) if present, place Wound referral order by RN under : No    New Ostomies, if present place, Ostomy referral order under : No     Nurse 1 eSignature: Electronically signed by Radha Rice RN on 8/27/23 at 7:41 PM EDT    **SHARE this note so that the co-signing nurse can place an eSignature**    Nurse 2 eSignature: Electronically signed by Teresa Wells RN on 8/27/23 at 7:42 PM EDT

## 2023-08-27 NOTE — ED NOTES
ED TO INPATIENT SBAR HANDOFF    Patient Name: Dae Patrick   :  1950  75 y.o. Preferred Name    Family/Caregiver Present yes   Restraints no   C-SSRS: Risk of Suicide: No Risk  Sitter no   Sepsis Risk Score Sepsis Risk Score: 0.87      Situation  Chief Complaint   Patient presents with    Chest Pain     X1 hour     Brief Description of Patient's Condition: pt to ED with chest pain (mid lower sternal area) and has a cardiac history. Patient has had a mastectomy on the right side so a limb alert bracelet was placed on her wrist   Mental Status: oriented, alert, coherent, logical, thought processes intact, and able to concentrate and follow conversation  Arrived from: home    Imaging:   XR CHEST PORTABLE   Final Result   No acute abnormality.            Abnormal labs:   Abnormal Labs Reviewed   CBC - Abnormal; Notable for the following components:       Result Value    WBC 10.8 (*)     Hemoglobin 12.2 (*)     MCHC 31.4 (*)     All other components within normal limits   COMPREHENSIVE METABOLIC PANEL W/ REFLEX TO MG FOR LOW K - Abnormal; Notable for the following components:    ALT 8 (*)     AST 12 (*)     Alkaline Phosphatase 136 (*)     All other components within normal limits       Background  History:   Past Medical History:   Diagnosis Date    Anemia     Anxiety     Arthritis     generalized    Asthma 2006    AV block     2nd degree per hospital in [id number]    Blood poisoning 1994    Blood transfusion     2003    Breast cancer ([address]) 2012    right    CAD (coronary artery disease)     Cancer ([address]) 2007    skin (face) & colon(2012 dx of right breast ca(pc)    Carcinoma of breast ([address]) 2012    right arm no b/p or sticks    Cardiac pacemaker 03/10/2014    Medtronic dual lead    Cellulitis 11/15/2022    Chronic cystitis     Chronic respiratory failure (HCC)     2 L/min oxygen at night-time    Colon cancer ([address]) 2013    COPD (chronic obstructive pulmonary disease) ([address])     CTS (carpal tunnel syndrome)     Depression     Diverticulitis     H/O 24 hour EKG monitoring 2/28/2014 7/24/13 2/14 complete heart block 7/13No clinically significant arrthymia noted. H/O cardiac catheterization 10/31/2011, 7/11/2007    10/31/2011-No significant CAD. Cardiolite stress test was false positive-Dr Germán Carlisle; 7/11/2007-No CAD, global function intact;    H/O cardiovascular stress test 10/20/2011, 3/23/2010    10/20/2011-Lexiscan- Evid of mild ischemia in Left CX region. EF 70%. Global LVSF normal;    H/O cardiovascular stress test 01/07/2015    EF 77%. Normal Stress Test.    H/O chest pain 04/2005    sees Dr Germán Carlisle    H/O Doppler ultrasound 02/20/2008 2/20/2008-CAROTID DOPPLER- Normal carotids bilaterally;    H/O Doppler ultrasound 06/06/2013    CAROTID DOPPLER- there is heterogeneous, irregular atherosclerotic plaque noted in the right internal carotid artery,  doppler flow velocities within the right internal carotid artery are elevated, consistent with a mild, less than 50%stenosis, there is intimal thickening but no significant atherosclerotic plaque noted in the left internal carotid artery, the left carotid are within normal limits    H/O echocardiogram 4/9/2012, 10/20/2011    4/9/2012-LVSF normal EF=>55%. impaired LV relaxation;    H/O echocardiogram 12/31/2014    EF 50-55%. LV shows mild concentric hypertrophy. Mildly dilated left atrium. Mildly dilated right ventricle. Sclerotic but not stenotic aortic valve. Mitral annular calcification.   Mild MR, Mild TR, Mild pulm HTN.    H/O urinary incontinence     History of blood transfusion     Hx antineoplastic chemo     Hx of Arterial Doppler ultrasound 07/01/2019    No hemodynamically significant or focal stenosis visualized bilaterally, bilateral lower extremity arteries exhibit diffuse plaque and multiphasic waveforms, unable to obtain bilateral ABIs due to elevated leg pressures >250 mmHg, possibly due to atherosclerosis    Hx of

## 2023-08-27 NOTE — ED PROVIDER NOTES
[facility]        Pt Name: Annelle Sacks  MRN: [id number]  [address] 1950  Date of evaluation: 8/27/2023  Provider: Felicity Alonzo PA-C  PCP: Devon Person,       ABEBA. I have evaluated this patient. CHIEF COMPLAINT      Chief Complaint   Patient presents with    Chest Pain     X1 hour       HISTORY OF PRESENT ILLNESS:     History from : Patient    Limitations to history : None    Annelle Sacks is a 75 y.o. female who presents complaint of chest pain. She mentions that started just prior to arrival while she was doing some stitching work. She feels pain in her mid chest rating into her right shoulder area. Is accompanied with some nausea. She does describe some chronic shortness of breath states it is no worse. She mentions she has had a productive cough for approximately 1 month. She mentions a history of asthma but denies any wheezing. She arrived by EMS had received aspirin. She denies any new abdominal pain, new leg pain or swelling, fevers    Nursing Notes were all reviewed and agreed with or any disagreements were addressed in the HPI. REVIEW OF SYSTEMS :     Review of Systems   Constitutional:  Negative for fever. Cardiovascular:  Negative for chest pain.      Pertinent positives and negatives are stated within HPI    PAST HISTORY   has a past medical history of Anemia, Anxiety, Arthritis, Asthma, AV block, Blood poisoning, Blood transfusion, Breast cancer ([address]), CAD (coronary artery disease), Cancer ([address]), Carcinoma of breast ([address]), Cardiac pacemaker, Cellulitis, Chronic cystitis, Chronic respiratory failure ([address]), Colon cancer ([address]), COPD (chronic obstructive pulmonary disease) ([address]), CTS (carpal tunnel syndrome), Depression, Diverticulitis, H/O 24 hour EKG monitoring, H/O cardiac catheterization, H/O cardiovascular stress test, H/O cardiovascular stress test, H/O chest pain, H/O Doppler ultrasound, H/O Doppler ultrasound, H/O Warren Maritn MD at [facility]  2003    back    CYSTOSCOPY Bilateral 12/15/14    CYSTOSCOPY Right 5/15/2022    CYSTOSCOPY URETERAL STENT INSERTION performed by Concepcion Nyhan, MD at [facility]      front right tooth and root canal w/ brass bolt    DILATION AND CURETTAGE OF UTERUS  2006    Needed a camera to find my uterus. DILATION AND CURETTAGE OF UTERUS N/A 5/24/2022    DILATATION AND CURETTAGE, CULTURES OF UTEREUS performed by Rolando Wynne MD at [address] [facility], COLON, DIAGNOSTIC  12/14/2017    Small hiatal hernia    HERNIA REPAIR  2004    Umb hernia    HERNIA REPAIR  2008    Inc hernia    KIDNEY SURGERY  05/24/2022    stent placed on right side    LITHOTRIPSY Right 8/1/2022    RIGHT CYSTOSCOPY URETEROSCOPY STONE MANIPULATION WITH HOLIUM LASER LITHOTRIPSY STENT REPLACEMENT performed by Carlos Loja MD at [address] [place] [address]    LITHOTRIPSY Left 9/26/2022    LEFT CYSTOSCOPY URETEROSCOPY RETROGRADE PYELOGRAM STONE [address] LITHOTRIPSY POSSIBLE [address], [address] performed by Carlos Loja MD at [address] [place] [address]  2/29/2012    Right axillary-3 sentinel nodes were positive out of 9.     MASTECTOMY, RADICAL Right 02/29/2012    w/ sentinal node disection-Dr West    PACEMAKER PLACEMENT      PRE-MALIGNANT / BENIGN SKIN LESION EXCISION  2/8/2013    right leg X2-Dr West    TONSILLECTOMY  1957    TUNNELED VENOUS PORT PLACEMENT  2004    TUNNELED VENOUS PORT PLACEMENT  02/13/2012    removal of mediport       Family History   Problem Relation Age of Onset    Arthritis Mother         OA, RA    High Cholesterol Mother     High Blood Pressure Mother     Cancer Father         skin and leukemia    High Blood Pressure Father     High Cholesterol Father     Diabetes Father     Heart Disease Father     Heart Disease Brother     High Cholesterol Brother     High Blood Pressure Brother     Heart Disease Brother     No Known Problems Sister

## 2023-08-28 LAB
ANION GAP SERPL CALCULATED.3IONS-SCNC: 13 MMOL/L (ref 4–16)
B PARAP IS1001 DNA NPH QL NAA+NON-PROBE: NOT DETECTED
B PERT.PT PRMT NPH QL NAA+NON-PROBE: NOT DETECTED
BASOPHILS ABSOLUTE: 0.1 K/CU MM
BASOPHILS RELATIVE PERCENT: 0.5 % (ref 0–1)
BUN SERPL-MCNC: 12 MG/DL (ref 6–23)
C PNEUM DNA NPH QL NAA+NON-PROBE: NOT DETECTED
CALCIUM SERPL-MCNC: 9.2 MG/DL (ref 8.3–10.6)
CHLORIDE BLD-SCNC: 103 MMOL/L (ref 99–110)
CHOLEST SERPL-MCNC: 198 MG/DL
CO2: 22 MMOL/L (ref 21–32)
CREAT SERPL-MCNC: 0.7 MG/DL (ref 0.6–1.1)
DIFFERENTIAL TYPE: ABNORMAL
EOSINOPHILS ABSOLUTE: 0.3 K/CU MM
EOSINOPHILS RELATIVE PERCENT: 2.9 % (ref 0–3)
FLUAV H1 2009 PAN RNA NPH NAA+NON-PROBE: NOT DETECTED
FLUAV H1 RNA NPH QL NAA+NON-PROBE: NOT DETECTED
FLUAV H3 RNA NPH QL NAA+NON-PROBE: NOT DETECTED
FLUAV RNA NPH QL NAA+NON-PROBE: NOT DETECTED
FLUBV RNA NPH QL NAA+NON-PROBE: NOT DETECTED
GFR SERPL CREATININE-BSD FRML MDRD: >60 ML/MIN/1.73M2
GLUCOSE SERPL-MCNC: 123 MG/DL (ref 70–99)
HADV DNA NPH QL NAA+NON-PROBE: NOT DETECTED
HCOV 229E RNA NPH QL NAA+NON-PROBE: NOT DETECTED
HCOV HKU1 RNA NPH QL NAA+NON-PROBE: NOT DETECTED
HCOV NL63 RNA NPH QL NAA+NON-PROBE: NOT DETECTED
HCOV OC43 RNA NPH QL NAA+NON-PROBE: NOT DETECTED
HCT VFR BLD CALC: 35.5 % (ref 37–47)
HDLC SERPL-MCNC: 38 MG/DL
HEMOGLOBIN: 11 GM/DL (ref 12.5–16)
HMPV RNA NPH QL NAA+NON-PROBE: NOT DETECTED
HPIV1 RNA NPH QL NAA+NON-PROBE: NOT DETECTED
HPIV2 RNA NPH QL NAA+NON-PROBE: NOT DETECTED
HPIV3 RNA NPH QL NAA+NON-PROBE: NOT DETECTED
HPIV4 RNA NPH QL NAA+NON-PROBE: NOT DETECTED
IMMATURE NEUTROPHIL %: 0.4 % (ref 0–0.43)
LDLC SERPL CALC-MCNC: 125 MG/DL
LV EF: 55 %
LVEF MODALITY: NORMAL
LYMPHOCYTES ABSOLUTE: 2.3 K/CU MM
LYMPHOCYTES RELATIVE PERCENT: 22.9 % (ref 24–44)
M PNEUMO DNA NPH QL NAA+NON-PROBE: NOT DETECTED
MCH RBC QN AUTO: 27 PG (ref 27–31)
MCHC RBC AUTO-ENTMCNC: 31 % (ref 32–36)
MCV RBC AUTO: 87 FL (ref 78–100)
MONOCYTES ABSOLUTE: 0.7 K/CU MM
MONOCYTES RELATIVE PERCENT: 7 % (ref 0–4)
NUCLEATED RBC %: 0 %
PDW BLD-RTO: 14.7 % (ref 11.7–14.9)
PLATELET # BLD: 239 K/CU MM (ref 140–440)
PMV BLD AUTO: 9.3 FL (ref 7.5–11.1)
POTASSIUM SERPL-SCNC: 4.1 MMOL/L (ref 3.5–5.1)
RBC # BLD: 4.08 M/CU MM (ref 4.2–5.4)
RSV RNA NPH QL NAA+NON-PROBE: NOT DETECTED
RV+EV RNA NPH QL NAA+NON-PROBE: NOT DETECTED
SARS-COV-2 RNA NPH QL NAA+NON-PROBE: NOT DETECTED
SEGMENTED NEUTROPHILS ABSOLUTE COUNT: 6.7 K/CU MM
SEGMENTED NEUTROPHILS RELATIVE PERCENT: 66.3 % (ref 36–66)
SODIUM BLD-SCNC: 138 MMOL/L (ref 135–145)
TOTAL IMMATURE NEUTOROPHIL: 0.04 K/CU MM
TOTAL NUCLEATED RBC: 0 K/CU MM
TOTAL RETICULOCYTE COUNT: 0.07 K/CU MM
TRIGL SERPL-MCNC: 175 MG/DL
WBC # BLD: 10.1 K/CU MM (ref 4–10.5)

## 2023-08-28 PROCEDURE — 6370000000 HC RX 637 (ALT 250 FOR IP): Performed by: NURSE PRACTITIONER

## 2023-08-28 PROCEDURE — 97535 SELF CARE MNGMENT TRAINING: CPT

## 2023-08-28 PROCEDURE — 6360000002 HC RX W HCPCS: Performed by: NURSE PRACTITIONER

## 2023-08-28 PROCEDURE — 0202U NFCT DS 22 TRGT SARS-COV-2: CPT

## 2023-08-28 PROCEDURE — 2580000003 HC RX 258: Performed by: NURSE PRACTITIONER

## 2023-08-28 PROCEDURE — 99221 1ST HOSP IP/OBS SF/LOW 40: CPT | Performed by: INTERNAL MEDICINE

## 2023-08-28 PROCEDURE — 94640 AIRWAY INHALATION TREATMENT: CPT

## 2023-08-28 PROCEDURE — G0378 HOSPITAL OBSERVATION PER HR: HCPCS

## 2023-08-28 PROCEDURE — 80048 BASIC METABOLIC PNL TOTAL CA: CPT

## 2023-08-28 PROCEDURE — 97116 GAIT TRAINING THERAPY: CPT

## 2023-08-28 PROCEDURE — 85025 COMPLETE CBC W/AUTO DIFF WBC: CPT

## 2023-08-28 PROCEDURE — 6370000000 HC RX 637 (ALT 250 FOR IP): Performed by: STUDENT IN AN ORGANIZED HEALTH CARE EDUCATION/TRAINING PROGRAM

## 2023-08-28 PROCEDURE — 97166 OT EVAL MOD COMPLEX 45 MIN: CPT

## 2023-08-28 PROCEDURE — 96372 THER/PROPH/DIAG INJ SC/IM: CPT

## 2023-08-28 PROCEDURE — 80061 LIPID PANEL: CPT

## 2023-08-28 PROCEDURE — 93306 TTE W/DOPPLER COMPLETE: CPT

## 2023-08-28 PROCEDURE — 94761 N-INVAS EAR/PLS OXIMETRY MLT: CPT

## 2023-08-28 PROCEDURE — 97162 PT EVAL MOD COMPLEX 30 MIN: CPT

## 2023-08-28 RX ADMIN — FLUTICASONE PROPIONATE 2 PUFF: 110 AEROSOL, METERED RESPIRATORY (INHALATION) at 08:04

## 2023-08-28 RX ADMIN — SODIUM CHLORIDE, PRESERVATIVE FREE 10 ML: 5 INJECTION INTRAVENOUS at 09:08

## 2023-08-28 RX ADMIN — AMLODIPINE BESYLATE 5 MG: 5 TABLET ORAL at 22:01

## 2023-08-28 RX ADMIN — ASPIRIN 81 MG 81 MG: 81 TABLET ORAL at 09:06

## 2023-08-28 RX ADMIN — ENOXAPARIN SODIUM 30 MG: 100 INJECTION SUBCUTANEOUS at 22:01

## 2023-08-28 RX ADMIN — FLUTICASONE PROPIONATE 2 PUFF: 110 AEROSOL, METERED RESPIRATORY (INHALATION) at 18:58

## 2023-08-28 RX ADMIN — ENOXAPARIN SODIUM 30 MG: 100 INJECTION SUBCUTANEOUS at 09:07

## 2023-08-28 RX ADMIN — SODIUM CHLORIDE, PRESERVATIVE FREE 10 ML: 5 INJECTION INTRAVENOUS at 22:01

## 2023-08-28 RX ADMIN — FAMOTIDINE 20 MG: 20 TABLET ORAL at 22:01

## 2023-08-28 RX ADMIN — ACETAMINOPHEN 650 MG: 325 TABLET ORAL at 05:51

## 2023-08-28 RX ADMIN — VALSARTAN 160 MG: 160 TABLET, FILM COATED ORAL at 09:06

## 2023-08-28 RX ADMIN — FUROSEMIDE 20 MG: 20 TABLET ORAL at 09:06

## 2023-08-28 RX ADMIN — FAMOTIDINE 20 MG: 20 TABLET ORAL at 09:07

## 2023-08-28 ASSESSMENT — PAIN DESCRIPTION - LOCATION: LOCATION: HEAD

## 2023-08-28 ASSESSMENT — PAIN SCALES - GENERAL: PAINLEVEL_OUTOF10: 3

## 2023-08-28 ASSESSMENT — PAIN DESCRIPTION - DESCRIPTORS: DESCRIPTORS: ACHING
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
## 2023-08-28 NOTE — PROGRESS NOTES
Occupational Therapy  [facility] ACUTE CARE OCCUPATIONAL THERAPY EVALUATION  Nusrat Cárdenas, 1950, OBS [id number], 8/28/2023    Discharge Recommendation: [id number] [place] [facility]:  The encounter diagnosis was Chest pain, unspecified type. Patient  has a past medical history of Anemia, Anxiety, Arthritis, Asthma, AV block, Blood poisoning, Blood transfusion, Breast cancer ([address]), CAD (coronary artery disease), Cancer ([address]), Carcinoma of breast ([address]), Cardiac pacemaker, Cellulitis, Chronic cystitis, Chronic respiratory failure ([address]), Colon cancer ([address]), COPD (chronic obstructive pulmonary disease) ([address]), CTS (carpal tunnel syndrome), Depression, Diverticulitis, H/O 24 hour EKG monitoring, H/O cardiac catheterization, H/O cardiovascular stress test, H/O cardiovascular stress test, H/O chest pain, H/O Doppler ultrasound, H/O Doppler ultrasound, H/O echocardiogram, H/O echocardiogram, H/O urinary incontinence, History of blood transfusion, Hx antineoplastic chemo, Hx of Arterial Doppler ultrasound, Hx of cardiovascular stress test, Hx of echocardiogram, Hx of echocardiogram, Hx of fall, HX OTHER MEDICAL, Hyperlipidemia, Hypertension, Hypothermia, Malignant neoplasm of breast (female) ([address]), Neuropathy, Normocytic normochromic anemia, Obstructive sleep apnea, Old MI (myocardial infarction), Osteoarthritis, Osteopenia, Osteopenia of multiple sites, Pneumonia due to COVID-19 virus, Primary malignant neoplasm of colon ([address]), S/P cardiac cath, Skin cancer, Super obesity, and Umbilical hernia. Patient  has a past surgical history that includes Tonsillectomy (1957); Appendectomy (2004); colectomy (2004); Tunneled venous port placement (2004); cyst removal (2003); Breast lumpectomy (2007); Dilation and curettage of uterus (2006); hernia repair (2004); hernia repair (2008); Dental surgery; Breast surgery (02/13/2012); Tunneled venous port placement (02/13/2012);  Breast biopsy (2/13/12); will perform therex/theract in order to increase functional activity tolerance    Treatment plan:  Pt will perform therex/theract in order to increase functional activity tolerance in preparation for ADL participation.      Recommendations for NURSING activity: Up to chair for all 3 meals and up to [facility] for all toileting needs    Time:   Time in: 1416  Time out: 1437  Timed treatment minutes: 11  Total time: 21 minutes     Electronically signed by:    Mickeal Kussmaul, OTR/RANDY [id number]  8/28/2023, 2:48 PM

## 2023-08-28 NOTE — PROGRESS NOTES
Physical Therapy  [facility] ACUTE CARE PHYSICAL THERAPY EVALUATION  Nusrat Chapman, 1950, OBS 06/[id number], 8/28/2023    History  [place]:  The encounter diagnosis was Chest pain, unspecified type. Patient  has a past medical history of Anemia, Anxiety, Arthritis, Asthma, AV block, Blood poisoning, Blood transfusion, Breast cancer ([address]), CAD (coronary artery disease), Cancer ([address]), Carcinoma of breast ([address]), Cardiac pacemaker, Cellulitis, Chronic cystitis, Chronic respiratory failure ([address]), Colon cancer ([address]), COPD (chronic obstructive pulmonary disease) ([address]), CTS (carpal tunnel syndrome), Depression, Diverticulitis, H/O 24 hour EKG monitoring, H/O cardiac catheterization, H/O cardiovascular stress test, H/O cardiovascular stress test, H/O chest pain, H/O Doppler ultrasound, H/O Doppler ultrasound, H/O echocardiogram, H/O echocardiogram, H/O urinary incontinence, History of blood transfusion, Hx antineoplastic chemo, Hx of Arterial Doppler ultrasound, Hx of cardiovascular stress test, Hx of echocardiogram, Hx of echocardiogram, Hx of fall, HX OTHER MEDICAL, Hyperlipidemia, Hypertension, Hypothermia, Malignant neoplasm of breast (female) ([address]), Neuropathy, Normocytic normochromic anemia, Obstructive sleep apnea, Old MI (myocardial infarction), Osteoarthritis, Osteopenia, Osteopenia of multiple sites, Pneumonia due to COVID-19 virus, Primary malignant neoplasm of colon ([address]), S/P cardiac cath, Skin cancer, Super obesity, and Umbilical hernia. Patient  has a past surgical history that includes Tonsillectomy (1957); Appendectomy (2004); colectomy (2004); Tunneled venous port placement (2004); cyst removal (2003); Breast lumpectomy (2007); Dilation and curettage of uterus (2006); hernia repair (2004); hernia repair (2008); Dental surgery; Breast surgery (02/13/2012); Tunneled venous port placement (02/13/2012); Breast biopsy (2/13/12);  Mastectomy, radical (Right, 02/29/2012); pre-malignant / benign PLOF.    Complexity: Moderate  Prognosis: Good, no significant barriers to participation at this time.    General Plan:  3+x/week  Discharge Recommendations: Subacute/Skilled Nursing Facility  Equipment: continue to assess at next level of care     Goals:  Short Term Goals  Time Frame for Short Term Goals: 2 weeks  Short Term Goal 1: Pt will perform bed mobility Gavin  Short Term Goal 2: Pt will transfer to bed/recliner Gavin  Short Term Goal 3: Pt will ambulate 30ft with LRAD Gavin  Short Term Goal 4: Pt will perform standing light dynamic activity with single UE support if needed, x 2 minutes Gavin       Treatment plan:  Bed mobility, transfers, balance, gait, TA, TX, stairs, WC     Recommendations for NURSING mobility: stand step pivot with RW     Time:   Time in: 1416  Time out: 1437  Timed treatment minutes: 11  Total time: 21 minutes     Electronically signed by:    Sabrina Carranza [id number]  8/28/2023, 3:45 PM

## 2023-08-29 ENCOUNTER — APPOINTMENT (OUTPATIENT)
Dept: CT IMAGING | Age: 73
End: 2023-08-29
Payer: MEDICARE

## 2023-08-29 LAB
BACTERIA: ABNORMAL /HPF
BASOPHILS ABSOLUTE: 0 K/CU MM
BASOPHILS RELATIVE PERCENT: 0.4 % (ref 0–1)
BILIRUBIN URINE: NEGATIVE MG/DL
BLOOD, URINE: NEGATIVE
CLARITY: CLEAR
COLOR: YELLOW
DIFFERENTIAL TYPE: ABNORMAL
EKG ATRIAL RATE: 60 BPM
EKG ATRIAL RATE: 60 BPM
EKG DIAGNOSIS: NORMAL
EKG DIAGNOSIS: NORMAL
EKG P AXIS: 79 DEGREES
EKG P AXIS: 94 DEGREES
EKG P-R INTERVAL: 188 MS
EKG P-R INTERVAL: 188 MS
EKG Q-T INTERVAL: 482 MS
EKG Q-T INTERVAL: 484 MS
EKG QRS DURATION: 180 MS
EKG QRS DURATION: 184 MS
EKG QTC CALCULATION (BAZETT): 482 MS
EKG QTC CALCULATION (BAZETT): 484 MS
EKG R AXIS: -61 DEGREES
EKG R AXIS: -64 DEGREES
EKG T AXIS: 101 DEGREES
EKG T AXIS: 106 DEGREES
EKG VENTRICULAR RATE: 60 BPM
EKG VENTRICULAR RATE: 60 BPM
EOSINOPHILS ABSOLUTE: 0.3 K/CU MM
EOSINOPHILS RELATIVE PERCENT: 2.6 % (ref 0–3)
GLUCOSE, URINE: NEGATIVE MG/DL
HCT VFR BLD CALC: 35.1 % (ref 37–47)
HEMOGLOBIN: 10.9 GM/DL (ref 12.5–16)
IMMATURE NEUTROPHIL %: 0.6 % (ref 0–0.43)
KETONES, URINE: NEGATIVE MG/DL
LEUKOCYTE ESTERASE, URINE: ABNORMAL
LYMPHOCYTES ABSOLUTE: 3.3 K/CU MM
LYMPHOCYTES RELATIVE PERCENT: 31.8 % (ref 24–44)
MCH RBC QN AUTO: 26.8 PG (ref 27–31)
MCHC RBC AUTO-ENTMCNC: 31.1 % (ref 32–36)
MCV RBC AUTO: 86.2 FL (ref 78–100)
MONOCYTES ABSOLUTE: 0.6 K/CU MM
MONOCYTES RELATIVE PERCENT: 5.8 % (ref 0–4)
MUCUS: ABNORMAL HPF
NITRITE URINE, QUANTITATIVE: POSITIVE
NUCLEATED RBC %: 0 %
PDW BLD-RTO: 14.6 % (ref 11.7–14.9)
PH, URINE: 6.5 (ref 5–8)
PLATELET # BLD: 222 K/CU MM (ref 140–440)
PMV BLD AUTO: 9.2 FL (ref 7.5–11.1)
PROTEIN UA: NEGATIVE MG/DL
RBC # BLD: 4.07 M/CU MM (ref 4.2–5.4)
RBC URINE: 3 /HPF (ref 0–6)
SEGMENTED NEUTROPHILS ABSOLUTE COUNT: 6.1 K/CU MM
SEGMENTED NEUTROPHILS RELATIVE PERCENT: 58.8 % (ref 36–66)
SPECIFIC GRAVITY UA: 1.01 (ref 1–1.03)
SQUAMOUS EPITHELIAL: 5 /HPF
TOTAL IMMATURE NEUTOROPHIL: 0.06 K/CU MM
TOTAL NUCLEATED RBC: 0 K/CU MM
TRICHOMONAS: ABNORMAL /HPF
UROBILINOGEN, URINE: 0.2 MG/DL (ref 0.2–1)
WBC # BLD: 10.3 K/CU MM (ref 4–10.5)
WBC CLUMP: ABNORMAL /HPF
WBC UA: 161 /HPF (ref 0–5)

## 2023-08-29 PROCEDURE — 71275 CT ANGIOGRAPHY CHEST: CPT

## 2023-08-29 PROCEDURE — 96372 THER/PROPH/DIAG INJ SC/IM: CPT

## 2023-08-29 PROCEDURE — 94761 N-INVAS EAR/PLS OXIMETRY MLT: CPT

## 2023-08-29 PROCEDURE — G0378 HOSPITAL OBSERVATION PER HR: HCPCS

## 2023-08-29 PROCEDURE — 6370000000 HC RX 637 (ALT 250 FOR IP): Performed by: NURSE PRACTITIONER

## 2023-08-29 PROCEDURE — 6370000000 HC RX 637 (ALT 250 FOR IP): Performed by: STUDENT IN AN ORGANIZED HEALTH CARE EDUCATION/TRAINING PROGRAM

## 2023-08-29 PROCEDURE — 94640 AIRWAY INHALATION TREATMENT: CPT

## 2023-08-29 PROCEDURE — 2580000003 HC RX 258: Performed by: NURSE PRACTITIONER

## 2023-08-29 PROCEDURE — 6360000004 HC RX CONTRAST MEDICATION: Performed by: NURSE PRACTITIONER

## 2023-08-29 PROCEDURE — 99232 SBSQ HOSP IP/OBS MODERATE 35: CPT | Performed by: INTERNAL MEDICINE

## 2023-08-29 PROCEDURE — 6360000002 HC RX W HCPCS: Performed by: NURSE PRACTITIONER

## 2023-08-29 PROCEDURE — 93010 ELECTROCARDIOGRAM REPORT: CPT | Performed by: INTERNAL MEDICINE

## 2023-08-29 PROCEDURE — 81001 URINALYSIS AUTO W/SCOPE: CPT

## 2023-08-29 PROCEDURE — 36415 COLL VENOUS BLD VENIPUNCTURE: CPT

## 2023-08-29 PROCEDURE — 85025 COMPLETE CBC W/AUTO DIFF WBC: CPT

## 2023-08-29 RX ADMIN — ASPIRIN 81 MG 81 MG: 81 TABLET ORAL at 09:50

## 2023-08-29 RX ADMIN — AMLODIPINE BESYLATE 5 MG: 5 TABLET ORAL at 20:20

## 2023-08-29 RX ADMIN — FUROSEMIDE 20 MG: 20 TABLET ORAL at 09:50

## 2023-08-29 RX ADMIN — FLUTICASONE PROPIONATE 2 PUFF: 110 AEROSOL, METERED RESPIRATORY (INHALATION) at 18:48

## 2023-08-29 RX ADMIN — SODIUM CHLORIDE, PRESERVATIVE FREE 10 ML: 5 INJECTION INTRAVENOUS at 09:49

## 2023-08-29 RX ADMIN — FLUTICASONE PROPIONATE 2 PUFF: 110 AEROSOL, METERED RESPIRATORY (INHALATION) at 08:42

## 2023-08-29 RX ADMIN — ENOXAPARIN SODIUM 30 MG: 100 INJECTION SUBCUTANEOUS at 20:20

## 2023-08-29 RX ADMIN — ENOXAPARIN SODIUM 30 MG: 100 INJECTION SUBCUTANEOUS at 09:49

## 2023-08-29 RX ADMIN — VALSARTAN 160 MG: 160 TABLET, FILM COATED ORAL at 09:51

## 2023-08-29 RX ADMIN — IOPAMIDOL 75 ML: 755 INJECTION, SOLUTION INTRAVENOUS at 15:32

## 2023-08-29 RX ADMIN — SODIUM CHLORIDE, PRESERVATIVE FREE 10 ML: 5 INJECTION INTRAVENOUS at 20:00

## 2023-08-29 RX ADMIN — FAMOTIDINE 20 MG: 20 TABLET ORAL at 20:20

## 2023-08-29 RX ADMIN — FAMOTIDINE 20 MG: 20 TABLET ORAL at 09:50

## 2023-08-29 ASSESSMENT — SOCIAL DETERMINANTS OF HEALTH (SDOH): HOW HARD IS IT FOR YOU TO PAY FOR THE VERY BASICS LIKE FOOD, HOUSING, MEDICAL CARE, AND HEATING?: VERY HARD

## 2023-08-29 ASSESSMENT — PAIN SCALES - GENERAL
PAINLEVEL_OUTOF10: 0
PAINLEVEL_OUTOF10: 0
PAINLEVEL_OUTOF10: 4

## 2023-08-29 ASSESSMENT — PAIN DESCRIPTION - DESCRIPTORS: DESCRIPTORS: ACHING

## 2023-08-29 ASSESSMENT — PAIN DESCRIPTION - LOCATION: LOCATION: BUTTOCKS;SHOULDER

## 2023-08-29 ASSESSMENT — PAIN DESCRIPTION - ORIENTATION: ORIENTATION: LEFT

## 2023-08-29 NOTE — PROGRESS NOTES
Today's plan: ok from cardiac standpoint for discharge      Admit Date:  8/27/2023    Subjective:ok      Chief complaints on admission  Chief Complaint   Patient presents with    Chest Pain     X1 hour         History of present illness:Nusrat is a 75 y. o.year old who  presents with had concerns including Chest Pain (X1 hour). Past medical history:    has a past medical history of Anemia, Anxiety, Arthritis, Asthma, AV block, Blood poisoning, Blood transfusion, Breast cancer ([address]), CAD (coronary artery disease), Cancer ([address]), Carcinoma of breast ([address]), Cardiac pacemaker, Cellulitis, Chronic cystitis, Chronic respiratory failure ([address]), Colon cancer ([address]), COPD (chronic obstructive pulmonary disease) ([address]), CTS (carpal tunnel syndrome), Depression, Diverticulitis, H/O 24 hour EKG monitoring, H/O cardiac catheterization, H/O cardiovascular stress test, H/O cardiovascular stress test, H/O chest pain, H/O Doppler ultrasound, H/O Doppler ultrasound, H/O echocardiogram, H/O echocardiogram, H/O urinary incontinence, History of blood transfusion, Hx antineoplastic chemo, Hx of Arterial Doppler ultrasound, Hx of cardiovascular stress test, Hx of echocardiogram, Hx of echocardiogram, Hx of fall, HX OTHER MEDICAL, Hyperlipidemia, Hypertension, Hypothermia, Malignant neoplasm of breast (female) ([address]), Neuropathy, Normocytic normochromic anemia, Obstructive sleep apnea, Old MI (myocardial infarction), Osteoarthritis, Osteopenia, Osteopenia of multiple sites, Pneumonia due to COVID-19 virus, Primary malignant neoplasm of colon ([address]), S/P cardiac cath, Skin cancer, Super obesity, and Umbilical hernia. Past surgical history:   has a past surgical history that includes Tonsillectomy (1957); Appendectomy (2004); colectomy (2004); Tunneled venous port placement (2004); cyst removal (2003); Breast lumpectomy (2007); Dilation and curettage of uterus (2006); hernia repair (2004); hernia repair (2008);  Dental surgery; Breast 3633  Last data filed at 8/28/2023 2311  Gross per 24 hour   Intake 240 ml   Output 1100 ml   Net -860 ml       TELEMETRY:     Physical Exam:  Constitutional:  Well developed, Well nourished, No acute distress, Non-toxic appearance. HENT:  Normocephalic, Atraumatic, Bilateral external ears normal, Oropharynx moist, No oral exudates, Nose normal. Neck- Normal range of motion, No tenderness, Supple, No stridor. Eyes:  PERRL, EOMI, Conjunctiva normal, No discharge. Respiratory:  Normal breath sounds, No respiratory distress, No wheezing, No chest tenderness. ,no use of accessory muscles, diaphragm movement is normal  Cardiovascular: (PMI) apex non displaced,no lifts no thrills, no s3,no s4, Normal heart rate, Normal rhythm, No murmurs, No rubs, No gallops. Carotid arteries pulse and amplitude are normal no bruit, no abdominal bruit noted ( normal abdominal aorta ausculation), femoral arteries pulse and amplitude are normal no bruit, pedal pulses are normal  GI:  Bowel sounds normal, Soft, No tenderness, No masses, No pulsatile masses. : External genitalia appear normal, No masses or lesions. No discharge. No CVA tenderness. Musculoskeletal:  Intact distal pulses, No edema, No tenderness, No cyanosis, No clubbing. Good range of motion in all major joints. No tenderness to palpation or major deformities noted. Back- No tenderness. Integument:  Warm, Dry, No erythema, No rash. Lymphatic:  No lymphadenopathy noted. Neurologic:  Alert & oriented x 3, Normal motor function, Normal sensory function, No focal deficits noted.    Psychiatric:  Affect normal, Judgment normal, Mood normal.     Medications:    amLODIPine  5 mg Oral Nightly    fluticasone  2 puff Inhalation BID    valsartan  160 mg Oral Daily    sodium chloride flush  5-40 mL IntraVENous 2 times per day    famotidine  20 mg Oral BID    aspirin  81 mg Oral Daily    lidocaine  1 patch TransDERmal Daily    enoxaparin  30 mg SubCUTAneous BID

## 2023-08-29 NOTE — PROGRESS NOTES
Comprehensive Nutrition Assessment    Type and Reason for Visit:  Initial, Positive Nutrition Screen (MST: 2, Unintentional weight loss, Poor Appetite)    Nutrition Recommendations/Plan:   Continue Diabetic oral nutrition supplements BID   Recommend Cardiac-Carb Controlled diet   Encourage adequate protein intake to preserve LBM   May education over lipid panel/nutrition implications upon reassess  Endorse adequate PO intakes/ONS Intake and record in I/O      Malnutrition Assessment:  Malnutrition Status: Moderate malnutrition (08/29/23 1634)    Context:  Social/Environmental Circumstances     Findings of the 6 clinical characteristics of malnutrition:  Energy Intake:  Less than 75% estimated energy requirements for 3 months or longer (eats 1 meal per day, pt states example: 2 ears of corn)  Weight Loss:  Mild weight loss (specify amount and time period) (7% in 10 months)     Body Fat Loss:  Mild body fat loss Orbital   Muscle Mass Loss:   (Moderate muscle mass loss) Temples (temporalis), Calf (gastrocnemius), Hand (interosseous), Scapula (trapezius)  Fluid Accumulation:  Mild Extremities   Strength:   (reduction per pt's recall)    Nutrition Assessment:    Admitted with Chest Pain. H/O Colon Ca, Breast Ca, Peripheral Nueoropathy, ambulatory dysfunction, JAMIE, B/D Carotid Artery Stenosis, HLD, HTN, VHD. Currenrtly on No Added Salt/No Caffeine diet, pt consumed 100% of lunch and oral nutrition supplement. Pt endorses maybe eating 1 meal per day, /loved one prepares all meals at home due to pt's ambulation dysfunction, difficulty with ADLs. Sedentary lifestyle due to chronic illness. C/o decrease  strength, unable to \"hold coffee up like before. \" Downward weight trends over the years s/s muscle wasting, poor intake, and intentional fluid losses. Performed NFPE, mild to moderate wasting identified, meets criteria for malnutrition. Follow as moderate nutrition risk.     Nutrition Related Findings:    TG None Identified  Food/Nutrient Intake Outcomes: Diet Advancement/Tolerance, Food and Nutrient Intake, Supplement Intake  Physical Signs/Symptoms Outcomes: Biochemical Data, GI Status, Fluid Status or Edema, Meal Time Behavior, Nutrition Focused Physical Findings, Weight    Discharge Planning:     Too soon to determine     Saba Barnett RD, LD  Contact: [phone]

## 2023-08-29 NOTE — PROGRESS NOTES
V2.0  [facility] Hospitalist Progress Note      Name:  Corine Sexton /Age/Sex: 1950  (75 y.o. female)   MRN & CSN:  [id number] & [id number] Encounter Date/Time: 2023 12:12 PM EDT    Location:  OBS  PCP: Heather Juarez, [address] [place] [address] Day: 3    Assessment and Plan:   Corine Sexton is a 75 y.o. female with pmh of anemia, asthma/COPD, breast cancer, CAD, cardiac pacemaker, colon cancer, anxiety/depression, hyperlipidemia, hypertension, neuropathy, JAMIE, OA, severe aortic stenosis, chronic bladder incontinency who presents with Chest pain    Plan:  Chest Pain: r/o ACS  Moderate/Severe Aortic Stenosis  Hx MI  -her pain started this am  on the right side and radiates into her right shoulder, she had a Cardiac Cath 2023 Right and Left which was clear, her last ECHO showed normal LVEF 55-60% but Heavily Calcified Aortic Valve with Severe Aortic Stenosis, ANDRE 23 showed Mod Aortic Stenosis, will repeat her ECHO as this she may need TAVR in the near future, will have her see Dr. Omar Harrison outpatient after discharge to discuss, as he did her carotic artery stenosis surgery   -ECHO: Normal systolic function with LVEF 55%, moderate to severe aortic stenosis  -Cardiology Consult: Dr. Pee Pineda, ruled out ACS, states always has elevated D-Dimer, Cath in 2023 was normal, pacer wires appear to be functioning well, FU as scheduled outpatient      Ambulatory Dysfunction  Peripheral Neuropathy  -having difficulty ambulating at home and taking care of herself, interested in SNF placement  -PT OT evaluated the patient and recommended subacute rehab  -Case Management Consulted, pre-CERT is pending     Hypertension  -cont Lasix 20 qd, valsartan 160 qd, amlodipine 5 mg qhs     Asthma/COPD  -cont Flovent bid and albuterol prn   -Viral Resp Panel: NEG     Right Posterior Neck Strain  -apply lidocaine patch qd, zanaflex prn     Diet ADULT DIET; Regular; No Added Salt (3-4 gm);  No Caffeine  ADULT ORAL

## 2023-08-29 NOTE — CARE COORDINATION
CM spoke with Jayla at [facility]. They will accept pt. Pre-cert is started.

## 2023-08-30 ENCOUNTER — TELEPHONE (OUTPATIENT)
Dept: INTERNAL MEDICINE CLINIC | Age: 73
End: 2023-08-30

## 2023-08-30 LAB
BASOPHILS ABSOLUTE: 0.1 K/CU MM
BASOPHILS RELATIVE PERCENT: 0.5 % (ref 0–1)
DIFFERENTIAL TYPE: ABNORMAL
EOSINOPHILS ABSOLUTE: 0.3 K/CU MM
EOSINOPHILS RELATIVE PERCENT: 2.8 % (ref 0–3)
HCT VFR BLD CALC: 35 % (ref 37–47)
HEMOGLOBIN: 10.9 GM/DL (ref 12.5–16)
IMMATURE NEUTROPHIL %: 0.4 % (ref 0–0.43)
LYMPHOCYTES ABSOLUTE: 2.8 K/CU MM
LYMPHOCYTES RELATIVE PERCENT: 28.8 % (ref 24–44)
MCH RBC QN AUTO: 26.8 PG (ref 27–31)
MCHC RBC AUTO-ENTMCNC: 31.1 % (ref 32–36)
MCV RBC AUTO: 86 FL (ref 78–100)
MONOCYTES ABSOLUTE: 0.8 K/CU MM
MONOCYTES RELATIVE PERCENT: 7.8 % (ref 0–4)
NUCLEATED RBC %: 0 %
PDW BLD-RTO: 14.7 % (ref 11.7–14.9)
PLATELET # BLD: 248 K/CU MM (ref 140–440)
PMV BLD AUTO: 9.4 FL (ref 7.5–11.1)
RBC # BLD: 4.07 M/CU MM (ref 4.2–5.4)
SEGMENTED NEUTROPHILS ABSOLUTE COUNT: 5.9 K/CU MM
SEGMENTED NEUTROPHILS RELATIVE PERCENT: 59.7 % (ref 36–66)
TOTAL IMMATURE NEUTOROPHIL: 0.04 K/CU MM
TOTAL NUCLEATED RBC: 0 K/CU MM
WBC # BLD: 9.8 K/CU MM (ref 4–10.5)

## 2023-08-30 PROCEDURE — 36415 COLL VENOUS BLD VENIPUNCTURE: CPT

## 2023-08-30 PROCEDURE — G0378 HOSPITAL OBSERVATION PER HR: HCPCS

## 2023-08-30 PROCEDURE — 87186 SC STD MICRODIL/AGAR DIL: CPT

## 2023-08-30 PROCEDURE — 6360000002 HC RX W HCPCS: Performed by: NURSE PRACTITIONER

## 2023-08-30 PROCEDURE — 6370000000 HC RX 637 (ALT 250 FOR IP): Performed by: STUDENT IN AN ORGANIZED HEALTH CARE EDUCATION/TRAINING PROGRAM

## 2023-08-30 PROCEDURE — 85025 COMPLETE CBC W/AUTO DIFF WBC: CPT

## 2023-08-30 PROCEDURE — 96372 THER/PROPH/DIAG INJ SC/IM: CPT

## 2023-08-30 PROCEDURE — 94761 N-INVAS EAR/PLS OXIMETRY MLT: CPT

## 2023-08-30 PROCEDURE — 96365 THER/PROPH/DIAG IV INF INIT: CPT

## 2023-08-30 PROCEDURE — 94640 AIRWAY INHALATION TREATMENT: CPT

## 2023-08-30 PROCEDURE — 87086 URINE CULTURE/COLONY COUNT: CPT

## 2023-08-30 PROCEDURE — 87077 CULTURE AEROBIC IDENTIFY: CPT

## 2023-08-30 PROCEDURE — 2580000003 HC RX 258: Performed by: NURSE PRACTITIONER

## 2023-08-30 PROCEDURE — 6370000000 HC RX 637 (ALT 250 FOR IP): Performed by: NURSE PRACTITIONER

## 2023-08-30 RX ADMIN — ACETAMINOPHEN 650 MG: 325 TABLET ORAL at 09:23

## 2023-08-30 RX ADMIN — VALSARTAN 160 MG: 160 TABLET, FILM COATED ORAL at 08:51

## 2023-08-30 RX ADMIN — CEFTRIAXONE SODIUM 1000 MG: 1 INJECTION, POWDER, FOR SOLUTION INTRAMUSCULAR; INTRAVENOUS at 09:26

## 2023-08-30 RX ADMIN — FLUTICASONE PROPIONATE 2 PUFF: 110 AEROSOL, METERED RESPIRATORY (INHALATION) at 07:53

## 2023-08-30 RX ADMIN — FAMOTIDINE 20 MG: 20 TABLET ORAL at 08:51

## 2023-08-30 RX ADMIN — FUROSEMIDE 20 MG: 20 TABLET ORAL at 08:51

## 2023-08-30 RX ADMIN — AMLODIPINE BESYLATE 5 MG: 5 TABLET ORAL at 20:56

## 2023-08-30 RX ADMIN — ENOXAPARIN SODIUM 30 MG: 100 INJECTION SUBCUTANEOUS at 20:56

## 2023-08-30 RX ADMIN — SODIUM CHLORIDE, PRESERVATIVE FREE 10 ML: 5 INJECTION INTRAVENOUS at 20:57

## 2023-08-30 RX ADMIN — FLUTICASONE PROPIONATE 2 PUFF: 110 AEROSOL, METERED RESPIRATORY (INHALATION) at 21:01

## 2023-08-30 RX ADMIN — ASPIRIN 81 MG 81 MG: 81 TABLET ORAL at 08:53

## 2023-08-30 RX ADMIN — ENOXAPARIN SODIUM 30 MG: 100 INJECTION SUBCUTANEOUS at 08:51

## 2023-08-30 RX ADMIN — FAMOTIDINE 20 MG: 20 TABLET ORAL at 20:56

## 2023-08-30 RX ADMIN — SODIUM CHLORIDE 50 ML/HR: 9 INJECTION, SOLUTION INTRAVENOUS at 09:26

## 2023-08-30 ASSESSMENT — PAIN SCALES - GENERAL
PAINLEVEL_OUTOF10: 3
PAINLEVEL_OUTOF10: 4

## 2023-08-30 ASSESSMENT — PAIN DESCRIPTION - ORIENTATION
ORIENTATION: LEFT
ORIENTATION: LEFT;RIGHT

## 2023-08-30 ASSESSMENT — PAIN DESCRIPTION - LOCATION
LOCATION: SHOULDER
LOCATION: FOOT

## 2023-08-30 ASSESSMENT — PAIN - FUNCTIONAL ASSESSMENT
PAIN_FUNCTIONAL_ASSESSMENT: ACTIVITIES ARE NOT PREVENTED
PAIN_FUNCTIONAL_ASSESSMENT: PREVENTS OR INTERFERES SOME ACTIVE ACTIVITIES AND ADLS

## 2023-08-30 ASSESSMENT — PAIN DESCRIPTION - FREQUENCY: FREQUENCY: INTERMITTENT

## 2023-08-30 ASSESSMENT — PAIN DESCRIPTION - ONSET: ONSET: ON-GOING

## 2023-08-30 ASSESSMENT — PAIN DESCRIPTION - DESCRIPTORS
DESCRIPTORS: ACHING;DISCOMFORT
DESCRIPTORS: ACHING;DISCOMFORT

## 2023-08-30 ASSESSMENT — PAIN DESCRIPTION - PAIN TYPE: TYPE: CHRONIC PAIN

## 2023-08-30 NOTE — TELEPHONE ENCOUNTER
Patient called to let PCP know she has been admitted at [facility], has been there since Sunday. Kept her in the ER Sunday night, was in observation for two nights, and has been moved to a room. Called the squad due to having bad chest pains and stated the pain was spreading to her arms. Determined she's having complications with her heart. Wanted to make PCP aware.

## 2023-08-30 NOTE — PROGRESS NOTES
[place]2.0  [facility]      Name:  Yesika Barney /Age/Sex: 1950  (75 y.o. female)   MRN & CSN:  [id number] & [id number] Encounter Date/Time: 2023 1:59 PM EDT    Location:  [facility]-A PCP: Neena Sanchez, [address] [place] [address] Day: 4    Assessment and Plan:   Yesika Barney is a 75 y.o. female with pmh of anemia, asthma/COPD, breast cancer, CAD, cardiac pacemaker, colon cancer, anxiety/depression, hyperlipidemia, hypertension, neuropathy, JAMIE, OA, severe aortic stenosis, chronic bladder incontinency who presents with Chest pain. Pending pre-cert to SNF. Pt has an appointment with Dr. Finn Lockhart outpatient at 1:30 pm on . Nursing home may need set up transportation that day. Plan:  Chest Pain: r/o ACS  Moderate/Severe Aortic Stenosis  Hx MI  -her pain started this am  on the right side and radiates into her right shoulder, she had a Cardiac Cath 2023 Right and Left which was clear, her last ECHO showed normal LVEF 55-60% but Heavily Calcified Aortic Valve with Severe Aortic Stenosis, ANDRE 23 showed Mod Aortic Stenosis, will repeat her ECHO as this she may need TAVR in the near future, will have her see Dr. Tuyet Rodriguez outpatient after discharge to discuss, as he did her carotic artery stenosis surgery   -ECHO: Normal systolic function with LVEF 55%, moderate to severe aortic stenosis, previous ANDRE on 2023 showed possible LVOTO  -Cardiology Consult: Dr. Ana Luisa Rowland, ruled out ACS, states always has elevated D-Dimer, Cath in 2023 was normal, pacer wires appear to be functioning well, FU as scheduled outpatient   -Set up an appointment with Dr. Finn Lockhart outpatient at 1:30 pm on .       Ambulatory Dysfunction  Peripheral Neuropathy  -having difficulty ambulating at home and taking care of herself, interested in SNF placement  -PT OT evaluated the patient and recommended subacute rehab  -Case Management Consulted,

## 2023-08-30 NOTE — PROGRESS NOTES
4 Eyes Skin Assessment     NAME:  Selam Jordan  YOB: 1950  MEDICAL RECORD NUMBER:  [id number]    The patient is being assessed for  Transfer to New Unit    I agree that at least one RN has performed a thorough Head to Toe Skin Assessment on the patient. ALL assessment sites listed below have been assessed. Areas assessed by both nurses:    Head, Face, Ears, Shoulders, Back, Chest, Arms, Elbows, Hands, Sacrum. Buttock, Coccyx, Ischium, Legs. Feet and Heels, and Under Medical Devices         Does the Patient have a Wound?  No noted wound(s)       Rangel Prevention initiated by RN: No  Wound Care Orders initiated by RN: No    Pressure Injury (Stage 3,4, Unstageable, DTI, NWPT, and Complex wounds) if present, place Wound referral order by RN under : No    New Ostomies, if present place, Ostomy referral order under : No     Nurse 1 eSignature: Electronically signed by Mike Ley RN on 8/29/23 at 11:45 PM EDT    **SHARE this note so that the co-signing nurse can place an eSignature**    Nurse 2 eSignature: Electronically signed by Moody Zuniga RN on 8/30/23 at 4:40 AM EDT

## 2023-08-31 LAB
EKG ATRIAL RATE: 59 BPM
EKG DIAGNOSIS: NORMAL
EKG P AXIS: 77 DEGREES
EKG P-R INTERVAL: 208 MS
EKG Q-T INTERVAL: 466 MS
EKG QRS DURATION: 150 MS
EKG QTC CALCULATION (BAZETT): 466 MS
EKG R AXIS: -67 DEGREES
EKG T AXIS: 113 DEGREES
EKG VENTRICULAR RATE: 60 BPM

## 2023-08-31 PROCEDURE — 97110 THERAPEUTIC EXERCISES: CPT

## 2023-08-31 PROCEDURE — 6370000000 HC RX 637 (ALT 250 FOR IP): Performed by: NURSE PRACTITIONER

## 2023-08-31 PROCEDURE — 2580000003 HC RX 258: Performed by: NURSE PRACTITIONER

## 2023-08-31 PROCEDURE — 6360000002 HC RX W HCPCS: Performed by: NURSE PRACTITIONER

## 2023-08-31 PROCEDURE — 6370000000 HC RX 637 (ALT 250 FOR IP): Performed by: STUDENT IN AN ORGANIZED HEALTH CARE EDUCATION/TRAINING PROGRAM

## 2023-08-31 PROCEDURE — 97535 SELF CARE MNGMENT TRAINING: CPT

## 2023-08-31 PROCEDURE — 97530 THERAPEUTIC ACTIVITIES: CPT

## 2023-08-31 PROCEDURE — 96366 THER/PROPH/DIAG IV INF ADDON: CPT

## 2023-08-31 PROCEDURE — 96372 THER/PROPH/DIAG INJ SC/IM: CPT

## 2023-08-31 PROCEDURE — 97116 GAIT TRAINING THERAPY: CPT

## 2023-08-31 PROCEDURE — 94761 N-INVAS EAR/PLS OXIMETRY MLT: CPT

## 2023-08-31 PROCEDURE — 94664 DEMO&/EVAL PT USE INHALER: CPT

## 2023-08-31 PROCEDURE — 94640 AIRWAY INHALATION TREATMENT: CPT

## 2023-08-31 PROCEDURE — G0378 HOSPITAL OBSERVATION PER HR: HCPCS

## 2023-08-31 RX ADMIN — FAMOTIDINE 20 MG: 20 TABLET ORAL at 20:14

## 2023-08-31 RX ADMIN — FAMOTIDINE 20 MG: 20 TABLET ORAL at 08:45

## 2023-08-31 RX ADMIN — VALSARTAN 160 MG: 160 TABLET, FILM COATED ORAL at 08:45

## 2023-08-31 RX ADMIN — FLUTICASONE PROPIONATE 2 PUFF: 110 AEROSOL, METERED RESPIRATORY (INHALATION) at 20:27

## 2023-08-31 RX ADMIN — ASPIRIN 81 MG 81 MG: 81 TABLET ORAL at 08:46

## 2023-08-31 RX ADMIN — CEFTRIAXONE SODIUM 1000 MG: 1 INJECTION, POWDER, FOR SOLUTION INTRAMUSCULAR; INTRAVENOUS at 08:45

## 2023-08-31 RX ADMIN — AMLODIPINE BESYLATE 5 MG: 5 TABLET ORAL at 20:14

## 2023-08-31 RX ADMIN — FUROSEMIDE 20 MG: 20 TABLET ORAL at 08:46

## 2023-08-31 RX ADMIN — ENOXAPARIN SODIUM 30 MG: 100 INJECTION SUBCUTANEOUS at 08:46

## 2023-08-31 RX ADMIN — SODIUM CHLORIDE, PRESERVATIVE FREE 10 ML: 5 INJECTION INTRAVENOUS at 08:49

## 2023-08-31 RX ADMIN — SODIUM CHLORIDE, PRESERVATIVE FREE 10 ML: 5 INJECTION INTRAVENOUS at 20:16

## 2023-08-31 RX ADMIN — FLUTICASONE PROPIONATE 2 PUFF: 110 AEROSOL, METERED RESPIRATORY (INHALATION) at 08:06

## 2023-08-31 RX ADMIN — ENOXAPARIN SODIUM 30 MG: 100 INJECTION SUBCUTANEOUS at 20:14

## 2023-08-31 RX ADMIN — ACETAMINOPHEN 650 MG: 325 TABLET ORAL at 20:16

## 2023-08-31 ASSESSMENT — PAIN DESCRIPTION - LOCATION: LOCATION: ARM

## 2023-08-31 ASSESSMENT — PAIN - FUNCTIONAL ASSESSMENT: PAIN_FUNCTIONAL_ASSESSMENT: ACTIVITIES ARE NOT PREVENTED

## 2023-08-31 ASSESSMENT — PAIN DESCRIPTION - ORIENTATION: ORIENTATION: LEFT

## 2023-08-31 ASSESSMENT — PAIN DESCRIPTION - ONSET: ONSET: PROGRESSIVE

## 2023-08-31 ASSESSMENT — PAIN DESCRIPTION - DESCRIPTORS: DESCRIPTORS: CRUSHING

## 2023-08-31 ASSESSMENT — PAIN DESCRIPTION - FREQUENCY: FREQUENCY: INTERMITTENT

## 2023-08-31 ASSESSMENT — PAIN DESCRIPTION - PAIN TYPE: TYPE: CHRONIC PAIN

## 2023-08-31 ASSESSMENT — PAIN SCALES - GENERAL: PAINLEVEL_OUTOF10: 3

## 2023-08-31 NOTE — CARE COORDINATION
Per [facility] portal, precert denied. Expedited appeal will be submitted.  CM Assistant to initiate appeal

## 2023-08-31 NOTE — PROGRESS NOTES
Physical Therapy    Physical Therapy Treatment Note  Name: Yesika Barney MRN: [id number] :   1950   Date:  2023   Admission Date: 2023 Room:  [address] [place] [address]   Restrictions/Precautions:          diagnosis was Chest pain  Communication with other providers:  per chart review appropriate for tx  Subjective:  Patient states:  agreeable to tx. Pt states \"my legs can give out suddenly\"  Pain:   Location, Type, Intensity (0/10 to 10/10):  no c/o pain during tx session  Objective:    Observation:  alert and oriented    Treatment, including education/measures:  Sit<=>stand from chair and commode sba but pt needing increased time and effort. Pt Rajeev to manage depends and kyle care. Amb with rw 10' x 1 30' ( back and forth across room x3) sba/cga and pt did report SOB needing increased time to recover. Ex in sitting:  Trunk stretches with deep breathing  10 reps aps and circles  10 reps laqs  Safety  Patient left safely in the chair, with call light/phone in reach with alarm applied. Gait belt was used for transfers and gait. Assessment / Impression:      Patient's tolerance of treatment:  good   Adverse Reaction: na  Significant change in status and impact:  na  Barriers to improvement:  strength and safety  Plan for Next Session:    Cont.  POC  Time in:  1325  Time out:  1405  Timed treatment minutes:  40  Total treatment time:  40     Previously filed items:           Short Term Goals  Time Frame for Short Term Goals: 2 weeks  Short Term Goal 1: Pt will perform bed mobility Rajeev  Short Term Goal 2: Pt will transfer to bed/recliner Rajeev  Short Term Goal 3: Pt will ambulate 30ft with LRAD Rajeev  Short Term Goal 4: Pt will perform standing light dynamic activity with single UE support if needed, x 2 minutes Rajeev    Electronically signed by:    Dakota Oneal PTA  2023, 8:28 AM

## 2023-08-31 NOTE — PROGRESS NOTES
V2.0  [facility] Hospitalist Progress Note      Name:  Daniel Gregory /Age/Sex: 1950  (75 y.o. female)   MRN & CSN:  [id number] & [id number] Encounter Date/Time: 2023 12:03 PM EDT    [facility]:  [facility] PCP: Heaven Franco, [facility] [address] Day: 5    Assessment and Plan:   Daniel Gregory is a 75 y.o. female with pmh of anemia, asthma/COPD, breast cancer, CAD, cardiac pacemaker, colon cancer, anxiety/depression, hyperlipidemia, hypertension, neuropathy, JAMIE, OA, severe aortic stenosis, chronic bladder incontinency who presents with Chest pain and difficulty walking. Set up an appointment with Dr. Nayeli Lindo outpatient at 1:30 pm on . Nursing home may need set up transportation that day. PT OT recommended SNF placement. Pre-CERT is denied today. Expedited appeal will be submitted by . Plan:  Chest Pain: r/o ACS  Moderate/Severe Aortic Stenosis  Hx MI  -her pain started this am  on the right side and radiates into her right shoulder, she had a Cardiac Cath 2023 Right and Left which was clear, her last ECHO showed normal LVEF 55-60% but Heavily Calcified Aortic Valve with Severe Aortic Stenosis, ANDRE 23 showed Mod Aortic Stenosis, will repeat her ECHO as this she may need TAVR in the near future, will have her see Dr. Chaya Isabel outpatient after discharge to discuss, as he did her carotic artery stenosis surgery   -ECHO: Normal systolic function with LVEF 55%, moderate to severe aortic stenosis, previous ANDRE on 2023 showed possible LVOTO  -Cardiology Consult: Dr. Valerie Aguillon, ruled out ACS, states always has elevated D-Dimer, Cath in 2023 was normal, pacer wires appear to be functioning well, FU as scheduled outpatient   -Set up an appointment with Dr. Nayeli Lindo outpatient at 1:30 pm on .       Ambulatory Dysfunction  Peripheral Neuropathy  -having difficulty ambulating at home and taking care of herself, interested of the thoracic aorta. The ascending thoracic aorta is 32 mm and descending thoracic aorta 22 mm. Lungs/pleura: The lungs are without acute process. Faint mosaic pattern opacities bilateral mid and lower lungs. Stable chronic pulmonary changes typically seen as sequela from smoking or other prior infectious/inflammatory disease including areas of air trapping mid and upper lungs. No focal consolidation or pulmonary edema. No evidence of pleural effusion or pneumothorax. Upper Abdomen: Limited images of the upper abdomen are unremarkable. Soft Tissues/Bones: No acute bone or soft tissue abnormality. Stable left-sided pacemaker. Confluent ossification of the anterior longitudinal ligament of thoracic spine reflects diffuse idiopathic skeletal hyperostosis (DISH). 1.  No acute pulmonary embolism. 2. Main pulmonary artery dilatation can be seen with pulmonary hypertension but is nonspecific. 3. No acute pulmonary disease. 4. Pulmonary findings typical of COPD/reactive airways disease. Chronic bronchiolitis is a consideration. 5. Mild calcific atherosclerosis coronary arteries.        Electronically signed by AUSTIN Franklin CNP on 8/31/2023 at 12:03 PM

## 2023-08-31 NOTE — PROGRESS NOTES
Records do not show that Darian Ornelas has had a Pulmonary Function Test (PFT). PFTs are required for confirmation of diagnosis and GOLD grading used for nonpharmacological therapy recommendations. Please schedule Nusrat Tyson for PFTs once stable.

## 2023-08-31 NOTE — PROGRESS NOTES
Occupational Therapy    Occupational Therapy Treatment Note    Name: Jose Henry MRN: [id number] :   1950   Date:  2023   Admission Date: 2023 Room:  [address] [place] [address]-A     Primary Problem:  The encounter diagnosis was Chest pain, unspecified type. Restrictions/Precautions:          General Precautions, Fall Risk, Contact (MDRO)    Communication with other providers: Per chart review pt appropriate for therapeutic intervention    Subjective:  Patient states:  \"I try and do as much for myself as I can. \"   Pain:   Location, Type, Intensity (0/10 to 10/10):  denies     Objective:    Observation: Pt presented supine in bed, agreeable to session. Objective Measures:  Tele     Treatment, including education:  Therapeutic Activity Training:   Therapeutic activity training was instructed today. Cues were given for safety, sequence, UE/LE placement, awareness, and balance. Activities performed today included bed mobility training, sup-sit, sit-stand, SPT. Supine to Sit with SBA with use of bed controls. Sit to Stand transfer from EOB with use of FWW with CGA. Functional Mobility for short HH distance X2 trials within room with use of FWW with CGA with increased time to complete. Stand to Sit with CGA and cues for UE placement. Self Care Training:   Cues were given for safety, sequence, UE/LE placement, visual cues, and balance. Activities performed today included: Toileting: Toilet t/f with CGA with use of grab bar/fWW. Other toileting tasks in sitting/standing with SBA with use of FWW. Dressing:  SBA to doff/don gown while seated on toilet. Max A to don socks seated EOB. SBA to doff/don pull-up while seated on toilet. Hygiene:  Pt completed UB hygiene with SBA while seated, later stood at sink for hand hygiene for ~2 min with SBA. Patient educated on role of OT , benefits of OT and rationale for therapeutic intervention.  Educated on need to be patient advocate, benefit of EOB activity. Pt positioned for comfort in recliner at end of session with call light within reach, chair alarm on, and all needs met. Assessment / Impression:    Patient's tolerance of treatment: Well  Adverse Reaction: None  Significant change in status and impact: Improved from initial evaluation  Barriers to improvement: None noted      Plan for Next Session:    Cont OT POC     Time in:  1018  Time out:  1046  Timed treatment minutes:  28  Total treatment time:  28      Electronically signed by:     TONY Almonte,   8/31/2023, 10:21 AM

## 2023-09-01 VITALS
TEMPERATURE: 97.8 F | HEIGHT: 62 IN | DIASTOLIC BLOOD PRESSURE: 50 MMHG | OXYGEN SATURATION: 93 % | RESPIRATION RATE: 18 BRPM | SYSTOLIC BLOOD PRESSURE: 124 MMHG | WEIGHT: 240.8 LBS | BODY MASS INDEX: 44.31 KG/M2 | HEART RATE: 66 BPM

## 2023-09-01 LAB
CULTURE: ABNORMAL
CULTURE: ABNORMAL
Lab: ABNORMAL
SARS-COV-2 RDRP RESP QL NAA+PROBE: NOT DETECTED
SOURCE: NORMAL
SPECIMEN: ABNORMAL

## 2023-09-01 PROCEDURE — 6370000000 HC RX 637 (ALT 250 FOR IP): Performed by: STUDENT IN AN ORGANIZED HEALTH CARE EDUCATION/TRAINING PROGRAM

## 2023-09-01 PROCEDURE — 94640 AIRWAY INHALATION TREATMENT: CPT

## 2023-09-01 PROCEDURE — 6370000000 HC RX 637 (ALT 250 FOR IP): Performed by: NURSE PRACTITIONER

## 2023-09-01 PROCEDURE — 87635 SARS-COV-2 COVID-19 AMP PRB: CPT

## 2023-09-01 PROCEDURE — G0378 HOSPITAL OBSERVATION PER HR: HCPCS

## 2023-09-01 PROCEDURE — 94761 N-INVAS EAR/PLS OXIMETRY MLT: CPT

## 2023-09-01 RX ORDER — ACETAMINOPHEN 500 MG
1000 TABLET ORAL EVERY 6 HOURS PRN
Status: DISCONTINUED | OUTPATIENT
Start: 2023-09-01 | End: 2023-09-01 | Stop reason: HOSPADM

## 2023-09-01 RX ORDER — CEPHALEXIN 500 MG/1
500 CAPSULE ORAL 4 TIMES DAILY
Qty: 20 CAPSULE | Refills: 0 | Status: SHIPPED | OUTPATIENT
Start: 2023-09-01 | End: 2023-09-06

## 2023-09-01 RX ADMIN — FLUTICASONE PROPIONATE 2 PUFF: 110 AEROSOL, METERED RESPIRATORY (INHALATION) at 07:13

## 2023-09-01 RX ADMIN — ACETAMINOPHEN 1000 MG: 500 TABLET ORAL at 11:44

## 2023-09-01 ASSESSMENT — PAIN DESCRIPTION - LOCATION
LOCATION: BACK;SHOULDER;NECK
LOCATION: BACK;NECK;SHOULDER

## 2023-09-01 ASSESSMENT — PAIN DESCRIPTION - DESCRIPTORS
DESCRIPTORS: ACHING;SHARP;DISCOMFORT
DESCRIPTORS: BURNING;SQUEEZING

## 2023-09-01 ASSESSMENT — PAIN SCALES - GENERAL
PAINLEVEL_OUTOF10: 10
PAINLEVEL_OUTOF10: 3

## 2023-09-01 ASSESSMENT — PAIN DESCRIPTION - ORIENTATION
ORIENTATION: RIGHT;LEFT
ORIENTATION: RIGHT;LEFT

## 2023-09-01 NOTE — DISCHARGE SUMMARY
V2.0  Discharge Summary    Name:  Jose Henry /Age/Sex: 1950 (38 y.o. female)   Admit Date: 2023  Discharge Date: 23    MRN & CSN:  [id number] & [id number] Encounter Date and Time 23 3:13 PM EDT    Attending:  Vincenzo Shelton MD Discharging Provider: AUSTIN Benavides [facility] Course:     Brief HPI: Jose Henry is a 75 y.o. female with pmh of anemia, asthma/COPD, breast cancer, CAD, cardiac pacemaker, colon cancer, anxiety/depression, hyperlipidemia, hypertension, neuropathy, JAMIE, OA, severe aortic stenosis, chronic bladder incontinency who presents with Chest pain and difficulty walking. Set up an appointment with Dr. Rafiq Donaldson outpatient at 1:30 pm on . Nursing home may need set up transportation that day. PT OT recommended SNF placement. Patient is approved to be discharged to nursing home today. She will continue oral antibiotics for UTI and other home medications. Brief Problem Based Course:   Chest Pain: r/o ACS  Moderate/Severe Aortic Stenosis  Hx MI  -her pain started this am  on the right side and radiates into her right shoulder, she had a Cardiac Cath 2023 Right and Left which was clear, her last ECHO showed normal LVEF 55-60% but Heavily Calcified Aortic Valve with Severe Aortic Stenosis, ANDRE 23 showed Mod Aortic Stenosis, will repeat her ECHO as this she may need TAVR in the near future, will have her see Dr. Shayy Duvall outpatient after discharge to discuss, as he did her carotic artery stenosis surgery   -ECHO: Normal systolic function with LVEF 55%, moderate to severe aortic stenosis, previous ANDRE on 2023 showed possible LVOTO  -Cardiology Consult: Dr. Lluvia Jo, ruled out ACS, states always has elevated D-Dimer, Cath in 2023 was normal, pacer wires appear to be functioning well, FU as scheduled outpatient   -Set up an appointment with Dr. Rafiq Donaldson outpatient at 1:30 pm on . weight based adjustment of the mA/kV was utilized to reduce the radiation dose to as low as reasonably achievable. COMPARISON: Chest CT 05/15/2022 HISTORY: Elevated D-dimer and acute chest pain, asthma/COPD, anemia, severe aortic stenosis FINDINGS: Technical: Evaluation of the lower lungs degraded because of motion during imaging, blurring detail and resulting in misregistration artifact. Pulmonary Arteries: Pulmonary arteries are adequately opacified for evaluation. No evidence of intraluminal filling defect to suggest pulmonary embolism. Main pulmonary artery is dilated, 33 mm. Mediastinum: No evidence of mediastinal lymphadenopathy. Mild calcific atherosclerosis coronary arteries. The heart and pericardium demonstrate no acute abnormality. There is no acute abnormality of the thoracic aorta. The ascending thoracic aorta is 32 mm and descending thoracic aorta 22 mm. Lungs/pleura: The lungs are without acute process. Faint mosaic pattern opacities bilateral mid and lower lungs. Stable chronic pulmonary changes typically seen as sequela from smoking or other prior infectious/inflammatory disease including areas of air trapping mid and upper lungs. No focal consolidation or pulmonary edema. No evidence of pleural effusion or pneumothorax. Upper Abdomen: Limited images of the upper abdomen are unremarkable. Soft Tissues/Bones: No acute bone or soft tissue abnormality. Stable left-sided pacemaker. Confluent ossification of the anterior longitudinal ligament of thoracic spine reflects diffuse idiopathic skeletal hyperostosis (DISH). 1.  No acute pulmonary embolism. 2. Main pulmonary artery dilatation can be seen with pulmonary hypertension but is nonspecific. 3. No acute pulmonary disease. 4. Pulmonary findings typical of COPD/reactive airways disease. Chronic bronchiolitis is a consideration. 5. Mild calcific atherosclerosis coronary arteries.        CBC:   Recent Labs     08/30/23  1032   WBC 9.8   HGB

## 2023-09-01 NOTE — CARE COORDINATION
CM messaged [employer] and exp appeal is pending and may not get answer until after holiday weekend. 1200 CM informed per Fadia Arrington that [facility] with Beatriz Mouse is requesting updated PT/OT notes. CM faxed to [phone]. Pending the exp appeal. JACK spoke with Jayla at [facility] with updates.  [address]

## 2023-09-01 NOTE — PROGRESS NOTES
Nutrition Note    Patient not content with meal today on carb controlled diet, noted diet liberalized to regular. Patient requested carb food list. Provided copy of carb food list from Nutrition Care Manual and plan your plate handouts from ADA. Provided contact number for additional questions.        Electronically signed by Yasmany Reynaga RD, YENIFER on 9/1/23 at 4:24 PM EDT    Contact: [phone]

## 2023-09-01 NOTE — DISCHARGE INSTRUCTIONS
Follow-up with cardiothoracic Dr. Mickey Holley outpatient at 1:30 pm on Tuesday September 5th. Finish oral antibiotics as instruction.

## 2023-09-01 NOTE — PROGRESS NOTES
Report called to nurse Finn Nurse at [facility]. Scripts,   AVS faxed. Family at bedside. Rapid covid completed and negative.

## 2023-09-01 NOTE — CARE COORDINATION
CM received call from [facility] with Floresita Crane pt is approved and may admit whenever medically cleared to [facility]. CM called Jayla at [facility] and confirmed that pt could admit. CM called Rosendo Talbot with [facility] and transport time is 6 pm.   When discharged pt will be going to SNF at [facility]  Please notify family of discharge  Place Scripts, AVS, and Completed KIEL  in Envelope  Scripts fax to [phone]  Please call report to [phone]  Please call  [facility] [phone]   Pt will need a Rapid covid. CM updated Jayla at [facility] with time. CM updated nursing and envelope placed in the soft chart. CM updated pt. HENS completed.

## 2023-09-05 ENCOUNTER — HOSPITAL ENCOUNTER (OUTPATIENT)
Age: 73
Setting detail: SPECIMEN
Discharge: HOME | End: 2023-09-05
Payer: MEDICARE

## 2023-09-05 LAB
ALBUMIN SERPL-MCNC: 3.8 GM/DL (ref 3.4–5)
ALP BLD-CCNC: 129 IU/L (ref 40–128)
ALT SERPL-CCNC: 10 U/L (ref 10–40)
ANION GAP SERPL CALCULATED.3IONS-SCNC: 11 MMOL/L (ref 4–16)
AST SERPL-CCNC: 12 IU/L (ref 15–37)
BASOPHILS ABSOLUTE: 0 K/CU MM
BASOPHILS RELATIVE PERCENT: 0.4 % (ref 0–1)
BILIRUB SERPL-MCNC: 0.3 MG/DL (ref 0–1)
BUN SERPL-MCNC: 25 MG/DL (ref 6–23)
CALCIUM SERPL-MCNC: 9.3 MG/DL (ref 8.3–10.6)
CHLORIDE BLD-SCNC: 103 MMOL/L (ref 99–110)
CO2: 23 MMOL/L (ref 21–32)
CREAT SERPL-MCNC: 0.8 MG/DL (ref 0.6–1.1)
DIFFERENTIAL TYPE: ABNORMAL
EOSINOPHILS ABSOLUTE: 0.3 K/CU MM
EOSINOPHILS RELATIVE PERCENT: 3 % (ref 0–3)
GFR SERPL CREATININE-BSD FRML MDRD: >60 ML/MIN/1.73M2
GLUCOSE SERPL-MCNC: 86 MG/DL (ref 70–99)
HCT VFR BLD CALC: 33.5 % (ref 37–47)
HEMOGLOBIN: 10.4 GM/DL (ref 12.5–16)
IMMATURE NEUTROPHIL %: 0.5 % (ref 0–0.43)
LYMPHOCYTES ABSOLUTE: 3.1 K/CU MM
LYMPHOCYTES RELATIVE PERCENT: 31 % (ref 24–44)
MCH RBC QN AUTO: 27.2 PG (ref 27–31)
MCHC RBC AUTO-ENTMCNC: 31 % (ref 32–36)
MCV RBC AUTO: 87.7 FL (ref 78–100)
MONOCYTES ABSOLUTE: 0.7 K/CU MM
MONOCYTES RELATIVE PERCENT: 6.8 % (ref 0–4)
NUCLEATED RBC %: 0 %
PDW BLD-RTO: 14.9 % (ref 11.7–14.9)
PLATELET # BLD: 237 K/CU MM (ref 140–440)
PMV BLD AUTO: 10 FL (ref 7.5–11.1)
POTASSIUM SERPL-SCNC: 4.4 MMOL/L (ref 3.5–5.1)
RBC # BLD: 3.82 M/CU MM (ref 4.2–5.4)
SEGMENTED NEUTROPHILS ABSOLUTE COUNT: 5.9 K/CU MM
SEGMENTED NEUTROPHILS RELATIVE PERCENT: 58.3 % (ref 36–66)
SODIUM BLD-SCNC: 137 MMOL/L (ref 135–145)
TOTAL IMMATURE NEUTOROPHIL: 0.05 K/CU MM
TOTAL NUCLEATED RBC: 0 K/CU MM
TOTAL PROTEIN: 6.1 GM/DL (ref 6.4–8.2)
WBC # BLD: 10 K/CU MM (ref 4–10.5)

## 2023-09-05 PROCEDURE — 80053 COMPREHEN METABOLIC PANEL: CPT

## 2023-09-05 PROCEDURE — 85025 COMPLETE CBC W/AUTO DIFF WBC: CPT

## 2023-09-05 PROCEDURE — 36415 COLL VENOUS BLD VENIPUNCTURE: CPT

## 2023-09-12 ENCOUNTER — CARE COORDINATION (OUTPATIENT)
Dept: CASE MANAGEMENT | Age: 73
End: 2023-09-12

## 2023-09-12 DIAGNOSIS — R07.9 CHEST PAIN WITH MODERATE RISK FOR CARDIAC ETIOLOGY: Primary | ICD-10-CM

## 2023-09-12 PROCEDURE — 1111F DSCHRG MED/CURRENT MED MERGE: CPT | Performed by: FAMILY MEDICINE

## 2023-09-12 NOTE — CARE COORDINATION
[facility] Transitions Initial Follow Up Call    Call within 2 business days of discharge: Yes    Patient Current Location:  Home: [facility] Transition Nurse contacted the patient by telephone to perform post hospital discharge assessment. Verified name and  with patient as identifiers. Provided introduction to self, and explanation of the Care Transition Nurse role. Patient: Shadi Fisher Patient : 1950   MRN: [id number]  Reason for Admission: Chest pain d/t heart  Discharge Date: 23 RARS: Readmission Risk Score: 18.3      Last Discharge [address] [facility]       Date Complaint Diagnosis Description Type Department Provider    23 Chest Pain Chest pain, unspecified type ED to Hosp-Admission (Discharged) (ADMITTED) [facility]N Hakan Ornelas MD; Siria Dumont MD;... Was this an external facility discharge? Yes, 23  Discharge Facility: [facility]    Challenges to be reviewed by the provider   Additional needs identified to be addressed with provider: No  none    Pt having new pacemaker placed 23           Method of communication with provider: none. Acute Care Course:    [place] [facility] finger pain   to  [place] with chest pain and aortic stenosis   to  [facility] with d/c home    HFU made:   with Dr Jonny Underwood - cardiology. Sig Hx:   Anemia, asthma, COPD, Breast Ca Hx, pacemaker, colon CA, JAMIE< OA, urinary incontinence, neuropathy  DME:     Conversation:   Spoke with Nusrat after 2 IDs. She states she is sdoing ok and still having some chest pain but is going to have a pacemaker adjustment tomorrow with Dr Jonny Underwood at [place] [facility]. States she has lost about a 100 pounds since all this is going on. She has a rollator for outside but inside furniture surfs. States that Jayshree Porter is cancelling all Attivio and she is looking for P21 as she wants to keep Dr Jerry Vega.  States she will see

## 2023-09-14 ENCOUNTER — CARE COORDINATION (OUTPATIENT)
Dept: CASE MANAGEMENT | Age: 73
End: 2023-09-14

## 2023-09-14 NOTE — CARE COORDINATION
[facility] Care Transitions Follow Up Call    Patient Current Location:  Home: [facility] Transition Nurse contacted the patient by telephone to follow up after admission on 23. Verified name and  with patient as identifiers. Patient: Sondra Begum  Patient : 1950   MRN: [id number]  Reason for Admission: Chest pain  Discharge Date: 23 RARS: Readmission Risk Score: 18.3      Needs to be reviewed by the provider   Additional needs identified to be addressed with provider: Yes  Spoke to pt about providing PCP with ACP documents             Method of communication with provider: chart routing. Conversation:  Spoke with Nusrat after 2 IDs. Pt states she is doing \"much better and can breath\". She states she is still weak but now can walk 8 feet and not be SOB. She states the only chest pain is from the site and not the middle of the chest pain. Reviewed notes from Dr Melvina Caldwell and she states she will call today to make appt. Reviewed appts and she thought MRI and US was on  so she will call and confirm if on  or . She knows to leave the bandage on until it falls off but yesterday the top edge got caught on her [place] Comber and a little of the top is starting to peel. She is putting a small bandage on it to keep it in place, but will let all the other corners come off. Will watch for s/s infection. Very happy with Dr Melvina Caldwell and states he is the best doctor!!/ She will go to PCP office tomorrow. Left my contact information. Will hand off to [address] changes since last contact:   follow up on procedure  Discussed follow-up appointments. If no appointment was previously scheduled, appointment scheduling offered: Yes. Is follow up appointment scheduled within 7 days of discharge? Yes.     Follow Up  Future Appointments   Date Time Provider [address]   9/15/2023  1:00 PM AUSTIN Portillo - CNP [facility]IE   2023

## 2023-09-15 ENCOUNTER — OFFICE VISIT (OUTPATIENT)
Dept: INTERNAL MEDICINE CLINIC | Age: 73
End: 2023-09-15
Payer: MEDICARE

## 2023-09-15 VITALS
HEART RATE: 59 BPM | DIASTOLIC BLOOD PRESSURE: 72 MMHG | BODY MASS INDEX: 45.91 KG/M2 | OXYGEN SATURATION: 98 % | SYSTOLIC BLOOD PRESSURE: 128 MMHG | WEIGHT: 251 LBS

## 2023-09-15 DIAGNOSIS — F41.9 ANXIETY: ICD-10-CM

## 2023-09-15 DIAGNOSIS — I10 PRIMARY HYPERTENSION: Primary | ICD-10-CM

## 2023-09-15 DIAGNOSIS — Z95.0 CARDIAC PACEMAKER: ICD-10-CM

## 2023-09-15 PROCEDURE — 1124F ACP DISCUSS-NO DSCNMKR DOCD: CPT

## 2023-09-15 PROCEDURE — 3078F DIAST BP <80 MM HG: CPT

## 2023-09-15 PROCEDURE — 3074F SYST BP LT 130 MM HG: CPT

## 2023-09-15 PROCEDURE — 99213 OFFICE O/P EST LOW 20 MIN: CPT

## 2023-09-15 RX ORDER — LOSARTAN POTASSIUM 100 MG/1
100 TABLET ORAL DAILY
COMMUNITY
Start: 2023-09-08 | End: 2023-09-15

## 2023-09-15 ASSESSMENT — ENCOUNTER SYMPTOMS
EYE PAIN: 0
NAUSEA: 0
CONSTIPATION: 0
FACIAL SWELLING: 0
DIARRHEA: 0
EYE DISCHARGE: 0
TROUBLE SWALLOWING: 0
VOMITING: 0
STRIDOR: 0
RHINORRHEA: 0
WHEEZING: 0
SORE THROAT: 0
SHORTNESS OF BREATH: 0
COLOR CHANGE: 0
COUGH: 0
CHOKING: 0
ABDOMINAL PAIN: 0
CHEST TIGHTNESS: 0
EYE REDNESS: 0

## 2023-09-15 ASSESSMENT — VISUAL ACUITY: OU: 1

## 2023-09-19 ENCOUNTER — CARE COORDINATION (OUTPATIENT)
Dept: CARE COORDINATION | Age: 73
End: 2023-09-19

## 2023-09-19 ENCOUNTER — HOSPITAL ENCOUNTER (OUTPATIENT)
Dept: WOMENS IMAGING | Age: 73
Discharge: HOME | End: 2023-09-19
Payer: MEDICARE

## 2023-09-19 DIAGNOSIS — Z78.0 ENCOUNTER FOR OSTEOPOROSIS SCREENING IN ASYMPTOMATIC POSTMENOPAUSAL PATIENT: ICD-10-CM

## 2023-09-19 DIAGNOSIS — Z13.820 ENCOUNTER FOR OSTEOPOROSIS SCREENING IN ASYMPTOMATIC POSTMENOPAUSAL PATIENT: ICD-10-CM

## 2023-09-19 PROCEDURE — 77080 DXA BONE DENSITY AXIAL: CPT

## 2023-09-22 ENCOUNTER — TELEPHONE (OUTPATIENT)
Dept: INTERNAL MEDICINE CLINIC | Age: 73
End: 2023-09-22

## 2023-09-22 ENCOUNTER — CARE COORDINATION (OUTPATIENT)
Dept: CARE COORDINATION | Age: 73
End: 2023-09-22

## 2023-09-22 SDOH — SOCIAL STABILITY: SOCIAL NETWORK: HOW OFTEN DO YOU GET TOGETHER WITH FRIENDS OR RELATIVES?: ONCE A WEEK

## 2023-09-22 SDOH — ECONOMIC STABILITY: FOOD INSECURITY: WITHIN THE PAST 12 MONTHS, THE FOOD YOU BOUGHT JUST DIDN'T LAST AND YOU DIDN'T HAVE MONEY TO GET MORE.: SOMETIMES TRUE

## 2023-09-22 SDOH — ECONOMIC STABILITY: INCOME INSECURITY: HOW HARD IS IT FOR YOU TO PAY FOR THE VERY BASICS LIKE FOOD, HOUSING, MEDICAL CARE, AND HEATING?: SOMEWHAT HARD

## 2023-09-22 SDOH — ECONOMIC STABILITY: HOUSING INSECURITY: IN THE LAST 12 MONTHS, HOW MANY PLACES HAVE YOU LIVED?: 1

## 2023-09-22 SDOH — HEALTH STABILITY: MENTAL HEALTH
STRESS IS WHEN SOMEONE FEELS TENSE, NERVOUS, ANXIOUS, OR CAN'T SLEEP AT NIGHT BECAUSE THEIR MIND IS TROUBLED. HOW STRESSED ARE YOU?: TO SOME EXTENT

## 2023-09-22 SDOH — SOCIAL STABILITY: SOCIAL NETWORK: HOW OFTEN DO YOU ATTEND CHURCH OR RELIGIOUS SERVICES?: NEVER

## 2023-09-22 SDOH — ECONOMIC STABILITY: FOOD INSECURITY: WITHIN THE PAST 12 MONTHS, YOU WORRIED THAT YOUR FOOD WOULD RUN OUT BEFORE YOU GOT MONEY TO BUY MORE.: SOMETIMES TRUE

## 2023-09-22 SDOH — HEALTH STABILITY: MENTAL HEALTH: HOW OFTEN DO YOU HAVE A DRINK CONTAINING ALCOHOL?: NEVER

## 2023-09-22 SDOH — HEALTH STABILITY: PHYSICAL HEALTH: ON AVERAGE, HOW MANY MINUTES DO YOU ENGAGE IN EXERCISE AT THIS LEVEL?: 0 MIN

## 2023-09-22 SDOH — HEALTH STABILITY: MENTAL HEALTH: HOW MANY STANDARD DRINKS CONTAINING ALCOHOL DO YOU HAVE ON A TYPICAL DAY?: PATIENT DOES NOT DRINK

## 2023-09-22 SDOH — SOCIAL STABILITY: SOCIAL NETWORK: ARE YOU MARRIED, WIDOWED, DIVORCED, SEPARATED, NEVER MARRIED, OR LIVING WITH A PARTNER?: DIVORCED

## 2023-09-22 SDOH — HEALTH STABILITY: PHYSICAL HEALTH: ON AVERAGE, HOW MANY DAYS PER WEEK DO YOU ENGAGE IN MODERATE TO STRENUOUS EXERCISE (LIKE A BRISK WALK)?: 0 DAYS

## 2023-09-22 SDOH — ECONOMIC STABILITY: INCOME INSECURITY: IN THE LAST 12 MONTHS, WAS THERE A TIME WHEN YOU WERE NOT ABLE TO PAY THE MORTGAGE OR RENT ON TIME?: YES

## 2023-09-22 SDOH — SOCIAL STABILITY: SOCIAL NETWORK: IN A TYPICAL WEEK, HOW MANY TIMES DO YOU TALK ON THE PHONE WITH FAMILY, FRIENDS, OR NEIGHBORS?: TWICE A WEEK

## 2023-09-22 NOTE — CARE COORDINATION
Ambulatory Care Coordination Note  2023    Patient Current Location:  Home: [address] [place] [address] [address]    ACM contacted the patient by telephone. Verified name and  with patient as identifiers. Provided introduction to self, and explanation of the ACM role. ACM: Bam Riley RN    Challenges to be reviewed by the provider   Additional needs identified to be addressed with provider: No  none               Method of communication with provider: none. Patient consents to ongoing follow up. Reports that she is doing ok. Medications:  medication reconciliation completed. Patient reports that she is taking her medications as directed. Denies any issue with the cost of her prescriptions. Instructed on safety/ fall prevention measures. Encouraged clear pathways; proper use of DME. Discussed care gaps: Instructed on the recommendation for routine Provider follow up. Instructed on same day sick, tele-health options for urgent care needs. Discussed support needs:  patient has a ; Veronika bunn. Reports that he takes her to appointments and assists with her care needs. Patient struggles with the cost of groceries; receives assistance with utilities. Patient is requesting assistance to move to Senior apartment. Reports that she is interested in apartments on [facility]. Instructed on referral to [facility] for support. Patient denies any questions or concerns at this time. ACM contact information provided should questions arise. Plan for next outreach:  safety, caregiver support. Referral made with request for LSW support.      Offered patient enrollment in the Remote Patient Monitoring (RPM) program for in-home monitoring: Patient is not eligible for RPM program.    Ambulatory Care Coordination Assessment    Care Coordination Protocol  Week 1 - Initial Assessment     Do you have all of your prescriptions and are they filled?: Yes  Are

## 2023-09-22 NOTE — TELEPHONE ENCOUNTER
----- Message from [address] sent at 9/22/2023  3:52 PM EDT -----  Subject: Message to Provider    QUESTIONS  Information for Provider? Bashir adame PTA with [facility] called to   report that Kayla January had Chest pain last night and pain in her right arm   this is on going pain. vitals today /92, HR 60 , No signs of   distress. Did get some bad news came today so there is a lot going on.   ---------------------------------------------------------------------------  --------------  [address] [place] [address] Aniket  [phone]; OK to leave message on voicemail  ---------------------------------------------------------------------------  --------------  SCRIPT ANSWERS  Relationship to Patient?  Self

## 2023-09-22 NOTE — CARE COORDINATION
Patient Current Location: Home: [place]     Initial Contact Social Work Note - Ambulatory  9/22/2023      Date of referral: 9/22/23  Referral received from: [address] [place] [address]  Reason for referral: housing    Previous  referral: No  If yes, brief summary of outcome: n/a    Two Identifiers Verified: Yes    Insurance Provider: Dara Gómez [employer] / medicaid    Support System:   caregiver - Leeanne Greenberg, son; mother - 80 - about 15 miles away and resides alone, Pt's brother lives with her mom. [place] Status:  n/a    Community Providers: SNAP/Food [place]    ADL Assistance Needed: Bathing and Transferring    Housing/Living Concerns or Home Modification Needs: Pt is in a house, current eviction notice, court date is 10/11. Pt has called and obtained application for [employer] Insurance Concern: Pt has a car, insurance and gas and her caregiver takes her where she needs to go. Medication Cost Concern: none reported     Medication Adherence Concern: n/a    Financial Concern(s): cost of rental applications, down payment for new apartment    Income (only if applicable): unk    Ability to Read/Write: Yes    Advance Care Plan:  N/A    Other: has 24/7 caregiver, Lucita Kuhn  Looking for housing  Pt is interested in [address] called and Leeanne Greenberg went to get an application and she has it to fill it out. Pt identified need for downpayment for new apartment.  notified Pt she could  application at drive thru window on [address] And drop completed application back to them. Received application for HUD - application for [employer] and [facility] housing. USS - applied for membership to get out of the house     Discussed Passport referral. Pt was in agreement with referral after she moves.  provided contact number for  to address concerns with landlord.          Identified Needs:  Passport referral   housing  OIC -

## 2023-09-22 NOTE — TELEPHONE ENCOUNTER
Patient called stating that she has moved and needs a letter stating that she needs Lucita Kuhn who has been caring for years has to be there with her for 24 hour care. Patient would like a call when letter is ready for .

## 2023-09-22 NOTE — TELEPHONE ENCOUNTER
Called patient to see how she is feeling. She is feeling better. She is resting. She knows if it gets worse to go to ER.    Informed I will inform PCP

## 2023-09-25 ENCOUNTER — TELEPHONE (OUTPATIENT)
Dept: INTERNAL MEDICINE CLINIC | Age: 73
End: 2023-09-25

## 2023-09-25 NOTE — TELEPHONE ENCOUNTER
Patient wants to know if Aileen Key got a fax [facility] to approve coverage for [facility] care.  Would like a call back

## 2023-09-25 NOTE — TELEPHONE ENCOUNTER
I called and spoke with Pt I did tell her I received the fax for her insurance continuation. I faxed them back the form that they asked for and waiting to see if her future Appt get approved or denied. Pt is going to switch insurance.

## 2023-09-26 ENCOUNTER — HOSPITAL ENCOUNTER (OUTPATIENT)
Dept: INFUSION THERAPY | Age: 73
Discharge: HOME | End: 2023-09-26
Payer: MEDICARE

## 2023-09-26 ENCOUNTER — OFFICE VISIT (OUTPATIENT)
Dept: ONCOLOGY | Age: 73
End: 2023-09-26
Payer: MEDICARE

## 2023-09-26 VITALS
WEIGHT: 244 LBS | HEIGHT: 62 IN | TEMPERATURE: 98.1 F | SYSTOLIC BLOOD PRESSURE: 153 MMHG | HEART RATE: 60 BPM | DIASTOLIC BLOOD PRESSURE: 67 MMHG | BODY MASS INDEX: 44.9 KG/M2 | OXYGEN SATURATION: 95 %

## 2023-09-26 DIAGNOSIS — Z17.0 MALIGNANT NEOPLASM OF UPPER-OUTER QUADRANT OF RIGHT BREAST IN FEMALE, ESTROGEN RECEPTOR POSITIVE (HCC): Primary | ICD-10-CM

## 2023-09-26 DIAGNOSIS — C50.411 MALIGNANT NEOPLASM OF UPPER-OUTER QUADRANT OF RIGHT BREAST IN FEMALE, ESTROGEN RECEPTOR POSITIVE (HCC): Primary | ICD-10-CM

## 2023-09-26 PROCEDURE — 3078F DIAST BP <80 MM HG: CPT | Performed by: INTERNAL MEDICINE

## 2023-09-26 PROCEDURE — 99211 OFF/OP EST MAY X REQ PHY/QHP: CPT

## 2023-09-26 PROCEDURE — 99213 OFFICE O/P EST LOW 20 MIN: CPT | Performed by: INTERNAL MEDICINE

## 2023-09-26 PROCEDURE — 1124F ACP DISCUSS-NO DSCNMKR DOCD: CPT | Performed by: INTERNAL MEDICINE

## 2023-09-26 PROCEDURE — 3077F SYST BP >= 140 MM HG: CPT | Performed by: INTERNAL MEDICINE

## 2023-09-26 RX ORDER — ASPIRIN 81 MG/1
TABLET ORAL
COMMUNITY
Start: 2023-09-11

## 2023-09-26 RX ORDER — LOSARTAN POTASSIUM 100 MG/1
100 TABLET ORAL DAILY
COMMUNITY
Start: 2023-09-08 | End: 2023-09-27

## 2023-09-26 ASSESSMENT — PATIENT HEALTH QUESTIONNAIRE - PHQ9
SUM OF ALL RESPONSES TO PHQ QUESTIONS 1-9: 0
SUM OF ALL RESPONSES TO PHQ QUESTIONS 1-9: 0
2. FEELING DOWN, DEPRESSED OR HOPELESS: 0
SUM OF ALL RESPONSES TO PHQ QUESTIONS 1-9: 0
SUM OF ALL RESPONSES TO PHQ9 QUESTIONS 1 & 2: 0
1. LITTLE INTEREST OR PLEASURE IN DOING THINGS: 0
SUM OF ALL RESPONSES TO PHQ QUESTIONS 1-9: 0

## 2023-09-26 NOTE — PROGRESS NOTES
MA Rooming Questions  Patient: Roberta Justin  MRN: [id number]    Date: 9/26/2023        1. Do you have any new issues?   no         2. Do you need any refills on medications?    no    3. Have you had any imaging done since your last visit?   no    4. Have you been hospitalized or seen in the emergency room since your last visit here?   no    5. Did the patient have a depression screening completed today?  Yes    PHQ-9 Total Score: 0 (9/26/2023  2:09 PM)       PHQ-9 Given to (if applicable):               PHQ-9 Score (if applicable):                     [] Positive     []  Negative              Does question #9 need addressed (if applicable)                     [] Yes    []  No               Chelsea Chester CMA
S2, no murmur noted  ABDOMEN: normal bowel sounds x 4, soft, non-distended, non-tender, no masses palpated, no hepatosplenomegaly   MUSCULOSKELETAL: full range of motion noted, tone is normal  NEUROLOGIC: awake, alert, oriented to name, place and time. Motor skills grossly intact. SKIN: appears intact, normal skin color, normal texture, normal turgor, no jaundice. EXTREMITIES: no LE edema, no clubbing, no leg swelling, no cyanosis,    BREASTS: no palpable mass in right mastectomy site.  No palpable mass in left breast and b/l axilla     Labs:  Hematology:  Lab Results   Component Value Date    WBC 10.0 09/05/2023    RBC 3.82 (L) 09/05/2023    HGB 10.4 (L) 09/05/2023    HCT 33.5 (L) 09/05/2023    MCV 87.7 09/05/2023    MCH 27.2 09/05/2023    MCHC 31.0 (L) 09/05/2023    RDW 14.9 09/05/2023     09/05/2023    MPV 10.0 09/05/2023    BANDSPCT 1 (L) 03/30/2021    SEGSPCT 58.3 09/05/2023    EOSRELPCT 3.0 09/05/2023    BASOPCT 0.4 09/05/2023    LYMPHOPCT 31.0 09/05/2023    MONOPCT 6.8 (H) 09/05/2023    BANDABS 0.10 03/30/2021    SEGSABS 5.9 09/05/2023    EOSABS 0.3 09/05/2023    BASOSABS 0.0 09/05/2023    LYMPHSABS 3.1 09/05/2023    MONOSABS 0.7 09/05/2023    DIFFTYPE AUTOMATED DIFFERENTIAL 09/05/2023    ANISOCYTOSIS 1+ 03/30/2021    POLYCHROM 1+ 03/30/2021    PLTM PLATELETS NORMAL IN NUMBER AND SIZE 03/26/2021     Lab Results   Component Value Date     (H) 10/17/2012     Chemistry:  Lab Results   Component Value Date     09/05/2023    K 4.4 09/05/2023     09/05/2023    CO2 23 09/05/2023    BUN 25 (H) 09/05/2023    CREATININE 0.8 09/05/2023    GLUCOSE 86 09/05/2023    CALCIUM 9.3 09/05/2023    PROT 6.1 (L) 09/05/2023    LABALBU 3.8 09/05/2023    BILITOT 0.3 09/05/2023    ALKPHOS 129 (H) 09/05/2023    AST 12 (L) 09/05/2023    ALT 10 09/05/2023    LABGLOM >60 09/05/2023    GFRAA >60 10/15/2022    AGRATIO 1.3 07/29/2021    GLOB 3.2 07/29/2021    PHOS 3.5 07/29/2015    MG 1.8 02/20/2023    POCGLU 108

## 2023-09-27 ENCOUNTER — HOSPITAL ENCOUNTER (OUTPATIENT)
Dept: WOMENS IMAGING | Age: 73
Discharge: HOME | End: 2023-09-27
Payer: MEDICARE

## 2023-09-27 ENCOUNTER — HOSPITAL ENCOUNTER (OUTPATIENT)
Dept: ULTRASOUND IMAGING | Age: 73
End: 2023-09-27
Payer: MEDICARE

## 2023-09-27 ENCOUNTER — CARE COORDINATION (OUTPATIENT)
Dept: CARE COORDINATION | Age: 73
End: 2023-09-27

## 2023-09-27 ENCOUNTER — OFFICE VISIT (OUTPATIENT)
Dept: INTERNAL MEDICINE CLINIC | Age: 73
End: 2023-09-27
Payer: MEDICARE

## 2023-09-27 ENCOUNTER — HOSPITAL ENCOUNTER (OUTPATIENT)
Age: 73
Discharge: HOME | End: 2023-09-27
Payer: MEDICARE

## 2023-09-27 VITALS
BODY MASS INDEX: 44.63 KG/M2 | HEIGHT: 62 IN | HEART RATE: 60 BPM | OXYGEN SATURATION: 95 % | SYSTOLIC BLOOD PRESSURE: 122 MMHG | DIASTOLIC BLOOD PRESSURE: 60 MMHG

## 2023-09-27 DIAGNOSIS — I10 PRIMARY HYPERTENSION: Primary | ICD-10-CM

## 2023-09-27 DIAGNOSIS — M85.80 OSTEOPENIA, UNSPECIFIED LOCATION: ICD-10-CM

## 2023-09-27 DIAGNOSIS — E55.9 VITAMIN D DEFICIENCY: ICD-10-CM

## 2023-09-27 DIAGNOSIS — R94.6 ABNORMAL THYROID FUNCTION TEST: ICD-10-CM

## 2023-09-27 DIAGNOSIS — Z17.0 MALIGNANT NEOPLASM OF UPPER-OUTER QUADRANT OF RIGHT BREAST IN FEMALE, ESTROGEN RECEPTOR POSITIVE (HCC): ICD-10-CM

## 2023-09-27 DIAGNOSIS — D64.9 ANEMIA, UNSPECIFIED TYPE: ICD-10-CM

## 2023-09-27 DIAGNOSIS — N64.4 BREAST PAIN, LEFT: ICD-10-CM

## 2023-09-27 DIAGNOSIS — I35.0 AORTIC VALVE STENOSIS, ETIOLOGY OF CARDIAC VALVE DISEASE UNSPECIFIED: ICD-10-CM

## 2023-09-27 DIAGNOSIS — C50.411 MALIGNANT NEOPLASM OF UPPER-OUTER QUADRANT OF RIGHT BREAST IN FEMALE, ESTROGEN RECEPTOR POSITIVE (HCC): ICD-10-CM

## 2023-09-27 DIAGNOSIS — Z85.3 HISTORY OF RIGHT BREAST CANCER: ICD-10-CM

## 2023-09-27 LAB
25(OH)D3 SERPL-MCNC: 14.92 NG/ML
BASOPHILS ABSOLUTE: 0.1 K/CU MM
BASOPHILS RELATIVE PERCENT: 0.5 % (ref 0–1)
DIFFERENTIAL TYPE: ABNORMAL
EOSINOPHILS ABSOLUTE: 0.2 K/CU MM
EOSINOPHILS RELATIVE PERCENT: 1.9 % (ref 0–3)
FOLATE SERPL-MCNC: 11.9 NG/ML (ref 3.1–17.5)
HCT VFR BLD CALC: 36.2 % (ref 37–47)
HEMOGLOBIN: 10.9 GM/DL (ref 12.5–16)
IMMATURE NEUTROPHIL %: 0.4 % (ref 0–0.43)
IRON: 49 UG/DL (ref 37–145)
LYMPHOCYTES ABSOLUTE: 3.3 K/CU MM
LYMPHOCYTES RELATIVE PERCENT: 29.4 % (ref 24–44)
MCH RBC QN AUTO: 27.6 PG (ref 27–31)
MCHC RBC AUTO-ENTMCNC: 30.1 % (ref 32–36)
MCV RBC AUTO: 91.6 FL (ref 78–100)
MONOCYTES ABSOLUTE: 0.8 K/CU MM
MONOCYTES RELATIVE PERCENT: 6.8 % (ref 0–4)
NUCLEATED RBC %: 0 %
PCT TRANSFERRIN: 16 % (ref 10–44)
PDW BLD-RTO: 14.6 % (ref 11.7–14.9)
PLATELET # BLD: 237 K/CU MM (ref 140–440)
PMV BLD AUTO: 10.1 FL (ref 7.5–11.1)
RBC # BLD: 3.95 M/CU MM (ref 4.2–5.4)
SEGMENTED NEUTROPHILS ABSOLUTE COUNT: 6.9 K/CU MM
SEGMENTED NEUTROPHILS RELATIVE PERCENT: 61 % (ref 36–66)
TOTAL IMMATURE NEUTOROPHIL: 0.05 K/CU MM
TOTAL IRON BINDING CAPACITY: 315 UG/DL (ref 250–450)
TOTAL NUCLEATED RBC: 0 K/CU MM
TSH SERPL DL<=0.005 MIU/L-ACNC: 1.12 UIU/ML (ref 0.27–4.2)
UNSATURATED IRON BINDING CAPACITY: 266 UG/DL (ref 110–370)
VITAMIN B-12: 406.6 PG/ML (ref 211–911)
WBC # BLD: 11.3 K/CU MM (ref 4–10.5)

## 2023-09-27 PROCEDURE — 84443 ASSAY THYROID STIM HORMONE: CPT

## 2023-09-27 PROCEDURE — 1124F ACP DISCUSS-NO DSCNMKR DOCD: CPT | Performed by: FAMILY MEDICINE

## 2023-09-27 PROCEDURE — 83550 IRON BINDING TEST: CPT

## 2023-09-27 PROCEDURE — 99214 OFFICE O/P EST MOD 30 MIN: CPT | Performed by: FAMILY MEDICINE

## 2023-09-27 PROCEDURE — 3078F DIAST BP <80 MM HG: CPT | Performed by: FAMILY MEDICINE

## 2023-09-27 PROCEDURE — G0279 TOMOSYNTHESIS, MAMMO: HCPCS

## 2023-09-27 PROCEDURE — 82607 VITAMIN B-12: CPT

## 2023-09-27 PROCEDURE — 36415 COLL VENOUS BLD VENIPUNCTURE: CPT

## 2023-09-27 PROCEDURE — 85025 COMPLETE CBC W/AUTO DIFF WBC: CPT

## 2023-09-27 PROCEDURE — 82746 ASSAY OF FOLIC ACID SERUM: CPT

## 2023-09-27 PROCEDURE — 3074F SYST BP LT 130 MM HG: CPT | Performed by: FAMILY MEDICINE

## 2023-09-27 PROCEDURE — 83540 ASSAY OF IRON: CPT

## 2023-09-27 PROCEDURE — 82306 VITAMIN D 25 HYDROXY: CPT

## 2023-09-27 RX ORDER — CALCIUM CARBONATE/VITAMIN D3 600 MG-10
1 TABLET ORAL DAILY
Qty: 60 TABLET | Refills: 5 | Status: SHIPPED | OUTPATIENT
Start: 2023-09-27

## 2023-09-27 ASSESSMENT — ENCOUNTER SYMPTOMS
COUGH: 0
SHORTNESS OF BREATH: 0
ABDOMINAL PAIN: 0
NAUSEA: 0

## 2023-09-27 NOTE — CARE COORDINATION
Attempted phone call to Pt to follow up regarding housing, rental assistance for down payment. No answer, voicemail message left requesting return call, contact number provided. FERN plan of care: SW will make next outreach attempt on 10/3 to follow up regarding housing, deposit on new apartment and passport referral following move.

## 2023-09-27 NOTE — PROGRESS NOTES
Sade Sutton (:  1950) is a 75 y.o. female,established patient, here for evaluation of the following chief complaint(s):  Follow-up (Vitamin deficiency, osteopenia) and Hypertension         ASSESSMENT/PLAN:  1. Primary hypertension  Continue Norvasc and valsartan    2. Aortic valve stenosis, etiology of cardiac valve disease unspecified  Follow-up with 6cardiothoracic surgery    3. Osteopenia, unspecified location  - calcium carb-cholecalciferol (CALCIUM + VITAMIN D3) 600-10 MG-MCG TABS per tab; Take 1 tablet by mouth daily  Dispense: 60 tablet; Refill: 5    4. Anemia, unspecified type  Continue ferrous sulfate  - CBC with Auto Differential; Future  - Iron and TIBC; Future  - Vitamin B12 & Folate; Future  Keep follow-up with hematology    5. Vitamin D deficiency  - Vitamin D 25 Hydroxy; Future    6. Abnormal thyroid function test  - TSH with Reflex; Future    Medications reconciled and discussed with the patient  The patient is asked to make an attempt to improve diet and exercise patterns   Return in about 4 months (around 2024) for HTN, HLD.        Lab Results   Component Value Date    CHOL 198 2023     Lab Results   Component Value Date    TRIG 175 (H) 2023     Lab Results   Component Value Date    HDL 38 (L) 2023     Lab Results   Component Value Date    LDLCALC 125 (H) 2023    LDLDIRECT 102 (H) 2020     Lab Results   Component Value Date    LABA1C 5.2 2023     Lab Results   Component Value Date     2023     Lab Results   Component Value Date     2023    K 4.4 2023     2023    CO2 23 2023    BUN 25 (H) 2023    CREATININE 0.8 2023    GLUCOSE 86 2023    CALCIUM 9.3 2023    PROT 6.1 (L) 2023    LABALBU 3.8 2023    BILITOT 0.3 2023    ALKPHOS 129 (H) 2023    AST 12 (L) 2023    ALT 10 2023    LABGLOM >60 2023    GFRAA >60 10/15/2022    AGRATIO 1.3

## 2023-09-28 ENCOUNTER — TELEPHONE (OUTPATIENT)
Dept: CARDIOLOGY CLINIC | Age: 73
End: 2023-09-28

## 2023-09-28 ENCOUNTER — TELEPHONE (OUTPATIENT)
Dept: INTERNAL MEDICINE CLINIC | Age: 73
End: 2023-09-28

## 2023-09-28 RX ORDER — FERROUS SULFATE 325(65) MG
325 TABLET ORAL
Qty: 30 TABLET | Refills: 3 | Status: SHIPPED | OUTPATIENT
Start: 2023-09-28

## 2023-09-28 RX ORDER — LANOLIN ALCOHOL/MO/W.PET/CERES
1000 CREAM (GRAM) TOPICAL DAILY
Qty: 30 TABLET | Refills: 3 | Status: SHIPPED | OUTPATIENT
Start: 2023-09-28

## 2023-09-28 NOTE — TELEPHONE ENCOUNTER
Called patient to discuss future appointment that is scheduled. Asked the patient if they received the notification through the mail from [facility] that Dr. Dino Corey will be out of network starting 10/1/2023. Patient states they did receive the letter. Informed the patient per the letter from [facility] they have the option to do a continuing of care request to receive care for the remainder of the year. Asked patient if they would like for us to send the request along with the clinical documentation needed!  [facility]    Informed patient that [employer] can help them with their decisions for future Medicare Beneficiaries on Thursday October 5th from 10 am to 12 pm. The address is [address] [place] [address], [address] [place] [id number]

## 2023-09-28 NOTE — TELEPHONE ENCOUNTER
----- Message from Desi Smoker sent at 9/28/2023  9:12 AM EDT -----  Subject: Message to Provider    QUESTIONS  Information for Provider? pt called in stating she got her blood work done   and wants the office to know she needs it by friday. She states that the   doctor would know what medications to put her on. Please contact the pt.   ---------------------------------------------------------------------------  --------------  Darnell KIMBLE  [phone]; OK to leave message on voicemail  ---------------------------------------------------------------------------  --------------  SCRIPT ANSWERS  Relationship to Patient?  Self

## 2023-09-29 ENCOUNTER — CARE COORDINATION (OUTPATIENT)
Dept: CARE COORDINATION | Age: 73
End: 2023-09-29

## 2023-09-29 ENCOUNTER — TELEPHONE (OUTPATIENT)
Dept: INTERNAL MEDICINE CLINIC | Age: 73
End: 2023-09-29

## 2023-09-29 NOTE — CARE COORDINATION
Ambulatory Care Coordination Note  2023    Patient Current Location:  Home: [place]     ACM contacted the patient by telephone. Verified name and  with patient as identifiers. Provided introduction to self, and explanation of the ACM role. Challenges to be reviewed by the provider   Additional needs identified to be addressed with provider: No  none               Method of communication with provider: none. ACM: Ananya White RN        Patient reports that she is felling ok. Reports that she is working on housing \" situation\". Patient reports that she has a 24/7 caregiver who lives with her and patient will need housing that will allow for this to continue. Patient has interview at [facility]. Today. Informed of LSW attempt at contact. Patient reports that she is not sleeping well d/t this issue. Support given. Provided patient with contact information to return call as patient reports that she is \" distracted\" at this time. Encouraged patient follow through. Patient reports that she is taking medications as directed. Appetite is \"ok\". Patient denies nausea or vomiting. B& B status is WNL. Patient denies HA, Palpitations, dizziness or light headedness. Reviewed worsening s/s to report to MD/ when to call 911. Offered patient enrollment in the Remote Patient Monitoring (RPM) program for in-home monitoring: NA.    Patient denies questions or concerns at this time. Confirmed that patient has ACM contact information. Encouraged patient to call if needs arise. Plan for next outreach:  discuss care gaps, support needs    Follow up contact with patient to leave VM per her request with ACM/ LSW contact information. Lab Results       None                 Goals Addressed                   This Visit's Progress     Conditions and Symptoms   No change     I will schedule office visits, as directed by my provider.   I will keep my
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
## 2023-09-29 NOTE — TELEPHONE ENCOUNTER
Azalea Suarez from [facility] wanted to let Chrissie Ledbetter know that Wednesday patient was having pain in the middle of her chest. He came back on Thursday and she was fine and was not in distress. Juana Lozada thought that it was maybe because the patient has been under a lot of stress. Vital signs remain within it's normal limits.  Any questions or concerns can call [facility]

## 2023-10-02 ENCOUNTER — TELEPHONE (OUTPATIENT)
Dept: CARDIOLOGY CLINIC | Age: 73
End: 2023-10-02

## 2023-10-02 ENCOUNTER — TELEPHONE (OUTPATIENT)
Dept: INTERNAL MEDICINE CLINIC | Age: 73
End: 2023-10-02

## 2023-10-02 ENCOUNTER — CARE COORDINATION (OUTPATIENT)
Dept: CARE COORDINATION | Age: 73
End: 2023-10-02

## 2023-10-02 NOTE — TELEPHONE ENCOUNTER
Returned call to Luzmaria Esparza [facility].. Had to leave a message to return my call. I did advise him that the patient has a pacemaker and not a defibrillator so the pacemaker does not deliver a shock. Patient also had a battery change on 9/13/23 and has not been to the office for site check.
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
## 2023-10-02 NOTE — CARE COORDINATION
Patient Current Location: Home: [place]     Phone call to Pt to follow up regarding housing / AL placement. Pt reported she had the wrong address for [facility], SW provided the correct address. Pt reported she did submit application for [facility], is going to [facility] on 10/3 and is considering looking into purchasing a camper. Pt is not looking into AL placement at this time. Health insurance - Pt reported she was approved by [facility]. Discussed that an agreement between [facility] and [facility] was made and Pt can keep [place]. Pt was appreciative of the update. FERN plan of care: SW will follow up with Pt regarding housing on 10/6.

## 2023-10-04 ENCOUNTER — PROCEDURE VISIT (OUTPATIENT)
Dept: CARDIOLOGY CLINIC | Age: 73
End: 2023-10-04
Payer: MEDICARE

## 2023-10-04 VITALS — DIASTOLIC BLOOD PRESSURE: 60 MMHG | SYSTOLIC BLOOD PRESSURE: 118 MMHG | HEART RATE: 60 BPM

## 2023-10-04 DIAGNOSIS — Z95.0 CARDIAC PACEMAKER IN SITU: Primary | ICD-10-CM

## 2023-10-04 PROCEDURE — 93280 PM DEVICE PROGR EVAL DUAL: CPT | Performed by: INTERNAL MEDICINE

## 2023-10-04 NOTE — PROCEDURES
Nusrat Hines Fix  75 y.o., female  1950    Alvaro Arauz DO    Chief Complaint   Patient presents with    Procedure     Pacemaker check     Vitals:    10/04/23 1559   BP: 118/60   Pulse: 60     Pacer analysis is reviewed and filed in the pacer chart. Analysis is consistent with normal dual-chamber MRI safe Medtronic pacer function with stable leads and appropriate battery status for the age of the device. Remaining average battery longevity is 11.8 years. Pacer is programmed to DDD mode at a lower rate of 60 bpm and 99.6% pacing in the ventricle and 86.5% pacing in the atrium. Recommend continued every three month pacer check and follow up office visit as scheduled.     Mohit Hale MD, 10/4/2023 4:20 PM

## 2023-10-04 NOTE — TELEPHONE ENCOUNTER
Patient returned call today. She had new pacer implant done by Dr. Waverly Cabot on 9/13/23. Patient feels that device shocked her. I advised patient that a pacemaker does not shock the patient. She said that her last device did this to her when it was first put in and no one was ever able to explain it to her. I asked about her monitor, patient said that it is packed because she is in the process of moving. I offered patient to come in to have device checked and she scheduled for today.

## 2023-10-06 ENCOUNTER — CARE COORDINATION (OUTPATIENT)
Dept: CARE COORDINATION | Age: 73
End: 2023-10-06

## 2023-10-06 NOTE — CARE COORDINATION
Ambulatory Care Coordination Note  10/6/2023    Patient Current Location:  Home: [place]     ACM contacted the patient by telephone. Verified name and  with patient as identifiers. Provided introduction to self, and explanation of the ACM role. Challenges to be reviewed by the provider   Additional needs identified to be addressed with provider: No  none               Method of communication with provider: none. ACM: Meche Higgins RN        Patient reports that she is doing well. Patient has a  that assists with meals, housekeeping and daily care needs. Reports slight swelling to extremities. Denies increased SOB, cough or wheezing. Encouraged elevation; low sodium diet. Instructed on s/s to report to MD.     Patient reports that her housing situation is being addressed. Reports that she is very happy as she has been accepted to a local senior apt with plan to move soon. Patient reports that the rent is income based and that she will be saving money from her current rent. Offered patient enrollment in the Remote Patient Monitoring (RPM) program for in-home monitoring: Patient declined. Discussed care gaps; Instructed on routine Provider follow up. Instructed on same day sick, tele-health options for urgent care needs. Patient denies any questions or concerns at this time. ACM contact information provided should questions arise.     Plan for next outreach; ensure safety, discuss support needs    Lab Results       None                 Goals Addressed                      This Visit's Progress      Patient Stated (pt-stated)   No change      Pt is being evicted and in need of housing    Barriers: financial, lack of support, overwhelmed by complexity of regimen, and time constraints  Plan for overcoming my barriers: SW will support Pt in process of obtaining housing, will provide additional resources as needed  Confidence:

## 2023-10-06 NOTE — CARE COORDINATION
Patient Current Location: Home: [place]     Phone call to Pt to follow up regarding housing. Pt reported she went to [facility] on 10/3 and they will have an opening soon, they will follow up with her next week. FERN plan of care: SW will follow up with Pt regarding housing on 10/11    Pt spoke with an Atty regarding issues with current landlord, court is on 10/11 @ 9am.     SW plan of care: SW will follow up with Pt on 10/12 regarding housing.

## 2023-10-12 ENCOUNTER — CARE COORDINATION (OUTPATIENT)
Dept: CARE COORDINATION | Age: 73
End: 2023-10-12

## 2023-10-12 NOTE — CARE COORDINATION
Patient Current Location: Home: [place]       Pt reported she went to court on 10/11 regarding eviction. Pt reported she was told she has until 10/20 to get out after she notified the court she is signing and paying deposit on 10/16. Pt reported there's no gas, just electric, which will be covered by her rent. Pt reported they provide lunch. Discussed previous conversation regarding Passport services. SW will follow up with Pt after she gets settled into new apartment. Pt was in agreement. FERN plan of care: SW will follow up with Pt in 2 weeks regarding referral for passport services.

## 2023-10-13 ENCOUNTER — CARE COORDINATION (OUTPATIENT)
Dept: CARE COORDINATION | Age: 73
End: 2023-10-13
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
## 2023-10-25 ENCOUNTER — CARE COORDINATION (OUTPATIENT)
Dept: CARE COORDINATION | Age: 73
End: 2023-10-25

## 2023-10-26 ENCOUNTER — CARE COORDINATION (OUTPATIENT)
Dept: CARE COORDINATION | Age: 73
End: 2023-10-26

## 2023-10-26 NOTE — CARE COORDINATION
Attempted phone call to Pt to follow up regarding her recent move and to discuss referral for Passport services. No answer, voicemail message left requesting return call, contact number provided. FERN plan of care: FERN will make next outreach attempt on 11/3.

## 2023-11-01 ENCOUNTER — CARE COORDINATION (OUTPATIENT)
Dept: CARE COORDINATION | Age: 73
End: 2023-11-01

## 2023-11-03 ENCOUNTER — CARE COORDINATION (OUTPATIENT)
Dept: CARE COORDINATION | Age: 73
End: 2023-11-03

## 2023-11-03 NOTE — CARE COORDINATION
Attempted phone call to Pt. No answer, voicemail message left requesting return call, contact number provided. FERN plan of care: SW will make next outreach attempt on 11/14 regarding her move and discuss referral for passport services.

## 2023-11-08 ENCOUNTER — OFFICE VISIT (OUTPATIENT)
Dept: CARDIOLOGY CLINIC | Age: 73
End: 2023-11-08
Payer: MEDICARE

## 2023-11-08 ENCOUNTER — CARE COORDINATION (OUTPATIENT)
Dept: CARE COORDINATION | Age: 73
End: 2023-11-08

## 2023-11-08 VITALS
WEIGHT: 251 LBS | BODY MASS INDEX: 46.19 KG/M2 | HEIGHT: 62 IN | OXYGEN SATURATION: 96 % | HEART RATE: 45 BPM | DIASTOLIC BLOOD PRESSURE: 60 MMHG | SYSTOLIC BLOOD PRESSURE: 116 MMHG

## 2023-11-08 DIAGNOSIS — I44.1 HEART BLOCK AV SECOND DEGREE: Primary | ICD-10-CM

## 2023-11-08 PROCEDURE — 3074F SYST BP LT 130 MM HG: CPT | Performed by: INTERNAL MEDICINE

## 2023-11-08 PROCEDURE — 1124F ACP DISCUSS-NO DSCNMKR DOCD: CPT | Performed by: INTERNAL MEDICINE

## 2023-11-08 PROCEDURE — 3078F DIAST BP <80 MM HG: CPT | Performed by: INTERNAL MEDICINE

## 2023-11-08 PROCEDURE — 99214 OFFICE O/P EST MOD 30 MIN: CPT | Performed by: INTERNAL MEDICINE

## 2023-11-08 NOTE — PROGRESS NOTES
LEFT CYSTOSCOPY URETEROSCOPY RETROGRADE PYELOGRAM STONE MANIPULATION WITH HOLIUM LASER LITHOTRIPSY POSSIBLE STENT PLACEMENT performed by Rah Ortez MD at [address] [place] [address]  2/29/2012    Right axillary-3 sentinel nodes were positive out of 9. MASTECTOMY, RADICAL Right 02/29/2012    w/ sentinal node disection-Dr West    PACEMAKER PLACEMENT      PRE-MALIGNANT / BENIGN SKIN LESION EXCISION  2/8/2013    right leg X2-Dr West    TONSILLECTOMY  1957    TUNNELED VENOUS PORT PLACEMENT  2004    TUNNELED VENOUS PORT PLACEMENT  02/13/2012    removal of mediport      As reviewed   Family History   Problem Relation Age of Onset    Arthritis Mother         OA, RA    High Cholesterol Mother     High Blood Pressure Mother     Cancer Father         skin and leukemia    High Blood Pressure Father     High Cholesterol Father     Diabetes Father     Heart Disease Father     Heart Disease Brother     High Cholesterol Brother     High Blood Pressure Brother     Heart Disease Brother     No Known Problems Sister     Seizures Son     Cancer Brother         skin cancer    Breast Cancer Neg Hx     Ovarian Cancer Neg Hx      Social History     Tobacco Use    Smoking status: Never    Smokeless tobacco: Never   Substance Use Topics    Alcohol use: No     Comment:           CAFFEINE: 2- 12oz nona daily & 2 cups coffee some nights. Review of Systems:    Constitutional: Negative for diaphoresis and fatigue  Psychological:Negative for anxiety or depression  HENT: Negative for headaches, nasal congestion, sinus pain or vertigo  Eyes: Negative for visual disturbance.    Endocrine: Negative for polydipsia/polyuria  Respiratory: Negative for shortness of breath  Cardiovascular: Negative for chest pain, dyspnea on exertion, claudication, edema, irregular heartbeat, murmur, palpitations or shortness of breath  Gastrointestinal: Negative for abdominal pain or heartburn  Genito-Urinary: Negative for urinary
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
## 2023-11-14 ENCOUNTER — CARE COORDINATION (OUTPATIENT)
Dept: CARE COORDINATION | Age: 73
End: 2023-11-14
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
## 2023-11-22 DIAGNOSIS — J44.9 CHRONIC OBSTRUCTIVE PULMONARY DISEASE, UNSPECIFIED COPD TYPE (HCC): ICD-10-CM

## 2023-11-22 DIAGNOSIS — B96.89 ACUTE BACTERIAL SINUSITIS: ICD-10-CM

## 2023-11-22 DIAGNOSIS — J01.90 ACUTE BACTERIAL SINUSITIS: ICD-10-CM

## 2023-11-23 RX ORDER — ALBUTEROL SULFATE 90 UG/1
AEROSOL, METERED RESPIRATORY (INHALATION)
Qty: 1 EACH | Refills: 5 | Status: SHIPPED | OUTPATIENT
Start: 2023-11-23

## 2023-11-23 RX ORDER — DEXAMETHASONE 4 MG/1
TABLET ORAL
Qty: 1 EACH | Refills: 5 | Status: SHIPPED | OUTPATIENT
Start: 2023-11-23

## 2023-11-24 DIAGNOSIS — I10 PRIMARY HYPERTENSION: ICD-10-CM

## 2023-11-27 RX ORDER — VALSARTAN 160 MG/1
160 TABLET ORAL DAILY
Qty: 90 TABLET | Refills: 0 | Status: SHIPPED | OUTPATIENT
Start: 2023-11-27

## 2023-11-28 ENCOUNTER — TELEPHONE (OUTPATIENT)
Dept: INTERNAL MEDICINE CLINIC | Age: 73
End: 2023-11-28

## 2023-11-28 NOTE — TELEPHONE ENCOUNTER
Received a call from the patient, said she needs some kind of form filled out by pcp in order to stay on medicaid. Said she spoke with jfs about it as well.

## 2023-11-29 NOTE — TELEPHONE ENCOUNTER
Patient needs a letter written by PCP for [address] [facility] stating she needs to stay on medicaid.

## 2023-12-08 ENCOUNTER — CARE COORDINATION (OUTPATIENT)
Dept: CARE COORDINATION | Age: 73
End: 2023-12-08

## 2023-12-08 NOTE — CARE COORDINATION
Ambulatory Care Coordination Note  2023    Patient Current Location:  Home: [address] [place] [id number]     ACM contacted the patient by telephone. Verified name and  with patient as identifiers. Provided introduction to self, and explanation of the ACM role. Challenges to be reviewed by the provider   Additional needs identified to be addressed with provider: No  none               Method of communication with provider: chart routing. ACM: Karen Renae RN     ACM completed follow up call with patient who said she is feeling much better at her new place. Patient said she feels it is  at new place. Patient has no active health concerns at this time. Plan:    Graduate from [address] [place] [address] patient enrollment in the Remote Patient Monitoring (RPM) program for in-home monitoring:  Patient graduated from [address] [place] [address] . Lab Results       None            Care Coordination Interventions    Referral from Primary Care Provider: Yes  Suggested Interventions and Community Resources  Fall Risk Prevention: Completed  Medication Assistance Program: Declined  Registered Dietician: Declined  Social Work: Completed          Goals Addressed                   This Visit's Progress     Conditions and Symptoms   Improving     I will schedule office visits, as directed by my provider. I will keep my appointment or reschedule if I have to cancel. I will notify my provider of any barriers to my plan of care. I will follow my Zone Management tool to seek urgent or emergent care. I will notify my provider of any symptoms that indicate a worsening of my condition. Barriers: fear of failure, lack of support, overwhelmed by complexity of regimen, stress, and lack of education  Plan for overcoming my barriers: Utilize ACM for education and community resources.    Confidence: 7/10  Anticipated Goal Completion Date: 3/27/2023   Updated 2023                Future Appointments   Date Time Provider

## 2023-12-21 DIAGNOSIS — I10 PRIMARY HYPERTENSION: ICD-10-CM

## 2023-12-21 RX ORDER — AMLODIPINE BESYLATE 5 MG/1
5 TABLET ORAL NIGHTLY
Qty: 30 TABLET | Refills: 1 | Status: SHIPPED | OUTPATIENT
Start: 2023-12-21

## 2023-12-21 RX ORDER — FUROSEMIDE 20 MG/1
20 TABLET ORAL DAILY
Qty: 30 TABLET | Refills: 1 | OUTPATIENT
Start: 2023-12-21

## 2023-12-21 RX ORDER — VALSARTAN 80 MG/1
80 TABLET ORAL DAILY
Qty: 30 TABLET | Refills: 1 | OUTPATIENT
Start: 2023-12-21

## 2023-12-21 RX ORDER — FUROSEMIDE 20 MG/1
20 TABLET ORAL DAILY
Qty: 30 TABLET | Refills: 1 | Status: SHIPPED | OUTPATIENT
Start: 2023-12-21

## 2024-01-09 ENCOUNTER — TELEPHONE (OUTPATIENT)
Dept: INTERNAL MEDICINE CLINIC | Age: 74
End: 2024-01-09

## 2024-01-09 DIAGNOSIS — R39.9 UTI SYMPTOMS: Primary | ICD-10-CM

## 2024-01-09 NOTE — TELEPHONE ENCOUNTER
Patient called wanting to drop the sample off. Explained to the patient that Dr. Arias needed to see her. I offered her an appt for Thursday and Friday, also [facility]. She does not want to come because she broke her toe. She would either want orders for Urine culture sent to the lab  or have a friend come pick a cup up to give to her for the urine analysis.

## 2024-01-22 ENCOUNTER — OFFICE VISIT (OUTPATIENT)
Dept: INTERNAL MEDICINE CLINIC | Age: 74
End: 2024-01-22
Payer: MEDICARE

## 2024-01-22 VITALS
BODY MASS INDEX: 48.47 KG/M2 | HEART RATE: 60 BPM | SYSTOLIC BLOOD PRESSURE: 122 MMHG | OXYGEN SATURATION: 99 % | HEIGHT: 62 IN | WEIGHT: 263.4 LBS | DIASTOLIC BLOOD PRESSURE: 64 MMHG

## 2024-01-22 VITALS
DIASTOLIC BLOOD PRESSURE: 64 MMHG | WEIGHT: 263 LBS | OXYGEN SATURATION: 99 % | HEIGHT: 62 IN | SYSTOLIC BLOOD PRESSURE: 122 MMHG | BODY MASS INDEX: 48.4 KG/M2 | HEART RATE: 60 BPM

## 2024-01-22 DIAGNOSIS — Z00.00 MEDICARE ANNUAL WELLNESS VISIT, SUBSEQUENT: Primary | ICD-10-CM

## 2024-01-22 DIAGNOSIS — R35.0 URINE FREQUENCY: ICD-10-CM

## 2024-01-22 DIAGNOSIS — I10 PRIMARY HYPERTENSION: Primary | ICD-10-CM

## 2024-01-22 DIAGNOSIS — M79.671 RIGHT FOOT PAIN: ICD-10-CM

## 2024-01-22 DIAGNOSIS — E55.9 VITAMIN D DEFICIENCY: ICD-10-CM

## 2024-01-22 PROBLEM — F33.1 MAJOR DEPRESSIVE DISORDER, RECURRENT, MODERATE (HCC): Status: ACTIVE | Noted: 2024-01-22

## 2024-01-22 PROCEDURE — 3074F SYST BP LT 130 MM HG: CPT | Performed by: FAMILY MEDICINE

## 2024-01-22 PROCEDURE — 3078F DIAST BP <80 MM HG: CPT | Performed by: FAMILY MEDICINE

## 2024-01-22 PROCEDURE — 1124F ACP DISCUSS-NO DSCNMKR DOCD: CPT | Performed by: FAMILY MEDICINE

## 2024-01-22 PROCEDURE — 99214 OFFICE O/P EST MOD 30 MIN: CPT | Performed by: FAMILY MEDICINE

## 2024-01-22 PROCEDURE — G0439 PPPS, SUBSEQ VISIT: HCPCS | Performed by: FAMILY MEDICINE

## 2024-01-22 ASSESSMENT — COLUMBIA-SUICIDE SEVERITY RATING SCALE - C-SSRS
4. HAVE YOU HAD THESE THOUGHTS AND HAD SOME INTENTION OF ACTING ON THEM?: YES
6. HAVE YOU EVER DONE ANYTHING, STARTED TO DO ANYTHING, OR PREPARED TO DO ANYTHING TO END YOUR LIFE?: NO
3. HAVE YOU BEEN THINKING ABOUT HOW YOU MIGHT KILL YOURSELF?: YES
5. HAVE YOU STARTED TO WORK OUT OR WORKED OUT THE DETAILS OF HOW TO KILL YOURSELF? DO YOU INTEND TO CARRY OUT THIS PLAN?: NO
1. WITHIN THE PAST MONTH, HAVE YOU WISHED YOU WERE DEAD OR WISHED YOU COULD GO TO SLEEP AND NOT WAKE UP?: YES
2. HAVE YOU ACTUALLY HAD ANY THOUGHTS OF KILLING YOURSELF?: YES

## 2024-01-22 ASSESSMENT — PATIENT HEALTH QUESTIONNAIRE - PHQ9
SUM OF ALL RESPONSES TO PHQ QUESTIONS 1-9: 6
10. IF YOU CHECKED OFF ANY PROBLEMS, HOW DIFFICULT HAVE THESE PROBLEMS MADE IT FOR YOU TO DO YOUR WORK, TAKE CARE OF THINGS AT HOME, OR GET ALONG WITH OTHER PEOPLE: 2
SUM OF ALL RESPONSES TO PHQ QUESTIONS 1-9: 5
7. TROUBLE CONCENTRATING ON THINGS, SUCH AS READING THE NEWSPAPER OR WATCHING TELEVISION: 0
5. POOR APPETITE OR OVEREATING: 0
SUM OF ALL RESPONSES TO PHQ QUESTIONS 1-9: 6
2. FEELING DOWN, DEPRESSED OR HOPELESS: 1
SUM OF ALL RESPONSES TO PHQ QUESTIONS 1-9: 6
1. LITTLE INTEREST OR PLEASURE IN DOING THINGS: 1
3. TROUBLE FALLING OR STAYING ASLEEP: 2
6. FEELING BAD ABOUT YOURSELF - OR THAT YOU ARE A FAILURE OR HAVE LET YOURSELF OR YOUR FAMILY DOWN: 0
8. MOVING OR SPEAKING SO SLOWLY THAT OTHER PEOPLE COULD HAVE NOTICED. OR THE OPPOSITE, BEING SO FIGETY OR RESTLESS THAT YOU HAVE BEEN MOVING AROUND A LOT MORE THAN USUAL: 0
9. THOUGHTS THAT YOU WOULD BE BETTER OFF DEAD, OR OF HURTING YOURSELF: 1
4. FEELING TIRED OR HAVING LITTLE ENERGY: 1

## 2024-01-22 ASSESSMENT — LIFESTYLE VARIABLES
HOW OFTEN DO YOU HAVE A DRINK CONTAINING ALCOHOL: NEVER
HOW MANY STANDARD DRINKS CONTAINING ALCOHOL DO YOU HAVE ON A TYPICAL DAY: PATIENT DOES NOT DRINK

## 2024-01-22 ASSESSMENT — ENCOUNTER SYMPTOMS
NAUSEA: 0
ABDOMINAL PAIN: 0
COUGH: 0
SHORTNESS OF BREATH: 0

## 2024-01-22 NOTE — PROGRESS NOTES
diet      Review of Systems   Constitutional:  Negative for diaphoresis and fever.   Respiratory:  Negative for cough and shortness of breath.    Cardiovascular:  Negative for chest pain and palpitations.   Gastrointestinal:  Negative for abdominal pain and nausea.   Genitourinary:  Positive for dysuria and frequency.   Musculoskeletal:  Positive for arthralgias (R foot/5th digit).   Neurological:  Negative for dizziness and headaches.       Allergies   Allergen Reactions    Bactrim [Sulfamethoxazole-Trimethoprim] Anaphylaxis and Hives    Lipitor [Atorvastatin] Shortness Of Breath    Taxol [Paclitaxel] Anaphylaxis and Swelling    Soap Other (See Comments) and Rash     IVORY soap    Aminoglycosides      Abstracted from [facility] patient chart.    Demerol Nausea Only    Neosporin [Bacitracin-Neomycin-Polymyxin] Rash and Other (See Comments)     hotness    Other Rash     Ivory Soap    Talc Other (See Comments)     blisters     Current Outpatient Medications   Medication Sig Dispense Refill    amLODIPine (NORVASC) 5 MG tablet Take 1 tablet by mouth nightly 30 tablet 1    furosemide (LASIX) 20 MG tablet Take 1 tablet by mouth daily 30 tablet 1    valsartan (DIOVAN) 160 MG tablet TAKE ONE (1) TABLET BY MOUTH ONCE DAILY 90 tablet 0    albuterol sulfate HFA (PROVENTIL;VENTOLIN;PROAIR) 108 (90 Base) MCG/ACT inhaler INHALE TWO (2) PUFFS BY MOUTH EVERY 6 HOURS AS NEEDED FOR WHEEZING 1 each 5    FLOVENT  MCG/ACT inhaler INHALE 2 PUFFS BY MOUTH TWO TIMES DAILY 1 each 5    vitamin B-12 (CYANOCOBALAMIN) 1000 MCG tablet Take 1 tablet by mouth daily 30 tablet 3    ferrous sulfate (IRON 325) 325 (65 Fe) MG tablet Take 1 tablet by mouth daily (with breakfast) 30 tablet 3    calcium carb-cholecalciferol (CALCIUM + VITAMIN D3) 600-10 MG-MCG TABS per tab Take 1 tablet by mouth daily 60 tablet 5    aspirin 81 MG EC tablet DAILY      acetaminophen (TYLENOL) 500 MG tablet Take 1 tablet by mouth every 6 hours as needed for Pain 120 tablet 3

## 2024-01-22 NOTE — PROGRESS NOTES
Medicare Annual Wellness Visit    Nusrat Moctezuma is here for Medicare AWV    Assessment & Plan   Medicare annual wellness visit, subsequent  Recommendations for Preventive Services Due: see orders and patient instructions/AVS.  Recommended screening schedule for the next 5-10 years is provided to the patient in written form: see Patient Instructions/AVS.     No follow-ups on file.     Subjective       Patient's complete Health Risk Assessment and screening values have been reviewed and are found in Flowsheets. The following problems were reviewed today and where indicated follow up appointments were made and/or referrals ordered.    Positive Risk Factor Screenings with Interventions:    Fall Risk:  Do you feel unsteady or are you worried about falling? : (!) yes (has rolling walker she always uses)  2 or more falls in past year?: (!) yes  Fall with injury in past year?: no     Interventions:    Patient comments: patient states she has a rolling walker that she uses at all time.  Reviewed medications, home hazards, visual acuity, and co-morbidities that can increase risk for falls  See AVS for additional education material     Depression:  PHQ-2 Score: 2  PHQ-9 Total Score: 6    Interpretation:  5-9 mild   10-14 moderate   15-19 moderately severe   20-27 severe     Interventions:  LPN INTERVENTION GUIDE: SCORE 5-14 = MODERATE DEPRESSION: FOLLOW UP IN 1 WEEK-patient saw PCP after AWV today.         Self-assessment of health:  In general, how would you say your health is?: (!) Poor    Interventions:  Patient comments: states her health is poor right now and is seeing PCP today after AWV.  See AVS for additional education material    General HRA Questions:  Select all that apply: (!) New or Increased Pain, New or Increased Fatigue, Anger (rt foot pain from injury, really tired today; anger at herself cause she can't do much anymore)    Pain Interventions:  Patient comments: states right foot pain from recent

## 2024-01-23 ENCOUNTER — TELEPHONE (OUTPATIENT)
Dept: CARDIOLOGY CLINIC | Age: 74
End: 2024-01-23

## 2024-01-23 PROCEDURE — 93296 REM INTERROG EVL PM/IDS: CPT | Performed by: INTERNAL MEDICINE

## 2024-01-23 PROCEDURE — 93294 REM INTERROG EVL PM/LDLS PM: CPT | Performed by: INTERNAL MEDICINE

## 2024-01-24 ENCOUNTER — HOSPITAL ENCOUNTER (OUTPATIENT)
Dept: GENERAL RADIOLOGY | Age: 74
Discharge: HOME | End: 2024-01-24
Payer: MEDICARE

## 2024-01-24 ENCOUNTER — TELEPHONE (OUTPATIENT)
Dept: CARDIOLOGY CLINIC | Age: 74
End: 2024-01-24

## 2024-01-24 ENCOUNTER — PROCEDURE VISIT (OUTPATIENT)
Dept: CARDIOLOGY CLINIC | Age: 74
End: 2024-01-24
Payer: MEDICARE

## 2024-01-24 ENCOUNTER — HOSPITAL ENCOUNTER (OUTPATIENT)
Age: 74
Discharge: HOME | End: 2024-01-24
Payer: MEDICARE

## 2024-01-24 DIAGNOSIS — M79.671 RIGHT FOOT PAIN: ICD-10-CM

## 2024-01-24 DIAGNOSIS — E55.9 VITAMIN D DEFICIENCY: ICD-10-CM

## 2024-01-24 DIAGNOSIS — Z95.0 CARDIAC PACEMAKER IN SITU: Primary | ICD-10-CM

## 2024-01-24 DIAGNOSIS — R35.0 URINE FREQUENCY: ICD-10-CM

## 2024-01-24 LAB
25(OH)D3 SERPL-MCNC: 21 NG/ML
BACTERIA: NEGATIVE /HPF
BILIRUBIN URINE: NEGATIVE MG/DL
BLOOD, URINE: ABNORMAL
CLARITY: ABNORMAL
COLOR: YELLOW
GLUCOSE, URINE: NEGATIVE MG/DL
KETONES, URINE: NEGATIVE MG/DL
LEUKOCYTE ESTERASE, URINE: ABNORMAL
MUCUS: ABNORMAL HPF
NITRITE URINE, QUANTITATIVE: NEGATIVE
PH, URINE: >8.5 (ref 5–8)
PROTEIN UA: 30 MG/DL
RBC URINE: 0 /HPF (ref 0–6)
SPECIFIC GRAVITY UA: <1.005 (ref 1–1.03)
SQUAMOUS EPITHELIAL: 7 /HPF
TRICHOMONAS: ABNORMAL /HPF
TRIPLE PHOSPHATE CRYSTALS: ABNORMAL /HPF
UROBILINOGEN, URINE: 0.2 MG/DL (ref 0.2–1)
WBC UA: 10 /HPF (ref 0–5)

## 2024-01-24 PROCEDURE — 82306 VITAMIN D 25 HYDROXY: CPT

## 2024-01-24 PROCEDURE — 87077 CULTURE AEROBIC IDENTIFY: CPT

## 2024-01-24 PROCEDURE — 87086 URINE CULTURE/COLONY COUNT: CPT

## 2024-01-24 PROCEDURE — 36415 COLL VENOUS BLD VENIPUNCTURE: CPT

## 2024-01-24 PROCEDURE — 81001 URINALYSIS AUTO W/SCOPE: CPT

## 2024-01-24 PROCEDURE — 73630 X-RAY EXAM OF FOOT: CPT

## 2024-01-24 PROCEDURE — 87186 SC STD MICRODIL/AGAR DIL: CPT

## 2024-01-25 DIAGNOSIS — N30.00 ACUTE CYSTITIS WITHOUT HEMATURIA: Primary | ICD-10-CM

## 2024-01-25 DIAGNOSIS — S92.511A CLOSED DISPLACED FRACTURE OF PROXIMAL PHALANX OF LESSER TOE OF RIGHT FOOT, INITIAL ENCOUNTER: Primary | ICD-10-CM

## 2024-01-25 RX ORDER — CEPHALEXIN 500 MG/1
500 CAPSULE ORAL 2 TIMES DAILY
Qty: 14 CAPSULE | Refills: 0 | Status: SHIPPED | OUTPATIENT
Start: 2024-01-25 | End: 2024-01-26

## 2024-01-25 ASSESSMENT — ENCOUNTER SYMPTOMS
NAUSEA: 0
SHORTNESS OF BREATH: 0
ABDOMINAL PAIN: 0
COUGH: 0

## 2024-01-26 ENCOUNTER — TELEPHONE (OUTPATIENT)
Dept: INTERNAL MEDICINE CLINIC | Age: 74
End: 2024-01-26

## 2024-01-26 DIAGNOSIS — N30.00 ACUTE CYSTITIS WITHOUT HEMATURIA: Primary | ICD-10-CM

## 2024-01-26 LAB
CULTURE: ABNORMAL
CULTURE: ABNORMAL
Lab: ABNORMAL
SPECIMEN: ABNORMAL

## 2024-01-26 RX ORDER — NITROFURANTOIN 25; 75 MG/1; MG/1
100 CAPSULE ORAL 2 TIMES DAILY
Qty: 10 CAPSULE | Refills: 0 | Status: SHIPPED | OUTPATIENT
Start: 2024-01-26

## 2024-01-26 NOTE — PROGRESS NOTES
diet      Review of Systems   Constitutional:  Negative for diaphoresis and fever.   Respiratory:  Negative for cough and shortness of breath.    Cardiovascular:  Negative for chest pain and palpitations.   Gastrointestinal:  Negative for abdominal pain and nausea.   Genitourinary:  Positive for dysuria and frequency.   Musculoskeletal:  Positive for arthralgias (R foot/5th digit).   Neurological:  Negative for dizziness and headaches.       Allergies   Allergen Reactions    Bactrim [Sulfamethoxazole-Trimethoprim] Anaphylaxis and Hives    Lipitor [Atorvastatin] Shortness Of Breath    Taxol [Paclitaxel] Anaphylaxis and Swelling    Soap Other (See Comments) and Rash     IVORY soap    Aminoglycosides      Abstracted from [facility] patient chart.    Demerol Nausea Only    Neosporin [Bacitracin-Neomycin-Polymyxin] Rash and Other (See Comments)     hotness    Other Rash     Ivory Soap    Talc Other (See Comments)     blisters     Current Outpatient Medications   Medication Sig Dispense Refill    amLODIPine (NORVASC) 5 MG tablet Take 1 tablet by mouth nightly 30 tablet 1    furosemide (LASIX) 20 MG tablet Take 1 tablet by mouth daily 30 tablet 1    valsartan (DIOVAN) 160 MG tablet TAKE ONE (1) TABLET BY MOUTH ONCE DAILY 90 tablet 0    albuterol sulfate HFA (PROVENTIL;VENTOLIN;PROAIR) 108 (90 Base) MCG/ACT inhaler INHALE TWO (2) PUFFS BY MOUTH EVERY 6 HOURS AS NEEDED FOR WHEEZING 1 each 5    FLOVENT  MCG/ACT inhaler INHALE 2 PUFFS BY MOUTH TWO TIMES DAILY 1 each 5    vitamin B-12 (CYANOCOBALAMIN) 1000 MCG tablet Take 1 tablet by mouth daily 30 tablet 3    ferrous sulfate (IRON 325) 325 (65 Fe) MG tablet Take 1 tablet by mouth daily (with breakfast) 30 tablet 3    calcium carb-cholecalciferol (CALCIUM + VITAMIN D3) 600-10 MG-MCG TABS per tab Take 1 tablet by mouth daily 60 tablet 5    aspirin 81 MG EC tablet DAILY      acetaminophen (TYLENOL) 500 MG tablet Take 1 tablet by mouth every 6 hours as needed for Pain 120 tablet 3

## 2024-01-29 ENCOUNTER — TELEPHONE (OUTPATIENT)
Dept: INTERNAL MEDICINE CLINIC | Age: 74
End: 2024-01-29

## 2024-01-29 NOTE — TELEPHONE ENCOUNTER
Patient called stating that Dr. Juan's office called her stating that they did not get the patients referral or  Urinary lab results. They would like that faxed to this # [phone]

## 2024-01-30 ENCOUNTER — TELEPHONE (OUTPATIENT)
Dept: INTERNAL MEDICINE CLINIC | Age: 74
End: 2024-01-30

## 2024-01-30 NOTE — TELEPHONE ENCOUNTER
[facility] called stating that they need the patient's most recent x-ray results and notes faxed back to them  Fax# [phone]

## 2024-01-31 ENCOUNTER — TELEPHONE (OUTPATIENT)
Dept: INTERNAL MEDICINE CLINIC | Age: 74
End: 2024-01-31

## 2024-01-31 NOTE — TELEPHONE ENCOUNTER
Patient called stating that she was supposed to get a call back for whether or not her antibiotic needed to be extended or not. Would like a call back

## 2024-01-31 NOTE — TELEPHONE ENCOUNTER
Pt to see urology 2/8. She is doing better after the Macrobid. Will hold on additional antibiotics. She will call if symptoms return  She was seen by Dr. Garcia -podiatry for her toe fracture

## 2024-03-12 DIAGNOSIS — I10 PRIMARY HYPERTENSION: ICD-10-CM

## 2024-03-12 RX ORDER — VALSARTAN 160 MG/1
160 TABLET ORAL DAILY
Qty: 90 TABLET | Refills: 0 | Status: SHIPPED | OUTPATIENT
Start: 2024-03-12

## 2024-03-12 RX ORDER — AMLODIPINE BESYLATE 5 MG/1
TABLET ORAL
Qty: 90 TABLET | Refills: 0 | Status: SHIPPED | OUTPATIENT
Start: 2024-03-12

## 2024-03-26 ENCOUNTER — HOSPITAL ENCOUNTER (OUTPATIENT)
Dept: INFUSION THERAPY | Age: 74
Discharge: HOME | End: 2024-03-26
Payer: MEDICARE

## 2024-03-26 ENCOUNTER — OFFICE VISIT (OUTPATIENT)
Dept: ONCOLOGY | Age: 74
End: 2024-03-26
Payer: MEDICARE

## 2024-03-26 VITALS
SYSTOLIC BLOOD PRESSURE: 152 MMHG | DIASTOLIC BLOOD PRESSURE: 63 MMHG | RESPIRATION RATE: 18 BRPM | HEART RATE: 74 BPM | BODY MASS INDEX: 50.61 KG/M2 | TEMPERATURE: 98 F | WEIGHT: 275 LBS | HEIGHT: 62 IN | OXYGEN SATURATION: 95 %

## 2024-03-26 DIAGNOSIS — C50.411 MALIGNANT NEOPLASM OF UPPER-OUTER QUADRANT OF RIGHT BREAST IN FEMALE, ESTROGEN RECEPTOR POSITIVE (HCC): Primary | ICD-10-CM

## 2024-03-26 DIAGNOSIS — Z12.31 ENCOUNTER FOR SCREENING MAMMOGRAM FOR MALIGNANT NEOPLASM OF BREAST: ICD-10-CM

## 2024-03-26 DIAGNOSIS — C50.411 MALIGNANT NEOPLASM OF UPPER-OUTER QUADRANT OF RIGHT BREAST IN FEMALE, ESTROGEN RECEPTOR POSITIVE (HCC): ICD-10-CM

## 2024-03-26 DIAGNOSIS — Z17.0 MALIGNANT NEOPLASM OF UPPER-OUTER QUADRANT OF RIGHT BREAST IN FEMALE, ESTROGEN RECEPTOR POSITIVE (HCC): Primary | ICD-10-CM

## 2024-03-26 DIAGNOSIS — Z17.0 MALIGNANT NEOPLASM OF UPPER-OUTER QUADRANT OF RIGHT BREAST IN FEMALE, ESTROGEN RECEPTOR POSITIVE (HCC): ICD-10-CM

## 2024-03-26 LAB
ALBUMIN SERPL-MCNC: 3.8 GM/DL (ref 3.4–5)
ALP BLD-CCNC: 132 IU/L (ref 40–129)
ALT SERPL-CCNC: 14 U/L (ref 10–40)
ANION GAP SERPL CALCULATED.3IONS-SCNC: 11 MMOL/L (ref 7–16)
AST SERPL-CCNC: 13 IU/L (ref 15–37)
BASOPHILS ABSOLUTE: 0 K/CU MM
BASOPHILS RELATIVE PERCENT: 0.3 % (ref 0–1)
BILIRUB SERPL-MCNC: 0.2 MG/DL (ref 0–1)
BUN SERPL-MCNC: 29 MG/DL (ref 6–23)
CALCIUM SERPL-MCNC: 9.9 MG/DL (ref 8.3–10.6)
CHLORIDE BLD-SCNC: 102 MMOL/L (ref 99–110)
CO2: 26 MMOL/L (ref 21–32)
CREAT SERPL-MCNC: 0.9 MG/DL (ref 0.6–1.1)
DIFFERENTIAL TYPE: ABNORMAL
EOSINOPHILS ABSOLUTE: 0.3 K/CU MM
EOSINOPHILS RELATIVE PERCENT: 2.2 % (ref 0–3)
GFR SERPL CREATININE-BSD FRML MDRD: 67 ML/MIN/1.73M2
GLUCOSE SERPL-MCNC: 105 MG/DL (ref 70–99)
HCT VFR BLD CALC: 35 % (ref 37–47)
HEMOGLOBIN: 11.2 GM/DL (ref 12.5–16)
LYMPHOCYTES ABSOLUTE: 3 K/CU MM
LYMPHOCYTES RELATIVE PERCENT: 23.4 % (ref 24–44)
MCH RBC QN AUTO: 28.4 PG (ref 27–31)
MCHC RBC AUTO-ENTMCNC: 32 % (ref 32–36)
MCV RBC AUTO: 88.6 FL (ref 78–100)
MONOCYTES ABSOLUTE: 1 K/CU MM
MONOCYTES RELATIVE PERCENT: 7.6 % (ref 0–4)
PDW BLD-RTO: 14.5 % (ref 11.7–14.9)
PLATELET # BLD: 256 K/CU MM (ref 140–440)
PMV BLD AUTO: 9.4 FL (ref 7.5–11.1)
POTASSIUM SERPL-SCNC: 4.7 MMOL/L (ref 3.5–5.1)
RBC # BLD: 3.95 M/CU MM (ref 4.2–5.4)
SEGMENTED NEUTROPHILS ABSOLUTE COUNT: 8.5 K/CU MM
SEGMENTED NEUTROPHILS RELATIVE PERCENT: 66.5 % (ref 36–66)
SODIUM BLD-SCNC: 139 MMOL/L (ref 135–145)
TOTAL PROTEIN: 6.9 GM/DL (ref 6.4–8.2)
WBC # BLD: 12.8 K/CU MM (ref 4–10.5)

## 2024-03-26 PROCEDURE — 3078F DIAST BP <80 MM HG: CPT | Performed by: INTERNAL MEDICINE

## 2024-03-26 PROCEDURE — G8399 PT W/DXA RESULTS DOCUMENT: HCPCS | Performed by: INTERNAL MEDICINE

## 2024-03-26 PROCEDURE — 36415 COLL VENOUS BLD VENIPUNCTURE: CPT

## 2024-03-26 PROCEDURE — 1090F PRES/ABSN URINE INCON ASSESS: CPT | Performed by: INTERNAL MEDICINE

## 2024-03-26 PROCEDURE — G8484 FLU IMMUNIZE NO ADMIN: HCPCS | Performed by: INTERNAL MEDICINE

## 2024-03-26 PROCEDURE — G8417 CALC BMI ABV UP PARAM F/U: HCPCS | Performed by: INTERNAL MEDICINE

## 2024-03-26 PROCEDURE — 1124F ACP DISCUSS-NO DSCNMKR DOCD: CPT | Performed by: INTERNAL MEDICINE

## 2024-03-26 PROCEDURE — 99213 OFFICE O/P EST LOW 20 MIN: CPT | Performed by: INTERNAL MEDICINE

## 2024-03-26 PROCEDURE — 1036F TOBACCO NON-USER: CPT | Performed by: INTERNAL MEDICINE

## 2024-03-26 PROCEDURE — 85025 COMPLETE CBC W/AUTO DIFF WBC: CPT

## 2024-03-26 PROCEDURE — 3017F COLORECTAL CA SCREEN DOC REV: CPT | Performed by: INTERNAL MEDICINE

## 2024-03-26 PROCEDURE — 86300 IMMUNOASSAY TUMOR CA 15-3: CPT

## 2024-03-26 PROCEDURE — 99211 OFF/OP EST MAY X REQ PHY/QHP: CPT

## 2024-03-26 PROCEDURE — 3077F SYST BP >= 140 MM HG: CPT | Performed by: INTERNAL MEDICINE

## 2024-03-26 PROCEDURE — 80053 COMPREHEN METABOLIC PANEL: CPT

## 2024-03-26 PROCEDURE — G8427 DOCREV CUR MEDS BY ELIG CLIN: HCPCS | Performed by: INTERNAL MEDICINE

## 2024-03-26 NOTE — PROGRESS NOTES
MA Rooming Questions  Patient: Nusrat Moctezuma  MRN: [id number]    Date: 3/26/2024        1. Do you have any new issues?   yes - swelling in Legs, does have blister/rash on both legs.          2. Do you need any refills on medications?    no    3. Have you had any imaging done since your last visit?   yes - XRAY    4. Have you been hospitalized or seen in the emergency room since your last visit here?   no    5. Did the patient have a depression screening completed today? No    No data recorded     PHQ-9 Given to (if applicable):               PHQ-9 Score (if applicable):                     [] Positive     []  Negative              Does question #9 need addressed (if applicable)                     [] Yes    []  No               Era Ramires CMA      
POCGLU 108 (H) 12/04/2021     Lab Results   Component Value Date     (H) 09/01/2017     No results found for: \"LD\"  Lab Results   Component Value Date    TSHHS 1.120 09/27/2023    T4FREE 1.06 02/21/2023     Immunology:  Lab Results   Component Value Date    PROT 6.9 03/26/2024    ALBUMINELP 3.3 09/28/2011    LABALPH 0.3 09/28/2011    LABALPH 1.1 09/28/2011    GAMGLOB 1.1 09/28/2011     No results found for: \"KAPPAUVOL\", \"LAMBDAUVOL\", \"KLFLCR\"  No results found for: \"B2M\"  Coagulation Panel:  Lab Results   Component Value Date    PROTIME 12.6 02/21/2023    INR 0.98 02/21/2023    APTT 29.8 02/21/2023    DDIMER 0.58 (H) 08/27/2023     Anemia Panel:  Lab Results   Component Value Date    BLAOATDC33 406.6 09/27/2023    FOLATE 11.9 09/27/2023     Tumor Markers:  Lab Results   Component Value Date    CEA 0.8 08/15/2011    LABCA2 18.8 12/21/2022     Observations:  No data recorded    Assessment & Plan:   1. Previous history of carcinoma of the colon., 2. Hypertension., 3. Degenerative arthritis., 4. Bronchial asthma., 5. Hypertension., 6. CA breast, surgery in March 2012, mastectomy.  Three sentinel nodes were positive.  Six additional axillary nodes were negative.  There was lymphovascular invasion.  Tumor was strongly positive for ER and WA, negative for HER-2.   Status post three months of treatment with Adriamycin/Cytoxan, followed by Taxol and Taxotere, finishing adjuvant chemotherapy on September 20, 2012.      DEXA scan on 9/19/23 showed osteopenia by WHO criteria.     Left breast mammogram done on 9/27/23 showed stable benign left mammogram.      On March 26, 2024, she presented to me for follow up. I have been following her for colon cancer and stage IIA right breast cancer. She is status post right breast mastectomy, axillary lymph node dissection, followed by adjuvant chemotherapy. She is s/p adjuvant endocrine therapy with letrozole (received b/t October 15, 2012 and 12/21/2022).     Reviewed mammogram

## 2024-03-28 LAB — CANCER AG27-29 SERPL-ACNC: 15.2 U/ML

## 2024-04-08 ENCOUNTER — TELEPHONE (OUTPATIENT)
Dept: INTERNAL MEDICINE CLINIC | Age: 74
End: 2024-04-08

## 2024-04-08 NOTE — TELEPHONE ENCOUNTER
Patient should follow up with cardiology concerning the decreased blood flow in the LLE. Per her chart she has known PVD

## 2024-04-08 NOTE — TELEPHONE ENCOUNTER
Jayla Chavez from [facility] called and stated that she did a QuantaFlo test on Pt and she scored lower than normal on her Left scoring a 0.81. They just have to call to state any abnormality's. Her Number is [phone] if you have any questions

## 2024-04-11 DIAGNOSIS — J01.90 ACUTE BACTERIAL SINUSITIS: ICD-10-CM

## 2024-04-11 DIAGNOSIS — B96.89 ACUTE BACTERIAL SINUSITIS: ICD-10-CM

## 2024-04-11 RX ORDER — AMLODIPINE BESYLATE 5 MG/1
TABLET ORAL
Qty: 90 TABLET | Refills: 0 | Status: SHIPPED | OUTPATIENT
Start: 2024-04-11

## 2024-04-11 RX ORDER — FLUTICASONE PROPIONATE 110 UG/1
AEROSOL, METERED RESPIRATORY (INHALATION)
Qty: 1 EACH | Refills: 5 | Status: SHIPPED | OUTPATIENT
Start: 2024-04-11

## 2024-04-11 RX ORDER — FUROSEMIDE 20 MG/1
20 TABLET ORAL DAILY
Qty: 30 TABLET | Refills: 1 | Status: SHIPPED | OUTPATIENT
Start: 2024-04-11

## 2024-04-28 PROCEDURE — 93294 REM INTERROG EVL PM/LDLS PM: CPT | Performed by: INTERNAL MEDICINE

## 2024-04-28 PROCEDURE — 93296 REM INTERROG EVL PM/IDS: CPT | Performed by: INTERNAL MEDICINE

## 2024-04-29 ENCOUNTER — PROCEDURE VISIT (OUTPATIENT)
Dept: CARDIOLOGY CLINIC | Age: 74
End: 2024-04-29
Payer: MEDICARE

## 2024-04-29 DIAGNOSIS — Z95.0 CARDIAC PACEMAKER IN SITU: Primary | ICD-10-CM

## 2024-05-21 DIAGNOSIS — J01.90 ACUTE BACTERIAL SINUSITIS: ICD-10-CM

## 2024-05-21 DIAGNOSIS — B96.89 ACUTE BACTERIAL SINUSITIS: ICD-10-CM

## 2024-05-21 RX ORDER — FLUTICASONE PROPIONATE 110 UG/1
AEROSOL, METERED RESPIRATORY (INHALATION)
Qty: 1 EACH | Refills: 5 | OUTPATIENT
Start: 2024-05-21

## 2024-05-21 RX ORDER — FLUTICASONE PROPIONATE 110 UG/1
AEROSOL, METERED RESPIRATORY (INHALATION)
Qty: 1 EACH | Refills: 5 | Status: CANCELLED | OUTPATIENT
Start: 2024-05-21

## 2024-05-21 NOTE — TELEPHONE ENCOUNTER
Patient needs new script and prior auth for inhaler.    Otezla Counseling: The side effects of Otezla were discussed with the patient, including but not limited to worsening or new depression, weight loss, diarrhea, nausea, upper respiratory tract infection, and headache. Patient instructed to call the office should any adverse effect occur.  The patient verbalized understanding of the proper use and possible adverse effects of Otezla.  All the patient's questions and concerns were addressed.

## 2024-05-23 ENCOUNTER — OFFICE VISIT (OUTPATIENT)
Dept: INTERNAL MEDICINE CLINIC | Age: 74
End: 2024-05-23
Payer: MEDICARE

## 2024-05-23 VITALS
DIASTOLIC BLOOD PRESSURE: 50 MMHG | SYSTOLIC BLOOD PRESSURE: 122 MMHG | WEIGHT: 280 LBS | OXYGEN SATURATION: 97 % | HEIGHT: 62 IN | HEART RATE: 67 BPM | BODY MASS INDEX: 51.53 KG/M2

## 2024-05-23 DIAGNOSIS — E78.2 MIXED HYPERLIPIDEMIA: ICD-10-CM

## 2024-05-23 DIAGNOSIS — R60.0 PERIPHERAL EDEMA: ICD-10-CM

## 2024-05-23 DIAGNOSIS — I87.2 STASIS DERMATITIS: ICD-10-CM

## 2024-05-23 DIAGNOSIS — J45.20 MILD INTERMITTENT ASTHMA WITHOUT COMPLICATION: Primary | ICD-10-CM

## 2024-05-23 DIAGNOSIS — E55.9 VITAMIN D DEFICIENCY: ICD-10-CM

## 2024-05-23 PROBLEM — F33.1 MAJOR DEPRESSIVE DISORDER, RECURRENT, MODERATE (HCC): Status: RESOLVED | Noted: 2024-01-22 | Resolved: 2024-05-23

## 2024-05-23 PROCEDURE — 1036F TOBACCO NON-USER: CPT | Performed by: FAMILY MEDICINE

## 2024-05-23 PROCEDURE — G8427 DOCREV CUR MEDS BY ELIG CLIN: HCPCS | Performed by: FAMILY MEDICINE

## 2024-05-23 PROCEDURE — 3078F DIAST BP <80 MM HG: CPT | Performed by: FAMILY MEDICINE

## 2024-05-23 PROCEDURE — 3074F SYST BP LT 130 MM HG: CPT | Performed by: FAMILY MEDICINE

## 2024-05-23 PROCEDURE — 1124F ACP DISCUSS-NO DSCNMKR DOCD: CPT | Performed by: FAMILY MEDICINE

## 2024-05-23 PROCEDURE — 3017F COLORECTAL CA SCREEN DOC REV: CPT | Performed by: FAMILY MEDICINE

## 2024-05-23 PROCEDURE — 1090F PRES/ABSN URINE INCON ASSESS: CPT | Performed by: FAMILY MEDICINE

## 2024-05-23 PROCEDURE — G8417 CALC BMI ABV UP PARAM F/U: HCPCS | Performed by: FAMILY MEDICINE

## 2024-05-23 PROCEDURE — G8399 PT W/DXA RESULTS DOCUMENT: HCPCS | Performed by: FAMILY MEDICINE

## 2024-05-23 PROCEDURE — 99214 OFFICE O/P EST MOD 30 MIN: CPT | Performed by: FAMILY MEDICINE

## 2024-05-23 RX ORDER — FLUTICASONE FUROATE AND VILANTEROL 100; 25 UG/1; UG/1
1 POWDER RESPIRATORY (INHALATION) DAILY
Qty: 1 EACH | Refills: 4 | Status: SHIPPED | OUTPATIENT
Start: 2024-05-23

## 2024-05-23 NOTE — PROGRESS NOTES
Nusrat Moctezuma (:  1950) is a 74 y.o. female,established patient, here for evaluation of the following chief complaint(s):  Follow-up (4 month f/u /Rash/bumps on both lateral extremity's ) and Depression      Assessment & Plan   ASSESSMENT/PLAN:  1. Mild intermittent asthma without complication  Continue albuterol  D/C  Flovent and start Breo  - fluticasone furoate-vilanterol (BREO ELLIPTA) 100-25 MCG/ACT inhaler; Inhale 1 puff into the lungs daily  Dispense: 1 each; Refill: 4  ADR's explained    2. Peripheral edema  - Compression Stockings, elevate legs  Continue Lasix 20 mg    3. Vitamin D deficiency  Continue supplement    4. Mixed hyperlipidemia  She stopped the statin  The patient is asked to make an attempt to improve diet and exercise patterns     5. Stasis dermatitis  Can use a topical steroid to the area prn  Compression stockings    Medications reconciled and discussed with the patient  Keep f/u with specialists  Persist RTO or call  Return in about 5 months (around 10/23/2024) for asthma,HLD.       Lab Results   Component Value Date    WBC 12.8 (H) 2024    HGB 11.2 (L) 2024    HCT 35.0 (L) 2024    MCV 88.6 2024     2024     Lab Results   Component Value Date    CHOL 198 2023     Lab Results   Component Value Date    TRIG 175 (H) 2023     Lab Results   Component Value Date    HDL 38 (L) 2023     Lab Results   Component Value Date    LDLDIRECT 102 (H) 2020     Lab Results   Component Value Date    LABA1C 5.2 2023     Lab Results   Component Value Date     2023     Lab Results   Component Value Date     2024    K 4.7 2024     2024    CO2 26 2024    BUN 29 (H) 2024    CREATININE 0.9 2024    GLUCOSE 105 (H) 2024    CALCIUM 9.9 2024    PROT 6.6 10/17/2012    BILITOT 0.2 2024    ALKPHOS 132 (H) 2024    AST 13 (L) 2024    ALT 14 2024

## 2024-06-18 DIAGNOSIS — I10 PRIMARY HYPERTENSION: ICD-10-CM

## 2024-06-18 RX ORDER — VALSARTAN 160 MG/1
160 TABLET ORAL DAILY
Qty: 90 TABLET | Refills: 0 | Status: SHIPPED | OUTPATIENT
Start: 2024-06-18

## 2024-06-18 RX ORDER — AMLODIPINE BESYLATE 5 MG/1
TABLET ORAL
Qty: 90 TABLET | Refills: 0 | Status: SHIPPED | OUTPATIENT
Start: 2024-06-18

## 2024-07-03 ENCOUNTER — HOSPITAL ENCOUNTER (EMERGENCY)
Age: 74
Discharge: HOME | End: 2024-07-04
Attending: EMERGENCY MEDICINE
Payer: MEDICARE

## 2024-07-03 ENCOUNTER — APPOINTMENT (OUTPATIENT)
Dept: GENERAL RADIOLOGY | Age: 74
End: 2024-07-03
Payer: MEDICARE

## 2024-07-03 VITALS
OXYGEN SATURATION: 97 % | RESPIRATION RATE: 18 BRPM | HEART RATE: 67 BPM | TEMPERATURE: 98 F | DIASTOLIC BLOOD PRESSURE: 55 MMHG | SYSTOLIC BLOOD PRESSURE: 130 MMHG

## 2024-07-03 DIAGNOSIS — E87.70 HYPERVOLEMIA, UNSPECIFIED HYPERVOLEMIA TYPE: ICD-10-CM

## 2024-07-03 DIAGNOSIS — I87.2 STASIS DERMATITIS: Primary | ICD-10-CM

## 2024-07-03 LAB
ALBUMIN SERPL-MCNC: 3.9 GM/DL (ref 3.4–5)
ALP BLD-CCNC: 140 IU/L (ref 40–129)
ALT SERPL-CCNC: 11 U/L (ref 10–40)
ANION GAP SERPL CALCULATED.3IONS-SCNC: 13 MMOL/L (ref 7–16)
AST SERPL-CCNC: 13 IU/L (ref 15–37)
BASOPHILS ABSOLUTE: 0.1 K/CU MM
BASOPHILS RELATIVE PERCENT: 0.4 % (ref 0–1)
BILIRUB SERPL-MCNC: 0.2 MG/DL (ref 0–1)
BUN SERPL-MCNC: 19 MG/DL (ref 6–23)
CALCIUM SERPL-MCNC: 10.4 MG/DL (ref 8.3–10.6)
CHLORIDE BLD-SCNC: 98 MMOL/L (ref 99–110)
CO2: 26 MMOL/L (ref 21–32)
CREAT SERPL-MCNC: 0.8 MG/DL (ref 0.6–1.1)
DIFFERENTIAL TYPE: ABNORMAL
EOSINOPHILS ABSOLUTE: 0.4 K/CU MM
EOSINOPHILS RELATIVE PERCENT: 2.7 % (ref 0–3)
GFR, ESTIMATED: 77 ML/MIN/1.73M2
GLUCOSE SERPL-MCNC: 112 MG/DL (ref 70–99)
HCT VFR BLD CALC: 37.3 % (ref 37–47)
HEMOGLOBIN: 11.7 GM/DL (ref 12.5–16)
IMMATURE NEUTROPHIL %: 0.9 % (ref 0–0.43)
LYMPHOCYTES ABSOLUTE: 3.9 K/CU MM
LYMPHOCYTES RELATIVE PERCENT: 28.2 % (ref 24–44)
MCH RBC QN AUTO: 28 PG (ref 27–31)
MCHC RBC AUTO-ENTMCNC: 31.4 % (ref 32–36)
MCV RBC AUTO: 89.2 FL (ref 78–100)
MONOCYTES ABSOLUTE: 1 K/CU MM
MONOCYTES RELATIVE PERCENT: 6.8 % (ref 0–4)
NEUTROPHILS ABSOLUTE: 8.5 K/CU MM
NEUTROPHILS RELATIVE PERCENT: 61 % (ref 36–66)
NUCLEATED RBC %: 0 %
PDW BLD-RTO: 14.3 % (ref 11.7–14.9)
PLATELET # BLD: 287 K/CU MM (ref 140–440)
PMV BLD AUTO: 9.6 FL (ref 7.5–11.1)
POTASSIUM SERPL-SCNC: 3.9 MMOL/L (ref 3.5–5.1)
RBC # BLD: 4.18 M/CU MM (ref 4.2–5.4)
SODIUM BLD-SCNC: 137 MMOL/L (ref 135–145)
TOTAL IMMATURE NEUTOROPHIL: 0.12 K/CU MM
TOTAL NUCLEATED RBC: 0 K/CU MM
TOTAL PROTEIN: 7.8 GM/DL (ref 6.4–8.2)
WBC # BLD: 13.9 K/CU MM (ref 4–10.5)

## 2024-07-03 PROCEDURE — 85025 COMPLETE CBC W/AUTO DIFF WBC: CPT

## 2024-07-03 PROCEDURE — 99284 EMERGENCY DEPT VISIT MOD MDM: CPT

## 2024-07-03 PROCEDURE — 80053 COMPREHEN METABOLIC PANEL: CPT

## 2024-07-03 PROCEDURE — 6370000000 HC RX 637 (ALT 250 FOR IP): Performed by: EMERGENCY MEDICINE

## 2024-07-03 PROCEDURE — 71045 X-RAY EXAM CHEST 1 VIEW: CPT

## 2024-07-03 RX ORDER — FUROSEMIDE 20 MG/1
40 TABLET ORAL ONCE
Status: COMPLETED | OUTPATIENT
Start: 2024-07-03 | End: 2024-07-03

## 2024-07-03 RX ADMIN — FUROSEMIDE 40 MG: 20 TABLET ORAL at 22:53

## 2024-07-04 ASSESSMENT — PAIN - FUNCTIONAL ASSESSMENT: PAIN_FUNCTIONAL_ASSESSMENT: 0-10

## 2024-07-04 ASSESSMENT — PAIN SCALES - GENERAL: PAINLEVEL_OUTOF10: 0

## 2024-07-04 NOTE — ED PROVIDER NOTES
Emergency Department Encounter    Patient: Nusrat Moctezuma  MRN: [id number]  : 1950  Date of Evaluation: 2024  ED Provider:  Paulo Mittal MD    Triage Chief Complaint:   Leg Pain    [place]:  Nusrat Moctezuma is a 74 y.o. female that presents to emergency department concerning about weeping from open sores in bilateral lower legs anteriorly since Monday.  Patient does not admit to acute weight gain or chest or abdominal pain or new shortness of breath.  She states she is not able to sleep on her back for many years and typically sleeps in a recliner.  She does not admit to fever or chills or cough.  Patient denies new weight gain    ROS - see HPI, below listed is current ROS at time of my eval:  General:  No fevers, no chills, no weakness  Eyes:  No recent vison changes, no discharge  ENT:  No sore throat, no nasal congestion, no hearing changes  Cardiovascular:  No chest pain, no palpitations  Respiratory:  No shortness of breath, no cough, no wheezing  Gastrointestinal:  No pain, no nausea, no vomiting, no diarrhea  Musculoskeletal:  No muscle pain, no joint pain  Skin:  No rash, no pruritis, no easy bruising  Neurologic:  No speech problems, no headache, no extremity numbness, no extremity tingling, no extremity weakness  Psychiatric:  No anxiety  Genitourinary:  No dysuria, no hematuria  Endocrine:  No unexpected weight gain, no unexpected weight loss  Extremities: Bilateral lower leg open sores with weeping and some bleeding    Past Medical History:   Diagnosis Date    Anemia     Anxiety     Arthritis     generalized    Asthma 2006    AV block     2nd degree per hospital in 2013    Blood poisoning     Blood transfusion     2003    Breast cancer (HCC) 2012    right    CAD (coronary artery disease)     Cancer ([facility]) 2007    skin (face) & colon(2012 dx of right breast ca(pc)    Carcinoma of breast (HCC) 2012    right arm no b/p or sticks    Cardiac pacemaker 03/10/2014

## 2024-07-04 NOTE — DISCHARGE INSTRUCTIONS
Continue your Lasix per your doctors instructions but please consult with your primary care physician tomorrow for further discussion regarding some fluid overload.  Your doctor will talk to you as to whether or not your Lasix needs to be readjusted

## 2024-07-10 ENCOUNTER — OFFICE VISIT (OUTPATIENT)
Dept: INTERNAL MEDICINE CLINIC | Age: 74
End: 2024-07-10
Payer: MEDICARE

## 2024-07-10 VITALS
BODY MASS INDEX: 51.71 KG/M2 | SYSTOLIC BLOOD PRESSURE: 132 MMHG | HEART RATE: 72 BPM | OXYGEN SATURATION: 99 % | HEIGHT: 62 IN | WEIGHT: 281 LBS | DIASTOLIC BLOOD PRESSURE: 60 MMHG

## 2024-07-10 DIAGNOSIS — S81.809D OPEN WOUND, LOWER LEG, UNSPECIFIED LATERALITY, SUBSEQUENT ENCOUNTER: ICD-10-CM

## 2024-07-10 DIAGNOSIS — I87.2 STASIS DERMATITIS: Primary | ICD-10-CM

## 2024-07-10 DIAGNOSIS — R60.0 LOCALIZED EDEMA: ICD-10-CM

## 2024-07-10 DIAGNOSIS — M79.604 BILATERAL LEG PAIN: ICD-10-CM

## 2024-07-10 DIAGNOSIS — L03.115 CELLULITIS OF RIGHT LOWER EXTREMITY: ICD-10-CM

## 2024-07-10 DIAGNOSIS — M79.605 BILATERAL LEG PAIN: ICD-10-CM

## 2024-07-10 PROCEDURE — 3078F DIAST BP <80 MM HG: CPT | Performed by: FAMILY MEDICINE

## 2024-07-10 PROCEDURE — 99214 OFFICE O/P EST MOD 30 MIN: CPT | Performed by: FAMILY MEDICINE

## 2024-07-10 PROCEDURE — 1124F ACP DISCUSS-NO DSCNMKR DOCD: CPT | Performed by: FAMILY MEDICINE

## 2024-07-10 PROCEDURE — 3075F SYST BP GE 130 - 139MM HG: CPT | Performed by: FAMILY MEDICINE

## 2024-07-10 RX ORDER — CEPHALEXIN 500 MG/1
500 CAPSULE ORAL 4 TIMES DAILY
Qty: 28 CAPSULE | Refills: 0 | Status: SHIPPED | OUTPATIENT
Start: 2024-07-10

## 2024-07-10 RX ORDER — POTASSIUM CHLORIDE 750 MG/1
10 TABLET, EXTENDED RELEASE ORAL DAILY
Qty: 30 TABLET | Refills: 3 | Status: SHIPPED | OUTPATIENT
Start: 2024-07-10

## 2024-07-10 RX ORDER — FUROSEMIDE 40 MG/1
40 TABLET ORAL DAILY
Qty: 30 TABLET | Refills: 3 | Status: SHIPPED | OUTPATIENT
Start: 2024-07-10

## 2024-07-10 ASSESSMENT — ENCOUNTER SYMPTOMS
NAUSEA: 0
SHORTNESS OF BREATH: 0
ABDOMINAL PAIN: 0
COUGH: 0

## 2024-07-10 NOTE — PROGRESS NOTES
Nusrat Moctezuma (:  1950) is a 74 y.o. female,established patient, here for evaluation of the following chief complaint(s):  Follow-up (Pt seen at [facility] for leg pain.)      Assessment & Plan   ASSESSMENT/PLAN:  1. Stasis dermatitis, chronic  Elevate legs, compression stockings emollients    2. Bilateral leg pain  - Vascular duplex lower extremity venous bilateral; Future  She prefers to call and schedule the test herself    3. Open wound, lower leg, unspecified laterality, subsequent encounter  - [facility]    4. Cellulitis of right lower extremity  - cephALEXin (KEFLEX) 500 MG capsule; Take 1 capsule by mouth 4 times daily  Dispense: 28 capsule; Refill: 0    5. Localized edema, chronic    -Increase Lasix and add potassium  - furosemide (LASIX) 40 MG tablet; Take 1 tablet by mouth daily  Dispense: 30 tablet; Refill: 3  - potassium chloride (KLOR-CON M) 10 MEQ extended release tablet; Take 1 tablet by mouth daily  Dispense: 30 tablet; Refill: 3  - Basic Metabolic Panel; Future    Medications reconciled and discussed with the patient  Return if symptoms worsen or fail to improve.       Lab Results   Component Value Date    WBC 13.9 (H) 2024    HGB 11.7 (L) 2024    HCT 37.3 2024    MCV 89.2 2024     2024     Lab Results   Component Value Date    CHOL 198 2023     Lab Results   Component Value Date    TRIG 175 (H) 2023     Lab Results   Component Value Date    HDL 38 (L) 2023     Lab Results   Component Value Date    LDLDIRECT 102 (H) 2020     Lab Results   Component Value Date    LABA1C 5.2 2023     Lab Results   Component Value Date     2023     Lab Results   Component Value Date     2024    K 3.9 2024    CL 98 (L) 2024    CO2 26 2024    BUN 19 2024    CREATININE 0.8 2024    GLUCOSE 112 (H) 2024    CALCIUM 10.4 2024    BILITOT 0.2 2024    ALKPHOS

## 2024-07-16 ENCOUNTER — HOSPITAL ENCOUNTER (OUTPATIENT)
Dept: ULTRASOUND IMAGING | Age: 74
Discharge: HOME | End: 2024-07-16
Payer: MEDICARE

## 2024-07-16 DIAGNOSIS — M79.605 BILATERAL LEG PAIN: ICD-10-CM

## 2024-07-16 DIAGNOSIS — M79.604 BILATERAL LEG PAIN: ICD-10-CM

## 2024-07-16 PROCEDURE — 93970 EXTREMITY STUDY: CPT

## 2024-07-17 ENCOUNTER — APPOINTMENT (OUTPATIENT)
Dept: GENERAL RADIOLOGY | Age: 74
End: 2024-07-17
Payer: MEDICARE

## 2024-07-17 ENCOUNTER — HOSPITAL ENCOUNTER (EMERGENCY)
Age: 74
Discharge: HOME | End: 2024-07-17
Payer: MEDICARE

## 2024-07-17 VITALS
HEART RATE: 61 BPM | BODY MASS INDEX: 51.2 KG/M2 | RESPIRATION RATE: 16 BRPM | OXYGEN SATURATION: 96 % | WEIGHT: 280 LBS | SYSTOLIC BLOOD PRESSURE: 135 MMHG | DIASTOLIC BLOOD PRESSURE: 50 MMHG | TEMPERATURE: 97.7 F

## 2024-07-17 DIAGNOSIS — F41.1 ANXIOUS REACTION: ICD-10-CM

## 2024-07-17 DIAGNOSIS — Z63.4 RECENT BEREAVEMENT: Primary | ICD-10-CM

## 2024-07-17 LAB
ALBUMIN SERPL-MCNC: 3.8 GM/DL (ref 3.4–5)
ALP BLD-CCNC: 138 IU/L (ref 40–129)
ALT SERPL-CCNC: 10 U/L (ref 10–40)
ANION GAP SERPL CALCULATED.3IONS-SCNC: 10 MMOL/L (ref 7–16)
AST SERPL-CCNC: 11 IU/L (ref 15–37)
BASOPHILS ABSOLUTE: 0.1 K/CU MM
BASOPHILS RELATIVE PERCENT: 0.4 % (ref 0–1)
BILIRUB SERPL-MCNC: 0.2 MG/DL (ref 0–1)
BUN SERPL-MCNC: 20 MG/DL (ref 6–23)
CALCIUM SERPL-MCNC: 9.4 MG/DL (ref 8.3–10.6)
CHLORIDE BLD-SCNC: 105 MMOL/L (ref 99–110)
CO2: 25 MMOL/L (ref 21–32)
CREAT SERPL-MCNC: 0.7 MG/DL (ref 0.6–1.1)
DIFFERENTIAL TYPE: ABNORMAL
EOSINOPHILS ABSOLUTE: 0.2 K/CU MM
EOSINOPHILS RELATIVE PERCENT: 2 % (ref 0–3)
GFR, ESTIMATED: >90 ML/MIN/1.73M2
GLUCOSE SERPL-MCNC: 120 MG/DL (ref 70–99)
HCT VFR BLD CALC: 34.9 % (ref 37–47)
HEMOGLOBIN: 11.1 GM/DL (ref 12.5–16)
IMMATURE NEUTROPHIL %: 0.8 % (ref 0–0.43)
LYMPHOCYTES ABSOLUTE: 2.3 K/CU MM
LYMPHOCYTES RELATIVE PERCENT: 18.9 % (ref 24–44)
MCH RBC QN AUTO: 28.5 PG (ref 27–31)
MCHC RBC AUTO-ENTMCNC: 31.8 % (ref 32–36)
MCV RBC AUTO: 89.7 FL (ref 78–100)
MONOCYTES ABSOLUTE: 0.8 K/CU MM
MONOCYTES RELATIVE PERCENT: 6.2 % (ref 0–4)
NEUTROPHILS ABSOLUTE: 8.6 K/CU MM
NEUTROPHILS RELATIVE PERCENT: 71.7 % (ref 36–66)
NUCLEATED RBC %: 0 %
PDW BLD-RTO: 14.1 % (ref 11.7–14.9)
PLATELET # BLD: 232 K/CU MM (ref 140–440)
PMV BLD AUTO: 9.8 FL (ref 7.5–11.1)
POTASSIUM SERPL-SCNC: 4.1 MMOL/L (ref 3.5–5.1)
RBC # BLD: 3.89 M/CU MM (ref 4.2–5.4)
SODIUM BLD-SCNC: 140 MMOL/L (ref 135–145)
TOTAL IMMATURE NEUTOROPHIL: 0.1 K/CU MM
TOTAL NUCLEATED RBC: 0 K/CU MM
TOTAL PROTEIN: 6.9 GM/DL (ref 6.4–8.2)
WBC # BLD: 12 K/CU MM (ref 4–10.5)

## 2024-07-17 PROCEDURE — 71045 X-RAY EXAM CHEST 1 VIEW: CPT

## 2024-07-17 PROCEDURE — 84484 ASSAY OF TROPONIN QUANT: CPT

## 2024-07-17 PROCEDURE — 80053 COMPREHEN METABOLIC PANEL: CPT

## 2024-07-17 PROCEDURE — 99284 EMERGENCY DEPT VISIT MOD MDM: CPT

## 2024-07-17 PROCEDURE — 85025 COMPLETE CBC W/AUTO DIFF WBC: CPT

## 2024-07-17 PROCEDURE — 83880 ASSAY OF NATRIURETIC PEPTIDE: CPT

## 2024-07-17 RX ORDER — IBUPROFEN 600 MG/1
TABLET ORAL
Status: DISCONTINUED
Start: 2024-07-17 | End: 2024-07-17 | Stop reason: WASHOUT

## 2024-07-17 RX ORDER — DOXYCYCLINE HYCLATE 100 MG
TABLET ORAL
Status: DISCONTINUED
Start: 2024-07-17 | End: 2024-07-17 | Stop reason: HOSPADM

## 2024-07-17 SDOH — SOCIAL STABILITY - SOCIAL INSECURITY: DISSAPEARANCE AND DEATH OF FAMILY MEMBER: Z63.4

## 2024-07-17 ASSESSMENT — PAIN - FUNCTIONAL ASSESSMENT
PAIN_FUNCTIONAL_ASSESSMENT: 0-10
PAIN_FUNCTIONAL_ASSESSMENT: 0-10

## 2024-07-17 ASSESSMENT — PAIN DESCRIPTION - ORIENTATION: ORIENTATION: RIGHT;LEFT

## 2024-07-17 ASSESSMENT — PAIN DESCRIPTION - LOCATION: LOCATION: LEG

## 2024-07-17 ASSESSMENT — PAIN SCALES - GENERAL
PAINLEVEL_OUTOF10: 4
PAINLEVEL_OUTOF10: 6

## 2024-07-17 NOTE — ED NOTES
from being able to access all her medications.       Trauma or Abuse: (specify)Unknown       Living Situation Pt reports that she lives in an apartment complex for elderly. Pt reports that Vasiliy, her 24 hr caregiver lives with her. Pt reports numerous friends within the complex.       Education: unknown       Employment: Pt is 73 y/o and is on  SS disability.       Brief Summary: Nusrat is a 73 y/o WWF who presented to ED with anxiety over the recent loss of her mother and the actions that are taking place about her mother's estate. Pt reports that she felt like she had a panic attack. Discussed with pt about follow-up with PCP about medications that may help with her anxiety. Discussed mindfulness practices that she can use to self-sooth and reduce her anxious feelings.       Disposition: Discussed with Davei Akhtar PA-C, pt given  outpatient resources. Pt encouraged to follow up with her PCP about today's ED visit for anxiety. Discussed grounding and distraction techniques.             Notifications to specify who was notified about any risk concerns, consultations w physician/jorge/nursing  and disposition recommendations

## 2024-07-17 NOTE — DISCHARGE INSTRUCTIONS
As discussed with you today, contact your primary care provider to discuss your visit to emergency department, your blood test, chest x-ray and schedule appointment for reevaluation of your symptoms, shortness of breath, anxiety and if needed further outpatient management.    Return if you develop any difficulty breathing, coughing up blood, confusion, passing out, worsening symptoms or any new concerns.    If needed, can contact the crisis line if you have any worsening anxiety, depression or thoughts of self-harm    [employer]      [facility]            [phone].      [place] Crisis Text Line                                      Text 4HOPE to 587-496.       National Suicide Prevention Lifeline               1-926.669.6233 or dial 988      Crisis [place]                                             [phone]      Crisis [place]                                                [phone]      [facility]                                 [phone]      [facility]              [phone]

## 2024-07-17 NOTE — ED PROVIDER NOTES
process however further discussion with patient evaluation of her symptoms and exam findings.  She has no worsening on exertion diaphoresis symptoms improved here, not hypoxic denying any chest painDo feel her symptoms are related to her upsetting news today and I do have low suspicion for acute cardiopulmonary etiology.  Discussion with her, she did describe some fleeting SI due to the setting use we did have patient evaluated by behavioral team and patient denies any SI.  She is agreeable to outpatient follow-up for potential adjustment disorder, bereavement, anxiety depression.  Additionally with reassuring chest x-ray vitals I do have low suspicion for any serious acutely life-threatening cardiopulmonary etiology nonetheless patient was advised to return to ED with any worsening.  She is also advised to follow-up with wound clinic and her PCP    Discussion with Other Profesionals : As discussed above, LSW Allison Bryant    Disposition Considerations (tests considered but not done, Shared Decision Making, Pt Expectation of Test or Tx.):  Did consider cardiac workup EKG troponin, however patient's symptoms were situational mild shortness of breath without any chest pain low suspicion for ACS, PE, infectious process     Escalation of care, including admission/OBS considered : Appropriate for outpatient management.     Records Reviewed : Outpatient PCP notes    Outpatient PCP note from 1 week ago, patient diagnosed with stasis dermatitis bilateral leg pain.  Outpatient ultrasound yesterday negative for DVT.  Patient prescribed Lasix, potassium, cephalexin      Social Determinants of Health : None     Chronic conditions affecting care:    has a past medical history of Anemia, Anxiety (1978), Arthritis, Asthma (2006), AV block, Blood poisoning (1994), Blood transfusion, Breast cancer ([facility]) (02/29/2012), CAD (coronary artery disease), Cancer (HCC) (2007), Carcinoma of breast ([facility]) (02/29/2012), Cardiac pacemaker

## 2024-07-17 NOTE — ED TRIAGE NOTES
Pt arrived via ems with c/o having a panic attack. Pt states she received the news today that her  called her and she isnt getting anything from her mothers passing but her brother is. Pt is very tearful and anxious on arrival.

## 2024-07-18 ENCOUNTER — TELEPHONE (OUTPATIENT)
Dept: INTERNAL MEDICINE CLINIC | Age: 74
End: 2024-07-18

## 2024-07-18 NOTE — TELEPHONE ENCOUNTER
Patient was seen in the ER yesterday. Patient has had bad news and was seen in the ER. Patient would like to know if something could be sent to her pharmacy for anxiety.

## 2024-07-19 ENCOUNTER — HOSPITAL ENCOUNTER (OUTPATIENT)
Dept: WOUND CARE | Age: 74
Discharge: HOME | End: 2024-07-19
Payer: MEDICARE

## 2024-07-19 VITALS
SYSTOLIC BLOOD PRESSURE: 139 MMHG | RESPIRATION RATE: 20 BRPM | TEMPERATURE: 98.9 F | DIASTOLIC BLOOD PRESSURE: 45 MMHG | HEART RATE: 73 BPM

## 2024-07-19 DIAGNOSIS — L97.212 VENOUS STASIS ULCER OF RIGHT CALF WITH FAT LAYER EXPOSED, UNSPECIFIED WHETHER VARICOSE VEINS PRESENT (HCC): ICD-10-CM

## 2024-07-19 DIAGNOSIS — L97.222 VENOUS STASIS ULCER OF LEFT CALF WITH FAT LAYER EXPOSED, UNSPECIFIED WHETHER VARICOSE VEINS PRESENT (HCC): ICD-10-CM

## 2024-07-19 DIAGNOSIS — Q82.0 HEREDITARY LYMPHEDEMA OF LEGS: Primary | ICD-10-CM

## 2024-07-19 DIAGNOSIS — I83.022 VENOUS STASIS ULCER OF LEFT CALF WITH FAT LAYER EXPOSED, UNSPECIFIED WHETHER VARICOSE VEINS PRESENT (HCC): ICD-10-CM

## 2024-07-19 DIAGNOSIS — I83.012 VENOUS STASIS ULCER OF RIGHT CALF WITH FAT LAYER EXPOSED, UNSPECIFIED WHETHER VARICOSE VEINS PRESENT (HCC): ICD-10-CM

## 2024-07-19 PROCEDURE — 99213 OFFICE O/P EST LOW 20 MIN: CPT

## 2024-07-19 PROCEDURE — 11042 DBRDMT SUBQ TIS 1ST 20SQCM/<: CPT

## 2024-07-19 RX ORDER — LIDOCAINE 40 MG/G
CREAM TOPICAL ONCE
OUTPATIENT
Start: 2024-07-19 | End: 2024-07-19

## 2024-07-19 RX ORDER — LIDOCAINE HYDROCHLORIDE 40 MG/ML
SOLUTION TOPICAL ONCE
OUTPATIENT
Start: 2024-07-19 | End: 2024-07-19

## 2024-07-19 RX ORDER — LIDOCAINE HYDROCHLORIDE 20 MG/ML
JELLY TOPICAL ONCE
OUTPATIENT
Start: 2024-07-19 | End: 2024-07-19

## 2024-07-19 RX ORDER — BETAMETHASONE DIPROPIONATE 0.5 MG/G
CREAM TOPICAL ONCE
OUTPATIENT
Start: 2024-07-19 | End: 2024-07-19

## 2024-07-19 RX ORDER — CLOBETASOL PROPIONATE 0.5 MG/G
OINTMENT TOPICAL ONCE
OUTPATIENT
Start: 2024-07-19 | End: 2024-07-19

## 2024-07-19 RX ORDER — LIDOCAINE 50 MG/G
OINTMENT TOPICAL ONCE
OUTPATIENT
Start: 2024-07-19 | End: 2024-07-19

## 2024-07-19 RX ORDER — TRIAMCINOLONE ACETONIDE 1 MG/G
OINTMENT TOPICAL ONCE
OUTPATIENT
Start: 2024-07-19 | End: 2024-07-19

## 2024-07-19 RX ORDER — SODIUM CHLOR/HYPOCHLOROUS ACID 0.033 %
SOLUTION, IRRIGATION IRRIGATION ONCE
OUTPATIENT
Start: 2024-07-19 | End: 2024-07-19

## 2024-07-19 NOTE — PROGRESS NOTES
Wound Care Center Initial Visit      Nusrat Moctezuma  AGE: 74 y.o.   GENDER: female  : 1950  EPISODE DATE:  2024   Referred by:PCP    Subjective:     CHIEF COMPLAINT venous stasis bilat     HISTORY of PRESENT ILLNESS      Nusrat Moctezuma is a 74 y.o. female who presents to the Wound Clinic for an initial visit for evaluation and treatment of Chronic venous and lymphedema  ulcer(s) of  R lower leg medial, R lower leg lateral, R lower leg anterior, R lower leg posterior, L lower leg medial, L lower leg lateral, L lower leg anterior, L lower leg posterior.  The condition is of mild severity. The ulcer has been present for several months.  The underlying cause is thought to be venous stasis and lymphedema.  The patients care to date has included nothing so far. The patient has significant underlying medical conditions as below.     Patient is not a diabetic or a smoker  Not on a blood thinner    Needs lymphedema pumps and juxta-lites   Wounds not too severe    Wound Pain Timing/Severity: waxing and waning  Quality of pain: dull, aching  Severity of pain:  2 / 10   Modifying Factors: edema, venous stasis, and obesity  Associated Signs/Symptoms: edema, drainage, and pain        PAST MEDICAL HISTORY        Diagnosis Date    Anemia     Anxiety     Arthritis     generalized    Asthma 2006    AV block     2nd degree per hospital in 2013    Blood poisoning     Blood transfusion     2003    Breast cancer ([facility]) 2012    right    CAD (coronary artery disease)     Cancer ([facility])     skin (face) & colon(2012 dx of right breast ca(pc)    Carcinoma of breast ([facility]) 2012    right arm no b/p or sticks    Cardiac pacemaker 03/10/2014    [employer] dual lead    Cellulitis 11/15/2022    Chronic cystitis     Chronic respiratory failure ([facility])     2 L/min oxygen at night-time    Colon cancer ([facility])     COPD (chronic obstructive pulmonary disease) ([facility])     CTS (carpal tunnel syndrome)

## 2024-07-19 NOTE — TELEPHONE ENCOUNTER
No answer when called. I can send in low dose Buspar for anxiety. She was on Zoloft in the past but stopped the medication

## 2024-07-19 NOTE — PROGRESS NOTES
Multilayer Compression Wrap   (Not Unna) Below the Knee    NAME:  Nusrat Moctezuma  YOB: 1950  MEDICAL RECORD NUMBER:  [id number]  DATE:  7/19/2024    Multilayer compression wrap: Removed old Multilayer wrap if indicated and wash leg with mild soap/water.  Applied moisturizing agent to dry skin as needed.   Applied primary and secondary dressing as ordered.  Applied multilayered dressing below the knee to right lower leg.  Applied multilayered dressing below the knee to left lower leg.  Instructed patient/caregiver not to remove dressing and to keep it clean and dry.   Instructed patient/caregiver on complications to report to provider, such as pain, numbness in toes, heavy drainage, and slippage of dressing.  Instructed patient on purpose of compression dressing and on activity and exercise recommendations.      Electronically signed by Rajendra Perkins RN on 7/19/2024 at 2:05 PM

## 2024-07-19 NOTE — PATIENT INSTRUCTIONS
Primary Wound Care Provider: AUSTIN Grubbs   Call  for any questions or concerns.  Central [phone] for imaging and lab work

## 2024-07-26 ENCOUNTER — HOSPITAL ENCOUNTER (OUTPATIENT)
Dept: WOUND CARE | Age: 74
Discharge: HOME | End: 2024-07-26
Payer: MEDICARE

## 2024-07-26 VITALS
TEMPERATURE: 97.6 F | HEART RATE: 64 BPM | DIASTOLIC BLOOD PRESSURE: 72 MMHG | SYSTOLIC BLOOD PRESSURE: 143 MMHG | RESPIRATION RATE: 20 BRPM

## 2024-07-26 DIAGNOSIS — I83.012 VENOUS STASIS ULCER OF RIGHT CALF WITH FAT LAYER EXPOSED, UNSPECIFIED WHETHER VARICOSE VEINS PRESENT (HCC): ICD-10-CM

## 2024-07-26 DIAGNOSIS — L97.212 VENOUS STASIS ULCER OF RIGHT CALF WITH FAT LAYER EXPOSED, UNSPECIFIED WHETHER VARICOSE VEINS PRESENT (HCC): ICD-10-CM

## 2024-07-26 DIAGNOSIS — I83.022 VENOUS STASIS ULCER OF LEFT CALF WITH FAT LAYER EXPOSED, UNSPECIFIED WHETHER VARICOSE VEINS PRESENT (HCC): ICD-10-CM

## 2024-07-26 DIAGNOSIS — Q82.0 HEREDITARY LYMPHEDEMA OF LEGS: Primary | ICD-10-CM

## 2024-07-26 DIAGNOSIS — L97.222 VENOUS STASIS ULCER OF LEFT CALF WITH FAT LAYER EXPOSED, UNSPECIFIED WHETHER VARICOSE VEINS PRESENT (HCC): ICD-10-CM

## 2024-07-26 PROCEDURE — 99213 OFFICE O/P EST LOW 20 MIN: CPT | Performed by: NURSE PRACTITIONER

## 2024-07-26 PROCEDURE — 29581 APPL MULTLAYER CMPRN SYS LEG: CPT

## 2024-07-26 RX ORDER — CLOBETASOL PROPIONATE 0.5 MG/G
OINTMENT TOPICAL ONCE
OUTPATIENT
Start: 2024-07-26 | End: 2024-07-26

## 2024-07-26 RX ORDER — TRIAMCINOLONE ACETONIDE 1 MG/G
OINTMENT TOPICAL ONCE
OUTPATIENT
Start: 2024-07-26 | End: 2024-07-26

## 2024-07-26 RX ORDER — LIDOCAINE 50 MG/G
OINTMENT TOPICAL ONCE
OUTPATIENT
Start: 2024-07-26 | End: 2024-07-26

## 2024-07-26 RX ORDER — BETAMETHASONE DIPROPIONATE 0.5 MG/G
CREAM TOPICAL ONCE
OUTPATIENT
Start: 2024-07-26 | End: 2024-07-26

## 2024-07-26 RX ORDER — SODIUM CHLOR/HYPOCHLOROUS ACID 0.033 %
SOLUTION, IRRIGATION IRRIGATION ONCE
OUTPATIENT
Start: 2024-07-26 | End: 2024-07-26

## 2024-07-26 RX ORDER — LIDOCAINE 40 MG/G
CREAM TOPICAL ONCE
OUTPATIENT
Start: 2024-07-26 | End: 2024-07-26

## 2024-07-26 RX ORDER — LIDOCAINE HYDROCHLORIDE 20 MG/ML
JELLY TOPICAL ONCE
OUTPATIENT
Start: 2024-07-26 | End: 2024-07-26

## 2024-07-26 RX ORDER — LIDOCAINE HYDROCHLORIDE 40 MG/ML
SOLUTION TOPICAL ONCE
OUTPATIENT
Start: 2024-07-26 | End: 2024-07-26

## 2024-07-26 NOTE — PROGRESS NOTES
Multilayer Compression Wrap   (Not Unna) Below the Knee    NAME:  Nusrat Moctezuma  YOB: 1950  MEDICAL RECORD NUMBER:  [id number]  DATE:  7/26/2024    Multilayer compression wrap: Removed old Multilayer wrap if indicated and wash leg with mild soap/water.  Applied moisturizing agent to dry skin as needed.   Applied primary and secondary dressing as ordered.  Applied multilayered dressing below the knee to right lower leg.  Applied multilayered dressing below the knee to left lower leg.  Instructed patient/caregiver not to remove dressing and to keep it clean and dry.   Instructed patient/caregiver on complications to report to provider, such as pain, numbness in toes, heavy drainage, and slippage of dressing.  Instructed patient on purpose of compression dressing and on activity and exercise recommendations.      Electronically signed by Rajendra Perkins RN on 7/26/2024 at 3:07 PM    
SURGERY  02/13/2012    rt lesion biopsy     CARDIAC CATHETERIZATION  2007 & 2011    CAROTID ENDARTERECTOMY Right 12/3/2021    RIGHT CAROTID ENDARTERECTOMY WITH PATCH performed by Niko Francisco MD at [facility] OR    COLECTOMY  2004    Colon cancer    COLONOSCOPY  10-18-13    polyp    COLONOSCOPY  1/19/2016    internal hemorrhoids, diverticulosis, 1.5 cm sessile polyp, 8 mm polyp found in rectum.    COLONOSCOPY  12/14/2017    1.5cm residual polyp, 5mm polyps in blind sigmoid loop x3, Sigmoid divertics, Internal grade 1 hemorrhoids    COLONOSCOPY N/A 1/16/2019    COLONOSCOPY W/ ENDOSCOPIC MUCOSAL RESECTION WITH ELEVIEW 5ML INJECTION AND POLYPECTOMY OF TIP OF BLIND RECTOSIGMOID STUMP AND CAUTERIZATION WITH ERBE PROBE, CLIPPING X2 performed by Jhony Schultz MD at [facility] ENDOSCOPY    COLONOSCOPY  01/21/2020    pan divertics/ 4 polyps/ sm int hem, S/P partial sigmoid resection w/ end to side anastamosis, repeat in 3 years    COLONOSCOPY N/A 1/21/2020    COLONOSCOPY POLYPECTOMY SNARE/COLD BIOPSY performed by Jhony Schultz MD at [facility] ENDOSCOPY    CYST REMOVAL  2003    back    CYSTOSCOPY Bilateral 12/15/14    CYSTOSCOPY Right 5/15/2022    CYSTOSCOPY URETERAL STENT INSERTION performed by Luis Antonio Juan MD at [facility] OR    DENTAL SURGERY      front right tooth and root canal w/ brass bolt    DILATION AND CURETTAGE OF UTERUS  2006    Needed a camera to find my uterus.    DILATION AND CURETTAGE OF UTERUS N/A 5/24/2022    DILATATION AND CURETTAGE, CULTURES OF UTEREUS performed by Antwan Haro MD at [facility] OR    ENDOSCOPY, COLON, DIAGNOSTIC  12/14/2017    Small hiatal hernia    HERNIA REPAIR  2004    Umb hernia    HERNIA REPAIR  2008    Inc hernia    KIDNEY SURGERY  05/24/2022    stent placed on right side    LITHOTRIPSY Right 8/1/2022    RIGHT CYSTOSCOPY URETEROSCOPY STONE MANIPULATION WITH HOLIUM LASER LITHOTRIPSY STENT REPLACEMENT performed by Osman Sofia MD at [facility] OR    LITHOTRIPSY Left 9/26/2022    LEFT

## 2024-07-26 NOTE — PATIENT INSTRUCTIONS
PHYSICIAN ORDERS AND DISCHARGE INSTRUCTIONS    Wound cleansing:     Do not scrub or use excessive force.   Wash hands with soap and water before and after dressing changes.   Prior to applying a clean dressing, cleanse wound with normal saline,               wound cleanser, or mild soap and water.    Ask the physician or nurse before getting the wound(s) wet in a shower      Compression Wrap Education   When slippage occurs, the wrap should be removed immediately and rewrapped or a different wrap should be applied. If removal is necessary, cover wound with clean bandage and contact the wound care clinic.   Monitor for impaired circulation including pale, cool or numb extremities distal to the wrap or garment.   If pain, numbness, tingling, discolouration or swelling of the toes occurs, the compression wrap or stocking must be removed immediately  Report to provider, such as pain, numbness in toes, heavy drainage, and slippage of dressing.  Keep compression wraps clean and dry. May use cast cover to take a shower or take sponge baths.   Do not stick objects inside wraps to scratch. This may create more wounds.   Speak to the provider about any issues or questions you may have about your compression wraps.   Rest and Elevate lower extremities throughout the day.   Avoid prolonged sitting with legs dangling or prolonged standing   If supplies are not available please DO NOT remove wraps until next visit to Wound Care Center     Wound Care Notes:  KNEE TO HEEL MEASUREMENTS           Orders for this week:  7/26/2024       Bilateral Lower Leg : Wash with soap and water, pat dry.  Wrap with coflex full.  Leave in place until your next visit to wound care center.    Dispense 30 day quantity when ordering supplies.  Plan:  Lymphedema pumps 7/19/24. Compression garment in 4 weeks starting 7/19/24      Follow Up Instructions: At the Wound Care Center in 1 week   Primary Wound Care Provider: Missael Gaona, APRN   Call [phone]

## 2024-07-30 ENCOUNTER — HOSPITAL ENCOUNTER (OUTPATIENT)
Age: 74
Discharge: HOME | End: 2024-07-30
Payer: MEDICARE

## 2024-07-30 DIAGNOSIS — R60.0 LOCALIZED EDEMA: ICD-10-CM

## 2024-07-30 LAB
ANION GAP SERPL CALCULATED.3IONS-SCNC: 15 MMOL/L (ref 7–16)
BUN SERPL-MCNC: 27 MG/DL (ref 6–23)
CALCIUM SERPL-MCNC: 9.7 MG/DL (ref 8.3–10.6)
CHLORIDE BLD-SCNC: 101 MMOL/L (ref 99–110)
CO2: 26 MMOL/L (ref 21–32)
CREAT SERPL-MCNC: 1 MG/DL (ref 0.6–1.1)
GFR, ESTIMATED: 59 ML/MIN/1.73M2
GLUCOSE SERPL-MCNC: 112 MG/DL (ref 70–99)
POTASSIUM SERPL-SCNC: 4.7 MMOL/L (ref 3.5–5.1)
SODIUM BLD-SCNC: 142 MMOL/L (ref 135–145)

## 2024-07-30 PROCEDURE — 80048 BASIC METABOLIC PNL TOTAL CA: CPT

## 2024-07-30 PROCEDURE — 36415 COLL VENOUS BLD VENIPUNCTURE: CPT

## 2024-07-31 ENCOUNTER — OFFICE VISIT (OUTPATIENT)
Dept: INTERNAL MEDICINE CLINIC | Age: 74
End: 2024-07-31
Payer: MEDICARE

## 2024-07-31 VITALS
OXYGEN SATURATION: 97 % | HEART RATE: 70 BPM | HEIGHT: 62 IN | DIASTOLIC BLOOD PRESSURE: 80 MMHG | SYSTOLIC BLOOD PRESSURE: 140 MMHG | WEIGHT: 276 LBS | BODY MASS INDEX: 50.79 KG/M2

## 2024-07-31 DIAGNOSIS — R60.0 PERIPHERAL EDEMA: ICD-10-CM

## 2024-07-31 DIAGNOSIS — F33.1 MODERATE EPISODE OF RECURRENT MAJOR DEPRESSIVE DISORDER (HCC): Primary | ICD-10-CM

## 2024-07-31 DIAGNOSIS — Z63.4 BEREAVEMENT: ICD-10-CM

## 2024-07-31 PROCEDURE — 3077F SYST BP >= 140 MM HG: CPT | Performed by: FAMILY MEDICINE

## 2024-07-31 PROCEDURE — 1124F ACP DISCUSS-NO DSCNMKR DOCD: CPT | Performed by: FAMILY MEDICINE

## 2024-07-31 PROCEDURE — 99214 OFFICE O/P EST MOD 30 MIN: CPT | Performed by: FAMILY MEDICINE

## 2024-07-31 PROCEDURE — 3079F DIAST BP 80-89 MM HG: CPT | Performed by: FAMILY MEDICINE

## 2024-07-31 RX ORDER — SERTRALINE HYDROCHLORIDE 25 MG/1
25 TABLET, FILM COATED ORAL DAILY
Qty: 30 TABLET | Refills: 1 | Status: SHIPPED | OUTPATIENT
Start: 2024-07-31

## 2024-07-31 RX ORDER — FUROSEMIDE 20 MG/1
20 TABLET ORAL DAILY
Qty: 30 TABLET | Refills: 3 | Status: SHIPPED | OUTPATIENT
Start: 2024-07-31

## 2024-07-31 SDOH — SOCIAL STABILITY - SOCIAL INSECURITY: DISSAPEARANCE AND DEATH OF FAMILY MEMBER: Z63.4

## 2024-07-31 ASSESSMENT — ENCOUNTER SYMPTOMS
SHORTNESS OF BREATH: 0
ABDOMINAL PAIN: 0
NAUSEA: 0
COUGH: 0

## 2024-07-31 NOTE — PROGRESS NOTES
Nusrat Moctezuma (:  1950) is a 74 y.o. female,established patient, here for evaluation of the following chief complaint(s):  Follow-up (ER f/u- depressed ) and Depression      Assessment & Plan   ASSESSMENT/PLAN:  1. Moderate episode of recurrent major depressive disorder (HCC)  Start:  - sertraline (ZOLOFT) 25 MG tablet; Take 1 tablet by mouth daily  Dispense: 30 tablet; Refill: 1  ADR's explained    2. Bereavement  Consider counseling    3. Peripheral edema  -Decrease Lasix to 20 mg  - furosemide (LASIX) 20 MG tablet; Take 1 tablet by mouth daily  Dispense: 30 tablet; Refill: 3    Medications reconciled and discussed with the patient  Return if symptoms worsen or fail to improve, for follow up as scheduled.       Lab Results   Component Value Date    WBC 12.0 (H) 2024    HGB 11.1 (L) 2024    HCT 34.9 (L) 2024    MCV 89.7 2024     2024       Lab Results   Component Value Date    LABA1C 5.2 2023     Lab Results   Component Value Date     2023     Lab Results   Component Value Date     2024    K 4.7 2024     2024    CO2 26 2024    BUN 27 (H) 2024    CREATININE 1.0 2024    GLUCOSE 112 (H) 2024    CALCIUM 9.7 2024    BILITOT 0.2 2024    ALKPHOS 138 (H) 2024    AST 11 (L) 2024    ALT 10 2024    LABGLOM 59 (L) 2024    GFRAA >60 10/15/2022    AGRATIO 1.3 2021    GLOB 3.2 2021       Lab Results   Component Value Date/Time    COLORU YELLOW 2024 03:18 PM    NITRU NEGATIVE 2024 03:18 PM    GLUCOSEU NEGATIVE 2024 03:18 PM    KETUA NEGATIVE 2024 03:18 PM    UROBILINOGEN 0.2 2024 03:18 PM    BILIRUBINUR NEGATIVE 2024 03:18 PM       Subjective   SUBJECTIVE/OBJECTIVE:    HISTORY OF PRESENT ILLNESS:  This is a 74 y.o. female here for the following:  Patient Active Problem List    Diagnosis Date Noted    Vitamin D deficiency

## 2024-08-02 ENCOUNTER — HOSPITAL ENCOUNTER (OUTPATIENT)
Dept: WOUND CARE | Age: 74
Discharge: HOME | End: 2024-08-02
Payer: MEDICARE

## 2024-08-02 VITALS
RESPIRATION RATE: 18 BRPM | SYSTOLIC BLOOD PRESSURE: 161 MMHG | TEMPERATURE: 97.9 F | HEART RATE: 70 BPM | DIASTOLIC BLOOD PRESSURE: 67 MMHG

## 2024-08-02 DIAGNOSIS — Q82.0 HEREDITARY LYMPHEDEMA OF LEGS: Primary | ICD-10-CM

## 2024-08-02 DIAGNOSIS — L97.212 VENOUS STASIS ULCER OF RIGHT CALF WITH FAT LAYER EXPOSED, UNSPECIFIED WHETHER VARICOSE VEINS PRESENT (HCC): ICD-10-CM

## 2024-08-02 DIAGNOSIS — I83.012 VENOUS STASIS ULCER OF RIGHT CALF WITH FAT LAYER EXPOSED, UNSPECIFIED WHETHER VARICOSE VEINS PRESENT (HCC): ICD-10-CM

## 2024-08-02 DIAGNOSIS — I83.022 VENOUS STASIS ULCER OF LEFT CALF WITH FAT LAYER EXPOSED, UNSPECIFIED WHETHER VARICOSE VEINS PRESENT (HCC): ICD-10-CM

## 2024-08-02 DIAGNOSIS — L97.222 VENOUS STASIS ULCER OF LEFT CALF WITH FAT LAYER EXPOSED, UNSPECIFIED WHETHER VARICOSE VEINS PRESENT (HCC): ICD-10-CM

## 2024-08-02 PROCEDURE — 29581 APPL MULTLAYER CMPRN SYS LEG: CPT

## 2024-08-02 PROCEDURE — 99213 OFFICE O/P EST LOW 20 MIN: CPT | Performed by: NURSE PRACTITIONER

## 2024-08-02 RX ORDER — LIDOCAINE HYDROCHLORIDE 20 MG/ML
JELLY TOPICAL ONCE
OUTPATIENT
Start: 2024-08-02 | End: 2024-08-02

## 2024-08-02 RX ORDER — CLOBETASOL PROPIONATE 0.5 MG/G
OINTMENT TOPICAL ONCE
OUTPATIENT
Start: 2024-08-02 | End: 2024-08-02

## 2024-08-02 RX ORDER — SODIUM CHLOR/HYPOCHLOROUS ACID 0.033 %
SOLUTION, IRRIGATION IRRIGATION ONCE
OUTPATIENT
Start: 2024-08-02 | End: 2024-08-02

## 2024-08-02 RX ORDER — LIDOCAINE HYDROCHLORIDE 40 MG/ML
SOLUTION TOPICAL ONCE
OUTPATIENT
Start: 2024-08-02 | End: 2024-08-02

## 2024-08-02 RX ORDER — TRIAMCINOLONE ACETONIDE 1 MG/G
OINTMENT TOPICAL ONCE
OUTPATIENT
Start: 2024-08-02 | End: 2024-08-02

## 2024-08-02 RX ORDER — LIDOCAINE 50 MG/G
OINTMENT TOPICAL ONCE
OUTPATIENT
Start: 2024-08-02 | End: 2024-08-02

## 2024-08-02 RX ORDER — BETAMETHASONE DIPROPIONATE 0.5 MG/G
CREAM TOPICAL ONCE
OUTPATIENT
Start: 2024-08-02 | End: 2024-08-02

## 2024-08-02 RX ORDER — LIDOCAINE 40 MG/G
CREAM TOPICAL ONCE
OUTPATIENT
Start: 2024-08-02 | End: 2024-08-02

## 2024-08-02 ASSESSMENT — PAIN SCALES - GENERAL: PAINLEVEL_OUTOF10: 0

## 2024-08-02 NOTE — PROGRESS NOTES
Multilayer Compression Wrap   (Not Unna) Below the Knee    NAME:  Nusrat Moctezuma  YOB: 1950  MEDICAL RECORD NUMBER:  [id number]  DATE:  8/2/2024    Multilayer compression wrap: Removed old Multilayer wrap if indicated and wash leg with mild soap/water.  Applied multilayered dressing below the knee to right lower leg.  Applied multilayered dressing below the knee to left lower leg.  Instructed patient/caregiver not to remove dressing and to keep it clean and dry.   Instructed patient/caregiver on complications to report to provider, such as pain, numbness in toes, heavy drainage, and slippage of dressing.  Instructed patient on purpose of compression dressing and on activity and exercise recommendations.  Per MICHELLE Mendoza    Electronically signed by Frannie Rock LPN on 8/2/2024 at 3:13 PM

## 2024-08-02 NOTE — PROGRESS NOTES
Wound Care Center Progress Note       Nusrat Moctezuma  AGE: 74 y.o.   GENDER: female  : 1950  TODAY'S DATE:  2024        Subjective:     Chief Complaint   Patient presents with    Wound Check         HISTORY of PRESENT ILLNESS    Nusrat Moctezuma is a 74 y.o. female who presents to the Wound Clinic for a visit for evaluation and treatment of Chronic venous and lymphedema  ulcer(s) of  R lower leg medial, R lower leg lateral, R lower leg anterior, R lower leg posterior, L lower leg medial, L lower leg lateral, L lower leg anterior, L lower leg posterior.  The condition is of mild severity. The ulcer has been present for several months.  The underlying cause is thought to be venous stasis and lymphedema.  The patients care to date has included nothing so far. The patient has significant underlying medical conditions as below.     2024: ordering lymphedema pumps and juxta-lites today  Ok getting wrapped until she is set up     2024: Patient vast improvement with wraps.   Increase compression today  All wounds healed  Will measure for juxta-lites and lymphedema pumps next week     Patient is not a diabetic or a smoker  Not on a blood thinner     Needs lymphedema pumps and juxta-lites   Wounds not too severe     Wound Pain Timing/Severity: waxing and waning  Quality of pain: dull, aching  Severity of pain:  2 / 10   Modifying Factors: edema, venous stasis, and obesity  Associated Signs/Symptoms: edema, drainage, and pain        PAST MEDICAL HISTORY        Diagnosis Date    Anemia     Anxiety 1978    Arthritis     generalized    Asthma 2006    AV block     2nd degree per hospital in 2013    Blood poisoning 1994    Blood transfusion     2003    Breast cancer (HCC) 2012    right    CAD (coronary artery disease)     Cancer ([facility])     skin (face) & colon(2012 dx of right breast ca(pc)    Carcinoma of breast (HCC) 2012    right arm no b/p or sticks    Cardiac pacemaker

## 2024-08-02 NOTE — PATIENT INSTRUCTIONS
PHYSICIAN ORDERS AND DISCHARGE INSTRUCTIONS    Wound cleansing:     Do not scrub or use excessive force.   Wash hands with soap and water before and after dressing changes.   Prior to applying a clean dressing, cleanse wound with normal saline,               wound cleanser, or mild soap and water.    Ask the physician or nurse before getting the wound(s) wet in a shower      Compression Wrap Education   When slippage occurs, the wrap should be removed immediately and rewrapped or a different wrap should be applied. If removal is necessary, cover wound with clean bandage and contact the wound care clinic.   Monitor for impaired circulation including pale, cool or numb extremities distal to the wrap or garment.   If pain, numbness, tingling, discolouration or swelling of the toes occurs, the compression wrap or stocking must be removed immediately  Report to provider, such as pain, numbness in toes, heavy drainage, and slippage of dressing.  Keep compression wraps clean and dry. May use cast cover to take a shower or take sponge baths.   Do not stick objects inside wraps to scratch. This may create more wounds.   Speak to the provider about any issues or questions you may have about your compression wraps.   Rest and Elevate lower extremities throughout the day.   Avoid prolonged sitting with legs dangling or prolonged standing   If supplies are not available please DO NOT remove wraps until next visit to Wound Care Center     Wound Care Notes:  KNEE TO HEEL MEASUREMENTS           Orders for this week:  8/2/2024       Bilateral Lower Leg : Wash with soap and water, pat dry.  Wrap with coflex full.  Leave in place until your next visit to wound care center.    Dispense 30 day quantity when ordering supplies.  Plan:  Lymphedema pumps 7/19/24. Compression garment to be ordered on 8/16/24      Follow Up Instructions: At the Wound Care Center in 1 week   Primary Wound Care Provider: Missael Gaona, APRN   Call

## 2024-08-04 PROCEDURE — 93294 REM INTERROG EVL PM/LDLS PM: CPT | Performed by: INTERNAL MEDICINE

## 2024-08-04 PROCEDURE — 93296 REM INTERROG EVL PM/IDS: CPT | Performed by: INTERNAL MEDICINE

## 2024-08-05 ENCOUNTER — PROCEDURE VISIT (OUTPATIENT)
Dept: CARDIOLOGY CLINIC | Age: 74
End: 2024-08-05
Payer: MEDICARE

## 2024-08-05 DIAGNOSIS — Z95.0 CARDIAC PACEMAKER IN SITU: Primary | ICD-10-CM

## 2024-08-09 ENCOUNTER — HOSPITAL ENCOUNTER (OUTPATIENT)
Dept: WOUND CARE | Age: 74
Discharge: HOME | End: 2024-08-09
Payer: MEDICARE

## 2024-08-09 VITALS
TEMPERATURE: 99 F | HEART RATE: 74 BPM | SYSTOLIC BLOOD PRESSURE: 142 MMHG | RESPIRATION RATE: 18 BRPM | DIASTOLIC BLOOD PRESSURE: 78 MMHG

## 2024-08-09 DIAGNOSIS — L97.212 VENOUS STASIS ULCER OF RIGHT CALF WITH FAT LAYER EXPOSED, UNSPECIFIED WHETHER VARICOSE VEINS PRESENT (HCC): ICD-10-CM

## 2024-08-09 DIAGNOSIS — Q82.0 HEREDITARY LYMPHEDEMA OF LEGS: Primary | ICD-10-CM

## 2024-08-09 DIAGNOSIS — L97.222 VENOUS STASIS ULCER OF LEFT CALF WITH FAT LAYER EXPOSED, UNSPECIFIED WHETHER VARICOSE VEINS PRESENT (HCC): ICD-10-CM

## 2024-08-09 DIAGNOSIS — I83.022 VENOUS STASIS ULCER OF LEFT CALF WITH FAT LAYER EXPOSED, UNSPECIFIED WHETHER VARICOSE VEINS PRESENT (HCC): ICD-10-CM

## 2024-08-09 DIAGNOSIS — I83.012 VENOUS STASIS ULCER OF RIGHT CALF WITH FAT LAYER EXPOSED, UNSPECIFIED WHETHER VARICOSE VEINS PRESENT (HCC): ICD-10-CM

## 2024-08-09 PROCEDURE — 29581 APPL MULTLAYER CMPRN SYS LEG: CPT

## 2024-08-09 PROCEDURE — 99213 OFFICE O/P EST LOW 20 MIN: CPT | Performed by: NURSE PRACTITIONER

## 2024-08-09 RX ORDER — LIDOCAINE HYDROCHLORIDE 20 MG/ML
JELLY TOPICAL ONCE
OUTPATIENT
Start: 2024-08-09 | End: 2024-08-09

## 2024-08-09 RX ORDER — LIDOCAINE 40 MG/G
CREAM TOPICAL ONCE
OUTPATIENT
Start: 2024-08-09 | End: 2024-08-09

## 2024-08-09 RX ORDER — LIDOCAINE HYDROCHLORIDE 40 MG/ML
SOLUTION TOPICAL ONCE
OUTPATIENT
Start: 2024-08-09 | End: 2024-08-09

## 2024-08-09 RX ORDER — SODIUM CHLOR/HYPOCHLOROUS ACID 0.033 %
SOLUTION, IRRIGATION IRRIGATION ONCE
OUTPATIENT
Start: 2024-08-09 | End: 2024-08-09

## 2024-08-09 RX ORDER — CLOBETASOL PROPIONATE 0.5 MG/G
OINTMENT TOPICAL ONCE
OUTPATIENT
Start: 2024-08-09 | End: 2024-08-09

## 2024-08-09 RX ORDER — TRIAMCINOLONE ACETONIDE 1 MG/G
OINTMENT TOPICAL ONCE
OUTPATIENT
Start: 2024-08-09 | End: 2024-08-09

## 2024-08-09 RX ORDER — LIDOCAINE 50 MG/G
OINTMENT TOPICAL ONCE
OUTPATIENT
Start: 2024-08-09 | End: 2024-08-09

## 2024-08-09 RX ORDER — BETAMETHASONE DIPROPIONATE 0.5 MG/G
CREAM TOPICAL ONCE
OUTPATIENT
Start: 2024-08-09 | End: 2024-08-09

## 2024-08-09 NOTE — PATIENT INSTRUCTIONS
PHYSICIAN ORDERS AND DISCHARGE INSTRUCTIONS    Wound cleansing:     Do not scrub or use excessive force.   Wash hands with soap and water before and after dressing changes.   Prior to applying a clean dressing, cleanse wound with normal saline,               wound cleanser, or mild soap and water.    Ask the physician or nurse before getting the wound(s) wet in a shower      Compression Wrap Education   When slippage occurs, the wrap should be removed immediately and rewrapped or a different wrap should be applied. If removal is necessary, cover wound with clean bandage and contact the wound care clinic.   Monitor for impaired circulation including pale, cool or numb extremities distal to the wrap or garment.   If pain, numbness, tingling, discolouration or swelling of the toes occurs, the compression wrap or stocking must be removed immediately  Report to provider, such as pain, numbness in toes, heavy drainage, and slippage of dressing.  Keep compression wraps clean and dry. May use cast cover to take a shower or take sponge baths.   Do not stick objects inside wraps to scratch. This may create more wounds.   Speak to the provider about any issues or questions you may have about your compression wraps.   Rest and Elevate lower extremities throughout the day.   Avoid prolonged sitting with legs dangling or prolonged standing   If supplies are not available please DO NOT remove wraps until next visit to Wound Care Center     Wound Care Notes:  KNEE TO HEEL MEASUREMENTS  Right 40 Left 40          Orders for this week:  8/9/2024       Bilateral Lower Leg : Wash with soap and water, pat dry.  Wrap with coflex full.  Leave in place until your next visit to wound care center.    Dispense 30 day quantity when ordering supplies.  Plan:  Lymphedema pumps denied- appealing decision as of 8/9/24. Compression garments 8/9/24      Follow Up Instructions: At the Wound Care Center in 1 week   Primary Wound Care Provider: Missael

## 2024-08-09 NOTE — PLAN OF CARE
Compression Garments    Supply Company for Compression Stockings:     [employer]  [address], [place] [address] p: [phone] f: [phone]       Ordering Center:     [facility]  [address]  [facility] [address]  [phone]  WOUND CARE Dept: [phone]   FAX NUMBER [phone]    Patient Information:      Vitaliy Moctezuma  [address]  [address]  [facility] [address]   [phone]   : 1950  AGE: 74 y.o.     GENDER: female   TODAYS DATE:  2024    Insurance:      PRIMARY INSURANCE:  Plan: [facility] MEDIBLUE ESSENTIAL/PLUS  Coverage: BCBS MEDICARE  Effective Date: 2024  Group Number: [unfilled]  Subscriber Number: [id number] - (Medicare Managed)    Payer/Plan Subscr  Sex Relation Sub. Ins. ID Effective Group Num   1. [employer]BS MEDICARE* VITALIY MOCTEZUMA 1950 Female Self [id number] [id number]                                    [id number]       Patient Information:      Problem List Items Addressed This Visit          Other    WD-Hereditary lymphedema of legs - Primary    Relevant Orders    Initiate Outpatient Wound Care Protocol    WD-Venous stasis ulcer of left calf with fat layer exposed (HCC)    Relevant Orders    Initiate Outpatient Wound Care Protocol    WD-Venous stasis ulcer of right calf with fat layer exposed (HCC)    Relevant Orders    Initiate Outpatient Wound Care Protocol            Right Leg Measurements: (ALL measurements are in cm)  Right Leg Edema Point of Measurement  Great toe to forefoot: 10 cm  Heel to ankle: 10 cm  Heel to calf: 33  Leg circumference: 40 cm  Ankle circumference: 24.1 cm  Foot circumference: 24 cm    Left Leg Measurements: (ALL measurements are in cm)  Left Leg Edema Point of Measurement  Great toe to forefoot: 10 cm  Heel to ankle: 10 cm  Heel to calf: 33  Leg circumference: 41 cm  Ankle circumference: 24.3 cm  Foot circumference: 23 cm    Supplies Requested :     Medicare Requirements  Patient must have a

## 2024-08-09 NOTE — PROGRESS NOTES
.Multilayer Compression Wrap   (Not Unna) Below the Knee    NAME:  Nusrat Moctezuma  YOB: 1950  MEDICAL RECORD NUMBER:  [id number]  DATE:  8/9/2024    Multilayer compression wrap: Removed old Multilayer wrap if indicated and wash leg with mild soap/water.  Applied moisturizing agent to dry skin as needed.   Applied primary and secondary dressing as ordered.  Applied multilayered dressing below the knee to right lower leg.  Applied multilayered dressing below the knee to left lower leg.  Instructed patient/caregiver not to remove dressing and to keep it clean and dry.   Instructed patient/caregiver on complications to report to provider, such as pain, numbness in toes, heavy drainage, and slippage of dressing.  Instructed patient on purpose of compression dressing and on activity and exercise recommendations.      Electronically signed by Maria Ines Zimmerman RN on 8/9/2024 at 2:24 PM

## 2024-08-09 NOTE — PROGRESS NOTES
Wound Care Center Progress Note       Nusrat Moctezuma  AGE: 74 y.o.   GENDER: female  : 1950  TODAY'S DATE:  2024        Subjective:     Chief Complaint   Patient presents with    Wound Check     Bilateral legs          HISTORY of PRESENT ILLNESS    Nusrat Moctezuma is a 74 y.o. female who presents to the Wound Clinic for a visit for evaluation and treatment of Chronic venous and lymphedema  ulcer(s) of  R lower leg medial, R lower leg lateral, R lower leg anterior, R lower leg posterior, L lower leg medial, L lower leg lateral, L lower leg anterior, L lower leg posterior.  The condition is of mild severity. The ulcer has been present for several months.  The underlying cause is thought to be venous stasis and lymphedema.  The patients care to date has included nothing so far. The patient has significant underlying medical conditions as below.     2024: Patient denied for lymphedema pumps  Need to order juxta-lites and if they come we can discharge next week     2024: ordering lymphedema pumps and juxta-lites today  Ok getting wrapped until she is set up     2024: Patient vast improvement with wraps.   Increase compression today  All wounds healed  Will measure for juxta-lites and lymphedema pumps next week     Patient is not a diabetic or a smoker  Not on a blood thinner     Needs lymphedema pumps and juxta-lites   Wounds not too severe     Wound Pain Timing/Severity: waxing and waning  Quality of pain: dull, aching  Severity of pain:  2 / 10   Modifying Factors: edema, venous stasis, and obesity  Associated Signs/Symptoms: edema, drainage, and pain        PAST MEDICAL HISTORY        Diagnosis Date    Anemia     Anxiety 1978    Arthritis     generalized    Asthma 2006    AV block     2nd degree per hospital in 2013    Blood poisoning 1994    Blood transfusion     2003    Breast cancer (HCC) 2012    right    CAD (coronary artery disease)     Cancer (HCC)     skin (face) &  Cyclosporine Counseling:  I discussed with the patient the risks of cyclosporine including but not limited to hypertension, gingival hyperplasia,myelosuppression, immunosuppression, liver damage, kidney damage, neurotoxicity, lymphoma, and serious infections. The patient understands that monitoring is required including baseline blood pressure, CBC, CMP, lipid panel and uric acid, and then 1-2 times monthly CMP and blood pressure.

## 2024-08-16 ENCOUNTER — HOSPITAL ENCOUNTER (OUTPATIENT)
Dept: WOUND CARE | Age: 74
Discharge: HOME | End: 2024-08-16
Attending: NURSE PRACTITIONER
Payer: MEDICARE

## 2024-08-16 VITALS
HEART RATE: 67 BPM | DIASTOLIC BLOOD PRESSURE: 71 MMHG | RESPIRATION RATE: 16 BRPM | TEMPERATURE: 98.3 F | SYSTOLIC BLOOD PRESSURE: 143 MMHG

## 2024-08-16 DIAGNOSIS — Q82.0 HEREDITARY LYMPHEDEMA OF LEGS: Primary | ICD-10-CM

## 2024-08-16 DIAGNOSIS — L97.212 VENOUS STASIS ULCER OF RIGHT CALF WITH FAT LAYER EXPOSED, UNSPECIFIED WHETHER VARICOSE VEINS PRESENT (HCC): ICD-10-CM

## 2024-08-16 DIAGNOSIS — I83.022 VENOUS STASIS ULCER OF LEFT CALF WITH FAT LAYER EXPOSED, UNSPECIFIED WHETHER VARICOSE VEINS PRESENT (HCC): ICD-10-CM

## 2024-08-16 DIAGNOSIS — I83.012 VENOUS STASIS ULCER OF RIGHT CALF WITH FAT LAYER EXPOSED, UNSPECIFIED WHETHER VARICOSE VEINS PRESENT (HCC): ICD-10-CM

## 2024-08-16 DIAGNOSIS — L97.222 VENOUS STASIS ULCER OF LEFT CALF WITH FAT LAYER EXPOSED, UNSPECIFIED WHETHER VARICOSE VEINS PRESENT (HCC): ICD-10-CM

## 2024-08-16 PROCEDURE — 99213 OFFICE O/P EST LOW 20 MIN: CPT | Performed by: NURSE PRACTITIONER

## 2024-08-16 PROCEDURE — 99212 OFFICE O/P EST SF 10 MIN: CPT

## 2024-08-16 RX ORDER — CLOBETASOL PROPIONATE 0.5 MG/G
OINTMENT TOPICAL ONCE
OUTPATIENT
Start: 2024-08-16 | End: 2024-08-16

## 2024-08-16 RX ORDER — LIDOCAINE HYDROCHLORIDE 20 MG/ML
JELLY TOPICAL ONCE
OUTPATIENT
Start: 2024-08-16 | End: 2024-08-16

## 2024-08-16 RX ORDER — LIDOCAINE 50 MG/G
OINTMENT TOPICAL ONCE
OUTPATIENT
Start: 2024-08-16 | End: 2024-08-16

## 2024-08-16 RX ORDER — LIDOCAINE 40 MG/G
CREAM TOPICAL ONCE
OUTPATIENT
Start: 2024-08-16 | End: 2024-08-16

## 2024-08-16 RX ORDER — TRIAMCINOLONE ACETONIDE 1 MG/G
OINTMENT TOPICAL ONCE
OUTPATIENT
Start: 2024-08-16 | End: 2024-08-16

## 2024-08-16 RX ORDER — SODIUM CHLOR/HYPOCHLOROUS ACID 0.033 %
SOLUTION, IRRIGATION IRRIGATION ONCE
OUTPATIENT
Start: 2024-08-16 | End: 2024-08-16

## 2024-08-16 RX ORDER — BETAMETHASONE DIPROPIONATE 0.5 MG/G
CREAM TOPICAL ONCE
OUTPATIENT
Start: 2024-08-16 | End: 2024-08-16

## 2024-08-16 RX ORDER — LIDOCAINE HYDROCHLORIDE 40 MG/ML
SOLUTION TOPICAL ONCE
OUTPATIENT
Start: 2024-08-16 | End: 2024-08-16

## 2024-08-16 NOTE — PROGRESS NOTES
H/O 24 hour EKG monitoring 2/28/2014 7/24/13 2/14 complete heart block 7/13No clinically significant arrthymia noted.    H/O cardiac catheterization 10/31/2011, 7/11/2007    10/31/2011-No significant CAD. Cardiolite stress test was false positive-Dr Leary; 7/11/2007-No CAD, global function intact;    H/O cardiovascular stress test 10/20/2011, 3/23/2010    10/20/2011-Lexiscan- Evid of mild ischemia in Left CX region. EF 70%. Global LVSF normal;    H/O cardiovascular stress test 01/07/2015    EF 77%.   Normal Stress Test.    H/O chest pain 04/2005    sees Dr Leary    H/O Doppler ultrasound 02/20/2008 2/20/2008-CAROTID DOPPLER- Normal carotids bilaterally;    H/O Doppler ultrasound 06/06/2013    CAROTID DOPPLER- there is heterogeneous, irregular atherosclerotic plaque noted in the right internal carotid artery,  doppler flow velocities within the right internal carotid artery are elevated, consistent with a mild, less than 50%stenosis, there is intimal thickening but no significant atherosclerotic plaque noted in the left internal carotid artery, the left carotid are within normal limits    H/O echocardiogram 4/9/2012, 10/20/2011    4/9/2012-LVSF normal EF=>55%. impaired LV relaxation;    H/O echocardiogram 12/31/2014    EF 50-55%.  LV shows mild concentric hypertrophy.  Mildly dilated left atrium.  Mildly dilated right ventricle.  Sclerotic but not stenotic aortic valve.  Mitral annular calcification.  Mild MR, Mild TR, Mild pulm HTN.    H/O urinary incontinence     History of blood transfusion     Hx antineoplastic chemo     Hx of Arterial Doppler ultrasound 07/01/2019    No hemodynamically significant or focal stenosis visualized bilaterally, bilateral lower extremity arteries exhibit diffuse plaque and multiphasic waveforms, unable to obtain bilateral ABIs due to elevated leg pressures >250 mmHg, possibly due to atherosclerosis    Hx of cardiovascular stress test 06/2013    EF 70% abn suggestive of

## 2024-08-23 ENCOUNTER — TELEPHONE (OUTPATIENT)
Dept: INTERNAL MEDICINE CLINIC | Age: 74
End: 2024-08-23

## 2024-08-26 NOTE — TELEPHONE ENCOUNTER
I called and informed Pt that Pt letter has been completed and placed at . Pt voiced understanding.

## 2024-09-09 DIAGNOSIS — I10 PRIMARY HYPERTENSION: ICD-10-CM

## 2024-09-09 RX ORDER — VALSARTAN 160 MG/1
160 TABLET ORAL DAILY
Qty: 90 TABLET | Refills: 0 | Status: SHIPPED | OUTPATIENT
Start: 2024-09-09

## 2024-09-09 RX ORDER — AMLODIPINE BESYLATE 5 MG/1
TABLET ORAL
Qty: 90 TABLET | Refills: 0 | Status: SHIPPED | OUTPATIENT
Start: 2024-09-09

## 2024-10-04 ENCOUNTER — CLINICAL DOCUMENTATION (OUTPATIENT)
Dept: ONCOLOGY | Age: 74
End: 2024-10-04

## 2024-10-04 DIAGNOSIS — N64.4 BREAST PAIN, LEFT: Primary | ICD-10-CM

## 2024-10-04 NOTE — PROGRESS NOTES
Patient called this RN to discuss her upcoming mammography.  Patient states she has a sore the size of a nickel on her left breast and states painful.  Patient is due for mammography.  Per Dr. Santiago - order placed for left breast dx mammogram per protocol.  Patient states she will call CS on Monday to get scheduled.

## 2024-10-14 DIAGNOSIS — F33.1 MODERATE EPISODE OF RECURRENT MAJOR DEPRESSIVE DISORDER (HCC): ICD-10-CM

## 2024-10-14 RX ORDER — SERTRALINE HYDROCHLORIDE 25 MG/1
25 TABLET, FILM COATED ORAL DAILY
Qty: 30 TABLET | Refills: 0 | Status: SHIPPED | OUTPATIENT
Start: 2024-10-14

## 2024-10-23 ENCOUNTER — OFFICE VISIT (OUTPATIENT)
Dept: INTERNAL MEDICINE CLINIC | Age: 74
End: 2024-10-23

## 2024-10-23 VITALS
HEART RATE: 62 BPM | OXYGEN SATURATION: 95 % | RESPIRATION RATE: 16 BRPM | SYSTOLIC BLOOD PRESSURE: 124 MMHG | HEIGHT: 62 IN | DIASTOLIC BLOOD PRESSURE: 60 MMHG | WEIGHT: 288.4 LBS | BODY MASS INDEX: 53.07 KG/M2

## 2024-10-23 DIAGNOSIS — I10 PRIMARY HYPERTENSION: Primary | ICD-10-CM

## 2024-10-23 DIAGNOSIS — J45.20 MILD INTERMITTENT ASTHMA WITHOUT COMPLICATION: ICD-10-CM

## 2024-10-23 DIAGNOSIS — Z85.3 PERSONAL HISTORY OF BREAST CANCER: ICD-10-CM

## 2024-10-23 DIAGNOSIS — R60.0 PERIPHERAL EDEMA: ICD-10-CM

## 2024-10-23 DIAGNOSIS — F33.1 MODERATE EPISODE OF RECURRENT MAJOR DEPRESSIVE DISORDER (HCC): ICD-10-CM

## 2024-10-23 DIAGNOSIS — L98.9 SKIN LESIONS: ICD-10-CM

## 2024-10-23 SDOH — ECONOMIC STABILITY: FOOD INSECURITY: WITHIN THE PAST 12 MONTHS, THE FOOD YOU BOUGHT JUST DIDN'T LAST AND YOU DIDN'T HAVE MONEY TO GET MORE.: NEVER TRUE

## 2024-10-23 SDOH — ECONOMIC STABILITY: FOOD INSECURITY: WITHIN THE PAST 12 MONTHS, YOU WORRIED THAT YOUR FOOD WOULD RUN OUT BEFORE YOU GOT MONEY TO BUY MORE.: NEVER TRUE

## 2024-10-23 SDOH — ECONOMIC STABILITY: INCOME INSECURITY: HOW HARD IS IT FOR YOU TO PAY FOR THE VERY BASICS LIKE FOOD, HOUSING, MEDICAL CARE, AND HEATING?: NOT HARD AT ALL

## 2024-10-23 ASSESSMENT — ENCOUNTER SYMPTOMS
ABDOMINAL PAIN: 0
SHORTNESS OF BREATH: 0
COUGH: 0
NAUSEA: 0

## 2024-10-23 NOTE — PROGRESS NOTES
Nusrat Moctezuma (:  1950) is a 74 y.o. female,established patient, here for evaluation of the following chief complaint(s):  Follow-up (5 month follow up//Has upcoming diagnostic mammogram to r/o possible breast cancer for the left breast then follows up with Dr. Santiago on 24), Hypertension, and Depression      Assessment & Plan   ASSESSMENT/PLAN:    Assessment & Plan  1. Primary hypertension.  Continuation of amlodipine 5 mg tablet and valsartan 160 mg tablet is advised.    2. Major depressive disorder, recurrent, moderate episode.  Sertraline (Zoloft) 25 mg tablet daily will be continued.    3. Asthma, mild intermittent without complication.  Continuation of albuterol and Breo Ellipta is recommended.    4. Skin lesions.  A follow-up with dermatology is planned.     5. Personal history of breast cancer  Hx of R mastectomy  A diagnostic mammogram of the left breast is scheduled for 2024, followed by a consultation with oncologist Dr. Santiago on 2024.    5. Peripheral edema.  She has been following up with the wound care center for her lower extremities and is currently wearing PRITI hose, which has improved her lower extremity edema. Lasix 20 mg is taken as needed.    Medications reconciled and discussed with the patient  The patient is asked to make an attempt to improve diet and exercise patterns   Return in about 3 months (around 2025) for HTN, Depression.       Lab Results   Component Value Date    WBC 12.0 (H) 2024    HGB 11.1 (L) 2024    HCT 34.9 (L) 2024    MCV 89.7 2024     2024     Lab Results   Component Value Date    LABA1C 5.2 2023     Lab Results   Component Value Date     2023     Lab Results   Component Value Date     2024    K 4.7 2024     2024    CO2 26 2024    BUN 27 (H) 2024    CREATININE 1.0 2024    GLUCOSE 112 (H) 2024    CALCIUM 9.7 2024    BILITOT 0.2

## 2024-11-03 NOTE — PROGRESS NOTES
Patient Name: Nusrat Moctezuma  Patient : 1950  Patient MRN: [id number]     Primary Oncologist: Moe Santiago MD  Referring Provider: Cheyenne Arias DO     Date of Service: 2024      Chief Complaint:   Chief Complaint   Patient presents with    Follow-up     Patient Active Problem List:     Malignant neoplasm of upper-outer quadrant of right breast in female, estrogen receptor positive (HCC)     Diffuse cystic mastopathy     Hyperlipidemia     Heart block AV second degree     JAMIE (obstructive sleep apnea)     Super obesity     Mild asthma     Severe obstructive sleep apnea     Cardiac pacemaker     Asthma     Hypertension     Depression     PVD (peripheral vascular disease) (HCC)     Moderate aortic stenosis     Hepatomegaly, not elsewhere classified     CAD (coronary artery disease)     Bilateral carotid artery stenosis     Ambulatory dysfunction    HPI:   Nusrat Moctezuma is a 73-year-old very pleasant female who has history of colon cancer, diagnosed in  and breast cancer diagnosed in .      Routine mammogram done in 2012 showed abnormality involving the right breast, which was noted on ultrasound.  She underwent biopsy on 2012, two separate lesions, as well as lobular carcinoma in situ, as well as focal carcinoma in situ.  2012, she underwent mastectomy, sentinel node biopsy, followed by axillary dissection. Primary tumor was 1.2 cm and 3 sentinel nodes were positive.  The six additional axillary nodes were negative.  There was lymphovascular invasion.  Tumor was strongly positive for estrogen and progesterone receptor, negative for HER-2.       Adjuvant chemotherapy treatments with Adriamycin and Cytoxan on 2012.  She tolerated those treatments with significant weakness and tiredness, but no nausea, vomiting, or mucositis.  Started on chemotherapy with Taxol on .  She had two injections, but on  did have difficulty breathing and

## 2024-11-05 ENCOUNTER — HOSPITAL ENCOUNTER (OUTPATIENT)
Dept: WOMENS IMAGING | Age: 74
Discharge: HOME | End: 2024-11-05
Attending: INTERNAL MEDICINE
Payer: MEDICARE

## 2024-11-05 ENCOUNTER — HOSPITAL ENCOUNTER (OUTPATIENT)
Dept: ULTRASOUND IMAGING | Age: 74
End: 2024-11-05
Attending: INTERNAL MEDICINE
Payer: MEDICARE

## 2024-11-05 DIAGNOSIS — N64.4 BREAST PAIN, LEFT: ICD-10-CM

## 2024-11-05 PROCEDURE — 77065 DX MAMMO INCL CAD UNI: CPT

## 2024-11-12 ENCOUNTER — HOSPITAL ENCOUNTER (OUTPATIENT)
Dept: INFUSION THERAPY | Age: 74
Discharge: HOME | End: 2024-11-12
Payer: MEDICARE

## 2024-11-12 ENCOUNTER — OFFICE VISIT (OUTPATIENT)
Dept: ONCOLOGY | Age: 74
End: 2024-11-12
Payer: MEDICARE

## 2024-11-12 VITALS
DIASTOLIC BLOOD PRESSURE: 54 MMHG | SYSTOLIC BLOOD PRESSURE: 140 MMHG | HEIGHT: 62 IN | HEART RATE: 81 BPM | BODY MASS INDEX: 48.99 KG/M2 | WEIGHT: 266.2 LBS | OXYGEN SATURATION: 97 % | TEMPERATURE: 97.8 F

## 2024-11-12 DIAGNOSIS — Z17.0 MALIGNANT NEOPLASM OF UPPER-OUTER QUADRANT OF RIGHT BREAST IN FEMALE, ESTROGEN RECEPTOR POSITIVE (HCC): Primary | ICD-10-CM

## 2024-11-12 DIAGNOSIS — Z12.31 ENCOUNTER FOR SCREENING MAMMOGRAM FOR MALIGNANT NEOPLASM OF BREAST: ICD-10-CM

## 2024-11-12 DIAGNOSIS — C50.411 MALIGNANT NEOPLASM OF UPPER-OUTER QUADRANT OF RIGHT BREAST IN FEMALE, ESTROGEN RECEPTOR POSITIVE (HCC): Primary | ICD-10-CM

## 2024-11-12 PROCEDURE — 3078F DIAST BP <80 MM HG: CPT | Performed by: INTERNAL MEDICINE

## 2024-11-12 PROCEDURE — 1159F MED LIST DOCD IN RCRD: CPT | Performed by: INTERNAL MEDICINE

## 2024-11-12 PROCEDURE — 99211 OFF/OP EST MAY X REQ PHY/QHP: CPT

## 2024-11-12 PROCEDURE — 3077F SYST BP >= 140 MM HG: CPT | Performed by: INTERNAL MEDICINE

## 2024-11-12 PROCEDURE — 1126F AMNT PAIN NOTED NONE PRSNT: CPT | Performed by: INTERNAL MEDICINE

## 2024-11-12 PROCEDURE — 99213 OFFICE O/P EST LOW 20 MIN: CPT | Performed by: INTERNAL MEDICINE

## 2024-11-12 PROCEDURE — 1124F ACP DISCUSS-NO DSCNMKR DOCD: CPT | Performed by: INTERNAL MEDICINE

## 2024-11-12 RX ORDER — NYSTATIN 100000 [USP'U]/G
POWDER TOPICAL
Qty: 60 G | Refills: 1 | Status: SHIPPED | OUTPATIENT
Start: 2024-11-12

## 2024-11-12 ASSESSMENT — PATIENT HEALTH QUESTIONNAIRE - PHQ9
SUM OF ALL RESPONSES TO PHQ QUESTIONS 1-9: 2
SUM OF ALL RESPONSES TO PHQ9 QUESTIONS 1 & 2: 2
2. FEELING DOWN, DEPRESSED OR HOPELESS: SEVERAL DAYS
SUM OF ALL RESPONSES TO PHQ QUESTIONS 1-9: 2
1. LITTLE INTEREST OR PLEASURE IN DOING THINGS: SEVERAL DAYS
SUM OF ALL RESPONSES TO PHQ QUESTIONS 1-9: 2
SUM OF ALL RESPONSES TO PHQ QUESTIONS 1-9: 2

## 2024-11-12 NOTE — PROGRESS NOTES
MA Rooming Questions  Patient: Nusrat Moctezuma  MRN: [id number]    Date: 11/12/2024        1. Do you have any new issues?   yes - Spots on skin          2. Do you need any refills on medications?    no    3. Have you had any imaging done since your last visit?   yes - Mammo 11/5    4. Have you been hospitalized or seen in the emergency room since your last visit here?   no    5. Did the patient have a depression screening completed today? Yes    No data recorded     PHQ-9 Given to (if applicable):               PHQ-9 Score (if applicable):                     [] Positive     []  Negative              Does question #9 need addressed (if applicable)                     [] Yes    []  No               Ayleen Jamil MA

## 2024-11-14 PROCEDURE — 93296 REM INTERROG EVL PM/IDS: CPT | Performed by: INTERNAL MEDICINE

## 2024-11-14 PROCEDURE — 93294 REM INTERROG EVL PM/LDLS PM: CPT | Performed by: INTERNAL MEDICINE

## 2024-11-19 DIAGNOSIS — J45.20 MILD INTERMITTENT ASTHMA WITHOUT COMPLICATION: ICD-10-CM

## 2024-11-19 RX ORDER — FLUTICASONE FUROATE AND VILANTEROL 100; 25 UG/1; UG/1
POWDER RESPIRATORY (INHALATION)
Qty: 1 EACH | Refills: 3 | Status: SHIPPED | OUTPATIENT
Start: 2024-11-19

## 2024-11-21 DIAGNOSIS — F33.1 MODERATE EPISODE OF RECURRENT MAJOR DEPRESSIVE DISORDER (HCC): ICD-10-CM

## 2024-11-21 DIAGNOSIS — I10 PRIMARY HYPERTENSION: ICD-10-CM

## 2024-11-21 RX ORDER — SERTRALINE HYDROCHLORIDE 25 MG/1
25 TABLET, FILM COATED ORAL DAILY
Qty: 90 TABLET | Refills: 0 | Status: SHIPPED | OUTPATIENT
Start: 2024-11-21

## 2024-11-21 RX ORDER — VALSARTAN 160 MG/1
160 TABLET ORAL DAILY
Qty: 90 TABLET | Refills: 0 | Status: SHIPPED | OUTPATIENT
Start: 2024-11-21

## 2024-11-21 RX ORDER — AMLODIPINE BESYLATE 5 MG/1
TABLET ORAL
Qty: 90 TABLET | Refills: 0 | Status: SHIPPED | OUTPATIENT
Start: 2024-11-21

## 2025-01-23 ENCOUNTER — APPOINTMENT (OUTPATIENT)
Dept: GENERAL RADIOLOGY | Age: 75
End: 2025-01-23
Payer: MEDICARE

## 2025-01-23 ENCOUNTER — HOSPITAL ENCOUNTER (INPATIENT)
Age: 75
LOS: 8 days | Discharge: NURSING, REHAB OR LONG-TERM CARE | End: 2025-01-31
Attending: EMERGENCY MEDICINE | Admitting: INTERNAL MEDICINE
Payer: MEDICARE

## 2025-01-23 DIAGNOSIS — J98.01 COUGH DUE TO BRONCHOSPASM: ICD-10-CM

## 2025-01-23 DIAGNOSIS — J44.1 COPD EXACERBATION (HCC): ICD-10-CM

## 2025-01-23 DIAGNOSIS — J96.21 ACUTE ON CHRONIC RESPIRATORY FAILURE WITH HYPOXEMIA: Primary | ICD-10-CM

## 2025-01-23 PROBLEM — J96.01 ACUTE RESPIRATORY FAILURE WITH HYPOXIA: Status: ACTIVE | Noted: 2025-01-23

## 2025-01-23 LAB
ALBUMIN SERPL-MCNC: 3.8 G/DL (ref 3.4–5)
ALBUMIN/GLOB SERPL: 1.2 {RATIO} (ref 1.1–2.2)
ALP SERPL-CCNC: 131 U/L (ref 40–129)
ALT SERPL-CCNC: 10 U/L (ref 10–40)
ANION GAP SERPL CALCULATED.3IONS-SCNC: 10 MMOL/L (ref 9–17)
ARTERIAL PATENCY WRIST A: NO
AST SERPL-CCNC: 15 U/L (ref 15–37)
B PARAP IS1001 DNA NPH QL NAA+NON-PROBE: NOT DETECTED
B PERT DNA SPEC QL NAA+PROBE: NOT DETECTED
BASOPHILS # BLD: 0.05 K/UL
BASOPHILS NFR BLD: 0 % (ref 0–1)
BILIRUB SERPL-MCNC: 0.2 MG/DL (ref 0–1)
BNP SERPL-MCNC: 793 PG/ML (ref 0–450)
BODY TEMPERATURE: 37
BUN SERPL-MCNC: 11 MG/DL (ref 7–20)
C PNEUM DNA NPH QL NAA+NON-PROBE: NOT DETECTED
CALCIUM SERPL-MCNC: 9.6 MG/DL (ref 8.3–10.6)
CHLORIDE SERPL-SCNC: 102 MMOL/L (ref 99–110)
CO2 SERPL-SCNC: 28 MMOL/L (ref 21–32)
COHGB MFR BLD: 0 % (ref 0.5–1.5)
CREAT SERPL-MCNC: 0.8 MG/DL (ref 0.6–1.2)
EKG ATRIAL RATE: 83 BPM
EKG DIAGNOSIS: NORMAL
EKG P AXIS: 56 DEGREES
EKG P-R INTERVAL: 138 MS
EKG Q-T INTERVAL: 456 MS
EKG QRS DURATION: 184 MS
EKG QTC CALCULATION (BAZETT): 535 MS
EKG R AXIS: -67 DEGREES
EKG T AXIS: 97 DEGREES
EKG VENTRICULAR RATE: 83 BPM
EOSINOPHIL # BLD: 0.3 K/UL
EOSINOPHILS RELATIVE PERCENT: 3 % (ref 0–3)
ERYTHROCYTE [DISTWIDTH] IN BLOOD BY AUTOMATED COUNT: 14.8 % (ref 11.7–14.9)
FLUAV RNA NPH QL NAA+NON-PROBE: NOT DETECTED
FLUBV RNA NPH QL NAA+NON-PROBE: NOT DETECTED
GFR, ESTIMATED: 71 ML/MIN/1.73M2
GLUCOSE SERPL-MCNC: 138 MG/DL (ref 74–99)
HADV DNA NPH QL NAA+NON-PROBE: NOT DETECTED
HCO3 VENOUS: 29.4 MMOL/L (ref 22–29)
HCOV 229E RNA NPH QL NAA+NON-PROBE: NOT DETECTED
HCOV HKU1 RNA NPH QL NAA+NON-PROBE: NOT DETECTED
HCOV NL63 RNA NPH QL NAA+NON-PROBE: NOT DETECTED
HCOV OC43 RNA NPH QL NAA+NON-PROBE: NOT DETECTED
HCT VFR BLD AUTO: 35.6 % (ref 37–47)
HGB BLD-MCNC: 11 G/DL (ref 12.5–16)
HMPV RNA NPH QL NAA+NON-PROBE: NOT DETECTED
HPIV1 RNA NPH QL NAA+NON-PROBE: NOT DETECTED
HPIV2 RNA NPH QL NAA+NON-PROBE: NOT DETECTED
HPIV3 RNA NPH QL NAA+NON-PROBE: NOT DETECTED
HPIV4 RNA NPH QL NAA+NON-PROBE: NOT DETECTED
IMM GRANULOCYTES # BLD AUTO: 0.09 K/UL
IMM GRANULOCYTES NFR BLD: 1 %
INFLUENZA A BY PCR: NOT DETECTED
INFLUENZA B BY PCR: NOT DETECTED
LYMPHOCYTES NFR BLD: 2.35 K/UL
LYMPHOCYTES RELATIVE PERCENT: 20 % (ref 24–44)
M PNEUMO DNA NPH QL NAA+NON-PROBE: NOT DETECTED
MCH RBC QN AUTO: 27.2 PG (ref 27–31)
MCHC RBC AUTO-ENTMCNC: 30.9 G/DL (ref 32–36)
MCV RBC AUTO: 88.1 FL (ref 78–100)
METHEMOGLOBIN: 1.3 % (ref 0.5–1.5)
MONOCYTES NFR BLD: 0.61 K/UL
MONOCYTES NFR BLD: 5 % (ref 0–4)
NEUTROPHILS NFR BLD: 71 % (ref 36–66)
NEUTS SEG NFR BLD: 8.16 K/UL
OXYHGB MFR BLD: 24.2 %
PCO2 VENOUS: 53.8 MM HG (ref 38–54)
PH VENOUS: 7.36 (ref 7.32–7.43)
PLATELET # BLD AUTO: 240 K/UL (ref 140–440)
PMV BLD AUTO: 9.4 FL (ref 7.5–11.1)
PO2 VENOUS: 18.4 MM HG (ref 23–48)
POSITIVE BASE EXCESS, VEN: 2.9 MMOL/L (ref 0–3)
POTASSIUM SERPL-SCNC: 3.8 MMOL/L (ref 3.5–5.1)
PROT SERPL-MCNC: 7 G/DL (ref 6.4–8.2)
RBC # BLD AUTO: 4.04 M/UL (ref 4.2–5.4)
RSV RNA NPH QL NAA+NON-PROBE: NOT DETECTED
RV+EV RNA NPH QL NAA+NON-PROBE: NOT DETECTED
SARS-COV-2 RDRP RESP QL NAA+PROBE: NOT DETECTED
SARS-COV-2 RNA NPH QL NAA+NON-PROBE: NOT DETECTED
SODIUM SERPL-SCNC: 140 MMOL/L (ref 136–145)
SPECIMEN DESCRIPTION: NORMAL
SPECIMEN DESCRIPTION: NORMAL
TROPONIN I SERPL HS-MCNC: 11 NG/L (ref 0–14)
TROPONIN I SERPL HS-MCNC: 12 NG/L (ref 0–14)
WBC OTHER # BLD: 11.6 K/UL (ref 4–10.5)

## 2025-01-23 PROCEDURE — 87070 CULTURE OTHR SPECIMN AEROBIC: CPT

## 2025-01-23 PROCEDURE — 6370000000 HC RX 637 (ALT 250 FOR IP): Performed by: INTERNAL MEDICINE

## 2025-01-23 PROCEDURE — 83880 ASSAY OF NATRIURETIC PEPTIDE: CPT

## 2025-01-23 PROCEDURE — 2500000003 HC RX 250 WO HCPCS: Performed by: EMERGENCY MEDICINE

## 2025-01-23 PROCEDURE — 87205 SMEAR GRAM STAIN: CPT

## 2025-01-23 PROCEDURE — 2700000000 HC OXYGEN THERAPY PER DAY

## 2025-01-23 PROCEDURE — 96374 THER/PROPH/DIAG INJ IV PUSH: CPT

## 2025-01-23 PROCEDURE — 0202U NFCT DS 22 TRGT SARS-COV-2: CPT

## 2025-01-23 PROCEDURE — 6360000002 HC RX W HCPCS: Performed by: EMERGENCY MEDICINE

## 2025-01-23 PROCEDURE — 93010 ELECTROCARDIOGRAM REPORT: CPT | Performed by: INTERNAL MEDICINE

## 2025-01-23 PROCEDURE — 80053 COMPREHEN METABOLIC PANEL: CPT

## 2025-01-23 PROCEDURE — 6370000000 HC RX 637 (ALT 250 FOR IP): Performed by: EMERGENCY MEDICINE

## 2025-01-23 PROCEDURE — 1200000000 HC SEMI PRIVATE

## 2025-01-23 PROCEDURE — 6360000002 HC RX W HCPCS: Performed by: INTERNAL MEDICINE

## 2025-01-23 PROCEDURE — 71045 X-RAY EXAM CHEST 1 VIEW: CPT

## 2025-01-23 PROCEDURE — 36415 COLL VENOUS BLD VENIPUNCTURE: CPT

## 2025-01-23 PROCEDURE — 85025 COMPLETE CBC W/AUTO DIFF WBC: CPT

## 2025-01-23 PROCEDURE — 84484 ASSAY OF TROPONIN QUANT: CPT

## 2025-01-23 PROCEDURE — 87502 INFLUENZA DNA AMP PROBE: CPT

## 2025-01-23 PROCEDURE — 82805 BLOOD GASES W/O2 SATURATION: CPT

## 2025-01-23 PROCEDURE — 93005 ELECTROCARDIOGRAM TRACING: CPT | Performed by: EMERGENCY MEDICINE

## 2025-01-23 PROCEDURE — 2500000003 HC RX 250 WO HCPCS: Performed by: INTERNAL MEDICINE

## 2025-01-23 PROCEDURE — 87635 SARS-COV-2 COVID-19 AMP PRB: CPT

## 2025-01-23 PROCEDURE — 94640 AIRWAY INHALATION TREATMENT: CPT

## 2025-01-23 PROCEDURE — 99285 EMERGENCY DEPT VISIT HI MDM: CPT

## 2025-01-23 PROCEDURE — 6360000002 HC RX W HCPCS

## 2025-01-23 RX ORDER — SODIUM CHLORIDE 9 MG/ML
INJECTION, SOLUTION INTRAVENOUS PRN
Status: DISCONTINUED | OUTPATIENT
Start: 2025-01-23 | End: 2025-01-31 | Stop reason: HOSPADM

## 2025-01-23 RX ORDER — ACETAMINOPHEN 325 MG/1
650 TABLET ORAL EVERY 6 HOURS PRN
Status: DISCONTINUED | OUTPATIENT
Start: 2025-01-23 | End: 2025-01-31 | Stop reason: HOSPADM

## 2025-01-23 RX ORDER — IPRATROPIUM BROMIDE AND ALBUTEROL SULFATE 2.5; .5 MG/3ML; MG/3ML
1 SOLUTION RESPIRATORY (INHALATION)
Status: DISCONTINUED | OUTPATIENT
Start: 2025-01-23 | End: 2025-01-31 | Stop reason: HOSPADM

## 2025-01-23 RX ORDER — PREDNISONE 20 MG/1
40 TABLET ORAL DAILY
Status: DISCONTINUED | OUTPATIENT
Start: 2025-01-24 | End: 2025-01-26

## 2025-01-23 RX ORDER — BUDESONIDE AND FORMOTEROL FUMARATE DIHYDRATE 80; 4.5 UG/1; UG/1
2 AEROSOL RESPIRATORY (INHALATION)
Status: DISCONTINUED | OUTPATIENT
Start: 2025-01-23 | End: 2025-01-29

## 2025-01-23 RX ORDER — ONDANSETRON 2 MG/ML
4 INJECTION INTRAMUSCULAR; INTRAVENOUS EVERY 6 HOURS PRN
Status: DISCONTINUED | OUTPATIENT
Start: 2025-01-23 | End: 2025-01-31 | Stop reason: HOSPADM

## 2025-01-23 RX ORDER — VALSARTAN 160 MG/1
160 TABLET ORAL DAILY
Status: DISCONTINUED | OUTPATIENT
Start: 2025-01-23 | End: 2025-01-31 | Stop reason: HOSPADM

## 2025-01-23 RX ORDER — ONDANSETRON 4 MG/1
4 TABLET, ORALLY DISINTEGRATING ORAL EVERY 8 HOURS PRN
Status: DISCONTINUED | OUTPATIENT
Start: 2025-01-23 | End: 2025-01-31 | Stop reason: HOSPADM

## 2025-01-23 RX ORDER — ENOXAPARIN SODIUM 100 MG/ML
30 INJECTION SUBCUTANEOUS 2 TIMES DAILY
Status: DISCONTINUED | OUTPATIENT
Start: 2025-01-23 | End: 2025-01-31 | Stop reason: HOSPADM

## 2025-01-23 RX ORDER — ACETAMINOPHEN 650 MG/1
650 SUPPOSITORY RECTAL EVERY 6 HOURS PRN
Status: DISCONTINUED | OUTPATIENT
Start: 2025-01-23 | End: 2025-01-31 | Stop reason: HOSPADM

## 2025-01-23 RX ORDER — POLYETHYLENE GLYCOL 3350 17 G/17G
17 POWDER, FOR SOLUTION ORAL DAILY PRN
Status: DISCONTINUED | OUTPATIENT
Start: 2025-01-23 | End: 2025-01-31 | Stop reason: HOSPADM

## 2025-01-23 RX ORDER — ASPIRIN 81 MG/1
81 TABLET, CHEWABLE ORAL DAILY
Status: DISCONTINUED | OUTPATIENT
Start: 2025-01-23 | End: 2025-01-31 | Stop reason: HOSPADM

## 2025-01-23 RX ORDER — GUAIFENESIN/DEXTROMETHORPHAN 100-10MG/5
5 SYRUP ORAL EVERY 4 HOURS PRN
Status: DISCONTINUED | OUTPATIENT
Start: 2025-01-23 | End: 2025-01-24

## 2025-01-23 RX ORDER — FUROSEMIDE 20 MG/1
20 TABLET ORAL DAILY
Status: DISCONTINUED | OUTPATIENT
Start: 2025-01-23 | End: 2025-01-31 | Stop reason: HOSPADM

## 2025-01-23 RX ORDER — SODIUM CHLORIDE 0.9 % (FLUSH) 0.9 %
5-40 SYRINGE (ML) INJECTION EVERY 12 HOURS SCHEDULED
Status: DISCONTINUED | OUTPATIENT
Start: 2025-01-23 | End: 2025-01-26

## 2025-01-23 RX ORDER — ALBUTEROL SULFATE 0.83 MG/ML
SOLUTION RESPIRATORY (INHALATION)
Status: COMPLETED
Start: 2025-01-23 | End: 2025-01-23

## 2025-01-23 RX ORDER — ALBUTEROL SULFATE 90 UG/1
2 INHALANT RESPIRATORY (INHALATION)
Status: DISCONTINUED | OUTPATIENT
Start: 2025-01-23 | End: 2025-01-24

## 2025-01-23 RX ORDER — SODIUM CHLORIDE 0.9 % (FLUSH) 0.9 %
5-40 SYRINGE (ML) INJECTION PRN
Status: DISCONTINUED | OUTPATIENT
Start: 2025-01-23 | End: 2025-01-31 | Stop reason: HOSPADM

## 2025-01-23 RX ORDER — AZITHROMYCIN 250 MG/1
500 TABLET, FILM COATED ORAL DAILY
Status: COMPLETED | OUTPATIENT
Start: 2025-01-23 | End: 2025-01-27

## 2025-01-23 RX ORDER — IPRATROPIUM BROMIDE AND ALBUTEROL SULFATE 2.5; .5 MG/3ML; MG/3ML
2 SOLUTION RESPIRATORY (INHALATION) ONCE
Status: COMPLETED | OUTPATIENT
Start: 2025-01-23 | End: 2025-01-23

## 2025-01-23 RX ORDER — AMLODIPINE BESYLATE 5 MG/1
5 TABLET ORAL NIGHTLY
Status: DISCONTINUED | OUTPATIENT
Start: 2025-01-23 | End: 2025-01-31 | Stop reason: HOSPADM

## 2025-01-23 RX ADMIN — SERTRALINE HYDROCHLORIDE 25 MG: 50 TABLET ORAL at 19:09

## 2025-01-23 RX ADMIN — ENOXAPARIN SODIUM 30 MG: 100 INJECTION SUBCUTANEOUS at 20:49

## 2025-01-23 RX ADMIN — IPRATROPIUM BROMIDE AND ALBUTEROL SULFATE 1 DOSE: 2.5; .5 SOLUTION RESPIRATORY (INHALATION) at 20:54

## 2025-01-23 RX ADMIN — AZITHROMYCIN DIHYDRATE 500 MG: 250 TABLET ORAL at 19:10

## 2025-01-23 RX ADMIN — ASPIRIN 81 MG CHEWABLE TABLET 81 MG: 81 TABLET CHEWABLE at 19:10

## 2025-01-23 RX ADMIN — AMLODIPINE BESYLATE 5 MG: 5 TABLET ORAL at 20:47

## 2025-01-23 RX ADMIN — SODIUM CHLORIDE, PRESERVATIVE FREE 10 ML: 5 INJECTION INTRAVENOUS at 20:49

## 2025-01-23 RX ADMIN — WATER 125 MG: 1 INJECTION INTRAMUSCULAR; INTRAVENOUS; SUBCUTANEOUS at 12:59

## 2025-01-23 RX ADMIN — IPRATROPIUM BROMIDE: 0.5 SOLUTION RESPIRATORY (INHALATION) at 13:57

## 2025-01-23 RX ADMIN — BUDESONIDE AND FORMOTEROL FUMARATE DIHYDRATE 2 PUFF: 80; 4.5 AEROSOL RESPIRATORY (INHALATION) at 20:54

## 2025-01-23 RX ADMIN — IPRATROPIUM BROMIDE AND ALBUTEROL SULFATE 2 DOSE: .5; 2.5 SOLUTION RESPIRATORY (INHALATION) at 12:44

## 2025-01-23 RX ADMIN — VALSARTAN 160 MG: 160 TABLET, FILM COATED ORAL at 19:10

## 2025-01-23 RX ADMIN — ALBUTEROL SULFATE: 2.5 SOLUTION RESPIRATORY (INHALATION) at 13:58

## 2025-01-23 ASSESSMENT — PAIN DESCRIPTION - LOCATION: LOCATION: LEG;FOOT;ARM

## 2025-01-23 ASSESSMENT — PAIN DESCRIPTION - DESCRIPTORS: DESCRIPTORS: ACHING;DISCOMFORT

## 2025-01-23 ASSESSMENT — PAIN - FUNCTIONAL ASSESSMENT: PAIN_FUNCTIONAL_ASSESSMENT: 0-10

## 2025-01-23 NOTE — ED TRIAGE NOTES
Patient arrived to ED via EMS. Patient c/o COPD exacerbation. EMS states patient had audible wheezing when they arrived. Patient has hx of asthma and COPD. Patient has hx of lung, breast, and colon cancer. Patient has hx of pacemaker. EMS states patient was hypertensive in route. Patient A&O x4.

## 2025-01-23 NOTE — ED PROVIDER NOTES
Triage Chief Complaint:   Wheezing (COPD exacerbation)    [place]:  Nusrat Moctezuma is a 74 y.o. female that presents with shortness of breath and cough.  Patient reports that she has had a cough that been persistent for almost a month.  Patient does notice that her cough seems to be worsening as well as her shortness of breath.  Cough is productive of a thick phlegm.  No fevers.  There is chest pain with coughing that is mostly in the back and on the right.  Patient does have underlying COPD and has been wheezing.  Patient has been using home inhalers with no significant improvement.  Patient almost came to the hospital last night but reported that she had a PCP appointment today so she held off until today.  Patient was too short of breath to go to her primary care physician today and thus presented via squad.  Patient did receive breathing treatment prehospital.    Patient does have underlying complete heart block status post pacemaker placement.  Patient does have remote history of skin, colon and breast cancer no longer on any chemo or radiation.  Patient was previously prescribed oxygen to wear at night with sleep of 2 L but she reports that that is no longer necessary as she sleeps sitting upright.  No home oxygen use currently.    ROS:  General:  No fevers, no chills, no weakness  Eyes:  No recent vison changes, no discharge  ENT:  No sore throat, no nasal congestion, no hearing changes  Cardiovascular:  No chest pain, no palpitations  Respiratory:  + shortness of breath, + cough, + wheezing  Gastrointestinal:  No pain, no nausea, no vomiting, no diarrhea  Musculoskeletal:  No muscle pain, no joint pain  Skin:  No rash, no pruritis, no easy bruising  Neurologic:  No speech problems, no headache, no extremity numbness, no extremity tingling, no extremity weakness  Psychiatric:  No anxiety  Genitourinary:  No dysuria, no hematuria  Extremities:  no edema, no pain    Past Medical History:   Diagnosis Date

## 2025-01-23 NOTE — H&P
History and Physical Exam    Patient:  Nusrat Moctezuma 74 y.o. female MRN: [id number]     Date of Service: 1/23/2025    Hospital Day: 1      Chief complaint: had concerns including Wheezing (COPD exacerbation).      Assessment and Plan   Nusrat Moctezuma, a 74 y.o. female, with a history of HTN, mood disorder, COPD, breast cancer, colon cancer, heart block s/p PPM, JAMIE not on CPAP, peripheral edema  was admitted on 1/23/2025. They had concerns including Wheezing (COPD exacerbation).    Assessment  Acute on Chronic Resp failure with Hypoxia and hypercapnia  Supposed to be on 2L/min nightly but has been sleeping up in a chair and not been using O2 recently   Was placed on 4L/min NC by squad due to hypoxia per ER   pCO2 53.8.    COPD Exacerbation   CXR without pneumonia   Cough on going for 4 weeks   Wheezing and SOB X 1 day   Nebulizer and solumedrol in the ER     Peripheral Edema   On lasix at home     H/O COPD   On inhalers at home     H/O HTN  On valsartan     H/O breast cancer, colon cancer  H/O heart block s/p PPM   Mood disorder   JAMIE not on CPAP   Class III obesity         Plan  Admit to med surg with tele   Azithromycin and prednisone   Duoneb   Resume home inhalers   Resume home meds, see orders   Wean O2 as able       # Peptic ulcer prophylaxis: -   # DVT Prophylaxis: Lovenox  #CODE STATUS: I did discuss CODE STATUS with the patient, they opted for FULL CODE  Inderjit melo, friend, to help with decision making     Current living situation: Home   Expected Disposition: Home   Estimated discharge date: 1-2 days    Personally reviewed Lab Studies and Imaging     Discussed management of the case with ER provider who recommended admission     EKG interpreted personally and results show A sensed, V paced rhythm    Imaging that was interpreted personally includes CXR and results no acute abnormalities     Drugs that require monitoring for toxicity include lasix and the method of monitoring was Cr and K   Drugs

## 2025-01-23 NOTE — ED NOTES
,Medication History  [facility]    Patient Name: Nusrat Moctezuma 1950     Medication history has been completed by: Mariposa Almeida [facility]    Source(s) of information: patient supplied medications from home and insurance claims     Primary Care Physician: Cheyenne Arias DO     Pharmacy: [facility]    Allergies as of 01/23/2025 - Fully Reviewed 01/23/2025   Allergen Reaction Noted    Bactrim [sulfamethoxazole-trimethoprim] Anaphylaxis and Hives 07/28/2018    Lipitor [atorvastatin] Shortness Of Breath 07/27/2015    Taxol [paclitaxel] Anaphylaxis and Swelling 10/08/2012    Soap Other (See Comments) and Rash 09/05/2023    Aminoglycosides  08/21/2020    Demerol Nausea Only 02/06/2012    Neosporin [bacitracin-neomycin-polymyxin] Rash and Other (See Comments) 02/06/2012    Other Rash     Talc Other (See Comments) 08/13/2015        Prior to Admission medications    Medication Sig Start Date End Date Taking? Authorizing Provider   sertraline (ZOLOFT) 25 MG tablet TAKE ONE (1) TABLET BY MOUTH ONCE DAILY 11/21/24  Yes Cheyenne Arias DO   valsartan (DIOVAN) 160 MG tablet TAKE ONE (1) TABLET BY MOUTH ONCE DAILY 11/21/24  Yes Cheyenne Arias DO   amLODIPine (NORVASC) 5 MG tablet TAKE ONE (1) TABLET BY MOUTH EVERY NIGHT 11/21/24  Yes hCeyenne Arias DO   fluticasone furoate-vilanterol (BREO ELLIPTA) 100-25 MCG/ACT inhaler INHALE ONE (1) PUFF BY MOUTH ONCE DAILY 11/19/24  Yes Cheyenne Arias DO   furosemide (LASIX) 20 MG tablet Take 1 tablet by mouth daily 7/31/24  Yes Cheyenne Arias DO   albuterol sulfate HFA (PROVENTIL;VENTOLIN;PROAIR) 108 (90 Base) MCG/ACT inhaler INHALE TWO (2) PUFFS BY MOUTH EVERY 6 HOURS AS NEEDED FOR WHEEZING 11/23/23  Yes Cheyenne Arias DO   ferrous sulfate (IRON 325) 325 (65 Fe) MG tablet Take 1 tablet by mouth daily (with breakfast)   OTC 9/28/23  Yes Cheyenne Arias DO   calcium carb-cholecalciferol (CALCIUM + VITAMIN D3) 600-10 MG-MCG TABS per tab Take 1 tablet by mouth daily

## 2025-01-23 NOTE — ED NOTES
MR, Mild TR, Mild pulm HTN.    H/O urinary incontinence     History of blood transfusion     Hx antineoplastic chemo     Hx of Arterial Doppler ultrasound 07/01/2019    No hemodynamically significant or focal stenosis visualized bilaterally, bilateral lower extremity arteries exhibit diffuse plaque and multiphasic waveforms, unable to obtain bilateral ABIs due to elevated leg pressures >250 mmHg, possibly due to atherosclerosis    Hx of cardiovascular stress test 06/2013    EF 70% abn suggestive of anterolateral ischemia, possible RCA ischmeia, post left ventricular dilation suggestive multivessel CAD.    Hx of echocardiogram 06/2013    EF50%, mild MR and TR, mild pulmonary HTN, mild AS    Hx of echocardiogram 11/01/2021    Left ventricular systolic function is normal, Mild concentric left ventricular hypertrophy Mildly dilated left atrium.  Mitral annular calcification is present.  Mild tricuspid regurgitation No evidence of any pericardial effusion    Hx of fall     \"last time 2018- due to neuropathy, have to use cane\"    HX OTHER MEDICAL 7/24/13, 06/2013 7/13-No clinacally significant arrhythmia. 6/13-multiple cycles of wenckebach episodes in addtion to some sinus tach    Hyperlipidemia     Hypertension     Hypothermia 2008    Malignant neoplasm of breast (female) (HCC) 2012    Neuropathy     \"have neuropathy of my legs\"    Normocytic normochromic anemia     Obstructive sleep apnea 2008 01- Has CPAP machine but has not used in over a year - states she has not had problems since her pacemaker was placed.    Old MI (myocardial infarction)     per pt on 1/15/2019\"had heart attack about a year ago\"    Osteoarthritis     Osteopenia     Osteopenia of multiple sites 9/14/2022    Pneumonia due to COVID-19 virus 03/22/2021    Primary malignant neoplasm of colon (HCC)     S/P cardiac cath 06/2013    no significant CAD false postive stress test    Skin cancer     Super obesity     Umbilical hernia         Assessment    Vitals: MEWS Score: 1  Level of Consciousness: Alert (0)   Vitals:    01/23/25 1323 01/23/25 1343 01/23/25 1402 01/23/25 1422   BP: (!) 135/47 94/83 118/65 (!) 141/55   Pulse: 70 68 72 66   Resp: 22 23 22 16   Temp:       TempSrc:       SpO2: 97% 98% 97% 97%   Weight:       Height:           PO Status: Regular    O2 Flow Rate: O2 Device: Nasal cannula O2 Flow Rate (L/min): 3 L/min    Cardiac Rhythm:     Last documented pain medication administered:     NIH Score: NIH       Active LDA's:   Peripheral IV 01/23/25 Left Antecubital (Active)       Pertinent or High Risk Medications/Drips: no   If Yes, please provide details:       Blood Product Administration: no  If Yes, please provide details:     Recommendation    Incomplete orders   Additional Comments:    If any further questions, please call Sending RN at [phone]    Electronically signed by: Electronically signed by Rivka Rashid RN on 1/23/2025 at 2:37 PM

## 2025-01-24 LAB
ANION GAP SERPL CALCULATED.3IONS-SCNC: 12 MMOL/L (ref 9–17)
BASOPHILS # BLD: 0.01 K/UL
BASOPHILS NFR BLD: 0 % (ref 0–1)
BUN SERPL-MCNC: 12 MG/DL (ref 7–20)
CALCIUM SERPL-MCNC: 9.6 MG/DL (ref 8.3–10.6)
CHLORIDE SERPL-SCNC: 102 MMOL/L (ref 99–110)
CO2 SERPL-SCNC: 23 MMOL/L (ref 21–32)
CREAT SERPL-MCNC: 0.7 MG/DL (ref 0.6–1.2)
EOSINOPHIL # BLD: 0 K/UL
EOSINOPHILS RELATIVE PERCENT: 0 % (ref 0–3)
ERYTHROCYTE [DISTWIDTH] IN BLOOD BY AUTOMATED COUNT: 14.7 % (ref 11.7–14.9)
GFR, ESTIMATED: 78 ML/MIN/1.73M2
GLUCOSE SERPL-MCNC: 164 MG/DL (ref 74–99)
HCT VFR BLD AUTO: 35.5 % (ref 37–47)
HGB BLD-MCNC: 10.8 G/DL (ref 12.5–16)
IMM GRANULOCYTES # BLD AUTO: 0.14 K/UL
IMM GRANULOCYTES NFR BLD: 1 %
LYMPHOCYTES NFR BLD: 1.54 K/UL
LYMPHOCYTES RELATIVE PERCENT: 12 % (ref 24–44)
MAGNESIUM SERPL-MCNC: 1.9 MG/DL (ref 1.8–2.4)
MCH RBC QN AUTO: 26.3 PG (ref 27–31)
MCHC RBC AUTO-ENTMCNC: 30.4 G/DL (ref 32–36)
MCV RBC AUTO: 86.4 FL (ref 78–100)
MONOCYTES NFR BLD: 0.25 K/UL
MONOCYTES NFR BLD: 2 % (ref 0–4)
NEUTROPHILS NFR BLD: 85 % (ref 36–66)
NEUTS SEG NFR BLD: 10.67 K/UL
PHOSPHATE SERPL-MCNC: 2.3 MG/DL (ref 2.5–4.9)
PLATELET # BLD AUTO: 234 K/UL (ref 140–440)
PMV BLD AUTO: 9.3 FL (ref 7.5–11.1)
POTASSIUM SERPL-SCNC: 4.5 MMOL/L (ref 3.5–5.1)
RBC # BLD AUTO: 4.11 M/UL (ref 4.2–5.4)
SODIUM SERPL-SCNC: 137 MMOL/L (ref 136–145)
WBC OTHER # BLD: 12.6 K/UL (ref 4–10.5)

## 2025-01-24 PROCEDURE — 1200000000 HC SEMI PRIVATE

## 2025-01-24 PROCEDURE — 83735 ASSAY OF MAGNESIUM: CPT

## 2025-01-24 PROCEDURE — 80048 BASIC METABOLIC PNL TOTAL CA: CPT

## 2025-01-24 PROCEDURE — 85025 COMPLETE CBC W/AUTO DIFF WBC: CPT

## 2025-01-24 PROCEDURE — 94664 DEMO&/EVAL PT USE INHALER: CPT

## 2025-01-24 PROCEDURE — 2700000000 HC OXYGEN THERAPY PER DAY

## 2025-01-24 PROCEDURE — 6370000000 HC RX 637 (ALT 250 FOR IP): Performed by: INTERNAL MEDICINE

## 2025-01-24 PROCEDURE — 36415 COLL VENOUS BLD VENIPUNCTURE: CPT

## 2025-01-24 PROCEDURE — 94640 AIRWAY INHALATION TREATMENT: CPT

## 2025-01-24 PROCEDURE — 6360000002 HC RX W HCPCS: Performed by: INTERNAL MEDICINE

## 2025-01-24 PROCEDURE — 84100 ASSAY OF PHOSPHORUS: CPT

## 2025-01-24 PROCEDURE — 94761 N-INVAS EAR/PLS OXIMETRY MLT: CPT

## 2025-01-24 PROCEDURE — 2500000003 HC RX 250 WO HCPCS: Performed by: INTERNAL MEDICINE

## 2025-01-24 RX ORDER — ALBUTEROL SULFATE 90 UG/1
2 INHALANT RESPIRATORY (INHALATION) EVERY 4 HOURS PRN
Status: DISCONTINUED | OUTPATIENT
Start: 2025-01-24 | End: 2025-01-31 | Stop reason: HOSPADM

## 2025-01-24 RX ORDER — GUAIFENESIN 600 MG/1
600 TABLET, EXTENDED RELEASE ORAL 2 TIMES DAILY
Status: DISCONTINUED | OUTPATIENT
Start: 2025-01-24 | End: 2025-01-31 | Stop reason: HOSPADM

## 2025-01-24 RX ORDER — GUAIFENESIN/DEXTROMETHORPHAN 100-10MG/5
5 SYRUP ORAL EVERY 6 HOURS SCHEDULED
Status: DISCONTINUED | OUTPATIENT
Start: 2025-01-24 | End: 2025-01-26

## 2025-01-24 RX ORDER — BENZONATATE 100 MG/1
100 CAPSULE ORAL 3 TIMES DAILY PRN
Status: DISCONTINUED | OUTPATIENT
Start: 2025-01-24 | End: 2025-01-31 | Stop reason: HOSPADM

## 2025-01-24 RX ADMIN — ENOXAPARIN SODIUM 30 MG: 100 INJECTION SUBCUTANEOUS at 21:01

## 2025-01-24 RX ADMIN — BENZONATATE 100 MG: 100 CAPSULE ORAL at 19:04

## 2025-01-24 RX ADMIN — FUROSEMIDE 20 MG: 20 TABLET ORAL at 11:52

## 2025-01-24 RX ADMIN — DIBASIC SODIUM PHOSPHATE, MONOBASIC POTASSIUM PHOSPHATE AND MONOBASIC SODIUM PHOSPHATE 1 TABLET: 852; 155; 130 TABLET ORAL at 11:53

## 2025-01-24 RX ADMIN — GUAIFENESIN SYRUP AND DEXTROMETHORPHAN 5 ML: 100; 10 SYRUP ORAL at 23:27

## 2025-01-24 RX ADMIN — PREDNISONE 40 MG: 20 TABLET ORAL at 11:54

## 2025-01-24 RX ADMIN — IPRATROPIUM BROMIDE AND ALBUTEROL SULFATE 1 DOSE: 2.5; .5 SOLUTION RESPIRATORY (INHALATION) at 11:04

## 2025-01-24 RX ADMIN — BUDESONIDE AND FORMOTEROL FUMARATE DIHYDRATE 2 PUFF: 80; 4.5 AEROSOL RESPIRATORY (INHALATION) at 19:12

## 2025-01-24 RX ADMIN — IPRATROPIUM BROMIDE AND ALBUTEROL SULFATE 1 DOSE: 2.5; .5 SOLUTION RESPIRATORY (INHALATION) at 19:12

## 2025-01-24 RX ADMIN — IPRATROPIUM BROMIDE AND ALBUTEROL SULFATE 1 DOSE: 2.5; .5 SOLUTION RESPIRATORY (INHALATION) at 07:20

## 2025-01-24 RX ADMIN — ASPIRIN 81 MG CHEWABLE TABLET 81 MG: 81 TABLET CHEWABLE at 11:53

## 2025-01-24 RX ADMIN — AMLODIPINE BESYLATE 5 MG: 5 TABLET ORAL at 21:01

## 2025-01-24 RX ADMIN — GUAIFENESIN SYRUP AND DEXTROMETHORPHAN 5 ML: 100; 10 SYRUP ORAL at 14:14

## 2025-01-24 RX ADMIN — DIBASIC SODIUM PHOSPHATE, MONOBASIC POTASSIUM PHOSPHATE AND MONOBASIC SODIUM PHOSPHATE 1 TABLET: 852; 155; 130 TABLET ORAL at 21:00

## 2025-01-24 RX ADMIN — SODIUM CHLORIDE, PRESERVATIVE FREE 10 ML: 5 INJECTION INTRAVENOUS at 11:54

## 2025-01-24 RX ADMIN — BUDESONIDE AND FORMOTEROL FUMARATE DIHYDRATE 2 PUFF: 80; 4.5 AEROSOL RESPIRATORY (INHALATION) at 07:18

## 2025-01-24 RX ADMIN — VALSARTAN 160 MG: 160 TABLET, FILM COATED ORAL at 11:52

## 2025-01-24 RX ADMIN — IPRATROPIUM BROMIDE AND ALBUTEROL SULFATE 1 DOSE: 2.5; .5 SOLUTION RESPIRATORY (INHALATION) at 15:06

## 2025-01-24 RX ADMIN — SERTRALINE HYDROCHLORIDE 25 MG: 50 TABLET ORAL at 11:53

## 2025-01-24 RX ADMIN — AZITHROMYCIN DIHYDRATE 500 MG: 250 TABLET ORAL at 11:53

## 2025-01-24 RX ADMIN — GUAIFENESIN 600 MG: 600 TABLET, EXTENDED RELEASE ORAL at 21:00

## 2025-01-24 RX ADMIN — ENOXAPARIN SODIUM 30 MG: 100 INJECTION SUBCUTANEOUS at 11:52

## 2025-01-24 ASSESSMENT — PAIN DESCRIPTION - LOCATION: LOCATION: CHEST

## 2025-01-24 ASSESSMENT — PAIN DESCRIPTION - DESCRIPTORS: DESCRIPTORS: ACHING

## 2025-01-24 NOTE — PROGRESS NOTES
4 Eyes Skin Assessment     NAME:  Nusrat Moctezuma  YOB: 1950  MEDICAL RECORD NUMBER:  [id number]    The patient is being assessed for  Admission    I agree that at least one RN has performed a thorough Head to Toe Skin Assessment on the patient. ALL assessment sites listed below have been assessed.      Areas assessed by both nurses:    Head, Face, Ears, Shoulders, Back, Chest, Arms, Elbows, Hands, Sacrum. Buttock, Coccyx, Ischium, and Legs. Feet and Heels        Does the Patient have a Wound? Yes wound(s) were present on assessment. LDA wound assessment was Initiated and completed by RN       Rangel Prevention initiated by RN: Yes  Wound Care Orders initiated by RN: Yes    Pressure Injury (Stage 3,4, Unstageable, DTI, NWPT, and Complex wounds) if present, place Wound referral order by RN under : No    New Ostomies, if present place, Ostomy referral order under : No     Nurse 1 eSignature: Electronically signed by Aliza Canchola RN on 1/24/25 at 2:19 AM EST    **SHARE this note so that the co-signing nurse can place an eSignature**    Nurse 2 eSignature: Electronically signed by Joanne Briggs RN on 1/24/25 at 6:59 PM EST

## 2025-01-24 NOTE — CARE COORDINATION
01/24/25 1320   Service Assessment   Patient Orientation Alert and Oriented;Person;Place;Situation   Cognition Alert   History Provided By Patient   Primary Caregiver Self   Support Systems Friends/Neighbors   Patient's Healthcare Decision Maker is: Legal Next of Kin   PCP Verified by CM Yes   Last Visit to PCP Within last 6 months   Prior Functional Level Assistance with the following:;Shopping;Housework;Mobility   Current Functional Level Assistance with the following:;Mobility;Housework;Shopping;Bathing   Ability to make needs known: Good   Family able to assist with home care needs: Yes   Would you like for me to discuss the discharge plan with any other family members/significant others, and if so, who? No   Condition of Participation: Discharge Planning   The Patient and/or Patient Representative was provided with a Choice of Provider? Patient   The Patient and/Or Patient Representative agree with the Discharge Plan? Yes     CM into see pt to initiate a safe discharge plan. Cm introduced self and explained role of CM. Pt is kind, alert and oriented. Pt lives in [facility]. Pt has elevator service and apartment is a one floor home. Pt lives with a friend and they help each other. Friend provides transportation to grocery, appointments, and obtaining medications. Pt DME includes a rollator walker, shower chr. Pt is assisted with showers as needed. Pt has a PCP and insurance. Pt is able to afford her medications. Pt receives meals at lunch daily. CM offered home care and pt declined. CM provided Senior resources for pt.   At this time discharge plan is to return home with friend.   CM provided card and encouraged to call for any needs or concern.   CM is available if any needs arise.

## 2025-01-24 NOTE — PROGRESS NOTES
Pt's IV was occluded. Attempted to replace. Asked Dr. Carvajal through [facility] if pt could go without IV since they are not getting anything via IV. He said yes, that we can watch without IV access.

## 2025-01-24 NOTE — PROGRESS NOTES
In-Patient Progress Note    Patient:  Nusrat Moctezuma 74 y.o. female MRN: [id number]     Date of Service: 1/24/2025    Hospital Day: 2      Chief complaint: had concerns including Wheezing (COPD exacerbation).      Subjective   Patient seen and examined in the morning. Patient states she has some SOB and cough today but slightly better than yesterday.       See ROS    I have reviewed all pertinent PMHx, PSHx, FamHx, SocialHx, medications, and allergies and updated history as appropriate. I have reviewed images as appropriate.     Assessment and Plan   Nusrat Moctezuma, a 74 y.o. female, with a history of HTN, mood disorder, Asthma, COPD, breast cancer, colon cancer, heart block s/p PPM, JAMIE not on CPAP, peripheral edema  was admitted on 1/23/2025 with complaints of had concerns including Wheezing (COPD exacerbation).      Assessment  Acute on Chronic Resp failure with Hypoxia and hypercapnia  Supposed to be on 2L/min nightly but has been sleeping up in a chair and not been using O2 recently   Was placed on 4L/min NC by squad due to hypoxia per ER   pCO2 53.8 on admission   O2 down to 3L/min      Asthma Exacerbation, ?Underlying COPD   CXR without pneumonia   Cough on going for 4 weeks   Wheezing and SOB X 1 day   Nebulizer and solumedrol in the ER   Viral panel -ve      Peripheral Edema   On lasix at home      H/O COPD   On inhalers at home      H/O HTN  On valsartan      Hypophosphatemia   a. PO4 2.2    H/O breast cancer, colon cancer  H/O heart block s/p PPM   Mood disorder   JAMIE not on CPAP   Class III obesity            Plan  Continue Azithromycin and prednisone   Duoneb   Resumed home inhalers   Resumed home meds, see orders   Robitussin scheduled   Replace phos   Wean O2 as able - if unable, will order home O2 eval     # Peptic ulcer prophylaxis: -   # DVT Prophylaxis: Lovenox     Current living situation: Home   Expected Disposition: Home   Estimated discharge date: Hopefully tomorrow     Personally  reviewed Lab Studies and Imaging     Discussed management of the case with IDR team - possible discharge tomorrow. Will need home O2 eval     Drugs that require monitoring for toxicity include lasix and the method of monitoring was Cr and K   Drugs that require monitoring for toxicity include azithromycin and the method of monitoring was CBC and Qtc  Monitor phos replacement with phos level     Review of System     Review of Systems  See subjection section     I have reviewed all pertinent PMHx, PSHx, FamHx, SocialHx, medications, and allergies and updated history as appropriate.    Physical Exam   VITAL SIGNS:  BP (!) 117/48   Pulse 67   Temp 97.9 °F (36.6 °C) (Oral)   Resp 19   Ht 1.575 m (5' 2\")   Wt 130.6 kg (288 lb)   LMP 01/15/1999   SpO2 97%   BMI 52.68 kg/m²   Tmax over 24 hours:  Temp (24hrs), Av.8 °F (36.6 °C), Min:97.5 °F (36.4 °C), Max:97.9 °F (36.6 °C)      Patient Vitals for the past 6 hrs:   BP Temp Temp src Pulse Resp SpO2   25 1412 (!) 117/48 97.9 °F (36.6 °C) Oral 67 19 97 %   25 1152 (!) 134/46 -- -- -- -- --   25 1115 (!) 136/46 97.7 °F (36.5 °C) Oral 64 16 97 %         Intake/Output Summary (Last 24 hours) at 2025 1420  Last data filed at 2025 1152  Gross per 24 hour   Intake 250 ml   Output --   Net 250 ml     Wt Readings from Last 2 Encounters:   25 130.6 kg (288 lb)   24 120.7 kg (266 lb 3.2 oz)     Body mass index is 52.68 kg/m².      Physical Exam  Vitals and nursing note reviewed.   Constitutional:       General: She is not in acute distress.     Appearance: She is obese. She is not ill-appearing, toxic-appearing or diaphoretic.      Interventions: Nasal cannula in place.   HENT:      Head: Normocephalic and atraumatic.      Right Ear: External ear normal.      Left Ear: External ear normal.      Nose: Nose normal.      Mouth/Throat:      Pharynx: Oropharynx is clear. No oropharyngeal exudate or posterior oropharyngeal erythema.   Eyes:

## 2025-01-24 NOTE — PROGRESS NOTES
4 Eyes Skin Assessment     NAME:  Nusrat Moctezuma  YOB: 1950  MEDICAL RECORD NUMBER:  [id number]    The patient is being assessed for  Admission    I agree that at least one RN has performed a thorough Head to Toe Skin Assessment on the patient. ALL assessment sites listed below have been assessed.      Areas assessed by both nurses:    Head, Face, Ears, Shoulders, Back, Chest, Arms, Elbows, Hands, Sacrum. Buttock, Coccyx, Ischium, Legs. Feet and Heels, and Under Medical Devices         Does the Patient have a Wound? Yes wound(s) were present on assessment. LDA wound assessment was Initiated and completed by RN       Rangel Prevention initiated by RN: Yes  Wound Care Orders initiated by RN: Yes    Pressure Injury (Stage 3,4, Unstageable, DTI, NWPT, and Complex wounds) if present, place Wound referral order by RN under : No    New Ostomies, if present place, Ostomy referral order under : No     Nurse 1 eSignature: Electronically signed by Noé Celaya LPN on 1/24/25 at 6:58 PM EST    **SHARE this note so that the co-signing nurse can place an eSignature**    Nurse 2 eSignature: {Esignature:[id number]}

## 2025-01-25 ENCOUNTER — APPOINTMENT (OUTPATIENT)
Dept: GENERAL RADIOLOGY | Age: 75
End: 2025-01-25
Payer: MEDICARE

## 2025-01-25 LAB
ANION GAP SERPL CALCULATED.3IONS-SCNC: 12 MMOL/L (ref 9–17)
BASOPHILS # BLD: 0.02 K/UL
BASOPHILS NFR BLD: 0 % (ref 0–1)
BUN SERPL-MCNC: 19 MG/DL (ref 7–20)
CALCIUM SERPL-MCNC: 9.7 MG/DL (ref 8.3–10.6)
CHLORIDE SERPL-SCNC: 101 MMOL/L (ref 99–110)
CO2 SERPL-SCNC: 25 MMOL/L (ref 21–32)
CREAT SERPL-MCNC: 0.8 MG/DL (ref 0.6–1.2)
EOSINOPHIL # BLD: 0.01 K/UL
EOSINOPHILS RELATIVE PERCENT: 0 % (ref 0–3)
ERYTHROCYTE [DISTWIDTH] IN BLOOD BY AUTOMATED COUNT: 15.2 % (ref 11.7–14.9)
GFR, ESTIMATED: 74 ML/MIN/1.73M2
GLUCOSE SERPL-MCNC: 127 MG/DL (ref 74–99)
HCT VFR BLD AUTO: 34.7 % (ref 37–47)
HGB BLD-MCNC: 10.7 G/DL (ref 12.5–16)
IMM GRANULOCYTES # BLD AUTO: 0.21 K/UL
IMM GRANULOCYTES NFR BLD: 1 %
LYMPHOCYTES NFR BLD: 2.24 K/UL
LYMPHOCYTES RELATIVE PERCENT: 13 % (ref 24–44)
MAGNESIUM SERPL-MCNC: 2.1 MG/DL (ref 1.8–2.4)
MCH RBC QN AUTO: 27.5 PG (ref 27–31)
MCHC RBC AUTO-ENTMCNC: 30.8 G/DL (ref 32–36)
MCV RBC AUTO: 89.2 FL (ref 78–100)
MICROORGANISM SPEC CULT: NORMAL
MICROORGANISM/AGENT SPEC: NORMAL
MONOCYTES NFR BLD: 0.84 K/UL
MONOCYTES NFR BLD: 5 % (ref 0–4)
NEUTROPHILS NFR BLD: 80 % (ref 36–66)
NEUTS SEG NFR BLD: 13.56 K/UL
PHOSPHATE SERPL-MCNC: 3.4 MG/DL (ref 2.5–4.9)
PLATELET # BLD AUTO: 263 K/UL (ref 140–440)
PMV BLD AUTO: 9.9 FL (ref 7.5–11.1)
POTASSIUM SERPL-SCNC: 4.8 MMOL/L (ref 3.5–5.1)
RBC # BLD AUTO: 3.89 M/UL (ref 4.2–5.4)
SODIUM SERPL-SCNC: 138 MMOL/L (ref 136–145)
SPECIMEN DESCRIPTION: NORMAL
WBC OTHER # BLD: 16.9 K/UL (ref 4–10.5)

## 2025-01-25 PROCEDURE — 71045 X-RAY EXAM CHEST 1 VIEW: CPT

## 2025-01-25 PROCEDURE — 6360000002 HC RX W HCPCS: Performed by: INTERNAL MEDICINE

## 2025-01-25 PROCEDURE — 6370000000 HC RX 637 (ALT 250 FOR IP): Performed by: INTERNAL MEDICINE

## 2025-01-25 PROCEDURE — 1200000000 HC SEMI PRIVATE

## 2025-01-25 PROCEDURE — 85025 COMPLETE CBC W/AUTO DIFF WBC: CPT

## 2025-01-25 PROCEDURE — 94640 AIRWAY INHALATION TREATMENT: CPT

## 2025-01-25 PROCEDURE — 94761 N-INVAS EAR/PLS OXIMETRY MLT: CPT

## 2025-01-25 PROCEDURE — 83735 ASSAY OF MAGNESIUM: CPT

## 2025-01-25 PROCEDURE — 5A0935A ASSISTANCE WITH RESPIRATORY VENTILATION, LESS THAN 24 CONSECUTIVE HOURS, HIGH NASAL FLOW/VELOCITY: ICD-10-PCS | Performed by: INTERNAL MEDICINE

## 2025-01-25 PROCEDURE — 94618 PULMONARY STRESS TESTING: CPT

## 2025-01-25 PROCEDURE — 84100 ASSAY OF PHOSPHORUS: CPT

## 2025-01-25 PROCEDURE — 80048 BASIC METABOLIC PNL TOTAL CA: CPT

## 2025-01-25 PROCEDURE — 36415 COLL VENOUS BLD VENIPUNCTURE: CPT

## 2025-01-25 PROCEDURE — 2700000000 HC OXYGEN THERAPY PER DAY

## 2025-01-25 RX ADMIN — GUAIFENESIN SYRUP AND DEXTROMETHORPHAN 5 ML: 100; 10 SYRUP ORAL at 11:47

## 2025-01-25 RX ADMIN — IPRATROPIUM BROMIDE AND ALBUTEROL SULFATE 1 DOSE: 2.5; .5 SOLUTION RESPIRATORY (INHALATION) at 16:42

## 2025-01-25 RX ADMIN — GUAIFENESIN SYRUP AND DEXTROMETHORPHAN 5 ML: 100; 10 SYRUP ORAL at 17:39

## 2025-01-25 RX ADMIN — IPRATROPIUM BROMIDE AND ALBUTEROL SULFATE 1 DOSE: 2.5; .5 SOLUTION RESPIRATORY (INHALATION) at 19:29

## 2025-01-25 RX ADMIN — IPRATROPIUM BROMIDE AND ALBUTEROL SULFATE 1 DOSE: 2.5; .5 SOLUTION RESPIRATORY (INHALATION) at 11:16

## 2025-01-25 RX ADMIN — ASPIRIN 81 MG CHEWABLE TABLET 81 MG: 81 TABLET CHEWABLE at 07:49

## 2025-01-25 RX ADMIN — ENOXAPARIN SODIUM 30 MG: 100 INJECTION SUBCUTANEOUS at 07:49

## 2025-01-25 RX ADMIN — AZITHROMYCIN DIHYDRATE 500 MG: 250 TABLET ORAL at 07:49

## 2025-01-25 RX ADMIN — SERTRALINE HYDROCHLORIDE 25 MG: 50 TABLET ORAL at 07:49

## 2025-01-25 RX ADMIN — IPRATROPIUM BROMIDE AND ALBUTEROL SULFATE 1 DOSE: 2.5; .5 SOLUTION RESPIRATORY (INHALATION) at 07:33

## 2025-01-25 RX ADMIN — GUAIFENESIN SYRUP AND DEXTROMETHORPHAN 5 ML: 100; 10 SYRUP ORAL at 04:22

## 2025-01-25 RX ADMIN — GUAIFENESIN 600 MG: 600 TABLET, EXTENDED RELEASE ORAL at 07:49

## 2025-01-25 RX ADMIN — VALSARTAN 160 MG: 160 TABLET, FILM COATED ORAL at 07:49

## 2025-01-25 RX ADMIN — FUROSEMIDE 20 MG: 20 TABLET ORAL at 07:49

## 2025-01-25 RX ADMIN — AMLODIPINE BESYLATE 5 MG: 5 TABLET ORAL at 21:12

## 2025-01-25 RX ADMIN — ENOXAPARIN SODIUM 30 MG: 100 INJECTION SUBCUTANEOUS at 21:12

## 2025-01-25 RX ADMIN — PREDNISONE 40 MG: 20 TABLET ORAL at 07:49

## 2025-01-25 RX ADMIN — GUAIFENESIN 600 MG: 600 TABLET, EXTENDED RELEASE ORAL at 21:12

## 2025-01-25 RX ADMIN — BUDESONIDE AND FORMOTEROL FUMARATE DIHYDRATE 2 PUFF: 80; 4.5 AEROSOL RESPIRATORY (INHALATION) at 07:35

## 2025-01-25 NOTE — PLAN OF CARE
Problem: Discharge Planning  Goal: Discharge to home or other facility with appropriate resources  1/24/2025 2106 by Mragarette Garzon RN  Outcome: Progressing  1/24/2025 1406 by Noé Celaya LPN  Outcome: Progressing     Problem: Pain  Goal: Verbalizes/displays adequate comfort level or baseline comfort level  1/24/2025 2106 by Margarette Garzon RN  Outcome: Progressing  1/24/2025 1406 by Noé Celaya LPN  Outcome: Progressing     Problem: Safety - Adult  Goal: Free from fall injury  1/24/2025 2106 by Margarette Garzon RN  Outcome: Progressing  1/24/2025 1406 by Noé Celaya LPN  Outcome: Progressing     Problem: Skin/Tissue Integrity  Goal: Skin integrity remains intact  Description: 1.  Monitor for areas of redness and/or skin breakdown  2.  Assess vascular access sites hourly  3.  Every 4-6 hours minimum:  Change oxygen saturation probe site  4.  Every 4-6 hours:  If on nasal continuous positive airway pressure, respiratory therapy assess nares and determine need for appliance change or resting period  1/24/2025 2106 by Margarette Garzon RN  Outcome: Progressing  Flowsheets (Taken 1/24/2025 2102)  Skin Integrity Remains Intact: Monitor for areas of redness and/or skin breakdown  1/24/2025 1406 by Noé Celaya LPN  Outcome: Progressing

## 2025-01-25 NOTE — PLAN OF CARE
Problem: Pain  Goal: Verbalizes/displays adequate comfort level or baseline comfort level  1/25/2025 0945 by Nubia Lindsey LPN  Outcome: Progressing

## 2025-01-25 NOTE — PROGRESS NOTES
1/25/2025 9:30 AM  Patient Room #: 4107/4107-A  Patient Name: Nusrat Moctezuma    (Step 1 Done by RN if possible otherwise call Pulmonary Diagnostics)  Place patient on room air at rest for at least 30 minutes.  If patient falls below 88% before 30 minutes then you can record the level and stop.  Record room air saturation level _82_ %.  If patient is at 88% or below, they will qualify for home oxygen and you can stop.  If level does not fall below 88%, fill in level above. If indicated continue to Step 2.   Signature:____kiesha Serrato_ Date: _01/25/2025__  (Step 2&3 Done by P)  Ambulate patient on room air until saturation falls below 89%.  Record level of room air saturation with ambulation___ %.  Next, place patient back on ___lpm oxygen and ambulate, record level __%.  (Note:  this level must show improvement from room air level done with ambulation.)  If patient’s saturation on room air with ambulation is 88% or below AND patient shows improvement with oxygen during ambulation, they will qualify for home oxygen and you can stop.  If patient does not drop below 89%, then patient should have an overnight oximetry trending on room air to see if level falls below 88%.  Complete level in Step 3 below.    Room air overnight oximetry level 88 % for___  cumulative minutes.  If patient’s room air oxygen level is below <89% for any amount of time they will qualify for nocturnal home oxygen.        (Attach Night Trending Report)    Complete order below: Diagnosis:_COPD___  Home oxygen at:  Length of Need: ? Lifetime ? 3 Months     __2_lpm or __%   via  [x] nasal cannula  []mask  [] other         [x]continuous []  with activity  []  Nocturnal   [] Portable Tanks []  Concentrator  [] Conserving Device        Therapist Signature:__kiesha serrato_____     Date:  ___01/25/2025  Physician Signature:  __Electronically Signed in EMR_    Date:___  Physician Printed Name:  Fish Carvajal____   NPI #

## 2025-01-25 NOTE — PROGRESS NOTES
Patient was seen in hospital for   COPD   .  I am prescribing oxygen because the diagnosis and testing requires the patient to have oxygen in the home.  Conditions will improve or be benefited by oxygen use.  The patient is able to perform good mobility and therefore requires the use of a portable oxygen system for ambulation.

## 2025-01-25 NOTE — PROGRESS NOTES
In-Patient Progress Note    Patient:  Nusrat Moctezuma 74 y.o. female MRN: [id number]     Date of Service: 1/25/2025    Hospital Day: 3      Chief complaint: had concerns including Wheezing (COPD exacerbation).      Subjective   Patient seen and examined in the morning. Patient states she is still SOB today. Still coughing.       See ROS    I have reviewed all pertinent PMHx, PSHx, FamHx, SocialHx, medications, and allergies and updated history as appropriate. I have reviewed images as appropriate.     Assessment and Plan   Nusrat Moctezuma, a 74 y.o. female, with a history of HTN, mood disorder, Asthma, COPD, breast cancer, colon cancer, heart block s/p PPM, JAMIE not on CPAP, peripheral edema  was admitted on 1/23/2025 with complaints of had concerns including Wheezing (COPD exacerbation).      Assessment  Acute on Chronic Resp failure with Hypoxia and hypercapnia  Supposed to be on 2L/min nightly but has been sleeping up in a chair and not been using O2 recently   Was placed on 4L/min NC by squad due to hypoxia per ER   pCO2 53.8 on admission   O2 down to 3L/min      Asthma Exacerbation, ?Underlying COPD   CXR without pneumonia   Cough on going for 4 weeks   Wheezing and SOB X 1 day prior to presentation   Nebulizer and solumedrol in the ER   Viral panel -ve      Peripheral Edema   On lasix at home      H/O COPD   On inhalers at home      H/O HTN  On valsartan      Hypophosphatemia   a. PO4 3.4    H/O breast cancer, colon cancer  H/O heart block s/p PPM   Mood disorder   JAMIE not on CPAP   Class III obesity            Plan  Continue Azithromycin and prednisone   Duoneb   Resumed home inhalers   Resumed home meds, see orders   Robitussin scheduled   Replace phos   Qualifies for home O2 - eval done.     # Peptic ulcer prophylaxis: -   # DVT Prophylaxis: Lovenox     Current living situation: Home   Expected Disposition: Home   Estimated discharge date: Hopefully tomorrow     Personally reviewed Lab Studies and

## 2025-01-26 LAB
ANION GAP SERPL CALCULATED.3IONS-SCNC: 11 MMOL/L (ref 9–17)
BASOPHILS # BLD: 0.04 K/UL
BASOPHILS NFR BLD: 0 % (ref 0–1)
BUN SERPL-MCNC: 18 MG/DL (ref 7–20)
CALCIUM SERPL-MCNC: 9.7 MG/DL (ref 8.3–10.6)
CHLORIDE SERPL-SCNC: 98 MMOL/L (ref 99–110)
CO2 SERPL-SCNC: 26 MMOL/L (ref 21–32)
CREAT SERPL-MCNC: 0.8 MG/DL (ref 0.6–1.2)
EOSINOPHIL # BLD: 0.09 K/UL
EOSINOPHILS RELATIVE PERCENT: 1 % (ref 0–3)
ERYTHROCYTE [DISTWIDTH] IN BLOOD BY AUTOMATED COUNT: 15 % (ref 11.7–14.9)
GFR, ESTIMATED: 75 ML/MIN/1.73M2
GLUCOSE SERPL-MCNC: 146 MG/DL (ref 74–99)
HCT VFR BLD AUTO: 35.9 % (ref 37–47)
HGB BLD-MCNC: 11 G/DL (ref 12.5–16)
IMM GRANULOCYTES # BLD AUTO: 0.26 K/UL
IMM GRANULOCYTES NFR BLD: 1 %
LYMPHOCYTES NFR BLD: 3.38 K/UL
LYMPHOCYTES RELATIVE PERCENT: 17 % (ref 24–44)
MAGNESIUM SERPL-MCNC: 2 MG/DL (ref 1.8–2.4)
MCH RBC QN AUTO: 26.8 PG (ref 27–31)
MCHC RBC AUTO-ENTMCNC: 30.6 G/DL (ref 32–36)
MCV RBC AUTO: 87.6 FL (ref 78–100)
MONOCYTES NFR BLD: 1.25 K/UL
MONOCYTES NFR BLD: 6 % (ref 0–4)
NEUTROPHILS NFR BLD: 74 % (ref 36–66)
NEUTS SEG NFR BLD: 14.42 K/UL
PHOSPHATE SERPL-MCNC: 2.7 MG/DL (ref 2.5–4.9)
PLATELET # BLD AUTO: 272 K/UL (ref 140–440)
PMV BLD AUTO: 9.8 FL (ref 7.5–11.1)
POTASSIUM SERPL-SCNC: 4.5 MMOL/L (ref 3.5–5.1)
RBC # BLD AUTO: 4.1 M/UL (ref 4.2–5.4)
SODIUM SERPL-SCNC: 136 MMOL/L (ref 136–145)
WBC OTHER # BLD: 19.4 K/UL (ref 4–10.5)

## 2025-01-26 PROCEDURE — 6370000000 HC RX 637 (ALT 250 FOR IP): Performed by: INTERNAL MEDICINE

## 2025-01-26 PROCEDURE — 2700000000 HC OXYGEN THERAPY PER DAY

## 2025-01-26 PROCEDURE — 6360000002 HC RX W HCPCS: Performed by: INTERNAL MEDICINE

## 2025-01-26 PROCEDURE — 1200000000 HC SEMI PRIVATE

## 2025-01-26 PROCEDURE — 94640 AIRWAY INHALATION TREATMENT: CPT

## 2025-01-26 PROCEDURE — 94761 N-INVAS EAR/PLS OXIMETRY MLT: CPT

## 2025-01-26 PROCEDURE — 6370000000 HC RX 637 (ALT 250 FOR IP): Performed by: FAMILY MEDICINE

## 2025-01-26 RX ORDER — PREDNISONE 20 MG/1
60 TABLET ORAL DAILY
Status: COMPLETED | OUTPATIENT
Start: 2025-01-27 | End: 2025-01-28

## 2025-01-26 RX ORDER — CODEINE PHOSPHATE AND GUAIFENESIN 10; 100 MG/5ML; MG/5ML
5 SOLUTION ORAL EVERY 6 HOURS
Status: DISCONTINUED | OUTPATIENT
Start: 2025-01-26 | End: 2025-01-31 | Stop reason: HOSPADM

## 2025-01-26 RX ORDER — MECLIZINE HYDROCHLORIDE 25 MG/1
12.5 TABLET ORAL EVERY 6 HOURS SCHEDULED
Status: DISCONTINUED | OUTPATIENT
Start: 2025-01-26 | End: 2025-01-31 | Stop reason: HOSPADM

## 2025-01-26 RX ORDER — HYDROXYZINE HYDROCHLORIDE 10 MG/1
10 TABLET, FILM COATED ORAL ONCE
Status: COMPLETED | OUTPATIENT
Start: 2025-01-26 | End: 2025-01-26

## 2025-01-26 RX ORDER — PREDNISONE 20 MG/1
20 TABLET ORAL ONCE
Status: COMPLETED | OUTPATIENT
Start: 2025-01-26 | End: 2025-01-26

## 2025-01-26 RX ADMIN — IPRATROPIUM BROMIDE AND ALBUTEROL SULFATE 1 DOSE: 2.5; .5 SOLUTION RESPIRATORY (INHALATION) at 15:02

## 2025-01-26 RX ADMIN — MECLIZINE HYDROCHLORIDE 12.5 MG: 25 TABLET ORAL at 19:48

## 2025-01-26 RX ADMIN — GUAIFENESIN AND CODEINE PHOSPHATE 5 ML: 100; 10 SOLUTION ORAL at 14:38

## 2025-01-26 RX ADMIN — HYDROXYZINE HYDROCHLORIDE 10 MG: 10 TABLET, FILM COATED ORAL at 03:11

## 2025-01-26 RX ADMIN — PREDNISONE 20 MG: 20 TABLET ORAL at 10:54

## 2025-01-26 RX ADMIN — AZITHROMYCIN DIHYDRATE 500 MG: 250 TABLET ORAL at 08:32

## 2025-01-26 RX ADMIN — GUAIFENESIN SYRUP AND DEXTROMETHORPHAN 5 ML: 100; 10 SYRUP ORAL at 05:23

## 2025-01-26 RX ADMIN — ENOXAPARIN SODIUM 30 MG: 100 INJECTION SUBCUTANEOUS at 08:34

## 2025-01-26 RX ADMIN — GUAIFENESIN AND CODEINE PHOSPHATE 5 ML: 100; 10 SOLUTION ORAL at 10:54

## 2025-01-26 RX ADMIN — IPRATROPIUM BROMIDE AND ALBUTEROL SULFATE 1 DOSE: 2.5; .5 SOLUTION RESPIRATORY (INHALATION) at 03:08

## 2025-01-26 RX ADMIN — GUAIFENESIN 600 MG: 600 TABLET, EXTENDED RELEASE ORAL at 08:33

## 2025-01-26 RX ADMIN — GUAIFENESIN 600 MG: 600 TABLET, EXTENDED RELEASE ORAL at 20:49

## 2025-01-26 RX ADMIN — AMLODIPINE BESYLATE 5 MG: 5 TABLET ORAL at 20:49

## 2025-01-26 RX ADMIN — FUROSEMIDE 20 MG: 20 TABLET ORAL at 08:33

## 2025-01-26 RX ADMIN — SERTRALINE HYDROCHLORIDE 25 MG: 50 TABLET ORAL at 08:33

## 2025-01-26 RX ADMIN — ASPIRIN 81 MG CHEWABLE TABLET 81 MG: 81 TABLET CHEWABLE at 08:33

## 2025-01-26 RX ADMIN — MECLIZINE HYDROCHLORIDE 12.5 MG: 25 TABLET ORAL at 10:54

## 2025-01-26 RX ADMIN — ENOXAPARIN SODIUM 30 MG: 100 INJECTION SUBCUTANEOUS at 20:49

## 2025-01-26 RX ADMIN — GUAIFENESIN SYRUP AND DEXTROMETHORPHAN 5 ML: 100; 10 SYRUP ORAL at 00:28

## 2025-01-26 RX ADMIN — BUDESONIDE AND FORMOTEROL FUMARATE DIHYDRATE 2 PUFF: 80; 4.5 AEROSOL RESPIRATORY (INHALATION) at 07:11

## 2025-01-26 RX ADMIN — GUAIFENESIN AND CODEINE PHOSPHATE 5 ML: 100; 10 SOLUTION ORAL at 20:49

## 2025-01-26 RX ADMIN — ACETAMINOPHEN 650 MG: 325 TABLET ORAL at 08:32

## 2025-01-26 RX ADMIN — IPRATROPIUM BROMIDE AND ALBUTEROL SULFATE 1 DOSE: 2.5; .5 SOLUTION RESPIRATORY (INHALATION) at 11:05

## 2025-01-26 RX ADMIN — BENZONATATE 100 MG: 100 CAPSULE ORAL at 02:40

## 2025-01-26 RX ADMIN — PREDNISONE 40 MG: 20 TABLET ORAL at 08:32

## 2025-01-26 RX ADMIN — IPRATROPIUM BROMIDE AND ALBUTEROL SULFATE 1 DOSE: 2.5; .5 SOLUTION RESPIRATORY (INHALATION) at 07:09

## 2025-01-26 RX ADMIN — VALSARTAN 160 MG: 160 TABLET, FILM COATED ORAL at 08:33

## 2025-01-26 ASSESSMENT — PAIN - FUNCTIONAL ASSESSMENT: PAIN_FUNCTIONAL_ASSESSMENT: ACTIVITIES ARE NOT PREVENTED

## 2025-01-26 ASSESSMENT — PAIN DESCRIPTION - DESCRIPTORS: DESCRIPTORS: THROBBING

## 2025-01-26 ASSESSMENT — PAIN DESCRIPTION - LOCATION: LOCATION: CHEST

## 2025-01-26 ASSESSMENT — PAIN DESCRIPTION - PAIN TYPE: TYPE: ACUTE PAIN

## 2025-01-26 ASSESSMENT — PAIN SCALES - GENERAL
PAINLEVEL_OUTOF10: 3
PAINLEVEL_OUTOF10: 2

## 2025-01-26 ASSESSMENT — PAIN DESCRIPTION - ORIENTATION: ORIENTATION: LEFT

## 2025-01-26 NOTE — PROGRESS NOTES
In-Patient Progress Note    Patient:  Nusrat Moctezuma 74 y.o. female MRN: [id number]     Date of Service: 1/26/2025    Hospital Day: 4      Chief complaint: had concerns including Wheezing (COPD exacerbation).      Subjective   Patient seen and examined in the morning. Patient states she is still SOB today - appears to have  Still coughing.       See ROS    I have reviewed all pertinent PMHx, PSHx, FamHx, SocialHx, medications, and allergies and updated history as appropriate. I have reviewed images as appropriate.     Assessment and Plan   Nusrat Moctezuma, a 74 y.o. female, with a history of HTN, mood disorder, Asthma, COPD, breast cancer, colon cancer, heart block s/p PPM, JAMIE not on CPAP, peripheral edema  was admitted on 1/23/2025 with complaints of had concerns including Wheezing (COPD exacerbation).      Assessment  Acute on Chronic Resp failure with Hypoxia and hypercapnia  Supposed to be on 2L/min nightly but has been sleeping up in a chair and not been using O2 recently   Was placed on 4L/min NC by squad due to hypoxia per ER   pCO2 53.8 on admission   O2 down to 2.5L/min      Asthma Exacerbation, ?Underlying COPD   CXR without pneumonia   Cough on going for 4 weeks   Wheezing and SOB X 1 day prior to presentation   Nebulizer and solumedrol in the ER   Viral panel -ve      Peripheral Edema   On lasix at home      H/O COPD   On inhalers at home      H/O HTN  On valsartan      Hypophosphatemia   a. PO4 3.4    H/O breast cancer, colon cancer  H/O heart block s/p PPM   Mood disorder   JAMIE not on CPAP   Class III obesity            Plan  Continue Azithromycin and increase prednisone to 60mg daily  Robitussin + codeine   Duoneb   Resumed home inhalers   Resumed home meds, see orders   Robitussin scheduled   Replace phos   Qualifies for home O2 - eval done but will likely need to repeat tomorrow.     # Peptic ulcer prophylaxis: -   # DVT Prophylaxis: Lovenox     Current living situation: Home   Expected  4.10 (L) 4.20 - 5.40 m/uL    Hemoglobin 11.0 (L) 12.5 - 16.0 g/dL    Hematocrit 35.9 (L) 37.0 - 47.0 %    MCV 87.6 78.0 - 100.0 fL    MCH 26.8 (L) 27.0 - 31.0 pg    MCHC 30.6 (L) 32.0 - 36.0 g/dL    RDW 15.0 (H) 11.7 - 14.9 %    Platelets 272 140 - 440 k/uL    MPV 9.8 7.5 - 11.1 fL    Neutrophils % 74 (H) 36 - 66 %    Lymphocytes % 17 (L) 24 - 44 %    Monocytes % 6 (H) 0 - 4 %    Eosinophils % 1 0 - 3 %    Basophils % 0 0 - 1 %    Immature Granulocytes % 1 (H) 0 %    Neutrophils Absolute 14.42 k/uL    Lymphocytes Absolute 3.38 k/uL    Monocytes Absolute 1.25 k/uL    Eosinophils Absolute 0.09 k/uL    Basophils Absolute 0.04 k/uL    Immature Granulocytes Absolute 0.26 k/uL       Results       Procedure Component Value Units Date/Time    Culture, Respiratory [[id number]] Collected: 01/23/25 2115    Order Status: Completed Specimen: Sputum Expectorated Updated: 01/25/25 0950     Specimen Description .EXPECTORATED SPUTUM     Direct Exam MANY NEUTROPHILS      RARE EPITHELIAL CELLS      MODERATE GRAM POSITIVE COCCI      FEW GRAM NEGATIVE RODS     Culture Normal respiratory cira    Resp Viral Panel [[id number]] Collected: 01/23/25 1815    Order Status: Completed Specimen: Nasopharyngeal Swab Updated: 01/23/25 1956     Specimen Description .NASOPHARYNGEAL SWAB     Adenovirus PCR Not Detected     Coronavirus 229E PCR Not Detected     Coronavirus HKU1 PCR Not Detected     Coronavirus NL63 PCR Not Detected     Coronavirus OC43 PCR Not Detected     SARS-CoV-2, PCR Not Detected     Human Metapneumovirus PCR Not Detected     Rhino/Enterovirus PCR Not Detected     Influenza A by PCR Not Detected     Influenza B by PCR Not Detected     Parainfluenza 1 PCR Not Detected     Parainfluenza 2 PCR Not Detected     Parainfluenza 3 PCR Not Detected     Parainfluenza 4 PCR Not Detected     Resp Syncytial Virus PCR Not Detected     Bordetella parapertussis by PCR Not Detected     B Pertussis by PCR Not Detected     Chlamydia pneumoniae By

## 2025-01-26 NOTE — PLAN OF CARE
Problem: Pain  Goal: Verbalizes/displays adequate comfort level or baseline comfort level  1/26/2025 1216 by Harriet Cai LPN  Outcome: Progressing  1/26/2025 0026 by Meg Candelaria RN  Outcome: Progressing     Problem: Safety - Adult  Goal: Free from fall injury  1/26/2025 1216 by Harriet Cai LPN  Outcome: Progressing  1/26/2025 0026 by Meg Candelaria, RN  Outcome: Progressing     Problem: Skin/Tissue Integrity  Goal: Skin integrity remains intact  Description: 1.  Monitor for areas of redness and/or skin breakdown  2.  Assess vascular access sites hourly  3.  Every 4-6 hours minimum:  Change oxygen saturation probe site  4.  Every 4-6 hours:  If on nasal continuous positive airway pressure, respiratory therapy assess nares and determine need for appliance change or resting period  1/26/2025 1216 by Harriet Cai LPN  Outcome: Progressing  1/26/2025 0026 by Meg Candelaria, RN  Outcome: Progressing

## 2025-01-26 NOTE — PLAN OF CARE
Problem: Discharge Planning  Goal: Discharge to home or other facility with appropriate resources  Outcome: Progressing     Problem: Pain  Goal: Verbalizes/displays adequate comfort level or baseline comfort level  Outcome: Progressing     Problem: Safety - Adult  Goal: Free from fall injury  Outcome: Progressing     Problem: Skin/Tissue Integrity  Goal: Skin integrity remains intact  Description: 1.  Monitor for areas of redness and/or skin breakdown  2.  Assess vascular access sites hourly  3.  Every 4-6 hours minimum:  Change oxygen saturation probe site  4.  Every 4-6 hours:  If on nasal continuous positive airway pressure, respiratory therapy assess nares and determine need for appliance change or resting period  Outcome: Progressing

## 2025-01-27 LAB
ANION GAP SERPL CALCULATED.3IONS-SCNC: 12 MMOL/L (ref 9–17)
BUN SERPL-MCNC: 22 MG/DL (ref 7–20)
CALCIUM SERPL-MCNC: 9.7 MG/DL (ref 8.3–10.6)
CHLORIDE SERPL-SCNC: 98 MMOL/L (ref 99–110)
CO2 SERPL-SCNC: 27 MMOL/L (ref 21–32)
CREAT SERPL-MCNC: 0.7 MG/DL (ref 0.6–1.2)
ERYTHROCYTE [DISTWIDTH] IN BLOOD BY AUTOMATED COUNT: 14.7 % (ref 11.7–14.9)
GFR, ESTIMATED: 78 ML/MIN/1.73M2
GLUCOSE SERPL-MCNC: 123 MG/DL (ref 74–99)
HCT VFR BLD AUTO: 36.4 % (ref 37–47)
HGB BLD-MCNC: 11 G/DL (ref 12.5–16)
MAGNESIUM SERPL-MCNC: 2.1 MG/DL (ref 1.8–2.4)
MCH RBC QN AUTO: 26.8 PG (ref 27–31)
MCHC RBC AUTO-ENTMCNC: 30.2 G/DL (ref 32–36)
MCV RBC AUTO: 88.8 FL (ref 78–100)
PHOSPHATE SERPL-MCNC: 2.2 MG/DL (ref 2.5–4.9)
PLATELET # BLD AUTO: 273 K/UL (ref 140–440)
PMV BLD AUTO: 9.8 FL (ref 7.5–11.1)
POTASSIUM SERPL-SCNC: 3.7 MMOL/L (ref 3.5–5.1)
RBC # BLD AUTO: 4.1 M/UL (ref 4.2–5.4)
SODIUM SERPL-SCNC: 137 MMOL/L (ref 136–145)
WBC OTHER # BLD: 15.6 K/UL (ref 4–10.5)

## 2025-01-27 PROCEDURE — 2700000000 HC OXYGEN THERAPY PER DAY

## 2025-01-27 PROCEDURE — 94640 AIRWAY INHALATION TREATMENT: CPT

## 2025-01-27 PROCEDURE — 92610 EVALUATE SWALLOWING FUNCTION: CPT

## 2025-01-27 PROCEDURE — 94761 N-INVAS EAR/PLS OXIMETRY MLT: CPT

## 2025-01-27 PROCEDURE — 1200000000 HC SEMI PRIVATE

## 2025-01-27 PROCEDURE — 85027 COMPLETE CBC AUTOMATED: CPT

## 2025-01-27 PROCEDURE — 36415 COLL VENOUS BLD VENIPUNCTURE: CPT

## 2025-01-27 PROCEDURE — 6370000000 HC RX 637 (ALT 250 FOR IP): Performed by: INTERNAL MEDICINE

## 2025-01-27 PROCEDURE — 80048 BASIC METABOLIC PNL TOTAL CA: CPT

## 2025-01-27 PROCEDURE — 94618 PULMONARY STRESS TESTING: CPT

## 2025-01-27 PROCEDURE — 6360000002 HC RX W HCPCS: Performed by: INTERNAL MEDICINE

## 2025-01-27 PROCEDURE — 83735 ASSAY OF MAGNESIUM: CPT

## 2025-01-27 PROCEDURE — 84100 ASSAY OF PHOSPHORUS: CPT

## 2025-01-27 RX ORDER — POTASSIUM & SODIUM PHOSPHATES POWDER PACK 280-160-250 MG 280-160-250 MG
1 PACK ORAL 4 TIMES DAILY
Qty: 8 PACKET | Refills: 0 | Status: SHIPPED | OUTPATIENT
Start: 2025-01-27 | End: 2025-01-29

## 2025-01-27 RX ORDER — PREDNISONE 10 MG/1
TABLET ORAL
Qty: 44 TABLET | Refills: 0 | Status: SHIPPED | OUTPATIENT
Start: 2025-01-27

## 2025-01-27 RX ORDER — CODEINE PHOSPHATE AND GUAIFENESIN 10; 100 MG/5ML; MG/5ML
5 SOLUTION ORAL EVERY 6 HOURS
Qty: 100 ML | Refills: 0 | Status: SHIPPED | OUTPATIENT
Start: 2025-01-27 | End: 2025-02-01

## 2025-01-27 RX ORDER — BENZONATATE 100 MG/1
100 CAPSULE ORAL 3 TIMES DAILY PRN
Qty: 21 CAPSULE | Refills: 0 | Status: SHIPPED | OUTPATIENT
Start: 2025-01-27 | End: 2025-02-03

## 2025-01-27 RX ORDER — MECLIZINE HCL 12.5 MG 12.5 MG/1
12.5 TABLET ORAL 3 TIMES DAILY PRN
Qty: 15 TABLET | Refills: 0 | Status: SHIPPED | OUTPATIENT
Start: 2025-01-27 | End: 2025-02-01

## 2025-01-27 RX ADMIN — ENOXAPARIN SODIUM 30 MG: 100 INJECTION SUBCUTANEOUS at 20:25

## 2025-01-27 RX ADMIN — GUAIFENESIN AND CODEINE PHOSPHATE 5 ML: 100; 10 SOLUTION ORAL at 20:25

## 2025-01-27 RX ADMIN — GUAIFENESIN 600 MG: 600 TABLET, EXTENDED RELEASE ORAL at 20:25

## 2025-01-27 RX ADMIN — FUROSEMIDE 20 MG: 20 TABLET ORAL at 10:07

## 2025-01-27 RX ADMIN — AZITHROMYCIN DIHYDRATE 500 MG: 250 TABLET ORAL at 10:07

## 2025-01-27 RX ADMIN — GUAIFENESIN 600 MG: 600 TABLET, EXTENDED RELEASE ORAL at 10:07

## 2025-01-27 RX ADMIN — ASPIRIN 81 MG CHEWABLE TABLET 81 MG: 81 TABLET CHEWABLE at 10:06

## 2025-01-27 RX ADMIN — BENZONATATE 100 MG: 100 CAPSULE ORAL at 20:25

## 2025-01-27 RX ADMIN — VALSARTAN 160 MG: 160 TABLET, FILM COATED ORAL at 10:07

## 2025-01-27 RX ADMIN — GUAIFENESIN AND CODEINE PHOSPHATE 5 ML: 100; 10 SOLUTION ORAL at 15:35

## 2025-01-27 RX ADMIN — ENOXAPARIN SODIUM 30 MG: 100 INJECTION SUBCUTANEOUS at 10:08

## 2025-01-27 RX ADMIN — MECLIZINE HYDROCHLORIDE 12.5 MG: 25 TABLET ORAL at 18:51

## 2025-01-27 RX ADMIN — IPRATROPIUM BROMIDE AND ALBUTEROL SULFATE 1 DOSE: 2.5; .5 SOLUTION RESPIRATORY (INHALATION) at 11:16

## 2025-01-27 RX ADMIN — PREDNISONE 60 MG: 20 TABLET ORAL at 10:07

## 2025-01-27 RX ADMIN — GUAIFENESIN AND CODEINE PHOSPHATE 5 ML: 100; 10 SOLUTION ORAL at 10:08

## 2025-01-27 RX ADMIN — AMLODIPINE BESYLATE 5 MG: 5 TABLET ORAL at 20:25

## 2025-01-27 RX ADMIN — IPRATROPIUM BROMIDE AND ALBUTEROL SULFATE 1 DOSE: 2.5; .5 SOLUTION RESPIRATORY (INHALATION) at 15:20

## 2025-01-27 RX ADMIN — GUAIFENESIN AND CODEINE PHOSPHATE 5 ML: 100; 10 SOLUTION ORAL at 05:29

## 2025-01-27 RX ADMIN — MECLIZINE HYDROCHLORIDE 12.5 MG: 25 TABLET ORAL at 05:29

## 2025-01-27 RX ADMIN — SERTRALINE HYDROCHLORIDE 25 MG: 50 TABLET ORAL at 10:08

## 2025-01-27 RX ADMIN — MECLIZINE HYDROCHLORIDE 12.5 MG: 25 TABLET ORAL at 12:36

## 2025-01-27 ASSESSMENT — PAIN DESCRIPTION - ORIENTATION: ORIENTATION: UPPER;MID

## 2025-01-27 ASSESSMENT — PAIN DESCRIPTION - LOCATION
LOCATION: THROAT
LOCATION: THROAT

## 2025-01-27 ASSESSMENT — PAIN - FUNCTIONAL ASSESSMENT: PAIN_FUNCTIONAL_ASSESSMENT: ACTIVITIES ARE NOT PREVENTED

## 2025-01-27 ASSESSMENT — PAIN SCALES - GENERAL
PAINLEVEL_OUTOF10: 3
PAINLEVEL_OUTOF10: 3

## 2025-01-27 ASSESSMENT — PAIN DESCRIPTION - PAIN TYPE: TYPE: ACUTE PAIN

## 2025-01-27 ASSESSMENT — PAIN DESCRIPTION - DESCRIPTORS: DESCRIPTORS: THROBBING

## 2025-01-27 NOTE — PLAN OF CARE
Problem: Discharge Planning  Goal: Discharge to home or other facility with appropriate resources  1/27/2025 1501 by Jayla Bolton RN  Outcome: Progressing  1/27/2025 0412 by Tara Vee RN  Outcome: Progressing     Problem: Pain  Goal: Verbalizes/displays adequate comfort level or baseline comfort level  1/27/2025 1501 by Jayla Bolton RN  Outcome: Progressing  1/27/2025 0412 by Tara Vee RN  Outcome: Progressing     Problem: Safety - Adult  Goal: Free from fall injury  1/27/2025 1501 by Jayla Bolton RN  Outcome: Progressing  1/27/2025 0412 by Tara Vee RN  Outcome: Progressing     Problem: Skin/Tissue Integrity  Goal: Skin integrity remains intact  Description: 1.  Monitor for areas of redness and/or skin breakdown  2.  Assess vascular access sites hourly  3.  Every 4-6 hours minimum:  Change oxygen saturation probe site  4.  Every 4-6 hours:  If on nasal continuous positive airway pressure, respiratory therapy assess nares and determine need for appliance change or resting period  1/27/2025 1501 by Jayla Bolton RN  Outcome: Progressing  Flowsheets (Taken 1/27/2025 1500)  Skin Integrity Remains Intact: Monitor for areas of redness and/or skin breakdown  1/27/2025 0412 by Tara Vee RN  Outcome: Progressing

## 2025-01-27 NOTE — PROGRESS NOTES
Facility/Department: [address]   CLINICAL BEDSIDE SWALLOW EVALUATION    NAME: Nusrat Moctezuma  : 1950  MRN: [id number]    ADMISSION DATE: 2025  ADMITTING DIAGNOSIS: has Malignant neoplasm of upper-outer quadrant of right breast in female, estrogen receptor positive (HCC); Diffuse cystic mastopathy; Hyperlipidemia; H/O chest pain; Heart block AV second degree; Abnormal cardiovascular stress test; JAMIE (obstructive sleep apnea); Super obesity; Mild asthma; Severe obstructive sleep apnea; Cardiac pacemaker; Chronic cystitis; Asthma; Hypertension; Depression; Anxiety; Obstructive sleep apnea; Nocturnal oxygen desaturation; PVD (peripheral vascular disease) (HCC); Morbid obesity with BMI of 45.0-49.9, adult; Moderate aortic stenosis; Hepatomegaly, not elsewhere classified; Arthritis; Bilateral carotid artery stenosis; Moderate persistent asthma without complication; Carotid stenosis, right; Osteopenia; Swelling of lower extremity; Ambulatory dysfunction; Chest pain with moderate risk for cardiac etiology; Left arm weakness; Chest pain; Valvular heart disease; Vitamin D deficiency; Anemia; WD-Hereditary lymphedema of legs; WD-Venous stasis ulcer of left calf with fat layer exposed (HCC); WD-Venous stasis ulcer of right calf with fat layer exposed (HCC); and Acute respiratory failure with hypoxia on their problem list.  ONSET DATE: this admission     Date of Eval: 2025  Evaluating Therapist: THOMAS Duncan    Impression: Nusrat Moctezuma was seen today for a clinical bedside swallow evaluation following admission to [facility] with acute respiratory failure with hypoxia. Chest xray on 25 reveals \"1. Cardiomegaly with mild congestive changes. 2. Patchy airspace disease versus atelectasis at the lower lungs.\" Relevant medical hx includes HTN, mood disorder, COPD, breast cancer, colon cancer, heart block s/p PPM, JAMIE not on CPAP, peripheral edema.    Nusrat Moctezuma was positioned upright in a chair for the

## 2025-01-27 NOTE — PROGRESS NOTES
Outpatient Pharmacy Progress Note for Meds-to-Beds    Total number of Prescriptions Filled: 5    Additional Documentation:  Patient's family member picked-up the medication(s) in the OP Pharmacy      Thank you for letting us serve your patients.  [address] [address]    Phone: [phone]    Fax: [phone]

## 2025-01-27 NOTE — CARE COORDINATION
Pt's nurse spoke with this CM. Pt wants to appeal her discharge. CM spoke with pt at bedside. Pt called the provided number for [employer]. Pt's Appeal # [id number].

## 2025-01-27 NOTE — DISCHARGE SUMMARY
Please use this note as a progress note for the day if the patient does not discharge today      Discharge Summary    Name:  Nusrat Moctezuma /Age/Sex: 1950  (74 y.o. female)   MRN & CSN:  [id number] & [id number] Admission Date/Time: 2025 12:15 PM   Attending:  Fish Carvajal MD Discharging Physician: Fish Carvajal MD       Admission Diagnosis:   Acute on Chronic Resp failure with Hypoxia and hypercapnia   Asthma Exacerbation   Peripheral Edema   H/O Asthma   H/O HTN   H/O breast cancer, colon cancer  H/O heart block s/p PPM   Mood disorder   JAMIE not on CPAP   Class III obesity     Discharge Diagnosis, hospital course, assessment and plan:  Nusrat Moctezuma is a 74 y.o.  female  who presents with Acute respiratory failure with hypoxia    Patient admitted on 2025 12:15 PM    Per H+P:  Nusrat Moctezuma, a 74 y.o. female, with a H/O  HTN, mood disorder, COPD, breast cancer, colon cancer, heart block s/p PPM, JAMIE not on CPAP, peripheral edema ,  who presented on 2025 with complaints of SOB and Wheeze X 1 day.      According to the patient, she developed a cough approximately 4 weeks ago.  She states that she also produces some clear sputum with a cough.  She developed some worsening shortness of breath and wheezing approximately 1 day prior to presentation. She also had some back pain associated with the cough the day prior to presentation. She waited to go to lunch in the a.m. of 2025 but became severely short of breath and decided to call the squad.     When ana maria got to her house, she was severely wheezing and required 4 L/min nasal cannula oxygen due to hypoxia.  She was also found to have a systolic blood pressure more than 200.        ED Course: On arrival, patient was found to have a temperature of 98.5, heart rate 74, respirate 15, blood pressure 159/51 mmHg and saturating 98% on 3 L minute nasal cannula.  BMP was relatively benign.  Troponin negative.  LFTs normal.  CBC  Swab Updated: 01/23/25 1956     Specimen Description .NASOPHARYNGEAL SWAB     Adenovirus PCR Not Detected     Coronavirus 229E PCR Not Detected     Coronavirus HKU1 PCR Not Detected     Coronavirus NL63 PCR Not Detected     Coronavirus OC43 PCR Not Detected     SARS-CoV-2, PCR Not Detected     Human Metapneumovirus PCR Not Detected     Rhino/Enterovirus PCR Not Detected     Influenza A by PCR Not Detected     Influenza B by PCR Not Detected     Parainfluenza 1 PCR Not Detected     Parainfluenza 2 PCR Not Detected     Parainfluenza 3 PCR Not Detected     Parainfluenza 4 PCR Not Detected     Resp Syncytial Virus PCR Not Detected     Bordetella parapertussis by PCR Not Detected     B Pertussis by PCR Not Detected     Chlamydia pneumoniae By PCR Not Detected     Mycoplasma pneumo by PCR Not Detected     Comment: Performed by multiplexed nucleic acid assay.       Influenza A and B [[id number]] Collected: 01/23/25 1353    Order Status: Completed Specimen: Nasal Updated: 01/23/25 1438     Influenza A by PCR Not Detected     Comment: A negative result does not rule out Influenza A&B.  Methodology: Isothermal Nucleic Acid Amplification          Influenza B by PCR Not Detected     Comment: A negative result does not rule out Influenza A&B.  Methodology: Isothermal Nucleic Acid Amplification         COVID-19, Rapid [[id number]] Collected: 01/23/25 1353    Order Status: Completed Specimen: Nasopharyngeal Swab Updated: 01/23/25 1438     Specimen Description .NASOPHARYNGEAL SWAB     SARS-CoV-2, Rapid Not Detected     Comment:       Rapid NAAT:  The specimen is NEGATIVE for SARS-CoV-2, the novel coronavirus associated with   COVID-19.  Negative results should be treated as presumptive and, if inconsistent with clinical signs   and symptoms or necessary for patient management,  should be tested with an alternative molecular assay. Negative results do not preclude   SARS-CoV-2 infection and   should not be used as the sole basis for

## 2025-01-27 NOTE — PROGRESS NOTES
Physician Progress Note      PATIENT:               VITALIY BURDEN  CSN #:                  [id number]  :                       1950  ADMIT DATE:       2025 12:15 PM  DISCH DATE:  RESPONDING  PROVIDER #:        Fish Carvajal MD          QUERY TEXT:    Patient admitted with wheezing, noted to have COPD Exacerbation in H&P    and  Asthma Exacerbation in IM PN . If possible, please document in   progress notes and discharge summary if you are evaluating and /or treating   any of the following:    The medical record reflects the following:  Risk Factors: Asthma, Respiratory failure ,JAMIE  Clinical Indicators: H&P  COPD Exacerbation CXR without pneumonia,   Wheezing and SOB X 1 day.  IM PN Asthma Exacerbation, ?Underlying COPD CXR without pneumonia, Cough   on going for 4 weeks, Wheezing and SOB X 1 day. H/O COPD On inhalers at home    Treatment: Azithromycin (ZITHROMAX) tablet 500 mg , ipratropium (ATROVENT)   0.02 % nebulizer solution , IV Solumedrol      Thank You  Lila Vinson [facility] CDS  Options provided:  -- COPD and asthma exacerbation  -- Asthma exacerbation only no COPD exacerbation  -- Other - I will add my own diagnosis  -- Disagree - Not applicable / Not valid  -- Disagree - Clinically unable to determine / Unknown  -- Refer to Clinical Documentation Reviewer    PROVIDER RESPONSE TEXT:    This patient is being treated for Asthma exacerbation only no COPD   exacerbation    Query created by: Lila Biggs on 2025 12:18 AM      Electronically signed by:  Fish Carvajal MD 2025 10:24 AM

## 2025-01-27 NOTE — PROGRESS NOTES
Patient refused breathing treatment. The patient stated she will let me know when she is ready for the treatment after she eats breakfast.

## 2025-01-27 NOTE — PROGRESS NOTES
1/27/2025 10:13 AM  Patient Room #: 4107/4107-A  Patient Name: Nusrat Moctezuma    (Step 1 Done by RN if possible otherwise call Pulmonary Diagnostics)  Place patient on room air at rest for at least 30 minutes.  If patient falls below 88% before 30 minutes then you can record the level and stop.  Record room air saturation level _88_ %.  If patient is at 88% or below, they will qualify for home oxygen and you can stop.  If level does not fall below 88%, fill in level above. If indicated continue to Step 2.   Signature:_Den Alves RRT____ Date: _01/27/2025__  (Step 2&3 Done by P)  Ambulate patient on room air until saturation falls below 89%.  Record level of room air saturation with ambulation___ %.  Next, place patient back on ___lpm oxygen and ambulate, record level __%.  (Note:  this level must show improvement from room air level done with ambulation.)  If patient’s saturation on room air with ambulation is 88% or below AND patient shows improvement with oxygen during ambulation, they will qualify for home oxygen and you can stop.  If patient does not drop below 89%, then patient should have an overnight oximetry trending on room air to see if level falls below 88%.  Complete level in Step 3 below.    Room air overnight oximetry level 88 % for__OSA_  cumulative minutes.  If patient’s room air oxygen level is below <89% for any amount of time they will qualify for nocturnal home oxygen.        (Attach Night Trending Report)    Complete order below: Diagnosis:__Asthma, COPD__  Home oxygen at:  Length of Need: X? Lifetime ? 3 Months     _2__lpm or __%   via  [x] nasal cannula  []mask  [] other         [x]continuous [x]  with activity  [x]  Nocturnal   [x] Portable Tanks [x]  Concentrator  [x] Conserving Device        Therapist Signature:_Den Alves RRT_____     Date:  _01/27/2025__  Physician Signature:  __Electronically Signed in EMR_    Date:___  Physician Printed Name:  _Mariano  Fish Mcmillan MD  NPI:  [id number] ___    [x] Patient Qualifies      [] Patient Does NOT qualify

## 2025-01-27 NOTE — DISCHARGE INSTRUCTIONS
Internal Medicine Discharge Instruction    Discharge to:  Home  Diet: ADULT DIET; Dysphagia - Soft and Bite Sized    Activity: As tolerated       Be compliant with medications  Please take medications as prescribed         Electronically signed by Fish Carvajal MD on 1/27/2025 at 8:38 AM

## 2025-01-27 NOTE — PROGRESS NOTES
Pt qualified for home oxygen. Paperwork faxed to [facility]. Please do not discharge pt without oxygen. This testing will  and have to be repeated if pt has not discharged 48 hours from time testing was ordered. Please call [facility] @ [phone] if oxygen has not been delivered prior to pt discharging. Thanks.

## 2025-01-27 NOTE — PROGRESS NOTES
Patient was seen in hospital for Asthma, COPD  .  I am prescribing oxygen because the diagnosis and testing requires the patient to have oxygen in the home.  Conditions will improve or be benefited by oxygen use.  The patient is able to perform good mobility and therefore requires the use of a portable oxygen system for ambulation.

## 2025-01-28 LAB
ANION GAP SERPL CALCULATED.3IONS-SCNC: 9 MMOL/L (ref 9–17)
BUN SERPL-MCNC: 27 MG/DL (ref 7–20)
CALCIUM SERPL-MCNC: 9.4 MG/DL (ref 8.3–10.6)
CHLORIDE SERPL-SCNC: 99 MMOL/L (ref 99–110)
CO2 SERPL-SCNC: 28 MMOL/L (ref 21–32)
CREAT SERPL-MCNC: 0.8 MG/DL (ref 0.6–1.2)
ERYTHROCYTE [DISTWIDTH] IN BLOOD BY AUTOMATED COUNT: 14.6 % (ref 11.7–14.9)
GFR, ESTIMATED: 69 ML/MIN/1.73M2
GLUCOSE SERPL-MCNC: 108 MG/DL (ref 74–99)
HCT VFR BLD AUTO: 32.1 % (ref 37–47)
HGB BLD-MCNC: 10 G/DL (ref 12.5–16)
MAGNESIUM SERPL-MCNC: 2.2 MG/DL (ref 1.8–2.4)
MCH RBC QN AUTO: 27.2 PG (ref 27–31)
MCHC RBC AUTO-ENTMCNC: 31.2 G/DL (ref 32–36)
MCV RBC AUTO: 87.5 FL (ref 78–100)
PHOSPHATE SERPL-MCNC: 3.1 MG/DL (ref 2.5–4.9)
PLATELET # BLD AUTO: 256 K/UL (ref 140–440)
PMV BLD AUTO: 9.8 FL (ref 7.5–11.1)
POTASSIUM SERPL-SCNC: 4.8 MMOL/L (ref 3.5–5.1)
RBC # BLD AUTO: 3.67 M/UL (ref 4.2–5.4)
SODIUM SERPL-SCNC: 136 MMOL/L (ref 136–145)
WBC OTHER # BLD: 15.8 K/UL (ref 4–10.5)

## 2025-01-28 PROCEDURE — 6360000002 HC RX W HCPCS: Performed by: INTERNAL MEDICINE

## 2025-01-28 PROCEDURE — 85027 COMPLETE CBC AUTOMATED: CPT

## 2025-01-28 PROCEDURE — 36415 COLL VENOUS BLD VENIPUNCTURE: CPT

## 2025-01-28 PROCEDURE — 6370000000 HC RX 637 (ALT 250 FOR IP): Performed by: INTERNAL MEDICINE

## 2025-01-28 PROCEDURE — 83735 ASSAY OF MAGNESIUM: CPT

## 2025-01-28 PROCEDURE — 94761 N-INVAS EAR/PLS OXIMETRY MLT: CPT

## 2025-01-28 PROCEDURE — 2700000000 HC OXYGEN THERAPY PER DAY

## 2025-01-28 PROCEDURE — 80048 BASIC METABOLIC PNL TOTAL CA: CPT

## 2025-01-28 PROCEDURE — 84100 ASSAY OF PHOSPHORUS: CPT

## 2025-01-28 PROCEDURE — 94640 AIRWAY INHALATION TREATMENT: CPT

## 2025-01-28 PROCEDURE — 1200000000 HC SEMI PRIVATE

## 2025-01-28 RX ADMIN — IPRATROPIUM BROMIDE AND ALBUTEROL SULFATE 1 DOSE: 2.5; .5 SOLUTION RESPIRATORY (INHALATION) at 20:25

## 2025-01-28 RX ADMIN — GUAIFENESIN AND CODEINE PHOSPHATE 5 ML: 100; 10 SOLUTION ORAL at 16:06

## 2025-01-28 RX ADMIN — MECLIZINE HYDROCHLORIDE 12.5 MG: 25 TABLET ORAL at 00:22

## 2025-01-28 RX ADMIN — IPRATROPIUM BROMIDE AND ALBUTEROL SULFATE 1 DOSE: 2.5; .5 SOLUTION RESPIRATORY (INHALATION) at 07:24

## 2025-01-28 RX ADMIN — IPRATROPIUM BROMIDE AND ALBUTEROL SULFATE 1 DOSE: 2.5; .5 SOLUTION RESPIRATORY (INHALATION) at 11:08

## 2025-01-28 RX ADMIN — MECLIZINE HYDROCHLORIDE 12.5 MG: 25 TABLET ORAL at 23:45

## 2025-01-28 RX ADMIN — MECLIZINE HYDROCHLORIDE 12.5 MG: 25 TABLET ORAL at 16:05

## 2025-01-28 RX ADMIN — GUAIFENESIN 600 MG: 600 TABLET, EXTENDED RELEASE ORAL at 21:44

## 2025-01-28 RX ADMIN — GUAIFENESIN AND CODEINE PHOSPHATE 5 ML: 100; 10 SOLUTION ORAL at 10:29

## 2025-01-28 RX ADMIN — GUAIFENESIN 600 MG: 600 TABLET, EXTENDED RELEASE ORAL at 10:29

## 2025-01-28 RX ADMIN — GUAIFENESIN AND CODEINE PHOSPHATE 5 ML: 100; 10 SOLUTION ORAL at 04:06

## 2025-01-28 RX ADMIN — PREDNISONE 60 MG: 20 TABLET ORAL at 10:30

## 2025-01-28 RX ADMIN — FUROSEMIDE 20 MG: 20 TABLET ORAL at 10:29

## 2025-01-28 RX ADMIN — BUDESONIDE AND FORMOTEROL FUMARATE DIHYDRATE 2 PUFF: 80; 4.5 AEROSOL RESPIRATORY (INHALATION) at 07:25

## 2025-01-28 RX ADMIN — IPRATROPIUM BROMIDE AND ALBUTEROL SULFATE 1 DOSE: 2.5; .5 SOLUTION RESPIRATORY (INHALATION) at 15:10

## 2025-01-28 RX ADMIN — SERTRALINE HYDROCHLORIDE 25 MG: 50 TABLET ORAL at 10:29

## 2025-01-28 RX ADMIN — ENOXAPARIN SODIUM 30 MG: 100 INJECTION SUBCUTANEOUS at 10:31

## 2025-01-28 RX ADMIN — VALSARTAN 160 MG: 160 TABLET, FILM COATED ORAL at 10:32

## 2025-01-28 RX ADMIN — MECLIZINE HYDROCHLORIDE 12.5 MG: 25 TABLET ORAL at 05:17

## 2025-01-28 RX ADMIN — GUAIFENESIN AND CODEINE PHOSPHATE 5 ML: 100; 10 SOLUTION ORAL at 21:43

## 2025-01-28 RX ADMIN — BUDESONIDE AND FORMOTEROL FUMARATE DIHYDRATE 2 PUFF: 80; 4.5 AEROSOL RESPIRATORY (INHALATION) at 20:26

## 2025-01-28 RX ADMIN — AMLODIPINE BESYLATE 5 MG: 5 TABLET ORAL at 21:43

## 2025-01-28 RX ADMIN — ASPIRIN 81 MG CHEWABLE TABLET 81 MG: 81 TABLET CHEWABLE at 10:29

## 2025-01-28 RX ADMIN — ENOXAPARIN SODIUM 30 MG: 100 INJECTION SUBCUTANEOUS at 21:44

## 2025-01-28 ASSESSMENT — PAIN SCALES - GENERAL: PAINLEVEL_OUTOF10: 0

## 2025-01-28 NOTE — CARE COORDINATION
Received request for clinical from [employer] regarding pt's discharge appeal- uploaded via [employer] portal  Case Control ID [id number] using Electronic Key [id number]  Your Submission Tracking ID is [id number]

## 2025-01-28 NOTE — PLAN OF CARE
Problem: Discharge Planning  Goal: Discharge to home or other facility with appropriate resources  1/27/2025 2239 by Meg Candelaria RN  Outcome: Progressing  1/27/2025 1501 by Jayla Bolton RN  Outcome: Progressing     Problem: Pain  Goal: Verbalizes/displays adequate comfort level or baseline comfort level  1/27/2025 2239 by Meg Candelaria RN  Outcome: Progressing  1/27/2025 1501 by Jayla Bolton RN  Outcome: Progressing     Problem: Safety - Adult  Goal: Free from fall injury  1/27/2025 2239 by Meg Candelaria RN  Outcome: Progressing  1/27/2025 1501 by Jayla Bolton RN  Outcome: Progressing     Problem: Skin/Tissue Integrity  Goal: Skin integrity remains intact  Description: 1.  Monitor for areas of redness and/or skin breakdown  2.  Assess vascular access sites hourly  3.  Every 4-6 hours minimum:  Change oxygen saturation probe site  4.  Every 4-6 hours:  If on nasal continuous positive airway pressure, respiratory therapy assess nares and determine need for appliance change or resting period  1/27/2025 2239 by Meg Candelaria RN  Outcome: Progressing  1/27/2025 1501 by Jayla Bolton RN  Outcome: Progressing  Flowsheets (Taken 1/27/2025 1500)  Skin Integrity Remains Intact: Monitor for areas of redness and/or skin breakdown

## 2025-01-29 LAB
ANION GAP SERPL CALCULATED.3IONS-SCNC: 10 MMOL/L (ref 9–17)
BUN SERPL-MCNC: 25 MG/DL (ref 7–20)
CALCIUM SERPL-MCNC: 9.6 MG/DL (ref 8.3–10.6)
CHLORIDE SERPL-SCNC: 99 MMOL/L (ref 99–110)
CO2 SERPL-SCNC: 27 MMOL/L (ref 21–32)
CREAT SERPL-MCNC: 0.7 MG/DL (ref 0.6–1.2)
ERYTHROCYTE [DISTWIDTH] IN BLOOD BY AUTOMATED COUNT: 14.4 % (ref 11.7–14.9)
GFR, ESTIMATED: 80 ML/MIN/1.73M2
GLUCOSE SERPL-MCNC: 113 MG/DL (ref 74–99)
HCT VFR BLD AUTO: 31.6 % (ref 37–47)
HGB BLD-MCNC: 9.9 G/DL (ref 12.5–16)
MAGNESIUM SERPL-MCNC: 2.3 MG/DL (ref 1.8–2.4)
MCH RBC QN AUTO: 27.1 PG (ref 27–31)
MCHC RBC AUTO-ENTMCNC: 31.3 G/DL (ref 32–36)
MCV RBC AUTO: 86.6 FL (ref 78–100)
PHOSPHATE SERPL-MCNC: 3.2 MG/DL (ref 2.5–4.9)
PLATELET # BLD AUTO: 273 K/UL (ref 140–440)
PMV BLD AUTO: 9.7 FL (ref 7.5–11.1)
POTASSIUM SERPL-SCNC: 5 MMOL/L (ref 3.5–5.1)
RBC # BLD AUTO: 3.65 M/UL (ref 4.2–5.4)
SODIUM SERPL-SCNC: 136 MMOL/L (ref 136–145)
WBC OTHER # BLD: 15.7 K/UL (ref 4–10.5)

## 2025-01-29 PROCEDURE — 6370000000 HC RX 637 (ALT 250 FOR IP): Performed by: INTERNAL MEDICINE

## 2025-01-29 PROCEDURE — 80048 BASIC METABOLIC PNL TOTAL CA: CPT

## 2025-01-29 PROCEDURE — 6360000002 HC RX W HCPCS: Performed by: INTERNAL MEDICINE

## 2025-01-29 PROCEDURE — 2700000000 HC OXYGEN THERAPY PER DAY

## 2025-01-29 PROCEDURE — 97116 GAIT TRAINING THERAPY: CPT

## 2025-01-29 PROCEDURE — 94761 N-INVAS EAR/PLS OXIMETRY MLT: CPT

## 2025-01-29 PROCEDURE — 85027 COMPLETE CBC AUTOMATED: CPT

## 2025-01-29 PROCEDURE — 36415 COLL VENOUS BLD VENIPUNCTURE: CPT

## 2025-01-29 PROCEDURE — 1200000000 HC SEMI PRIVATE

## 2025-01-29 PROCEDURE — 97166 OT EVAL MOD COMPLEX 45 MIN: CPT

## 2025-01-29 PROCEDURE — 94664 DEMO&/EVAL PT USE INHALER: CPT

## 2025-01-29 PROCEDURE — 92526 ORAL FUNCTION THERAPY: CPT

## 2025-01-29 PROCEDURE — 97162 PT EVAL MOD COMPLEX 30 MIN: CPT

## 2025-01-29 PROCEDURE — 97535 SELF CARE MNGMENT TRAINING: CPT

## 2025-01-29 PROCEDURE — 94150 VITAL CAPACITY TEST: CPT

## 2025-01-29 PROCEDURE — 83735 ASSAY OF MAGNESIUM: CPT

## 2025-01-29 PROCEDURE — 84100 ASSAY OF PHOSPHORUS: CPT

## 2025-01-29 PROCEDURE — 94640 AIRWAY INHALATION TREATMENT: CPT

## 2025-01-29 RX ORDER — FLUTICASONE FUROATE AND VILANTEROL 100; 25 UG/1; UG/1
1 POWDER RESPIRATORY (INHALATION)
Status: DISCONTINUED | OUTPATIENT
Start: 2025-01-30 | End: 2025-01-31 | Stop reason: HOSPADM

## 2025-01-29 RX ORDER — BUDESONIDE AND FORMOTEROL FUMARATE DIHYDRATE 80; 4.5 UG/1; UG/1
2 AEROSOL RESPIRATORY (INHALATION)
Status: DISCONTINUED | OUTPATIENT
Start: 2025-01-29 | End: 2025-01-29

## 2025-01-29 RX ADMIN — IPRATROPIUM BROMIDE AND ALBUTEROL SULFATE 1 DOSE: 2.5; .5 SOLUTION RESPIRATORY (INHALATION) at 11:56

## 2025-01-29 RX ADMIN — ENOXAPARIN SODIUM 30 MG: 100 INJECTION SUBCUTANEOUS at 21:43

## 2025-01-29 RX ADMIN — GUAIFENESIN AND CODEINE PHOSPHATE 5 ML: 100; 10 SOLUTION ORAL at 11:49

## 2025-01-29 RX ADMIN — ENOXAPARIN SODIUM 30 MG: 100 INJECTION SUBCUTANEOUS at 11:50

## 2025-01-29 RX ADMIN — MECLIZINE HYDROCHLORIDE 12.5 MG: 25 TABLET ORAL at 11:51

## 2025-01-29 RX ADMIN — GUAIFENESIN AND CODEINE PHOSPHATE 5 ML: 100; 10 SOLUTION ORAL at 21:43

## 2025-01-29 RX ADMIN — IPRATROPIUM BROMIDE AND ALBUTEROL SULFATE 1 DOSE: 2.5; .5 SOLUTION RESPIRATORY (INHALATION) at 08:17

## 2025-01-29 RX ADMIN — IPRATROPIUM BROMIDE AND ALBUTEROL SULFATE 1 DOSE: 2.5; .5 SOLUTION RESPIRATORY (INHALATION) at 21:56

## 2025-01-29 RX ADMIN — GUAIFENESIN 600 MG: 600 TABLET, EXTENDED RELEASE ORAL at 11:51

## 2025-01-29 RX ADMIN — VALSARTAN 160 MG: 160 TABLET, FILM COATED ORAL at 11:51

## 2025-01-29 RX ADMIN — GUAIFENESIN AND CODEINE PHOSPHATE 5 ML: 100; 10 SOLUTION ORAL at 03:15

## 2025-01-29 RX ADMIN — MECLIZINE HYDROCHLORIDE 12.5 MG: 25 TABLET ORAL at 05:58

## 2025-01-29 RX ADMIN — AMLODIPINE BESYLATE 5 MG: 5 TABLET ORAL at 21:44

## 2025-01-29 RX ADMIN — FUROSEMIDE 20 MG: 20 TABLET ORAL at 11:52

## 2025-01-29 RX ADMIN — MECLIZINE HYDROCHLORIDE 12.5 MG: 25 TABLET ORAL at 23:58

## 2025-01-29 RX ADMIN — GUAIFENESIN 600 MG: 600 TABLET, EXTENDED RELEASE ORAL at 21:44

## 2025-01-29 RX ADMIN — MECLIZINE HYDROCHLORIDE 12.5 MG: 25 TABLET ORAL at 18:28

## 2025-01-29 RX ADMIN — ASPIRIN 81 MG CHEWABLE TABLET 81 MG: 81 TABLET CHEWABLE at 11:52

## 2025-01-29 RX ADMIN — IPRATROPIUM BROMIDE AND ALBUTEROL SULFATE 1 DOSE: 2.5; .5 SOLUTION RESPIRATORY (INHALATION) at 15:33

## 2025-01-29 RX ADMIN — SERTRALINE HYDROCHLORIDE 25 MG: 50 TABLET ORAL at 11:50

## 2025-01-29 RX ADMIN — BUDESONIDE AND FORMOTEROL FUMARATE DIHYDRATE 2 PUFF: 80; 4.5 AEROSOL RESPIRATORY (INHALATION) at 08:18

## 2025-01-29 ASSESSMENT — PAIN DESCRIPTION - LOCATION: LOCATION: THROAT

## 2025-01-29 ASSESSMENT — PAIN DESCRIPTION - PAIN TYPE: TYPE: ACUTE PAIN

## 2025-01-29 ASSESSMENT — PAIN DESCRIPTION - DESCRIPTORS: DESCRIPTORS: THROBBING

## 2025-01-29 ASSESSMENT — PAIN - FUNCTIONAL ASSESSMENT: PAIN_FUNCTIONAL_ASSESSMENT: ACTIVITIES ARE NOT PREVENTED

## 2025-01-29 ASSESSMENT — PAIN DESCRIPTION - ORIENTATION: ORIENTATION: UPPER;MID

## 2025-01-29 ASSESSMENT — PAIN SCALES - GENERAL: PAINLEVEL_OUTOF10: 0

## 2025-01-29 NOTE — CARE COORDINATION
Deanna initiated and pending via [employer] and is pending at this time   The authorization request [id number] has been submitted. The authorization is pending for clinical review.    Electronic PAS completed

## 2025-01-29 NOTE — CONSULTS
Assist for Transfers: Independent    Examination of body systems (includes body structures/functions, activity/participation limitations):  Observation:  pt up in chair upon arrival and agreeable to therapy  Vision:  wears glasses  Hearing:  WFL  Cardiopulmonary:  2L O2, does not wear at baseline  Cognition: WFL, see OT/SLP note for further evaluation.    Musculoskeletal  ROM R/L:  WFL.    Strength R/L:  3/5, moderate impairment in function and endurance.    Neuro:  WFL      Mobility:  Rolling L/R:  NT  Supine to sit:  NT  Transfers: pt completed STS to/from chair and Rollator CGA with cues for sequencing and safety  Sitting balance:  good.    Standing balance:  fair.    Gait: pt ambulated 15' + 15' with Rollator CGA with decreased aliya, forward flexed posture, and wide BEVERLY. Cues provided for pathway and safety. SpO2 92% after first bout and 90% after second. Seated rest break in between bouts    [facility] 6 Clicks Inpatient Mobility:  AM-PAC Inpatient Mobility Raw Score : 15    Safety: patient left in chair with alarm on, call light within reach, RN notified, gait belt used.    Assessment:   Pt is a 74 y.o. female admitted to the hospital for acute respiratory failure with hypoxia. Pt is typically mod I with Rollator for all ambulation and transfers. Pt is currently performing transfers CGA and ambulating 15' with Rollator CGA. Pt is presenting with decreased endurance, impaired transfers, impaired gait, impaired strength, impaired balance. Pt would benefit from continued acute care PT as well as Facility for moderate post-acute rehabilitation, anticipate 1-2 hours per day and 5 days per week.     Complexity: moderate    Prognosis: Good, no significant barriers to participation at this time.     General Plan: 3-5 times per week         Goals:  Short Term Goals  Time Frame for Short Term Goals: 1 week  Short Term Goal 1: pt to complete all bed mobility min A  Short Term Goal 2: pt to complete all STS transfers  to/from bed, commode, and chair SBA  Short Term Goal 3: pt to ambulate 50' with LRAD CGA       Treatment plan:  Bed mobility, transfers, balance, gait, TA, TX    Recommendations for NURSING mobility: amb with gait belt and Rollator    Time:   Time in: 1406  Time out: 1428  Timed treatment minutes: 11  Total time: 22    Electronically signed by:    Floresita Vera, PT  1/29/2025, 2:32 PM

## 2025-01-29 NOTE — CARE COORDINATION
CM reviewed pt’s medical record and discussed pt in IDR. CM was informed that pt would like to change her discharge plan to SNF. Whiteboard placed. PT/OT saw pt and recommend SNF. CM in to see pt to discuss discharge plans. Pt would like to go to [facility] at discharge. CM spoke with Gisselle and gave referral. Awaiting response.

## 2025-01-29 NOTE — PROGRESS NOTES
[facility]  DEPARTMENT OF SPEECH/LANGUAGE PATHOLOGY  DAILY PROGRESS NOTE  Nusrat Moctezuma  1/29/2025  [id number]  COPD exacerbation (HCC) [J44.1]  Acute respiratory failure with hypoxia [J96.01]  Acute on chronic respiratory failure with hypoxemia [J96.21]  Allergies   Allergen Reactions    Bactrim [Sulfamethoxazole-Trimethoprim] Anaphylaxis and Hives    Lipitor [Atorvastatin] Shortness Of Breath    Taxol [Paclitaxel] Anaphylaxis and Swelling    Soap Other (See Comments) and Rash     IVORY soap    Aminoglycosides      Abstracted from [facility] patient chart.    Demerol Nausea Only    Neosporin [Bacitracin-Neomycin-Polymyxin] Rash and Other (See Comments)     hotness    Other Rash     Ivory Soap    Talc Other (See Comments)     blisters         Pt was seen this date for dysphagia treatment.       IMPRESSION AND RECOMMENDATIONS: Nusrat Moctezuma was seen today for diet tolerance monitoring and ongoing dysphagia evaluation. MICHELLE Narvaez reports pt complaining of a sore throat today. Nusrat Moctezuma was positioned upright in a chair with her caregiver at bedside. Pt agreeable and cooperative. She reports persistent sore throat and feeling something \"stuck\" in her throat. Pt endorses episode of coughing with pull pork. Reports no difficulty with majority of soft and bite sized consistencies and softer foods. Pt additionally reports increased difficulty swallowing water.     Nusrat Moctezuma consumed trials of thin liquid via cup/straw, puree, and regular solid during this visit. Oral acceptance, a/p transit, and oral clearance are WFL. Mastication of regular solid mildly prolonged d/t missing dentition. Pharyngeal swallow initiation appears timely with no overt s/sx of penetration/aspiration noted with any consistency. Pt endorses globus sensation during and outside of PO intake, denies hx of reflux. SLP provided education on safe swallow strategies (upright positioning, small bites/sips, alternate

## 2025-01-29 NOTE — CONSULTS
[facility] ACUTE CARE OCCUPATIONAL THERAPY EVALUATION  Nusrat Moctezuma, 1950, 4107/4107-A, 1/29/2025    Discharge Recommendation: Encourage facility for post-acute rehabilitation, anticipate 1-2 hours per day and 5 days per week.    History  [place]:  The primary encounter diagnosis was Acute on chronic respiratory failure with hypoxemia. Diagnoses of COPD exacerbation (HCC) and Cough due to bronchospasm were also pertinent to this visit.  Patient  has a past medical history of Anemia, Anxiety, Arthritis, Asthma, AV block, Blood poisoning, Blood transfusion, Breast cancer (HCC), CAD (coronary artery disease), Cancer (HCC), Carcinoma of breast (HCC), Cardiac pacemaker, Cellulitis, Chronic cystitis, Chronic respiratory failure, Colon cancer (HCC), COPD (chronic obstructive pulmonary disease) (HCC), CTS (carpal tunnel syndrome), Depression, Diverticulitis, H/O 24 hour EKG monitoring, H/O cardiac catheterization, H/O cardiovascular stress test, H/O cardiovascular stress test, H/O chest pain, H/O Doppler ultrasound, H/O Doppler ultrasound, H/O echocardiogram, H/O echocardiogram, H/O urinary incontinence, History of blood transfusion, Hx antineoplastic chemo, Hx of Arterial Doppler ultrasound, Hx of cardiovascular stress test, Hx of echocardiogram, Hx of echocardiogram, Hx of fall, HX OTHER MEDICAL, Hyperlipidemia, Hypertension, Hypothermia, Malignant neoplasm of breast (female) (HCC), Neuropathy, Normocytic normochromic anemia, Obstructive sleep apnea, Old MI (myocardial infarction), Osteoarthritis, Osteopenia, Osteopenia of multiple sites, Pneumonia due to COVID-19 virus, Primary malignant neoplasm of colon (HCC), S/P cardiac cath, Skin cancer, Super obesity, and Umbilical hernia.  Patient  has a past surgical history that includes Tonsillectomy (1957); Appendectomy (2004); colectomy (2004); Tunneled venous port placement (2004); cyst removal (2003); Breast lumpectomy (2007); Dilation and curettage of

## 2025-01-29 NOTE — CARE COORDINATION
Per [employer] portal: Outcome:  SD Agrees with termination of Hospital services  Liability:  Beneficiary Liability Starts on 01/30/2025

## 2025-01-30 ENCOUNTER — APPOINTMENT (OUTPATIENT)
Dept: GENERAL RADIOLOGY | Age: 75
End: 2025-01-30
Payer: MEDICARE

## 2025-01-30 LAB — HBV SURFACE AG SERPL QL IA: NONREACTIVE

## 2025-01-30 PROCEDURE — 36415 COLL VENOUS BLD VENIPUNCTURE: CPT

## 2025-01-30 PROCEDURE — 6370000000 HC RX 637 (ALT 250 FOR IP): Performed by: INTERNAL MEDICINE

## 2025-01-30 PROCEDURE — 94150 VITAL CAPACITY TEST: CPT

## 2025-01-30 PROCEDURE — 87340 HEPATITIS B SURFACE AG IA: CPT

## 2025-01-30 PROCEDURE — 1200000000 HC SEMI PRIVATE

## 2025-01-30 PROCEDURE — 94640 AIRWAY INHALATION TREATMENT: CPT

## 2025-01-30 PROCEDURE — 6360000002 HC RX W HCPCS: Performed by: INTERNAL MEDICINE

## 2025-01-30 PROCEDURE — 71045 X-RAY EXAM CHEST 1 VIEW: CPT

## 2025-01-30 PROCEDURE — 94761 N-INVAS EAR/PLS OXIMETRY MLT: CPT

## 2025-01-30 PROCEDURE — 2700000000 HC OXYGEN THERAPY PER DAY

## 2025-01-30 RX ADMIN — GUAIFENESIN AND CODEINE PHOSPHATE 5 ML: 100; 10 SOLUTION ORAL at 18:04

## 2025-01-30 RX ADMIN — MECLIZINE HYDROCHLORIDE 12.5 MG: 25 TABLET ORAL at 23:50

## 2025-01-30 RX ADMIN — SERTRALINE HYDROCHLORIDE 25 MG: 50 TABLET ORAL at 08:55

## 2025-01-30 RX ADMIN — AMLODIPINE BESYLATE 5 MG: 5 TABLET ORAL at 20:50

## 2025-01-30 RX ADMIN — ENOXAPARIN SODIUM 30 MG: 100 INJECTION SUBCUTANEOUS at 08:58

## 2025-01-30 RX ADMIN — GUAIFENESIN AND CODEINE PHOSPHATE 5 ML: 100; 10 SOLUTION ORAL at 20:50

## 2025-01-30 RX ADMIN — GUAIFENESIN AND CODEINE PHOSPHATE 5 ML: 100; 10 SOLUTION ORAL at 08:55

## 2025-01-30 RX ADMIN — GUAIFENESIN 600 MG: 600 TABLET, EXTENDED RELEASE ORAL at 20:50

## 2025-01-30 RX ADMIN — MECLIZINE HYDROCHLORIDE 12.5 MG: 25 TABLET ORAL at 12:47

## 2025-01-30 RX ADMIN — FLUTICASONE FUROATE AND VILANTEROL 1 PUFF: 100; 25 POWDER RESPIRATORY (INHALATION) at 08:39

## 2025-01-30 RX ADMIN — IPRATROPIUM BROMIDE AND ALBUTEROL SULFATE 1 DOSE: 2.5; .5 SOLUTION RESPIRATORY (INHALATION) at 19:03

## 2025-01-30 RX ADMIN — IPRATROPIUM BROMIDE AND ALBUTEROL SULFATE 1 DOSE: 2.5; .5 SOLUTION RESPIRATORY (INHALATION) at 11:59

## 2025-01-30 RX ADMIN — VALSARTAN 160 MG: 160 TABLET, FILM COATED ORAL at 08:55

## 2025-01-30 RX ADMIN — ASPIRIN 81 MG CHEWABLE TABLET 81 MG: 81 TABLET CHEWABLE at 08:55

## 2025-01-30 RX ADMIN — ENOXAPARIN SODIUM 30 MG: 100 INJECTION SUBCUTANEOUS at 20:50

## 2025-01-30 RX ADMIN — MECLIZINE HYDROCHLORIDE 12.5 MG: 25 TABLET ORAL at 18:04

## 2025-01-30 RX ADMIN — GUAIFENESIN AND CODEINE PHOSPHATE 5 ML: 100; 10 SOLUTION ORAL at 03:41

## 2025-01-30 RX ADMIN — IPRATROPIUM BROMIDE AND ALBUTEROL SULFATE 1 DOSE: 2.5; .5 SOLUTION RESPIRATORY (INHALATION) at 16:37

## 2025-01-30 RX ADMIN — FUROSEMIDE 20 MG: 20 TABLET ORAL at 08:55

## 2025-01-30 RX ADMIN — MECLIZINE HYDROCHLORIDE 12.5 MG: 25 TABLET ORAL at 05:09

## 2025-01-30 RX ADMIN — IPRATROPIUM BROMIDE AND ALBUTEROL SULFATE 1 DOSE: 2.5; .5 SOLUTION RESPIRATORY (INHALATION) at 08:40

## 2025-01-30 RX ADMIN — GUAIFENESIN 600 MG: 600 TABLET, EXTENDED RELEASE ORAL at 08:55

## 2025-01-30 NOTE — CARE COORDINATION
Received SNF approval via [employer] portal   Authorization Number:  [id number]    Effective From:  01/29/2025  Effective Through:  02/06/2025

## 2025-01-30 NOTE — PROGRESS NOTES
The pt's Home Oxygen Evaluation has . IF the pt is to get home oxygen, another Home Oxygen Evaluation will need to be ordered and completed.

## 2025-01-30 NOTE — PROGRESS NOTES
V2.0    [facility] Progress Note      Name:  Nusrat Moctezuma /Age/Sex: 1950  (74 y.o. female)   MRN & CSN:  [id number] & [id number] Encounter Date/Time: 25 12:36 PM EST   Location:  [address] [place] [address] PCP: Cheyenne Arias DO     Attending:Mitzy Wilcox MD         Assessment and Recommendations   Nusrat Moctezuma is a 74 y.o. female with pmh of hypertension, asthma, heart block s/p PPM, mood disorder, JAMIE, obesity, who presents with Acute respiratory failure with hypoxia      Acute on Chronic Resp failure with Hypoxia and hypercapnia: Resolved  Asthma Exacerbation: Resolved  Peripheral Edema: Resolved  H/O Asthma   H/O HTN   H/O breast cancer, colon cancer  H/O heart block s/p PPM   Mood disorder   JAMIE not on CPAP   Class III obesity            Plan  Prednisone taper   Robitussin + codeine   Resumed home inhalers   Resumed home meds, see orders   Qualifies for home O2   Patient is discharged home on  but patient appealed as she feeling weak, PT/OT evaluation ordered.       Comment: Please note this report has been produced using speech recognition software and may contain errors related to that system including errors in grammar, punctuation, and spelling, as well as words and phrases that may be inappropriate. If there are any questions or concerns please feel free to contact the dictating provider for clarification.     Diet ADULT DIET; Dysphagia - Soft and Bite Sized   DVT Prophylaxis [x] Lovenox, []  Heparin, [] SCDs, [] Ambulation,  [] Eliquis, [] Xarelto  [] Coumadin   Code Status Full Code   Disposition From: Home  Expected Disposition: Home versus SNF  Estimated Date of Discharge: TBD  Patient requires continued admission due to discharge appeal, PT/OT evaluation   Surrogate Decision Maker/ POA Self     Personally reviewed Lab Studies and Imaging       Subjective:     Chief Complaint:   Chief Complaint   Patient presents with    Wheezing     COPD exacerbation         Patient seen and examined at

## 2025-01-30 NOTE — PROGRESS NOTES
V2.0    [facility] Progress Note      Name:  Nusrat Moctezuma /Age/Sex: 1950  (74 y.o. female)   MRN & CSN:  [id number] & [id number] Encounter Date/Time: 25 12:36 PM EST   Location:  [address] [place] [address] PCP: Cheyenne Arias DO     Attending:Mitzy Wilcox MD         Assessment and Recommendations   Nusrat Moctezuma is a 74 y.o. female with pmh of hypertension, asthma, heart block s/p PPM, mood disorder, JAMIE, obesity, who presents with Acute respiratory failure with hypoxia      Acute on Chronic Resp failure with Hypoxia and hypercapnia: Resolved  Asthma Exacerbation: Resolved  Peripheral Edema: Resolved  H/O Asthma   H/O HTN   H/O breast cancer, colon cancer  H/O heart block s/p PPM   Mood disorder   JAMIE not on CPAP   Class III obesity            Plan  Prednisone taper   Robitussin + codeine   Resumed home inhalers   Resumed home meds, see orders   Qualifies for home O2   Patient is discharged home on  but patient appealed as she feeling weak, PT/OT evaluation ordered.  Evaluated and recommended SNF  working on it       Comment: Please note this report has been produced using speech recognition software and may contain errors related to that system including errors in grammar, punctuation, and spelling, as well as words and phrases that may be inappropriate. If there are any questions or concerns please feel free to contact the dictating provider for clarification.     Diet ADULT DIET; Dysphagia - Soft and Bite Sized   DVT Prophylaxis [x] Lovenox, []  Heparin, [] SCDs, [] Ambulation,  [] Eliquis, [] Xarelto  [] Coumadin   Code Status Full Code   Disposition From: Home  Expected Disposition: SNF  Estimated Date of Discharge: TBD  Patient requires continued admission due to discharge appeal, pending placement   Surrogate Decision Maker/ POA Self     Personally reviewed Lab Studies and Imaging       Subjective:     Chief Complaint:   Chief Complaint   Patient presents with    Wheezing     COPD

## 2025-01-30 NOTE — CARE COORDINATION
CM reviewed pt's medical record and discussed in IDR. Pt approved for [facility]. CM in to see pt and she is agreeable to go to [facility] in the morning. CM called and left confidential VM with Jayla/Jacinto to inform her that pt is agreeable to go to SNF in the AM.    Discharging Nurse: Pt will go to [facility] at discharge (agreed to discharge tomorrow, 1/31/2025)     Call report to [phone]    FAX AVS with completed KIEL and any scripts to [phone].     If pt is discharged after hours or on the weekend, arrange transportation with [facility] @ [phone].    Notify Family

## 2025-01-30 NOTE — PROGRESS NOTES
V2.0    [facility] Progress Note      Name:  Nusrat Moctezuma /Age/Sex: 1950  (74 y.o. female)   MRN & CSN:  [id number] & [id number] Encounter Date/Time: 25 12:36 PM EST   Location:  [address] [place] [address] PCP: Cheyenne Arias DO     Attending:Mitzy Wilcox MD         Assessment and Recommendations   Nusrat Moctezuma is a 74 y.o. female with pmh of hypertension, asthma, heart block s/p PPM, mood disorder, JAMIE, obesity, who presents with Acute respiratory failure with hypoxia      Acute on Chronic Resp failure with Hypoxia and hypercapnia: Resolved  Asthma Exacerbation: Resolved  Peripheral Edema: Resolved  H/O Asthma   H/O HTN   H/O breast cancer, colon cancer  H/O heart block s/p PPM   Mood disorder   JAMIE not on CPAP   Class III obesity            Plan  Prednisone taper   Robitussin + codeine   Resumed home inhalers   Resumed home meds, see orders   Qualifies for home O2   Patient is discharged home on  but patient appealed as she feeling weak, PT/OT evaluation ordered.       Comment: Please note this report has been produced using speech recognition software and may contain errors related to that system including errors in grammar, punctuation, and spelling, as well as words and phrases that may be inappropriate. If there are any questions or concerns please feel free to contact the dictating provider for clarification.     Diet ADULT DIET; Dysphagia - Soft and Bite Sized   DVT Prophylaxis [x] Lovenox, []  Heparin, [] SCDs, [] Ambulation,  [] Eliquis, [] Xarelto  [] Coumadin   Code Status Full Code   Disposition From: Home  Expected Disposition: Home versus SNF  Estimated Date of Discharge: TBD  Patient requires continued admission due to discharge appeal, PT/OT evaluation   Surrogate Decision Maker/ POA Self     Personally reviewed Lab Studies and Imaging       Subjective:     Chief Complaint:   Chief Complaint   Patient presents with    Wheezing     COPD exacerbation         Patient seen and examined at

## 2025-01-31 VITALS
WEIGHT: 287.92 LBS | HEART RATE: 60 BPM | TEMPERATURE: 97.7 F | HEIGHT: 62 IN | DIASTOLIC BLOOD PRESSURE: 56 MMHG | RESPIRATION RATE: 18 BRPM | OXYGEN SATURATION: 96 % | BODY MASS INDEX: 52.98 KG/M2 | SYSTOLIC BLOOD PRESSURE: 146 MMHG

## 2025-01-31 PROCEDURE — 2700000000 HC OXYGEN THERAPY PER DAY

## 2025-01-31 PROCEDURE — 94761 N-INVAS EAR/PLS OXIMETRY MLT: CPT

## 2025-01-31 PROCEDURE — 6370000000 HC RX 637 (ALT 250 FOR IP): Performed by: INTERNAL MEDICINE

## 2025-01-31 PROCEDURE — 94640 AIRWAY INHALATION TREATMENT: CPT

## 2025-01-31 RX ADMIN — IPRATROPIUM BROMIDE AND ALBUTEROL SULFATE 1 DOSE: 2.5; .5 SOLUTION RESPIRATORY (INHALATION) at 07:15

## 2025-01-31 RX ADMIN — MECLIZINE HYDROCHLORIDE 12.5 MG: 25 TABLET ORAL at 05:59

## 2025-01-31 RX ADMIN — GUAIFENESIN AND CODEINE PHOSPHATE 5 ML: 100; 10 SOLUTION ORAL at 02:45

## 2025-01-31 RX ADMIN — FLUTICASONE FUROATE AND VILANTEROL 1 PUFF: 100; 25 POWDER RESPIRATORY (INHALATION) at 07:18

## 2025-01-31 NOTE — DISCHARGE INSTR - COC
Continuity of Care Form    Patient Name: Nusrat Moctezuma   :  1950  MRN:  [id number]    Admit date:  2025  Discharge date:  ***    Code Status Order: Full Code   Advance Directives:   Advance Care Flowsheet Documentation             Admitting Physician:  Fish Carvajal MD  PCP: Cheyenne Arias DO    Discharging Nurse: ***  Discharging Hospital Unit/Room#: 4107/4107-A  Discharging Unit Phone Number: ***    Emergency Contact:   Extended Emergency Contact Information  Primary Emergency Contact: BamInderjit  Address: [address] [place] [address]  Home Phone: [phone]  Mobile Phone: [phone]  Relation: Other  Secondary Emergency Contact: Contact,  Address: ADDRESS UNKNOWN           [address]  Home Phone: [phone]  Mobile Phone: [phone]  Relation: Parent    Past Surgical History:  Past Surgical History:   Procedure Laterality Date    A-V CARDIAC PACEMAKER INSERTION  3/10/14     [employer]tr[employer].   Model:  A2DR01 Medtronic  Serial:  [id number] dual chamber pacemaker    APPENDECTOMY  2004    BREAST BIOPSY  12    BREAST LUMPECTOMY  2007    right     BREAST SURGERY  2012    rt lesion biopsy     CARDIAC CATHETERIZATION   &     CAROTID ENDARTERECTOMY Right 12/3/2021    RIGHT CAROTID ENDARTERECTOMY WITH PATCH performed by Niko Francisco MD at [facility] OR    COLECTOMY      Colon cancer    COLONOSCOPY  10-18-13    polyp    COLONOSCOPY  2016    internal hemorrhoids, diverticulosis, 1.5 cm sessile polyp, 8 mm polyp found in rectum.    COLONOSCOPY  2017    1.5cm residual polyp, 5mm polyps in blind sigmoid loop x3, Sigmoid divertics, Internal grade 1 hemorrhoids    COLONOSCOPY N/A 2019    COLONOSCOPY W/ ENDOSCOPIC MUCOSAL RESECTION WITH ELEVIEW 5ML INJECTION AND POLYPECTOMY OF TIP OF BLIND RECTOSIGMOID STUMP AND CAUTERIZATION WITH ERBE PROBE, CLIPPING X2 performed by

## 2025-01-31 NOTE — CARE COORDINATION
When discharged pt will be going to SNF at [facility]  Please notify family of discharge  Place Scripts, AVS, and Completed KIEL  in Envelope  Scripts fax to [phone]  Please call report to [phone]  Please call  [facility] [phone]

## 2025-01-31 NOTE — CARE COORDINATION
CM sent a PS to dr Wilcox to ask about discharge plan and if he wanted transport set up.   Approved for [facility]  Effective From:  01/29/2025  Effective Through:  02/06/2025  0830 CM received PS from Dr Wilcox, pt is ready for discharge. CM called Jayla at [facility] and left a VM that pt would be discharged. CM called [facility] and spoke with Karen. Transport time is set for 1030. CM updated nursing placed envelope in soft chart. CM informed pt and she remains agreeable to discharge to [facility]. Pt wants to notify her family.   945 CM called Lisa at [facility] and informed her of discharge time.   1000 CM informed per Jayla that she is aware of discharge and transport time.

## 2025-01-31 NOTE — PROGRESS NOTES
Pt oxygen order has  - Pt going to [facility] upon discharge.  Pt does have [employer] oxygen tank in her room,  I will contact Pulmonary regarding oxygen order.

## 2025-02-04 ENCOUNTER — HOSPITAL ENCOUNTER (OUTPATIENT)
Age: 75
Setting detail: SPECIMEN
Discharge: HOME | End: 2025-02-04

## 2025-02-04 LAB
ALBUMIN SERPL-MCNC: 3.8 G/DL (ref 3.4–5)
ALBUMIN/GLOB SERPL: 1.3 {RATIO} (ref 1.1–2.2)
ALP SERPL-CCNC: 115 U/L (ref 40–129)
ALT SERPL-CCNC: 17 U/L (ref 10–40)
ANION GAP SERPL CALCULATED.3IONS-SCNC: 12 MMOL/L (ref 9–17)
AST SERPL-CCNC: 15 U/L (ref 15–37)
BASOPHILS # BLD: 0.06 K/UL
BASOPHILS NFR BLD: 1 % (ref 0–1)
BILIRUB SERPL-MCNC: <0.2 MG/DL (ref 0–1)
BUN SERPL-MCNC: 19 MG/DL (ref 7–20)
CALCIUM SERPL-MCNC: 9.7 MG/DL (ref 8.3–10.6)
CHLORIDE SERPL-SCNC: 99 MMOL/L (ref 99–110)
CO2 SERPL-SCNC: 27 MMOL/L (ref 21–32)
CREAT SERPL-MCNC: 0.8 MG/DL (ref 0.6–1.2)
EOSINOPHIL # BLD: 0.27 K/UL
EOSINOPHILS RELATIVE PERCENT: 3 % (ref 0–3)
ERYTHROCYTE [DISTWIDTH] IN BLOOD BY AUTOMATED COUNT: 14.8 % (ref 11.7–14.9)
GFR, ESTIMATED: 67 ML/MIN/1.73M2
GLUCOSE SERPL-MCNC: 129 MG/DL (ref 74–99)
HCT VFR BLD AUTO: 34.6 % (ref 37–47)
HGB BLD-MCNC: 10.4 G/DL (ref 12.5–16)
IMM GRANULOCYTES # BLD AUTO: 0.11 K/UL
IMM GRANULOCYTES NFR BLD: 1 %
LYMPHOCYTES NFR BLD: 2.29 K/UL
LYMPHOCYTES RELATIVE PERCENT: 23 % (ref 24–44)
MCH RBC QN AUTO: 26.7 PG (ref 27–31)
MCHC RBC AUTO-ENTMCNC: 30.1 G/DL (ref 32–36)
MCV RBC AUTO: 88.9 FL (ref 78–100)
MONOCYTES NFR BLD: 0.78 K/UL
MONOCYTES NFR BLD: 8 % (ref 0–4)
NEUTROPHILS NFR BLD: 65 % (ref 36–66)
NEUTS SEG NFR BLD: 6.43 K/UL
PLATELET # BLD AUTO: 283 K/UL (ref 140–440)
PMV BLD AUTO: 9.8 FL (ref 7.5–11.1)
POTASSIUM SERPL-SCNC: 4.3 MMOL/L (ref 3.5–5.1)
PROT SERPL-MCNC: 6.7 G/DL (ref 6.4–8.2)
RBC # BLD AUTO: 3.89 M/UL (ref 4.2–5.4)
SODIUM SERPL-SCNC: 139 MMOL/L (ref 136–145)
WBC OTHER # BLD: 9.9 K/UL (ref 4–10.5)

## 2025-02-04 PROCEDURE — 85025 COMPLETE CBC W/AUTO DIFF WBC: CPT

## 2025-02-04 PROCEDURE — 80053 COMPREHEN METABOLIC PANEL: CPT

## 2025-02-10 ENCOUNTER — CARE COORDINATION (OUTPATIENT)
Dept: CASE MANAGEMENT | Age: 75
End: 2025-02-10

## 2025-02-10 NOTE — CARE COORDINATION
Care Transitions Note    Initial Call - Call within 2 business days of discharge: Yes    Patient Current Location:  Home: [address] [place], [place] [id number]    Post Acute Care Manager contacted the patient by telephone to perform post hospital discharge assessment, verified name and  as identifiers. Provided introduction to self, and explanation of the Post Acute Care Manager role.     Patient: Nusrat Moctezuma    Patient : 1950   MRN: [id number]    Reason for Admission: ACUTE RESP FAILURE    Discharge Date: 25  RURS: Readmission Risk Score: 13.7      Last Discharge Facility       Date Complaint Diagnosis Description Type Department Provider    25 Wheezing Acute on chronic respiratory failure with hypoxemia ... ED to Hosp-Admission (Discharged) (ADMITTED) [facility] Mitzy David MD; [facility] Middleton.            Was this an external facility discharge? Yes. Discharge Date: . Facility Name:   [facility]    Additional needs identified to be addressed with provider   No needs identified             Method of communication with provider: none.    Patients top risk factors for readmission: medical condition-recent resp failure    Interventions to address risk factors:   Home Health: called and gave them pt's number.     Care Summary Note: Spoke with Nusrat after 2 IDs. Pt states she does not have oxygen and she was told she should have oxygen. She does not now the name of her supplier. Pt is speaking rapidly and clearly. Asked her to take her O2 and she states it is 95% Told her I would call her back    Called [facility] and Spoke with Tequila. She states that O2 was not ordered as pt had a trial off O2 and was at a 97% x 3 days and the facility MD would not order O2. She states she told Nusrat this..    Called Nusrat back and talked to her about monitoring her O2 levels and that O2 needs to stay above 90% and educated on use of pulse oximeter. Talked

## 2025-02-12 ENCOUNTER — CARE COORDINATION (OUTPATIENT)
Dept: CASE MANAGEMENT | Age: 75
End: 2025-02-12

## 2025-02-12 NOTE — CARE COORDINATION
Care Transitions Note    Follow Up Call     Patient Current Location:  Home: [address]  [facility] [address]    Post Acute Care Manager contacted the patient by telephone. Verified name and  as identifiers.    Additional needs identified to be addressed with provider   No needs identified                 Method of communication with provider: none.    Care Summary Note: Spoke with Nusrat and she is doing well she is being very careful and measuring her O2. She states did go down to 94 when she went to the . She states [facility] has been out - nurse and therapyReviewed medications and anxious to review with Dr Arias. She is hoping to reduce medications. Agreeable to calls. Left my contact information    Post Acute Care Manager reviewed red flags with patient. The patient was given an opportunity to ask questions; all questions answered at this time.. The patient verbalized understanding.   Were discharge instructions available to patient? Yes.   Reviewed appropriate site of care based on symptoms and resources available to patient including: PCP. The patient agrees to contact the primary care provider and/or specialist office for questions related to their healthcare.      Advance Care Planning:   Does patient have an Advance Directive: deferred at this time, will discuss on future follow up. .    Medication Review:  Medication reconciliation was performed with patient,1111F entered: yes.        Assessments:  Discharge Assessment    Follow Up Appointment:   Reviewed upcoming appointment(s).  Future Appointments         Provider Specialty Dept Phone    2025 1:40 PM Cheyenne Arias DO Internal Medicine [phone]    2025 1:30 PM Moe Santiago MD Oncology [phone]    2025 1:45 PM [facility] NURSE Infusion Therapy [phone]            Post Acute Care Manager provided contact information.   Manager to determine  based on severity of symptoms and risk factors.  Plan for next

## 2025-02-14 ENCOUNTER — CARE COORDINATION (OUTPATIENT)
Dept: CARE COORDINATION | Age: 75
End: 2025-02-14

## 2025-02-14 NOTE — CARE COORDINATION
Ambulatory Care Coordination Note     2/14/2025 11:25 AM     Patient outreach attempt by this ACM today to offer care management services. ACM was unable to reach the patient by telephone today;   left voice message requesting a return phone call to this ACM.     ACM: Jazmin Leblanc RN     Care Summary Note: ACM received PAC Handoff referral for care management services. Patient was admitted at [facility] from 1/23/25-1/31/25 for acute on chronic respiratory failure with hypoxia and hypercapnia & asthma exacerbation. She was discharged to [facility]. She returned home with [facility] on 2/9/25. ACM attempted to contact patient today to offer care management services. ACM will outreach patient at a later date.     PCP/Specialist follow up:   Future Appointments         Provider Specialty Dept Phone    2/19/2025 1:40 PM Cheyenne Arias DO Internal Medicine [phone]    11/12/2025 1:30 PM Moe Santiago MD Oncology [phone]    11/12/2025 1:45 PM [facility] NURSE Infusion Therapy [phone]            Follow Up:   Plan for next ACM outreach in approximately 1 week to complete:  - outreach attempt to offer care management services.             
  Do you have any symptoms that are causing concern?: No      ,   Social Determinants of Health     Tobacco Use: Low Risk  (1/23/2025)    Patient History     Smoking Tobacco Use: Never     Smokeless Tobacco Use: Never     Passive Exposure: Not on file   Alcohol Use: Not At Risk (1/23/2025)    AUDIT-C     Frequency of Alcohol Consumption: Never     Average Number of Drinks: Patient does not drink     Frequency of Binge Drinking: Never   Financial Resource Strain: Low Risk  (10/23/2024)    Overall Financial Resource Strain (CARDIA)     Difficulty of Paying Living Expenses: Not hard at all   Food Insecurity: No Food Insecurity (1/24/2025)    Hunger Vital Sign     Worried About Running Out of Food in the Last Year: Never true     Ran Out of Food in the Last Year: Never true   Transportation Needs: No Transportation Needs (1/24/2025)    PRAPARE - Transportation     Lack of Transportation (Medical): No     Lack of Transportation (Non-Medical): No   Physical Activity: Inactive (1/22/2024)    Exercise Vital Sign     Days of Exercise per Week: 0 days     Minutes of Exercise per Session: 0 min   Stress: Stress Concern Present (9/22/2023)    [place] [employer] - Occupational Stress Questionnaire     Feeling of Stress : To some extent   Social Connections: Socially Isolated (9/22/2023)    Social Connection and Isolation Panel [NHANES]     Frequency of Communication with Friends and Family: Twice a week     Frequency of Social Gatherings with Friends and Family: Once a week     Attends Sikh Services: Never     Active Member of Clubs or Organizations: No     Attends Club or Organization Meetings: Not on file     Marital Status:    Intimate Partner Violence: Not At Risk (10/8/2019)    Humiliation, Afraid, Rape, and Kick questionnaire     Fear of Current or Ex-Partner: No     Emotionally Abused: No     Physically Abused: No     Sexually Abused: No   Depression: Not at risk (11/12/2024)    PHQ-2

## 2025-02-19 ENCOUNTER — OFFICE VISIT (OUTPATIENT)
Dept: INTERNAL MEDICINE CLINIC | Age: 75
End: 2025-02-19
Payer: MEDICARE

## 2025-02-19 VITALS
WEIGHT: 292 LBS | DIASTOLIC BLOOD PRESSURE: 68 MMHG | SYSTOLIC BLOOD PRESSURE: 138 MMHG | OXYGEN SATURATION: 98 % | HEIGHT: 62 IN | HEART RATE: 68 BPM | BODY MASS INDEX: 53.73 KG/M2

## 2025-02-19 DIAGNOSIS — J45.40 MODERATE PERSISTENT ASTHMA WITHOUT COMPLICATION: ICD-10-CM

## 2025-02-19 DIAGNOSIS — Z00.00 MEDICARE ANNUAL WELLNESS VISIT, SUBSEQUENT: Primary | ICD-10-CM

## 2025-02-19 DIAGNOSIS — R60.0 PERIPHERAL EDEMA: ICD-10-CM

## 2025-02-19 DIAGNOSIS — D50.9 IRON DEFICIENCY ANEMIA, UNSPECIFIED IRON DEFICIENCY ANEMIA TYPE: ICD-10-CM

## 2025-02-19 DIAGNOSIS — R73.9 HYPERGLYCEMIA: ICD-10-CM

## 2025-02-19 DIAGNOSIS — F33.1 MODERATE EPISODE OF RECURRENT MAJOR DEPRESSIVE DISORDER (HCC): ICD-10-CM

## 2025-02-19 DIAGNOSIS — I10 PRIMARY HYPERTENSION: ICD-10-CM

## 2025-02-19 DIAGNOSIS — I38 VHD (VALVULAR HEART DISEASE): ICD-10-CM

## 2025-02-19 PROCEDURE — 93296 REM INTERROG EVL PM/IDS: CPT | Performed by: INTERNAL MEDICINE

## 2025-02-19 PROCEDURE — 1159F MED LIST DOCD IN RCRD: CPT | Performed by: FAMILY MEDICINE

## 2025-02-19 PROCEDURE — 1160F RVW MEDS BY RX/DR IN RCRD: CPT | Performed by: FAMILY MEDICINE

## 2025-02-19 PROCEDURE — 3075F SYST BP GE 130 - 139MM HG: CPT | Performed by: FAMILY MEDICINE

## 2025-02-19 PROCEDURE — 1123F ACP DISCUSS/DSCN MKR DOCD: CPT | Performed by: FAMILY MEDICINE

## 2025-02-19 PROCEDURE — 3078F DIAST BP <80 MM HG: CPT | Performed by: FAMILY MEDICINE

## 2025-02-19 PROCEDURE — 93294 REM INTERROG EVL PM/LDLS PM: CPT | Performed by: INTERNAL MEDICINE

## 2025-02-19 PROCEDURE — G0439 PPPS, SUBSEQ VISIT: HCPCS | Performed by: FAMILY MEDICINE

## 2025-02-19 RX ORDER — ACETAMINOPHEN 500 MG
500 TABLET ORAL EVERY 6 HOURS PRN
COMMUNITY

## 2025-02-19 RX ORDER — BENZONATATE 100 MG/1
100 CAPSULE ORAL 3 TIMES DAILY PRN
COMMUNITY

## 2025-02-19 ASSESSMENT — PATIENT HEALTH QUESTIONNAIRE - PHQ9
SUM OF ALL RESPONSES TO PHQ9 QUESTIONS 1 & 2: 3
1. LITTLE INTEREST OR PLEASURE IN DOING THINGS: SEVERAL DAYS
6. FEELING BAD ABOUT YOURSELF - OR THAT YOU ARE A FAILURE OR HAVE LET YOURSELF OR YOUR FAMILY DOWN: NOT AT ALL
7. TROUBLE CONCENTRATING ON THINGS, SUCH AS READING THE NEWSPAPER OR WATCHING TELEVISION: MORE THAN HALF THE DAYS
9. THOUGHTS THAT YOU WOULD BE BETTER OFF DEAD, OR OF HURTING YOURSELF: NOT AT ALL
5. POOR APPETITE OR OVEREATING: NOT AT ALL
SUM OF ALL RESPONSES TO PHQ QUESTIONS 1-9: 6
SUM OF ALL RESPONSES TO PHQ QUESTIONS 1-9: 6
2. FEELING DOWN, DEPRESSED OR HOPELESS: MORE THAN HALF THE DAYS
SUM OF ALL RESPONSES TO PHQ QUESTIONS 1-9: 6
SUM OF ALL RESPONSES TO PHQ QUESTIONS 1-9: 6
4. FEELING TIRED OR HAVING LITTLE ENERGY: SEVERAL DAYS
8. MOVING OR SPEAKING SO SLOWLY THAT OTHER PEOPLE COULD HAVE NOTICED. OR THE OPPOSITE, BEING SO FIGETY OR RESTLESS THAT YOU HAVE BEEN MOVING AROUND A LOT MORE THAN USUAL: NOT AT ALL
3. TROUBLE FALLING OR STAYING ASLEEP: NOT AT ALL
10. IF YOU CHECKED OFF ANY PROBLEMS, HOW DIFFICULT HAVE THESE PROBLEMS MADE IT FOR YOU TO DO YOUR WORK, TAKE CARE OF THINGS AT HOME, OR GET ALONG WITH OTHER PEOPLE: SOMEWHAT DIFFICULT

## 2025-02-19 NOTE — PATIENT INSTRUCTIONS
groups: They can help you meet new people and get involved in activities you enjoy.  Community support groups: In a support group, you can talk to others who have dealt with the same problems or illness as you. You can encourage one another and learn ways to cope with tough emotions.  How can you find a support group?  Finding a support group that works for you may take time. There are many options. Some groups have a  who helps lead discussions or shares information. Others are less formal. Some meet in person, while others meet online.  Try using these resources to help you find the best support group for you.  Your doctor, health care team, or counselor.  People with the same health concern.  Your local Roman Catholic, Bahai, Anabaptism, or other Christian group.  A city, state, or national group that provides support for your health concern. Check your local library or community center for a list of these groups. Or look for information online.  Your local community, friends, and family.  Supportive relationships  A supportive relationship includes emotional support such as love, trust, and understanding, as well as advice and concrete help, such as help managing your time.  Reach out to others  Family and friends can help you. Ask them to:  Listen to you and give you encouragement. This can keep you from feeling hopeless or alone.  Help with small daily tasks or with bigger problems. A helping hand can keep you from feeling overwhelmed.  Help you manage a health problem. For example, ask them to go to doctor visits with you. Your loved ones can offer support by being involved in your medical care.  Respect your relationships  A good relationship is also a two-way street. You count on help from others, but they also count on you.  Know your friends' limits. You don't have to see or call your friends every day. If you are going through a rough patch, ask friends if you can contact them outside of the usual

## 2025-02-19 NOTE — PROGRESS NOTES
Medicare Annual Wellness Visit    Nusrat Moctezuma is here for Medicare AWV (AWV and hospital f/u. Medication review)    Assessment & Plan   Medicare annual wellness visit, subsequent  Moderate persistent asthma without complication  Continue albuterol and Breo  Primary hypertension  Continue amlodipine  and valsartan 160 mg  VHD (valvular heart disease)-stable  Keep f/u with cardiology  Moderate episode of recurrent major depressive disorder (HCC)  Continue Zoloft 25 mg  Peripheral edema  Continue Lasix 20  Iron deficiency anemia, unspecified iron deficiency anemia type  -     CBC with Auto Differential; Future  -     Ferritin; Future  -     Iron and TIBC; Future  Hyperglycemia  -     Hemoglobin A1C; Future  Medications reconciled and discussed with the patient  Return in about 5 months (around 7/19/2025) for HTN, Depression.     Subjective   The following acute and/or chronic problems were also addressed today:    History of Present Illness  The patient is a 74-year-old female who presents today for a Medicare annual wellness visit.     She has known asthma, hypertension, VHD, MDD, edema, and PATY. She has been stable on her medications. There had been some elevated glucose readings on previous labs, and she would like to have this checked.    She has had some stressors trying to care for her significant other. She was admitted to the hospital in January for acute on chronic respiratory failure with hypoxia, and an asthma exacerbation. She was subsequently discharged to a nursing home and is doing much better. She states some medications she was discharged with from the nursing home, she did not need to use.      Patient's complete Health Risk Assessment and screening values have been reviewed and are found in Flowsheets. The following problems were reviewed today and where indicated follow up appointments were made and/or referrals ordered.    Positive Risk Factor Screenings with Interventions:    Fall Risk:  Do

## 2025-02-25 ENCOUNTER — TELEPHONE (OUTPATIENT)
Dept: INTERNAL MEDICINE CLINIC | Age: 75
End: 2025-02-25

## 2025-02-25 ENCOUNTER — CARE COORDINATION (OUTPATIENT)
Dept: CASE MANAGEMENT | Age: 75
End: 2025-02-25

## 2025-02-25 DIAGNOSIS — J45.40 MODERATE PERSISTENT ASTHMA WITHOUT COMPLICATION: ICD-10-CM

## 2025-02-25 DIAGNOSIS — T17.308D CHOKING, SUBSEQUENT ENCOUNTER: Primary | ICD-10-CM

## 2025-02-25 DIAGNOSIS — K63.5 POLYP OF COLON, UNSPECIFIED PART OF COLON, UNSPECIFIED TYPE: ICD-10-CM

## 2025-02-25 DIAGNOSIS — J96.11 CHRONIC RESPIRATORY FAILURE WITH HYPOXIA (HCC): ICD-10-CM

## 2025-02-25 NOTE — TELEPHONE ENCOUNTER
Spoke to patient regarding her respiratory symptoms.  Patient has known asthma and CRF..  She is on oxygen at home at about 2 L.  She has not followed up with pulmonology and a new referral has been placed for Dr. Villafuerte and she will call to follow-up.  If she is in acute distress to the ED.  Also discussed her choking episodes and a referral has been placed to [facility].  She was managed in the past with Dr. Schultz but he is since retired.  Patient is aware

## 2025-02-25 NOTE — CARE COORDINATION
Ambulatory Care Coordination Note     2025 3:34 PM     Patient Current Location:  Home: [address]  [address]  [place] [address]     LPN CC contacted the patient by telephone. Verified name and  with patient as identifiers.         ACM: Jade Kahn LPN     Challenges to be reviewed by the provider   Additional needs identified to be addressed with provider Yes  Spoke with patient who reported that she is still having episodes of breathlessness. Patient stated episodes happen mostly at night or early morning. Patient stated that she can just be sitting doing nothing and will start coughing and choking not being able to get her breath. Patient stated it gets very scary. Patient stated that her room mate has an concentrator and when she have these episode she will use her roommates O2 with her O2 tubing until she gets her breath back. Patient stated that she is doing her deep breathing exercises and using the IS as instructed. Patient check O2 sat during out call and it was 94% HR 66.     FYI: Patient stated that she still have issues swallowing fluids. Patient stated that she has to remember to hold her chin down when she swallows. Patient stated that it works most times but sometimes she coughs. Patients stated that if no one is at the house with her she will not eat or drink             Method of communication with provider: chart routing.    Utilization: Has the patient been seen in the ED since your last call? no    Care Summary Note: Spoke with Nusrat Moctezuma stated that she still believes she needs O2. Patient stated that PT had a visit today and she is keeping O2 90% and above but she is having some in the 80s during bedtime and early morning. Patient stated that doctor is aware. Please see Yellow Box. Patient denied cp, dizziness, headache, n/v, diarrhea, abdominal pains, fever, or chills. Patient report that appetite and fluid intake is fair. Patient stated that she still have issues

## 2025-03-10 ENCOUNTER — CARE COORDINATION (OUTPATIENT)
Dept: CARE COORDINATION | Age: 75
End: 2025-03-10

## 2025-03-10 NOTE — CARE COORDINATION
Ambulatory Care Coordination Note     3/10/2025 12:11 PM     Patient Current Location:  Home: [address]  [address]  [place] [id number]     ACM contacted the patient by telephone. Verified name and  with patient as identifiers.         ACM: Jazmin Leblanc RN     Challenges to be reviewed by the provider   Additional needs identified to be addressed with provider No  none         Method of communication with provider: none.    Utilization: Patient has not had any utilization since our last call.     Care Summary Note: ACM placed call to patient today for CM follow-up. Patient reports she's doing well. She had diarrhea all day yesterday, but is feeling better today.     Patient did complete Pulmonology consult with Dr. Villafuerte on 3/4/25. He changed inhaler from Breo to Trelegy. Patient states she is going to finish Breo as it's almost empty and then start the Trelegy. Patient states she's not sure about changing this inhaler and likes to run any medication changes by her PCP first. ACM advised patient that PCP referred her to Pulmonology and encouraged patient to change medications as ordered by Dr. Villafuerte. He also ordered a sleep study, PFTs, CXR & labs. Patient states she cannot do a sleep study because she has to sit straight up to sleep. She states she told  This. He told her it could be done in a hospital bed, but she states the hospital bed doesn't sit up tall enough. She is awaiting a call to schedule PFTs. He advised her to get CXR & Labs in 2 weeks prior to next follow-up in April.     Patient does have new GI consult with Dr. Chavez this Thursday, 3/13/25 for dysphagia. She plans to keep and attend this appointment.     HTN, COPD & Asthma assessments completed. See below. Patient denies any current s/sx of exacerbation. Patient does not have a BP cuff at home, but does have a pulse oximeter. She states her BP has been normal when [facility] comes. ACM reviewed HTN, COPD & Asthma zone

## 2025-03-12 ENCOUNTER — CARE COORDINATION (OUTPATIENT)
Dept: CARE COORDINATION | Age: 75
End: 2025-03-12

## 2025-03-12 NOTE — CARE COORDINATION
Advance Care Planning Note      Date: 3/12/2025    Patient Current Location:   n/a    ACP Specialist:  EZEQUIEL Diaz    Date Referral Received:3/10/25    Initial Outreach:     Attempted outreach call to patient in follow-up to referral from: Ambulatory Care Manager Jazmin Leblanc  requesting assistance with new advance directive completion. Unable to reach patient.    Outreach Attempts:   First outreach attempt, no answer, vm message left requesting return call, contact number provided. SW will route to Jayla SIMPSON to request she sends out ACP documents to Pt.    Follow Up:   Plan for next outreach in 2 1/2 weeks on 3/31.        Thank you for this referral.

## 2025-03-13 ENCOUNTER — TELEPHONE (OUTPATIENT)
Dept: GASTROENTEROLOGY | Age: 75
End: 2025-03-13

## 2025-03-13 ENCOUNTER — OFFICE VISIT (OUTPATIENT)
Dept: GASTROENTEROLOGY | Age: 75
End: 2025-03-13
Payer: MEDICARE

## 2025-03-13 VITALS
DIASTOLIC BLOOD PRESSURE: 72 MMHG | OXYGEN SATURATION: 95 % | RESPIRATION RATE: 17 BRPM | HEART RATE: 64 BPM | WEIGHT: 292 LBS | BODY MASS INDEX: 53.73 KG/M2 | SYSTOLIC BLOOD PRESSURE: 138 MMHG | HEIGHT: 62 IN

## 2025-03-13 DIAGNOSIS — Z85.038 PERSONAL HISTORY OF COLON CANCER: ICD-10-CM

## 2025-03-13 DIAGNOSIS — R13.14 PHARYNGOESOPHAGEAL DYSPHAGIA: Primary | ICD-10-CM

## 2025-03-13 DIAGNOSIS — Z86.0101 PERSONAL HISTORY OF ADENOMATOUS AND SERRATED COLON POLYPS: ICD-10-CM

## 2025-03-13 PROCEDURE — 3075F SYST BP GE 130 - 139MM HG: CPT | Performed by: INTERNAL MEDICINE

## 2025-03-13 PROCEDURE — 99204 OFFICE O/P NEW MOD 45 MIN: CPT | Performed by: INTERNAL MEDICINE

## 2025-03-13 PROCEDURE — 1123F ACP DISCUSS/DSCN MKR DOCD: CPT | Performed by: INTERNAL MEDICINE

## 2025-03-13 PROCEDURE — 3078F DIAST BP <80 MM HG: CPT | Performed by: INTERNAL MEDICINE

## 2025-03-13 PROCEDURE — 1159F MED LIST DOCD IN RCRD: CPT | Performed by: INTERNAL MEDICINE

## 2025-03-13 NOTE — PROGRESS NOTES
[facility] Gastroenterology and Hepatology      MD Horacio Villalobos MD Carol Christensen, APRN-CNP       Jory Glenn, APRN-CNP             [address] [place] [address] [phone] fax [phone]        Gastroenterology Clinic Consultation    Gera Chavez MD  Encounter Date: 03/13/25     CC: New Patient (Pt is here due to having issues with dysphagia- liquid is the worst )       Cheyenne Arias, DO  [address] [facility] [place] [address]     History obtained from: patient and medical records     Subjective:     Nusrat Moctezuma is an 74 y.o.  female who presents for New Patient (Pt is here due to having issues with dysphagia- liquid is the worst )    74-year-old pleasant woman presents mainly with a complaint of choking while swallowing.  Apparently she has been experiencing swallowing problems at least for the last 6 months but she had this problem off-and-on for the last several years.  She feels choked predominantly while drinking liquids and she has cough episodes following swallowing.  She has occasional swallowing difficulty with solid food with regurgitation.  No history of heartburn.  No history of loss of weight, anorexia.  No abdominal pain.  She has occasional incontinence for stool but she has frequent urinary incontinence.  She has personal history of colon cancer which was diagnosed in January 2004 and she had colon resection by Dr. Mooney.  Apparently she had surveillance colonoscopy after in a year and found to have multiple polyps.  She had several surveillance colonoscopies every year until the polyps were cleared.  Last colonoscopy was in 2020.  She does not have any rectal bleeding, abdominal pain.    Patient has history of COPD, emphysema, obstructive sleep apnea.  Lately she is having breathlessness problems and she is seeing the pulmonologist Dr. Villafuerte and getting evaluations and

## 2025-03-13 NOTE — TELEPHONE ENCOUNTER
Pt seen by dr. Chavez today and was ordered a cscope/EGD. Pt stated she wanted to take care of her lunch issues first and will call to [facility] procedure when she was ready.

## 2025-03-17 ENCOUNTER — CARE COORDINATION (OUTPATIENT)
Dept: CARE COORDINATION | Age: 75
End: 2025-03-17

## 2025-03-17 NOTE — CARE COORDINATION
Ambulatory Care Coordination Note     3/17/2025 2:41 PM     Patient Current Location:  Home: [address] [place] [address] [address]  [address]  [place] [address]     ACM contacted the patient by telephone. Verified name and  with patient as identifiers.         ACM: Jazmin Leblanc RN     Challenges to be reviewed by the provider   Additional needs identified to be addressed with provider No  none               Method of communication with provider: none.    Utilization: Patient has not had any utilization since our last call.     Care Summary Note: ACM placed call to patient today for care management follow-up. Patient did complete initial GI OV on 3/13/25 for dysphagia. Patient reports provider wants to do EGD and colonoscopy. Patient reports she's not \"too thrilled\" with going through with these procedures because she doesn't have the energy to do all the prep. Patient reports her dysphagia is worse with thin liquids. She has learned to tuck her chin when swallowing helps with choking. Patient reports she's going to get other things in order and will then call office back to schedule procedure.     Offered patient enrollment in the Remote Patient Monitoring (RPM) program for in-home monitoring: Yes, but did not enroll at this time: already monitoring with home equipment.     Assessments Completed:   General Assessment    Do you have any symptoms that are causing concern?: No          Medications Reviewed:   Not completed during this call: Will attempt to complete during a future outreach.     Advance Care Planning:   Reviewed during previous call      Care Planning:   Not completed during this call    PCP/Specialist follow up:   Future Appointments         Provider Specialty Dept Phone    3/24/2025 2:40 PM Nidhi Gardiner APRN - CNP Cardiology [phone]    4/3/2025 2:00 PM Foster Villafuerte MD Pulmonology [phone]    2025 3:20 PM Cheyenne Arias DO Internal Medicine [phone]    2025 1:30 PM Jack

## 2025-03-27 DIAGNOSIS — I10 PRIMARY HYPERTENSION: ICD-10-CM

## 2025-03-27 RX ORDER — VALSARTAN 160 MG/1
160 TABLET ORAL DAILY
Qty: 90 TABLET | Refills: 1 | Status: SHIPPED | OUTPATIENT
Start: 2025-03-27

## 2025-03-31 ENCOUNTER — CARE COORDINATION (OUTPATIENT)
Dept: CARE COORDINATION | Age: 75
End: 2025-03-31

## 2025-03-31 ENCOUNTER — TELEPHONE (OUTPATIENT)
Dept: SLEEP CENTER | Age: 75
End: 2025-03-31

## 2025-03-31 NOTE — CARE COORDINATION
Advance Care Planning Note      Date: 3/31/2025    Patient Current Location:  Home: [facility] [address]  [facility] [address]  [place] [id number]    ACP Specialist:  EZEQUIEL Diaz    Date Referral Received:3/10/25    Second Outreach:     Outreach call to patient in follow-up to ACP Specialist. Pt confirmed she received ACP documents. Pt reported she hasn't been feeling well so she has not had a chance to review them yet. SW offered to call back in 2 weeks to see if she has questions and Pt was in agreement.     Next Step:    Outreach again in 2 weeks        Thank you for this referral.

## 2025-03-31 NOTE — TELEPHONE ENCOUNTER
[facility]    Patient Acceptance Criteria Questionnaire    Level IV: Patient Requires Caregiver.  1:1 patient to tech ratio    Patients with a combination of multiple level III criteria and/or comorbidities that create a need for significant additional attention/ care.    [] Patients from skilled nursing facilities  [] Patients physical or mental impairment that requires significant additional attention/ care out of scope for RPSGT. This includes but is not limited to quadriplegic patients, patients with severe Down's Syndrome, patients with dementia, etc.            Level III: May require 1:1 patient to tech ration depending on severity.    Patients with a combination of multiple level II criteria and/or comorbidities that create a need for significant additional attention/ care.     [x] Patients requiring additional mobility assistance beyond walker or wheelchair.  [] Legally blind patients requiring additional mobility assistance.   [] Patients requiring service animals.  [] Patients with significant cognitive disability    Level II: 2:1 patient to tech ratio    A technologist may have multiple level II patients when working within the [facility] as on-site support is available.    A technologist may have multiple level II patients when multiple technologists are working at [facility] off-site and can provide support    [] Ambulatory patients who require language services.   [x] Patients needing assistance with mobility (walker, wheelchair) and minor comorbid conditions not requiring significant additional attention/ care.  [] Patients requiring additional minor assistance with toileting such as bedside commodes, adult undergarments, etc.  [] Legally blind patients who do not require mobility assistance.  [] Patient with mild cognitive disability      Level I:  2:1 patient to tech ratio    [x] Ambulatory patient with no significant comorbidities requiring additional

## 2025-03-31 NOTE — CARE COORDINATION
Ambulatory Care Coordination Note     3/31/2025 3:32 PM     Patient Current Location:  Home: [address]  [address]  [place] [phone]     ACM contacted the patient by telephone. Verified name and  with patient as identifiers.         ACM: Jazmin Leblanc RN     Challenges to be reviewed by the provider   Additional needs identified to be addressed with provider No  none         Method of communication with provider: none.    Utilization: Patient has not had any utilization since our last call.     Care Summary Note: ACM placed call to patient today for care management follow-up. Patient reports she's doing ok. She thinks she could be a little depressed, but not sure. She states she doesn't want to get out of her chair to do anything besides go to the restroom. She states she has been anxious and her mind has been racing afraid she's going to have an asthma attack while her friend/roommate Bill is not home. She denies having any s/sx of asthma exacerbation - no new or increased cough, sputum changes or SOB. Patient states her birthday is tomorrow and Easter is coming up and she misses her mom who passed away. ACM advised patient to set small goals to at least walk around her apartment or sit outside on her balcony when the weather is nice. Patient states she is content sitting in her chair. Patient verbalized understanding. ACM recommended patient schedule an OV with PCP to discuss symptoms if ongoing. Patient states she will call to schedule on her own.     Sleep study scheduled for 25. Will reschedule 4/3/25 Pulmonology OV for after the sleep study. Doesn't want to schedule GI scopes yet.     Offered patient enrollment in the Remote Patient Monitoring (RPM) program for in-home monitoring: Yes, but did not enroll at this time: already monitoring with home equipment.     Assessments Completed:   Hypertension - Encounter Level          ,   General Assessment    Do you have any symptoms that are

## 2025-04-07 ENCOUNTER — CARE COORDINATION (OUTPATIENT)
Dept: CARE COORDINATION | Age: 75
End: 2025-04-07

## 2025-04-07 NOTE — CARE COORDINATION
Ambulatory Care Coordination Note     2025 2:09 PM     Patient Current Location:  Home: [address] [place], [place] [address]  [address]  [place] [phone]     ACM contacted the patient by telephone. Verified name and  with patient as identifiers.         ACM: Jazmin Leblanc RN     Challenges to be reviewed by the provider   Additional needs identified to be addressed with provider No  none         Method of communication with provider: none.    Utilization: Patient has not had any utilization since our last call.     Care Summary Note: ACM placed call to patient today for care management follow-up. Patient reports she is doing well and denies any symptoms or concerns to report to provider at this time.     Patient reports her mood has improved some. Her birthday was last week and she reports neither her son or daughter called her. She's ok with it though.     HTN and asthma assessments completed. Patient denies any s/sx of exacerbation at this time.     Offered patient enrollment in the Remote Patient Monitoring (RPM) program for in-home monitoring: Yes, but did not enroll at this time: already monitoring with home equipment.     Assessments Completed:   Hypertension - Encounter Level          ,   General Assessment    Do you have any symptoms that are causing concern?: No          Medications Reviewed:   Patient denies any changes with medications and reports taking all medications as prescribed.    Advance Care Planning:   Reviewed during previous call      Care Planning:   Not completed during this call    PCP/Specialist follow up:   Future Appointments         Provider Specialty Dept Phone    2025 8:30 PM [facility] IN LAB SLEEP STUDY [facility] [phone]    2025 1:30 PM Foster Villafuerte MD Pulmonology [phone]    2025 3:20 PM Cheyenne Arias DO Internal Medicine [phone]    2025 1:30 PM Moe Santiago MD Oncology [phone]    2025 1:45 PM DAVINA, MED ONC NURSE Infusion

## 2025-04-14 ENCOUNTER — CARE COORDINATION (OUTPATIENT)
Dept: CARE COORDINATION | Age: 75
End: 2025-04-14

## 2025-04-14 NOTE — CARE COORDINATION
ACP discussion completed. Please see ACP note.     SW plan of care: SW will resolve from SW services. Pt will contact this SW with any questions when completing ACP documents.

## 2025-04-14 NOTE — ACP (ADVANCE CARE PLANNING)
Advance Care Planning Conversation Note      Date of Conversation: 2025  Patient Current Location:  Home: [address]  [address]  [place] [address]  Conducted with: Patient with decision making capacity    Healthcare Decision Maker:    Primary Decision Maker: Inderjit Kuhn - Other - [phone]    Secondary Decision Maker: Contact, - Parent - [phone]    Care Preferences:  What is your understanding of Advance Care Planning?  Living Will is who I want to get my stuff, POA is to make choices for me if I'm not able to. SW did provide education on medical living will. Pt reports Vasiliy knows her wishes.     Ventilation:   [x] Yes   [] No   Educated Patient / Decision Maker on ventilation.    If you were in your present state of health and suddenly became very ill and were unable to breathe on your own, what would your preference be about the use of a ventilator (breathing machine) if it were available to you? No, I would not like a ventilator used.    If your health worsens and your provider determines that your chance of recovery is unlikely, what would your preference be about the use of a ventilator (breathing machine) if it were available to you? No, I would not like a ventilator used.    Resuscitation:   [x] Yes   [] No   Educated Patient / Decision Maker on CPR.   [x] Yes   [] No   Educated Patient / Decision Maker regarding differences between Advance Directives and portable DNR orders.    In the event your heart stopped, would you want attempts to be made to restart your heart? No, I would not like CPR    Feeding Tubes and Artificial Hydration:   [x] Yes   [] No   Educated Patient / Decision Maker on feeding tubes and artificial hydration.    For those nearing the end of life, tube feeding and hydration will not prevent death but may extend life for a short period.     In the event that you stopped eating and drinking, would you like a feeding tube and artificial hydration? No, I would not

## 2025-04-17 ENCOUNTER — HOSPITAL ENCOUNTER (OUTPATIENT)
Age: 75
Discharge: HOME | End: 2025-04-17
Payer: MEDICARE

## 2025-04-17 ENCOUNTER — HOSPITAL ENCOUNTER (OUTPATIENT)
Dept: GENERAL RADIOLOGY | Age: 75
Discharge: HOME | End: 2025-04-17
Payer: MEDICARE

## 2025-04-17 DIAGNOSIS — D50.9 IRON DEFICIENCY ANEMIA, UNSPECIFIED IRON DEFICIENCY ANEMIA TYPE: ICD-10-CM

## 2025-04-17 DIAGNOSIS — J45.40 MODERATE PERSISTENT ASTHMA WITHOUT COMPLICATION: ICD-10-CM

## 2025-04-17 DIAGNOSIS — R73.9 HYPERGLYCEMIA: ICD-10-CM

## 2025-04-17 LAB
BASOPHILS # BLD: 0.05 K/UL
BASOPHILS NFR BLD: 1 % (ref 0–1)
EOSINOPHIL # BLD: 0.28 K/UL
EOSINOPHILS RELATIVE PERCENT: 3 % (ref 0–3)
ERYTHROCYTE [DISTWIDTH] IN BLOOD BY AUTOMATED COUNT: 15 % (ref 11.7–14.9)
EST. AVERAGE GLUCOSE BLD GHB EST-MCNC: 121 MG/DL
FERRITIN SERPL-MCNC: 57 NG/ML (ref 15–150)
HBA1C MFR BLD: 5.8 % (ref 4.2–6.3)
HCT VFR BLD AUTO: 36.2 % (ref 37–47)
HGB BLD-MCNC: 11 G/DL (ref 12.5–16)
IMM GRANULOCYTES # BLD AUTO: 0.05 K/UL
IMM GRANULOCYTES NFR BLD: 1 %
IRON SATN MFR SERPL: 27 % (ref 15–50)
IRON SERPL-MCNC: 83 UG/DL (ref 37–145)
LYMPHOCYTES NFR BLD: 2.83 K/UL
LYMPHOCYTES RELATIVE PERCENT: 27 % (ref 24–44)
MCH RBC QN AUTO: 26.3 PG (ref 27–31)
MCHC RBC AUTO-ENTMCNC: 30.4 G/DL (ref 32–36)
MCV RBC AUTO: 86.6 FL (ref 78–100)
MONOCYTES NFR BLD: 0.57 K/UL
MONOCYTES NFR BLD: 6 % (ref 0–5)
NEUTROPHILS NFR BLD: 64 % (ref 36–66)
NEUTS SEG NFR BLD: 6.66 K/UL
PLATELET # BLD AUTO: 284 K/UL (ref 140–440)
PMV BLD AUTO: 9.9 FL (ref 7.5–11.1)
RBC # BLD AUTO: 4.18 M/UL (ref 4.2–5.4)
TIBC SERPL-MCNC: 308 UG/DL (ref 260–445)
UNSATURATED IRON BINDING CAPACITY: 225 UG/DL (ref 110–370)
WBC OTHER # BLD: 10.4 K/UL (ref 4–10.5)

## 2025-04-17 PROCEDURE — 83036 HEMOGLOBIN GLYCOSYLATED A1C: CPT

## 2025-04-17 PROCEDURE — 83550 IRON BINDING TEST: CPT

## 2025-04-17 PROCEDURE — 82728 ASSAY OF FERRITIN: CPT

## 2025-04-17 PROCEDURE — 85025 COMPLETE CBC W/AUTO DIFF WBC: CPT

## 2025-04-17 PROCEDURE — 36415 COLL VENOUS BLD VENIPUNCTURE: CPT

## 2025-04-17 PROCEDURE — 82785 ASSAY OF IGE: CPT

## 2025-04-17 PROCEDURE — 83540 ASSAY OF IRON: CPT

## 2025-04-17 PROCEDURE — 71046 X-RAY EXAM CHEST 2 VIEWS: CPT

## 2025-04-20 LAB — IGE SERPL-ACNC: 63 KU/L

## 2025-04-22 ENCOUNTER — RESULTS FOLLOW-UP (OUTPATIENT)
Dept: INTERNAL MEDICINE CLINIC | Age: 75
End: 2025-04-22

## 2025-05-09 ENCOUNTER — CARE COORDINATION (OUTPATIENT)
Dept: CARE COORDINATION | Age: 75
End: 2025-05-09

## 2025-05-09 NOTE — CARE COORDINATION
Ambulatory Care Coordination Note     2025 12:03 PM     Patient Current Location:  Home: [address]  [address]  [place] [address]     ACM contacted the patient by telephone. Verified name and  with patient as identifiers.         ACM: Jazmin Leblanc RN     Challenges to be reviewed by the provider   Additional needs identified to be addressed with provider No  none         Method of communication with provider: none.    Utilization: Patient has not had any utilization since our last call.     Care Summary Note: ACM placed call to patient today for care management follow-up. Patient reports she is doing well and denies any symptoms or concerns to report to provider at this time.     Patient states she did not complete sleep study due to anxiety. She states she is sometimes incontinent of urine and was worried that she wouldn't be able to get to the bathroom during her sleep study. She states she didn't want to be embarrassed. She says she got so anxious thinking about going that she vomited, had diarrhea & started shaking. Patient states she also would not be able to tolerate lying in a hospital bed as she sleeps straight up in a chair that doesn't even recline. Patient states she does not want to reschedule the sleep study. She states she will talk to her PCP about this. She also cancelled her Pulmonology follow-up due to not completing sleep study.     HTN and asthma assessments completed. Patient does not have a BP cuff at home (it has been lost). Patient denies having any chest pain, palpitations, vision changes, headaches or neck pain. Patient reports her breathing has been at baseline and she denies having any recent asthma flare ups. She has been keeping her patio door open and states the cool air has been good for her asthma. Patient reports she was monitoring her weight, BP and pulse ox daily for PCP a few months ago. Patient states her PCP has not mentioned the need for daily

## 2025-05-27 PROCEDURE — 93294 REM INTERROG EVL PM/LDLS PM: CPT | Performed by: INTERNAL MEDICINE

## 2025-05-27 PROCEDURE — 93296 REM INTERROG EVL PM/IDS: CPT | Performed by: INTERNAL MEDICINE

## 2025-06-03 DIAGNOSIS — R60.0 PERIPHERAL EDEMA: ICD-10-CM

## 2025-06-03 RX ORDER — FUROSEMIDE 20 MG/1
20 TABLET ORAL DAILY
Qty: 30 TABLET | Refills: 1 | Status: SHIPPED | OUTPATIENT
Start: 2025-06-03

## 2025-06-17 NOTE — PATIENT INSTRUCTIONS
Called and spoke to patient to reschedule appointment. New appointment made for 06/19/25 at 1300 with Dr. Loredo.    We are committed to providing you the best care possible. If you receive a survey after visiting one of our offices, please take time to share your experience concerning your physician office visit. These surveys are confidential and no health information about you is shared. We are eager to improve for you and we are counting on your feedback to help make that happen. Preventing Falls: Care Instructions  Overview     Getting around your home safely can be a challenge if you have injuries or health problems that make it easy for you to fall. Loose rugs and furniture in walkways are among the dangers for many older people who have problems walking or who have poor eyesight. People who have conditions such as arthritis, osteoporosis, or dementia also have to be careful not to fall. You can make your home safer with a few simple measures. Follow-up care is a key part of your treatment and safety. Be sure to make and go to all appointments, and call your doctor if you are having problems. It's also a good idea to know your test results and keep a list of the medicines you take. How can you care for yourself at home? Taking care of yourself  Exercise regularly to improve your strength, muscle tone, and balance. Walk if you can. Swimming may be a good choice if you cannot walk easily. Have your vision and hearing checked each year or any time you notice a change. If you have trouble seeing and hearing, you might not be able to avoid objects and could lose your balance. Know the side effects of the medicines you take. Ask your doctor or pharmacist whether the medicines you take can affect your balance. Sleeping pills or sedatives can affect your balance. Limit the amount of alcohol you drink. Alcohol can impair your balance and other senses. Ask your doctor whether calluses or corns on your feet need to be removed.  If you wear loose-fitting shoes because of calluses or corns, you can lose your balance and fall.  Talk to your doctor if you have numbness in your feet.  You may get dizzy if you do not drink enough water. To prevent dehydration, drink plenty of fluids. Choose water and other clear liquids. If you have kidney, heart, or liver disease and have to limit fluids, talk with your doctor before you increase the amount of fluids you drink.  Preventing falls at home  Remove raised doorway thresholds, throw rugs, and clutter. Repair loose carpet or raised areas in the floor.  Move furniture and electrical cords to keep them out of walking paths.  Use nonskid floor wax, and wipe up spills right away, especially on ceramic tile floors.  If you use a walker or cane, put rubber tips on it. If you use crutches, clean the bottoms of them regularly with an abrasive pad, such as steel wool.  Keep your house well lit, especially stairways, porches, and outside walkways. Use night-lights in areas such as hallways and bathrooms. Add extra light switches or use remote switches (such as switches that go on or off when you clap your hands) to make it easier to turn lights on if you have to get up during the night.  Install sturdy handrails on stairways.  Move items in your cabinets so that the things you use a lot are on the lower shelves (about waist level).  Keep a cordless phone and a flashlight with new batteries by your bed. If possible, put a phone in each of the main rooms of your house, or carry a cell phone in case you fall and cannot reach a phone. Or, you can wear a device around your neck or wrist. You push a button that sends a signal for help.  Wear low-heeled shoes that fit well and give your feet good support. Use footwear with nonskid soles. Check the heels and soles of your shoes for wear. Repair or replace worn heels or soles.  Do not wear socks without shoes on smooth floors, such as wood.  Walk on the grass when the sidewalks are slippery. If you live in an area that gets snow and ice in the  winter, sprinkle salt on slippery steps and sidewalks. Or ask a family member or friend to do this for you. Preventing falls in the bath  Install grab bars and nonskid mats inside and outside your shower or tub and near the toilet and sinks. Use shower chairs and bath benches. Use a hand-held shower head that will allow you to sit while showering. Get into a tub or shower by putting the weaker leg in first. Get out of a tub or shower with your strong side first.  Repair loose toilet seats and consider installing a raised toilet seat to make getting on and off the toilet easier. Keep your bathroom door unlocked while you are in the shower. Where can you learn more? Go to http://www.ballard.com/ and enter G117 to learn more about \"Preventing Falls: Care Instructions. \"  Current as of: May 4, 2022               Content Version: 13.5  © 2045-8861 [employer]. Care instructions adapted under license by [facility] ([facility]). If you have questions about a medical condition or this instruction, always ask your healthcare professional. [place] [address] any warranty or liability for your use of this information. Learning About Mindfulness for Stress  What are mindfulness and stress? Stress is what you feel when you have to handle more than you are used to. A lot of things can cause stress. You may feel stress when you go on a job interview, take a test, or run a race. This kind of short-term stress is normal and even useful. It can help you if you need to work hard or react quickly. Stress also can last a long time. Long-term stress is caused by stressful situations or events. Examples of long-term stress include long-term health problems, ongoing problems at work, and conflicts in your family. Long-term stress can harm your health. Mindfulness is a focus only on things happening in the present moment.  It's a process of purposefully paying attention to and being aware of your surroundings, your emotions, your thoughts, and how your body feels. You are aware of these things, but you aren't judging these experiences as \"good\" or \"bad.\" Mindfulness can help you learn to calm your mind and body to help you cope with illness, pain, and stress.  How does mindfulness help to relieve stress?  Mindfulness can help quiet your mind and relax your body. Studies show that it can help some people sleep better, feel less anxious, and bring their blood pressure down. And it's been shown to help some people live and cope better with certain health problems like heart disease, depression, chronic pain, and cancer.  How do you practice mindfulness?  To be mindful is to pay attention, to be present, and to be accepting.  When you're mindful, you do just one thing and you pay close attention to that one thing. For example, you may sit quietly and notice your emotions or how your food tastes and smells.  When you're present, you focus on the things that are happening right now. You let go of your thoughts about the past and the future. When you dwell on the past or the future, you miss moments that can heal and strengthen you. You may miss moments like hearing a child laugh or seeing a friendly face when you think you're all alone.  When you're accepting, you don't  the present moment. Instead you accept your thoughts and feelings as they come.  You can practice anytime, anywhere, and in any way you choose. You can practice in many ways. Here are a few ideas:  While doing your chores, like washing the dishes, let your mind focus on what's in your hand. What does the dish feel like? Is the water warm or cold?  Go outside and take a few deep breaths. What is the air like? Is it warm or cold?  When you can, take some time at the start of your day to sit alone and think.  Take a slow walk by yourself. Count your steps while you breathe in and out.  Try yoga breathing exercises, stretches, and  poses to strengthen and relax your muscles. At work, if you can, try to stop for a few moments each hour. Note how your body feels. Let yourself regroup and let your mind settle before you return to what you were doing. If you struggle with anxiety or \"worry thoughts,\" imagine your mind as a blue shanta and your worry thoughts as clouds. Now imagine those worry thoughts floating across your mind's shanta. Just let them pass by as you watch. Follow-up care is a key part of your treatment and safety. Be sure to make and go to all appointments, and call your doctor if you are having problems. It's also a good idea to know your test results and keep a list of the medicines you take. Where can you learn more? Go to http://www.ballard.com/ and enter M676 to learn more about \"Learning About Mindfulness for Stress. \"  Current as of: February 9, 2022               Content Version: 13.5  © 2006-2022 "[employer] Care instructions adapted under license by [facility] ([facility]). If you have questions about a medical condition or this instruction, always ask your healthcare professional. [address] any warranty or liability for your use of this information. Learning About Stress  What is stress? Stress is what you feel when you have to handle more than you are used to. Stress is a fact of life for most people, and it affects everyone differently. What causes stress for you may not be stressful for someone else. A lot of things can cause stress. You may feel stress when you go on a job interview, take a test, or run a race. This kind of short-term stress is normal and even useful. It can help you if you need to work hard or react quickly. For example, stress can help you finish an important job on time. Stress also can last a long time. Long-term stress is caused by stressful situations or events.  Examples of long-term stress include long-term health problems, ongoing problems at work, or conflicts in your family. Long-term stress can harm your health. How does stress affect your health? When you are stressed, your body responds as though you are in danger. It makes hormones that speed up your heart, make you breathe faster, and give you a burst of energy. This is called the fight-or-flight stress response. If the stress is over quickly, your body goes back to normal and no harm is done. But if stress happens too often or lasts too long, it can have bad effects. Long-term stress can make you more likely to get sick, and it can make symptoms of some diseases worse. If you tense up when you are stressed, you may develop neck, shoulder, or low back pain. Stress is linked to high blood pressure and heart disease. Stress also harms your emotional health. It can make you ybarra, tense, or depressed. Your relationships may suffer, and you may not do well at work or school. What can you do to manage stress? How to relax your mind   Write. It may help to write about things that are bothering you. This helps you find out how much stress you feel and what is causing it. When you know this, you can find better ways to cope. Let your feelings out. Talk, laugh, cry, and express anger when you need to. Talking with friends, family, a counselor, or a member of the clergy about your feelings is a healthy way to relieve stress. Do something you enjoy. For example, listen to music or go to a movie. Practice your hobby or do volunteer work. Meditate. This can help you relax, because you are not worrying about what happened before or what may happen in the future. Do guided imagery. Imagine yourself in any setting that helps you feel calm. You can use audiotapes, books, or a teacher to guide you. How to relax your body   Do something active. Exercise or activity can help reduce stress. Walking is a great way to get started.  Even everyday activities such as housecleaning or yard work can help.  Do breathing exercises. For example:  From a standing position, bend forward from the waist with your knees slightly bent. Let your arms dangle close to the floor. Breathe in slowly and deeply as you return to a standing position. Roll up slowly and lift your head last.  Hold your breath for just a few seconds in the standing position. Breathe out slowly and bend forward from the waist.  Try yoga or shorty chi. These techniques combine exercise and meditation. You may need some training at first to learn them. What can you do to prevent stress? Manage your time. This helps you find time to do the things you want and need to do. Get enough sleep. Your body recovers from the stresses of the day while you are sleeping. Get support. Your family, friends, and community can make a difference in how you experience stress. Where can you learn more? Go to http://[employer].ballard.com/ and enter N032 to learn more about \"Learning About Stress. \"  Current as of: October 6, 2021               Content Version: 13.5  © 2006-2022 [employer]. Care instructions adapted under license by [facility] ([facility]). If you have questions about a medical condition or this instruction, always ask your healthcare professional. [place] [address] any warranty or liability for your use of this information. Learning About Managing Anger  What causes anger? Many things can cause anger: Stress at work or at home. Social situations that make you angry. A response to everyday events. Anger signals your body to prepare for a fight. This reaction is often called \"fight or flight. \" When you get angry, adrenaline and other hormones are released into your blood. Then your blood pressure goes up, your heart beats faster, and you breathe faster. When you express anger in a healthy way, it can inspire change and make you productive.  But if you don't have the skills to express anger in a healthy way, anger can build up. You may hurt others--and yourself--emotionally and even physically. Violent behavior often starts with verbal threats or fairly minor incidents. But over time, it can involve physical harm. It can include physical, verbal, or sexual abuse of an intimate partner (domestic violence), a child (child abuse), or an older adult (elder abuse). It can also make you sick. Anger and constant hostility keep your blood pressure high. They increase your chances of having another health problem, such as depression, a heart attack, or a stroke. Some people with post-traumatic stress disorder (PTSD) feel angry and on alert all the time. It may feel like there are no other ways to react when you are angry. But when you learn to work with anger in appropriate and healthy ways, your anger no longer controls you. How can you manage your anger? The first step to managing anger is to be more aware of it. Note the thoughts, feelings, and emotions that you have when you get angry. Practice noticing these signs of anger when you are calm. If you are more aware of the signs of anger, you can take steps to manage it. Here are a few tips: Think before you act. Take time to stop and cool down when you feel yourself getting angry. Count to 10 while you take slow, steady breaths. Practice some other form of mental relaxation. Learn the feelings that lead to angry outbursts. Anger and hostility may be a symptom of unhappy feelings or depression about your job, your relationship, or other aspects of your personal life. Avoid situations that lead to angry outbursts. If standing in line bothers you, do errands at less busy times. Express anger in a healthy way. You might:  Go for a short walk or jog. Draw, paint, or listen to music to release the anger. Write in a daily journal.  Use \"I\" statements, not \"you\" statements, to discuss your anger.  Say \"I don't feel valued when my needs are not being met\" instead of \"You make me mad when you are so inconsiderate. \"  Take care of yourself. Exercise regularly. Eat a variety of healthy foods. Don't skip meals. Try to get 8 hours of sleep each night. Limit your use of alcohol, and don't use drugs. Practice yoga, meditation, or shorty chi to relax. Explore other resources that may be available through your job or your community. Contact your human resources department at work. You might be able to get services through an employee assistance program.  Contact your local hospital, mental health facility, or health department. Ask what types of programs or support groups are available in your area. Do not keep guns in your home. If you must have guns in your home, unload them and lock them up. Lock ammunition in a separate place. Keep guns away from children. Where can you find help? If anger or stress starts to harm your work or personal relationships, you might seek help. You can learn ways to manage your feelings and actions. Talk to someone you trust, or find a counselor. There are groups in your area that can connect you with people to talk to. [facility] . This service from the national Substance Abuse and [place] [address] can help you find local counselors. Search online at [place]. [place].gov or call 5-575-866-HELP ([phone]), or [employer] 2-742.924.5862. Parents Anonymous. Self-help groups that serve parents under stress, as well as children who have been abused, are available throughout the [place], [place] ([facility]), and [facility]. To find a group in your area, search online or in your phone book under Parents Anonymous or call (354) 418-9938. Where can you learn more? Go to http://www.ballard.com/ and enter Z357 to learn more about \"Learning About Managing Anger. \"  Current as of: February 9, 2022               Content Version: 13.5  © 2612-7420 [employer].    Care instructions adapted under license by [facility]. If you have questions about a medical condition or this instruction, always ask your healthcare professional. [address] any warranty or liability for your use of this information. Learning About Being Active as an Older Adult  Why is being active important as you get older? Being active is one of the best things you can do for your health. And it's never too late to start. Being active--or getting active, if you aren't already--has definite benefits. It can:  Give you more energy,  Keep your mind sharp. Improve balance to reduce your risk of falls. Help you manage chronic illness with fewer medicines. No matter how old you are, how fit you are, or what health problems you have, there is a form of activity that will work for you. And the more physical activity you can do, the better your overall health will be. What kinds of activity can help you stay healthy? Being more active will make your daily activities easier. Physical activity includes planned exercise and things you do in daily life. There are four types of activity:  Aerobic. Doing aerobic activity makes your heart and lungs strong. Includes walking, dancing, and gardening. Aim for at least 2½ hours spread throughout the week. It improves your energy and can help you sleep better. Muscle-strengthening. This type of activity can help maintain muscle and strengthen bones. Includes climbing stairs, using resistance bands, and lifting or carrying heavy loads. Aim for at least twice a week. It can help protect the knees and other joints. Stretching. Stretching gives you better range of motion in joints and muscles. Includes upper arm stretches, calf stretches, and gentle yoga. Aim for at least twice a week, preferably after your muscles are warmed up from other activities. It can help you function better in daily life. Balancing. This helps you stay coordinated and have good posture.   Includes heel-to-toe walking, shorty chi, and certain types of yoga. Aim for at least 3 days a week. It can reduce your risk of falling. Even if you have a hard time meeting the recommendations, it's better to be more active than less active. All activity done in each category counts toward your weekly total. You'd be surprised how daily things like carrying groceries, keeping up with grandchildren, and taking the stairs can add up. What keeps you from being active? If you've had a hard time being more active, you're not alone. Maybe you remember being able to do more. Or maybe you've never thought of yourself as being active. It's frustrating when you can't do the things you want. Being more active can help. What's holding you back? Getting started. Have a goal, but break it into easy tasks. Small steps build into big accomplishments. Staying motivated. If you feel like skipping your activity, remember your goal. Maybe you want to move better and stay independent. Every activity gets you one step closer. Not feeling your best.  Start with 5 minutes of an activity you enjoy. Prove to yourself you can do it. As you get comfortable, increase your time. You may not be where you want to be. But you're in the process of getting there. Everyone starts somewhere. How can you find safe ways to stay active? Talk with your doctor about any physical challenges you're facing. Make a plan with your doctor if you have a health problem or aren't sure how to get started with activity. If you're already active, ask your doctor if there is anything you should change to stay safe as your body and health change. If you tend to feel dizzy after you take medicine, avoid activity at that time. Try being active before you take your medicine. This will reduce your risk of falls. If you plan to be active at home, make sure to clear your space before you get started. Remove things like TV cords, coffee tables, and throw rugs.  It's safest to have plenty of space to move freely. The key to getting more active is to take it slow and steady. Try to improve only a little bit at a time. Pick just one area to improve on at first. And if an activity hurts, stop and talk to your doctor. Where can you learn more? Go to http://www.ballard.com/ and enter P600 to learn more about \"Learning About Being Active as an Older Adult. \"  Current as of: October 10, 2022               Content Version: 13.5  © 2397-5628 [employer]. Care instructions adapted under license by [facility] ([facility]). If you have questions about a medical condition or this instruction, always ask your healthcare professional. [address] any warranty or liability for your use of this information. Learning About Dental Care for Older Adults  Dental care for older adults: Overview  Dental care for older people is much the same as for younger adults. But older adults do have concerns that younger adults do not. Older adults may have problems with gum disease and decay on the roots of their teeth. They may need missing teeth replaced or broken fillings fixed. Or they may have dentures that need to be cared for. Some older adults may have trouble holding a toothbrush. You can help remind the person you are caring for to brush and floss their teeth or to clean their dentures. In some cases, you may need to do the brushing and other dental care tasks. People who have trouble using their hands or who have dementia may need this extra help. How can you help with dental care? Normal dental care  To keep the teeth and gums healthy:  Brush the teeth with fluoride toothpaste twice a day--in the morning and at night--and floss at least once a day. Plaque can quickly build up on the teeth of older adults. Watch for the signs of gum disease. These signs include gums that bleed after brushing or after eating hard foods, such as apples.   See a dentist regularly. Many experts recommend checkups every 6 months. Keep the dentist up to date on any new medications the person is taking. Encourage a balanced diet that includes whole grains, vegetables, and fruits, and that is low in saturated fat and sodium. Encourage the person you're caring for not to use tobacco products. They can affect dental and general health. Many older adults have a fixed income and feel that they can't afford dental care. But most towns and cities have programs in which dentists help older adults by lowering fees. Contact your area's public health offices or  for information about dental care in your area. Using a toothbrush  Older adults with arthritis sometimes have trouble brushing their teeth because they can't easily hold the toothbrush. Their hands and fingers may be stiff, painful, or weak. If this is the case, you can: Offer an electric toothbrush. Enlarge the handle of a non-electric toothbrush by wrapping a sponge, an elastic bandage, or adhesive tape around it. Push the toothbrush handle through a ball made of rubber or soft foam.  Make the handle longer and thicker by taping Popsicle sticks or tongue depressors to it. You may also be able to buy special toothbrushes, toothpaste dispensers, and floss holders. Your doctor may recommend a soft-bristle toothbrush if the person you care for bleeds easily. Bleeding can happen because of a health problem or from certain medicines. A toothpaste for sensitive teeth may help if the person you care for has sensitive teeth. How do you brush and floss someone's teeth? If the person you are caring for has a hard time cleaning their teeth on their own, you may need to brush and floss their teeth for them. It may be easiest to have the person sit and face away from you, and to sit or stand behind them. That way you can steady their head against your arm as you reach around to floss and brush their teeth.  Choose a place that has good lighting and is comfortable for both of you. Before you begin, gather your supplies. You will need gloves, floss, a toothbrush, and a container to hold water if you are not near a sink. Wash and dry your hands well and put on gloves. Start by flossing:  Gently work a piece of floss between each of the teeth toward the gums. A plastic flossing tool may make this easier, and they are available at most [facility]. Curve the floss around each tooth into a U-shape and gently slide it under the gum line. Move the floss firmly up and down several times to scrape off the plaque. After you've finished flossing, throw away the used floss and begin brushing:  Wet the brush and apply toothpaste. Place the brush at a 45-degree angle where the teeth meet the gums. Press firmly, and move the brush in small circles over the surface of the teeth. Be careful not to brush too hard. Vigorous brushing can make the gums pull away from the teeth and can scratch the tooth enamel. Brush all surfaces of the teeth, on the tongue side and on the cheek side. Pay special attention to the front teeth and all surfaces of the back teeth. Brush chewing surfaces with short back-and-forth strokes. After you've finished, help the person rinse the remaining toothpaste from their mouth. Where can you learn more? Go to http://www.woods.com/ and enter F944 to learn more about \"Learning About Dental Care for Older Adults. \"  Current as of: June 16, 2022               Content Version: 13.5  © 2006-2022 Healthwise, Incorporated. Care instructions adapted under license by [facility] ([facility]). If you have questions about a medical condition or this instruction, always ask your healthcare professional. [place] [address] any warranty or liability for your use of this information. Hearing Loss: Care Instructions  Overview     Hearing loss is a sudden or slow decrease in how well you hear.  It can range from mild to severe. Permanent hearing loss can occur with aging. It also can happen when you are exposed long-term to loud noise. Examples include listening to loud music, riding motorcycles, or being around other loud machines. Hearing loss can affect your work and home life. It can make you feel lonely or depressed. You may feel that you have lost your independence. But hearing aids and other devices can help you hear better and feel connected to others. Follow-up care is a key part of your treatment and safety. Be sure to make and go to all appointments, and call your doctor if you are having problems. It's also a good idea to know your test results and keep a list of the medicines you take. How can you care for yourself at home? Avoid loud noises whenever possible. This helps keep your hearing from getting worse. Always wear hearing protection around loud noises. Wear a hearing aid as directed. See a professional who can help you pick a hearing aid that fits you. Have hearing tests as your doctor suggests. They can show whether your hearing has changed. Your hearing aid may need to be adjusted. Use other devices as needed. These may include:  Telephone amplifiers and hearing aids that can connect to a television, stereo, radio, or microphone. Devices that use lights or vibrations. These alert you to the doorbell, a ringing telephone, or a baby monitor. Television closed-captioning. This shows the words at the bottom of the screen. Most new TVs can do this. TTY (text telephone). This lets you type messages back and forth on the telephone instead of talking or listening. These devices are also called TDD. When messages are typed on the keyboard, they are sent over the phone line to a receiving TTY. The message is shown on a monitor. Use text messaging, social media, and email if it is hard for you to communicate by telephone. Try to learn a listening technique called speechreading.  It is not lipreading. You pay attention to people's gestures, expressions, posture, and tone of voice. These clues can help you understand what a person is saying. Face the person you are talking to, and have them face you. Make sure the lighting is good. You need to see the other person's face clearly. Think about counseling if you need help to adjust to your hearing loss. When should you call for help? Watch closely for changes in your health, and be sure to contact your doctor if:    You think your hearing is getting worse.     You have new symptoms, such as dizziness or nausea. Where can you learn more? Go to http://www.ballard.com/ and enter R798 to learn more about \"Hearing Loss: Care Instructions. \"  Current as of: May 4, 2022               Content Version: 13.5  © 2006-2022 Healthwise, Incorporated. Care instructions adapted under license by [facility] ([facility]). If you have questions about a medical condition or this instruction, always ask your healthcare professional. [place] [address] any warranty or liability for your use of this information. Learning About Vision Tests  What are vision tests? The four most common vision tests are visual acuity tests, refraction, visual field tests, and color vision tests. Visual acuity (sharpness) tests  These tests are used: To see if you need glasses or contact lenses. To monitor an eye problem. To check an eye injury. Visual acuity tests are done as part of routine exams. You may also have this test when you get your 's license or apply for some types of jobs. Visual field tests  These tests are used: To check for vision loss in any area of your range of vision. To screen for certain eye diseases. To look for nerve damage after a stroke, head injury, or other problem that could reduce blood flow to the brain.   Refraction and color tests  A refraction test is done to find the right prescription for glasses and contact lenses. A color vision test is done to check for color blindness. Color vision is often tested as part of a routine exam. You may also have this test when you apply for a job where recognizing different colors is important, such as , electronics, or the [employer]. How are vision tests done? Visual acuity test   You cover one eye at a time. You read aloud from a wall chart across the room. You read aloud from a small card that you hold in your hand. Refraction   You look into a special device. The device puts lenses of different strengths in front of each eye to see how strong your glasses or contact lenses need to be. Visual field tests   Your doctor may have you look through special machines. Or your doctor may simply have you stare straight ahead while they move a finger into and out of your field of vision. Color vision test   You look at pieces of printed test patterns in various colors. You say what number or symbol you see. Your doctor may have you trace the number or symbol using a pointer. How do these tests feel? There is very little chance of having a problem from this test. If dilating drops are used for a vision test, they may make the eyes sting and cause a medicine taste in the mouth. Follow-up care is a key part of your treatment and safety. Be sure to make and go to all appointments, and call your doctor if you are having problems. It's also a good idea to know your test results and keep a list of the medicines you take. Where can you learn more? Go to http://www.ballard.com/ and enter G551 to learn more about \"Learning About Vision Tests. \"  Current as of: October 12, 2022               Content Version: 13.5  © 2378-6389 Healthwise, Incorporated. Care instructions adapted under license by [facility] ([facility]).  If you have questions about a medical condition or this instruction, always ask your healthcare professional. Seth Gutierrez disclaims any warranty or liability for your use of this information. Learning About Activities of Daily Living  What are activities of daily living? Activities of daily living (ADLs) are the basic self-care tasks you do every day. As you age, and if you have health problems, you may find that it's harder to do these things for yourself. That's when you may need some help. Your doctor uses ADLs to measure how much help you need. Knowing what you can and can't do for yourself is an important first step to getting help. And when you have the help you need, you can stay as independent as possible. Your doctor will want to know if you are able to do tasks such as: Take a bath or shower without help. Go to the bathroom by yourself. Dress and undress without help. Shave, comb your hair, and brush teeth on your own. Get in and out of bed or a chair without help. Feed yourself without help. If you are having trouble doing basic self-care tasks, talk with your doctor. You may want to bring a caregiver or family member who can help the doctor understand your needs and abilities. How will a doctor assess your ADLs? Asking about ADLs is part of a routine health checkup your doctor will likely do as you age. Your health check might be done in a doctor's office, in your home, or at a hospital. The goal is to find out if you are having any problems that could make your health problems worse or that make it unsafe for you to be on your own. To measure your ADLs, your doctor will ask how hard it is for you to do routine tasks. He or she may also want to know if you have changed the way you do a task because of a health problem. He or she may watch how you:  Walk back and forth. Keep your balance while you stand or walk. Move from sitting to standing or from a bed to a chair. Button or unbutton a shirt or sweater. Remove and put on your shoes.   It's normal to feel a little worried or anxious if you find you can't do all the things you used to be able to do. Talking with your doctor about ADLs isn't a test that you either pass or fail. It's just a way to get more information about your health and safety. Follow-up care is a key part of your treatment and safety. Be sure to make and go to all appointments, and call your doctor if you are having problems. It's also a good idea to know your test results and keep a list of the medicines you take. Current as of: October 6, 2021               Content Version: 13.5  © 2006-2022 [employer]. Care instructions adapted under license by [facility] ([facility]). If you have questions about a medical condition or this instruction, always ask your healthcare professional. [address] any warranty or liability for your use of this information. Advance Directives: Care Instructions  Overview  An advance directive is a legal way to state your wishes at the end of your life. It tells your family and your doctor what to do if you can't say what you want. There are two main types of advance directives. You can change them any time your wishes change. Living will. This form tells your family and your doctor your wishes about life support and other treatment. The form is also called a declaration. Medical power of . This form lets you name a person to make treatment decisions for you when you can't speak for yourself. This person is called a health care agent (health care proxy, health care surrogate). The form is also called a durable power of  for health care. If you do not have an advance directive, decisions about your medical care may be made by a family member, or by a doctor or a  who doesn't know you. It may help to think of an advance directive as a gift to the people who care for you. If you have one, they won't have to make tough decisions by themselves.   For more information, including forms for your state, see the [facility] website (www.caringinfo.org/planning/advance-directives/). Follow-up care is a key part of your treatment and safety. Be sure to make and go to all appointments, and call your doctor if you are having problems. It's also a good idea to know your test results and keep a list of the medicines you take. What should you include in an advance directive? Many states have a unique advance directive form. (It may ask you to address specific issues.) Or you might use a universal form that's approved by many states. If your form doesn't tell you what to address, it may be hard to know what to include in your advance directive. Use the questions below to help you get started. Who do you want to make decisions about your medical care if you are not able to? What life-support measures do you want if you have a serious illness that gets worse over time or can't be cured? What are you most afraid of that might happen? (Maybe you're afraid of having pain, losing your independence, or being kept alive by machines.)  Where would you prefer to die? (Your home? A hospital? A nursing home?)  Do you want to donate your organs when you die? Do you want certain [employer] practices performed before you die? When should you call for help? Be sure to contact your doctor if you have any questions. Where can you learn more? Go to http://www.ballard.com/ and enter R264 to learn more about \"Advance Directives: Care Instructions. \"  Current as of: June 16, 2022               Content Version: 13.5  © 9504-0886 Healthwise, Incorporated. Care instructions adapted under license by [facility] ([facility]). If you have questions about a medical condition or this instruction, always ask your healthcare professional. [address] any warranty or liability for your use of this information. Personalized Preventive Plan for Viviana Forte - 1/9/2023  Medicare offers a range of preventive health benefits.  Some of the tests and screenings are paid in full while other may be subject to a deductible, co-insurance, and/or copay. Some of these benefits include a comprehensive review of your medical history including lifestyle, illnesses that may run in your family, and various assessments and screenings as appropriate. After reviewing your medical record and screening and assessments performed today your provider may have ordered immunizations, labs, imaging, and/or referrals for you. A list of these orders (if applicable) as well as your Preventive Care list are included within your After Visit Summary for your review. Other Preventive Recommendations:    A preventive eye exam performed by an eye specialist is recommended every 1-2 years to screen for glaucoma; cataracts, macular degeneration, and other eye disorders. A preventive dental visit is recommended every 6 months. Try to get at least 150 minutes of exercise per week or 10,000 steps per day on a pedometer . Order or download the FREE \"Exercise & Physical Activity: Your Everyday Guide\" from [employer]. Call 0-337.526.7494 or search [employer] online. You need 1834-4382 mg of calcium and 3855-9520 IU of vitamin D per day. It is possible to meet your calcium requirement with diet alone, but a vitamin D supplement is usually necessary to meet this goal.  When exposed to the sun, use a sunscreen that protects against both UVA and UVB radiation with an SPF of 30 or greater. Reapply every 2 to 3 hours or after sweating, drying off with a towel, or swimming. Always wear a seat belt when traveling in a car. Always wear a helmet when riding a bicycle or motorcycle.

## 2025-06-20 DIAGNOSIS — F33.1 MODERATE EPISODE OF RECURRENT MAJOR DEPRESSIVE DISORDER (HCC): ICD-10-CM

## 2025-06-20 RX ORDER — SERTRALINE HYDROCHLORIDE 25 MG/1
25 TABLET, FILM COATED ORAL DAILY
Qty: 90 TABLET | Refills: 0 | Status: SHIPPED | OUTPATIENT
Start: 2025-06-20

## 2025-06-23 ENCOUNTER — CARE COORDINATION (OUTPATIENT)
Dept: CARE COORDINATION | Age: 75
End: 2025-06-23

## 2025-06-23 NOTE — CARE COORDINATION
program becausedenies need for daily monitoring.     Assessments Completed:   Hypertension - Encounter Level          ,   General Assessment    Do you have any symptoms that are causing concern?: No          Medications Reviewed:   Patient denies any changes with medications and reports taking all medications as prescribed.    Advance Care Planning:   Reviewed during previous call      Care Planning:   Education Documentation  Educate reporting changes in condition, taught by Jazmin Leblanc, RN at 6/23/2025 10:33 AM.  Learner: Patient  Readiness: Acceptance  Method: Explanation  Response: Verbalizes Understanding    Educate Patient on When to Call for Symptoms, taught by Jazmin Leblanc RN at 6/23/2025 10:33 AM.  Learner: Patient  Readiness: Acceptance  Method: Explanation  Response: Verbalizes Understanding    Education Comments  No comments found.         PCP/Specialist follow up:   Future Appointments         Provider Specialty Dept Phone    6/25/2025 3:40 PM Yamileth Leary MD Cardiology [phone]    7/21/2025 3:20 PM Cheyenne Arias DO Internal Medicine [phone]    11/12/2025 1:30 PM Moe Santiago MD Oncology [phone]    11/12/2025 1:45 PM NO GHOSH ONC NURSE Infusion Therapy [phone]            Follow Up:   No further Ambulatory Care Management follow-up scheduled at this time.  Patient  has Ambulatory Care Manager's contact information for any further questions, concerns or needs.

## 2025-06-25 ENCOUNTER — OFFICE VISIT (OUTPATIENT)
Dept: CARDIOLOGY CLINIC | Age: 75
End: 2025-06-25
Payer: MEDICARE

## 2025-06-25 VITALS
HEART RATE: 68 BPM | SYSTOLIC BLOOD PRESSURE: 110 MMHG | HEIGHT: 62 IN | BODY MASS INDEX: 53.15 KG/M2 | WEIGHT: 288.8 LBS | DIASTOLIC BLOOD PRESSURE: 64 MMHG | OXYGEN SATURATION: 96 %

## 2025-06-25 DIAGNOSIS — I83.022 VENOUS STASIS ULCER OF LEFT CALF WITH FAT LAYER EXPOSED, UNSPECIFIED WHETHER VARICOSE VEINS PRESENT (HCC): ICD-10-CM

## 2025-06-25 DIAGNOSIS — Z95.0 ARTIFICIAL CARDIAC PACEMAKER: Primary | ICD-10-CM

## 2025-06-25 DIAGNOSIS — L97.222 VENOUS STASIS ULCER OF LEFT CALF WITH FAT LAYER EXPOSED, UNSPECIFIED WHETHER VARICOSE VEINS PRESENT (HCC): ICD-10-CM

## 2025-06-25 PROCEDURE — 3074F SYST BP LT 130 MM HG: CPT | Performed by: INTERNAL MEDICINE

## 2025-06-25 PROCEDURE — 99214 OFFICE O/P EST MOD 30 MIN: CPT | Performed by: INTERNAL MEDICINE

## 2025-06-25 PROCEDURE — 3078F DIAST BP <80 MM HG: CPT | Performed by: INTERNAL MEDICINE

## 2025-06-25 PROCEDURE — 1123F ACP DISCUSS/DSCN MKR DOCD: CPT | Performed by: INTERNAL MEDICINE

## 2025-06-25 PROCEDURE — 1159F MED LIST DOCD IN RCRD: CPT | Performed by: INTERNAL MEDICINE

## 2025-06-25 NOTE — PATIENT INSTRUCTIONS
**It is YOUR responsibilty to bring medication bottles and/or updated medication list to EACH APPOINTMENT. This will allow us to better serve you and all your healthcare needs**  Thank you for allowing us to care for you today!   We want to ensure we can follow your treatment plan and we strive to give you the best outcomes and experience possible.   If you ever have a life threatening emergency and call 911 - for an ambulance (EMS)   Our providers can only care for you at:   [facility] or [facility].   Even if you have someone take you or you drive yourself we can only care for you in a [facility] facility. Our providers are not setup at the other healthcare locations!       Thank you for allowing us to care for you today!   We want to ensure we can follow your treatment plan and we strive to give you the best outcomes and experience possible.   If you ever have a life threatening emergency and call 911 - for an ambulance (EMS)  REMEMBER  Our providers can only care for you at:   [facility] or [facility]   Even if you have someone take you or you drive yourself we can only care for you in a [facility] facility. Our providers are not setup at the other healthcare locations!    PLEASE CALL OUR OFFICE DURING NORMAL BUSINESS HOURS  Monday through Friday 8 am to 5 pm  AFTER HOURS the physician on-call cannot help with scheduling, rescheduling, procedure instruction questions or any type of medication need or issue.  [facility] P:[phone] - [facility] P:[phone] - [facility] P:[phone]      If you receive a survey:  We would appreciate you taking the time to share your experience concerning your provider visit in the office.    These surveys are confidential!  We are eager to improve and are counting on you to share your feedback so we can ensure you get the best care possible.

## 2025-06-25 NOTE — PROGRESS NOTES
CARDIOLOGY NOTE      6/25/2025    RE: Nusrat Moctezuma  (1950)                               TO:  Cheyenne Bucio, DO            CHIEF COMPLAINT   Nusrat is a 75 y.o. female who was seen today for management of  htn                                    HPI:   Patient is here for    - Hypertension,is  well controlled, pt is  compliant with medicines  - Hyperlipidimea, lipids are in acceptable range. Pt  is  compliant with medicines  - PPM  - JAMIE                The patient does not have cardiac complaints    Nusrat Moctezuma has the following history recorded in care path:  Patient Active Problem List    Diagnosis Date Noted    Vitamin D deficiency 03/11/2023    Valvular heart disease 03/08/2023    Chest pain 02/22/2023    Left arm weakness 02/21/2023    Chest pain with moderate risk for cardiac etiology 02/20/2023    Ambulatory dysfunction 10/15/2022    Swelling of lower extremity 10/14/2022    Osteopenia 09/14/2022    Asthma     Hypertension     Depression     Anxiety     Obstructive sleep apnea     Nocturnal oxygen desaturation     Chronic cystitis 12/15/2014    Cardiac pacemaker 03/10/2014    Mild asthma 11/20/2013    Severe obstructive sleep apnea 11/20/2013    Super obesity 09/11/2013    JAMIE (obstructive sleep apnea) 07/17/2013    Abnormal cardiovascular stress test 06/28/2013    Heart block AV second degree 06/26/2013    Hyperlipidemia     Malignant neoplasm of upper-outer quadrant of right breast in female, estrogen receptor positive (HCC) 08/29/2012    Diffuse cystic mastopathy 08/29/2012    H/O chest pain 04/01/2005    Acute respiratory failure with hypoxia (HCC) 01/23/2025    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

## 2025-07-02 ENCOUNTER — APPOINTMENT (OUTPATIENT)
Dept: GENERAL RADIOLOGY | Age: 75
DRG: 872 | End: 2025-07-02
Payer: MEDICARE

## 2025-07-02 ENCOUNTER — HOSPITAL ENCOUNTER (INPATIENT)
Age: 75
LOS: 4 days | Discharge: HOME HEALTH | DRG: 872 | End: 2025-07-06
Attending: EMERGENCY MEDICINE | Admitting: STUDENT IN AN ORGANIZED HEALTH CARE EDUCATION/TRAINING PROGRAM
Payer: MEDICARE

## 2025-07-02 ENCOUNTER — APPOINTMENT (OUTPATIENT)
Dept: CT IMAGING | Age: 75
DRG: 872 | End: 2025-07-02
Payer: MEDICARE

## 2025-07-02 DIAGNOSIS — R65.20 SEVERE SEPSIS (HCC): ICD-10-CM

## 2025-07-02 DIAGNOSIS — L03.119 CELLULITIS OF LOWER EXTREMITY, UNSPECIFIED LATERALITY: ICD-10-CM

## 2025-07-02 DIAGNOSIS — M79.89 SWELLING OF LOWER EXTREMITY: ICD-10-CM

## 2025-07-02 DIAGNOSIS — J96.20 ACUTE ON CHRONIC RESPIRATORY FAILURE, UNSPECIFIED WHETHER WITH HYPOXIA OR HYPERCAPNIA (HCC): Primary | ICD-10-CM

## 2025-07-02 DIAGNOSIS — A41.9 SEVERE SEPSIS (HCC): ICD-10-CM

## 2025-07-02 DIAGNOSIS — J44.9 CHRONIC OBSTRUCTIVE PULMONARY DISEASE, UNSPECIFIED COPD TYPE (HCC): ICD-10-CM

## 2025-07-02 DIAGNOSIS — N39.0 URINARY TRACT INFECTION WITHOUT HEMATURIA, SITE UNSPECIFIED: ICD-10-CM

## 2025-07-02 LAB
ALBUMIN SERPL-MCNC: 3.8 G/DL (ref 3.4–5)
ALBUMIN/GLOB SERPL: 1 {RATIO} (ref 1.1–2.2)
ALP SERPL-CCNC: 140 U/L (ref 40–129)
ALT SERPL-CCNC: 10 U/L (ref 10–40)
ANION GAP SERPL CALCULATED.3IONS-SCNC: 14 MMOL/L (ref 9–17)
ARTERIAL PATENCY WRIST A: ABNORMAL
AST SERPL-CCNC: 23 U/L (ref 15–37)
BACTERIA URNS QL MICRO: ABNORMAL
BASOPHILS # BLD: 0.06 K/UL
BASOPHILS NFR BLD: 0 % (ref 0–1)
BILIRUB SERPL-MCNC: 0.3 MG/DL (ref 0–1)
BILIRUB UR QL STRIP: NEGATIVE
BNP SERPL-MCNC: 1587 PG/ML (ref 0–450)
BODY TEMPERATURE: 37
BUN SERPL-MCNC: 24 MG/DL (ref 7–20)
CALCIUM SERPL-MCNC: 9.7 MG/DL (ref 8.3–10.6)
CHLORIDE SERPL-SCNC: 99 MMOL/L (ref 99–110)
CLARITY UR: ABNORMAL
CO2 SERPL-SCNC: 23 MMOL/L (ref 21–32)
COHGB MFR BLD: 0.3 % (ref 0.5–1.5)
COLOR UR: YELLOW
CREAT SERPL-MCNC: 1 MG/DL (ref 0.6–1.2)
EKG ATRIAL RATE: 95 BPM
EKG DIAGNOSIS: NORMAL
EKG P AXIS: 56 DEGREES
EKG P-R INTERVAL: 194 MS
EKG Q-T INTERVAL: 376 MS
EKG QRS DURATION: 150 MS
EKG QTC CALCULATION (BAZETT): 472 MS
EKG R AXIS: -67 DEGREES
EKG T AXIS: 105 DEGREES
EKG VENTRICULAR RATE: 95 BPM
EOSINOPHIL # BLD: 0.07 K/UL
EOSINOPHILS RELATIVE PERCENT: 0 % (ref 0–3)
EPI CELLS #/AREA URNS HPF: 50 /HPF
ERYTHROCYTE [DISTWIDTH] IN BLOOD BY AUTOMATED COUNT: 15.5 % (ref 11.7–14.9)
GFR, ESTIMATED: 52 ML/MIN/1.73M2
GLUCOSE SERPL-MCNC: 188 MG/DL (ref 74–99)
GLUCOSE UR STRIP-MCNC: NEGATIVE MG/DL
HCO3 VENOUS: 27.2 MMOL/L (ref 22–29)
HCT VFR BLD AUTO: 35.5 % (ref 37–47)
HGB BLD-MCNC: 11 G/DL (ref 12.5–16)
HGB UR QL STRIP.AUTO: NEGATIVE
IMM GRANULOCYTES # BLD AUTO: 0.32 K/UL
IMM GRANULOCYTES NFR BLD: 1 %
KETONES UR STRIP-MCNC: NEGATIVE MG/DL
LACTATE BLDV-SCNC: 2.8 MMOL/L (ref 0.4–2)
LACTATE BLDV-SCNC: 3.1 MMOL/L (ref 0.4–2)
LACTATE BLDV-SCNC: 3.2 MMOL/L (ref 0.4–2)
LEUKOCYTE ESTERASE UR QL STRIP: ABNORMAL
LYMPHOCYTES NFR BLD: 0.92 K/UL
LYMPHOCYTES RELATIVE PERCENT: 3 % (ref 24–44)
MCH RBC QN AUTO: 26.4 PG (ref 27–31)
MCHC RBC AUTO-ENTMCNC: 31 G/DL (ref 32–36)
MCV RBC AUTO: 85.3 FL (ref 78–100)
METHEMOGLOBIN: 0.4 % (ref 0.5–1.5)
MONOCYTES NFR BLD: 1.72 K/UL
MONOCYTES NFR BLD: 6 % (ref 0–5)
MUCOUS THREADS URNS QL MICRO: ABNORMAL
NEUTROPHILS NFR BLD: 89 % (ref 36–66)
NEUTS SEG NFR BLD: 25.27 K/UL
NITRITE UR QL STRIP: POSITIVE
OXYHGB MFR BLD: 59.4 %
PCO2 VENOUS: 46 MM HG (ref 38–54)
PH UR STRIP: 7 [PH] (ref 5–8)
PH VENOUS: 7.39 (ref 7.32–7.43)
PLATELET # BLD AUTO: 275 K/UL (ref 140–440)
PMV BLD AUTO: 9.3 FL (ref 7.5–11.1)
PO2 VENOUS: 32.5 MM HG (ref 23–48)
POSITIVE BASE EXCESS, VEN: 1.8 MMOL/L (ref 0–3)
POTASSIUM SERPL-SCNC: 4 MMOL/L (ref 3.5–5.1)
PROCALCITONIN SERPL-MCNC: 2.03 NG/ML
PROT SERPL-MCNC: 7.5 G/DL (ref 6.4–8.2)
PROT UR STRIP-MCNC: ABNORMAL MG/DL
RBC # BLD AUTO: 4.16 M/UL (ref 4.2–5.4)
RBC #/AREA URNS HPF: 9 /HPF (ref 0–2)
SODIUM SERPL-SCNC: 136 MMOL/L (ref 136–145)
SP GR UR STRIP: 1.01 (ref 1–1.03)
UROBILINOGEN UR STRIP-ACNC: 0.2 EU/DL (ref 0–1)
WBC #/AREA URNS HPF: 68 /HPF (ref 0–5)
WBC OTHER # BLD: 28.4 K/UL (ref 4–10.5)

## 2025-07-02 PROCEDURE — 6360000002 HC RX W HCPCS: Performed by: EMERGENCY MEDICINE

## 2025-07-02 PROCEDURE — 81001 URINALYSIS AUTO W/SCOPE: CPT

## 2025-07-02 PROCEDURE — 99285 EMERGENCY DEPT VISIT HI MDM: CPT

## 2025-07-02 PROCEDURE — 6370000000 HC RX 637 (ALT 250 FOR IP): Performed by: EMERGENCY MEDICINE

## 2025-07-02 PROCEDURE — 2580000003 HC RX 258: Performed by: EMERGENCY MEDICINE

## 2025-07-02 PROCEDURE — 2580000003 HC RX 258: Performed by: STUDENT IN AN ORGANIZED HEALTH CARE EDUCATION/TRAINING PROGRAM

## 2025-07-02 PROCEDURE — 83605 ASSAY OF LACTIC ACID: CPT

## 2025-07-02 PROCEDURE — 71045 X-RAY EXAM CHEST 1 VIEW: CPT

## 2025-07-02 PROCEDURE — 84145 PROCALCITONIN (PCT): CPT

## 2025-07-02 PROCEDURE — 96365 THER/PROPH/DIAG IV INF INIT: CPT

## 2025-07-02 PROCEDURE — 6360000002 HC RX W HCPCS: Performed by: STUDENT IN AN ORGANIZED HEALTH CARE EDUCATION/TRAINING PROGRAM

## 2025-07-02 PROCEDURE — 93005 ELECTROCARDIOGRAM TRACING: CPT | Performed by: EMERGENCY MEDICINE

## 2025-07-02 PROCEDURE — 36415 COLL VENOUS BLD VENIPUNCTURE: CPT

## 2025-07-02 PROCEDURE — 71250 CT THORAX DX C-: CPT

## 2025-07-02 PROCEDURE — 2140000000 HC CCU INTERMEDIATE R&B

## 2025-07-02 PROCEDURE — 87040 BLOOD CULTURE FOR BACTERIA: CPT

## 2025-07-02 PROCEDURE — 6370000000 HC RX 637 (ALT 250 FOR IP): Performed by: STUDENT IN AN ORGANIZED HEALTH CARE EDUCATION/TRAINING PROGRAM

## 2025-07-02 PROCEDURE — 94640 AIRWAY INHALATION TREATMENT: CPT

## 2025-07-02 PROCEDURE — 83880 ASSAY OF NATRIURETIC PEPTIDE: CPT

## 2025-07-02 PROCEDURE — 94761 N-INVAS EAR/PLS OXIMETRY MLT: CPT

## 2025-07-02 PROCEDURE — 93010 ELECTROCARDIOGRAM REPORT: CPT | Performed by: INTERNAL MEDICINE

## 2025-07-02 PROCEDURE — 87086 URINE CULTURE/COLONY COUNT: CPT

## 2025-07-02 PROCEDURE — 94664 DEMO&/EVAL PT USE INHALER: CPT

## 2025-07-02 PROCEDURE — 87088 URINE BACTERIA CULTURE: CPT

## 2025-07-02 PROCEDURE — 80053 COMPREHEN METABOLIC PANEL: CPT

## 2025-07-02 PROCEDURE — 85025 COMPLETE CBC W/AUTO DIFF WBC: CPT

## 2025-07-02 PROCEDURE — 70450 CT HEAD/BRAIN W/O DYE: CPT

## 2025-07-02 PROCEDURE — 87186 SC STD MICRODIL/AGAR DIL: CPT

## 2025-07-02 PROCEDURE — 2700000000 HC OXYGEN THERAPY PER DAY

## 2025-07-02 PROCEDURE — 82805 BLOOD GASES W/O2 SATURATION: CPT

## 2025-07-02 RX ORDER — FUROSEMIDE 20 MG/1
20 TABLET ORAL DAILY
Status: DISCONTINUED | OUTPATIENT
Start: 2025-07-02 | End: 2025-07-06 | Stop reason: HOSPADM

## 2025-07-02 RX ORDER — ACETAMINOPHEN 325 MG/1
650 TABLET ORAL EVERY 6 HOURS PRN
Status: DISCONTINUED | OUTPATIENT
Start: 2025-07-02 | End: 2025-07-06 | Stop reason: HOSPADM

## 2025-07-02 RX ORDER — VALSARTAN 160 MG/1
160 TABLET ORAL DAILY
Status: DISCONTINUED | OUTPATIENT
Start: 2025-07-02 | End: 2025-07-06 | Stop reason: HOSPADM

## 2025-07-02 RX ORDER — AMLODIPINE BESYLATE 5 MG/1
5 TABLET ORAL DAILY
Status: DISCONTINUED | OUTPATIENT
Start: 2025-07-02 | End: 2025-07-06 | Stop reason: HOSPADM

## 2025-07-02 RX ORDER — 0.9 % SODIUM CHLORIDE 0.9 %
1000 INTRAVENOUS SOLUTION INTRAVENOUS ONCE
Status: COMPLETED | OUTPATIENT
Start: 2025-07-02 | End: 2025-07-02

## 2025-07-02 RX ORDER — BUDESONIDE AND FORMOTEROL FUMARATE DIHYDRATE 160; 4.5 UG/1; UG/1
2 AEROSOL RESPIRATORY (INHALATION)
Status: DISCONTINUED | OUTPATIENT
Start: 2025-07-02 | End: 2025-07-06 | Stop reason: HOSPADM

## 2025-07-02 RX ORDER — IPRATROPIUM BROMIDE AND ALBUTEROL SULFATE 2.5; .5 MG/3ML; MG/3ML
1 SOLUTION RESPIRATORY (INHALATION) EVERY 6 HOURS PRN
Status: DISCONTINUED | OUTPATIENT
Start: 2025-07-02 | End: 2025-07-06 | Stop reason: HOSPADM

## 2025-07-02 RX ORDER — ACETAMINOPHEN 500 MG
1000 TABLET ORAL ONCE
Status: COMPLETED | OUTPATIENT
Start: 2025-07-02 | End: 2025-07-02

## 2025-07-02 RX ORDER — IPRATROPIUM BROMIDE AND ALBUTEROL SULFATE 2.5; .5 MG/3ML; MG/3ML
1 SOLUTION RESPIRATORY (INHALATION) ONCE
Status: COMPLETED | OUTPATIENT
Start: 2025-07-02 | End: 2025-07-02

## 2025-07-02 RX ORDER — ASPIRIN 81 MG/1
81 TABLET, CHEWABLE ORAL DAILY
Status: DISCONTINUED | OUTPATIENT
Start: 2025-07-02 | End: 2025-07-06 | Stop reason: HOSPADM

## 2025-07-02 RX ORDER — ACETAMINOPHEN 650 MG/1
650 SUPPOSITORY RECTAL EVERY 6 HOURS PRN
Status: DISCONTINUED | OUTPATIENT
Start: 2025-07-02 | End: 2025-07-06 | Stop reason: HOSPADM

## 2025-07-02 RX ORDER — SODIUM CHLORIDE, SODIUM LACTATE, POTASSIUM CHLORIDE, AND CALCIUM CHLORIDE .6; .31; .03; .02 G/100ML; G/100ML; G/100ML; G/100ML
1000 INJECTION, SOLUTION INTRAVENOUS ONCE
Status: COMPLETED | OUTPATIENT
Start: 2025-07-02 | End: 2025-07-02

## 2025-07-02 RX ORDER — ENOXAPARIN SODIUM 100 MG/ML
30 INJECTION SUBCUTANEOUS 2 TIMES DAILY
Status: DISCONTINUED | OUTPATIENT
Start: 2025-07-02 | End: 2025-07-06 | Stop reason: HOSPADM

## 2025-07-02 RX ORDER — SODIUM CHLORIDE 9 MG/ML
INJECTION, SOLUTION INTRAVENOUS CONTINUOUS
Status: DISCONTINUED | OUTPATIENT
Start: 2025-07-02 | End: 2025-07-03

## 2025-07-02 RX ADMIN — IPRATROPIUM BROMIDE AND ALBUTEROL SULFATE 1 DOSE: .5; 2.5 SOLUTION RESPIRATORY (INHALATION) at 04:56

## 2025-07-02 RX ADMIN — CEFEPIME 2000 MG: 2 INJECTION, POWDER, FOR SOLUTION INTRAVENOUS at 05:02

## 2025-07-02 RX ADMIN — BUDESONIDE AND FORMOTEROL FUMARATE DIHYDRATE 2 PUFF: 160; 4.5 AEROSOL RESPIRATORY (INHALATION) at 20:08

## 2025-07-02 RX ADMIN — ACETAMINOPHEN 650 MG: 325 TABLET ORAL at 16:11

## 2025-07-02 RX ADMIN — ACETAMINOPHEN 1000 MG: 500 TABLET ORAL at 04:37

## 2025-07-02 RX ADMIN — SERTRALINE HYDROCHLORIDE 25 MG: 50 TABLET ORAL at 08:24

## 2025-07-02 RX ADMIN — SODIUM CHLORIDE 1000 ML: 9 INJECTION, SOLUTION INTRAVENOUS at 05:03

## 2025-07-02 RX ADMIN — SODIUM CHLORIDE, SODIUM LACTATE, POTASSIUM CHLORIDE, AND CALCIUM CHLORIDE 1000 ML: .6; .31; .03; .02 INJECTION, SOLUTION INTRAVENOUS at 07:52

## 2025-07-02 RX ADMIN — ASPIRIN 81 MG: 81 TABLET, CHEWABLE ORAL at 08:24

## 2025-07-02 RX ADMIN — SODIUM CHLORIDE: 0.9 INJECTION, SOLUTION INTRAVENOUS at 21:01

## 2025-07-02 RX ADMIN — IPRATROPIUM BROMIDE AND ALBUTEROL SULFATE 1 DOSE: .5; 2.5 SOLUTION RESPIRATORY (INHALATION) at 04:55

## 2025-07-02 RX ADMIN — VANCOMYCIN HYDROCHLORIDE 2500 MG: 5 INJECTION, POWDER, LYOPHILIZED, FOR SOLUTION INTRAVENOUS at 05:59

## 2025-07-02 RX ADMIN — ENOXAPARIN SODIUM 30 MG: 100 INJECTION SUBCUTANEOUS at 08:24

## 2025-07-02 RX ADMIN — ENOXAPARIN SODIUM 30 MG: 100 INJECTION SUBCUTANEOUS at 21:01

## 2025-07-02 RX ADMIN — CEFEPIME 2000 MG: 2 INJECTION, POWDER, FOR SOLUTION INTRAVENOUS at 16:09

## 2025-07-02 RX ADMIN — SODIUM CHLORIDE: 0.9 INJECTION, SOLUTION INTRAVENOUS at 12:24

## 2025-07-02 ASSESSMENT — PAIN SCALES - GENERAL
PAINLEVEL_OUTOF10: 0
PAINLEVEL_OUTOF10: 5
PAINLEVEL_OUTOF10: 0
PAINLEVEL_OUTOF10: 0

## 2025-07-02 ASSESSMENT — PAIN DESCRIPTION - LOCATION: LOCATION: FOOT

## 2025-07-02 ASSESSMENT — PAIN DESCRIPTION - DESCRIPTORS: DESCRIPTORS: ACHING

## 2025-07-02 ASSESSMENT — PAIN DESCRIPTION - ORIENTATION: ORIENTATION: RIGHT;LEFT

## 2025-07-02 NOTE — ED PROVIDER NOTES
[facility] EMERGENCY DEPARTMENT  EMERGENCY DEPARTMENT ENCOUNTER      Pt Name: Nusrat Moctezuma  MRN: [id number]  Birthdate 1950  Date of evaluation: 7/2/2025  Provider: Vivienne Mehta MD    CHIEF COMPLAINT       Chief Complaint   Patient presents with    Fatigue    Dizziness         HISTORY OF PRESENT ILLNESS      Nusrat Moctezuma is a 75 y.o. female who presents to the emergency department  for   Chief Complaint   Patient presents with    Fatigue    Dizziness       75-year-old female presents complaining of multiple symptoms.  She endorses feeling shaky and cold as well as having some \"dizziness.\"  Symptoms started about 9 PM.  She presents to the emergency department at around 4 AM so presents outside of the stroke window.  She denies any focal neurological deficits or lateralizing symptoms.  No visual field deficits.  No gait abnormalities.  No focal weakness or paresthesias.  She endorses some redness and pain in bilateral legs it is a bit worse on the right.  She has endorsed some green sputum production.  Denies any difficulty breathing.  She presents by EMS.  She reportedly had mildly low oxygen saturations for EMS.  Upon presentation the emergency department ox saturations were in the low to mid 90s on room air.  She does not have a home oxygen requirement.  She has not had remarkable chest pain or abdominal pain.  Denies any vomiting, diarrhea or changes in stool.  She presents with a stroke scale of 0          Nursing Notes, Triage Notes & Vital Signs were reviewed.      REVIEW OF SYSTEMS    (2-9 systems for level 4, 10 or more for level 5)     Review of Systems   Constitutional:  Positive for fatigue.       Except as noted above the remainder of the review of systems was reviewed and negative.       PAST MEDICAL HISTORY     Past Medical History:   Diagnosis Date    Anemia     Anxiety 1978    Arthritis     generalized    Asthma 2006    AV block     2nd degree per hospital in

## 2025-07-02 NOTE — H&P
History and Physical      Name:  uNsrat Moctezuma /Age/Sex: 1950  (75 y.o. female)   MRN & CSN:  [id number] & [id number] Encounter Date/Time: 2025 6:13 AM EDT   Location:  [address] [place] [address] PCP: Cheyenne Arias DO       Assessment and Plan:   Nusrat Moctezuma is a 75 y.o. female with hypertension, hyperlipidemia, COPD, chronic diastolic heart failure, morbid obesity/JAMIE presented from home with dizziness and fatigue     Severe sepsis POA  Secondary to UTI/right lower extremity cellulitis  CT chest abdomen pelvis reviewed no acute findings  Discontinue IV vancomycin, continue IV cefepime follow-up urine and blood cultures  IV LR bolus 1 L ordered, avoided 30 cc/kg due to risk of fluid overload  Repeat lactic acid after fluid administration    Hypertension  Hold home amlodipine and valsartan in the setting of severe sepsis  Consider discontinuing amlodipine and adding hydrochlorothiazide upon discharge    COPD not in exacerbation  Symbicort and Spiriva as needed DuoNebs    Chronic diastolic heart failure  No evidence of pulmonary edema on imaging, check BNP  Hold home Lasix    Morbid obesity/BMI 52.68  Obstructive sleep apnea  Noncompliant with CPAP    Inpatient stepdown telemetry  Full code    Disposition:     Current Living situation: Home  Expected Disposition: Home versus rehab  Estimated D/C: 2 to 3 days    Diet ADULT DIET; Regular; No Added Salt (3-4 gm)   DVT Prophylaxis [x] Lovenox, []  Heparin, [] SCDs, [] Ambulation,  [] Eliquis, [] Xarelto, [] Coumadin   Code Status Full Code   Surrogate Decision Maker/ POA Inderjit     Personally reviewed Lab Studies and Imaging   Discussed management of the case with ER provider who recommended admission due to severe sepsis  EKG interpreted personally and results V-paced rhythm    History from:     Patient    History of Present Illness:     Chief Complaint   Patient presents with    Fatigue    Dizziness     Nusrat Moctezuma is a 75 y.o. female with hypertension,

## 2025-07-02 NOTE — CONSULTS
PHARMACY VANCOMYCIN MONITORING & DOSING SERVICE    Nusrat Moctezuma is a 75 y.o. female started on vancomycin for Sepsis. Pharmacy consulted by ED provider Dr. Cralos SARMIENTO to order a dose of vancomycin in the emergency department.    Other antimicrobials: Cefepime    Ht Readings from Last 1 Encounters:   07/02/25 1.575 m (5' 2\")     Wt Readings from Last 3 Encounters:   07/02/25 130.6 kg (288 lb)   06/25/25 131 kg (288 lb 12.8 oz)   03/13/25 132.5 kg (292 lb)        Pertinent Laboratory Values:   Temp Readings from Last 3 Encounters:   07/02/25 100.3 °F (37.9 °C) (Oral)   03/04/25 96.9 °F (36.1 °C)   01/31/25 97.7 °F (36.5 °C) (Oral)     Recent Labs     07/02/25  0424   WBC 28.4*     No results for input(s): \"BUN\", \"CREATININE\" in the last 72 hours.  CrCl cannot be calculated (Patient's most recent lab result is older than the maximum 30 days allowed.).  No intake or output data in the 24 hours ending 07/02/25 0457    Assessment/Plan:  Pharmacy will order vancomycin 2500 mg (20 mg/kg).  Please note, pharmacy will order a one-time dose of vancomycin for the Emergency Department. The consult will need to be re-ordered if vancomycin is to continue upon admission.    Thank you for the consult.  Nahid Barakat, formerly [facility]  7/2/2025 4:57 AM

## 2025-07-02 NOTE — ED NOTES
Dr. Mehta at bedside.   
Patient arrives to ED via EMS for complaints of generalized weakness and dizziness that started around 0100. Patients blood glucose was 211 for EMS. Patient reports not feeling well and having some redness and pain in lower extremities and having trouble getting around at home. Patient normal does not wear oxygen at baseline but for EMS needed 3L to get patient to 90s. Patient is alert and oriented on arrival.    
\"last time 2018- due to neuropathy, have to use cane\"    HX OTHER MEDICAL 7/24/13, 06/2013 7/13-No clinacally significant arrhythmia. 6/13-multiple cycles of wenckebach episodes in addtion to some sinus tach    Hyperlipidemia     Hypertension     Hypothermia 2008    Malignant neoplasm of breast (female) (HCC) 2012    Neuropathy     \"have neuropathy of my legs\"    Normocytic normochromic anemia     Obstructive sleep apnea 2008 01- Has CPAP machine but has not used in over a year - states she has not had problems since her pacemaker was placed.    Old MI (myocardial infarction)     per pt on 1/15/2019\"had heart attack about a year ago\"    Osteoarthritis     Osteopenia     Osteopenia of multiple sites 9/14/2022    Pneumonia due to COVID-19 virus 03/22/2021    Primary malignant neoplasm of colon (HCC)     S/P cardiac cath 06/2013    no significant CAD false postive stress test    Skin cancer     Super obesity     Umbilical hernia        Assessment    Vitals:        Vitals:    07/02/25 0345 07/02/25 0456 07/02/25 0459   BP: (!) 159/130     Pulse: 100 89 89   Resp: 30 21 22   Temp: 100.3 °F (37.9 °C)     TempSrc: Oral     SpO2: 91% 96% 96%   Weight: 130.6 kg (288 lb)     Height: 1.575 m (5' 2\")         PO Status: n/a    O2 Flow Rate: O2 Device: Nasal cannula O2 Flow Rate (L/min): 2 L/min    Cardiac Rhythm: NS    Last documented pain medication administered: none    NIH Score: NIH       Active LDA's:   Peripheral IV 07/02/25 Left Antecubital (Active)       Pertinent or High Risk Medications/Drips: no   If Yes, please provide details: none      Blood Product Administration: no  If Yes, please provide details: none    Recommendation    Incomplete orders none  Additional Comments: none   If any further questions, please call Sending RN at [phone]    Electronically signed by: Electronically signed by Esequiel Oneil RN on 7/2/2025 at 6:18 AM

## 2025-07-03 LAB
ABSOLUTE BANDS: 1.81 K/UL
ALBUMIN SERPL-MCNC: 2.9 G/DL (ref 3.4–5)
ALBUMIN/GLOB SERPL: 0.9 {RATIO} (ref 1.1–2.2)
ALP SERPL-CCNC: 113 U/L (ref 40–129)
ALT SERPL-CCNC: 11 U/L (ref 10–40)
ANION GAP SERPL CALCULATED.3IONS-SCNC: 11 MMOL/L (ref 9–17)
AST SERPL-CCNC: 20 U/L (ref 15–37)
BANDS: 8 %
BASOPHILS # BLD: 0 K/UL
BASOPHILS NFR BLD: 0 % (ref 0–1)
BILIRUB SERPL-MCNC: 0.5 MG/DL (ref 0–1)
BUN SERPL-MCNC: 21 MG/DL (ref 7–20)
CALCIUM SERPL-MCNC: 8.8 MG/DL (ref 8.3–10.6)
CHLORIDE SERPL-SCNC: 102 MMOL/L (ref 99–110)
CO2 SERPL-SCNC: 21 MMOL/L (ref 21–32)
CREAT SERPL-MCNC: 0.9 MG/DL (ref 0.6–1.2)
EOSINOPHIL # BLD: 0 K/UL
EOSINOPHILS RELATIVE PERCENT: 0 % (ref 0–3)
ERYTHROCYTE [DISTWIDTH] IN BLOOD BY AUTOMATED COUNT: 16.2 % (ref 11.7–14.9)
GFR, ESTIMATED: 63 ML/MIN/1.73M2
GLUCOSE SERPL-MCNC: 130 MG/DL (ref 74–99)
HCT VFR BLD AUTO: 30.6 % (ref 37–47)
HGB BLD-MCNC: 9.3 G/DL (ref 12.5–16)
LYMPHOCYTES NFR BLD: 2.03 K/UL
LYMPHOCYTES RELATIVE PERCENT: 9 % (ref 24–44)
MCH RBC QN AUTO: 26.6 PG (ref 27–31)
MCHC RBC AUTO-ENTMCNC: 30.4 G/DL (ref 32–36)
MCV RBC AUTO: 87.4 FL (ref 78–100)
MONOCYTES NFR BLD: 1.58 K/UL
MONOCYTES NFR BLD: 7 % (ref 0–5)
NEUTROPHILS NFR BLD: 76 % (ref 36–66)
NEUTS SEG NFR BLD: 17.18 K/UL
PLATELET # BLD AUTO: 194 K/UL (ref 140–440)
PLATELET ESTIMATE: NORMAL
PMV BLD AUTO: 10.2 FL (ref 7.5–11.1)
POTASSIUM SERPL-SCNC: 3.9 MMOL/L (ref 3.5–5.1)
PROT SERPL-MCNC: 6.2 G/DL (ref 6.4–8.2)
RBC # BLD AUTO: 3.5 M/UL (ref 4.2–5.4)
RBC # BLD: NORMAL 10*6/UL
SODIUM SERPL-SCNC: 134 MMOL/L (ref 136–145)
WBC # BLD: NORMAL 10*3/UL
WBC OTHER # BLD: 22.6 K/UL (ref 4–10.5)

## 2025-07-03 PROCEDURE — 2580000003 HC RX 258: Performed by: STUDENT IN AN ORGANIZED HEALTH CARE EDUCATION/TRAINING PROGRAM

## 2025-07-03 PROCEDURE — 94640 AIRWAY INHALATION TREATMENT: CPT

## 2025-07-03 PROCEDURE — 36415 COLL VENOUS BLD VENIPUNCTURE: CPT

## 2025-07-03 PROCEDURE — 80053 COMPREHEN METABOLIC PANEL: CPT

## 2025-07-03 PROCEDURE — 6370000000 HC RX 637 (ALT 250 FOR IP): Performed by: STUDENT IN AN ORGANIZED HEALTH CARE EDUCATION/TRAINING PROGRAM

## 2025-07-03 PROCEDURE — 94761 N-INVAS EAR/PLS OXIMETRY MLT: CPT

## 2025-07-03 PROCEDURE — 2700000000 HC OXYGEN THERAPY PER DAY

## 2025-07-03 PROCEDURE — 6360000002 HC RX W HCPCS: Performed by: STUDENT IN AN ORGANIZED HEALTH CARE EDUCATION/TRAINING PROGRAM

## 2025-07-03 PROCEDURE — 2140000000 HC CCU INTERMEDIATE R&B

## 2025-07-03 PROCEDURE — 85025 COMPLETE CBC W/AUTO DIFF WBC: CPT

## 2025-07-03 RX ADMIN — ENOXAPARIN SODIUM 30 MG: 100 INJECTION SUBCUTANEOUS at 20:12

## 2025-07-03 RX ADMIN — BUDESONIDE AND FORMOTEROL FUMARATE DIHYDRATE 2 PUFF: 160; 4.5 AEROSOL RESPIRATORY (INHALATION) at 09:39

## 2025-07-03 RX ADMIN — TIOTROPIUM BROMIDE INHALATION SPRAY 5 MCG: 3.12 SPRAY, METERED RESPIRATORY (INHALATION) at 09:41

## 2025-07-03 RX ADMIN — CEFEPIME 2000 MG: 2 INJECTION, POWDER, FOR SOLUTION INTRAVENOUS at 05:02

## 2025-07-03 RX ADMIN — SODIUM CHLORIDE: 0.9 INJECTION, SOLUTION INTRAVENOUS at 07:30

## 2025-07-03 RX ADMIN — CEFEPIME 2000 MG: 2 INJECTION, POWDER, FOR SOLUTION INTRAVENOUS at 17:02

## 2025-07-03 RX ADMIN — SERTRALINE HYDROCHLORIDE 25 MG: 50 TABLET ORAL at 09:42

## 2025-07-03 RX ADMIN — ENOXAPARIN SODIUM 30 MG: 100 INJECTION SUBCUTANEOUS at 09:43

## 2025-07-03 RX ADMIN — BUDESONIDE AND FORMOTEROL FUMARATE DIHYDRATE 2 PUFF: 160; 4.5 AEROSOL RESPIRATORY (INHALATION) at 20:05

## 2025-07-03 RX ADMIN — ASPIRIN 81 MG: 81 TABLET, CHEWABLE ORAL at 09:42

## 2025-07-03 ASSESSMENT — PAIN DESCRIPTION - LOCATION
LOCATION: LEG
LOCATION: NECK

## 2025-07-03 ASSESSMENT — PAIN DESCRIPTION - DESCRIPTORS
DESCRIPTORS: ACHING
DESCRIPTORS: THROBBING

## 2025-07-03 ASSESSMENT — PAIN SCALES - GENERAL
PAINLEVEL_OUTOF10: 3
PAINLEVEL_OUTOF10: 2

## 2025-07-03 ASSESSMENT — PAIN DESCRIPTION - ORIENTATION
ORIENTATION: RIGHT;LEFT
ORIENTATION: POSTERIOR

## 2025-07-04 LAB
ANION GAP SERPL CALCULATED.3IONS-SCNC: 12 MMOL/L (ref 9–17)
BUN SERPL-MCNC: 23 MG/DL (ref 7–20)
CALCIUM SERPL-MCNC: 8.8 MG/DL (ref 8.3–10.6)
CHLORIDE SERPL-SCNC: 102 MMOL/L (ref 99–110)
CO2 SERPL-SCNC: 20 MMOL/L (ref 21–32)
CREAT SERPL-MCNC: 0.8 MG/DL (ref 0.6–1.2)
GFR, ESTIMATED: 69 ML/MIN/1.73M2
GLUCOSE SERPL-MCNC: 168 MG/DL (ref 74–99)
MICROORGANISM SPEC CULT: ABNORMAL
MICROORGANISM SPEC CULT: ABNORMAL
POTASSIUM SERPL-SCNC: 4 MMOL/L (ref 3.5–5.1)
PROCALCITONIN SERPL-MCNC: 3.82 NG/ML
SERVICE CMNT-IMP: ABNORMAL
SODIUM SERPL-SCNC: 134 MMOL/L (ref 136–145)
SPECIMEN DESCRIPTION: ABNORMAL

## 2025-07-04 PROCEDURE — 6370000000 HC RX 637 (ALT 250 FOR IP): Performed by: STUDENT IN AN ORGANIZED HEALTH CARE EDUCATION/TRAINING PROGRAM

## 2025-07-04 PROCEDURE — 6360000002 HC RX W HCPCS: Performed by: HOSPITALIST

## 2025-07-04 PROCEDURE — 94640 AIRWAY INHALATION TREATMENT: CPT

## 2025-07-04 PROCEDURE — 2580000003 HC RX 258: Performed by: HOSPITALIST

## 2025-07-04 PROCEDURE — 2580000003 HC RX 258: Performed by: STUDENT IN AN ORGANIZED HEALTH CARE EDUCATION/TRAINING PROGRAM

## 2025-07-04 PROCEDURE — 2700000000 HC OXYGEN THERAPY PER DAY

## 2025-07-04 PROCEDURE — 2140000000 HC CCU INTERMEDIATE R&B

## 2025-07-04 PROCEDURE — 84145 PROCALCITONIN (PCT): CPT

## 2025-07-04 PROCEDURE — 6360000002 HC RX W HCPCS: Performed by: STUDENT IN AN ORGANIZED HEALTH CARE EDUCATION/TRAINING PROGRAM

## 2025-07-04 PROCEDURE — 36415 COLL VENOUS BLD VENIPUNCTURE: CPT

## 2025-07-04 PROCEDURE — 80048 BASIC METABOLIC PNL TOTAL CA: CPT

## 2025-07-04 PROCEDURE — 94761 N-INVAS EAR/PLS OXIMETRY MLT: CPT

## 2025-07-04 RX ADMIN — ENOXAPARIN SODIUM 30 MG: 100 INJECTION SUBCUTANEOUS at 08:41

## 2025-07-04 RX ADMIN — SERTRALINE HYDROCHLORIDE 25 MG: 50 TABLET ORAL at 08:41

## 2025-07-04 RX ADMIN — ENOXAPARIN SODIUM 30 MG: 100 INJECTION SUBCUTANEOUS at 19:34

## 2025-07-04 RX ADMIN — CEFEPIME 2000 MG: 2 INJECTION, POWDER, FOR SOLUTION INTRAVENOUS at 16:52

## 2025-07-04 RX ADMIN — AMLODIPINE BESYLATE 5 MG: 5 TABLET ORAL at 08:47

## 2025-07-04 RX ADMIN — TIOTROPIUM BROMIDE INHALATION SPRAY 5 MCG: 3.12 SPRAY, METERED RESPIRATORY (INHALATION) at 08:58

## 2025-07-04 RX ADMIN — BUDESONIDE AND FORMOTEROL FUMARATE DIHYDRATE 2 PUFF: 160; 4.5 AEROSOL RESPIRATORY (INHALATION) at 19:27

## 2025-07-04 RX ADMIN — ASPIRIN 81 MG: 81 TABLET, CHEWABLE ORAL at 08:41

## 2025-07-04 RX ADMIN — BUDESONIDE AND FORMOTEROL FUMARATE DIHYDRATE 2 PUFF: 160; 4.5 AEROSOL RESPIRATORY (INHALATION) at 08:57

## 2025-07-04 RX ADMIN — VALSARTAN 160 MG: 160 TABLET, FILM COATED ORAL at 08:47

## 2025-07-04 RX ADMIN — CEFEPIME 2000 MG: 2 INJECTION, POWDER, FOR SOLUTION INTRAVENOUS at 04:26

## 2025-07-04 RX ADMIN — CEFTRIAXONE SODIUM 2000 MG: 2 INJECTION, POWDER, FOR SOLUTION INTRAMUSCULAR; INTRAVENOUS at 19:34

## 2025-07-04 ASSESSMENT — PAIN DESCRIPTION - DESCRIPTORS: DESCRIPTORS: ACHING

## 2025-07-04 ASSESSMENT — PAIN DESCRIPTION - LOCATION: LOCATION: NECK

## 2025-07-04 ASSESSMENT — PAIN SCALES - GENERAL: PAINLEVEL_OUTOF10: 2

## 2025-07-05 ENCOUNTER — APPOINTMENT (OUTPATIENT)
Dept: ULTRASOUND IMAGING | Age: 75
DRG: 872 | End: 2025-07-05
Payer: MEDICARE

## 2025-07-05 LAB
BASOPHILS # BLD: 0.04 K/UL
BASOPHILS NFR BLD: 0 % (ref 0–1)
EOSINOPHIL # BLD: 0.36 K/UL
EOSINOPHILS RELATIVE PERCENT: 3 % (ref 0–3)
ERYTHROCYTE [DISTWIDTH] IN BLOOD BY AUTOMATED COUNT: 15.7 % (ref 11.7–14.9)
HCT VFR BLD AUTO: 27.6 % (ref 37–47)
HGB BLD-MCNC: 8.5 G/DL (ref 12.5–16)
IMM GRANULOCYTES # BLD AUTO: 0.17 K/UL
IMM GRANULOCYTES NFR BLD: 1 %
LYMPHOCYTES NFR BLD: 1.54 K/UL
LYMPHOCYTES RELATIVE PERCENT: 11 % (ref 24–44)
MCH RBC QN AUTO: 26.6 PG (ref 27–31)
MCHC RBC AUTO-ENTMCNC: 30.8 G/DL (ref 32–36)
MCV RBC AUTO: 86.3 FL (ref 78–100)
MONOCYTES NFR BLD: 0.77 K/UL
MONOCYTES NFR BLD: 6 % (ref 0–5)
NEUTROPHILS NFR BLD: 80 % (ref 36–66)
NEUTS SEG NFR BLD: 11.21 K/UL
PLATELET # BLD AUTO: 196 K/UL (ref 140–440)
PMV BLD AUTO: 10.4 FL (ref 7.5–11.1)
RBC # BLD AUTO: 3.2 M/UL (ref 4.2–5.4)
WBC OTHER # BLD: 14.1 K/UL (ref 4–10.5)

## 2025-07-05 PROCEDURE — 2700000000 HC OXYGEN THERAPY PER DAY

## 2025-07-05 PROCEDURE — 6360000002 HC RX W HCPCS: Performed by: STUDENT IN AN ORGANIZED HEALTH CARE EDUCATION/TRAINING PROGRAM

## 2025-07-05 PROCEDURE — 6370000000 HC RX 637 (ALT 250 FOR IP): Performed by: STUDENT IN AN ORGANIZED HEALTH CARE EDUCATION/TRAINING PROGRAM

## 2025-07-05 PROCEDURE — 2580000003 HC RX 258: Performed by: HOSPITALIST

## 2025-07-05 PROCEDURE — 97530 THERAPEUTIC ACTIVITIES: CPT

## 2025-07-05 PROCEDURE — 94761 N-INVAS EAR/PLS OXIMETRY MLT: CPT

## 2025-07-05 PROCEDURE — 97535 SELF CARE MNGMENT TRAINING: CPT

## 2025-07-05 PROCEDURE — 97166 OT EVAL MOD COMPLEX 45 MIN: CPT

## 2025-07-05 PROCEDURE — 36415 COLL VENOUS BLD VENIPUNCTURE: CPT

## 2025-07-05 PROCEDURE — 85025 COMPLETE CBC W/AUTO DIFF WBC: CPT

## 2025-07-05 PROCEDURE — 6360000002 HC RX W HCPCS: Performed by: HOSPITALIST

## 2025-07-05 PROCEDURE — 94640 AIRWAY INHALATION TREATMENT: CPT

## 2025-07-05 PROCEDURE — 93971 EXTREMITY STUDY: CPT

## 2025-07-05 PROCEDURE — 1200000000 HC SEMI PRIVATE

## 2025-07-05 RX ORDER — FUROSEMIDE 10 MG/ML
20 INJECTION INTRAMUSCULAR; INTRAVENOUS ONCE
Status: COMPLETED | OUTPATIENT
Start: 2025-07-05 | End: 2025-07-05

## 2025-07-05 RX ADMIN — ASPIRIN 81 MG: 81 TABLET, CHEWABLE ORAL at 08:11

## 2025-07-05 RX ADMIN — CEFTRIAXONE SODIUM 2000 MG: 2 INJECTION, POWDER, FOR SOLUTION INTRAMUSCULAR; INTRAVENOUS at 19:47

## 2025-07-05 RX ADMIN — ENOXAPARIN SODIUM 30 MG: 100 INJECTION SUBCUTANEOUS at 19:46

## 2025-07-05 RX ADMIN — VALSARTAN 160 MG: 160 TABLET, FILM COATED ORAL at 08:11

## 2025-07-05 RX ADMIN — SERTRALINE HYDROCHLORIDE 25 MG: 50 TABLET ORAL at 08:10

## 2025-07-05 RX ADMIN — FUROSEMIDE 20 MG: 10 INJECTION, SOLUTION INTRAMUSCULAR; INTRAVENOUS at 10:37

## 2025-07-05 RX ADMIN — BUDESONIDE AND FORMOTEROL FUMARATE DIHYDRATE 2 PUFF: 160; 4.5 AEROSOL RESPIRATORY (INHALATION) at 19:18

## 2025-07-05 RX ADMIN — ENOXAPARIN SODIUM 30 MG: 100 INJECTION SUBCUTANEOUS at 08:11

## 2025-07-05 RX ADMIN — BUDESONIDE AND FORMOTEROL FUMARATE DIHYDRATE 2 PUFF: 160; 4.5 AEROSOL RESPIRATORY (INHALATION) at 07:24

## 2025-07-05 RX ADMIN — AMLODIPINE BESYLATE 5 MG: 5 TABLET ORAL at 08:10

## 2025-07-05 RX ADMIN — TIOTROPIUM BROMIDE INHALATION SPRAY 5 MCG: 3.12 SPRAY, METERED RESPIRATORY (INHALATION) at 07:24

## 2025-07-05 ASSESSMENT — PAIN DESCRIPTION - LOCATION: LOCATION: GROIN

## 2025-07-05 ASSESSMENT — PAIN DESCRIPTION - ORIENTATION: ORIENTATION: LEFT

## 2025-07-05 ASSESSMENT — PAIN DESCRIPTION - DESCRIPTORS: DESCRIPTORS: ACHING

## 2025-07-05 ASSESSMENT — PAIN SCALES - GENERAL: PAINLEVEL_OUTOF10: 5

## 2025-07-05 ASSESSMENT — PAIN DESCRIPTION - FREQUENCY: FREQUENCY: INTERMITTENT

## 2025-07-06 VITALS
TEMPERATURE: 98.1 F | HEART RATE: 64 BPM | SYSTOLIC BLOOD PRESSURE: 138 MMHG | HEIGHT: 62 IN | RESPIRATION RATE: 21 BRPM | WEIGHT: 293 LBS | BODY MASS INDEX: 53.92 KG/M2 | OXYGEN SATURATION: 95 % | DIASTOLIC BLOOD PRESSURE: 57 MMHG

## 2025-07-06 LAB — PROCALCITONIN SERPL-MCNC: 1.65 NG/ML

## 2025-07-06 PROCEDURE — 84145 PROCALCITONIN (PCT): CPT

## 2025-07-06 PROCEDURE — 97530 THERAPEUTIC ACTIVITIES: CPT

## 2025-07-06 PROCEDURE — 6370000000 HC RX 637 (ALT 250 FOR IP): Performed by: HOSPITALIST

## 2025-07-06 PROCEDURE — 6370000000 HC RX 637 (ALT 250 FOR IP): Performed by: STUDENT IN AN ORGANIZED HEALTH CARE EDUCATION/TRAINING PROGRAM

## 2025-07-06 PROCEDURE — 6360000002 HC RX W HCPCS: Performed by: STUDENT IN AN ORGANIZED HEALTH CARE EDUCATION/TRAINING PROGRAM

## 2025-07-06 PROCEDURE — 97535 SELF CARE MNGMENT TRAINING: CPT

## 2025-07-06 PROCEDURE — 36415 COLL VENOUS BLD VENIPUNCTURE: CPT

## 2025-07-06 RX ORDER — ALBUTEROL SULFATE 90 UG/1
2 INHALANT RESPIRATORY (INHALATION) EVERY 4 HOURS PRN
Qty: 1 EACH | Refills: 0 | Status: ON HOLD | OUTPATIENT
Start: 2025-07-06 | End: 2025-07-10

## 2025-07-06 RX ORDER — CEFDINIR 300 MG/1
300 CAPSULE ORAL 2 TIMES DAILY
Qty: 14 CAPSULE | Refills: 0 | Status: SHIPPED | OUTPATIENT
Start: 2025-07-06 | End: 2025-07-13

## 2025-07-06 RX ORDER — BUDESONIDE AND FORMOTEROL FUMARATE DIHYDRATE 160; 4.5 UG/1; UG/1
2 AEROSOL RESPIRATORY (INHALATION)
Qty: 2 EACH | Refills: 0 | Status: ON HOLD | OUTPATIENT
Start: 2025-07-06 | End: 2025-07-10

## 2025-07-06 RX ADMIN — SERTRALINE HYDROCHLORIDE 25 MG: 50 TABLET ORAL at 08:54

## 2025-07-06 RX ADMIN — AMLODIPINE BESYLATE 5 MG: 5 TABLET ORAL at 08:55

## 2025-07-06 RX ADMIN — ENOXAPARIN SODIUM 30 MG: 100 INJECTION SUBCUTANEOUS at 08:54

## 2025-07-06 RX ADMIN — FUROSEMIDE 20 MG: 20 TABLET ORAL at 08:55

## 2025-07-06 RX ADMIN — ASPIRIN 81 MG: 81 TABLET, CHEWABLE ORAL at 08:55

## 2025-07-06 RX ADMIN — VALSARTAN 160 MG: 160 TABLET, FILM COATED ORAL at 08:55

## 2025-07-06 RX ADMIN — ACETAMINOPHEN 650 MG: 325 TABLET ORAL at 09:05

## 2025-07-06 ASSESSMENT — PAIN DESCRIPTION - DESCRIPTORS: DESCRIPTORS: ACHING

## 2025-07-06 ASSESSMENT — PAIN DESCRIPTION - LOCATION: LOCATION: LEG

## 2025-07-06 ASSESSMENT — PAIN DESCRIPTION - ONSET: ONSET: GRADUAL

## 2025-07-06 ASSESSMENT — PAIN DESCRIPTION - ORIENTATION: ORIENTATION: RIGHT

## 2025-07-06 ASSESSMENT — PAIN - FUNCTIONAL ASSESSMENT: PAIN_FUNCTIONAL_ASSESSMENT: ACTIVITIES ARE NOT PREVENTED

## 2025-07-06 ASSESSMENT — PAIN DESCRIPTION - FREQUENCY: FREQUENCY: INTERMITTENT

## 2025-07-06 ASSESSMENT — PAIN SCALES - GENERAL: PAINLEVEL_OUTOF10: 3

## 2025-07-06 ASSESSMENT — PAIN DESCRIPTION - PAIN TYPE: TYPE: ACUTE PAIN

## 2025-07-06 NOTE — CARE COORDINATION
Pt on discharge.  CM discussed with Dr. Leonard, plan home with home care.    Referral made to [facility].  Referral sent via [employer].  Pt information and home care order faxed to [phone]  
individualized plan of care/goals, treatment preferences, and shares the quality data associated with the providers?  Yes

## 2025-07-06 NOTE — PROGRESS NOTES
V2.0  [facility] [place]      Name:  Nusrat Moctezuma /Age/Sex: 1950  (75 y.o. female)   MRN & CSN:  [id number] & [id number] Encounter Date/Time: 2025 10:34 AM EDT    Location:  [facility]/3122-A PCP: Cheyenne Arias DO       Hospital Day: 3    Assessment and Plan:   Nusrat Moctezuma is a 75 y.o. female with pmh of hypertension, hyperlipidemia, COPD, chronic diastolic heart failure, morbid obesity/JAMIE   who presents with Severe sepsis (HCC)      Plan:  Severe sepsis POA  Secondary to UTI, less likely lower extremity cellulitis  CT chest abdomen pelvis reviewed no acute findings.  Urine culture showing E. coli awaiting final sensitivities  - BCx : NGTD.  Stop cefepime and placed on ceftriaxone.     Hypertension  Resume home amlodipine and valsartan in the setting of severe sepsis     COPD not in exacerbation  Symbicort and Spiriva as needed DuoNebs  - On 2 L nasal cannula.     Chronic diastolic heart failure  No evidence of pulmonary edema on imaging,   Hold home Lasix  - BNP 1.5K.    Anemia  - Hemoglobin 9.3.  No gross bleeding.    Chronic venous stasis  - Patient states she has had erythema and warmth in her right lower extremity for many years.  Likely due to venous stasis.     Morbid obesity/BMI 52.68  Obstructive sleep apnea  Noncompliant with CPAP    Diet ADULT DIET; Regular; No Added Salt (3-4 gm)   DVT Prophylaxis [] Lovenox, []  Heparin, [] SCDs, [] Ambulation,  [] Eliquis, [] Xarelto  [] Coumadin   Code Status Full Code   Disposition From: Home   Expected Disposition: TBD  Estimated Date of Discharge: 1 to 2 days  Patient requires continued admission due to severe sepsis   Surrogate Decision Maker/ POA      Subjective:     Chief Complaint: Fatigue and Dizziness       Nusrat Moctezuma is a 75 y.o. female who presents with generalized weakness and chills.      States she feels like she has kidney stones in her kidney sometimes.   She does not wear oxygen at home but does wear her aide's 
    V2.0  [facility] Hospitalist Progress Note      Name:  Nusrat Moctezuma /Age/Sex: 1950  (75 y.o. female)   MRN & CSN:  [id number] & [id number] Encounter Date/Time: 2025 10:34 AM EDT    Location:  [facility]/3122-A PCP: Cheyenne Arias DO       Hospital Day: 4    Assessment and Plan:   Nusrat Moctezuma is a 75 y.o. female with pmh of hypertension, hyperlipidemia, COPD, chronic diastolic heart failure, morbid obesity/JAMIE   who presents with Severe sepsis (HCC)      Plan:  Severe sepsis POA  Secondary to UTI, less likely lower extremity cellulitis  CT chest abdomen pelvis reviewed no acute findings.  Urine culture showing E. coli awaiting final sensitivities  - BCx : NGTD.  Off cefepime and on ceftriaxone.  - Procalcitonin 3.82.     Hypertension  Resume home amlodipine and valsartan in the setting of severe sepsis     COPD not in exacerbation  Symbicort and Spiriva as needed DuoNebs  -Off nasal cannula and on room air.     Chronic diastolic heart failure  No evidence of pulmonary edema on imaging,   - BNP 1.5K.  - Resume home Lasix due to increased edema.    Anemia  - No gross bleeding.  - Decreased hemoglobin 8.5.  Monitor.    Likely chronic venous stasis  - Patient states she has had erythema and warmth in her right lower extremity for many years.  Likely due to venous stasis.  -Check venous duplex of right lower extremity.  Restarting diuretics due to increased edema in lower extremities.     Morbid obesity/BMI 52.68  Obstructive sleep apnea  Noncompliant with CPAP    Diet ADULT DIET; Regular; No Added Salt (3-4 gm)   DVT Prophylaxis [x] Lovenox, []  Heparin, [] SCDs, [] Ambulation,  [] Eliquis, [] Xarelto  [] Coumadin   Code Status Full Code   Disposition From: Home   Expected Disposition: TBD  Estimated Date of Discharge:   Patient requires continued admission due to severe sepsis   Surrogate Decision Maker/ POA      Subjective:     Chief Complaint: Fatigue and Dizziness       Nusrat Moctezuma is a 75 y.o. 
    V2.0  [facility] [place]      Name:  Nusrat Moctezuma /Age/Sex: 1950  (75 y.o. female)   MRN & CSN:  [id number] & [id number] Encounter Date/Time: 7/3/2025 10:34 AM EDT    Location:  [facility]/3122-A PCP: Cheyenne Arias DO       Hospital Day: 2    Assessment and Plan:   Nusrat Moctezuma is a 75 y.o. female with pmh of hypertension, hyperlipidemia, COPD, chronic diastolic heart failure, morbid obesity/JAMIE   who presents with Severe sepsis (HCC)      Plan:  Severe sepsis POA  Secondary to UTI, less likely lower extremity cellulitis  CT chest abdomen pelvis reviewed no acute findings  continue IV cefepime blood culture no growth for 1 day, urine culture showing E. coli awaiting final sensitivities     Hypertension  Resume home amlodipine and valsartan in the setting of severe sepsis     COPD not in exacerbation  Symbicort and Spiriva as needed DuoNebs     Chronic diastolic heart failure  No evidence of pulmonary edema on imaging,   Hold home Lasix     Morbid obesity/BMI 52.68  Obstructive sleep apnea  Noncompliant with CPAP    Diet ADULT DIET; Regular; No Added Salt (3-4 gm)   DVT Prophylaxis [] Lovenox, []  Heparin, [] SCDs, [] Ambulation,  [] Eliquis, [] Xarelto  [] Coumadin   Code Status Full Code   Disposition From: Home   Expected Disposition: TBD  Estimated Date of Discharge: 1 to 2 days  Patient requires continued admission due to severe sepsis   Surrogate Decision Maker/ POA      Subjective:     Chief Complaint: Fatigue and Dizziness       Nusrat Moctezuma is a 75 y.o. female who presents with generalized weakness and chills.  Seen and examined at bedside still feels very tired has been sleeping a lot denies any pain or burning with urination         Review of Systems:    Review of Systems    As above  Objective:     Intake/Output Summary (Last 24 hours) at 7/3/2025 1034  Last data filed at 7/3/2025 1000  Gross per 24 hour   Intake 540 ml   Output 1075 ml   Net -535 ml        Vitals:   Vitals:    
    V2.0  [facility] [place] Hospitalist Progress Note      Name:  Nusrat Moctezuma /Age/Sex: 1950  (75 y.o. female)   MRN & CSN:  [id number] & [id number] Encounter Date/Time: 2025 10:34 AM EDT    Location:  [facility]/3122-A PCP: Cheyenne Arias DO       Hospital Day: 5    Assessment and Plan:   Nusrat Moctezuma is a 75 y.o. female with pmh of hypertension, hyperlipidemia, COPD, chronic diastolic heart failure, morbid obesity/JAMIE   who presents with Severe sepsis (HCC)      Plan:  Severe sepsis POA  Secondary to UTI, less likely lower extremity cellulitis  CT chest abdomen pelvis reviewed no acute findings.  Urine culture showing E. coli awaiting final sensitivities  - BCx : NGTD.  Off cefepime and on ceftriaxone.  - Procalcitonin 3.82.     Hypertension  Resume home amlodipine and valsartan in the setting of severe sepsis     COPD not in exacerbation  Symbicort and Spiriva as needed DuoNebs  -Off nasal cannula and on room air.     Chronic diastolic heart failure  No evidence of pulmonary edema on imaging,   - BNP 1.5K.  - Resume home Lasix due to increased edema.    Anemia  - No gross bleeding.  - Decreased hemoglobin 8.5.  Monitor.    Likely chronic venous stasis  - Patient states she has had erythema and warmth in her right lower extremity for many years.  Likely due to venous stasis.  -Check venous duplex of right lower extremity.  Restarting diuretics due to increased edema in lower extremities.  - Venous duplex negative for DVT.       Morbid obesity/BMI 52.68  Obstructive sleep apnea  Noncompliant with CPAP    Diet ADULT DIET; Regular; No Added Salt (3-4 gm)   DVT Prophylaxis [x] Lovenox, []  Heparin, [] SCDs, [] Ambulation,  [] Eliquis, [] Xarelto  [] Coumadin   Code Status Full Code   Disposition From: Home   Expected Disposition: TBD  Estimated Date of Discharge:   Patient requires continued admission due to severe sepsis   Surrogate Decision Maker/ POA      Subjective:     Chief Complaint: Fatigue and 
4 Eyes Skin Assessment     NAME:  Nusrat Moctezuma  YOB: 1950  MEDICAL RECORD NUMBER:  [id number]    The patient is being assessed for  Admission    I agree that at least one RN has performed a thorough Head to Toe Skin Assessment on the patient. ALL assessment sites listed below have been assessed.      Areas assessed by both nurses:    Head, Face, Ears, Shoulders, Back, Chest, Arms, Elbows, Hands, Sacrum. Buttock, Coccyx, Ischium, and Legs. Feet and Heels        Does the Patient have a Wound? Yes wound(s) were present on assessment. LDA wound assessment was Initiated and completed by RN  BLE scabbing         Rangel Prevention initiated by RN: No  Wound Care Orders initiated by RN: Yes    Pressure Injury (Stage 1,2,3,4, Unstageable, DTI, NWPT, and Complex wounds) if present, place Wound referral order by RN under : No    New Ostomies, if present place, Ostomy referral order under : No     Nurse 1 eSignature: Electronically signed by Kee Barksdale RN on 7/2/25 at 8:00 AM EDT    **SHARE this note so that the co-signing nurse can place an eSignature**    Nurse 2 eSignature: Electronically signed by Palmira Harris RN on 7/2/25 at 9:28 AM EDT  
Occupational Therapy      Occupational Therapy Treatment Note    Name: Nusrat Moctezuma MRN: [id number] :   1950   Date:  2025   Admission Date: 2025 Room:  [facility]     Primary Problem:  Severe sepsis    Restrictions/Precautions:          General Precautions, Fall Risk, Contact Precautions    Communication with other providers: Nursing handoff    Subjective:  Patient states:  \"I walked to the bathroom yesterday\"  Pain:   4/10 pain in RLE, constant, aching    Objective:    Observation: Pt received supine in bed, RLE red and swollen, agreeable to therapy   Objective Measures:  Vitals stable throughout session    Treatment, including education:  Self Care Training:   Cues were given for safety, sequence, UE/LE placement, visual cues, and balance.    Activities performed today included grooming, LB dressing    Therapeutic Activity Training:   Therapeutic activity training was instructed today.  Cues were given for safety, sequence, UE/LE placement, awareness, and balance.    Activities performed today included bed mobility training, sup-sit, sit-stand, functional mobility, stand to sit    Supine to sit SBA w/ increased time to complete. Sitting EOB SBA, grooming washing face/hands w/ warm washcloth SBA. LB dressing threading socks maxA. STS from EOB up to 4WW CGA, LB dressing doffing soiled depends maxA, donning fresh depends maxA. Functional mobility in room w/ 4WW CGA w/ increased time to complete (See PT notes for gait details). Stand to sit from 4WW to reclining chair CGA.    Pt sitting upright in chair, chair alarm on, call light at side, nursing notified       Assessment / Impression:    Patient's tolerance of treatment: Well  Adverse Reaction: None  Significant change in status and impact: Improved from initial evaluation  Barriers to improvement: None noted      Plan for Next Session:    Continue OT POC    Time in:  654  Time out:  734  Timed treatment minutes:  40  Total treatment time:  40 
Patient admitted earlier this morning by my partner.  History and physical reviewed.  Agree with history and physical.  Continue with plan of care.  Continue current measures for now.      Princess Meléndez MD  Hospitalist    
RENAL DOSE ADJUSTMENT MADE PER P/T PROTOCOL    PREVIOUS ORDER:  Ceftriaxone 100mg IV q24h    Estimated Creatinine Clearance: 79 mL/min (based on SCr of 0.8 mg/dL).  Recent Labs     07/02/25  0424 07/03/25  0346 07/04/25  1041   BUN 24* 21* 23*   CREATININE 1.0 0.9 0.8    194  --      NEW RENALLY ADJUSTED ORDER:  Ceftriaxone 2000mg IV Q24h per [facility] protocol for BMI >30 kg/m2    Bhaskar العلي RPH  7/4/2025 7:00 PM     
Ra  Procal 3.82  Wbc 14.1  Hgb 8.5  Vd-  
Breast lumpectomy (2007); Dilation and curettage of uterus (2006); hernia repair (2004); hernia repair (2008); Dental surgery; Breast surgery (02/13/2012); Tunneled venous port placement (02/13/2012); Breast biopsy (2/13/12); Mastectomy, radical (Right, 02/29/2012); pre-malignant / benign skin lesion excision (2/8/2013); lymph node dissection (2/29/2012); Cardiac catheterization (2007 & 2011); A-V cardiac pacemaker insertion (3/10/14); Cystoscopy (Bilateral, 12/15/14); pacemaker placement; Endoscopy, colon, diagnostic (12/14/2017); Colonoscopy (10-18-13); Colonoscopy (1/19/2016); Colonoscopy (12/14/2017); Colonoscopy (N/A, 1/16/2019); Colonoscopy (01/21/2020); Colonoscopy (N/A, 1/21/2020); Carotid endarterectomy (Right, 12/3/2021); Cystoscopy (Right, 5/15/2022); Dilation and curettage of uterus (N/A, 5/24/2022); Kidney surgery (05/24/2022); Lithotripsy (Right, 8/1/2022); and Lithotripsy (Left, 9/26/2022).    Subjective:  Patient states:  \"It's been a couple of days since I've stood up\".    Pain:  4/10 pain in R leg feels like a strain.    Pain Intervention: increased movement, repositioned, RN notified  Communication with other providers:  Handoff to RN  Restrictions: General Precautions, Fall Risk, Contact Precautions    Home Setup/Prior level of function   Social/Functional History  Lives With: Friend(s)  Type of Home: Senior housing apartment  Home Layout: One level  Home Access: Elevator  Bathroom Shower/Tub: Tub/Shower unit  Bathroom Toilet: Standard  Bathroom Equipment: Grab bars in shower, Shower chair  Home Equipment: Rollator, Cane (mod I with Rollator)  Has the patient had two or more falls in the past year or any fall with injury in the past year?: Yes  Prior Level of Assist for ADLs: Independent  Toileting: Independent  Prior Level of Assist for Homemaking: Needs assistance (Friend performs)  Homemaking Responsibilities: No  Prior Level of Assist for Ambulation: Independent household ambulator, with or

## 2025-07-06 NOTE — DISCHARGE INSTRUCTIONS
Medications: see computerized discharge medication list  Activity: activity as tolerated  Diet: ADULT DIET; Regular; No Added Salt (3-4 gm)  Wound Care: keep wound clean and dry  Disposition: home with home health  Discharged Condition: Stable

## 2025-07-06 NOTE — DISCHARGE SUMMARY
V2.0  Discharge Summary    Name:  Nusrat Moctezuma /Age/Sex: 1950 (75 y.o. female)   Admit Date: 2025  Discharge Date: 25    MRN & CSN:  [id number] & [id number] Encounter Date and Time 25 2:41 PM EDT    Attending:  Haja Leonard MD Discharging Provider: Haja Leonard MD       Hospital Course:     HPI:   Nusrat Moctezuma is a 75 y.o. female with hypertension, hyperlipidemia, COPD, chronic diastolic heart failure, morbid obesity/JAMIE presented from home with fatigue.  Patient states that her symptoms started last night describes that \"she could not move\" had lack of energy and thought she was \"heavy\".  Endorses compliance with home medications but noncompliance with CPAP.  During my evaluation in ER patient was alert oriented x 3 hemodynamically stable denies any chest pain LOC nausea vomiting diarrhea fever night sweats or chills.  States that she has mild discomfort in right lower extremity and foot.     Problem Based Course:   Severe sepsis POA  Secondary to UTI, less likely lower extremity cellulitis  CT chest abdomen pelvis reviewed no acute findings.  Urine culture showing E. coli awaiting final sensitivities  - BCx : NGTD.  Off cefepime and on ceftriaxone.  - Procalcitonin 3.82.     Hypertension  Resume home amlodipine and valsartan in the setting of severe sepsis     COPD not in exacerbation  Symbicort and Spiriva as needed DuoNebs  -Off nasal cannula and on room air.     Chronic diastolic heart failure  No evidence of pulmonary edema on imaging,   - BNP 1.5K.  - Resume home Lasix due to increased edema.     Anemia  - No gross bleeding.  - Decreased hemoglobin 8.5.  Monitor.     Likely chronic venous stasis  - Patient states she has had erythema and warmth in her right lower extremity for many years.  Likely due to venous stasis.  -Check venous duplex of right lower extremity.  Restarting diuretics due to increased edema in lower extremities.  - Venous duplex negative for DVT.    Comment:

## 2025-07-06 NOTE — PLAN OF CARE
Problem: Discharge Planning  Goal: Discharge to home or other facility with appropriate resources  Outcome: Progressing     Problem: Skin/Tissue Integrity  Goal: Skin integrity remains intact  Description: 1.  Monitor for areas of redness and/or skin breakdown  2.  Assess vascular access sites hourly  3.  Every 4-6 hours minimum:  Change oxygen saturation probe site  4.  Every 4-6 hours:  If on nasal continuous positive airway pressure, respiratory therapy assess nares and determine need for appliance change or resting period  Outcome: Progressing     
  Problem: Pain  Goal: Verbalizes/displays adequate comfort level or baseline comfort level  7/2/2025 2233 by Eric De Anda RN  Outcome: Progressing  7/2/2025 0918 by Kee Barksdale RN  Outcome: Progressing     Problem: Discharge Planning  Goal: Discharge to home or other facility with appropriate resources  7/2/2025 2233 by Eric De Anda RN  Outcome: Progressing  7/2/2025 0918 by Kee Barksdale RN  Outcome: Progressing     Problem: Safety - Adult  Goal: Free from fall injury  7/2/2025 2233 by Eric De Anda RN  Outcome: Progressing  7/2/2025 0918 by Kee Barksdale RN  Outcome: Progressing     Problem: Skin/Tissue Integrity  Goal: Skin integrity remains intact  Description: 1.  Monitor for areas of redness and/or skin breakdown  2.  Assess vascular access sites hourly  3.  Every 4-6 hours minimum:  Change oxygen saturation probe site  4.  Every 4-6 hours:  If on nasal continuous positive airway pressure, respiratory therapy assess nares and determine need for appliance change or resting period  7/2/2025 2233 by Eric De Anda RN  Outcome: Progressing  7/2/2025 0918 by Kee Barksdale RN  Outcome: Progressing     Problem: ABCDS Injury Assessment  Goal: Absence of physical injury  7/2/2025 2233 by Eric De Anda RN  Outcome: Progressing  7/2/2025 0918 by Kee Barksdale RN  Outcome: Progressing     
  Problem: Pain  Goal: Verbalizes/displays adequate comfort level or baseline comfort level  Outcome: Progressing     Problem: Discharge Planning  Goal: Discharge to home or other facility with appropriate resources  Outcome: Progressing     Problem: Safety - Adult  Goal: Free from fall injury  Outcome: Progressing     Problem: Skin/Tissue Integrity  Goal: Skin integrity remains intact  Description: 1.  Monitor for areas of redness and/or skin breakdown  2.  Assess vascular access sites hourly  3.  Every 4-6 hours minimum:  Change oxygen saturation probe site  4.  Every 4-6 hours:  If on nasal continuous positive airway pressure, respiratory therapy assess nares and determine need for appliance change or resting period  Outcome: Progressing     Problem: ABCDS Injury Assessment  Goal: Absence of physical injury  Outcome: Progressing     
  Problem: Pain  Goal: Verbalizes/displays adequate comfort level or baseline comfort level  Outcome: Progressing     Problem: Discharge Planning  Goal: Discharge to home or other facility with appropriate resources  Outcome: Progressing  Flowsheets (Taken 7/4/2025 3946)  Discharge to home or other facility with appropriate resources: Identify barriers to discharge with patient and caregiver     Problem: Safety - Adult  Goal: Free from fall injury  Outcome: Progressing     Problem: Skin/Tissue Integrity  Goal: Skin integrity remains intact  Description: 1.  Monitor for areas of redness and/or skin breakdown  2.  Assess vascular access sites hourly  3.  Every 4-6 hours minimum:  Change oxygen saturation probe site  4.  Every 4-6 hours:  If on nasal continuous positive airway pressure, respiratory therapy assess nares and determine need for appliance change or resting period  Outcome: Progressing     Problem: ABCDS Injury Assessment  Goal: Absence of physical injury  Outcome: Progressing     
No

## 2025-07-07 ENCOUNTER — HOSPITAL ENCOUNTER (INPATIENT)
Age: 75
LOS: 3 days | Discharge: HOME HEALTH | DRG: 603 | End: 2025-07-10
Attending: INTERNAL MEDICINE | Admitting: INTERNAL MEDICINE
Payer: MEDICARE

## 2025-07-07 ENCOUNTER — CARE COORDINATION (OUTPATIENT)
Dept: CASE MANAGEMENT | Age: 75
End: 2025-07-07

## 2025-07-07 ENCOUNTER — RESULTS FOLLOW-UP (OUTPATIENT)
Dept: EMERGENCY DEPT | Age: 75
End: 2025-07-07

## 2025-07-07 ENCOUNTER — APPOINTMENT (OUTPATIENT)
Dept: GENERAL RADIOLOGY | Age: 75
DRG: 603 | End: 2025-07-07
Payer: MEDICARE

## 2025-07-07 DIAGNOSIS — I83.022 VENOUS STASIS ULCER OF LEFT CALF WITH FAT LAYER EXPOSED, UNSPECIFIED WHETHER VARICOSE VEINS PRESENT (HCC): ICD-10-CM

## 2025-07-07 DIAGNOSIS — L03.115 CELLULITIS OF FOOT, RIGHT: Primary | ICD-10-CM

## 2025-07-07 DIAGNOSIS — J44.9 CHRONIC OBSTRUCTIVE PULMONARY DISEASE, UNSPECIFIED COPD TYPE (HCC): ICD-10-CM

## 2025-07-07 DIAGNOSIS — A41.9 SEVERE SEPSIS (HCC): Primary | ICD-10-CM

## 2025-07-07 DIAGNOSIS — L97.222 VENOUS STASIS ULCER OF LEFT CALF WITH FAT LAYER EXPOSED, UNSPECIFIED WHETHER VARICOSE VEINS PRESENT (HCC): ICD-10-CM

## 2025-07-07 DIAGNOSIS — L03.115 CELLULITIS OF RIGHT LOWER EXTREMITY: ICD-10-CM

## 2025-07-07 DIAGNOSIS — R65.20 SEVERE SEPSIS (HCC): Primary | ICD-10-CM

## 2025-07-07 DIAGNOSIS — R60.0 EDEMA OF RIGHT LOWER EXTREMITY: ICD-10-CM

## 2025-07-07 PROBLEM — L03.90 CELLULITIS: Status: ACTIVE | Noted: 2025-07-07

## 2025-07-07 LAB
ALBUMIN SERPL-MCNC: 3.1 G/DL (ref 3.4–5)
ALBUMIN/GLOB SERPL: 0.8 {RATIO} (ref 1.1–2.2)
ALP SERPL-CCNC: 122 U/L (ref 40–129)
ALT SERPL-CCNC: 14 U/L (ref 10–40)
ANION GAP SERPL CALCULATED.3IONS-SCNC: 10 MMOL/L (ref 9–17)
AST SERPL-CCNC: 20 U/L (ref 15–37)
BASOPHILS # BLD: 0.08 K/UL
BASOPHILS NFR BLD: 1 % (ref 0–1)
BILIRUB SERPL-MCNC: <0.2 MG/DL (ref 0–1)
BUN SERPL-MCNC: 20 MG/DL (ref 7–20)
CALCIUM SERPL-MCNC: 9.4 MG/DL (ref 8.3–10.6)
CHLORIDE SERPL-SCNC: 106 MMOL/L (ref 99–110)
CO2 SERPL-SCNC: 25 MMOL/L (ref 21–32)
CREAT SERPL-MCNC: 0.6 MG/DL (ref 0.6–1.2)
EOSINOPHIL # BLD: 0.5 K/UL
EOSINOPHILS RELATIVE PERCENT: 4 % (ref 0–3)
ERYTHROCYTE [DISTWIDTH] IN BLOOD BY AUTOMATED COUNT: 15.1 % (ref 11.7–14.9)
GFR, ESTIMATED: >90 ML/MIN/1.73M2
GLUCOSE SERPL-MCNC: 154 MG/DL (ref 74–99)
HCT VFR BLD AUTO: 29.7 % (ref 37–47)
HGB BLD-MCNC: 9.2 G/DL (ref 12.5–16)
IMM GRANULOCYTES # BLD AUTO: 0.61 K/UL
IMM GRANULOCYTES NFR BLD: 5 %
LYMPHOCYTES NFR BLD: 2.21 K/UL
LYMPHOCYTES RELATIVE PERCENT: 16 % (ref 24–44)
MCH RBC QN AUTO: 26.7 PG (ref 27–31)
MCHC RBC AUTO-ENTMCNC: 31 G/DL (ref 32–36)
MCV RBC AUTO: 86.1 FL (ref 78–100)
MONOCYTES NFR BLD: 0.78 K/UL
MONOCYTES NFR BLD: 6 % (ref 0–5)
NEUTROPHILS NFR BLD: 69 % (ref 36–66)
NEUTS SEG NFR BLD: 9.37 K/UL
PLATELET # BLD AUTO: 299 K/UL (ref 140–440)
PMV BLD AUTO: 9.7 FL (ref 7.5–11.1)
POTASSIUM SERPL-SCNC: 3.8 MMOL/L (ref 3.5–5.1)
PROCALCITONIN SERPL-MCNC: 0.8 NG/ML
PROT SERPL-MCNC: 7 G/DL (ref 6.4–8.2)
RBC # BLD AUTO: 3.45 M/UL (ref 4.2–5.4)
SODIUM SERPL-SCNC: 141 MMOL/L (ref 136–145)
WBC OTHER # BLD: 13.6 K/UL (ref 4–10.5)

## 2025-07-07 PROCEDURE — 96375 TX/PRO/DX INJ NEW DRUG ADDON: CPT

## 2025-07-07 PROCEDURE — 1200000000 HC SEMI PRIVATE

## 2025-07-07 PROCEDURE — 80053 COMPREHEN METABOLIC PANEL: CPT

## 2025-07-07 PROCEDURE — 84145 PROCALCITONIN (PCT): CPT

## 2025-07-07 PROCEDURE — 99285 EMERGENCY DEPT VISIT HI MDM: CPT

## 2025-07-07 PROCEDURE — 71045 X-RAY EXAM CHEST 1 VIEW: CPT

## 2025-07-07 PROCEDURE — 6360000002 HC RX W HCPCS: Performed by: PHYSICIAN ASSISTANT

## 2025-07-07 PROCEDURE — 96374 THER/PROPH/DIAG INJ IV PUSH: CPT

## 2025-07-07 PROCEDURE — 73630 X-RAY EXAM OF FOOT: CPT

## 2025-07-07 PROCEDURE — 2500000003 HC RX 250 WO HCPCS: Performed by: PHYSICIAN ASSISTANT

## 2025-07-07 PROCEDURE — 1111F DSCHRG MED/CURRENT MED MERGE: CPT | Performed by: FAMILY MEDICINE

## 2025-07-07 PROCEDURE — 85025 COMPLETE CBC W/AUTO DIFF WBC: CPT

## 2025-07-07 RX ORDER — POLYETHYLENE GLYCOL 3350 17 G/17G
17 POWDER, FOR SOLUTION ORAL DAILY PRN
Status: DISCONTINUED | OUTPATIENT
Start: 2025-07-07 | End: 2025-07-10 | Stop reason: HOSPADM

## 2025-07-07 RX ORDER — FUROSEMIDE 10 MG/ML
40 INJECTION INTRAMUSCULAR; INTRAVENOUS ONCE
Status: COMPLETED | OUTPATIENT
Start: 2025-07-07 | End: 2025-07-07

## 2025-07-07 RX ORDER — AMLODIPINE BESYLATE 5 MG/1
5 TABLET ORAL NIGHTLY
Status: DISCONTINUED | OUTPATIENT
Start: 2025-07-07 | End: 2025-07-10 | Stop reason: HOSPADM

## 2025-07-07 RX ORDER — ENOXAPARIN SODIUM 100 MG/ML
30 INJECTION SUBCUTANEOUS 2 TIMES DAILY
Status: DISCONTINUED | OUTPATIENT
Start: 2025-07-08 | End: 2025-07-10 | Stop reason: HOSPADM

## 2025-07-07 RX ORDER — POTASSIUM CHLORIDE 7.45 MG/ML
10 INJECTION INTRAVENOUS PRN
Status: DISCONTINUED | OUTPATIENT
Start: 2025-07-07 | End: 2025-07-10 | Stop reason: HOSPADM

## 2025-07-07 RX ORDER — FERROUS SULFATE 325(65) MG
325 TABLET ORAL
Status: DISCONTINUED | OUTPATIENT
Start: 2025-07-08 | End: 2025-07-10 | Stop reason: HOSPADM

## 2025-07-07 RX ORDER — ONDANSETRON 4 MG/1
4 TABLET, ORALLY DISINTEGRATING ORAL EVERY 8 HOURS PRN
Status: DISCONTINUED | OUTPATIENT
Start: 2025-07-07 | End: 2025-07-10 | Stop reason: HOSPADM

## 2025-07-07 RX ORDER — SODIUM CHLORIDE 0.9 % (FLUSH) 0.9 %
5-40 SYRINGE (ML) INJECTION EVERY 12 HOURS SCHEDULED
Status: DISCONTINUED | OUTPATIENT
Start: 2025-07-07 | End: 2025-07-10 | Stop reason: HOSPADM

## 2025-07-07 RX ORDER — CALCIUM CARBONATE/VITAMIN D3 600 MG-10
1 TABLET ORAL DAILY
Status: DISCONTINUED | OUTPATIENT
Start: 2025-07-08 | End: 2025-07-07

## 2025-07-07 RX ORDER — LANOLIN ALCOHOL/MO/W.PET/CERES
1000 CREAM (GRAM) TOPICAL DAILY
Status: DISCONTINUED | OUTPATIENT
Start: 2025-07-08 | End: 2025-07-10 | Stop reason: HOSPADM

## 2025-07-07 RX ORDER — MAGNESIUM SULFATE IN WATER 40 MG/ML
2000 INJECTION, SOLUTION INTRAVENOUS PRN
Status: DISCONTINUED | OUTPATIENT
Start: 2025-07-07 | End: 2025-07-10 | Stop reason: HOSPADM

## 2025-07-07 RX ORDER — ACETAMINOPHEN 325 MG/1
650 TABLET ORAL EVERY 6 HOURS PRN
Status: DISCONTINUED | OUTPATIENT
Start: 2025-07-07 | End: 2025-07-10 | Stop reason: HOSPADM

## 2025-07-07 RX ORDER — ONDANSETRON 2 MG/ML
4 INJECTION INTRAMUSCULAR; INTRAVENOUS EVERY 6 HOURS PRN
Status: DISCONTINUED | OUTPATIENT
Start: 2025-07-07 | End: 2025-07-10 | Stop reason: HOSPADM

## 2025-07-07 RX ORDER — BUDESONIDE AND FORMOTEROL FUMARATE DIHYDRATE 160; 4.5 UG/1; UG/1
2 AEROSOL RESPIRATORY (INHALATION)
Status: DISCONTINUED | OUTPATIENT
Start: 2025-07-07 | End: 2025-07-08

## 2025-07-07 RX ORDER — SODIUM CHLORIDE 0.9 % (FLUSH) 0.9 %
5-40 SYRINGE (ML) INJECTION PRN
Status: DISCONTINUED | OUTPATIENT
Start: 2025-07-07 | End: 2025-07-10 | Stop reason: HOSPADM

## 2025-07-07 RX ORDER — ASPIRIN 81 MG/1
81 TABLET, CHEWABLE ORAL DAILY
Status: DISCONTINUED | OUTPATIENT
Start: 2025-07-08 | End: 2025-07-10 | Stop reason: HOSPADM

## 2025-07-07 RX ORDER — POTASSIUM CHLORIDE 1500 MG/1
40 TABLET, EXTENDED RELEASE ORAL PRN
Status: DISCONTINUED | OUTPATIENT
Start: 2025-07-07 | End: 2025-07-10 | Stop reason: HOSPADM

## 2025-07-07 RX ORDER — SODIUM CHLORIDE 9 MG/ML
INJECTION, SOLUTION INTRAVENOUS PRN
Status: DISCONTINUED | OUTPATIENT
Start: 2025-07-07 | End: 2025-07-10 | Stop reason: HOSPADM

## 2025-07-07 RX ORDER — VALSARTAN 160 MG/1
160 TABLET ORAL DAILY
Status: DISCONTINUED | OUTPATIENT
Start: 2025-07-08 | End: 2025-07-10 | Stop reason: HOSPADM

## 2025-07-07 RX ORDER — ENOXAPARIN SODIUM 100 MG/ML
40 INJECTION SUBCUTANEOUS DAILY
Status: DISCONTINUED | OUTPATIENT
Start: 2025-07-08 | End: 2025-07-07

## 2025-07-07 RX ORDER — ACETAMINOPHEN 650 MG/1
650 SUPPOSITORY RECTAL EVERY 6 HOURS PRN
Status: DISCONTINUED | OUTPATIENT
Start: 2025-07-07 | End: 2025-07-10 | Stop reason: HOSPADM

## 2025-07-07 RX ORDER — ALBUTEROL SULFATE 90 UG/1
2 INHALANT RESPIRATORY (INHALATION) EVERY 4 HOURS PRN
Status: DISCONTINUED | OUTPATIENT
Start: 2025-07-07 | End: 2025-07-10 | Stop reason: HOSPADM

## 2025-07-07 RX ADMIN — WATER 1000 MG: 1 INJECTION INTRAMUSCULAR; INTRAVENOUS; SUBCUTANEOUS at 21:38

## 2025-07-07 RX ADMIN — FUROSEMIDE 40 MG: 10 INJECTION, SOLUTION INTRAMUSCULAR; INTRAVENOUS at 21:34

## 2025-07-07 ASSESSMENT — PAIN SCALES - GENERAL: PAINLEVEL_OUTOF10: 5

## 2025-07-07 ASSESSMENT — PAIN DESCRIPTION - ORIENTATION: ORIENTATION: RIGHT

## 2025-07-07 ASSESSMENT — PAIN DESCRIPTION - FREQUENCY: FREQUENCY: INTERMITTENT

## 2025-07-07 ASSESSMENT — PAIN DESCRIPTION - DESCRIPTORS: DESCRIPTORS: ACHING

## 2025-07-07 ASSESSMENT — PAIN DESCRIPTION - LOCATION: LOCATION: LEG

## 2025-07-07 ASSESSMENT — PAIN DESCRIPTION - PAIN TYPE: TYPE: ACUTE PAIN

## 2025-07-07 ASSESSMENT — PAIN - FUNCTIONAL ASSESSMENT: PAIN_FUNCTIONAL_ASSESSMENT: ACTIVITIES ARE NOT PREVENTED

## 2025-07-07 ASSESSMENT — PAIN DESCRIPTION - ONSET: ONSET: GRADUAL

## 2025-07-07 NOTE — CARE COORDINATION
Pt called CTN stating that swelling, and redness in RLE have now gone above knee. Pt also reports she removed dressing and stated \"skin looks like yogurt is all over it \" Pt advised to go to ED d/t the reported fast  increase of redness, swelling and tenderness. CTN attempt reach out on next business day

## 2025-07-07 NOTE — CARE COORDINATION
Care Planning:   Does patient have an Advance Directive: reviewed and current.    Medication Review:  Medication review was performed with patient,1111F entered: yes.     Remote Patient Monitoring:  Offered patient enrollment in the Remote Patient Monitoring (RPM) program for in-home monitoring: Yes, but did not enroll at this time: limited patient ability to navigate RPM/equipment.    Assessments:  Care Transitions 24 Hour Call    Schedule Follow Up Appointment with PCP: Completed  Do you have a copy of your discharge instructions?: Yes  Do you have all of your prescriptions and are they filled?: Yes (Comment: All updated/reviewed 2/14/25)  Have you been contacted by a [facility] Pharmacist?: No  Have you scheduled your follow up appointment?: Yes  How are you going to get to your appointment?: Car - family or friend to transport  Post Acute Services: Home Health  Patient DME: Straight cane, Shower chair, Walker  Do you have support at home?: Other Caregiver  Do you feel like you have everything you need to keep you well at home?: Yes  Are you an active caregiver in your home?: No  Care Transitions Interventions     Transportation Support: Declined    Meals on Wheels: Declined  DME Assistance: Declined     Senior Services: Declined     Medication Assistance Program: Declined       Social Work: Declined              Follow Up Appointment:   Discussed follow up appointments. Patient has hospital follow up appointment scheduled within 7 days of discharge.   Future Appointments         Provider Specialty Dept Phone    7/17/2025 1:40 PM Cheyenne Arias DO Internal Medicine [phone]    7/21/2025 3:20 PM Cheyenne Arias DO Internal Medicine [phone]    11/12/2025 1:30 PM Moe Santiago MD Oncology [phone]    11/12/2025 1:45 PM NO GHOSH ONC NURSE Infusion Therapy [phone]    12/31/2025 3:30 PM Yamileth Leary MD Cardiology [phone]            Care Transition Nurse provided contact information.  Plan for

## 2025-07-07 NOTE — ED PROVIDER NOTES
available for consultation as needed.  The patient and / or the family were informed of the results of any tests, a time was given to answer questions, a plan was proposed and they agreed with plan.       CLINICAL IMPRESSION:  1. Cellulitis of foot, right    2. Edema of right lower extremity        Is this patient to be included in the SEP-1 Core Measure due to severe sepsis or septic shock?   No   Exclusion criteria - the patient is NOT to be included for SEP-1 Core Measure due to:  2+ SIRS criteria are not met    DISPOSITION Decision To Admit 07/07/2025 09:53:24 PM   DISPOSITION CONDITION Stable        PATIENT REFERRED TO:  No follow-up provider specified.    DISCHARGE MEDICATIONS:  New Prescriptions    No medications on file       DISCONTINUED MEDICATIONS:  Discontinued Medications    No medications on file              (Please note the MDM and HPI sections of this note were completed with a voice recognition program.  Efforts were made to edit the dictations but occasionally words are mis-transcribed.)    Electronically signed, Davie Akhtar PA-C,          Davie Akhtar PA-C  07/07/25 8136

## 2025-07-08 ENCOUNTER — CARE COORDINATION (OUTPATIENT)
Dept: CASE MANAGEMENT | Age: 75
End: 2025-07-08

## 2025-07-08 LAB
ANION GAP SERPL CALCULATED.3IONS-SCNC: 11 MMOL/L (ref 9–17)
BASOPHILS # BLD: 0.08 K/UL
BASOPHILS NFR BLD: 1 % (ref 0–1)
BUN SERPL-MCNC: 19 MG/DL (ref 7–20)
CALCIUM SERPL-MCNC: 8.6 MG/DL (ref 8.3–10.6)
CHLORIDE SERPL-SCNC: 105 MMOL/L (ref 99–110)
CO2 SERPL-SCNC: 22 MMOL/L (ref 21–32)
CREAT SERPL-MCNC: 0.6 MG/DL (ref 0.6–1.2)
CRP SERPL HS-MCNC: 67.9 MG/L (ref 0–5)
EOSINOPHIL # BLD: 0.44 K/UL
EOSINOPHILS RELATIVE PERCENT: 4 % (ref 0–3)
ERYTHROCYTE [DISTWIDTH] IN BLOOD BY AUTOMATED COUNT: 15.3 % (ref 11.7–14.9)
ERYTHROCYTE [SEDIMENTATION RATE] IN BLOOD BY WESTERGREN METHOD: 73 MM/HR (ref 0–30)
GFR, ESTIMATED: >90 ML/MIN/1.73M2
GLUCOSE SERPL-MCNC: 127 MG/DL (ref 74–99)
HCT VFR BLD AUTO: 28.1 % (ref 37–47)
HGB BLD-MCNC: 8.5 G/DL (ref 12.5–16)
IMM GRANULOCYTES # BLD AUTO: 0.58 K/UL
IMM GRANULOCYTES NFR BLD: 5 %
LYMPHOCYTES NFR BLD: 2.11 K/UL
LYMPHOCYTES RELATIVE PERCENT: 18 % (ref 24–44)
MCH RBC QN AUTO: 26.3 PG (ref 27–31)
MCHC RBC AUTO-ENTMCNC: 30.2 G/DL (ref 32–36)
MCV RBC AUTO: 87 FL (ref 78–100)
MICROORGANISM SPEC CULT: NORMAL
MICROORGANISM SPEC CULT: NORMAL
MONOCYTES NFR BLD: 0.8 K/UL
MONOCYTES NFR BLD: 7 % (ref 0–5)
MRSA, DNA, NASAL: NOT DETECTED
NEUTROPHILS NFR BLD: 67 % (ref 36–66)
NEUTS SEG NFR BLD: 8.08 K/UL
PLATELET # BLD AUTO: 284 K/UL (ref 140–440)
PMV BLD AUTO: 9.9 FL (ref 7.5–11.1)
POTASSIUM SERPL-SCNC: 3.6 MMOL/L (ref 3.5–5.1)
RBC # BLD AUTO: 3.23 M/UL (ref 4.2–5.4)
SERVICE CMNT-IMP: NORMAL
SERVICE CMNT-IMP: NORMAL
SODIUM SERPL-SCNC: 139 MMOL/L (ref 136–145)
SPECIMEN DESCRIPTION: NORMAL
WBC OTHER # BLD: 12.1 K/UL (ref 4–10.5)

## 2025-07-08 PROCEDURE — 6360000002 HC RX W HCPCS: Performed by: LICENSED PRACTICAL NURSE

## 2025-07-08 PROCEDURE — 2700000000 HC OXYGEN THERAPY PER DAY

## 2025-07-08 PROCEDURE — 80048 BASIC METABOLIC PNL TOTAL CA: CPT

## 2025-07-08 PROCEDURE — 1200000000 HC SEMI PRIVATE

## 2025-07-08 PROCEDURE — 6360000002 HC RX W HCPCS: Performed by: INTERNAL MEDICINE

## 2025-07-08 PROCEDURE — 94640 AIRWAY INHALATION TREATMENT: CPT

## 2025-07-08 PROCEDURE — 2580000003 HC RX 258: Performed by: LICENSED PRACTICAL NURSE

## 2025-07-08 PROCEDURE — 2500000003 HC RX 250 WO HCPCS: Performed by: INTERNAL MEDICINE

## 2025-07-08 PROCEDURE — 85025 COMPLETE CBC W/AUTO DIFF WBC: CPT

## 2025-07-08 PROCEDURE — 85652 RBC SED RATE AUTOMATED: CPT

## 2025-07-08 PROCEDURE — 6370000000 HC RX 637 (ALT 250 FOR IP): Performed by: INTERNAL MEDICINE

## 2025-07-08 PROCEDURE — 36415 COLL VENOUS BLD VENIPUNCTURE: CPT

## 2025-07-08 PROCEDURE — 2580000003 HC RX 258: Performed by: INTERNAL MEDICINE

## 2025-07-08 PROCEDURE — 94761 N-INVAS EAR/PLS OXIMETRY MLT: CPT

## 2025-07-08 PROCEDURE — 86140 C-REACTIVE PROTEIN: CPT

## 2025-07-08 PROCEDURE — 87641 MR-STAPH DNA AMP PROBE: CPT

## 2025-07-08 RX ORDER — FUROSEMIDE 10 MG/ML
40 INJECTION INTRAMUSCULAR; INTRAVENOUS 2 TIMES DAILY
Status: DISCONTINUED | OUTPATIENT
Start: 2025-07-08 | End: 2025-07-10 | Stop reason: HOSPADM

## 2025-07-08 RX ORDER — BUDESONIDE AND FORMOTEROL FUMARATE DIHYDRATE 160; 4.5 UG/1; UG/1
2 AEROSOL RESPIRATORY (INHALATION)
Status: DISCONTINUED | OUTPATIENT
Start: 2025-07-08 | End: 2025-07-10 | Stop reason: HOSPADM

## 2025-07-08 RX ADMIN — SODIUM CHLORIDE 10 ML: 0.9 INJECTION, SOLUTION INTRAVENOUS at 00:11

## 2025-07-08 RX ADMIN — CALCIUM CARBONATE-CHOLECALCIFEROL TAB 250 MG-125 UNIT 2 TABLET: 250-125 TAB at 09:58

## 2025-07-08 RX ADMIN — BUDESONIDE AND FORMOTEROL FUMARATE DIHYDRATE 2 PUFF: 160; 4.5 AEROSOL RESPIRATORY (INHALATION) at 08:31

## 2025-07-08 RX ADMIN — SERTRALINE HYDROCHLORIDE 25 MG: 50 TABLET ORAL at 09:58

## 2025-07-08 RX ADMIN — ACETAMINOPHEN 650 MG: 325 TABLET ORAL at 20:52

## 2025-07-08 RX ADMIN — VANCOMYCIN HYDROCHLORIDE 2500 MG: 5 INJECTION, POWDER, LYOPHILIZED, FOR SOLUTION INTRAVENOUS at 00:49

## 2025-07-08 RX ADMIN — ACETAMINOPHEN 650 MG: 325 TABLET ORAL at 01:24

## 2025-07-08 RX ADMIN — BUDESONIDE AND FORMOTEROL FUMARATE DIHYDRATE 2 PUFF: 160; 4.5 AEROSOL RESPIRATORY (INHALATION) at 20:25

## 2025-07-08 RX ADMIN — SODIUM CHLORIDE, PRESERVATIVE FREE 10 ML: 5 INJECTION INTRAVENOUS at 20:53

## 2025-07-08 RX ADMIN — MICONAZOLE NITRATE: 2 POWDER TOPICAL at 00:15

## 2025-07-08 RX ADMIN — FERROUS SULFATE TAB 325 MG (65 MG ELEMENTAL FE) 325 MG: 325 (65 FE) TAB at 09:58

## 2025-07-08 RX ADMIN — TIOTROPIUM BROMIDE INHALATION SPRAY 5 MCG: 3.12 SPRAY, METERED RESPIRATORY (INHALATION) at 08:31

## 2025-07-08 RX ADMIN — MICONAZOLE NITRATE: 2 POWDER TOPICAL at 20:53

## 2025-07-08 RX ADMIN — CEFEPIME 2000 MG: 2 INJECTION, POWDER, FOR SOLUTION INTRAVENOUS at 10:04

## 2025-07-08 RX ADMIN — ENOXAPARIN SODIUM 30 MG: 100 INJECTION SUBCUTANEOUS at 09:58

## 2025-07-08 RX ADMIN — Medication 1000 MCG: at 09:58

## 2025-07-08 RX ADMIN — ENOXAPARIN SODIUM 30 MG: 100 INJECTION SUBCUTANEOUS at 20:52

## 2025-07-08 RX ADMIN — MICONAZOLE NITRATE: 2 POWDER TOPICAL at 10:01

## 2025-07-08 RX ADMIN — ASPIRIN 81 MG 81 MG: 81 TABLET ORAL at 09:58

## 2025-07-08 RX ADMIN — AMLODIPINE BESYLATE 5 MG: 5 TABLET ORAL at 20:52

## 2025-07-08 RX ADMIN — CEFTRIAXONE SODIUM 2000 MG: 2 INJECTION, POWDER, FOR SOLUTION INTRAMUSCULAR; INTRAVENOUS at 13:43

## 2025-07-08 RX ADMIN — CEFEPIME 2000 MG: 2 INJECTION, POWDER, FOR SOLUTION INTRAVENOUS at 00:12

## 2025-07-08 RX ADMIN — FUROSEMIDE 40 MG: 10 INJECTION, SOLUTION INTRAMUSCULAR; INTRAVENOUS at 17:59

## 2025-07-08 RX ADMIN — FUROSEMIDE 40 MG: 10 INJECTION, SOLUTION INTRAMUSCULAR; INTRAVENOUS at 09:57

## 2025-07-08 RX ADMIN — SODIUM CHLORIDE, PRESERVATIVE FREE 10 ML: 5 INJECTION INTRAVENOUS at 00:14

## 2025-07-08 ASSESSMENT — PAIN - FUNCTIONAL ASSESSMENT: PAIN_FUNCTIONAL_ASSESSMENT: PREVENTS OR INTERFERES SOME ACTIVE ACTIVITIES AND ADLS

## 2025-07-08 ASSESSMENT — PAIN SCALES - GENERAL
PAINLEVEL_OUTOF10: 5
PAINLEVEL_OUTOF10: 7

## 2025-07-08 ASSESSMENT — PAIN DESCRIPTION - DESCRIPTORS
DESCRIPTORS: ACHING
DESCRIPTORS: ACHING

## 2025-07-08 ASSESSMENT — PAIN DESCRIPTION - ORIENTATION
ORIENTATION: RIGHT
ORIENTATION: RIGHT

## 2025-07-08 ASSESSMENT — PAIN DESCRIPTION - LOCATION
LOCATION: LEG
LOCATION: LEG

## 2025-07-08 NOTE — ED NOTES
ED TO INPATIENT SBAR HANDOFF    Patient Name: Nusrat Moctezuma   :  1950  75 y.o.   Preferred Name  Nusrat  Family/Caregiver Present no   Restraints no   C-SSRS:    Sitter no   Sepsis Risk Score Sepsis V2 Risk Score: 27.1    PLEASE NOTE--Encounter / Re-Admission Within 30 Days  This patient has had another encounter or admission within the last 30 days.      Readmission Risk Score: 15.9      Situation  Chief Complaint   Patient presents with    Wound Check     Right foot/leg. Dx with cellulitis, worse since going home     Brief Description of Patient's Condition: non-ambulatory Pur wick in place  Mental Status: oriented, alert, coherent, logical, thought processes intact, and able to concentrate and follow conversation  Arrived from: home    Imaging:   XR FOOT RIGHT (MIN 3 VIEWS)   Final Result      XR CHEST PORTABLE   Final Result        Abnormal labs:   Abnormal Labs Reviewed   CBC WITH AUTO DIFFERENTIAL - Abnormal; Notable for the following components:       Result Value    WBC 13.6 (*)     RBC 3.45 (*)     Hemoglobin 9.2 (*)     Hematocrit 29.7 (*)     MCH 26.7 (*)     MCHC 31.0 (*)     RDW 15.1 (*)     Neutrophils % 69 (*)     Lymphocytes % 16 (*)     Monocytes % 6 (*)     Eosinophils % 4 (*)     Immature Granulocytes % 5 (*)     All other components within normal limits   COMPREHENSIVE METABOLIC PANEL - Abnormal; Notable for the following components:    Glucose 154 (*)     Albumin 3.1 (*)     Albumin/Globulin Ratio 0.8 (*)     All other components within normal limits        Background  History:   Past Medical History:   Diagnosis Date    Anemia     Anxiety     Arthritis     generalized    Asthma     AV block     2nd degree per hospital in 2013    Blood poisoning     Blood transfusion     2003    Breast cancer (HCC) 2012    right    CAD (coronary artery disease)     Cancer (HCC)     skin (face) & colon(2012 dx of right breast ca(pc)    Carcinoma of breast (HCC)

## 2025-07-08 NOTE — ED NOTES
The following labs were labeled with appropriate pt sticker and tubed to lab:     [x] Blue     [x] Lavender   [] on ice  [] Green/yellow  [x] Green/black [] on ice  [] Grey  [] on ice  [] Yellow  [x] Red  [] Pink  [] Type/ Screen  [] ABG  [] VBG    [] COVID-19 swab    [] Rapid  [] PCR  [] Flu swab  [] Peds Viral Panel     [] Urine Sample  [] Fecal Sample  [] Pelvic Cultures  [] Blood Cultures  [] X 2  [] STREP Cultures  [] Wound Cultures

## 2025-07-08 NOTE — CARE COORDINATION
Chart reviewed and case discussed during IDR. Per Jory CORRIGAN, patient is to be set up at OP wound clinic by wound nurse. CM met w/ patient, introduced self and explained role. Patient reports she is from home and has 24/7 support from a private caregiver. Patient states that when she was recently here, she was supposed to have HHC but could not due to needing to see her PCP. Patient is aware that she will need to follow with the wound clinic and also stated that her caregiver will be able to assist with dressing changes. Patient declined any needs from CM at this time. CM remains available should needs present.        07/08/25 0451   Service Assessment   Patient Orientation Alert and Oriented   Cognition Alert   History Provided By Patient   Primary Caregiver Self   Support Systems Other (Comment)  (Caregiver)   Patient's Healthcare Decision Maker is: Legal Next of Kin   PCP Verified by CM No  (Needs new appointment to be seen.)   Prior Functional Level Assistance with the following:;Bathing;Dressing;Toileting;Feeding;Cooking;Housework;Shopping;Mobility   Can patient return to prior living arrangement No   Ability to make needs known: Good   Financial Resources Medicare;Medicaid   Community Resources None   Social/Functional History   Type of Home Apartment   Home Equipment Rollator   Discharge Planning   Type of Residence Apartment   Living Arrangements Other (Comment)  (caregiver)   Current Services Prior To Admission None   Potential Assistance Needed N/A   Potential Assistance Purchasing Medications No   Type of Home Care Services None   Patient expects to be discharged to: Apartment   Services At/After Discharge   Services At/After Discharge Outpatient  (wound care)   Condition of Participation: Discharge Planning   The Patient and/or Patient Representative was provided with a Choice of Provider? Patient   The Patient and/Or Patient Representative agree with the Discharge Plan? Yes

## 2025-07-08 NOTE — H&P
[facility] 7/2/2025 5:40        XR CHEST PORTABLE  Result Date: 7/2/2025  PROCEDURE: XR CHEST PORTABLE DATE OF EXAM:  7/2/2025 4:46 DEMOGRAPHICS: 75 years old Female INDICATION: tachycardia; shortness of breath COMPARISON: 4/17/2025 FINDINGS:  Cardiomegaly. Left subclavian pacemaker. Atherosclerosis. No consolidation. No pneumothorax or pleural fluid. IMPRESSION: 1.  No acute findings.  Dictated and Electronically Signed By: Nolan Valdez MD [facility] 7/2/2025 5:03          Personally reviewed Lab Studies, Imaging, and discussed case with ED provider.     Drugs that require monitoring for toxicity include vancomycin and the method of monitoring vancomycin level, BMP.    Electronically signed by Sury Riddle MD on 7/7/2025 at 10:43 PM    Comment: Please note this report has been produced using speech recognition software and may contain errors related to that system including errors in grammar, punctuation, and spelling, as well as words and phrases that may be inappropriate. If there are any questions or concerns please feel free to contact the dictating provider for clarification.

## 2025-07-09 LAB
ABSOLUTE BANDS: 0.13 K/UL
ALBUMIN SERPL-MCNC: 3.4 G/DL (ref 3.4–5)
ALBUMIN/GLOB SERPL: 0.9 {RATIO} (ref 1.1–2.2)
ALP SERPL-CCNC: 119 U/L (ref 40–129)
ALT SERPL-CCNC: 13 U/L (ref 10–40)
ANION GAP SERPL CALCULATED.3IONS-SCNC: 12 MMOL/L (ref 9–17)
AST SERPL-CCNC: 14 U/L (ref 15–37)
BANDS: 1 %
BASOPHILS # BLD: 0 K/UL
BASOPHILS NFR BLD: 0 % (ref 0–1)
BILIRUB SERPL-MCNC: 0.2 MG/DL (ref 0–1)
BUN SERPL-MCNC: 19 MG/DL (ref 7–20)
CALCIUM SERPL-MCNC: 9.6 MG/DL (ref 8.3–10.6)
CHLORIDE SERPL-SCNC: 100 MMOL/L (ref 99–110)
CO2 SERPL-SCNC: 26 MMOL/L (ref 21–32)
CREAT SERPL-MCNC: 0.7 MG/DL (ref 0.6–1.2)
CRP SERPL HS-MCNC: 51.5 MG/L (ref 0–5)
EOSINOPHIL # BLD: 0.39 K/UL
EOSINOPHILS RELATIVE PERCENT: 3 % (ref 0–3)
ERYTHROCYTE [DISTWIDTH] IN BLOOD BY AUTOMATED COUNT: 15.5 % (ref 11.7–14.9)
GFR, ESTIMATED: 87 ML/MIN/1.73M2
GLUCOSE SERPL-MCNC: 101 MG/DL (ref 74–99)
HCT VFR BLD AUTO: 30.8 % (ref 37–47)
HGB BLD-MCNC: 9.4 G/DL (ref 12.5–16)
LYMPHOCYTES NFR BLD: 2.62 K/UL
LYMPHOCYTES RELATIVE PERCENT: 20 % (ref 24–44)
MCH RBC QN AUTO: 26.4 PG (ref 27–31)
MCHC RBC AUTO-ENTMCNC: 30.5 G/DL (ref 32–36)
MCV RBC AUTO: 86.5 FL (ref 78–100)
METAMYELOCYTES ABSOLUTE COUNT: 0.13 K/UL
METAMYELOCYTES: 1 %
MONOCYTES NFR BLD: 0.39 K/UL
MONOCYTES NFR BLD: 3 % (ref 0–5)
MYELOCYTES ABSOLUTE COUNT: 0.13 K/UL
MYELOCYTES: 1 %
NEUTROPHILS NFR BLD: 71 % (ref 36–66)
NEUTS SEG NFR BLD: 9.3 K/UL
PLATELET # BLD AUTO: 344 K/UL (ref 140–440)
PLATELET ESTIMATE: NORMAL
PMV BLD AUTO: 9.8 FL (ref 7.5–11.1)
POTASSIUM SERPL-SCNC: 3.8 MMOL/L (ref 3.5–5.1)
PROT SERPL-MCNC: 7.3 G/DL (ref 6.4–8.2)
RBC # BLD AUTO: 3.56 M/UL (ref 4.2–5.4)
RBC # BLD: ABNORMAL 10*6/UL
SODIUM SERPL-SCNC: 138 MMOL/L (ref 136–145)
WBC # BLD: NORMAL 10*3/UL
WBC OTHER # BLD: 13.1 K/UL (ref 4–10.5)

## 2025-07-09 PROCEDURE — 94761 N-INVAS EAR/PLS OXIMETRY MLT: CPT

## 2025-07-09 PROCEDURE — 6360000002 HC RX W HCPCS: Performed by: INTERNAL MEDICINE

## 2025-07-09 PROCEDURE — 6360000002 HC RX W HCPCS: Performed by: LICENSED PRACTICAL NURSE

## 2025-07-09 PROCEDURE — 85025 COMPLETE CBC W/AUTO DIFF WBC: CPT

## 2025-07-09 PROCEDURE — 6370000000 HC RX 637 (ALT 250 FOR IP): Performed by: INTERNAL MEDICINE

## 2025-07-09 PROCEDURE — 86140 C-REACTIVE PROTEIN: CPT

## 2025-07-09 PROCEDURE — 94640 AIRWAY INHALATION TREATMENT: CPT

## 2025-07-09 PROCEDURE — 2500000003 HC RX 250 WO HCPCS: Performed by: INTERNAL MEDICINE

## 2025-07-09 PROCEDURE — 80053 COMPREHEN METABOLIC PANEL: CPT

## 2025-07-09 PROCEDURE — 2580000003 HC RX 258: Performed by: LICENSED PRACTICAL NURSE

## 2025-07-09 PROCEDURE — 36415 COLL VENOUS BLD VENIPUNCTURE: CPT

## 2025-07-09 PROCEDURE — 6370000000 HC RX 637 (ALT 250 FOR IP)

## 2025-07-09 PROCEDURE — 1200000000 HC SEMI PRIVATE

## 2025-07-09 RX ORDER — HYDROCODONE BITARTRATE AND ACETAMINOPHEN 5; 325 MG/1; MG/1
1 TABLET ORAL ONCE
Status: COMPLETED | OUTPATIENT
Start: 2025-07-09 | End: 2025-07-09

## 2025-07-09 RX ADMIN — SERTRALINE HYDROCHLORIDE 25 MG: 50 TABLET ORAL at 10:48

## 2025-07-09 RX ADMIN — ENOXAPARIN SODIUM 30 MG: 100 INJECTION SUBCUTANEOUS at 20:00

## 2025-07-09 RX ADMIN — TIOTROPIUM BROMIDE INHALATION SPRAY 5 MCG: 3.12 SPRAY, METERED RESPIRATORY (INHALATION) at 07:38

## 2025-07-09 RX ADMIN — SODIUM CHLORIDE, PRESERVATIVE FREE 10 ML: 5 INJECTION INTRAVENOUS at 10:49

## 2025-07-09 RX ADMIN — FUROSEMIDE 40 MG: 10 INJECTION, SOLUTION INTRAMUSCULAR; INTRAVENOUS at 17:19

## 2025-07-09 RX ADMIN — Medication 1000 MCG: at 10:48

## 2025-07-09 RX ADMIN — CEFTRIAXONE SODIUM 2000 MG: 2 INJECTION, POWDER, FOR SOLUTION INTRAMUSCULAR; INTRAVENOUS at 13:00

## 2025-07-09 RX ADMIN — BUDESONIDE AND FORMOTEROL FUMARATE DIHYDRATE 2 PUFF: 160; 4.5 AEROSOL RESPIRATORY (INHALATION) at 07:38

## 2025-07-09 RX ADMIN — ASPIRIN 81 MG 81 MG: 81 TABLET ORAL at 10:48

## 2025-07-09 RX ADMIN — VALSARTAN 160 MG: 160 TABLET, FILM COATED ORAL at 10:48

## 2025-07-09 RX ADMIN — ACETAMINOPHEN 650 MG: 325 TABLET ORAL at 20:00

## 2025-07-09 RX ADMIN — CALCIUM CARBONATE-CHOLECALCIFEROL TAB 250 MG-125 UNIT 2 TABLET: 250-125 TAB at 10:48

## 2025-07-09 RX ADMIN — MICONAZOLE NITRATE: 2 POWDER TOPICAL at 20:02

## 2025-07-09 RX ADMIN — FUROSEMIDE 40 MG: 10 INJECTION, SOLUTION INTRAMUSCULAR; INTRAVENOUS at 10:48

## 2025-07-09 RX ADMIN — FERROUS SULFATE TAB 325 MG (65 MG ELEMENTAL FE) 325 MG: 325 (65 FE) TAB at 10:48

## 2025-07-09 RX ADMIN — SODIUM CHLORIDE, PRESERVATIVE FREE 10 ML: 5 INJECTION INTRAVENOUS at 20:00

## 2025-07-09 RX ADMIN — ENOXAPARIN SODIUM 30 MG: 100 INJECTION SUBCUTANEOUS at 10:49

## 2025-07-09 RX ADMIN — MICONAZOLE NITRATE: 2 POWDER TOPICAL at 10:51

## 2025-07-09 RX ADMIN — HYDROCODONE BITARTRATE AND ACETAMINOPHEN 1 TABLET: 5; 325 TABLET ORAL at 03:01

## 2025-07-09 RX ADMIN — ACETAMINOPHEN 650 MG: 325 TABLET ORAL at 13:02

## 2025-07-09 RX ADMIN — AMLODIPINE BESYLATE 5 MG: 5 TABLET ORAL at 20:00

## 2025-07-09 ASSESSMENT — PAIN DESCRIPTION - DESCRIPTORS
DESCRIPTORS: STABBING;SHARP;DISCOMFORT
DESCRIPTORS: SHARP;PINS AND NEEDLES
DESCRIPTORS: BURNING
DESCRIPTORS: BURNING

## 2025-07-09 ASSESSMENT — PAIN DESCRIPTION - LOCATION
LOCATION: LEG
LOCATION: LEG
LOCATION: LEG;FOOT
LOCATION: FOOT

## 2025-07-09 ASSESSMENT — PAIN DESCRIPTION - ORIENTATION
ORIENTATION: RIGHT
ORIENTATION: RIGHT;LOWER

## 2025-07-09 ASSESSMENT — PAIN SCALES - GENERAL
PAINLEVEL_OUTOF10: 4
PAINLEVEL_OUTOF10: 2
PAINLEVEL_OUTOF10: 4
PAINLEVEL_OUTOF10: 4
PAINLEVEL_OUTOF10: 10

## 2025-07-09 ASSESSMENT — PAIN - FUNCTIONAL ASSESSMENT: PAIN_FUNCTIONAL_ASSESSMENT: ACTIVITIES ARE NOT PREVENTED

## 2025-07-09 ASSESSMENT — PAIN DESCRIPTION - FREQUENCY: FREQUENCY: INTERMITTENT

## 2025-07-09 NOTE — PLAN OF CARE
Problem: Discharge Planning  Goal: Discharge to home or other facility with appropriate resources  7/8/2025 2350 by Leah Weston LPN  Outcome: Progressing  7/8/2025 1200 by Pham Horton RN  Outcome: Progressing     Problem: Pain  Goal: Verbalizes/displays adequate comfort level or baseline comfort level  7/8/2025 2350 by Leah Weston LPN  Outcome: Progressing  7/8/2025 1200 by Pham Horton RN  Outcome: Progressing     Problem: Safety - Adult  Goal: Free from fall injury  7/8/2025 2350 by Leah Weston LPN  Outcome: Progressing  7/8/2025 1200 by Pham Horton RN  Outcome: Progressing     Problem: Skin/Tissue Integrity  Goal: Skin integrity remains intact  Description: 1.  Monitor for areas of redness and/or skin breakdown  2.  Assess vascular access sites hourly  3.  Every 4-6 hours minimum:  Change oxygen saturation probe site  4.  Every 4-6 hours:  If on nasal continuous positive airway pressure, respiratory therapy assess nares and determine need for appliance change or resting period  7/8/2025 2350 by Leah Weston LPN  Outcome: Progressing  7/8/2025 1200 by Pham Horton, RN  Outcome: Progressing

## 2025-07-09 NOTE — CARE COORDINATION
[facility] but was unable to been seen before she readmitted to [facility].     1:37 PM JACK updated Jory CORRIGAN that patient will need [facility] order prior to discharge.

## 2025-07-10 VITALS
WEIGHT: 293 LBS | HEART RATE: 68 BPM | SYSTOLIC BLOOD PRESSURE: 125 MMHG | BODY MASS INDEX: 53.92 KG/M2 | TEMPERATURE: 98 F | RESPIRATION RATE: 20 BRPM | OXYGEN SATURATION: 93 % | HEIGHT: 62 IN | DIASTOLIC BLOOD PRESSURE: 50 MMHG

## 2025-07-10 LAB
ALBUMIN SERPL-MCNC: 3 G/DL (ref 3.4–5)
ALBUMIN/GLOB SERPL: 0.9 {RATIO} (ref 1.1–2.2)
ALP SERPL-CCNC: 98 U/L (ref 40–129)
ALT SERPL-CCNC: 10 U/L (ref 10–40)
ANION GAP SERPL CALCULATED.3IONS-SCNC: 11 MMOL/L (ref 9–17)
AST SERPL-CCNC: 13 U/L (ref 15–37)
BASOPHILS # BLD: 0.05 K/UL
BASOPHILS NFR BLD: 1 % (ref 0–1)
BILIRUB SERPL-MCNC: 0.2 MG/DL (ref 0–1)
BUN SERPL-MCNC: 18 MG/DL (ref 7–20)
CALCIUM SERPL-MCNC: 9.6 MG/DL (ref 8.3–10.6)
CHLORIDE SERPL-SCNC: 102 MMOL/L (ref 99–110)
CO2 SERPL-SCNC: 26 MMOL/L (ref 21–32)
CREAT SERPL-MCNC: 0.6 MG/DL (ref 0.6–1.2)
CRP SERPL HS-MCNC: 43.3 MG/L (ref 0–5)
EOSINOPHIL # BLD: 0.33 K/UL
EOSINOPHILS RELATIVE PERCENT: 3 % (ref 0–3)
ERYTHROCYTE [DISTWIDTH] IN BLOOD BY AUTOMATED COUNT: 15.6 % (ref 11.7–14.9)
GFR, ESTIMATED: >90 ML/MIN/1.73M2
GLUCOSE SERPL-MCNC: 146 MG/DL (ref 74–99)
HCT VFR BLD AUTO: 29.8 % (ref 37–47)
HGB BLD-MCNC: 9 G/DL (ref 12.5–16)
IMM GRANULOCYTES # BLD AUTO: 0.5 K/UL
IMM GRANULOCYTES NFR BLD: 5 %
LYMPHOCYTES NFR BLD: 2.02 K/UL
LYMPHOCYTES RELATIVE PERCENT: 20 % (ref 24–44)
MCH RBC QN AUTO: 26.5 PG (ref 27–31)
MCHC RBC AUTO-ENTMCNC: 30.2 G/DL (ref 32–36)
MCV RBC AUTO: 87.6 FL (ref 78–100)
MONOCYTES NFR BLD: 0.47 K/UL
MONOCYTES NFR BLD: 5 % (ref 0–5)
NEUTROPHILS NFR BLD: 66 % (ref 36–66)
NEUTS SEG NFR BLD: 6.63 K/UL
PLATELET # BLD AUTO: 316 K/UL (ref 140–440)
PMV BLD AUTO: 9.5 FL (ref 7.5–11.1)
POTASSIUM SERPL-SCNC: 4.1 MMOL/L (ref 3.5–5.1)
PROT SERPL-MCNC: 6.5 G/DL (ref 6.4–8.2)
RBC # BLD AUTO: 3.4 M/UL (ref 4.2–5.4)
SODIUM SERPL-SCNC: 139 MMOL/L (ref 136–145)
WBC OTHER # BLD: 10 K/UL (ref 4–10.5)

## 2025-07-10 PROCEDURE — 85025 COMPLETE CBC W/AUTO DIFF WBC: CPT

## 2025-07-10 PROCEDURE — 86140 C-REACTIVE PROTEIN: CPT

## 2025-07-10 PROCEDURE — 94640 AIRWAY INHALATION TREATMENT: CPT

## 2025-07-10 PROCEDURE — 6370000000 HC RX 637 (ALT 250 FOR IP): Performed by: INTERNAL MEDICINE

## 2025-07-10 PROCEDURE — 36415 COLL VENOUS BLD VENIPUNCTURE: CPT

## 2025-07-10 PROCEDURE — 6360000002 HC RX W HCPCS: Performed by: LICENSED PRACTICAL NURSE

## 2025-07-10 PROCEDURE — 80053 COMPREHEN METABOLIC PANEL: CPT

## 2025-07-10 PROCEDURE — 6370000000 HC RX 637 (ALT 250 FOR IP): Performed by: LICENSED PRACTICAL NURSE

## 2025-07-10 PROCEDURE — 2500000003 HC RX 250 WO HCPCS: Performed by: INTERNAL MEDICINE

## 2025-07-10 PROCEDURE — 6360000002 HC RX W HCPCS: Performed by: INTERNAL MEDICINE

## 2025-07-10 PROCEDURE — 2580000003 HC RX 258: Performed by: LICENSED PRACTICAL NURSE

## 2025-07-10 RX ORDER — HYDROCODONE BITARTRATE AND ACETAMINOPHEN 5; 325 MG/1; MG/1
1 TABLET ORAL EVERY 8 HOURS PRN
Qty: 9 TABLET | Refills: 0 | Status: SHIPPED | OUTPATIENT
Start: 2025-07-10 | End: 2025-07-13

## 2025-07-10 RX ORDER — ALBUTEROL SULFATE 90 UG/1
2 INHALANT RESPIRATORY (INHALATION) EVERY 4 HOURS PRN
Qty: 1 EACH | Refills: 0 | Status: SHIPPED | OUTPATIENT
Start: 2025-07-10

## 2025-07-10 RX ORDER — HYDROCODONE BITARTRATE AND ACETAMINOPHEN 5; 325 MG/1; MG/1
1 TABLET ORAL ONCE
Status: COMPLETED | OUTPATIENT
Start: 2025-07-10 | End: 2025-07-10

## 2025-07-10 RX ORDER — BUDESONIDE AND FORMOTEROL FUMARATE DIHYDRATE 160; 4.5 UG/1; UG/1
2 AEROSOL RESPIRATORY (INHALATION)
Qty: 2 EACH | Refills: 0 | Status: SHIPPED | OUTPATIENT
Start: 2025-07-10

## 2025-07-10 RX ADMIN — FUROSEMIDE 40 MG: 10 INJECTION, SOLUTION INTRAMUSCULAR; INTRAVENOUS at 09:44

## 2025-07-10 RX ADMIN — SODIUM CHLORIDE, PRESERVATIVE FREE 10 ML: 5 INJECTION INTRAVENOUS at 09:46

## 2025-07-10 RX ADMIN — ENOXAPARIN SODIUM 30 MG: 100 INJECTION SUBCUTANEOUS at 09:44

## 2025-07-10 RX ADMIN — FERROUS SULFATE TAB 325 MG (65 MG ELEMENTAL FE) 325 MG: 325 (65 FE) TAB at 09:44

## 2025-07-10 RX ADMIN — HYDROCODONE BITARTRATE AND ACETAMINOPHEN 1 TABLET: 5; 325 TABLET ORAL at 13:38

## 2025-07-10 RX ADMIN — ASPIRIN 81 MG 81 MG: 81 TABLET ORAL at 09:44

## 2025-07-10 RX ADMIN — TIOTROPIUM BROMIDE INHALATION SPRAY 5 MCG: 3.12 SPRAY, METERED RESPIRATORY (INHALATION) at 07:22

## 2025-07-10 RX ADMIN — SERTRALINE HYDROCHLORIDE 25 MG: 50 TABLET ORAL at 09:44

## 2025-07-10 RX ADMIN — ACETAMINOPHEN 650 MG: 325 TABLET ORAL at 12:12

## 2025-07-10 RX ADMIN — CEFTRIAXONE SODIUM 2000 MG: 2 INJECTION, POWDER, FOR SOLUTION INTRAMUSCULAR; INTRAVENOUS at 12:17

## 2025-07-10 RX ADMIN — BUDESONIDE AND FORMOTEROL FUMARATE DIHYDRATE 2 PUFF: 160; 4.5 AEROSOL RESPIRATORY (INHALATION) at 07:22

## 2025-07-10 RX ADMIN — Medication 1000 MCG: at 09:44

## 2025-07-10 RX ADMIN — MICONAZOLE NITRATE: 2 POWDER TOPICAL at 09:44

## 2025-07-10 ASSESSMENT — PAIN SCALES - GENERAL
PAINLEVEL_OUTOF10: 5
PAINLEVEL_OUTOF10: 8

## 2025-07-10 ASSESSMENT — PAIN DESCRIPTION - LOCATION
LOCATION: LEG
LOCATION: LEG

## 2025-07-10 ASSESSMENT — PAIN DESCRIPTION - DESCRIPTORS
DESCRIPTORS: PINS AND NEEDLES;BURNING
DESCRIPTORS: ACHING;SHARP

## 2025-07-10 ASSESSMENT — PAIN DESCRIPTION - ORIENTATION
ORIENTATION: RIGHT
ORIENTATION: RIGHT

## 2025-07-10 NOTE — PATIENT INSTRUCTIONS
Ian Ellsworth, DO  You23 minutes ago (3:25 PM)       She has multiple low iron levels documented in her chart.       Writer informed patient's spouse of this.    [employer].  How are vision tests done?  Visual acuity test   You cover one eye at a time.  You read aloud from a wall chart across the room.  You read aloud from a small card that you hold in your hand.  Refraction   You look into a special device.  The device puts lenses of different strengths in front of each eye to see how strong your glasses or contact lenses need to be.  Visual field tests   Your doctor may have you look through special machines.  Or your doctor may simply have you stare straight ahead while they move a finger into and out of your field of vision.  Color vision test   You look at pieces of printed test patterns in various colors. You say what number or symbol you see.  Your doctor may have you trace the number or symbol using a pointer.  How do these tests feel?  There is very little chance of having a problem from this test. If dilating drops are used for a vision test, they may make the eyes sting and cause a medicine taste in the mouth.  Follow-up care is a key part of your treatment and safety. Be sure to make and go to all appointments, and call your doctor if you are having problems. It's also a good idea to know your test results and keep a list of the medicines you take.  Where can you learn more?  Go to https://www.Glow[employer].net/patientEd and enter G551 to learn more about \"Learning About Vision Tests.\"  Current as of: June 6, 2023               Content Version: 13.9  © 8261-4359 [employer].   Care instructions adapted under license by [employer]. If you have questions about a medical condition or this instruction, always ask your healthcare professional. [employer] disclaims any warranty or liability for your use of this information.           Learning About Activities of Daily Living  What are activities of daily living?     Activities of daily living (ADLs) are the basic self-care tasks you do every day. These include eating, bathing, dressing, and

## 2025-07-10 NOTE — PLAN OF CARE
Problem: Pain  Goal: Verbalizes/displays adequate comfort level or baseline comfort level  Outcome: Progressing     Problem: Safety - Adult  Goal: Free from fall injury  Outcome: Progressing     Problem: Neurosensory - Adult  Goal: Achieves stable or improved neurological status  Outcome: Progressing  Goal: Achieves maximal functionality and self care  Outcome: Progressing     Problem: Respiratory - Adult  Goal: Achieves optimal ventilation and oxygenation  Outcome: Progressing     Problem: Cardiovascular - Adult  Goal: Maintains optimal cardiac output and hemodynamic stability  Outcome: Progressing  Goal: Absence of cardiac dysrhythmias or at baseline  Outcome: Progressing     Problem: Musculoskeletal - Adult  Goal: Return mobility to safest level of function  Outcome: Progressing  Goal: Maintain proper alignment of affected body part  Outcome: Progressing     Problem: Gastrointestinal - Adult  Goal: Minimal or absence of nausea and vomiting  Outcome: Progressing  Goal: Maintains adequate nutritional intake  Outcome: Progressing     Problem: Genitourinary - Adult  Goal: Absence of urinary retention  Outcome: Progressing

## 2025-07-10 NOTE — DISCHARGE SUMMARY
leading to omissions or inappropriate words, phrases or sentences.  Dictated and Electronically Signed By: Brian Tellez MD [facility] 7/7/2025 20:10        Vascular duplex lower extremity venous right  Result Date: 7/5/2025  EXAMINATION: VAS DUP LOWER EXTREMITY VENOUS RIGHT DATE OF EXAM:  7/5/2025 11:30 DEMOGRAPHICS: 75 years old Female INDICATION: Edema right entire leg COMPARISON: None. TECHNIQUE: Grayscale and color Doppler sonographic evaluation of the right lower  extremity venous vasculature was performed. FINDINGS: This was a technically difficult examination due to patient body habitus. COMMON FEMORAL VEIN: Patent with normal flow and compression FEMORAL VEIN: Patent with normal flow and compression POPLITEAL VEIN: Patent with normal flow and compression POSTERIOR TIBIAL VEIN: Not well visualized. PERONEAL VEIN: Not well visualized. Normal augmentation where possible. IMPRESSION: 1.  Suboptimal visualization of the right posterior tibial and peroneal veins. 2.  Negative for deep vein thrombosis in the visualized veins of the right lower  extremity. This dictation was created with voice recognition software.  While attempts have  been made to review the dictation as it is transcribed, on occasion the spoken word can be misinterpreted by the technology leading to omissions or inappropriate words, phrases or sentences.  Dictated and Electronically Signed By: John Webster DO [facility] 7/5/2025 15:26          CBC:   Recent Labs     07/08/25  0129 07/09/25  1140 07/10/25  0810   WBC 12.1* 13.1* 10.0   HGB 8.5* 9.4* 9.0*    344 316     BMP:    Recent Labs     07/08/25  0129 07/09/25  1140 07/10/25  0810    138 139   K 3.6 3.8 4.1    100 102   CO2 22 26 26   BUN 19 19 18   CREATININE 0.6 0.7 0.6   GLUCOSE 127* 101* 146*     Hepatic:   Recent Labs     07/07/25 2100 07/09/25  1140 07/10/25  0810   AST 20 14* 13*   ALT 14 13 10   BILITOT <0.2 0.2

## 2025-07-10 NOTE — DISCHARGE INSTRUCTIONS
Please contact PCP follow up in 1 week and have them check your kidney function.   Take your lasix daily 40mg not 20mg dose.   Finish your antibiotic you have at home and make sure that you follow up with the outpatient wound clinic.  Keep your legs elevated.

## 2025-07-10 NOTE — PROGRESS NOTES
V2.0    [facility] Progress Note      Name:  Nusrat Moctezuma /Age/Sex: 1950  (75 y.o. female)   MRN & CSN:  [id number] & [id number] Encounter Date/Time: 2025 1:57 PM EDT   Location:  [facility]/2956-A PCP: Cheyenne Arias DO     Attending:Haja Leonard MD       Hospital Day: 3    Assessment and Recommendations   Nusrat Moctezuma is a 75 y.o. female who presents with Cellulitis    Plan:   #Cellulitis of right lower extremity  #.  Lymphedema of bilateral lower extremities  #. Hx of frequent Cellulitis of right lower extremity-2015  -Improving leukocytosis, procalcitonin  - Patient received ceftriaxone in ED  -received  1 dose of vancomycin, cefepime,   -MRSA negative; will DC vancomycin and cefepime and place back on ceftriaxone and monitor  -sed rate 73; CRP 67.9  -wound care consulted; they placed referral orders for outpatient wound clinic  - Continue Lasix twice daily and monitor; creatinine stable and at baseline 0.7 currently     #.  Recent admission -2025 for UTI  - Urine culture growing E. coli, discharged on cefdinir   -UA shows normal skin/urogenital cira     #.  Hypertension  - On amlodipine, valsartan-resume     #.  COPD  - Continue home medications  - Albuterol HFA as needed, Symbicort     #.  Chronic diastolic congestive heart failure  # Moderate aortic stenosis  - [facility]-2023.   -[facility]-10/2011, 2013-no significant CAD     #.  Chronic venous insufficiency     #.  Traumatic bradycardia-status post pacemaker-3/2014     #.  Chronic anemia  -Currently 9.4; baseline 9.2  - Monitor with repeat H&H  - On ferrous sulfate      #.  History of breast cancer-status post right mastectomy-2012     #.  History of colon cancer-status post partial sigmoid resection     #.  Right carotid endarterectomy-2021     #.  Depression-on Zoloft     #.  Nephrolithiasis     #.  JAMIE-does not use CPAP     #.  Morbid obesity with BMI 53.96      Diet ADULT DIET; Regular; Low Sodium (2 gm)   DVT Prophylaxis [x] Lovenox, [] 
    V2.0    [facility] Progress Note      Name:  Nusrat Moctezuma /Age/Sex: 1950  (75 y.o. female)   MRN & CSN:  [id number] & [id number] Encounter Date/Time: 2025 1:57 PM EDT   Location:  [facility] PCP: Cheyenne Arias DO     Attending:Haja Leonard MD       Hospital Day: 2    Assessment and Recommendations   Nusrat Moctezuma is a 75 y.o. female who presents with Cellulitis    Plan:   #Cellulitis of right lower extremity  #.  Lymphedema of bilateral lower extremities  #. Hx of frequent Cellulitis of right lower extremity-2015  -Improving leukocytosis, procalcitonin  - Patient received ceftriaxone in ED  -received  1 dose of vancomycin, cefepime,   -MRSA negative; will DC vancomycin and cefepime and place back on ceftriaxone and monitor  -sed rate 73; CRP 67.9  -wound care consulted; they placed referral orders for outpatient wound clinic  - Continue Lasix twice daily and monitor; creatinine stable and at baseline 0.6     #.  Recent admission -2025 for UTI  - Urine culture growing E. coli, discharged on cefdinir   -UA shows normal skin/urogenital cira     #.  Hypertension  - On amlodipine, valsartan-resume     #.  COPD  - Continue home medications  - Albuterol HFA as needed, Symbicort     #.  Chronic diastolic congestive heart failure  # Moderate aortic stenosis  - [facility]-2023.   -[facility]-10/2011, 2013-no significant CAD     #.  Chronic venous insufficiency     #.  Traumatic bradycardia-status post pacemaker-3/2014     #.  Chronic anemia  -Currently 8.5; baseline 9.2  - Monitor with repeat H&H  - On ferrous sulfate      #.  History of breast cancer-status post right mastectomy-2012     #.  History of colon cancer-status post partial sigmoid resection     #.  Right carotid endarterectomy-2021     #.  Depression-on Zoloft     #.  Nephrolithiasis     #.  JAMIE-does not use CPAP     #.  Morbid obesity with BMI 53.96      Diet ADULT DIET; Regular; Low Sodium (2 gm)   DVT Prophylaxis [x] Lovenox, []  Heparin, 
  Pharmacy Note - Weight/BMI Adjustment(s)    Ceftriaxone 1g Q24h for treatment of Skin and soft tissue infection. Per [facility] Ceftriaxone Dosing Guidance Document, ceftriaxone will be changed to 2g Q24h for BMI >30 Kg/m2.    Estimated Creatinine Clearance: Estimated Creatinine Clearance: 105 mL/min (based on SCr of 0.6 mg/dL).    BMI: Body mass index is 53.96 kg/m².          Please call with any questions.    Thank you,    Luis Antonio Nelson [facility]       
4 Eyes Skin Assessment     NAME:  Nusrat Moctezuma  YOB: 1950  MEDICAL RECORD NUMBER:  [id number]    The patient is being assessed for  Admission    I agree that at least one RN has performed a thorough Head to Toe Skin Assessment on the patient. ALL assessment sites listed below have been assessed.      Areas assessed by both nurses:    Head, Face, Ears, Shoulders, Back, Chest, Arms, Elbows, Hands, Sacrum. Buttock, Coccyx, Ischium, Legs. Feet and Heels, and Under Medical Devices         Does the Patient have a Wound? Yes wound(s) were present on assessment. LDA wound assessment was Initiated and completed by RN       Rangel Prevention initiated by RN: Yes  Wound Care Orders initiated by RN: Yes    Pressure Injury (Stage 1,2,3,4, Unstageable, DTI, NWPT, and Complex wounds) if present, place Wound referral order by RN under : No    New Ostomies, if present place, Ostomy referral order under : No     Nurse 1 eSignature: Electronically signed by Faith Lindsey RN on 7/7/25 at 11:39 PM EDT    **SHARE this note so that the co-signing nurse can place an eSignature**    Nurse 2 eSignature: Electronically signed by Kalen Mendiola RN on 7/8/25 at 1:20 AM EDT          
[id number] Called to bedside by nurse Girard to speak to family. Patient has caretaker, and friend all whom live with the patient in their home. Patient was in agreement to discuss medical condition and concerns in front of them. The patient herself, has no concerns or questions about getting home. The caregiver Inderjit, whom I spoke to earlier in the day did not want her to be discharged. When asked he felt she would just be back. I explained to her that she left against our recommendations last time and we were not able to optimize her prior to discharge. I explained to him/patient/other room mate that her labs, vitals are all stable and have shown improvement. I explained to them that I have arranged [facility] and requested a visit take place tomorrow and referrals were already sent to the outpatient wound care clinic. I also explained that she will be taking a higher dose of diuretic/Lasix/water pill once home.  For severe pain in which OTC medications are not effective, a few norcos 5-325 were prescribed and a 30 day supply of her chronic medications (inhalers) due to not having them at home were also sent to her preferred pharmacy. These medications were actually delivered to bedside. Patient stated she still has her antibodies at home and she was instructed just to finish that course.  Inderjit/the caregiver brought up someone that they know who was misdiagnosed/treated at this hospital and sought care elsewhere and found out that they needed additional interventions and almost .  Patient began to get slightly hostile, accusatory, and Saying that we were not treating her well and that she needed to go to a different hospital.  I did set up boundaries with the patients/ caregiver. He was more agreeable to listen. I explained to him/ the patient/ the other room mate it Is very important that they follow our discharge instructions appropriately.  I explained to them that they are not to touch the wounds/ dressings 
I did NOT see the patient. The patient was discussed with the ABEBA. I was available for questions and consultation as needed.     Leg is improving  
I did NOT see the patient. The patient was discussed with the ABEBA. I was available for questions and consultation as needed.     Legs much better, swelling has improved. Plan for dc with home care.   
I did NOT see the patient. The patient was discussed with the ABEBA. I was available for questions and consultation as needed.     Treating for celluitis. Diuresing for edema.   
LOVENOX PROPHYLAXIS EVALUATION  (Populations not addressed in this protocol: trauma, obstetrics, or COVID-19)    Wt Readings from Last 3 Encounters:   07/06/25 (!) 137.9 kg (304 lb 0.2 oz)   06/25/25 131 kg (288 lb 12.8 oz)   03/13/25 132.5 kg (292 lb)       Estimated Creatinine Clearance: 107 mL/min (based on SCr of 0.6 mg/dL).  Recent Labs     07/07/25  2100   BUN 20   CREATININE 0.6      HGB 9.2*   HCT 29.7*       Weight Range: 101-150.9 kg    CRCL = 30 or greater    []  50.9 kg   and below     []  .9  kg   [x]  101-150.9 kg   []  151-174.9  kg   []  175 kg  or greater     30mg subq  daily     40mg subq daily    (or 30mg subq BID orthopedic)     30mg subq  BID   40mg subq  BID   60mg subq BID       Per P/T protocol for appropriate subq anticoagulation by weight and CRCL change to:    Enoxaparin 30mg subq BID      Kurt Hutchinson [facility]  11:24 PM  07/07/25        
Outpatient Pharmacy Progress Note for Meds-to-Beds    Total number of Prescriptions Filled: 3    Additional Documentation:  Medication(s) were delivered to the patient's room prior to discharge      Thank you for letting us serve your patients.  [address] [address]    Phone: [phone]    Fax: [phone]        
RENAL DOSE ADJUSTMENT MADE PER P/T PROTOCOL    PREVIOUS ORDER:  Cefepime 2000 mg q12h    Indication: SSTI    Estimated Creatinine Clearance: 107 mL/min (based on SCr of 0.6 mg/dL).  Recent Labs     07/07/25  2100   BUN 20   CREATININE 0.6        NEW RENALLY ADJUSTED ORDER:  Cefepime 2000 mg x 1 over 30 minutes, followed by cefepime 2000 mg q8h (extended infusion)    For BMI = 55.6    Kurt Ashley Hutchinson [facility]  7/7/2025 11:33 PM      
therapy: 1 of 7  Vancomycin concentration: TBD    Plan:  Loading dose of vancomycin administered: 2500 mg. Followed by a scheduled dose of vancomycin 1250 mg every 18 hours.   Predicted trough of 11.7 and AUC: 438 at steady-state  Pharmacy will continue to monitor patient and adjust therapy as indicated    VANCOMYCIN CONCENTRATION SCHEDULED FOR 7/10 @0600    Thank you for the consult.  Kurt Hutchinson RPH  7/8/2025 3:45 AM

## 2025-07-10 NOTE — CONSULTS
[facility] Contin[facility] Nurse  Consult Note       Nusrat Moctezuma  AGE: 75 y.o.   GENDER: female  : 1950  TODAY'S DATE:  7/10/2025    Subjective:     Reason for  Evaluation and Assessment: wound care evalAlek Moctezuma is a 75 y.o. female referred by:   [x] Physician  [] Nursing  [] Other:     Wound Identification:  Wound Type: venous  Contributing Factors: edema, venous stasis, chronic pressure, decreased mobility, and obesity        PAST MEDICAL HISTORY        Diagnosis Date    Anemia     Anxiety     Arthritis     generalized    Asthma     AV block     2nd degree per hospital in 2013    Blood poisoning     Blood transfusion     2003    Breast cancer ([facility]) 2012    right    CAD (coronary artery disease)     Cancer ([facility])     skin (face) & colon(2012 dx of right breast ca(pc)    Carcinoma of breast ([facility]) 2012    right arm no b/p or sticks    Cardiac pacemaker 03/10/2014    Medtronic dual lead    Cellulitis 11/15/2022    Chronic cystitis     Chronic respiratory failure ([facility])     2 L/min oxygen at night-time    Colon cancer ([facility])     COPD (chronic obstructive pulmonary disease) ([facility])     CTS (carpal tunnel syndrome)     Depression     Diverticulitis     H/O 24 hour EKG monitoring 2014 complete heart block No clinically significant arrthymia noted.    H/O cardiac catheterization 10/31/2011, 2007    10/31/2011-No significant CAD. Cardiolite stress test was false positive-Dr Leary; 2007-No CAD, global function intact;    H/O cardiovascular stress test 10/20/2011, 3/23/2010    10/20/2011-Lexiscan- Evid of mild ischemia in Left CX region. EF 70%. Global LVSF normal;    H/O cardiovascular stress test 2015    EF 77%.   Normal Stress Test.    H/O chest pain 2005    sees Dr Leary    H/O Doppler ultrasound 2008-CAROTID DOPPLER- Normal carotids bilaterally;    H/O Doppler ultrasound 2013    
1241   Number of days: 0       Wound 07/08/25 Tibial Posterior;Right (Active)   Wound Image   07/08/25 1241   Wound Etiology Venous 07/08/25 1241   Dressing Status New dressing applied 07/08/25 1241   Wound Cleansed Cleansed with saline 07/08/25 1241   Dressing/Treatment Hydrofiber Ag;ABD;Roll gauze;Ace wrap 07/08/25 1241   Wound Length (cm) 12 cm 07/08/25 1241   Wound Width (cm) 7 cm 07/08/25 1241   Wound Depth (cm) 0.1 cm 07/08/25 1241   Wound Surface Area (cm^2) 84 cm^2 07/08/25 1241   Wound Volume (cm^3) 8.4 cm^3 07/08/25 1241   Distance Tunneling (cm) 0 cm 07/08/25 1241   Tunneling Position ___ O'Clock 0 07/08/25 1241   Undermining Starts ___ O'Clock 0 07/08/25 1241   Undermining Ends___ O'Clock 0 07/08/25 1241   Undermining Maxium Distance (cm) 0 07/08/25 1241   Wound Assessment Pink/red 07/08/25 1241   Drainage Amount Moderate (25-50%) 07/08/25 1241   Drainage Description Yellow 07/08/25 1241   Odor None 07/08/25 1241   Leslie-wound Assessment Maceration 07/08/25 1241   Margins Defined edges 07/08/25 1241   Wound Thickness Description not for Pressure Injury Partial thickness 07/08/25 1241   Number of days: 0       Response to treatment:  Well tolerated by patient.     Pain Assessment:  Severity:  none  Quality of pain:   Wound Pain Timing/Severity:   Premedicated: no    Plan:     Plan of Care: Wound 07/08/25 Pretibial Right-Dressing/Treatment: Hydrofiber Ag, ABD, Roll gauze, Ace wrap  Wound 07/08/25 Tibial Posterior;Right-Dressing/Treatment: Hydrofiber Ag, ABD, Roll gauze, Ace wrap  Wound 07/05/25 Foot Right blister that popped.-Dressing/Treatment: Hydrofiber Ag, ABD, Roll gauze, Ace wrap    Patient in bed agreeable to wound care eval. Has rt leg cellulitis/edema/blisters washed with soap and water skin sloughed off has drainage and maceration to foot and leg. Measured and pictured. [employer] abd/kerlix/ace wrap. Heels intact. Pt unable to turn for buttock assessment. Skin folds with MASD using

## 2025-07-11 ENCOUNTER — CARE COORDINATION (OUTPATIENT)
Dept: CASE MANAGEMENT | Age: 75
End: 2025-07-11

## 2025-07-11 ENCOUNTER — HOSPITAL ENCOUNTER (OUTPATIENT)
Dept: WOUND CARE | Age: 75
Discharge: HOME | End: 2025-07-11
Attending: NURSE PRACTITIONER
Payer: MEDICARE

## 2025-07-11 DIAGNOSIS — L03.115 CELLULITIS OF RIGHT LOWER EXTREMITY: Primary | ICD-10-CM

## 2025-07-11 PROCEDURE — 1111F DSCHRG MED/CURRENT MED MERGE: CPT | Performed by: FAMILY MEDICINE

## 2025-07-11 PROCEDURE — 99213 OFFICE O/P EST LOW 20 MIN: CPT

## 2025-07-11 PROCEDURE — 11045 DBRDMT SUBQ TISS EACH ADDL: CPT

## 2025-07-11 PROCEDURE — 11042 DBRDMT SUBQ TIS 1ST 20SQCM/<: CPT

## 2025-07-11 RX ORDER — FUROSEMIDE 40 MG/1
40 TABLET ORAL DAILY
COMMUNITY

## 2025-07-11 NOTE — PROGRESS NOTES
Multilayer Compression Wrap   (Not Unna) Below the Knee    NAME:  Nusrat Moctezuma  YOB: 1950  MEDICAL RECORD NUMBER:  [id number]  DATE:  7/11/2025    Multilayer compression wrap: Removed old Multilayer wrap if indicated and wash leg with mild soap/water.  Applied moisturizing agent to dry skin as needed.   Applied primary and secondary dressing as ordered.  Applied multilayered dressing below the knee to right lower leg.  Instructed patient/caregiver not to remove dressing and to keep it clean and dry.   Instructed patient/caregiver on complications to report to provider, such as pain, numbness in toes, heavy drainage, and slippage of dressing.  Instructed patient on purpose of compression dressing and on activity and exercise recommendations.      Electronically signed by Marissa Rockwell RN on 7/11/2025 at 2:04 PM

## 2025-07-11 NOTE — PATIENT INSTRUCTIONS
PHYSICIAN ORDERS AND DISCHARGE INSTRUCTIONS     Wound Care Notes:           Orders for this week:  2025       Fax to [facility]    Right Lower Legs:Wash with soap and water. Pat dry.   Apply Anasept gel and Stimulen powder to wound beds  Cover with Zetuvit Plus  Wrap with Coban 2 Lite  Change Monday, Wednesday and Friday       Dispense 30 day quantity when ordering supplies.  Plan:        Follow Up Instructions: 1 week   Primary Wound Care Provider: Rajendra Perkins CNP  Call  for any questions or concerns.  Central [phone] for imaging and lab work

## 2025-07-11 NOTE — PLAN OF CARE
Wound Care Supplies      Supply Company:     [employer]     Ordering Center:     [facility]  [address]  [facility] [id number] [phone]  WOUND CARE Dept: [phone]   FAX NUMBER [phone]    Patient Information:      Vitaliy Moctezuma  [address]  [address]  [facility] [address]   [phone]   : 1950  AGE: 75 y.o.     GENDER: female   EPISODE DATE: 2025    Insurance:      PRIMARY INSURANCE:  Plan: [employer] DUAL ADVANTAGE  Coverage: BCBS MEDICARE  Effective Date: 2024  Group Number: [unfilled]  Subscriber Number: [id number] - (Medicare Managed)    Payer/Plan Subscr  Sex Relation Sub. Ins. ID Effective Group Num   1. BCBS MEDICARE* VITALIY MOCTEZUMA 1950 Female Self [id number]                                      2. [place]ID [place] -* VITALIY MOCTEZUMA 1950 Female Self [id number]                                    [address]       Patient Wound Information:      Problem List Items Addressed This Visit    None      WOUNDS REQUIRING DRESSING SUPPLIES:     Wound 25 #1 Right Anterior Lower Leg (Active)   Wound Image   25 1336   Wound Etiology Venous 07/10/25 0900   Dressing Status New dressing applied 07/10/25 0900   Wound Cleansed Soap and water;Wound cleanser 25 1328   Dressing/Treatment Hydrofiber Ag;ABD;Roll gauze;Ace wrap 07/10/25 0900   Wound Length (cm) 14 cm 25 1328   Wound Width (cm) 3.9 cm 25 1328   Wound Depth (cm) 0.1 cm 25 1328   Wound Surface Area (cm^2) 54.6 cm^2 25 1328   Change in Wound Size % (l*w) 48 25 1328   Wound Volume (cm^3) 5.46 cm^3 25 1328   Wound Healing % 48 25 1328   Post-Procedure Length (cm) 14 cm 25 1348   Post-Procedure Width (cm) 3.9 cm 25 1348   Post-Procedure Depth (cm) 0.1 cm 25 1348   Post-Procedure Surface Area (cm^2) 54.6 cm^2 25 1348   Post-Procedure Volume (cm^3) 5.46 cm^3 25 1348   Distance Tunneling

## 2025-07-11 NOTE — CARE COORDINATION
scheduling. Advised [facility] referral was initiated (per CALEB Gu RN Liasion) and [facility] will also be reaching out to her to schedule intake visit. Reports LLE \"ok, not bad at all\". Denies fever, chills, drainage RLE, foul odor. Advised to elevate RLE, avoid prolonged dependent position, t&r frequently. Advised low 2gm sodium diet- read labels; avoid/limit high sodium foods such as fast foods, canned/boxed/processed foods, frozen meals, soups, cheeses, breads, chips, soda; do not add salt to food. Advised to adhere to provider recommended 2L fluid restriction. Denies need for RD. Patient is aware she is to be taking Lasix 40mg qd until further advised to help reduce edema. Advised patient she is to have repeat labs next week to monitor kidney function--reports she will present to [facility] for labs. Reports taking Omnicef. Advised to not skip doses and complete entire course unless otherwise directed by provider. Reports minimal RLE pain and has only  needed Tylenol x 1 since home however does have Norco if needed for more intense pain. Advised Norco can cause constipation--discussed benefits of otc gentle stool softeners if needed. Reports appetite, fluid intake good w/ b&b wnl; had reg bm this morning. Denies urinary complaints.     Lives in second story apartment w/ elevator. Friend, Vasiliy, lives w/ her to provide 24/7 care/assistance w/ adls, transportation. DME: rollator walker, shower chair. [facility] and  Wound Clinic for wound mgt, dressing changes. Denies additional resource needs at this time.     Care Transition Nurse reviewed discharge instructions, medical action plan, and red flags with patient. The patient was given an opportunity to ask questions; all questions answered at this time.. The patient needs reinforcement of information discussed.   Were discharge instructions available to patient? Yes.   Reviewed appropriate site of care based on symptoms and resources available to patient including:

## 2025-07-14 ENCOUNTER — CARE COORDINATION (OUTPATIENT)
Dept: CASE MANAGEMENT | Age: 75
End: 2025-07-14

## 2025-07-14 NOTE — CARE COORDINATION
Care Transitions Note    Follow Up Call     Patient Current Location:  Home: [address]  [address]  [place] [address]    LPN Care Coordinator contacted the patient by telephone. Verified name and  as identifiers.    Additional needs identified to be addressed with provider   No needs identified                 Method of communication with provider: none.    Care Summary Note: LPN CC spoke with patient. States she can feel kidney stones passing. Denies hematuria, fever/chills, N/V/D. Taking atbx as directed. Has been to [facility]. States wound is a lot better. Swelling is at baseline. Monitors sodium intake. States she drinks about max of 2L daily. [facility] has not been in yet. Appetite good. Denies problems with BM. PCP . WCC . Friend Vasiliy provides her transportation for her to [facility].     Plan of care updates since last contact:  Review of patient management of conditions/medications:         Advance Care Planning:   Does patient have an Advance Directive: deferred at this time, will discuss on future follow up. .    Medication Review:  No changes since last call.     Remote Patient Monitoring:  Offered patient enrollment in the Remote Patient Monitoring (RPM) program for in-home monitoring: Deferred at this time because [facility] not yet active, patient still dealing with some acute issues; will discuss at next outreach.    Assessments:  Care Transitions Subsequent and Final Call    Subsequent and Final Calls  Care Transitions Interventions  Other Interventions:              Follow Up Appointment:   Reviewed upcoming appointment(s).  Future Appointments         Provider Specialty Dept Phone    2025 1:40 PM Cheyenne Arias DO Internal Medicine [phone]    2025 1:30 PM Rajendra Perkins, APRN - CNP Wound Ostomy [phone]    2025 3:20 PM Cheyenne Arias DO Internal Medicine [phone]    2025 1:30 PM Moe Santiago MD Oncology [phone]    2025 1:45 PM DAVINA, NO ONC NURSE Infusion

## 2025-07-16 ENCOUNTER — TELEPHONE (OUTPATIENT)
Dept: INTERNAL MEDICINE CLINIC | Age: 75
End: 2025-07-16

## 2025-07-16 ENCOUNTER — CARE COORDINATION (OUTPATIENT)
Dept: CASE MANAGEMENT | Age: 75
End: 2025-07-16

## 2025-07-16 NOTE — TELEPHONE ENCOUNTER
Marianna from [facility] called stating that patient is using friends oxygen tank and does have orders for one her self. Marianna wanted to report that to PCP.

## 2025-07-16 NOTE — CARE COORDINATION
Care Transitions Note    Follow Up Call     Reason for Admission: RLE Cellulitis (freq), Lymphedema BLE, Recent UTI     Patient Current Location:  Home: [address] [facility]  [address]  [address] Care Coordinator contacted the patient by telephone. Verified name and  as identifiers.    Additional needs identified to be addressed with provider   No needs identified                 Method of communication with provider: none.    Care Summary Note: Spoke with Nusrat Moctezuma who reported that she was doing ok just as long as she keep her leg elevated. Patient reported that she will take her last dose of ATB later today. Patient stated that she has a HFU with PCP tomorrow  and the wound clinic on . Patient stated that the [facility] nurse was in to see her yesterday. Patient stated the she is not feeling the kidney stones passing anymore. Patient urinating without issue. Patient stated that since they changed her inhalers her sob is much better and she is able to catch her breath faster now when at rest. Patient denied cp, cough, dizziness, headache, n/v, diarrhea, abdominal pains, fever, or chills. Patient report that appetite and fluid intake is good and denied any problems with bowel or bladder. Patient reported that he is taking all medications as ordered. Patient denied any other needs at this time. Patient instructed to continue to monitor s/s, reporting any that may present to MD immediately for early intervention. Patient is agreeable to f/u calls.    Plan of care updates since last contact:          Advance Care Planning   The patient has the following advanced directives on file:  Advance Directives       Power of  Living Will ACP-Advance Directive ACP-Power of     Not on File Not on File Not on File Not on File            The patient has appointed the following active healthcare agents:    Primary Decision Maker: Inderjit Kuhn - [place] - [phone]    Secondary Decision

## 2025-07-16 NOTE — PATIENT INSTRUCTIONS
PHYSICIAN ORDERS AND DISCHARGE INSTRUCTIONS     Wound Care Notes:           Orders for this week:  2025       Fax to [facility]    Right Lower Legs:Wash with soap and water. Pat dry.   Apply Anasept gel and Stimulen powder to wound beds  Cover with Zetuvit plus   Wrap with Coban 2 Lite  Apply tubi  to LLE   Change  and         Dispense 30 day quantity when ordering supplies.  Plan: Need to order Lymph pumps and Juxtalites 25       Follow Up Instructions: 1 week   Primary Wound Care Provider: Rajendra Perkins CNP  Call  for any questions or concerns.  Central [phone] for imaging and lab work

## 2025-07-17 ENCOUNTER — OFFICE VISIT (OUTPATIENT)
Dept: INTERNAL MEDICINE CLINIC | Age: 75
End: 2025-07-17

## 2025-07-17 VITALS — SYSTOLIC BLOOD PRESSURE: 132 MMHG | HEART RATE: 72 BPM | OXYGEN SATURATION: 93 % | DIASTOLIC BLOOD PRESSURE: 70 MMHG

## 2025-07-17 DIAGNOSIS — R20.2 PARESTHESIA OF FINGER: ICD-10-CM

## 2025-07-17 DIAGNOSIS — Z09 HOSPITAL DISCHARGE FOLLOW-UP: ICD-10-CM

## 2025-07-17 DIAGNOSIS — F33.1 MODERATE EPISODE OF RECURRENT MAJOR DEPRESSIVE DISORDER (HCC): ICD-10-CM

## 2025-07-17 DIAGNOSIS — I10 PRIMARY HYPERTENSION: ICD-10-CM

## 2025-07-17 DIAGNOSIS — L97.212 VENOUS STASIS ULCER OF RIGHT CALF WITH FAT LAYER EXPOSED, UNSPECIFIED WHETHER VARICOSE VEINS PRESENT (HCC): ICD-10-CM

## 2025-07-17 DIAGNOSIS — R73.03 PREDIABETES: ICD-10-CM

## 2025-07-17 DIAGNOSIS — F41.9 ANXIETY: ICD-10-CM

## 2025-07-17 DIAGNOSIS — L03.115 CELLULITIS OF RIGHT LOWER EXTREMITY: Primary | ICD-10-CM

## 2025-07-17 DIAGNOSIS — D64.9 ANEMIA, UNSPECIFIED TYPE: ICD-10-CM

## 2025-07-17 DIAGNOSIS — K74.60 CIRRHOSIS OF LIVER WITHOUT ASCITES, UNSPECIFIED HEPATIC CIRRHOSIS TYPE (HCC): ICD-10-CM

## 2025-07-17 DIAGNOSIS — I89.0 LYMPHEDEMA: ICD-10-CM

## 2025-07-17 DIAGNOSIS — I83.012 VENOUS STASIS ULCER OF RIGHT CALF WITH FAT LAYER EXPOSED, UNSPECIFIED WHETHER VARICOSE VEINS PRESENT (HCC): ICD-10-CM

## 2025-07-17 RX ORDER — CEFDINIR 300 MG/1
300 CAPSULE ORAL 2 TIMES DAILY
Qty: 10 CAPSULE | Refills: 0 | Status: SHIPPED | OUTPATIENT
Start: 2025-07-17 | End: 2025-07-22

## 2025-07-17 RX ORDER — FUROSEMIDE 40 MG/1
40 TABLET ORAL DAILY
Qty: 90 TABLET | Refills: 0 | Status: SHIPPED | OUTPATIENT
Start: 2025-07-17 | End: 2025-07-17 | Stop reason: SDUPTHER

## 2025-07-17 RX ORDER — FUROSEMIDE 40 MG/1
40 TABLET ORAL DAILY
Qty: 90 TABLET | Refills: 0 | Status: SHIPPED | OUTPATIENT
Start: 2025-07-17

## 2025-07-17 NOTE — PROGRESS NOTES
Post-Discharge Transitional Care  Follow Up      Nusrat Moctezuma   YOB: 1950    Date of Office Visit:  7/17/2025  Date of Hospital Admission: 7/7/25  Date of Hospital Discharge: 7/10/25  Risk of hospital readmission (high >=14%. Medium >=10%) :Readmission Risk Score: 21.7      Care management risk score Rising risk (score 2-5) and Complex Care (Scores >=6): No Risk Score On File     Non face to face  following discharge, date last encounter closed (first attempt may have been earlier): 07/11/2025    Call initiated 2 business days of discharge: Yes    ASSESSMENT/PLAN:   Cellulitis of right lower extremity  -     cefdinir (OMNICEF) 300 MG capsule; Take 1 capsule by mouth 2 times daily for 5 days, Disp-10 capsule, R-0Normal  Venous stasis ulcer of right calf with fat layer exposed, unspecified whether varicose veins present (HCC)  Keep f/u with the wound center  Lymphedema  -     Comprehensive Metabolic Panel; Future  -     furosemide (LASIX) 40 MG tablet; Take 1 tablet by mouth daily, Disp-90 tablet, R-0Normal  ADR's explained  Patient may decide to use 1/2 tablet  Moderate episode of recurrent major depressive disorder (HCC)  Increase Zoloft 25 mg to 50 mg  Anxiety  Increase Zoloft 25 mg to 50 mg  Cirrhosis of liver without ascites, unspecified hepatic cirrhosis type (HCC)  -     Comprehensive Metabolic Panel; Future  -     US ABDOMINAL LIMITED; Future  -     AFP Tumor Marker; Future  Keep f/u with GI  Anemia, unspecified type  -     CBC with Auto Differential; Future  Primary hypertension  Continue Norvasc 5 mg and valsartan 160  -     CBC with Auto Differential; Future  Paresthesia of finger  -     TSH reflex to FT4; Future  -     Hemoglobin A1C; Future  -     CBC with Auto Differential; Future  Prediabetes  -     Hemoglobin A1C; Future  Hospital discharge follow-up  -     AR DISCHARGE MEDS RECONCILED W/ CURRENT OUTPATIENT MED LIST  Keep f/u with specialists  Medical Decision Making: high

## 2025-07-18 ENCOUNTER — HOSPITAL ENCOUNTER (OUTPATIENT)
Dept: WOUND CARE | Age: 75
Discharge: HOME | End: 2025-07-18
Attending: NURSE PRACTITIONER
Payer: MEDICARE

## 2025-07-18 VITALS
SYSTOLIC BLOOD PRESSURE: 125 MMHG | DIASTOLIC BLOOD PRESSURE: 71 MMHG | TEMPERATURE: 97.9 F | HEART RATE: 70 BPM | RESPIRATION RATE: 20 BRPM

## 2025-07-18 DIAGNOSIS — I83.012 VENOUS STASIS ULCER OF RIGHT CALF WITH FAT LAYER EXPOSED WITH VARICOSE VEINS (HCC): Primary | ICD-10-CM

## 2025-07-18 DIAGNOSIS — Q82.0 HEREDITARY LYMPHEDEMA OF LEGS: ICD-10-CM

## 2025-07-18 DIAGNOSIS — I87.2 VENOUS STASIS DERMATITIS OF BOTH LOWER EXTREMITIES: ICD-10-CM

## 2025-07-18 DIAGNOSIS — E66.01 MORBID OBESITY WITH BMI OF 50.0-59.9, ADULT (HCC): ICD-10-CM

## 2025-07-18 DIAGNOSIS — L97.212 VENOUS STASIS ULCER OF RIGHT CALF WITH FAT LAYER EXPOSED WITH VARICOSE VEINS (HCC): Primary | ICD-10-CM

## 2025-07-18 PROCEDURE — 11045 DBRDMT SUBQ TISS EACH ADDL: CPT

## 2025-07-18 PROCEDURE — 11042 DBRDMT SUBQ TIS 1ST 20SQCM/<: CPT

## 2025-07-18 RX ORDER — TRIAMCINOLONE ACETONIDE 1 MG/G
OINTMENT TOPICAL PRN
OUTPATIENT
Start: 2025-07-18

## 2025-07-18 RX ORDER — MUPIROCIN 2 %
OINTMENT (GRAM) TOPICAL PRN
OUTPATIENT
Start: 2025-07-18

## 2025-07-18 RX ORDER — LIDOCAINE 40 MG/G
CREAM TOPICAL PRN
OUTPATIENT
Start: 2025-07-18

## 2025-07-18 RX ORDER — LIDOCAINE HYDROCHLORIDE 20 MG/ML
JELLY TOPICAL PRN
OUTPATIENT
Start: 2025-07-18

## 2025-07-18 RX ORDER — CLOBETASOL PROPIONATE 0.5 MG/G
OINTMENT TOPICAL PRN
OUTPATIENT
Start: 2025-07-18

## 2025-07-18 RX ORDER — SODIUM CHLOR/HYPOCHLOROUS ACID 0.033 %
SOLUTION, IRRIGATION IRRIGATION PRN
OUTPATIENT
Start: 2025-07-18

## 2025-07-18 RX ORDER — LIDOCAINE HYDROCHLORIDE 40 MG/ML
SOLUTION TOPICAL PRN
OUTPATIENT
Start: 2025-07-18

## 2025-07-18 RX ORDER — BETAMETHASONE DIPROPIONATE 0.5 MG/G
CREAM TOPICAL PRN
OUTPATIENT
Start: 2025-07-18

## 2025-07-18 RX ORDER — LIDOCAINE 50 MG/G
OINTMENT TOPICAL PRN
OUTPATIENT
Start: 2025-07-18

## 2025-07-18 NOTE — PROGRESS NOTES
Multilayer Compression Wrap   (Not Unna) Below the Knee    NAME:  Nusrat Moctezuma  YOB: 1950  MEDICAL RECORD NUMBER:  [id number]  DATE:  7/18/2025    Multilayer compression wrap: Removed old Multilayer wrap if indicated and wash leg with mild soap/water.  Applied moisturizing agent to dry skin as needed.   Applied primary and secondary dressing as ordered.  Applied multilayered dressing below the knee to right lower leg.  Instructed patient/caregiver not to remove dressing and to keep it clean and dry.   Instructed patient/caregiver on complications to report to provider, such as pain, numbness in toes, heavy drainage, and slippage of dressing.  Instructed patient on purpose of compression dressing and on activity and exercise recommendations.      Electronically signed by Nola Matos LPN on 7/18/2025 at 2:35 PM  
Rhianna  appears to have a non-healing wound of the Right lower extremity. The etiology of the wound is felt to be venous. There are multiple complicating factors including edema, venous stasis, lymphedema, and obesity.  A comprehensive wound management program would be helpful to heal this wound. Assessments completed include fall risk and nutritional, functional,and psychological status.  At this time appropriate care would include: periodic debridement and wound care as below.     Problem List Items Addressed This Visit          Circulatory    Venous stasis dermatitis of both lower extremities       Musculoskeletal and Integument    WD-Venous stasis ulcer of right calf with fat layer exposed (HCC) - Primary    Relevant Orders    Initiate Outpatient Wound Care Protocol       Other    Morbid obesity with BMI of 50.0-59.9, adult (HCC)    WD-Hereditary lymphedema of legs       Procedure Note    Indications:  Based on my examination of this patient's wound(s) today, sharp excision into subcutaneous tissue is required to promote healing and evaluate the extent of previous healing.    Performed by: AUSTIN Oseguera - CNP    Consent obtained: Yes    Time out taken:  Yes    Pain Control:  2% Lidocaine spray    Debridement: Excisional Debridement    Using curette the wound(s) was/were sharply debrided down through and including the removal of subcutaneous tissue.        Devitalized Tissue Debrided:  biofilm, slough, and exudate    Pre Debridement Measurements:  Are located in the Wound Documentation Flow Sheet    All active wounds listed below with today's date are evaluated  Wound(s) debrided this date include # : 1 and 2     Post  Debridement Measurements:  Wound 07/08/25 #1 Right Anterior Lower Leg (Active)   Wound Image   07/18/25 1349   Wound Etiology Venous 07/10/25 0900   Dressing Status New dressing applied;Clean;Dry;Intact 07/18/25 1434   Wound Cleansed Soap and water 07/18/25 1337   Dressing/Treatment Other

## 2025-07-23 ENCOUNTER — CARE COORDINATION (OUTPATIENT)
Dept: CASE MANAGEMENT | Age: 75
End: 2025-07-23

## 2025-07-23 NOTE — CARE COORDINATION
Care Transitions Note    Follow Up Call     Patient: Nusrat Moctezuma                                Patient : 1950   MRN: [id number]                             Reason for Admission: RLE Cellulitis (freq), Lymphedema BLE, Recent UTI   Discharge Date: 7/10/25       RURS: Readmission Risk Score: 21.7  Facility: [facility] 25-7/10/25    Patient Current Location:  Home: [address] [place] [address]    Care Transition Nurse contacted patient by telephone. Verified name and  as identifiers.    Additional needs identified to be addressed with provider   No needs identified       Method of communication with provider: none.    Care Summary Note: Reports doing well since last call. Was seen by PCP  25 w/ new rx for Omnicef bid x 5 days for cellulitis rle, rx for Abd US to address Liver Cirrhosis, labs, GI appt. Reports PCP checking to see if liver cirrhosis may be cause of issues. Patient reports will be taking last dose of Omnicef this evening. Reports naty le \"starting to look better\". Denies fever, chills, drainage RLE, foul odor. Advised to elevate BLE, avoid prolonged dependent position, t&r frequently. Dressing changes per [facility] RN and [facility] w/ next appt 25. Reminded to keep dressing C/D/I. Reports ambulating w/ Rollator--gait steady, has assistance of Bill (caregiver). Reports take occasional Tylenol ES for discomfort; has not needed Norco and does not want to take for fear of addiction. Discussed benefits of cold/ice packs prn w/ thin towel/cloth to protect skin.    Reviewed CHF mgt including monitor weight daily in am (before eating/drinking, after void/bm, in light wt clothing, no shoes), keep written log of weights for provider review at appts, notify MD immediately of weight gain 3 + pounds overnight or over course of a few days; take all rx as prescribed; keep scheduled cardiology/pcp appts; adhere to low 2gm sodium diet- read labels; avoid/limit high sodium foods such

## 2025-07-25 ENCOUNTER — HOSPITAL ENCOUNTER (OUTPATIENT)
Dept: WOUND CARE | Age: 75
Discharge: HOME | End: 2025-07-25
Attending: NURSE PRACTITIONER
Payer: MEDICARE

## 2025-07-25 VITALS
RESPIRATION RATE: 20 BRPM | HEART RATE: 73 BPM | TEMPERATURE: 98.7 F | SYSTOLIC BLOOD PRESSURE: 144 MMHG | DIASTOLIC BLOOD PRESSURE: 62 MMHG

## 2025-07-25 DIAGNOSIS — I83.012 VENOUS STASIS ULCER OF RIGHT CALF WITH FAT LAYER EXPOSED WITH VARICOSE VEINS (HCC): Primary | ICD-10-CM

## 2025-07-25 DIAGNOSIS — L97.212 VENOUS STASIS ULCER OF RIGHT CALF WITH FAT LAYER EXPOSED WITH VARICOSE VEINS (HCC): Primary | ICD-10-CM

## 2025-07-25 PROCEDURE — 11045 DBRDMT SUBQ TISS EACH ADDL: CPT

## 2025-07-25 PROCEDURE — 11042 DBRDMT SUBQ TIS 1ST 20SQCM/<: CPT

## 2025-07-25 RX ORDER — TRIAMCINOLONE ACETONIDE 1 MG/G
OINTMENT TOPICAL PRN
OUTPATIENT
Start: 2025-07-25

## 2025-07-25 RX ORDER — BETAMETHASONE DIPROPIONATE 0.5 MG/G
CREAM TOPICAL PRN
OUTPATIENT
Start: 2025-07-25

## 2025-07-25 RX ORDER — LIDOCAINE 50 MG/G
OINTMENT TOPICAL PRN
OUTPATIENT
Start: 2025-07-25

## 2025-07-25 RX ORDER — LIDOCAINE 40 MG/G
CREAM TOPICAL PRN
OUTPATIENT
Start: 2025-07-25

## 2025-07-25 RX ORDER — SODIUM CHLOR/HYPOCHLOROUS ACID 0.033 %
SOLUTION, IRRIGATION IRRIGATION PRN
OUTPATIENT
Start: 2025-07-25

## 2025-07-25 RX ORDER — LIDOCAINE HYDROCHLORIDE 20 MG/ML
JELLY TOPICAL PRN
OUTPATIENT
Start: 2025-07-25

## 2025-07-25 RX ORDER — LIDOCAINE HYDROCHLORIDE 40 MG/ML
SOLUTION TOPICAL PRN
OUTPATIENT
Start: 2025-07-25

## 2025-07-25 RX ORDER — CLOBETASOL PROPIONATE 0.5 MG/G
OINTMENT TOPICAL PRN
OUTPATIENT
Start: 2025-07-25

## 2025-07-25 RX ORDER — MUPIROCIN 2 %
OINTMENT (GRAM) TOPICAL PRN
OUTPATIENT
Start: 2025-07-25

## 2025-07-25 NOTE — PATIENT INSTRUCTIONS
PHYSICIAN ORDERS AND DISCHARGE INSTRUCTIONS     Wound Care Notes:           Orders for this week:  2025       Fax to [facility]    Right Lower Legs:Wash with soap and water. Pat dry.   Apply Anasept gel and Stimulen powder to wound beds  Cover with Zetuvit plus   Wrap with Coban 2 Lite (enclose toes per patient request)  Apply tubi  to LLE   Change  and         Dispense 30 day quantity when ordering supplies.  Plan: Need to order Lymph pumps and Juxtalites 25       Follow Up Instructions: 1 week   Primary Wound Care Provider: Rajendra Perkins CNP  Call  for any questions or concerns.  Central [phone] for imaging and lab work

## 2025-07-25 NOTE — PROGRESS NOTES
Multilayer Compression Wrap   (Not Unna) Below the Knee    NAME:  Nusrat Moctezuma  YOB: 1950  MEDICAL RECORD NUMBER:  [id number]  DATE:  7/25/2025    Multilayer compression wrap: Removed old Multilayer wrap if indicated and wash leg with mild soap/water.  Applied moisturizing agent to dry skin as needed.   Applied primary and secondary dressing as ordered.  Applied multilayered dressing below the knee to right lower leg.  Instructed patient/caregiver not to remove dressing and to keep it clean and dry.   Instructed patient/caregiver on complications to report to provider, such as pain, numbness in toes, heavy drainage, and slippage of dressing.  Instructed patient on purpose of compression dressing and on activity and exercise recommendations.      Electronically signed by Nola Matos LPN on 7/25/2025 at 1:43 PM

## 2025-07-28 DIAGNOSIS — I10 PRIMARY HYPERTENSION: ICD-10-CM

## 2025-07-28 RX ORDER — VALSARTAN 160 MG/1
160 TABLET ORAL DAILY
Qty: 90 TABLET | Refills: 1 | Status: SHIPPED | OUTPATIENT
Start: 2025-07-28

## 2025-07-28 NOTE — TELEPHONE ENCOUNTER
Last appointment: 7/17/2025  Next appointment: 10/17/2025  Last refill:     Valsartan: 03/27/2025  Spiriva inhaler: 07/10/2025 was sent to [facility] pharmacy. This request is to go to [facility]

## 2025-07-30 ENCOUNTER — CARE COORDINATION (OUTPATIENT)
Dept: CARE COORDINATION | Age: 75
End: 2025-07-30

## 2025-07-30 NOTE — CARE COORDINATION
Care Transitions Note    Follow Up Call     Patient: Nusrat Moctezuma                                Patient : 1950   MRN: [id number]                             Reason for Admission: RLE Cellulitis (freq), Lymphedema BLE, Recent UTI   Discharge Date: 7/10/25       RURS: Readmission Risk Score: 21.7  Facility: [facility] 25-7/10/25    Patient Current Location:  Home: [address] [place] [address]    Care Transition Nurse contacted patient by telephone. Verified name and  as identifiers.    Additional needs identified to be addressed with provider   No needs identified         Method of communication with provider: none.    Care Summary Note: Reports doing well since last outreach. Reports slight improvement naty le \"but still need work\"; reports slight decrease in edema and erythema. Reports completed Omnicef as directed. Denies fever, purulent wound drainage, foul odor. Cellulitis, Lymphedema and drsg changes [facility] appt [facility]--reports she has transportation.  Advised to elevate BLE, avoid prolonged dependent position, t&r frequently.     Brief re-review of CHF. Denies cp, sob, orthopnea, edema, noted wt gain, wet cough, ac distress. Reports wt this week was 284-286# which is down from 295# 25 inpt wt. Reports taking Lasix and all rx as directed; denies refill needs. Denies need for cardiology appt. Scheduled for abd ultrasound 25 as patient w/ hx of liver cirrhosis. Advised GI appt. Reminded to adhere to low 2gm sodium diet- read labels; avoid/limit high sodium foods such as fast foods, canned/boxed/processed foods, frozen meals, soups, cheeses, breads, chips, soda; do not add salt to food; adhere to provider recommended fluid restriction. Advised to notify provider asap noting increased sob, orthopnea, edema, weight gain, chest pain/tightness, increased/wet cough for early outpt intervention; call 911 if noting acute distress.

## 2025-08-01 ENCOUNTER — HOSPITAL ENCOUNTER (OUTPATIENT)
Dept: WOUND CARE | Age: 75
Discharge: HOME | End: 2025-08-01
Attending: NURSE PRACTITIONER

## 2025-08-07 ENCOUNTER — HOSPITAL ENCOUNTER (OUTPATIENT)
Dept: ULTRASOUND IMAGING | Age: 75
Discharge: HOME | End: 2025-08-07
Payer: MEDICARE

## 2025-08-07 ENCOUNTER — HOSPITAL ENCOUNTER (OUTPATIENT)
Dept: LAB | Age: 75
Discharge: HOME | End: 2025-08-07
Payer: MEDICARE

## 2025-08-07 ENCOUNTER — CARE COORDINATION (OUTPATIENT)
Dept: CARE COORDINATION | Age: 75
End: 2025-08-07

## 2025-08-07 DIAGNOSIS — R20.2 PARESTHESIA OF FINGER: ICD-10-CM

## 2025-08-07 DIAGNOSIS — R73.03 PREDIABETES: ICD-10-CM

## 2025-08-07 DIAGNOSIS — I89.0 LYMPHEDEMA: ICD-10-CM

## 2025-08-07 DIAGNOSIS — K74.60 CIRRHOSIS OF LIVER WITHOUT ASCITES, UNSPECIFIED HEPATIC CIRRHOSIS TYPE (HCC): ICD-10-CM

## 2025-08-07 DIAGNOSIS — I10 PRIMARY HYPERTENSION: ICD-10-CM

## 2025-08-07 LAB
ALBUMIN SERPL-MCNC: 3.8 G/DL (ref 3.4–5)
ALBUMIN/GLOB SERPL: 1 {RATIO} (ref 1.1–2.2)
ALP SERPL-CCNC: 123 U/L (ref 40–129)
ALT SERPL-CCNC: 8 U/L (ref 10–40)
ANION GAP SERPL CALCULATED.3IONS-SCNC: 10 MMOL/L (ref 9–17)
AST SERPL-CCNC: 12 U/L (ref 15–37)
BASOPHILS # BLD: 0.06 K/UL
BASOPHILS NFR BLD: 0 % (ref 0–1)
BILIRUB SERPL-MCNC: 0.3 MG/DL (ref 0–1)
BUN SERPL-MCNC: 20 MG/DL (ref 7–20)
CALCIUM SERPL-MCNC: 9.6 MG/DL (ref 8.3–10.6)
CHLORIDE SERPL-SCNC: 99 MMOL/L (ref 99–110)
CO2 SERPL-SCNC: 28 MMOL/L (ref 21–32)
CREAT SERPL-MCNC: 0.9 MG/DL (ref 0.6–1.2)
EOSINOPHIL # BLD: 0.32 K/UL
EOSINOPHILS RELATIVE PERCENT: 2 % (ref 0–3)
ERYTHROCYTE [DISTWIDTH] IN BLOOD BY AUTOMATED COUNT: 15.4 % (ref 11.7–14.9)
EST. AVERAGE GLUCOSE BLD GHB EST-MCNC: 124 MG/DL
GFR, ESTIMATED: 62 ML/MIN/1.73M2
GLUCOSE SERPL-MCNC: 111 MG/DL (ref 74–99)
HBA1C MFR BLD: 5.9 % (ref 4.2–6.3)
HCT VFR BLD AUTO: 30.9 % (ref 37–47)
HGB BLD-MCNC: 9.3 G/DL (ref 12.5–16)
IMM GRANULOCYTES # BLD AUTO: 0.11 K/UL
IMM GRANULOCYTES NFR BLD: 1 %
LYMPHOCYTES NFR BLD: 2.39 K/UL
LYMPHOCYTES RELATIVE PERCENT: 18 % (ref 24–44)
MCH RBC QN AUTO: 26.6 PG (ref 27–31)
MCHC RBC AUTO-ENTMCNC: 30.1 G/DL (ref 32–36)
MCV RBC AUTO: 88.3 FL (ref 78–100)
MONOCYTES NFR BLD: 0.82 K/UL
MONOCYTES NFR BLD: 6 % (ref 0–5)
NEUTROPHILS NFR BLD: 73 % (ref 36–66)
NEUTS SEG NFR BLD: 9.77 K/UL
PLATELET # BLD AUTO: 286 K/UL (ref 140–440)
PMV BLD AUTO: 9.8 FL (ref 7.5–11.1)
POTASSIUM SERPL-SCNC: 4.4 MMOL/L (ref 3.5–5.1)
PROT SERPL-MCNC: 7.5 G/DL (ref 6.4–8.2)
RBC # BLD AUTO: 3.5 M/UL (ref 4.2–5.4)
SODIUM SERPL-SCNC: 137 MMOL/L (ref 136–145)
TSH SERPL DL<=0.05 MIU/L-ACNC: 2.49 UIU/ML (ref 0.27–4.2)
WBC OTHER # BLD: 13.5 K/UL (ref 4–10.5)

## 2025-08-07 PROCEDURE — 84443 ASSAY THYROID STIM HORMONE: CPT

## 2025-08-07 PROCEDURE — 36415 COLL VENOUS BLD VENIPUNCTURE: CPT

## 2025-08-07 PROCEDURE — 85025 COMPLETE CBC W/AUTO DIFF WBC: CPT

## 2025-08-07 PROCEDURE — 76705 ECHO EXAM OF ABDOMEN: CPT

## 2025-08-07 PROCEDURE — 82105 ALPHA-FETOPROTEIN SERUM: CPT

## 2025-08-07 PROCEDURE — 83036 HEMOGLOBIN GLYCOSYLATED A1C: CPT

## 2025-08-07 PROCEDURE — 80053 COMPREHEN METABOLIC PANEL: CPT

## 2025-08-08 ENCOUNTER — HOSPITAL ENCOUNTER (OUTPATIENT)
Dept: WOUND CARE | Age: 75
Discharge: HOME | End: 2025-08-08
Attending: NURSE PRACTITIONER
Payer: MEDICARE

## 2025-08-08 VITALS
RESPIRATION RATE: 16 BRPM | SYSTOLIC BLOOD PRESSURE: 115 MMHG | TEMPERATURE: 98.6 F | HEART RATE: 66 BPM | DIASTOLIC BLOOD PRESSURE: 48 MMHG

## 2025-08-08 DIAGNOSIS — L97.212 VENOUS STASIS ULCER OF RIGHT CALF WITH FAT LAYER EXPOSED WITH VARICOSE VEINS (HCC): Primary | ICD-10-CM

## 2025-08-08 DIAGNOSIS — I83.012 VENOUS STASIS ULCER OF RIGHT CALF WITH FAT LAYER EXPOSED WITH VARICOSE VEINS (HCC): Primary | ICD-10-CM

## 2025-08-08 DIAGNOSIS — Q82.0 HEREDITARY LYMPHEDEMA OF LEGS: ICD-10-CM

## 2025-08-08 DIAGNOSIS — E66.01 MORBID OBESITY WITH BMI OF 50.0-59.9, ADULT (HCC): ICD-10-CM

## 2025-08-08 DIAGNOSIS — I87.2 VENOUS STASIS DERMATITIS OF BOTH LOWER EXTREMITIES: ICD-10-CM

## 2025-08-08 PROCEDURE — 97597 DBRDMT OPN WND 1ST 20 CM/<: CPT

## 2025-08-08 PROCEDURE — 11042 DBRDMT SUBQ TIS 1ST 20SQCM/<: CPT

## 2025-08-08 PROCEDURE — 29581 APPL MULTLAYER CMPRN SYS LEG: CPT

## 2025-08-08 RX ORDER — LIDOCAINE 40 MG/G
CREAM TOPICAL PRN
OUTPATIENT
Start: 2025-08-08

## 2025-08-08 RX ORDER — MUPIROCIN 2 %
OINTMENT (GRAM) TOPICAL PRN
OUTPATIENT
Start: 2025-08-08

## 2025-08-08 RX ORDER — SODIUM CHLOR/HYPOCHLOROUS ACID 0.033 %
SOLUTION, IRRIGATION IRRIGATION PRN
OUTPATIENT
Start: 2025-08-08

## 2025-08-08 RX ORDER — LIDOCAINE HYDROCHLORIDE 20 MG/ML
JELLY TOPICAL PRN
OUTPATIENT
Start: 2025-08-08

## 2025-08-08 RX ORDER — TRIAMCINOLONE ACETONIDE 1 MG/G
OINTMENT TOPICAL PRN
OUTPATIENT
Start: 2025-08-08

## 2025-08-08 RX ORDER — CLOBETASOL PROPIONATE 0.5 MG/G
OINTMENT TOPICAL PRN
OUTPATIENT
Start: 2025-08-08

## 2025-08-08 RX ORDER — LIDOCAINE 50 MG/G
OINTMENT TOPICAL PRN
OUTPATIENT
Start: 2025-08-08

## 2025-08-08 RX ORDER — BETAMETHASONE DIPROPIONATE 0.5 MG/G
CREAM TOPICAL PRN
OUTPATIENT
Start: 2025-08-08

## 2025-08-08 RX ORDER — LIDOCAINE HYDROCHLORIDE 40 MG/ML
SOLUTION TOPICAL PRN
OUTPATIENT
Start: 2025-08-08

## 2025-08-09 LAB — AFP-TM SERPL-MCNC: 5 NG/ML (ref 0–9)

## 2025-08-11 ENCOUNTER — CARE COORDINATION (OUTPATIENT)
Dept: CARE COORDINATION | Age: 75
End: 2025-08-11

## 2025-08-12 ENCOUNTER — RESULTS FOLLOW-UP (OUTPATIENT)
Dept: INTERNAL MEDICINE CLINIC | Age: 75
End: 2025-08-12

## 2025-08-13 ENCOUNTER — TELEPHONE (OUTPATIENT)
Dept: CARDIOLOGY CLINIC | Age: 75
End: 2025-08-13

## 2025-08-15 ENCOUNTER — HOSPITAL ENCOUNTER (OUTPATIENT)
Dept: WOUND CARE | Age: 75
Discharge: HOME | End: 2025-08-15
Attending: NURSE PRACTITIONER
Payer: MEDICARE

## 2025-08-15 VITALS
HEART RATE: 72 BPM | DIASTOLIC BLOOD PRESSURE: 46 MMHG | TEMPERATURE: 98.2 F | RESPIRATION RATE: 18 BRPM | SYSTOLIC BLOOD PRESSURE: 123 MMHG

## 2025-08-15 DIAGNOSIS — I83.012 VENOUS STASIS ULCER OF RIGHT CALF WITH FAT LAYER EXPOSED WITH VARICOSE VEINS (HCC): Primary | ICD-10-CM

## 2025-08-15 DIAGNOSIS — I87.2 VENOUS STASIS DERMATITIS OF BOTH LOWER EXTREMITIES: ICD-10-CM

## 2025-08-15 DIAGNOSIS — L97.212 VENOUS STASIS ULCER OF RIGHT CALF WITH FAT LAYER EXPOSED WITH VARICOSE VEINS (HCC): Primary | ICD-10-CM

## 2025-08-15 DIAGNOSIS — Q82.0 HEREDITARY LYMPHEDEMA OF LEGS: ICD-10-CM

## 2025-08-15 DIAGNOSIS — E66.01 MORBID OBESITY WITH BMI OF 50.0-59.9, ADULT (HCC): ICD-10-CM

## 2025-08-15 PROCEDURE — 11042 DBRDMT SUBQ TIS 1ST 20SQCM/<: CPT

## 2025-08-15 PROCEDURE — 29581 APPL MULTLAYER CMPRN SYS LEG: CPT

## 2025-08-15 RX ORDER — LIDOCAINE 40 MG/G
CREAM TOPICAL PRN
OUTPATIENT
Start: 2025-08-15

## 2025-08-15 RX ORDER — TRIAMCINOLONE ACETONIDE 1 MG/G
OINTMENT TOPICAL PRN
OUTPATIENT
Start: 2025-08-15

## 2025-08-15 RX ORDER — MUPIROCIN 2 %
OINTMENT (GRAM) TOPICAL PRN
OUTPATIENT
Start: 2025-08-15

## 2025-08-15 RX ORDER — LIDOCAINE HYDROCHLORIDE 40 MG/ML
SOLUTION TOPICAL PRN
OUTPATIENT
Start: 2025-08-15

## 2025-08-15 RX ORDER — LIDOCAINE 50 MG/G
OINTMENT TOPICAL PRN
OUTPATIENT
Start: 2025-08-15

## 2025-08-15 RX ORDER — LIDOCAINE HYDROCHLORIDE 20 MG/ML
JELLY TOPICAL PRN
OUTPATIENT
Start: 2025-08-15

## 2025-08-15 RX ORDER — SODIUM CHLOR/HYPOCHLOROUS ACID 0.033 %
SOLUTION, IRRIGATION IRRIGATION PRN
OUTPATIENT
Start: 2025-08-15

## 2025-08-15 RX ORDER — BETAMETHASONE DIPROPIONATE 0.5 MG/G
CREAM TOPICAL PRN
OUTPATIENT
Start: 2025-08-15

## 2025-08-15 RX ORDER — CLOBETASOL PROPIONATE 0.5 MG/G
OINTMENT TOPICAL PRN
OUTPATIENT
Start: 2025-08-15

## 2025-08-15 ASSESSMENT — PAIN SCALES - GENERAL: PAINLEVEL_OUTOF10: 0

## 2025-08-18 ENCOUNTER — TELEPHONE (OUTPATIENT)
Dept: GASTROENTEROLOGY | Age: 75
End: 2025-08-18

## 2025-08-20 ENCOUNTER — OFFICE VISIT (OUTPATIENT)
Dept: CARDIOLOGY CLINIC | Age: 75
End: 2025-08-20
Payer: MEDICARE

## 2025-08-20 ENCOUNTER — TELEPHONE (OUTPATIENT)
Dept: CARDIOLOGY CLINIC | Age: 75
End: 2025-08-20

## 2025-08-20 VITALS
DIASTOLIC BLOOD PRESSURE: 46 MMHG | HEIGHT: 62 IN | SYSTOLIC BLOOD PRESSURE: 122 MMHG | HEART RATE: 76 BPM | WEIGHT: 282 LBS | BODY MASS INDEX: 51.89 KG/M2

## 2025-08-20 DIAGNOSIS — R06.02 SHORTNESS OF BREATH: Primary | ICD-10-CM

## 2025-08-20 PROCEDURE — 1159F MED LIST DOCD IN RCRD: CPT | Performed by: INTERNAL MEDICINE

## 2025-08-20 PROCEDURE — 1123F ACP DISCUSS/DSCN MKR DOCD: CPT | Performed by: INTERNAL MEDICINE

## 2025-08-20 PROCEDURE — 3074F SYST BP LT 130 MM HG: CPT | Performed by: INTERNAL MEDICINE

## 2025-08-20 PROCEDURE — 3078F DIAST BP <80 MM HG: CPT | Performed by: INTERNAL MEDICINE

## 2025-08-20 PROCEDURE — 99214 OFFICE O/P EST MOD 30 MIN: CPT | Performed by: INTERNAL MEDICINE

## 2025-08-21 ENCOUNTER — TELEPHONE (OUTPATIENT)
Dept: CARDIOLOGY CLINIC | Age: 75
End: 2025-08-21

## 2025-08-21 ENCOUNTER — OFFICE VISIT (OUTPATIENT)
Dept: CARDIOLOGY CLINIC | Age: 75
End: 2025-08-21
Payer: MEDICARE

## 2025-08-21 VITALS
DIASTOLIC BLOOD PRESSURE: 64 MMHG | WEIGHT: 282 LBS | HEART RATE: 64 BPM | SYSTOLIC BLOOD PRESSURE: 124 MMHG | HEIGHT: 62 IN | BODY MASS INDEX: 51.89 KG/M2

## 2025-08-21 DIAGNOSIS — Z95.0 CARDIAC PACEMAKER IN SITU: Primary | ICD-10-CM

## 2025-08-21 DIAGNOSIS — Z01.810 PRE-OPERATIVE CARDIOVASCULAR EXAMINATION: Primary | ICD-10-CM

## 2025-08-21 DIAGNOSIS — R93.1 ABNORMAL ECHOCARDIOGRAM: ICD-10-CM

## 2025-08-21 DIAGNOSIS — Z95.0 PACEMAKER: Primary | ICD-10-CM

## 2025-08-21 PROCEDURE — 3074F SYST BP LT 130 MM HG: CPT | Performed by: INTERNAL MEDICINE

## 2025-08-21 PROCEDURE — 99214 OFFICE O/P EST MOD 30 MIN: CPT | Performed by: INTERNAL MEDICINE

## 2025-08-21 PROCEDURE — 1123F ACP DISCUSS/DSCN MKR DOCD: CPT | Performed by: INTERNAL MEDICINE

## 2025-08-21 PROCEDURE — 3078F DIAST BP <80 MM HG: CPT | Performed by: INTERNAL MEDICINE

## 2025-08-21 PROCEDURE — 1159F MED LIST DOCD IN RCRD: CPT | Performed by: INTERNAL MEDICINE

## 2025-08-22 ENCOUNTER — HOSPITAL ENCOUNTER (OUTPATIENT)
Dept: WOUND CARE | Age: 75
Discharge: HOME | End: 2025-08-22
Attending: NURSE PRACTITIONER
Payer: MEDICARE

## 2025-08-22 VITALS
HEART RATE: 68 BPM | RESPIRATION RATE: 20 BRPM | DIASTOLIC BLOOD PRESSURE: 44 MMHG | TEMPERATURE: 98.6 F | SYSTOLIC BLOOD PRESSURE: 104 MMHG

## 2025-08-22 DIAGNOSIS — L97.212 VENOUS STASIS ULCER OF RIGHT CALF WITH FAT LAYER EXPOSED WITH VARICOSE VEINS (HCC): Primary | ICD-10-CM

## 2025-08-22 DIAGNOSIS — I83.012 VENOUS STASIS ULCER OF RIGHT CALF WITH FAT LAYER EXPOSED WITH VARICOSE VEINS (HCC): Primary | ICD-10-CM

## 2025-08-22 PROCEDURE — 97597 DBRDMT OPN WND 1ST 20 CM/<: CPT

## 2025-08-22 RX ORDER — SODIUM CHLOR/HYPOCHLOROUS ACID 0.033 %
SOLUTION, IRRIGATION IRRIGATION PRN
OUTPATIENT
Start: 2025-08-22

## 2025-08-22 RX ORDER — LIDOCAINE 40 MG/G
CREAM TOPICAL PRN
OUTPATIENT
Start: 2025-08-22

## 2025-08-22 RX ORDER — LIDOCAINE HYDROCHLORIDE 20 MG/ML
JELLY TOPICAL PRN
OUTPATIENT
Start: 2025-08-22

## 2025-08-22 RX ORDER — LIDOCAINE 50 MG/G
OINTMENT TOPICAL PRN
OUTPATIENT
Start: 2025-08-22

## 2025-08-22 RX ORDER — LIDOCAINE HYDROCHLORIDE 40 MG/ML
SOLUTION TOPICAL PRN
OUTPATIENT
Start: 2025-08-22

## 2025-08-22 RX ORDER — MUPIROCIN 2 %
OINTMENT (GRAM) TOPICAL PRN
OUTPATIENT
Start: 2025-08-22

## 2025-08-22 RX ORDER — BETAMETHASONE DIPROPIONATE 0.5 MG/G
CREAM TOPICAL PRN
OUTPATIENT
Start: 2025-08-22

## 2025-08-22 RX ORDER — TRIAMCINOLONE ACETONIDE 1 MG/G
OINTMENT TOPICAL PRN
OUTPATIENT
Start: 2025-08-22

## 2025-08-22 RX ORDER — CLOBETASOL PROPIONATE 0.5 MG/G
OINTMENT TOPICAL PRN
OUTPATIENT
Start: 2025-08-22

## 2025-08-22 ASSESSMENT — PAIN - FUNCTIONAL ASSESSMENT: PAIN_FUNCTIONAL_ASSESSMENT: PREVENTS OR INTERFERES SOME ACTIVE ACTIVITIES AND ADLS

## 2025-08-22 ASSESSMENT — PAIN DESCRIPTION - PAIN TYPE: TYPE: ACUTE PAIN

## 2025-08-22 ASSESSMENT — PAIN SCALES - GENERAL: PAINLEVEL_OUTOF10: 3

## 2025-08-22 ASSESSMENT — PAIN DESCRIPTION - LOCATION: LOCATION: LEG

## 2025-08-22 ASSESSMENT — PAIN DESCRIPTION - ONSET: ONSET: ON-GOING

## 2025-08-22 ASSESSMENT — PAIN DESCRIPTION - ORIENTATION: ORIENTATION: RIGHT

## 2025-08-22 ASSESSMENT — PAIN DESCRIPTION - DESCRIPTORS: DESCRIPTORS: ACHING

## 2025-08-22 ASSESSMENT — PAIN DESCRIPTION - FREQUENCY: FREQUENCY: INTERMITTENT

## 2025-08-25 ENCOUNTER — HOSPITAL ENCOUNTER (OUTPATIENT)
Dept: LAB | Age: 75
Discharge: HOME | End: 2025-08-25
Payer: MEDICARE

## 2025-08-25 ENCOUNTER — HOSPITAL ENCOUNTER (OUTPATIENT)
Dept: GENERAL RADIOLOGY | Age: 75
Discharge: HOME | End: 2025-08-25
Payer: MEDICARE

## 2025-08-25 DIAGNOSIS — Z01.810 PRE-OPERATIVE CARDIOVASCULAR EXAMINATION: ICD-10-CM

## 2025-08-25 PROBLEM — L97.211 VENOUS STASIS ULCER OF RIGHT CALF LIMITED TO BREAKDOWN OF SKIN WITH VARICOSE VEINS (HCC): Status: ACTIVE | Noted: 2024-07-19

## 2025-08-25 LAB
ABO + RH BLD: NORMAL
ANION GAP SERPL CALCULATED.3IONS-SCNC: 12 MMOL/L (ref 9–17)
BLOOD BANK COMMENT: NORMAL
BLOOD BANK SAMPLE EXPIRATION: NORMAL
BLOOD GROUP ANTIBODIES SERPL: NEGATIVE
BUN SERPL-MCNC: 21 MG/DL (ref 7–20)
CALCIUM SERPL-MCNC: 10.3 MG/DL (ref 8.3–10.6)
CHLORIDE SERPL-SCNC: 100 MMOL/L (ref 99–110)
CO2 SERPL-SCNC: 27 MMOL/L (ref 21–32)
CREAT SERPL-MCNC: 0.9 MG/DL (ref 0.6–1.2)
ERYTHROCYTE [DISTWIDTH] IN BLOOD BY AUTOMATED COUNT: 14.9 % (ref 11.7–14.9)
GFR, ESTIMATED: 64 ML/MIN/1.73M2
GLUCOSE SERPL-MCNC: 116 MG/DL (ref 74–99)
HCT VFR BLD AUTO: 34.2 % (ref 37–47)
HGB BLD-MCNC: 10 G/DL (ref 12.5–16)
MCH RBC QN AUTO: 26.1 PG (ref 27–31)
MCHC RBC AUTO-ENTMCNC: 29.2 G/DL (ref 32–36)
MCV RBC AUTO: 89.3 FL (ref 78–100)
PLATELET # BLD AUTO: 305 K/UL (ref 140–440)
PMV BLD AUTO: 9.7 FL (ref 7.5–11.1)
POTASSIUM SERPL-SCNC: 4.2 MMOL/L (ref 3.5–5.1)
RBC # BLD AUTO: 3.83 M/UL (ref 4.2–5.4)
SODIUM SERPL-SCNC: 138 MMOL/L (ref 136–145)
WBC OTHER # BLD: 13.5 K/UL (ref 4–10.5)

## 2025-08-25 PROCEDURE — 80048 BASIC METABOLIC PNL TOTAL CA: CPT

## 2025-08-25 PROCEDURE — 86901 BLOOD TYPING SEROLOGIC RH(D): CPT

## 2025-08-25 PROCEDURE — 86850 RBC ANTIBODY SCREEN: CPT

## 2025-08-25 PROCEDURE — 86900 BLOOD TYPING SEROLOGIC ABO: CPT

## 2025-08-25 PROCEDURE — 85027 COMPLETE CBC AUTOMATED: CPT

## 2025-08-25 PROCEDURE — 71046 X-RAY EXAM CHEST 2 VIEWS: CPT

## 2025-08-25 PROCEDURE — 36415 COLL VENOUS BLD VENIPUNCTURE: CPT

## 2025-08-27 ENCOUNTER — HOSPITAL ENCOUNTER (OUTPATIENT)
Age: 75
Setting detail: OUTPATIENT SURGERY
Discharge: HOME | End: 2025-08-27
Attending: INTERNAL MEDICINE | Admitting: INTERNAL MEDICINE
Payer: MEDICARE

## 2025-08-27 VITALS
WEIGHT: 282 LBS | SYSTOLIC BLOOD PRESSURE: 123 MMHG | BODY MASS INDEX: 51.89 KG/M2 | HEART RATE: 60 BPM | RESPIRATION RATE: 15 BRPM | DIASTOLIC BLOOD PRESSURE: 89 MMHG | TEMPERATURE: 97 F | HEIGHT: 62 IN | OXYGEN SATURATION: 94 %

## 2025-08-27 DIAGNOSIS — R93.1 ABNORMAL ECHOCARDIOGRAM: ICD-10-CM

## 2025-08-27 LAB
ARTERIAL PATENCY WRIST A: ABNORMAL
BODY TEMPERATURE: 37
COHGB MFR BLD: 0.2 % (ref 0.5–1.5)
ECHO BSA: 2.37 M2
HCO3 ARTERIAL: 26.7 MMOL/L (ref 21–28)
METHEMOGLOBIN: 0.5 % (ref 0.5–1.5)
OXYHGB MFR BLD: 94.5 %
PCO2 ARTERIAL: 40.6 MMHG (ref 32–45)
PH ARTERIAL: 7.44 (ref 7.35–7.45)
PO2 ARTERIAL: 81 MMHG (ref 83–108)
POSITIVE BASE EXCESS, ART: 2.3 MMOL/L (ref 0–3)

## 2025-08-27 PROCEDURE — 2709999900 HC NON-CHARGEABLE SUPPLY: Performed by: INTERNAL MEDICINE

## 2025-08-27 PROCEDURE — C1751 CATH, INF, PER/CENT/MIDLINE: HCPCS | Performed by: INTERNAL MEDICINE

## 2025-08-27 PROCEDURE — C1769 GUIDE WIRE: HCPCS | Performed by: INTERNAL MEDICINE

## 2025-08-27 PROCEDURE — 6370000000 HC RX 637 (ALT 250 FOR IP): Performed by: INTERNAL MEDICINE

## 2025-08-27 PROCEDURE — 93460 R&L HRT ART/VENTRICLE ANGIO: CPT | Performed by: INTERNAL MEDICINE

## 2025-08-27 PROCEDURE — 7100000011 HC PHASE II RECOVERY - ADDTL 15 MIN: Performed by: INTERNAL MEDICINE

## 2025-08-27 PROCEDURE — 2580000003 HC RX 258: Performed by: INTERNAL MEDICINE

## 2025-08-27 PROCEDURE — 99153 MOD SED SAME PHYS/QHP EA: CPT | Performed by: INTERNAL MEDICINE

## 2025-08-27 PROCEDURE — 82805 BLOOD GASES W/O2 SATURATION: CPT

## 2025-08-27 PROCEDURE — 99152 MOD SED SAME PHYS/QHP 5/>YRS: CPT | Performed by: INTERNAL MEDICINE

## 2025-08-27 PROCEDURE — 6360000004 HC RX CONTRAST MEDICATION: Performed by: INTERNAL MEDICINE

## 2025-08-27 PROCEDURE — 7100000010 HC PHASE II RECOVERY - FIRST 15 MIN: Performed by: INTERNAL MEDICINE

## 2025-08-27 PROCEDURE — C1894 INTRO/SHEATH, NON-LASER: HCPCS | Performed by: INTERNAL MEDICINE

## 2025-08-27 PROCEDURE — 6360000002 HC RX W HCPCS: Performed by: INTERNAL MEDICINE

## 2025-08-27 RX ORDER — ACETAMINOPHEN 325 MG/1
650 TABLET ORAL EVERY 4 HOURS PRN
Status: DISCONTINUED | OUTPATIENT
Start: 2025-08-27 | End: 2025-08-27 | Stop reason: HOSPADM

## 2025-08-27 RX ORDER — SODIUM CHLORIDE 9 MG/ML
INJECTION, SOLUTION INTRAVENOUS CONTINUOUS PRN
Status: COMPLETED | OUTPATIENT
Start: 2025-08-27 | End: 2025-08-27

## 2025-08-27 RX ORDER — SODIUM CHLORIDE 9 MG/ML
INJECTION, SOLUTION INTRAVENOUS PRN
Status: DISCONTINUED | OUTPATIENT
Start: 2025-08-27 | End: 2025-08-27 | Stop reason: HOSPADM

## 2025-08-27 RX ORDER — DIAZEPAM 5 MG/1
5 TABLET ORAL ONCE
Status: COMPLETED | OUTPATIENT
Start: 2025-08-27 | End: 2025-08-27

## 2025-08-27 RX ORDER — DIPHENHYDRAMINE HCL 25 MG
25 TABLET ORAL ONCE
Status: COMPLETED | OUTPATIENT
Start: 2025-08-27 | End: 2025-08-27

## 2025-08-27 RX ORDER — SODIUM CHLORIDE 0.9 % (FLUSH) 0.9 %
5-40 SYRINGE (ML) INJECTION EVERY 12 HOURS SCHEDULED
Status: DISCONTINUED | OUTPATIENT
Start: 2025-08-27 | End: 2025-08-27 | Stop reason: HOSPADM

## 2025-08-27 RX ORDER — SODIUM CHLORIDE 0.9 % (FLUSH) 0.9 %
5-40 SYRINGE (ML) INJECTION PRN
Status: DISCONTINUED | OUTPATIENT
Start: 2025-08-27 | End: 2025-08-27 | Stop reason: HOSPADM

## 2025-08-27 RX ORDER — SODIUM CHLORIDE 9 MG/ML
INJECTION, SOLUTION INTRAVENOUS CONTINUOUS
Status: DISCONTINUED | OUTPATIENT
Start: 2025-08-27 | End: 2025-08-27 | Stop reason: HOSPADM

## 2025-08-27 RX ORDER — IOPAMIDOL 755 MG/ML
INJECTION, SOLUTION INTRAVASCULAR PRN
Status: DISCONTINUED | OUTPATIENT
Start: 2025-08-27 | End: 2025-08-27 | Stop reason: HOSPADM

## 2025-08-27 RX ORDER — MIDAZOLAM HYDROCHLORIDE 1 MG/ML
INJECTION, SOLUTION INTRAMUSCULAR; INTRAVENOUS PRN
Status: DISCONTINUED | OUTPATIENT
Start: 2025-08-27 | End: 2025-08-27 | Stop reason: HOSPADM

## 2025-08-27 RX ADMIN — DIAZEPAM 5 MG: 5 TABLET ORAL at 07:26

## 2025-08-27 RX ADMIN — DIPHENHYDRAMINE HYDROCHLORIDE 25 MG: 25 TABLET ORAL at 07:26

## 2025-08-27 ASSESSMENT — PAIN SCALES - GENERAL: PAINLEVEL_OUTOF10: 0

## 2025-08-28 ENCOUNTER — TELEPHONE (OUTPATIENT)
Dept: CARDIOLOGY CLINIC | Age: 75
End: 2025-08-28

## 2025-08-28 ENCOUNTER — TELEPHONE (OUTPATIENT)
Dept: INTERNAL MEDICINE CLINIC | Age: 75
End: 2025-08-28

## 2025-08-29 ENCOUNTER — TELEPHONE (OUTPATIENT)
Age: 75
End: 2025-08-29

## 2025-08-29 ENCOUNTER — OFFICE VISIT (OUTPATIENT)
Dept: INTERNAL MEDICINE CLINIC | Age: 75
End: 2025-08-29
Payer: MEDICARE

## 2025-08-29 ENCOUNTER — TELEPHONE (OUTPATIENT)
Dept: CARDIOLOGY CLINIC | Age: 75
End: 2025-08-29

## 2025-08-29 ENCOUNTER — HOSPITAL ENCOUNTER (EMERGENCY)
Age: 75
Discharge: HOME | End: 2025-08-29
Attending: EMERGENCY MEDICINE
Payer: MEDICARE

## 2025-08-29 VITALS
TEMPERATURE: 98.2 F | HEIGHT: 62 IN | SYSTOLIC BLOOD PRESSURE: 135 MMHG | HEART RATE: 62 BPM | WEIGHT: 279 LBS | BODY MASS INDEX: 51.34 KG/M2 | OXYGEN SATURATION: 94 % | RESPIRATION RATE: 13 BRPM | DIASTOLIC BLOOD PRESSURE: 51 MMHG

## 2025-08-29 VITALS
HEART RATE: 79 BPM | DIASTOLIC BLOOD PRESSURE: 60 MMHG | WEIGHT: 279 LBS | SYSTOLIC BLOOD PRESSURE: 138 MMHG | BODY MASS INDEX: 51.03 KG/M2 | OXYGEN SATURATION: 95 %

## 2025-08-29 DIAGNOSIS — I10 PRIMARY HYPERTENSION: ICD-10-CM

## 2025-08-29 DIAGNOSIS — K60.2 RECTAL FISSURE: Primary | ICD-10-CM

## 2025-08-29 DIAGNOSIS — I35.0 AORTIC VALVE STENOSIS, ETIOLOGY OF CARDIAC VALVE DISEASE UNSPECIFIED: ICD-10-CM

## 2025-08-29 DIAGNOSIS — B37.2 CANDIDAL INTERTRIGO: Primary | ICD-10-CM

## 2025-08-29 DIAGNOSIS — J45.20 MILD INTERMITTENT ASTHMA WITHOUT COMPLICATION: ICD-10-CM

## 2025-08-29 DIAGNOSIS — E78.2 MIXED HYPERLIPIDEMIA: ICD-10-CM

## 2025-08-29 LAB
ALBUMIN SERPL-MCNC: 3.8 G/DL (ref 3.4–5)
ALBUMIN/GLOB SERPL: 1.2 {RATIO} (ref 1.1–2.2)
ALP SERPL-CCNC: 119 U/L (ref 40–129)
ALT SERPL-CCNC: 12 U/L (ref 10–40)
ANION GAP SERPL CALCULATED.3IONS-SCNC: 11 MMOL/L (ref 9–17)
AST SERPL-CCNC: 16 U/L (ref 15–37)
BASOPHILS # BLD: 0.05 K/UL
BASOPHILS NFR BLD: 0 % (ref 0–1)
BILIRUB SERPL-MCNC: 0.2 MG/DL (ref 0–1)
BUN SERPL-MCNC: 17 MG/DL (ref 7–20)
CALCIUM SERPL-MCNC: 9.7 MG/DL (ref 8.3–10.6)
CHLORIDE SERPL-SCNC: 101 MMOL/L (ref 99–110)
CO2 SERPL-SCNC: 27 MMOL/L (ref 21–32)
CREAT SERPL-MCNC: 0.8 MG/DL (ref 0.6–1.2)
EOSINOPHIL # BLD: 0.46 K/UL
EOSINOPHILS RELATIVE PERCENT: 3 % (ref 0–3)
ERYTHROCYTE [DISTWIDTH] IN BLOOD BY AUTOMATED COUNT: 14.9 % (ref 11.7–14.9)
GFR, ESTIMATED: 65 ML/MIN/1.73M2
GLUCOSE SERPL-MCNC: 90 MG/DL (ref 74–99)
HCT VFR BLD AUTO: 32.1 % (ref 37–47)
HGB BLD-MCNC: 9.9 G/DL (ref 12.5–16)
IMM GRANULOCYTES # BLD AUTO: 0.13 K/UL
IMM GRANULOCYTES NFR BLD: 1 %
INR PPP: 1
LYMPHOCYTES NFR BLD: 3.28 K/UL
LYMPHOCYTES RELATIVE PERCENT: 24 % (ref 24–44)
MCH RBC QN AUTO: 26.2 PG (ref 27–31)
MCHC RBC AUTO-ENTMCNC: 30.8 G/DL (ref 32–36)
MCV RBC AUTO: 84.9 FL (ref 78–100)
MONOCYTES NFR BLD: 0.92 K/UL
MONOCYTES NFR BLD: 7 % (ref 0–5)
NEUTROPHILS NFR BLD: 64 % (ref 36–66)
NEUTS SEG NFR BLD: 8.77 K/UL
PLATELET # BLD AUTO: 301 K/UL (ref 140–440)
PMV BLD AUTO: 9.5 FL (ref 7.5–11.1)
POTASSIUM SERPL-SCNC: 4.1 MMOL/L (ref 3.5–5.1)
PROT SERPL-MCNC: 6.9 G/DL (ref 6.4–8.2)
PROTHROMBIN TIME: 13.6 SEC (ref 11.7–14.5)
RBC # BLD AUTO: 3.78 M/UL (ref 4.2–5.4)
SODIUM SERPL-SCNC: 139 MMOL/L (ref 136–145)
WBC OTHER # BLD: 13.6 K/UL (ref 4–10.5)

## 2025-08-29 PROCEDURE — 1159F MED LIST DOCD IN RCRD: CPT | Performed by: FAMILY MEDICINE

## 2025-08-29 PROCEDURE — 80053 COMPREHEN METABOLIC PANEL: CPT

## 2025-08-29 PROCEDURE — 3075F SYST BP GE 130 - 139MM HG: CPT | Performed by: FAMILY MEDICINE

## 2025-08-29 PROCEDURE — 85025 COMPLETE CBC W/AUTO DIFF WBC: CPT

## 2025-08-29 PROCEDURE — 1124F ACP DISCUSS-NO DSCNMKR DOCD: CPT | Performed by: FAMILY MEDICINE

## 2025-08-29 PROCEDURE — 85610 PROTHROMBIN TIME: CPT

## 2025-08-29 PROCEDURE — 3078F DIAST BP <80 MM HG: CPT | Performed by: FAMILY MEDICINE

## 2025-08-29 PROCEDURE — 99214 OFFICE O/P EST MOD 30 MIN: CPT | Performed by: FAMILY MEDICINE

## 2025-08-29 RX ORDER — MICONAZOLE NITRATE
POWDER (GRAM) MISCELLANEOUS
Qty: 100 G | Refills: 5 | Status: SHIPPED | OUTPATIENT
Start: 2025-08-29

## 2025-08-29 RX ORDER — NITROGLYCERIN 4 MG/G
1 OINTMENT RECTAL EVERY 12 HOURS
Qty: 30 G | Refills: 0 | Status: SHIPPED | OUTPATIENT
Start: 2025-08-29 | End: 2025-09-12

## 2025-08-29 ASSESSMENT — PAIN SCALES - GENERAL: PAINLEVEL_OUTOF10: 0

## (undated) DEVICE — ANGIOGRAPHY KIT CUST MANIFOLD

## (undated) DEVICE — SET IRRIG L94IN ID0.281IN W/ 4.5IN DST FLX CONN 2 LD ON OFF

## (undated) DEVICE — TOWEL,OR,DSP,ST,BLUE,STD,6/PK,12PK/CS: Brand: MEDLINE

## (undated) DEVICE — PAD MATERNITY CURITY ADH STRIP DISP

## (undated) DEVICE — GOWN,ECLIPSE,POLYRNF,BRTHSLV,L,30/CS: Brand: MEDLINE

## (undated) DEVICE — TRAY PREP DRY W/ PREM GLV 2 APPL 6 SPNG 2 UNDPD 1 OVERWRAP

## (undated) DEVICE — NEEDLE HYPO 25GA L0.625IN BLU POLYPR HUB S STL REG BVL STR

## (undated) DEVICE — GOWN,SIRUS,POLYRNF,BRTHSLV,XLN/XL,20/CS: Brand: MEDLINE

## (undated) DEVICE — DRESSING TRNSPAR W5XL4.5IN FLM SHT SEMIPERMEABLE WIND

## (undated) DEVICE — GLOVE ORANGE PI 7   MSG9070

## (undated) DEVICE — FORCEPS BX 240CM JAW 3.2MM L CAP NDL MIC MESH TTH M00513372

## (undated) DEVICE — TUBING, SUCTION, 9/32" X 20', STRAIGHT: Brand: MEDLINE INDUSTRIES, INC.

## (undated) DEVICE — UROLOGIC DRAIN BAG: Brand: UNBRANDED

## (undated) DEVICE — MARKER SURG SKIN UTIL REGULAR/FINE 2 TIP W/ RUL AND 9 LBL

## (undated) DEVICE — INTENDED FOR TISSUE SEPARATION, AND OTHER PROCEDURES THAT REQUIRE A SHARP SURGICAL BLADE TO PUNCTURE OR CUT.: Brand: BARD-PARKER ® STAINLESS STEEL BLADES

## (undated) DEVICE — CATHETER,URETHRAL,VINYL,MALE,16",16 FR: Brand: MEDLINE

## (undated) DEVICE — PAD,NON-ADHERENT,3X8,STERILE,LF,1/PK: Brand: MEDLINE

## (undated) DEVICE — PACK,BASIC,IX: Brand: MEDLINE

## (undated) DEVICE — GLOVE SURG SZ 6 L12IN FNGR THK75MIL WHT LTX POLYMER BEAD

## (undated) DEVICE — Z DISCONTINUED NO SUB IDED TUBING ETCO2 AD L6.5FT NSL ORAL CVD PRNG NONFLARED TIP OVR

## (undated) DEVICE — GOWN,ECLIPSE,POLYRNF,BRTHSLV,XL,30/CS: Brand: MEDLINE

## (undated) DEVICE — GAUZE,SPONGE,4"X4",16PLY,XRAY,STRL,LF: Brand: MEDLINE

## (undated) DEVICE — TOWEL,OR,DSP,ST,WHITE,DLX,XR,4/PK,20PK/C: Brand: MEDLINE

## (undated) DEVICE — GLOVE ORANGE PI 7 1/2   MSG9075

## (undated) DEVICE — PACK,BASIC,SIRUS,V: Brand: MEDLINE

## (undated) DEVICE — SYRINGE MED 20ML STD CLR PLAS LUERLOCK TIP N CTRL DISP

## (undated) DEVICE — DRAPE SHEET ULTRAGARD: Brand: MEDLINE

## (undated) DEVICE — DECANTER FLD 9IN ST BG FOR ASEP TRNSF OF FLD

## (undated) DEVICE — FIAPC® PROBE W/ FILTER 2200 A OD 2.3MM/6.9FR; L 2.2M/7.2FT: Brand: ERBE

## (undated) DEVICE — TELFA NON-ADHERENT ABSORBENT DRESSING: Brand: TELFA

## (undated) DEVICE — PACK,CYSTOSCOPY,PK I,AURORA: Brand: MEDLINE

## (undated) DEVICE — SYRINGE 20ML LL S/C 50

## (undated) DEVICE — SINGLE-USE DIGITAL FLEXIBLE URETEROSCOPE: Brand: LITHOVUE

## (undated) DEVICE — GLOVE SURG SZ 7 CRM LTX FREE POLYISOPRENE POLYMER BEAD ANTI

## (undated) DEVICE — OPEN-END FLEXI-TIP URETERAL CATHETER: Brand: FLEXI-TIP

## (undated) DEVICE — GLOVE SURG SZ 65 CRM LTX FREE POLYISOPRENE POLYMER BEAD ANTI

## (undated) DEVICE — SUTURE PROL SZ 6-0 L24IN NONABSORBABLE BLU L13MM C-1 3/8 8726H

## (undated) DEVICE — INSTINCT ENDOSCOPIC HEMOCLIP: Brand: INSTINCT

## (undated) DEVICE — GOWN,SIRUS,FABRNF,RAGLAN,XL,ST,28/CS: Brand: MEDLINE

## (undated) DEVICE — DRAPE,LITHOTOMY,STERILE: Brand: MEDLINE

## (undated) DEVICE — CATHETER DIAG AD 4FR L100CM STD NYL JUDKINS R 4 TRULUMEN

## (undated) DEVICE — GUIDEWIRE ENDOSCP L150CM DIA0.035IN TIP 3CM PTFE NIT

## (undated) DEVICE — CATHETER,FOLEY,100%SILICONE,16FR,10ML,LF: Brand: MEDLINE

## (undated) DEVICE — PENCIL ES CRD L10FT HND SWCHING ROCK SWCH W/ EDGE COAT BLDE

## (undated) DEVICE — BALL RAYON ABSRB COT L STRL ST 10EA/PK

## (undated) DEVICE — POSITIONER,HEAD,RING CUSHION,9IN,32CS: Brand: MEDLINE

## (undated) DEVICE — SENSOR OXMTR SM AD DISP FOR INVOS SYS

## (undated) DEVICE — URETERAL ACCESS SHEATH SET: Brand: NAVIGATOR

## (undated) DEVICE — CATHETER PULM ART 5FR L100CM PVC 3 LUMN TRUE SZ THRMDIL

## (undated) DEVICE — CATHETER DIAG AD 4FR L110CM 145DEG COR RED HYDRPHLC NYL

## (undated) DEVICE — SYRINGE MED 3ML NDL 18GA L1.5IN BLNT FILL LUERLOCK TIP DISP

## (undated) DEVICE — LOOP VES W25MM THK1MM MAXI RED SIL FLD REPELLENT 100 PER

## (undated) DEVICE — GLOVE SURG SZ 6 THK91MIL LTX FREE SYN POLYISOPRENE ANTI

## (undated) DEVICE — ZINACTIVE USE 2539609 APPLICATOR MEDICATED 10.5 CC SOLUTION HI LT ORNG CHLORAPREP

## (undated) DEVICE — GLOVE SURG SZ 75 L12IN THK75MIL DK GRN LTX FREE

## (undated) DEVICE — CONTAINER,SPECIMEN,OR STERILE,4OZ: Brand: MEDLINE

## (undated) DEVICE — CATHETER ANGIO 4FR L100CM S STL NYL JL4 3 SEG BRAID SFT

## (undated) DEVICE — ELECTRODE ES AD CRDLSS PT RET REM POLYHESIVE

## (undated) DEVICE — STERILE LATEX POWDER-FREE SURGICAL GLOVESWITH NITRILE COATING: Brand: PROTEXIS

## (undated) DEVICE — SOLUTION 1000ML NRML NACL

## (undated) DEVICE — STERILE POLYISOPRENE POWDER-FREE SURGICAL GLOVES: Brand: PROTEXIS

## (undated) DEVICE — TUBING, SUCTION, 3/16" X 10', STRAIGHT: Brand: MEDLINE

## (undated) DEVICE — SUTURE VCRL SZ 4-0 L27IN ABSRB VLT L26MM SH 1/2 CIR J315H

## (undated) DEVICE — SUTURE PROL SZ 6-0 L18IN NONABSORBABLE BLU L13MM C-1 3/8 8718H

## (undated) DEVICE — SUTURE VCRL SZ 3-0 L27IN ABSRB UD L26MM SH 1/2 CIR J416H

## (undated) DEVICE — DRAINBAG,ANTI-REFLUX TOWER,L/F,2000ML,LL: Brand: MEDLINE

## (undated) DEVICE — SUTURE PERMAHAND SZ 2-0 L17X18IN NONABSORBABLE BLK SILK SA65H

## (undated) DEVICE — Z INACTIVE USE 2641837 CLIP LIG M BLU TI HRT SHP WIRE HORZ 600 PER BX

## (undated) DEVICE — GUIDEWIRE VASC L75CM DIA0.035IN TIP L7CM PTFE COAT S STL

## (undated) DEVICE — SHEATH INTRO 4FR L10CM MINI GWIRE L45CM 0.035IN PERIPH KINK

## (undated) DEVICE — STAPLER SKIN L39MM DIA0.53MM CRWN 5.7MM S STL FIX HD PROX

## (undated) DEVICE — NITINOL STONE RETRIEVAL BASKET: Brand: ZERO TIP

## (undated) DEVICE — PREMIUM WET SKIN PREP TRAY CHG: Brand: MEDLINE INDUSTRIES, INC.

## (undated) DEVICE — ELECTRODE ES L2.75IN S STL INSUL BLDE W/ SL EDGE

## (undated) DEVICE — PREMIUM WET SKIN PREP TRAY: Brand: MEDLINE INDUSTRIES, INC.

## (undated) DEVICE — KIT MICROINTRODUCER 4FR ECHOGENIC NDL L7CM 21GA STIFF COAX

## (undated) DEVICE — SHEET POS TRANSFER 46X34 IN 1000 LB PERM COMFORT GLIDE LT LF

## (undated) DEVICE — SOLUTION IV IRRIG WATER 1000ML POUR BRL 2F7114

## (undated) DEVICE — SHEET,T,THYROID,STERILE: Brand: MEDLINE

## (undated) DEVICE — Device

## (undated) DEVICE — ADAPTER,CATHETER/SYRINGE/LUER,STERILE: Brand: MEDLINE

## (undated) DEVICE — GUIDEWIRE VASC L150CM DIA0.035IN FLX END L7CM J 3MM PTFE

## (undated) DEVICE — SKIN PREP TRAY 4 COMPARTM TRAY: Brand: MEDLINE INDUSTRIES, INC.

## (undated) DEVICE — SHEET PT TRANSFER 80X40 IN LAT AIR NYL GRY COMFORT GLIDE

## (undated) DEVICE — ADHESIVE SKIN CLSR 0.7ML TOP DERMBND ADV

## (undated) DEVICE — NEEDLE SCLERO 25GA L240CM OD0.51MM ID0.24MM EXTN L4MM SHTH

## (undated) DEVICE — SNARE VASC L240CM LOOP W10MM SHTH DIA2.4MM RND STIFF CLD

## (undated) DEVICE — SNARE ENDOSCP M L240CM W27MM SHTH DIA2.4MM CHN 2.8MM HEX

## (undated) DEVICE — FORCEPS BX L240CM JAW DIA2.8MM L CAP W/ NDL MIC MESH TOOTH

## (undated) DEVICE — PROTECTOR EYE PT SELF ADH NS OPT GRD LF

## (undated) DEVICE — SHEATH INTRO 5FR L10CM MINI GWIRE L45CM 0.035IN PERIPH KINK

## (undated) DEVICE — COUNTER NDL 30 COUNT FOAM STRP SGL MAG

## (undated) DEVICE — CLIP SM RED INTERN HMOCLP TITAN LIGATING

## (undated) DEVICE — PAD,EYE,1-5/8X2 5/8,STERILE,LF,1/PK: Brand: MEDLINE

## (undated) DEVICE — SUTURE MCRYL SZ 4-0 L18IN ABSRB UD L19MM PS-2 3/8 CIR PRIM Y496G

## (undated) DEVICE — DRAPE,UTILITY,XL,4/PK,STERILE: Brand: MEDLINE

## (undated) DEVICE — ACUSNARE POLYPECTOMY SNARE: Brand: ACUSNARE